# Patient Record
Sex: FEMALE | Employment: UNEMPLOYED | URBAN - METROPOLITAN AREA
[De-identification: names, ages, dates, MRNs, and addresses within clinical notes are randomized per-mention and may not be internally consistent; named-entity substitution may affect disease eponyms.]

---

## 2021-04-23 ENCOUNTER — APPOINTMENT (INPATIENT)
Dept: RADIOLOGY | Facility: HOSPITAL | Age: 56
DRG: 840 | End: 2021-04-23
Payer: COMMERCIAL

## 2021-04-23 ENCOUNTER — HOSPITAL ENCOUNTER (INPATIENT)
Facility: HOSPITAL | Age: 56
LOS: 52 days | Discharge: NURSING, REHAB OR LONG-TERM CARE | DRG: 840 | End: 2021-06-14
Attending: FAMILY MEDICINE | Admitting: INTERNAL MEDICINE
Payer: COMMERCIAL

## 2021-04-23 DIAGNOSIS — G89.3 CANCER RELATED PAIN: ICD-10-CM

## 2021-04-23 DIAGNOSIS — T38.0X5A STEROID-INDUCED HYPERGLYCEMIA: ICD-10-CM

## 2021-04-23 DIAGNOSIS — C85.89 CNS LYMPHOMA (HCC): Primary | ICD-10-CM

## 2021-04-23 DIAGNOSIS — R33.9 URINARY RETENTION: ICD-10-CM

## 2021-04-23 DIAGNOSIS — K59.00 CONSTIPATION: ICD-10-CM

## 2021-04-23 DIAGNOSIS — M25.462 PAIN AND SWELLING OF LEFT KNEE: ICD-10-CM

## 2021-04-23 DIAGNOSIS — R73.9 STEROID-INDUCED HYPERGLYCEMIA: ICD-10-CM

## 2021-04-23 DIAGNOSIS — E43 SEVERE PROTEIN-CALORIE MALNUTRITION (HCC): ICD-10-CM

## 2021-04-23 DIAGNOSIS — R13.10 DYSPHAGIA: ICD-10-CM

## 2021-04-23 DIAGNOSIS — M10.9 ACUTE GOUT: ICD-10-CM

## 2021-04-23 DIAGNOSIS — M25.562 PAIN AND SWELLING OF LEFT KNEE: ICD-10-CM

## 2021-04-23 DIAGNOSIS — I10 HYPERTENSION: ICD-10-CM

## 2021-04-23 PROBLEM — R41.82 ALTERED MENTAL STATUS: Status: ACTIVE | Noted: 2021-04-23

## 2021-04-23 PROCEDURE — 71045 X-RAY EXAM CHEST 1 VIEW: CPT

## 2021-04-23 PROCEDURE — 94760 N-INVAS EAR/PLS OXIMETRY 1: CPT

## 2021-04-23 PROCEDURE — 99223 1ST HOSP IP/OBS HIGH 75: CPT | Performed by: FAMILY MEDICINE

## 2021-04-23 RX ORDER — DEXAMETHASONE SODIUM PHOSPHATE 4 MG/ML
4 INJECTION, SOLUTION INTRA-ARTICULAR; INTRALESIONAL; INTRAMUSCULAR; INTRAVENOUS; SOFT TISSUE 4 TIMES DAILY
COMMUNITY
Start: 2021-04-22 | End: 2021-06-15 | Stop reason: HOSPADM

## 2021-04-23 RX ORDER — ACETAMINOPHEN 500 MG
500 TABLET ORAL EVERY 6 HOURS PRN
Status: ON HOLD | COMMUNITY
Start: 2021-04-22 | End: 2021-06-14

## 2021-04-23 RX ORDER — ONDANSETRON 2 MG/ML
4 INJECTION INTRAMUSCULAR; INTRAVENOUS EVERY 6 HOURS PRN
COMMUNITY
Start: 2021-04-22 | End: 2021-06-15 | Stop reason: HOSPADM

## 2021-04-23 RX ORDER — DEXTROSE AND SODIUM CHLORIDE 5; .45 G/100ML; G/100ML
50 INJECTION, SOLUTION INTRAVENOUS CONTINUOUS
Status: DISCONTINUED | OUTPATIENT
Start: 2021-04-23 | End: 2021-04-27

## 2021-04-23 RX ORDER — DEXAMETHASONE SODIUM PHOSPHATE 4 MG/ML
4 INJECTION, SOLUTION INTRA-ARTICULAR; INTRALESIONAL; INTRAMUSCULAR; INTRAVENOUS; SOFT TISSUE 4 TIMES DAILY
Status: DISCONTINUED | OUTPATIENT
Start: 2021-04-23 | End: 2021-05-07

## 2021-04-23 RX ORDER — STANDARDIZED SENNA CONCENTRATE AND DOCUSATE SODIUM 8.6; 5 MG/1; MG/1
1 TABLET ORAL 2 TIMES DAILY
COMMUNITY
Start: 2021-04-22 | End: 2021-08-05 | Stop reason: HOSPADM

## 2021-04-23 RX ORDER — ALBUTEROL SULFATE 2.5 MG/3ML
2.5 SOLUTION RESPIRATORY (INHALATION) EVERY 6 HOURS PRN
Status: DISCONTINUED | OUTPATIENT
Start: 2021-04-23 | End: 2021-04-23

## 2021-04-23 RX ORDER — AMOXICILLIN 250 MG
1 CAPSULE ORAL 2 TIMES DAILY
Status: DISCONTINUED | OUTPATIENT
Start: 2021-04-23 | End: 2021-05-23

## 2021-04-23 RX ORDER — ALBUTEROL SULFATE 90 UG/1
2 AEROSOL, METERED RESPIRATORY (INHALATION)
COMMUNITY
Start: 2021-04-22 | End: 2021-08-05 | Stop reason: HOSPADM

## 2021-04-23 RX ORDER — FAMOTIDINE 20 MG/1
20 TABLET, FILM COATED ORAL DAILY
Status: ON HOLD | COMMUNITY
Start: 2021-04-22 | End: 2021-09-07 | Stop reason: SDUPTHER

## 2021-04-23 RX ORDER — LIDOCAINE 50 MG/G
1 PATCH TOPICAL DAILY
Status: DISCONTINUED | OUTPATIENT
Start: 2021-04-24 | End: 2021-06-14 | Stop reason: HOSPADM

## 2021-04-23 RX ORDER — ACETAMINOPHEN 325 MG/1
500 TABLET ORAL EVERY 6 HOURS PRN
Status: DISPENSED | OUTPATIENT
Start: 2021-04-23 | End: 2021-05-02

## 2021-04-23 RX ORDER — LIDOCAINE 4 G/G
1 PATCH TOPICAL DAILY
COMMUNITY
Start: 2021-04-23 | End: 2021-07-21 | Stop reason: HOSPADM

## 2021-04-23 RX ORDER — FAMOTIDINE 20 MG/1
20 TABLET, FILM COATED ORAL DAILY
Status: DISCONTINUED | OUTPATIENT
Start: 2021-04-24 | End: 2021-04-27

## 2021-04-23 RX ORDER — ALBUTEROL SULFATE 90 UG/1
2 AEROSOL, METERED RESPIRATORY (INHALATION) EVERY 6 HOURS PRN
Status: DISCONTINUED | OUTPATIENT
Start: 2021-04-23 | End: 2021-06-14 | Stop reason: HOSPADM

## 2021-04-23 RX ORDER — ALBUTEROL SULFATE 2.5 MG/3ML
2.5 SOLUTION RESPIRATORY (INHALATION)
COMMUNITY
Start: 2021-04-22 | End: 2021-07-21 | Stop reason: HOSPADM

## 2021-04-23 RX ORDER — ONDANSETRON 2 MG/ML
4 INJECTION INTRAMUSCULAR; INTRAVENOUS EVERY 6 HOURS PRN
Status: DISCONTINUED | OUTPATIENT
Start: 2021-04-23 | End: 2021-06-14 | Stop reason: HOSPADM

## 2021-04-23 NOTE — PROGRESS NOTES
Pt arrived to unit in stable condition  Pt will not answer orientation questions, open eyes, or respond to questions  Dr Jorge Rodriugez made aware that pt was not responding appropriately to staff, but MD said she responded to her when in room  Dual skin check completed with Landon Primrose, RN- darkened areas noted to bilateral buttocks areas- no skin breakdown noted  "IJ" noted per MARTINE Larsen at [address] [facility] line was placed 3/31  Update 1935: upon assessment pt does not have a IJ central line  Confirmed with Johnson Baumgarten RN, that pt in fact has a hemodialysis catheter  Dr Jorge Rodriguez made aware via tiger text and night shift RN, Jaymie updated

## 2021-04-23 NOTE — ASSESSMENT & PLAN NOTE
· Patient noted to have dysphagia and also decreased p o  Intake  As per [facility] progress note patient had a calorie count and recommended feeding tube placement  · Speech evaluation  · Calorie count  · If the patient continued to have poor p o   Intake may need discussion with the family regarding alternate methods of nutrition

## 2021-04-23 NOTE — H&P
[facility]  H&P- July Echeverria 1965, 49 y o  female MRN: [id number]  Unit/Bed#: [facility] 920-01 Encounter: [id number]  Primary Care Provider: No primary care provider on file  Date and time admitted to hospital: 4/23/2021  4:49 PM    * 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[facility])  Assessment & Plan  · Patient was diagnosed with primary 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through biopsy in [facility] transferred over here for chemotherapy since the [facility] is out of network  · Consult Hematology-Oncology  · Continue with dexamethasone  · As per [facility] progress note patient had discussion with palliative Care and at that time family wants to pursue disease directed therapy     Urinary retention  Assessment & Plan  · Patient developed urinary retention wearing the hospital and heart Cottrell catheter placement  · Plan was voiding trial as an outpatient  · Continue with the Cottrell catheter    Dysphagia  Assessment & Plan  · Patient noted to have dysphagia and also decreased p o  Intake  As per [facility] progress note patient had a calorie count and recommended feeding tube placement  · Speech evaluation  · Calorie count  · If the patient continued to have poor p o  Intake may need discussion with the family regarding alternate methods of nutrition      Altered mental status  Assessment & Plan  · Patient initially presented to [facility] his ultimate [place] with stroke-like symptoms for 2 days  CT scan of the head was concerning for subacute CVA and was transferred to [facility]   · MRI of the brain was more consistent with demyelinating disease patient was started on IV steroids and 1000 mg daily for 5 days  Patient did not have any significant improvement at that time patient had plasmapheresis  And also had  lumbar puncture-negative for infection or malignancy    Patient noted to have persistent encephalopathy so a repeat MRI was obtained which showed worsening of the abnormality seen on the previous MRI and 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· Patient still remained altered  · Continue with the dexamethasone  · Likely secondary to 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· Neurology and Hematology Oncology consult    VTE Prophylaxis:  Hold until hematology evaluation given 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/ sequential compression device   Code Status: full code  POLST: POLST form is not discussed and not completed at this time  Discussion with family:     Anticipated Length of Stay:  Patient will be admitted on an Inpatient basis with an anticipated length of stay of  > 2 midnights  Justification for Hospital Stay:  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Total Time for Visit, including Counseling / Coordination of Care: 70 minutes  Greater than 50% of this total time spent on direct patient counseling and coordination of care  Chief Complaint:   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History of Present Illness:    Arpita Jarvis is a 49 y o  female who presents assessed transfer from [facility] secondary to 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[facility] is out of network for [employer] and needed to be transfer to [facility] for initiation of chemotherapy for her 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Patient initially presented to [facility] his cell [address] and was transferred to [facility] due to suspected stroke  49 y o  female patient who is legally blind at baseline with no other significant past medical history who presented initially on 03/19/21 with left sided facial droop and trouble swallowing for over two days  She also started to complain of blurred vision  and mild posterior headache  She denied any recent febrile illness or flu-like symptoms   She did sustain a fall prior to admission at home  She had a CT of the head which showed an acute to subacute infarct left cerebellum and midbrain  CTA neck and head showed no arterial stenosis or dissection intracranially and patent cervical carotid and vertebral arteries  Patient was outside tPa window and was managed medically  Michelle Bundy showed demyelinating processes such as anti-MOG encephalitis vs neoplastic processes such as 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Edema and expansion of the left aspect of the midbrain, de and left middle cerebellar peduncle was seen as well  Patient also underwent a CT chest/abdomen/pelvis which showed fracture of multiple pubic rami, megan-left for which she was recommended physical therapy and acute rehab  Patient was started on pulse dose steroids on 3/24 1000mg daily for 5 days for suspicion of acute demyelinating encephalomyelitis  Patient underwent a lumbar puncture on 0325/21 by IR which showed high CSF protein, normal glucose and lymphocytosis  Cytology negative for malignancy or lymphoma  She also developed acute urinary retention on 03/25 for which a machado was placed by urology with plan for outpatient follow up with urology for trial of void  Patient was found to have a UTI and completed a 7 day course of antibiotics  When patient's mentation did not improve after steroids, patient was initiated on PLEX for 5 days from 03/30/21 but patient had no major improvements  Patient also had dysphagia and was recommended a dysphagia soft diet  Patient was noticed to have an acute worsening with her mental status on 04/04/21 with repeat CT head showing no new changes  EEG negative for seizures  She had repeat MRI after completing PLEX on 4/7/21 revealing increased enhancement in corpus callosum and RIGHT periventricular white matter and RIGHT aspect of the splenium of the corpus callosum and overall decreasing FLAIR signal abnormality involving the LEFT brainstem and LEFT cerebellar hemispheres with interval decrease in degree of nodular enhancement which has been slowly receding since the first MRI on 03/21/2021   Differential diagnosis at that time included 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although typically not as rapidly progressive and typically more hypercellular), angioinvasive lymphoma (although typically presents with hemorrhages which are not present), autoimmune encephalitis  Neurology recommended repeat LP and cytology and ID was consulted for further infectious work up- CSF PCR negative x 2, CSF cryptococcal ag negative, CSF AFB culture negative  Repeat LP on 4/8/21 revealing 84 WBC (previously 20), elevated protein 134 (previously 222)  Cytology was otherwise reported as negative  Neurosurgery was then consulted and patient underwent a brain biospy on 04/14/21   Patient required ICU stay after the biopsy but was eventually transferred to the floor in a stable  Patient was also treated for acute kidney injury during the hospitalization which was resolved  Patient with poor P O intake   Patient was seen by palliative care and the family was treatment focused  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  Immunohistochemistry is performed with the following results: CD20 positive, CD3 highlights reactive lymphocytes, CD79a positive, CD10 negative, BCL6 positive Patient had a previous CT C/A/P which had not shown any lymphadenopathy or any other signs of metastatic disease elsewhere  MRI cervical/thoraco/lumbar spine on 04/22/21 revealed no evidence of lymphoma in the area  Unfortunately [facility] is not in network for patient insurance  They contacted Dr Pippa Pratt who accepted the patient and wants the patient to be admitted under Medicine  Patient was eventually transferred to [facility]  Patient denied any specific complaints  Patient is [place] speaking  I was able to communicate with her in [address]    Patient follows commands on the right side       Patient and her  is in you was but they have 2 adult children who is in [place]    Review of Systems:    Review of Systems  Cannot be obtained due to patient's mental status  Past Medical and Surgical History: History reviewed  No pertinent past medical history  History reviewed  No pertinent surgical history  Meds/Allergies:    Prior to Admission medications    Medication Sig Start Date End Date Taking? Authorizing Provider   acetaminophen (TYLENOL) 500 mg tablet Take 500 mg by mouth every 6 (six) hours as needed 4/22/21 5/2/21 Yes Historical Provider, MD   albuterol (2 5 mg/3 mL) 0 083 % nebulizer solution Inhale 2 5 mg 4/22/21 4/22/22 Yes Historical Provider, MD   albuterol (PROVENTIL HFA,VENTOLIN HFA) 90 mcg/act inhaler Inhale 2 puffs 4/22/21 4/22/22 Yes Historical Provider, MD   dexamethasone (DECADRON) 4 mg/mL Infuse 4 mg into a venous catheter 4 (four) times a day 4/22/21  Yes Historical Provider, MD   famotidine (PEPCID) 20 mg tablet Take 20 mg by mouth daily 4/22/21 4/22/22 Yes Historical Provider, MD   Lidocaine 4 % PTCH Place 1 patch on the skin daily 4/23/21  Yes Historical Provider, MD   ondansetron (ZOFRAN) 4 mg/2 mL injection Infuse 4 mg into a venous catheter every 6 (six) hours as needed 4/22/21  Yes Historical Provider, MD   senna-docusate sodium (Senokot S) 8 6-50 mg per tablet Take 1 tablet by mouth 2 (two) times a day 4/22/21 4/22/22 Yes Historical Provider, MD     I have been unable to obtain / verify an up to date medication list despite all reasonable attempts  Patient was not on any medication as an outpatient  Allergies:    Allergies   Allergen Reactions    Albumen, Egg - Food Allergy Other (See Comments)       Social History:     Marital Status: Unknown   Occupation:   Patient Pre-hospital Living Situation:   Patient Pre-hospital Level of Mobility:   Patient Pre-hospital Diet Restrictions:   Substance Use History:   Social History     Substance and Sexual Activity   Alcohol Use Never    Frequency: Never     Social History     Tobacco Use   Smoking Status Never Smoker   Smokeless Tobacco Never Used     Social History     Substance and Sexual Activity   Drug Use Not on file Family History:    History reviewed  No pertinent family history  Physical Exam:     Vitals:   Pulse: 79 (04/23/21 1657)  Temperature: 98 2 °F (36 8 °C) (04/23/21 1700)  SpO2: 99 % (04/23/21 1657)    Physical Exam  Constitutional:       Comments: When I entered the room patient's eyes were closed  When I asked her to open her eyes she open her eyes  HENT:      Head:      Comments: Surgical site with staples in     Nose: Nose normal    Eyes:      General: No scleral icterus  Cardiovascular:      Rate and Rhythm: Normal rate and regular rhythm  Pulmonary:      Comments: Decreased breath sounds bilateral  Abdominal:      General: Abdomen is flat  Genitourinary:     Comments: Cottrell catheter present  Musculoskeletal:      Right lower leg: No edema  Left lower leg: No edema  Lymphadenopathy:      Cervical: No cervical adenopathy  Skin:     General: Skin is warm  Neurological:      Mental Status: She is alert  Comments: Opens eyes to voice  Moves right upper and lower extremity on command Strength 4+ out of 5  Attempts to move the left lower extremity on command-  Noted to have muscle wasting in bilateral upper thigh/ calf           Additional Data:     Lab Results: I have personally reviewed pertinent reports  Imaging: I have personally reviewed pertinent reports  XR chest portable    (Results Pending)       EKG, Pathology, and Other Studies Reviewed on Admission:   · EKG:     Allscripts / Epic Records Reviewed: Yes     ** Please Note: This note has been constructed using a voice recognition system   **

## 2021-04-23 NOTE — ASSESSMENT & PLAN NOTE
· Patient was diagnosed with primary 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through biopsy in [facility] transferred over here for chemotherapy since the [facility] is out of network  · Consult Hematology-Oncology  · Continue with dexamethasone  · As per [facility] progress note patient had discussion with palliative Care and at that time family wants to pursue disease directed therapy

## 2021-04-23 NOTE — ASSESSMENT & PLAN NOTE
· Patient initially presented to [facility] his ultimate [place] with stroke-like symptoms for 2 days  CT scan of the head was concerning for subacute CVA and was transferred to [facility]   · MRI of the brain was more consistent with demyelinating disease patient was started on IV steroids and 1000 mg daily for 5 days  Patient did not have any significant improvement at that time patient had plasmapheresis  And also had  lumbar puncture-negative for infection or malignancy    Patient noted to have persistent encephalopathy so a repeat MRI was obtained which showed worsening of the abnormality seen on the previous MRI and 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· Patient still remained altered  · Continue with the dexamethasone  · Likely secondary to 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· Neurology and Hematology Oncology consult

## 2021-04-23 NOTE — ASSESSMENT & PLAN NOTE
· Patient developed urinary retention wearing the hospital and heart Cottrell catheter placement    · Plan was voiding trial as an outpatient  · Continue with the Cottrell catheter

## 2021-04-24 PROBLEM — R53.1 WEAKNESS: Status: ACTIVE | Noted: 2021-04-24

## 2021-04-24 PROBLEM — Z95.828: Status: ACTIVE | Noted: 2021-04-24

## 2021-04-24 PROBLEM — R47.01 APHASIA: Status: ACTIVE | Noted: 2021-04-24

## 2021-04-24 LAB
ALBUMIN SERPL BCP-MCNC: 3.6 G/DL (ref 3.5–5)
ALP SERPL-CCNC: 63 U/L (ref 46–116)
ALT SERPL W P-5'-P-CCNC: 18 U/L (ref 12–78)
ANION GAP SERPL CALCULATED.3IONS-SCNC: 5 MMOL/L (ref 4–13)
AST SERPL W P-5'-P-CCNC: 9 U/L (ref 5–45)
BILIRUB SERPL-MCNC: 0.36 MG/DL (ref 0.2–1)
BUN SERPL-MCNC: 25 MG/DL (ref 5–25)
CALCIUM SERPL-MCNC: 10.2 MG/DL (ref 8.3–10.1)
CHLORIDE SERPL-SCNC: 105 MMOL/L (ref 100–108)
CO2 SERPL-SCNC: 26 MMOL/L (ref 21–32)
CREAT SERPL-MCNC: 0.62 MG/DL (ref 0.6–1.3)
ERYTHROCYTE [DISTWIDTH] IN BLOOD BY AUTOMATED COUNT: 13.2 % (ref 11.6–15.1)
GFR SERPL CREATININE-BSD FRML MDRD: 103 ML/MIN/1.73SQ M
GLUCOSE SERPL-MCNC: 165 MG/DL (ref 65–140)
HCT VFR BLD AUTO: 39.1 % (ref 34.8–46.1)
HGB BLD-MCNC: 13.1 G/DL (ref 11.5–15.4)
MAGNESIUM SERPL-MCNC: 2.1 MG/DL (ref 1.6–2.6)
MCH RBC QN AUTO: 32 PG (ref 26.8–34.3)
MCHC RBC AUTO-ENTMCNC: 33.5 G/DL (ref 31.4–37.4)
MCV RBC AUTO: 96 FL (ref 82–98)
PLATELET # BLD AUTO: 194 THOUSANDS/UL (ref 149–390)
PMV BLD AUTO: 10 FL (ref 8.9–12.7)
POTASSIUM SERPL-SCNC: 4.1 MMOL/L (ref 3.5–5.3)
PREALB SERPL-MCNC: 31.3 MG/DL (ref 18–40)
PROT SERPL-MCNC: 6.9 G/DL (ref 6.4–8.2)
RBC # BLD AUTO: 4.09 MILLION/UL (ref 3.81–5.12)
SODIUM SERPL-SCNC: 136 MMOL/L (ref 136–145)
WBC # BLD AUTO: 7.03 THOUSAND/UL (ref 4.31–10.16)

## 2021-04-24 PROCEDURE — 99253 IP/OBS CNSLTJ NEW/EST LOW 45: CPT | Performed by: INTERNAL MEDICINE

## 2021-04-24 PROCEDURE — 83735 ASSAY OF MAGNESIUM: CPT | Performed by: FAMILY MEDICINE

## 2021-04-24 PROCEDURE — 99222 1ST HOSP IP/OBS MODERATE 55: CPT | Performed by: INTERNAL MEDICINE

## 2021-04-24 PROCEDURE — 85027 COMPLETE CBC AUTOMATED: CPT | Performed by: FAMILY MEDICINE

## 2021-04-24 PROCEDURE — 84134 ASSAY OF PREALBUMIN: CPT | Performed by: FAMILY MEDICINE

## 2021-04-24 PROCEDURE — 92610 EVALUATE SWALLOWING FUNCTION: CPT

## 2021-04-24 PROCEDURE — 80053 COMPREHEN METABOLIC PANEL: CPT | Performed by: FAMILY MEDICINE

## 2021-04-24 RX ORDER — FUROSEMIDE 10 MG/ML
40 INJECTION INTRAMUSCULAR; INTRAVENOUS ONCE
Status: DISCONTINUED | OUTPATIENT
Start: 2021-04-24 | End: 2021-04-24

## 2021-04-24 RX ORDER — TAMSULOSIN HYDROCHLORIDE 0.4 MG/1
0.4 CAPSULE ORAL
Status: DISCONTINUED | OUTPATIENT
Start: 2021-04-24 | End: 2021-06-14 | Stop reason: HOSPADM

## 2021-04-24 RX ADMIN — DEXTROSE AND SODIUM CHLORIDE 75 ML/HR: 5; .45 INJECTION, SOLUTION INTRAVENOUS at 20:45

## 2021-04-24 RX ADMIN — SENNOSIDES, DOCUSATE SODIUM 1 TABLET: 8.6; 5 TABLET ORAL at 08:47

## 2021-04-24 RX ADMIN — FAMOTIDINE 20 MG: 20 TABLET ORAL at 08:47

## 2021-04-24 RX ADMIN — DEXAMETHASONE SODIUM PHOSPHATE 4 MG: 4 INJECTION INTRA-ARTICULAR; INTRALESIONAL; INTRAMUSCULAR; INTRAVENOUS; SOFT TISSUE at 17:31

## 2021-04-24 RX ADMIN — LIDOCAINE 5% 1 PATCH: 700 PATCH TOPICAL at 08:47

## 2021-04-24 RX ADMIN — DEXAMETHASONE SODIUM PHOSPHATE 4 MG: 4 INJECTION INTRA-ARTICULAR; INTRALESIONAL; INTRAMUSCULAR; INTRAVENOUS; SOFT TISSUE at 21:09

## 2021-04-24 RX ADMIN — TAMSULOSIN HYDROCHLORIDE 0.4 MG: 0.4 CAPSULE ORAL at 17:31

## 2021-04-24 RX ADMIN — DEXTROSE AND SODIUM CHLORIDE 75 ML/HR: 5; .45 INJECTION, SOLUTION INTRAVENOUS at 11:42

## 2021-04-24 RX ADMIN — DEXAMETHASONE SODIUM PHOSPHATE 4 MG: 4 INJECTION INTRA-ARTICULAR; INTRALESIONAL; INTRAMUSCULAR; INTRAVENOUS; SOFT TISSUE at 11:38

## 2021-04-24 RX ADMIN — DEXAMETHASONE SODIUM PHOSPHATE 4 MG: 4 INJECTION INTRA-ARTICULAR; INTRALESIONAL; INTRAMUSCULAR; INTRAVENOUS; SOFT TISSUE at 08:47

## 2021-04-24 RX ADMIN — ACETAMINOPHEN 488 MG: 325 TABLET ORAL at 00:47

## 2021-04-24 RX ADMIN — DEXTROSE AND SODIUM CHLORIDE 75 ML/HR: 5; .45 INJECTION, SOLUTION INTRAVENOUS at 00:25

## 2021-04-24 RX ADMIN — SENNOSIDES, DOCUSATE SODIUM 1 TABLET: 8.6; 5 TABLET ORAL at 17:31

## 2021-04-24 RX ADMIN — DEXAMETHASONE SODIUM PHOSPHATE 4 MG: 4 INJECTION INTRA-ARTICULAR; INTRALESIONAL; INTRAMUSCULAR; INTRAVENOUS; SOFT TISSUE at 00:24

## 2021-04-24 NOTE — UTILIZATION REVIEW
Inpatient Admission Authorization Request   NOTIFICATION OF INPATIENT ADMISSION/INPATIENT AUTHORIZATION REQUEST   SERVICING FACILITY:   [facility]  Address: [address] [place] [address], [address] [place] [address]  Tax ID: [id number]  NPI: [id number]  Place of Service: Inpatient [facility]  [address]  Place of Service Code: 24     ATTENDING PROVIDER:  Attending Name and NPI#: Maximino Santana Md [[id number]]  Address: [address] [place] [address], [address] [place] [address]  Phone: [phone]     UTILIZATION REVIEW CONTACT:  Pernell Kayser, Utilization   Network Utilization Review Department  Phone: [phone]  Fax: [phone]  Email: [email or web address]     PHYSICIAN ADVISORY SERVICES:  FOR IOHZ-CC-VUHQ REVIEW - MEDICAL NECESSITY DENIAL  Phone: [phone]  Fax: [phone]  Email: [email or web address]     TYPE OF REQUEST:  Inpatient Status     ADMISSION INFORMATION:  ADMISSION DATE/TIME: 4/23/21 1649  PATIENT DIAGNOSIS CODE/DESCRIPTION:  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[facility] [address] ) [C85 [id number]]  DISCHARGE DATE/TIME: No discharge date for patient encounter  DISCHARGE DISPOSITION (IF DISCHARGED): Final discharge disposition not confirmed     IMPORTANT INFORMATION:  Please contact the Pernell Kayser directly with any questions or concerns regarding this request  Department voicemails are confidential     Send requests for admission clinical reviews, concurrent reviews, approvals, and administrative denials due to lack of clinical to fax [phone]

## 2021-04-24 NOTE — ASSESSMENT & PLAN NOTE
Definite evidence of expressive aphasia, possible component of receptive aphasia as well  Speech therapy on board   However, given that etiology of this is underlying lymphoma, caner-related therapy is the ultimate goal

## 2021-04-24 NOTE — ASSESSMENT & PLAN NOTE
Noted presence of right IJ tunneled double-lumen HD catheter; upon chart review, this was likely inserted to be used for plasmapheresis which she has completed    Will await Oncology recommendations regarding therapeutic options but if no longer needed, can consider IR consult for removal

## 2021-04-24 NOTE — CONSULTS
Medical Oncology/Hematology Consult Note  Senthil Vasquez, female, 49 y o , 1965,  [facility], [id number]     Assessment/Plan:    Assessment in summary, this is a 42-year-old female history of recently diagnosed primary 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Treatment had been recommended in the form of the [facility] program   This involves multi drug cytotoxic chemotherapy  In patients with performance status of 2 or better the fatality rate from toxicity was 6%  Her performance status is ECOG-4  This certainly would increase her potential for toxicity  Given the above, I think it would be reasonable to consider high-dose methotrexate with Rituxan  If she tolerated this without substantial toxicity, Manuela-C and or thiotepa could be added  Again, given her current performance status, I would say that even high-dose methotrexate and Rituxan would be high risk for substantial or even life-threatening toxicities  Not to confuse the matter, another option would be radiation therapy  While this is considered to be generally effective with at least moderate likelihood of improvement in disease, it is usually deferred due to concerns for long-term CNS toxicities  None the less, in her case, due to her poor performance status, I think it is reasonable to consider as a treatment option in her case  I have put in a call to the listed family friend to review this at length  If this is acceptable we will meet in the near future to sign consent forms and proceed with therapy as outlined  Plan see above  History of Present Illness     HPI: Senthil Vasquez is a 49y o  year old female who presents with 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She had been admitted at [facility] in March 19, 2021 with left-sided facial droop and swallowing difficulties  She had long history of legal blindness  Initial MRI imaging suggested demyelinating process versus lymphomatous process  She was started on high-dose steroids    LP showed lymphocytosis  Cytology negative  Symptoms do not improve on steroids  She was started on plasmapheresis March 30, 2021  No significant improvement  Repeat MRI April 7, 2021 showed increasing enhancement of the corpus callosum  Repeat LP was negative for infectious showed agents a nonspecific regarding lymphoma or malignancy  April 20, 2021 she had a biopsy indicating diffuse large B-cell lymphoma, CD 20 positive  CT chest abdomen pelvis showed no disease extracranially  Plan was in place to begin matrix chemotherapy  Insurance issues indicated transition to the [employer] which was accomplished yesterday  WBC 5 3, hemoglobin 12 2, platelet count 720, normal differential   CMP shows glucose 145, otherwise normal     Review of Systems   Constitutional: Negative for appetite change, diaphoresis, fatigue and fever  HENT: Negative for sinus pain  Eyes: Negative for discharge  Respiratory: Negative for cough and shortness of breath  Cardiovascular: Negative for chest pain  Gastrointestinal: Negative for abdominal pain, constipation and diarrhea  Endocrine: Negative for cold intolerance  Genitourinary: Negative for difficulty urinating and hematuria  Musculoskeletal: Negative for joint swelling  Skin: Negative for rash  Allergic/Immunologic: Negative for environmental allergies  Neurological: Negative for dizziness and headaches  Hematological: Negative for adenopathy  Psychiatric/Behavioral: Negative for agitation  Historical Information   History reviewed  No pertinent past medical history  History reviewed  No pertinent surgical history    Social History   Social History     Substance and Sexual Activity   Alcohol Use Never    Frequency: Never     Social History     Substance and Sexual Activity   Drug Use Not on file     Social History     Tobacco Use   Smoking Status Never Smoker   Smokeless Tobacco Never Used     Family History:   Family History   Problem Relation Age of Onset    No Known Problems Mother        Meds/Allergies   all current active meds have been reviewed  Allergies   Allergen Reactions    Albumen, Egg - Food Allergy Other (See Comments)       Objective   Vitals: Blood pressure 141/88, pulse 84, temperature 98 °F (36 7 °C), resp  rate 16, height 5' (1 524 m), weight 46 8 kg (103 lb 2 8 oz), SpO2 98 %  Intake/Output Summary (Last 24 hours) at 4/24/2021 1530  Last data filed at 4/24/2021 1300  Gross per 24 hour   Intake 1240 ml   Output 600 ml   Net 640 ml     Invasive Devices     Peripheral Intravenous Line            Peripheral IV 04/24/21 Left Antecubital less than 1 day          Hemodialysis Catheter            Hemodialysis Catheter Subclavian -- days          Drain            Urethral Catheter -- days                Physical Exam  Constitutional:       Appearance: She is well-developed  Comments: Not communicative  HENT:      Head: Normocephalic and atraumatic  Eyes:      Pupils: Pupils are equal, round, and reactive to light  Neck:      Musculoskeletal: Neck supple  Cardiovascular:      Rate and Rhythm: Normal rate  Heart sounds: No murmur  Pulmonary:      Effort: No respiratory distress  Breath sounds: No wheezing or rales  Abdominal:      General: There is no distension  Palpations: Abdomen is soft  Tenderness: There is no abdominal tenderness  There is no rebound  Musculoskeletal:         General: No tenderness  Lymphadenopathy:      Cervical: No cervical adenopathy  Skin:     General: Skin is warm  Findings: No rash  Neurological:      Mental Status: She is alert and oriented to person, place, and time  Deep Tendon Reflexes: Reflexes normal    Psychiatric:         Thought Content: Thought content normal          Lab Results: I have personally reviewed pertinent reports  Imaging Studies: I have personally reviewed pertinent reports      EKG, Pathology, and Other Studies: I have personally reviewed pertinent reports  Counseling / Coordination of Care  Total floor / unit time spent today 40 minutes  Greater than 50% of total time was spent with the patient and / or family counseling and / or coordination of care  A description of the counseling / coordination of care: see note

## 2021-04-24 NOTE — SPEECH THERAPY NOTE
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[facility])  Active Problems:    Altered mental status    Dysphagia    Urinary retention      Past Medical History  History reviewed  No pertinent past medical history  Past Surgical History  History reviewed  No pertinent surgical history  Summary   Pt presented with s/s suggestive of mild oral dysphagia characterized by prolonged mastication and bolus formation for advanced textures that was better managed with very soft solids  Although she appears to manage advanced textures, the increased amount of time to process appears to lead to fatigue/decreased intake  No pharyngeal s/s of aspiration observed  Recommended Diet: mechanically altered/level 2 diet and thin liquids   Recommended Form of Meds: as tolerated  Currently crushed in puree; trial whole in puree and advance as tolerated  Aspiration precautions and swallowing strategies: upright posture, only feed when fully alert, slow rate of feeding, small bites/sips and alternating bites and sips  Other Recommendations/Considerations: Nutrition consult/ calorie count as ordered at [facility] to assure adequate po intake  Current Medical Status  Per Neuro consult:  Patience Manzo is a 49 y o  female who is legally blind no other past medical problems who presents as a transfer from [facility] secondary to 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on 4/23       Patient presented to [facility] on 3/19 with left-sided facial droop and trouble swallowing since 03/17, she also reported blurred vision, mild posterior headache, recent fall    She had a CT head which revealed acute to subacute infarct left cerebellum and midbrain, CTA showed no arterial stenosis or dissection, out of tPA window, MRI brain revealed demyelinating process suspicion for anti MOG encephalitis vs neoplastic-CNS lymphoma, edema and expansion of left aspect of the midbrain, de, left middle cerebellar peduncle  CT chest,abdomen, pelvis which showed fracture of multiple pubic rami, megan left for which she was recommended PT  She also developed dysphagia and recommended dysphagia soft diet  She had acute worsening in her mental status on 04/04 with repeat CT head no changes, EEG negative for seizure, repeat MRI on 04/07 after completing Plex revealed increased enhancement and carpus callosum and right periventricular white matter and right aspect of splenium of corpus callosum and overall decreased FLAIR signal abnormality involving the LEFT brainstem and LEFT cerebellar hemispheres with interval decrease in degree of nodular enhancement which has been slowly receding since the first MRI on 03/21/2021  Differentials included 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although typically not as rapidly progressive and typically more hypercellular), angioinvasive lymphoma (typically presents with hemorrhages), autoimmune encephalitis  Repeat LP was done- 4/8- 84 WBC (previously 20), elevated protein 134 (previously 222), cytology negative  She underwent brain biopsy on 04/14,       Allergies:  No known food allergies    Past medical history:  Please see H&P for details    Social/Education/Vocational Hx:  Pt lives with family    Swallow Information   Current Risks for Dysphagia & Aspiration: known history of dysphagia since admission to [facility]  Current Symptoms/Concerns: decreased po intake and report of difficulty swallowing  Current Diet: puree/level 1 diet and nectar thick liquids   Baseline Diet: regular prior to admission to [facility]  Reportedly on a "dysphagia soft" diet at [facility]  Baseline Assessment   Behavior/Cognition: pt kept her eyes closed throughout assessment but responded when engaged  Speech/Language Status: limited verbal output and followed basic commands with verbal/tactile cues    Patient Positioning: upright in bed  Pain Status/Interventions/Response to Interventions:   No report of or nonverbal indications of pain  Swallow Mechanism Exam  Limited assessment  Pt has natural dentition  Good lingual movement noted for bolus manipulation and management of material orally  Lip seal was in tact  Voicing was soft but clear  Consistencies Assessed and Performance   Consistencies Administered: thin liquids, puree, soft solids and hard solids  Materials administered included applesauce, rice krispies in milk, toast with butter, thin water via straw  Oral Stage: mild  Mastication was prolonged but effective with toast   Decreased bolus formation noted but pt utilized her tongue to successfully retrieve residual material following initial transfer  A Bolus formation and transfer were functional with no significant oral residue noted for puree and mechanical soft trials  No overt s/s reduced oral control  Pharyngeal Stage: WFL  Swallow Mechanics:  Swallowing initiation appeared prompt  Laryngeal rise was palpated and judged to be within functional limits  No coughing, throat clearing, change in vocal quality or respiratory status noted today  Esophageal Concerns: none reported      Summary and Recommendations (see above)    Results Reviewed with: patient, RN and PCA     Treatment Recommended: dysphagia tx     Frequency of treatment: 3-5x week    Patient Stated Goal: did not state  Dysphagia LTG  -Patient will demonstrate safe and effective oral intake (without overt s/s significant oral/pharyngeal dysphagia including s/s penetration or aspiration) for the highest appropriate diet level       Short Term Goals:  -Pt will tolerate Dysphagia 2/mechanical soft diet and thin liquid with no significant s/s oral or pharyngeal dysphagia across 1-3 diagnostic session/s     -Pt will tolerate Dysphagia 3/advanced (dental soft) diet and thin liquid with no significant s/s oral or pharyngeal dysphagia across 1-3 diagnostic session/s     -Patient will tolerate trials of upgraded food and/or liquid texture with no significant s/s of oral or pharyngeal dysphagia including aspiration across 1-3 diagnostic sessions                 Speech Therapy Prognosis   Prognosis: fair    Prognosis Considerations: age, medical status and therapeutic potential

## 2021-04-24 NOTE — CONSULTS
Consultation - Palliative and Supportive Care   Mekhi Samuels 49 y o  female [id number]    Assessment:      Patient Active Problem List   Diagnosis    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[address] )    Altered mental status    Dysphagia    Urinary retention   Goals of care        Plan:  1  Symptom management -     Recommend global delirium precautions including:      - Establishment of day/night cycle via lights during the day and blinds open  Please limit interruptions at night as medically appropriate  - Provide glasses/hearing aids as apprioriate  - Minimize deliriogenic meds as able  - Provide reorientation including date on board and visible clock  - Avoid restraints as able, frequent verbal reorientations or patient care sitter as appropriate  - PRN tylenol    2  Goals - Patient transferred from [facility] for inpatietn chemo after >1 month in hospital   Presumably full cares  Code Status: level 1   Decisional apparatus:  Patient is not competent on my exam today  If competence is lost, patient's substitute decision maker would default to unknown by [place]  I have reviewed the patient's controlled substance dispensing history in the Prescription Drug Monitoring Program in compliance with the [facility] regulations before prescribing any controlled substances  We appreciate the invitation to be involved in this patient's care  We will follow peripherally  Please do not hesitate to reach our on call provider through our clinic answering service at  should you have acute symptom control concerns  Derotha Phoenix, MD  Palliative and Supportive Care  Clinic/Answering Service: [phone]  You can find me on [employer]!        IDENTIFICATION:  Inpatient consult to Palliative Care  Consult performed by: Derotha Phoenix, MD  Consult ordered by: Rashi Ventura MD        Physician Requesting Consult: Chrissy Torres MD  Reason for Consult / Principal Problem: psychosocial support  Hx and PE limited by: patient participation    HISTORY OF PRESENT ILLNESS:       Anupama Barajas is a 49 y o  female who carried a diagnosis of being legally blind who presented to [facility] after a fall on 3/19  Patient initially thought to have CVA but no found to have 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Per chart review, patient was evaluated by care team at [facility] including Palliative and full cares were to be pursued including inpatient chemo  Given insurance issues she was transferred to [place] [id number] for this  On my evaluation patient will not open her eyes  States "good morning" but then engages no further in conversation  Review of Systems   Unable to perform ROS: mental status change       History reviewed  No pertinent past medical history  History reviewed  No pertinent surgical history    Social History     Socioeconomic History    Marital status: Unknown     Spouse name: Not on file    Number of children: Not on file    Years of education: Not on file    Highest education level: Not on file   Occupational History    Not on file   Social Needs    Financial resource strain: Not on file    Food insecurity     Worry: Not on file     Inability: Not on file    Transportation needs     Medical: Not on file     Non-medical: Not on file   Tobacco Use    Smoking status: Never Smoker    Smokeless tobacco: Never Used   Substance and Sexual Activity    Alcohol use: Never     Frequency: Never    Drug use: Not on file    Sexual activity: Not on file   Lifestyle    Physical activity     Days per week: Not on file     Minutes per session: Not on file    Stress: Not on file   Relationships    Social connections     Talks on phone: Not on file     Gets together: Not on file     Attends [employer] service: Not on file     Active member of club or organization: Not on file     Attends meetings of clubs or organizations: Not on file     Relationship status: Not on file    Intimate partner violence     Fear of current or ex partner: Not on file     Emotionally abused: Not on file     Physically abused: Not on file     Forced sexual activity: Not on file   Other Topics Concern    Not on file   Social History Narrative    Not on file     History reviewed  No pertinent family history  MEDICATIONS / ALLERGIES:    all current active meds have been reviewed and current meds:   Current Facility-Administered Medications   Medication Dose Route Frequency    acetaminophen (TYLENOL) tablet 488 mg  488 mg Oral Q6H PRN    albuterol (PROVENTIL HFA,VENTOLIN HFA) inhaler 2 puff  2 puff Inhalation Q6H PRN    dexamethasone (DECADRON) injection 4 mg  4 mg Intravenous 4x Daily    dextrose 5 % and sodium chloride 0 45 % infusion  75 mL/hr Intravenous Continuous    famotidine (PEPCID) tablet 20 mg  20 mg Oral Daily    lidocaine (LIDODERM) 5 % patch 1 patch  1 patch Topical Daily    ondansetron (ZOFRAN) injection 4 mg  4 mg Intravenous Q6H PRN    senna-docusate sodium (SENOKOT S) 8 6-50 mg per tablet 1 tablet  1 tablet Oral BID       Allergies   Allergen Reactions    Albumen, Egg - Food Allergy Other (See Comments)       OBJECTIVE:    Physical Exam  Physical Exam  Vitals signs and nursing note reviewed  Constitutional:       General: She is not in acute distress  Appearance: She is ill-appearing  HENT:      Head: Atraumatic  Right Ear: External ear normal       Left Ear: External ear normal       Nose: Nose normal    Eyes:      Comments: Eyes clsoed   Cardiovascular:      Rate and Rhythm: Normal rate  Pulmonary:      Effort: No respiratory distress  Abdominal:      General: There is no distension  Skin:     General: Skin is dry  Neurological:      Comments: Not engaging         Lab Results:   I have personally reviewed pertinent labs  , CBC:   Lab Results   Component Value Date    WBC 7 03 04/24/2021    HGB 13 1 04/24/2021    HCT 39 1 04/24/2021    MCV 96 04/24/2021     04/24/2021    MCH 32 0 04/24/2021    MCHC 33 5 04/24/2021 RDW 13 2 04/24/2021    MPV 10 0 04/24/2021   , CMP:   Lab Results   Component Value Date    SODIUM 136 04/24/2021    K 4 1 04/24/2021     04/24/2021    CO2 26 04/24/2021    BUN 25 04/24/2021    CREATININE 0 62 04/24/2021    CALCIUM 10 2 (H) 04/24/2021    AST 9 04/24/2021    ALT 18 04/24/2021    ALKPHOS 63 04/24/2021    EGFR 103 04/24/2021   , BMP:  Lab Results   Component Value Date    SODIUM 136 04/24/2021    K 4 1 04/24/2021     04/24/2021    CO2 26 04/24/2021    BUN 25 04/24/2021    CREATININE 0 62 04/24/2021    GLUC 165 (H) 04/24/2021    CALCIUM 10 2 (H) 04/24/2021    AGAP 5 04/24/2021    EGFR 103 04/24/2021     Imaging Studies: I personally reviewed relevant reports  EKG, Pathology, and Other Studies: I personally reviewed relevant reports    Counseling / Coordination of Care    Total floor / unit time spent today 25+ minutes  Greater than 50% of total time was spent with the patient and / or family counseling and / or coordination of care  A description of the counseling / coordination of care: chart review, med review, brief patietn assessment

## 2021-04-24 NOTE — PROGRESS NOTES
[address] 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[facility])  Assessment & Plan  · Patient was diagnosed with primary 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through biopsy in [facility] transferred over here for chemotherapy since the [facility] is out of network  · Oncology and Palliative care services consulted, appreciate recommendations   · Continue with dexamethasone  · As per [facility] progress note patient had discussion with palliative Care and at that time family wants to pursue disease directed therapy; Also spoke with patient's friend mentioned the chart who confirmed this information, attempted to contact her  Noemí Lindsey, [phone]) but voicemail left  Patient apparently does not speak much English as well but was answering by nodding her head at times and following commands  Presence of permanent central venous catheter  Assessment & Plan  Noted presence of right IJ tunneled double-lumen HD catheter; upon chart review, this was likely inserted to be used for plasmapheresis which she has completed  Will await Oncology recommendations regarding therapeutic options but if no longer needed, can consider IR consult for removal     Weakness  Assessment & Plan  Left greater than right upper and lower extremity weakness with ongoing left lower extremity contracture  Secondary to underlying CNS malignancy  *Decubitus ulcer precautions such as frequent repositioning  Aphasia  Assessment & Plan  Definite evidence of expressive aphasia, possible component of receptive aphasia as well  Speech therapy on board   However, given that etiology of this is underlying lymphoma, caner-related therapy is the ultimate goal      Urinary retention  Assessment & Plan  · Patient developed urinary retention during previous hospitalization  · Plan was voiding trial as an outpatient as she was scheduled for discharge from that facility  · Continue with the Cottrell catheter for today, will initiate Flomax 0 4mg, consider voiding trial    Dysphagia  Assessment & Plan  · Patient noted to have dysphagia and also decreased p o  Intake  As per [facility] progress note patient had a calorie count and recommended feeding tube placement  · Speech evaluation - advance diet with dysphagia 2 and thin liquids; needs assistance with feeds  · Calorie count  · If the patient continued to have poor p o  Intake may need discussion with the family regarding alternate methods of nutrition      Altered mental status  Assessment & Plan  · Patient initially presented to [facility] [place] with stroke-like symptoms for 2 days  CT scan of the head was concerning for subacute CVA and was transferred to [facility]   · MRI of the brain was more consistent with demyelinating disease patient was started on IV steroids and 1000 mg daily for 5 days  Patient did not have any significant improvement at that time patient had plasmapheresis  And also had  lumbar puncture-negative for infection or malignancy    Patient noted to have persistent encephalopathy so a repeat MRI was obtained which showed worsening of the abnormality seen on the previous MRI and 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· Patient following most verbal commands with some exceptions, however large part of this is due to concurrent aphasia from underlying malignancy as well as language barrier  · Continue with the dexamethasone  · Likely secondary to 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· Delirium precautions          Disposition: Anticipated discharge pending oncology recommendations regarding therapeutic options    VTE Prophylaxis - SCDs    Education and Discussions with Family / Patient: Spoke with patient at bedside; discussed current management plan with patient's friend (in chart); attempted to contact her , voicemail left    Current Length of Stay: 1 day(s)  Current Patient Status: Inpatient     Code Status: Level 1 - Full Code      Subjective:   Seen and examined at bedside  Overnight noted  Patient is awake in follow some verbal command but is unable to provide any verbal response, does nod her head but answers yes to any question that is asked, suspect some of this may be a language barrier versus encephalopathy secondary to underlying neoplasm  As per nurse, no diarrhea, cough, fever or chills  Objective:     Vitals:   Temp (24hrs), Av 1 °F (36 7 °C), Min:97 9 °F (36 6 °C), Max:98 2 °F (36 8 °C)    Temp:  [97 9 °F (36 6 °C)-98 2 °F (36 8 °C)] 98 °F (36 7 °C)  HR:  [72-79] 72  Resp:  [16] 16  BP: (132-135)/(84-85) 135/85  SpO2:  [98 %-100 %] 100 %  Body mass index is 20 15 kg/m²  Invasive Devices     Peripheral Intravenous Line            Peripheral IV 21 Left Antecubital less than 1 day          Hemodialysis Catheter            Hemodialysis Catheter Subclavian -- days          Drain            Urethral Catheter -- days                Input and Output Summary (last 24 hours): Intake/Output Summary (Last 24 hours) at 2021 1353  Last data filed at 2021 1140  Gross per 24 hour   Intake 1000 ml   Output 600 ml   Net 400 ml       Physical Exam:     Gen: in bed; room air; appears chronically ill/frail  HEENT: NC/AT, PERRLA, no scleral icterus, moist mucous membranes  No JVD  Right IJ tunneled catheter in right chest wall noted, no signs of infection  RS: symmetric, speaking in full sentences, CTA bilaterally  CVS:  RRR, S1-S2 heard without murmurs, no peripheral edema, radial pulses 2+, capillary refill less than 2 seconds  Abdomen:  Soft, nondistended, no facial grimacing with palpation, bowel sounds normoactive  MSK:  Warm    Left lower extremity contracture evident, significant weakness of 1/5 in left upper and lower extremity, 4/5 in right LE and 5/5 in R upper   :  (+() Cottrell with yellow urine  Neuro:  Alert, nonverbal ; nods her head yes to most questions asked  Psych:  Cooperative        Additional Data:     Labs:    Results from last 7 days   Lab Units 04/24/21  0837   WBC Thousand/uL 7 03   HEMOGLOBIN g/dL 13 1   HEMATOCRIT % 39 1   PLATELETS Thousands/uL 194     Results from last 7 days   Lab Units 04/24/21  0837   POTASSIUM mmol/L 4 1   CHLORIDE mmol/L 105   CO2 mmol/L 26   BUN mg/dL 25   CREATININE mg/dL 0 62   CALCIUM mg/dL 10 2*   ALK PHOS U/L 63   ALT U/L 18   AST U/L 9           * I Have Reviewed All Lab Data Listed Above  * Additional Pertinent Lab Tests Reviewed: [address] Admission Reviewed    Imaging:    Imaging Reports Reviewed Today Include:     IR FLUOROSCOPY GUIDED CENTRAL VENOUS ACCESS    Result Date: 3/31/2021  Impression: Uncomplicated insertion of a tunneled (permanent) Duraflow 2 dialysis catheter via the right internal jugular vein  Workstation:[id number]    MRI BRAIN WWO CONTRAST    Result Date: 4/7/2021  IMPRESSION: 1  Overall changes of the FLAIR signal abnormality and enhancement with significant interval increase in degree of FLAIR hyperintensity from the most recent brain MRI involving the corpus callosum and RIGHT periventricular white matter with associated increased enhancement  Additional increase within the RIGHT aspect of the splenium of the corpus callosum with increased enhancement  2  Overall decreasing FLAIR signal abnormality involving the LEFT brainstem and LEFT cerebellar hemispheres with interval decrease in degree of nodular enhancement which has been slowly receding since the first MRI on 03/21/2021  The FLAIR signal abnormality demonstrates slightly decreased diffusion, suggesting possible cellularity to the changes   The changes over time with different "ages" of lesions is not consistent with ADEM, which is typically a mono-phasic process  Enhancement pattern is also not typical for a demyelinating process  Differential diagnosis includes 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although typically not as rapidly progressive and typically more hypercellular), angioinvasive lymphoma (although typically presents with hemorrhages which are not present), autoimmune encephalitis  Vasculitis is considered less likely given the findings on the prior CTA  No acute hydrocephalus or entrapment of the ventricular system  Radiologist contact information is provided above if needed  Workstation:[id number]    MRI CERVICAL SPINE WWO CONTRAST    Result Date: 4/23/2021  IMPRESSION: 1  No evidence of lymphoma in the cervical spine  2  Small central disc protrusions at C3-4 and C4-5  Workstation:[id number]    MRI THORACIC SPINE WWO CONTRAST    Result Date: 4/23/2021  IMPRESSION: No evidence of lymphoma  Workstation:[id number]    MRI LUMBAR SPINE WWO CONTRAST    Result Date: 4/23/2021  IMPRESSION: 1  No evidence of lymphoma  2  Small central disc extrusion at L1-2 extending superiorly  3  Mild central canal stenosis at L3-4  4  Narrowing of multiple neural foramina as detailed above  5  Grade I spondylolisthesis of L5  Bilateral L5 spondylolysis   Workstation:[id number]        Imaging Personally Reviewed by Myself Includes:  none    Recent Cultures (last 7 days):           Last 24 Hours Medication List:   Current Facility-Administered Medications   Medication Dose Route Frequency Provider Last Rate    acetaminophen  488 mg Oral Q6H PRN Sam Monae MD      albuterol  2 puff Inhalation Q6H PRN Sam Monae MD      dexamethasone  4 mg Intravenous 4x Daily Sam Monae MD      dextrose 5 % and sodium chloride 0 45 %  75 mL/hr Intravenous Continuous Sam Monae MD 75 mL/hr (04/24/21 1142)    famotidine  20 mg Oral Daily Sam Monae MD      lidocaine  1 patch Topical Daily Sam Monae MD      ondansetron  4 mg Intravenous Q6H PRN Justina Romero MD      senna-docusate sodium  1 tablet Oral BID Justina Romero MD      tamsulosin  0 4 mg Oral Daily With Keron Pabon MD              ** Please Note: This note has been constructed using a voice recognition system   **

## 2021-04-24 NOTE — ASSESSMENT & PLAN NOTE
Left greater than right upper and lower extremity weakness with ongoing left lower extremity contracture  Secondary to underlying CNS malignancy  *Decubitus ulcer precautions such as frequent repositioning

## 2021-04-24 NOTE — ASSESSMENT & PLAN NOTE
· Patient developed urinary retention during previous hospitalization  · Plan was voiding trial as an outpatient as she was scheduled for discharge from that facility  · Continue with the Cottrell catheter for today, will initiate Flomax 0 4mg, consider voiding trial

## 2021-04-24 NOTE — ASSESSMENT & PLAN NOTE
· Patient noted to have dysphagia and also decreased p o  Intake  As per [facility] progress note patient had a calorie count and recommended feeding tube placement  · Speech evaluation - advance diet with dysphagia 2 and thin liquids; needs assistance with feeds  · Calorie count  · If the patient continued to have poor p o   Intake may need discussion with the family regarding alternate methods of nutrition

## 2021-04-24 NOTE — ASSESSMENT & PLAN NOTE
· Patient was diagnosed with primary 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through biopsy in [facility] transferred over here for chemotherapy since the [facility] is out of network  · Oncology and Palliative care services consulted, appreciate recommendations   · Continue with dexamethasone  · As per [facility] progress note patient had discussion with palliative Care and at that time family wants to pursue disease directed therapy; Also spoke with patient's friend mentioned the chart who confirmed this information, attempted to contact her  Emir Vigil, [phone]) but voicemail left  Patient apparently does not speak much English as well but was answering by nodding her head at times and following commands

## 2021-04-24 NOTE — RESPIRATORY THERAPY NOTE
RT Protocol Note  Davied No 49 y o  female MRN: [id number]  Unit/Bed#: [facility] 920-01 Encounter: [id number]    Assessment    Principal Problem:    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[address] )  Active Problems:    Altered mental status    Dysphagia    Urinary retention      Home Pulmonary Medications:  none       History reviewed  No pertinent past medical history  Social History     Socioeconomic History    Marital status: Unknown     Spouse name: None    Number of children: None    Years of education: None    Highest education level: None   Occupational History    None   Social Needs    Financial resource strain: None    Food insecurity     Worry: None     Inability: None    Transportation needs     Medical: None     Non-medical: None   Tobacco Use    Smoking status: Never Smoker    Smokeless tobacco: Never Used   Substance and Sexual Activity    Alcohol use: Never     Frequency: Never    Drug use: None    Sexual activity: None   Lifestyle    Physical activity     Days per week: None     Minutes per session: None    Stress: None   Relationships    Social connections     Talks on phone: None     Gets together: None     Attends Buddhist service: None     Active member of club or organization: None     Attends meetings of clubs or organizations: None     Relationship status: None    Intimate partner violence     Fear of current or ex partner: None     Emotionally abused: None     Physically abused: None     Forced sexual activity: None   Other Topics Concern    None   Social History Narrative    None       Subjective         Objective    Physical Exam:   Assessment Type: (P) Assess only  General Appearance: (P) Drowsy  Respiratory Pattern: (P) Normal  Chest Assessment: (P) Chest expansion symmetrical  Bilateral Breath Sounds: (P) Clear    Vitals:  Blood pressure 132/84, pulse 79, temperature 98 2 °F (36 8 °C), SpO2 98 %  Imaging and other studies: I have personally reviewed pertinent reports  Plan    Respiratory Plan: (P) Discontinue Protocol        Resp Comments: (P) Pt admitted from [facility] for chemo tx  Pt has no pulm hx listed in chart  She was found to be on rma with no visible distress and clear BS  No CXR available to review  Pt is not answearing questions for RN or myself at this time, physician aware of this  No therapy needed at this time  Will discontinue prn UDN

## 2021-04-24 NOTE — ASSESSMENT & PLAN NOTE
· Patient initially presented to [facility] his ultimate [place] with stroke-like symptoms for 2 days  CT scan of the head was concerning for subacute CVA and was transferred to [facility]   · MRI of the brain was more consistent with demyelinating disease patient was started on IV steroids and 1000 mg daily for 5 days  Patient did not have any significant improvement at that time patient had plasmapheresis  And also had  lumbar puncture-negative for infection or malignancy    Patient noted to have persistent encephalopathy so a repeat MRI was obtained which showed worsening of the abnormality seen on the previous MRI and 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· Patient following most verbal commands with some exceptions, however large part of this is due to concurrent aphasia from underlying malignancy as well as language barrier  · Continue with the dexamethasone  · Likely secondary to 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· Delirium precautions

## 2021-04-25 PROBLEM — S32.592A FRACTURE OF RAMUS OF LEFT PUBIS (HCC): Status: ACTIVE | Noted: 2021-04-25

## 2021-04-25 LAB
ANION GAP SERPL CALCULATED.3IONS-SCNC: 8 MMOL/L (ref 4–13)
BASOPHILS # BLD MANUAL: 0 THOUSAND/UL (ref 0–0.1)
BASOPHILS NFR MAR MANUAL: 0 % (ref 0–1)
BUN SERPL-MCNC: 18 MG/DL (ref 5–25)
CALCIUM SERPL-MCNC: 9.4 MG/DL (ref 8.3–10.1)
CHLORIDE SERPL-SCNC: 106 MMOL/L (ref 100–108)
CO2 SERPL-SCNC: 23 MMOL/L (ref 21–32)
CREAT SERPL-MCNC: 0.59 MG/DL (ref 0.6–1.3)
EOSINOPHIL # BLD MANUAL: 0 THOUSAND/UL (ref 0–0.4)
EOSINOPHIL NFR BLD MANUAL: 0 % (ref 0–6)
ERYTHROCYTE [DISTWIDTH] IN BLOOD BY AUTOMATED COUNT: 13 % (ref 11.6–15.1)
GFR SERPL CREATININE-BSD FRML MDRD: 104 ML/MIN/1.73SQ M
GLUCOSE SERPL-MCNC: 160 MG/DL (ref 65–140)
HCT VFR BLD AUTO: 33.8 % (ref 34.8–46.1)
HGB BLD-MCNC: 11.5 G/DL (ref 11.5–15.4)
LYMPHOCYTES # BLD AUTO: 1.29 THOUSAND/UL (ref 0.6–4.47)
LYMPHOCYTES # BLD AUTO: 18 % (ref 14–44)
MCH RBC QN AUTO: 31.9 PG (ref 26.8–34.3)
MCHC RBC AUTO-ENTMCNC: 34 G/DL (ref 31.4–37.4)
MCV RBC AUTO: 94 FL (ref 82–98)
METAMYELOCYTES NFR BLD MANUAL: 1 % (ref 0–1)
MONOCYTES # BLD AUTO: 0.65 THOUSAND/UL (ref 0–1.22)
MONOCYTES NFR BLD: 9 % (ref 4–12)
MYELOCYTES NFR BLD MANUAL: 2 % (ref 0–1)
NEUTROPHILS # BLD MANUAL: 5.03 THOUSAND/UL (ref 1.85–7.62)
NEUTS BAND NFR BLD MANUAL: 1 % (ref 0–8)
NEUTS SEG NFR BLD AUTO: 69 % (ref 43–75)
NRBC BLD AUTO-RTO: 0 /100 WBCS
NRBC BLD AUTO-RTO: 1 /100 WBC (ref 0–2)
PLATELET # BLD AUTO: 187 THOUSANDS/UL (ref 149–390)
PLATELET BLD QL SMEAR: ADEQUATE
PMV BLD AUTO: 11 FL (ref 8.9–12.7)
POTASSIUM SERPL-SCNC: 3.7 MMOL/L (ref 3.5–5.3)
RBC # BLD AUTO: 3.6 MILLION/UL (ref 3.81–5.12)
RBC MORPH BLD: NORMAL
SODIUM SERPL-SCNC: 137 MMOL/L (ref 136–145)
WBC # BLD AUTO: 7.19 THOUSAND/UL (ref 4.31–10.16)

## 2021-04-25 PROCEDURE — 85027 COMPLETE CBC AUTOMATED: CPT | Performed by: INTERNAL MEDICINE

## 2021-04-25 PROCEDURE — 99232 SBSQ HOSP IP/OBS MODERATE 35: CPT | Performed by: INTERNAL MEDICINE

## 2021-04-25 PROCEDURE — 85007 BL SMEAR W/DIFF WBC COUNT: CPT | Performed by: INTERNAL MEDICINE

## 2021-04-25 PROCEDURE — 80048 BASIC METABOLIC PNL TOTAL CA: CPT | Performed by: INTERNAL MEDICINE

## 2021-04-25 RX ORDER — HEPARIN SODIUM 5000 [USP'U]/ML
5000 INJECTION, SOLUTION INTRAVENOUS; SUBCUTANEOUS EVERY 8 HOURS SCHEDULED
Status: DISCONTINUED | OUTPATIENT
Start: 2021-04-25 | End: 2021-05-27

## 2021-04-25 RX ADMIN — SENNOSIDES, DOCUSATE SODIUM 1 TABLET: 8.6; 5 TABLET ORAL at 08:53

## 2021-04-25 RX ADMIN — DEXAMETHASONE SODIUM PHOSPHATE 4 MG: 4 INJECTION INTRA-ARTICULAR; INTRALESIONAL; INTRAMUSCULAR; INTRAVENOUS; SOFT TISSUE at 17:00

## 2021-04-25 RX ADMIN — DEXAMETHASONE SODIUM PHOSPHATE 4 MG: 4 INJECTION INTRA-ARTICULAR; INTRALESIONAL; INTRAMUSCULAR; INTRAVENOUS; SOFT TISSUE at 08:11

## 2021-04-25 RX ADMIN — SENNOSIDES, DOCUSATE SODIUM 1 TABLET: 8.6; 5 TABLET ORAL at 17:00

## 2021-04-25 RX ADMIN — DEXAMETHASONE SODIUM PHOSPHATE 4 MG: 4 INJECTION INTRA-ARTICULAR; INTRALESIONAL; INTRAMUSCULAR; INTRAVENOUS; SOFT TISSUE at 21:33

## 2021-04-25 RX ADMIN — HEPARIN SODIUM 5000 UNITS: 5000 INJECTION INTRAVENOUS; SUBCUTANEOUS at 21:33

## 2021-04-25 RX ADMIN — LIDOCAINE 5% 1 PATCH: 700 PATCH TOPICAL at 08:53

## 2021-04-25 RX ADMIN — DEXAMETHASONE SODIUM PHOSPHATE 4 MG: 4 INJECTION INTRA-ARTICULAR; INTRALESIONAL; INTRAMUSCULAR; INTRAVENOUS; SOFT TISSUE at 11:36

## 2021-04-25 RX ADMIN — FAMOTIDINE 20 MG: 20 TABLET ORAL at 08:53

## 2021-04-25 RX ADMIN — HEPARIN SODIUM 5000 UNITS: 5000 INJECTION INTRAVENOUS; SUBCUTANEOUS at 17:00

## 2021-04-25 RX ADMIN — TAMSULOSIN HYDROCHLORIDE 0.4 MG: 0.4 CAPSULE ORAL at 17:00

## 2021-04-25 RX ADMIN — DEXTROSE AND SODIUM CHLORIDE 75 ML/HR: 5; .45 INJECTION, SOLUTION INTRAVENOUS at 11:51

## 2021-04-25 NOTE — PROGRESS NOTES
INTERNAL MEDICINE RESIDENCY PROGRESS NOTE     Name: Toro Barajas   Age & Sex: 49 y o  female   MRN: [id number]  Unit/Bed#: [facility] 920-01   Encounter: [id number]  Team: SOD Team B     PATIENT INFORMATION     Name: Toro Barajas   Age & Sex: 49 y o  female   MRN: [id number] Stay Days: 2    ASSESSMENT/PLAN     Principal Problem:    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[address] )  Active Problems:    Altered mental status    Dysphagia    Urinary retention    Aphasia    Weakness    Presence of permanent central venous catheter    Fracture of ramus of left pubis ([facility])      Fracture of ramus of left pubis [facility])  Assessment & [address] prior to presentation to [facility]  Managed nonoperatively at [facility]    Presence of permanent central venous catheter  Assessment & Plan  Noted presence of right IJ tunneled double-lumen HD catheter; upon chart review, this was likely inserted to be used for plasmapheresis which she has completed  Will await Oncology recommendations regarding therapeutic options but if no longer needed, can consider IR consult for removal     Weakness  Assessment & Plan  Left greater than right upper and lower extremity weakness with ongoing left lower extremity contracture  Secondary to underlying CNS malignancy  *Decubitus ulcer precautions such as frequent repositioning  Aphasia  Assessment & Plan  Definite evidence of expressive aphasia, possible component of receptive aphasia as well  Speech therapy on board  Urinary retention  Assessment & Plan  Patient developed urinary retention during previous hospitalization  Plan was voiding trial as an outpatient as she was scheduled for discharge from that facility  Continue with the Cottrell catheter for today, was started on Flomax 0 4mg, consider voiding trial    Dysphagia  Assessment & Plan  Patient noted to have dysphagia and also decreased p o  Intake    As per [facility] progress note patient had a calorie count and recommended feeding tube placement  Speech evaluation - advance diet with dysphagia 2 and thin liquids; needs assistance with feeds  Calorie count  If the patient continued to have poor p o  Intake may need discussion with the family regarding alternate methods of nutrition    Altered mental status  Assessment & Plan  Patient initially presented to [facility] his ultimate [place] with stroke-like symptoms for 2 days  CT scan of the head was concerning for subacute CVA and was transferred to [facility]   MRI of the brain was more consistent with demyelinating disease patient was started on IV steroids and 1000 mg daily for 5 days  Patient did not have any significant improvement at that time patient had plasmapheresis  And also had  lumbar puncture-negative for infection or malignancy  Patient noted to have persistent encephalopathy so a repeat MRI was obtained which showed worsening of the abnormality seen on the previous MRI and 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Patient following most verbal commands with some exceptions, however large part of this is due to concurrent aphasia from underlying malignancy as well as language barrier  Continue with the dexamethasone  Likely secondary to 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Delirium precautions    * 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[facility])  Assessment & Plan  Patient was diagnosed with primary 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through biopsy in [facility] transferred over here for chemotherapy since the [facility] is out of network  Oncology and Palliative care services consulted, appreciate recommendations   Continue with dexamethasone  As per [facility] progress note patient had discussion with palliative Care and at that time family wants to pursue disease directed therapy  Dr Earl Simpson, oncologist, to meet with pt's family tomorrow afternoon      Disposition: Continue inpatient    SUBJECTIVE     Patient seen and examined  No acute events overnight   She verbalizes "water" and nods and shakes head appropriately to questions, communicates that she has no pain, but is lethargic and has delay in following commands  OBJECTIVE     Vitals:    21 2244 21 0600 21 0758 21 0758   BP: 120/74  139/83 139/83   BP Location:    Right arm   Pulse: 89  76 75   Resp: 18   16   Temp: 99 3 °F (37 4 °C)  98 6 °F (37 °C) 98 6 °F (37 °C)   TempSrc:    Oral   SpO2: 98%  100% 100%   Weight:  46 2 kg (101 lb 13 6 oz)     Height:          Temperature:   Temp (24hrs), Av 6 °F (37 °C), Min:98 °F (36 7 °C), Max:99 3 °F (37 4 °C)    Temperature: 98 6 °F (37 °C)  Intake & Output:  I/O        07 -  07 07 -  07 07 -  0700    P  O  0 560 120    I V  (mL/kg)  1240 (26 8)     Total Intake(mL/kg) 0 (0) 1800 (39) 120 (2 6)    Urine (mL/kg/hr) 250 1425 (1 3)     Total Output 250 1425     Net -250 +375 +120               Weights:   IBW (Ideal Body Weight): 45 5 kg    Body mass index is 19 89 kg/m²  Weight (last 2 days)     Date/Time   Weight    21 06   46 2 (101 85)    21 06   46 8 (103 18)    21 22:14:21   46 7 (102 9)            Physical Exam  Constitutional:       Appearance: She is underweight  HENT:      Head: Normocephalic and atraumatic  Eyes:      General: No scleral icterus  Conjunctiva/sclera: Conjunctivae normal    Cardiovascular:      Rate and Rhythm: Normal rate and regular rhythm  Heart sounds: Normal heart sounds  No murmur  Pulmonary:      Effort: Pulmonary effort is normal       Breath sounds: Normal breath sounds  No wheezing or rales  Abdominal:      General: Bowel sounds are normal  There is no distension  Palpations: Abdomen is soft  Tenderness: There is no abdominal tenderness  There is no guarding  Musculoskeletal:         General: No tenderness or deformity  Right lower leg: No edema  Left lower leg: No edema        Comments: LLE contracted  Diffuse muscle atrophy noted   Skin:     General: Skin is warm and dry  Findings: No erythema  Neurological:      Mental Status: She is lethargic  LABORATORY DATA     Labs: I have personally reviewed pertinent reports  Results from last 7 days   Lab Units 04/25/21  0620 04/24/21  0837   WBC Thousand/uL 7 19 7 03   HEMOGLOBIN g/dL 11 5 13 1   HEMATOCRIT % 33 8* 39 1   PLATELETS Thousands/uL 187 194   MONO PCT % 9  --       Results from last 7 days   Lab Units 04/25/21  0620 04/24/21  0837   POTASSIUM mmol/L 3 7 4 1   CHLORIDE mmol/L 106 105   CO2 mmol/L 23 26   BUN mg/dL 18 25   CREATININE mg/dL 0 59* 0 62   CALCIUM mg/dL 9 4 10 2*   ALK PHOS U/L  --  63   ALT U/L  --  18   AST U/L  --  9     Results from last 7 days   Lab Units 04/24/21  0837   MAGNESIUM mg/dL 2 1                        IMAGING & DIAGNOSTIC TESTING     Radiology Results: I have personally reviewed pertinent reports  Xr Chest Portable    Result Date: 4/24/2021  Impression: Dialysis catheter tip within the superior cavoatrial junction  No pneumothorax  Workstation performed: [id number]     Other Diagnostic Testing: I have personally reviewed pertinent reports      ACTIVE MEDICATIONS     Current Facility-Administered Medications   Medication Dose Route Frequency    acetaminophen (TYLENOL) tablet 488 mg  488 mg Oral Q6H PRN    albuterol (PROVENTIL HFA,VENTOLIN HFA) inhaler 2 puff  2 puff Inhalation Q6H PRN    dexamethasone (DECADRON) injection 4 mg  4 mg Intravenous 4x Daily    dextrose 5 % and sodium chloride 0 45 % infusion  75 mL/hr Intravenous Continuous    famotidine (PEPCID) tablet 20 mg  20 mg Oral Daily    heparin (porcine) subcutaneous injection 5,000 Units  5,000 Units Subcutaneous Q8H [facility]    lidocaine (LIDODERM) 5 % patch 1 patch  1 patch Topical Daily    ondansetron (ZOFRAN) injection 4 mg  4 mg Intravenous Q6H PRN    senna-docusate sodium (SENOKOT S) 8 6-50 mg per tablet 1 tablet  1 tablet Oral BID    tamsulosin (FLOMAX) capsule 0 4 mg  0 4 mg Oral Daily With Dinner       VTE Pharmacologic Prophylaxis: Heparin  VTE Mechanical Prophylaxis: sequential compression device    Portions of the record may have been created with voice recognition software  Occasional wrong word or "sound a like" substitutions may have occurred due to the inherent limitations of voice recognition software    Read the chart carefully and recognize, using context, where substitutions have occurred   ==  Rosalia Nissen, DO  [facility]  Internal Medicine Residency PGY-3

## 2021-04-25 NOTE — ASSESSMENT & PLAN NOTE
Left greater than right upper and lower extremity weakness with ongoing left lower extremity contracture  Secondary to underlying CNS malignancy  Decubitus ulcer precautions such as frequent repositioning

## 2021-04-25 NOTE — ASSESSMENT & PLAN NOTE
· Patient initially presented to [facility] his ultimate [place] with stroke-like symptoms for 2 days  CT scan of the head was concerning for subacute CVA and was transferred to [facility]   · MRI of the brain was more consistent with demyelinating disease patient was started on IV steroids and 1000 mg daily for 5 days  Patient did not have any significant improvement at that time patient had plasmapheresis  And also had  lumbar puncture-negative for infection or malignancy  Patient noted to have persistent encephalopathy so a repeat MRI was obtained which showed worsening of the abnormality seen on the previous MRI and 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· Likely secondary to 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· Delirium precautions   · Currently alert and oriented times 2-3  Follow commands      · Patient's  (does not speak [address]) makes all medical decision for her, confirmed once all medical treatment at this time

## 2021-04-25 NOTE — PROGRESS NOTES
Progress Note - Laura Taylor 49 y o  female MRN: [id number]    Unit/Bed#: [facility] 920-01 Encounter: [id number]      Assessment:  In summary, this is a 62-year-old female history of primary 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Yesterday and today I tried to contact the patient's family  Left message on answering machines  No call backs  In my opinion, the patient is not capable of participating in discussion regarding her cancer condition or treatment options  Until consent can be obtained it will be difficult to move forward with treatment  We will continue to try to contact her family  Addendum:  I was able to talk with family friend Mr Anabella Ramos  I will meet with Mr Anabella Ramos and the patient's  tomorrow afternoon  Plan:  See above  Subjective:   Patient remains in bed  She appears comfortable  Does not respond to verbal stimuli  Eyes closed  Objective:     Vitals: Blood pressure 139/83, pulse 75, temperature 98 6 °F (37 °C), resp  rate 18, height 5' (1 524 m), weight 46 2 kg (101 lb 13 6 oz), SpO2 100 %  ,Body mass index is 19 89 kg/m²  Intake/Output Summary (Last 24 hours) at 4/25/2021 1353  Last data filed at 4/25/2021 0239  Gross per 24 hour   Intake 560 ml   Output 1075 ml   Net -515 ml       Physical Exam:   General Appearance:    Alert, oriented        Eyes:    PERRL   Ears:    Normal external ear canals, both ears   Nose:   Nares normal, septum midline   Throat:   Mucosa moist  Pharynx without injection  Neck:   Supple       Lungs:     Clear to auscultation bilaterally   Chest Wall:    No tenderness or deformity    Heart:    Regular rate and rhythm       Abdomen:     Soft, non-tender, bowel sounds +, no organomegaly           Extremities:   Extremities no cyanosis or edema       Skin:   no rash or icterus      Lymph nodes:   Cervical, supraclavicular, and axillary nodes normal   Neurologic:   CNII-XII intact, normal strength, sensation and reflexes     throughout          Invasive Devices Peripheral Intravenous Line            Peripheral IV 04/24/21 Left Antecubital 1 day          Hemodialysis Catheter            Hemodialysis Catheter Subclavian -- days          Drain            Urethral Catheter -- days                Lab, Imaging and other studies: I have personally reviewed pertinent reports

## 2021-04-25 NOTE — ASSESSMENT & PLAN NOTE
Noted presence of right IJ tunneled double-lumen HD catheter; upon chart review, this was likely inserted to be used for plasmapheresis which she has completed  Plan:  · S/p removal by IR  · Patient now has PICC line

## 2021-04-25 NOTE — ASSESSMENT & PLAN NOTE
Patient was diagnosed with primary 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through biopsy in [facility] transferred over here for chemotherapy since the [facility] is out of network  Had a 2nd family meeting on 04/30 and family has decided to proceed treatment at this time  S/p PICC line for chemotherapy  S/p peg tube placement on 05/10  Plan:  · s/p 2nd cycle of chemotherapy on 05/13, leucovin on 5/14  · DVT prophylaxis with Lovenox  · Rehab placement pending    · S/p steroid taper for cerebral edema from 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· Pancytopenia in setting of recent chemotherapy    Will continue to watch for any signs of infection  · On acyclovir, Diflucan and Levaquin prophylactically  · After discussion with Oncology, plan for rehab, patient does not require radiation therapy, rehab for strength  · Patient's family updated on plan going forward, no questions after family visit  · Repeat MRI shows interval improvement of intracranial multifocal lesions  · Re-consult Oncology on the 06/14/2021 if patient is still inpatient

## 2021-04-25 NOTE — ASSESSMENT & PLAN NOTE
Yisel Sheehan prior to presentation to [facility]   Managed nonoperatively at [facility]

## 2021-04-25 NOTE — UTILIZATION REVIEW
Initial Clinical Review    Admission: Date/Time/Statement:   Admission Orders (From admission, onward)     Ordered        04/23/21 1703  Inpatient Admission  Once                   Orders Placed This Encounter   Procedures    Inpatient Admission     Standing Status:   Standing     Number of Occurrences:   1     Order Specific Question:   Level of Care     Answer:   Med Surg [16]     Order Specific Question:   Estimated length of stay     Answer:   More than 2 Midnights     Order Specific Question:   Certification     Answer:   I certify that inpatient services are medically necessary for this patient for a duration of greater than two midnights  See H&P and MD Progress Notes for additional information about the patient's course of treatment  ED Arrival Information     Patient not seen in ED -- tx from [facility]                     Chief complaint:  recently diagnosed primary 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with need for chemotherapy    Initial Presentation:  49 y o  female who presents to [facility] as a transfer from [facility] d/t [address] [place] [address],[address]  [facility] is out of network for patient's insurance and needed to be tx'd to [facility] for initiation of chemotherapy for her 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On arrival pt still remains altered likely 2/2 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Admit inpatient to M/S/Tele unit -- Continue with dexamethasone  Continue machado cath d/t urinary retention  Plan voiding trial as op  Pt with dysphagia and decreased p o  Intake  Feeding tube recommended at [facility]  Initiate rosy spain, speech therapy consulted  Neurology, Hem/Onc and Palliative care consulted    Hem/Onc consult 4/24 --- Treatment had been recommended in the form of the MATRIX program   This involves multi drug cytotoxic chemotherapy  In patients with performance status of 2 or better the fatality rate from toxicity was 6%  Her performance status is ECOG-4  This certainly would increase her potential for toxicity    Given the above, I think it would be reasonable to consider high-dose methotrexate with Rituxan  If she tolerated this without substantial toxicity, Manuela-C and or thiotepa could be added  Again, given her current performance status, I would say that even high-dose methotrexate and Rituxan would be high risk for substantial or even life-threatening toxicities  Not to confuse the matter, another option would be radiation therapy  While this is considered to be generally effective with at least moderate likelihood of improvement in disease, it is usually deferred due to concerns for long-term CNS toxicities  None the less, in her case, d/t poor performance status, I think it is reasonable to consider as a treatment option in her case  Speech recommends advance diet with dysphagia 2 and thin liquids; needs assistance with feeds  Calorie count    Palliative care consult 4/24 -- Recommend global delirium precautions    Vital Signs:   Date/Time  Temp  Pulse  Resp  BP  MAP (mmHg)  SpO2  O2 Device   04/24/21 22:44:05  99 3 °F (37 4 °C)  89  18  120/74  89  98 %  --   04/24/21 15:23:37  98 °F (36 7 °C)  84  16  141/88  106  98 %  None (Room air)   04/24/21 08:07:53  98 °F (36 7 °C)  72  16  135/85  102  100 %  None (Room air)   04/23/21 22:14:21  97 9 °F (36 6 °C)  73  16  134/85  101  100 %  --   04/23/21 2024  --  --  --  --  --  98 %  None (Room air)   04/23/21 1920  --  --  --  --  --  99 %  None (Room air)   04/23/21 18:38:05  --  --  --  132/84  100  --  --   04/23/21 17:00:13  98 2 °F (36 8 °C)  --  --  --  --  --  --   04/23/21 1700  98 2 °F (36 8 °C)  79  --  --  --  --  --   04/23/21 16:57:45  --  79  --  --  --  99 %  --          Pertinent Labs/Diagnostic Test Results:   CXR 4/24 -- Dialysis catheter tip within the superior cavoatrial junction   No pneumothorax         Results from last 7 days   Lab Units 04/24/21  0837   WBC Thousand/uL 7 03   HEMOGLOBIN g/dL 13 1   HEMATOCRIT % 39 1   PLATELETS Thousands/uL 194   BANDS PCT %  --      Results from last 7 days   Lab Units 04/24/21  0837   SODIUM mmol/L 136   POTASSIUM mmol/L 4 1   CHLORIDE mmol/L 105   CO2 mmol/L 26   ANION GAP mmol/L 5   BUN mg/dL 25   CREATININE mg/dL 0 62   EGFR ml/min/1 73sq m 103   CALCIUM mg/dL 10 2*   MAGNESIUM mg/dL 2 1     Results from last 7 days   Lab Units 04/24/21  0837   AST U/L 9   ALT U/L 18   ALK PHOS U/L 63   TOTAL PROTEIN g/dL 6 9   ALBUMIN g/dL 3 6   TOTAL BILIRUBIN mg/dL 0 36     Results from last 7 days   Lab Units 04/24/21  0837   GLUCOSE RANDOM mg/dL 165*       Admitting Diagnosis: 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HCC) [C85 89]  Age/Sex: 49 y o  female  Admission Orders:  Scheduled Medications:  dexamethasone, 4 mg, Intravenous, 4x Daily  famotidine, 20 mg, Oral, Daily  lidocaine, 1 patch, Topical, Daily  senna-docusate sodium, 1 tablet, Oral, BID  tamsulosin, 0 4 mg, Oral, Daily With Dinner    Continuous IV Infusions:  dextrose 5 % and sodium chloride 0 45 %, 75 mL/hr, Intravenous, Continuous    PRN Meds:  acetaminophen, 488 mg, Oral, Q6H PRN  albuterol, 2 puff, Inhalation, Q6H PRN  ondansetron, 4 mg, Intravenous, Q6H PRN        Network Utilization Review Department  ATTENTION: Please call with any questions or concerns to [phone] and carefully listen to the prompts so that you are directed to the right person  All voicemails are confidential   [facility] all requests for admission clinical reviews, approved or denied determinations and any other requests to dedicated fax number below belonging to the campus where the patient is receiving treatment   List of dedicated fax numbers for the Facilities:  [address] DENIALS (Administrative/Medical Necessity) [phone]   [address] [place] [address] (Maternity/NICU/Pediatrics) 261 [facility],[address] Floor [address] [place] [address] [place] [address] [facility] [address] [place] [address] [address] [address] [facility] [phone]

## 2021-04-25 NOTE — ASSESSMENT & PLAN NOTE
· Patient developed urinary retention during previous hospitalization  · Plan was voiding trial as an outpatient as she was scheduled for discharge from that facility  · Machado catheter was initially removed although patient required re-placement of machado on 05/14 in setting of incontinence and getting chemotherapy    · Machado discontinued, monitor for signs of fluid retention

## 2021-04-26 LAB
ANION GAP SERPL CALCULATED.3IONS-SCNC: 7 MMOL/L (ref 4–13)
BUN SERPL-MCNC: 19 MG/DL (ref 5–25)
CALCIUM SERPL-MCNC: 9.4 MG/DL (ref 8.3–10.1)
CHLORIDE SERPL-SCNC: 103 MMOL/L (ref 100–108)
CO2 SERPL-SCNC: 25 MMOL/L (ref 21–32)
CREAT SERPL-MCNC: 0.54 MG/DL (ref 0.6–1.3)
ERYTHROCYTE [DISTWIDTH] IN BLOOD BY AUTOMATED COUNT: 13 % (ref 11.6–15.1)
GFR SERPL CREATININE-BSD FRML MDRD: 107 ML/MIN/1.73SQ M
GLUCOSE SERPL-MCNC: 145 MG/DL (ref 65–140)
HCT VFR BLD AUTO: 35.1 % (ref 34.8–46.1)
HGB BLD-MCNC: 12 G/DL (ref 11.5–15.4)
MCH RBC QN AUTO: 32.1 PG (ref 26.8–34.3)
MCHC RBC AUTO-ENTMCNC: 34.2 G/DL (ref 31.4–37.4)
MCV RBC AUTO: 94 FL (ref 82–98)
PLATELET # BLD AUTO: 161 THOUSANDS/UL (ref 149–390)
PMV BLD AUTO: 11.7 FL (ref 8.9–12.7)
POTASSIUM SERPL-SCNC: 3.9 MMOL/L (ref 3.5–5.3)
RBC # BLD AUTO: 3.74 MILLION/UL (ref 3.81–5.12)
SODIUM SERPL-SCNC: 135 MMOL/L (ref 136–145)
WBC # BLD AUTO: 8.57 THOUSAND/UL (ref 4.31–10.16)

## 2021-04-26 PROCEDURE — 92526 ORAL FUNCTION THERAPY: CPT

## 2021-04-26 PROCEDURE — 97167 OT EVAL HIGH COMPLEX 60 MIN: CPT

## 2021-04-26 PROCEDURE — 80048 BASIC METABOLIC PNL TOTAL CA: CPT | Performed by: INTERNAL MEDICINE

## 2021-04-26 PROCEDURE — 97163 PT EVAL HIGH COMPLEX 45 MIN: CPT

## 2021-04-26 PROCEDURE — 85027 COMPLETE CBC AUTOMATED: CPT | Performed by: INTERNAL MEDICINE

## 2021-04-26 PROCEDURE — 99233 SBSQ HOSP IP/OBS HIGH 50: CPT | Performed by: INTERNAL MEDICINE

## 2021-04-26 RX ORDER — LEUCOVORIN CALCIUM 50 MG/5ML
25 INJECTION, POWDER, LYOPHILIZED, FOR SOLUTION INTRAMUSCULAR; INTRAVENOUS EVERY 6 HOURS
Status: CANCELLED | OUTPATIENT
Start: 2021-04-28

## 2021-04-26 RX ADMIN — DEXAMETHASONE SODIUM PHOSPHATE 4 MG: 4 INJECTION INTRA-ARTICULAR; INTRALESIONAL; INTRAMUSCULAR; INTRAVENOUS; SOFT TISSUE at 22:04

## 2021-04-26 RX ADMIN — TAMSULOSIN HYDROCHLORIDE 0.4 MG: 0.4 CAPSULE ORAL at 17:00

## 2021-04-26 RX ADMIN — ONDANSETRON 4 MG: 2 INJECTION INTRAMUSCULAR; INTRAVENOUS at 00:52

## 2021-04-26 RX ADMIN — DEXTROSE AND SODIUM CHLORIDE 75 ML/HR: 5; .45 INJECTION, SOLUTION INTRAVENOUS at 00:45

## 2021-04-26 RX ADMIN — HEPARIN SODIUM 5000 UNITS: 5000 INJECTION INTRAVENOUS; SUBCUTANEOUS at 06:54

## 2021-04-26 RX ADMIN — DEXTROSE AND SODIUM CHLORIDE 75 ML/HR: 5; .45 INJECTION, SOLUTION INTRAVENOUS at 17:07

## 2021-04-26 RX ADMIN — DEXAMETHASONE SODIUM PHOSPHATE 4 MG: 4 INJECTION INTRA-ARTICULAR; INTRALESIONAL; INTRAMUSCULAR; INTRAVENOUS; SOFT TISSUE at 09:35

## 2021-04-26 RX ADMIN — HEPARIN SODIUM 5000 UNITS: 5000 INJECTION INTRAVENOUS; SUBCUTANEOUS at 22:04

## 2021-04-26 RX ADMIN — DEXAMETHASONE SODIUM PHOSPHATE 4 MG: 4 INJECTION INTRA-ARTICULAR; INTRALESIONAL; INTRAMUSCULAR; INTRAVENOUS; SOFT TISSUE at 17:00

## 2021-04-26 RX ADMIN — DEXAMETHASONE SODIUM PHOSPHATE 4 MG: 4 INJECTION INTRA-ARTICULAR; INTRALESIONAL; INTRAMUSCULAR; INTRAVENOUS; SOFT TISSUE at 13:41

## 2021-04-26 RX ADMIN — HEPARIN SODIUM 5000 UNITS: 5000 INJECTION INTRAVENOUS; SUBCUTANEOUS at 13:41

## 2021-04-26 RX ADMIN — SENNOSIDES, DOCUSATE SODIUM 1 TABLET: 8.6; 5 TABLET ORAL at 09:34

## 2021-04-26 RX ADMIN — FAMOTIDINE 20 MG: 20 TABLET ORAL at 09:34

## 2021-04-26 RX ADMIN — LIDOCAINE 5% 1 PATCH: 700 PATCH TOPICAL at 09:34

## 2021-04-26 RX ADMIN — SENNOSIDES, DOCUSATE SODIUM 1 TABLET: 8.6; 5 TABLET ORAL at 17:00

## 2021-04-26 NOTE — PROGRESS NOTES
Progress Note - Girish Hawley 49 y o  female MRN: [id number]    [facility] Encounter: [id number]      Assessment:  In summary, this is a 65-year-old female history of recently diagnosed diffuse large B-cell lymphoma, primary 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I reviewed with the patient and her  as well as her son, by phone, who acted as  throughout the course of the interview today  Plan is to proceed with high-dose methotrexate, 3500 g per m2 as well as Rituxan  If she tolerates this well with her 1st cycle Manuela-C and/or thiotepa could be considered working our way into the matrix protocol  This approach is reasonable acknowledge in her poor performance status, ECOG-4  We reviewed potential toxicities including but not limited to nausea, vomiting, cytopenias, renal dysfunction, stomatitis, diarrhea  We reviewed prophylactic measures taken routinely as well as monitoring to allow for early intervention as appropriate  Restaging after 2 months of therapy  The patient's family voiced understanding above discussion and are agreeable to proceed as outlined  Consent form signed  Plan:  See above  Subjective:   Clinically stable  She has no fevers or chills  She has no nausea or vomiting  She has no urinary complaints  She had has no bleeding symptoms  Objective:     Vitals: Blood pressure 145/90, pulse 79, temperature 98 4 °F (36 9 °C), resp  rate 16, height 5' (1 524 m), weight 47 1 kg (103 lb 13 4 oz), SpO2 100 %  ,Body mass index is 20 28 kg/m²  Intake/Output Summary (Last 24 hours) at 4/26/2021 1458  Last data filed at 4/26/2021 1238  Gross per 24 hour   Intake 1220 ml   Output 3175 ml   Net -1955 ml       Physical Exam:  General Appearance:    Alert, oriented        Eyes:    PERRL   Ears:    Normal external ear canals, both ears   Nose:   Nares normal, septum midline   Throat:   Mucosa moist  Pharynx without injection      Neck:   Supple       Lungs:     Clear to auscultation bilaterally   Chest Wall:    No tenderness or deformity    Heart:    Regular rate and rhythm       Abdomen:     Soft, non-tender, bowel sounds +, no organomegaly           Extremities:   Extremities no cyanosis or edema       Skin:   no rash or icterus  Lymph nodes:   Cervical, supraclavicular, and axillary nodes normal   Neurologic:   CNII-XII intact, normal strength, sensation and reflexes     throughout          Invasive Devices     Peripheral Intravenous Line            Peripheral IV 04/24/21 Left Antecubital 2 days          Hemodialysis Catheter            Hemodialysis Catheter Subclavian -- days          Drain            Urethral Catheter -- days                Lab, Imaging and other studies: I have personally reviewed pertinent reports

## 2021-04-26 NOTE — PLAN OF CARE
Problem: Prexisting or High Potential for Compromised Skin Integrity  Goal: Skin integrity is maintained or improved  Description: INTERVENTIONS:  - Identify patients at risk for skin breakdown  - Assess and monitor skin integrity  - Assess and monitor nutrition and hydration status  - Monitor labs   - Assess for incontinence   - Turn and reposition patient  - Assist with mobility/ambulation  - Relieve pressure over bony prominences  - Avoid friction and shearing  - Provide appropriate hygiene as needed including keeping skin clean and dry  - Evaluate need for skin moisturizer/barrier cream  - Collaborate with interdisciplinary team   - Patient/family teaching  - Consider wound care consult   Outcome: Progressing     Problem: Potential for Falls  Goal: Patient will remain free of falls  Description: INTERVENTIONS:  - Assess patient frequently for physical needs  -  Identify cognitive and physical deficits and behaviors that affect risk of falls    -  Strang fall precautions as indicated by assessment   - Educate patient/family on patient safety including physical limitations  - Instruct patient to call for assistance with activity based on assessment  - Modify environment to reduce risk of injury  - Consider OT/PT consult to assist with strengthening/mobility  Outcome: Progressing

## 2021-04-26 NOTE — PHYSICAL THERAPY NOTE
Physical Therapy Evaluation     Patient's Name: Leopold Anis    Admitting Diagnosis  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[facility] [address] ) [C85 89]    Problem List  Patient Active Problem List   Diagnosis    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[facility] [address] )    Altered mental status    Dysphagia    Urinary retention    Aphasia    Weakness    Presence of permanent central venous catheter    Fracture of ramus of left pubis [facility])       Past Medical History  History reviewed  No pertinent past medical history  Past Surgical History  History reviewed  No pertinent surgical history  04/26/21 0839   PT Last Visit   PT Visit Date 04/26/21   Note Type   Note type Evaluation   Pain Assessment   Pain Assessment Tool 0-10   Pain Score No Pain   Home Living   Additional Comments Pt poor historian  Per CM note, pt resides at [facility] where spouse works    Prior Function   Level of [place] Independent with ADLs and functional mobility   Lives With WoodwardManjarrez Help From Family;Friend(s)   ADL Assistance Independent   IADLs Independent   Comments Per CM note, pt was I with ADLs PTA    Restrictions/Precautions   Weight Bearing Precautions Per Order No   Other Precautions Cognitive; Chair Alarm; Bed Alarm;Multiple lines; Fall Risk   General   Family/Caregiver Present No   Cognition   Overall Cognitive Status Impaired   Attention Difficulty attending to directions   Orientation Level Unable to assess   Memory Unable to assess   Following Commands Follows one step commands inconsistently   Comments Pt lethargic throughout therapy session  Able to follow some commands inconsistently  Attempted to use Google translate  RLE Assessment   RLE Assessment   (grossly 2/5 )   LLE Assessment   LLE Assessment   (increased tone; flexed position  grossly 2-/5 )   Bed Mobility   Rolling R 2  Maximal assistance   Additional items Assist x 1; Increased time required   Supine to Sit 2  Maximal assistance   Additional items Assist x 2; Increased time required;LE management;HOB elevated   Sit to Supine 2  Maximal assistance   Additional items Assist x 2; Increased time required;LE management;HOB elevated   Additional Comments Pt supine in bed upon arrival  Pt sat EOB with max x1 to correct L lateral lean  Pt sat EOB ~3 min  Pt returned to supine in bed with alarm on and all needs within reach at the end of therapy session  Transfers   Sit to Stand 2  Maximal assistance   Additional items Assist x 2; Increased time required   Stand to Sit 2  Maximal assistance   Additional items Assist x 2; Increased time required   Additional Comments B/L HHA; unable to tolerate standing for > 30 secs   Ambulation/Elevation   Gait pattern Not tested   Balance   Static Sitting Poor -   Dynamic Sitting Poor -   Static Standing Poor -   Dynamic Standing Poor -   Ambulatory Zero   Activity Tolerance   Activity Tolerance Patient limited by fatigue  (+ cognition )   Medical Staff Made Aware SANCHEZ Casillas    Nurse Made Aware RN cleared pt to participate in therapy session    Assessment   Prognosis Fair   Problem List Decreased strength;Decreased endurance; Impaired balance;Decreased mobility; Decreased cognition   Assessment Pt seen for high complexity PT evaluation  Pt with active PT eval/treat orders  Pt is a 49 y o  female who was admitted to [facility] on 4/23/21 with 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Pt's active problems include: altered mental status, fracture of ramus of L pubis, weakness, aphasia, urinary retention, dysphagia  Pt poor historian, per CM note, pt resides with  at the [facility] where spouse works and was independent prior to hospital admission  Pt has limitations in strength, balance, endurance, and overall functional mobility  Pt requires assist of 2 for all mobility performed this date  Pt performed bed mobility tasks with max Ax2  Pt sat EOB with max Ax1 to correct L lateral lean and maintain upright posture   Pt performed STS transfer from EOB using b/l HHA and max Ax2; pt unable to tolerate standing for more than 30 seconds  Pt was left supine in bed with bed alarm on at the end of PT session with all needs in reach  Pt would benefit from continued PT services while in hospital to address remaining limitations  PT to continue to follow pt and recommends post-acute rehab services after d/c  The patient's AM-PAC Basic Mobility Inpatient Short Form Low Function Raw Score 9 , Standardized Score is 12 55  A standardized score less 42 9 suggests the patient may benefit from discharge to post-acute rehab services  Please also refer to the recommendation of the Physical Therapist for safe discharge planning  Goals   Patient Goals none stated    STG Expiration Date 05/10/21   Short Term Goal #1 STG 1  Pt will be able to perform bed mobility with min x1 in order to improve overall functional mobility and assist in safe d/c  STG 2  Pt will be able to perform functional transfer with modx1 in order to improve overall functional mobility and assist in safe d/c  STG 3  Pt will improve sitting/standing static/dynamic balance 1 grade in order to improve functional mobility and assist in safe d/c  STG 4  Pt will improve LE strength by one grade in order to improve functional mobility and assist in safe d/c  *PT to continue to follow pt to assess ambulation*   Plan   Treatment/Interventions ADL retraining;Functional transfer training;LE strengthening/ROM; Endurance training;Patient/family training;Bed mobility;Spoke to nursing;OT   PT Frequency Other (Comment)  (3-5x/wk )   Recommendation   PT Discharge Recommendation Post acute rehabilitation services   Equipment Recommended   (TBD)   PT - OK to Discharge Yes  (to rehab once medically cleared )   AM-PAC Basic Mobility Inpatient   Turning in Bed Without Bedrails 1   Lying on Back to Sitting on Edge of Flat Bed 1   Moving Bed to Chair 1   Standing Up From Chair 1   Walk in Room 1   Climb 3-5 Stairs 1   Basic Mobility Inpatient Raw Score 6   Turning Head Towards Sound 2 Follow Simple Instructions 1   Low Function Basic Mobility Raw Score 9   Low Function Basic Mobility Standardized Score 12 55         Jakob Su, PT, DPT

## 2021-04-26 NOTE — SPEECH THERAPY NOTE
Speech Language/Pathology    Speech/Language Pathology Progress Note    Patient Name: Satish Grace  [id number] Date: 4/26/2021     Problem List  Principal Problem:    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[address] )  Active Problems:    Altered mental status    Dysphagia    Urinary retention    Aphasia    Weakness    Presence of permanent central venous catheter    Fracture of ramus of left pubis [facility])       Past Medical History  History reviewed  No pertinent past medical history  Past Surgical History  History reviewed  No pertinent surgical history  Subjective:  Patient is awake and alert  PCA initially feeding patient  Objective: The patient is seen for f/u dysphagia therapy at lunch meal  She requires full assistance to feed herself  The patient is assessed with diced peaches and pears with cottage cheese  The patient presents with decreased rotary chew pattern with prolonged mastication time  Lingual residue is observed with all, and patient independently uses lingual sweep to clear buccal cavity  She is unable to adequately close oral cavity for straw and does not have strength to draw from straw  Thin liquids are placed behind bottom teeth  Patient has good oral control and is able to transfer and swallow thin liquid bolus well  Swallow is controled and appears timely  No overt s/s aspiration observed  Intake is 100%  Assessment:  The patient has slow intake, but tolerates mechanical soft diet and thin liquids well  Plan/Recommendations:  Continue current diet and ST to further assess tolerance

## 2021-04-26 NOTE — CASE MANAGEMENT
CM s/w pt's friend (Disha Silver [phone]) and gathered the following information:     HOME:  Pt lives at the [facility] in [address] where  works    LIVES W/:  Spouse    :  Disha Silver (friend) [phone]    INDEPENDENCE:  Ind at baseline and ambulates w/o a device    TRANSPORTATION: Friends     DME:  None reported    [facility]    I/P REHAB:  None reported    INCOME:  None reported     MENTAL HEALTH:  None reported    D&A:  None reported    PCP:  Dr Av Edwards ([phone])    [place]  [facility] [place]    MEDICAL POA:  None reported    TRANSPORTATION AT D/C:  Disha Silver (friend) [phone] (lives 2 hrs away)          CM reviewed d/c planning process including the following: identifying help at home, patient preference for d/c planning needs, Discharge Lounge, [facility] Meds to Bed program, availability of treatment team to discuss questions or concerns patient and/or family may have regarding understanding medications and recognizing signs and symptoms once discharged  CM also encouraged patient to follow up with all recommended appointments after discharge  Patient advised of importance for patient and family to participate in managing patients medical well-being  Patient/caregiver received discharge checklist  Content reviewed  Patient/caregiver encouraged to participate in discharge plan of care prior to discharge home

## 2021-04-26 NOTE — MALNUTRITION/BMI
This medical record reflects one or more clinical indicators suggestive of malnutrition  Malnutrition Findings:   Adult Malnutrition type: Acute illness(due to medical condition resulting in dysphagia, decreased appetite and intake, weight loss)  Adult Degree of Malnutrition: Other severe protein calorie malnutrition  Malnutrition Characteristics: Muscle loss, Inadequate energy, Weight loss(records reveal 13 8%wt loss since 3/19/21; inadquate PO intake while @ [facility] meeting < /=  50% est needs for >/=5 days; loss of muscle mass @ temporals; tx w/ PO diet & supplements; calorie count & consideration for nutrition support via feeding tube)         See Nutrition note dated 4/26/21 for additional details  Completed nutrition assessment is viewable in the nutrition documentation

## 2021-04-26 NOTE — OCCUPATIONAL THERAPY NOTE
Occupational Therapy Evaluation     Patient Name: Niharika RAMÍREZ Date: 4/26/2021  Problem List  Principal Problem:    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[address] )  Active Problems:    Altered mental status    Dysphagia    Urinary retention    Aphasia    Weakness    Presence of permanent central venous catheter    Fracture of ramus of left pubis [facility])    Past Medical History  History reviewed  No pertinent past medical history  Past Surgical History  History reviewed  No pertinent surgical history  04/26/21 0854   OT Last Visit   OT Visit Date 04/26/21   Note Type   Note type Evaluation   Restrictions/Precautions   Weight Bearing Precautions Per Order No   Other Precautions Cognitive; Bed Alarm;Multiple lines; Fall Risk   Pain Assessment   Pain Assessment Tool 0-10   Pain Score No Pain   Home Living   Additional Comments Per chart, pt resides at [facility] where spouse works    Prior Function   Level of [place] Independent with ADLs and functional mobility   Lives With Spouse   ADL Assistance Independent   IADLs Independent   Comments Per chart, pt was I with ADLs PTA    Lifestyle   Autonomy Pt poorly responsive, per chart, pt was I with ADLs PTA    Reciprocal Relationships     Service to Others Unable to obtain   Intrinsic Gratification Will continue to assess    Subjective   Subjective "ok"   ADL   Where Assessed Edge of bed   Eating Assistance 2  Maximal Assistance   Grooming Assistance 2  Maximal Assistance   UB Bathing Assistance 2  Maximal Assistance   LB Bathing Assistance 2  Maximal Assistance   UB Dressing Assistance 2  Maximal Assistance   LB Dressing Assistance 2  Maximal [address]  2  Maximal Assistance   Bed Mobility   Rolling R 2  Maximal assistance   Additional items Assist x 1   Supine to Sit 2  Maximal assistance   Additional items Assist x 2; Increased time required   Sit to Supine 2  Maximal assistance   Additional items Assist x 2; Increased time required Transfers   Sit to Stand 2  Maximal assistance   Additional items Assist x 2; Increased time required   Stand to Sit 2  Maximal assistance   Additional items Assist x 2; Increased time required   Additional Comments B/L HHA    Balance   Static Sitting Poor -   Dynamic Sitting Poor -   Static Standing Poor -   Dynamic Standing Poor -   Ambulatory Zero   Activity Tolerance   Activity Tolerance Patient limited by fatigue  (+ cognition )   Medical Staff Made Aware PT Aristeo Russell    Nurse Made Aware RN clearance for session    RUE Assessment   RUE Assessment   (generally 3/5)   LUE Assessment   LUE Assessment   (increased tone t/o, rigid )   Hand Function   Gross Motor Coordination Impaired   Fine Motor Coordination Impaired   Vision-Basic Assessment   Visual History   (per chart, pt legally blind )   Vision - Complex Assessment   Additional Comments unable to formally assess vision    Cognition   Overall Cognitive Status Impaired   Arousal/Participation Lethargic;Poorly responsive   Attention Difficulty attending to directions   Orientation Level Unable to assess   Memory Unable to assess   Following Commands Follows one step commands inconsistently   Comments Pt lethargic t/o session  Poorly responsive  Attempted to use google translate w/ pt, no increase in attention or interaction    Assessment   Limitation Decreased ADL status; Decreased UE ROM; Decreased UE strength;Decreased cognition;Decreased endurance;Decreased Safe judgement during ADL;Decreased self-care trans;Decreased high-level ADLs   Prognosis Fair   Assessment Pt is a 49 y o  female admitted to [facility] on 4/23/2021 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[facility] [address] s/p fall with fx of ramus of L pubis, AMS  has no past medical history on file  Pt with active OT orders and up with assistance  orders  Pt very lethargic/poorly responsive during session  Attempted both English and Rebeka - pt with no clear preference  Demonstrating global aphasia  Pt only saying "ok" at times t/o session   Per chart, pt lives at [facility] where he  works  Pt was I with ADLs  Upon evaluation, pt currently requires MAX A x2 for transfers and mobility  Pt required MAX A to maintain EOB sitting position with noted L sided lean  Exhibits decreased strength to B/L UE's limiting performance in ADLs/transfers - increased tone and limited ROM noted in LUE  Pt currently  requires MAX A for ADLs overall  Pt intermittently/inconsistently follows commands  Pt is limited at this time 2*: endurance, activity tolerance, functional mobility, forward functional reach, balance, trunk control, functional standing tolerance, decreased I w/ ADLS/IADLS, strength, ROM, aphasia, cognitive impairments, decreased safety awareness and decreased insight into deficits  The following Occupational Performance Areas to address include: eating, grooming, bathing/shower, toilet hygiene, dressing, health maintenance, functional mobility, community mobility and clothing management  Pt to benefit from immediate acute skilled OT to address above deficits, improve overall functional independence, maximize fnxl mobility and reduce caregiver burden  From OT standpoint, recommendation at time of d/c would be STR  Pt was left in bed after session with all current needs met  The patient's raw score on the AM-PAC Daily Activity inpatient short form is 12, standardized score is 30 6, less than 39 4  Patients at this level are likely to benefit from discharge to post-acute rehabilitation services  Please refer to the recommendation of the Occupational Therapist for safe discharge planning  Goals   Patient Goals none stated    LTG Time Frame 10-14   Plan   Treatment Interventions ADL retraining;Functional transfer training;UE strengthening/ROM; Endurance training;Cognitive reorientation;Patient/family training;Equipment evaluation/education; Neuromuscular reeducation; Fine motor coordination activities; Compensatory technique education;Continued evaluation; Energy conservation; Activityengagement   Goal Expiration Date 05/10/21   OT Frequency 3-5x/wk   Recommendation   OT Discharge Recommendation Post acute rehabilitation services   OT - OK to Discharge Yes  (to rehab when medically stable)   AM-PAC Daily Activity Inpatient   Lower Body Dressing 2   Bathing 2   Toileting 2   Upper Body Dressing 2   Grooming 2   Eating 2   Daily Activity Raw Score 12   Daily Activity Standardized Score (Calc for Raw Score >=11) 30 6   AM-PAC Applied Cognition Inpatient   Following a Speech/Presentation 1   Understanding Ordinary Conversation 2   Taking Medications 1   Remembering Where Things Are Placed or Put Away 1   Remembering List of 4-5 Errands 1   Taking Care of Complicated Tasks 1   Applied Cognition Raw Score 7   Applied Cognition Standardized Score 15 17       GOALS    1) Pt will increase activity tolerance to G for 30 min txment sessions    2) Pt will complete UB/LB dressing/self care w/ MIN A using adaptive device and DME as needed    3) Pt will complete bathing w/ MIN A w/ use of AE and DME as needed    4) Pt will complete toileting w/ MIN A w/ G hygiene/thoroughness using DME as needed    5) Pt will improve functional transfers to MIN A on/off all surfaces using DME as needed w/ G balance/safety     6) Pt will improve functional mobility during ADL/IADL/leisure tasks to MIN A using DME as needed w/ G balance/safety     7) Pt will attend to 75% of commands with no more than 3 VCs required    8) Pt will demonstrate G carryover of pt/caregiver education and training as appropriate      9) Pt will demonstrate 100% carryover of energy conservation techniques t/o functional I/ADL/leisure tasks w/o cues s/p skilled education    10) Pt will engage in ongoing cognitive assessment w/ G participation to assist w/ safe d/c planning/recommendations    11) Pt will be A&O to person, place, and situation 100% of the time for increased safety awareness    12) Pt will improve UE strength to 4-/5 for improved ADL performance      Mohit Russell MS, OTR/L

## 2021-04-26 NOTE — PLAN OF CARE
Problem: PHYSICAL THERAPY ADULT  Goal: Performs mobility at highest level of function for planned discharge setting  See evaluation for individualized goals  Description: Treatment/Interventions: ADL retraining, Functional transfer training, LE strengthening/ROM, Endurance training, Patient/family training, Bed mobility, Spoke to nursing, OT  Equipment Recommended: (TBD)       See flowsheet documentation for full assessment, interventions and recommendations  Note: Prognosis: Fair  Problem List: Decreased strength, Decreased endurance, Impaired balance, Decreased mobility, Decreased cognition  Assessment: Pt seen for high complexity PT evaluation  Pt with active PT eval/treat orders  Pt is a 49 y o  female who was admitted to [facility] on 4/23/21 with 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Pt's active problems include: altered mental status, fracture of ramus of L pubis, weakness, aphasia, urinary retention, dysphagia  Pt poor historian, per CM note, pt resides with  at the [facility] where spouse works and was independent prior to hospital admission  Pt has limitations in strength, balance, endurance, and overall functional mobility  Pt requires assist of 2 for all mobility performed this date  Pt performed bed mobility tasks with max Ax2  Pt sat EOB with max Ax1 to correct L lateral lean and maintain upright posture  Pt performed STS transfer from EOB using b/l HHA and max Ax2; pt unable to tolerate standing for more than 30 seconds  Pt was left supine in bed with bed alarm on at the end of PT session with all needs in reach  Pt would benefit from continued PT services while in hospital to address remaining limitations  PT to continue to follow pt and recommends post-acute rehab services after d/c  The patient's AM-PAC Basic Mobility Inpatient Short Form Low Function Raw Score 9 , Standardized Score is 12 55   A standardized score less 42 9 suggests the patient may benefit from discharge to post-acute rehab services  Please also refer to the recommendation of the Physical Therapist for safe discharge planning  PT Discharge Recommendation: Post acute rehabilitation services     PT - OK to Discharge: Yes(to rehab once medically cleared )    See flowsheet documentation for full assessment

## 2021-04-26 NOTE — PLAN OF CARE
Problem: Prexisting or High Potential for Compromised Skin Integrity  Goal: Skin integrity is maintained or improved  Description: INTERVENTIONS:  - Identify patients at risk for skin breakdown  - Assess and monitor skin integrity  - Assess and monitor nutrition and hydration status  - Monitor labs   - Assess for incontinence   - Turn and reposition patient  - Assist with mobility/ambulation  - Relieve pressure over bony prominences  - Avoid friction and shearing  - Provide appropriate hygiene as needed including keeping skin clean and dry  - Evaluate need for skin moisturizer/barrier cream  - Collaborate with interdisciplinary team   - Patient/family teaching  - Consider wound care consult   Outcome: Progressing     Problem: Potential for Falls  Goal: Patient will remain free of falls  Description: INTERVENTIONS:  - Assess patient frequently for physical needs  -  Identify cognitive and physical deficits and behaviors that affect risk of falls    -  [place] fall precautions as indicated by assessment   - Educate patient/family on patient safety including physical limitations  - Instruct patient to call for assistance with activity based on assessment  - Modify environment to reduce risk of injury  - Consider OT/PT consult to assist with strengthening/mobility  Outcome: Progressing

## 2021-04-26 NOTE — NUTRITION
Summary:  Severe malnutrition identified; patient presented from [facility] with evidence of significant wt loss; 2 day calorie count at [facility] 4/21 and 4/22 revealed inadequate PO intake; placement of dobhoff feeding tube was planned for initiation of TF however patient was then transferred to [facility] for initiation of chemo therefore TF was not initiated; patient currently rx for dysphagia 2 thin liquids (mechanical soft) vegetarian diet; RD scheduled ensrue compact with meals today; calorie count planned for 4/26 and 4/27; Goal PO at least 1400kcal/ 55g protein; will follow with calorie count results

## 2021-04-26 NOTE — PLAN OF CARE
Problem: OCCUPATIONAL THERAPY ADULT  Goal: Performs self-care activities at highest level of function for planned discharge setting  See evaluation for individualized goals  Description: Treatment Interventions: ADL retraining, Functional transfer training, UE strengthening/ROM, Endurance training, Cognitive reorientation, Patient/family training, Equipment evaluation/education, Neuromuscular reeducation, Fine motor coordination activities, Compensatory technique education, Continued evaluation, Energy conservation, Activityengagement          See flowsheet documentation for full assessment, interventions and recommendations  Note: Limitation: Decreased ADL status, Decreased UE ROM, Decreased UE strength, Decreased cognition, Decreased endurance, Decreased Safe judgement during ADL, Decreased self-care trans, Decreased high-level ADLs  Prognosis: Fair  Assessment: Pt is a 49 y o  female admitted to [facility] on 4/23/2021 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[address] ), s/p fall with fx of ramus of L pubis, AMS  has no past medical history on file  Pt with active OT orders and up with assistance  orders  Pt very lethargic/poorly responsive during session  Attempted both English and Rebeka - pt with no clear preference  Demonstrating global aphasia  Pt only saying "ok" at times t/o session  Per chart, pt lives at [facility] where he  works  Pt was I with ADLs  Upon evaluation, pt currently requires MAX A x2 for transfers and mobility  Pt required MAX A to maintain EOB sitting position with noted L sided lean  Exhibits decreased strength to B/L UE's limiting performance in ADLs/transfers - increased tone and limited ROM noted in LUE  Pt currently  requires MAX A for ADLs overall  Pt intermittently/inconsistently follows commands   Pt is limited at this time 2*: endurance, activity tolerance, functional mobility, forward functional reach, balance, trunk control, functional standing tolerance, decreased I w/ ADLS/IADLS, strength, ROM, aphasia, cognitive impairments, decreased safety awareness and decreased insight into deficits  The following Occupational Performance Areas to address include: eating, grooming, bathing/shower, toilet hygiene, dressing, health maintenance, functional mobility, community mobility and clothing management  Pt to benefit from immediate acute skilled OT to address above deficits, improve overall functional independence, maximize fnxl mobility and reduce caregiver burden  From OT standpoint, recommendation at time of d/c would be STR  Pt was left in bed after session with all current needs met  The patient's raw score on the AM-PAC Daily Activity inpatient short form is 12, standardized score is 30 6, less than 39 4  Patients at this level are likely to benefit from discharge to post-acute rehabilitation services  Please refer to the recommendation of the Occupational Therapist for safe discharge planning       OT Discharge Recommendation: Post acute rehabilitation services  OT - OK to Discharge: Yes(to rehab when medically stable)

## 2021-04-26 NOTE — PROGRESS NOTES
INTERNAL MEDICINE RESIDENCY PROGRESS NOTE     Name: Drew Lopez   Age & Sex: 49 y o  female   MRN: [id number]  Unit/Bed#: [facility] 920-01   Encounter: [id number]  Team: SOD Team B     PATIENT INFORMATION     Name: Drew Lopez   Age & Sex: 49 y o  female   MRN: [id number] Stay Days: 3    ASSESSMENT/PLAN     Principal Problem:    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[facility] [address] )  Active Problems:    Altered mental status    Dysphagia    Urinary retention    Aphasia    Weakness    Presence of permanent central venous catheter    Fracture of ramus of left pubis ([facility] [address] )      * 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[facility] [address] )  Assessment & Plan  Patient was diagnosed with primary 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through biopsy in [facility] transferred over here for chemotherapy since the [facility] is out of network  Oncology and Palliative care services consulted, appreciate recommendations   Continue with dexamethasone  As per [facility] progress note patient had discussion with palliative Care and at that time family wants to pursue disease directed therapy  Dr Mary Lee, oncologist, to meet with pt's family on 4/26    Altered mental status  Assessment & Plan  Patient initially presented to [facility] his ultimate [place] with stroke-like symptoms for 2 days  CT scan of the head was concerning for subacute CVA and was transferred to [facility]   MRI of the brain was more consistent with demyelinating disease patient was started on IV steroids and 1000 mg daily for 5 days  Patient did not have any significant improvement at that time patient had plasmapheresis  And also had  lumbar puncture-negative for infection or malignancy    Patient noted to have persistent encephalopathy so a repeat MRI was obtained which showed worsening of the abnormality seen on the previous MRI and 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Continue with the dexamethasone  Likely secondary to 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Delirium precautions  Currently alert and oriented times 2-3  Intermittently follow commands  Moves all extremities spontaneously  Fracture of ramus of left pubis [facility])  Assessment & Plan  Nam [place] prior to presentation to [facility]  Managed nonoperatively at [facility]    Presence of permanent central venous catheter  Assessment & Plan  Noted presence of right IJ tunneled double-lumen HD catheter; upon chart review, this was likely inserted to be used for plasmapheresis which she has completed  Will await Oncology recommendations regarding therapeutic options but if no longer needed, can consider IR consult for removal     Weakness  Assessment & Plan  Left greater than right upper and lower extremity weakness with ongoing left lower extremity contracture  Secondary to underlying CNS malignancy  *Decubitus ulcer precautions such as frequent repositioning  Aphasia  Assessment & Plan  Definite evidence of expressive aphasia, possible component of receptive aphasia as well  Speech therapy on board  Urinary retention  Assessment & Plan  Patient developed urinary retention during previous hospitalization  Plan was voiding trial as an outpatient as she was scheduled for discharge from that facility  Continue with the Cottrell catheter for today, was started on Flomax 0 4mg, consider voiding trial    Dysphagia  Assessment & Plan  Patient noted to have dysphagia and also decreased p o  Intake  As per [facility] progress note patient had a calorie count and recommended feeding tube placement  Speech evaluation - advance diet with dysphagia 2 and thin liquids; needs assistance with feeds  Calorie count  If the patient continued to have poor p o  Intake may need discussion with the family regarding alternate methods of nutrition      Disposition:  Family meeting today  SUBJECTIVE     Patient seen and examined  No acute events overnight  Alert and oriented times 2-3  Moves all extremities spontaneously    Intermittently follow commands  OBJECTIVE     Vitals:    21 1530 21 2212 21 0553 21 0715   BP: 127/79 147/91  145/90   BP Location:       Pulse: (!) 106 73  79   Resp:  18  16   Temp: 98 1 °F (36 7 °C) 98 1 °F (36 7 °C)  98 4 °F (36 9 °C)   TempSrc:       SpO2: 98% 99%  100%   Weight:   47 1 kg (103 lb 13 4 oz)    Height:          Temperature:   Temp (24hrs), Av 2 °F (36 8 °C), Min:98 1 °F (36 7 °C), Max:98 4 °F (36 9 °C)    Temperature: 98 4 °F (36 9 °C)  Intake & Output:  I/O        07 -  0700  07 -  07 07 -  0700    P  O  560 240     I V  (mL/kg) 1240 (26 8) 1000 (21 2)     Total Intake(mL/kg) 1800 (39) 1240 (26 3)     Urine (mL/kg/hr) 1425 (1 3) 2275 (2) 900 (4 4)    Stool   0    Total Output 1425 2275 900    Net +375 -1035 -900           Unmeasured Stool Occurrence   1 x        Weights:   IBW (Ideal Body Weight): 45 5 kg    Body mass index is 20 28 kg/m²  Weight (last 2 days)     Date/Time   Weight    21 0553   47 1 (103 84)    21 0600   46 2 (101 85)    21 0600   46 8 (103 18)            Physical Exam  Vitals signs reviewed  Constitutional:       General: She is not in acute distress  Appearance: She is well-developed  She is not diaphoretic  HENT:      Head: Normocephalic and atraumatic  Nose: Nose normal       Mouth/Throat:      Pharynx: No oropharyngeal exudate  Eyes:      General: No scleral icterus  Conjunctiva/sclera: Conjunctivae normal    Neck:      Musculoskeletal: Neck supple  Thyroid: No thyromegaly  Vascular: No JVD  Trachea: No tracheal deviation  Cardiovascular:      Rate and Rhythm: Normal rate and regular rhythm  Heart sounds: Normal heart sounds  No murmur  No friction rub  No gallop  Pulmonary:      Effort: Pulmonary effort is normal  No respiratory distress  Breath sounds: Normal breath sounds  No stridor  No wheezing or rales  Chest:      Chest wall: No tenderness  Abdominal:      General: Bowel sounds are normal  There is no distension  Palpations: Abdomen is soft  There is no mass  Tenderness: There is no abdominal tenderness  There is no guarding or rebound  Musculoskeletal: Normal range of motion  General: No tenderness  Skin:     General: Skin is warm  Findings: No erythema or rash  Neurological:      Mental Status: She is alert  Sensory: No sensory deficit  Comments: Alert and oriented times 2-3  Intermittently follow commands  Moves all extremities spontaneously  Psychiatric:         Behavior: Behavior normal          Thought Content: Thought content normal          Judgment: Judgment normal        LABORATORY DATA     Labs: I have personally reviewed pertinent reports  Results from last 7 days   Lab Units 04/26/21  0552 04/25/21  0620 04/24/21  0837   WBC Thousand/uL 8 57 7 19 7 03   HEMOGLOBIN g/dL 12 0 11 5 13 1   HEMATOCRIT % 35 1 33 8* 39 1   PLATELETS Thousands/uL 161 187 194   MONO PCT %  --  9  --       Results from last 7 days   Lab Units 04/26/21  0552 04/25/21  0620 04/24/21  0837   POTASSIUM mmol/L 3 9 3 7 4 1   CHLORIDE mmol/L 103 106 105   CO2 mmol/L 25 23 26   BUN mg/dL 19 18 25   CREATININE mg/dL 0 54* 0 59* 0 62   CALCIUM mg/dL 9 4 9 4 10 2*   ALK PHOS U/L  --   --  63   ALT U/L  --   --  18   AST U/L  --   --  9     Results from last 7 days   Lab Units 04/24/21  0837   MAGNESIUM mg/dL 2 1                        IMAGING & DIAGNOSTIC TESTING     Radiology Results: I have personally reviewed pertinent reports  Xr Chest Portable    Result Date: 4/24/2021  Impression: Dialysis catheter tip within the superior cavoatrial junction  No pneumothorax  Workstation performed: [id number]     Other Diagnostic Testing: I have personally reviewed pertinent reports      ACTIVE MEDICATIONS     Current Facility-Administered Medications   Medication Dose Route Frequency    acetaminophen (TYLENOL) tablet 488 mg  488 mg Oral Q6H PRN    albuterol (PROVENTIL HFA,VENTOLIN HFA) inhaler 2 puff  2 puff Inhalation Q6H PRN    dexamethasone (DECADRON) injection 4 mg  4 mg Intravenous 4x Daily    dextrose 5 % and sodium chloride 0 45 % infusion  75 mL/hr Intravenous Continuous    famotidine (PEPCID) tablet 20 mg  20 mg Oral Daily    heparin (porcine) subcutaneous injection 5,000 Units  5,000 Units Subcutaneous Q8H [address]    lidocaine (LIDODERM) 5 % patch 1 patch  1 patch Topical Daily    ondansetron (ZOFRAN) injection 4 mg  4 mg Intravenous Q6H PRN    senna-docusate sodium (SENOKOT S) 8 6-50 mg per tablet 1 tablet  1 tablet Oral BID    tamsulosin (FLOMAX) capsule 0 4 mg  0 4 mg Oral Daily With Dinner       VTE Pharmacologic Prophylaxis: Heparin  VTE Mechanical Prophylaxis: sequential compression device    Portions of the record may have been created with voice recognition software  Occasional wrong word or "sound a like" substitutions may have occurred due to the inherent limitations of voice recognition software    Read the chart carefully and recognize, using context, where substitutions have occurred   ==  Belinda Nix, [address] [facility]  Internal Medicine Residency PGY-2

## 2021-04-27 ENCOUNTER — APPOINTMENT (INPATIENT)
Dept: RADIOLOGY | Facility: HOSPITAL | Age: 56
DRG: 840 | End: 2021-04-27
Payer: COMMERCIAL

## 2021-04-27 PROBLEM — E43 SEVERE PROTEIN-CALORIE MALNUTRITION (HCC): Status: ACTIVE | Noted: 2021-04-27

## 2021-04-27 LAB
ANION GAP SERPL CALCULATED.3IONS-SCNC: 10 MMOL/L (ref 4–13)
BUN SERPL-MCNC: 18 MG/DL (ref 5–25)
CALCIUM SERPL-MCNC: 9.8 MG/DL (ref 8.3–10.1)
CHLORIDE SERPL-SCNC: 102 MMOL/L (ref 100–108)
CO2 SERPL-SCNC: 22 MMOL/L (ref 21–32)
CREAT SERPL-MCNC: 0.48 MG/DL (ref 0.6–1.3)
ERYTHROCYTE [DISTWIDTH] IN BLOOD BY AUTOMATED COUNT: 13.2 % (ref 11.6–15.1)
GFR SERPL CREATININE-BSD FRML MDRD: 112 ML/MIN/1.73SQ M
GLUCOSE SERPL-MCNC: 120 MG/DL (ref 65–140)
GLUCOSE SERPL-MCNC: 162 MG/DL (ref 65–140)
GLUCOSE SERPL-MCNC: 173 MG/DL (ref 65–140)
GLUCOSE SERPL-MCNC: 180 MG/DL (ref 65–140)
GLUCOSE SERPL-MCNC: 195 MG/DL (ref 65–140)
GLUCOSE SERPL-MCNC: 252 MG/DL (ref 65–140)
HCT VFR BLD AUTO: 37.2 % (ref 34.8–46.1)
HGB BLD-MCNC: 12.9 G/DL (ref 11.5–15.4)
MCH RBC QN AUTO: 32.3 PG (ref 26.8–34.3)
MCHC RBC AUTO-ENTMCNC: 34.7 G/DL (ref 31.4–37.4)
MCV RBC AUTO: 93 FL (ref 82–98)
NRBC BLD AUTO-RTO: 0 /100 WBCS
PLATELET # BLD AUTO: 181 THOUSANDS/UL (ref 149–390)
PMV BLD AUTO: 10.3 FL (ref 8.9–12.7)
POTASSIUM SERPL-SCNC: 3.8 MMOL/L (ref 3.5–5.3)
RBC # BLD AUTO: 3.99 MILLION/UL (ref 3.81–5.12)
SODIUM SERPL-SCNC: 134 MMOL/L (ref 136–145)
WBC # BLD AUTO: 11.99 THOUSAND/UL (ref 4.31–10.16)

## 2021-04-27 PROCEDURE — 02PY33Z REMOVAL OF INFUSION DEVICE FROM GREAT VESSEL, PERCUTANEOUS APPROACH: ICD-10-PCS | Performed by: RADIOLOGY

## 2021-04-27 PROCEDURE — 85027 COMPLETE CBC AUTOMATED: CPT | Performed by: INTERNAL MEDICINE

## 2021-04-27 PROCEDURE — C1769 GUIDE WIRE: HCPCS

## 2021-04-27 PROCEDURE — 36597 REPOSITION VENOUS CATHETER: CPT

## 2021-04-27 PROCEDURE — 36589 REMOVAL TUNNELED CV CATH: CPT

## 2021-04-27 PROCEDURE — 36589 REMOVAL TUNNELED CV CATH: CPT | Performed by: PHYSICIAN ASSISTANT

## 2021-04-27 PROCEDURE — 82948 REAGENT STRIP/BLOOD GLUCOSE: CPT

## 2021-04-27 PROCEDURE — 80048 BASIC METABOLIC PNL TOTAL CA: CPT | Performed by: STUDENT IN AN ORGANIZED HEALTH CARE EDUCATION/TRAINING PROGRAM

## 2021-04-27 PROCEDURE — 36597 REPOSITION VENOUS CATHETER: CPT | Performed by: RADIOLOGY

## 2021-04-27 PROCEDURE — 71045 X-RAY EXAM CHEST 1 VIEW: CPT

## 2021-04-27 PROCEDURE — 76000 FLUOROSCOPY <1 HR PHYS/QHP: CPT

## 2021-04-27 PROCEDURE — 3E04305 INTRODUCTION OF OTHER ANTINEOPLASTIC INTO CENTRAL VEIN, PERCUTANEOUS APPROACH: ICD-10-PCS | Performed by: INTERNAL MEDICINE

## 2021-04-27 PROCEDURE — 02HV33Z INSERTION OF INFUSION DEVICE INTO SUPERIOR VENA CAVA, PERCUTANEOUS APPROACH: ICD-10-PCS | Performed by: RADIOLOGY

## 2021-04-27 PROCEDURE — C1751 CATH, INF, PER/CENT/MIDLINE: HCPCS

## 2021-04-27 PROCEDURE — 02WY33Z REVISION OF INFUSION DEVICE IN GREAT VESSEL, PERCUTANEOUS APPROACH: ICD-10-PCS | Performed by: RADIOLOGY

## 2021-04-27 PROCEDURE — 36569 INSJ PICC 5 YR+ W/O IMAGING: CPT

## 2021-04-27 PROCEDURE — 92526 ORAL FUNCTION THERAPY: CPT

## 2021-04-27 RX ORDER — LIDOCAINE HYDROCHLORIDE AND EPINEPHRINE 10; 10 MG/ML; UG/ML
INJECTION, SOLUTION INFILTRATION; PERINEURAL CODE/TRAUMA/SEDATION MEDICATION
Status: COMPLETED | OUTPATIENT
Start: 2021-04-27 | End: 2021-04-27

## 2021-04-27 RX ORDER — SODIUM CHLORIDE 9 MG/ML
20 INJECTION, SOLUTION INTRAVENOUS ONCE AS NEEDED
Status: DISCONTINUED | OUTPATIENT
Start: 2021-04-27 | End: 2021-05-31

## 2021-04-27 RX ORDER — FAMOTIDINE 20 MG/1
20 TABLET, FILM COATED ORAL 2 TIMES DAILY
Status: DISCONTINUED | OUTPATIENT
Start: 2021-04-27 | End: 2021-05-23

## 2021-04-27 RX ADMIN — DEXTROSE AND SODIUM CHLORIDE 50 ML/HR: 5; .45 INJECTION, SOLUTION INTRAVENOUS at 19:11

## 2021-04-27 RX ADMIN — INSULIN LISPRO 1 UNITS: 100 INJECTION, SOLUTION INTRAVENOUS; SUBCUTANEOUS at 13:42

## 2021-04-27 RX ADMIN — HEPARIN SODIUM 5000 UNITS: 5000 INJECTION INTRAVENOUS; SUBCUTANEOUS at 21:29

## 2021-04-27 RX ADMIN — DEXAMETHASONE SODIUM PHOSPHATE 4 MG: 4 INJECTION INTRA-ARTICULAR; INTRALESIONAL; INTRAMUSCULAR; INTRAVENOUS; SOFT TISSUE at 13:42

## 2021-04-27 RX ADMIN — FAMOTIDINE 20 MG: 20 TABLET ORAL at 10:48

## 2021-04-27 RX ADMIN — LIDOCAINE HYDROCHLORIDE,EPINEPHRINE BITARTRATE 10 ML: 10; .01 INJECTION, SOLUTION INFILTRATION; PERINEURAL at 15:24

## 2021-04-27 RX ADMIN — FAMOTIDINE 20 MG: 20 TABLET ORAL at 18:18

## 2021-04-27 RX ADMIN — TAMSULOSIN HYDROCHLORIDE 0.4 MG: 0.4 CAPSULE ORAL at 18:18

## 2021-04-27 RX ADMIN — HEPARIN SODIUM 5000 UNITS: 5000 INJECTION INTRAVENOUS; SUBCUTANEOUS at 05:57

## 2021-04-27 RX ADMIN — INSULIN LISPRO 1 UNITS: 100 INJECTION, SOLUTION INTRAVENOUS; SUBCUTANEOUS at 18:59

## 2021-04-27 RX ADMIN — DEXAMETHASONE SODIUM PHOSPHATE 4 MG: 4 INJECTION INTRA-ARTICULAR; INTRALESIONAL; INTRAMUSCULAR; INTRAVENOUS; SOFT TISSUE at 10:47

## 2021-04-27 RX ADMIN — HEPARIN SODIUM 5000 UNITS: 5000 INJECTION INTRAVENOUS; SUBCUTANEOUS at 13:42

## 2021-04-27 RX ADMIN — LIDOCAINE 5% 1 PATCH: 700 PATCH TOPICAL at 10:48

## 2021-04-27 RX ADMIN — SENNOSIDES, DOCUSATE SODIUM 1 TABLET: 8.6; 5 TABLET ORAL at 18:18

## 2021-04-27 RX ADMIN — DEXAMETHASONE SODIUM PHOSPHATE 4 MG: 4 INJECTION INTRA-ARTICULAR; INTRALESIONAL; INTRAMUSCULAR; INTRAVENOUS; SOFT TISSUE at 21:29

## 2021-04-27 RX ADMIN — DEXAMETHASONE SODIUM PHOSPHATE 4 MG: 4 INJECTION INTRA-ARTICULAR; INTRALESIONAL; INTRAMUSCULAR; INTRAVENOUS; SOFT TISSUE at 18:18

## 2021-04-27 RX ADMIN — RITUXIMAB 500 MG: 10 INJECTION, SOLUTION INTRAVENOUS at 18:22

## 2021-04-27 RX ADMIN — DEXTROSE AND SODIUM CHLORIDE 75 ML/HR: 5; .45 INJECTION, SOLUTION INTRAVENOUS at 04:44

## 2021-04-27 RX ADMIN — SENNOSIDES, DOCUSATE SODIUM 1 TABLET: 8.6; 5 TABLET ORAL at 10:48

## 2021-04-27 NOTE — PLAN OF CARE
Problem: Prexisting or High Potential for Compromised Skin Integrity  Goal: Skin integrity is maintained or improved  Description: INTERVENTIONS:  - Identify patients at risk for skin breakdown  - Assess and monitor skin integrity  - Assess and monitor nutrition and hydration status  - Monitor labs   - Assess for incontinence   - Turn and reposition patient  - Assist with mobility/ambulation  - Relieve pressure over bony prominences  - Avoid friction and shearing  - Provide appropriate hygiene as needed including keeping skin clean and dry  - Evaluate need for skin moisturizer/barrier cream  - Collaborate with interdisciplinary team   - Patient/family teaching  - Consider wound care consult   Outcome: Progressing     Problem: Potential for Falls  Goal: Patient will remain free of falls  Description: INTERVENTIONS:  - Assess patient frequently for physical needs  -  Identify cognitive and physical deficits and behaviors that affect risk of falls  -  [place] fall precautions as indicated by assessment   - Educate patient/family on patient safety including physical limitations  - Instruct patient to call for assistance with activity based on assessment  - Modify environment to reduce risk of injury  - Consider OT/PT consult to assist with strengthening/mobility  Outcome: Progressing     Problem: Nutrition/Hydration-ADULT  Goal: Nutrient/Hydration intake appropriate for improving, restoring or maintaining nutritional needs  Description: Monitor and assess patient's nutrition/hydration status for malnutrition  Collaborate with interdisciplinary team and initiate plan and interventions as ordered  Monitor patient's weight and dietary intake as ordered or per policy  Utilize nutrition screening tool and intervene as necessary  Determine patient's food preferences and provide high-protein, high-caloric foods as appropriate       INTERVENTIONS:  - Monitor oral intake, urinary output, labs, and treatment plans  - Assess nutrition and hydration status and recommend course of action  - Evaluate amount of meals eaten  - Assist patient with eating if necessary   - Allow adequate time for meals  - Recommend/ encourage appropriate diets, oral nutritional supplements, and vitamin/mineral supplements  - Order, calculate, and assess calorie counts as needed  - Recommend, monitor, and adjust tube feedings and TPN/PPN based on assessed needs  - Assess need for intravenous fluids  - Provide specific nutrition/hydration education as appropriate  - Include patient/family/caregiver in decisions related to nutrition  Outcome: Progressing     Problem: PAIN - ADULT  Goal: Verbalizes/displays adequate comfort level or baseline comfort level  Description: Interventions:  - Encourage patient to monitor pain and request assistance  - Assess pain using appropriate pain scale  - Administer analgesics based on type and severity of pain and evaluate response  - Implement non-pharmacological measures as appropriate and evaluate response  - Consider cultural and social influences on pain and pain management  - Notify physician/advanced practitioner if interventions unsuccessful or patient reports new pain  Outcome: Progressing     Problem: INFECTION - ADULT  Goal: Absence or prevention of progression during hospitalization  Description: INTERVENTIONS:  - Assess and monitor for signs and symptoms of infection  - Monitor lab/diagnostic results  - Monitor all insertion sites, i e  indwelling lines, tubes, and drains  - Monitor endotracheal if appropriate and nasal secretions for changes in amount and color  - [place] appropriate cooling/warming therapies per order  - Administer medications as ordered  - Instruct and encourage patient and family to use good hand hygiene technique  - Identify and instruct in appropriate isolation precautions for identified infection/condition  Outcome: Progressing     Problem: SAFETY ADULT  Goal: Patient will remain free of falls  Description: INTERVENTIONS:  - Assess patient frequently for physical needs  -  Identify cognitive and physical deficits and behaviors that affect risk of falls    -  [place] fall precautions as indicated by assessment   - Educate patient/family on patient safety including physical limitations  - Instruct patient to call for assistance with activity based on assessment  - Modify environment to reduce risk of injury  - Consider OT/PT consult to assist with strengthening/mobility  Outcome: Progressing  Goal: Maintain or return to baseline ADL function  Description: INTERVENTIONS:  -  Assess patient's ability to carry out ADLs; assess patient's baseline for ADL function and identify physical deficits which impact ability to perform ADLs (bathing, care of mouth/teeth, toileting, grooming, dressing, etc )  - Assess/evaluate cause of self-care deficits   - Assess range of motion  - Assess patient's mobility; develop plan if impaired  - Assess patient's need for assistive devices and provide as appropriate  - Encourage maximum independence but intervene and supervise when necessary  - Involve family in performance of ADLs  - Assess for home care needs following discharge   - Consider OT consult to assist with ADL evaluation and planning for discharge  - Provide patient education as appropriate  Outcome: Progressing  Goal: Maintain or return mobility status to optimal level  Description: INTERVENTIONS:  - Assess patient's baseline mobility status (ambulation, transfers, stairs, etc )    - Identify cognitive and physical deficits and behaviors that affect mobility  - Identify mobility aids required to assist with transfers and/or ambulation (gait belt, sit-to-stand, lift, walker, cane, etc )  - [place] fall precautions as indicated by assessment  - Record patient progress and toleration of activity level on Mobility SBAR; progress patient to next Phase/Stage  - Instruct patient to call for assistance with activity based on assessment  - Consider rehabilitation consult to assist with strengthening/weightbearing, etc   Outcome: Progressing     Problem: DISCHARGE PLANNING  Goal: Discharge to home or other facility with appropriate resources  Description: INTERVENTIONS:  - Identify barriers to discharge w/patient and caregiver  - Arrange for needed discharge resources and transportation as appropriate  - Identify discharge learning needs (meds, wound care, etc )  - Arrange for interpretive services to assist at discharge as needed  - Refer to Case Management Department for coordinating discharge planning if the patient needs post-hospital services based on physician/advanced practitioner order or complex needs related to functional status, cognitive ability, or social support system  Outcome: Progressing     Problem: Knowledge Deficit  Goal: Patient/family/caregiver demonstrates understanding of disease process, treatment plan, medications, and discharge instructions  Description: Complete learning assessment and assess knowledge base    Interventions:  - Provide teaching at level of understanding  - Provide teaching via preferred learning methods  Outcome: Progressing

## 2021-04-27 NOTE — SPEECH THERAPY NOTE
Speech Language/Pathology    Speech/Language Pathology Progress Note    Patient Name: Mulu London  GWHIC'V Date: 4/27/2021     Problem List  Principal Problem:    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[facility] [address] )  Active Problems:    Altered mental status    Dysphagia    Urinary retention    Aphasia    Weakness    Presence of permanent central venous catheter    Fracture of ramus of left pubis ([facility])    Severe protein-calorie malnutrition ([facility] [address] )       Past Medical History  History reviewed  No pertinent past medical history  Past Surgical History  History reviewed  No pertinent surgical history  Subjective:  Patient asleep, but wakes to verbal cues  Objective: The patient is seen for f/u dysphagia therapy at am meal  SLP feeds patient, but she is able to hold and give herself thin liquids via cup  The patient is assessed with cream of wheat and banana  Lip closure for tsp appears improved today  The patient has good bite strength  She continues with decreased rotary chew pattern, but has good bolus breakdown and transfer  Any oral residue is cleared with lingual sweep and liquid wash  The patient is unable to use straw today (she is unable to get full closure to draw from straw)  She gives herself thin liquids via cup  Appears to have better oral closure for cup today, with timely transfer  No overt s/s aspiration observed throughout meal      Assessment:  The patient is tolerating current diet well  Plan/Recommendations:  Continue mechanical soft diet with thin liquids  Continue ST to assess with level 3/soft diet as able

## 2021-04-27 NOTE — TELEMEDICINE
INTERPROFESSIONAL (PHONE) CONSULTATION - Interventional Radiology  Lew Liz 49 y o  female MRN: [id number]  Unit/Bed#: [facility] 920-01 Encounter: [id number]    IR has been consulted to evaluate the patient, determine the appropriate procedure, and whether or not a procedure can and should be performed regarding the care of Lew Liz  We were consulted by SOD concerning tunneled catheter, and to possibly perform a permacath removal  if medically appropriate for the patient  IP Consult to IR  Consult performed by: CHEPE Ramirez  Consult ordered by: Alexa Mathews DO        04/27/21      Assessment/Recommendation:     65-year-old female with complicated hospital course  Was previously at [facility] and had tunneled dialysis catheter inserted for PLEX treatments on 03/31/2021  Patient is no longer in need of HD catheter for PLEX  treatments  - plan to remove HD catheter   - right basilic PICC may also need repositioning under fluoroscopy      Total time spent in review of data, discussion with requesting provider and rendering advice was 22 minutes  Patient or appropriate family member was verbally informed by [facility] and infusion center of this consultative service on their behalf to provide more timely access to specialty care in lieu of an in person consultation  Verbal consent was obtained  Thank you for allowing Interventional Radiology to participate in the care of Lew Liz  Please don't hesitate to call or TigerText us with any questions       [address] [place], [place] [address]

## 2021-04-27 NOTE — SEDATION DOCUMENTATION
PICC repositioning completed, Dr Robert Soto reviewed and verified  Patient tolerated well  Catheter advanced and flushed and aspirates well  0cm out  Report called to [facility]

## 2021-04-27 NOTE — PROGRESS NOTES
INTERNAL MEDICINE RESIDENCY PROGRESS NOTE     Name: Shannan Benson   Age & Sex: 49 y o  female   MRN: [id number]  Unit/Bed#: [facility] 920-01   Encounter: [id number]  Team: SOD Team B     PATIENT INFORMATION     Name: Shannan Benson   Age & Sex: 49 y o  female   MRN: [id number]  Hospital Stay Days: 4    ASSESSMENT/PLAN     Principal Problem:    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[facility] [address] )  Active Problems:    Altered mental status    Presence of permanent central venous catheter    Dysphagia    Urinary retention    Aphasia    Weakness    Fracture of ramus of left pubis (HCC)    Severe protein-calorie malnutrition ([facility] [address] )      * 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[facility] [address] )  Assessment & Plan  Patient was diagnosed with primary 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through biopsy in [facility] transferred over here for chemotherapy since the [facility] is out of network  Oncology and Palliative care services consulted, appreciate recommendations   Continue with dexamethasone  As per [facility] progress note patient had discussion with palliative Care and at that time family wants to pursue disease directed therapy    Plan:  Family meeting was held on 04/26 and plan was to proceed with chemotherapy  Hematology-Oncology consulted  Appreciate recommendations  Plan to start patient on methotrexate and rituximab on 04/27  Will place PICC line for chemotherapy  DVT prophylaxis:  On heparin  Presence of permanent central venous catheter  Assessment & Plan  Noted presence of right IJ tunneled double-lumen HD catheter; upon chart review, this was likely inserted to be used for plasmapheresis which she has completed  Plan:  IR consulted for removal      Altered mental status  Assessment & Plan  Patient initially presented to [facility] his ultimate [place] with stroke-like symptoms for 2 days    CT scan of the head was concerning for subacute CVA and was transferred to [facility]   MRI of the brain was more consistent with demyelinating disease patient was started on IV steroids and 1000 mg daily for 5 days  Patient did not have any significant improvement at that time patient had plasmapheresis  And also had  lumbar puncture-negative for infection or malignancy  Patient noted to have persistent encephalopathy so a repeat MRI was obtained which showed worsening of the abnormality seen on the previous MRI and 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Continue with the dexamethasone  Likely secondary to 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Delirium precautions   Currently alert and oriented times 2-3  Intermittently follow commands  Moves all extremities spontaneously  Patient's  (does not speak [place]) makes all medical decision for her  Severe protein-calorie malnutrition ([facility] [address] )  Assessment & Plan  Malnutrition Findings:   Adult Malnutrition type: Acute illness(due to medical condition resulting in dysphagia, decreased appetite and intake, weight loss)  Adult Degree of Malnutrition: Other severe protein calorie malnutrition    BMI Findings: Body mass index is 20 49 kg/m²  Plan:  Nutrition consult  Proving IV fluids, calorie count, protein shakes  Fracture of ramus of left pubis [facility])  Assessment & Plan  Leslie Font prior to presentation to [facility]  Managed nonoperatively at [facility]    Weakness  Assessment & Plan  Left greater than right upper and lower extremity weakness with ongoing left lower extremity contracture  Secondary to underlying CNS malignancy  *Decubitus ulcer precautions such as frequent repositioning  Aphasia  Assessment & Plan  Definite evidence of expressive aphasia, possible component of receptive aphasia as well  Speech therapy on board       Urinary retention  Assessment & Plan  Patient developed urinary retention during previous hospitalization  Plan was voiding trial as an outpatient as she was scheduled for discharge from that facility  Continue with the Cottrell catheter for today, was started on Flomax 0 4mg, consider voiding trial    Dysphagia  Assessment & Plan  Patient noted to have dysphagia and also decreased p o  Intake  As per [facility] progress note patient had a calorie count and recommended feeding tube placement  Speech evaluation - advance diet with dysphagia 2 and thin liquids; needs assistance with feeds  Calorie count  If the patient continued to have poor p o  Intake may need discussion with the family regarding alternate methods of nutrition      Disposition:  Inpatient chemotherapy  SUBJECTIVE     Patient seen and examined  No acute events overnight  Alert and oriented times 1-2  Intermittently follow commands  Left-sided paralysis  OBJECTIVE     Vitals:    21 2156 21 0600 21 0816 21 0816   BP: 118/80  126/82 126/82   Pulse: 86  76 76   Resp: 18  18 18   Temp: 98 8 °F (37 1 °C)  97 7 °F (36 5 °C) 97 7 °F (36 5 °C)   TempSrc:       SpO2: 99%  99% 100%   Weight:  47 6 kg (104 lb 15 oz)     Height:          Temperature:   Temp (24hrs), Av 4 °F (36 9 °C), Min:97 7 °F (36 5 °C), Max:99 5 °F (37 5 °C)    Temperature: 97 7 °F (36 5 °C)  Intake & Output:  I/O        07 -  0700  07 -  07 07 -  0700    P  O  240 220     I V  (mL/kg) 1000 (21 2) 2096 3 (44)     Total Intake(mL/kg) 1240 (26 3) 2316 3 (48 7)     Urine (mL/kg/hr) 2275 (2) 2850 (2 5)     Stool  0     Total Output 2275 2850     Net -1035 -533 8            Unmeasured Stool Occurrence  2 x         Weights:   IBW (Ideal Body Weight): 45 5 kg    Body mass index is 20 49 kg/m²  Weight (last 2 days)     Date/Time   Weight    21 0600   47 6 (104 94)    21 0553   47 1 (103 84)    21 0600   46 2 (101 85)            Physical Exam  Vitals signs reviewed  Constitutional:       General: She is not in acute distress  Appearance: She is well-developed  She is not diaphoretic  HENT:      Head: Normocephalic and atraumatic        Nose: Nose normal       Mouth/Throat:      Pharynx: No oropharyngeal exudate  Eyes:      General: No scleral icterus  Conjunctiva/sclera: Conjunctivae normal    Neck:      Musculoskeletal: Neck supple  Thyroid: No thyromegaly  Vascular: No JVD  Trachea: No tracheal deviation  Cardiovascular:      Rate and Rhythm: Normal rate and regular rhythm  Heart sounds: Normal heart sounds  No murmur  No friction rub  No gallop  Pulmonary:      Effort: Pulmonary effort is normal  No respiratory distress  Breath sounds: Normal breath sounds  No stridor  No wheezing or rales  Chest:      Chest wall: No tenderness  Abdominal:      General: Bowel sounds are normal  There is no distension  Palpations: Abdomen is soft  There is no mass  Tenderness: There is no abdominal tenderness  There is no guarding or rebound  Musculoskeletal:         General: No tenderness  Skin:     General: Skin is warm  Findings: No erythema or rash  Neurological:      Mental Status: She is alert  Mental status is at baseline  Sensory: No sensory deficit  Comments: Alert and oriented times 1-2  Intermittently follow commands  1/5 strength in left upper and lower extremity  Psychiatric:         Behavior: Behavior normal          Thought Content: Thought content normal          Judgment: Judgment normal        LABORATORY DATA     Labs: I have personally reviewed pertinent reports    Results from last 7 days   Lab Units 04/27/21  1043 04/26/21  0552 04/25/21  0620   WBC Thousand/uL 11 99* 8 57 7 19   HEMOGLOBIN g/dL 12 9 12 0 11 5   HEMATOCRIT % 37 2 35 1 33 8*   PLATELETS Thousands/uL 181 161 187   MONO PCT %  --   --  9      Results from last 7 days   Lab Units 04/27/21  0458 04/26/21  0552 04/25/21  0620 04/24/21  0837   POTASSIUM mmol/L 3 8 3 9 3 7 4 1   CHLORIDE mmol/L 102 103 106 105   CO2 mmol/L 22 25 23 26   BUN mg/dL 18 19 18 25   CREATININE mg/dL 0 48* 0 54* 0 59* 0 62   CALCIUM mg/dL 9 8 9 4 9 4 10 2*   ALK PHOS U/L  --   --   --  63   ALT U/L  --   --   --  18   AST U/L  --   --   --  9     Results from last 7 days   Lab Units 04/24/21  0837   MAGNESIUM mg/dL 2 1                        IMAGING & DIAGNOSTIC TESTING     Radiology Results: I have personally reviewed pertinent reports  Xr Chest Portable    Result Date: 4/24/2021  Impression: Dialysis catheter tip within the superior cavoatrial junction  No pneumothorax  Workstation performed: [id number]     Other Diagnostic Testing: I have personally reviewed pertinent reports      ACTIVE MEDICATIONS     Current Facility-Administered Medications   Medication Dose Route Frequency    acetaminophen (TYLENOL) tablet 488 mg  488 mg Oral Q6H PRN    albuterol (PROVENTIL HFA,VENTOLIN HFA) inhaler 2 puff  2 puff Inhalation Q6H PRN    dexamethasone (DECADRON) injection 4 mg  4 mg Intravenous 4x Daily    dextrose 5 % and sodium chloride 0 45 % infusion  50 mL/hr Intravenous Continuous    famotidine (PEPCID) tablet 20 mg  20 mg Oral BID    heparin (porcine) subcutaneous injection 5,000 Units  5,000 Units Subcutaneous Q8H [facility]    insulin lispro (HumaLOG) 100 units/mL subcutaneous injection 1-5 Units  1-5 Units Subcutaneous TID AC    lidocaine (LIDODERM) 5 % patch 1 patch  1 patch Topical Daily    methotrexate (PF) 4,935 mg in sodium chloride 0 9 % 1,000 mL IVPB  3,500 mg/m2 (Treatment Plan Recorded) Intravenous Once    ondansetron (ZOFRAN) 16 mg, dexamethasone (DECADRON) 10 mg in sodium chloride 0 9 % 50 mL IVPB   Intravenous Once    ondansetron (ZOFRAN) injection 4 mg  4 mg Intravenous Q6H PRN    riTUXimab (RITUXAN) 528 8 mg in sodium chloride 0 9 % 211 5 mL first titrated chemo infusion  375 mg/m2 (Treatment Plan Recorded) Intravenous Once    senna-docusate sodium (SENOKOT S) 8 6-50 mg per tablet 1 tablet  1 tablet Oral BID    sodium bicarbonate 100 mEq in dextrose 5 % 1,000 mL infusion  150 mL/hr Intravenous Continuous    sodium chloride 0 9 % infusion  20 mL/hr Intravenous Once PRN    tamsulosin (FLOMAX) capsule 0 4 mg  0 4 mg Oral Daily With Dinner       VTE Pharmacologic Prophylaxis: Heparin  VTE Mechanical Prophylaxis: sequential compression device    Portions of the record may have been created with voice recognition software  Occasional wrong word or "sound a like" substitutions may have occurred due to the inherent limitations of voice recognition software    Read the chart carefully and recognize, using context, where substitutions have occurred   ==  Aparna Isaac, [facility] [facility]  Internal Medicine Residency PGY-2

## 2021-04-27 NOTE — UTILIZATION REVIEW
Continued Stay Review    Date: 4/25, 4/26 and 4/27/21                         Current Patient Class: IP  Current Level of Care: MS    HPI:56 y o  female initially admitted on 4/23 as a transfer from [facility] to [facility] for treatment of 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for initiation of chemotherapy  She was started on IV Dexamethasone  stroke-like symptoms for 2 days   CT scan of the head was concerning for subacute CVA and was transferred to [facility] [place]   MRI of the brain was more consistent with demyelinating disease patient was started on IV steroids and 1000 mg daily for 5 days   Patient did not have any significant improvement at that time patient had plasmapheresis   And also had  lumbar puncture-negative for infection or malignancy   Patient noted to have persistent encephalopathy so a repeat MRI was obtained which showed worsening of the abnormality seen on the previous MRI and 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Assessment/Plan:   4/25 cannot reach family for consent for treatment, chemo and pt is not capable of giving consent  Provider will meet with spouse and family friend Mr Sesar Escamilla on 4/26  Cannot move forward with chemo w/o consent  Is not responding to verbal stimuli  Pt has R IJ HD catheter that was used prior  If not needed will have it removed  Pt has L>R Upper and lower extremity weakness - needs good skin care  Has expressive aphasia and some receptive aphasia  Will continue machado  She is on Flomax and may need a voiding trial   Per ST she can have dysphagia diet with thin liquids  Patient following most verbal commands with some exceptions, however large part of this is due to concurrent aphasia from underlying malignancy as well as language barrier    4/26 Oncologist spoke with pt and family members  I reviewed with the patient and her  as well as her son, by phone, who acted as  throughout the course of the interview today    Plan is to proceed with high-dose methotrexate, 3500 g per m2 as well as Rituxan  If she tolerates this well with her 1st cycle Manuela-C and/or thiotepa could be considered working our way into the matrix protocol  This approach is reasonable acknowledge in her poor performance status, ECOG-4     4/27 pt had PICC line inserted today  Previous HD catheter has been removed today  Pt will start Methotrexate and Rituxin today 4/27  Intermittently following commands today  Continues on IV Dexamethasone  Chemo started  Na Bicarb drip started today at 150 ml/hr  Vital Signs:   04/27/21 08:16:56  97 7 °F (36 5 °C)  76  18  126/82  97  100 %  --  --   04/27/21 08:16:04  97 7 °F (36 5 °C)  76  18  126/82  97  99 %  --  --   04/26/21 2204  --  --  --  --  --  --  None (Room air)  --   04/26/21 21:56:10  98 8 °F (37 1 °C)  86  18  118/80  93  99 %  --  --   04/26/21 15:27:47  99 5 °F (37 5 °C)  104  16  127/77  94  98 %  --  --   04/26/21 0935  --  --  --  --  --  --  None (Room air)  --   04/26/21 07:15:28  98 4 °F (36 9 °C)  79  16  145/90  108  100 %  --  --   04/25/21 22:12:57  98 1 °F (36 7 °C)  73  18  147/91  110  99 %  --  --   04/25/21 2130  --  --  --  --  --  --  None (Room air)  --   04/25/21 15:30:53  98 1 °F (36 7 °C)  106Abnormal   --  127/79  95  98 %  --  --   04/25/21 15:29:39  98 1 °F (36 7 °C)  101  16  127/79  95  97 %  --  --   04/25/21 0900  --  --  --  --  --  --  None (Room air)  --   04/25/21 07:58:55  98 6 °F (37 °C)  75  16  139/83  102  100 %  --  Lying   04/25/21 07:58:19  98 6 °F (37 °C)  76  --  139/83  102  100 %  --  --     Pertinent Labs/Diagnostic Results:     4/27 removal of IJ tunneled HD Catheter         Results from last 7 days   Lab Units 04/27/21  1043 04/26/21  0552 04/25/21  0620 04/24/21  0837   WBC Thousand/uL 11 99* 8 57 7 19 7 03   HEMOGLOBIN g/dL 12 9 12 0 11 5 13 1   HEMATOCRIT % 37 2 35 1 33 8* 39 1   PLATELETS Thousands/uL 181 161 187 194   BANDS PCT %  --   --  1  --          Results from last 7 days   Lab Units 04/27/21  0458 04/26/21  0552 04/25/21  0620 04/24/21  0837   SODIUM mmol/L 134* 135* 137 136   POTASSIUM mmol/L 3 8 3 9 3 7 4 1   CHLORIDE mmol/L 102 103 106 105   CO2 mmol/L 22 25 23 26   ANION GAP mmol/L 10 7 8 5   BUN mg/dL 18 19 18 25   CREATININE mg/dL 0 48* 0 54* 0 59* 0 62   EGFR ml/min/1 73sq m 112 107 104 103   CALCIUM mg/dL 9 8 9 4 9 4 10 2*   MAGNESIUM mg/dL  --   --   --  2 1     Results from last 7 days   Lab Units 04/24/21  0837   AST U/L 9   ALT U/L 18   ALK PHOS U/L 63   TOTAL PROTEIN g/dL 6 9   ALBUMIN g/dL 3 6   TOTAL BILIRUBIN mg/dL 0 36     Results from last 7 days   Lab Units 04/27/21  1143 04/27/21  1126 04/27/21  0823   POC GLUCOSE mg/dl 173* 195* 120     Results from last 7 days   Lab Units 04/27/21  0458 04/26/21  0552 04/25/21  0620 04/24/21  0837   GLUCOSE RANDOM mg/dL 162* 145* 160* 165*       Medications:   Scheduled Medications:  dexamethasone, 4 mg, Intravenous, 4x Daily  famotidine, 20 mg, Oral, BID  heparin (porcine), 5,000 Units, Subcutaneous, Q8H MEÑO  insulin lispro, 1-5 Units, Subcutaneous, TID AC  lidocaine, 1 patch, Topical, Daily  methotrexate (PF) IVPB, 5,000 mg, Intravenous, Once  ondansetron with dexamethasone IVPB, , Intravenous, Once  riTUXimab (RITUXAN) first titrated chemo infusion, 500 mg, Intravenous, Once  senna-docusate sodium, 1 tablet, Oral, BID  tamsulosin, 0 4 mg, Oral, Daily With Dinner      Continuous IV Infusions:  dextrose 5 % and sodium chloride 0 45 %, 50 mL/hr, Intravenous, Continuous  sodium bicarbonate infusion, 150 mL/hr, Intravenous, Continuous      PRN Meds:  acetaminophen, 488 mg, Oral, Q6H PRN  albuterol, 2 puff, Inhalation, Q6H PRN  ondansetron, 4 mg, Intravenous, Q6H PRN - x 1 4/26  sodium chloride, 20 mL/hr, Intravenous, Once PRN    Discharge Plan: TBD     Network Utilization Review Department  ATTENTION: Please call with any questions or concerns to [phone] and carefully listen to the prompts so that you are directed to the right person  All voicemails are confidential   Valeta Pastel all requests for admission clinical reviews, approved or denied determinations and any other requests to dedicated fax number below belonging to the campus where the patient is receiving treatment   List of dedicated fax numbers for the Facilities:  [address] DENIALS (Administrative/Medical Necessity) [phone]   [address] (Maternity/NICU/Pediatrics) [phone]   [address] [id number] [place] [address] [address] [facility] [place] [address] [phone]

## 2021-04-27 NOTE — PROGRESS NOTES
Nurse on floor spoke same language as patient and assisted in communicating  Patient speaks Jorgensen Octavia

## 2021-04-27 NOTE — NUTRITION
04/27/21 1450   Assessment   Timepoint Nutrition Review  (Calorie count day 1 results)   Recommendations/Interventions   Summary Day 1 calorie count results 4/26 x3 meals: 540 calories (38%-33% calorie needs met) and 22 grams protein (38%-31% protein needs met)  Patient remains on Level 2 Dysphagia, thin liquid, vegetarian diet with ensure compact TID  Calorie count to continue one more day     Nutrition Recommendations Other (Specify)  (Will follow up with day 2 calorie count results and recommendations )   Nutrition Complexity Risk   Nutrition complexity level High risk   Nutrition review: 04/28/21

## 2021-04-27 NOTE — PROCEDURES
Insert PICC line    Date/Time: 4/27/2021 1:05 PM  Performed by: Tamara Nicole RN  Authorized by: Rosi Kirk DO     Patient location:  Bedside  Other Assisting Provider: Yes (comment)    Consent:     Consent obtained:  Written and verbal (consent obtained by physician from pt's )    Consent given by:  Spouse    Procedural risks discussed: MD instructed  Universal protocol:     Procedure explained and questions answered to patient or proxy's satisfaction: yes      Relevant documents present and verified: yes      Test results available and properly labeled: yes      Radiology Images displayed and confirmed  If images not available, report reviewed: yes      Required blood products, implants, devices, and special equipment available: yes      Site/side marked: yes      Immediately prior to procedure, a time out was called: yes      Patient identity confirmed:  Arm band and hospital-assigned identification number  Pre-procedure details:     Hand hygiene: Hand hygiene performed prior to insertion      Sterile barrier technique: All elements of maximal sterile technique followed      Skin preparation:  ChloraPrep    Skin preparation agent: Skin preparation agent completely dried prior to procedure    Indications:     PICC line indications: chemotherapy    Anesthesia (see MAR for exact dosages):      Anesthesia method:  Local infiltration    Local anesthetic:  Lidocaine 1% w/o epi (2 ml)  Procedure details:     Location:  Basilic    Vessel type: vein      Laterality:  Right    Approach: percutaneous technique used      Patient position:  Flat    Procedural supplies:  Double lumen    Catheter size:  5 Fr    Landmarks identified: yes      Ultrasound guidance: yes      Ultrasound image availability:  Not saved    Sterile ultrasound techniques: Sterile gel and sterile probe covers were used      Number of attempts:  1    Successful placement: yes      Vessel of catheter tip end:  Chest Xray needed to confirm placement (pink stickers on caps, reported to RN)    Total catheter length (cm):  40    Catheter out on skin (cm):  3    Max flow rate:  999 ml / hr    Arm circumference:  24  Post-procedure details:     Post-procedure:  Dressing applied and securement device placed    Assessment:  Blood return through all ports, free fluid flow and placement verification pending x-ray result    Post-procedure complications: none      Patient tolerance of procedure:   Tolerated well, no immediate complications    Observer: Yes      Observer name:  Nati Hayward, Infusion tech  Comments:      Lot # [id number]  Expires 6/30/22  Pink stickers on caps

## 2021-04-27 NOTE — PLAN OF CARE
Problem: Prexisting or High Potential for Compromised Skin Integrity  Goal: Skin integrity is maintained or improved  Description: INTERVENTIONS:  - Identify patients at risk for skin breakdown  - Assess and monitor skin integrity  - Assess and monitor nutrition and hydration status  - Monitor labs   - Assess for incontinence   - Turn and reposition patient  - Assist with mobility/ambulation  - Relieve pressure over bony prominences  - Avoid friction and shearing  - Provide appropriate hygiene as needed including keeping skin clean and dry  - Evaluate need for skin moisturizer/barrier cream  - Collaborate with interdisciplinary team   - Patient/family teaching  - Consider wound care consult   4/26/2021 2337 by Lillie Barroso RN  Outcome: Progressing  4/26/2021 2336 by Lillie Barroso RN  Outcome: Progressing     Problem: Potential for Falls  Goal: Patient will remain free of falls  Description: INTERVENTIONS:  - Assess patient frequently for physical needs  -  Identify cognitive and physical deficits and behaviors that affect risk of falls  -  [place] fall precautions as indicated by assessment   - Educate patient/family on patient safety including physical limitations  - Instruct patient to call for assistance with activity based on assessment  - Modify environment to reduce risk of injury  - Consider OT/PT consult to assist with strengthening/mobility  4/26/2021 2337 by Lillie Barroso RN  Outcome: Progressing  4/26/2021 2336 by Lillie Barroso RN  Outcome: Progressing     Problem: Nutrition/Hydration-ADULT  Goal: Nutrient/Hydration intake appropriate for improving, restoring or maintaining nutritional needs  Description: Monitor and assess patient's nutrition/hydration status for malnutrition  Collaborate with interdisciplinary team and initiate plan and interventions as ordered  Monitor patient's weight and dietary intake as ordered or per policy  Utilize nutrition screening tool and intervene as necessary   Determine patient's food preferences and provide high-protein, high-caloric foods as appropriate       INTERVENTIONS:  - Monitor oral intake, urinary output, labs, and treatment plans  - Assess nutrition and hydration status and recommend course of action  - Evaluate amount of meals eaten  - Assist patient with eating if necessary   - Allow adequate time for meals  - Recommend/ encourage appropriate diets, oral nutritional supplements, and vitamin/mineral supplements  - Order, calculate, and assess calorie counts as needed  - Recommend, monitor, and adjust tube feedings and TPN/PPN based on assessed needs  - Assess need for intravenous fluids  - Provide specific nutrition/hydration education as appropriate  - Include patient/family/caregiver in decisions related to nutrition  4/26/2021 2337 by Kailee Yeager RN  Outcome: Progressing  4/26/2021 2336 by Kailee Yeager RN  Outcome: Progressing     Problem: PAIN - ADULT  Goal: Verbalizes/displays adequate comfort level or baseline comfort level  Description: Interventions:  - Encourage patient to monitor pain and request assistance  - Assess pain using appropriate pain scale  - Administer analgesics based on type and severity of pain and evaluate response  - Implement non-pharmacological measures as appropriate and evaluate response  - Consider cultural and social influences on pain and pain management  - Notify physician/advanced practitioner if interventions unsuccessful or patient reports new pain  Outcome: Progressing     Problem: INFECTION - ADULT  Goal: Absence or prevention of progression during hospitalization  Description: INTERVENTIONS:  - Assess and monitor for signs and symptoms of infection  - Monitor lab/diagnostic results  - Monitor all insertion sites, i e  indwelling lines, tubes, and drains  - Monitor endotracheal if appropriate and nasal secretions for changes in amount and color  - [place] appropriate cooling/warming therapies per order  - Administer medications as ordered  - Instruct and encourage patient and family to use good hand hygiene technique  - Identify and instruct in appropriate isolation precautions for identified infection/condition  Outcome: Progressing     Problem: SAFETY ADULT  Goal: Patient will remain free of falls  Description: INTERVENTIONS:  - Assess patient frequently for physical needs  -  Identify cognitive and physical deficits and behaviors that affect risk of falls    -  [place] fall precautions as indicated by assessment   - Educate patient/family on patient safety including physical limitations  - Instruct patient to call for assistance with activity based on assessment  - Modify environment to reduce risk of injury  - Consider OT/PT consult to assist with strengthening/mobility  4/26/2021 2337 by Paulo Haynes RN  Outcome: Progressing  4/26/2021 2336 by Paulo Haynes RN  Outcome: Progressing  Goal: Maintain or return to baseline ADL function  Description: INTERVENTIONS:  -  Assess patient's ability to carry out ADLs; assess patient's baseline for ADL function and identify physical deficits which impact ability to perform ADLs (bathing, care of mouth/teeth, toileting, grooming, dressing, etc )  - Assess/evaluate cause of self-care deficits   - Assess range of motion  - Assess patient's mobility; develop plan if impaired  - Assess patient's need for assistive devices and provide as appropriate  - Encourage maximum independence but intervene and supervise when necessary  - Involve family in performance of ADLs  - Assess for home care needs following discharge   - Consider OT consult to assist with ADL evaluation and planning for discharge  - Provide patient education as appropriate  Outcome: Progressing  Goal: Maintain or return mobility status to optimal level  Description: INTERVENTIONS:  - Assess patient's baseline mobility status (ambulation, transfers, stairs, etc )    - Identify cognitive and physical deficits and behaviors that affect mobility  - Identify mobility aids required to assist with transfers and/or ambulation (gait belt, sit-to-stand, lift, walker, cane, etc )  - [place] fall precautions as indicated by assessment  - Record patient progress and toleration of activity level on Mobility SBAR; progress patient to next Phase/Stage  - Instruct patient to call for assistance with activity based on assessment  - Consider rehabilitation consult to assist with strengthening/weightbearing, etc   Outcome: Progressing     Problem: DISCHARGE PLANNING  Goal: Discharge to home or other facility with appropriate resources  Description: INTERVENTIONS:  - Identify barriers to discharge w/patient and caregiver  - Arrange for needed discharge resources and transportation as appropriate  - Identify discharge learning needs (meds, wound care, etc )  - Arrange for interpretive services to assist at discharge as needed  - Refer to Case Management Department for coordinating discharge planning if the patient needs post-hospital services based on physician/advanced practitioner order or complex needs related to functional status, cognitive ability, or social support system  Outcome: Progressing     Problem: Knowledge Deficit  Goal: Patient/family/caregiver demonstrates understanding of disease process, treatment plan, medications, and discharge instructions  Description: Complete learning assessment and assess knowledge base    Interventions:  - Provide teaching at level of understanding  - Provide teaching via preferred learning methods  Outcome: Progressing

## 2021-04-27 NOTE — ASSESSMENT & PLAN NOTE
Malnutrition Findings:   Adult Malnutrition type: Acute illness(due to medical condition resulting in dysphagia, decreased appetite and intake, weight loss)  Adult Degree of Malnutrition: Other severe protein calorie malnutrition    BMI Findings: Body mass index is 21 44 kg/m²  Plan:  · Nutrition consult     · Tube feeds initiated with Jevity 1 2, advancing to goal

## 2021-04-27 NOTE — BRIEF OP NOTE (RAD/CATH)
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

## 2021-04-27 NOTE — CASE MANAGEMENT
CM s/w pt's friend Rajeevjohnnycorbin Don to discuss aftercare recommendations  Pt is being recommended for rehab after d/c and CM reviewed the rehab facilities that where covered under the pt's insurance  Per [facility], he will review the options with the pt's spouse and CM will follow up with him 4/28 to see what choices they made  CM will follow

## 2021-04-27 NOTE — SEDATION DOCUMENTATION
Right IJ tunneled hemodialysis catheter removed by Melo Leyva PA-C  Patient tolerated well  Gauze and tegaderm applied over site

## 2021-04-27 NOTE — PHYSICAL THERAPY NOTE
Physical Therapy Cancellation Note    Patient's Name: Mancel President        04/27/21 1417   PT Last Visit   PT Visit Date 04/27/21   Note Type   Note Type Treatment   Cancel Reasons Patient off floor/test       Orders received, chart reviewed  PT attempted to see pt, however pt currently being taken off the floor to IR  Will follow for PT treatment as medically appropriate  Thank you       Mark Adam, PT, DPT

## 2021-04-28 LAB
ANION GAP SERPL CALCULATED.3IONS-SCNC: 7 MMOL/L (ref 4–13)
BUN SERPL-MCNC: 18 MG/DL (ref 5–25)
CALCIUM SERPL-MCNC: 10 MG/DL (ref 8.3–10.1)
CHLORIDE SERPL-SCNC: 104 MMOL/L (ref 100–108)
CO2 SERPL-SCNC: 25 MMOL/L (ref 21–32)
CREAT SERPL-MCNC: 0.46 MG/DL (ref 0.6–1.3)
ERYTHROCYTE [DISTWIDTH] IN BLOOD BY AUTOMATED COUNT: 13.5 % (ref 11.6–15.1)
GFR SERPL CREATININE-BSD FRML MDRD: 113 ML/MIN/1.73SQ M
GLUCOSE SERPL-MCNC: 131 MG/DL (ref 65–140)
GLUCOSE SERPL-MCNC: 160 MG/DL (ref 65–140)
GLUCOSE SERPL-MCNC: 182 MG/DL (ref 65–140)
GLUCOSE SERPL-MCNC: 202 MG/DL (ref 65–140)
GLUCOSE SERPL-MCNC: 245 MG/DL (ref 65–140)
HCT VFR BLD AUTO: 36.5 % (ref 34.8–46.1)
HGB BLD-MCNC: 12.4 G/DL (ref 11.5–15.4)
MCH RBC QN AUTO: 32 PG (ref 26.8–34.3)
MCHC RBC AUTO-ENTMCNC: 34 G/DL (ref 31.4–37.4)
MCV RBC AUTO: 94 FL (ref 82–98)
NRBC BLD AUTO-RTO: 0 /100 WBCS
PLATELET # BLD AUTO: 172 THOUSANDS/UL (ref 149–390)
PMV BLD AUTO: 10.8 FL (ref 8.9–12.7)
POTASSIUM SERPL-SCNC: 3.6 MMOL/L (ref 3.5–5.3)
RBC # BLD AUTO: 3.88 MILLION/UL (ref 3.81–5.12)
SODIUM SERPL-SCNC: 136 MMOL/L (ref 136–145)
WBC # BLD AUTO: 12.97 THOUSAND/UL (ref 4.31–10.16)

## 2021-04-28 PROCEDURE — 97112 NEUROMUSCULAR REEDUCATION: CPT

## 2021-04-28 PROCEDURE — 85027 COMPLETE CBC AUTOMATED: CPT | Performed by: STUDENT IN AN ORGANIZED HEALTH CARE EDUCATION/TRAINING PROGRAM

## 2021-04-28 PROCEDURE — 97530 THERAPEUTIC ACTIVITIES: CPT

## 2021-04-28 PROCEDURE — 80048 BASIC METABOLIC PNL TOTAL CA: CPT | Performed by: STUDENT IN AN ORGANIZED HEALTH CARE EDUCATION/TRAINING PROGRAM

## 2021-04-28 PROCEDURE — 82948 REAGENT STRIP/BLOOD GLUCOSE: CPT

## 2021-04-28 PROCEDURE — 97535 SELF CARE MNGMENT TRAINING: CPT

## 2021-04-28 RX ORDER — SODIUM CHLORIDE 9 MG/ML
20 INJECTION, SOLUTION INTRAVENOUS ONCE AS NEEDED
Status: DISCONTINUED | OUTPATIENT
Start: 2021-04-28 | End: 2021-05-31

## 2021-04-28 RX ADMIN — ONDANSETRON: 2 SOLUTION INTRAMUSCULAR; INTRAVENOUS at 11:50

## 2021-04-28 RX ADMIN — SODIUM BICARBONATE 150 ML/HR: 84 INJECTION, SOLUTION INTRAVENOUS at 05:22

## 2021-04-28 RX ADMIN — SENNOSIDES, DOCUSATE SODIUM 1 TABLET: 8.6; 5 TABLET ORAL at 08:51

## 2021-04-28 RX ADMIN — FAMOTIDINE 20 MG: 20 TABLET ORAL at 08:51

## 2021-04-28 RX ADMIN — FAMOTIDINE 20 MG: 20 TABLET ORAL at 17:50

## 2021-04-28 RX ADMIN — SENNOSIDES, DOCUSATE SODIUM 1 TABLET: 8.6; 5 TABLET ORAL at 17:50

## 2021-04-28 RX ADMIN — DEXAMETHASONE SODIUM PHOSPHATE 4 MG: 4 INJECTION INTRA-ARTICULAR; INTRALESIONAL; INTRAMUSCULAR; INTRAVENOUS; SOFT TISSUE at 08:51

## 2021-04-28 RX ADMIN — INSULIN LISPRO 2 UNITS: 100 INJECTION, SOLUTION INTRAVENOUS; SUBCUTANEOUS at 17:50

## 2021-04-28 RX ADMIN — HEPARIN SODIUM 5000 UNITS: 5000 INJECTION INTRAVENOUS; SUBCUTANEOUS at 21:39

## 2021-04-28 RX ADMIN — HEPARIN SODIUM 5000 UNITS: 5000 INJECTION INTRAVENOUS; SUBCUTANEOUS at 13:26

## 2021-04-28 RX ADMIN — METHOTREXATE 5000 MG: 25 INJECTION, SOLUTION INTRA-ARTERIAL; INTRAMUSCULAR; INTRATHECAL; INTRAVENOUS at 12:23

## 2021-04-28 RX ADMIN — DEXAMETHASONE SODIUM PHOSPHATE 4 MG: 4 INJECTION INTRA-ARTICULAR; INTRALESIONAL; INTRAMUSCULAR; INTRAVENOUS; SOFT TISSUE at 21:39

## 2021-04-28 RX ADMIN — SODIUM BICARBONATE 150 ML/HR: 84 INJECTION, SOLUTION INTRAVENOUS at 21:39

## 2021-04-28 RX ADMIN — HEPARIN SODIUM 5000 UNITS: 5000 INJECTION INTRAVENOUS; SUBCUTANEOUS at 05:45

## 2021-04-28 RX ADMIN — TAMSULOSIN HYDROCHLORIDE 0.4 MG: 0.4 CAPSULE ORAL at 17:50

## 2021-04-28 RX ADMIN — DEXAMETHASONE SODIUM PHOSPHATE 4 MG: 4 INJECTION INTRA-ARTICULAR; INTRALESIONAL; INTRAMUSCULAR; INTRAVENOUS; SOFT TISSUE at 17:50

## 2021-04-28 RX ADMIN — INSULIN LISPRO 1 UNITS: 100 INJECTION, SOLUTION INTRAVENOUS; SUBCUTANEOUS at 08:53

## 2021-04-28 RX ADMIN — INSULIN LISPRO 1 UNITS: 100 INJECTION, SOLUTION INTRAVENOUS; SUBCUTANEOUS at 12:28

## 2021-04-28 RX ADMIN — SODIUM BICARBONATE 150 ML/HR: 84 INJECTION, SOLUTION INTRAVENOUS at 11:57

## 2021-04-28 RX ADMIN — DEXAMETHASONE SODIUM PHOSPHATE 4 MG: 4 INJECTION INTRA-ARTICULAR; INTRALESIONAL; INTRAMUSCULAR; INTRAVENOUS; SOFT TISSUE at 11:57

## 2021-04-28 NOTE — PLAN OF CARE
Problem: Prexisting or High Potential for Compromised Skin Integrity  Goal: Skin integrity is maintained or improved  Description: INTERVENTIONS:  - Identify patients at risk for skin breakdown  - Assess and monitor skin integrity  - Assess and monitor nutrition and hydration status  - Monitor labs   - Assess for incontinence   - Turn and reposition patient  - Assist with mobility/ambulation  - Relieve pressure over bony prominences  - Avoid friction and shearing  - Provide appropriate hygiene as needed including keeping skin clean and dry  - Evaluate need for skin moisturizer/barrier cream  - Collaborate with interdisciplinary team   - Patient/family teaching  - Consider wound care consult   Outcome: Progressing     Problem: Potential for Falls  Goal: Patient will remain free of falls  Description: INTERVENTIONS:  - Assess patient frequently for physical needs  -  Identify cognitive and physical deficits and behaviors that affect risk of falls  -  [place] fall precautions as indicated by assessment   - Educate patient/family on patient safety including physical limitations  - Instruct patient to call for assistance with activity based on assessment  - Modify environment to reduce risk of injury  - Consider OT/PT consult to assist with strengthening/mobility  Outcome: Progressing     Problem: Nutrition/Hydration-ADULT  Goal: Nutrient/Hydration intake appropriate for improving, restoring or maintaining nutritional needs  Description: Monitor and assess patient's nutrition/hydration status for malnutrition  Collaborate with interdisciplinary team and initiate plan and interventions as ordered  Monitor patient's weight and dietary intake as ordered or per policy  Utilize nutrition screening tool and intervene as necessary  Determine patient's food preferences and provide high-protein, high-caloric foods as appropriate       INTERVENTIONS:  - Monitor oral intake, urinary output, labs, and treatment plans  - Assess nutrition and hydration status and recommend course of action  - Evaluate amount of meals eaten  - Assist patient with eating if necessary   - Allow adequate time for meals  - Recommend/ encourage appropriate diets, oral nutritional supplements, and vitamin/mineral supplements  - Order, calculate, and assess calorie counts as needed  - Recommend, monitor, and adjust tube feedings and TPN/PPN based on assessed needs  - Assess need for intravenous fluids  - Provide specific nutrition/hydration education as appropriate  - Include patient/family/caregiver in decisions related to nutrition  Outcome: Progressing     Problem: PAIN - ADULT  Goal: Verbalizes/displays adequate comfort level or baseline comfort level  Description: Interventions:  - Encourage patient to monitor pain and request assistance  - Assess pain using appropriate pain scale  - Administer analgesics based on type and severity of pain and evaluate response  - Implement non-pharmacological measures as appropriate and evaluate response  - Consider cultural and social influences on pain and pain management  - Notify physician/advanced practitioner if interventions unsuccessful or patient reports new pain  Outcome: Progressing     Problem: INFECTION - ADULT  Goal: Absence or prevention of progression during hospitalization  Description: INTERVENTIONS:  - Assess and monitor for signs and symptoms of infection  - Monitor lab/diagnostic results  - Monitor all insertion sites, i e  indwelling lines, tubes, and drains  - Monitor endotracheal if appropriate and nasal secretions for changes in amount and color  - [place] appropriate cooling/warming therapies per order  - Administer medications as ordered  - Instruct and encourage patient and family to use good hand hygiene technique  - Identify and instruct in appropriate isolation precautions for identified infection/condition  Outcome: Progressing     Problem: SAFETY ADULT  Goal: Patient will remain free of falls  Description: INTERVENTIONS:  - Assess patient frequently for physical needs  -  Identify cognitive and physical deficits and behaviors that affect risk of falls    -  [place] fall precautions as indicated by assessment   - Educate patient/family on patient safety including physical limitations  - Instruct patient to call for assistance with activity based on assessment  - Modify environment to reduce risk of injury  - Consider OT/PT consult to assist with strengthening/mobility  Outcome: Progressing  Goal: Maintain or return to baseline ADL function  Description: INTERVENTIONS:  -  Assess patient's ability to carry out ADLs; assess patient's baseline for ADL function and identify physical deficits which impact ability to perform ADLs (bathing, care of mouth/teeth, toileting, grooming, dressing, etc )  - Assess/evaluate cause of self-care deficits   - Assess range of motion  - Assess patient's mobility; develop plan if impaired  - Assess patient's need for assistive devices and provide as appropriate  - Encourage maximum independence but intervene and supervise when necessary  - Involve family in performance of ADLs  - Assess for home care needs following discharge   - Consider OT consult to assist with ADL evaluation and planning for discharge  - Provide patient education as appropriate  Outcome: Progressing  Goal: Maintain or return mobility status to optimal level  Description: INTERVENTIONS:  - Assess patient's baseline mobility status (ambulation, transfers, stairs, etc )    - Identify cognitive and physical deficits and behaviors that affect mobility  - Identify mobility aids required to assist with transfers and/or ambulation (gait belt, sit-to-stand, lift, walker, cane, etc )  - [place] fall precautions as indicated by assessment  - Record patient progress and toleration of activity level on Mobility SBAR; progress patient to next Phase/Stage  - Instruct patient to call for assistance with activity based on assessment  - Consider rehabilitation consult to assist with strengthening/weightbearing, etc   Outcome: Progressing     Problem: DISCHARGE PLANNING  Goal: Discharge to home or other facility with appropriate resources  Description: INTERVENTIONS:  - Identify barriers to discharge w/patient and caregiver  - Arrange for needed discharge resources and transportation as appropriate  - Identify discharge learning needs (meds, wound care, etc )  - Arrange for interpretive services to assist at discharge as needed  - Refer to Case Management Department for coordinating discharge planning if the patient needs post-hospital services based on physician/advanced practitioner order or complex needs related to functional status, cognitive ability, or social support system  Outcome: Progressing     Problem: Knowledge Deficit  Goal: Patient/family/caregiver demonstrates understanding of disease process, treatment plan, medications, and discharge instructions  Description: Complete learning assessment and assess knowledge base    Interventions:  - Provide teaching at level of understanding  - Provide teaching via preferred learning methods  Outcome: Progressing

## 2021-04-28 NOTE — OCCUPATIONAL THERAPY NOTE
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 [address] )          04/28/21 1027   OT Last Visit   OT Visit Date 04/28/21   Note Type   Note Type Treatment   Restrictions/Precautions   Weight Bearing Precautions Per Order No   Other Precautions Cognitive; Chair Alarm; Bed Alarm; Fall Risk;Telemetry;Multiple lines   General   Response to Previous Treatment Patient unable to report, no changes reported from family or staff   Lifestyle   Autonomy Pt poorly responsive, per chart, pt was I with ADLs PTA    Reciprocal Relationships     Service to Others Unable to obtain   Intrinsic Gratification Will continue to assess    Pain Assessment   Pain Assessment Tool 0-10   Pain Score No Pain   ADL   Toileting Assistance  1  Total Assistance   Toileting Deficit Perineal hygiene   Bed Mobility   Supine to Sit 2  Maximal assistance   Additional items Assist x 2; Increased time required;HOB elevated   Sit to Supine Unable to assess   Additional Comments EOB pt requires S for static sitting balance, MIN A for dynamic    Transfers   Sit to Stand 2  Maximal assistance   Additional items Assist x 2; Increased time required   Stand to Sit 2  Maximal assistance   Additional items Assist x 2; Increased time required   Stand pivot 2  Maximal assistance   Additional items Assist x 2; Increased time required   Additional Comments Transfers with HHA    Cognition   Overall Cognitive Status Impaired   Arousal/Participation Alert; Cooperative   Attention Difficulty attending to directions   Orientation Level Unable to assess   Memory Unable to assess   Following Commands Follows one step commands inconsistently   Comments Pt demonstrating improved participation; however, continues to demonstrate limited ability to follow commands    Activity Tolerance   Activity Tolerance Patient limited by fatigue;Treatment limited secondary to medical complications (Comment)   Medical Staff Made Aware PT Ivonne   Assessment   Assessment Patient participated in Skilled OT session this date with interventions consisting of ADL re training with the use of correct body mechnaics, therapeutic exercise to: increase functional use of BUEs, increase BUE muscle strength ,  therapeutic activities to: increase activity tolerance and increase OOB/ sitting tolerance   Upon arrival patient was found supine in bed  Pt demonstrated the following tasks: MAX A x 2 sup to sit  Pt maintains EOB position with S for static sitting, MIN A for dynamic  Pt with improved ability to follow commands, though remains limited and inconsistent  Pt does demonstrate slight L sided inattention requiring cues to attend to L side  LUE with limited strength and ROM, pt does utilize RUE to help lift LUE without cueing  Pt requires MAX A x 2 STS, MAX A x 2 SPT  Pt requires DEP A for toileting  Patient continues to be functioning below baseline level, occupational performance remains limited secondary to factors listed above and increased risk for falls and injury  From OT standpoint, recommendation at time of d/c would be Short Term Rehab  Patient to benefit from continued Occupational Therapy treatment while in the hospital to address deficits as defined above and maximize level of functional independence with ADLs and functional mobility  Pt was left in chair with alarm on after session with all current needs met  The patient's raw score on the AM-PAC Daily Activity inpatient short form is 11, standardized score is 29 04, less than 39 4  Patients at this level are likely to benefit from discharge to post-acute rehabilitation services   Please refer to the recommendation of the Occupational Therapist for safe discharge planning  Plan   Treatment Interventions ADL retraining;Functional transfer training;Visual perceptual retraining;UE strengthening/ROM; Endurance training;Cognitive reorientation;Patient/family training;Equipment evaluation/education; Neuromuscular reeducation; Compensatory technique education; Fine motor coordination activities;Continued evaluation; Energy conservation; Activityengagement   Goal Expiration Date 05/10/21   OT Treatment Day 1   OT Frequency 3-5x/wk   Recommendation   OT Discharge Recommendation Post acute rehabilitation services   OT - OK to Discharge Yes  (to rehab when medically stable )   AM-PAC Daily Activity Inpatient   Lower Body Dressing 2   Bathing 2   Toileting 1   Upper Body Dressing 2   Grooming 2   Eating 2   Daily Activity Raw Score 11   Daily Activity Standardized Score (Calc for Raw Score >=11) 29 04   AM-PAC Applied Cognition Inpatient   Following a Speech/Presentation 1   Understanding Ordinary Conversation 2   Taking Medications 1   Remembering Where Things Are Placed or Put Away 1   Remembering List of 4-5 Errands 1   Taking Care of Complicated Tasks 1   Applied Cognition Raw Score 7   Applied Cognition Standardized Score 15 17       Andrew Bautista MS, OTR/L

## 2021-04-28 NOTE — NUTRITION
04/28/21 1613   Recommendations/Interventions   Summary Day2 calorie count results: 650kcal and 30g pro x3 meals and ensure compact supplements; average intake x2 days 595kcal/ 26gm pro; meets < than 50% needs; per Celeste Piña Text with Medical Resident, pts family agree to tube feeding to help optimize pts nutrition; discussed short term option via keofed/ NGT route that is cycled overnite thereby allowing PO during the day for balance of needs; cycled TF goal recs below will provide 780ml tf volume; 936kcal/ 43g pro/ 690ml total water   Interventions Diet: continued as ordered; Supplement continue   Nutrition Recommendations Initiate EN  (continue current diet; initiated TF via NG route: jevity 1 2 cycled 12 hrs (8pm to 8am); 1st night start at 20ml/hr and increase by 20ml q 4 hrs as tony to goal of 65ml/hr; flush with 30ml water at start and end of cycle)

## 2021-04-28 NOTE — PROGRESS NOTES
INTERNAL MEDICINE RESIDENCY PROGRESS NOTE     Name: Oscar Jade   Age & Sex: 49 y o  female   MRN: [id number]  Unit/Bed#: [facility] 920-01   Encounter: [id number]  Team: SOD Team B     PATIENT INFORMATION     Name: Oscar Jade   Age & Sex: 49 y o  female   MRN: [id number]  Hospital Stay Days: 5    ASSESSMENT/PLAN     Principal Problem:    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[facility] [address] )  Active Problems:    Altered mental status    Presence of permanent central venous catheter    Dysphagia    Urinary retention    Aphasia    Weakness    Fracture of ramus of left pubis (HCC)    Severe protein-calorie malnutrition ([facility] [address] )      * 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[facility] [address] )  Assessment & Plan  Patient was diagnosed with primary 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through biopsy in [facility] transferred over here for chemotherapy since the [facility] is out of network  Oncology and Palliative care services consulted, appreciate recommendations   Continue with dexamethasone  As per [facility] progress note patient had discussion with palliative Care and at that time family wants to pursue disease directed therapy    Plan:  Family meeting was held on 04/26 and plan was to proceed with chemotherapy  Hematology-Oncology consulted  Appreciate recommendations  Plan to start patient on methotrexate and rituximab on 04/27  Will place PICC line for chemotherapy  DVT prophylaxis:  On heparin  Presence of permanent central venous catheter  Assessment & Plan  Noted presence of right IJ tunneled double-lumen HD catheter; upon chart review, this was likely inserted to be used for plasmapheresis which she has completed  Plan:  S/p removal by IR  Patient now has PICC line  Altered mental status  Assessment & Plan  Patient initially presented to [facility] his ultimate [place] with stroke-like symptoms for 2 days    CT scan of the head was concerning for subacute CVA and was transferred to [facility]   MRI of the brain was more consistent with demyelinating disease patient was started on IV steroids and 1000 mg daily for 5 days  Patient did not have any significant improvement at that time patient had plasmapheresis  And also had  lumbar puncture-negative for infection or malignancy  Patient noted to have persistent encephalopathy so a repeat MRI was obtained which showed worsening of the abnormality seen on the previous MRI and 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Continue with the dexamethasone  Likely secondary to 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Delirium precautions   Currently alert and oriented times 2-3   follow commands  Moves all extremities spontaneously  Has some delirium episodes during the day and night  Patient's  (does not speak [place]) makes all medical decision for her  Severe protein-calorie malnutrition ([facility] [address] )  Assessment & Plan  Malnutrition Findings:   Adult Malnutrition type: Acute illness(due to medical condition resulting in dysphagia, decreased appetite and intake, weight loss)  Adult Degree of Malnutrition: Other severe protein calorie malnutrition    BMI Findings: Body mass index is 20 49 kg/m²  Plan:  Nutrition consult  Proving IV fluids, calorie count, protein shakes  Fracture of ramus of left pubis [facility])  Assessment & Plan  Wendie Dryer prior to presentation to [facility]  Managed nonoperatively at [facility]    Weakness  Assessment & Plan  Left greater than right upper and lower extremity weakness with ongoing left lower extremity contracture  Secondary to underlying CNS malignancy  *Decubitus ulcer precautions such as frequent repositioning  Aphasia  Assessment & Plan  Definite evidence of expressive aphasia, possible component of receptive aphasia as well  Speech therapy on board       Urinary retention  Assessment & Plan  Patient developed urinary retention during previous hospitalization  Plan was voiding trial as an outpatient as she was scheduled for discharge from that facility  Continue with the Cottrell catheter for today, was started on Flomax 0 4mg, consider voiding trial    Dysphagia  Assessment & Plan  Patient noted to have dysphagia and also decreased p o  Intake  As per [facility] progress note patient had a calorie count and recommended feeding tube placement  Speech evaluation - advance diet with dysphagia 2 and thin liquids; needs assistance with feeds  Calorie count  If the patient continued to have poor p o  Intake may need discussion with the family regarding alternate methods of nutrition      Disposition:  Currently getting chemotherapy    SUBJECTIVE     Patient seen and examined  No acute events overnight  No complaints  OBJECTIVE     Vitals:    21 0248 21 0249 21 0548 21 0752   BP: 131/83   150/98   Pulse: 70   87   Resp: 20   18   Temp:  98 2 °F (36 8 °C)  98 2 °F (36 8 °C)   TempSrc:  Axillary     SpO2: 96%   99%   Weight:   46 8 kg (103 lb 1 6 oz)    Height:          Temperature:   Temp (24hrs), Av 6 °F (37 °C), Min:97 9 °F (36 6 °C), Max:99 3 °F (37 4 °C)    Temperature: 98 2 °F (36 8 °C)  Intake & Output:  I/O        07 -  0700  07 -  0700  07 -  0700    P  O  220 464 50    I V  (mL/kg) 2096 3 (44) 1420 (30 3)     IV Piggyback  250     Total Intake(mL/kg) 2316 3 (48 7) 2134 (45 6) 50 (1 1)    Urine (mL/kg/hr) 2850 (2 5) 1775 (1 6) 1300 (6 4)    Stool 0 0     Total Output 2850 1775 1300    Net -533 8 +359 -1250           Unmeasured Urine Occurrence  1 x     Unmeasured Stool Occurrence 2 x 1 x         Weights:   IBW (Ideal Body Weight): 45 5 kg    Body mass index is 20 14 kg/m²  Weight (last 2 days)     Date/Time   Weight    21 0548   46 8 (103 1)    21 1700   47 6 (104 94)    21 0600   47 6 (104 94)    21 0553   47 1 (103 84)            Physical Exam  Vitals signs reviewed  Constitutional:       General: She is not in acute distress       Appearance: She is well-developed  She is not diaphoretic  HENT:      Head: Normocephalic and atraumatic  Nose: Nose normal       Mouth/Throat:      Pharynx: No oropharyngeal exudate  Eyes:      General: No scleral icterus  Conjunctiva/sclera: Conjunctivae normal    Neck:      Musculoskeletal: Neck supple  Thyroid: No thyromegaly  Vascular: No JVD  Trachea: No tracheal deviation  Cardiovascular:      Rate and Rhythm: Normal rate and regular rhythm  Heart sounds: Normal heart sounds  No murmur  No friction rub  No gallop  Pulmonary:      Effort: Pulmonary effort is normal  No respiratory distress  Breath sounds: Normal breath sounds  No stridor  No wheezing or rales  Chest:      Chest wall: No tenderness  Abdominal:      General: Bowel sounds are normal  There is no distension  Palpations: Abdomen is soft  There is no mass  Tenderness: There is no abdominal tenderness  There is no guarding or rebound  Skin:     General: Skin is warm  Findings: No erythema or rash  Neurological:      Motor: Weakness present  Comments: Patient intermittently answer questions  Left upper and lower extremity 0/5 strength  Answers with single words  LABORATORY DATA     Labs: I have personally reviewed pertinent reports    Results from last 7 days   Lab Units 04/28/21  0518 04/27/21  1043 04/26/21  0552 04/25/21  0620   WBC Thousand/uL 12 97* 11 99* 8 57 7 19   HEMOGLOBIN g/dL 12 4 12 9 12 0 11 5   HEMATOCRIT % 36 5 37 2 35 1 33 8*   PLATELETS Thousands/uL 172 181 161 187   MONO PCT %  --   --   --  9      Results from last 7 days   Lab Units 04/28/21  0518 04/27/21  0458 04/26/21  0552  04/24/21  0837   POTASSIUM mmol/L 3 6 3 8 3 9   < > 4 1   CHLORIDE mmol/L 104 102 103   < > 105   CO2 mmol/L 25 22 25   < > 26   BUN mg/dL 18 18 19   < > 25   CREATININE mg/dL 0 46* 0 48* 0 54*   < > 0 62   CALCIUM mg/dL 10 0 9 8 9 4   < > 10 2*   ALK PHOS U/L  --   --   --   --  63   ALT U/L  -- --   --   --  18   AST U/L  --   --   --   --  9    < > = values in this interval not displayed  Results from last 7 days   Lab Units 04/24/21  0837   MAGNESIUM mg/dL 2 1                        IMAGING & DIAGNOSTIC TESTING     Radiology Results: I have personally reviewed pertinent reports  Xr Chest Portable    Result Date: 4/24/2021  Impression: Dialysis catheter tip within the superior cavoatrial junction  No pneumothorax  Workstation performed: [id number]     Xr Chest Picc Line Portable    Result Date: 4/27/2021  Impression: PICC line tip in the superior vena cava, approximately 3 5 to 4 cm above the cavoatrial junction  The examination demonstrates a significant  finding and was documented as such in [facility] for liaison and referring practitioner notification  Workstation performed: [id number]     Ir Picc Reposition    Result Date: 4/27/2021  Impression: Status-post repositioning of a 5 [place] central venous catheter under fluoroscopic guidance with its tip at the cavoatrial junction  Workstation performed: [id number]     Ir Tunneled Central Line Removal    Result Date: 4/27/2021  Impression: Impression: Successful tunneled central line removal as described  Signed, performed and dictated by Bruna Savage PA-C under the supervision of Dr Brandon Celaya  Workstation performed: [id number]     Other Diagnostic Testing: I have personally reviewed pertinent reports      ACTIVE MEDICATIONS     Current Facility-Administered Medications   Medication Dose Route Frequency    acetaminophen (TYLENOL) tablet 488 mg  488 mg Oral Q6H PRN    albuterol (PROVENTIL HFA,VENTOLIN HFA) inhaler 2 puff  2 puff Inhalation Q6H PRN    dexamethasone (DECADRON) injection 4 mg  4 mg Intravenous 4x Daily    famotidine (PEPCID) tablet 20 mg  20 mg Oral BID    heparin (porcine) subcutaneous injection 5,000 Units  5,000 Units Subcutaneous Q8H [address]    insulin lispro (HumaLOG) 100 units/mL subcutaneous injection 1-5 Units  1-5 Units Subcutaneous TID AC    lidocaine (LIDODERM) 5 % patch 1 patch  1 patch Topical Daily    methotrexate (PF) 5,000 mg in sodium chloride 0 9 % 1,000 mL IVPB  5,000 mg Intravenous Once    methotrexate (PF) 5,000 mg in sodium chloride 0 9 % 1,000 mL IVPB  5,000 mg Intravenous Once    ondansetron (ZOFRAN) 16 mg, dexamethasone (DECADRON) 10 mg in sodium chloride 0 9 % 50 mL IVPB   Intravenous Once    ondansetron (ZOFRAN) 16 mg, dexamethasone (DECADRON) 10 mg in sodium chloride 0 9 % 50 mL IVPB   Intravenous Once    ondansetron (ZOFRAN) injection 4 mg  4 mg Intravenous Q6H PRN    senna-docusate sodium (SENOKOT S) 8 6-50 mg per tablet 1 tablet  1 tablet Oral BID    sodium bicarbonate 100 mEq in dextrose 5 % 1,000 mL infusion  150 mL/hr Intravenous Continuous    sodium bicarbonate 100 mEq in dextrose 5 % 1,000 mL infusion  150 mL/hr Intravenous Continuous    sodium chloride 0 9 % infusion  20 mL/hr Intravenous Once PRN    sodium chloride 0 9 % infusion  20 mL/hr Intravenous Once PRN    tamsulosin (FLOMAX) capsule 0 4 mg  0 4 mg Oral Daily With Dinner       VTE Pharmacologic Prophylaxis: Heparin  VTE Mechanical Prophylaxis: sequential compression device    Portions of the record may have been created with voice recognition software  Occasional wrong word or "sound a like" substitutions may have occurred due to the inherent limitations of voice recognition software    Read the chart carefully and recognize, using context, where substitutions have occurred   ==  Alexa Mathews, [address] [facility]  Internal Medicine Residency PGY-2

## 2021-04-28 NOTE — PLAN OF CARE
Problem: OCCUPATIONAL THERAPY ADULT  Goal: Performs self-care activities at highest level of function for planned discharge setting  See evaluation for individualized goals  Description: Treatment Interventions: ADL retraining, Functional transfer training, UE strengthening/ROM, Endurance training, Cognitive reorientation, Patient/family training, Equipment evaluation/education, Neuromuscular reeducation, Fine motor coordination activities, Compensatory technique education, Continued evaluation, Energy conservation, Activityengagement          See flowsheet documentation for full assessment, interventions and recommendations  Outcome: Progressing  Note: Limitation: Decreased ADL status, Decreased UE ROM, Decreased UE strength, Decreased cognition, Decreased endurance, Decreased Safe judgement during ADL, Decreased self-care trans, Decreased high-level ADLs  Prognosis: Fair  Assessment: Patient participated in Skilled OT session this date with interventions consisting of ADL re training with the use of correct body mechnaics, therapeutic exercise to: increase functional use of BUEs, increase BUE muscle strength ,  therapeutic activities to: increase activity tolerance and increase OOB/ sitting tolerance   Upon arrival patient was found supine in bed  Pt demonstrated the following tasks: MAX A x 2 sup to sit  Pt maintains EOB position with S for static sitting, MIN A for dynamic  Pt with improved ability to follow commands, though remains limited and inconsistent  Pt does demonstrate slight L sided inattention requiring cues to attend to L side  LUE with limited strength and ROM, pt does utilize RUE to help lift LUE without cueing  Pt requires MAX A x 2 STS, MAX A x 2 SPT  Pt requires DEP A for toileting  Patient continues to be functioning below baseline level, occupational performance remains limited secondary to factors listed above and increased risk for falls and injury     From OT standpoint, recommendation at time of d/c would be Short Term Rehab  Patient to benefit from continued Occupational Therapy treatment while in the hospital to address deficits as defined above and maximize level of functional independence with ADLs and functional mobility  Pt was left in chair with alarm on after session with all current needs met  The patient's raw score on the AM-PAC Daily Activity inpatient short form is 11, standardized score is 29 04, less than 39 4  Patients at this level are likely to benefit from discharge to post-acute rehabilitation services  Please refer to the recommendation of the Occupational Therapist for safe discharge planning       OT Discharge Recommendation: Post acute rehabilitation services  OT - OK to Discharge: Yes(to rehab when medically stable )

## 2021-04-28 NOTE — PLAN OF CARE
Problem: PHYSICAL THERAPY ADULT  Goal: Performs mobility at highest level of function for planned discharge setting  See evaluation for individualized goals  Description: Treatment/Interventions: ADL retraining, Functional transfer training, LE strengthening/ROM, Endurance training, Patient/family training, Bed mobility, Spoke to nursing, OT  Equipment Recommended: (TBD)       See flowsheet documentation for full assessment, interventions and recommendations  Outcome: Progressing  Note: Prognosis: Fair  Problem List: Decreased strength, Decreased endurance, Decreased mobility, Decreased coordination, Impaired balance, Decreased cognition, Decreased safety awareness, Pain, Impaired vision  Assessment: Pt continues to present with weakness, impaired balance, and decreased activity tolerance  Pt much more alert this session, but still requires Ax2 for all mobility  Pt was able to sit EOB with Letitia during dynamic balance activities; only required CGA with static sitting  Performed stand pivot transfer, during which pt was able to shuffle RLE, but required maxA to advance LLE  During seated TE, pt demonstrated grossly 2+/5 strength in RLE and 2-/5 strength in LLE  Attempted to use translate he on iphone to assist with translation, however limited success likely 2* pt's cognition  Pt would benefit from continued acute skilled PT to address above deficits  Continuing to recommend rehab upon d/c             PT Discharge Recommendation: Post acute rehabilitation services     PT - OK to Discharge: Yes(to rehab)    See flowsheet documentation for full assessment

## 2021-04-28 NOTE — PROGRESS NOTES
I had a call with the pt's  and friend listed on chart  We have set up a family meeting at 6 am on Friday 4/30/21  I tiger text palliative team regarding this

## 2021-04-28 NOTE — CASE MANAGEMENT
CM s/w pt's friend to get SNF recommendations  Per pt's friend, pt's  first choice is [facility] and his second choice is [facility]  CM sent the referrals as requested  CM will follow

## 2021-04-28 NOTE — PHYSICAL THERAPY NOTE
Physical Therapy Treatment Note    Patient's Name: Joan Talley  : 21 1026   PT Last Visit   PT Visit Date 21   Note Type   Note Type Treatment   Pain Assessment   Pain Assessment Tool FLACC   Pain Rating: FLACC (Rest) - Face 0   Pain Rating: FLACC (Rest) - Legs 0   Pain Rating: FLACC (Rest) - Activity 0   Pain Rating: FLACC (Rest) - Cry 0   Pain Rating: FLACC (Rest) - Consolability 0   Score: FLACC (Rest) 0   Pain Rating: FLACC (Activity) - Face 0   Pain Rating: FLACC (Activity) - Legs 0   Pain Rating: FLACC (Activity) - Activity 0   Pain Rating: FLACC (Activity) - Cry 0   Pain Rating: FLACC (Activity) - Consolability 0   Score: FLACC (Activity) 0   Restrictions/Precautions   Weight Bearing Precautions Per Order No   Other Precautions Cognitive; Bed Alarm; Chair Alarm;Multiple lines;Telemetry; Fall Risk   General   Chart Reviewed Yes   Family/Caregiver Present No   Cognition   Overall Cognitive Status Impaired   Arousal/Participation Cooperative; Alert   Attention Difficulty attending to directions   Orientation Level Unable to assess   Memory Unable to assess   Following Commands Follows one step commands inconsistently   Comments Pt with improved alertness from IE  Limited command following despite attempt at using translate he on iphone  Bed Mobility   Supine to Sit 2  Maximal assistance   Additional items Assist x 2;HOB elevated; Increased time required;Verbal cues;LE management   Additional Comments Pt able to sit EOB with Letitia for dynamic balance and CGA for static balance  Pt ended session seated in the recliner with chair alarm on and RN updated  Transfers   Sit to Stand 2  Maximal assistance   Additional items Assist x 2; Increased time required;Verbal cues   Stand to Sit 2  Maximal assistance   Additional items Assist x 2; Increased time required;Verbal cues   Stand pivot 2  Maximal assistance   Additional items Assist x 2; Increased time required;Verbal cues Additional Comments Transfers with HHA  Performed stand pivot transfer to recliner during which pt required assistance to advance LLE, but was able to shuffle RLE  Ambulation/Elevation   Gait pattern Decreased foot clearance;R Knee Konrad;L Knee Konrad; Improper Weight shift   Gait Assistance 2  Maximal assist   Additional items Assist x 2;Verbal cues; Tactile cues   Assistive Device Other (Comment)  (HHA)   Distance 2 steps during transfer from bed to chair during which pt was able to shuffle RLE, but required maxA to advance LLE   Balance   Static Sitting Fair -   Dynamic Sitting Poor +   Static Standing Poor   Dynamic Standing Poor -   Ambulatory Poor -   Activity Tolerance   Activity Tolerance Patient limited by fatigue   Medical Staff [id number] ok to see per RN   Exercises   Knee AROM Long Arc Quad Sitting;15 reps;AROM; Bilateral   Ankle Pumps Sitting;15 reps;AROM; Bilateral   Neuro re-ed Dynamic crossbody reaching while seated EOB with Letitia   Assessment   Prognosis Fair   Problem List Decreased strength;Decreased endurance;Decreased mobility; Decreased coordination; Impaired balance;Decreased cognition;Decreased safety awareness;Pain; Impaired vision   Assessment Pt continues to present with weakness, impaired balance, and decreased activity tolerance  Pt much more alert this session, but still requires Ax2 for all mobility  Pt was able to sit EOB with Letitia during dynamic balance activities; only required CGA with static sitting  Performed stand pivot transfer, during which pt was able to shuffle RLE, but required maxA to advance LLE  During seated TE, pt demonstrated grossly 2+/5 strength in RLE and 2-/5 strength in LLE  Attempted to use translate he on iphone to assist with translation, however limited success likely 2* pt's cognition  Pt would benefit from continued acute skilled PT to address above deficits  Continuing to recommend rehab upon d/c      Goals   Patient Goals none stated    STG Expiration Date 05/10/21   PT Treatment Day 1   Plan   Treatment/Interventions Functional transfer training;LE strengthening/ROM; Elevations; Therapeutic exercise; Endurance training;Bed mobility;Gait training;Spoke to nursing;Spoke to case management;OT   Progress Progressing toward goals   PT Frequency Other (Comment)  (3-5x/wk)   Recommendation   PT Discharge Recommendation Post acute rehabilitation services   PT - OK to Discharge Yes  (to rehab)   B[place]ly 8 in Bed Without Bedrails 1   Lying on Back to Sitting on Edge of Flat Bed 1   Moving Bed to Chair 1   Standing Up From Chair 1   Walk in Room 1   Climb 3-5 Stairs 1   Basic Mobility Inpatient Raw Score 6   Turning Head Towards Sound 2   Follow Simple Instructions 2   Low Function Basic Mobility Raw Score 10   Low Function Basic Mobility Standardized Score 14 65       Germania Schaefer, PT, DPT

## 2021-04-29 LAB
ALBUMIN SERPL BCP-MCNC: 3 G/DL (ref 3.5–5)
ALP SERPL-CCNC: 54 U/L (ref 46–116)
ALT SERPL W P-5'-P-CCNC: 36 U/L (ref 12–78)
ANION GAP SERPL CALCULATED.3IONS-SCNC: 4 MMOL/L (ref 4–13)
ANION GAP SERPL CALCULATED.3IONS-SCNC: 5 MMOL/L (ref 4–13)
AST SERPL W P-5'-P-CCNC: 15 U/L (ref 5–45)
BASOPHILS # BLD MANUAL: 0 THOUSAND/UL (ref 0–0.1)
BASOPHILS NFR MAR MANUAL: 0 % (ref 0–1)
BILIRUB SERPL-MCNC: 0.61 MG/DL (ref 0.2–1)
BUN SERPL-MCNC: 13 MG/DL (ref 5–25)
BUN SERPL-MCNC: 14 MG/DL (ref 5–25)
CALCIUM ALBUM COR SERPL-MCNC: 10.1 MG/DL (ref 8.3–10.1)
CALCIUM SERPL-MCNC: 8.9 MG/DL (ref 8.3–10.1)
CALCIUM SERPL-MCNC: 9.3 MG/DL (ref 8.3–10.1)
CHLORIDE SERPL-SCNC: 98 MMOL/L (ref 100–108)
CHLORIDE SERPL-SCNC: 98 MMOL/L (ref 100–108)
CO2 SERPL-SCNC: 34 MMOL/L (ref 21–32)
CO2 SERPL-SCNC: 34 MMOL/L (ref 21–32)
CREAT SERPL-MCNC: 0.5 MG/DL (ref 0.6–1.3)
CREAT SERPL-MCNC: 0.51 MG/DL (ref 0.6–1.3)
EOSINOPHIL # BLD MANUAL: 0 THOUSAND/UL (ref 0–0.4)
EOSINOPHIL NFR BLD MANUAL: 0 % (ref 0–6)
ERYTHROCYTE [DISTWIDTH] IN BLOOD BY AUTOMATED COUNT: 13.3 % (ref 11.6–15.1)
ERYTHROCYTE [DISTWIDTH] IN BLOOD BY AUTOMATED COUNT: 13.3 % (ref 11.6–15.1)
GFR SERPL CREATININE-BSD FRML MDRD: 109 ML/MIN/1.73SQ M
GFR SERPL CREATININE-BSD FRML MDRD: 110 ML/MIN/1.73SQ M
GLUCOSE SERPL-MCNC: 145 MG/DL (ref 65–140)
GLUCOSE SERPL-MCNC: 147 MG/DL (ref 65–140)
GLUCOSE SERPL-MCNC: 161 MG/DL (ref 65–140)
GLUCOSE SERPL-MCNC: 180 MG/DL (ref 65–140)
GLUCOSE SERPL-MCNC: 180 MG/DL (ref 65–140)
HCT VFR BLD AUTO: 31.8 % (ref 34.8–46.1)
HCT VFR BLD AUTO: 32.5 % (ref 34.8–46.1)
HGB BLD-MCNC: 10.9 G/DL (ref 11.5–15.4)
HGB BLD-MCNC: 11.3 G/DL (ref 11.5–15.4)
LYMPHOCYTES # BLD AUTO: 0.4 THOUSAND/UL (ref 0.6–4.47)
LYMPHOCYTES # BLD AUTO: 5 % (ref 14–44)
MCH RBC QN AUTO: 31.9 PG (ref 26.8–34.3)
MCH RBC QN AUTO: 32.3 PG (ref 26.8–34.3)
MCHC RBC AUTO-ENTMCNC: 34.3 G/DL (ref 31.4–37.4)
MCHC RBC AUTO-ENTMCNC: 34.8 G/DL (ref 31.4–37.4)
MCV RBC AUTO: 93 FL (ref 82–98)
MCV RBC AUTO: 93 FL (ref 82–98)
METAMYELOCYTES NFR BLD MANUAL: 1 % (ref 0–1)
MONOCYTES # BLD AUTO: 0.4 THOUSAND/UL (ref 0–1.22)
MONOCYTES NFR BLD: 5 % (ref 4–12)
MTX SERPL-SCNC: 2.8 UMOL/L
MYELOCYTES NFR BLD MANUAL: 2 % (ref 0–1)
NEUTROPHILS # BLD MANUAL: 6.26 THOUSAND/UL (ref 1.85–7.62)
NEUTS SEG NFR BLD AUTO: 79 % (ref 43–75)
NRBC BLD AUTO-RTO: 0 /100 WBCS
NRBC BLD AUTO-RTO: 0 /100 WBCS
PHOSPHATE SERPL-MCNC: 2.7 MG/DL (ref 2.7–4.5)
PLATELET # BLD AUTO: 143 THOUSANDS/UL (ref 149–390)
PLATELET # BLD AUTO: 154 THOUSANDS/UL (ref 149–390)
PLATELET BLD QL SMEAR: ADEQUATE
PMV BLD AUTO: 10.5 FL (ref 8.9–12.7)
PMV BLD AUTO: 10.5 FL (ref 8.9–12.7)
POTASSIUM SERPL-SCNC: 3.1 MMOL/L (ref 3.5–5.3)
POTASSIUM SERPL-SCNC: 3.7 MMOL/L (ref 3.5–5.3)
PROT SERPL-MCNC: 5.8 G/DL (ref 6.4–8.2)
RBC # BLD AUTO: 3.42 MILLION/UL (ref 3.81–5.12)
RBC # BLD AUTO: 3.5 MILLION/UL (ref 3.81–5.12)
RBC MORPH BLD: NORMAL
SODIUM SERPL-SCNC: 136 MMOL/L (ref 136–145)
SODIUM SERPL-SCNC: 137 MMOL/L (ref 136–145)
VARIANT LYMPHS # BLD AUTO: 8 %
WBC # BLD AUTO: 7.92 THOUSAND/UL (ref 4.31–10.16)
WBC # BLD AUTO: 9.99 THOUSAND/UL (ref 4.31–10.16)

## 2021-04-29 PROCEDURE — 85027 COMPLETE CBC AUTOMATED: CPT | Performed by: STUDENT IN AN ORGANIZED HEALTH CARE EDUCATION/TRAINING PROGRAM

## 2021-04-29 PROCEDURE — 80048 BASIC METABOLIC PNL TOTAL CA: CPT | Performed by: STUDENT IN AN ORGANIZED HEALTH CARE EDUCATION/TRAINING PROGRAM

## 2021-04-29 PROCEDURE — 84100 ASSAY OF PHOSPHORUS: CPT | Performed by: STUDENT IN AN ORGANIZED HEALTH CARE EDUCATION/TRAINING PROGRAM

## 2021-04-29 PROCEDURE — 85027 COMPLETE CBC AUTOMATED: CPT | Performed by: INTERNAL MEDICINE

## 2021-04-29 PROCEDURE — 85007 BL SMEAR W/DIFF WBC COUNT: CPT | Performed by: STUDENT IN AN ORGANIZED HEALTH CARE EDUCATION/TRAINING PROGRAM

## 2021-04-29 PROCEDURE — 80053 COMPREHEN METABOLIC PANEL: CPT | Performed by: INTERNAL MEDICINE

## 2021-04-29 PROCEDURE — 82948 REAGENT STRIP/BLOOD GLUCOSE: CPT

## 2021-04-29 PROCEDURE — 80204 DRUG ASSAY METHOTREXATE: CPT | Performed by: INTERNAL MEDICINE

## 2021-04-29 PROCEDURE — 99231 SBSQ HOSP IP/OBS SF/LOW 25: CPT | Performed by: INTERNAL MEDICINE

## 2021-04-29 RX ORDER — POTASSIUM CHLORIDE 20 MEQ/1
40 TABLET, EXTENDED RELEASE ORAL ONCE
Status: COMPLETED | OUTPATIENT
Start: 2021-04-29 | End: 2021-04-29

## 2021-04-29 RX ORDER — LEUCOVORIN CALCIUM 25 MG/1
25 TABLET ORAL EVERY 6 HOURS
Status: CANCELLED | OUTPATIENT
Start: 2021-04-29

## 2021-04-29 RX ORDER — AMLODIPINE BESYLATE 5 MG/1
5 TABLET ORAL DAILY
Status: DISCONTINUED | OUTPATIENT
Start: 2021-04-29 | End: 2021-06-14 | Stop reason: HOSPADM

## 2021-04-29 RX ADMIN — HEPARIN SODIUM 5000 UNITS: 5000 INJECTION INTRAVENOUS; SUBCUTANEOUS at 06:00

## 2021-04-29 RX ADMIN — SENNOSIDES, DOCUSATE SODIUM 1 TABLET: 8.6; 5 TABLET ORAL at 08:52

## 2021-04-29 RX ADMIN — DEXAMETHASONE SODIUM PHOSPHATE 4 MG: 4 INJECTION INTRA-ARTICULAR; INTRALESIONAL; INTRAMUSCULAR; INTRAVENOUS; SOFT TISSUE at 13:00

## 2021-04-29 RX ADMIN — TAMSULOSIN HYDROCHLORIDE 0.4 MG: 0.4 CAPSULE ORAL at 18:02

## 2021-04-29 RX ADMIN — SODIUM BICARBONATE 150 ML/HR: 84 INJECTION, SOLUTION INTRAVENOUS at 03:29

## 2021-04-29 RX ADMIN — LIDOCAINE 5% 1 PATCH: 700 PATCH TOPICAL at 08:52

## 2021-04-29 RX ADMIN — HEPARIN SODIUM 5000 UNITS: 5000 INJECTION INTRAVENOUS; SUBCUTANEOUS at 14:59

## 2021-04-29 RX ADMIN — INSULIN LISPRO 1 UNITS: 100 INJECTION, SOLUTION INTRAVENOUS; SUBCUTANEOUS at 13:00

## 2021-04-29 RX ADMIN — SENNOSIDES, DOCUSATE SODIUM 1 TABLET: 8.6; 5 TABLET ORAL at 18:02

## 2021-04-29 RX ADMIN — DEXAMETHASONE SODIUM PHOSPHATE 4 MG: 4 INJECTION INTRA-ARTICULAR; INTRALESIONAL; INTRAMUSCULAR; INTRAVENOUS; SOFT TISSUE at 22:14

## 2021-04-29 RX ADMIN — SODIUM BICARBONATE 150 ML/HR: 84 INJECTION, SOLUTION INTRAVENOUS at 19:42

## 2021-04-29 RX ADMIN — AMLODIPINE BESYLATE 5 MG: 5 TABLET ORAL at 08:52

## 2021-04-29 RX ADMIN — SODIUM CHLORIDE 25 MG: 9 INJECTION, SOLUTION INTRAVENOUS at 12:56

## 2021-04-29 RX ADMIN — SODIUM BICARBONATE 150 ML/HR: 84 INJECTION, SOLUTION INTRAVENOUS at 11:50

## 2021-04-29 RX ADMIN — HEPARIN SODIUM 5000 UNITS: 5000 INJECTION INTRAVENOUS; SUBCUTANEOUS at 22:14

## 2021-04-29 RX ADMIN — DEXAMETHASONE SODIUM PHOSPHATE 4 MG: 4 INJECTION INTRA-ARTICULAR; INTRALESIONAL; INTRAMUSCULAR; INTRAVENOUS; SOFT TISSUE at 08:52

## 2021-04-29 RX ADMIN — FAMOTIDINE 20 MG: 20 TABLET ORAL at 08:52

## 2021-04-29 RX ADMIN — SODIUM CHLORIDE 25 MG: 9 INJECTION, SOLUTION INTRAVENOUS at 18:34

## 2021-04-29 RX ADMIN — POTASSIUM CHLORIDE 40 MEQ: 1500 TABLET, EXTENDED RELEASE ORAL at 08:52

## 2021-04-29 RX ADMIN — INSULIN LISPRO 1 UNITS: 100 INJECTION, SOLUTION INTRAVENOUS; SUBCUTANEOUS at 18:02

## 2021-04-29 RX ADMIN — FAMOTIDINE 20 MG: 20 TABLET ORAL at 18:02

## 2021-04-29 RX ADMIN — DEXAMETHASONE SODIUM PHOSPHATE 4 MG: 4 INJECTION INTRA-ARTICULAR; INTRALESIONAL; INTRAMUSCULAR; INTRAVENOUS; SOFT TISSUE at 18:02

## 2021-04-29 NOTE — PROGRESS NOTES
Progress Note - Vijay Mccullough 49 y o  female MRN: [id number]    [facility] Encounter: [id number]      Assessment:  1  Diffuse large B-cell lymphoma, primary 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· Plan to proceed with high-dose methotrexate, 3500g per m2 as well as rituxan   · Methotrexate level today 2 80   · Continue leucovorin and monitoring methotrexate levels    If cycle 1 is well tolerated can consider Manuela-C and/or thiotepa working further into matrix protocol    Plan has previously been reviewed by Dr Johana Minor with the patient and her  as well as her son, by phone, who acted as  throughout the course of the interview  Reviewed potential toxicities, prophylactic measures, and restaging after 2 months of therapy  Family understands and agrees with this plan  Consent form signed  ECOG-4    Plan:  As above  Subjective:   Clinically stable  No fevers, chills, N/V/D, no urinary complaints, no hematuria, melena, hematochezia  Objective:     Vitals: Blood pressure 125/84, pulse 85, temperature 98 3 °F (36 8 °C), resp  rate 16, height 5' (1 524 m), weight 46 2 kg (101 lb 13 6 oz), SpO2 97 %  ,Body mass index is 19 89 kg/m²  Intake/Output Summary (Last 24 hours) at 4/29/2021 1646  Last data filed at 4/29/2021 1300  Gross per 24 hour   Intake 1235 ml   Output 3350 ml   Net -2115 ml       Physical Exam:  General Appearance:    Alert, oriented        Eyes:    PERRL   Ears:    Normal external ear canals, both ears   Nose:   Nares normal, septum midline   Throat:   Mucosa moist  Pharynx without injection  Neck:   Supple       Lungs:     Clear to auscultation bilaterally   Chest Wall:    No tenderness or deformity    Heart:    Regular rate and rhythm       Abdomen:     Soft, non-tender, bowel sounds +, no organomegaly           Extremities:   Extremities no cyanosis or edema       Skin:   no rash or icterus      Lymph nodes:   Cervical, supraclavicular, and axillary nodes normal Neurologic:   CNII-XII intact, normal strength, sensation and reflexes     throughout          Invasive Devices     Peripherally Inserted Central Catheter Line            PICC Line 40/58/15 Right Basilic 2 days          Peripheral Intravenous Line            Peripheral IV 04/27/21 Right;Dorsal (posterior) Forearm 2 days          Drain            Urethral Catheter -- days                Lab, Imaging and other studies: I have personally reviewed pertinent reports

## 2021-04-29 NOTE — PROGRESS NOTES
INTERNAL MEDICINE RESIDENCY PROGRESS NOTE     Name: Hortensia Boone   Age & Sex: 49 y o  female   MRN: [id number]  Unit/Bed#: [facility] 920-01   Encounter: [id number]  Team: SOD Team B     PATIENT INFORMATION     Name: Hortensia Boone   Age & Sex: 49 y o  female   MRN: [id number]  Hospital Stay Days: 6    ASSESSMENT/PLAN     Principal Problem:    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[facility] [address] )  Active Problems:    Altered mental status    Presence of permanent central venous catheter    Dysphagia    Urinary retention    Aphasia    Weakness    Fracture of ramus of left pubis (HCC)    Severe protein-calorie malnutrition ([address] )      * 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[facility] [address] )  Assessment & Plan  Patient was diagnosed with primary 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through biopsy in [facility] transferred over here for chemotherapy since the [facility] is out of network  Oncology and Palliative care services consulted, appreciate recommendations   Continue with dexamethasone  As per [facility] progress note patient had discussion with palliative Care and at that time family wants to pursue disease directed therapy    Plan:  Family meeting was held on 04/26 and plan was to proceed with chemotherapy although there is another family meeting on 04/30 at 11:00 a m  Palliative team aware  Hematology-Oncology consulted  Appreciate recommendations  Plan started on methotrexate and rituximab on 04/27  Will place PICC line for chemotherapy  DVT prophylaxis:  On heparin  Presence of permanent central venous catheter  Assessment & Plan  Noted presence of right IJ tunneled double-lumen HD catheter; upon chart review, this was likely inserted to be used for plasmapheresis which she has completed  Plan:  S/p removal by IR  Patient now has PICC line  Altered mental status  Assessment & Plan  Patient initially presented to [facility] his ultimate [place] with stroke-like symptoms for 2 days    CT scan of the head was concerning for subacute CVA and was transferred to [facility]   MRI of the brain was more consistent with demyelinating disease patient was started on IV steroids and 1000 mg daily for 5 days  Patient did not have any significant improvement at that time patient had plasmapheresis  And also had  lumbar puncture-negative for infection or malignancy  Patient noted to have persistent encephalopathy so a repeat MRI was obtained which showed worsening of the abnormality seen on the previous MRI and 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Continue with the dexamethasone  Likely secondary to 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Delirium precautions   Currently alert and oriented times 2-3   follow commands  Moves all extremities spontaneously  Has some delirium episodes during the day and night  Patient's  (does not speak [place]) makes all medical decision for her  Severe protein-calorie malnutrition ([facility] [address] )  Assessment & Plan  Malnutrition Findings:   Adult Malnutrition type: Acute illness(due to medical condition resulting in dysphagia, decreased appetite and intake, weight loss)  Adult Degree of Malnutrition: Other severe protein calorie malnutrition    BMI Findings: Body mass index is 20 49 kg/m²  Plan:  Nutrition consult  Proving IV fluids, calorie count, protein shakes  Fracture of ramus of left pubis [facility])  Assessment & Plan  Gale Roman prior to presentation to [facility]  Managed nonoperatively at [facility]    Weakness  Assessment & Plan  Left greater than right upper and lower extremity weakness with ongoing left lower extremity contracture  Secondary to underlying CNS malignancy  *Decubitus ulcer precautions such as frequent repositioning  Aphasia  Assessment & Plan  Definite evidence of expressive aphasia, possible component of receptive aphasia as well  Speech therapy on board       Urinary retention  Assessment & Plan  Patient developed urinary retention during previous hospitalization  Plan was voiding trial as an outpatient as she was scheduled for discharge from that facility  Continue with the Cottrell catheter for today, was started on Flomax 0 4mg, consider voiding trial    Dysphagia  Assessment & Plan  Patient noted to have dysphagia and also decreased p o  Intake  As per [facility] progress note patient had a calorie count and recommended feeding tube placement  Speech evaluation - advance diet with dysphagia 2 and thin liquids; needs assistance with feeds  Calorie count  If the patient continued to have poor p o  Intake may need discussion with the family regarding alternate methods of nutrition        Disposition:  Currently getting chemo     SUBJECTIVE     Patient seen and examined  No acute events overnight  Alert and oriented x2 3  OBJECTIVE     Vitals:    21 1843 21 2123 21 0624 21 0710   BP: 160/92 151/90  148/86   Pulse: 73 71  71   Resp: 18      Temp: 98 2 °F (36 8 °C)   98 6 °F (37 °C)   TempSrc:       SpO2: 100% 99%  100%   Weight:   46 2 kg (101 lb 13 6 oz)    Height:          Temperature:   Temp (24hrs), Av 1 °F (36 7 °C), Min:97 6 °F (36 4 °C), Max:98 6 °F (37 °C)    Temperature: 98 6 °F (37 °C)  Intake & Output:  I/O        07 -  0700  07 -  0700  07 -  0700    P  O  464 270 240    I V  (mL/kg) 1420 (30 3) 2087 5 (45 2)     IV Piggyback 250 1409 4     Total Intake(mL/kg) 2134 (45 6) 3766 9 (81 5) 240 (5 2)    Urine (mL/kg/hr) 1775 (1 6) 3625 (3 3) 1325 (6 1)    Stool 0 0     Total Output 1775 3625 1325    Net +359 +141 9 -1085           Unmeasured Urine Occurrence 1 x      Unmeasured Stool Occurrence 1 x 2 x         Weights:   IBW (Ideal Body Weight): 45 5 kg    Body mass index is 19 89 kg/m²    Weight (last 2 days)     Date/Time   Weight    21 0624   46 2 (101 85)    21 1100   46 8 (103 09)    21 0548   46 8 (103 1)    21 1700   47 6 (104 94)    21 0600   47 6 (104 94) Physical Exam  Vitals signs reviewed  Constitutional:       General: She is not in acute distress  Appearance: She is well-developed  She is not diaphoretic  HENT:      Head: Normocephalic and atraumatic  Nose: Nose normal       Mouth/Throat:      Pharynx: No oropharyngeal exudate  Eyes:      General: No scleral icterus  Conjunctiva/sclera: Conjunctivae normal    Neck:      Musculoskeletal: Neck supple  Thyroid: No thyromegaly  Vascular: No JVD  Trachea: No tracheal deviation  Cardiovascular:      Rate and Rhythm: Normal rate and regular rhythm  Heart sounds: Normal heart sounds  No murmur  No friction rub  No gallop  Pulmonary:      Effort: Pulmonary effort is normal  No respiratory distress  Breath sounds: Normal breath sounds  No stridor  No wheezing or rales  Chest:      Chest wall: No tenderness  Abdominal:      General: Bowel sounds are normal  There is no distension  Palpations: Abdomen is soft  There is no mass  Tenderness: There is no abdominal tenderness  There is no guarding or rebound  Musculoskeletal: Normal range of motion  General: No tenderness  Skin:     General: Skin is warm  Findings: No erythema or rash  Neurological:      Mental Status: She is alert  Mental status is at baseline  Sensory: No sensory deficit  Comments: Patient alert and oriented x2 3  0/5 strength in left upper and lower extremity  Intermittently follow commands  LABORATORY DATA     Labs: I have personally reviewed pertinent reports  Results from last 7 days   Lab Units 04/29/21  0555 04/28/21  0518 04/27/21  1043  04/25/21  0620   WBC Thousand/uL 7 92 12 97* 11 99*   < > 7 19   HEMOGLOBIN g/dL 10 9* 12 4 12 9   < > 11 5   HEMATOCRIT % 31 8* 36 5 37 2   < > 33 8*   PLATELETS Thousands/uL 143* 172 181   < > 187   MONO PCT % 5  --   --   --  9    < > = values in this interval not displayed        Results from last 7 days   Lab Units 04/29/21  0555 04/28/21  0518 04/27/21  0458  04/24/21  0837   POTASSIUM mmol/L 3 1* 3 6 3 8   < > 4 1   CHLORIDE mmol/L 98* 104 102   < > 105   CO2 mmol/L 34* 25 22   < > 26   BUN mg/dL 14 18 18   < > 25   CREATININE mg/dL 0 51* 0 46* 0 48*   < > 0 62   CALCIUM mg/dL 8 9 10 0 9 8   < > 10 2*   ALK PHOS U/L  --   --   --   --  63   ALT U/L  --   --   --   --  18   AST U/L  --   --   --   --  9    < > = values in this interval not displayed  Results from last 7 days   Lab Units 04/24/21  0837   MAGNESIUM mg/dL 2 1     Results from last 7 days   Lab Units 04/29/21  0555   PHOSPHORUS mg/dL 2 7                    IMAGING & DIAGNOSTIC TESTING     Radiology Results: I have personally reviewed pertinent reports  Xr Chest Portable    Result Date: 4/24/2021  Impression: Dialysis catheter tip within the superior cavoatrial junction  No pneumothorax  Workstation performed: [id number]     Xr Chest Picc Line Portable    Result Date: 4/27/2021  Impression: PICC line tip in the superior vena cava, approximately 3 5 to 4 cm above the cavoatrial junction  The examination demonstrates a significant  finding and was documented as such in [facility] for liaison and referring practitioner notification  Workstation performed: [id number]     Ir Picc Reposition    Result Date: 4/27/2021  Impression: Status-post repositioning of a 5 Palestinian central venous catheter under fluoroscopic guidance with its tip at the cavoatrial junction  Workstation performed: [id number]     Ir Tunneled Central Line Removal    Result Date: 4/27/2021  Impression: Impression: Successful tunneled central line removal as described  Signed, performed and dictated by Christophe Allison PA-C under the supervision of Dr Linda Weeks  Workstation performed: [id number]     Other Diagnostic Testing: I have personally reviewed pertinent reports      ACTIVE MEDICATIONS     Current Facility-Administered Medications   Medication Dose Route Frequency    acetaminophen (TYLENOL) tablet 488 mg  488 mg Oral Q6H PRN    albuterol (PROVENTIL HFA,VENTOLIN HFA) inhaler 2 puff  2 puff Inhalation Q6H PRN    amLODIPine (NORVASC) tablet 5 mg  5 mg Oral Daily    dexamethasone (DECADRON) injection 4 mg  4 mg Intravenous 4x Daily    famotidine (PEPCID) tablet 20 mg  20 mg Oral BID    heparin (porcine) subcutaneous injection 5,000 Units  5,000 Units Subcutaneous Q8H [address]    insulin lispro (HumaLOG) 100 units/mL subcutaneous injection 1-6 Units  1-6 Units Subcutaneous TID AC    leucovorin calcium injection 25 mg  25 mg Intravenous Q6H    lidocaine (LIDODERM) 5 % patch 1 patch  1 patch Topical Daily    methotrexate (PF) 5,000 mg in sodium chloride 0 9 % 1,000 mL IVPB  5,000 mg Intravenous Once    ondansetron (ZOFRAN) 16 mg, dexamethasone (DECADRON) 10 mg in sodium chloride 0 9 % 50 mL IVPB   Intravenous Once    ondansetron (ZOFRAN) injection 4 mg  4 mg Intravenous Q6H PRN    senna-docusate sodium (SENOKOT S) 8 6-50 mg per tablet 1 tablet  1 tablet Oral BID    sodium bicarbonate 100 mEq in dextrose 5 % 1,000 mL infusion  150 mL/hr Intravenous Continuous    sodium chloride 0 9 % infusion  20 mL/hr Intravenous Once PRN    sodium chloride 0 9 % infusion  20 mL/hr Intravenous Once PRN    tamsulosin (FLOMAX) capsule 0 4 mg  0 4 mg Oral Daily With Dinner       VTE Pharmacologic Prophylaxis: Heparin  VTE Mechanical Prophylaxis: sequential compression device    Portions of the record may have been created with voice recognition software  Occasional wrong word or "sound a like" substitutions may have occurred due to the inherent limitations of voice recognition software    Read the chart carefully and recognize, using context, where substitutions have occurred   ==  Nanda Maynard, [facility]  Internal Medicine Residency PGY-2

## 2021-04-30 ENCOUNTER — APPOINTMENT (INPATIENT)
Dept: RADIOLOGY | Facility: HOSPITAL | Age: 56
DRG: 840 | End: 2021-04-30
Payer: COMMERCIAL

## 2021-04-30 ENCOUNTER — TELEPHONE (OUTPATIENT)
Dept: SURGICAL ONCOLOGY | Facility: CLINIC | Age: 56
End: 2021-04-30

## 2021-04-30 LAB
ALBUMIN SERPL BCP-MCNC: 2.8 G/DL (ref 3.5–5)
ALP SERPL-CCNC: 53 U/L (ref 46–116)
ALT SERPL W P-5'-P-CCNC: 36 U/L (ref 12–78)
ANION GAP SERPL CALCULATED.3IONS-SCNC: 6 MMOL/L (ref 4–13)
ANION GAP SERPL CALCULATED.3IONS-SCNC: 7 MMOL/L (ref 4–13)
AST SERPL W P-5'-P-CCNC: 14 U/L (ref 5–45)
BASOPHILS # BLD AUTO: 0.01 THOUSANDS/ΜL (ref 0–0.1)
BASOPHILS NFR BLD AUTO: 0 % (ref 0–1)
BILIRUB SERPL-MCNC: 0.39 MG/DL (ref 0.2–1)
BUN SERPL-MCNC: 15 MG/DL (ref 5–25)
BUN SERPL-MCNC: 16 MG/DL (ref 5–25)
CALCIUM ALBUM COR SERPL-MCNC: 10.1 MG/DL (ref 8.3–10.1)
CALCIUM SERPL-MCNC: 9.1 MG/DL (ref 8.3–10.1)
CALCIUM SERPL-MCNC: 9.1 MG/DL (ref 8.3–10.1)
CHLORIDE SERPL-SCNC: 100 MMOL/L (ref 100–108)
CHLORIDE SERPL-SCNC: 98 MMOL/L (ref 100–108)
CO2 SERPL-SCNC: 31 MMOL/L (ref 21–32)
CO2 SERPL-SCNC: 32 MMOL/L (ref 21–32)
CREAT SERPL-MCNC: 0.48 MG/DL (ref 0.6–1.3)
CREAT SERPL-MCNC: 0.48 MG/DL (ref 0.6–1.3)
EOSINOPHIL # BLD AUTO: 0 THOUSAND/ΜL (ref 0–0.61)
EOSINOPHIL NFR BLD AUTO: 0 % (ref 0–6)
ERYTHROCYTE [DISTWIDTH] IN BLOOD BY AUTOMATED COUNT: 13.6 % (ref 11.6–15.1)
GFR SERPL CREATININE-BSD FRML MDRD: 112 ML/MIN/1.73SQ M
GFR SERPL CREATININE-BSD FRML MDRD: 112 ML/MIN/1.73SQ M
GLUCOSE SERPL-MCNC: 136 MG/DL (ref 65–140)
GLUCOSE SERPL-MCNC: 154 MG/DL (ref 65–140)
GLUCOSE SERPL-MCNC: 161 MG/DL (ref 65–140)
GLUCOSE SERPL-MCNC: 172 MG/DL (ref 65–140)
GLUCOSE SERPL-MCNC: 185 MG/DL (ref 65–140)
GLUCOSE SERPL-MCNC: 186 MG/DL (ref 65–140)
GLUCOSE SERPL-MCNC: 198 MG/DL (ref 65–140)
HCT VFR BLD AUTO: 31.9 % (ref 34.8–46.1)
HGB BLD-MCNC: 10.6 G/DL (ref 11.5–15.4)
IMM GRANULOCYTES # BLD AUTO: 0.13 THOUSAND/UL (ref 0–0.2)
IMM GRANULOCYTES NFR BLD AUTO: 2 % (ref 0–2)
LYMPHOCYTES # BLD AUTO: 0.63 THOUSANDS/ΜL (ref 0.6–4.47)
LYMPHOCYTES NFR BLD AUTO: 8 % (ref 14–44)
MCH RBC QN AUTO: 31.4 PG (ref 26.8–34.3)
MCHC RBC AUTO-ENTMCNC: 33.2 G/DL (ref 31.4–37.4)
MCV RBC AUTO: 94 FL (ref 82–98)
MONOCYTES # BLD AUTO: 0.24 THOUSAND/ΜL (ref 0.17–1.22)
MONOCYTES NFR BLD AUTO: 3 % (ref 4–12)
NEUTROPHILS # BLD AUTO: 6.45 THOUSANDS/ΜL (ref 1.85–7.62)
NEUTS SEG NFR BLD AUTO: 87 % (ref 43–75)
NRBC BLD AUTO-RTO: 0 /100 WBCS
PHOSPHATE SERPL-MCNC: 2.7 MG/DL (ref 2.7–4.5)
PLATELET # BLD AUTO: 147 THOUSANDS/UL (ref 149–390)
PMV BLD AUTO: 10.5 FL (ref 8.9–12.7)
POTASSIUM SERPL-SCNC: 3.3 MMOL/L (ref 3.5–5.3)
POTASSIUM SERPL-SCNC: 3.5 MMOL/L (ref 3.5–5.3)
PROT SERPL-MCNC: 5.6 G/DL (ref 6.4–8.2)
RBC # BLD AUTO: 3.38 MILLION/UL (ref 3.81–5.12)
SODIUM SERPL-SCNC: 136 MMOL/L (ref 136–145)
SODIUM SERPL-SCNC: 138 MMOL/L (ref 136–145)
WBC # BLD AUTO: 7.46 THOUSAND/UL (ref 4.31–10.16)

## 2021-04-30 PROCEDURE — 80053 COMPREHEN METABOLIC PANEL: CPT | Performed by: INTERNAL MEDICINE

## 2021-04-30 PROCEDURE — 92526 ORAL FUNCTION THERAPY: CPT

## 2021-04-30 PROCEDURE — 99497 ADVNCD CARE PLAN 30 MIN: CPT | Performed by: PHYSICIAN ASSISTANT

## 2021-04-30 PROCEDURE — 80204 DRUG ASSAY METHOTREXATE: CPT | Performed by: INTERNAL MEDICINE

## 2021-04-30 PROCEDURE — 71045 X-RAY EXAM CHEST 1 VIEW: CPT

## 2021-04-30 PROCEDURE — 99232 SBSQ HOSP IP/OBS MODERATE 35: CPT | Performed by: INTERNAL MEDICINE

## 2021-04-30 PROCEDURE — 84100 ASSAY OF PHOSPHORUS: CPT | Performed by: STUDENT IN AN ORGANIZED HEALTH CARE EDUCATION/TRAINING PROGRAM

## 2021-04-30 PROCEDURE — 80048 BASIC METABOLIC PNL TOTAL CA: CPT | Performed by: STUDENT IN AN ORGANIZED HEALTH CARE EDUCATION/TRAINING PROGRAM

## 2021-04-30 PROCEDURE — 82948 REAGENT STRIP/BLOOD GLUCOSE: CPT

## 2021-04-30 PROCEDURE — 74018 RADEX ABDOMEN 1 VIEW: CPT

## 2021-04-30 PROCEDURE — 85025 COMPLETE CBC W/AUTO DIFF WBC: CPT | Performed by: STUDENT IN AN ORGANIZED HEALTH CARE EDUCATION/TRAINING PROGRAM

## 2021-04-30 RX ORDER — LEUCOVORIN CALCIUM 25 MG/1
25 TABLET ORAL EVERY 6 HOURS
Status: COMPLETED | OUTPATIENT
Start: 2021-05-01 | End: 2021-05-02

## 2021-04-30 RX ORDER — POTASSIUM CHLORIDE 29.8 MG/ML
40 INJECTION INTRAVENOUS ONCE
Status: COMPLETED | OUTPATIENT
Start: 2021-04-30 | End: 2021-05-01

## 2021-04-30 RX ORDER — POTASSIUM CHLORIDE 20 MEQ/1
40 TABLET, EXTENDED RELEASE ORAL 2 TIMES DAILY
Status: DISCONTINUED | OUTPATIENT
Start: 2021-04-30 | End: 2021-04-30

## 2021-04-30 RX ADMIN — SODIUM CHLORIDE 25 MG: 9 INJECTION, SOLUTION INTRAVENOUS at 00:09

## 2021-04-30 RX ADMIN — LIDOCAINE 5% 1 PATCH: 700 PATCH TOPICAL at 10:04

## 2021-04-30 RX ADMIN — SODIUM CHLORIDE 25 MG: 9 INJECTION, SOLUTION INTRAVENOUS at 14:06

## 2021-04-30 RX ADMIN — INSULIN LISPRO 1 UNITS: 100 INJECTION, SOLUTION INTRAVENOUS; SUBCUTANEOUS at 14:12

## 2021-04-30 RX ADMIN — DEXAMETHASONE SODIUM PHOSPHATE 4 MG: 4 INJECTION INTRA-ARTICULAR; INTRALESIONAL; INTRAMUSCULAR; INTRAVENOUS; SOFT TISSUE at 21:59

## 2021-04-30 RX ADMIN — DEXAMETHASONE SODIUM PHOSPHATE 4 MG: 4 INJECTION INTRA-ARTICULAR; INTRALESIONAL; INTRAMUSCULAR; INTRAVENOUS; SOFT TISSUE at 19:09

## 2021-04-30 RX ADMIN — SENNOSIDES, DOCUSATE SODIUM 1 TABLET: 8.6; 5 TABLET ORAL at 10:05

## 2021-04-30 RX ADMIN — FAMOTIDINE 20 MG: 20 TABLET ORAL at 10:04

## 2021-04-30 RX ADMIN — SODIUM BICARBONATE 150 ML/HR: 84 INJECTION, SOLUTION INTRAVENOUS at 00:09

## 2021-04-30 RX ADMIN — SODIUM BICARBONATE 150 ML/HR: 84 INJECTION, SOLUTION INTRAVENOUS at 19:09

## 2021-04-30 RX ADMIN — AMLODIPINE BESYLATE 5 MG: 5 TABLET ORAL at 10:04

## 2021-04-30 RX ADMIN — INSULIN LISPRO 1 UNITS: 100 INJECTION, SOLUTION INTRAVENOUS; SUBCUTANEOUS at 19:46

## 2021-04-30 RX ADMIN — DEXAMETHASONE SODIUM PHOSPHATE 4 MG: 4 INJECTION INTRA-ARTICULAR; INTRALESIONAL; INTRAMUSCULAR; INTRAVENOUS; SOFT TISSUE at 10:04

## 2021-04-30 RX ADMIN — HEPARIN SODIUM 5000 UNITS: 5000 INJECTION INTRAVENOUS; SUBCUTANEOUS at 06:53

## 2021-04-30 RX ADMIN — HEPARIN SODIUM 5000 UNITS: 5000 INJECTION INTRAVENOUS; SUBCUTANEOUS at 21:59

## 2021-04-30 RX ADMIN — POTASSIUM CHLORIDE 40 MEQ: 29.8 INJECTION, SOLUTION INTRAVENOUS at 20:50

## 2021-04-30 RX ADMIN — HEPARIN SODIUM 5000 UNITS: 5000 INJECTION INTRAVENOUS; SUBCUTANEOUS at 14:12

## 2021-04-30 RX ADMIN — SODIUM CHLORIDE 25 MG: 9 INJECTION, SOLUTION INTRAVENOUS at 19:09

## 2021-04-30 RX ADMIN — SODIUM CHLORIDE 25 MG: 9 INJECTION, SOLUTION INTRAVENOUS at 06:31

## 2021-04-30 RX ADMIN — DEXAMETHASONE SODIUM PHOSPHATE 4 MG: 4 INJECTION INTRA-ARTICULAR; INTRALESIONAL; INTRAMUSCULAR; INTRAVENOUS; SOFT TISSUE at 14:08

## 2021-04-30 NOTE — NUTRITION
Tube feeding recommendations; due to severe malnutrition, patient at risk for re-feeding; slow tube feeding increase recommended monitor lytes and correct is indicated     04/30/21 5500   Recommendations/Interventions   Nutrition Recommendations Other (Specify)  (place keofed for TF; recommend jevity 1 2; start at 10ml/hr; increse by 10ml q 8 hrs to to goal of 45ml/hr; flush with 100ml water q 4 hours; will provide ~1300kcal; 60g pro; ~1300ml total water; continue PO as tolerated, when alert)

## 2021-04-30 NOTE — TELEPHONE ENCOUNTER
The only spot I was able to schedule patient for was 5/3 at 10am in the [address] [facility] [place], [place]  Hopefully patient is out of hospital by than and able to make it

## 2021-04-30 NOTE — PROGRESS NOTES
INTERNAL MEDICINE RESIDENCY PROGRESS NOTE     Name: Castro Metcalf   Age & Sex: 49 y o  female   MRN: [id number]  Unit/Bed#: [facility] 920-01   Encounter: [id number]  Team: SOD Team B     PATIENT INFORMATION     Name: Castro Metcalf   Age & Sex: 49 y o  female   MRN: [id number] Stay Days: 7    ASSESSMENT/PLAN     Principal Problem:    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[facility] [address] )  Active Problems:    Altered mental status    Presence of permanent central venous catheter    Dysphagia    Urinary retention    Aphasia    Weakness    Fracture of ramus of left pubis (HCC)    Severe protein-calorie malnutrition ([facility] [address] )      * 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[facility] [address] )  Assessment & Plan  Patient was diagnosed with primary 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through biopsy in [facility] transferred over here for chemotherapy since the [facility] is out of network  Oncology and Palliative care services consulted, appreciate recommendations   Continue with dexamethasone  As per [facility] progress note patient had discussion with palliative Care and at that time family wants to pursue disease directed therapy    Plan:  Had a 2nd family meeting on 04/30 and family has decided to proceed treatment at this time  Hematology-Oncology consulted  Appreciate recommendations  Chemotherapy treatment as per Hematology-Oncology  Patient has poor prognosis even with treatment  S/p PICC line for chemotherapy  DVT prophylaxis:  On heparin  Presence of permanent central venous catheter  Assessment & Plan  Noted presence of right IJ tunneled double-lumen HD catheter; upon chart review, this was likely inserted to be used for plasmapheresis which she has completed  Plan:  S/p removal by IR  Patient now has PICC line  Altered mental status  Assessment & Plan  Patient initially presented to [facility] his ultimate [place] with stroke-like symptoms for 2 days    CT scan of the head was concerning for subacute CVA and was transferred to [facility]   MRI of the brain was more consistent with demyelinating disease patient was started on IV steroids and 1000 mg daily for 5 days  Patient did not have any significant improvement at that time patient had plasmapheresis  And also had  lumbar puncture-negative for infection or malignancy  Patient noted to have persistent encephalopathy so a repeat MRI was obtained which showed worsening of the abnormality seen on the previous MRI and 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Continue with the dexamethasone  Likely secondary to 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Delirium precautions   Currently alert and oriented times 2-3   follow commands  Moves all extremities spontaneously except left upper and lower extrimities  Has some delirium episodes during the day and night  Patient's  (does not speak [place]) makes all medical decision for her  Severe protein-calorie malnutrition ([facility] [address] )  Assessment & Plan  Malnutrition Findings:   Adult Malnutrition type: Acute illness(due to medical condition resulting in dysphagia, decreased appetite and intake, weight loss)  Adult Degree of Malnutrition: Other severe protein calorie malnutrition    BMI Findings: Body mass index is 20 49 kg/m²  Plan:  Nutrition consult  Proving IV fluids, calorie count, protein shakes  Fracture of ramus of left pubis [facility])  Assessment & Plan  Markel Dhillon prior to presentation to [facility]  Managed nonoperatively at [facility]    Weakness  Assessment & Plan  Left greater than right upper and lower extremity weakness with ongoing left lower extremity contracture  Secondary to underlying CNS malignancy  *Decubitus ulcer precautions such as frequent repositioning  Aphasia  Assessment & Plan  Definite evidence of expressive aphasia, possible component of receptive aphasia as well  Speech therapy on board       Urinary retention  Assessment & Plan  Patient developed urinary retention during previous hospitalization  Plan was voiding trial as an outpatient as she was scheduled for discharge from that facility  Continue with the Cottrell catheter for today, was started on Flomax 0 4mg, consider voiding trial    Dysphagia  Assessment & Plan  Patient noted to have dysphagia and also decreased p o  Intake  As per [facility] [place] progress note patient had a calorie count and recommended feeding tube placement  Speech evaluation - advance diet with dysphagia 2 and thin liquids; needs assistance with feeds  Calorie count  If the patient continued to have poor p o  Intake may need discussion with the family regarding alternate methods of nutrition  Patient's calorie intake is low and tube feeds recommendation was made by dietitian  Disposition:  On chemotherapy     SUBJECTIVE     Patient seen and examined  No acute events overnight  Alert and oriented times 2-3  Left-sided paralysis from the tumor  OBJECTIVE     Vitals:    21 0228 21 0230 21 0656 21 0657   BP: 122/80  125/79 125/79   BP Location:       Pulse: 82  73 78   Resp: 20      Temp:  98 3 °F (36 8 °C) 97 7 °F (36 5 °C) 97 7 °F (36 5 °C)   TempSrc:  Axillary     SpO2: 98%  100% 100%   Weight:       Height:          Temperature:   Temp (24hrs), Av 7 °F (36 5 °C), Min:97 1 °F (36 2 °C), Max:98 3 °F (36 8 °C)    Temperature: 97 7 °F (36 5 °C)  Intake & Output:  I/O       701 -  0700  07 -  0700  0701 - / 0700    P  O  270 240 100    I V  (mL/kg) 3080 (66 7) 2292 5 (49 6)     IV Piggyback 1409 4      Total Intake(mL/kg) 4759 4 (103) 2532 5 (54 8) 100 (2 2)    Urine (mL/kg/hr) 3625 (3 3) 2925 (2 6) 2350 (7 7)    Stool 0 0     Total Output 3625 2925 2350    Net +1134 4 -392 5 -2250           Unmeasured Stool Occurrence 2 x 1 x         Weights:   IBW (Ideal Body Weight): 45 5 kg    Body mass index is 19 89 kg/m²    Weight (last 2 days)     Date/Time   Weight    21 0624   46 2 (101 85)    21 1100   46 8 (103 09)    04/28/21 0548   46 8 (103 1)            Physical Exam  Vitals signs reviewed  Constitutional:       General: She is not in acute distress  Appearance: She is well-developed  She is not diaphoretic  HENT:      Head: Normocephalic and atraumatic  Nose: Nose normal       Mouth/Throat:      Pharynx: No oropharyngeal exudate  Eyes:      General: No scleral icterus  Conjunctiva/sclera: Conjunctivae normal    Neck:      Musculoskeletal: Neck supple  Thyroid: No thyromegaly  Vascular: No JVD  Trachea: No tracheal deviation  Cardiovascular:      Rate and Rhythm: Normal rate and regular rhythm  Heart sounds: Normal heart sounds  No murmur  No friction rub  No gallop  Pulmonary:      Effort: Pulmonary effort is normal  No respiratory distress  Breath sounds: Normal breath sounds  No stridor  No wheezing or rales  Chest:      Chest wall: No tenderness  Abdominal:      General: Bowel sounds are normal  There is no distension  Palpations: Abdomen is soft  There is no mass  Tenderness: There is no abdominal tenderness  There is no guarding or rebound  Musculoskeletal: Normal range of motion  General: No tenderness  Skin:     General: Skin is warm  Findings: No erythema or rash  Neurological:      Mental Status: She is alert  Sensory: No sensory deficit  Comments: Alert and oriented times 2-3  Left-sided paralysis from the tumor  Psychiatric:         Behavior: Behavior normal          Thought Content: Thought content normal          Judgment: Judgment normal        LABORATORY DATA     Labs: I have personally reviewed pertinent reports    Results from last 7 days   Lab Units 04/30/21  0506 04/29/21  1227 04/29/21  0555  04/25/21  0620   WBC Thousand/uL 7 46 9 99 7 92   < > 7 19   HEMOGLOBIN g/dL 10 6* 11 3* 10 9*   < > 11 5   HEMATOCRIT % 31 9* 32 5* 31 8*   < > 33 8*   PLATELETS Thousands/uL 147* 154 143*   < > 187   NEUTROS PCT % 87*  --   --   --   --    MONOS PCT % 3*  --   --   --   --    MONO PCT %  --   --  5  --  9    < > = values in this interval not displayed  Results from last 7 days   Lab Units 04/30/21  0506 04/29/21  1227 04/29/21  0555  04/24/21  0837   POTASSIUM mmol/L 3 5  3 3* 3 7 3 1*   < > 4 1   CHLORIDE mmol/L 100  98* 98* 98*   < > 105   CO2 mmol/L 31  32 34* 34*   < > 26   BUN mg/dL 16  15 13 14   < > 25   CREATININE mg/dL 0 48*  0 48* 0 50* 0 51*   < > 0 62   CALCIUM mg/dL 9 1  9 1 9 3 8 9   < > 10 2*   ALK PHOS U/L 53 54  --   --  63   ALT U/L 36 36  --   --  18   AST U/L 14 15  --   --  9    < > = values in this interval not displayed  Results from last 7 days   Lab Units 04/24/21  0837   MAGNESIUM mg/dL 2 1     Results from last 7 days   Lab Units 04/30/21  0506 04/29/21  0555   PHOSPHORUS mg/dL 2 7 2 7                    IMAGING & DIAGNOSTIC TESTING     Radiology Results: I have personally reviewed pertinent reports  Xr Chest Portable    Result Date: 4/24/2021  Impression: Dialysis catheter tip within the superior cavoatrial junction  No pneumothorax  Workstation performed: [id number]     Xr Chest Picc Line Portable    Result Date: 4/27/2021  Impression: PICC line tip in the superior vena cava, approximately 3 5 to 4 cm above the cavoatrial junction  The examination demonstrates a significant  finding and was documented as such in [facility] for liaison and referring practitioner notification  Workstation performed: [id number]     Ir Picc Reposition    Result Date: 4/27/2021  Impression: Status-post repositioning of a 5 Vietnamese central venous catheter under fluoroscopic guidance with its tip at the cavoatrial junction  Workstation performed: [id number]     Ir Tunneled Central Line Removal    Result Date: 4/27/2021  Impression: Impression: Successful tunneled central line removal as described  Signed, performed and dictated by Nel Canela PA-C under the supervision of Dr Erika Manning  Workstation performed: [id number]     Other Diagnostic Testing: I have personally reviewed pertinent reports  ACTIVE MEDICATIONS     Current Facility-Administered Medications   Medication Dose Route Frequency    acetaminophen (TYLENOL) tablet 488 mg  488 mg Oral Q6H PRN    albuterol (PROVENTIL HFA,VENTOLIN HFA) inhaler 2 puff  2 puff Inhalation Q6H PRN    amLODIPine (NORVASC) tablet 5 mg  5 mg Oral Daily    dexamethasone (DECADRON) injection 4 mg  4 mg Intravenous 4x Daily    famotidine (PEPCID) tablet 20 mg  20 mg Oral BID    heparin (porcine) subcutaneous injection 5,000 Units  5,000 Units Subcutaneous Q8H [address]    insulin lispro (HumaLOG) 100 units/mL subcutaneous injection 1-6 Units  1-6 Units Subcutaneous TID AC    [START ON 5/1/2021] leucovorin (WELLCOVORIN) tablet 25 mg  25 mg Oral Q6H    leucovorin calcium 25 mg in sodium chloride 0 9 % 50 mL  25 mg Intravenous Q6H    lidocaine (LIDODERM) 5 % patch 1 patch  1 patch Topical Daily    methotrexate (PF) 5,000 mg in sodium chloride 0 9 % 1,000 mL IVPB  5,000 mg Intravenous Once    ondansetron (ZOFRAN) 16 mg, dexamethasone (DECADRON) 10 mg in sodium chloride 0 9 % 50 mL IVPB   Intravenous Once    ondansetron (ZOFRAN) injection 4 mg  4 mg Intravenous Q6H PRN    potassium chloride (K-DUR,KLOR-CON) CR tablet 40 mEq  40 mEq Oral BID    senna-docusate sodium (SENOKOT S) 8 6-50 mg per tablet 1 tablet  1 tablet Oral BID    sodium bicarbonate 100 mEq in dextrose 5 % 1,000 mL infusion  150 mL/hr Intravenous Continuous    sodium chloride 0 9 % infusion  20 mL/hr Intravenous Once PRN    sodium chloride 0 9 % infusion  20 mL/hr Intravenous Once PRN    tamsulosin (FLOMAX) capsule 0 4 mg  0 4 mg Oral Daily With Dinner       VTE Pharmacologic Prophylaxis: Heparin  VTE Mechanical Prophylaxis: sequential compression device    Portions of the record may have been created with voice recognition software    Occasional wrong word or "sound a like" substitutions may have occurred due to the inherent limitations of voice recognition software    Read the chart carefully and recognize, using context, where substitutions have occurred   ==  Dafne Tamayo, [address] [facility]  Internal Medicine Residency PGY-2

## 2021-04-30 NOTE — TELEPHONE ENCOUNTER
Dr Claude Figueroa from Internal Medicine is calling because this patient Javon Schofield is going to be discharged in the next few days  He is not sure what is the next step for her after she is discharged  Please advise  He does not need a call back - just making sure this patient is appropriately scheduled  Please check with Dr Shaggy Welch

## 2021-04-30 NOTE — ACP (ADVANCE CARE PLANNING)
Record of Family Meeting - Palliative and Supportive Care   Angela Burk 49 y o  female [id number]      Recommendations and Plan:  · Ongoing treatment directed care without limits at this time  · Consider keofed if Calorie count remains insufficient  · Goals of care to be readdressed on an as needed basis pending clinical course        A family meeting was held to provide medical update and discuss goals of care  This meeting was necessary for determine the appropriate course of treatment  Time of Meetin:30  Meeting Location: patient's room  Participants:   - Dr Pam Landin, primary team (interpreted [place])  - Family friend  - Patient's spouse  - Patient's children (present via video call from [facility])  Nico York PA-C [facility])    Patient Participation: alert, but not participating (limited by her disease)    Patient Support System: family as above     Advanced Directive of POLST available: no    Topics of Discussion:  · Medical update provided by Dr Pam Landin (in [facility]) including the following  · Relayed update from oncology team who were not able to be in attendance  · Incurable nature of disease, poor prognosis, unlikely to recovery functionally  · Will consider further chemotherapy if functional and nutritional status allows about 2-3 weeks from initial dose  · If not stable for chemo consider WBRT vs comfort measures  · 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· Discussed concern of poor nutritional status despite maximum attempts, modified diet  Considering keofeed pending calorie count in next 24H  · Clarified goals of care as above  · Family asked if patient was stable to fly back to [facility] at which time this was advised against (unless medical flight which is very expensive)  Unfortunately family is unable to travel to [address] [place] [address] [address] secondary to [place] restrictions, despite letters being sent by other providers      Other Content of Meeting:  · Answers provided to family's satisfaction    Time Involved in Meetin minutes, beginning at approximately 12:30 and ending at approximately 1:15 including time spent coordinating with multidisciplinary team including SOD, oncology, and dietician  Violet Jin PA-C   Palliative and Supportive Care  Clinic/Answering Service: [phone]  You can find me on [employer]!

## 2021-04-30 NOTE — PROGRESS NOTES
I readdress the code status with  today  After further discussion,  has decided to keep patient level 1 full code   has also decided to proceed with [facility] via NG tube  Will place the order for it

## 2021-04-30 NOTE — QUICK NOTE
Was present at bedside for placement of Kaofed tube  Patient tolerated bedside procedure well  Chest x-ray and KUB reviewed at bedside confirming placement of tube within gastric fundus  Okay to use at this time      Roxanne Wilkes DO

## 2021-04-30 NOTE — PROGRESS NOTES
Oncology Progress Note  Junior Braxton 49 y o  female MRN: [id number]  Unit/Bed#: [facility] 920-01 Encounter: [id number]      /79   Pulse 78   Temp 97 7 °F (36 5 °C)   Resp 20   Ht 5' (1 524 m)   Wt 46 2 kg (101 lb 13 6 oz)   SpO2 100%   BMI 19 89 kg/m²     Subjective:  Alert to person    Objective:  Denies any pain  Does not verbalize much but does nod her head appropriately    General Appearance:    Alert, awake, follows commands does have neurologic deficit       Eyes:    PERRL   Ears:    Normal external ear canals, both ears   Nose:   Nares normal, septum midline   Throat:   Mucosa moist  Pharynx without injection  Neck:   Supple       Lungs:     Mild crackles at bilateral bases   Chest Wall:    No tenderness or deformity    Heart:    Regular rate and rhythm       Abdomen:     Soft, non-tender, bowel sounds +, no organomegaly           Extremities:   Extremities no cyanosis or edema       Skin:   no rash or icterus      Lymph nodes:   Cervical, supraclavicular, and axillary nodes normal       Recent Results (from the past 48 hour(s))   Fingerstick Glucose (POCT)    Collection Time: 04/28/21  5:05 PM   Result Value Ref Range    POC Glucose 245 (H) 65 - 140 mg/dl   Fingerstick Glucose (POCT)    Collection Time: 04/28/21  9:21 PM   Result Value Ref Range    POC Glucose 202 (H) 65 - 140 mg/dl   CBC and differential    Collection Time: 04/29/21  5:55 AM   Result Value Ref Range    WBC 7 92 4 31 - 10 16 Thousand/uL    RBC 3 42 (L) 3 81 - 5 12 Million/uL    Hemoglobin 10 9 (L) 11 5 - 15 4 g/dL    Hematocrit 31 8 (L) 34 8 - 46 1 %    MCV 93 82 - 98 fL    MCH 31 9 26 8 - 34 3 pg    MCHC 34 3 31 4 - 37 4 g/dL    RDW 13 3 11 6 - 15 1 %    MPV 10 5 8 9 - 12 7 fL    Platelets 256 (L) 404 - 390 Thousands/uL    nRBC 0 /100 WBCs   Basic metabolic panel    Collection Time: 04/29/21  5:55 AM   Result Value Ref Range    Sodium 137 136 - 145 mmol/L    Potassium 3 1 (L) 3 5 - 5 3 mmol/L    Chloride 98 (L) 100 - 108 mmol/L    CO2 34 (H) 21 - 32 mmol/L    ANION GAP 5 4 - 13 mmol/L    BUN 14 5 - 25 mg/dL    Creatinine 0 51 (L) 0 60 - 1 30 mg/dL    Glucose 161 (H) 65 - 140 mg/dL    Calcium 8 9 8 3 - 10 1 mg/dL    eGFR 109 ml/min/1 73sq m   Phosphorus    Collection Time: 04/29/21  5:55 AM   Result Value Ref Range    Phosphorus 2 7 2 7 - 4 5 mg/dL   Manual Differential(PHLEBS Do Not Order)    Collection Time: 04/29/21  5:55 AM   Result Value Ref Range    Segmented % 79 (H) 43 - 75 %    Lymphocytes % 5 (L) 14 - 44 %    Monocytes % 5 4 - 12 %    Eosinophils, % 0 0 - 6 %    Basophils % 0 0 - 1 %    Metamyelocytes% 1 0 - 1 %    Myelocytes % 2 (H) 0 - 1 %    Atypical Lymphocytes % 8 (H) <=0 %    Absolute Neutrophils 6 26 1 85 - 7 62 Thousand/uL    Lymphocytes Absolute 0 40 (L) 0 60 - 4 47 Thousand/uL    Monocytes Absolute 0 40 0 00 - 1 22 Thousand/uL    Eosinophils Absolute 0 00 0 00 - 0 40 Thousand/uL    Basophils Absolute 0 00 0 00 - 0 10 Thousand/uL    Total Counted      RBC Morphology Normal     Platelet Estimate Adequate Adequate   Fingerstick Glucose (POCT)    Collection Time: 04/29/21  7:55 AM   Result Value Ref Range    POC Glucose 145 (H) 65 - 140 mg/dl   Fingerstick Glucose (POCT)    Collection Time: 04/29/21 11:36 AM   Result Value Ref Range    POC Glucose 180 (H) 65 - 140 mg/dl   Methotrexate level    Collection Time: 04/29/21 12:27 PM   Result Value Ref Range    Methotrexate Lvl 2 80 umol/L   CBC and differential    Collection Time: 04/29/21 12:27 PM   Result Value Ref Range    WBC 9 99 4 31 - 10 16 Thousand/uL    RBC 3 50 (L) 3 81 - 5 12 Million/uL    Hemoglobin 11 3 (L) 11 5 - 15 4 g/dL    Hematocrit 32 5 (L) 34 8 - 46 1 %    MCV 93 82 - 98 fL    MCH 32 3 26 8 - 34 3 pg    MCHC 34 8 31 4 - 37 4 g/dL    RDW 13 3 11 6 - 15 1 %    MPV 10 5 8 9 - 12 7 fL    Platelets 951 882 - 929 Thousands/uL    nRBC 0 /100 WBCs   Comprehensive metabolic panel    Collection Time: 04/29/21 12:27 PM   Result Value Ref Range    Sodium 136 136 - 145 mmol/L    Potassium 3 7 3 5 - 5 3 mmol/L    Chloride 98 (L) 100 - 108 mmol/L    CO2 34 (H) 21 - 32 mmol/L    ANION GAP 4 4 - 13 mmol/L    BUN 13 5 - 25 mg/dL    Creatinine 0 50 (L) 0 60 - 1 30 mg/dL    Glucose 147 (H) 65 - 140 mg/dL    Calcium 9 3 8 3 - 10 1 mg/dL    Corrected Calcium 10 1 8 3 - 10 1 mg/dL    AST 15 5 - 45 U/L    ALT 36 12 - 78 U/L    Alkaline Phosphatase 54 46 - 116 U/L    Total Protein 5 8 (L) 6 4 - 8 2 g/dL    Albumin 3 0 (L) 3 5 - 5 0 g/dL    Total Bilirubin 0 61 0 20 - 1 00 mg/dL    eGFR 110 ml/min/1 73sq m   Fingerstick Glucose (POCT)    Collection Time: 04/29/21  4:59 PM   Result Value Ref Range    POC Glucose 180 (H) 65 - 140 mg/dl   CBC and differential    Collection Time: 04/30/21  5:06 AM   Result Value Ref Range    WBC 7 46 4 31 - 10 16 Thousand/uL    RBC 3 38 (L) 3 81 - 5 12 Million/uL    Hemoglobin 10 6 (L) 11 5 - 15 4 g/dL    Hematocrit 31 9 (L) 34 8 - 46 1 %    MCV 94 82 - 98 fL    MCH 31 4 26 8 - 34 3 pg    MCHC 33 2 31 4 - 37 4 g/dL    RDW 13 6 11 6 - 15 1 %    MPV 10 5 8 9 - 12 7 fL    Platelets 764 (L) 076 - 390 Thousands/uL    nRBC 0 /100 WBCs    Neutrophils Relative 87 (H) 43 - 75 %    Immat GRANS % 2 0 - 2 %    Lymphocytes Relative 8 (L) 14 - 44 %    Monocytes Relative 3 (L) 4 - 12 %    Eosinophils Relative 0 0 - 6 %    Basophils Relative 0 0 - 1 %    Neutrophils Absolute 6 45 1 85 - 7 62 Thousands/µL    Immature Grans Absolute 0 13 0 00 - 0 20 Thousand/uL    Lymphocytes Absolute 0 63 0 60 - 4 47 Thousands/µL    Monocytes Absolute 0 24 0 17 - 1 22 Thousand/µL    Eosinophils Absolute 0 00 0 00 - 0 61 Thousand/µL    Basophils Absolute 0 01 0 00 - 0 10 Thousands/µL   Basic metabolic panel    Collection Time: 04/30/21  5:06 AM   Result Value Ref Range    Sodium 136 136 - 145 mmol/L    Potassium 3 3 (L) 3 5 - 5 3 mmol/L    Chloride 98 (L) 100 - 108 mmol/L    CO2 32 21 - 32 mmol/L    ANION GAP 6 4 - 13 mmol/L    BUN 15 5 - 25 mg/dL    Creatinine 0 48 (L) 0 60 - 1 30 mg/dL Glucose 154 (H) 65 - 140 mg/dL    Calcium 9 1 8 3 - 10 1 mg/dL    eGFR 112 ml/min/1 73sq m   Phosphorus    Collection Time: 04/30/21  5:06 AM   Result Value Ref Range    Phosphorus 2 7 2 7 - 4 5 mg/dL   Comprehensive metabolic panel    Collection Time: 04/30/21  5:06 AM   Result Value Ref Range    Sodium 138 136 - 145 mmol/L    Potassium 3 5 3 5 - 5 3 mmol/L    Chloride 100 100 - 108 mmol/L    CO2 31 21 - 32 mmol/L    ANION GAP 7 4 - 13 mmol/L    BUN 16 5 - 25 mg/dL    Creatinine 0 48 (L) 0 60 - 1 30 mg/dL    Glucose 161 (H) 65 - 140 mg/dL    Calcium 9 1 8 3 - 10 1 mg/dL    Corrected Calcium 10 1 8 3 - 10 1 mg/dL    AST 14 5 - 45 U/L    ALT 36 12 - 78 U/L    Alkaline Phosphatase 53 46 - 116 U/L    Total Protein 5 6 (L) 6 4 - 8 2 g/dL    Albumin 2 8 (L) 3 5 - 5 0 g/dL    Total Bilirubin 0 39 0 20 - 1 00 mg/dL    eGFR 112 ml/min/1 73sq m   Fingerstick Glucose (POCT)    Collection Time: 04/30/21  7:07 AM   Result Value Ref Range    POC Glucose 136 65 - 140 mg/dl   Fingerstick Glucose (POCT)    Collection Time: 04/30/21 11:22 AM   Result Value Ref Range    POC Glucose 185 (H) 65 - 140 mg/dl         Xr Chest Portable    Result Date: 4/24/2021  Narrative: CHEST INDICATION:   Central line placement from outside  COMPARISON:  None EXAM PERFORMED/VIEWS:  XR CHEST PORTABLE FINDINGS:  Dialysis catheter tip is within the superior cavoatrial junction  Cardiomediastinal silhouette appears unremarkable  The lungs are clear  No pneumothorax or pleural effusion  Elevation of left hemidiaphragm is present  Osseous structures appear within normal limits for patient age  Impression: Dialysis catheter tip within the superior cavoatrial junction  No pneumothorax  Workstation performed: [id number]     Xr Chest Picc Line Portable    Result Date: 4/27/2021  Narrative: CHEST INDICATION:   picc line placement   COMPARISON:  4/23/2021 EXAM PERFORMED/VIEWS:  XR CHEST PICC LINE PORTABLE FINDINGS:  Again noted is a dialysis catheter with tip in region of the upper right atrium at or just below cavoatrial junction  Now present is a right arm approach PICC line, with its tip in the superior vena cava approximately 3 5 to 4 cm above the cavoatrial junction  Cardiomediastinal silhouette appears unremarkable  The lungs are clear  No pneumothorax or pleural effusion  Osseous structures appear within normal limits for patient age  Impression: PICC line tip in the superior vena cava, approximately 3 5 to 4 cm above the cavoatrial junction  The examination demonstrates a significant  finding and was documented as such in [facility] for liaison and referring practitioner notification  Workstation performed: [id number]     Ir Picc Reposition    Result Date: 4/27/2021  Narrative: PICC REPOSITION - CENTRAL VENOUS CATHETER PLACEMENT HISTORY:    Malpositioned PICC PROCEDURE: The patient was brought to the Interventional Radiology Suite and identified verbally and by wrist band  Utilizing standard sterile technique with patient prepped and draped in the usual manner, the 5 [place] central venous catheter was advanced over a  018 inch guidewire under fluoroscopic guidance to the cavoatrial junction  The position was confirmed fluoroscopically with 0 cm external length  Blood could be freely aspirated and heparinized saline injected  The patient experienced no untoward reactions  All elements of maximal sterile barrier technique were followed (cap, mask, sterile gown, sterile gloves, large sterile sheet, hand hygiene, and 2% chlorhexidine for cutaneous antisepsis)  Fluoroscopy time: 0 9 minutes Images: 2     Impression: Status-post repositioning of a 5 [place] central venous catheter under fluoroscopic guidance with its tip at the cavoatrial junction  Workstation performed: [id number]     Ir Tunneled Central Line Removal    Result Date: 4/27/2021  Narrative:  Tunneled central line removal Clinical History: Patient presenting for tunneled central line removal  Patient had line placed for PLEX which has been completed  The existing catheter was prepped and draped in the usual sterile fashion  Lidocaine w/ epinephrine was administered the skin and subcutaneous tissues  The cuff was dissected free, and the catheter was removed  Manual compression was applied to the puncture site and tunnel until hemostasis was achieved  The patient tolerated the procedure well and suffered no complications  Impression: Impression: Successful tunneled central line removal as described  Signed, performed and dictated by Dagmar Pearson PA-C under the supervision of Dr Stephanie Valentine  Workstation performed: [id number]         Assessment and Plan : This is an unfortunate 72-year-old female with diffuse large B-cell lymphoma primary CNS who was treated with high-dose methotrexate and Rituxan  Methotrexate level is being followed and she is currently on Leucovorin  ECOG performance status is 4  Prognosis is very guarded  If she does not have a response within the next week or 2 radiation to the brain could be considered versus a 2nd cycle of methotrexate  If she does have a response she can proceed with her next cycle in a few weeks  At this point overall prognosis based on her performance status is poor  I discussed this with the primary team   Dr Bear Exon is in agreement with this  I agree with his assessment that giving her the previously scheduled/plan chemotherapy that [facility] was planning had a very high risk of mortality and even this chemotherapy is quite aggressive for her overall performance status  If she is not improving or cannot tolerated further whole brain radiation versus hospice could be considered  For now we await a response depending on her clinical situation

## 2021-05-01 PROBLEM — T38.0X5A STEROID-INDUCED HYPERGLYCEMIA: Status: ACTIVE | Noted: 2021-05-01

## 2021-05-01 PROBLEM — R73.9 STEROID-INDUCED HYPERGLYCEMIA: Status: ACTIVE | Noted: 2021-05-01

## 2021-05-01 LAB
ANION GAP SERPL CALCULATED.3IONS-SCNC: 6 MMOL/L (ref 4–13)
BUN SERPL-MCNC: 17 MG/DL (ref 5–25)
CALCIUM SERPL-MCNC: 8.9 MG/DL (ref 8.3–10.1)
CHLORIDE SERPL-SCNC: 98 MMOL/L (ref 100–108)
CO2 SERPL-SCNC: 29 MMOL/L (ref 21–32)
CREAT SERPL-MCNC: 0.51 MG/DL (ref 0.6–1.3)
ERYTHROCYTE [DISTWIDTH] IN BLOOD BY AUTOMATED COUNT: 13.6 % (ref 11.6–15.1)
GFR SERPL CREATININE-BSD FRML MDRD: 109 ML/MIN/1.73SQ M
GLUCOSE SERPL-MCNC: 154 MG/DL (ref 65–140)
GLUCOSE SERPL-MCNC: 181 MG/DL (ref 65–140)
GLUCOSE SERPL-MCNC: 208 MG/DL (ref 65–140)
GLUCOSE SERPL-MCNC: 231 MG/DL (ref 65–140)
GLUCOSE SERPL-MCNC: 239 MG/DL (ref 65–140)
GLUCOSE SERPL-MCNC: 274 MG/DL (ref 65–140)
HCT VFR BLD AUTO: 33.1 % (ref 34.8–46.1)
HGB BLD-MCNC: 11.1 G/DL (ref 11.5–15.4)
MCH RBC QN AUTO: 31.9 PG (ref 26.8–34.3)
MCHC RBC AUTO-ENTMCNC: 33.5 G/DL (ref 31.4–37.4)
MCV RBC AUTO: 95 FL (ref 82–98)
MTX SERPL-SCNC: 0.14 UMOL/L
MTX SERPL-SCNC: 0.36 UMOL/L
NRBC BLD AUTO-RTO: 0 /100 WBCS
PHOSPHATE SERPL-MCNC: 1.4 MG/DL (ref 2.7–4.5)
PLATELET # BLD AUTO: 175 THOUSANDS/UL (ref 149–390)
PMV BLD AUTO: 10.3 FL (ref 8.9–12.7)
POTASSIUM SERPL-SCNC: 3.7 MMOL/L (ref 3.5–5.3)
RBC # BLD AUTO: 3.48 MILLION/UL (ref 3.81–5.12)
SODIUM SERPL-SCNC: 133 MMOL/L (ref 136–145)
WBC # BLD AUTO: 10.75 THOUSAND/UL (ref 4.31–10.16)

## 2021-05-01 PROCEDURE — 85027 COMPLETE CBC AUTOMATED: CPT | Performed by: STUDENT IN AN ORGANIZED HEALTH CARE EDUCATION/TRAINING PROGRAM

## 2021-05-01 PROCEDURE — 82948 REAGENT STRIP/BLOOD GLUCOSE: CPT

## 2021-05-01 PROCEDURE — 99233 SBSQ HOSP IP/OBS HIGH 50: CPT | Performed by: INTERNAL MEDICINE

## 2021-05-01 PROCEDURE — 84100 ASSAY OF PHOSPHORUS: CPT | Performed by: STUDENT IN AN ORGANIZED HEALTH CARE EDUCATION/TRAINING PROGRAM

## 2021-05-01 PROCEDURE — 80048 BASIC METABOLIC PNL TOTAL CA: CPT | Performed by: STUDENT IN AN ORGANIZED HEALTH CARE EDUCATION/TRAINING PROGRAM

## 2021-05-01 PROCEDURE — 80204 DRUG ASSAY METHOTREXATE: CPT | Performed by: INTERNAL MEDICINE

## 2021-05-01 RX ADMIN — DEXAMETHASONE SODIUM PHOSPHATE 4 MG: 4 INJECTION INTRA-ARTICULAR; INTRALESIONAL; INTRAMUSCULAR; INTRAVENOUS; SOFT TISSUE at 17:54

## 2021-05-01 RX ADMIN — DEXAMETHASONE SODIUM PHOSPHATE 4 MG: 4 INJECTION INTRA-ARTICULAR; INTRALESIONAL; INTRAMUSCULAR; INTRAVENOUS; SOFT TISSUE at 13:22

## 2021-05-01 RX ADMIN — LEUCOVORIN CALCIUM 25 MG: 25 TABLET ORAL at 01:50

## 2021-05-01 RX ADMIN — HEPARIN SODIUM 5000 UNITS: 5000 INJECTION INTRAVENOUS; SUBCUTANEOUS at 21:04

## 2021-05-01 RX ADMIN — LIDOCAINE 5% 1 PATCH: 700 PATCH TOPICAL at 10:34

## 2021-05-01 RX ADMIN — AMLODIPINE BESYLATE 5 MG: 5 TABLET ORAL at 10:31

## 2021-05-01 RX ADMIN — DEXAMETHASONE SODIUM PHOSPHATE 4 MG: 4 INJECTION INTRA-ARTICULAR; INTRALESIONAL; INTRAMUSCULAR; INTRAVENOUS; SOFT TISSUE at 10:34

## 2021-05-01 RX ADMIN — SODIUM BICARBONATE 150 ML/HR: 84 INJECTION, SOLUTION INTRAVENOUS at 03:28

## 2021-05-01 RX ADMIN — LEUCOVORIN CALCIUM 25 MG: 25 TABLET ORAL at 14:28

## 2021-05-01 RX ADMIN — SENNOSIDES, DOCUSATE SODIUM 1 TABLET: 8.6; 5 TABLET ORAL at 17:55

## 2021-05-01 RX ADMIN — HEPARIN SODIUM 5000 UNITS: 5000 INJECTION INTRAVENOUS; SUBCUTANEOUS at 05:54

## 2021-05-01 RX ADMIN — INSULIN LISPRO 1 UNITS: 100 INJECTION, SOLUTION INTRAVENOUS; SUBCUTANEOUS at 13:21

## 2021-05-01 RX ADMIN — INSULIN LISPRO 1 UNITS: 100 INJECTION, SOLUTION INTRAVENOUS; SUBCUTANEOUS at 23:18

## 2021-05-01 RX ADMIN — HEPARIN SODIUM 5000 UNITS: 5000 INJECTION INTRAVENOUS; SUBCUTANEOUS at 13:22

## 2021-05-01 RX ADMIN — TAMSULOSIN HYDROCHLORIDE 0.4 MG: 0.4 CAPSULE ORAL at 17:54

## 2021-05-01 RX ADMIN — ONDANSETRON 4 MG: 2 INJECTION INTRAMUSCULAR; INTRAVENOUS at 18:02

## 2021-05-01 RX ADMIN — FAMOTIDINE 20 MG: 20 TABLET ORAL at 17:55

## 2021-05-01 RX ADMIN — FAMOTIDINE 20 MG: 20 TABLET ORAL at 10:32

## 2021-05-01 RX ADMIN — SENNOSIDES, DOCUSATE SODIUM 1 TABLET: 8.6; 5 TABLET ORAL at 10:31

## 2021-05-01 RX ADMIN — INSULIN LISPRO 1 UNITS: 100 INJECTION, SOLUTION INTRAVENOUS; SUBCUTANEOUS at 17:55

## 2021-05-01 RX ADMIN — DEXAMETHASONE SODIUM PHOSPHATE 4 MG: 4 INJECTION INTRA-ARTICULAR; INTRALESIONAL; INTRAMUSCULAR; INTRAVENOUS; SOFT TISSUE at 21:04

## 2021-05-01 RX ADMIN — LEUCOVORIN CALCIUM 25 MG: 25 TABLET ORAL at 08:44

## 2021-05-01 RX ADMIN — LEUCOVORIN CALCIUM 25 MG: 25 TABLET ORAL at 20:53

## 2021-05-01 NOTE — PLAN OF CARE
Problem: Prexisting or High Potential for Compromised Skin Integrity  Goal: Skin integrity is maintained or improved  Description: INTERVENTIONS:  - Identify patients at risk for skin breakdown  - Assess and monitor skin integrity  - Assess and monitor nutrition and hydration status  - Monitor labs   - Assess for incontinence   - Turn and reposition patient  - Assist with mobility/ambulation  - Relieve pressure over bony prominences  - Avoid friction and shearing  - Provide appropriate hygiene as needed including keeping skin clean and dry  - Evaluate need for skin moisturizer/barrier cream  - Collaborate with interdisciplinary team   - Patient/family teaching  - Consider wound care consult   Outcome: Progressing     Problem: Potential for Falls  Goal: Patient will remain free of falls  Description: INTERVENTIONS:  - Assess patient frequently for physical needs  -  Identify cognitive and physical deficits and behaviors that affect risk of falls  -  [place] fall precautions as indicated by assessment   - Educate patient/family on patient safety including physical limitations  - Instruct patient to call for assistance with activity based on assessment  - Modify environment to reduce risk of injury  - Consider OT/PT consult to assist with strengthening/mobility  Outcome: Progressing     Problem: Nutrition/Hydration-ADULT  Goal: Nutrient/Hydration intake appropriate for improving, restoring or maintaining nutritional needs  Description: Monitor and assess patient's nutrition/hydration status for malnutrition  Collaborate with interdisciplinary team and initiate plan and interventions as ordered  Monitor patient's weight and dietary intake as ordered or per policy  Utilize nutrition screening tool and intervene as necessary  Determine patient's food preferences and provide high-protein, high-caloric foods as appropriate       INTERVENTIONS:  - Monitor oral intake, urinary output, labs, and treatment plans  - Assess nutrition and hydration status and recommend course of action  - Evaluate amount of meals eaten  - Assist patient with eating if necessary   - Allow adequate time for meals  - Recommend/ encourage appropriate diets, oral nutritional supplements, and vitamin/mineral supplements  - Order, calculate, and assess calorie counts as needed  - Recommend, monitor, and adjust tube feedings and TPN/PPN based on assessed needs  - Assess need for intravenous fluids  - Provide specific nutrition/hydration education as appropriate  - Include patient/family/caregiver in decisions related to nutrition  Outcome: Progressing     Problem: PAIN - ADULT  Goal: Verbalizes/displays adequate comfort level or baseline comfort level  Description: Interventions:  - Encourage patient to monitor pain and request assistance  - Assess pain using appropriate pain scale  - Administer analgesics based on type and severity of pain and evaluate response  - Implement non-pharmacological measures as appropriate and evaluate response  - Consider cultural and social influences on pain and pain management  - Notify physician/advanced practitioner if interventions unsuccessful or patient reports new pain  Outcome: Progressing     Problem: INFECTION - ADULT  Goal: Absence or prevention of progression during hospitalization  Description: INTERVENTIONS:  - Assess and monitor for signs and symptoms of infection  - Monitor lab/diagnostic results  - Monitor all insertion sites, i e  indwelling lines, tubes, and drains  - Monitor endotracheal if appropriate and nasal secretions for changes in amount and color  - [place] appropriate cooling/warming therapies per order  - Administer medications as ordered  - Instruct and encourage patient and family to use good hand hygiene technique  - Identify and instruct in appropriate isolation precautions for identified infection/condition  Outcome: Progressing     Problem: SAFETY ADULT  Goal: Patient will remain free of falls  Description: INTERVENTIONS:  - Assess patient frequently for physical needs  -  Identify cognitive and physical deficits and behaviors that affect risk of falls    -  [place] fall precautions as indicated by assessment   - Educate patient/family on patient safety including physical limitations  - Instruct patient to call for assistance with activity based on assessment  - Modify environment to reduce risk of injury  - Consider OT/PT consult to assist with strengthening/mobility  Outcome: Progressing  Goal: Maintain or return to baseline ADL function  Description: INTERVENTIONS:  -  Assess patient's ability to carry out ADLs; assess patient's baseline for ADL function and identify physical deficits which impact ability to perform ADLs (bathing, care of mouth/teeth, toileting, grooming, dressing, etc )  - Assess/evaluate cause of self-care deficits   - Assess range of motion  - Assess patient's mobility; develop plan if impaired  - Assess patient's need for assistive devices and provide as appropriate  - Encourage maximum independence but intervene and supervise when necessary  - Involve family in performance of ADLs  - Assess for home care needs following discharge   - Consider OT consult to assist with ADL evaluation and planning for discharge  - Provide patient education as appropriate  Outcome: Progressing  Goal: Maintain or return mobility status to optimal level  Description: INTERVENTIONS:  - Assess patient's baseline mobility status (ambulation, transfers, stairs, etc )    - Identify cognitive and physical deficits and behaviors that affect mobility  - Identify mobility aids required to assist with transfers and/or ambulation (gait belt, sit-to-stand, lift, walker, cane, etc )  - [place] fall precautions as indicated by assessment  - Record patient progress and toleration of activity level on Mobility SBAR; progress patient to next Phase/Stage  - Instruct patient to call for assistance with activity based on assessment  - Consider rehabilitation consult to assist with strengthening/weightbearing, etc   Outcome: Progressing     Problem: DISCHARGE PLANNING  Goal: Discharge to home or other facility with appropriate resources  Description: INTERVENTIONS:  - Identify barriers to discharge w/patient and caregiver  - Arrange for needed discharge resources and transportation as appropriate  - Identify discharge learning needs (meds, wound care, etc )  - Arrange for interpretive services to assist at discharge as needed  - Refer to Case Management Department for coordinating discharge planning if the patient needs post-hospital services based on physician/advanced practitioner order or complex needs related to functional status, cognitive ability, or social support system  Outcome: Progressing     Problem: Knowledge Deficit  Goal: Patient/family/caregiver demonstrates understanding of disease process, treatment plan, medications, and discharge instructions  Description: Complete learning assessment and assess knowledge base    Interventions:  - Provide teaching at level of understanding  - Provide teaching via preferred learning methods  Outcome: Progressing

## 2021-05-01 NOTE — PROGRESS NOTES
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[address] )  Active Problems:    Altered mental status    Dysphagia    Urinary retention    Aphasia    Weakness    Presence of permanent central venous catheter    Fracture of ramus of left pubis (HCC)    Severe protein-calorie malnutrition (HCC)    Steroid-induced hyperglycemia      Steroid-induced hyperglycemia  Assessment & Plan  Mild hyperglycemia, glucoses in 200s, no history of DM  Occurring in the setting of Decadron with chemotherapy, as well as sodium bicarb in D5 drip  - D5 drip to finish today  - will monitor sugars while on Jevity and off D5  - continue q6 hour fingerstick glucose with correctional algorithm 1    Severe protein-calorie malnutrition (HCC)  Assessment & Plan  Malnutrition Findings:   Adult Malnutrition type: Acute illness(due to medical condition resulting in dysphagia, decreased appetite and intake, weight loss)  Adult Degree of Malnutrition: Other severe protein calorie malnutrition    BMI Findings: Body mass index is 20 49 kg/m²  Plan:  Nutrition consult  Keofed placed 4/30  Tube feeds initiated with Jevity 1 2, advancing to goal  Hyperglycemia present with glucoses >250: monitor today, SSI, may need to change to glucerna     Fracture of ramus of left pubis [facility])  Assessment & [address] [facility] [place] prior to presentation to [facility]  Managed nonoperatively at [facility]    Presence of permanent central venous catheter  Assessment & Plan  Noted presence of right IJ tunneled double-lumen HD catheter; upon chart review, this was likely inserted to be used for plasmapheresis which she has completed  Plan:  S/p removal by IR    Patient now has PICC line  Weakness  Assessment & Plan  Left greater than right upper and lower extremity weakness with ongoing left lower extremity contracture  Secondary to underlying CNS malignancy  *Decubitus ulcer precautions such as frequent repositioning  Aphasia  Assessment & Plan  Definite evidence of expressive aphasia, possible component of receptive aphasia as well  Speech therapy on board  Urinary retention  Assessment & Plan  Patient developed urinary retention during previous hospitalization  Plan was voiding trial as an outpatient as she was scheduled for discharge from that facility  Continue with the Cottrell catheter for today, was started on Flomax 0 4mg, consider voiding trial    Dysphagia  Assessment & Plan  Patient noted to have dysphagia and also decreased p o  Intake  As per [facility] progress note patient had a calorie count and recommended feeding tube placement  Speech evaluation - advance diet with dysphagia 2 and thin liquids; needs assistance with feeds  Calorie count  If the patient continued to have poor p o  Intake may need discussion with the family regarding alternate methods of nutrition  Patient's calorie intake is low and tube feeds recommendation was made by dietitian  Fabio placed evening of 4/30, Jevity 1 2 started, advancing and currently at 20cc/hr    Altered mental status  Assessment & Plan  Patient initially presented to [facility] his [place] with stroke-like symptoms for 2 days  CT scan of the head was concerning for subacute CVA and was transferred to [facility]   MRI of the brain was more consistent with demyelinating disease patient was started on IV steroids and 1000 mg daily for 5 days  Patient did not have any significant improvement at that time patient had plasmapheresis  And also had  lumbar puncture-negative for infection or malignancy    Patient noted to have persistent encephalopathy so a repeat MRI was obtained which showed worsening of the abnormality seen on the previous MRI and 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Continue with the dexamethasone  Likely secondary to 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Delirium precautions   Currently alert and oriented times 2-3   follow commands  Moves all extremities spontaneously except left upper and lower extrimities  Has some delirium episodes during the day and night  Patient's  (does not speak [place]) makes all medical decision for her  * 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[facility])  Assessment & Plan  Patient was diagnosed with primary 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through biopsy in [facility] transferred over here for chemotherapy since the [facility] is out of network  Oncology and Palliative care services consulted, appreciate recommendations   Continue with dexamethasone  As per [facility] progress note patient had discussion with palliative Care and at that time family wants to pursue disease directed therapy    Plan:  Had a 2nd family meeting on 04/30 and family has decided to proceed treatment at this time  Hematology-Oncology consulted  Appreciate recommendations  Chemotherapy treatment as per Hematology-Oncology  Patient has poor prognosis even with treatment  S/p PICC line for chemotherapy  DVT prophylaxis:  On heparin  Disposition: Continue inpatient, ongoing chemotherapy, tube feeds started     SUBJECTIVE     Patient seen and examined  No acute events overnight  With attending present, she is able to communicate in [place] and reports very minimal neck pain, as well as LLQ abdominal pain which is mild  She does not remember the last time she had a bowel movement       OBJECTIVE     Vitals:    04/30/21 2155 05/01/21 0257 05/01/21 0454 05/01/21 0748   BP: 132/81 133/84  135/79   Pulse: 96 99  94   Resp: 17 16  16   Temp: 98 4 °F (36 9 °C) 97 8 °F (36 6 °C)  98 1 °F (36 7 °C)   TempSrc:    Oral   SpO2: 96% 99%  100%   Weight:   46 5 kg (102 lb 8 2 oz)    Height:          Temperature:   Temp (24hrs), Av 4 °F (36 9 °C), Min:97 8 °F (36 6 °C), Max:98 8 °F (37 1 °C)    Temperature: 98 1 °F (36 7 °C)  Intake & Output:  I/O       701 -  0700  07 -  0700  07 -  0700    P  O  240 340 260    I V  (mL/kg) 2292 5 (49 6) 4652 5 (100 1) 750 (16 1)    NG/GT   200    IV Piggyback       Feedings   137    Total Intake(mL/kg) 2532 5 (54 8) 4992 5 (107 4) 1347 (29)    Urine (mL/kg/hr) 2925 (2 6) 5900 (5 3) 1650 (6 4)    Stool 0  0    Total Output 2925 5900 1650    Net -392 5 -907 5 -303           Unmeasured Stool Occurrence 1 x  1 x        Weights:   IBW (Ideal Body Weight): 45 5 kg    Body mass index is 20 02 kg/m²  Weight (last 2 days)     Date/Time   Weight    21 0454   46 5 (102 51)    21 0624   46 2 (101 85)            Physical Exam  Constitutional:       Appearance: She is well-developed  HENT:      Head: Normocephalic and atraumatic  Eyes:      General: No scleral icterus  Conjunctiva/sclera: Conjunctivae normal    Cardiovascular:      Rate and Rhythm: Normal rate and regular rhythm  Heart sounds: Normal heart sounds  No murmur  Pulmonary:      Effort: Pulmonary effort is normal       Breath sounds: Normal breath sounds  No wheezing or rales  Abdominal:      General: Bowel sounds are normal  There is no distension  Palpations: Abdomen is soft  Tenderness: There is no abdominal tenderness  There is no guarding  Musculoskeletal:         General: No tenderness or deformity  Skin:     General: Skin is warm and dry  Findings: No erythema  Neurological:      Mental Status: She is alert  Mental status is at baseline  Comments: LUE and LLE hemiparesis   Psychiatric:         Mood and Affect: Mood normal          Behavior: Behavior normal        LABORATORY DATA     Labs: I have personally reviewed pertinent reports    Results from last 7 days   Lab Units 21  5416 04/30/21  0506 04/29/21  1227 04/29/21  0555  04/25/21  0620   WBC Thousand/uL 10 75* 7 46 9 99 7 92   < > 7 19   HEMOGLOBIN g/dL 11 1* 10 6* 11 3* 10 9*   < > 11 5   HEMATOCRIT % 33 1* 31 9* 32 5* 31 8*   < > 33 8*   PLATELETS Thousands/uL 175 147* 154 143*   < > 187   NEUTROS PCT %  --  87*  --   --   --   --    MONOS PCT %  --  3*  --   --   --   --    MONO PCT %  --   --   --  5  --  9    < > = values in this interval not displayed  Results from last 7 days   Lab Units 05/01/21  0554 04/30/21  0506 04/29/21  1227   POTASSIUM mmol/L 3 7 3 5  3 3* 3 7   CHLORIDE mmol/L 98* 100  98* 98*   CO2 mmol/L 29 31  32 34*   BUN mg/dL 17 16  15 13   CREATININE mg/dL 0 51* 0 48*  0 48* 0 50*   CALCIUM mg/dL 8 9 9 1  9 1 9 3   ALK PHOS U/L  --  53 54   ALT U/L  --  36 36   AST U/L  --  14 15         Results from last 7 days   Lab Units 05/01/21  0554 04/30/21  0506 04/29/21  0555   PHOSPHORUS mg/dL 1 4* 2 7 2 7                    IMAGING & DIAGNOSTIC TESTING     Radiology Results: I have personally reviewed pertinent reports  Xr Chest Portable    Result Date: 4/24/2021  Impression: Dialysis catheter tip within the superior cavoatrial junction  No pneumothorax  Workstation performed: [id number]     Xr Chest Picc Line Portable    Result Date: 4/27/2021  Impression: PICC line tip in the superior vena cava, approximately 3 5 to 4 cm above the cavoatrial junction  The examination demonstrates a significant  finding and was documented as such in [facility] for liaison and referring practitioner notification  Workstation performed: [id number]     Ir Picc Reposition    Result Date: 4/27/2021  Impression: Status-post repositioning of a 5 [place] citizen central venous catheter under fluoroscopic guidance with its tip at the cavoatrial junction  Workstation performed: [id number]     Ir Tunneled Central Line Removal    Result Date: 4/27/2021  Impression: Impression: Successful tunneled central line removal as described   Signed, performed and dictated by Haroon Duron PA-C under the supervision of Dr Jones   Workstation performed: [id number]     Other Diagnostic Testing: I have personally reviewed pertinent reports  ACTIVE MEDICATIONS     Current Facility-Administered Medications   Medication Dose Route Frequency    acetaminophen (TYLENOL) tablet 488 mg  488 mg Oral Q6H PRN    albuterol (PROVENTIL HFA,VENTOLIN HFA) inhaler 2 puff  2 puff Inhalation Q6H PRN    amLODIPine (NORVASC) tablet 5 mg  5 mg Oral Daily    dexamethasone (DECADRON) injection 4 mg  4 mg Intravenous 4x Daily    famotidine (PEPCID) tablet 20 mg  20 mg Oral BID    heparin (porcine) subcutaneous injection 5,000 Units  5,000 Units Subcutaneous Q8H [address]    insulin lispro (HumaLOG) 100 units/mL subcutaneous injection 1-5 Units  1-5 Units Subcutaneous Q6H [address]    leucovorin (WELLCOVORIN) tablet 25 mg  25 mg Oral Q6H    lidocaine (LIDODERM) 5 % patch 1 patch  1 patch Topical Daily    methotrexate (PF) 5,000 mg in sodium chloride 0 9 % 1,000 mL IVPB  5,000 mg Intravenous Once    ondansetron (ZOFRAN) 16 mg, dexamethasone (DECADRON) 10 mg in sodium chloride 0 9 % 50 mL IVPB   Intravenous Once    ondansetron (ZOFRAN) injection 4 mg  4 mg Intravenous Q6H PRN    senna-docusate sodium (SENOKOT S) 8 6-50 mg per tablet 1 tablet  1 tablet Oral BID    sodium chloride 0 9 % infusion  20 mL/hr Intravenous Once PRN    sodium chloride 0 9 % infusion  20 mL/hr Intravenous Once PRN    tamsulosin (FLOMAX) capsule 0 4 mg  0 4 mg Oral Daily With Dinner       VTE Pharmacologic Prophylaxis: Heparin  VTE Mechanical Prophylaxis: sequential compression device    Portions of the record may have been created with voice recognition software  Occasional wrong word or "sound a like" substitutions may have occurred due to the inherent limitations of voice recognition software    Read the chart carefully and recognize, using context, where substitutions have occurred   ==  Luisa García, [address] [facility]  Internal Medicine Residency PGY-3

## 2021-05-02 LAB
ALBUMIN SERPL BCP-MCNC: 3.2 G/DL (ref 3.5–5)
ANION GAP SERPL CALCULATED.3IONS-SCNC: 8 MMOL/L (ref 4–13)
BASOPHILS # BLD MANUAL: 0 THOUSAND/UL (ref 0–0.1)
BASOPHILS NFR MAR MANUAL: 0 % (ref 0–1)
BUN SERPL-MCNC: 23 MG/DL (ref 5–25)
CALCIUM SERPL-MCNC: 9.5 MG/DL (ref 8.3–10.1)
CHLORIDE SERPL-SCNC: 97 MMOL/L (ref 100–108)
CO2 SERPL-SCNC: 28 MMOL/L (ref 21–32)
CREAT SERPL-MCNC: 0.47 MG/DL (ref 0.6–1.3)
EOSINOPHIL # BLD MANUAL: 0 THOUSAND/UL (ref 0–0.4)
EOSINOPHIL NFR BLD MANUAL: 0 % (ref 0–6)
ERYTHROCYTE [DISTWIDTH] IN BLOOD BY AUTOMATED COUNT: 13.3 % (ref 11.6–15.1)
GFR SERPL CREATININE-BSD FRML MDRD: 112 ML/MIN/1.73SQ M
GLUCOSE SERPL-MCNC: 117 MG/DL (ref 65–140)
GLUCOSE SERPL-MCNC: 141 MG/DL (ref 65–140)
GLUCOSE SERPL-MCNC: 151 MG/DL (ref 65–140)
GLUCOSE SERPL-MCNC: 156 MG/DL (ref 65–140)
GLUCOSE SERPL-MCNC: 249 MG/DL (ref 65–140)
GLUCOSE SERPL-MCNC: 267 MG/DL (ref 65–140)
HCT VFR BLD AUTO: 33.7 % (ref 34.8–46.1)
HGB BLD-MCNC: 11.5 G/DL (ref 11.5–15.4)
LYMPHOCYTES # BLD AUTO: 0 % (ref 14–44)
LYMPHOCYTES # BLD AUTO: 0 THOUSAND/UL (ref 0.6–4.47)
MCH RBC QN AUTO: 32.5 PG (ref 26.8–34.3)
MCHC RBC AUTO-ENTMCNC: 34.1 G/DL (ref 31.4–37.4)
MCV RBC AUTO: 95 FL (ref 82–98)
MONOCYTES # BLD AUTO: 0 THOUSAND/UL (ref 0–1.22)
MONOCYTES NFR BLD: 0 % (ref 4–12)
MTX SERPL-SCNC: <0.1 UMOL/L
NEUTROPHILS # BLD MANUAL: 14.16 THOUSAND/UL (ref 1.85–7.62)
NEUTS SEG NFR BLD AUTO: 98 % (ref 43–75)
NRBC BLD AUTO-RTO: 0 /100 WBCS
PHOSPHATE SERPL-MCNC: 2.7 MG/DL (ref 2.7–4.5)
PLATELET # BLD AUTO: 216 THOUSANDS/UL (ref 149–390)
PLATELET BLD QL SMEAR: ADEQUATE
PMV BLD AUTO: 10.4 FL (ref 8.9–12.7)
POTASSIUM SERPL-SCNC: 3.8 MMOL/L (ref 3.5–5.3)
RBC # BLD AUTO: 3.54 MILLION/UL (ref 3.81–5.12)
RBC MORPH BLD: NORMAL
SODIUM SERPL-SCNC: 133 MMOL/L (ref 136–145)
VARIANT LYMPHS # BLD AUTO: 2 %
WBC # BLD AUTO: 14.45 THOUSAND/UL (ref 4.31–10.16)

## 2021-05-02 PROCEDURE — 82040 ASSAY OF SERUM ALBUMIN: CPT | Performed by: INTERNAL MEDICINE

## 2021-05-02 PROCEDURE — 82948 REAGENT STRIP/BLOOD GLUCOSE: CPT

## 2021-05-02 PROCEDURE — 85027 COMPLETE CBC AUTOMATED: CPT | Performed by: STUDENT IN AN ORGANIZED HEALTH CARE EDUCATION/TRAINING PROGRAM

## 2021-05-02 PROCEDURE — 80204 DRUG ASSAY METHOTREXATE: CPT | Performed by: INTERNAL MEDICINE

## 2021-05-02 PROCEDURE — 84100 ASSAY OF PHOSPHORUS: CPT | Performed by: STUDENT IN AN ORGANIZED HEALTH CARE EDUCATION/TRAINING PROGRAM

## 2021-05-02 PROCEDURE — 99233 SBSQ HOSP IP/OBS HIGH 50: CPT | Performed by: INTERNAL MEDICINE

## 2021-05-02 PROCEDURE — 80048 BASIC METABOLIC PNL TOTAL CA: CPT | Performed by: STUDENT IN AN ORGANIZED HEALTH CARE EDUCATION/TRAINING PROGRAM

## 2021-05-02 PROCEDURE — 85007 BL SMEAR W/DIFF WBC COUNT: CPT | Performed by: STUDENT IN AN ORGANIZED HEALTH CARE EDUCATION/TRAINING PROGRAM

## 2021-05-02 RX ADMIN — TAMSULOSIN HYDROCHLORIDE 0.4 MG: 0.4 CAPSULE ORAL at 17:00

## 2021-05-02 RX ADMIN — HEPARIN SODIUM 5000 UNITS: 5000 INJECTION INTRAVENOUS; SUBCUTANEOUS at 13:51

## 2021-05-02 RX ADMIN — DEXAMETHASONE SODIUM PHOSPHATE 4 MG: 4 INJECTION INTRA-ARTICULAR; INTRALESIONAL; INTRAMUSCULAR; INTRAVENOUS; SOFT TISSUE at 08:39

## 2021-05-02 RX ADMIN — SENNOSIDES, DOCUSATE SODIUM 1 TABLET: 8.6; 5 TABLET ORAL at 08:39

## 2021-05-02 RX ADMIN — INSULIN LISPRO 2 UNITS: 100 INJECTION, SOLUTION INTRAVENOUS; SUBCUTANEOUS at 23:40

## 2021-05-02 RX ADMIN — LIDOCAINE 5% 1 PATCH: 700 PATCH TOPICAL at 08:39

## 2021-05-02 RX ADMIN — FAMOTIDINE 20 MG: 20 TABLET ORAL at 17:00

## 2021-05-02 RX ADMIN — HEPARIN SODIUM 5000 UNITS: 5000 INJECTION INTRAVENOUS; SUBCUTANEOUS at 05:06

## 2021-05-02 RX ADMIN — DEXAMETHASONE SODIUM PHOSPHATE 4 MG: 4 INJECTION INTRA-ARTICULAR; INTRALESIONAL; INTRAMUSCULAR; INTRAVENOUS; SOFT TISSUE at 13:11

## 2021-05-02 RX ADMIN — LEUCOVORIN CALCIUM 25 MG: 25 TABLET ORAL at 08:32

## 2021-05-02 RX ADMIN — INSULIN LISPRO 2 UNITS: 100 INJECTION, SOLUTION INTRAVENOUS; SUBCUTANEOUS at 18:47

## 2021-05-02 RX ADMIN — DEXAMETHASONE SODIUM PHOSPHATE 4 MG: 4 INJECTION INTRA-ARTICULAR; INTRALESIONAL; INTRAMUSCULAR; INTRAVENOUS; SOFT TISSUE at 17:00

## 2021-05-02 RX ADMIN — AMLODIPINE BESYLATE 5 MG: 5 TABLET ORAL at 08:39

## 2021-05-02 RX ADMIN — FAMOTIDINE 20 MG: 20 TABLET ORAL at 08:39

## 2021-05-02 RX ADMIN — SENNOSIDES, DOCUSATE SODIUM 1 TABLET: 8.6; 5 TABLET ORAL at 17:00

## 2021-05-02 RX ADMIN — DEXAMETHASONE SODIUM PHOSPHATE 4 MG: 4 INJECTION INTRA-ARTICULAR; INTRALESIONAL; INTRAMUSCULAR; INTRAVENOUS; SOFT TISSUE at 21:01

## 2021-05-02 RX ADMIN — HEPARIN SODIUM 5000 UNITS: 5000 INJECTION INTRAVENOUS; SUBCUTANEOUS at 21:01

## 2021-05-02 RX ADMIN — LEUCOVORIN CALCIUM 25 MG: 25 TABLET ORAL at 02:27

## 2021-05-02 RX ADMIN — INSULIN LISPRO 1 UNITS: 100 INJECTION, SOLUTION INTRAVENOUS; SUBCUTANEOUS at 05:07

## 2021-05-02 NOTE — PROGRESS NOTES
INTERNAL MEDICINE RESIDENCY PROGRESS NOTE     Name: Niharika Altamirano   Age & Sex: 49 y o  female   MRN: [id number]  Unit/Bed#: [facility] 920-01   Encounter: [id number]  Team: SOD Team B     PATIENT INFORMATION     Name: Niharika Altamirano   Age & Sex: 49 y o  female   MRN: [id number] Stay Days: 9    ASSESSMENT/PLAN     Principal Problem:    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[facility] [address] )  Active Problems:    Altered mental status    Dysphagia    Presence of permanent central venous catheter    Urinary retention    Aphasia    Weakness    Fracture of ramus of left pubis (HCC)    Severe protein-calorie malnutrition ([address] )    Steroid-induced hyperglycemia      * 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[facility] [address] )  Assessment & Plan  Patient was diagnosed with primary 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through biopsy in [facility] transferred over here for chemotherapy since the [facility] is out of network  Oncology and Palliative care services consulted, appreciate recommendations   Continue with dexamethasone  As per [facility] progress note patient had discussion with palliative Care and at that time family wants to pursue disease directed therapy    Plan:  Had a 2nd family meeting on 04/30 and family has decided to proceed treatment at this time  Hematology-Oncology consulted  Appreciate recommendations  Chemotherapy treatment as per Hematology-Oncology  Patient has poor prognosis even with treatment  S/p PICC line for chemotherapy  DVT prophylaxis:  On heparin  Case management-will start looking for places for rehab  Presence of permanent central venous catheter  Assessment & Plan  Noted presence of right IJ tunneled double-lumen HD catheter; upon chart review, this was likely inserted to be used for plasmapheresis which she has completed  Plan:  S/p removal by IR  Patient now has PICC line  Dysphagia  Assessment & Plan  Patient noted to have dysphagia and also decreased p o  Intake    As per [facility] progress note patient had a calorie count and recommended feeding tube placement  Speech evaluation - advance diet with dysphagia 2 and thin liquids; needs assistance with feeds  Calorie count  If the patient continued to have poor p o  Intake may need discussion with the family regarding alternate methods of nutrition  Patient's calorie intake is low and tube feeds recommendation was made by dietitian  Fabio placed evening of 4/30, Jevity 1 2 started, advancing as tolerated  Altered mental status  Assessment & Plan  Patient initially presented to [facility] his ultimate [employer] with stroke-like symptoms for 2 days  CT scan of the head was concerning for subacute CVA and was transferred to [facility]   MRI of the brain was more consistent with demyelinating disease patient was started on IV steroids and 1000 mg daily for 5 days  Patient did not have any significant improvement at that time patient had plasmapheresis  And also had  lumbar puncture-negative for infection or malignancy  Patient noted to have persistent encephalopathy so a repeat MRI was obtained which showed worsening of the abnormality seen on the previous MRI and 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Continue with the dexamethasone  Likely secondary to 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Delirium precautions   Currently alert and oriented times 2-3   follow commands  Moves all extremities spontaneously except left upper and lower extrimities  Has some delirium episodes during the day and night  Patient's  (does not speak [place]) makes all medical decision for her  Steroid-induced hyperglycemia  Assessment & Plan  Mild hyperglycemia, glucoses in 200s, no history of DM  Occurring in the setting of Decadron with chemotherapy, as well as sodium bicarb in D5 drip    - D5 drip to finish today  - will monitor sugars while on Jevity and off D5  - continue q6 hour fingerstick glucose with correctional algorithm 1    Severe protein-calorie malnutrition ([facility] [address] )  Assessment & Plan  Malnutrition Findings:   Adult Malnutrition type: Acute illness(due to medical condition resulting in dysphagia, decreased appetite and intake, weight loss)  Adult Degree of Malnutrition: Other severe protein calorie malnutrition    BMI Findings: Body mass index is 20 49 kg/m²  Plan:  Nutrition consult  Keofed placed   Tube feeds initiated with Jevity 1 2, advancing to goal  Hyperglycemia present with glucoses >250: monitor today, SSI, may need to change to glucerna     Fracture of ramus of left pubis [facility])  Assessment & [address] [facility] prior to presentation to [facility]  Managed nonoperatively at [facility]    Weakness  Assessment & Plan  Left greater than right upper and lower extremity weakness with ongoing left lower extremity contracture  Secondary to underlying CNS malignancy  *Decubitus ulcer precautions such as frequent repositioning  Aphasia  Assessment & Plan  Definite evidence of expressive aphasia, possible component of receptive aphasia as well  Speech therapy on board  Urinary retention  Assessment & Plan  Patient developed urinary retention during previous hospitalization  Plan was voiding trial as an outpatient as she was scheduled for discharge from that facility  Continue with the Cottrell catheter for today, was started on Flomax 0 4mg, consider voiding trial        Disposition:  Discharge next week     SUBJECTIVE     Patient seen and examined  No acute events overnight  Alert and oriented 2-3      OBJECTIVE     Vitals:    21 0544 21 0833 21 1057 21 1100   BP:  123/89 124/88    Pulse:  85 94    Resp:  18  18   Temp:  98 5 °F (36 9 °C) 98 °F (36 7 °C)    TempSrc:  Axillary  Oral   SpO2:  100% 100%    Weight: 46 8 kg (103 lb 4 oz)      Height:          Temperature:   Temp (24hrs), Av 5 °F (36 9 °C), Min:97 7 °F (36 5 °C), Max:100 °F (37 8 °C)    Temperature: 98 °F (36 7 °C)  Intake & Output:  I/O        2655 - 05/01 0700 05/01 0701 - 05/02 0700 05/02 0701 - 05/03 0700    P  O  340 660 80    I V  (mL/kg) 4652 5 (100 1) 750 (16)     NG/GT  800     Feedings  827     Total Intake(mL/kg) 4992 5 (107 4) 3037 (64 9) 80 (1 7)    Urine (mL/kg/hr) 5900 (5 3) 5000 (4 5)     Stool  0     Total Output 5900 5000     Net -907 5 -1963 +80           Unmeasured Stool Occurrence  1 x         Weights:   IBW (Ideal Body Weight): 45 5 kg    Body mass index is 20 16 kg/m²  Weight (last 2 days)     Date/Time   Weight    05/02/21 0544   46 8 (103 25)    05/01/21 0454   46 5 (102 51)            Physical Exam  Vitals signs reviewed  Constitutional:       General: She is not in acute distress  Appearance: She is well-developed  She is not diaphoretic  HENT:      Head: Normocephalic and atraumatic  Nose: Nose normal       Mouth/Throat:      Pharynx: No oropharyngeal exudate  Eyes:      General: No scleral icterus  Conjunctiva/sclera: Conjunctivae normal    Neck:      Musculoskeletal: Neck supple  Thyroid: No thyromegaly  Vascular: No JVD  Trachea: No tracheal deviation  Cardiovascular:      Rate and Rhythm: Normal rate and regular rhythm  Heart sounds: Normal heart sounds  No murmur  No friction rub  No gallop  Pulmonary:      Effort: Pulmonary effort is normal  No respiratory distress  Breath sounds: Normal breath sounds  No stridor  No wheezing or rales  Chest:      Chest wall: No tenderness  Abdominal:      General: Bowel sounds are normal  There is no distension  Palpations: Abdomen is soft  There is no mass  Tenderness: There is no abdominal tenderness  There is no guarding or rebound  Musculoskeletal: Normal range of motion  General: No tenderness  Skin:     General: Skin is warm  Findings: No erythema or rash  Neurological:      Mental Status: She is alert  Sensory: No sensory deficit        Comments: Left upper and lower extremity severe weakness from the mass  Alert and oriented times 2-3  LABORATORY DATA     Labs: I have personally reviewed pertinent reports  Results from last 7 days   Lab Units 05/02/21 0451 05/01/21  0554 04/30/21  0506  04/29/21  0555   WBC Thousand/uL 14 45* 10 75* 7 46   < > 7 92   HEMOGLOBIN g/dL 11 5 11 1* 10 6*   < > 10 9*   HEMATOCRIT % 33 7* 33 1* 31 9*   < > 31 8*   PLATELETS Thousands/uL 216 175 147*   < > 143*   NEUTROS PCT %  --   --  87*  --   --    MONOS PCT %  --   --  3*  --   --    MONO PCT % 0*  --   --   --  5    < > = values in this interval not displayed  Results from last 7 days   Lab Units 05/02/21 0451 05/01/21  0554 04/30/21  0506 04/29/21  1227   POTASSIUM mmol/L 3 8 3 7 3 5  3 3* 3 7   CHLORIDE mmol/L 97* 98* 100  98* 98*   CO2 mmol/L 28 29 31  32 34*   BUN mg/dL 23 17 16  15 13   CREATININE mg/dL 0 47* 0 51* 0 48*  0 48* 0 50*   CALCIUM mg/dL 9 5 8 9 9 1  9 1 9 3   ALK PHOS U/L  --   --  53 54   ALT U/L  --   --  36 36   AST U/L  --   --  14 15         Results from last 7 days   Lab Units 05/02/21 0451 05/01/21  0554 04/30/21  0506   PHOSPHORUS mg/dL 2 7 1 4* 2 7                    IMAGING & DIAGNOSTIC TESTING     Radiology Results: I have personally reviewed pertinent reports  Xr Chest Portable    Result Date: 4/24/2021  Impression: Dialysis catheter tip within the superior cavoatrial junction  No pneumothorax  Workstation performed: [id number]     Xr Chest Picc Line Portable    Result Date: 4/27/2021  Impression: PICC line tip in the superior vena cava, approximately 3 5 to 4 cm above the cavoatrial junction  The examination demonstrates a significant  finding and was documented as such in [facility] for liaison and referring practitioner notification  Workstation performed: [id number]     Ir Picc Reposition    Result Date: 4/27/2021  Impression: Status-post repositioning of a 5 [place] central venous catheter under fluoroscopic guidance with its tip at the cavoatrial junction   Workstation performed: [id number]     Ir Tunneled Central Line Removal    Result Date: 4/27/2021  Impression: Impression: Successful tunneled central line removal as described  Signed, performed and dictated by Marycarmen Katz PA-C under the supervision of Dr Lopez Brenton  Workstation performed: [id number]     Other Diagnostic Testing: I have personally reviewed pertinent reports  ACTIVE MEDICATIONS     Current Facility-Administered Medications   Medication Dose Route Frequency    acetaminophen (TYLENOL) tablet 488 mg  488 mg Oral Q6H PRN    albuterol (PROVENTIL HFA,VENTOLIN HFA) inhaler 2 puff  2 puff Inhalation Q6H PRN    amLODIPine (NORVASC) tablet 5 mg  5 mg Oral Daily    dexamethasone (DECADRON) injection 4 mg  4 mg Intravenous 4x Daily    famotidine (PEPCID) tablet 20 mg  20 mg Oral BID    heparin (porcine) subcutaneous injection 5,000 Units  5,000 Units Subcutaneous Q8H [facility]    insulin lispro (HumaLOG) 100 units/mL subcutaneous injection 1-5 Units  1-5 Units Subcutaneous Q6H [facility]    lidocaine (LIDODERM) 5 % patch 1 patch  1 patch Topical Daily    methotrexate (PF) 5,000 mg in sodium chloride 0 9 % 1,000 mL IVPB  5,000 mg Intravenous Once    ondansetron (ZOFRAN) 16 mg, dexamethasone (DECADRON) 10 mg in sodium chloride 0 9 % 50 mL IVPB   Intravenous Once    ondansetron (ZOFRAN) injection 4 mg  4 mg Intravenous Q6H PRN    senna-docusate sodium (SENOKOT S) 8 6-50 mg per tablet 1 tablet  1 tablet Oral BID    sodium chloride 0 9 % infusion  20 mL/hr Intravenous Once PRN    sodium chloride 0 9 % infusion  20 mL/hr Intravenous Once PRN    tamsulosin (FLOMAX) capsule 0 4 mg  0 4 mg Oral Daily With Dinner       VTE Pharmacologic Prophylaxis: Heparin  VTE Mechanical Prophylaxis: sequential compression device    Portions of the record may have been created with voice recognition software    Occasional wrong word or "sound a like" substitutions may have occurred due to the inherent limitations of voice recognition software    Read the chart carefully and recognize, using context, where substitutions have occurred   ==  Alexa Mathews, [facility]  Internal Medicine Residency PGY-2

## 2021-05-03 ENCOUNTER — APPOINTMENT (INPATIENT)
Dept: RADIOLOGY | Facility: HOSPITAL | Age: 56
DRG: 840 | End: 2021-05-03
Payer: COMMERCIAL

## 2021-05-03 PROBLEM — A41.9 SEPSIS (HCC): Status: ACTIVE | Noted: 2021-05-03

## 2021-05-03 LAB
ANION GAP SERPL CALCULATED.3IONS-SCNC: 10 MMOL/L (ref 4–13)
BUN SERPL-MCNC: 30 MG/DL (ref 5–25)
CALCIUM SERPL-MCNC: 8.7 MG/DL (ref 8.3–10.1)
CHLORIDE SERPL-SCNC: 98 MMOL/L (ref 100–108)
CO2 SERPL-SCNC: 22 MMOL/L (ref 21–32)
CREAT SERPL-MCNC: 0.58 MG/DL (ref 0.6–1.3)
ERYTHROCYTE [DISTWIDTH] IN BLOOD BY AUTOMATED COUNT: 13.3 % (ref 11.6–15.1)
GFR SERPL CREATININE-BSD FRML MDRD: 105 ML/MIN/1.73SQ M
GLUCOSE SERPL-MCNC: 177 MG/DL (ref 65–140)
GLUCOSE SERPL-MCNC: 202 MG/DL (ref 65–140)
GLUCOSE SERPL-MCNC: 211 MG/DL (ref 65–140)
GLUCOSE SERPL-MCNC: 255 MG/DL (ref 65–140)
GLUCOSE SERPL-MCNC: 264 MG/DL (ref 65–140)
HCT VFR BLD AUTO: 31.9 % (ref 34.8–46.1)
HGB BLD-MCNC: 11 G/DL (ref 11.5–15.4)
LACTATE SERPL-SCNC: 5 MMOL/L (ref 0.5–2)
LACTATE SERPL-SCNC: 5.1 MMOL/L (ref 0.5–2)
LACTATE SERPL-SCNC: 5.5 MMOL/L (ref 0.5–2)
MCH RBC QN AUTO: 32.4 PG (ref 26.8–34.3)
MCHC RBC AUTO-ENTMCNC: 34.5 G/DL (ref 31.4–37.4)
MCV RBC AUTO: 94 FL (ref 82–98)
NRBC BLD AUTO-RTO: 0 /100 WBCS
PLATELET # BLD AUTO: 224 THOUSANDS/UL (ref 149–390)
PMV BLD AUTO: 10.8 FL (ref 8.9–12.7)
POTASSIUM SERPL-SCNC: 4.2 MMOL/L (ref 3.5–5.3)
PROCALCITONIN SERPL-MCNC: <0.05 NG/ML
RBC # BLD AUTO: 3.39 MILLION/UL (ref 3.81–5.12)
SODIUM SERPL-SCNC: 130 MMOL/L (ref 136–145)
WBC # BLD AUTO: 13.53 THOUSAND/UL (ref 4.31–10.16)

## 2021-05-03 PROCEDURE — 97112 NEUROMUSCULAR REEDUCATION: CPT

## 2021-05-03 PROCEDURE — 83605 ASSAY OF LACTIC ACID: CPT | Performed by: INTERNAL MEDICINE

## 2021-05-03 PROCEDURE — 71045 X-RAY EXAM CHEST 1 VIEW: CPT

## 2021-05-03 PROCEDURE — 87081 CULTURE SCREEN ONLY: CPT | Performed by: STUDENT IN AN ORGANIZED HEALTH CARE EDUCATION/TRAINING PROGRAM

## 2021-05-03 PROCEDURE — 82948 REAGENT STRIP/BLOOD GLUCOSE: CPT

## 2021-05-03 PROCEDURE — 80048 BASIC METABOLIC PNL TOTAL CA: CPT | Performed by: STUDENT IN AN ORGANIZED HEALTH CARE EDUCATION/TRAINING PROGRAM

## 2021-05-03 PROCEDURE — 84145 PROCALCITONIN (PCT): CPT | Performed by: STUDENT IN AN ORGANIZED HEALTH CARE EDUCATION/TRAINING PROGRAM

## 2021-05-03 PROCEDURE — 87040 BLOOD CULTURE FOR BACTERIA: CPT | Performed by: STUDENT IN AN ORGANIZED HEALTH CARE EDUCATION/TRAINING PROGRAM

## 2021-05-03 PROCEDURE — 74230 X-RAY XM SWLNG FUNCJ C+: CPT

## 2021-05-03 PROCEDURE — 85027 COMPLETE CBC AUTOMATED: CPT | Performed by: STUDENT IN AN ORGANIZED HEALTH CARE EDUCATION/TRAINING PROGRAM

## 2021-05-03 PROCEDURE — 97530 THERAPEUTIC ACTIVITIES: CPT

## 2021-05-03 PROCEDURE — 83605 ASSAY OF LACTIC ACID: CPT | Performed by: STUDENT IN AN ORGANIZED HEALTH CARE EDUCATION/TRAINING PROGRAM

## 2021-05-03 PROCEDURE — 97535 SELF CARE MNGMENT TRAINING: CPT

## 2021-05-03 PROCEDURE — 92526 ORAL FUNCTION THERAPY: CPT

## 2021-05-03 PROCEDURE — 92611 MOTION FLUOROSCOPY/SWALLOW: CPT

## 2021-05-03 RX ORDER — INSULIN GLARGINE 100 [IU]/ML
3 INJECTION, SOLUTION SUBCUTANEOUS
Status: DISCONTINUED | OUTPATIENT
Start: 2021-05-03 | End: 2021-05-04

## 2021-05-03 RX ORDER — SODIUM CHLORIDE, SODIUM GLUCONATE, SODIUM ACETATE, POTASSIUM CHLORIDE, MAGNESIUM CHLORIDE, SODIUM PHOSPHATE, DIBASIC, AND POTASSIUM PHOSPHATE .53; .5; .37; .037; .03; .012; .00082 G/100ML; G/100ML; G/100ML; G/100ML; G/100ML; G/100ML; G/100ML
500 INJECTION, SOLUTION INTRAVENOUS ONCE
Status: COMPLETED | OUTPATIENT
Start: 2021-05-03 | End: 2021-05-03

## 2021-05-03 RX ORDER — SODIUM CHLORIDE, SODIUM GLUCONATE, SODIUM ACETATE, POTASSIUM CHLORIDE, MAGNESIUM CHLORIDE, SODIUM PHOSPHATE, DIBASIC, AND POTASSIUM PHOSPHATE .53; .5; .37; .037; .03; .012; .00082 G/100ML; G/100ML; G/100ML; G/100ML; G/100ML; G/100ML; G/100ML
500 INJECTION, SOLUTION INTRAVENOUS ONCE
Status: COMPLETED | OUTPATIENT
Start: 2021-05-03 | End: 2021-05-04

## 2021-05-03 RX ORDER — SODIUM CHLORIDE 9 MG/ML
50 INJECTION, SOLUTION INTRAVENOUS CONTINUOUS
Status: DISCONTINUED | OUTPATIENT
Start: 2021-05-03 | End: 2021-05-10

## 2021-05-03 RX ORDER — VANCOMYCIN HYDROCHLORIDE 1 G/200ML
1000 INJECTION, SOLUTION INTRAVENOUS EVERY 12 HOURS
Status: DISCONTINUED | OUTPATIENT
Start: 2021-05-03 | End: 2021-05-05

## 2021-05-03 RX ORDER — POLYETHYLENE GLYCOL 3350 17 G/17G
17 POWDER, FOR SOLUTION ORAL DAILY
Status: DISCONTINUED | OUTPATIENT
Start: 2021-05-03 | End: 2021-05-23

## 2021-05-03 RX ADMIN — INSULIN LISPRO 1 UNITS: 100 INJECTION, SOLUTION INTRAVENOUS; SUBCUTANEOUS at 05:27

## 2021-05-03 RX ADMIN — INSULIN LISPRO 2 UNITS: 100 INJECTION, SOLUTION INTRAVENOUS; SUBCUTANEOUS at 18:18

## 2021-05-03 RX ADMIN — INSULIN GLARGINE 3 UNITS: 100 INJECTION, SOLUTION SUBCUTANEOUS at 22:22

## 2021-05-03 RX ADMIN — INSULIN LISPRO 1 UNITS: 100 INJECTION, SOLUTION INTRAVENOUS; SUBCUTANEOUS at 12:21

## 2021-05-03 RX ADMIN — SODIUM CHLORIDE, SODIUM GLUCONATE, SODIUM ACETATE, POTASSIUM CHLORIDE, MAGNESIUM CHLORIDE, SODIUM PHOSPHATE, DIBASIC, AND POTASSIUM PHOSPHATE 500 ML: .53; .5; .37; .037; .03; .012; .00082 INJECTION, SOLUTION INTRAVENOUS at 21:54

## 2021-05-03 RX ADMIN — FAMOTIDINE 20 MG: 20 TABLET ORAL at 11:29

## 2021-05-03 RX ADMIN — DEXAMETHASONE SODIUM PHOSPHATE 4 MG: 4 INJECTION INTRA-ARTICULAR; INTRALESIONAL; INTRAMUSCULAR; INTRAVENOUS; SOFT TISSUE at 15:52

## 2021-05-03 RX ADMIN — SENNOSIDES, DOCUSATE SODIUM 1 TABLET: 8.6; 5 TABLET ORAL at 18:07

## 2021-05-03 RX ADMIN — DEXAMETHASONE SODIUM PHOSPHATE 4 MG: 4 INJECTION INTRA-ARTICULAR; INTRALESIONAL; INTRAMUSCULAR; INTRAVENOUS; SOFT TISSUE at 11:30

## 2021-05-03 RX ADMIN — SODIUM CHLORIDE 75 ML/HR: 0.9 INJECTION, SOLUTION INTRAVENOUS at 12:09

## 2021-05-03 RX ADMIN — POLYETHYLENE GLYCOL 3350 17 G: 17 POWDER, FOR SOLUTION ORAL at 11:30

## 2021-05-03 RX ADMIN — CEFEPIME HYDROCHLORIDE 2000 MG: 2 INJECTION, POWDER, FOR SOLUTION INTRAVENOUS at 19:47

## 2021-05-03 RX ADMIN — TAMSULOSIN HYDROCHLORIDE 0.4 MG: 0.4 CAPSULE ORAL at 18:07

## 2021-05-03 RX ADMIN — VANCOMYCIN HYDROCHLORIDE 1000 MG: 1 INJECTION, SOLUTION INTRAVENOUS at 21:05

## 2021-05-03 RX ADMIN — DEXAMETHASONE SODIUM PHOSPHATE 4 MG: 4 INJECTION INTRA-ARTICULAR; INTRALESIONAL; INTRAMUSCULAR; INTRAVENOUS; SOFT TISSUE at 19:47

## 2021-05-03 RX ADMIN — AMLODIPINE BESYLATE 5 MG: 5 TABLET ORAL at 11:29

## 2021-05-03 RX ADMIN — CEFTRIAXONE 1000 MG: 10 INJECTION, POWDER, FOR SOLUTION INTRAVENOUS at 13:08

## 2021-05-03 RX ADMIN — SODIUM CHLORIDE, SODIUM GLUCONATE, SODIUM ACETATE, POTASSIUM CHLORIDE, MAGNESIUM CHLORIDE, SODIUM PHOSPHATE, DIBASIC, AND POTASSIUM PHOSPHATE 500 ML: .53; .5; .37; .037; .03; .012; .00082 INJECTION, SOLUTION INTRAVENOUS at 18:06

## 2021-05-03 RX ADMIN — HEPARIN SODIUM 5000 UNITS: 5000 INJECTION INTRAVENOUS; SUBCUTANEOUS at 15:53

## 2021-05-03 RX ADMIN — SENNOSIDES, DOCUSATE SODIUM 1 TABLET: 8.6; 5 TABLET ORAL at 11:29

## 2021-05-03 RX ADMIN — HEPARIN SODIUM 5000 UNITS: 5000 INJECTION INTRAVENOUS; SUBCUTANEOUS at 05:27

## 2021-05-03 RX ADMIN — METRONIDAZOLE 500 MG: 500 INJECTION, SOLUTION INTRAVENOUS at 11:31

## 2021-05-03 RX ADMIN — HEPARIN SODIUM 5000 UNITS: 5000 INJECTION INTRAVENOUS; SUBCUTANEOUS at 22:22

## 2021-05-03 RX ADMIN — FAMOTIDINE 20 MG: 20 TABLET ORAL at 18:07

## 2021-05-03 RX ADMIN — SODIUM CHLORIDE 500 ML: 0.9 INJECTION, SOLUTION INTRAVENOUS at 13:45

## 2021-05-03 NOTE — CASE MANAGEMENT
Per conversation with reviewing SNF  CM called pt's emergency contact and asked for pt's SSN as its needed to process pt's referral  Pt's emergency contact went to voicemail and CM left a VM requesting a return phone call  CM will follow

## 2021-05-03 NOTE — PROGRESS NOTES
INTERNAL MEDICINE RESIDENCY PROGRESS NOTE     Name: Castro Metcalf   Age & Sex: 49 y o  female   MRN: [id number]  Unit/Bed#: [facility] 920-01   Encounter: [id number]  Team: SOD Team B     PATIENT INFORMATION     Name: Castro Metcalf   Age & Sex: 49 y o  female   MRN: [id number] Stay Days: 10    ASSESSMENT/PLAN     Principal Problem:    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[facility] [address] )  Active Problems:    Sepsis ([facility] [address] )    Altered mental status    Dysphagia    Urinary retention    Presence of permanent central venous catheter    Steroid-induced hyperglycemia    Aphasia    Weakness    Fracture of ramus of left pubis ([facility] [address] )    Severe protein-calorie malnutrition ([facility] [address] )      Sepsis ([facility] [address] )  Assessment & Plan  Patient now has hypothermia, elevated WBC count and tachycardia  Concern for possible aspiration in setting on dysphagia  Plan: Will start on IV ceftriaxone and Flagyl for now  Follow-up on blood cultures, procalcitonin, lactic acid and chest x-ray  * 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[facility])  Assessment & Plan  Patient was diagnosed with primary 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through biopsy in [facility] transferred over here for chemotherapy since the [facility] is out of network  Oncology and Palliative care services consulted, appreciate recommendations   Continue with dexamethasone  As per [facility] progress note patient had discussion with palliative Care and at that time family wants to pursue disease directed therapy    Plan:  Had a 2nd family meeting on 04/30 and family has decided to proceed treatment at this time  Hematology-Oncology consulted  Appreciate recommendations  S/p 1 cycle of Chemotherapy treatment as per Hematology-Oncology  Patient has poor prognosis even with treatment  S/p PICC line for chemotherapy  DVT prophylaxis:  On heparin  Case management-will start looking for places for rehab  Facilities may not accept patient since she has Keofed   Patient currently has keofed and plan is to try VBS to advanced diet so that we can discontinue Keofed or place feeding tube if needed  Steroid-induced hyperglycemia  Assessment & Plan  Mild hyperglycemia, glucoses in 200s, no history of DM  Occurring in the setting of Decadron with chemotherapy, as well as sodium bicarb in D5 drip  - D5 drip to finished  - will monitor sugars while on Jevity and now off D5  - continue q6 hour fingerstick glucose with correctional algorithm 1 and Lantus    Presence of permanent central venous catheter  Assessment & Plan  Noted presence of right IJ tunneled double-lumen HD catheter; upon chart review, this was likely inserted to be used for plasmapheresis which she has completed  Plan:  S/p removal by IR  Patient now has PICC line  Urinary retention  Assessment & Plan  Patient developed urinary retention during previous hospitalization  Plan was voiding trial as an outpatient as she was scheduled for discharge from that facility  Will try voiding trial     Dysphagia  Assessment & Plan  Patient noted to have dysphagia and also decreased p o  Intake  As per [facility] progress note patient had a calorie count and recommended feeding tube placement  Speech evaluation - advance diet with dysphagia 2 and thin liquids; needs assistance with feeds  Calorie count  If the patient continued to have poor p o  Intake may need discussion with the family regarding alternate methods of nutrition  Patient's calorie intake is low  Keofed placed evening of 4/30, Jevity 1 2 started, advancing as tolerated  Plan for VBS to advanced diet  Altered mental status  Assessment & Plan  Patient initially presented to [facility] his [place] with stroke-like symptoms for 2 days  CT scan of the head was concerning for subacute CVA and was transferred to [facility]   MRI of the brain was more consistent with demyelinating disease patient was started on IV steroids and 1000 mg daily for 5 days  Patient did not have any significant improvement at that time patient had plasmapheresis  And also had  lumbar puncture-negative for infection or malignancy  Patient noted to have persistent encephalopathy so a repeat MRI was obtained which showed worsening of the abnormality seen on the previous MRI and 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Continue with the dexamethasone  Likely secondary to 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Delirium precautions   Currently alert and oriented times 2-3   follow commands  Moves all extremities spontaneously except left upper and lower extrimities  Patient's  (does not speak [place]) makes all medical decision for her  Severe protein-calorie malnutrition ([facility] [address] )  Assessment & Plan  Malnutrition Findings:   Adult Malnutrition type: Acute illness(due to medical condition resulting in dysphagia, decreased appetite and intake, weight loss)  Adult Degree of Malnutrition: Other severe protein calorie malnutrition    BMI Findings: Body mass index is 20 49 kg/m²  Plan:  Nutrition consult  Keofed placed 4/30  Tube feeds initiated with Jevity 1 2, advancing to goal      Fracture of ramus of left pubis [facility])  Assessment & Plan  Marelisabet Hodges prior to presentation to [facility]  Managed nonoperatively at [facility]    Weakness  Assessment & Plan  Left greater than right upper and lower extremity weakness with ongoing left lower extremity contracture  Secondary to underlying CNS malignancy  *Decubitus ulcer precautions such as frequent repositioning  Aphasia  Assessment & Plan  Definite evidence of expressive aphasia, possible component of receptive aphasia as well  Speech therapy on board  Disposition:  Case management looking for rehab places  SUBJECTIVE     Patient seen and examined  No acute events overnight  Alert and oriented x2 3       OBJECTIVE     Vitals:    05/03/21 0219 05/03/21 0555 05/03/21 0822 05/03/21 0822   BP: 125/81  133/91 133/91   Pulse: 98  99 99 Resp: 17  14    Temp: 98 7 °F (37 1 °C)  98 7 °F (37 1 °C) 98 7 °F (37 1 °C)   TempSrc:       SpO2: 99%  100% 100%   Weight:  46 7 kg (102 lb 15 3 oz)     Height:          Temperature:   Temp (24hrs), Av °F (36 7 °C), Min:96 3 °F (35 7 °C), Max:98 8 °F (37 1 °C)    Temperature: 98 7 °F (37 1 °C)  Intake & Output:  I/O       / 0701 - / 0700 05/ 07 -  0700 05/ 07 - / 0700    P  O  660 350     I V  (mL/kg) 750 (16)      NG/ 600     Feedings 827 1080     Total Intake(mL/kg) 3037 (64 9) 2030 (43 5)     Urine (mL/kg/hr) 5000 (4 5) 1700 (1 5) 250 (1 3)    Stool 0      Total Output 5000 1700 250    Net -1963 +330 -250           Unmeasured Urine Occurrence  0 x     Unmeasured Stool Occurrence 1 x          Weights:   IBW (Ideal Body Weight): 45 5 kg    Body mass index is 20 11 kg/m²  Weight (last 2 days)     Date/Time   Weight    21 0555   46 7 (102 96)    21 0544   46 8 (103 25)    21 0454   46 5 (102 51)            Physical Exam  Vitals signs reviewed  Constitutional:       General: She is not in acute distress  Appearance: She is well-developed  She is not diaphoretic  HENT:      Head: Normocephalic and atraumatic  Nose: Nose normal       Mouth/Throat:      Pharynx: No oropharyngeal exudate  Eyes:      General: No scleral icterus  Conjunctiva/sclera: Conjunctivae normal    Neck:      Musculoskeletal: Neck supple  Thyroid: No thyromegaly  Vascular: No JVD  Trachea: No tracheal deviation  Cardiovascular:      Rate and Rhythm: Normal rate and regular rhythm  Heart sounds: Normal heart sounds  No murmur  No friction rub  No gallop  Pulmonary:      Effort: Pulmonary effort is normal  No respiratory distress  Breath sounds: Normal breath sounds  No stridor  No wheezing or rales  Chest:      Chest wall: No tenderness  Abdominal:      General: Bowel sounds are normal  There is no distension  Palpations: Abdomen is soft  There is no mass  Tenderness: There is no abdominal tenderness  There is no guarding or rebound  Musculoskeletal: Normal range of motion  General: No tenderness  Skin:     General: Skin is warm  Findings: No erythema or rash  Neurological:      Mental Status: She is alert  Mental status is at baseline  Sensory: No sensory deficit  Comments: And alert and oriented x2 3  Patient now able to left upper extremity  0/5 strength left lower extremity  4/5 strength left upper extremity  LABORATORY DATA     Labs: I have personally reviewed pertinent reports  Results from last 7 days   Lab Units 05/03/21  0601 05/02/21 0451 05/01/21  0554 04/30/21  0506  04/29/21  0555   WBC Thousand/uL 13 53* 14 45* 10 75* 7 46   < > 7 92   HEMOGLOBIN g/dL 11 0* 11 5 11 1* 10 6*   < > 10 9*   HEMATOCRIT % 31 9* 33 7* 33 1* 31 9*   < > 31 8*   PLATELETS Thousands/uL 224 216 175 147*   < > 143*   NEUTROS PCT %  --   --   --  87*  --   --    MONOS PCT %  --   --   --  3*  --   --    MONO PCT %  --  0*  --   --   --  5    < > = values in this interval not displayed  Results from last 7 days   Lab Units 05/03/21  0601 05/02/21 0451 05/01/21  0554 04/30/21  0506 04/29/21  1227   POTASSIUM mmol/L 4 2 3 8 3 7 3 5  3 3* 3 7   CHLORIDE mmol/L 98* 97* 98* 100  98* 98*   CO2 mmol/L 22 28 29 31  32 34*   BUN mg/dL 30* 23 17 16  15 13   CREATININE mg/dL 0 58* 0 47* 0 51* 0 48*  0 48* 0 50*   CALCIUM mg/dL 8 7 9 5 8 9 9 1  9 1 9 3   ALK PHOS U/L  --   --   --  53 54   ALT U/L  --   --   --  36 36   AST U/L  --   --   --  14 15         Results from last 7 days   Lab Units 05/02/21 0451 05/01/21  0554 04/30/21  0506   PHOSPHORUS mg/dL 2 7 1 4* 2 7                    IMAGING & DIAGNOSTIC TESTING     Radiology Results: I have personally reviewed pertinent reports  Xr Chest Portable    Result Date: 4/24/2021  Impression: Dialysis catheter tip within the superior cavoatrial junction  No pneumothorax  Workstation performed: [id number]     Xr Chest Picc Line Portable    Result Date: 4/27/2021  Impression: PICC line tip in the superior vena cava, approximately 3 5 to 4 cm above the cavoatrial junction  The examination demonstrates a significant  finding and was documented as such in [facility] for liaison and referring practitioner notification  Workstation performed: [id number]     Ir Picc Reposition    Result Date: 4/27/2021  Impression: Status-post repositioning of a 5 Nepali central venous catheter under fluoroscopic guidance with its tip at the cavoatrial junction  Workstation performed: [id number]     Ir Tunneled Central Line Removal    Result Date: 4/27/2021  Impression: Impression: Successful tunneled central line removal as described  Signed, performed and dictated by Dagmar Pearson PA-C under the supervision of Dr Stephanie Valentine  Workstation performed: [id number]     Other Diagnostic Testing: I have personally reviewed pertinent reports      ACTIVE MEDICATIONS     Current Facility-Administered Medications   Medication Dose Route Frequency    albuterol (PROVENTIL HFA,VENTOLIN HFA) inhaler 2 puff  2 puff Inhalation Q6H PRN    amLODIPine (NORVASC) tablet 5 mg  5 mg Oral Daily    cefTRIAXone (ROCEPHIN) 1,000 mg in dextrose 5 % 50 mL IVPB  1,000 mg Intravenous Q24H    dexamethasone (DECADRON) injection 4 mg  4 mg Intravenous 4x Daily    famotidine (PEPCID) tablet 20 mg  20 mg Oral BID    heparin (porcine) subcutaneous injection 5,000 Units  5,000 Units Subcutaneous Q8H [address]    insulin glargine (LANTUS) subcutaneous injection 3 Units 0 03 mL  3 Units Subcutaneous HS    insulin lispro (HumaLOG) 100 units/mL subcutaneous injection 1-5 Units  1-5 Units Subcutaneous Q6H [address]    lidocaine (LIDODERM) 5 % patch 1 patch  1 patch Topical Daily    methotrexate (PF) 5,000 mg in sodium chloride 0 9 % 1,000 mL IVPB  5,000 mg Intravenous Once    metroNIDAZOLE (FLAGYL) IVPB (premix) 500 mg 100 mL  500 mg Intravenous Q8H  ondansetron (ZOFRAN) 16 mg, dexamethasone (DECADRON) 10 mg in sodium chloride 0 9 % 50 mL IVPB   Intravenous Once    ondansetron (ZOFRAN) injection 4 mg  4 mg Intravenous Q6H PRN    senna-docusate sodium (SENOKOT S) 8 6-50 mg per tablet 1 tablet  1 tablet Oral BID    sodium chloride 0 9 % infusion  20 mL/hr Intravenous Once PRN    sodium chloride 0 9 % infusion  20 mL/hr Intravenous Once PRN    sodium chloride 0 9 % infusion  50 mL/hr Intravenous Continuous    tamsulosin (FLOMAX) capsule 0 4 mg  0 4 mg Oral Daily With Dinner       VTE Pharmacologic Prophylaxis: Heparin  VTE Mechanical Prophylaxis: sequential compression device    Portions of the record may have been created with voice recognition software  Occasional wrong word or "sound a like" substitutions may have occurred due to the inherent limitations of voice recognition software    Read the chart carefully and recognize, using context, where substitutions have occurred   ==  Brent Carrera, [facility]  Internal Medicine Residency PGY-2

## 2021-05-03 NOTE — SEPSIS NOTE
Sepsis Note   Mancel President 49 y o  female MRN: [id number]  [facility] Encounter: [id number]      qSOFA     [address] Name 05/03/21 18:26:39 05/03/21 15:37:08 05/03/21 1520 05/03/21 12:32:01 05/03/21 0917    Altered mental status GCS < 15  --  --  1  --  1    Respiratory Rate > / =22  0  0  --  0  --    Systolic BP < / =525  0  0  --  0  --    Q Sofa Score  1  0  1  0  1    Row Name 05/03/21 08:22:58 05/03/21 08:22:21 05/03/21 02:19:01 05/02/21 21:25:20 05/02/21 19:43:12    Altered mental status GCS < 15  --  --  --  --  --    Respiratory Rate > / =22  --  0  0  0  0    Systolic BP < / =279  0  0  0  0  --    Q Sofa Score  0  0  0  0  1    Row Name 05/02/21 18:47:57 05/02/21 1600 05/02/21 15:00:47 05/02/21 1100 05/02/21 10:57:05    Altered mental status GCS < 15  --  1  --  --  --    Respiratory Rate > / =22  0  --  0  0  --    Systolic BP < / =811  0  --  0  --  0    Q Sofa Score  1  1  0  1  1    Row Name 05/02/21 08:33:10 05/02/21 0725 05/02/21 0247 05/02/21 02:28:18 05/01/21 22:31:41    Altered mental status GCS < 15  --  1  1  --  --    Respiratory Rate > / =22  0  --  --  0  0    Systolic BP < / =451  0  --  --  0  0    Q Sofa Score  1  --  1  0  0    Row Name 05/01/21 19:02:48                Altered mental status GCS < 15  --        Respiratory Rate > / =23  0        Systolic BP < / =005  0        Q Sofa Score  1            Initial Sepsis Screening     Row Name 05/03/21 5558                Is the patient's history suggestive of a new or worsening infection? (!) Yes (Proceed)  -KS        Suspected source of infection  urinary tract infection;bloodstream catheter infection  -KS        Are two or more of the following signs & symptoms of infection both present and new to the patient? (!) Yes (Proceed)  -KS        Indicate SIRS criteria  Tachycardia > 90 bpm;Leukocytosis (WBC > 66853 IJL); Hypothermia < 36C (96 8F)  -KS        If the answer is yes to both questions, suspicion of sepsis is present --        If severe sepsis is present AND tissue hypoperfusion perists in the hour after fluid resuscitation or lactate > 4, the patient meets criteria for SEPTIC SHOCK  --        Are any of the following organ dysfunction criteria present within 6 hours of suspected infection and SIRS criteria that are NOT considered to be chronic conditions? (!) Yes  -KS        Organ dysfunction  --        Date of presentation of severe sepsis  --        Time of presentation of severe sepsis  --        Tissue hypoperfusion persists in the hour after crystalloid fluid administration, evidenced, by either:  * OR * Lactate level is greater to or equal 4 mmol/dL ( ___ mmol/dL in comment field)  -KS        Was hypotension present within one hour of the conclusion of crystalloid fluid administration? No  -KS        Date of presentation of septic shock  --        Time of presentation of septic shock  --          User Key  (r) = Recorded By, (t) = Taken By, (c) = Cosigned By    Initials Name Provider Type    ESSENCE Trinidad DO Resident               Default Flowsheet Data (last 720 hours)      Sepsis Reassess     Row Name 05/03/21 9116                   Repeat Volume Status and Tissue Perfusion Assessment Performed    Repeat Volume Status and Tissue Perfusion Assessment Performed  Yes  -KS           Volume Status and Tissue Perfusion Post Fluid Resuscitation * Must Document All *    Vital Signs Reviewed (HR, RR, BP, T)  Yes  -KS        Shock Index Reviewed  Yes  -KS        Arterial Oxygen Saturation Reviewed (POx, SaO2 or SpO2)  Yes (comment %)  -KS        Cardio  Normal S1/S2; No rub or gallop; No murmor;Regular rate and rhythm  -KS        Pulmonary  Normal effort  -KS        Capillary Refill  Brisk  -KS        Peripheral Pulses  Radial  -KS        Peripheral Pulse  +2  -KS        Skin  Warm  -KS        Urine output assessed  Decreased  -KS           *OR*   Intensive Monitoring- Must Document One of the Following Four *:    Vital Signs Reviewed  Yes  -KS        * Central Venous Pressure (CVP or RAP)  --        * Central Venous Oxygen (SVO2, ScvO2 or Oxygen saturation via central catheter)  --        * Bedside Cardiovascular US in IVC diameter and % collapse  --        * Passive Leg Raise OR Crystalloid Challenge  --          User Key  (r) = Recorded By, (t) = Taken By, (c) = Cosigned By    Initials Name Provider Type    ESSENCE Miller,  Resident

## 2021-05-03 NOTE — PLAN OF CARE
Problem: Prexisting or High Potential for Compromised Skin Integrity  Goal: Skin integrity is maintained or improved  Description: INTERVENTIONS:  - Identify patients at risk for skin breakdown  - Assess and monitor skin integrity  - Assess and monitor nutrition and hydration status  - Monitor labs   - Assess for incontinence   - Turn and reposition patient  - Assist with mobility/ambulation  - Relieve pressure over bony prominences  - Avoid friction and shearing  - Provide appropriate hygiene as needed including keeping skin clean and dry  - Evaluate need for skin moisturizer/barrier cream  - Collaborate with interdisciplinary team   - Patient/family teaching  - Consider wound care consult   Outcome: Progressing     Problem: Potential for Falls  Goal: Patient will remain free of falls  Description: INTERVENTIONS:  - Assess patient frequently for physical needs  -  Identify cognitive and physical deficits and behaviors that affect risk of falls  -  [place] fall precautions as indicated by assessment   - Educate patient/family on patient safety including physical limitations  - Instruct patient to call for assistance with activity based on assessment  - Modify environment to reduce risk of injury  - Consider OT/PT consult to assist with strengthening/mobility  Outcome: Progressing     Problem: Nutrition/Hydration-ADULT  Goal: Nutrient/Hydration intake appropriate for improving, restoring or maintaining nutritional needs  Description: Monitor and assess patient's nutrition/hydration status for malnutrition  Collaborate with interdisciplinary team and initiate plan and interventions as ordered  Monitor patient's weight and dietary intake as ordered or per policy  Utilize nutrition screening tool and intervene as necessary  Determine patient's food preferences and provide high-protein, high-caloric foods as appropriate       INTERVENTIONS:  - Monitor oral intake, urinary output, labs, and treatment plans  - Assess nutrition and hydration status and recommend course of action  - Evaluate amount of meals eaten  - Assist patient with eating if necessary   - Allow adequate time for meals  - Recommend/ encourage appropriate diets, oral nutritional supplements, and vitamin/mineral supplements  - Order, calculate, and assess calorie counts as needed  - Recommend, monitor, and adjust tube feedings and TPN/PPN based on assessed needs  - Assess need for intravenous fluids  - Provide specific nutrition/hydration education as appropriate  - Include patient/family/caregiver in decisions related to nutrition  Outcome: Progressing     Problem: PAIN - ADULT  Goal: Verbalizes/displays adequate comfort level or baseline comfort level  Description: Interventions:  - Encourage patient to monitor pain and request assistance  - Assess pain using appropriate pain scale  - Administer analgesics based on type and severity of pain and evaluate response  - Implement non-pharmacological measures as appropriate and evaluate response  - Consider cultural and social influences on pain and pain management  - Notify physician/advanced practitioner if interventions unsuccessful or patient reports new pain  Outcome: Progressing     Problem: INFECTION - ADULT  Goal: Absence or prevention of progression during hospitalization  Description: INTERVENTIONS:  - Assess and monitor for signs and symptoms of infection  - Monitor lab/diagnostic results  - Monitor all insertion sites, i e  indwelling lines, tubes, and drains  - Monitor endotracheal if appropriate and nasal secretions for changes in amount and color  - [place] appropriate cooling/warming therapies per order  - Administer medications as ordered  - Instruct and encourage patient and family to use good hand hygiene technique  - Identify and instruct in appropriate isolation precautions for identified infection/condition  Outcome: Progressing     Problem: SAFETY ADULT  Goal: Patient will remain free of falls  Description: INTERVENTIONS:  - Assess patient frequently for physical needs  -  Identify cognitive and physical deficits and behaviors that affect risk of falls    -  [place] fall precautions as indicated by assessment   - Educate patient/family on patient safety including physical limitations  - Instruct patient to call for assistance with activity based on assessment  - Modify environment to reduce risk of injury  - Consider OT/PT consult to assist with strengthening/mobility  Outcome: Progressing  Goal: Maintain or return to baseline ADL function  Description: INTERVENTIONS:  -  Assess patient's ability to carry out ADLs; assess patient's baseline for ADL function and identify physical deficits which impact ability to perform ADLs (bathing, care of mouth/teeth, toileting, grooming, dressing, etc )  - Assess/evaluate cause of self-care deficits   - Assess range of motion  - Assess patient's mobility; develop plan if impaired  - Assess patient's need for assistive devices and provide as appropriate  - Encourage maximum independence but intervene and supervise when necessary  - Involve family in performance of ADLs  - Assess for home care needs following discharge   - Consider OT consult to assist with ADL evaluation and planning for discharge  - Provide patient education as appropriate  Outcome: Progressing  Goal: Maintain or return mobility status to optimal level  Description: INTERVENTIONS:  - Assess patient's baseline mobility status (ambulation, transfers, stairs, etc )    - Identify cognitive and physical deficits and behaviors that affect mobility  - Identify mobility aids required to assist with transfers and/or ambulation (gait belt, sit-to-stand, lift, walker, cane, etc )  - [place] fall precautions as indicated by assessment  - Record patient progress and toleration of activity level on Mobility SBAR; progress patient to next Phase/Stage  - Instruct patient to call for assistance with activity based on assessment  - Consider rehabilitation consult to assist with strengthening/weightbearing, etc   Outcome: Progressing     Problem: DISCHARGE PLANNING  Goal: Discharge to home or other facility with appropriate resources  Description: INTERVENTIONS:  - Identify barriers to discharge w/patient and caregiver  - Arrange for needed discharge resources and transportation as appropriate  - Identify discharge learning needs (meds, wound care, etc )  - Arrange for interpretive services to assist at discharge as needed  - Refer to Case Management Department for coordinating discharge planning if the patient needs post-hospital services based on physician/advanced practitioner order or complex needs related to functional status, cognitive ability, or social support system  Outcome: Progressing     Problem: Knowledge Deficit  Goal: Patient/family/caregiver demonstrates understanding of disease process, treatment plan, medications, and discharge instructions  Description: Complete learning assessment and assess knowledge base    Interventions:  - Provide teaching at level of understanding  - Provide teaching via preferred learning methods  Outcome: Progressing

## 2021-05-03 NOTE — SPEECH THERAPY NOTE
Speech Language/Pathology    Speech/Language Pathology Progress Note    Patient Name: Patience Manzo  RJSPP'N Date: 5/3/2021     Problem List  Principal Problem:    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[address] )  Active Problems:    Altered mental status    Dysphagia    Urinary retention    Aphasia    Weakness    Presence of permanent central venous catheter    Fracture of ramus of left pubis (HCC)    Severe protein-calorie malnutrition (HCC)    Steroid-induced hyperglycemia       Past Medical History  History reviewed  No pertinent past medical history  Past Surgical History  Past Surgical History:   Procedure Laterality Date    IR PICC REPOSITION  4/27/2021    IR TUNNELED CENTRAL LINE REMOVAL  4/27/2021         Subjective:  Patient awake and alert  Sitting upright in bed  Now has NG tube  Objective:  PCA fed patient am meal  She reports patient tolerated well  She continues to be hungry and is assessed with applesauce and honey thick liquids  Oral care is first provided via oral swab and suction kit  Oral cavity is clear  The patient continues with right side facial weakness  SLP feeds patient  She has decreased oral closure around tsp, and retrieves bolus by scraping with her teeth  Despite this, the patient has adequate transfer and clearance  She independently uses lingual sweep to clear buccal cavities  The patient is also assessed with honey thick liquids  A small amount is poured into space behind bottom teeth  The patient has very good bolus control, with timely transfer  Swallow appears adequate with all with no overt s/s aspiration today  PO intake has been good with current diet  Assessment:  Patient appears to be tolerating current diet well  Consider VBS to assess for upgrades  Plan/Recommendations:  Continue puree diet with honey thick liquids  Consider VBS this week  Will s/w medical team  [place] will continue

## 2021-05-03 NOTE — NUTRITION
05/03/21 1234   Recommendations/Interventions   Summary patient rx for pureed diet HTL and magic cup supplement tid; 24 hour calorie count completed for 5/2: 400kcal/ 15 grams protein;  patient tolerating TF at goal rate which provides 1296kcal; 60g; ~1470 total free water   Interventions Diet: continued as ordered; Supplement continue;EN continue as ordered  (calorie count concluded with this note)   Nutrition Recommendations Continue diet as ordered;Continue EN as ordered

## 2021-05-03 NOTE — ASSESSMENT & PLAN NOTE
Patient had hypothermia, elevated WBC count and tachycardia  Concern for possible aspiration in setting on dysphagia versus UTI in setting of urinary catheter which was placed for retention  Plan:  · S/p IV antibiotics last day on 05/05  · Will continue to monitor off antibiotics

## 2021-05-03 NOTE — UTILIZATION REVIEW
Continued Stay Review    Date: 5/1/21                          Current Patient Class: IP  Current Level of Care: MS     HPI:56 y o  female initially admitted on 4/23 for 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for initiation of chemotherapy  Assessment/Plan:   Pt and family have decided to proceed with treatment at this time  Family meeting 4/30  Pt has poor prognosis even with treatment  Chemo is in progress  She is on IV dexamethasone  She is A&O and has ZOHAIB except for L side  Keofed tube was placed 4/30 and she has started on tube feeds with Jevity 1/2  Currently at 20 cc/hr  Has steroid induced hyperglycemia - monitor sugars on TF q 6 hr  She has some hyperglycemia today  She is on IV fluids, IV dexamethasone, IV Chemo, IV antibiotics       Vital Signs:   05/01/21 22:31:41  99 °F (37 2 °C)  102  18  104/68  80  98 %  --   05/01/21 19:02:48  98 8 °F (37 1 °C)  95  18  118/75  89  94 %  None (Room air)   05/01/21 17:55:12  97 8 °F (36 6 °C)  101  --  --  --  99 %  --   05/01/21 1615  --  --  --  --  --  --  None (Room air)   05/01/21 15:30:10  100 °F (37 8 °C)  103  18  118/72  87  99 %  None (Room air)   05/01/21 07:48:31  98 1 °F (36 7 °C)  94  16  135/79  98  100 %  None (Room air)   05/01/21 02:57:34  97 8 °F (36 6 °C)  99  16  133/84  100  99 %  --     Pertinent Labs/Diagnostic Results:       Results from last 7 days   Lab Units 05/01/21  0554 04/30/21  0506 04/29/21  1227   WBC Thousand/uL 10 75* 7 46 9 99   HEMOGLOBIN g/dL 11 1* 10 6* 11 3*   HEMATOCRIT % 33 1* 31 9* 32 5*   PLATELETS Thousands/uL 175 147* 154   NEUTROS ABS Thousands/µL  --  6 45  --          Results from last 7 days   Lab Units 05/01/21  0554 04/30/21  0506 04/29/21  1227 04/29/21  0555   SODIUM mmol/L 133* 138  136 136 137   POTASSIUM mmol/L 3 7 3 5  3 3* 3 7 3 1*   CHLORIDE mmol/L 98* 100  98* 98* 98*   CO2 mmol/L 29 31  32 34* 34*   ANION GAP mmol/L 6 7  6 4 5   BUN mg/dL 17 16  15 13 14   CREATININE mg/dL 0 51* 0 48*  0 48* 0 50* 0 51*   EGFR ml/min/1 73sq m 109 112  112 110 109   CALCIUM mg/dL 8 9 9 1  9 1 9 3 8 9   PHOSPHORUS mg/dL 1 4* 2 7  --  2 7     Results from last 7 days   Lab Units 05/02/21  0451 04/30/21  0506 04/29/21  1227   AST U/L  --  14 15   ALT U/L  --  36 36   ALK PHOS U/L  --  53 54   TOTAL PROTEIN g/dL  --  5 6* 5 8*   ALBUMIN g/dL 3 2* 2 8* 3 0*   TOTAL BILIRUBIN mg/dL  --  0 39 0 61     Results from last 7 days   Lab Units 05/01/21  2311 05/01/21  1706 05/01/21  1218 05/01/21  0636 05/01/21  0408   POC GLUCOSE mg/dl 208* 181* 154* 231* 274*     Results from last 7 days   Lab Units 05/01/21  0554 04/30/21  0506 04/29/21  1227 04/29/21  0555 04/28/21  0518 04/27/21  0458   GLUCOSE RANDOM mg/dL 239* 161*  154* 147* 161* 131 162*       Medications:   Scheduled Medications:  amLODIPine, 5 mg, Oral, Daily  cefTRIAXone, 1,000 mg, Intravenous, Q24H  dexamethasone, 4 mg, Intravenous, 4x Daily  famotidine, 20 mg, Oral, BID  heparin (porcine), 5,000 Units, Subcutaneous, Q8H MEÑO  insulin glargine, 3 Units, Subcutaneous, HS  insulin lispro, 1-5 Units, Subcutaneous, Q6H MEÑO  lidocaine, 1 patch, Topical, Daily  methotrexate (PF) IVPB, 5,000 mg, Intravenous, Once  metroNIDAZOLE, 500 mg, Intravenous, Q8H  ondansetron with dexamethasone IVPB, , Intravenous, Once  polyethylene glycol, 17 g, Oral, Daily  senna-docusate sodium, 1 tablet, Oral, BID  sodium chloride, 500 mL, Intravenous, Once  tamsulosin, 0 4 mg, Oral, Daily With Dinner      Continuous IV Infusions:  sodium chloride, 100 mL/hr, Intravenous, Continuous  Bicarb infusion d/c 5/1 @ 0521    PRN Meds:  albuterol, 2 puff, Inhalation, Q6H PRN  ondansetron, 4 mg, Intravenous, Q6H PRN - x 1 5/1  sodium chloride, 20 mL/hr, Intravenous, Once PRN  sodium chloride, 20 mL/hr, Intravenous, Once PRN    Discharge Plan: TBD    Network Utilization Review Department  ATTENTION: Please call with any questions or concerns to [phone] and carefully listen to the prompts so that you are directed to the right person  All voicemails are confidential   [facility] all requests for admission clinical reviews, approved or denied determinations and any other requests to dedicated fax number below belonging to the campus where the patient is receiving treatment   List of dedicated fax numbers for the Facilities:  [address] DENIALS (Administrative/Medical Necessity) [phone]   [address] (Maternity/NICU/Pediatrics) [phone]   [address] [place] [address] [id number] [place] [address] [place] [address] [facility] [phone]

## 2021-05-03 NOTE — PROCEDURES
Video Barium Swallow Study    Summary:  Images are on PACS for review  The patient presents with a moderate oropharyngeal dysphagia  Oral retrieval of bolus is reduced via tsp and cup (patient unable to fully close oral cavity most likely due to right side weakness)  Mastication of soft bolus is prolonged  Patient independently uses lingual sweep to clear right buccal cavity  Bolus transfer time is prolonged and piecemeal deglutition is needed for each bolus  Epiglottic inversion is reduced with decreased pharyngeal constriction  A moderate amount of valleculae and pyriform retention is observed with all  Deep penetration with banana x1, with eventual silent aspiration after nectar thick liquid wash x1  No additional penetration or aspiration events observed  Brief scan of the esophagus appears unremarkable  Recommendations:  Diet: Puree  Liquids: Honey   Meds: Crush in puree   Strategies: Double swallows; alternate liquids and solids   Upright position  F/u ST tx: Yes-will trial nectar thick liquids at bedside   Therapy Prognosis: Guarded   Prognosis considerations: Medical status and diagnosis   Full Supervision  Aspiration Precautions    Consider consult with: nutrition ? supplements  Results reviewed with: nursing, physician    Aspiration precautions posted  If a dedicated assessment of the esophagus is desired, consider esophagram/barium swallow or EGD  Patient's goal:  None stated     Goals:  Pt will tolerate least restrictive diet w/out s/s aspiration or oral/pharyngeal difficulties  Current Medical Status  Per Neuro consult:  Jd Savage a 49 y  o  female who is legally blind no other past medical problems who presents as a transfer from [facility] secondary to CNS lymphoma on 4/23     Patient presented to [facility] on 3/19 with left-sided facial droop and trouble swallowing since 03/17, she also reported blurred vision, mild posterior headache, recent fall   She had a CT head which revealed acute to subacute infarct left cerebellum and midbrain, CTA showed no arterial stenosis or dissection, out of tPA window, MRI brain revealed demyelinating process suspicion for anti MOG encephalitis vs neoplastic-CNS lymphoma, edema and expansion of left aspect of the midbrain, de, left middle cerebellar peduncle   CT chest,abdomen, pelvis which showed fracture of multiple pubic rami, megan left for which she was recommended PT     She also developed dysphagia and recommended dysphagia soft diet   She had acute worsening in her mental status on 04/04 with repeat CT head no changes, EEG negative for seizure, repeat MRI on 04/07 after completing [place] revealed increased enhancement and carpus callosum and right periventricular white matter and right aspect of splenium of corpus callosum and overall decreased FLAIR signal abnormality involving the LEFT brainstem and LEFT cerebellar hemispheres with interval decrease in degree of nodular enhancement which has been slowly receding since the first MRI on 03/21/2021   Differentials included 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although typically not as rapidly progressive and typically more hypercellular), angioinvasive lymphoma (typically presents with hemorrhages), autoimmune encephalitis    Repeat LP was done- 4/8- 84 WBC (previously 20), elevated protein 134 (previously 222), cytology negative   She underwent brain biopsy on 04/14,     Previous VBS:  None   Current Diet:  Puree with honey thick liquids    Premorbid diet:  Regular with thin liquids   Dentition:  WFL-natural   O2 requirement:  None   Oral mech:  Strength and ROM: Right side labial droop/weakness observed   Vocal Quality/Speech:  Difficult to assess due to language barrier   Cognitive status:  Awake and alert     Consistencies administered: Barium laden applesauce, banana, peaches, honey thick, nectar thick, thin liquids  Liquids were administered by cup  Pt was seated laterally at 90 degrees       Oral stage: Moderate  Lip closure: decreased closure on tsp and cup   Mastication: prolonged, but efficient   Bolus formation: wfl  Bolus control: wfl  Transfer: prolonged  Residue: minimal     Pharyngeal stage: Moderate  Swallow promptness: delayed with thin liquids   Spill to valleculae: with thin liquids   Spill to pyriforms: with thin liquids   Epiglottic inversion: reduced   Laryngeal excursion: reduced   Pharyngeal constriction: reduced   Vallecular retention: moderate with all   Pyriform retention: moderate with all   PPW coating: yes  Osteophytes: no  CP prominence: no  Retropulsion from prominence: n/a  Transient penetration: no  Epiglottic undercoat: no  Penetration: yes with banana   Aspiration: yes with banana residue and nectar thick wash   Strategies: unable to use strategies due to language barrier   Response to aspiration: silent     Screening of Esophageal stage:   WNL

## 2021-05-03 NOTE — OCCUPATIONAL THERAPY NOTE
[address] Progress Note     Patient Name: Tyra Villeda  TCQGO'X Date: 5/3/2021  Problem List  Principal Problem:    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[facility] [address] )  Active Problems:    Altered mental status    Dysphagia    Urinary retention    Aphasia    Weakness    Presence of permanent central venous catheter    Fracture of ramus of left pubis (HCC)    Severe protein-calorie malnutrition (HCC)    Steroid-induced hyperglycemia    Sepsis ([facility] [address] )          05/03/21 1156   OT Last Visit   OT Visit Date 05/03/21   Note Type   Note Type Treatment   Restrictions/Precautions   Weight Bearing Precautions Per Order No   Other Precautions Cognitive; Chair Alarm; Bed Alarm; Fall Risk;Multiple lines  (aphasia)   General   Response to Previous Treatment Patient unable to report, no changes reported from family or staff   Lifestyle   Autonomy Pt poorly responsive, per chart, pt was I with ADLs PTA    Reciprocal Relationships     Service to Others Unable to obtain   Intrinsic Gratification Will continue to assess    Pain Assessment   Pain Assessment Tool FLACC   Pain Score No Pain   ADL   Where Assessed Edge of bed   Grooming Assistance 2  Maximal Assistance   Grooming Deficit Brushing hair   Toileting Assistance  1  Total Assistance   Toileting Deficit Perineal hygiene   Functional Standing Tolerance   Time ~ 1 5 min   Activity Standing with A x 2 for buttocks hygiene    Bed Mobility   Supine to Sit 2  Maximal assistance   Additional items Assist x 2; Increased time required   Sit to Supine Unable to assess   Transfers   Sit to Stand 2  Maximal assistance   Additional items Assist x 2; Increased time required   Stand to Sit 2  Maximal assistance   Additional items Assist x 2; Increased time required   Stand pivot 2  Maximal assistance   Additional items Assist x 2; Increased time required   Additional Comments Transfers with B/L HHA    Neuromuscular Education   Weight Bearing Technique Yes   Comments Participated in LUE WB activity with reaching with RUE - requires A for positioning  Initally requires [place] to reach with RUE but improves with repetition    Cognition   Overall Cognitive Status Impaired   Arousal/Participation Alert; Cooperative   Attention Difficulty attending to directions   Memory Unable to assess   Following Commands Follows one step commands inconsistently   Comments Pt alert t/o session  Continue to demonstrated aphasia, typically only responsing 'ok' or nodding head for yes/no  Requires demonstration and repition of directions for command following    Activity Tolerance   Activity Tolerance Patient limited by fatigue   Medical Staff Made Aware PT Helene    Assessment   Assessment Patient participated in Skilled OT session this date with interventions consisting of ADL re training with the use of correct body mechnaics, safety awareness and fall prevention techniques, therapeutic exercise to: increase functional use of BUEs, increase BUE muscle strength , neuromuscular re education to: facilitate volitional use of limbs and elicit righting and equilibrium reactions for improved postural control and alignment during transitional movements   Upon arrival patient was found supine in bed  Pt demonstrated the following tasks: MAX A x 2 sup to sit  Pt maintains EOB sitting position with CGA  Participated in WB activity through LUE  Participation improves with repetition  Requires MAX A for hair care, DEP for toileting hygiene  Pt requires MAX A x 2 STS and SPT  Unable to take fnxl steps at this time  Pt continues to have difficulty following commands, tho improves with repeated directions  Patient continues to be functioning below baseline level, occupational performance remains limited secondary to factors listed above and increased risk for falls and injury  From OT standpoint, recommendation at time of d/c would be Short Term Rehab     Patient to benefit from continued Occupational Therapy treatment while in the hospital to address deficits as defined above and maximize level of functional independence with ADLs and functional mobility  Pt was left in chair with alarm on after session with all current needs met  The patient's raw score on the AM-PAC Daily Activity inpatient short form is 11, standardized score is 29 04, less than 39 4  Patients at this level are likely to benefit from discharge to post-acute rehabilitation services  Please refer to the recommendation of the Occupational Therapist for safe discharge planning  Plan   Treatment Interventions ADL retraining;Visual perceptual retraining;Functional transfer training;UE strengthening/ROM; Endurance training;Cognitive reorientation;Patient/family training;Equipment evaluation/education; Compensatory technique education;Continued evaluation; Energy conservation; Activityengagement; Neuromuscular reeducation; Fine motor coordination activities   Goal Expiration Date 05/10/21   OT Treatment Day 2   OT Frequency 3-5x/wk   Recommendation   OT Discharge Recommendation Post acute rehabilitation services   OT - OK to Discharge Yes  (to rehab when medically stable )   AM-[facility] Daily Activity Inpatient   Lower Body Dressing 2   Bathing 2   Toileting 1   Upper Body Dressing 2   Grooming 2   Eating 2   Daily Activity Raw Score 11   Daily Activity Standardized Score (Calc for Raw Score >=11) 29 04   AM-[facility] Applied Cognition Inpatient   Following a Speech/Presentation 1   Understanding Ordinary Conversation 2   Taking Medications 1   Remembering Where Things Are Placed or Put Away 1   Remembering List of 4-5 Errands 1   Taking Care of Complicated Tasks 1   Applied Cognition Raw Score 7   Applied Cognition Standardized Score 15 17         Chin Almeida MS, OTR/L

## 2021-05-03 NOTE — PLAN OF CARE
Problem: OCCUPATIONAL THERAPY ADULT  Goal: Performs self-care activities at highest level of function for planned discharge setting  See evaluation for individualized goals  Description: Treatment Interventions: ADL retraining, Functional transfer training, UE strengthening/ROM, Endurance training, Cognitive reorientation, Patient/family training, Equipment evaluation/education, Neuromuscular reeducation, Fine motor coordination activities, Compensatory technique education, Continued evaluation, Energy conservation, Activityengagement          See flowsheet documentation for full assessment, interventions and recommendations  Outcome: Progressing  Note: Limitation: Decreased ADL status, Decreased UE ROM, Decreased UE strength, Decreased cognition, Decreased endurance, Decreased Safe judgement during ADL, Decreased self-care trans, Decreased high-level ADLs  Prognosis: Fair  Assessment: Patient participated in Skilled OT session this date with interventions consisting of ADL re training with the use of correct body mechnaics, safety awareness and fall prevention techniques, therapeutic exercise to: increase functional use of BUEs, increase BUE muscle strength , neuromuscular re education to: facilitate volitional use of limbs and elicit righting and equilibrium reactions for improved postural control and alignment during transitional movements   Upon arrival patient was found supine in bed  Pt demonstrated the following tasks: MAX A x 2 sup to sit  Pt maintains EOB sitting position with CGA  Participated in WB activity through LUE  Participation improves with repetition  Requires MAX A for hair care, DEP for toileting hygiene  Pt requires MAX A x 2 STS and SPT  Unable to take fnxl steps at this time  Pt continues to have difficulty following commands, tho improves with repeated directions  Patient continues to be functioning below baseline level, occupational performance remains limited secondary to factors listed above and increased risk for falls and injury  From OT standpoint, recommendation at time of d/c would be Short Term Rehab  Patient to benefit from continued Occupational Therapy treatment while in the hospital to address deficits as defined above and maximize level of functional independence with ADLs and functional mobility  Pt was left in chair with alarm on after session with all current needs met  The patient's raw score on the AM-PAC Daily Activity inpatient short form is 11, standardized score is 29 04, less than 39 4  Patients at this level are likely to benefit from discharge to post-acute rehabilitation services  Please refer to the recommendation of the Occupational Therapist for safe discharge planning       OT Discharge Recommendation: Post acute rehabilitation services  OT - OK to Discharge: Yes(to rehab when medically stable )

## 2021-05-03 NOTE — PHYSICAL THERAPY NOTE
PHYSICAL THERAPY NOTE          Patient Name: Girish Hawley  MBPHS'O Date: 5/3/2021     05/03/21 1157   PT Last Visit   PT Visit Date 05/03/21   Note Type   Note Type Treatment   Pain Assessment   Pain Assessment Tool FLACC   Pain Score No Pain   Restrictions/Precautions   Weight Bearing Precautions Per Order No   Other Precautions Cognitive; Chair Alarm; Bed Alarm;Multiple lines; Fall Risk  (aphasia )   General   Chart Reviewed Yes   Response to Previous Treatment Patient unable to report, no changes reported from family or staff   Family/Caregiver Present No   Cognition   Overall Cognitive Status Impaired   Arousal/Participation Alert; Cooperative   Attention Difficulty attending to directions   Orientation Level Unable to assess   Memory Unable to assess   Following Commands Follows one step commands inconsistently   Comments Pt pleasant and cooperative to participate in therapy session  Requires frequent cuing and demonstration to complete tasks  Bed Mobility   Supine to Sit 2  Maximal assistance   Additional items Assist x 2; Increased time required   Sit to Supine Unable to assess   Additional Comments Pt supine in bed upon arrival  Pt sat EOB ~ 18 min with CGA to maintain upright posture  Pt sitting upright in bedside chair with chair alarm on with all needs within reach at the end of therapy session    Transfers   Sit to Stand 2  Maximal assistance   Additional items Assist x 2; Increased time required   Stand to Sit 2  Maximal assistance   Additional items Assist x 2   Stand pivot 2  Maximal assistance   Additional items Assist x 2; Increased time required   Additional Comments Transfers with b/l HHA    Ambulation/Elevation   Gait pattern Not tested   Balance   Static Sitting Fair -   Dynamic Sitting Poor +   Static Standing Poor -   Dynamic Standing Poor -   Endurance Deficit   Endurance Deficit Yes   Endurance Deficit Description fatigue    Activity Tolerance   Activity Tolerance Patient limited by fatigue   Medical Staff Made Aware SANCHEZ Jackman    Nurse Made Aware RN cleared pt to participate in therapy session    Exercises   Neuro re-ed WB through L UE and reach across body with RUE  Pt requires min A to complete tasks as well as TC   Assessment   Prognosis Fair   Problem List Decreased strength;Decreased endurance;Decreased mobility; Decreased coordination; Impaired balance;Decreased cognition;Decreased safety awareness;Pain; Impaired vision   Assessment Pt seen for PT treatment session this date  Therapy session focused on bed mobility, sitting static/dynamic balance, functional transfers, and endurance training in order to improve overall mobility and independence  Pt requires assist of 2 for all mobility performed this date  Pt with increased alertness and sitting tolerance/balance noted this session  Pt sat EOB ~ 18 min with CGA; min Ax1 required for dynamic balance activities  Pt performed STS from EOB using max x2 b/l HHA  Pt performed SPT to bedside chiar  Pt making progress toward goals  Pt was left sitting upright in bedside chair with chair alarm on at the end of PT session with all needs in reach  Pt would benefit from continued PT services while in hospital to address remaining limitations  PT to continue to follow pt and recommends post-acute rehab services after d/c  The patient's AM-PAC Basic Mobility Inpatient Short Form Low Function Raw Score 10 , Standardized Score is 14 65  A standardized score less 42 9 suggests the patient may benefit from discharge to post-acute rehab services  Please also refer to the recommendation of the Physical Therapist for safe discharge planning  Goals   Patient Goals none stated    STG Expiration Date 05/10/21   PT Treatment Day 2   Plan   Treatment/Interventions ADL retraining;Functional transfer training;LE strengthening/ROM; Endurance training;Patient/family training; Bed mobility;Spoke to nursing;OT   Progress Progressing toward goals   PT Frequency Other (Comment)  (3-5x/wk )   Recommendation   PT Discharge Recommendation Post acute rehabilitation services   PT - OK to Discharge Yes  (to rehab once medically cleared )   AM-PAC Basic Mobility Inpatient   Turning in Bed Without Bedrails 1   Lying on Back to Sitting on Edge of Flat Bed 1   Moving Bed to Chair 1   Standing Up From Chair 1   Walk in Room 1   Climb 3-5 Stairs 1   Basic Mobility Inpatient Raw Score 6   Turning Head Towards Sound 2   Follow Simple Instructions 2   Low Function Basic Mobility Raw Score 10   Low Function Basic Mobility Standardized Score 14 65     Evert Domingo, PT, DPT

## 2021-05-03 NOTE — PLAN OF CARE
Problem: PHYSICAL THERAPY ADULT  Goal: Performs mobility at highest level of function for planned discharge setting  See evaluation for individualized goals  Description: Treatment/Interventions: ADL retraining, Functional transfer training, LE strengthening/ROM, Endurance training, Patient/family training, Bed mobility, Spoke to nursing, OT  Equipment Recommended: (TBD)       See flowsheet documentation for full assessment, interventions and recommendations  Outcome: Progressing  Note: Prognosis: Fair  Problem List: Decreased strength, Decreased endurance, Decreased mobility, Decreased coordination, Impaired balance, Decreased cognition, Decreased safety awareness, Pain, Impaired vision  Assessment: Pt seen for PT treatment session this date  Therapy session focused on bed mobility, sitting static/dynamic balance, functional transfers, and endurance training in order to improve overall mobility and independence  Pt requires assist of 2 for all mobility performed this date  Pt with increased alertness and sitting tolerance/balance noted this session  Pt sat EOB ~ 18 min with CGA; min Ax1 required for dynamic balance activities  Pt performed STS from EOB using max x2 b/l HHA  Pt performed SPT to bedside chiar  Pt making progress toward goals  Pt was left sitting upright in bedside chair with chair alarm on at the end of PT session with all needs in reach  Pt would benefit from continued PT services while in hospital to address remaining limitations  PT to continue to follow pt and recommends post-acute rehab services after d/c  The patient's AM-PAC Basic Mobility Inpatient Short Form Low Function Raw Score 10 , Standardized Score is 14 65  A standardized score less 42 9 suggests the patient may benefit from discharge to post-acute rehab services  Please also refer to the recommendation of the Physical Therapist for safe discharge planning             PT Discharge Recommendation: Post acute rehabilitation services     PT - OK to Discharge: Yes(to rehab once medically cleared )    See flowsheet documentation for full assessment

## 2021-05-04 LAB
ANION GAP SERPL CALCULATED.3IONS-SCNC: 6 MMOL/L (ref 4–13)
BUN SERPL-MCNC: 24 MG/DL (ref 5–25)
CALCIUM SERPL-MCNC: 8.1 MG/DL (ref 8.3–10.1)
CHLORIDE SERPL-SCNC: 103 MMOL/L (ref 100–108)
CO2 SERPL-SCNC: 24 MMOL/L (ref 21–32)
CREAT SERPL-MCNC: 0.46 MG/DL (ref 0.6–1.3)
ERYTHROCYTE [DISTWIDTH] IN BLOOD BY AUTOMATED COUNT: 13.4 % (ref 11.6–15.1)
GFR SERPL CREATININE-BSD FRML MDRD: 113 ML/MIN/1.73SQ M
GLUCOSE SERPL-MCNC: 178 MG/DL (ref 65–140)
GLUCOSE SERPL-MCNC: 179 MG/DL (ref 65–140)
GLUCOSE SERPL-MCNC: 232 MG/DL (ref 65–140)
GLUCOSE SERPL-MCNC: 283 MG/DL (ref 65–140)
HCT VFR BLD AUTO: 27.2 % (ref 34.8–46.1)
HGB BLD-MCNC: 9.2 G/DL (ref 11.5–15.4)
LACTATE SERPL-SCNC: 2.9 MMOL/L (ref 0.5–2)
LACTATE SERPL-SCNC: 3 MMOL/L (ref 0.5–2)
LACTATE SERPL-SCNC: 3.8 MMOL/L (ref 0.5–2)
LACTATE SERPL-SCNC: 4.2 MMOL/L (ref 0.5–2)
MCH RBC QN AUTO: 31.9 PG (ref 26.8–34.3)
MCHC RBC AUTO-ENTMCNC: 33.8 G/DL (ref 31.4–37.4)
MCV RBC AUTO: 94 FL (ref 82–98)
NRBC BLD AUTO-RTO: 1 /100 WBCS
PLATELET # BLD AUTO: 139 THOUSANDS/UL (ref 149–390)
PMV BLD AUTO: 10.3 FL (ref 8.9–12.7)
POTASSIUM SERPL-SCNC: 4.2 MMOL/L (ref 3.5–5.3)
PROCALCITONIN SERPL-MCNC: <0.05 NG/ML
RBC # BLD AUTO: 2.88 MILLION/UL (ref 3.81–5.12)
SODIUM SERPL-SCNC: 133 MMOL/L (ref 136–145)
WBC # BLD AUTO: 9.55 THOUSAND/UL (ref 4.31–10.16)

## 2021-05-04 PROCEDURE — 85027 COMPLETE CBC AUTOMATED: CPT | Performed by: STUDENT IN AN ORGANIZED HEALTH CARE EDUCATION/TRAINING PROGRAM

## 2021-05-04 PROCEDURE — 82948 REAGENT STRIP/BLOOD GLUCOSE: CPT

## 2021-05-04 PROCEDURE — 83605 ASSAY OF LACTIC ACID: CPT | Performed by: INTERNAL MEDICINE

## 2021-05-04 PROCEDURE — 92526 ORAL FUNCTION THERAPY: CPT

## 2021-05-04 PROCEDURE — 80048 BASIC METABOLIC PNL TOTAL CA: CPT | Performed by: STUDENT IN AN ORGANIZED HEALTH CARE EDUCATION/TRAINING PROGRAM

## 2021-05-04 PROCEDURE — 83605 ASSAY OF LACTIC ACID: CPT | Performed by: STUDENT IN AN ORGANIZED HEALTH CARE EDUCATION/TRAINING PROGRAM

## 2021-05-04 PROCEDURE — 84145 PROCALCITONIN (PCT): CPT | Performed by: STUDENT IN AN ORGANIZED HEALTH CARE EDUCATION/TRAINING PROGRAM

## 2021-05-04 RX ORDER — SODIUM CHLORIDE, SODIUM GLUCONATE, SODIUM ACETATE, POTASSIUM CHLORIDE, MAGNESIUM CHLORIDE, SODIUM PHOSPHATE, DIBASIC, AND POTASSIUM PHOSPHATE .53; .5; .37; .037; .03; .012; .00082 G/100ML; G/100ML; G/100ML; G/100ML; G/100ML; G/100ML; G/100ML
1000 INJECTION, SOLUTION INTRAVENOUS ONCE
Status: COMPLETED | OUTPATIENT
Start: 2021-05-04 | End: 2021-05-04

## 2021-05-04 RX ORDER — SODIUM CHLORIDE, SODIUM GLUCONATE, SODIUM ACETATE, POTASSIUM CHLORIDE, MAGNESIUM CHLORIDE, SODIUM PHOSPHATE, DIBASIC, AND POTASSIUM PHOSPHATE .53; .5; .37; .037; .03; .012; .00082 G/100ML; G/100ML; G/100ML; G/100ML; G/100ML; G/100ML; G/100ML
500 INJECTION, SOLUTION INTRAVENOUS ONCE
Status: COMPLETED | OUTPATIENT
Start: 2021-05-04 | End: 2021-05-04

## 2021-05-04 RX ORDER — INSULIN GLARGINE 100 [IU]/ML
6 INJECTION, SOLUTION SUBCUTANEOUS
Status: DISCONTINUED | OUTPATIENT
Start: 2021-05-04 | End: 2021-05-05

## 2021-05-04 RX ADMIN — INSULIN LISPRO 1 UNITS: 100 INJECTION, SOLUTION INTRAVENOUS; SUBCUTANEOUS at 11:48

## 2021-05-04 RX ADMIN — CEFEPIME HYDROCHLORIDE 2000 MG: 2 INJECTION, POWDER, FOR SOLUTION INTRAVENOUS at 05:06

## 2021-05-04 RX ADMIN — HEPARIN SODIUM 5000 UNITS: 5000 INJECTION INTRAVENOUS; SUBCUTANEOUS at 13:38

## 2021-05-04 RX ADMIN — DEXAMETHASONE SODIUM PHOSPHATE 4 MG: 4 INJECTION INTRA-ARTICULAR; INTRALESIONAL; INTRAMUSCULAR; INTRAVENOUS; SOFT TISSUE at 00:23

## 2021-05-04 RX ADMIN — AMLODIPINE BESYLATE 5 MG: 5 TABLET ORAL at 10:34

## 2021-05-04 RX ADMIN — DEXAMETHASONE SODIUM PHOSPHATE 4 MG: 4 INJECTION INTRA-ARTICULAR; INTRALESIONAL; INTRAMUSCULAR; INTRAVENOUS; SOFT TISSUE at 18:20

## 2021-05-04 RX ADMIN — FAMOTIDINE 20 MG: 20 TABLET ORAL at 10:34

## 2021-05-04 RX ADMIN — SODIUM CHLORIDE, SODIUM GLUCONATE, SODIUM ACETATE, POTASSIUM CHLORIDE, MAGNESIUM CHLORIDE, SODIUM PHOSPHATE, DIBASIC, AND POTASSIUM PHOSPHATE 1000 ML: .53; .5; .37; .037; .03; .012; .00082 INJECTION, SOLUTION INTRAVENOUS at 11:48

## 2021-05-04 RX ADMIN — INSULIN LISPRO 1 UNITS: 100 INJECTION, SOLUTION INTRAVENOUS; SUBCUTANEOUS at 18:23

## 2021-05-04 RX ADMIN — SODIUM CHLORIDE 125 ML/HR: 0.9 INJECTION, SOLUTION INTRAVENOUS at 22:57

## 2021-05-04 RX ADMIN — DEXAMETHASONE SODIUM PHOSPHATE 4 MG: 4 INJECTION INTRA-ARTICULAR; INTRALESIONAL; INTRAMUSCULAR; INTRAVENOUS; SOFT TISSUE at 10:34

## 2021-05-04 RX ADMIN — INSULIN LISPRO 2 UNITS: 100 INJECTION, SOLUTION INTRAVENOUS; SUBCUTANEOUS at 00:23

## 2021-05-04 RX ADMIN — SODIUM CHLORIDE, SODIUM GLUCONATE, SODIUM ACETATE, POTASSIUM CHLORIDE, MAGNESIUM CHLORIDE, SODIUM PHOSPHATE, DIBASIC, AND POTASSIUM PHOSPHATE 1000 ML: .53; .5; .37; .037; .03; .012; .00082 INJECTION, SOLUTION INTRAVENOUS at 07:45

## 2021-05-04 RX ADMIN — SENNOSIDES, DOCUSATE SODIUM 1 TABLET: 8.6; 5 TABLET ORAL at 10:34

## 2021-05-04 RX ADMIN — SENNOSIDES, DOCUSATE SODIUM 1 TABLET: 8.6; 5 TABLET ORAL at 18:20

## 2021-05-04 RX ADMIN — INSULIN LISPRO 2 UNITS: 100 INJECTION, SOLUTION INTRAVENOUS; SUBCUTANEOUS at 06:35

## 2021-05-04 RX ADMIN — FAMOTIDINE 20 MG: 20 TABLET ORAL at 18:19

## 2021-05-04 RX ADMIN — TAMSULOSIN HYDROCHLORIDE 0.4 MG: 0.4 CAPSULE ORAL at 18:19

## 2021-05-04 RX ADMIN — HEPARIN SODIUM 5000 UNITS: 5000 INJECTION INTRAVENOUS; SUBCUTANEOUS at 06:34

## 2021-05-04 RX ADMIN — VANCOMYCIN HYDROCHLORIDE 1000 MG: 1 INJECTION, SOLUTION INTRAVENOUS at 18:29

## 2021-05-04 RX ADMIN — SODIUM CHLORIDE 125 ML/HR: 0.9 INJECTION, SOLUTION INTRAVENOUS at 10:36

## 2021-05-04 RX ADMIN — VANCOMYCIN HYDROCHLORIDE 1000 MG: 1 INJECTION, SOLUTION INTRAVENOUS at 05:57

## 2021-05-04 RX ADMIN — SODIUM CHLORIDE, SODIUM GLUCONATE, SODIUM ACETATE, POTASSIUM CHLORIDE, MAGNESIUM CHLORIDE, SODIUM PHOSPHATE, DIBASIC, AND POTASSIUM PHOSPHATE 500 ML: .53; .5; .37; .037; .03; .012; .00082 INJECTION, SOLUTION INTRAVENOUS at 01:58

## 2021-05-04 RX ADMIN — DEXAMETHASONE SODIUM PHOSPHATE 4 MG: 4 INJECTION INTRA-ARTICULAR; INTRALESIONAL; INTRAMUSCULAR; INTRAVENOUS; SOFT TISSUE at 22:50

## 2021-05-04 RX ADMIN — INSULIN GLARGINE 6 UNITS: 100 INJECTION, SOLUTION SUBCUTANEOUS at 22:55

## 2021-05-04 RX ADMIN — HEPARIN SODIUM 5000 UNITS: 5000 INJECTION INTRAVENOUS; SUBCUTANEOUS at 22:50

## 2021-05-04 RX ADMIN — CEFEPIME HYDROCHLORIDE 2000 MG: 2 INJECTION, POWDER, FOR SOLUTION INTRAVENOUS at 18:29

## 2021-05-04 RX ADMIN — DEXAMETHASONE SODIUM PHOSPHATE 4 MG: 4 INJECTION INTRA-ARTICULAR; INTRALESIONAL; INTRAMUSCULAR; INTRAVENOUS; SOFT TISSUE at 13:38

## 2021-05-04 NOTE — CASE MANAGEMENT
CM s/w pt's emergency contact and he provided CM with pt's SS# and copy of pt's permanent resident card  CM will follow

## 2021-05-04 NOTE — PROGRESS NOTES
Vancomycin Pharmacy Consult    Shannan Benson is an 49 y o  female who is currently receiving IV vancomycin for sepsis 2/2 PNA vs  other  Vancomycin Assessment:  1  ID Consult: No  2  Cultures:   5/3 Blood 2/2: in process  5/3 MRSA: in process  3  Procalcitonin:   5/3: <0 05  4  Renal Function:   SCr: 0 46  CrCl: 81 mL/min  UOP: 2 8 mL/kg/hr  5  Days of Therapy: 2  6  Current Dose: 1000 mg IV q12h  7  Last Level: n/a  8  Goal AUC(24h): 400-600  9  Goal Random/Trough: 15-20    Vancomycin Plan:  1  Evaluation: continue current regimen  2  New Dosing: continue 1000 mg IV q12h  Predicted Trough / AUC(24h): 13 2 / 543  3  Next Level: trough 5/5 at [address] will continue to follow closely for s/sx of nephrotoxicity, infusion reactions, and appropriateness of therapy  BMP and CBC will be ordered per protocol  We will continue to follow the patients culture results and clinical progress daily  Keron Basurto, PharmD  Internal Medicine Clinical Pharmacist Specialist  [phone]  [facility]/[employer]

## 2021-05-04 NOTE — CASE MANAGEMENT
YSABEL s/w Navneet ( from [employer]) and provided d/c update  Per Molly Dimas if YSABEL runs into any issues with placement due to pt's citizenship status, SYABEL should contact Molly Dimas ([phone]) and he will have his  the facility  to try to alleviate the issue  YSABEL will follow

## 2021-05-04 NOTE — PLAN OF CARE
Problem: Prexisting or High Potential for Compromised Skin Integrity  Goal: Skin integrity is maintained or improved  Description: INTERVENTIONS:  - Identify patients at risk for skin breakdown  - Assess and monitor skin integrity  - Assess and monitor nutrition and hydration status  - Monitor labs   - Assess for incontinence   - Turn and reposition patient  - Assist with mobility/ambulation  - Relieve pressure over bony prominences  - Avoid friction and shearing  - Provide appropriate hygiene as needed including keeping skin clean and dry  - Evaluate need for skin moisturizer/barrier cream  - Collaborate with interdisciplinary team   - Patient/family teaching  - Consider wound care consult   Outcome: Progressing     Problem: Potential for Falls  Goal: Patient will remain free of falls  Description: INTERVENTIONS:  - Assess patient frequently for physical needs  -  Identify cognitive and physical deficits and behaviors that affect risk of falls  -  [place] fall precautions as indicated by assessment   - Educate patient/family on patient safety including physical limitations  - Instruct patient to call for assistance with activity based on assessment  - Modify environment to reduce risk of injury  - Consider OT/PT consult to assist with strengthening/mobility  Outcome: Progressing     Problem: Nutrition/Hydration-ADULT  Goal: Nutrient/Hydration intake appropriate for improving, restoring or maintaining nutritional needs  Description: Monitor and assess patient's nutrition/hydration status for malnutrition  Collaborate with interdisciplinary team and initiate plan and interventions as ordered  Monitor patient's weight and dietary intake as ordered or per policy  Utilize nutrition screening tool and intervene as necessary  Determine patient's food preferences and provide high-protein, high-caloric foods as appropriate       INTERVENTIONS:  - Monitor oral intake, urinary output, labs, and treatment plans  - Assess nutrition and hydration status and recommend course of action  - Evaluate amount of meals eaten  - Assist patient with eating if necessary   - Allow adequate time for meals  - Recommend/ encourage appropriate diets, oral nutritional supplements, and vitamin/mineral supplements  - Order, calculate, and assess calorie counts as needed  - Recommend, monitor, and adjust tube feedings and TPN/PPN based on assessed needs  - Assess need for intravenous fluids  - Provide specific nutrition/hydration education as appropriate  - Include patient/family/caregiver in decisions related to nutrition  Outcome: Progressing     Problem: PAIN - ADULT  Goal: Verbalizes/displays adequate comfort level or baseline comfort level  Description: Interventions:  - Encourage patient to monitor pain and request assistance  - Assess pain using appropriate pain scale  - Administer analgesics based on type and severity of pain and evaluate response  - Implement non-pharmacological measures as appropriate and evaluate response  - Consider cultural and social influences on pain and pain management  - Notify physician/advanced practitioner if interventions unsuccessful or patient reports new pain  Outcome: Progressing     Problem: INFECTION - ADULT  Goal: Absence or prevention of progression during hospitalization  Description: INTERVENTIONS:  - Assess and monitor for signs and symptoms of infection  - Monitor lab/diagnostic results  - Monitor all insertion sites, i e  indwelling lines, tubes, and drains  - Monitor endotracheal if appropriate and nasal secretions for changes in amount and color  - [place] appropriate cooling/warming therapies per order  - Administer medications as ordered  - Instruct and encourage patient and family to use good hand hygiene technique  - Identify and instruct in appropriate isolation precautions for identified infection/condition  Outcome: Progressing     Problem: SAFETY ADULT  Goal: Patient will remain free of falls  Description: INTERVENTIONS:  - Assess patient frequently for physical needs  -  Identify cognitive and physical deficits and behaviors that affect risk of falls    -  [place] fall precautions as indicated by assessment   - Educate patient/family on patient safety including physical limitations  - Instruct patient to call for assistance with activity based on assessment  - Modify environment to reduce risk of injury  - Consider OT/PT consult to assist with strengthening/mobility  Outcome: Progressing  Goal: Maintain or return to baseline ADL function  Description: INTERVENTIONS:  -  Assess patient's ability to carry out ADLs; assess patient's baseline for ADL function and identify physical deficits which impact ability to perform ADLs (bathing, care of mouth/teeth, toileting, grooming, dressing, etc )  - Assess/evaluate cause of self-care deficits   - Assess range of motion  - Assess patient's mobility; develop plan if impaired  - Assess patient's need for assistive devices and provide as appropriate  - Encourage maximum independence but intervene and supervise when necessary  - Involve family in performance of ADLs  - Assess for home care needs following discharge   - Consider OT consult to assist with ADL evaluation and planning for discharge  - Provide patient education as appropriate  Outcome: Progressing  Goal: Maintain or return mobility status to optimal level  Description: INTERVENTIONS:  - Assess patient's baseline mobility status (ambulation, transfers, stairs, etc )    - Identify cognitive and physical deficits and behaviors that affect mobility  - Identify mobility aids required to assist with transfers and/or ambulation (gait belt, sit-to-stand, lift, walker, cane, etc )  - [place] fall precautions as indicated by assessment  - Record patient progress and toleration of activity level on Mobility SBAR; progress patient to next Phase/Stage  - Instruct patient to call for assistance with activity based on assessment  - Consider rehabilitation consult to assist with strengthening/weightbearing, etc   Outcome: Progressing     Problem: DISCHARGE PLANNING  Goal: Discharge to home or other facility with appropriate resources  Description: INTERVENTIONS:  - Identify barriers to discharge w/patient and caregiver  - Arrange for needed discharge resources and transportation as appropriate  - Identify discharge learning needs (meds, wound care, etc )  - Arrange for interpretive services to assist at discharge as needed  - Refer to Case Management Department for coordinating discharge planning if the patient needs post-hospital services based on physician/advanced practitioner order or complex needs related to functional status, cognitive ability, or social support system  Outcome: Progressing     Problem: Knowledge Deficit  Goal: Patient/family/caregiver demonstrates understanding of disease process, treatment plan, medications, and discharge instructions  Description: Complete learning assessment and assess knowledge base    Interventions:  - Provide teaching at level of understanding  - Provide teaching via preferred learning methods  Outcome: Progressing

## 2021-05-04 NOTE — NUTRITION
05/04/21 1517   Recommendations/Interventions   Summary RD was contacted by SLP on behalf of Medical Resident re:"when should TF decrease"; RD then spoke with Medical Resident; it was decided to trial cycled TF to allow for increased PO at meal time; RD provided cycled TF recommendations as noted below; cylced TF will provide 936kcal; 43g pro; 630ml water; TF flushes: 30ml at start and end of cycle = 60ml;  100ml water flush q 4 hrs while TF infusing = 300ml; total water ~990ml; patient also continues to receive IVF at 125ml/hour   Interventions EN change to cyclic  (goal PO at least 400-500kcal and 20g protein with meals and supplements for balance of needs)   Nutrition Recommendations Other (Specify)  (jevity 1 2 at 65ml/hr x12 hours; 8pm to 8am; flush with 30ml water before and after cycle; continue 100ml water flush q 4 hours while TF infusing; will defer IVF mgmt to medical team)

## 2021-05-04 NOTE — PROGRESS NOTES
Vancomycin IV Pharmacy-to-Dose Consultation    Eduarda Herrera is a 49 y o  female who is currently receiving Vancomycin IV with management by the Pharmacy Consult service for the treatment of MRSA suspected    Assessment/Plan:    The patient's chart was reviewed  Renal function is stable  There are no signs or symptoms of nephrotoxicity and/or infusion reactions documented  Based on today's assessment, will continue current vancomycin (Day # 2) dosing of 1000 mg Q12, with a plan for trough to be drawn at   0530 on Wed (5/5)  We will continue to follow the patient's culture results and clinical progress daily  Stevie Goldberg Pavlo R  , Pharmacist, [phone]

## 2021-05-04 NOTE — CASE MANAGEMENT
CM s/w pt's contact and sent additional referrals to in network providers for short term rehab  Referrals where sent to [facility], Emi Sanders at [facility] AND [facility] and [address] [place] [address]  CM will follow

## 2021-05-04 NOTE — PROGRESS NOTES
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[address] )  Active Problems:    Sepsis ([facility] [address] )    Altered mental status    Dysphagia    Urinary retention    Presence of permanent central venous catheter    Steroid-induced hyperglycemia    Aphasia    Weakness    Fracture of ramus of left pubis ([facility] [address] )    Severe protein-calorie malnutrition ([facility] [address] )      Sepsis ([facility] [address] )  Assessment & Plan  Patient now has hypothermia, elevated WBC count and tachycardia  Concern for possible aspiration in setting on dysphagia  Plan:  Initially on IV ceftriaxone and Flagyl for now then switched to broad-spectrum with cefepime and vancomycin  Follow-up on blood cultures, procalcitonin, lactic acid and chest x-ray- no focal pneumonia  Will narrow antibiotics based on culture results  * 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[facility])  Assessment & Plan  Patient was diagnosed with primary 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through biopsy in [facility] transferred over here for chemotherapy since the [facility] is out of network  Oncology and Palliative care services consulted, appreciate recommendations   Continue with dexamethasone  As per [facility] progress note patient had discussion with palliative Care and at that time family wants to pursue disease directed therapy    Plan:  Had a 2nd family meeting on 04/30 and family has decided to proceed treatment at this time  Hematology-Oncology consulted  Appreciate recommendations  S/p 1 cycle of Chemotherapy treatment as per Hematology-Oncology  Patient has poor prognosis even with treatment  S/p PICC line for chemotherapy  DVT prophylaxis:  On heparin    Case management-will start looking for places for rehab  Facilities may not accept patient since she has Keofed  Patient currently has keofed and plan is to try VBS to advanced diet so that we can discontinue Keofed or place feeding tube if needed  Steroid-induced hyperglycemia  Assessment & Plan  Mild hyperglycemia, glucoses in 200s, no history of DM  Occurring in the setting of Decadron with chemotherapy, as well as sodium bicarb in D5 drip  - D5 drip to finished  - will monitor sugars while on Jevity and now off D5  - continue q6 hour fingerstick glucose with correctional algorithm 1 and Lantus    Presence of permanent central venous catheter  Assessment & Plan  Noted presence of right IJ tunneled double-lumen HD catheter; upon chart review, this was likely inserted to be used for plasmapheresis which she has completed  Plan:  S/p removal by IR  Patient now has PICC line  Urinary retention  Assessment & Plan  Patient developed urinary retention during previous hospitalization  Plan was voiding trial as an outpatient as she was scheduled for discharge from that facility  Will try voiding trial     Dysphagia  Assessment & Plan  Patient noted to have dysphagia and also decreased p o  Intake  As per [facility] progress note patient had a calorie count and recommended feeding tube placement  Speech evaluation - advance diet with dysphagia 2 and thin liquids; needs assistance with feeds  Calorie count  If the patient continued to have poor p o  Intake may need discussion with the family regarding alternate methods of nutrition  Patient's calorie intake is low  Keofed placed evening of 4/30, Jevity 1 2 started, advancing as tolerated  VBS- showed oropharyngeal dysphagia  Altered mental status  Assessment & Plan  Patient initially presented to [facility] his ultimate [place] with stroke-like symptoms for 2 days    CT scan of the head was concerning for subacute CVA and was transferred to [facility]   MRI of the brain was more consistent with demyelinating disease patient was started on IV steroids and 1000 mg daily for 5 days  Patient did not have any significant improvement at that time patient had plasmapheresis  And also had  lumbar puncture-negative for infection or malignancy  Patient noted to have persistent encephalopathy so a repeat MRI was obtained which showed worsening of the abnormality seen on the previous MRI and 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Continue with the dexamethasone  Likely secondary to 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Delirium precautions   Currently alert and oriented times 2-3   follow commands  Moves all extremities spontaneously except left upper and lower extrimities  Patient's  (does not speak [place]) makes all medical decision for her  Severe protein-calorie malnutrition ([facility] [address] )  Assessment & Plan  Malnutrition Findings:   Adult Malnutrition type: Acute illness(due to medical condition resulting in dysphagia, decreased appetite and intake, weight loss)  Adult Degree of Malnutrition: Other severe protein calorie malnutrition    BMI Findings: Body mass index is 20 49 kg/m²  Plan:  Nutrition consult  Keofed placed 4/30  Tube feeds initiated with Jevity 1 2, advancing to goal      Fracture of ramus of left pubis [facility])  Assessment & Plan  Jorge Luis Perales prior to presentation to [facility]  Managed nonoperatively at [facility]    Weakness  Assessment & Plan  Left greater than right upper and lower extremity weakness with ongoing left lower extremity contracture  Secondary to underlying CNS malignancy  *Decubitus ulcer precautions such as frequent repositioning  Aphasia  Assessment & Plan  Definite evidence of expressive aphasia, possible component of receptive aphasia as well  Speech therapy on board  Disposition:  On IV antibiotics     SUBJECTIVE     Patient seen and examined  No acute events overnight  Alert and oriented x2-3    Still has left-sided weakness  OBJECTIVE     Vitals:    21 2218 21 0244 21 0600 21 0733   BP: 119/76 118/76  121/75   Pulse: 98 99  94   Resp: 17 17  20   Temp: 97 8 °F (36 6 °C) (!) 97 4 °F (36 3 °C)  98 9 °F (37 2 °C)   TempSrc:       SpO2: 100% 100%  100%   Weight:   48 1 kg (106 lb 0 7 oz)    Height:          Temperature:   Temp (24hrs), Av 2 °F (36 8 °C), Min:97 4 °F (36 3 °C), Max:98 9 °F (37 2 °C)    Temperature: 98 9 °F (37 2 °C)  Intake & Output:  I/O        07 -  07 07 -  07 07 -  0700    P  O  350 160     I V  (mL/kg)  2462 1 (51 2) 1000 (20 8)    NG/ 701     IV Piggyback  950     Feedings 1080 1429     Total Intake(mL/kg) 2030 (43 5) 5702 1 (118 5) 1000 (20 8)    Urine (mL/kg/hr) 1700 (1 5) 3215 (2 8)     Stool  0     Total Output 1700 3215     Net +330 +2487 1 +1000           Unmeasured Urine Occurrence 0 x      Unmeasured Stool Occurrence  4 x         Weights:   IBW (Ideal Body Weight): 45 5 kg    Body mass index is 20 71 kg/m²  Weight (last 2 days)     Date/Time   Weight    21 0600   48 1 (106 04)    21 0555   46 7 (102 96)    21 0544   46 8 (103 25)            Physical Exam  Vitals signs reviewed  Constitutional:       General: She is not in acute distress  Appearance: She is well-developed  She is not diaphoretic  HENT:      Head: Normocephalic and atraumatic  Nose: Nose normal       Mouth/Throat:      Pharynx: No oropharyngeal exudate  Eyes:      General: No scleral icterus  Conjunctiva/sclera: Conjunctivae normal    Neck:      Musculoskeletal: Neck supple  Thyroid: No thyromegaly  Vascular: No JVD  Trachea: No tracheal deviation  Cardiovascular:      Rate and Rhythm: Normal rate and regular rhythm  Heart sounds: Normal heart sounds  No murmur  No friction rub  No gallop  Pulmonary:      Effort: Pulmonary effort is normal  No respiratory distress        Breath sounds: Normal breath sounds  No stridor  No wheezing or rales  Chest:      Chest wall: No tenderness  Abdominal:      General: Bowel sounds are normal  There is no distension  Palpations: Abdomen is soft  There is no mass  Tenderness: There is no abdominal tenderness  There is no guarding or rebound  Musculoskeletal: Normal range of motion  General: No tenderness  Skin:     General: Skin is warm  Findings: No erythema or rash  Neurological:      Mental Status: She is alert  Sensory: No sensory deficit  Comments: A&O x2 3  Left lower extremity 0/5  Left upper extremity 4/5  Psychiatric:         Behavior: Behavior normal          Thought Content: Thought content normal          Judgment: Judgment normal        LABORATORY DATA     Labs: I have personally reviewed pertinent reports  Results from last 7 days   Lab Units 05/04/21 0627 05/03/21  0601 05/02/21  0451  04/30/21  0506  04/29/21  0555   WBC Thousand/uL 9 55 13 53* 14 45*   < > 7 46   < > 7 92   HEMOGLOBIN g/dL 9 2* 11 0* 11 5   < > 10 6*   < > 10 9*   HEMATOCRIT % 27 2* 31 9* 33 7*   < > 31 9*   < > 31 8*   PLATELETS Thousands/uL 139* 224 216   < > 147*   < > 143*   NEUTROS PCT %  --   --   --   --  87*  --   --    MONOS PCT %  --   --   --   --  3*  --   --    MONO PCT %  --   --  0*  --   --   --  5    < > = values in this interval not displayed  Results from last 7 days   Lab Units 05/04/21 0627 05/03/21  0601 05/02/21  0451  04/30/21  0506 04/29/21  1227   POTASSIUM mmol/L 4 2 4 2 3 8   < > 3 5  3 3* 3 7   CHLORIDE mmol/L 103 98* 97*   < > 100  98* 98*   CO2 mmol/L 24 22 28   < > 31  32 34*   BUN mg/dL 24 30* 23   < > 16  15 13   CREATININE mg/dL 0 46* 0 58* 0 47*   < > 0 48*  0 48* 0 50*   CALCIUM mg/dL 8 1* 8 7 9 5   < > 9 1  9 1 9 3   ALK PHOS U/L  --   --   --   --  53 54   ALT U/L  --   --   --   --  36 36   AST U/L  --   --   --   --  14 15    < > = values in this interval not displayed  Results from last 7 days   Lab Units 05/02/21  0451 05/01/21  0554 04/30/21  0506   PHOSPHORUS mg/dL 2 7 1 4* 2 7          Results from last 7 days   Lab Units 05/04/21  0347   LACTIC ACID mmol/L 3 0*           IMAGING & DIAGNOSTIC TESTING     Radiology Results: I have personally reviewed pertinent reports  Xr Chest Portable    Result Date: 4/24/2021  Impression: Dialysis catheter tip within the superior cavoatrial junction  No pneumothorax  Workstation performed: [id number]     Xr Chest Picc Line Portable    Result Date: 4/27/2021  Impression: PICC line tip in the superior vena cava, approximately 3 5 to 4 cm above the cavoatrial junction  The examination demonstrates a significant  finding and was documented as such in [facility] for liaison and referring practitioner notification  Workstation performed: [id number]     Ir Picc Reposition    Result Date: 4/27/2021  Impression: Status-post repositioning of a 5 English central venous catheter under fluoroscopic guidance with its tip at the cavoatrial junction  Workstation performed: [id number]     Ir Tunneled Central Line Removal    Result Date: 4/27/2021  Impression: Impression: Successful tunneled central line removal as described  Signed, performed and dictated by Nel Canela PA-C under the supervision of Dr Erika Manning  Workstation performed: [id number]     Other Diagnostic Testing: I have personally reviewed pertinent reports      ACTIVE MEDICATIONS     Current Facility-Administered Medications   Medication Dose Route Frequency    albuterol (PROVENTIL HFA,VENTOLIN HFA) inhaler 2 puff  2 puff Inhalation Q6H PRN    amLODIPine (NORVASC) tablet 5 mg  5 mg Oral Daily    cefepime (MAXIPIME) 2,000 mg in dextrose 5 % 50 mL IVPB  2,000 mg Intravenous Q12H    dexamethasone (DECADRON) injection 4 mg  4 mg Intravenous 4x Daily    famotidine (PEPCID) tablet 20 mg  20 mg Oral BID    heparin (porcine) subcutaneous injection 5,000 Units  5,000 Units Subcutaneous Q8H [facility]    insulin glargine (LANTUS) subcutaneous injection 6 Units 0 06 mL  6 Units Subcutaneous HS    insulin lispro (HumaLOG) 100 units/mL subcutaneous injection 1-5 Units  1-5 Units Subcutaneous Q6H [facility]    lidocaine (LIDODERM) 5 % patch 1 patch  1 patch Topical Daily    methotrexate (PF) 5,000 mg in sodium chloride 0 9 % 1,000 mL IVPB  5,000 mg Intravenous Once    ondansetron (ZOFRAN) 16 mg, dexamethasone (DECADRON) 10 mg in sodium chloride 0 9 % 50 mL IVPB   Intravenous Once    ondansetron (ZOFRAN) injection 4 mg  4 mg Intravenous Q6H PRN    polyethylene glycol (MIRALAX) packet 17 g  17 g Oral Daily    senna-docusate sodium (SENOKOT S) 8 6-50 mg per tablet 1 tablet  1 tablet Oral BID    sodium chloride 0 9 % infusion  20 mL/hr Intravenous Once PRN    sodium chloride 0 9 % infusion  20 mL/hr Intravenous Once PRN    sodium chloride 0 9 % infusion  125 mL/hr Intravenous Continuous    tamsulosin (FLOMAX) capsule 0 4 mg  0 4 mg Oral Daily With Dinner    vancomycin (VANCOCIN) IVPB (premix in dextrose) 1,000 mg 200 mL  1,000 mg Intravenous Q12H       VTE Pharmacologic Prophylaxis: Heparin  VTE Mechanical Prophylaxis: sequential compression device    Portions of the record may have been created with voice recognition software  Occasional wrong word or "sound a like" substitutions may have occurred due to the inherent limitations of voice recognition software    Read the chart carefully and recognize, using context, where substitutions have occurred   ==  Rory Pyle, [address] [facility]  Internal Medicine Residency PGY-2

## 2021-05-04 NOTE — SPEECH THERAPY NOTE
Speech Language/Pathology    Speech/Language Pathology Progress Note    Patient Name: Girish Hawley  LWMNL'B Date: 5/4/2021     Problem List  Principal Problem:    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[facility] [address] )  Active Problems:    Altered mental status    Dysphagia    Urinary retention    Aphasia    Weakness    Presence of permanent central venous catheter    Fracture of ramus of left pubis (HCC)    Severe protein-calorie malnutrition ([facility])    Steroid-induced hyperglycemia    Sepsis ([facility] [address] )       Past Medical History  History reviewed  No pertinent past medical history  Past Surgical History  Past Surgical History:   Procedure Laterality Date    IR PICC REPOSITION  4/27/2021    IR TUNNELED CENTRAL LINE REMOVAL  4/27/2021         Subjective:  Patient is awake  Objective:  Patient is positioned upright in bed  She continues with NG tube and receives puree tray with nectar thick liquids (diet order is still honey thick)  SLP feeds patient  She has decrease oral closure for tsp today, with anterior leakage observed on left  Patient is very restless in bed, and consistently turns head/body to the left to swallow  Minimal oral residue is observed with puree solids  She has decrease oral closure for nectar thick liquids via cup  Oral control of bolus appears reduced  The patient does not present with any overt s/s aspiration  However, she appears nauseas and waves hand for SLP to stop meal  Intake is decreased today compared to other sessions  Assessment:  Patient is restless and appears uncomfortable  PO intake is decreased today  Plan/Recommendations:  Continue puree diet with honey thick liquids, as tolerated  Will discuss with dietician to determine potential supplements  ST will continue

## 2021-05-05 LAB
ALBUMIN SERPL BCP-MCNC: 2.4 G/DL (ref 3.5–5)
ALP SERPL-CCNC: 55 U/L (ref 46–116)
ALT SERPL W P-5'-P-CCNC: 56 U/L (ref 12–78)
ANION GAP SERPL CALCULATED.3IONS-SCNC: 7 MMOL/L (ref 4–13)
AST SERPL W P-5'-P-CCNC: 14 U/L (ref 5–45)
BILIRUB SERPL-MCNC: 0.28 MG/DL (ref 0.2–1)
BUN SERPL-MCNC: 22 MG/DL (ref 5–25)
CALCIUM ALBUM COR SERPL-MCNC: 9.2 MG/DL (ref 8.3–10.1)
CALCIUM SERPL-MCNC: 7.9 MG/DL (ref 8.3–10.1)
CHLORIDE SERPL-SCNC: 105 MMOL/L (ref 100–108)
CO2 SERPL-SCNC: 21 MMOL/L (ref 21–32)
CREAT SERPL-MCNC: 0.38 MG/DL (ref 0.6–1.3)
ERYTHROCYTE [DISTWIDTH] IN BLOOD BY AUTOMATED COUNT: 13.7 % (ref 11.6–15.1)
GFR SERPL CREATININE-BSD FRML MDRD: 120 ML/MIN/1.73SQ M
GLUCOSE SERPL-MCNC: 159 MG/DL (ref 65–140)
GLUCOSE SERPL-MCNC: 199 MG/DL (ref 65–140)
GLUCOSE SERPL-MCNC: 222 MG/DL (ref 65–140)
GLUCOSE SERPL-MCNC: 225 MG/DL (ref 65–140)
GLUCOSE SERPL-MCNC: 309 MG/DL (ref 65–140)
HCT VFR BLD AUTO: 23.9 % (ref 34.8–46.1)
HGB BLD-MCNC: 8.1 G/DL (ref 11.5–15.4)
LACTATE SERPL-SCNC: 3.1 MMOL/L (ref 0.5–2)
LYMPHOCYTES # BLD AUTO: 3 % (ref 14–44)
MCH RBC QN AUTO: 32.3 PG (ref 26.8–34.3)
MCHC RBC AUTO-ENTMCNC: 33.9 G/DL (ref 31.4–37.4)
MCV RBC AUTO: 95 FL (ref 82–98)
MONOCYTES NFR BLD: 1 % (ref 4–12)
MRSA NOSE QL CULT: NORMAL
MYELOCYTES NFR BLD MANUAL: 3 % (ref 0–1)
NEUTS SEG NFR BLD AUTO: 93 % (ref 43–75)
PLATELET # BLD AUTO: 124 THOUSANDS/UL (ref 149–390)
PLATELET BLD QL SMEAR: ABNORMAL
PMV BLD AUTO: 10.8 FL (ref 8.9–12.7)
POTASSIUM SERPL-SCNC: 4 MMOL/L (ref 3.5–5.3)
PROT SERPL-MCNC: 4.6 G/DL (ref 6.4–8.2)
RBC # BLD AUTO: 2.51 MILLION/UL (ref 3.81–5.12)
SODIUM SERPL-SCNC: 133 MMOL/L (ref 136–145)
TOTAL CELLS COUNTED SPEC: 100
VANCOMYCIN TROUGH SERPL-MCNC: 15 UG/ML (ref 10–20)
WBC # BLD AUTO: 10.98 THOUSAND/UL (ref 4.31–10.16)

## 2021-05-05 PROCEDURE — 85027 COMPLETE CBC AUTOMATED: CPT | Performed by: STUDENT IN AN ORGANIZED HEALTH CARE EDUCATION/TRAINING PROGRAM

## 2021-05-05 PROCEDURE — 85025 COMPLETE CBC W/AUTO DIFF WBC: CPT | Performed by: STUDENT IN AN ORGANIZED HEALTH CARE EDUCATION/TRAINING PROGRAM

## 2021-05-05 PROCEDURE — 80053 COMPREHEN METABOLIC PANEL: CPT | Performed by: STUDENT IN AN ORGANIZED HEALTH CARE EDUCATION/TRAINING PROGRAM

## 2021-05-05 PROCEDURE — 80202 ASSAY OF VANCOMYCIN: CPT | Performed by: STUDENT IN AN ORGANIZED HEALTH CARE EDUCATION/TRAINING PROGRAM

## 2021-05-05 PROCEDURE — 97530 THERAPEUTIC ACTIVITIES: CPT

## 2021-05-05 PROCEDURE — 97112 NEUROMUSCULAR REEDUCATION: CPT

## 2021-05-05 PROCEDURE — 82948 REAGENT STRIP/BLOOD GLUCOSE: CPT

## 2021-05-05 PROCEDURE — 85007 BL SMEAR W/DIFF WBC COUNT: CPT | Performed by: STUDENT IN AN ORGANIZED HEALTH CARE EDUCATION/TRAINING PROGRAM

## 2021-05-05 RX ORDER — INSULIN GLARGINE 100 [IU]/ML
9 INJECTION, SOLUTION SUBCUTANEOUS
Status: DISCONTINUED | OUTPATIENT
Start: 2021-05-05 | End: 2021-05-07

## 2021-05-05 RX ADMIN — FAMOTIDINE 20 MG: 20 TABLET ORAL at 18:03

## 2021-05-05 RX ADMIN — POLYETHYLENE GLYCOL 3350 17 G: 17 POWDER, FOR SOLUTION ORAL at 08:16

## 2021-05-05 RX ADMIN — INSULIN LISPRO 1 UNITS: 100 INJECTION, SOLUTION INTRAVENOUS; SUBCUTANEOUS at 18:04

## 2021-05-05 RX ADMIN — INSULIN LISPRO 2 UNITS: 100 INJECTION, SOLUTION INTRAVENOUS; SUBCUTANEOUS at 05:55

## 2021-05-05 RX ADMIN — INSULIN GLARGINE 9 UNITS: 100 INJECTION, SOLUTION SUBCUTANEOUS at 21:29

## 2021-05-05 RX ADMIN — INSULIN LISPRO 3 UNITS: 100 INJECTION, SOLUTION INTRAVENOUS; SUBCUTANEOUS at 00:12

## 2021-05-05 RX ADMIN — DEXAMETHASONE SODIUM PHOSPHATE 4 MG: 4 INJECTION INTRA-ARTICULAR; INTRALESIONAL; INTRAMUSCULAR; INTRAVENOUS; SOFT TISSUE at 18:03

## 2021-05-05 RX ADMIN — SODIUM CHLORIDE 125 ML/HR: 0.9 INJECTION, SOLUTION INTRAVENOUS at 06:05

## 2021-05-05 RX ADMIN — VANCOMYCIN HYDROCHLORIDE 1000 MG: 1 INJECTION, SOLUTION INTRAVENOUS at 06:06

## 2021-05-05 RX ADMIN — HEPARIN SODIUM 5000 UNITS: 5000 INJECTION INTRAVENOUS; SUBCUTANEOUS at 21:29

## 2021-05-05 RX ADMIN — DEXAMETHASONE SODIUM PHOSPHATE 4 MG: 4 INJECTION INTRA-ARTICULAR; INTRALESIONAL; INTRAMUSCULAR; INTRAVENOUS; SOFT TISSUE at 21:29

## 2021-05-05 RX ADMIN — INSULIN LISPRO 1 UNITS: 100 INJECTION, SOLUTION INTRAVENOUS; SUBCUTANEOUS at 12:29

## 2021-05-05 RX ADMIN — SENNOSIDES, DOCUSATE SODIUM 1 TABLET: 8.6; 5 TABLET ORAL at 18:03

## 2021-05-05 RX ADMIN — HEPARIN SODIUM 5000 UNITS: 5000 INJECTION INTRAVENOUS; SUBCUTANEOUS at 14:13

## 2021-05-05 RX ADMIN — SENNOSIDES, DOCUSATE SODIUM 1 TABLET: 8.6; 5 TABLET ORAL at 08:16

## 2021-05-05 RX ADMIN — DEXAMETHASONE SODIUM PHOSPHATE 4 MG: 4 INJECTION INTRA-ARTICULAR; INTRALESIONAL; INTRAMUSCULAR; INTRAVENOUS; SOFT TISSUE at 08:17

## 2021-05-05 RX ADMIN — LIDOCAINE 5% 1 PATCH: 700 PATCH TOPICAL at 08:16

## 2021-05-05 RX ADMIN — CEFTRIAXONE SODIUM 1000 MG: 10 INJECTION, POWDER, FOR SOLUTION INTRAVENOUS at 20:23

## 2021-05-05 RX ADMIN — CEFEPIME HYDROCHLORIDE 2000 MG: 2 INJECTION, POWDER, FOR SOLUTION INTRAVENOUS at 05:53

## 2021-05-05 RX ADMIN — HEPARIN SODIUM 5000 UNITS: 5000 INJECTION INTRAVENOUS; SUBCUTANEOUS at 05:54

## 2021-05-05 RX ADMIN — AMLODIPINE BESYLATE 5 MG: 5 TABLET ORAL at 08:16

## 2021-05-05 RX ADMIN — FAMOTIDINE 20 MG: 20 TABLET ORAL at 08:16

## 2021-05-05 RX ADMIN — DEXAMETHASONE SODIUM PHOSPHATE 4 MG: 4 INJECTION INTRA-ARTICULAR; INTRALESIONAL; INTRAMUSCULAR; INTRAVENOUS; SOFT TISSUE at 12:29

## 2021-05-05 RX ADMIN — TAMSULOSIN HYDROCHLORIDE 0.4 MG: 0.4 CAPSULE ORAL at 18:03

## 2021-05-05 NOTE — PROGRESS NOTES
Vancomycin Pharmacy Consult     Zita President is an 49 y o  female who is currently receiving IV vancomycin for sepsis 2/2 PNA vs  other  Vancomycin Assessment:  1  ID Consult: No  2  Cultures:   5/3 Blood 2/2: NGTD  5/3 MRSA: in process  3  Procalcitonin:   5/3: <0 05  5/4: <0 05  4  Renal Function:   SCr: 0 38  CrCl: ~80 mL/min  UOP: 3 6 mL/kg/hr  5  Days of Therapy: 3  6  Current Dose: 1000 mg IV q12h  7  Last Level: 15 (trough 5/5 at 0557)  8  Goal AUC(24h): 400-600  9  Goal Random/Trough: 15-20     Vancomycin Plan:  1  Evaluation: trough this AM extrapolates to 14 3, but AUC is 567, thus, will continue current regimen  2  New Dosing: continue 1000 mg IV q12h  Predicted Trough / AUC(24h): 14 3 / 567  3  Next Level: trough 5/10 at [place] will continue to follow closely for s/sx of nephrotoxicity, infusion reactions, and appropriateness of therapy  BMP and CBC will be ordered per protocol  We will continue to follow the patients culture results and clinical progress daily  Marvel Alves, PharmD  Internal Medicine Clinical Pharmacist Specialist  [phone]  [facility]

## 2021-05-05 NOTE — UTILIZATION REVIEW
Continued Stay Review    Date: 5/5/21                          Current Patient Class: IP  Current Level of Care: MS    HPI:56 y o  female initially admitted on 4/23 for CNS lymphoma for initiation of chemotherapy    Assessment/Plan:   Pt with persistent lactic acidosis - to 3 1 with high of 4 8 5/4  Has received IV fluids x 36 hr and may be r/t CNS Lymphoma  Continues on IV fluids at 50 ml/hr  Goal is for full treatment  Hyperglycemia persists - pt is on steroids  Pt is now on only IV Ceftriaxone and Vanco d/c  She remains with leukocytosis  Pt will need rehab  Consideration for PEG tube instead of Keofed tube if calorie intake is still low  Pt remains A&O x 3  Vital Signs:   05/05/21 11:20:48  98 6 °F (37 °C)  92  18  111/71  84  99 %   05/05/21 08:21:05  98 1 °F (36 7 °C)  87  16  108/66  80  100 %   05/05/21 02:43:15  98 2 °F (36 8 °C)  89  18  123/70  88  100 %   05/04/21 23:21:45  98 2 °F (36 8 °C)  84  18  110/72  85  100 %   05/04/21 19:05:33  98 3 °F (36 8 °C)  97  18  127/77  94  100 %   05/04/21 15:12:46  98 9 °F (37 2 °C)  91  16  117/72  87  100 %   05/04/21 11:54:17  99 1 °F (37 3 °C)  83  20  119/73  88  100 %   05/04/21 07:33:56  98 9 °F (37 2 °C)  94  20  121/75  90  100 %   05/04/21 02:44:57  97 4 °F (36 3 °C)Abnormal   99  17  118/76  90  100 %   05/03/21 22:18:04  97 8 °F (36 6 °C)  98  17  119/76  90  100 %   05/03/21 18:26:39  98 4 °F (36 9 °C)  101  18  123/79  94  99 %   05/03/21 15:37:08  98 7 °F (37 1 °C)  103  18  116/80  92  100 %   05/03/21 12:32:01  98 1 °F (36 7 °C)  111Abnormal   16  121/86  98  100 %   05/03/21 08:22:58  98 7 °F (37 1 °C)  99  --  133/91  105  100 %   05/03/21 08:22:21  98 7 °F (37 1 °C)  99  14  133/91  105  100 %   05/03/21 02:19:01  98 7 °F (37 1 °C)  98  17  125/81  96  99 %     Pertinent Labs/Diagnostic Results:     5/3 CXR - 1  Lines and tubes as described above  2   Improved aeration within the left lower lobe      5/3 Video barium swallow - moderate oropharyngeal dysphagia, therapy guarded, needs full supervision with meals, Diet: Puree, Liquids: Honey, Meds: Crush in puree         Results from last 7 days   Lab Units 05/05/21 0557 05/04/21  0627 05/03/21  0601 05/02/21  0451 05/01/21  0554 04/30/21  0506   WBC Thousand/uL 10 98* 9 55 13 53* 14 45* 10 75* 7 46   HEMOGLOBIN g/dL 8 1* 9 2* 11 0* 11 5 11 1* 10 6*   HEMATOCRIT % 23 9* 27 2* 31 9* 33 7* 33 1* 31 9*   PLATELETS Thousands/uL 124* 139* 224 216 175 147*   NEUTROS ABS Thousands/µL  --   --   --   --   --  6 45         Results from last 7 days   Lab Units 05/05/21 0557 05/04/21  0627 05/03/21  0601 05/02/21  0451 05/01/21  0554 04/30/21  0506 04/29/21  0555   SODIUM mmol/L 133* 133* 130* 133* 133* 138  136 137   POTASSIUM mmol/L 4 0 4 2 4 2 3 8 3 7 3 5  3 3* 3 1*   CHLORIDE mmol/L 105 103 98* 97* 98* 100  98* 98*   CO2 mmol/L 21 24 22 28 29 31  32 34*   ANION GAP mmol/L 7 6 10 8 6 7  6 5   BUN mg/dL 22 24 30* 23 17 16  15 14   CREATININE mg/dL 0 38* 0 46* 0 58* 0 47* 0 51* 0 48*  0 48* 0 51*   EGFR ml/min/1 73sq m 120 113 105 112 109 112  112 109   CALCIUM mg/dL 7 9* 8 1* 8 7 9 5 8 9 9 1  9 1 8 9   PHOSPHORUS mg/dL  --   --   --  2 7 1 4* 2 7 2 7     Results from last 7 days   Lab Units 05/05/21 0557 05/02/21 0451 04/30/21  0506 04/29/21  1227   AST U/L 14  --  14 15   ALT U/L 56  --  36 36   ALK PHOS U/L 55  --  53 54   TOTAL PROTEIN g/dL 4 6*  --  5 6* 5 8*   ALBUMIN g/dL 2 4* 3 2* 2 8* 3 0*   TOTAL BILIRUBIN mg/dL 0 28  --  0 39 0 61     Results from last 7 days   Lab Units 05/05/21  1209 05/05/21  0539 05/04/21  2358 05/04/21  1800 05/04/21  1139 05/04/21  0604 05/03/21  2347 05/03/21  1811 05/03/21  1134 05/03/21  0526 05/02/21  2338 05/02/21  1800   POC GLUCOSE mg/dl 199* 222* 309* 178* 179* 232* 264* 255* 177* 202* 249* 267*     Results from last 7 days   Lab Units 05/05/21  0557 05/04/21  3826 05/03/21  0601 05/02/21  0451 05/01/21  0554 04/30/21  0506 04/29/21  1227 04/29/21  0555   GLUCOSE RANDOM mg/dL 225* 283* 211* 151* 239* 161*  154* 147* 161*     Results from last 7 days   Lab Units 05/04/21  0627 05/03/21  1112   PROCALCITONIN ng/ml <0 05 <0 05     Results from last 7 days   Lab Units 05/04/21  2243 05/04/21  1832 05/04/21  1026 05/04/21  0347 05/04/21  0025   LACTIC ACID mmol/L 3 1* 4 2* 3 8* 3 0* 2 9*     Results from last 7 days   Lab Units 05/03/21  1115 05/03/21  1114   BLOOD CULTURE  No Growth at 48 hrs  No Growth at 48 hrs  Medications:   Scheduled Medications:  amLODIPine, 5 mg, Oral, Daily  cefTRIAXone, 1,000 mg, Intravenous, Once  dexamethasone, 4 mg, Intravenous, 4x Daily  famotidine, 20 mg, Oral, BID  heparin (porcine), 5,000 Units, Subcutaneous, Q8H [address]  insulin glargine, 9 Units, Subcutaneous, HS  insulin lispro, 1-5 Units, Subcutaneous, Q6H MEÑO  lidocaine, 1 patch, Topical, Daily  methotrexate (PF) IVPB, 5,000 mg, Intravenous, Once  ondansetron with dexamethasone IVPB, , Intravenous, Once  polyethylene glycol, 17 g, Oral, Daily  senna-docusate sodium, 1 tablet, Oral, BID  tamsulosin, 0 4 mg, Oral, Daily With Dinner      Continuous IV Infusions:  sodium chloride, 50 mL/hr, Intravenous, Continuous      PRN Meds:  albuterol, 2 puff, Inhalation, Q6H PRN  ondansetron, 4 mg, Intravenous, Q6H PRN  sodium chloride, 20 mL/hr, Intravenous, Once PRN  sodium chloride, 20 mL/hr, Intravenous, Once PRN    Discharge Plan: D     Network Utilization Review Department  ATTENTION: Please call with any questions or concerns to [phone] and carefully listen to the prompts so that you are directed to the right person  All voicemails are confidential   Dennis Gonzalez all requests for admission clinical reviews, approved or denied determinations and any other requests to dedicated fax number below belonging to the campus where the patient is receiving treatment   List of dedicated fax numbers for the Facilities:  FACILITY NAME UR FAX NUMBER   ADMISSION DENIALS (Administrative/Medical Necessity) [phone]   [address] (Maternity/NICU/Pediatrics) 261 [facility],7Th Floor [facility] [address] [place] [address]  [phone]   Juanito Oh [id number] [id number] [place] [address] [facility] [address] [phone]

## 2021-05-05 NOTE — QUICK NOTE
Progress Note - Palliative & Supportive Care     Patient is moving towards dispo to SNF  Family has been clear with their goals of car for Ms Arturo Martinez  [facility] will offer outpatient follow up and sign off at this time  Please notify on-call provider with further questions/concerns      Ty Torres,   Palliative and Supportive Care  [phone]

## 2021-05-05 NOTE — PROGRESS NOTES
Evaluated patient at bedside regarding elevated lactate which has been ongoing and not responsive to intermittent fluid resuscitation over the last 36h  Pt's vital signs appear stable, clinical exam appears unchanged, abdominal exam is benign  [place]  line to be obtained by nursing and placed at bedside to confirm patient has no new symptoms  Suspect lactic acidosis is secondary to 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Will discontinue trending lactic acid and will monitor for acidosis on BMP  Will continue with IVF hydration given poor oral intake      Kamari Brar, DO  Internal Medicine PGY-3

## 2021-05-05 NOTE — PROGRESS NOTES
INTERNAL MEDICINE RESIDENCY PROGRESS NOTE     Name: Murray Gong   Age & Sex: 49 y o  female   MRN: [id number]  Unit/Bed#: [facility] 920-01   Encounter: [id number]  Team: SOD Team B     PATIENT INFORMATION     Name: Murray Gong   Age & Sex: 49 y o  female   MRN: [id number] Stay Days: 12    ASSESSMENT/PLAN     Principal Problem:    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[facility] [address] )  Active Problems:    Sepsis ([facility] [address] )    Altered mental status    Dysphagia    Urinary retention    Presence of permanent central venous catheter    Steroid-induced hyperglycemia    Aphasia    Weakness    Fracture of ramus of left pubis (HCC)    Severe protein-calorie malnutrition ([facility] [address] )      Sepsis ([facility] [address] )  Assessment & Plan  Patient had hypothermia, elevated WBC count and tachycardia  Concern for possible aspiration in setting on dysphagia versus UTI in setting of urinary catheter which was placed for retention  Plan:  Initially on IV ceftriaxone and Flagyl for now then switched to broad-spectrum with cefepime and vancomycin  Follow-up on blood cultures, procalcitonin, lactic acid and chest x-ray- no focal pneumonia  Will narrow antibiotics based on culture results  * 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[facility])  Assessment & Plan  Patient was diagnosed with primary 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through biopsy in [facility] transferred over here for chemotherapy since the [facility] is out of network  Oncology and Palliative care services consulted, appreciate recommendations   Continue with dexamethasone  As per [facility] progress note patient had discussion with palliative Care and at that time family wants to pursue disease directed therapy    Plan:  Had a 2nd family meeting on 04/30 and family has decided to proceed treatment at this time  Hematology-Oncology consulted  Appreciate recommendations  S/p 1 cycle of Chemotherapy treatment as per Hematology-Oncology  Patient has poor prognosis even with treatment    S/p PICC line for chemotherapy  DVT prophylaxis:  On heparin  Case management-will start looking for places for rehab  Facilities may not accept patient since she has Keofed  Patient currently has keofed and VPS showed severe oropharyngeal dysphagia  Plan is to eventually discontinue Keofed and may consider PEG tube if calorie intake is low  Steroid-induced hyperglycemia  Assessment & Plan  Mild hyperglycemia, glucoses in 200s, no history of DM  Occurring in the setting of Decadron with chemotherapy, as well as sodium bicarb in D5 drip  - D5 drip is finished  - will monitor sugars while on Jevity and now off D5  - continue q6 hour fingerstick glucose with correctional algorithm 1 and Lantus    Presence of permanent central venous catheter  Assessment & Plan  Noted presence of right IJ tunneled double-lumen HD catheter; upon chart review, this was likely inserted to be used for plasmapheresis which she has completed  Plan:  S/p removal by IR  Patient now has PICC line  Urinary retention  Assessment & Plan  Patient developed urinary retention during previous hospitalization  Plan was voiding trial as an outpatient as she was scheduled for discharge from that facility  Patient passed voiding trial and now voiding appropriately  Urinary retention protocol and strict ins and outs  Dysphagia  Assessment & Plan  Patient noted to have dysphagia and also decreased p o  Intake  As per [facility] progress note patient had a calorie count and recommended feeding tube placement  Speech evaluation - advance diet with dysphagia 2 and thin liquids; needs assistance with feeds  Calorie count  If the patient continued to have poor p o  Intake may need discussion with the family regarding alternate methods of nutrition  Patient's calorie intake is low  Keofed placed evening of 4/30, Jevity 1 2 started, advancing as tolerated  VBS- showed oropharyngeal dysphagia      Altered mental status  Assessment & Plan  Patient initially presented to [facility] his ultimate [place] with stroke-like symptoms for 2 days  CT scan of the head was concerning for subacute CVA and was transferred to [facility]   MRI of the brain was more consistent with demyelinating disease patient was started on IV steroids and 1000 mg daily for 5 days  Patient did not have any significant improvement at that time patient had plasmapheresis  And also had  lumbar puncture-negative for infection or malignancy  Patient noted to have persistent encephalopathy so a repeat MRI was obtained which showed worsening of the abnormality seen on the previous MRI and 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Continue with the dexamethasone  Likely secondary to 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Delirium precautions   Currently alert and oriented times 2-3   follow commands  Moves all extremities spontaneously except left upper and lower extrimities  Patient's  (does not speak [place]) makes all medical decision for her  Severe protein-calorie malnutrition ([facility] [address] )  Assessment & Plan  Malnutrition Findings:   Adult Malnutrition type: Acute illness(due to medical condition resulting in dysphagia, decreased appetite and intake, weight loss)  Adult Degree of Malnutrition: Other severe protein calorie malnutrition    BMI Findings: Body mass index is 20 49 kg/m²  Plan:  Nutrition consult  Keofed placed 4/30  Tube feeds initiated with Jevity 1 2, advancing to goal      Fracture of ramus of left pubis [facility])  Assessment & Plan  Gustavo Mcallister prior to presentation to [facility]  Managed nonoperatively at [facility]    Weakness  Assessment & Plan  Left greater than right upper and lower extremity weakness with ongoing left lower extremity contracture  Secondary to underlying CNS malignancy  *Decubitus ulcer precautions such as frequent repositioning       Aphasia  Assessment & Plan  Definite evidence of expressive aphasia, possible component of receptive aphasia as well  Speech therapy on board  Disposition:  Will stay inpatient  SUBJECTIVE     Patient seen and examined  No acute events overnight  Alert and oriented x2 3  Only answers with 1 words  Left-sided weakness severe in left leg at baseline  OBJECTIVE     Vitals:    21 2321 21 0243 21 0546 21 0821   BP: 110/72 123/70  108/66   Pulse: 84 89  87   Resp: 18 18  16   Temp: 98 2 °F (36 8 °C) 98 2 °F (36 8 °C)  98 1 °F (36 7 °C)   TempSrc: Oral Oral     SpO2: 100% 100%  100%   Weight:   50 1 kg (110 lb 7 2 oz)    Height:          Temperature:   Temp (24hrs), Av 5 °F (36 9 °C), Min:98 1 °F (36 7 °C), Max:99 1 °F (37 3 °C)    Temperature: 98 1 °F (36 7 °C)  Intake & Output:  I/O       701 -  07 -  07 07 -  0700    P  O  160 270 120    I V  (mL/kg) 2462 1 (51 2) 3317 9 (66 2)     NG/ 531 100    IV Piggyback 950 250     Feedings 1429 878 127    Total Intake(mL/kg) 5702 1 (118 5) 5246 9 (104 7) 347 (6 9)    Urine (mL/kg/hr) 3215 (2 8) 4388 (3 6) 1178 (5 5)    Stool 0 0     Total Output 3215 4388 1178    Net +2487 1 +858 9 -831           Unmeasured Stool Occurrence 4 x 2 x         Weights:   IBW (Ideal Body Weight): 45 5 kg    Body mass index is 21 57 kg/m²  Weight (last 2 days)     Date/Time   Weight    21 0546   50 1 (110 45)    21 0600   48 1 (106 04)    21 0555   46 7 (102 96)            Physical Exam  Vitals signs reviewed  Constitutional:       General: She is not in acute distress  Appearance: She is well-developed  She is not diaphoretic  HENT:      Head: Normocephalic and atraumatic  Nose: Nose normal       Mouth/Throat:      Pharynx: No oropharyngeal exudate  Eyes:      General: No scleral icterus  Conjunctiva/sclera: Conjunctivae normal    Neck:      Musculoskeletal: Neck supple  Thyroid: No thyromegaly  Vascular: No JVD  Trachea: No tracheal deviation  Cardiovascular:      Rate and Rhythm: Normal rate and regular rhythm  Heart sounds: Normal heart sounds  No murmur  No friction rub  No gallop  Pulmonary:      Effort: Pulmonary effort is normal  No respiratory distress  Breath sounds: Normal breath sounds  No stridor  No wheezing or rales  Chest:      Chest wall: No tenderness  Abdominal:      General: Bowel sounds are normal  There is no distension  Palpations: Abdomen is soft  There is no mass  Tenderness: There is no abdominal tenderness  There is no guarding or rebound  Musculoskeletal: Normal range of motion  General: No tenderness  Skin:     General: Skin is warm  Findings: No erythema or rash  Neurological:      Mental Status: Mental status is at baseline  Sensory: No sensory deficit  Comments: Left lower leg of 0/5 strength  Left arm 4/5 strength  Psychiatric:         Behavior: Behavior normal          Thought Content: Thought content normal          Judgment: Judgment normal        LABORATORY DATA     Labs: I have personally reviewed pertinent reports  Results from last 7 days   Lab Units 05/05/21  0557 05/04/21  0627 05/03/21  0601 05/02/21  0451  04/30/21  0506   WBC Thousand/uL 10 98* 9 55 13 53* 14 45*   < > 7 46   HEMOGLOBIN g/dL 8 1* 9 2* 11 0* 11 5   < > 10 6*   HEMATOCRIT % 23 9* 27 2* 31 9* 33 7*   < > 31 9*   PLATELETS Thousands/uL 124* 139* 224 216   < > 147*   NEUTROS PCT %  --   --   --   --   --  87*   MONOS PCT %  --   --   --   --   --  3*   MONO PCT % 1*  --   --  0*  --   --     < > = values in this interval not displayed        Results from last 7 days   Lab Units 05/05/21  0557 05/04/21  0627 05/03/21  0601  04/30/21  0506 04/29/21  1227   POTASSIUM mmol/L 4 0 4 2 4 2   < > 3 5  3 3* 3 7   CHLORIDE mmol/L 105 103 98*   < > 100  98* 98*   CO2 mmol/L 21 24 22   < > 31  32 34*   BUN mg/dL 22 24 30*   < > 16  15 13   CREATININE mg/dL 0 38* 0 46* 0 58*   < > 0 48*  0 48* 0 50* CALCIUM mg/dL 7 9* 8 1* 8 7   < > 9 1  9 1 9 3   ALK PHOS U/L 55  --   --   --  53 54   ALT U/L 56  --   --   --  36 36   AST U/L 14  --   --   --  14 15    < > = values in this interval not displayed  Results from last 7 days   Lab Units 05/02/21  0451 05/01/21  0554 04/30/21  0506   PHOSPHORUS mg/dL 2 7 1 4* 2 7          Results from last 7 days   Lab Units 05/04/21  2243   LACTIC ACID mmol/L 3 1*           IMAGING & DIAGNOSTIC TESTING     Radiology Results: I have personally reviewed pertinent reports  Xr Chest Portable    Result Date: 4/24/2021  Impression: Dialysis catheter tip within the superior cavoatrial junction  No pneumothorax  Workstation performed: [id number]     Xr Chest Picc Line Portable    Result Date: 4/27/2021  Impression: PICC line tip in the superior vena cava, approximately 3 5 to 4 cm above the cavoatrial junction  The examination demonstrates a significant  finding and was documented as such in [facility] for liaison and referring practitioner notification  Workstation performed: [id number]     Ir Picc Reposition    Result Date: 4/27/2021  Impression: Status-post repositioning of a 5 Japanese central venous catheter under fluoroscopic guidance with its tip at the cavoatrial junction  Workstation performed: [id number]     Ir Tunneled Central Line Removal    Result Date: 4/27/2021  Impression: Impression: Successful tunneled central line removal as described  Signed, performed and dictated by Adela Hugo PA-C under the supervision of Dr Emely Joiner  Workstation performed: [id number]     Other Diagnostic Testing: I have personally reviewed pertinent reports      ACTIVE MEDICATIONS     Current Facility-Administered Medications   Medication Dose Route Frequency    albuterol (PROVENTIL HFA,VENTOLIN HFA) inhaler 2 puff  2 puff Inhalation Q6H PRN    amLODIPine (NORVASC) tablet 5 mg  5 mg Oral Daily    cefepime (MAXIPIME) 2,000 mg in dextrose 5 % 50 mL IVPB  2,000 mg Intravenous Q12H  dexamethasone (DECADRON) injection 4 mg  4 mg Intravenous 4x Daily    famotidine (PEPCID) tablet 20 mg  20 mg Oral BID    heparin (porcine) subcutaneous injection 5,000 Units  5,000 Units Subcutaneous Q8H [facility]    insulin glargine (LANTUS) subcutaneous injection 9 Units 0 09 mL  9 Units Subcutaneous HS    insulin lispro (HumaLOG) 100 units/mL subcutaneous injection 1-5 Units  1-5 Units Subcutaneous Q6H [facility]    lidocaine (LIDODERM) 5 % patch 1 patch  1 patch Topical Daily    methotrexate (PF) 5,000 mg in sodium chloride 0 9 % 1,000 mL IVPB  5,000 mg Intravenous Once    ondansetron (ZOFRAN) 16 mg, dexamethasone (DECADRON) 10 mg in sodium chloride 0 9 % 50 mL IVPB   Intravenous Once    ondansetron (ZOFRAN) injection 4 mg  4 mg Intravenous Q6H PRN    polyethylene glycol (MIRALAX) packet 17 g  17 g Oral Daily    senna-docusate sodium (SENOKOT S) 8 6-50 mg per tablet 1 tablet  1 tablet Oral BID    sodium chloride 0 9 % infusion  20 mL/hr Intravenous Once PRN    sodium chloride 0 9 % infusion  20 mL/hr Intravenous Once PRN    sodium chloride 0 9 % infusion  50 mL/hr Intravenous Continuous    tamsulosin (FLOMAX) capsule 0 4 mg  0 4 mg Oral Daily With Dinner    vancomycin (VANCOCIN) IVPB (premix in dextrose) 1,000 mg 200 mL  1,000 mg Intravenous Q12H       VTE Pharmacologic Prophylaxis: Heparin  VTE Mechanical Prophylaxis: sequential compression device    Portions of the record may have been created with voice recognition software  Occasional wrong word or "sound a like" substitutions may have occurred due to the inherent limitations of voice recognition software    Read the chart carefully and recognize, using context, where substitutions have occurred   ==  Noah Almazan, [address] [facility]  Internal Medicine Residency PGY-2

## 2021-05-05 NOTE — PHYSICAL THERAPY NOTE
PHYSICAL THERAPY NOTE          Patient Name: Shannan Benson  PMSGM'X Date: 5/5/2021 05/05/21 4036   PT Last Visit   PT Visit Date 05/05/21   Note Type   Note Type Treatment   Pain Assessment   Pain Assessment Tool FLACC   Pain Score No Pain   Restrictions/Precautions   Weight Bearing Precautions Per Order No   Other Precautions Cognitive; Chair Alarm; Bed Alarm;Multiple lines; Fall Risk  (aphasia )   General   Chart Reviewed Yes   Response to Previous Treatment Patient unable to report, no changes reported from family or staff   Family/Caregiver Present No   Cognition   Overall Cognitive Status Impaired   Arousal/Participation Alert; Cooperative   Attention Difficulty attending to directions   Memory Unable to assess   Following Commands Follows one step commands inconsistently   Comments Pt alert and cooperative to participate in therapy session  Responded "ok" or nodded head yes/no    Bed Mobility   Supine to Sit 2  Maximal assistance   Additional items Assist x 2; Increased time required;LE management;HOB elevated   Sit to Supine Unable to assess   Additional Comments Pt supine in bed upon arrival  Pt sat EOB with min A/ CGA to correct R lateral lean and maintain upright posture  Pt sat EOB ~12 min  Hands positioned on bed and feet positioned on floor to assist in upright posture  Pt sitting upright in bedside chair with alarm on and all needs within reach at the end of therapy session    Transfers   Sit to Stand 2  Maximal assistance   Additional items Assist x 2; Increased time required   Stand to Sit 2  Maximal assistance   Additional items Assist x 2; Increased time required   Stand pivot 2  Maximal assistance   Additional items Assist x 2; Increased time required   Additional Comments Transfers with b/l HHA      Ambulation/Elevation   Gait pattern Not tested   Balance   Static Sitting Fair -   Dynamic Sitting Poor +   Static Standing Poor -   Dynamic Standing Poor -   Ambulatory Poor -   Endurance Deficit   Endurance Deficit Yes   Endurance Deficit Description faituge    Activity Tolerance   Activity Tolerance Patient limited by fatigue   Medical Staff Made Aware Rehab aidcorbin Jovel    Nurse Made Aware RN cleared pt to participate in therapy session    Assessment   Prognosis Fair   Problem List Decreased strength;Decreased endurance;Decreased mobility; Decreased coordination; Impaired balance;Decreased cognition;Decreased safety awareness;Pain; Impaired vision   Assessment Pt seen for PT treatment session this date  Therapy session focused on bed mobility, static/dynamic sitting tolerance/balance, functional transfers, and endurance training in order to improve overall mobility and independence  Pt requires assist of 2 for all mobility performed this date  Pt performed supine to sit bed mobility tasks with max Ax2; HOB elevated, trunk/ LE management and increased time required to complete tasks  Pt sat EOB with min A/CGA to correct R lateral lean and maintain upright posture  Increased fatigue noted in sitting this date- pt could only tolerate ~12 min sitting upright  Pt performed 3x STS from EOB/bedside chair using HHA and max Ax2 to complete  SPT from EOB to bedside chair completed with max Ax2; pt unable to take steps this date  Pt incontinent of bladder- increased time required for hygiene tasks  Pt making slow progress toward goals due to decreased activity tolerance  Pt was left sitting upright in bedside chair with alarm on at the end of PT session with all needs in reach  Pt would benefit from continued PT services while in hospital to address remaining limitations  PT to continue to follow pt and recommends post-acute rehab services after d/c  The patient's AM-PAC Basic Mobility Inpatient Short Form Low Function Raw Score 10 , Standardized Score is 14 65   A standardized score less 42 9 suggests the patient may benefit from discharge to post-acute rehab services  Please also refer to the recommendation of the Physical Therapist for safe discharge planning  Goals   Patient Goals none stated    STG Expiration Date 05/10/21   PT Treatment Day 3   Plan   Treatment/Interventions ADL retraining;Functional transfer training;LE strengthening/ROM; Endurance training;Patient/family training;Bed mobility;Spoke to nursing   Progress Slow progress, decreased activity tolerance   PT Frequency Other (Comment)  (3-5x/wk )   Recommendation   PT Discharge Recommendation Post acute rehabilitation services   Equipment Recommended   (TBD )   PT - OK to Discharge Yes  (to rehab once medically cleared )   AM-PAC Basic Mobility Inpatient   Turning in Bed Without Bedrails 1   Lying on Back to Sitting on Edge of Flat Bed 1   Moving Bed to Chair 1   Standing Up From Chair 1   Walk in Room 1   Climb 3-5 Stairs 1   Basic Mobility Inpatient Raw Score 6   Turning Head Towards Sound 2   Follow Simple Instructions 2   Low Function Basic Mobility Raw Score 10   Low Function Basic Mobility Standardized Score 14 65     Araceli Ashley, PT, DPT

## 2021-05-05 NOTE — CONSULTS
Vancomycin Pharmacy Consult      Vancomycin has been discontinued; Pharmacy will sign off now  Thank you for involving us in this patient's care  Please do not hesitate to reach back out to Pharmacy  Mary Mcadams, PharmD  Internal Medicine Clinical Pharmacist Specialist  [phone]  [facility]

## 2021-05-05 NOTE — PLAN OF CARE
Problem: PHYSICAL THERAPY ADULT  Goal: Performs mobility at highest level of function for planned discharge setting  See evaluation for individualized goals  Description: Treatment/Interventions: ADL retraining, Functional transfer training, LE strengthening/ROM, Endurance training, Patient/family training, Bed mobility, Spoke to nursing, OT  Equipment Recommended: (TBD)       See flowsheet documentation for full assessment, interventions and recommendations  Outcome: Progressing  Note: Prognosis: Fair  Problem List: Decreased strength, Decreased endurance, Decreased mobility, Decreased coordination, Impaired balance, Decreased cognition, Decreased safety awareness, Pain, Impaired vision  Assessment: Pt seen for PT treatment session this date  Therapy session focused on bed mobility, static/dynamic sitting tolerance/balance, functional transfers, and endurance training in order to improve overall mobility and independence  Pt requires assist of 2 for all mobility performed this date  Pt performed supine to sit bed mobility tasks with max Ax2; HOB elevated, trunk/ LE management and increased time required to complete tasks  Pt sat EOB with min A/CGA to correct R lateral lean and maintain upright posture  Increased fatigue noted in sitting this date- pt could only tolerate ~12 min sitting upright  Pt performed 3x STS from EOB/bedside chair using HHA and max Ax2 to complete  SPT from EOB to bedside chair completed with max Ax2; pt unable to take steps this date  Pt incontinent of bladder- increased time required for hygiene tasks  Pt making slow progress toward goals due to decreased activity tolerance  Pt was left sitting upright in bedside chair with alarm on at the end of PT session with all needs in reach  Pt would benefit from continued PT services while in hospital to address remaining limitations  PT to continue to follow pt and recommends post-acute rehab services after d/c   The patient's AM-PAC Basic Mobility Inpatient Short Form Low Function Raw Score 10 , Standardized Score is 14 65  A standardized score less 42 9 suggests the patient may benefit from discharge to post-acute rehab services  Please also refer to the recommendation of the Physical Therapist for safe discharge planning  PT Discharge Recommendation: Post acute rehabilitation services     PT - OK to Discharge: Yes(to rehab once medically cleared )    See flowsheet documentation for full assessment

## 2021-05-06 LAB
ERYTHROCYTE [DISTWIDTH] IN BLOOD BY AUTOMATED COUNT: 14.5 % (ref 11.6–15.1)
GLUCOSE SERPL-MCNC: 135 MG/DL (ref 65–140)
GLUCOSE SERPL-MCNC: 181 MG/DL (ref 65–140)
GLUCOSE SERPL-MCNC: 192 MG/DL (ref 65–140)
GLUCOSE SERPL-MCNC: 261 MG/DL (ref 65–140)
HCT VFR BLD AUTO: 24.3 % (ref 34.8–46.1)
HGB BLD-MCNC: 8.3 G/DL (ref 11.5–15.4)
MCH RBC QN AUTO: 32.4 PG (ref 26.8–34.3)
MCHC RBC AUTO-ENTMCNC: 34.2 G/DL (ref 31.4–37.4)
MCV RBC AUTO: 95 FL (ref 82–98)
NRBC BLD AUTO-RTO: 2 /100 WBCS
PLATELET # BLD AUTO: 132 THOUSANDS/UL (ref 149–390)
PMV BLD AUTO: 11 FL (ref 8.9–12.7)
RBC # BLD AUTO: 2.56 MILLION/UL (ref 3.81–5.12)
WBC # BLD AUTO: 10.88 THOUSAND/UL (ref 4.31–10.16)

## 2021-05-06 PROCEDURE — 85027 COMPLETE CBC AUTOMATED: CPT | Performed by: STUDENT IN AN ORGANIZED HEALTH CARE EDUCATION/TRAINING PROGRAM

## 2021-05-06 PROCEDURE — 92526 ORAL FUNCTION THERAPY: CPT

## 2021-05-06 PROCEDURE — 82948 REAGENT STRIP/BLOOD GLUCOSE: CPT

## 2021-05-06 RX ORDER — BISACODYL 10 MG
10 SUPPOSITORY, RECTAL RECTAL DAILY PRN
Status: DISCONTINUED | OUTPATIENT
Start: 2021-05-06 | End: 2021-05-06

## 2021-05-06 RX ORDER — LACTULOSE 20 G/30ML
10 SOLUTION ORAL 2 TIMES DAILY PRN
Status: DISCONTINUED | OUTPATIENT
Start: 2021-05-06 | End: 2021-05-24

## 2021-05-06 RX ORDER — BISACODYL 10 MG
10 SUPPOSITORY, RECTAL RECTAL DAILY PRN
Status: DISCONTINUED | OUTPATIENT
Start: 2021-05-06 | End: 2021-06-01

## 2021-05-06 RX ADMIN — FAMOTIDINE 20 MG: 20 TABLET ORAL at 17:15

## 2021-05-06 RX ADMIN — TAMSULOSIN HYDROCHLORIDE 0.4 MG: 0.4 CAPSULE ORAL at 17:15

## 2021-05-06 RX ADMIN — INSULIN LISPRO 1 UNITS: 100 INJECTION, SOLUTION INTRAVENOUS; SUBCUTANEOUS at 11:34

## 2021-05-06 RX ADMIN — SENNOSIDES, DOCUSATE SODIUM 1 TABLET: 8.6; 5 TABLET ORAL at 08:16

## 2021-05-06 RX ADMIN — DEXAMETHASONE SODIUM PHOSPHATE 4 MG: 4 INJECTION INTRA-ARTICULAR; INTRALESIONAL; INTRAMUSCULAR; INTRAVENOUS; SOFT TISSUE at 22:20

## 2021-05-06 RX ADMIN — SODIUM CHLORIDE 50 ML/HR: 0.9 INJECTION, SOLUTION INTRAVENOUS at 01:43

## 2021-05-06 RX ADMIN — POLYETHYLENE GLYCOL 3350 17 G: 17 POWDER, FOR SOLUTION ORAL at 08:16

## 2021-05-06 RX ADMIN — HEPARIN SODIUM 5000 UNITS: 5000 INJECTION INTRAVENOUS; SUBCUTANEOUS at 14:10

## 2021-05-06 RX ADMIN — FAMOTIDINE 20 MG: 20 TABLET ORAL at 08:16

## 2021-05-06 RX ADMIN — DEXAMETHASONE SODIUM PHOSPHATE 4 MG: 4 INJECTION INTRA-ARTICULAR; INTRALESIONAL; INTRAMUSCULAR; INTRAVENOUS; SOFT TISSUE at 08:17

## 2021-05-06 RX ADMIN — SENNOSIDES, DOCUSATE SODIUM 1 TABLET: 8.6; 5 TABLET ORAL at 17:15

## 2021-05-06 RX ADMIN — HEPARIN SODIUM 5000 UNITS: 5000 INJECTION INTRAVENOUS; SUBCUTANEOUS at 22:20

## 2021-05-06 RX ADMIN — SODIUM CHLORIDE 50 ML/HR: 0.9 INJECTION, SOLUTION INTRAVENOUS at 22:09

## 2021-05-06 RX ADMIN — INSULIN GLARGINE 9 UNITS: 100 INJECTION, SOLUTION SUBCUTANEOUS at 22:19

## 2021-05-06 RX ADMIN — DEXAMETHASONE SODIUM PHOSPHATE 4 MG: 4 INJECTION INTRA-ARTICULAR; INTRALESIONAL; INTRAMUSCULAR; INTRAVENOUS; SOFT TISSUE at 17:15

## 2021-05-06 RX ADMIN — AMLODIPINE BESYLATE 5 MG: 5 TABLET ORAL at 08:16

## 2021-05-06 RX ADMIN — LIDOCAINE 5% 1 PATCH: 700 PATCH TOPICAL at 08:16

## 2021-05-06 RX ADMIN — DEXAMETHASONE SODIUM PHOSPHATE 4 MG: 4 INJECTION INTRA-ARTICULAR; INTRALESIONAL; INTRAMUSCULAR; INTRAVENOUS; SOFT TISSUE at 11:35

## 2021-05-06 RX ADMIN — LACTULOSE 10 G: 10 SOLUTION ORAL at 03:04

## 2021-05-06 RX ADMIN — HEPARIN SODIUM 5000 UNITS: 5000 INJECTION INTRAVENOUS; SUBCUTANEOUS at 05:07

## 2021-05-06 RX ADMIN — INSULIN LISPRO 1 UNITS: 100 INJECTION, SOLUTION INTRAVENOUS; SUBCUTANEOUS at 05:07

## 2021-05-06 RX ADMIN — INSULIN LISPRO 2 UNITS: 100 INJECTION, SOLUTION INTRAVENOUS; SUBCUTANEOUS at 01:44

## 2021-05-06 NOTE — NUTRITION
05/06/21 3964   Recommendations/Interventions   Summary Physician contacted [facility] re: possible need for PEG tube prior to transfer to [facility]; it is noted that patients PO intake has not significantly improved since transition to cycled tube feeding   Nutrition Recommendations Other (Specify)  (if patient planned for PEG tube rec: jevity 1 2 continuous at 45ml/hour; flush with 150ml water q 6 hours (~1300kcal; 60g pro,1470ml total water); continue PO diet as rx)

## 2021-05-06 NOTE — CASE MANAGEMENT
CM contacted the [facility] and [facility] to follow up on the referral  CM left both facilities a voicemail requesting a return call  CM will follow

## 2021-05-06 NOTE — PROGRESS NOTES
INTERNAL MEDICINE RESIDENCY PROGRESS NOTE     Name: Vivienne Mcintosh   Age & Sex: 49 y o  female   MRN: [id number]  Unit/Bed#: [facility] 920-01   Encounter: [id number]  Team: SOD Team B     PATIENT INFORMATION     Name: Vivienne Mcintosh   Age & Sex: 49 y o  female   MRN: [id number] [facility] [address] Stay Days: 13    ASSESSMENT/PLAN     Principal Problem:    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[facility] [address] )  Active Problems:    Sepsis ([facility] [address] )    Altered mental status    Dysphagia    Urinary retention    Presence of permanent central venous catheter    Steroid-induced hyperglycemia    Aphasia    Weakness    Fracture of ramus of left pubis (HCC)    Severe protein-calorie malnutrition ([facility] [address] )      Sepsis ([facility] [address] )  Assessment & Plan  Patient had hypothermia, elevated WBC count and tachycardia  Concern for possible aspiration in setting on dysphagia versus UTI in setting of urinary catheter which was placed for retention  Plan:  S/p IV antibiotics last day on 05/05  Will continue to monitor off antibiotics  * 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[facility])  Assessment & Plan  Patient was diagnosed with primary 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through biopsy in [facility] transferred over here for chemotherapy since the [facility] is out of network  Oncology and Palliative care services consulted, appreciate recommendations   Continue with dexamethasone  As per [facility] progress note patient had discussion with palliative Care and at that time family wants to pursue disease directed therapy    Plan:  Had a 2nd family meeting on 04/30 and family has decided to proceed treatment at this time  Hematology-Oncology consulted  Appreciate recommendations  S/p 1 cycle of Chemotherapy treatment as per Hematology-Oncology  Patient has poor prognosis even with treatment  S/p PICC line for chemotherapy  DVT prophylaxis:  On heparin  Case management-will start looking for places for rehab  Facilities may not accept patient since she has Keofed   Patient currently has keofed and VPS showed severe oropharyngeal dysphagia  Plan is to eventually discontinue Keofed and may consider PEG tube if calorie intake is low   (was making medical decisions for her) is agreeing with placement of PEG tube if needed  Steroid-induced hyperglycemia  Assessment & Plan  Mild hyperglycemia, glucoses in 200s, no history of DM  Occurring in the setting of Decadron with chemotherapy, as well as sodium bicarb in D5 drip  - D5 drip is finished  - will monitor sugars while on Jevity and now off D5  - continue q6 hour fingerstick glucose with correctional algorithm 1 and Lantus    Presence of permanent central venous catheter  Assessment & Plan  Noted presence of right IJ tunneled double-lumen HD catheter; upon chart review, this was likely inserted to be used for plasmapheresis which she has completed  Plan:  S/p removal by IR  Patient now has PICC line  Urinary retention  Assessment & Plan  Patient developed urinary retention during previous hospitalization  Plan was voiding trial as an outpatient as she was scheduled for discharge from that facility  Patient passed voiding trial and now voiding appropriately  Urinary retention protocol and strict ins and outs  Dysphagia  Assessment & Plan  Patient noted to have dysphagia and also decreased p o  Intake  As per [facility] progress note patient had a calorie count and recommended feeding tube placement  Speech evaluation - advance diet with dysphagia 2 and thin liquids; needs assistance with feeds  Calorie count  If the patient continued to have poor p o  Intake may need discussion with the family regarding alternate methods of nutrition  Patient's calorie intake is low  Keofed placed evening of 4/30, Jevity 1 2 started, advancing as tolerated  VBS- showed oropharyngeal dysphagia      Altered mental status  Assessment & Plan  Patient initially presented to [facility] his ultimate [place] with stroke-like symptoms for 2 days  CT scan of the head was concerning for subacute CVA and was transferred to [facility]   MRI of the brain was more consistent with demyelinating disease patient was started on IV steroids and 1000 mg daily for 5 days  Patient did not have any significant improvement at that time patient had plasmapheresis  And also had  lumbar puncture-negative for infection or malignancy  Patient noted to have persistent encephalopathy so a repeat MRI was obtained which showed worsening of the abnormality seen on the previous MRI and 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Continue with the dexamethasone  Likely secondary to 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Delirium precautions   Currently alert and oriented times 2-3   follow commands  Moves all extremities spontaneously except left upper and lower extrimities  Patient's  (does not speak [place]) makes all medical decision for her  Severe protein-calorie malnutrition ([facility] [address] )  Assessment & Plan  Malnutrition Findings:   Adult Malnutrition type: Acute illness(due to medical condition resulting in dysphagia, decreased appetite and intake, weight loss)  Adult Degree of Malnutrition: Other severe protein calorie malnutrition    BMI Findings: Body mass index is 20 49 kg/m²  Plan:  Nutrition consult  Keofed placed 4/30  Tube feeds initiated with Jevity 1 2, advancing to goal      Fracture of ramus of left pubis [facility])  Assessment & Plan  Betancourt Mediate prior to presentation to [facility]  Managed nonoperatively at [facility]    Weakness  Assessment & Plan  Left greater than right upper and lower extremity weakness with ongoing left lower extremity contracture  Secondary to underlying CNS malignancy  *Decubitus ulcer precautions such as frequent repositioning  Aphasia  Assessment & Plan  Definite evidence of expressive aphasia, possible component of receptive aphasia as well  Speech therapy on board         Disposition:  CM working on finding places for rehab  SUBJECTIVE     Patient seen and examined  No acute events overnight  Alert and oriented x2 3  Left-sided paralysis at baseline  OBJECTIVE     Vitals:    21 0145 21 0245 21 0600 21 0708   BP: 115/76 98/61  116/80   Pulse: 85 82  91   Resp:  20     Temp: 98 1 °F (36 7 °C) 98 2 °F (36 8 °C)  98 6 °F (37 °C)   TempSrc:  Axillary     SpO2: 100% 100%  100%   Weight:   47 2 kg (104 lb 1 6 oz)    Height:          Temperature:   Temp (24hrs), Av 5 °F (36 9 °C), Min:98 1 °F (36 7 °C), Max:98 9 °F (37 2 °C)    Temperature: 98 6 °F (37 °C)  Intake & Output:  I/O        07 -  07 07 -  07 07 -  0700    P  O  270 240     I V  (mL/kg) 3317 9 (66 2) 1349 6 (28 6)     NG/ 600 100    IV Piggyback 250 50     Feedings 878 709 133    Total Intake(mL/kg) 5246 9 (104 7) 2948 6 (62 5) 233 (4 9)    Urine (mL/kg/hr) 4388 (3 6) 2513 (2 2)     Stool 0 0     Total Output 4388 2513     Net +858 9 +435 6 +233           Unmeasured Urine Occurrence  1 x     Unmeasured Stool Occurrence 2 x 3 x         Weights:   IBW (Ideal Body Weight): 45 5 kg    Body mass index is 20 33 kg/m²  Weight (last 2 days)     Date/Time   Weight    21 0600   47 2 (104 1)    21 0546   50 1 (110 45)    21 0600   48 1 (106 04)            Physical Exam  Vitals signs reviewed  Constitutional:       General: She is not in acute distress  Appearance: She is well-developed  She is not diaphoretic  HENT:      Head: Normocephalic and atraumatic  Nose: Nose normal       Mouth/Throat:      Pharynx: No oropharyngeal exudate  Eyes:      General: No scleral icterus  Conjunctiva/sclera: Conjunctivae normal    Neck:      Musculoskeletal: Neck supple  Thyroid: No thyromegaly  Vascular: No JVD  Trachea: No tracheal deviation  Cardiovascular:      Rate and Rhythm: Normal rate and regular rhythm        Heart sounds: Normal heart sounds  No murmur  No friction rub  No gallop  Pulmonary:      Effort: Pulmonary effort is normal  No respiratory distress  Breath sounds: Normal breath sounds  No stridor  No wheezing or rales  Chest:      Chest wall: No tenderness  Abdominal:      General: Bowel sounds are normal  There is no distension  Palpations: Abdomen is soft  There is no mass  Tenderness: There is no abdominal tenderness  There is no guarding or rebound  Musculoskeletal: Normal range of motion  General: No tenderness  Skin:     General: Skin is warm  Findings: No erythema or rash  Neurological:      Mental Status: Mental status is at baseline  Sensory: No sensory deficit  Comments: Left leg 0/5 strength, left arm 3-4/5       LABORATORY DATA     Labs: I have personally reviewed pertinent reports  Results from last 7 days   Lab Units 05/06/21  0502 05/05/21  0557 05/04/21  0627  05/02/21  0451  04/30/21  0506   WBC Thousand/uL 10 88* 10 98* 9 55   < > 14 45*   < > 7 46   HEMOGLOBIN g/dL 8 3* 8 1* 9 2*   < > 11 5   < > 10 6*   HEMATOCRIT % 24 3* 23 9* 27 2*   < > 33 7*   < > 31 9*   PLATELETS Thousands/uL 132* 124* 139*   < > 216   < > 147*   NEUTROS PCT %  --   --   --   --   --   --  87*   MONOS PCT %  --   --   --   --   --   --  3*   MONO PCT %  --  1*  --   --  0*  --   --     < > = values in this interval not displayed  Results from last 7 days   Lab Units 05/05/21  0557 05/04/21  0627 05/03/21  0601  04/30/21  0506 04/29/21  1227   POTASSIUM mmol/L 4 0 4 2 4 2   < > 3 5  3 3* 3 7   CHLORIDE mmol/L 105 103 98*   < > 100  98* 98*   CO2 mmol/L 21 24 22   < > 31  32 34*   BUN mg/dL 22 24 30*   < > 16  15 13   CREATININE mg/dL 0 38* 0 46* 0 58*   < > 0 48*  0 48* 0 50*   CALCIUM mg/dL 7 9* 8 1* 8 7   < > 9 1  9 1 9 3   ALK PHOS U/L 55  --   --   --  53 54   ALT U/L 56  --   --   --  36 36   AST U/L 14  --   --   --  14 15    < > = values in this interval not displayed  Results from last 7 days   Lab Units 05/02/21  0451 05/01/21  0554 04/30/21  0506   PHOSPHORUS mg/dL 2 7 1 4* 2 7          Results from last 7 days   Lab Units 05/04/21  2243   LACTIC ACID mmol/L 3 1*           IMAGING & DIAGNOSTIC TESTING     Radiology Results: I have personally reviewed pertinent reports  Xr Chest Portable    Result Date: 4/24/2021  Impression: Dialysis catheter tip within the superior cavoatrial junction  No pneumothorax  Workstation performed: [id number]     Xr Chest Picc Line Portable    Result Date: 4/27/2021  Impression: PICC line tip in the superior vena cava, approximately 3 5 to 4 cm above the cavoatrial junction  The examination demonstrates a significant  finding and was documented as such in [facility] for liaison and referring practitioner notification  Workstation performed: [id number]     Ir Picc Reposition    Result Date: 4/27/2021  Impression: Status-post repositioning of a 5 [place] central venous catheter under fluoroscopic guidance with its tip at the cavoatrial junction  Workstation performed: [id number]     Ir Tunneled Central Line Removal    Result Date: 4/27/2021  Impression: Impression: Successful tunneled central line removal as described  Signed, performed and dictated by Luis Fernando Narayan PA-C under the supervision of Dr Kevyn Pavon  Workstation performed: [id number]     Other Diagnostic Testing: I have personally reviewed pertinent reports      ACTIVE MEDICATIONS     Current Facility-Administered Medications   Medication Dose Route Frequency    albuterol (PROVENTIL HFA,VENTOLIN HFA) inhaler 2 puff  2 puff Inhalation Q6H PRN    amLODIPine (NORVASC) tablet 5 mg  5 mg Oral Daily    bisacodyl (DULCOLAX) rectal suppository 10 mg  10 mg Rectal Daily PRN    dexamethasone (DECADRON) injection 4 mg  4 mg Intravenous 4x Daily    famotidine (PEPCID) tablet 20 mg  20 mg Oral BID    heparin (porcine) subcutaneous injection 5,000 Units  5,000 Units Subcutaneous Q8H [address]    insulin glargine (LANTUS) subcutaneous injection 9 Units 0 09 mL  9 Units Subcutaneous HS    insulin lispro (HumaLOG) 100 units/mL subcutaneous injection 1-5 Units  1-5 Units Subcutaneous Q6H [address]    lactulose oral solution 10 g  10 g Oral BID PRN    lidocaine (LIDODERM) 5 % patch 1 patch  1 patch Topical Daily    methotrexate (PF) 5,000 mg in sodium chloride 0 9 % 1,000 mL IVPB  5,000 mg Intravenous Once    ondansetron (ZOFRAN) 16 mg, dexamethasone (DECADRON) 10 mg in sodium chloride 0 9 % 50 mL IVPB   Intravenous Once    ondansetron (ZOFRAN) injection 4 mg  4 mg Intravenous Q6H PRN    polyethylene glycol (MIRALAX) packet 17 g  17 g Oral Daily    senna-docusate sodium (SENOKOT S) 8 6-50 mg per tablet 1 tablet  1 tablet Oral BID    sodium chloride 0 9 % infusion  20 mL/hr Intravenous Once PRN    sodium chloride 0 9 % infusion  20 mL/hr Intravenous Once PRN    sodium chloride 0 9 % infusion  50 mL/hr Intravenous Continuous    tamsulosin (FLOMAX) capsule 0 4 mg  0 4 mg Oral Daily With Dinner       VTE Pharmacologic Prophylaxis: Heparin  VTE Mechanical Prophylaxis: sequential compression device    Portions of the record may have been created with voice recognition software  Occasional wrong word or "sound a like" substitutions may have occurred due to the inherent limitations of voice recognition software    Read the chart carefully and recognize, using context, where substitutions have occurred   ==  Mario Wilkes, [address] [facility]  Internal Medicine Residency PGY-2

## 2021-05-06 NOTE — SPEECH THERAPY NOTE
Speech Language/Pathology    Speech/Language Pathology Progress Note    Patient Name: Mekhi Samuels  AUREAGAN Date: 5/6/2021     Problem List  Principal Problem:    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[facility] [address] )  Active Problems:    Altered mental status    Dysphagia    Urinary retention    Aphasia    Weakness    Presence of permanent central venous catheter    Fracture of ramus of left pubis (HCC)    Severe protein-calorie malnutrition (HCC)    Steroid-induced hyperglycemia    Sepsis ([facility] [address] )       Past Medical History  History reviewed  No pertinent past medical history  Past Surgical History  Past Surgical History:   Procedure Laterality Date    IR PICC REPOSITION  4/27/2021    IR TUNNELED CENTRAL LINE REMOVAL  4/27/2021         Subjective:  Patient awake and alert  Objective: The patient is seen for f/u dysphagia therapy  She continues on a puree diet with honey thick liquids, with NGT feeds  Intake has been ~25% at each meal  She is agreeable to am snack and SLP feeds patient pudding  Oral closure for tsp continues to be reduced and patient continues to "scrape" bolus from tsp with teeth  Oral transfer is min prolonged, but patient has good bolus control and clears oral cavity with lingual sweep  Patient is assessed with nectar thick vs honey thick liquids  Small bolus is poured behind patient's lower teeth  She again continues with good bolus control  The patient appears to have less control with nectar thick liquids, requiring multiple swallows to clear  She immediately raises hand, indicating to stop intake  She appears to tolerate honey thick liquids better  No overt s/s aspiration observed across all trials  The patient continues with overall fatigue and deconditioning increasing aspiration risl  Assessment:  Patient appears to be tolerating puree solids and honey thick liquids well  Plan/Recommendations:  Recommend to continue with current diet   Recommend f/u with ST In rehab, and consider repeat VBS during or after rehab stay  May need to consider PEG to help meet calorie needs  No further ST warranted in acute care  Please re-consult with concerns

## 2021-05-07 PROBLEM — R53.1 LEFT-SIDED WEAKNESS: Status: ACTIVE | Noted: 2021-05-07

## 2021-05-07 LAB
ERYTHROCYTE [DISTWIDTH] IN BLOOD BY AUTOMATED COUNT: 15 % (ref 11.6–15.1)
GLUCOSE SERPL-MCNC: 194 MG/DL (ref 65–140)
GLUCOSE SERPL-MCNC: 197 MG/DL (ref 65–140)
GLUCOSE SERPL-MCNC: 203 MG/DL (ref 65–140)
GLUCOSE SERPL-MCNC: 218 MG/DL (ref 65–140)
GLUCOSE SERPL-MCNC: 226 MG/DL (ref 65–140)
GLUCOSE SERPL-MCNC: 239 MG/DL (ref 65–140)
HCT VFR BLD AUTO: 24.4 % (ref 34.8–46.1)
HGB BLD-MCNC: 8.4 G/DL (ref 11.5–15.4)
MCH RBC QN AUTO: 32.8 PG (ref 26.8–34.3)
MCHC RBC AUTO-ENTMCNC: 34.4 G/DL (ref 31.4–37.4)
MCV RBC AUTO: 95 FL (ref 82–98)
NRBC BLD AUTO-RTO: 3 /100 WBCS
PLATELET # BLD AUTO: 127 THOUSANDS/UL (ref 149–390)
PMV BLD AUTO: 10.8 FL (ref 8.9–12.7)
RBC # BLD AUTO: 2.56 MILLION/UL (ref 3.81–5.12)
WBC # BLD AUTO: 8.99 THOUSAND/UL (ref 4.31–10.16)

## 2021-05-07 PROCEDURE — 85027 COMPLETE CBC AUTOMATED: CPT | Performed by: STUDENT IN AN ORGANIZED HEALTH CARE EDUCATION/TRAINING PROGRAM

## 2021-05-07 PROCEDURE — 82948 REAGENT STRIP/BLOOD GLUCOSE: CPT

## 2021-05-07 PROCEDURE — 99254 IP/OBS CNSLTJ NEW/EST MOD 60: CPT | Performed by: INTERNAL MEDICINE

## 2021-05-07 RX ORDER — DEXAMETHASONE SODIUM PHOSPHATE 4 MG/ML
4 INJECTION, SOLUTION INTRA-ARTICULAR; INTRALESIONAL; INTRAMUSCULAR; INTRAVENOUS; SOFT TISSUE EVERY 8 HOURS
Status: COMPLETED | OUTPATIENT
Start: 2021-05-07 | End: 2021-05-09

## 2021-05-07 RX ORDER — DEXAMETHASONE SODIUM PHOSPHATE 4 MG/ML
4 INJECTION, SOLUTION INTRA-ARTICULAR; INTRALESIONAL; INTRAMUSCULAR; INTRAVENOUS; SOFT TISSUE DAILY
Status: COMPLETED | OUTPATIENT
Start: 2021-05-12 | End: 2021-05-13

## 2021-05-07 RX ORDER — INSULIN GLARGINE 100 [IU]/ML
11 INJECTION, SOLUTION SUBCUTANEOUS
Status: DISCONTINUED | OUTPATIENT
Start: 2021-05-07 | End: 2021-05-09

## 2021-05-07 RX ORDER — DEXAMETHASONE SODIUM PHOSPHATE 4 MG/ML
4 INJECTION, SOLUTION INTRA-ARTICULAR; INTRALESIONAL; INTRAMUSCULAR; INTRAVENOUS; SOFT TISSUE EVERY 12 HOURS
Status: COMPLETED | OUTPATIENT
Start: 2021-05-09 | End: 2021-05-11

## 2021-05-07 RX ADMIN — INSULIN GLARGINE 11 UNITS: 100 INJECTION, SOLUTION SUBCUTANEOUS at 21:56

## 2021-05-07 RX ADMIN — AMLODIPINE BESYLATE 5 MG: 5 TABLET ORAL at 08:18

## 2021-05-07 RX ADMIN — INSULIN LISPRO 2 UNITS: 100 INJECTION, SOLUTION INTRAVENOUS; SUBCUTANEOUS at 11:46

## 2021-05-07 RX ADMIN — DEXAMETHASONE SODIUM PHOSPHATE 4 MG: 4 INJECTION INTRA-ARTICULAR; INTRALESIONAL; INTRAMUSCULAR; INTRAVENOUS; SOFT TISSUE at 11:46

## 2021-05-07 RX ADMIN — SENNOSIDES, DOCUSATE SODIUM 1 TABLET: 8.6; 5 TABLET ORAL at 17:36

## 2021-05-07 RX ADMIN — SENNOSIDES, DOCUSATE SODIUM 1 TABLET: 8.6; 5 TABLET ORAL at 08:18

## 2021-05-07 RX ADMIN — DEXAMETHASONE SODIUM PHOSPHATE 4 MG: 4 INJECTION INTRA-ARTICULAR; INTRALESIONAL; INTRAMUSCULAR; INTRAVENOUS; SOFT TISSUE at 20:04

## 2021-05-07 RX ADMIN — POLYETHYLENE GLYCOL 3350 17 G: 17 POWDER, FOR SOLUTION ORAL at 08:18

## 2021-05-07 RX ADMIN — INSULIN LISPRO 1 UNITS: 100 INJECTION, SOLUTION INTRAVENOUS; SUBCUTANEOUS at 17:37

## 2021-05-07 RX ADMIN — HEPARIN SODIUM 5000 UNITS: 5000 INJECTION INTRAVENOUS; SUBCUTANEOUS at 21:56

## 2021-05-07 RX ADMIN — LIDOCAINE 5% 1 PATCH: 700 PATCH TOPICAL at 08:18

## 2021-05-07 RX ADMIN — HEPARIN SODIUM 5000 UNITS: 5000 INJECTION INTRAVENOUS; SUBCUTANEOUS at 06:09

## 2021-05-07 RX ADMIN — FAMOTIDINE 20 MG: 20 TABLET ORAL at 08:18

## 2021-05-07 RX ADMIN — INSULIN LISPRO 2 UNITS: 100 INJECTION, SOLUTION INTRAVENOUS; SUBCUTANEOUS at 00:40

## 2021-05-07 RX ADMIN — INSULIN LISPRO 1 UNITS: 100 INJECTION, SOLUTION INTRAVENOUS; SUBCUTANEOUS at 06:09

## 2021-05-07 RX ADMIN — DEXAMETHASONE SODIUM PHOSPHATE 4 MG: 4 INJECTION INTRA-ARTICULAR; INTRALESIONAL; INTRAMUSCULAR; INTRAVENOUS; SOFT TISSUE at 08:18

## 2021-05-07 RX ADMIN — TAMSULOSIN HYDROCHLORIDE 0.4 MG: 0.4 CAPSULE ORAL at 17:36

## 2021-05-07 RX ADMIN — SODIUM CHLORIDE 50 ML/HR: 0.9 INJECTION, SOLUTION INTRAVENOUS at 18:30

## 2021-05-07 RX ADMIN — FAMOTIDINE 20 MG: 20 TABLET ORAL at 17:36

## 2021-05-07 RX ADMIN — HEPARIN SODIUM 5000 UNITS: 5000 INJECTION INTRAVENOUS; SUBCUTANEOUS at 14:04

## 2021-05-07 NOTE — PROGRESS NOTES
INTERNAL MEDICINE RESIDENCY PROGRESS NOTE     Name: Joan Talley   Age & Sex: 49 y o  female   MRN: [id number]  Unit/Bed#: [facility] 920-01   Encounter: [id number]  Team: SOD Team B     PATIENT INFORMATION     Name: Joan Talley   Age & Sex: 49 y o  female   MRN: [id number] Stay Days: 14    ASSESSMENT/PLAN     Principal Problem:    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[facility] [address] )  Active Problems:    Sepsis ([facility] [address] )    Altered mental status    Dysphagia    Urinary retention    Presence of permanent central venous catheter    Steroid-induced hyperglycemia    Left-sided weakness    Aphasia    Weakness    Fracture of ramus of left pubis (HCC)    Severe protein-calorie malnutrition ([facility] [address] )      Sepsis ([facility] [address] )  Assessment & Plan  Patient had hypothermia, elevated WBC count and tachycardia  Concern for possible aspiration in setting on dysphagia versus UTI in setting of urinary catheter which was placed for retention  Plan:  S/p IV antibiotics last day on 05/05  Will continue to monitor off antibiotics  * 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[facility])  Assessment & Plan  Patient was diagnosed with primary 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through biopsy in [facility] transferred over here for chemotherapy since the [facility] is out of network  Oncology and Palliative care services consulted, appreciate recommendations   Continue with dexamethasone  As per [facility] progress note patient had discussion with palliative Care and at that time family wants to pursue disease directed therapy    Plan:  Had a 2nd family meeting on 04/30 and family has decided to proceed treatment at this time  Hematology-Oncology consulted  Appreciate recommendations  S/p 1 cycle of Chemotherapy treatment as per Hematology-Oncology  Patient has poor prognosis even with treatment  S/p PICC line for chemotherapy  DVT prophylaxis:  On heparin  Case management-will start looking for places for rehab  Facilities may not accept patient since she has Keofed  Patient currently has keofed and VBS showed severe oropharyngeal dysphagia  Plan is to eventually discontinue Keofed and consider PEG tube given low calorie intake   (was making medical decisions for her) is agreeing with placement of PEG tube  GI consulted  Left-sided weakness  Assessment & Plan  Patient has left-sided weakness at baseline from her 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Left upper extremity 3-4/5 and left lower extremity 0-1/5 muscle strength at baseline  Steroid-induced hyperglycemia  Assessment & Plan  Mild hyperglycemia, glucoses in 200s, no history of DM  Occurring in the setting of Decadron with chemotherapy, as well as sodium bicarb in D5 drip  - D5 drip is finished  - will monitor sugars while on Jevity and now off D5  - continue q6 hour fingerstick glucose with correctional algorithm 1 and Lantus    Presence of permanent central venous catheter  Assessment & Plan  Noted presence of right IJ tunneled double-lumen HD catheter; upon chart review, this was likely inserted to be used for plasmapheresis which she has completed  Plan:  S/p removal by IR  Patient now has PICC line  Urinary retention  Assessment & Plan  Patient developed urinary retention during previous hospitalization  Plan was voiding trial as an outpatient as she was scheduled for discharge from that facility  Patient passed voiding trial and now voiding appropriately  Urinary retention protocol and strict ins and outs  Dysphagia  Assessment & Plan  Patient noted to have dysphagia and also decreased p o  Intake  As per [facility] progress note patient had a calorie count and recommended feeding tube placement  Speech evaluation - advance diet with dysphagia 2 and thin liquids; needs assistance with feeds  Calorie count  If the patient continued to have poor p o  Intake may need discussion with the family regarding alternate methods of nutrition  Patient's calorie intake is low     Keofed placed evening of 4/30, Jevity 1 2 started, advancing as tolerated  VBS- showed oropharyngeal dysphagia  Altered mental status  Assessment & Plan  Patient initially presented to [facility] his [facility] [place] with stroke-like symptoms for 2 days  CT scan of the head was concerning for subacute CVA and was transferred to [facility]   MRI of the brain was more consistent with demyelinating disease patient was started on IV steroids and 1000 mg daily for 5 days  Patient did not have any significant improvement at that time patient had plasmapheresis  And also had  lumbar puncture-negative for infection or malignancy  Patient noted to have persistent encephalopathy so a repeat MRI was obtained which showed worsening of the abnormality seen on the previous MRI and 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Continue with the dexamethasone  Likely secondary to 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Delirium precautions   Currently alert and oriented times 2-3   follow commands  Moves all extremities spontaneously except left upper and lower extrimities  Patient's  (does not speak [place]) makes all medical decision for her  Severe protein-calorie malnutrition ([facility] [address] )  Assessment & Plan  Malnutrition Findings:   Adult Malnutrition type: Acute illness(due to medical condition resulting in dysphagia, decreased appetite and intake, weight loss)  Adult Degree of Malnutrition: Other severe protein calorie malnutrition    BMI Findings: Body mass index is 20 49 kg/m²  Plan:  Nutrition consult  Keofed placed 4/30  Tube feeds initiated with Jevity 1 2, advancing to goal      Fracture of ramus of left pubis [facility])  Assessment & Plan  Katie Bustillo prior to presentation to [facility]  Managed nonoperatively at [facility]    Weakness  Assessment & Plan  Left greater than right upper and lower extremity weakness with ongoing left lower extremity contracture  Secondary to underlying CNS malignancy     *Decubitus ulcer precautions such as frequent repositioning  Aphasia  Assessment & Plan  Definite evidence of expressive aphasia, possible component of receptive aphasia as well  Speech therapy on board  Disposition:  Plan for PEG tube placement     SUBJECTIVE     Patient seen and examined  No acute events overnight  Alert and oriented times 1-2  Left-sided weakness at baseline  OBJECTIVE     Vitals:    21 1539 21 2209 21 0600 21 0729   BP: 121/80 123/82  124/82   Pulse: 86 81  84   Resp: 18 18  15   Temp: (!) 97 2 °F (36 2 °C) 97 8 °F (36 6 °C)  99 °F (37 2 °C)   TempSrc:       SpO2: 98% 100%  100%   Weight:   47 2 kg (104 lb 0 9 oz)    Height:          Temperature:   Temp (24hrs), Av °F (36 7 °C), Min:97 2 °F (36 2 °C), Max:99 °F (37 2 °C)    Temperature: 99 °F (37 2 °C)  Intake & Output:  I/O       701 -  07 -  07 07 -  0700    P  O  240 0     I V  (mL/kg) 1349 6 (28 6) 1000 (21 2)     NG/ 100 300    IV Piggyback 50      Feedings 709 133 760    Total Intake(mL/kg) 2948 6 (62 5) 1233 (26 1) 1060 (22 5)    Urine (mL/kg/hr) 2513 (2 2) 1553 (1 4)     Stool 0      Total Output 2513 1553     Net +435 6 -320 +1060           Unmeasured Urine Occurrence 1 x      Unmeasured Stool Occurrence 3 x          Weights:   IBW (Ideal Body Weight): 45 5 kg    Body mass index is 20 32 kg/m²  Weight (last 2 days)     Date/Time   Weight    21 0600   47 2 (104 06)    21 0600   47 2 (104 1)    21 0546   50 1 (110 45)            Physical Exam  Vitals signs reviewed  Constitutional:       General: She is not in acute distress  Appearance: She is well-developed  She is not diaphoretic  HENT:      Head: Normocephalic and atraumatic  Nose: Nose normal       Mouth/Throat:      Pharynx: No oropharyngeal exudate  Eyes:      General: No scleral icterus  Conjunctiva/sclera: Conjunctivae normal    Neck:      Musculoskeletal: Neck supple  Thyroid: No thyromegaly  Vascular: No JVD  Trachea: No tracheal deviation  Cardiovascular:      Rate and Rhythm: Normal rate and regular rhythm  Heart sounds: Normal heart sounds  No murmur  No friction rub  No gallop  Pulmonary:      Effort: Pulmonary effort is normal  No respiratory distress  Breath sounds: Normal breath sounds  No stridor  No wheezing or rales  Chest:      Chest wall: No tenderness  Abdominal:      General: Bowel sounds are normal  There is no distension  Palpations: Abdomen is soft  There is no mass  Tenderness: There is no abdominal tenderness  There is no guarding or rebound  Musculoskeletal: Normal range of motion  General: No tenderness  Skin:     General: Skin is warm  Findings: No erythema or rash  Neurological:      Mental Status: Mental status is at baseline  Sensory: No sensory deficit  Comments: Patient only responds with 1 word  Left upper and lower extremity weakness at baseline  Alert and oriented times 1-2   LABORATORY DATA     Labs: I have personally reviewed pertinent reports  Results from last 7 days   Lab Units 05/07/21  0612 05/06/21  0502 05/05/21  0557  05/02/21  0451   WBC Thousand/uL 8 99 10 88* 10 98*   < > 14 45*   HEMOGLOBIN g/dL 8 4* 8 3* 8 1*   < > 11 5   HEMATOCRIT % 24 4* 24 3* 23 9*   < > 33 7*   PLATELETS Thousands/uL 127* 132* 124*   < > 216   MONO PCT %  --   --  1*  --  0*    < > = values in this interval not displayed        Results from last 7 days   Lab Units 05/05/21  0557 05/04/21  0627 05/03/21  0601   POTASSIUM mmol/L 4 0 4 2 4 2   CHLORIDE mmol/L 105 103 98*   CO2 mmol/L 21 24 22   BUN mg/dL 22 24 30*   CREATININE mg/dL 0 38* 0 46* 0 58*   CALCIUM mg/dL 7 9* 8 1* 8 7   ALK PHOS U/L 55  --   --    ALT U/L 56  --   --    AST U/L 14  --   --          Results from last 7 days   Lab Units 05/02/21  0451 05/01/21  0554   PHOSPHORUS mg/dL 2 7 1 4*          Results from last 7 days Lab Units 05/04/21  2243   LACTIC ACID mmol/L 3 1*           IMAGING & DIAGNOSTIC TESTING     Radiology Results: I have personally reviewed pertinent reports  Xr Chest Portable    Result Date: 4/24/2021  Impression: Dialysis catheter tip within the superior cavoatrial junction  No pneumothorax  Workstation performed: [id number]     Xr Chest Picc Line Portable    Result Date: 4/27/2021  Impression: PICC line tip in the superior vena cava, approximately 3 5 to 4 cm above the cavoatrial junction  The examination demonstrates a significant  finding and was documented as such in [facility] for liaison and referring practitioner notification  Workstation performed: [id number]     Ir Picc Reposition    Result Date: 4/27/2021  Impression: Status-post repositioning of a 5 [place] central venous catheter under fluoroscopic guidance with its tip at the cavoatrial junction  Workstation performed: [id number]     Ir Tunneled Central Line Removal    Result Date: 4/27/2021  Impression: Impression: Successful tunneled central line removal as described  Signed, performed and dictated by Rut Sandhu PA-C under the supervision of Dr Comfort Dallas  Workstation performed: [id number]     Other Diagnostic Testing: I have personally reviewed pertinent reports      ACTIVE MEDICATIONS     Current Facility-Administered Medications   Medication Dose Route Frequency    albuterol (PROVENTIL HFA,VENTOLIN HFA) inhaler 2 puff  2 puff Inhalation Q6H PRN    amLODIPine (NORVASC) tablet 5 mg  5 mg Oral Daily    bisacodyl (DULCOLAX) rectal suppository 10 mg  10 mg Rectal Daily PRN    dexamethasone (DECADRON) injection 4 mg  4 mg Intravenous 4x Daily    famotidine (PEPCID) tablet 20 mg  20 mg Oral BID    heparin (porcine) subcutaneous injection 5,000 Units  5,000 Units Subcutaneous Q8H [address]    insulin glargine (LANTUS) subcutaneous injection 11 Units 0 11 mL  11 Units Subcutaneous HS    insulin lispro (HumaLOG) 100 units/mL subcutaneous injection 1-5 Units  1-5 Units Subcutaneous Q6H [address]    lactulose oral solution 10 g  10 g Oral BID PRN    lidocaine (LIDODERM) 5 % patch 1 patch  1 patch Topical Daily    methotrexate (PF) 5,000 mg in sodium chloride 0 9 % 1,000 mL IVPB  5,000 mg Intravenous Once    ondansetron (ZOFRAN) 16 mg, dexamethasone (DECADRON) 10 mg in sodium chloride 0 9 % 50 mL IVPB   Intravenous Once    ondansetron (ZOFRAN) injection 4 mg  4 mg Intravenous Q6H PRN    polyethylene glycol (MIRALAX) packet 17 g  17 g Oral Daily    senna-docusate sodium (SENOKOT S) 8 6-50 mg per tablet 1 tablet  1 tablet Oral BID    sodium chloride 0 9 % infusion  20 mL/hr Intravenous Once PRN    sodium chloride 0 9 % infusion  20 mL/hr Intravenous Once PRN    sodium chloride 0 9 % infusion  50 mL/hr Intravenous Continuous    tamsulosin (FLOMAX) capsule 0 4 mg  0 4 mg Oral Daily With Dinner       VTE Pharmacologic Prophylaxis: Heparin  VTE Mechanical Prophylaxis: sequential compression device    Portions of the record may have been created with voice recognition software  Occasional wrong word or "sound a like" substitutions may have occurred due to the inherent limitations of voice recognition software    Read the chart carefully and recognize, using context, where substitutions have occurred   ==  Tressa Izquierdo, Sammi1 [facility]  Internal Medicine Residency PGY-2

## 2021-05-07 NOTE — CONSULTS
Consultation - [facility]) [facility]  Sandoval Olguin 49 y o  female MRN: [id number]  Unit/Bed#: [facility] 920-01 Encounter: [id number]        Inpatient consult to gastroenterology     Performed by  Caryl Fields MD     Authorized by Belinda Nix DO          Reason for Consult / Principal Problem:   PEG Tube Evaluation    ASSESSMENT AND PLAN:    Sandoval Olguin is a 49 y o  old female with PMH:  CNS lymphoma, altered mental status status post plasmapheresis who presented with protein calorie malnutrition and dysphagia  #CNS Lymphoma  #PEG Tube Evaluation  #Dysphagia  Patient with dysphagia status post 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and mass effect  Had discussion was had with primary team,  Austin Scott - [phone]), and patient everyone is in agreement with placement of G-tube  Patient has not has not adequately sustained nutritional requirements and is now a candidate for G-tube placement  Patient now reasonably be candidate for PEG tube placement  Patient with no significant contraindications at this time including no ascites, no significant coagulopathies, no acute infectious symptoms, and no distal obstruction at this point  Discussed risks of procedure with patient and her  in [place]  They understood risks and would like to proceed with procedure  Plan:  -NPO at midnight on Sunday night for EGD Monday  -Patient not anticoagulation  -Ancef 1 gram prior to endoscopy     ______________________________________________________________________    HPI:    Patient who presented as a transfer from [facility] in the setting of 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Patient presented following a suspected stroke  Patient is legally blind but otherwise had no significant past medical history  Left facial droop started March 19, 21  Patient had blurred vision and posterior headache  CTA showed subacute infarct the left cerebellum and mid brain    MRI brain showed edema and expansion of the left aspect of the midbrain consistent with 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Patient was started on pulse dose steroids  Patient noted dysphagia decreased p o  intake  Patient underwent calorie count in discussion about feeding tube placement [facility]   Family initially held off at that time in hopes recovery  Since that time patient has undergone speech evaluation which showed dysphagia to thin liquids and inability of all maintain calorie count  Patient had NG tube feeding of Jevity starting on 04/30  Underwent VBS which showed oropharyngeal dysphagia  Patient denies any hematemesis, melena, or hematochezia  Does not endorse any weight loss, recent N/V/F/C  Denies any change in bowel habits, no new constipation or diarrhea  Patient not on anticoagulation  REVIEW OF SYSTEMS:    CONSTITUTIONAL: Denies any fever, chills, rigors, and weight loss  HEENT: No earache or tinnitus  Denies hearing loss or visual disturbances  CARDIOVASCULAR: No chest pain or palpitations  RESPIRATORY: Denies any cough, hemoptysis, shortness of breath or dyspnea on exertion  GASTROINTESTINAL: As noted in the History of Present Illness  GENITOURINARY: No problems with urination  Denies any hematuria or dysuria  NEUROLOGIC: No dizziness or vertigo, denies headaches  MUSCULOSKELETAL: Denies any muscle or joint pain  SKIN: Denies skin rashes or itching  ENDOCRINE: Denies excessive thirst  Denies intolerance to heat or cold  PSYCHOSOCIAL: Denies depression or anxiety  Denies any recent memory loss  Historical Information   History reviewed  No pertinent past medical history    Past Surgical History:   Procedure Laterality Date    IR PICC REPOSITION  4/27/2021    IR TUNNELED CENTRAL LINE REMOVAL  4/27/2021     Social History   Social History     Substance and Sexual Activity   Alcohol Use Not Currently    Frequency: Never     Social History     Substance and Sexual Activity   Drug Use Not on file     Social History     Tobacco Use   Smoking Status Never Smoker   Smokeless Tobacco Never Used     Family History   Problem Relation Age of Onset    No Known Problems Mother        Meds/Allergies     Medications Prior to Admission   Medication    [] acetaminophen (TYLENOL) 500 mg tablet    albuterol (2 5 mg/3 mL) 0 083 % nebulizer solution    albuterol (PROVENTIL HFA,VENTOLIN HFA) 90 mcg/act inhaler    dexamethasone (DECADRON) 4 mg/mL    famotidine (PEPCID) 20 mg tablet    Lidocaine 4 % PTCH    ondansetron (ZOFRAN) 4 mg/2 mL injection    senna-docusate sodium (Senokot S) 8 6-50 mg per tablet     Current Facility-Administered Medications   Medication Dose Route Frequency    albuterol (PROVENTIL HFA,VENTOLIN HFA) inhaler 2 puff  2 puff Inhalation Q6H PRN    amLODIPine (NORVASC) tablet 5 mg  5 mg Oral Daily    bisacodyl (DULCOLAX) rectal suppository 10 mg  10 mg Rectal Daily PRN    dexamethasone (DECADRON) injection 4 mg  4 mg Intravenous Q8H    Followed by   Jean Grapes ON 2021] dexamethasone (DECADRON) injection 4 mg  4 mg Intravenous Q12H    Followed by   Jean Grapes ON 2021] dexamethasone (DECADRON) injection 4 mg  4 mg Intravenous Daily    famotidine (PEPCID) tablet 20 mg  20 mg Oral BID    heparin (porcine) subcutaneous injection 5,000 Units  5,000 Units Subcutaneous Q8H [address]    insulin glargine (LANTUS) subcutaneous injection 11 Units 0 11 mL  11 Units Subcutaneous HS    insulin lispro (HumaLOG) 100 units/mL subcutaneous injection 1-5 Units  1-5 Units Subcutaneous Q6H [address]    lactulose oral solution 10 g  10 g Oral BID PRN    lidocaine (LIDODERM) 5 % patch 1 patch  1 patch Topical Daily    methotrexate (PF) 5,000 mg in sodium chloride 0 9 % 1,000 mL IVPB  5,000 mg Intravenous Once    ondansetron (ZOFRAN) 16 mg, dexamethasone (DECADRON) 10 mg in sodium chloride 0 9 % 50 mL IVPB   Intravenous Once    ondansetron (ZOFRAN) injection 4 mg  4 mg Intravenous Q6H PRN    polyethylene glycol (MIRALAX) packet 17 g  17 g Oral Daily    senna-docusate sodium (SENOKOT S) 8 6-50 mg per tablet 1 tablet  1 tablet Oral BID    sodium chloride 0 9 % infusion  20 mL/hr Intravenous Once PRN    sodium chloride 0 9 % infusion  20 mL/hr Intravenous Once PRN    sodium chloride 0 9 % infusion  50 mL/hr Intravenous Continuous    tamsulosin (FLOMAX) capsule 0 4 mg  0 4 mg Oral Daily With Dinner     No Known Allergies    Objective     Blood pressure 124/82, pulse 84, temperature 99 °F (37 2 °C), resp  rate 15, height 5' (1 524 m), weight 47 2 kg (104 lb 0 9 oz), SpO2 100 %  Body mass index is 20 32 kg/m²  Intake/Output Summary (Last 24 hours) at 5/7/2021 1307  Last data filed at 5/7/2021 1001  Gross per 24 hour   Intake 2060 ml   Output 1553 ml   Net 507 ml       PHYSICAL EXAM:      General Appearance:   Alert, cooperative, no distress   HEENT:   L facial droop   Neck:   Supple, symmetrical, trachea midline   Lungs:   Clear to auscultation bilaterally; no rales, rhonchi or wheezing; respirations unlabored    Heart:   Regular rate and rhythm; no murmur, rub, or gallop  Abdomen:   Soft, non-tender, non-distended; normal bowel sounds; no masses, no organomegaly    Genitalia:   Deferred    Rectal:   Deferred    Extremities:  No cyanosis, clubbing or edema    Pulses:  2+ and symmetric all extremities    Skin:  No jaundice, rashes, or lesions    Lymph nodes:  No palpable cervical lymphadenopathy      Lab Results:   No results displayed because visit has over 200 results  Imaging Studies: I have personally reviewed pertinent imaging studies  Xr Chest Portable    Result Date: 5/3/2021  Narrative: CHEST INDICATION:   low T, high WBC, aspiration risk  COMPARISON:  4/30/2021 at 6:08 PM EXAM PERFORMED/VIEWS:  XR CHEST PORTABLE FINDINGS:  Right PICC tip is within the superior cavoatrial junction  Feeding tube tip is within the distal body of the stomach  Cardiomediastinal silhouette appears unremarkable   There is improved aeration within the left lung base  No pleural effusions or pneumothorax  Elevation of left hemidiaphragm is stable  Osseous structures appear within normal limits for patient age  Impression: 1  Lines and tubes as described above  2   Improved aeration within the left lower lobe  Workstation performed: [id number]     Xr Chest Portable    Result Date: 5/1/2021  Narrative: CHEST INDICATION:  Keofed tube placement  COMPARISON:  4/27/2021 EXAM PERFORMED/VIEWS:  XR CHEST PORTABLE FINDINGS:  Thoracic inlet has been partially excluded from the field-of-view  Feeding tube tip terminates near the level of the GE junction  This should be advanced about 8 to 10 cm  Right IJ central line has been removed  Right-sided PICC line remains, tip terminates near the caval atrial junction  Cardiomediastinal silhouette appears unremarkable  Mild elevation left diaphragm  Patchy left retrocardiac density could represent atelectasis or infiltrate  No pneumothorax or pleural effusion  Osseous structures appear within normal limits for patient age  Impression: Feeding tube tip terminates near the level of the GE junction  Recommend advancement 8-10 cm  Please see report of abdominal film immediately following this study  Patchy left retrocardiac density could represent atelectasis or infiltrate  Workstation performed: [id number]     Xr Chest Portable    Result Date: 4/24/2021  Narrative: CHEST INDICATION:   Central line placement from outside  COMPARISON:  None EXAM PERFORMED/VIEWS:  XR CHEST PORTABLE FINDINGS:  Dialysis catheter tip is within the superior cavoatrial junction  Cardiomediastinal silhouette appears unremarkable  The lungs are clear  No pneumothorax or pleural effusion  Elevation of left hemidiaphragm is present  Osseous structures appear within normal limits for patient age  Impression: Dialysis catheter tip within the superior cavoatrial junction  No pneumothorax   Workstation performed: [id number]     Xr Abdomen 1 View Kub    Result Date: 5/1/2021  Narrative: ABDOMEN INDICATION:  Keofed tube placement  COMPARISON:  None VIEWS:  AP semierect FINDINGS: Please note the lower abdomen/pelvis was not included in the field-of-view  Feeding tube has been advanced below the diaphragm tip projects in the region of the mid gastric body  There is a nonobstructive bowel gas pattern  No discernible free air on this limited portable study  No pathologic calcifications or soft tissue masses as visualized  Left retrocardiac opacity suspicious for pneumonia  Paravertebral ossifications thoracic spine  Impression: No acute findings  Feeding tube position as above  Left retrocardiac opacity suspicious for pneumonia  The study was marked in [facility] for immediate notification  Workstation performed: [id number]     Ct Head W Wo Contrast    Result Date: 5/3/2021  Narrative: 1 2 840 [id number]  7 723 4 [id number]    Mri Thoracic Spine W Wo Contrast    Result Date: 5/3/2021  Narrative: 1 2 840 [id number]  2 401 2 [id number] 2 [id number] 1    Mri Lumbar Spine W Wo Contrast    Result Date: 5/3/2021  Narrative: 1 2 840 [id number]  2 401 2 [id number] 2 [id number] 1    Fl Barium Swallow Video W Speech    Result Date: 5/3/2021  Narrative: A video barium swallow study was performed by the Department of Speech Pathology  Please refer to the report for the official interpretation  The images are stored for archival purposes only  Study images were not formally reviewed by the Radiology Department  Xr Chest Picc Line Portable    Result Date: 4/27/2021  Narrative: CHEST INDICATION:   picc line placement  COMPARISON:  4/23/2021 EXAM PERFORMED/VIEWS:  XR CHEST PICC LINE PORTABLE FINDINGS:  Again noted is a dialysis catheter with tip in region of the upper right atrium at or just below cavoatrial junction  Now present is a right arm approach PICC line, with its tip in the superior vena cava approximately 3 5 to 4 cm above the cavoatrial junction   Cardiomediastinal silhouette appears unremarkable  The lungs are clear  No pneumothorax or pleural effusion  Osseous structures appear within normal limits for patient age  Impression: PICC line tip in the superior vena cava, approximately 3 5 to 4 cm above the cavoatrial junction  The examination demonstrates a significant  finding and was documented as such in [facility] for liaison and referring practitioner notification  Workstation performed: [id number]     Ir Picc Reposition    Result Date: 4/27/2021  Narrative: PICC REPOSITION - CENTRAL VENOUS CATHETER PLACEMENT HISTORY:    Malpositioned PICC PROCEDURE: The patient was brought to the Interventional Radiology Suite and identified verbally and by wrist band  Utilizing standard sterile technique with patient prepped and draped in the usual manner, the 5 Maltese central venous catheter was advanced over a  018 inch guidewire under fluoroscopic guidance to the cavoatrial junction  The position was confirmed fluoroscopically with 0 cm external length  Blood could be freely aspirated and heparinized saline injected  The patient experienced no untoward reactions  All elements of maximal sterile barrier technique were followed (cap, mask, sterile gown, sterile gloves, large sterile sheet, hand hygiene, and 2% chlorhexidine for cutaneous antisepsis)  Fluoroscopy time: 0 9 minutes Images: 2     Impression: Status-post repositioning of a 5 Maltese central venous catheter under fluoroscopic guidance with its tip at the cavoatrial junction  Workstation performed: [id number]     Mri Outside Images    Result Date: 5/3/2021  Narrative: 1 2 840 [id number]  2 401 2 [id number] 2 [id number] 1    Fl Outside Images    Result Date: 5/3/2021  Narrative: 1 2 840 [id number]  2 401 2 [id number] 2 [id number] 1    Ir Tunneled Central Line Removal    Result Date: 4/27/2021  Narrative:  Tunneled central line removal Clinical History: Patient presenting for tunneled central line removal  Patient had line placed for PLEX which has been completed  The existing catheter was prepped and draped in the usual sterile fashion  Lidocaine w/ epinephrine was administered the skin and subcutaneous tissues  The cuff was dissected free, and the catheter was removed  Manual compression was applied to the puncture site and tunnel until hemostasis was achieved  The patient tolerated the procedure well and suffered no complications  Impression: Impression: Successful tunneled central line removal as described  Signed, performed and dictated by Melo Leyva PA-C under the supervision of Dr Cheli Zavala   Workstation performed: [id number]       ---------------------------------------------------  Note Electronically Signed By:    Gloria Mao MD  [facility] Gastroenterology Fellow PGY-5  [address] #: [id number]

## 2021-05-07 NOTE — ASSESSMENT & PLAN NOTE
· Patient has left-sided weakness at baseline from her 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· Left upper extremity 3-4/5 and left lower extremity 0-1/5 muscle strength

## 2021-05-08 LAB
BACTERIA BLD CULT: NORMAL
BACTERIA BLD CULT: NORMAL
ERYTHROCYTE [DISTWIDTH] IN BLOOD BY AUTOMATED COUNT: 15.7 % (ref 11.6–15.1)
GLUCOSE SERPL-MCNC: 162 MG/DL (ref 65–140)
GLUCOSE SERPL-MCNC: 178 MG/DL (ref 65–140)
GLUCOSE SERPL-MCNC: 217 MG/DL (ref 65–140)
GLUCOSE SERPL-MCNC: 247 MG/DL (ref 65–140)
HCT VFR BLD AUTO: 26.3 % (ref 34.8–46.1)
HGB BLD-MCNC: 8.9 G/DL (ref 11.5–15.4)
MCH RBC QN AUTO: 33 PG (ref 26.8–34.3)
MCHC RBC AUTO-ENTMCNC: 33.8 G/DL (ref 31.4–37.4)
MCV RBC AUTO: 97 FL (ref 82–98)
NRBC BLD AUTO-RTO: 4 /100 WBCS
PLATELET # BLD AUTO: 147 THOUSANDS/UL (ref 149–390)
PMV BLD AUTO: 10.8 FL (ref 8.9–12.7)
RBC # BLD AUTO: 2.7 MILLION/UL (ref 3.81–5.12)
WBC # BLD AUTO: 8.59 THOUSAND/UL (ref 4.31–10.16)

## 2021-05-08 PROCEDURE — 82948 REAGENT STRIP/BLOOD GLUCOSE: CPT

## 2021-05-08 PROCEDURE — 97530 THERAPEUTIC ACTIVITIES: CPT

## 2021-05-08 PROCEDURE — 97112 NEUROMUSCULAR REEDUCATION: CPT

## 2021-05-08 PROCEDURE — 85027 COMPLETE CBC AUTOMATED: CPT | Performed by: STUDENT IN AN ORGANIZED HEALTH CARE EDUCATION/TRAINING PROGRAM

## 2021-05-08 RX ADMIN — INSULIN LISPRO 1 UNITS: 100 INJECTION, SOLUTION INTRAVENOUS; SUBCUTANEOUS at 23:35

## 2021-05-08 RX ADMIN — POLYETHYLENE GLYCOL 3350 17 G: 17 POWDER, FOR SOLUTION ORAL at 09:33

## 2021-05-08 RX ADMIN — FAMOTIDINE 20 MG: 20 TABLET ORAL at 09:34

## 2021-05-08 RX ADMIN — DEXAMETHASONE SODIUM PHOSPHATE 4 MG: 4 INJECTION INTRA-ARTICULAR; INTRALESIONAL; INTRAMUSCULAR; INTRAVENOUS; SOFT TISSUE at 20:51

## 2021-05-08 RX ADMIN — INSULIN LISPRO 1 UNITS: 100 INJECTION, SOLUTION INTRAVENOUS; SUBCUTANEOUS at 05:46

## 2021-05-08 RX ADMIN — DEXAMETHASONE SODIUM PHOSPHATE 4 MG: 4 INJECTION INTRA-ARTICULAR; INTRALESIONAL; INTRAMUSCULAR; INTRAVENOUS; SOFT TISSUE at 04:44

## 2021-05-08 RX ADMIN — INSULIN LISPRO 2 UNITS: 100 INJECTION, SOLUTION INTRAVENOUS; SUBCUTANEOUS at 17:33

## 2021-05-08 RX ADMIN — LIDOCAINE 5% 1 PATCH: 700 PATCH TOPICAL at 09:33

## 2021-05-08 RX ADMIN — HEPARIN SODIUM 5000 UNITS: 5000 INJECTION INTRAVENOUS; SUBCUTANEOUS at 05:46

## 2021-05-08 RX ADMIN — INSULIN LISPRO 2 UNITS: 100 INJECTION, SOLUTION INTRAVENOUS; SUBCUTANEOUS at 12:33

## 2021-05-08 RX ADMIN — TAMSULOSIN HYDROCHLORIDE 0.4 MG: 0.4 CAPSULE ORAL at 15:26

## 2021-05-08 RX ADMIN — INSULIN GLARGINE 11 UNITS: 100 INJECTION, SOLUTION SUBCUTANEOUS at 22:11

## 2021-05-08 RX ADMIN — HEPARIN SODIUM 5000 UNITS: 5000 INJECTION INTRAVENOUS; SUBCUTANEOUS at 22:11

## 2021-05-08 RX ADMIN — SENNOSIDES, DOCUSATE SODIUM 1 TABLET: 8.6; 5 TABLET ORAL at 09:33

## 2021-05-08 RX ADMIN — AMLODIPINE BESYLATE 5 MG: 5 TABLET ORAL at 09:33

## 2021-05-08 RX ADMIN — FAMOTIDINE 20 MG: 20 TABLET ORAL at 15:27

## 2021-05-08 RX ADMIN — HEPARIN SODIUM 5000 UNITS: 5000 INJECTION INTRAVENOUS; SUBCUTANEOUS at 14:26

## 2021-05-08 RX ADMIN — INSULIN LISPRO 2 UNITS: 100 INJECTION, SOLUTION INTRAVENOUS; SUBCUTANEOUS at 00:10

## 2021-05-08 RX ADMIN — DEXAMETHASONE SODIUM PHOSPHATE 4 MG: 4 INJECTION INTRA-ARTICULAR; INTRALESIONAL; INTRAMUSCULAR; INTRAVENOUS; SOFT TISSUE at 12:32

## 2021-05-08 RX ADMIN — SENNOSIDES, DOCUSATE SODIUM 1 TABLET: 8.6; 5 TABLET ORAL at 15:26

## 2021-05-08 NOTE — PROGRESS NOTES
INTERNAL MEDICINE RESIDENCY PROGRESS NOTE     Name: Lew Liz   Age & Sex: 49 y o  female   MRN: [id number]  Unit/Bed#: [facility] 920-01   Encounter: [id number]  Team: SOD Team B     PATIENT INFORMATION     Name: Lew Liz   Age & Sex: 49 y o  female   MRN: [id number] [address] Stay Days: 15    ASSESSMENT/PLAN     Principal Problem:    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[facility] [address] )  Active Problems:    Sepsis ([facility] [address] )    Altered mental status    Dysphagia    Urinary retention    Presence of permanent central venous catheter    Steroid-induced hyperglycemia    Left-sided weakness    Aphasia    Weakness    Fracture of ramus of left pubis (HCC)    Severe protein-calorie malnutrition ([address] )      Sepsis ([facility] [address] )  Assessment & Plan  Patient had hypothermia, elevated WBC count and tachycardia  Concern for possible aspiration in setting on dysphagia versus UTI in setting of urinary catheter which was placed for retention  Plan:  S/p IV antibiotics last day on 05/05  Will continue to monitor off antibiotics  * 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[facility])  Assessment & Plan  Patient was diagnosed with primary 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through biopsy in [facility] transferred over here for chemotherapy since the [facility] is out of network    Plan:  Had a 2nd family meeting on 04/30 and family has decided to proceed treatment at this time  Hematology-Oncology consulted  Appreciate recommendations  S/p 1 cycle of Chemotherapy treatment as per Hematology-Oncology  Patient has poor prognosis even with treatment although family wants all treatment  S/p PICC line for chemotherapy  DVT prophylaxis:  On heparin  Case management-will start looking for places for rehab  Facilities may not accept patient since she has Keofed  Patient currently has keofed and VBS showed severe oropharyngeal dysphagia  Plan is to eventually discontinue Keofed and consider PEG tube given low calorie intake     (was making medical decisions for her) is agreeing with placement of PEG tube  GI consulted  Plan for PEG tube placement on Monday  Left-sided weakness  Assessment & Plan  Patient has left-sided weakness at baseline from her 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Left upper extremity 3-4/5 and left lower extremity 0-1/5 muscle strength at baseline  Steroid-induced hyperglycemia  Assessment & Plan  Mild hyperglycemia, glucoses in 200s, no history of DM  Occurring in the setting of Decadron with chemotherapy, as well as sodium bicarb in D5 drip  - D5 drip is finished  - will monitor sugars while on Jevity and now off D5  - continue q6 hour fingerstick glucose with correctional algorithm 1 and Lantus    Presence of permanent central venous catheter  Assessment & Plan  Noted presence of right IJ tunneled double-lumen HD catheter; upon chart review, this was likely inserted to be used for plasmapheresis which she has completed  Plan:  S/p removal by IR  Patient now has PICC line  Urinary retention  Assessment & Plan  Patient developed urinary retention during previous hospitalization  Plan was voiding trial as an outpatient as she was scheduled for discharge from that facility  Patient passed voiding trial and now voiding appropriately  Urinary retention protocol and strict ins and outs  Dysphagia  Assessment & Plan  Patient noted to have dysphagia and also decreased p o  Intake  As per [facility] progress note patient had a calorie count and recommended feeding tube placement  Speech evaluation - advance diet with dysphagia 2 and thin liquids; needs assistance with feeds  Calorie count  If the patient continued to have poor p o  Intake may need discussion with the family regarding alternate methods of nutrition  Patient's calorie intake is low  Keofed placed evening of 4/30, Jevity 1 2 started, advancing as tolerated  VBS- showed oropharyngeal dysphagia      Altered mental status  Assessment & Plan  Patient initially presented to [facility] his [place] with stroke-like symptoms for 2 days  CT scan of the head was concerning for subacute CVA and was transferred to [facility]   MRI of the brain was more consistent with demyelinating disease patient was started on IV steroids and 1000 mg daily for 5 days  Patient did not have any significant improvement at that time patient had plasmapheresis  And also had  lumbar puncture-negative for infection or malignancy  Patient noted to have persistent encephalopathy so a repeat MRI was obtained which showed worsening of the abnormality seen on the previous MRI and 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Continue with the dexamethasone  Likely secondary to 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Delirium precautions   Currently alert and oriented times 2-3   follow commands  Moves all extremities spontaneously except left upper and lower extrimities  Patient's  (does not speak [place]) makes all medical decision for her  Severe protein-calorie malnutrition ([facility] [address] )  Assessment & Plan  Malnutrition Findings:   Adult Malnutrition type: Acute illness(due to medical condition resulting in dysphagia, decreased appetite and intake, weight loss)  Adult Degree of Malnutrition: Other severe protein calorie malnutrition    BMI Findings: Body mass index is 20 49 kg/m²  Plan:  Nutrition consult  Keofed placed 4/30  Tube feeds initiated with Jevity 1 2, advancing to goal      Fracture of ramus of left pubis [facility])  Assessment & Plan  [facility] prior to presentation to [facility]  Managed nonoperatively at [facility]    Weakness  Assessment & Plan  Left greater than right upper and lower extremity weakness with ongoing left lower extremity contracture  Secondary to underlying CNS malignancy  *Decubitus ulcer precautions such as frequent repositioning  Aphasia  Assessment & Plan  Definite evidence of expressive aphasia, possible component of receptive aphasia as well  Speech therapy on board  Disposition:  Continue with inpatient management as highlighted above    SUBJECTIVE     Patient seen and examined  No acute events overnight  Lying comfortably in bed  Has no acute complaints at this time  Denies any nausea, vomiting, diarrhea, constipation, fevers or chills  All other review of systems negative at this time  Completed nursing rounds  OBJECTIVE     Vitals:    21 1543 21 2204 21 0729 21 0730   BP: 118/85 122/85 127/85 127/85   BP Location: Left arm Left arm     Pulse: 97 98 83 83   Resp: 20 18     Temp: 98 4 °F (36 9 °C) 98 2 °F (36 8 °C) (!) 97 2 °F (36 2 °C) (!) 97 2 °F (36 2 °C)   TempSrc: Axillary Axillary     SpO2: 100% 100% 100% 100%   Weight:       Height:          Temperature:   Temp (24hrs), Av 8 °F (36 6 °C), Min:97 2 °F (36 2 °C), Max:98 4 °F (36 9 °C)    Temperature: (!) 97 2 °F (36 2 °C)  Intake & Output:  I/O       / 07 -  0700 / 07 -  0700 / 07 -  0700    P  O  0 240     I V  (mL/kg) 1000 (21 2) 1575 8 (33 4)     NG/ 300     IV Piggyback       Feedings 133 760     Total Intake(mL/kg) 1233 (26 1) 2875 8 (60 9)     Urine (mL/kg/hr) 1553 (1 4) 1000 (0 9)     Stool  0     Total Output 1553 1000     Net -320 +1875 8            Unmeasured Urine Occurrence  1 x     Unmeasured Stool Occurrence  2 x         Weights:   IBW (Ideal Body Weight): 45 5 kg    Body mass index is 20 32 kg/m²  Weight (last 2 days)     Date/Time   Weight    21 0600   47 2 (104 06)    21 0600   47 2 (104 1)            Physical Exam  Vitals signs reviewed  Constitutional:       General: She is not in acute distress  Appearance: She is well-developed  She is not diaphoretic  HENT:      Head: Normocephalic and atraumatic  Nose: Nose normal       Mouth/Throat:      Pharynx: No oropharyngeal exudate  Eyes:      General: No scleral icterus  Right eye: No discharge  Left eye: No discharge  Conjunctiva/sclera: Conjunctivae normal    Neck:      Musculoskeletal: Normal range of motion and neck supple  Cardiovascular:      Rate and Rhythm: Normal rate and regular rhythm  Heart sounds: Normal heart sounds  No murmur  No friction rub  No gallop  Pulmonary:      Effort: Pulmonary effort is normal  No respiratory distress  Breath sounds: Normal breath sounds  No wheezing or rales  Abdominal:      General: Bowel sounds are normal  There is no distension  Palpations: Abdomen is soft  Tenderness: There is no abdominal tenderness  There is no guarding  Musculoskeletal:      Comments: Left upper extremity 3/5, left lower extremity 0/5  Skin:     General: Skin is warm and dry  Findings: No erythema  Neurological:      Mental Status: She is alert  Comments: Alert oriented x2   Psychiatric:         Behavior: Behavior normal        LABORATORY DATA     Labs: I have personally reviewed pertinent reports  Results from last 7 days   Lab Units 05/08/21  0549 05/07/21  0612 05/06/21  0502 05/05/21  0557  05/02/21  0451   WBC Thousand/uL 8 59 8 99 10 88* 10 98*   < > 14 45*   HEMOGLOBIN g/dL 8 9* 8 4* 8 3* 8 1*   < > 11 5   HEMATOCRIT % 26 3* 24 4* 24 3* 23 9*   < > 33 7*   PLATELETS Thousands/uL 147* 127* 132* 124*   < > 216   MONO PCT %  --   --   --  1*  --  0*    < > = values in this interval not displayed        Results from last 7 days   Lab Units 05/05/21  0557 05/04/21  0627 05/03/21  0601   POTASSIUM mmol/L 4 0 4 2 4 2   CHLORIDE mmol/L 105 103 98*   CO2 mmol/L 21 24 22   BUN mg/dL 22 24 30*   CREATININE mg/dL 0 38* 0 46* 0 58*   CALCIUM mg/dL 7 9* 8 1* 8 7   ALK PHOS U/L 55  --   --    ALT U/L 56  --   --    AST U/L 14  --   --          Results from last 7 days   Lab Units 05/02/21  0451   PHOSPHORUS mg/dL 2 7          Results from last 7 days   Lab Units 05/04/21  2243   LACTIC ACID mmol/L 3 1*           IMAGING & DIAGNOSTIC TESTING     Radiology Results: I have personally reviewed pertinent reports  Xr Chest Portable    Result Date: 4/24/2021  Impression: Dialysis catheter tip within the superior cavoatrial junction  No pneumothorax  Workstation performed: [id number]     Xr Chest Picc Line Portable    Result Date: 4/27/2021  Impression: PICC line tip in the superior vena cava, approximately 3 5 to 4 cm above the cavoatrial junction  The examination demonstrates a significant  finding and was documented as such in [facility] for liaison and referring practitioner notification  Workstation performed: [id number]     Ir Picc Reposition    Result Date: 4/27/2021  Impression: Status-post repositioning of a 5 [employer] central venous catheter under fluoroscopic guidance with its tip at the cavoatrial junction  Workstation performed: [id number]     Ir Tunneled Central Line Removal    Result Date: 4/27/2021  Impression: Impression: Successful tunneled central line removal as described  Signed, performed and dictated by Thurnell Runner PA-C under the supervision of Dr Arturo Martinez  Workstation performed: [id number]     Other Diagnostic Testing: I have personally reviewed pertinent reports      ACTIVE MEDICATIONS     Current Facility-Administered Medications   Medication Dose Route Frequency    albuterol (PROVENTIL HFA,VENTOLIN HFA) inhaler 2 puff  2 puff Inhalation Q6H PRN    amLODIPine (NORVASC) tablet 5 mg  5 mg Oral Daily    bisacodyl (DULCOLAX) rectal suppository 10 mg  10 mg Rectal Daily PRN    dexamethasone (DECADRON) injection 4 mg  4 mg Intravenous Q8H    Followed by   Verdie Slice ON 5/9/2021] dexamethasone (DECADRON) injection 4 mg  4 mg Intravenous Q12H    Followed by   Verdie Slice ON 5/12/2021] dexamethasone (DECADRON) injection 4 mg  4 mg Intravenous Daily    famotidine (PEPCID) tablet 20 mg  20 mg Oral BID    heparin (porcine) subcutaneous injection 5,000 Units  5,000 Units Subcutaneous Q8H [address]    insulin glargine (LANTUS) subcutaneous injection 11 Units 0 11 mL  11 Units Subcutaneous HS    insulin lispro (HumaLOG) 100 units/mL subcutaneous injection 1-5 Units  1-5 Units Subcutaneous Q6H [address]    lactulose oral solution 10 g  10 g Oral BID PRN    lidocaine (LIDODERM) 5 % patch 1 patch  1 patch Topical Daily    methotrexate (PF) 5,000 mg in sodium chloride 0 9 % 1,000 mL IVPB  5,000 mg Intravenous Once    ondansetron (ZOFRAN) 16 mg, dexamethasone (DECADRON) 10 mg in sodium chloride 0 9 % 50 mL IVPB   Intravenous Once    ondansetron (ZOFRAN) injection 4 mg  4 mg Intravenous Q6H PRN    polyethylene glycol (MIRALAX) packet 17 g  17 g Oral Daily    senna-docusate sodium (SENOKOT S) 8 6-50 mg per tablet 1 tablet  1 tablet Oral BID    sodium chloride 0 9 % infusion  20 mL/hr Intravenous Once PRN    sodium chloride 0 9 % infusion  20 mL/hr Intravenous Once PRN    sodium chloride 0 9 % infusion  50 mL/hr Intravenous Continuous    tamsulosin (FLOMAX) capsule 0 4 mg  0 4 mg Oral Daily With Dinner       VTE Pharmacologic Prophylaxis: Heparin  VTE Mechanical Prophylaxis: sequential compression device    Portions of the record may have been created with voice recognition software  Occasional wrong word or "sound a like" substitutions may have occurred due to the inherent limitations of voice recognition software    Read the chart carefully and recognize, using context, where substitutions have occurred   ==    [address]  Internal Medicine Residency PGY-

## 2021-05-08 NOTE — PLAN OF CARE
Problem: Prexisting or High Potential for Compromised Skin Integrity  Goal: Skin integrity is maintained or improved  Description: INTERVENTIONS:  - Identify patients at risk for skin breakdown  - Assess and monitor skin integrity  - Assess and monitor nutrition and hydration status  - Monitor labs   - Assess for incontinence   - Turn and reposition patient  - Assist with mobility/ambulation  - Relieve pressure over bony prominences  - Avoid friction and shearing  - Provide appropriate hygiene as needed including keeping skin clean and dry  - Evaluate need for skin moisturizer/barrier cream  - Collaborate with interdisciplinary team   - Patient/family teaching  - Consider wound care consult   Outcome: Progressing     Problem: Potential for Falls  Goal: Patient will remain free of falls  Description: INTERVENTIONS:  - Assess patient frequently for physical needs  -  Identify cognitive and physical deficits and behaviors that affect risk of falls  -  [place] fall precautions as indicated by assessment   - Educate patient/family on patient safety including physical limitations  - Instruct patient to call for assistance with activity based on assessment  - Modify environment to reduce risk of injury  - Consider OT/PT consult to assist with strengthening/mobility  Outcome: Progressing     Problem: Nutrition/Hydration-ADULT  Goal: Nutrient/Hydration intake appropriate for improving, restoring or maintaining nutritional needs  Description: Monitor and assess patient's nutrition/hydration status for malnutrition  Collaborate with interdisciplinary team and initiate plan and interventions as ordered  Monitor patient's weight and dietary intake as ordered or per policy  Utilize nutrition screening tool and intervene as necessary  Determine patient's food preferences and provide high-protein, high-caloric foods as appropriate       INTERVENTIONS:  - Monitor oral intake, urinary output, labs, and treatment plans  - Assess nutrition and hydration status and recommend course of action  - Evaluate amount of meals eaten  - Assist patient with eating if necessary   - Allow adequate time for meals  - Recommend/ encourage appropriate diets, oral nutritional supplements, and vitamin/mineral supplements  - Order, calculate, and assess calorie counts as needed  - Recommend, monitor, and adjust tube feedings and TPN/PPN based on assessed needs  - Assess need for intravenous fluids  - Provide specific nutrition/hydration education as appropriate  - Include patient/family/caregiver in decisions related to nutrition  Outcome: Progressing     Problem: PAIN - ADULT  Goal: Verbalizes/displays adequate comfort level or baseline comfort level  Description: Interventions:  - Encourage patient to monitor pain and request assistance  - Assess pain using appropriate pain scale  - Administer analgesics based on type and severity of pain and evaluate response  - Implement non-pharmacological measures as appropriate and evaluate response  - Consider cultural and social influences on pain and pain management  - Notify physician/advanced practitioner if interventions unsuccessful or patient reports new pain  Outcome: Progressing     Problem: INFECTION - ADULT  Goal: Absence or prevention of progression during hospitalization  Description: INTERVENTIONS:  - Assess and monitor for signs and symptoms of infection  - Monitor lab/diagnostic results  - Monitor all insertion sites, i e  indwelling lines, tubes, and drains  - Monitor endotracheal if appropriate and nasal secretions for changes in amount and color  - Roll appropriate cooling/warming therapies per order  - Administer medications as ordered  - Instruct and encourage patient and family to use good hand hygiene technique  - Identify and instruct in appropriate isolation precautions for identified infection/condition  Outcome: Progressing     Problem: SAFETY ADULT  Goal: Patient will remain free of falls  Description: INTERVENTIONS:  - Assess patient frequently for physical needs  -  Identify cognitive and physical deficits and behaviors that affect risk of falls    -  [place] fall precautions as indicated by assessment   - Educate patient/family on patient safety including physical limitations  - Instruct patient to call for assistance with activity based on assessment  - Modify environment to reduce risk of injury  - Consider OT/PT consult to assist with strengthening/mobility  Outcome: Progressing  Goal: Maintain or return to baseline ADL function  Description: INTERVENTIONS:  -  Assess patient's ability to carry out ADLs; assess patient's baseline for ADL function and identify physical deficits which impact ability to perform ADLs (bathing, care of mouth/teeth, toileting, grooming, dressing, etc )  - Assess/evaluate cause of self-care deficits   - Assess range of motion  - Assess patient's mobility; develop plan if impaired  - Assess patient's need for assistive devices and provide as appropriate  - Encourage maximum independence but intervene and supervise when necessary  - Involve family in performance of ADLs  - Assess for home care needs following discharge   - Consider OT consult to assist with ADL evaluation and planning for discharge  - Provide patient education as appropriate  Outcome: Progressing  Goal: Maintain or return mobility status to optimal level  Description: INTERVENTIONS:  - Assess patient's baseline mobility status (ambulation, transfers, stairs, etc )    - Identify cognitive and physical deficits and behaviors that affect mobility  - Identify mobility aids required to assist with transfers and/or ambulation (gait belt, sit-to-stand, lift, walker, cane, etc )  - [place] fall precautions as indicated by assessment  - Record patient progress and toleration of activity level on Mobility SBAR; progress patient to next Phase/Stage  - Instruct patient to call for assistance with activity based on assessment  - Consider rehabilitation consult to assist with strengthening/weightbearing, etc   Outcome: Progressing     Problem: DISCHARGE PLANNING  Goal: Discharge to home or other facility with appropriate resources  Description: INTERVENTIONS:  - Identify barriers to discharge w/patient and caregiver  - Arrange for needed discharge resources and transportation as appropriate  - Identify discharge learning needs (meds, wound care, etc )  - Arrange for interpretive services to assist at discharge as needed  - Refer to Case Management Department for coordinating discharge planning if the patient needs post-hospital services based on physician/advanced practitioner order or complex needs related to functional status, cognitive ability, or social support system  Outcome: Progressing     Problem: Knowledge Deficit  Goal: Patient/family/caregiver demonstrates understanding of disease process, treatment plan, medications, and discharge instructions  Description: Complete learning assessment and assess knowledge base    Interventions:  - Provide teaching at level of understanding  - Provide teaching via preferred learning methods  Outcome: Not Progressing

## 2021-05-08 NOTE — PHYSICAL THERAPY NOTE
PHYSICAL THERAPY NOTE          Patient Name: Drew Lopez  MZQKM'[id number] Date: 5/8/2021 05/08/21 1035   PT Last Visit   PT Visit Date 05/08/21   Note Type   Note Type Treatment   Pain Assessment   Pain Assessment Tool FLACC   Pain Score No Pain   Restrictions/Precautions   Weight Bearing Precautions Per Order No   Other Precautions Cognitive; Chair Alarm; Bed Alarm;Multiple lines; Fall Risk  (+ Apashia )   General   Chart Reviewed Yes   Response to Previous Treatment Patient unable to report, no changes reported from family or staff   Family/Caregiver Present No   Cognition   Overall Cognitive Status Impaired   Arousal/Participation Alert; Cooperative   Attention Difficulty attending to directions   Memory Unable to assess   Following Commands Follows one step commands inconsistently   Comments Pt alert and cooperative to participate in therapy session  Followed directions inconsistently- increased cuing/demonstration required  Bed Mobility   Rolling R 3  Moderate assistance   Additional items Assist x 1   Rolling L 3  Moderate assistance   Additional items Assist x 1   Supine to Sit 2  Maximal assistance   Additional items Assist x 2;HOB elevated; Increased time required;Verbal cues;LE management   Sit to Supine Unable to assess   Additional Comments Pt supine in bed upon arrival  Pt sat EOB ~ 8 min with min Ax1 to correct R lateral lean and maintain upright posture  Pt sitting upright in bedside chair with chair alarm on and all needs within reach at the end of therapy session  Transfers   Sit to Stand 2  Maximal assistance   Additional items Assist x 2; Increased time required   Stand to Sit 2  Maximal assistance   Additional items Assist x 2; Increased time required   Stand pivot 2  Maximal assistance   Additional items Assist x 2; Increased time required   Additional Comments Transfers with b/l HHA; 2x STS performed   Pt able to tolerate standing ~1 min while performing hygiene tasks  b/l knee block required  Ambulation/Elevation   Gait pattern Not tested   Balance   Static Sitting Fair -   Dynamic Sitting Poor   Static Standing Poor -   Endurance Deficit   Endurance Deficit Yes   Endurance Deficit Description fatigue    Activity Tolerance   Activity Tolerance Patient limited by fatigue   Medical Staff Made Aware nursing aide    Nurse Made Aware rn cleared pt to participate in therapy session    Assessment   Prognosis Fair   Problem List Decreased strength;Decreased endurance;Decreased mobility; Decreased coordination; Impaired balance;Decreased cognition;Decreased safety awareness;Pain; Impaired vision   Assessment Pt seen for PT treatment session this date  Therapy session focused on bed mobility, static/dynamic sitting/standing tolerance/ balance, functional transfers, and endurance training in order to improve overall mobility and independence  Pt performed L/R rolling bed mobility tasks with mod Ax1; pt performed multiple rolls to assist in hygiene tasks- pt incontinent of bowel/bladder 2x- increased time required for hygiene tasks  Pt sat EOB ~8 min with min Ax1 to correct R lateral lean and assist in sitting upright  Pt performed STS from EOB using b/l HHA with max Ax2 to complete- b/l knee block required due to b/l knee buckling  Pt performed SPT from EOB to bedside chair with Ax2  Pt incontinent of bladder while sitting in bedside chair; STS performed to assist in hygiene tasks- RN notified due to excessive amount of urine expelled throughout therapy session  Pt making slow progress toward goals due to decreased activity tolerance  Pt was left sitting upright in bedside chair with alarm on at the end of PT session with all needs in reach  Pt would benefit from continued PT services while in hospital to address remaining limitations  PT to continue to follow pt and recommends post-acute rehab services after d c   The patient's AM-PAC Basic Mobility Inpatient Short Form Low Function Raw Score 10 , Standardized Score is 14 65  A standardized score less 42 9 suggests the patient may benefit from discharge to post-acute rehab services  Please also refer to the recommendation of the Physical Therapist for safe discharge planning  Goals   Patient Goals none stated    STG Expiration Date 05/10/21   PT Treatment Day 4   Plan   Treatment/Interventions ADL retraining;Functional transfer training;LE strengthening/ROM; Endurance training;Patient/family training;Bed mobility;Spoke to nursing   Progress Slow progress, decreased activity tolerance   PT Frequency Other (Comment)  (3-5x/wk )   Recommendation   PT Discharge Recommendation Post acute rehabilitation services   Equipment Recommended   (TBD )   PT - OK to Discharge   (to rehab once medically cleared )   AM-PAC Basic Mobility Inpatient   Turning in Bed Without Bedrails 1   Lying on Back to Sitting on Edge of Flat Bed 1   Moving Bed to Chair 1   Standing Up From Chair 1   Walk in Room 1   Climb 3-5 Stairs 1   Basic Mobility Inpatient Raw Score 6   Turning Head Towards Sound 2   Follow Simple Instructions 2   Low Function Basic Mobility Raw Score 10   Low Function Basic Mobility Standardized Score 14 65     Derrek Baum, PT, DPT

## 2021-05-08 NOTE — PLAN OF CARE
Problem: PHYSICAL THERAPY ADULT  Goal: Performs mobility at highest level of function for planned discharge setting  See evaluation for individualized goals  Description: Treatment/Interventions: ADL retraining, Functional transfer training, LE strengthening/ROM, Endurance training, Patient/family training, Bed mobility, Spoke to nursing, OT  Equipment Recommended: (TBD)       See flowsheet documentation for full assessment, interventions and recommendations  Outcome: Progressing  Note: Prognosis: Fair  Problem List: Decreased strength, Decreased endurance, Decreased mobility, Decreased coordination, Impaired balance, Decreased cognition, Decreased safety awareness, Pain, Impaired vision  Assessment: Pt seen for PT treatment session this date  Therapy session focused on bed mobility, static/dynamic sitting/standing tolerance/ balance, functional transfers, and endurance training in order to improve overall mobility and independence  Pt performed L/R rolling bed mobility tasks with mod Ax1; pt performed multiple rolls to assist in hygiene tasks- pt incontinent of bowel/bladder 2x- increased time required for hygiene tasks  Pt sat EOB ~8 min with min Ax1 to correct R lateral lean and assist in sitting upright  Pt performed STS from EOB using b/l HHA with max Ax2 to complete- b/l knee block required due to b/l knee buckling  Pt performed SPT from EOB to bedside chair with Ax2  Pt incontinent of bladder while sitting in bedside chair; STS performed to assist in hygiene tasks- RN notified due to excessive amount of urine expelled throughout therapy session  Pt making slow progress toward goals due to decreased activity tolerance  Pt was left sitting upright in bedside chair with alarm on at the end of PT session with all needs in reach  Pt would benefit from continued PT services while in hospital to address remaining limitations  PT to continue to follow pt and recommends post-acute rehab services after d c   The patient's AM-PAC Basic Mobility Inpatient Short Form Low Function Raw Score 10 , Standardized Score is 14 65  A standardized score less 42 9 suggests the patient may benefit from discharge to post-acute rehab services  Please also refer to the recommendation of the Physical Therapist for safe discharge planning  PT Discharge Recommendation: Post acute rehabilitation services     PT - OK to Discharge: (to rehab once medically cleared )    See flowsheet documentation for full assessment

## 2021-05-09 LAB
ANISOCYTOSIS BLD QL SMEAR: PRESENT
BASOPHILS # BLD MANUAL: 0 THOUSAND/UL (ref 0–0.1)
BASOPHILS NFR MAR MANUAL: 0 % (ref 0–1)
EOSINOPHIL # BLD MANUAL: 0 THOUSAND/UL (ref 0–0.4)
EOSINOPHIL NFR BLD MANUAL: 0 % (ref 0–6)
ERYTHROCYTE [DISTWIDTH] IN BLOOD BY AUTOMATED COUNT: 16.5 % (ref 11.6–15.1)
GLUCOSE SERPL-MCNC: 125 MG/DL (ref 65–140)
GLUCOSE SERPL-MCNC: 149 MG/DL (ref 65–140)
GLUCOSE SERPL-MCNC: 150 MG/DL (ref 65–140)
GLUCOSE SERPL-MCNC: 262 MG/DL (ref 65–140)
HCT VFR BLD AUTO: 26.8 % (ref 34.8–46.1)
HGB BLD-MCNC: 8.7 G/DL (ref 11.5–15.4)
LYMPHOCYTES # BLD AUTO: 0.28 THOUSAND/UL (ref 0.6–4.47)
LYMPHOCYTES # BLD AUTO: 3 % (ref 14–44)
MCH RBC QN AUTO: 32.2 PG (ref 26.8–34.3)
MCHC RBC AUTO-ENTMCNC: 32.5 G/DL (ref 31.4–37.4)
MCV RBC AUTO: 99 FL (ref 82–98)
MONOCYTES # BLD AUTO: 0.18 THOUSAND/UL (ref 0–1.22)
MONOCYTES NFR BLD: 2 % (ref 4–12)
MYELOCYTES NFR BLD MANUAL: 3 % (ref 0–1)
NEUTROPHILS # BLD MANUAL: 8.46 THOUSAND/UL (ref 1.85–7.62)
NEUTS BAND NFR BLD MANUAL: 2 % (ref 0–8)
NEUTS SEG NFR BLD AUTO: 90 % (ref 43–75)
NRBC BLD AUTO-RTO: 2 /100 WBC (ref 0–2)
NRBC BLD AUTO-RTO: 3 /100 WBCS
PLATELET # BLD AUTO: 157 THOUSANDS/UL (ref 149–390)
PLATELET BLD QL SMEAR: ADEQUATE
PMV BLD AUTO: 11.3 FL (ref 8.9–12.7)
POIKILOCYTOSIS BLD QL SMEAR: PRESENT
POLYCHROMASIA BLD QL SMEAR: PRESENT
RBC # BLD AUTO: 2.7 MILLION/UL (ref 3.81–5.12)
RBC MORPH BLD: PRESENT
WBC # BLD AUTO: 9.2 THOUSAND/UL (ref 4.31–10.16)

## 2021-05-09 PROCEDURE — 85027 COMPLETE CBC AUTOMATED: CPT | Performed by: STUDENT IN AN ORGANIZED HEALTH CARE EDUCATION/TRAINING PROGRAM

## 2021-05-09 PROCEDURE — 82948 REAGENT STRIP/BLOOD GLUCOSE: CPT

## 2021-05-09 PROCEDURE — 85007 BL SMEAR W/DIFF WBC COUNT: CPT | Performed by: STUDENT IN AN ORGANIZED HEALTH CARE EDUCATION/TRAINING PROGRAM

## 2021-05-09 RX ORDER — INSULIN GLARGINE 100 [IU]/ML
12 INJECTION, SOLUTION SUBCUTANEOUS
Status: DISCONTINUED | OUTPATIENT
Start: 2021-05-09 | End: 2021-05-10

## 2021-05-09 RX ADMIN — DEXAMETHASONE SODIUM PHOSPHATE 4 MG: 4 INJECTION INTRA-ARTICULAR; INTRALESIONAL; INTRAMUSCULAR; INTRAVENOUS; SOFT TISSUE at 03:07

## 2021-05-09 RX ADMIN — LIDOCAINE 5% 1 PATCH: 700 PATCH TOPICAL at 07:19

## 2021-05-09 RX ADMIN — DEXAMETHASONE SODIUM PHOSPHATE 4 MG: 4 INJECTION INTRA-ARTICULAR; INTRALESIONAL; INTRAMUSCULAR; INTRAVENOUS; SOFT TISSUE at 11:20

## 2021-05-09 RX ADMIN — HEPARIN SODIUM 5000 UNITS: 5000 INJECTION INTRAVENOUS; SUBCUTANEOUS at 14:04

## 2021-05-09 RX ADMIN — SENNOSIDES, DOCUSATE SODIUM 1 TABLET: 8.6; 5 TABLET ORAL at 07:20

## 2021-05-09 RX ADMIN — FAMOTIDINE 20 MG: 20 TABLET ORAL at 07:20

## 2021-05-09 RX ADMIN — AMLODIPINE BESYLATE 5 MG: 5 TABLET ORAL at 07:20

## 2021-05-09 RX ADMIN — DEXAMETHASONE SODIUM PHOSPHATE 4 MG: 4 INJECTION INTRA-ARTICULAR; INTRALESIONAL; INTRAMUSCULAR; INTRAVENOUS; SOFT TISSUE at 21:04

## 2021-05-09 RX ADMIN — TAMSULOSIN HYDROCHLORIDE 0.4 MG: 0.4 CAPSULE ORAL at 15:17

## 2021-05-09 RX ADMIN — SENNOSIDES, DOCUSATE SODIUM 1 TABLET: 8.6; 5 TABLET ORAL at 17:21

## 2021-05-09 RX ADMIN — HEPARIN SODIUM 5000 UNITS: 5000 INJECTION INTRAVENOUS; SUBCUTANEOUS at 05:05

## 2021-05-09 RX ADMIN — POLYETHYLENE GLYCOL 3350 17 G: 17 POWDER, FOR SOLUTION ORAL at 07:21

## 2021-05-09 RX ADMIN — FAMOTIDINE 20 MG: 20 TABLET ORAL at 17:21

## 2021-05-09 RX ADMIN — INSULIN GLARGINE 12 UNITS: 100 INJECTION, SOLUTION SUBCUTANEOUS at 21:04

## 2021-05-09 RX ADMIN — HEPARIN SODIUM 5000 UNITS: 5000 INJECTION INTRAVENOUS; SUBCUTANEOUS at 21:05

## 2021-05-09 RX ADMIN — SODIUM CHLORIDE 50 ML/HR: 0.9 INJECTION, SOLUTION INTRAVENOUS at 07:24

## 2021-05-09 RX ADMIN — INSULIN LISPRO 2 UNITS: 100 INJECTION, SOLUTION INTRAVENOUS; SUBCUTANEOUS at 05:04

## 2021-05-09 NOTE — QUICK NOTE
Notified by RN that patient was found with Keofed tube removed  TF is cyclic from 8pm to 8am, however patient is currently planned for PEG tube placement tomorrow, so feeds would be held after midnight anyway  She has had some PO intake today so it is ok to leave Atul Bowens out at this time, pending PEG tomorrow

## 2021-05-09 NOTE — PROGRESS NOTES
Entered patient's room at this time and found keofed out of nares  Notified sod b,md aware  Peg placement for tomorrow, npo midnight

## 2021-05-09 NOTE — PROGRESS NOTES
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[facility] [address] )  Active Problems:    Altered mental status    Dysphagia    Urinary retention    Aphasia    Weakness    Presence of permanent central venous catheter    Fracture of ramus of left pubis (HCC)    Severe protein-calorie malnutrition (HCC)    Steroid-induced hyperglycemia    Sepsis ([facility] [address] )    Left-sided weakness      Left-sided weakness  Assessment & Plan  Patient has left-sided weakness at baseline from her 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Left upper extremity 3-4/5 and left lower extremity 0-1/5 muscle strength at baseline  Sepsis [facility])  Assessment & Plan  Patient had hypothermia, elevated WBC count and tachycardia  Concern for possible aspiration in setting on dysphagia versus UTI in setting of urinary catheter which was placed for retention  Plan:  S/p IV antibiotics last day on 05/05  Will continue to monitor off antibiotics  Steroid-induced hyperglycemia  Assessment & Plan  Mild hyperglycemia, glucoses in 200s, no history of DM  Occurring in the setting of Decadron with chemotherapy, as well as sodium bicarb in D5 drip  - D5 drip is finished  - will monitor sugars while on Jevity and now off D5  - continue q6 hour fingerstick glucose with correctional algorithm 1 and Lantus    Severe protein-calorie malnutrition (HCC)  Assessment & Plan  Malnutrition Findings:   Adult Malnutrition type: Acute illness(due to medical condition resulting in dysphagia, decreased appetite and intake, weight loss)  Adult Degree of Malnutrition: Other severe protein calorie malnutrition    BMI Findings: Body mass index is 20 49 kg/m²  Plan:  Nutrition consult  Keofed placed 4/30  Tube feeds initiated with Jevity 1 2, advancing to goal      Fracture of ramus of left pubis [facility])  Assessment & Plan  Nikky Felipe prior to presentation to [facility]  Managed nonoperatively at [facility]    Presence of permanent central venous catheter  Assessment & Plan  Noted presence of right IJ tunneled double-lumen HD catheter; upon chart review, this was likely inserted to be used for plasmapheresis which she has completed  Plan:  S/p removal by IR  Patient now has PICC line  Weakness  Assessment & Plan  Left greater than right upper and lower extremity weakness with ongoing left lower extremity contracture  Secondary to underlying CNS malignancy  *Decubitus ulcer precautions such as frequent repositioning  Aphasia  Assessment & Plan  Definite evidence of expressive aphasia, possible component of receptive aphasia as well  Speech therapy on board  Urinary retention  Assessment & Plan  Patient developed urinary retention during previous hospitalization  Plan was voiding trial as an outpatient as she was scheduled for discharge from that facility  Patient passed voiding trial and now voiding appropriately  Urinary retention protocol and strict ins and outs  Dysphagia  Assessment & Plan  Patient noted to have dysphagia and also decreased p o  Intake  As per [facility] progress note patient had a calorie count and recommended feeding tube placement  Speech evaluation - advance diet with dysphagia 2 and thin liquids; needs assistance with feeds  Calorie count  If the patient continued to have poor p o  Intake may need discussion with the family regarding alternate methods of nutrition  Patient's calorie intake is low  Keofed placed evening of 4/30, Jevity 1 2 started, advancing as tolerated  VBS- showed oropharyngeal dysphagia      Altered mental status  Assessment & Plan  Patient initially presented to [facility] his [place] with stroke-like symptoms for 2 days  CT scan of the head was concerning for subacute CVA and was transferred to [facility]   MRI of the brain was more consistent with demyelinating disease patient was started on IV steroids and 1000 mg daily for 5 days  Patient did not have any significant improvement at that time patient had plasmapheresis  And also had  lumbar puncture-negative for infection or malignancy  Patient noted to have persistent encephalopathy so a repeat MRI was obtained which showed worsening of the abnormality seen on the previous MRI and 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Continue with the dexamethasone  Likely secondary to 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Delirium precautions   Currently alert and oriented times 2-3   follow commands  Moves all extremities spontaneously except left upper and lower extrimities  Patient's  (does not speak [place]) makes all medical decision for her  * 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[facility])  Assessment & Plan  Patient was diagnosed with primary 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through biopsy in [facility] transferred over here for chemotherapy since the [facility] is out of network    Plan:  Had a 2nd family meeting on 04/30 and family has decided to proceed treatment at this time  Hematology-Oncology consulted  Appreciate recommendations  S/p 1 cycle of Chemotherapy treatment as per Hematology-Oncology  Patient has poor prognosis even with treatment although family wants all treatment  S/p PICC line for chemotherapy  DVT prophylaxis:  On heparin  Case management-will start looking for places for rehab  Facilities may not accept patient since she has Keofed  Patient currently has keofed and VBS showed severe oropharyngeal dysphagia  Plan is to eventually discontinue Keofed and consider PEG tube given low calorie intake   (was making medical decisions for her) is agreeing with placement of PEG tube  GI consulted    Plan for PEG tube placement on Monday  Disposition: Continue inpatient, PEG monday     SUBJECTIVE     Patient seen and examined  No acute events overnight  Per PCA, she seemed to flinch previously when her right hip was touched while she was being turned  She does not indicate any pain in this area  OBJECTIVE     Vitals:    21 0730 21 1528 21 2200 21 0732   BP: 127/85 127/86 125/86 127/85   BP Location: Left arm      Pulse: 83 91 98 99   Resp: 20 18 16    Temp: (!) 97 2 °F (36 2 °C) (!) 97 3 °F (36 3 °C) 98 °F (36 7 °C) 97 9 °F (36 6 °C)   TempSrc: Oral      SpO2: 100% 96% 99% 100%   Weight:       Height:          Temperature:   Temp (24hrs), Av 7 °F (36 5 °C), Min:97 3 °F (36 3 °C), Max:98 °F (36 7 °C)    Temperature: 97 9 °F (36 6 °C)  Intake & Output:  I/O        07 -  0700  07 -  0700  07 - 05/10 0700    P  O  240 120     I V  (mL/kg) 1575 8 (33 4) 1050 (22 2)     NG/  60    Feedings 760      Total Intake(mL/kg) 2875 8 (60 9) 1170 (24 8) 60 (1 3)    Urine (mL/kg/hr) 1000 (0 9) 0 (0)     Stool 0 0     Total Output 1000 0     Net +1875 8 +1170 +60           Unmeasured Urine Occurrence 1 x 1 x 1 x    Unmeasured Stool Occurrence 2 x 3 x 1 x        Weights:   IBW (Ideal Body Weight): 45 5 kg    Body mass index is 20 32 kg/m²  Weight (last 2 days)     Date/Time   Weight    21 0600   47 2 (104 06)            Physical Exam  Constitutional:       Appearance: She is well-developed  HENT:      Head: Normocephalic and atraumatic  Eyes:      General: No scleral icterus  Conjunctiva/sclera: Conjunctivae normal    Cardiovascular:      Rate and Rhythm: Normal rate and regular rhythm  Heart sounds: Normal heart sounds  No murmur  Pulmonary:      Effort: Pulmonary effort is normal       Breath sounds: Normal breath sounds  No wheezing or rales  Abdominal:      General: Bowel sounds are normal  There is no distension  Palpations: Abdomen is soft  Tenderness: There is no abdominal tenderness  There is no guarding  Musculoskeletal:         General: No tenderness or deformity  Skin:     General: Skin is warm and dry  Findings: No erythema  Neurological:      Mental Status: She is alert  Comments: Awake, makes eye contact, verbalizes "yes" and "ok", moves RUE and RLE spontaneously   Psychiatric:         Behavior: Behavior normal        LABORATORY DATA     Labs: I have personally reviewed pertinent reports  Results from last 7 days   Lab Units 05/09/21  0500 05/08/21  0549 05/07/21  0612  05/05/21  0557   WBC Thousand/uL 9 20 8 59 8 99   < > 10 98*   HEMOGLOBIN g/dL 8 7* 8 9* 8 4*   < > 8 1*   HEMATOCRIT % 26 8* 26 3* 24 4*   < > 23 9*   PLATELETS Thousands/uL 157 147* 127*   < > 124*   MONO PCT % 2*  --   --   --  1*    < > = values in this interval not displayed  Results from last 7 days   Lab Units 05/05/21  0557 05/04/21  0627 05/03/21  0601   POTASSIUM mmol/L 4 0 4 2 4 2   CHLORIDE mmol/L 105 103 98*   CO2 mmol/L 21 24 22   BUN mg/dL 22 24 30*   CREATININE mg/dL 0 38* 0 46* 0 58*   CALCIUM mg/dL 7 9* 8 1* 8 7   ALK PHOS U/L 55  --   --    ALT U/L 56  --   --    AST U/L 14  --   --                   Results from last 7 days   Lab Units 05/04/21  2243   LACTIC ACID mmol/L 3 1*           IMAGING & DIAGNOSTIC TESTING     Radiology Results: I have personally reviewed pertinent reports  Xr Chest Portable    Result Date: 4/24/2021  Impression: Dialysis catheter tip within the superior cavoatrial junction  No pneumothorax  Workstation performed: [id number]     Xr Chest Picc Line Portable    Result Date: 4/27/2021  Impression: PICC line tip in the superior vena cava, approximately 3 5 to 4 cm above the cavoatrial junction  The examination demonstrates a significant  finding and was documented as such in [facility] for liaison and referring practitioner notification   Workstation performed: [id number]     Ir Picc Reposition    Result Date: 4/27/2021  Impression: Status-post repositioning of a 5 [place] central venous catheter under fluoroscopic guidance with its tip at the cavoatrial junction  Workstation performed: [id number]     Ir Tunneled Central Line Removal    Result Date: 4/27/2021  Impression: Impression: Successful tunneled central line removal as described  Signed, performed and dictated by Christophe Allison PA-C under the supervision of Dr Linda Weeks  Workstation performed: [id number]     Other Diagnostic Testing: I have personally reviewed pertinent reports      ACTIVE MEDICATIONS     Current Facility-Administered Medications   Medication Dose Route Frequency    albuterol (PROVENTIL HFA,VENTOLIN HFA) inhaler 2 puff  2 puff Inhalation Q6H PRN    amLODIPine (NORVASC) tablet 5 mg  5 mg Oral Daily    bisacodyl (DULCOLAX) rectal suppository 10 mg  10 mg Rectal Daily PRN    dexamethasone (DECADRON) injection 4 mg  4 mg Intravenous Q12H    Followed by   Shimon Love ON 5/12/2021] dexamethasone (DECADRON) injection 4 mg  4 mg Intravenous Daily    famotidine (PEPCID) tablet 20 mg  20 mg Oral BID    heparin (porcine) subcutaneous injection 5,000 Units  5,000 Units Subcutaneous Q8H [facility]    insulin glargine (LANTUS) subcutaneous injection 11 Units 0 11 mL  11 Units Subcutaneous HS    insulin lispro (HumaLOG) 100 units/mL subcutaneous injection 1-5 Units  1-5 Units Subcutaneous Q6H [facility]    lactulose oral solution 10 g  10 g Oral BID PRN    lidocaine (LIDODERM) 5 % patch 1 patch  1 patch Topical Daily    methotrexate (PF) 5,000 mg in sodium chloride 0 9 % 1,000 mL IVPB  5,000 mg Intravenous Once    ondansetron (ZOFRAN) 16 mg, dexamethasone (DECADRON) 10 mg in sodium chloride 0 9 % 50 mL IVPB   Intravenous Once    ondansetron (ZOFRAN) injection 4 mg  4 mg Intravenous Q6H PRN    polyethylene glycol (MIRALAX) packet 17 g  17 g Oral Daily    senna-docusate sodium (SENOKOT S) 8 6-50 mg per tablet 1 tablet  1 tablet Oral BID    sodium chloride 0 9 % infusion  20 mL/hr Intravenous Once PRN    sodium chloride 0 9 % infusion  20 mL/hr Intravenous Once PRN    sodium chloride 0 9 % infusion  50 mL/hr Intravenous Continuous    tamsulosin (FLOMAX) capsule 0 4 mg  0 4 mg Oral Daily With Dinner       VTE Pharmacologic Prophylaxis: Heparin  VTE Mechanical Prophylaxis: sequential compression device    Portions of the record may have been created with voice recognition software  Occasional wrong word or "sound a like" substitutions may have occurred due to the inherent limitations of voice recognition software    Read the chart carefully and recognize, using context, where substitutions have occurred   ==  Marlena Alexander, DO  [address]  Internal Medicine Residency PGY-3

## 2021-05-09 NOTE — PLAN OF CARE
Problem: Prexisting or High Potential for Compromised Skin Integrity  Goal: Skin integrity is maintained or improved  Description: INTERVENTIONS:  - Identify patients at risk for skin breakdown  - Assess and monitor skin integrity  - Assess and monitor nutrition and hydration status  - Monitor labs   - Assess for incontinence   - Turn and reposition patient  - Assist with mobility/ambulation  - Relieve pressure over bony prominences  - Avoid friction and shearing  - Provide appropriate hygiene as needed including keeping skin clean and dry  - Evaluate need for skin moisturizer/barrier cream  - Collaborate with interdisciplinary team   - Patient/family teaching  - Consider wound care consult   Outcome: Progressing     Problem: Potential for Falls  Goal: Patient will remain free of falls  Description: INTERVENTIONS:  - Assess patient frequently for physical needs  -  Identify cognitive and physical deficits and behaviors that affect risk of falls  -  [place] fall precautions as indicated by assessment   - Educate patient/family on patient safety including physical limitations  - Instruct patient to call for assistance with activity based on assessment  - Modify environment to reduce risk of injury  - Consider OT/PT consult to assist with strengthening/mobility  Outcome: Progressing     Problem: Nutrition/Hydration-ADULT  Goal: Nutrient/Hydration intake appropriate for improving, restoring or maintaining nutritional needs  Description: Monitor and assess patient's nutrition/hydration status for malnutrition  Collaborate with interdisciplinary team and initiate plan and interventions as ordered  Monitor patient's weight and dietary intake as ordered or per policy  Utilize nutrition screening tool and intervene as necessary  Determine patient's food preferences and provide high-protein, high-caloric foods as appropriate       INTERVENTIONS:  - Monitor oral intake, urinary output, labs, and treatment plans  - Assess nutrition and hydration status and recommend course of action  - Evaluate amount of meals eaten  - Assist patient with eating if necessary   - Allow adequate time for meals  - Recommend/ encourage appropriate diets, oral nutritional supplements, and vitamin/mineral supplements  - Order, calculate, and assess calorie counts as needed  - Recommend, monitor, and adjust tube feedings and TPN/PPN based on assessed needs  - Assess need for intravenous fluids  - Provide specific nutrition/hydration education as appropriate  - Include patient/family/caregiver in decisions related to nutrition  Outcome: Progressing     Problem: PAIN - ADULT  Goal: Verbalizes/displays adequate comfort level or baseline comfort level  Description: Interventions:  - Encourage patient to monitor pain and request assistance  - Assess pain using appropriate pain scale  - Administer analgesics based on type and severity of pain and evaluate response  - Implement non-pharmacological measures as appropriate and evaluate response  - Consider cultural and social influences on pain and pain management  - Notify physician/advanced practitioner if interventions unsuccessful or patient reports new pain  Outcome: Progressing     Problem: INFECTION - ADULT  Goal: Absence or prevention of progression during hospitalization  Description: INTERVENTIONS:  - Assess and monitor for signs and symptoms of infection  - Monitor lab/diagnostic results  - Monitor all insertion sites, i e  indwelling lines, tubes, and drains  - Monitor endotracheal if appropriate and nasal secretions for changes in amount and color  - [place] appropriate cooling/warming therapies per order  - Administer medications as ordered  - Instruct and encourage patient and family to use good hand hygiene technique  - Identify and instruct in appropriate isolation precautions for identified infection/condition  Outcome: Progressing     Problem: SAFETY ADULT  Goal: Patient will remain free of falls  Description: INTERVENTIONS:  - Assess patient frequently for physical needs  -  Identify cognitive and physical deficits and behaviors that affect risk of falls    -  [place] fall precautions as indicated by assessment   - Educate patient/family on patient safety including physical limitations  - Instruct patient to call for assistance with activity based on assessment  - Modify environment to reduce risk of injury  - Consider OT/PT consult to assist with strengthening/mobility  Outcome: Progressing  Goal: Maintain or return to baseline ADL function  Description: INTERVENTIONS:  -  Assess patient's ability to carry out ADLs; assess patient's baseline for ADL function and identify physical deficits which impact ability to perform ADLs (bathing, care of mouth/teeth, toileting, grooming, dressing, etc )  - Assess/evaluate cause of self-care deficits   - Assess range of motion  - Assess patient's mobility; develop plan if impaired  - Assess patient's need for assistive devices and provide as appropriate  - Encourage maximum independence but intervene and supervise when necessary  - Involve family in performance of ADLs  - Assess for home care needs following discharge   - Consider OT consult to assist with ADL evaluation and planning for discharge  - Provide patient education as appropriate  Outcome: Progressing  Goal: Maintain or return mobility status to optimal level  Description: INTERVENTIONS:  - Assess patient's baseline mobility status (ambulation, transfers, stairs, etc )    - Identify cognitive and physical deficits and behaviors that affect mobility  - Identify mobility aids required to assist with transfers and/or ambulation (gait belt, sit-to-stand, lift, walker, cane, etc )  - [place] fall precautions as indicated by assessment  - Record patient progress and toleration of activity level on Mobility SBAR; progress patient to next Phase/Stage  - Instruct patient to call for assistance with activity based on assessment  - Consider rehabilitation consult to assist with strengthening/weightbearing, etc   Outcome: Progressing     Problem: DISCHARGE PLANNING  Goal: Discharge to home or other facility with appropriate resources  Description: INTERVENTIONS:  - Identify barriers to discharge w/patient and caregiver  - Arrange for needed discharge resources and transportation as appropriate  - Identify discharge learning needs (meds, wound care, etc )  - Arrange for interpretive services to assist at discharge as needed  - Refer to Case Management Department for coordinating discharge planning if the patient needs post-hospital services based on physician/advanced practitioner order or complex needs related to functional status, cognitive ability, or social support system  Outcome: Progressing     Problem: Knowledge Deficit  Goal: Patient/family/caregiver demonstrates understanding of disease process, treatment plan, medications, and discharge instructions  Description: Complete learning assessment and assess knowledge base    Interventions:  - Provide teaching at level of understanding  - Provide teaching via preferred learning methods  Outcome: Not Progressing

## 2021-05-10 ENCOUNTER — ANESTHESIA (INPATIENT)
Dept: GASTROENTEROLOGY | Facility: HOSPITAL | Age: 56
DRG: 840 | End: 2021-05-10
Payer: COMMERCIAL

## 2021-05-10 ENCOUNTER — APPOINTMENT (INPATIENT)
Dept: GASTROENTEROLOGY | Facility: HOSPITAL | Age: 56
DRG: 840 | End: 2021-05-10
Payer: COMMERCIAL

## 2021-05-10 ENCOUNTER — ANESTHESIA EVENT (INPATIENT)
Dept: GASTROENTEROLOGY | Facility: HOSPITAL | Age: 56
DRG: 840 | End: 2021-05-10
Payer: COMMERCIAL

## 2021-05-10 LAB
ANION GAP SERPL CALCULATED.3IONS-SCNC: 8 MMOL/L (ref 4–13)
BASOPHILS # BLD AUTO: 0.02 THOUSANDS/ΜL (ref 0–0.1)
BASOPHILS NFR BLD AUTO: 0 % (ref 0–1)
BUN SERPL-MCNC: 20 MG/DL (ref 5–25)
CALCIUM SERPL-MCNC: 8.8 MG/DL (ref 8.3–10.1)
CHLORIDE SERPL-SCNC: 103 MMOL/L (ref 100–108)
CO2 SERPL-SCNC: 23 MMOL/L (ref 21–32)
CREAT SERPL-MCNC: 0.27 MG/DL (ref 0.6–1.3)
EOSINOPHIL # BLD AUTO: 0 THOUSAND/ΜL (ref 0–0.61)
EOSINOPHIL NFR BLD AUTO: 0 % (ref 0–6)
ERYTHROCYTE [DISTWIDTH] IN BLOOD BY AUTOMATED COUNT: 16.9 % (ref 11.6–15.1)
GFR SERPL CREATININE-BSD FRML MDRD: 135 ML/MIN/1.73SQ M
GLUCOSE SERPL-MCNC: 101 MG/DL (ref 65–140)
GLUCOSE SERPL-MCNC: 142 MG/DL (ref 65–140)
GLUCOSE SERPL-MCNC: 82 MG/DL (ref 65–140)
GLUCOSE SERPL-MCNC: 85 MG/DL (ref 65–140)
HCT VFR BLD AUTO: 25.9 % (ref 34.8–46.1)
HGB BLD-MCNC: 8.7 G/DL (ref 11.5–15.4)
IMM GRANULOCYTES # BLD AUTO: 0.36 THOUSAND/UL (ref 0–0.2)
IMM GRANULOCYTES NFR BLD AUTO: 6 % (ref 0–2)
LYMPHOCYTES # BLD AUTO: 0.82 THOUSANDS/ΜL (ref 0.6–4.47)
LYMPHOCYTES NFR BLD AUTO: 13 % (ref 14–44)
MCH RBC QN AUTO: 32.7 PG (ref 26.8–34.3)
MCHC RBC AUTO-ENTMCNC: 33.6 G/DL (ref 31.4–37.4)
MCV RBC AUTO: 97 FL (ref 82–98)
MONOCYTES # BLD AUTO: 0.32 THOUSAND/ΜL (ref 0.17–1.22)
MONOCYTES NFR BLD AUTO: 5 % (ref 4–12)
NEUTROPHILS # BLD AUTO: 5.04 THOUSANDS/ΜL (ref 1.85–7.62)
NEUTS SEG NFR BLD AUTO: 76 % (ref 43–75)
NRBC BLD AUTO-RTO: 2 /100 WBCS
PHOSPHATE SERPL-MCNC: 4.4 MG/DL (ref 2.7–4.5)
PLATELET # BLD AUTO: 141 THOUSANDS/UL (ref 149–390)
PMV BLD AUTO: 10.3 FL (ref 8.9–12.7)
POTASSIUM SERPL-SCNC: 4 MMOL/L (ref 3.5–5.3)
RBC # BLD AUTO: 2.66 MILLION/UL (ref 3.81–5.12)
SODIUM SERPL-SCNC: 134 MMOL/L (ref 136–145)
WBC # BLD AUTO: 6.56 THOUSAND/UL (ref 4.31–10.16)

## 2021-05-10 PROCEDURE — 97168 OT RE-EVAL EST PLAN CARE: CPT

## 2021-05-10 PROCEDURE — 43246 EGD PLACE GASTROSTOMY TUBE: CPT | Performed by: INTERNAL MEDICINE

## 2021-05-10 PROCEDURE — 0DH63UZ INSERTION OF FEEDING DEVICE INTO STOMACH, PERCUTANEOUS APPROACH: ICD-10-PCS | Performed by: INTERNAL MEDICINE

## 2021-05-10 PROCEDURE — 97110 THERAPEUTIC EXERCISES: CPT

## 2021-05-10 PROCEDURE — 97112 NEUROMUSCULAR REEDUCATION: CPT

## 2021-05-10 PROCEDURE — 85025 COMPLETE CBC W/AUTO DIFF WBC: CPT | Performed by: STUDENT IN AN ORGANIZED HEALTH CARE EDUCATION/TRAINING PROGRAM

## 2021-05-10 PROCEDURE — 84100 ASSAY OF PHOSPHORUS: CPT | Performed by: INTERNAL MEDICINE

## 2021-05-10 PROCEDURE — 97164 PT RE-EVAL EST PLAN CARE: CPT

## 2021-05-10 PROCEDURE — 80048 BASIC METABOLIC PNL TOTAL CA: CPT | Performed by: INTERNAL MEDICINE

## 2021-05-10 PROCEDURE — 82948 REAGENT STRIP/BLOOD GLUCOSE: CPT

## 2021-05-10 RX ORDER — INSULIN GLARGINE 100 [IU]/ML
10 INJECTION, SOLUTION SUBCUTANEOUS
Status: DISCONTINUED | OUTPATIENT
Start: 2021-05-10 | End: 2021-05-12

## 2021-05-10 RX ORDER — LIDOCAINE HYDROCHLORIDE 20 MG/ML
INJECTION, SOLUTION INFILTRATION; PERINEURAL AS NEEDED
Status: DISCONTINUED | OUTPATIENT
Start: 2021-05-10 | End: 2021-05-10

## 2021-05-10 RX ORDER — PROPOFOL 10 MG/ML
INJECTION, EMULSION INTRAVENOUS CONTINUOUS PRN
Status: DISCONTINUED | OUTPATIENT
Start: 2021-05-10 | End: 2021-05-10

## 2021-05-10 RX ORDER — DEXTROSE AND SODIUM CHLORIDE 5; .9 G/100ML; G/100ML
50 INJECTION, SOLUTION INTRAVENOUS CONTINUOUS
Status: DISCONTINUED | OUTPATIENT
Start: 2021-05-10 | End: 2021-05-11

## 2021-05-10 RX ORDER — PROPOFOL 10 MG/ML
INJECTION, EMULSION INTRAVENOUS AS NEEDED
Status: DISCONTINUED | OUTPATIENT
Start: 2021-05-10 | End: 2021-05-10

## 2021-05-10 RX ORDER — CEFAZOLIN SODIUM 1 G/3ML
INJECTION, POWDER, FOR SOLUTION INTRAMUSCULAR; INTRAVENOUS AS NEEDED
Status: DISCONTINUED | OUTPATIENT
Start: 2021-05-10 | End: 2021-05-10

## 2021-05-10 RX ADMIN — CEFAZOLIN 1000 MG: 1 INJECTION, POWDER, FOR SOLUTION INTRAMUSCULAR; INTRAVENOUS at 10:24

## 2021-05-10 RX ADMIN — TAMSULOSIN HYDROCHLORIDE 0.4 MG: 0.4 CAPSULE ORAL at 17:54

## 2021-05-10 RX ADMIN — HEPARIN SODIUM 5000 UNITS: 5000 INJECTION INTRAVENOUS; SUBCUTANEOUS at 13:36

## 2021-05-10 RX ADMIN — SODIUM CHLORIDE 50 ML/HR: 0.9 INJECTION, SOLUTION INTRAVENOUS at 03:59

## 2021-05-10 RX ADMIN — PROPOFOL 50 MCG/KG/MIN: 10 INJECTION, EMULSION INTRAVENOUS at 10:23

## 2021-05-10 RX ADMIN — DEXTROSE AND SODIUM CHLORIDE 50 ML/HR: 5; .9 INJECTION, SOLUTION INTRAVENOUS at 20:09

## 2021-05-10 RX ADMIN — INSULIN GLARGINE 10 UNITS: 100 INJECTION, SOLUTION SUBCUTANEOUS at 23:15

## 2021-05-10 RX ADMIN — DEXAMETHASONE SODIUM PHOSPHATE 4 MG: 4 INJECTION INTRA-ARTICULAR; INTRALESIONAL; INTRAMUSCULAR; INTRAVENOUS; SOFT TISSUE at 23:15

## 2021-05-10 RX ADMIN — PROPOFOL 20 MG: 10 INJECTION, EMULSION INTRAVENOUS at 10:23

## 2021-05-10 RX ADMIN — HEPARIN SODIUM 5000 UNITS: 5000 INJECTION INTRAVENOUS; SUBCUTANEOUS at 23:15

## 2021-05-10 RX ADMIN — DEXAMETHASONE SODIUM PHOSPHATE 4 MG: 4 INJECTION INTRA-ARTICULAR; INTRALESIONAL; INTRAMUSCULAR; INTRAVENOUS; SOFT TISSUE at 09:15

## 2021-05-10 RX ADMIN — LIDOCAINE HYDROCHLORIDE 3 ML: 20 INJECTION, SOLUTION INFILTRATION; PERINEURAL at 10:23

## 2021-05-10 RX ADMIN — SENNOSIDES, DOCUSATE SODIUM 1 TABLET: 8.6; 5 TABLET ORAL at 17:54

## 2021-05-10 RX ADMIN — FAMOTIDINE 20 MG: 20 TABLET ORAL at 17:55

## 2021-05-10 NOTE — DISCHARGE INSTRUCTIONS
Perma-cath Removal   WHAT YOU NEED TO KNOW:   A perma-cath is a catheter placed through a vein into or near your right atrium  Your right atrium is the right upper chamber of your heart  A perma-cath is used for dialysis in an emergency or until a long-term device is ready to use  Or you no longer need dialysis  DISCHARGE INSTRUCTIONS:   Call 911 for any of the following:   · You feel lightheaded, short of breath, and have chest pain  · You start bleeding    Contact Interventional Radiology at [phone] [place] PATIENTS: Contact Interventional Radiology at [phone]) Kyra Echeverria PATIENTS: Contact Interventional Radiology at [phone]) if:  · Blood soaks through your bandage  · You have new swelling in your arm, neck, face, or chest on your right side  · Your bruises or pain get worse  · You have a fever or chills  · Persistent nausea or vomiting  · Your incision is red, swollen, or draining pus  · You have questions or concerns about your condition or care  Self-care:   · Resume your normal diet  Small sips of flat soda will help with nausea  · Keep your dressings dry  Do not get your perma-cath site wet until the incision closes  You may take a tub bath, but do not get your dressings wet  Water in your wound can cause bacteria to grow and cause an infection  If your dressing gets wet, remove the wet dressing and apply a dry bandaid  Keep it covered until the incision closes  This should only take a few days to heal  Do not use soaps or ointments  · Immediately after your catheter is removed, no strenuous activity for twenty four hours and stay in an upright sitting position for two hours  Follow up with your healthcare provider as directed: Write down your questions so you remember to ask them during your visits  Upper Endoscopy   WHAT YOU NEED TO KNOW:   An upper endoscopy is also called an upper gastrointestinal (GI) endoscopy, or an esophagogastroduodenoscopy (EGD)   It is a procedure to examine the inside of your esophagus, stomach, and duodenum (first part of the small intestine) with a scope  You may feel bloated, gassy, or have some abdominal discomfort after your procedure  Your throat may be sore for 24 to 36 hours  You may burp or pass gas from air that is still inside your body  DISCHARGE INSTRUCTIONS:   Seek care immediately if:    You have sudden, severe abdominal pain   You have problems swallowing   You have a large amount of black, sticky bowel movements or blood in your bowel movements   You have sudden trouble breathing   You feel weak, lightheaded, or faint or your heart beats faster than normal for you  Contact your healthcare provider if:    You have a fever and chills   You have nausea or are vomiting   Your abdomen is bloated or feels full and hard   You have abdominal pain   You have black, sticky bowel movements or blood in your bowel movements   You have not had a bowel movement for 3 days after your procedure   You have rash or hives   You have questions or concerns about your procedure  Activity:    Do not lift, strain, or run for 24 hours after your procedure   Rest after your procedure  You have been given medicine to relax you  Do not drive or make important decisions until the day after your procedure  Return to your normal activity as directed   Relieve gas and discomfort from bloating by lying on your right side with a heating pad on your abdomen  You may need to take short walks to help the gas move out  Eat small meals until bloating is relieved  Follow up with your healthcare provider as directed: Write down your questions so you remember to ask them during your visits  If you take a blood thinner, please review the specific instructions from your endoscopist about when you should resume it   These can be found in the Recommendation and Your Medication list sections of this After Visit Summary

## 2021-05-10 NOTE — NUTRITION
05/10/21 1720   Recommendations/Interventions   Nutrition Recommendations Other (Specify)  (resume jevity 1 2 when medically appropriate via PEG tube via continuous method for now: jevity 1 2 at 45ml/hr; flush with 150ml q 6 hours (~1300kcal; 60g pro; 1470ml total water); continue PO as rx)

## 2021-05-10 NOTE — PROGRESS NOTES
INTERNAL MEDICINE RESIDENCY PROGRESS NOTE     Name: Zita Guzman   Age & Sex: 49 y o  female   MRN: [id number]  Unit/Bed#: [facility] 920-01   Encounter: [id number]  Team: SOD Team B     PATIENT INFORMATION     Name: Zita Guzman   Age & Sex: 49 y o  female   MRN: [id number] [facility] [address] Stay Days: 16    ASSESSMENT/PLAN     Principal Problem:    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[facility] [address] )  Active Problems:    Sepsis ([facility] [address] )    Altered mental status    Dysphagia    Urinary retention    Presence of permanent central venous catheter    Steroid-induced hyperglycemia    Left-sided weakness    Aphasia    Weakness    Fracture of ramus of left pubis (HCC)    Severe protein-calorie malnutrition ([facility] [address] )      Sepsis ([facility] [address] )  Assessment & Plan  Patient had hypothermia, elevated WBC count and tachycardia  Concern for possible aspiration in setting on dysphagia versus UTI in setting of urinary catheter which was placed for retention  Plan:  S/p IV antibiotics last day on 05/05  Will continue to monitor off antibiotics  * 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[facility])  Assessment & Plan  Patient was diagnosed with primary 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through biopsy in [facility] transferred over here for chemotherapy since the [facility] is out of network    Plan:  Had a 2nd family meeting on 04/30 and family has decided to proceed treatment at this time  Hematology-Oncology consulted  Appreciate recommendations  S/p 1 cycle of Chemotherapy treatment as per Hematology-Oncology  Patient has poor prognosis even with treatment although family wants all treatment  S/p PICC line for chemotherapy  DVT prophylaxis:  On heparin  Case management-will start looking for places for rehab  Facilities may not accept patient since she has Keofed  Patient currently has keofed and VBS showed severe oropharyngeal dysphagia  Plan is to eventually discontinue Keofed and consider PEG tube given low calorie intake     (was making medical decisions for her) is agreeing with placement of PEG tube  GI consulted  Plan for PEG tube placement on 5/10  Continue steroid taper for cerebral edema from 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Left-sided weakness  Assessment & Plan  Patient has left-sided weakness at baseline from her 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Left upper extremity 3-4/5 and left lower extremity 0-1/5 muscle strength at baseline  Steroid-induced hyperglycemia  Assessment & Plan  Mild hyperglycemia, glucoses in 200s, no history of DM  Occurring in the setting of Decadron with chemotherapy, as well as sodium bicarb in D5 drip  - D5 drip is finished  - will monitor sugars while on Jevity and now off D5  - continue q6 hour fingerstick glucose with correctional algorithm 1 and Lantus    Presence of permanent central venous catheter  Assessment & Plan  Noted presence of right IJ tunneled double-lumen HD catheter; upon chart review, this was likely inserted to be used for plasmapheresis which she has completed  Plan:  S/p removal by IR  Patient now has PICC line  Urinary retention  Assessment & Plan  Patient developed urinary retention during previous hospitalization  Plan was voiding trial as an outpatient as she was scheduled for discharge from that facility  Patient passed voiding trial and now voiding appropriately  Urinary retention protocol and strict ins and outs  Dysphagia  Assessment & Plan  Patient noted to have dysphagia and also decreased p o  Intake  As per [facility] progress note patient had a calorie count and recommended feeding tube placement  Speech evaluation - advance diet with dysphagia 2 and thin liquids; needs assistance with feeds  Calorie count  If the patient continued to have poor p o  Intake may need discussion with the family regarding alternate methods of nutrition  Patient's calorie intake is low  Keofed placed evening of 4/30, Jevity 1 2 started, advancing as tolerated  VBS- showed oropharyngeal dysphagia      Altered mental status  Assessment & Plan  Patient initially presented to [facility] his ultimate [place] with stroke-like symptoms for 2 days  CT scan of the head was concerning for subacute CVA and was transferred to [facility]   MRI of the brain was more consistent with demyelinating disease patient was started on IV steroids and 1000 mg daily for 5 days  Patient did not have any significant improvement at that time patient had plasmapheresis  And also had  lumbar puncture-negative for infection or malignancy  Patient noted to have persistent encephalopathy so a repeat MRI was obtained which showed worsening of the abnormality seen on the previous MRI and 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Continue with the dexamethasone  Likely secondary to 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Delirium precautions   Currently alert and oriented times 2-3   follow commands  Moves all extremities spontaneously except left upper and lower extrimities  Patient's  (does not speak [place]) makes all medical decision for her  Severe protein-calorie malnutrition ([facility] [address] )  Assessment & Plan  Malnutrition Findings:   Adult Malnutrition type: Acute illness(due to medical condition resulting in dysphagia, decreased appetite and intake, weight loss)  Adult Degree of Malnutrition: Other severe protein calorie malnutrition    BMI Findings: Body mass index is 20 49 kg/m²  Plan:  Nutrition consult  Keofed placed 4/30  Tube feeds initiated with Jevity 1 2, advancing to goal      Fracture of ramus of left pubis [facility])  Assessment & Plan  [place] prior to presentation to [facility]  Managed nonoperatively at [facility]    Weakness  Assessment & Plan  Left greater than right upper and lower extremity weakness with ongoing left lower extremity contracture  Secondary to underlying CNS malignancy  *Decubitus ulcer precautions such as frequent repositioning       Aphasia  Assessment & Plan  Definite evidence of expressive aphasia, possible component of receptive aphasia as well  Speech therapy on board  Disposition:  Continue inpatient care     SUBJECTIVE     Patient seen and examined  No acute events overnight  Alert and oriented times 2-3  only lead response with 1 or 2 words  Left-sided weakness at baseline  OBJECTIVE     Vitals:    05/10/21 0907 05/10/21 0959 05/10/21 1101 05/10/21 1116   BP: 124/82 126/81 123/81 137/89   Pulse:  71 71 70   Resp:  18 16 16   Temp:  (!) 96 7 °F (35 9 °C) (!) 96 °F (35 6 °C)    TempSrc:  Tympanic Tympanic    SpO2:  100% 100% 100%   Weight:  47 2 kg (104 lb)     Height:  5' (1 524 m)        Temperature:   Temp (24hrs), Av 2 °F (36 2 °C), Min:96 °F (35 6 °C), Max:98 1 °F (36 7 °C)    Temperature: (!) 96 °F (35 6 °C)  Intake & Output:  I/O        07 -  07 07 - 05/10 0700 05/10 0701 -  0700    P  O  120 110 0    I V  (mL/kg) 1050 (22 2) 1200 (25 4)     NG/GT  60     Feedings       Total Intake(mL/kg) 1170 (24 8) 1370 (29) 0 (0)    Urine (mL/kg/hr) 0 (0) 0 (0)     Stool 0 0     Total Output 0 0     Net +1170 +1370 0           Unmeasured Urine Occurrence 1 x 3 x     Unmeasured Stool Occurrence 3 x 3 x         Weights:   IBW (Ideal Body Weight): 45 5 kg    Body mass index is 20 31 kg/m²  Weight (last 2 days)     Date/Time   Weight    05/10/21 0959   47 2 (104)            Physical Exam  Vitals signs reviewed  Constitutional:       General: She is not in acute distress  Appearance: She is well-developed  She is not diaphoretic  HENT:      Head: Normocephalic and atraumatic  Nose: Nose normal       Mouth/Throat:      Pharynx: No oropharyngeal exudate  Eyes:      General: No scleral icterus  Conjunctiva/sclera: Conjunctivae normal    Neck:      Musculoskeletal: Neck supple  Thyroid: No thyromegaly  Vascular: No JVD  Trachea: No tracheal deviation  Cardiovascular:      Rate and Rhythm: Normal rate and regular rhythm        Heart sounds: Normal heart sounds  No murmur  No friction rub  No gallop  Pulmonary:      Effort: Pulmonary effort is normal  No respiratory distress  Breath sounds: Normal breath sounds  No stridor  No wheezing or rales  Chest:      Chest wall: No tenderness  Abdominal:      General: Bowel sounds are normal  There is no distension  Palpations: Abdomen is soft  There is no mass  Tenderness: There is no abdominal tenderness  There is no guarding or rebound  Musculoskeletal: Normal range of motion  General: No tenderness  Skin:     General: Skin is warm  Findings: No erythema or rash  Neurological:      Mental Status: She is alert and oriented to person, place, and time  Sensory: No sensory deficit  Comments: Left lower extremity 0/5 strength, left upper extremity 3 to 4/5 strength  Psychiatric:         Behavior: Behavior normal          Thought Content: Thought content normal          Judgment: Judgment normal        LABORATORY DATA     Labs: I have personally reviewed pertinent reports  Results from last 7 days   Lab Units 05/10/21  0651 05/09/21  0500 05/08/21  0549  05/05/21  0557   WBC Thousand/uL 6 56 9 20 8 59   < > 10 98*   HEMOGLOBIN g/dL 8 7* 8 7* 8 9*   < > 8 1*   HEMATOCRIT % 25 9* 26 8* 26 3*   < > 23 9*   PLATELETS Thousands/uL 141* 157 147*   < > 124*   NEUTROS PCT % 76*  --   --   --   --    MONOS PCT % 5  --   --   --   --    MONO PCT %  --  2*  --   --  1*    < > = values in this interval not displayed        Results from last 7 days   Lab Units 05/10/21  0651 05/05/21  0557 05/04/21  0627   POTASSIUM mmol/L 4 0 4 0 4 2   CHLORIDE mmol/L 103 105 103   CO2 mmol/L 23 21 24   BUN mg/dL 20 22 24   CREATININE mg/dL 0 27* 0 38* 0 46*   CALCIUM mg/dL 8 8 7 9* 8 1*   ALK PHOS U/L  --  55  --    ALT U/L  --  56  --    AST U/L  --  14  --          Results from last 7 days   Lab Units 05/10/21  0651   PHOSPHORUS mg/dL 4 4          Results from last 7 days   Lab Units 05/04/21  2243   LACTIC ACID mmol/L 3 1*           IMAGING & DIAGNOSTIC TESTING     Radiology Results: I have personally reviewed pertinent reports  Xr Chest Portable    Result Date: 4/24/2021  Impression: Dialysis catheter tip within the superior cavoatrial junction  No pneumothorax  Workstation performed: [id number]     Xr Chest Picc Line Portable    Result Date: 4/27/2021  Impression: PICC line tip in the superior vena cava, approximately 3 5 to 4 cm above the cavoatrial junction  The examination demonstrates a significant  finding and was documented as such in [facility] for liaison and referring practitioner notification  Workstation performed: [id number]     Ir Picc Reposition    Result Date: 4/27/2021  Impression: Status-post repositioning of a 5 [place] central venous catheter under fluoroscopic guidance with its tip at the cavoatrial junction  Workstation performed: [id number]     Ir Tunneled Central Line Removal    Result Date: 4/27/2021  Impression: Impression: Successful tunneled central line removal as described  Signed, performed and dictated by Melyssa Mccarthy PA-C under the supervision of Dr Sesar Escamilla  Workstation performed: [id number]     Other Diagnostic Testing: I have personally reviewed pertinent reports      ACTIVE MEDICATIONS     Current Facility-Administered Medications   Medication Dose Route Frequency    albuterol (PROVENTIL HFA,VENTOLIN HFA) inhaler 2 puff  2 puff Inhalation Q6H PRN    amLODIPine (NORVASC) tablet 5 mg  5 mg Oral Daily    bisacodyl (DULCOLAX) rectal suppository 10 mg  10 mg Rectal Daily PRN    dexamethasone (DECADRON) injection 4 mg  4 mg Intravenous Q12H    Followed by   Cal Specking ON 5/12/2021] dexamethasone (DECADRON) injection 4 mg  4 mg Intravenous Daily    famotidine (PEPCID) tablet 20 mg  20 mg Oral BID    heparin (porcine) subcutaneous injection 5,000 Units  5,000 Units Subcutaneous Q8H [address]    insulin glargine (LANTUS) subcutaneous injection 10 Units 0 1 mL 10 Units Subcutaneous HS    insulin lispro (HumaLOG) 100 units/mL subcutaneous injection 1-5 Units  1-5 Units Subcutaneous Q6H [address]    lactulose oral solution 10 g  10 g Oral BID PRN    lidocaine (LIDODERM) 5 % patch 1 patch  1 patch Topical Daily    methotrexate (PF) 5,000 mg in sodium chloride 0 9 % 1,000 mL IVPB  5,000 mg Intravenous Once    ondansetron (ZOFRAN) 16 mg, dexamethasone (DECADRON) 10 mg in sodium chloride 0 9 % 50 mL IVPB   Intravenous Once    ondansetron (ZOFRAN) injection 4 mg  4 mg Intravenous Q6H PRN    polyethylene glycol (MIRALAX) packet 17 g  17 g Oral Daily    senna-docusate sodium (SENOKOT S) 8 6-50 mg per tablet 1 tablet  1 tablet Oral BID    sodium chloride 0 9 % infusion  20 mL/hr Intravenous Once PRN    sodium chloride 0 9 % infusion  20 mL/hr Intravenous Once PRN    sodium chloride 0 9 % infusion  50 mL/hr Intravenous Continuous    tamsulosin (FLOMAX) capsule 0 4 mg  0 4 mg Oral Daily With Dinner       VTE Pharmacologic Prophylaxis: Heparin  VTE Mechanical Prophylaxis: sequential compression device    Portions of the record may have been created with voice recognition software  Occasional wrong word or "sound a like" substitutions may have occurred due to the inherent limitations of voice recognition software    Read the chart carefully and recognize, using context, where substitutions have occurred   ==  Tanmay President, [address] [place] [address]  Internal Medicine Residency PGY-2

## 2021-05-10 NOTE — PLAN OF CARE
Problem: Prexisting or High Potential for Compromised Skin Integrity  Goal: Skin integrity is maintained or improved  Description: INTERVENTIONS:  - Identify patients at risk for skin breakdown  - Assess and monitor skin integrity  - Assess and monitor nutrition and hydration status  - Monitor labs   - Assess for incontinence   - Turn and reposition patient  - Assist with mobility/ambulation  - Relieve pressure over bony prominences  - Avoid friction and shearing  - Provide appropriate hygiene as needed including keeping skin clean and dry  - Evaluate need for skin moisturizer/barrier cream  - Collaborate with interdisciplinary team   - Patient/family teaching  - Consider wound care consult   Outcome: Progressing     Problem: Potential for Falls  Goal: Patient will remain free of falls  Description: INTERVENTIONS:  - Assess patient frequently for physical needs  -  Identify cognitive and physical deficits and behaviors that affect risk of falls  -  [place] fall precautions as indicated by assessment   - Educate patient/family on patient safety including physical limitations  - Instruct patient to call for assistance with activity based on assessment  - Modify environment to reduce risk of injury  - Consider OT/PT consult to assist with strengthening/mobility  Outcome: Progressing     Problem: Nutrition/Hydration-ADULT  Goal: Nutrient/Hydration intake appropriate for improving, restoring or maintaining nutritional needs  Description: Monitor and assess patient's nutrition/hydration status for malnutrition  Collaborate with interdisciplinary team and initiate plan and interventions as ordered  Monitor patient's weight and dietary intake as ordered or per policy  Utilize nutrition screening tool and intervene as necessary  Determine patient's food preferences and provide high-protein, high-caloric foods as appropriate       INTERVENTIONS:  - Monitor oral intake, urinary output, labs, and treatment plans  - Assess nutrition and hydration status and recommend course of action  - Evaluate amount of meals eaten  - Assist patient with eating if necessary   - Allow adequate time for meals  - Recommend/ encourage appropriate diets, oral nutritional supplements, and vitamin/mineral supplements  - Order, calculate, and assess calorie counts as needed  - Recommend, monitor, and adjust tube feedings and TPN/PPN based on assessed needs  - Assess need for intravenous fluids  - Provide specific nutrition/hydration education as appropriate  - Include patient/family/caregiver in decisions related to nutrition  Outcome: Progressing     Problem: PAIN - ADULT  Goal: Verbalizes/displays adequate comfort level or baseline comfort level  Description: Interventions:  - Encourage patient to monitor pain and request assistance  - Assess pain using appropriate pain scale  - Administer analgesics based on type and severity of pain and evaluate response  - Implement non-pharmacological measures as appropriate and evaluate response  - Consider cultural and social influences on pain and pain management  - Notify physician/advanced practitioner if interventions unsuccessful or patient reports new pain  Outcome: Progressing     Problem: INFECTION - ADULT  Goal: Absence or prevention of progression during hospitalization  Description: INTERVENTIONS:  - Assess and monitor for signs and symptoms of infection  - Monitor lab/diagnostic results  - Monitor all insertion sites, i e  indwelling lines, tubes, and drains  - Monitor endotracheal if appropriate and nasal secretions for changes in amount and color  - [place] appropriate cooling/warming therapies per order  - Administer medications as ordered  - Instruct and encourage patient and family to use good hand hygiene technique  - Identify and instruct in appropriate isolation precautions for identified infection/condition  Outcome: Progressing     Problem: SAFETY ADULT  Goal: Patient will remain free of falls  Description: INTERVENTIONS:  - Assess patient frequently for physical needs  -  Identify cognitive and physical deficits and behaviors that affect risk of falls    -  [place] fall precautions as indicated by assessment   - Educate patient/family on patient safety including physical limitations  - Instruct patient to call for assistance with activity based on assessment  - Modify environment to reduce risk of injury  - Consider OT/PT consult to assist with strengthening/mobility  Outcome: Progressing  Goal: Maintain or return to baseline ADL function  Description: INTERVENTIONS:  -  Assess patient's ability to carry out ADLs; assess patient's baseline for ADL function and identify physical deficits which impact ability to perform ADLs (bathing, care of mouth/teeth, toileting, grooming, dressing, etc )  - Assess/evaluate cause of self-care deficits   - Assess range of motion  - Assess patient's mobility; develop plan if impaired  - Assess patient's need for assistive devices and provide as appropriate  - Encourage maximum independence but intervene and supervise when necessary  - Involve family in performance of ADLs  - Assess for home care needs following discharge   - Consider OT consult to assist with ADL evaluation and planning for discharge  - Provide patient education as appropriate  Outcome: Progressing  Goal: Maintain or return mobility status to optimal level  Description: INTERVENTIONS:  - Assess patient's baseline mobility status (ambulation, transfers, stairs, etc )    - Identify cognitive and physical deficits and behaviors that affect mobility  - Identify mobility aids required to assist with transfers and/or ambulation (gait belt, sit-to-stand, lift, walker, cane, etc )  - [place] fall precautions as indicated by assessment  - Record patient progress and toleration of activity level on Mobility SBAR; progress patient to next Phase/Stage  - Instruct patient to call for assistance with activity based on assessment  - Consider rehabilitation consult to assist with strengthening/weightbearing, etc   Outcome: Progressing     Problem: DISCHARGE PLANNING  Goal: Discharge to home or other facility with appropriate resources  Description: INTERVENTIONS:  - Identify barriers to discharge w/patient and caregiver  - Arrange for needed discharge resources and transportation as appropriate  - Identify discharge learning needs (meds, wound care, etc )  - Arrange for interpretive services to assist at discharge as needed  - Refer to Case Management Department for coordinating discharge planning if the patient needs post-hospital services based on physician/advanced practitioner order or complex needs related to functional status, cognitive ability, or social support system  Outcome: Progressing

## 2021-05-10 NOTE — PLAN OF CARE
Problem: PHYSICAL THERAPY ADULT  Goal: Performs mobility at highest level of function for planned discharge setting  See evaluation for individualized goals  Description: Treatment/Interventions: ADL retraining, Functional transfer training, LE strengthening/ROM, Endurance training, Patient/family training, Bed mobility, Spoke to nursing, OT  Equipment Recommended: (TBD)       See flowsheet documentation for full assessment, interventions and recommendations  Outcome: Progressing  Note: Prognosis: Fair  Problem List: Decreased strength, Decreased range of motion, Decreased endurance, Impaired balance, Decreased mobility, Decreased cognition, Impaired vision  Assessment: Pt seen for PT re-Evaluation this date 2* expiring goals  Please refer to IE for more detailed information regarding PLOF and home set-up  Per chart review, pt I ADLs/ IADLs  prior to hospital admission  Upon re-evaluation, pt has limitations in strength, balance, endurance, cognition and overall functional mobility  Pt requires assist of 1- 2 for all mobility performed this date  Pt performed L/R rolling with mod Ax1  Pt performed Supine to sit/ sit to supine bed mobility tasks with mod Ax2  Pt sat EOB fluctuating between min x1 to close S  Pt perofmred STS from EOB using b/l HHA with max Ax2  Pt was left supine in bed with alarm on at the end of PT session with all needs in reach  Pt would benefit from continued PT services while in hospital to address remaining limitations  PT to continue to follow pt and recommends post-acute rehab services after d/c  The patient's AM-PAC Basic Mobility Inpatient Short Form Low Function Raw Score 14 , Standardized Score is 22 01  A standardized score less 42 9 suggests the patient may benefit from discharge to post-acute rehab services  Please also refer to the recommendation of the Physical Therapist for safe discharge planning    Barriers to Discharge: Inaccessible home environment, Decreased caregiver support        PT Discharge Recommendation: Post acute rehabilitation services     PT - OK to Discharge: Yes(to rehab once medically cleared )    See flowsheet documentation for full assessment

## 2021-05-10 NOTE — PHYSICAL THERAPY NOTE
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[address] )    Altered mental status    Dysphagia    Urinary retention    Aphasia    Weakness    Presence of permanent central venous catheter    Fracture of ramus of left pubis (HCC)    Severe protein-calorie malnutrition (HCC)    Steroid-induced hyperglycemia    Sepsis ([facility] [address] )    Left-sided weakness       Past Medical History  History reviewed  No pertinent past medical history  Past Surgical History  Past Surgical History:   Procedure Laterality Date    IR PICC REPOSITION  4/27/2021    IR TUNNELED CENTRAL LINE REMOVAL  4/27/2021     Time: 13:40- 13:53     05/10/21 1419   PT Last Visit   PT Visit Date 05/10/21   Note Type   Note type Re-Evaluation   Pain Assessment   Pain Assessment Tool Pain Assessment not indicated - pt denies pain   Pain Score No Pain   Home Living   Additional Comments please refer to initial evaluation    Prior Function   Level of [place] Independent with ADLs and functional mobility   Lives With Spouse   Receives Help From Family;Friend(s)   ADL Assistance Independent   IADLs Independent   Restrictions/Precautions   Weight Bearing Precautions Per Order No   Other Precautions Cognitive; Chair Alarm; Bed Alarm; Fall Risk;Visual impairment  (+ aphasia, limited english, pt speaks [place] )   General   Family/Caregiver Present No   Cognition   Overall Cognitive Status Impaired   Attention Attends with cues to redirect   Orientation Level Oriented to person   Memory Unable to assess   Following Commands Follows one step commands with increased time or repetition   Comments Pt more alert and cooperative this therapy session  Requires visual demonstration, used google translate this session with more understanding      RLE Assessment   RLE Assessment   (Grossly 3/5 )   LLE Assessment   LLE Assessment   (grossly 2/5 )   Bed Mobility   Rolling R 3  Moderate assistance   Additional items Assist x 1;Bedrails; Increased time required   Rolling L 3  Moderate assistance   Additional items Assist x 1;Bedrails; Increased time required   Supine to Sit 3  Moderate assistance   Additional items Assist x 2; Increased time required   Sit to Supine 3  Moderate assistance   Additional items Assist x 2; Increased time required   Transfers   Sit to Stand 2  Maximal assistance   Additional items Assist x 2; Increased time required   Stand to Sit 2  Maximal assistance   Additional items Assist x 2; Increased time required   Additional Comments transfers with b/l HHA    Ambulation/Elevation   Gait pattern Not appropriate   Balance   Static Sitting Fair -   Dynamic Sitting Poor +   Static Standing Poor   Dynamic Standing Poor -   Ambulatory Poor -   Activity Tolerance   Activity Tolerance Patient limited by fatigue   Medical Staff Made Aware SANCHEZ Key    Nurse Made Aware RN cleared pt to participate in therapy session    Assessment   Prognosis Fair   Problem List Decreased strength;Decreased range of motion;Decreased endurance; Impaired balance;Decreased mobility; Decreased cognition; Impaired vision   Assessment Pt seen for PT re-Evaluation this date 2* expiring goals  Please refer to IE for more detailed information regarding PLOF and home set-up  Per chart review, pt I ADLs/ IADLs  prior to hospital admission  Upon re-evaluation, pt has limitations in strength, balance, endurance, cognition and overall functional mobility  Pt requires assist of 1- 2 for all mobility performed this date  Pt performed L/R rolling with mod Ax1  Pt performed Supine to sit/ sit to supine bed mobility tasks with mod Ax2  Pt sat EOB fluctuating between min x1 to close S  Pt perofmred STS from EOB using b/l HHA with max Ax2  Pt was left supine in bed with alarm on at the end of PT session with all needs in reach   Pt would benefit from continued PT services while in hospital to address remaining limitations  PT to continue to follow pt and recommends post-acute rehab services after d/c  The patient's AM-PAC Basic Mobility Inpatient Short Form Low Function Raw Score 14 , Standardized Score is 22 01  A standardized score less 42 9 suggests the patient may benefit from discharge to post-acute rehab services  Please also refer to the recommendation of the Physical Therapist for safe discharge planning  Barriers to Discharge Inaccessible home environment;Decreased caregiver support   Goals   Patient Goals none stated    STG Expiration Date 05/24/21   Short Term Goal #1 STG 1  Pt will be able to perform bed mobility with min Ax1 in order to improve overall functional mobility and assist in safe d/c  STG 2  Pt will be able to perform functional transfer with min Ax1 in order to improve overall functional mobility and assist in safe d/c  STG 3  Pt will improve sitting/standing static/dynamic balance 1/2 grade in order to improve functional mobility and assist in safe d/c  STG 4  Pt will improve LE strength by 1/2 grade in order to improve functional mobility and assist in safe d/c  *PT to continue to follow to assess ambulation as appropriate and able   Plan   Treatment/Interventions Functional transfer training;LE strengthening/ROM; Therapeutic exercise; Endurance training;Patient/family training;Bed mobility;Spoke to nursing;OT   PT Frequency Other (Comment)  (3-5x/wk )   Recommendation   PT Discharge Recommendation Post acute rehabilitation services   Equipment Recommended   (TBD )   PT - OK to Discharge Yes  (to rehab once medically cleared )   AM-PAC Basic Mobility Inpatient   Turning in Bed Without Bedrails 2   Lying on Back to Sitting on Edge of Flat Bed 1   Moving Bed to Chair 1   Standing Up From Chair 1   Walk in Room 1   Climb 3-5 Stairs 1   Basic Mobility Inpatient Raw Score 7   Turning Head Towards Sound 4   Follow Simple Instructions 3   Low Function Basic Mobility Raw Score 14   Low Function Basic Mobility Standardized Score 22 01     Treatment Time: 13:54- 14:19   S: Pt pleasant and cooperative to participate in therapy session   O: static/dynamic sitting balance/tolerance, therapeutic exercise, and endurance training  A: Pt seen for additional treatment session after re-evaluation this date  Treatment session focused on sitting static/dynamic balance, therapeutic exercise and endurance training  Pt sat EOB ~18 minutes fluctuating between min Ax1 for dynamic activities to close supervision level  for static balance activities  Dynamic balance activities performed sitting EOB  Pt able to touch therapist's hand high/low/cross body with RUE, assistance required to assist LUE in doing the same- pt able to initiate motion however required min Ax1 to complete motion  WBing through b/l UEs on table top reaching for therapist's hand also performed  Pt able to perform ankle pumps (AROM 2x10 b/l LEs), LAQs (AROM RLE 2x 10, AAROM LLE 2 x10) and marching (AAROM RLE and RLLE 2x10)  Pt positioned comfortably in bed with alarm on and all needs within reach at the end of therapy session  P: Continue to follow pt 3-5x/wk  Recommend post-acute rehab services after d/c             John Dowd, PT, DPT

## 2021-05-10 NOTE — UTILIZATION REVIEW
Continued Stay Review    Date:5/9/21                          Current Patient Class: IP  Current Level of Care: MS    HPI:56 y o  female initially admitted on 4/23 for CNS lymphoma for initiation of chemotherapy     Assessment/Plan:   Pt's Keofed tube came out on 5/9  She will have PEG place on 5/10  She is NPO p MN for the procedure  Pt continues on IV steroids with tapering doses  Being monitored off IV antibiotics    Pt is awake, makes eye contact, verbalizes "yes" and "ok", moves RUE and RLE spontaneously   Vital Signs:   05/09/21 22:07:52  97 9 °F (36 6 °C)  86  16  128/86  100  98 %  --  --   05/09/21 15:00:57  98 1 °F (36 7 °C)  94  16  130/85  100  99 %  --  --   05/09/21 0929  --  --  --  --  --  --  None (Room air)  --   05/09/21 07:32:11  97 9 °F (36 6 °C)  99  --  127/85  99  100 %  --  --   05/08/21 22:00:17  98 °F (36 7 °C)  98  16  125/86  99  99 %  --  --   05/08/21 15:28:29  97 3 °F (36 3 °C)Abnormal   91  18  127/86  100  96 %  --  --   05/08/21 07:30:07  97 2 °F (36 2 °C)Abnormal   83  20  127/85  99  100 %  --  Lying   05/08/21 07:29:52  97 2 °F (36 2 °C)Abnormal   83  --  127/85  99  100 %  --  --     Pertinent Labs/Diagnostic Results:       Results from last 7 days   Lab Units 05/09/21  0500 05/08/21  0549 05/07/21  0612 05/06/21  0502   WBC Thousand/uL 9 20 8 59 8 99 10 88*   HEMOGLOBIN g/dL 8 7* 8 9* 8 4* 8 3*   HEMATOCRIT % 26 8* 26 3* 24 4* 24 3*   PLATELETS Thousands/uL 157 147* 127* 132*   NEUTROS ABS Thousands/µL  --   --   --   --    BANDS PCT % 2  --   --   --          Results from last 7 days   Lab Units 05/05/21  0557 05/04/21  0627   SODIUM mmol/L 133* 133*   POTASSIUM mmol/L 4 0 4 2   CHLORIDE mmol/L 105 103   CO2 mmol/L 21 24   ANION GAP mmol/L 7 6   BUN mg/dL 22 24   CREATININE mg/dL 0 38* 0 46*   EGFR ml/min/1 73sq m 120 113   CALCIUM mg/dL 7 9* 8 1*   PHOSPHORUS mg/dL  --   --      Results from last 7 days   Lab Units 05/05/21  0557   AST U/L 14   ALT U/L 56   ALK PHOS U/L 55 TOTAL PROTEIN g/dL 4 6*   ALBUMIN g/dL 2 4*   TOTAL BILIRUBIN mg/dL 0 28     Results from last 7 days   Lab Units 05/09/21  2357 05/09/21  2045 05/09/21  1130 05/09/21  0503 05/08/21  2334 05/08/21  1727 05/08/21  1217 05/08/21  0545 05/07/21  2350 05/07/21  1712 05/07/21  1132   POC GLUCOSE mg/dl 125 150* 149* 262* 162* 217* 247* 178* 218* 194* 239*     Results from last 7 days   Lab Units 05/10/21  0651 05/05/21  0557 05/04/21  0627   GLUCOSE RANDOM mg/dL 82 225* 283*     Results from last 7 days   Lab Units 05/04/21  0627   PROCALCITONIN ng/ml <0 05     Results from last 7 days   Lab Units 05/04/21  2243 05/04/21  1832 05/04/21  1026 05/04/21  0347 05/04/21  0025   LACTIC ACID mmol/L 3 1* 4 2* 3 8* 3 0* 2 9*     Medications:   Scheduled Medications:  amLODIPine, 5 mg, Oral, Daily  dexamethasone, 4 mg, Intravenous, Q12H    Followed by  Yanira Johnson ON 5/12/2021] dexamethasone, 4 mg, Intravenous, Daily  famotidine, 20 mg, Oral, BID  heparin (porcine), 5,000 Units, Subcutaneous, Q8H MEÑO  insulin glargine, 10 Units, Subcutaneous, HS  insulin lispro, 1-5 Units, Subcutaneous, Q6H MEÑO  lidocaine, 1 patch, Topical, Daily  methotrexate (PF) IVPB, 5,000 mg, Intravenous, Once  ondansetron with dexamethasone IVPB, , Intravenous, Once  polyethylene glycol, 17 g, Oral, Daily  senna-docusate sodium, 1 tablet, Oral, BID  tamsulosin, 0 4 mg, Oral, Daily With Dinner      Continuous IV Infusions:  sodium chloride, 50 mL/hr, Intravenous, Continuous      PRN Meds:  albuterol, 2 puff, Inhalation, Q6H PRN  bisacodyl, 10 mg, Rectal, Daily PRN  lactulose, 10 g, Oral, BID PRN  ondansetron, 4 mg, Intravenous, Q6H PRN  sodium chloride, 20 mL/hr, Intravenous, Once PRN  sodium chloride, 20 mL/hr, Intravenous, Once PRN    Discharge Plan: TBD    Network Utilization Review Department  ATTENTION: Please call with any questions or concerns to [phone] and carefully listen to the prompts so that you are directed to the right person   All voicemails are confidential   Keily Lee all requests for admission clinical reviews, approved or denied determinations and any other requests to dedicated fax number below belonging to the campus where the patient is receiving treatment   List of dedicated fax numbers for the Facilities:  [address] DENIALS (Administrative/Medical Necessity) [phone]   [address] (Maternity/NICU/Pediatrics) [phone]   [address] [place] [address] [facility] [place] [address] [address] [place] [address] [place] [address] [facility] [place] [address] [phone]

## 2021-05-10 NOTE — ANESTHESIA POSTPROCEDURE EVALUATION
Post-Op Assessment Note    CV Status:  Stable    Pain management: adequate     Mental Status:  Lethargic and sleepy   Hydration Status:  Stable   PONV Controlled:  None   Airway Patency:  Patent      Post Op Vitals Reviewed: Yes      Staff: CRNA   Comments: vss        No complications documented      BP      Temp     Pulse     Resp      SpO2

## 2021-05-10 NOTE — PLAN OF CARE
Problem: OCCUPATIONAL THERAPY ADULT  Goal: Performs self-care activities at highest level of function for planned discharge setting  See evaluation for individualized goals  Description: Treatment Interventions: ADL retraining, Functional transfer training, UE strengthening/ROM, Endurance training, Cognitive reorientation, Patient/family training, Equipment evaluation/education, Neuromuscular reeducation, Fine motor coordination activities, Compensatory technique education, Continued evaluation, Energy conservation, Activityengagement          See flowsheet documentation for full assessment, interventions and recommendations  Outcome: Progressing  Note: Limitation: Decreased ADL status, Decreased UE ROM, Decreased UE strength, Decreased cognition, Decreased endurance, Visual deficit, Decreased fine motor control, Decreased self-care trans, Decreased high-level ADLs  Prognosis: Fair  Assessment: Pt seen for OT-revaluation 2* expiring goals  Please refer to IE for more detailed information regarding PLOF and home set-up  Per chart, pt was I with ADLs/IADLs and fnxl mobility at baseline  Upon re-evaluation, pt currently requires MOD A x 2 sup <> sit, MAX A x 2 STS  Exhibits decreased strength to B/L UE's limiting performance in ADLs/transfers, LUE limited distally  Pt able to achieve ~ 45* sh flex and abd with LUE, able to achieve 180* AAROM, also can perform supination and pronation  Limited finger flexion, grasp strength  Pt currently  requires MAX A UB ADLs, MAX A LB ADLs, and MAX A toileting  Pt is limited at this time 2*: endurance, activity tolerance, functional mobility, forward functional reach, balance, trunk control, functional standing tolerance, decreased I w/ ADLS/IADLS, strength, ROM, aphasia, visual deficits, cognitive impairments, impaired fine motor skills and coordination deficits  The following Occupational Performance Areas to address include: eating, grooming, bathing/shower, toilet hygiene, dressing, health maintenance, functional mobility, community mobility and clothing management  Pt to benefit from immediate acute skilled OT to address above deficits, improve overall functional independence, maximize fnxl mobility and reduce caregiver burden  From OT standpoint, recommendation at time of d/c would be STR  Pt was left in bed, wedges and pillow placed for appropriate positioning and alarm on after session with all current needs met  The patient's raw score on the AM-PAC Daily Activity inpatient short form is 12, standardized score is 30 6, less than 39 4  Patients at this level are likely to benefit from discharge to post-acute rehabilitation services  Please refer to the recommendation of the Occupational Therapist for safe discharge planning       OT Discharge Recommendation: Post acute rehabilitation services  OT - OK to Discharge: Yes(to rehab when medically stable )

## 2021-05-10 NOTE — OCCUPATIONAL THERAPY NOTE
Occupational Therapy Re-Evaluation and Treatment      Patient Name: Adri Greene  ZTLZL'L Date: 5/10/2021  Problem List  Principal Problem:    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[address] )  Active Problems:    Altered mental status    Dysphagia    Urinary retention    Aphasia    Weakness    Presence of permanent central venous catheter    Fracture of ramus of left pubis (HCC)    Severe protein-calorie malnutrition (HCC)    Steroid-induced hyperglycemia    Sepsis (HCC)    Left-sided weakness    Past Medical History  History reviewed  No pertinent past medical history  Past Surgical History  Past Surgical History:   Procedure Laterality Date    IR PICC REPOSITION  4/27/2021    IR TUNNELED CENTRAL LINE REMOVAL  4/27/2021             05/10/21 1457   OT Last Visit   OT Visit Date 05/10/21   Note Type   Note type Re-Evaluation   Restrictions/Precautions   Weight Bearing Precautions Per Order No   Other Precautions Cognitive; Bed Alarm; Fall Risk;Visual impairment  (+aphasia, limited [place] - language is primarily gujarati )   Pain Assessment   Pain Assessment Tool Pain Assessment not indicated - pt denies pain   Pain Score No Pain   Home Living   Additional Comments Per chart, pt was residing at [facility] where he  works    Prior Function   Level of [place] Independent with ADLs and functional mobility   Lives With WoodwardManjarrez Help From Family;Friend(s)   ADL Assistance Independent   IADLs Independent   Lifestyle   Autonomy  per chart, pt was I with ADLs PTA    Reciprocal Relationships     Service to Others Unable to obtain   Intrinsic Gratification Will continue to assess    Psychosocial   Psychosocial (WDL) WDL   ADL   Where Assessed Edge of bed   Eating Assistance 3  Moderate Assistance   Grooming Assistance 2  Maximal Assistance   UB Bathing Assistance 2  Maximal Assistance   UB Bathing Deficit   (Limited LUE movement )   LB Bathing Assistance 2  Maximal Assistance   UB Dressing Assistance 2  Maximal Assistance   LB Dressing Assistance 2  Maximal Assistance   Toileting Assistance  2  Maximal Assistance   Bed Mobility   Rolling R 3  Moderate assistance   Additional items Assist x 1;Bedrails; Increased time required   Rolling L 3  Moderate assistance   Additional items Assist x 1;Bedrails; Increased time required   Supine to Sit 3  Moderate assistance   Additional items Assist x 2; Increased time required   Sit to Supine 3  Moderate assistance   Additional items Assist x 2; Increased time required   Transfers   Sit to Stand 2  Maximal assistance   Additional items Assist x 2; Increased time required   Stand to Sit 2  Maximal assistance   Balance   Static Sitting Fair -   Dynamic Sitting Poor +   Static Standing Poor   Dynamic Standing Poor -   Ambulatory Poor -   Activity Tolerance   Activity Tolerance Patient limited by fatigue   Medical Staff Made Aware PT Helene   Nurse Made Aware RN clearance for session   RUE Assessment   RUE Assessment   (~ 3/5 )   LUE Assessment   LUE Assessment   (2-/5 proximally, limited distally )   Hand Function   Gross Motor Coordination Impaired   Fine Motor Coordination Impaired   Vision - Complex Assessment   Additional Comments Unable to formally assess  Does demonstrate L sided visual inattention at times   Cognition   Overall Cognitive Status Impaired   Arousal/Participation Alert; Cooperative   Attention Attends with cues to redirect   Orientation Level Oriented to person   Memory Unable to assess   Following Commands Follows one step commands with increased time or repetition   Comments Pt alert and cooperative t/o session  Requires repeated directions at times with tactile cues - utilize translate he during session    Assessment   Limitation Decreased ADL status; Decreased UE ROM; Decreased UE strength;Decreased cognition;Decreased endurance;Visual deficit; Decreased fine motor control;Decreased self-care trans;Decreased high-level ADLs   Prognosis Fair   Assessment Pt seen for OT-revaluation 2* expiring goals  Please refer to IE for more detailed information regarding PLOF and home set-up  Per chart, pt was I with ADLs/IADLs and fnxl mobility at baseline  Upon re-evaluation, pt currently requires MOD A x 2 sup <> sit, MAX A x 2 STS  Exhibits decreased strength to B/L UE's limiting performance in ADLs/transfers, LUE limited distally  Pt able to achieve ~ 45* sh flex and abd with LUE, able to achieve 180* AAROM, also can perform supination and pronation  Limited finger flexion, grasp strength  Pt currently  requires MAX A UB ADLs, MAX A LB ADLs, and MAX A toileting  Pt is limited at this time 2*: endurance, activity tolerance, functional mobility, forward functional reach, balance, trunk control, functional standing tolerance, decreased I w/ ADLS/IADLS, strength, ROM, aphasia, visual deficits, cognitive impairments, impaired fine motor skills and coordination deficits  The following Occupational Performance Areas to address include: eating, grooming, bathing/shower, toilet hygiene, dressing, health maintenance, functional mobility, community mobility and clothing management  Pt to benefit from immediate acute skilled OT to address above deficits, improve overall functional independence, maximize fnxl mobility and reduce caregiver burden  From OT standpoint, recommendation at time of d/c would be STR  Pt was left in bed, wedges and pillow placed for appropriate positioning and alarm on after session with all current needs met  The patient's raw score on the AM-PAC Daily Activity inpatient short form is 12, standardized score is 30 6, less than 39 4  Patients at this level are likely to benefit from discharge to post-acute rehabilitation services  Please refer to the recommendation of the Occupational Therapist for safe discharge planning     Goals   Patient Goals None stated    LTG Time Frame 10-14   Plan   Treatment Interventions ADL retraining;Functional transfer training;UE strengthening/ROM; Endurance training;Cognitive reorientation;Patient/family training;Neuromuscular reeducation;Equipment evaluation/education; Fine motor coordination activities; Compensatory technique education;Continued evaluation   Goal Expiration Date 05/24/21   OT Treatment Day 3   OT Frequency 3-5x/wk   Additional Treatment Session   Start Time 1400   End Time 1420   Treatment Assessment Pt seen for additional OT tx session  Pt maintains EOB sitting position with S for static sitting, MIN A for dynamic sitting balance  Pt engaged in reaching activity in all planes with RUE, min A provided to reach with LUE  Pt does demonstrate dysmetria when reaching with LUE  Pt also participated in WB activity through B/L elbows on tabletop with further reaching and grasping with B/L hands  Pt given stress ball and performed grasp - limited ability to grasp independently, requires assistance to place fingers and thumb appropriately to hold onto ball, noted to have poor intrinsic muscle strength and tightness in thumb  Pt to benefit from continued skilled OT sessions to improve above deficits and increase occupational performance and independence      Additional Treatment Day 1   Recommendation   OT Discharge Recommendation Post acute rehabilitation services   OT - OK to Discharge Yes  (to rehab when medically stable )   AM-PAC Daily Activity Inpatient   Lower Body Dressing 2   Bathing 2   Toileting 2   Upper Body Dressing 2   Grooming 2   Eating 2   Daily Activity Raw Score 12   Daily Activity Standardized Score (Calc for Raw Score >=11) 30 6   AM-PAC Applied Cognition Inpatient   Following a Speech/Presentation 1   Understanding Ordinary Conversation 2   Taking Medications 1   Remembering Where Things Are Placed or Put Away 1   Remembering List of 4-5 Errands 1   Taking Care of Complicated Tasks 1   Applied Cognition Raw Score 7   Applied Cognition Standardized Score 15 17      Chin Ko, MS, OTR/L

## 2021-05-10 NOTE — ANESTHESIA PREPROCEDURE EVALUATION
Procedure:  EGD    Relevant Problems   GI/HEPATIC   (+) Dysphagia      HEMATOLOGY   (+) 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HCC)      NEURO/PSYCH   (+) Aphasia      Other   (+) Altered mental status   (+) Fracture of ramus of left pubis (HCC)   (+) Left-sided weakness   (+) Severe protein-calorie malnutrition (HCC)   (+) Steroid-induced hyperglycemia   (+) Urinary retention   (+) Weakness      TTE 3/2021:    Left ventricle is normal in size  Wall thickness is normal  Systolic   function is mildly decreased with an ejection fraction of 45-50%  Global   hypokinesis  There is grade I (mild) diastolic dysfunction    Right ventricle cavity is normal  Systolic function is normal     No significant valve abnormalities    Lab Results   Component Value Date    WBC 6 56 05/10/2021    HGB 8 7 (L) 05/10/2021    HCT 25 9 (L) 05/10/2021    MCV 97 05/10/2021     (L) 05/10/2021     Lab Results   Component Value Date    SODIUM 134 (L) 05/10/2021    K 4 0 05/10/2021     05/10/2021    CO2 23 05/10/2021    BUN 20 05/10/2021    CREATININE 0 27 (L) 05/10/2021    GLUC 82 05/10/2021    CALCIUM 8 8 05/10/2021     No results found for: INR, PROTIME  Lab Results   Component Value Date    HGBA1C 4 8 03/19/2021          Physical Exam    Airway    Mallampati score: III  TM Distance: >3 FB  Neck ROM: full     Dental   No notable dental hx     Cardiovascular  Cardiovascular exam normal    Pulmonary  Pulmonary exam normal     Other Findings        Anesthesia Plan  ASA Score- 3     Anesthesia Type- IV sedation with anesthesia with ASA Monitors  Additional Monitors:   Airway Plan:           Plan Factors-Exercise tolerance (METS): <4 METS  Chart reviewed  Existing labs reviewed  Patient summary reviewed  Induction- intravenous  Postoperative Plan-     Informed Consent- Anesthetic plan and risks discussed with patient  I personally reviewed this patient with the CRNA   Discussed and agreed on the Anesthesia Plan with the TITO Parsons

## 2021-05-10 NOTE — UTILIZATION REVIEW
Continued Stay Review    Date: 5/7/21                         Current Patient Class: IP  Current Level of Care: MS    HPI:56 y o  female initially admitted on 4/23 for CNS lymphoma for initiation of chemotherapy     Assessment/Plan:   Pt completed IV antibiotics on 5/5  She remains afebrile  She continues on IV steroids in decreasing doses for titrating down - currently receiving IV Dexamethasone 4 mg q 8 hr, then q 12 hr and then daily  Pt continues to have Keofed tube for oropharyngeal dysphagia  Spouse is agreeable to her having a PEG tube inserted which will allow her to have placement in a rehab facility  GI is consulted  She continues on IV fluids  H/H is stable  Still with hyperglycemia        Vital Signs:   05/07/21 07:29:20  99 °F (37 2 °C)  84  15  124/82  96  100 %   05/06/21 22:09:50  97 8 °F (36 6 °C)  81  18  123/82  96  100 %   05/06/21 15:39:02  97 2 °F (36 2 °C)Abnormal   86  18  121/80  94  98 %   05/06/21 07:08:01  98 6 °F (37 °C)  91  --  116/80  92  100 %   05/06/21 02:45:53  98 2 °F (36 8 °C)  82  20  98/61  73  100 %   05/06/21 01:45:19  98 1 °F (36 7 °C)  85  --  115/76  89  100 %   05/05/21 15:35:51  98 9 °F (37 2 °C)  81  16  120/80  93  100 %   05/05/21 11:20:48  98 6 °F (37 °C)  92  18  111/71  84  99 %   05/05/21 08:21:05  98 1 °F (36 7 °C)  87  16  108/66  80  100 %   05/05/21 02:43:15  98 2 °F (36 8 °C)  89  18  123/70  88  100 %     Pertinent Labs/Diagnostic Results:       Results from last 7 days   Lab Units 05/10/21  0651 05/09/21  0500 05/08/21  0549 05/07/21  0612 05/06/21  0502   WBC Thousand/uL 6 56 9 20 8 59 8 99 10 88*   HEMOGLOBIN g/dL 8 7* 8 7* 8 9* 8 4* 8 3*   HEMATOCRIT % 25 9* 26 8* 26 3* 24 4* 24 3*   PLATELETS Thousands/uL 141* 157 147* 127* 132*   NEUTROS ABS Thousands/µL 5 04  --   --   --   --    BANDS PCT %  --  2  --   --   --          Results from last 7 days   Lab Units 05/10/21  0651 05/05/21  0557 05/04/21  0627   SODIUM mmol/L 134* 133* 133*   POTASSIUM mmol/L 4 0 4 0 4 2   CHLORIDE mmol/L 103 105 103   CO2 mmol/L 23 21 24   ANION GAP mmol/L 8 7 6   BUN mg/dL 20 22 24   CREATININE mg/dL 0 27* 0 38* 0 46*   EGFR ml/min/1 73sq m 135 120 113   CALCIUM mg/dL 8 8 7 9* 8 1*   PHOSPHORUS mg/dL 4 4  --   --      Results from last 7 days   Lab Units 05/05/21  0557   AST U/L 14   ALT U/L 56   ALK PHOS U/L 55   TOTAL PROTEIN g/dL 4 6*   ALBUMIN g/dL 2 4*   TOTAL BILIRUBIN mg/dL 0 28     Results from last 7 days   Lab Units 05/10/21  0647 05/09/21  2357 05/09/21  2045 05/09/21  1130 05/09/21  0503 05/08/21  2334 05/08/21  1727 05/08/21  1217 05/08/21  0545 05/07/21  2350 05/07/21  1712 05/07/21  1132   POC GLUCOSE mg/dl 101 125 150* 149* 262* 162* 217* 247* 178* 218* 194* 239*     Results from last 7 days   Lab Units 05/10/21  0651 05/05/21  0557 05/04/21  0627   GLUCOSE RANDOM mg/dL 82 225* 283*     Results from last 7 days   Lab Units 05/04/21  0627   PROCALCITONIN ng/ml <0 05     Results from last 7 days   Lab Units 05/04/21  2243 05/04/21  1832 05/04/21  1026 05/04/21  0347 05/04/21  0025   LACTIC ACID mmol/L 3 1* 4 2* 3 8* 3 0* 2 9*     Medications:   Scheduled Medications:  amLODIPine, 5 mg, Oral, Daily  dexamethasone, 4 mg, Intravenous, Q12H    Followed by  Neva Manrique ON 5/12/2021] dexamethasone, 4 mg, Intravenous, Daily  famotidine, 20 mg, Oral, BID  heparin (porcine), 5,000 Units, Subcutaneous, Q8H MEÑO  insulin glargine, 10 Units, Subcutaneous, HS  insulin lispro, 1-5 Units, Subcutaneous, Q6H MEÑO  lidocaine, 1 patch, Topical, Daily  methotrexate (PF) IVPB, 5,000 mg, Intravenous, Once  ondansetron with dexamethasone IVPB, , Intravenous, Once  polyethylene glycol, 17 g, Oral, Daily  senna-docusate sodium, 1 tablet, Oral, BID  tamsulosin, 0 4 mg, Oral, Daily With Dinner      Continuous IV Infusions:  sodium chloride, 50 mL/hr, Intravenous, Continuous      PRN Meds:  albuterol, 2 puff, Inhalation, Q6H PRN  bisacodyl, 10 mg, Rectal, Daily PRN  lactulose, 10 g, Oral, BID PRN  ondansetron, 4 mg, Intravenous, Q6H PRN  sodium chloride, 20 mL/hr, Intravenous, Once PRN  sodium chloride, 20 mL/hr, Intravenous, Once PRN    Discharge Plan:     Network Utilization Review Department  ATTENTION: Please call with any questions or concerns to [phone] and carefully listen to the prompts so that you are directed to the right person  All voicemails are confidential   Awilda Lockhart all requests for admission clinical reviews, approved or denied determinations and any other requests to dedicated fax number below belonging to the campus where the patient is receiving treatment   List of dedicated fax numbers for the Facilities:  [address] DENIALS (Administrative/Medical Necessity) [phone]   [address] (Maternity/NICU/Pediatrics) [phone]   [address] [place] [address] Dr Aleisha Oh [address] [id number] [place] [address] [address] [place] [address] [phone]

## 2021-05-11 LAB
GLUCOSE SERPL-MCNC: 100 MG/DL (ref 65–140)
GLUCOSE SERPL-MCNC: 101 MG/DL (ref 65–140)
GLUCOSE SERPL-MCNC: 155 MG/DL (ref 65–140)
GLUCOSE SERPL-MCNC: 158 MG/DL (ref 65–140)
GLUCOSE SERPL-MCNC: 184 MG/DL (ref 65–140)

## 2021-05-11 PROCEDURE — 82948 REAGENT STRIP/BLOOD GLUCOSE: CPT

## 2021-05-11 PROCEDURE — 99232 SBSQ HOSP IP/OBS MODERATE 35: CPT | Performed by: INTERNAL MEDICINE

## 2021-05-11 RX ADMIN — SENNOSIDES, DOCUSATE SODIUM 1 TABLET: 8.6; 5 TABLET ORAL at 10:53

## 2021-05-11 RX ADMIN — HEPARIN SODIUM 5000 UNITS: 5000 INJECTION INTRAVENOUS; SUBCUTANEOUS at 22:26

## 2021-05-11 RX ADMIN — POLYETHYLENE GLYCOL 3350 17 G: 17 POWDER, FOR SOLUTION ORAL at 10:54

## 2021-05-11 RX ADMIN — INSULIN LISPRO 1 UNITS: 100 INJECTION, SOLUTION INTRAVENOUS; SUBCUTANEOUS at 22:27

## 2021-05-11 RX ADMIN — FAMOTIDINE 20 MG: 20 TABLET ORAL at 10:54

## 2021-05-11 RX ADMIN — LIDOCAINE 5% 1 PATCH: 700 PATCH TOPICAL at 10:56

## 2021-05-11 RX ADMIN — INSULIN GLARGINE 10 UNITS: 100 INJECTION, SOLUTION SUBCUTANEOUS at 22:26

## 2021-05-11 RX ADMIN — HEPARIN SODIUM 5000 UNITS: 5000 INJECTION INTRAVENOUS; SUBCUTANEOUS at 05:06

## 2021-05-11 RX ADMIN — INSULIN LISPRO 1 UNITS: 100 INJECTION, SOLUTION INTRAVENOUS; SUBCUTANEOUS at 17:33

## 2021-05-11 RX ADMIN — DEXAMETHASONE SODIUM PHOSPHATE 4 MG: 4 INJECTION INTRA-ARTICULAR; INTRALESIONAL; INTRAMUSCULAR; INTRAVENOUS; SOFT TISSUE at 10:54

## 2021-05-11 RX ADMIN — SENNOSIDES, DOCUSATE SODIUM 1 TABLET: 8.6; 5 TABLET ORAL at 17:31

## 2021-05-11 RX ADMIN — FAMOTIDINE 20 MG: 20 TABLET ORAL at 17:32

## 2021-05-11 RX ADMIN — HEPARIN SODIUM 5000 UNITS: 5000 INJECTION INTRAVENOUS; SUBCUTANEOUS at 16:07

## 2021-05-11 RX ADMIN — TAMSULOSIN HYDROCHLORIDE 0.4 MG: 0.4 CAPSULE ORAL at 17:31

## 2021-05-11 NOTE — PROGRESS NOTES
INTERNAL MEDICINE RESIDENCY PROGRESS NOTE     Name: Leopold Anis   Age & Sex: 49 y o  female   MRN: [id number]  Unit/Bed#: [facility] 920-01   Encounter: [id number]  Team: SOD Team B     PATIENT INFORMATION     Name: Leopold Anis   Age & Sex: 49 y o  female   MRN: [id number] [facility] Stay Days: 25    ASSESSMENT/PLAN     Principal Problem:    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[facility] [address] )  Active Problems:    Sepsis ([facility] [address] )    Altered mental status    Dysphagia    Urinary retention    Presence of permanent central venous catheter    Steroid-induced hyperglycemia    Left-sided weakness    Aphasia    Weakness    Fracture of ramus of left pubis (HCC)    Severe protein-calorie malnutrition ([address] )      Sepsis ([facility] [address] )  Assessment & Plan  Patient had hypothermia, elevated WBC count and tachycardia  Concern for possible aspiration in setting on dysphagia versus UTI in setting of urinary catheter which was placed for retention  Plan:  S/p IV antibiotics last day on 05/05  Will continue to monitor off antibiotics  * 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[facility])  Assessment & Plan  Patient was diagnosed with primary 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through biopsy in [facility] transferred over here for chemotherapy since the [facility] is out of network    Plan:  Had a 2nd family meeting on 04/30 and family has decided to proceed treatment at this time  Hematology-Oncology consulted  Appreciate recommendations  S/p 1 cycle of Chemotherapy treatment as per Hematology-Oncology  Patient has poor prognosis even with treatment although family wants all treatment  S/p PICC line for chemotherapy  DVT prophylaxis:  On heparin  Case management-will start looking for places for rehab  S/p peg tube placement on 05/10  Currently on oral diet and Jevity 1 2  Continue steroid taper for cerebral edema from 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Left-sided weakness  Assessment & Plan  Patient has left-sided weakness at baseline from her 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Left upper extremity 3-4/5 and left lower extremity 0-1/5 muscle strength at baseline  Steroid-induced hyperglycemia  Assessment & Plan  Mild hyperglycemia, glucoses in 200s, no history of DM  Occurring in the setting of Decadron with chemotherapy, as well as sodium bicarb in D5 drip  - D5 drip is finished  - will monitor sugars while on Jevity and now off D5  - continue q4 hour fingerstick glucose with correctional algorithm 1 and Lantus    Presence of permanent central venous catheter  Assessment & Plan  Noted presence of right IJ tunneled double-lumen HD catheter; upon chart review, this was likely inserted to be used for plasmapheresis which she has completed  Plan:  S/p removal by IR  Patient now has PICC line  Urinary retention  Assessment & Plan  Patient developed urinary retention during previous hospitalization  Plan was voiding trial as an outpatient as she was scheduled for discharge from that facility  Patient passed voiding trial and now voiding appropriately  Urinary retention protocol and strict ins and outs  Dysphagia  Assessment & Plan  Patient noted to have dysphagia and also decreased p o  Intake  As per [facility] progress note patient had a calorie count and recommended feeding tube placement  Speech evaluation - advance diet with dysphagia 2 and thin liquids; needs assistance with feeds  Calorie count  If the patient continued to have poor p o  Intake may need discussion with the family regarding alternate methods of nutrition  Patient's calorie intake is low  VBS- showed oropharyngeal dysphagia  s/p peg tube placement  Altered mental status  Assessment & Plan  Patient initially presented to [facility] his ultimate [place] with stroke-like symptoms for 2 days    CT scan of the head was concerning for subacute CVA and was transferred to [facility]   MRI of the brain was more consistent with demyelinating disease patient was started on IV steroids and 1000 mg daily for 5 days  Patient did not have any significant improvement at that time patient had plasmapheresis  And also had  lumbar puncture-negative for infection or malignancy  Patient noted to have persistent encephalopathy so a repeat MRI was obtained which showed worsening of the abnormality seen on the previous MRI and 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Continue with the dexamethasone  Likely secondary to 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Delirium precautions   Currently alert and oriented times 2-3   follow commands  Moves all extremities spontaneously except left upper and lower extrimities  Patient's  (does not speak [place]) makes all medical decision for her  Severe protein-calorie malnutrition ([facility] [address] )  Assessment & Plan  Malnutrition Findings:   Adult Malnutrition type: Acute illness(due to medical condition resulting in dysphagia, decreased appetite and intake, weight loss)  Adult Degree of Malnutrition: Other severe protein calorie malnutrition    BMI Findings: Body mass index is 20 49 kg/m²  Plan:  Nutrition consult  Keofed placed 4/30  Tube feeds initiated with Jevity 1 2, advancing to goal      Fracture of ramus of left pubis [facility])  Assessment & Plan  Ki Boykin prior to presentation to [facility]  Managed nonoperatively at [facility]    Weakness  Assessment & Plan  Left greater than right upper and lower extremity weakness with ongoing left lower extremity contracture  Secondary to underlying CNS malignancy  *Decubitus ulcer precautions such as frequent repositioning  Aphasia  Assessment & Plan  Definite evidence of expressive aphasia, possible component of receptive aphasia as well  Speech therapy on board  Disposition:  Continue inpatient care     SUBJECTIVE     Patient seen and examined  No acute events overnight  Alert and oriented x2 3  Left-sided weakness at baseline      OBJECTIVE     Vitals:    05/10/21 2242 05/11/21 0536 05/11/21 0729 05/11/21 0729   BP: 106/73  105/72 105/72   Pulse: 96   88   Resp: 18  16    Temp: 99 °F (37 2 °C)  97 9 °F (36 6 °C) 97 9 °F (36 6 °C)   TempSrc:       SpO2: 97%   100%   Weight:  48 2 kg (106 lb 4 2 oz)     Height:          Temperature:   Temp (24hrs), Av °F (36 7 °C), Min:97 °F (36 1 °C), Max:99 °F (37 2 °C)    Temperature: 97 9 °F (36 6 °C)  Intake & Output:  I/O        07 - 05/10 0700 05/10 07 -  0700  07 0700    P  O  110 120     I V  (mL/kg) 1200 (25 4) 822 5 (17 1)     NG/GT 60      Total Intake(mL/kg) 1370 (29) 942 5 (19 6)     Urine (mL/kg/hr) 0 (0) 0 (0)     Stool 0      Total Output 0 0     Net +1370 +942 5            Unmeasured Urine Occurrence 3 x      Unmeasured Stool Occurrence 3 x          Weights:   IBW (Ideal Body Weight): 45 5 kg    Body mass index is 20 75 kg/m²  Weight (last 2 days)     Date/Time   Weight    21 0536   48 2 (106 26)    05/10/21 0959   47 2 (104)            Physical Exam  Vitals signs reviewed  Constitutional:       General: She is not in acute distress  Appearance: She is well-developed  She is not diaphoretic  HENT:      Head: Normocephalic and atraumatic  Nose: Nose normal       Mouth/Throat:      Pharynx: No oropharyngeal exudate  Eyes:      General: No scleral icterus  Conjunctiva/sclera: Conjunctivae normal    Neck:      Musculoskeletal: Neck supple  Thyroid: No thyromegaly  Vascular: No JVD  Trachea: No tracheal deviation  Cardiovascular:      Rate and Rhythm: Normal rate and regular rhythm  Heart sounds: Normal heart sounds  No murmur  No friction rub  No gallop  Pulmonary:      Effort: Pulmonary effort is normal  No respiratory distress  Breath sounds: Normal breath sounds  No stridor  No wheezing or rales  Chest:      Chest wall: No tenderness  Abdominal:      General: Bowel sounds are normal  There is no distension  Palpations: Abdomen is soft  There is no mass  Tenderness:  There is no abdominal tenderness  There is no guarding or rebound  Musculoskeletal: Normal range of motion  General: No tenderness  Skin:     General: Skin is warm  Findings: No erythema or rash  Neurological:      Mental Status: She is alert  Mental status is at baseline  Sensory: No sensory deficit  Comments: Left lower extremity 0/5, left upper extremity 3/5 muscle strength  LABORATORY DATA     Labs: I have personally reviewed pertinent reports  Results from last 7 days   Lab Units 05/10/21  0651 05/09/21  0500 05/08/21  0549  05/05/21  0557   WBC Thousand/uL 6 56 9 20 8 59   < > 10 98*   HEMOGLOBIN g/dL 8 7* 8 7* 8 9*   < > 8 1*   HEMATOCRIT % 25 9* 26 8* 26 3*   < > 23 9*   PLATELETS Thousands/uL 141* 157 147*   < > 124*   NEUTROS PCT % 76*  --   --   --   --    MONOS PCT % 5  --   --   --   --    MONO PCT %  --  2*  --   --  1*    < > = values in this interval not displayed  Results from last 7 days   Lab Units 05/10/21  0651 05/05/21  0557   POTASSIUM mmol/L 4 0 4 0   CHLORIDE mmol/L 103 105   CO2 mmol/L 23 21   BUN mg/dL 20 22   CREATININE mg/dL 0 27* 0 38*   CALCIUM mg/dL 8 8 7 9*   ALK PHOS U/L  --  55   ALT U/L  --  56   AST U/L  --  14         Results from last 7 days   Lab Units 05/10/21  0651   PHOSPHORUS mg/dL 4 4          Results from last 7 days   Lab Units 05/04/21  2243   LACTIC ACID mmol/L 3 1*           IMAGING & DIAGNOSTIC TESTING     Radiology Results: I have personally reviewed pertinent reports  Xr Chest Portable    Result Date: 4/24/2021  Impression: Dialysis catheter tip within the superior cavoatrial junction  No pneumothorax  Workstation performed: [id number]     Xr Chest Picc Line Portable    Result Date: 4/27/2021  Impression: PICC line tip in the superior vena cava, approximately 3 5 to 4 cm above the cavoatrial junction  The examination demonstrates a significant  finding and was documented as such in [facility] for liaison and referring practitioner notification  Workstation performed: [id number]     Ir Picc Reposition    Result Date: 4/27/2021  Impression: Status-post repositioning of a 5 [place] central venous catheter under fluoroscopic guidance with its tip at the cavoatrial junction  Workstation performed: [id number]     Ir Tunneled Central Line Removal    Result Date: 4/27/2021  Impression: Impression: Successful tunneled central line removal as described  Signed, performed and dictated by Elsy Talley PA-C under the supervision of Dr Сергей Kamara  Workstation performed: [id number]     Other Diagnostic Testing: I have personally reviewed pertinent reports      ACTIVE MEDICATIONS     Current Facility-Administered Medications   Medication Dose Route Frequency    albuterol (PROVENTIL HFA,VENTOLIN HFA) inhaler 2 puff  2 puff Inhalation Q6H PRN    amLODIPine (NORVASC) tablet 5 mg  5 mg Oral Daily    bisacodyl (DULCOLAX) rectal suppository 10 mg  10 mg Rectal Daily PRN    [START ON 5/12/2021] dexamethasone (DECADRON) injection 4 mg  4 mg Intravenous Daily    famotidine (PEPCID) tablet 20 mg  20 mg Oral BID    heparin (porcine) subcutaneous injection 5,000 Units  5,000 Units Subcutaneous Q8H [facility]    insulin glargine (LANTUS) subcutaneous injection 10 Units 0 1 mL  10 Units Subcutaneous HS    insulin lispro (HumaLOG) 100 units/mL subcutaneous injection 1-5 Units  1-5 Units Subcutaneous 4x Daily (AC & HS)    lactulose oral solution 10 g  10 g Oral BID PRN    lidocaine (LIDODERM) 5 % patch 1 patch  1 patch Topical Daily    methotrexate (PF) 5,000 mg in sodium chloride 0 9 % 1,000 mL IVPB  5,000 mg Intravenous Once    ondansetron (ZOFRAN) 16 mg, dexamethasone (DECADRON) 10 mg in sodium chloride 0 9 % 50 mL IVPB   Intravenous Once    ondansetron (ZOFRAN) injection 4 mg  4 mg Intravenous Q6H PRN    polyethylene glycol (MIRALAX) packet 17 g  17 g Oral Daily    senna-docusate sodium (SENOKOT S) 8 6-50 mg per tablet 1 tablet  1 tablet Oral BID    sodium chloride 0 9 % infusion  20 mL/hr Intravenous Once PRN    sodium chloride 0 9 % infusion  20 mL/hr Intravenous Once PRN    tamsulosin (FLOMAX) capsule 0 4 mg  0 4 mg Oral Daily With Dinner       VTE Pharmacologic Prophylaxis: Heparin  VTE Mechanical Prophylaxis: sequential compression device    Portions of the record may have been created with voice recognition software  Occasional wrong word or "sound a like" substitutions may have occurred due to the inherent limitations of voice recognition software    Read the chart carefully and recognize, using context, where substitutions have occurred   ==  Brent Carrera, [facility] [facility] [employer]  Internal Medicine Residency PGY-2

## 2021-05-11 NOTE — UTILIZATION REVIEW
Continued Stay Review    Date: 5/11/21                       Current Patient Class: Inpatient Current Level of Care: Med Surg    HPI:56 y o  female initially admitted on 4/23/21 for initiation of chemotherapy for 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5/10 PEG tube placed today  Physical Therapy recommending post acute rehab when medically cleared for d/c      5/11 S/P PEG tube placement, tube feeds initiated with Jevity 1 2, advancing to goal  Remains on IV dexamethasone  Alert and oriented x2-3, left sided weakness at baseline  Vital Signs:     Date/Time  Temp  Pulse  Resp  BP  MAP (mmHg)  SpO2  O2 Device   05/11/21 07:29:36  97 9 °F (36 6 °C)  88  --  105/72  83  100 %  --   05/11/21 07:29:15  97 9 °F (36 6 °C)  --  16  105/72  83  --  --   05/10/21 2300  --  --  --  --  --  --  None (Room air)   05/10/21 22:42:38  99 °F (37 2 °C)  96  18  106/73  84  97 %  --   05/10/21 15:25:03  97 °F (36 1 °C)Abnormal   87  18  112/77  89  97 %  --   05/10/21 1116  --  70  16  137/89  --  100 %  None (Room air)   05/10/21 1101  96 °F (35 6 °C)Abnormal   71  16  123/81  --  100 %  None (Room air)   05/10/21 0959  96 7 °F (35 9 °C)Abnormal   71  18  126/81  --  100 %  None (Room air)   05/10/21 09:07:22  --  --  --  124/82  96  --  --   05/10/21 06:37:37  97 5 °F (36 4 °C)  82  18  128/83  98  100 %  --   05/09/21 22:07:52  97 9 °F (36 6 °C)  86  16  128/86  100  98 %  --         DATE OF SERVICE:  5/10/21    EGD     INDICATION: Dysphagia    FINDINGS:  · The esophagus appeared normal   · PEG-G tube placed in the stomach via the pull technique after the site was identified via transillumination, visualized indentation and needle passed through abdominal wall; distance from external bolster to external end of tube: 3 5 cm   PEG reference number [id number]  PEG LOT number [id number]  05 [place] size  · Mild, patchy abnormal mucosa in the stomach  · The 1st part of the duodenum and 2nd part of the duodenum appeared normal   ·   IMPRESSION:  Normal esophagus  Successful placement of PEG tube with external bumper at 3 5cm  Mild patchy gastritis  Normal duodenum    RECOMMENDATION:  Okay to use PEG tube for medications in 4 hours  Hold tube feeds until gastroenterology evaluation tomorrow morning  Will need nutritional recommendations regarding tube feeds  Return to floor         Pertinent Labs/Diagnostic Results:           Results from last 7 days   Lab Units 05/10/21  0651 05/09/21  0500 05/08/21  0549 05/07/21  0612 05/06/21  0502   WBC Thousand/uL 6 56 9 20 8 59 8 99 10 88*   HEMOGLOBIN g/dL 8 7* 8 7* 8 9* 8 4* 8 3*   HEMATOCRIT % 25 9* 26 8* 26 3* 24 4* 24 3*   PLATELETS Thousands/uL 141* 157 147* 127* 132*   NEUTROS ABS Thousands/µL 5 04  --   --   --   --    BANDS PCT %  --  2  --   --   --          Results from last 7 days   Lab Units 05/10/21  0651 05/05/21  0557   SODIUM mmol/L 134* 133*   POTASSIUM mmol/L 4 0 4 0   CHLORIDE mmol/L 103 105   CO2 mmol/L 23 21   ANION GAP mmol/L 8 7   BUN mg/dL 20 22   CREATININE mg/dL 0 27* 0 38*   EGFR ml/min/1 73sq m 135 120   CALCIUM mg/dL 8 8 7 9*   PHOSPHORUS mg/dL 4 4  --      Results from last 7 days   Lab Units 05/05/21  0557   AST U/L 14   ALT U/L 56   ALK PHOS U/L 55   TOTAL PROTEIN g/dL 4 6*   ALBUMIN g/dL 2 4*   TOTAL BILIRUBIN mg/dL 0 28     Results from last 7 days   Lab Units 05/11/21  1043 05/11/21  0730 05/10/21  2314 05/10/21  1754 05/10/21  1301 05/10/21  0647 05/09/21  2357 05/09/21  2045 05/09/21  1130 05/09/21  0503 05/08/21  2334 05/08/21  1727   POC GLUCOSE mg/dl 100 155* 142* 85 101 101 125 150* 149* 262* 162* 217*     Results from last 7 days   Lab Units 05/10/21  0651 05/05/21  0557   GLUCOSE RANDOM mg/dL 82 225*                 Results from last 7 days   Lab Units 05/04/21  2243 05/04/21  1832   LACTIC ACID mmol/L 3 1* 4 2*           Results from last 7 days   Lab Units 05/05/21  0557   TOTAL COUNTED  100             Medications:   Scheduled Medications:      amLODIPine, 5 mg, Oral, Daily  [START ON 5/12/2021] dexamethasone, 4 mg, Intravenous, Daily  famotidine, 20 mg, Oral, BID  heparin (porcine), 5,000 Units, Subcutaneous, Q8H [facility]  insulin glargine, 10 Units, Subcutaneous, HS  insulin lispro, 1-5 Units, Subcutaneous, 4x Daily (AC & HS)  lidocaine, 1 patch, Topical, Daily  methotrexate (PF) IVPB, 5,000 mg, Intravenous, Once  ondansetron with dexamethasone IVPB, , Intravenous, Once  polyethylene glycol, 17 g, Oral, Daily  senna-docusate sodium, 1 tablet, Oral, BID  tamsulosin, 0 4 mg, Oral, Daily With Dinner      Continuous IV Infusions:     PRN Meds:      albuterol, 2 puff, Inhalation, Q6H PRN  bisacodyl, 10 mg, Rectal, Daily PRN  lactulose, 10 g, Oral, BID PRN  ondansetron, 4 mg, Intravenous, Q6H PRN  sodium chloride, 20 mL/hr, Intravenous, Once PRN  sodium chloride, 20 mL/hr, Intravenous, Once PRN        Discharge Plan: D        Network Utilization Review Department  ATTENTION: Please call with any questions or concerns to [phone] and carefully listen to the prompts so that you are directed to the right person  All voicemails are confidential   Leonie Dickinson all requests for admission clinical reviews, approved or denied determinations and any other requests to dedicated fax number below belonging to the campus where the patient is receiving treatment   List of dedicated fax numbers for the Facilities:  [address] DENIALS (Administrative/Medical Necessity) [phone]   [address] (Maternity/NICU/Pediatrics) [phone]   [address] [phone]   [address] Aleisha Oh [address] [id number] [place] [address] [phone]     Micki Mack 37 [address] [place] [address] [phone]

## 2021-05-11 NOTE — PLAN OF CARE
Problem: Prexisting or High Potential for Compromised Skin Integrity  Goal: Skin integrity is maintained or improved  Description: INTERVENTIONS:  - Identify patients at risk for skin breakdown  - Assess and monitor skin integrity  - Assess and monitor nutrition and hydration status  - Monitor labs   - Assess for incontinence   - Turn and reposition patient  - Assist with mobility/ambulation  - Relieve pressure over bony prominences  - Avoid friction and shearing  - Provide appropriate hygiene as needed including keeping skin clean and dry  - Evaluate need for skin moisturizer/barrier cream  - Collaborate with interdisciplinary team   - Patient/family teaching  - Consider wound care consult   Outcome: Progressing     Problem: Potential for Falls  Goal: Patient will remain free of falls  Description: INTERVENTIONS:  - Assess patient frequently for physical needs  -  Identify cognitive and physical deficits and behaviors that affect risk of falls  -  [place] fall precautions as indicated by assessment   - Educate patient/family on patient safety including physical limitations  - Instruct patient to call for assistance with activity based on assessment  - Modify environment to reduce risk of injury  - Consider OT/PT consult to assist with strengthening/mobility  Outcome: Progressing     Problem: Nutrition/Hydration-ADULT  Goal: Nutrient/Hydration intake appropriate for improving, restoring or maintaining nutritional needs  Description: Monitor and assess patient's nutrition/hydration status for malnutrition  Collaborate with interdisciplinary team and initiate plan and interventions as ordered  Monitor patient's weight and dietary intake as ordered or per policy  Utilize nutrition screening tool and intervene as necessary  Determine patient's food preferences and provide high-protein, high-caloric foods as appropriate       INTERVENTIONS:  - Monitor oral intake, urinary output, labs, and treatment plans  - Assess nutrition and hydration status and recommend course of action  - Evaluate amount of meals eaten  - Assist patient with eating if necessary   - Allow adequate time for meals  - Recommend/ encourage appropriate diets, oral nutritional supplements, and vitamin/mineral supplements  - Order, calculate, and assess calorie counts as needed  - Recommend, monitor, and adjust tube feedings and TPN/PPN based on assessed needs  - Assess need for intravenous fluids  - Provide specific nutrition/hydration education as appropriate  - Include patient/family/caregiver in decisions related to nutrition  Outcome: Progressing     Problem: PAIN - ADULT  Goal: Verbalizes/displays adequate comfort level or baseline comfort level  Description: Interventions:  - Encourage patient to monitor pain and request assistance  - Assess pain using appropriate pain scale  - Administer analgesics based on type and severity of pain and evaluate response  - Implement non-pharmacological measures as appropriate and evaluate response  - Consider cultural and social influences on pain and pain management  - Notify physician/advanced practitioner if interventions unsuccessful or patient reports new pain  Outcome: Progressing     Problem: INFECTION - ADULT  Goal: Absence or prevention of progression during hospitalization  Description: INTERVENTIONS:  - Assess and monitor for signs and symptoms of infection  - Monitor lab/diagnostic results  - Monitor all insertion sites, i e  indwelling lines, tubes, and drains  - Monitor endotracheal if appropriate and nasal secretions for changes in amount and color  - [place] appropriate cooling/warming therapies per order  - Administer medications as ordered  - Instruct and encourage patient and family to use good hand hygiene technique  - Identify and instruct in appropriate isolation precautions for identified infection/condition  Outcome: Progressing

## 2021-05-11 NOTE — PROGRESS NOTES
Progress Note- Anupama Barajas 49 y o  female MRN: [id number]  [facility] Encounter: [id number]    Assessment and Plan:  Anupama Barajas is a 49 y o  old female with PMH:  CNS lymphoma, altered mental status status post plasmapheresis who presented with protein calorie malnutrition and dysphagia  #CNS Lymphoma  #PEG Tube Evaluation  #Dysphagia  Patient with dysphagia status post 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and mass effect  Had discussion was had with primary team,  Hever Elam - [phone]), and patient everyone is in agreement with placement of G-tube  Patient underwent successful PEG tube placement yesterday  Peg tube flushed appropriately with bumper exhibiting 360 degree mobility  No evidence of erythema, discomfort, abdominal pain endorsed by patient  Patient's abdomen was soft, nondistended, nontender on exam    Patient safely able to use G-tube for administration of medications  Plan:  -safe to start tube feeds for patient through G-tube  -dietician/nutrition consult for tube feeds    GI team will sign off at this time, please do not hesitate to contact us with any further questions or concerns about the patient at any point       ______________________________________________________________________    Subjective:     Patient no acute distress at this point  States that she does not have any significant abdominal pain at this time  Tolerated the procedure well  No hematemesis, melena noted      Medication Administration - last 24 hours from 05/10/2021 1426 to 05/11/2021 1426       Date/Time Order Dose Route Action Action by     05/11/2021 1056 lidocaine (LIDODERM) 5 % patch 1 patch 1 patch Topical Medication Applied Kemi Martel RN     05/11/2021 1053 senna-docusate sodium (SENOKOT S) 8 6-50 mg per tablet 1 tablet 1 tablet Oral Given Kemi Martel RN     05/10/2021 1754 senna-docusate sodium (SENOKOT S) 8 6-50 mg per tablet 1 tablet 1 tablet Oral Given Vijay Fitzgerald RN     05/10/2021 1754 tamsulosin (FLOMAX) capsule 0 4 mg 0 4 mg Oral Given Karla Monaco, RN     05/11/2021 0506 heparin (porcine) subcutaneous injection 5,000 Units 5,000 Units Subcutaneous Given Manpreet Rogers, RN     05/10/2021 2315 heparin (porcine) subcutaneous injection 5,000 Units 5,000 Units Subcutaneous Given Manpreet Rogers, RN     05/11/2021 1054 famotidine (PEPCID) tablet 20 mg 20 mg Oral Given Babs Webster, RN     05/10/2021 1755 famotidine (PEPCID) tablet 20 mg 20 mg Oral Given Karla Monaco, RN     05/11/2021 1039 amLODIPine (NORVASC) tablet 5 mg 5 mg Oral Not Given Babs Webster, RN     05/10/2021 1930 sodium chloride 0 9 % infusion 0 mL/hr Intravenous Stopped Manpreet Rogers, RN     05/11/2021 1054 polyethylene glycol (MIRALAX) packet 17 g 17 g Oral Given Babs Webster, MARTINE     05/10/2021 1754 insulin lispro (HumaLOG) 100 units/mL subcutaneous injection 1-5 Units 1 Units Subcutaneous Not Given Karla Monaco, MARTINE     05/11/2021 1054 dexamethasone (DECADRON) injection 4 mg 4 mg Intravenous Given Babs Webster, RN     05/10/2021 2315 dexamethasone (DECADRON) injection 4 mg 4 mg Intravenous Given Manpreet Rogers, RN     05/10/2021 2315 insulin glargine (LANTUS) subcutaneous injection 10 Units 0 1 mL 10 Units Subcutaneous Given Manpreet Rogers, MARTINE     05/11/2021 1044 insulin lispro (HumaLOG) 100 units/mL subcutaneous injection 1-5 Units 1 Units Subcutaneous Not Given Babs Webster, RN     05/11/2021 0900 insulin lispro (HumaLOG) 100 units/mL subcutaneous injection 1-5 Units 1 Units Subcutaneous Not Given Babs Webster, RN     05/10/2021 2316 insulin lispro (HumaLOG) 100 units/mL subcutaneous injection 1-5 Units 1 Units Subcutaneous Not Given Manpreet Rogers, RN     05/11/2021 1038 dextrose 5 % and sodium chloride 0 9 % infusion 0 mL/hr Intravenous Stopped Babs Webster, RN     05/10/2021 2009 dextrose 5 % and sodium chloride 0 9 % infusion 50 mL/hr Intravenous [facility]          Objective: Vitals: Blood pressure 105/72, pulse 88, temperature 97 9 °F (36 6 °C), resp  rate 16, height 5' (1 524 m), weight 48 2 kg (106 lb 4 2 oz), SpO2 100 %  ,Body mass index is 20 75 kg/m²  Intake/Output Summary (Last 24 hours) at 5/11/2021 1426  Last data filed at 5/11/2021 1416  Gross per 24 hour   Intake 1002 5 ml   Output 0 ml   Net 1002 5 ml       Physical Exam:   General Appearance:   Alert, cooperative, no distress   HEENT:   Normocephalic, atraumatic, anicteric  Neck:  Supple, symmetrical, trachea midline   Lungs:   Clear to auscultation bilaterally; no rales, rhonchi or wheezing; respirations unlabored    Heart[de-identified]   Regular rate and rhythm; no murmur, rub, or gallop  Abdomen:   Peg tube rotated freely   Genitalia:   Deferred    Rectal:   Deferred    Extremities:  No cyanosis, clubbing or edema    Pulses:  2+ and symmetric all extremities    Skin:  No jaundice, rashes, or lesions    Lymph nodes:  No palpable cervical lymphadenopathy        Invasive Devices     Peripherally Inserted Central Catheter Line            PICC Line 16/36/22 Right Basilic 14 days          Drain            External Urinary Catheter 4 days    Gastrostomy/Enterostomy Gastrostomy-jejunostomy 20 Fr  LUQ 1 day                Lab Results:  No results displayed because visit has over 200 results  Imaging Studies: I have personally reviewed pertinent imaging studies        ---------------------------------------------------  Note Electronically Signed By:    MD Darrin Mary 75 Gastroenterology Fellow PGY-5  0146 [employer] #: [id number]

## 2021-05-12 LAB
ALBUMIN SERPL BCP-MCNC: 2.7 G/DL (ref 3.5–5)
ALBUMIN SERPL BCP-MCNC: 2.8 G/DL (ref 3.5–5)
ALP SERPL-CCNC: 49 U/L (ref 46–116)
ALP SERPL-CCNC: 52 U/L (ref 46–116)
ALT SERPL W P-5'-P-CCNC: 59 U/L (ref 12–78)
ALT SERPL W P-5'-P-CCNC: 64 U/L (ref 12–78)
ANION GAP SERPL CALCULATED.3IONS-SCNC: 6 MMOL/L (ref 4–13)
ANION GAP SERPL CALCULATED.3IONS-SCNC: 7 MMOL/L (ref 4–13)
AST SERPL W P-5'-P-CCNC: 15 U/L (ref 5–45)
AST SERPL W P-5'-P-CCNC: 16 U/L (ref 5–45)
BASOPHILS # BLD MANUAL: 0 THOUSAND/UL (ref 0–0.1)
BASOPHILS NFR MAR MANUAL: 0 % (ref 0–1)
BILIRUB DIRECT SERPL-MCNC: 0.14 MG/DL (ref 0–0.2)
BILIRUB SERPL-MCNC: 0.5 MG/DL (ref 0.2–1)
BILIRUB SERPL-MCNC: 0.52 MG/DL (ref 0.2–1)
BUN SERPL-MCNC: 17 MG/DL (ref 5–25)
BUN SERPL-MCNC: 18 MG/DL (ref 5–25)
CALCIUM ALBUM COR SERPL-MCNC: 9.8 MG/DL (ref 8.3–10.1)
CALCIUM SERPL-MCNC: 8.5 MG/DL (ref 8.3–10.1)
CALCIUM SERPL-MCNC: 8.8 MG/DL (ref 8.3–10.1)
CHLORIDE SERPL-SCNC: 101 MMOL/L (ref 100–108)
CHLORIDE SERPL-SCNC: 99 MMOL/L (ref 100–108)
CO2 SERPL-SCNC: 24 MMOL/L (ref 21–32)
CO2 SERPL-SCNC: 25 MMOL/L (ref 21–32)
CREAT SERPL-MCNC: 0.25 MG/DL (ref 0.6–1.3)
CREAT SERPL-MCNC: 0.34 MG/DL (ref 0.6–1.3)
EOSINOPHIL # BLD MANUAL: 0.03 THOUSAND/UL (ref 0–0.4)
EOSINOPHIL NFR BLD MANUAL: 1 % (ref 0–6)
ERYTHROCYTE [DISTWIDTH] IN BLOOD BY AUTOMATED COUNT: 17.2 % (ref 11.6–15.1)
ERYTHROCYTE [DISTWIDTH] IN BLOOD BY AUTOMATED COUNT: 17.2 % (ref 11.6–15.1)
GFR SERPL CREATININE-BSD FRML MDRD: 125 ML/MIN/1.73SQ M
GFR SERPL CREATININE-BSD FRML MDRD: 138 ML/MIN/1.73SQ M
GLUCOSE SERPL-MCNC: 130 MG/DL (ref 65–140)
GLUCOSE SERPL-MCNC: 156 MG/DL (ref 65–140)
GLUCOSE SERPL-MCNC: 170 MG/DL (ref 65–140)
GLUCOSE SERPL-MCNC: 188 MG/DL (ref 65–140)
GLUCOSE SERPL-MCNC: 198 MG/DL (ref 65–140)
GLUCOSE SERPL-MCNC: 212 MG/DL (ref 65–140)
GLUCOSE SERPL-MCNC: 228 MG/DL (ref 65–140)
HCT VFR BLD AUTO: 24.4 % (ref 34.8–46.1)
HCT VFR BLD AUTO: 26.4 % (ref 34.8–46.1)
HGB BLD-MCNC: 8.1 G/DL (ref 11.5–15.4)
HGB BLD-MCNC: 8.7 G/DL (ref 11.5–15.4)
LYMPHOCYTES # BLD AUTO: 0.91 THOUSAND/UL (ref 0.6–4.47)
LYMPHOCYTES # BLD AUTO: 31 % (ref 14–44)
MCH RBC QN AUTO: 32.2 PG (ref 26.8–34.3)
MCH RBC QN AUTO: 32.7 PG (ref 26.8–34.3)
MCHC RBC AUTO-ENTMCNC: 33 G/DL (ref 31.4–37.4)
MCHC RBC AUTO-ENTMCNC: 33.2 G/DL (ref 31.4–37.4)
MCV RBC AUTO: 98 FL (ref 82–98)
MCV RBC AUTO: 98 FL (ref 82–98)
MONOCYTES # BLD AUTO: 0.12 THOUSAND/UL (ref 0–1.22)
MONOCYTES NFR BLD: 4 % (ref 4–12)
MYELOCYTES NFR BLD MANUAL: 5 % (ref 0–1)
NEUTROPHILS # BLD MANUAL: 1.71 THOUSAND/UL (ref 1.85–7.62)
NEUTS BAND NFR BLD MANUAL: 9 % (ref 0–8)
NEUTS SEG NFR BLD AUTO: 49 % (ref 43–75)
NRBC BLD AUTO-RTO: 1 /100 WBCS
NRBC BLD AUTO-RTO: 1 /100 WBCS
PLATELET # BLD AUTO: 127 THOUSANDS/UL (ref 149–390)
PLATELET # BLD AUTO: 131 THOUSANDS/UL (ref 149–390)
PLATELET BLD QL SMEAR: ABNORMAL
PMV BLD AUTO: 10.2 FL (ref 8.9–12.7)
PMV BLD AUTO: 10.2 FL (ref 8.9–12.7)
POTASSIUM SERPL-SCNC: 4 MMOL/L (ref 3.5–5.3)
POTASSIUM SERPL-SCNC: 4.3 MMOL/L (ref 3.5–5.3)
PROMYELOCYTES NFR BLD MANUAL: 1 % (ref 0–0)
PROT SERPL-MCNC: 5.2 G/DL (ref 6.4–8.2)
PROT SERPL-MCNC: 5.7 G/DL (ref 6.4–8.2)
RBC # BLD AUTO: 2.48 MILLION/UL (ref 3.81–5.12)
RBC # BLD AUTO: 2.7 MILLION/UL (ref 3.81–5.12)
SODIUM SERPL-SCNC: 130 MMOL/L (ref 136–145)
SODIUM SERPL-SCNC: 132 MMOL/L (ref 136–145)
TOTAL CELLS COUNTED SPEC: 100
WBC # BLD AUTO: 2.94 THOUSAND/UL (ref 4.31–10.16)
WBC # BLD AUTO: 2.97 THOUSAND/UL (ref 4.31–10.16)

## 2021-05-12 PROCEDURE — 82948 REAGENT STRIP/BLOOD GLUCOSE: CPT

## 2021-05-12 PROCEDURE — 97110 THERAPEUTIC EXERCISES: CPT

## 2021-05-12 PROCEDURE — 85007 BL SMEAR W/DIFF WBC COUNT: CPT | Performed by: STUDENT IN AN ORGANIZED HEALTH CARE EDUCATION/TRAINING PROGRAM

## 2021-05-12 PROCEDURE — 99233 SBSQ HOSP IP/OBS HIGH 50: CPT | Performed by: INTERNAL MEDICINE

## 2021-05-12 PROCEDURE — 80048 BASIC METABOLIC PNL TOTAL CA: CPT | Performed by: STUDENT IN AN ORGANIZED HEALTH CARE EDUCATION/TRAINING PROGRAM

## 2021-05-12 PROCEDURE — 85027 COMPLETE CBC AUTOMATED: CPT | Performed by: INTERNAL MEDICINE

## 2021-05-12 PROCEDURE — 80053 COMPREHEN METABOLIC PANEL: CPT | Performed by: INTERNAL MEDICINE

## 2021-05-12 PROCEDURE — 80076 HEPATIC FUNCTION PANEL: CPT | Performed by: INTERNAL MEDICINE

## 2021-05-12 PROCEDURE — 85027 COMPLETE CBC AUTOMATED: CPT | Performed by: STUDENT IN AN ORGANIZED HEALTH CARE EDUCATION/TRAINING PROGRAM

## 2021-05-12 RX ORDER — INSULIN GLARGINE 100 [IU]/ML
8 INJECTION, SOLUTION SUBCUTANEOUS
Status: DISCONTINUED | OUTPATIENT
Start: 2021-05-12 | End: 2021-05-14

## 2021-05-12 RX ORDER — LEUCOVORIN CALCIUM 50 MG/5ML
25 INJECTION, POWDER, LYOPHILIZED, FOR SOLUTION INTRAMUSCULAR; INTRAVENOUS EVERY 6 HOURS
Status: CANCELLED | OUTPATIENT
Start: 2021-05-14 | End: 2021-05-14

## 2021-05-12 RX ORDER — ACETAMINOPHEN 325 MG/1
650 TABLET ORAL ONCE
Status: DISCONTINUED | OUTPATIENT
Start: 2021-05-12 | End: 2021-05-13 | Stop reason: SDUPTHER

## 2021-05-12 RX ORDER — ACETAMINOPHEN 325 MG/1
650 TABLET ORAL ONCE
Status: COMPLETED | OUTPATIENT
Start: 2021-05-13 | End: 2021-05-13

## 2021-05-12 RX ORDER — DIPHENHYDRAMINE HCL 25 MG
50 TABLET ORAL ONCE
Status: CANCELLED
Start: 2021-05-12

## 2021-05-12 RX ORDER — SODIUM CHLORIDE 9 MG/ML
20 INJECTION, SOLUTION INTRAVENOUS ONCE AS NEEDED
Status: DISCONTINUED | OUTPATIENT
Start: 2021-05-12 | End: 2021-05-31

## 2021-05-12 RX ORDER — DIPHENHYDRAMINE HCL 25 MG
50 TABLET ORAL ONCE
Status: DISCONTINUED | OUTPATIENT
Start: 2021-05-12 | End: 2021-05-13 | Stop reason: SDUPTHER

## 2021-05-12 RX ADMIN — SENNOSIDES, DOCUSATE SODIUM 1 TABLET: 8.6; 5 TABLET ORAL at 08:03

## 2021-05-12 RX ADMIN — HEPARIN SODIUM 5000 UNITS: 5000 INJECTION INTRAVENOUS; SUBCUTANEOUS at 13:36

## 2021-05-12 RX ADMIN — POLYETHYLENE GLYCOL 3350 17 G: 17 POWDER, FOR SOLUTION ORAL at 08:00

## 2021-05-12 RX ADMIN — SENNOSIDES, DOCUSATE SODIUM 1 TABLET: 8.6; 5 TABLET ORAL at 17:01

## 2021-05-12 RX ADMIN — INSULIN LISPRO 1 UNITS: 100 INJECTION, SOLUTION INTRAVENOUS; SUBCUTANEOUS at 12:06

## 2021-05-12 RX ADMIN — FAMOTIDINE 20 MG: 20 TABLET ORAL at 08:03

## 2021-05-12 RX ADMIN — DEXAMETHASONE SODIUM PHOSPHATE 4 MG: 4 INJECTION INTRA-ARTICULAR; INTRALESIONAL; INTRAMUSCULAR; INTRAVENOUS; SOFT TISSUE at 08:01

## 2021-05-12 RX ADMIN — HEPARIN SODIUM 5000 UNITS: 5000 INJECTION INTRAVENOUS; SUBCUTANEOUS at 22:37

## 2021-05-12 RX ADMIN — INSULIN LISPRO 1 UNITS: 100 INJECTION, SOLUTION INTRAVENOUS; SUBCUTANEOUS at 22:37

## 2021-05-12 RX ADMIN — INSULIN LISPRO 1 UNITS: 100 INJECTION, SOLUTION INTRAVENOUS; SUBCUTANEOUS at 07:57

## 2021-05-12 RX ADMIN — INSULIN GLARGINE 8 UNITS: 100 INJECTION, SOLUTION SUBCUTANEOUS at 22:36

## 2021-05-12 RX ADMIN — INSULIN LISPRO 2 UNITS: 100 INJECTION, SOLUTION INTRAVENOUS; SUBCUTANEOUS at 17:00

## 2021-05-12 RX ADMIN — FAMOTIDINE 20 MG: 20 TABLET ORAL at 17:01

## 2021-05-12 RX ADMIN — TAMSULOSIN HYDROCHLORIDE 0.4 MG: 0.4 CAPSULE ORAL at 17:00

## 2021-05-12 RX ADMIN — HEPARIN SODIUM 5000 UNITS: 5000 INJECTION INTRAVENOUS; SUBCUTANEOUS at 06:12

## 2021-05-12 RX ADMIN — LIDOCAINE 5% 1 PATCH: 700 PATCH TOPICAL at 08:06

## 2021-05-12 NOTE — PLAN OF CARE
Problem: Prexisting or High Potential for Compromised Skin Integrity  Goal: Skin integrity is maintained or improved  Description: INTERVENTIONS:  - Identify patients at risk for skin breakdown  - Assess and monitor skin integrity  - Assess and monitor nutrition and hydration status  - Monitor labs   - Assess for incontinence   - Turn and reposition patient  - Assist with mobility/ambulation  - Relieve pressure over bony prominences  - Avoid friction and shearing  - Provide appropriate hygiene as needed including keeping skin clean and dry  - Evaluate need for skin moisturizer/barrier cream  - Collaborate with interdisciplinary team   - Patient/family teaching  - Consider wound care consult   Outcome: Progressing     Problem: Potential for Falls  Goal: Patient will remain free of falls  Description: INTERVENTIONS:  - Assess patient frequently for physical needs  -  Identify cognitive and physical deficits and behaviors that affect risk of falls  -  [place] fall precautions as indicated by assessment   - Educate patient/family on patient safety including physical limitations  - Instruct patient to call for assistance with activity based on assessment  - Modify environment to reduce risk of injury  - Consider OT/PT consult to assist with strengthening/mobility  Outcome: Progressing     Problem: Nutrition/Hydration-ADULT  Goal: Nutrient/Hydration intake appropriate for improving, restoring or maintaining nutritional needs  Description: Monitor and assess patient's nutrition/hydration status for malnutrition  Collaborate with interdisciplinary team and initiate plan and interventions as ordered  Monitor patient's weight and dietary intake as ordered or per policy  Utilize nutrition screening tool and intervene as necessary  Determine patient's food preferences and provide high-protein, high-caloric foods as appropriate       INTERVENTIONS:  - Monitor oral intake, urinary output, labs, and treatment plans  - Assess nutrition and hydration status and recommend course of action  - Evaluate amount of meals eaten  - Assist patient with eating if necessary   - Allow adequate time for meals  - Recommend/ encourage appropriate diets, oral nutritional supplements, and vitamin/mineral supplements  - Order, calculate, and assess calorie counts as needed  - Recommend, monitor, and adjust tube feedings and TPN/PPN based on assessed needs  - Assess need for intravenous fluids  - Provide specific nutrition/hydration education as appropriate  - Include patient/family/caregiver in decisions related to nutrition  Outcome: Progressing     Problem: PAIN - ADULT  Goal: Verbalizes/displays adequate comfort level or baseline comfort level  Description: Interventions:  - Encourage patient to monitor pain and request assistance  - Assess pain using appropriate pain scale  - Administer analgesics based on type and severity of pain and evaluate response  - Implement non-pharmacological measures as appropriate and evaluate response  - Consider cultural and social influences on pain and pain management  - Notify physician/advanced practitioner if interventions unsuccessful or patient reports new pain  Outcome: Progressing     Problem: INFECTION - ADULT  Goal: Absence or prevention of progression during hospitalization  Description: INTERVENTIONS:  - Assess and monitor for signs and symptoms of infection  - Monitor lab/diagnostic results  - Monitor all insertion sites, i e  indwelling lines, tubes, and drains  - Monitor endotracheal if appropriate and nasal secretions for changes in amount and color  - [place] appropriate cooling/warming therapies per order  - Administer medications as ordered  - Instruct and encourage patient and family to use good hand hygiene technique  - Identify and instruct in appropriate isolation precautions for identified infection/condition  Outcome: Progressing     Problem: SAFETY ADULT  Goal: Patient will remain free of falls  Description: INTERVENTIONS:  - Assess patient frequently for physical needs  -  Identify cognitive and physical deficits and behaviors that affect risk of falls    -  [place] fall precautions as indicated by assessment   - Educate patient/family on patient safety including physical limitations  - Instruct patient to call for assistance with activity based on assessment  - Modify environment to reduce risk of injury  - Consider OT/PT consult to assist with strengthening/mobility  Outcome: Progressing  Goal: Maintain or return to baseline ADL function  Description: INTERVENTIONS:  -  Assess patient's ability to carry out ADLs; assess patient's baseline for ADL function and identify physical deficits which impact ability to perform ADLs (bathing, care of mouth/teeth, toileting, grooming, dressing, etc )  - Assess/evaluate cause of self-care deficits   - Assess range of motion  - Assess patient's mobility; develop plan if impaired  - Assess patient's need for assistive devices and provide as appropriate  - Encourage maximum independence but intervene and supervise when necessary  - Involve family in performance of ADLs  - Assess for home care needs following discharge   - Consider OT consult to assist with ADL evaluation and planning for discharge  - Provide patient education as appropriate  Outcome: Progressing  Goal: Maintain or return mobility status to optimal level  Description: INTERVENTIONS:  - Assess patient's baseline mobility status (ambulation, transfers, stairs, etc )    - Identify cognitive and physical deficits and behaviors that affect mobility  - Identify mobility aids required to assist with transfers and/or ambulation (gait belt, sit-to-stand, lift, walker, cane, etc )  - [place] fall precautions as indicated by assessment  - Record patient progress and toleration of activity level on Mobility SBAR; progress patient to next Phase/Stage  - Instruct patient to call for assistance with activity based on assessment  - Consider rehabilitation consult to assist with strengthening/weightbearing, etc   Outcome: Progressing     Problem: DISCHARGE PLANNING  Goal: Discharge to home or other facility with appropriate resources  Description: INTERVENTIONS:  - Identify barriers to discharge w/patient and caregiver  - Arrange for needed discharge resources and transportation as appropriate  - Identify discharge learning needs (meds, wound care, etc )  - Arrange for interpretive services to assist at discharge as needed  - Refer to Case Management Department for coordinating discharge planning if the patient needs post-hospital services based on physician/advanced practitioner order or complex needs related to functional status, cognitive ability, or social support system  Outcome: Progressing     Problem: Knowledge Deficit  Goal: Patient/family/caregiver demonstrates understanding of disease process, treatment plan, medications, and discharge instructions  Description: Complete learning assessment and assess knowledge base    Interventions:  - Provide teaching at level of understanding  - Provide teaching via preferred learning methods  Outcome: Progressing

## 2021-05-12 NOTE — PLAN OF CARE
Problem: PHYSICAL THERAPY ADULT  Goal: Performs mobility at highest level of function for planned discharge setting  See evaluation for individualized goals  Description: Treatment/Interventions: ADL retraining, Functional transfer training, LE strengthening/ROM, Endurance training, Patient/family training, Bed mobility, Spoke to nursing, OT  Equipment Recommended: (TBD)       See flowsheet documentation for full assessment, interventions and recommendations  Outcome: Progressing  Note: Prognosis: Fair  Problem List: Decreased strength, Decreased range of motion, Decreased endurance, Impaired balance, Decreased mobility, Decreased cognition, Impaired vision  Assessment: Pt seen for PT treatment session this date  Therapy session focused on bed mobility, static/dynamic sitting balance, endurance training, and therapeutic exercise in order to improve overall mobility and independence  Pt requires assist of 1-2 for all mobility performed this date  Pt more alert and engaged in PT session this afternoon  Pt performed sit to supine bed mobility tasks with mod Ax2  Pt sat EOB ~10 min fluctuating between mod Ax1- close S with UE support to maintain upright posture  Pt performed BLE therex program to hips, knees and ankles- pt able to count with therapist this date  Pt performed dynamic reaching with 1 UE support  More trunk assistance required when performing reaching with LUE compared to RUE  Pt making steady progress toward goals  Pt was left supine in bed with alarm on at the end of PT session with all needs in reach  Pt would benefit from continued PT services while in hospital to address remaining limitations  PT to continue to follow pt and recommends post-acute rehab services after d/c  The patient's AM-PAC Basic Mobility Inpatient Short Form Low Function Raw Score 14 , Standardized Score is 22 01  A standardized score less 42 9 suggests the patient may benefit from discharge to post-acute rehab services  Please also refer to the recommendation of the Physical Therapist for safe discharge planning  Barriers to Discharge: Inaccessible home environment, Decreased caregiver support        PT Discharge Recommendation: Post acute rehabilitation services     PT - OK to Discharge: (to rehab once medically cleared )    See flowsheet documentation for full assessment

## 2021-05-12 NOTE — PHYSICAL THERAPY NOTE
PHYSICAL THERAPY NOTE          Patient Name: Mulu London  BPKVO'E Date: 5/12/2021 05/12/21 1524   PT Last Visit   PT Visit Date 05/12/21   Note Type   Note Type Treatment   Pain Assessment   Pain Assessment Tool FLACC   Pain Rating: FLACC (Rest) - Face 0   Pain Rating: FLACC (Rest) - Legs 0   Pain Rating: FLACC (Rest) - Activity 0   Pain Rating: FLACC (Rest) - Cry 0   Pain Rating: FLACC (Rest) - Consolability 0   Score: FLACC (Rest) 0   Pain Rating: FLACC (Activity) - Face 0   Pain Rating: FLACC (Activity) - Legs 0   Pain Rating: FLACC (Activity) - Activity 0   Pain Rating: FLACC (Activity) - Cry 0   Pain Rating: FLACC (Activity) - Consolability 0   Score: FLACC (Activity) 0   Restrictions/Precautions   Weight Bearing Precautions Per Order No   Other Precautions Cognitive; Chair Alarm; Bed Alarm;Multiple lines; Fall Risk;Visual impairment  (aphasia, limited English- primarily speaks [place] )   General   Chart Reviewed Yes   Response to Previous Treatment Patient unable to report, no changes reported from family or staff   Family/Caregiver Present No   Cognition   Overall Cognitive Status Impaired   Arousal/Participation Alert; Cooperative   Attention Attends with cues to redirect   Orientation Level Unable to assess   Memory Unable to assess   Following Commands Follows one step commands with increased time or repetition   Comments Pt more alert and engaged in therapy session  Able to count with therapist when performing therex program  Cooperative and motivated to participate    Bed Mobility   Rolling R 3  Moderate assistance   Additional items Assist x 1; Increased time required; Bedrails   Rolling L 3  Moderate assistance   Additional items Assist x 1; Increased time required; Bedrails   Supine to Sit 3  Moderate assistance   Additional items Assist x 2; Increased time required;LE management;HOB elevated   Sit to Supine 3 Moderate assistance   Additional items Assist x 2;HOB elevated; Increased time required;LE management   Additional Comments Pt supine in bed upon arrival  Pt sat EOB ~10 min fluctuating between mod Ax1- close S to maintain upright posture  Pt repositioned comfortably in supine with all needs within reach at the end of therapy session  Transfers   Sit to Stand Unable to assess   Stand to Sit Unable to assess   Ambulation/Elevation   Gait pattern Not appropriate   Balance   Static Sitting Fair -   Dynamic Sitting Poor +   Endurance Deficit   Endurance Deficit Yes   Endurance Deficit Description fatigue, weakness   Activity Tolerance   Activity Tolerance Patient limited by fatigue   Medical Staff Grecia    Nurse Made Aware RN cleared pt to participate in therapy session    Exercises   Knee AROM Long Arc Quad Sitting;Bilateral;10 reps;AAROM;AROM  (AAROM LLE, AROM RLE )   Ankle Pumps Sitting;10 reps;Bilateral;AROM   Marching Sitting;Bilateral;AAROM;10 reps   Neuro re-ed Dynamic reaching with 1 UE support; BUEs  CGA/ min Ax1 when reaching with RUE  Min/Mod Ax1 when reaching with LLE (assistance required to aide LUE in reaching- min Ax1)    Assessment   Prognosis Fair   Problem List Decreased strength;Decreased range of motion;Decreased endurance; Impaired balance;Decreased mobility; Decreased cognition; Impaired vision   Assessment Pt seen for PT treatment session this date  Therapy session focused on bed mobility, static/dynamic sitting balance, endurance training, and therapeutic exercise in order to improve overall mobility and independence  Pt requires assist of 1-2 for all mobility performed this date  Pt more alert and engaged in PT session this afternoon  Pt performed sit to supine bed mobility tasks with mod Ax2  Pt sat EOB ~10 min fluctuating between mod Ax1- close S with UE support to maintain upright posture   Pt performed BLE therex program to hips, knees and ankles- pt able to count with therapist this date  Pt performed dynamic reaching with 1 UE support  More trunk assistance required when performing reaching with LUE compared to RUE  Pt making steady progress toward goals  Pt was left supine in bed with alarm on at the end of PT session with all needs in reach  Pt would benefit from continued PT services while in hospital to address remaining limitations  PT to continue to follow pt and recommends post-acute rehab services after d/c  The patient's AM-PAC Basic Mobility Inpatient Short Form Low Function Raw Score 14 , Standardized Score is 22 01  A standardized score less 42 9 suggests the patient may benefit from discharge to post-acute rehab services  Please also refer to the recommendation of the Physical Therapist for safe discharge planning  Barriers to Discharge Inaccessible home environment;Decreased caregiver support   Goals   Patient Goals to rest    STG Expiration Date 05/24/21   Plan   Treatment/Interventions LE strengthening/ROM; Therapeutic exercise; Endurance training;Patient/family training;Bed mobility;Spoke to nursing   Progress Progressing toward goals   PT Frequency Other (Comment)  (3-5x/wk )   Recommendation   PT Discharge Recommendation Post acute rehabilitation services   Equipment Recommended   (tbd )   PT - OK to Discharge   (to rehab once medically cleared )   AM-PAC Basic Mobility Inpatient   Turning in Bed Without Bedrails 2   Lying on Back to Sitting on Edge of Flat Bed 1   Moving Bed to Chair 1   Standing Up From Chair 1   Walk in Room 1   Climb 3-5 Stairs 1   Basic Mobility Inpatient Raw Score 7   Turning Head Towards Sound 4   Follow Simple Instructions 3   Low Function Basic Mobility Raw Score 14   Low Function Basic Mobility Standardized Score 22 01     Nader Salvador, PT, DPT

## 2021-05-12 NOTE — QUICK NOTE
Spoke with Dr Yanet Patten; plan to push C2 Rituxan/HD MTX to tomorrow, 5/13       Cassandra Hargrove, FNP-BC  Hematology/Oncology Nurse Practitioner

## 2021-05-12 NOTE — CASE MANAGEMENT
CM s/w Go Hooper  (MARTINE GRADY) from [facility] and provided an update to pt's current status  Per Go Hooper he will bring the issues regarding placement to his supervisors for review to see if he can get an exception to help reduce the barriers to the pt's placement  CM will follow

## 2021-05-12 NOTE — PROGRESS NOTES
[facility]  Department of Hematology & Medical Oncology  Inpatient Consultation/Progress Note    Date: 05/12/21          ASSESSMENT & PLAN        Assessment:  · 28-year-old female with primary CNS diffuse large B-cell lymphoma and ECOG performance status of 4 here for cycle 2 of methotrexate chemotherapy with Leucovorin rescue  Her primary oncologist is Dr Asenath Claude  Plan:   Treatment:  o Per primary oncologist, administer 3,500 mg/m2 Methotrexate with Leucovorin rescue and 375 mg/m2 Rituximab  Order was signed by Dr Asenath Claude  - Consent was obtained and signed prior to administration of cycle 1   - Chemotherapy-induced nausea and vomiting protocol in place  o Continue steroid taper for cerebral edema  · Labs/imaging:  · Monitor methotrexate levels daily  · Hepatic function levels and BMP to be drawn on 5/13  · Begin daily CMP on Friday 5/14  · Her primary oncologist, plan for restaging imaging with an MRI brain in 2 weeks      Please contact me if any questions or concerns about this patient's case  Thank you  SUBJECTIVE      Anahi Rios is a Gujarati-speaking 28-year-old female who was diagnosed with primary 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via biopsy at [facility] and transferred to [facility] for chemotherapy  She has a PICC line in place  She had a PEG tube placed on 5/10 and is currently on an oral diet with Jevity  She was diagnosed with primary 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after presenting with left-sided facial droop and difficulty swallowing at [facility] on March 19, 2021  Treatment according to the MATRIX program was recommended when she was initially diagnosed at [facility]  However given that the patient will likely not tolerate the toxicity of this regimen given her ECOG performance status of 4, Dr Asenath Claude  recommended high-dose methotrexate with Rituxan and and consideration of Manuela-C and/or thiotepa if she tolerates her initial cycles well    The plan was discussed with the family by Dr Asenath Claude and a consent form was signed  She received her 1st cycle of methotrexate with Leucovorin rescue on 4/28 and 4/29 which she tolerated well without significant adverse effects or complications  Given her poor performance status, Dr Natalia Yoon discussed her treatment plan with Dr Christel Shea, who recommended moving forward with cycle 2 of her treatment today  Oncology pharmacist Asher Muñoz was made aware and will coordinate chemotherapy he regimen accordingly  Given that she only speaks [place], Dr Natalia Yoon performed her review of symptoms using Gujarati  I have reviewed the relevant past medical, surgical, social and family history  I have also reviewed allergies and medications for this patient  Review of Systems  Review of Systems   All other systems reviewed and are negative  OBJECTIVE     Physical Exam    Vitals:    05/12/21 0742   BP: 103/67   Pulse: 90   Resp: 18   Temp: 98 3 °F (36 8 °C)   SpO2: 98%       Physical Exam  Vitals signs reviewed  Constitutional:       General: She is not in acute distress  Appearance: She is ill-appearing  She is not toxic-appearing or diaphoretic  Comments: 70-year-old cachectic and tired-appearing female lying in bed  HENT:      Head: Normocephalic and atraumatic  Eyes:      General: No scleral icterus  Extraocular Movements: Extraocular movements intact  Conjunctiva/sclera: Conjunctivae normal    Neck:      Musculoskeletal: Normal range of motion and neck supple  No neck rigidity  Cardiovascular:      Rate and Rhythm: Regular rhythm  Tachycardia present  Pulses: Normal pulses  Heart sounds: Normal heart sounds  No murmur  No friction rub  No gallop  Pulmonary:      Effort: Pulmonary effort is normal  No respiratory distress  Breath sounds: Normal breath sounds  No wheezing or rales  Abdominal:      General: Abdomen is flat  Bowel sounds are normal  There is no distension  Palpations: Abdomen is soft  There is no mass  Tenderness: There is no abdominal tenderness  There is no guarding or rebound  Musculoskeletal: Normal range of motion  General: No swelling or tenderness  Right lower leg: No edema  Left lower leg: No edema  Lymphadenopathy:      Cervical: No cervical adenopathy  Skin:     General: Skin is warm and dry  Neurological:      Comments: Unable to illicit mental status due to language barrier  Appeared confused but this may be her baseline            Imaging  Relevant imaging reviewed in chart    Labs  Relevant labs reviewed in chart    CBC  Diff   Lab Results   Component Value Date/Time    WBC 2 94 (L) 05/12/2021 06:13 AM    HGB 8 1 (L) 05/12/2021 06:13 AM    HCT 24 4 (L) 05/12/2021 06:13 AM    RBC 2 48 (L) 05/12/2021 06:13 AM    MCV 98 05/12/2021 06:13 AM    MCHC 33 2 05/12/2021 06:13 AM    MCH 32 7 05/12/2021 06:13 AM    RDW 17 2 (H) 05/12/2021 06:13 AM    MPV 10 2 05/12/2021 06:13 AM    Lab Results   Component Value Date/Time    LYMPHSABS 0 82 05/10/2021 06:51 AM    EOSABS 0 00 05/10/2021 06:51 AM    MONOSABS 0 32 05/10/2021 06:51 AM    BASOSABS 0 02 05/10/2021 06:51 AM        Basic Metabolic Profile    Lab Results   Component Value Date/Time    SODIUM 130 (L) 05/12/2021 06:13 AM    CO2 25 05/12/2021 06:13 AM    Lab Results   Component Value Date/Time    BUN 18 05/12/2021 06:13 AM    CREATININE 0 25 (L) 05/12/2021 06:13 AM           Leopold Penman, PA-C  Hematology & Medical Oncology  [facility] 12

## 2021-05-12 NOTE — PROGRESS NOTES
INTERNAL MEDICINE RESIDENCY PROGRESS NOTE     Name: Castro Metcalf   Age & Sex: 49 y o  female   MRN: [id number]  Unit/Bed#: [facility] 920-01   Encounter: [id number]  Team: SOD Team B     PATIENT INFORMATION     Name: Castro Metcalf   Age & Sex: 49 y o  female   MRN: [id number] Stay Days: 23    ASSESSMENT/PLAN     Principal Problem:    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[facility] [address] )  Active Problems:    Sepsis ([facility] [address] )    Altered mental status    Dysphagia    Urinary retention    Presence of permanent central venous catheter    Steroid-induced hyperglycemia    Left-sided weakness    Aphasia    Weakness    Fracture of ramus of left pubis (HCC)    Severe protein-calorie malnutrition ([facility] [address] )      Sepsis ([facility] [address] )  Assessment & Plan  Patient had hypothermia, elevated WBC count and tachycardia  Concern for possible aspiration in setting on dysphagia versus UTI in setting of urinary catheter which was placed for retention  Plan:  S/p IV antibiotics last day on 05/05  Will continue to monitor off antibiotics  * 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[facility])  Assessment & Plan  Patient was diagnosed with primary 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through biopsy in [facility] transferred over here for chemotherapy since the [facility] is out of network    Plan:  Had a 2nd family meeting on 04/30 and family has decided to proceed treatment at this time  Hematology-Oncology consulted  Appreciate recommendations  S/p 1 cycle of Chemotherapy treatment as per Hematology-Oncology  Patient has poor prognosis even with treatment although family wants all treatment  S/p PICC line for chemotherapy  DVT prophylaxis:  On heparin  Case management-will start looking for places for rehab  S/p peg tube placement on 05/10  Currently on oral diet and Jevity 1 2  Continue steroid taper for cerebral edema from 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Left-sided weakness  Assessment & Plan  Patient has left-sided weakness at baseline from her 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Left upper extremity 3-4/5 and left lower extremity 0-1/5 muscle strength at baseline  Steroid-induced hyperglycemia  Assessment & Plan  Mild hyperglycemia, glucoses in 200s, no history of DM  Occurring in the setting of Decadron with chemotherapy, as well as sodium bicarb in D5 drip  - D5 drip is finished  - will monitor sugars while on Jevity and now off D5  - continue q4 hour fingerstick glucose with correctional algorithm 1 and Lantus    Presence of permanent central venous catheter  Assessment & Plan  Noted presence of right IJ tunneled double-lumen HD catheter; upon chart review, this was likely inserted to be used for plasmapheresis which she has completed  Plan:  S/p removal by IR  Patient now has PICC line  Urinary retention  Assessment & Plan  Patient developed urinary retention during previous hospitalization  Plan was voiding trial as an outpatient as she was scheduled for discharge from that facility  Patient passed voiding trial and now voiding appropriately  Urinary retention protocol and strict ins and outs  Dysphagia  Assessment & Plan  Patient noted to have dysphagia and also decreased p o  Intake  As per [facility] progress note patient had a calorie count and recommended feeding tube placement  Speech evaluation - advance diet with dysphagia 2 and thin liquids; needs assistance with feeds  Calorie count  If the patient continued to have poor p o  Intake may need discussion with the family regarding alternate methods of nutrition  Patient's calorie intake is low  VBS- showed oropharyngeal dysphagia  s/p peg tube placement  Altered mental status  Assessment & Plan  Patient initially presented to [facility] [place] with stroke-like symptoms for 2 days    CT scan of the head was concerning for subacute CVA and was transferred to [facility]   MRI of the brain was more consistent with demyelinating disease patient was started on IV steroids and 1000 mg daily for 5 days  Patient did not have any significant improvement at that time patient had plasmapheresis  And also had  lumbar puncture-negative for infection or malignancy  Patient noted to have persistent encephalopathy so a repeat MRI was obtained which showed worsening of the abnormality seen on the previous MRI and 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Likely secondary to 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Delirium precautions   Currently alert and oriented times 2-3   follow commands  Moves all extremities spontaneously except left upper and lower extrimities  Patient's  (does not speak [place]) makes all medical decision for her  Severe protein-calorie malnutrition ([facility] [address] )  Assessment & Plan  Malnutrition Findings:   Adult Malnutrition type: Acute illness(due to medical condition resulting in dysphagia, decreased appetite and intake, weight loss)  Adult Degree of Malnutrition: Other severe protein calorie malnutrition    BMI Findings: Body mass index is 20 49 kg/m²  Plan:  Nutrition consult  Tube feeds initiated with Jevity 1 2, advancing to goal      Fracture of ramus of left pubis [facility])  Assessment & Plan  Gale Roman prior to presentation to [facility]  Managed nonoperatively at [facility]    Weakness  Assessment & Plan  Left greater than right upper and lower extremity weakness with ongoing left lower extremity contracture  Secondary to underlying CNS malignancy  *Decubitus ulcer precautions such as frequent repositioning  Aphasia  Assessment & Plan  Definite evidence of expressive aphasia, possible component of receptive aphasia as well  Speech therapy on board  Disposition:  Continue inpatient care     SUBJECTIVE     Patient seen and examined  No acute events overnight  Alert and oriented times 2-3  Left-sided weakness from the brain mass      OBJECTIVE     Vitals:    05/11/21 2214 05/12/21 0535 05/12/21 0731 05/12/21 0742   BP: 104/70   103/67   Pulse: 91   90   Resp: 18   18   Temp: 98 8 °F (37 1 °C)   98 3 °F (36 8 °C)   TempSrc:       SpO2: 99%   98%   Weight:  47 9 kg (105 lb 9 6 oz) 47 9 kg (105 lb 9 6 oz)    Height:   5' (1 524 m)       Temperature:   Temp (24hrs), Av 1 °F (36 7 °C), Min:97 3 °F (36 3 °C), Max:98 8 °F (37 1 °C)    Temperature: 98 3 °F (36 8 °C)  Intake & Output:  I/O       05/10 07 -  0700  07 -  0700  07 -  0700    P  O  120 180     I V  (mL/kg) 822 5 (17 1)      NG/GT       Total Intake(mL/kg) 942 5 (19 6) 180 (3 8)     Urine (mL/kg/hr) 0 (0) 1320 (1 1)     Stool  0     Total Output 0 1320     Net +942 5 -1140            Unmeasured Urine Occurrence  2 x     Unmeasured Stool Occurrence  2 x         Weights:   IBW (Ideal Body Weight): 45 5 kg    Body mass index is 20 62 kg/m²  Weight (last 2 days)     Date/Time   Weight    21 0731   47 9 (105 6)    21 0535   47 9 (105 6)    21 0536   48 2 (106 26)    05/10/21 0959   47 2 (104)            Physical Exam  Vitals signs reviewed  Constitutional:       General: She is not in acute distress  Appearance: She is well-developed  She is not diaphoretic  HENT:      Head: Normocephalic and atraumatic  Nose: Nose normal       Mouth/Throat:      Pharynx: No oropharyngeal exudate  Eyes:      General: No scleral icterus  Conjunctiva/sclera: Conjunctivae normal    Neck:      Musculoskeletal: Neck supple  Thyroid: No thyromegaly  Vascular: No JVD  Trachea: No tracheal deviation  Cardiovascular:      Rate and Rhythm: Normal rate and regular rhythm  Heart sounds: Normal heart sounds  No murmur  No friction rub  No gallop  Pulmonary:      Effort: Pulmonary effort is normal  No respiratory distress  Breath sounds: Normal breath sounds  No stridor  No wheezing or rales  Chest:      Chest wall: No tenderness  Abdominal:      General: Bowel sounds are normal  There is no distension  Palpations: Abdomen is soft  There is no mass  Tenderness: There is no abdominal tenderness  There is no guarding or rebound  Musculoskeletal: Normal range of motion  General: No tenderness  Skin:     General: Skin is warm  Findings: No erythema or rash  Neurological:      Mental Status: Mental status is at baseline  Sensory: No sensory deficit  Comments: Alert and oriented x2 3  Left-sided weakness from brain mass  Left lower extremity 0/5 and left upper extremity 3/5 muscle strength  LABORATORY DATA     Labs: I have personally reviewed pertinent reports  Results from last 7 days   Lab Units 05/12/21  0613 05/10/21  0651 05/09/21  0500   WBC Thousand/uL 2 94* 6 56 9 20   HEMOGLOBIN g/dL 8 1* 8 7* 8 7*   HEMATOCRIT % 24 4* 25 9* 26 8*   PLATELETS Thousands/uL 127* 141* 157   NEUTROS PCT %  --  76*  --    MONOS PCT %  --  5  --    MONO PCT %  --   --  2*      Results from last 7 days   Lab Units 05/12/21  0613 05/10/21  0651   POTASSIUM mmol/L 4 0 4 0   CHLORIDE mmol/L 99* 103   CO2 mmol/L 25 23   BUN mg/dL 18 20   CREATININE mg/dL 0 25* 0 27*   CALCIUM mg/dL 8 5 8 8         Results from last 7 days   Lab Units 05/10/21  0651   PHOSPHORUS mg/dL 4 4                    IMAGING & DIAGNOSTIC TESTING     Radiology Results: I have personally reviewed pertinent reports  Xr Chest Portable    Result Date: 4/24/2021  Impression: Dialysis catheter tip within the superior cavoatrial junction  No pneumothorax  Workstation performed: [id number]     Xr Chest Picc Line Portable    Result Date: 4/27/2021  Impression: PICC line tip in the superior vena cava, approximately 3 5 to 4 cm above the cavoatrial junction  The examination demonstrates a significant  finding and was documented as such in [facility] for liaison and referring practitioner notification   Workstation performed: [id number]     Ir Picc Reposition    Result Date: 4/27/2021  Impression: Status-post repositioning of a 5 [place] central venous catheter under fluoroscopic guidance with its tip at the cavoatrial junction  Workstation performed: [id number]     Ir Tunneled Central Line Removal    Result Date: 4/27/2021  Impression: Impression: Successful tunneled central line removal as described  Signed, performed and dictated by Bob Choi PA-C under the supervision of Dr Wendie Barrett  Workstation performed: [id number]     Other Diagnostic Testing: I have personally reviewed pertinent reports      ACTIVE MEDICATIONS     Current Facility-Administered Medications   Medication Dose Route Frequency    albuterol (PROVENTIL HFA,VENTOLIN HFA) inhaler 2 puff  2 puff Inhalation Q6H PRN    amLODIPine (NORVASC) tablet 5 mg  5 mg Oral Daily    bisacodyl (DULCOLAX) rectal suppository 10 mg  10 mg Rectal Daily PRN    dexamethasone (DECADRON) injection 4 mg  4 mg Intravenous Daily    famotidine (PEPCID) tablet 20 mg  20 mg Oral BID    heparin (porcine) subcutaneous injection 5,000 Units  5,000 Units Subcutaneous Q8H [address]    insulin glargine (LANTUS) subcutaneous injection 8 Units 0 08 mL  8 Units Subcutaneous HS    insulin lispro (HumaLOG) 100 units/mL subcutaneous injection 1-5 Units  1-5 Units Subcutaneous 4x Daily (AC & HS)    lactulose oral solution 10 g  10 g Oral BID PRN    lidocaine (LIDODERM) 5 % patch 1 patch  1 patch Topical Daily    methotrexate (PF) 5,000 mg in sodium chloride 0 9 % 1,000 mL IVPB  5,000 mg Intravenous Once    ondansetron (ZOFRAN) 16 mg, dexamethasone (DECADRON) 10 mg in sodium chloride 0 9 % 50 mL IVPB   Intravenous Once    ondansetron (ZOFRAN) injection 4 mg  4 mg Intravenous Q6H PRN    polyethylene glycol (MIRALAX) packet 17 g  17 g Oral Daily    senna-docusate sodium (SENOKOT S) 8 6-50 mg per tablet 1 tablet  1 tablet Oral BID    sodium chloride 0 9 % infusion  20 mL/hr Intravenous Once PRN    sodium chloride 0 9 % infusion  20 mL/hr Intravenous Once PRN    tamsulosin (FLOMAX) capsule 0 4 mg  0 4 mg Oral Daily With Dinner       VTE Pharmacologic Prophylaxis: Heparin  VTE Mechanical Prophylaxis: sequential compression device    Portions of the record may have been created with voice recognition software  Occasional wrong word or "sound a like" substitutions may have occurred due to the inherent limitations of voice recognition software    Read the chart carefully and recognize, using context, where substitutions have occurred   ==  Eddi Bautista, [address] [facility]  Internal Medicine Residency PGY-2

## 2021-05-12 NOTE — PLAN OF CARE
Problem: Prexisting or High Potential for Compromised Skin Integrity  Goal: Skin integrity is maintained or improved  Description: INTERVENTIONS:  - Identify patients at risk for skin breakdown  - Assess and monitor skin integrity  - Assess and monitor nutrition and hydration status  - Monitor labs   - Assess for incontinence   - Turn and reposition patient  - Assist with mobility/ambulation  - Relieve pressure over bony prominences  - Avoid friction and shearing  - Provide appropriate hygiene as needed including keeping skin clean and dry  - Evaluate need for skin moisturizer/barrier cream  - Collaborate with interdisciplinary team   - Patient/family teaching  - Consider wound care consult   Outcome: Progressing     Problem: Potential for Falls  Goal: Patient will remain free of falls  Description: INTERVENTIONS:  - Assess patient frequently for physical needs  -  Identify cognitive and physical deficits and behaviors that affect risk of falls  -  [place] fall precautions as indicated by assessment   - Educate patient/family on patient safety including physical limitations  - Instruct patient to call for assistance with activity based on assessment  - Modify environment to reduce risk of injury  - Consider OT/PT consult to assist with strengthening/mobility  Outcome: Progressing     Problem: Nutrition/Hydration-ADULT  Goal: Nutrient/Hydration intake appropriate for improving, restoring or maintaining nutritional needs  Description: Monitor and assess patient's nutrition/hydration status for malnutrition  Collaborate with interdisciplinary team and initiate plan and interventions as ordered  Monitor patient's weight and dietary intake as ordered or per policy  Utilize nutrition screening tool and intervene as necessary  Determine patient's food preferences and provide high-protein, high-caloric foods as appropriate       INTERVENTIONS:  - Monitor oral intake, urinary output, labs, and treatment plans  - Assess nutrition and hydration status and recommend course of action  - Evaluate amount of meals eaten  - Assist patient with eating if necessary   - Allow adequate time for meals  - Recommend/ encourage appropriate diets, oral nutritional supplements, and vitamin/mineral supplements  - Order, calculate, and assess calorie counts as needed  - Recommend, monitor, and adjust tube feedings and TPN/PPN based on assessed needs  - Assess need for intravenous fluids  - Provide specific nutrition/hydration education as appropriate  - Include patient/family/caregiver in decisions related to nutrition  Outcome: Progressing     Problem: PAIN - ADULT  Goal: Verbalizes/displays adequate comfort level or baseline comfort level  Description: Interventions:  - Encourage patient to monitor pain and request assistance  - Assess pain using appropriate pain scale  - Administer analgesics based on type and severity of pain and evaluate response  - Implement non-pharmacological measures as appropriate and evaluate response  - Consider cultural and social influences on pain and pain management  - Notify physician/advanced practitioner if interventions unsuccessful or patient reports new pain  Outcome: Progressing     Problem: INFECTION - ADULT  Goal: Absence or prevention of progression during hospitalization  Description: INTERVENTIONS:  - Assess and monitor for signs and symptoms of infection  - Monitor lab/diagnostic results  - Monitor all insertion sites, i e  indwelling lines, tubes, and drains  - Monitor endotracheal if appropriate and nasal secretions for changes in amount and color  - [place] appropriate cooling/warming therapies per order  - Administer medications as ordered  - Instruct and encourage patient and family to use good hand hygiene technique  - Identify and instruct in appropriate isolation precautions for identified infection/condition  Outcome: Progressing

## 2021-05-13 PROBLEM — A41.9 SEPSIS (HCC): Status: RESOLVED | Noted: 2021-05-03 | Resolved: 2021-05-13

## 2021-05-13 LAB
ANION GAP SERPL CALCULATED.3IONS-SCNC: 7 MMOL/L (ref 4–13)
BUN SERPL-MCNC: 20 MG/DL (ref 5–25)
CALCIUM SERPL-MCNC: 9 MG/DL (ref 8.3–10.1)
CHLORIDE SERPL-SCNC: 99 MMOL/L (ref 100–108)
CO2 SERPL-SCNC: 25 MMOL/L (ref 21–32)
CREAT SERPL-MCNC: 0.35 MG/DL (ref 0.6–1.3)
ERYTHROCYTE [DISTWIDTH] IN BLOOD BY AUTOMATED COUNT: 16.8 % (ref 11.6–15.1)
GFR SERPL CREATININE-BSD FRML MDRD: 124 ML/MIN/1.73SQ M
GLUCOSE SERPL-MCNC: 141 MG/DL (ref 65–140)
GLUCOSE SERPL-MCNC: 155 MG/DL (ref 65–140)
GLUCOSE SERPL-MCNC: 163 MG/DL (ref 65–140)
GLUCOSE SERPL-MCNC: 258 MG/DL (ref 65–140)
GLUCOSE SERPL-MCNC: 278 MG/DL (ref 65–140)
GLUCOSE SERPL-MCNC: 387 MG/DL (ref 65–140)
HCT VFR BLD AUTO: 27.6 % (ref 34.8–46.1)
HGB BLD-MCNC: 9.2 G/DL (ref 11.5–15.4)
MCH RBC QN AUTO: 32.3 PG (ref 26.8–34.3)
MCHC RBC AUTO-ENTMCNC: 33.3 G/DL (ref 31.4–37.4)
MCV RBC AUTO: 97 FL (ref 82–98)
NRBC BLD AUTO-RTO: 2 /100 WBCS
PLATELET # BLD AUTO: 164 THOUSANDS/UL (ref 149–390)
PMV BLD AUTO: 10.4 FL (ref 8.9–12.7)
POTASSIUM SERPL-SCNC: 4 MMOL/L (ref 3.5–5.3)
RBC # BLD AUTO: 2.85 MILLION/UL (ref 3.81–5.12)
SODIUM SERPL-SCNC: 131 MMOL/L (ref 136–145)
WBC # BLD AUTO: 3.02 THOUSAND/UL (ref 4.31–10.16)

## 2021-05-13 PROCEDURE — 85027 COMPLETE CBC AUTOMATED: CPT | Performed by: STUDENT IN AN ORGANIZED HEALTH CARE EDUCATION/TRAINING PROGRAM

## 2021-05-13 PROCEDURE — 3E04305 INTRODUCTION OF OTHER ANTINEOPLASTIC INTO CENTRAL VEIN, PERCUTANEOUS APPROACH: ICD-10-PCS | Performed by: INTERNAL MEDICINE

## 2021-05-13 PROCEDURE — 80048 BASIC METABOLIC PNL TOTAL CA: CPT | Performed by: STUDENT IN AN ORGANIZED HEALTH CARE EDUCATION/TRAINING PROGRAM

## 2021-05-13 PROCEDURE — 82948 REAGENT STRIP/BLOOD GLUCOSE: CPT

## 2021-05-13 PROCEDURE — 99232 SBSQ HOSP IP/OBS MODERATE 35: CPT | Performed by: INTERNAL MEDICINE

## 2021-05-13 RX ORDER — DIPHENHYDRAMINE HCL 25 MG
50 TABLET ORAL ONCE
Status: COMPLETED | OUTPATIENT
Start: 2021-05-13 | End: 2021-05-13

## 2021-05-13 RX ORDER — ACETAMINOPHEN 325 MG/1
650 TABLET ORAL EVERY 6 HOURS PRN
Status: DISCONTINUED | OUTPATIENT
Start: 2021-05-13 | End: 2021-05-27

## 2021-05-13 RX ORDER — ACETAMINOPHEN 325 MG/1
650 TABLET ORAL ONCE
Status: COMPLETED | OUTPATIENT
Start: 2021-05-13 | End: 2021-05-13

## 2021-05-13 RX ADMIN — HEPARIN SODIUM 5000 UNITS: 5000 INJECTION INTRAVENOUS; SUBCUTANEOUS at 17:01

## 2021-05-13 RX ADMIN — DIPHENHYDRAMINE HCL 50 MG: 25 TABLET ORAL at 11:37

## 2021-05-13 RX ADMIN — HEPARIN SODIUM 5000 UNITS: 5000 INJECTION INTRAVENOUS; SUBCUTANEOUS at 22:06

## 2021-05-13 RX ADMIN — SENNOSIDES, DOCUSATE SODIUM 1 TABLET: 8.6; 5 TABLET ORAL at 10:05

## 2021-05-13 RX ADMIN — DEXAMETHASONE SODIUM PHOSPHATE 4 MG: 4 INJECTION INTRA-ARTICULAR; INTRALESIONAL; INTRAMUSCULAR; INTRAVENOUS; SOFT TISSUE at 10:05

## 2021-05-13 RX ADMIN — ACETAMINOPHEN 650 MG: 325 TABLET, FILM COATED ORAL at 00:35

## 2021-05-13 RX ADMIN — ONDANSETRON: 2 SOLUTION INTRAMUSCULAR; INTRAVENOUS at 18:00

## 2021-05-13 RX ADMIN — FAMOTIDINE 20 MG: 20 TABLET ORAL at 18:00

## 2021-05-13 RX ADMIN — FAMOTIDINE 20 MG: 20 TABLET ORAL at 10:05

## 2021-05-13 RX ADMIN — INSULIN GLARGINE 8 UNITS: 100 INJECTION, SOLUTION SUBCUTANEOUS at 22:05

## 2021-05-13 RX ADMIN — INSULIN LISPRO 3 UNITS: 100 INJECTION, SOLUTION INTRAVENOUS; SUBCUTANEOUS at 22:10

## 2021-05-13 RX ADMIN — SENNOSIDES, DOCUSATE SODIUM 1 TABLET: 8.6; 5 TABLET ORAL at 18:00

## 2021-05-13 RX ADMIN — METHOTREXATE 5000 MG: 25 INJECTION, SOLUTION INTRA-ARTERIAL; INTRAMUSCULAR; INTRATHECAL; INTRAVENOUS at 18:22

## 2021-05-13 RX ADMIN — INSULIN LISPRO 2 UNITS: 100 INJECTION, SOLUTION INTRAVENOUS; SUBCUTANEOUS at 11:57

## 2021-05-13 RX ADMIN — ACETAMINOPHEN 650 MG: 325 TABLET, FILM COATED ORAL at 22:06

## 2021-05-13 RX ADMIN — RITUXIMAB 500 MG: 10 INJECTION, SOLUTION INTRAVENOUS at 12:23

## 2021-05-13 RX ADMIN — SODIUM BICARBONATE 150 ML/HR: 84 INJECTION, SOLUTION INTRAVENOUS at 13:55

## 2021-05-13 RX ADMIN — LIDOCAINE 5% 1 PATCH: 700 PATCH TOPICAL at 10:05

## 2021-05-13 RX ADMIN — SODIUM BICARBONATE 150 ML/HR: 84 INJECTION, SOLUTION INTRAVENOUS at 22:11

## 2021-05-13 RX ADMIN — INSULIN LISPRO 4 UNITS: 100 INJECTION, SOLUTION INTRAVENOUS; SUBCUTANEOUS at 17:02

## 2021-05-13 RX ADMIN — SODIUM CHLORIDE 20 ML/HR: 0.9 INJECTION, SOLUTION INTRAVENOUS at 12:20

## 2021-05-13 RX ADMIN — ACETAMINOPHEN 650 MG: 325 TABLET, FILM COATED ORAL at 11:37

## 2021-05-13 RX ADMIN — HEPARIN SODIUM 5000 UNITS: 5000 INJECTION INTRAVENOUS; SUBCUTANEOUS at 05:53

## 2021-05-13 NOTE — PROGRESS NOTES
INTERNAL MEDICINE RESIDENCY PROGRESS NOTE     Name: Mekhi Samuels   Age & Sex: 49 y o  female   MRN: [id number]  Unit/Bed#: [facility] 920-01   Encounter: [id number]  Team: SOD Team B     PATIENT INFORMATION     Name: Mekhi Samuels   Age & Sex: 49 y o  female   MRN: [id number] [facility] [address] Stay Days: 20    ASSESSMENT/PLAN     Principal Problem:    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[facility] [address] )  Active Problems:    Dysphagia    Altered mental status    Urinary retention    Presence of permanent central venous catheter    Steroid-induced hyperglycemia    Left-sided weakness    Aphasia    Weakness    Fracture of ramus of left pubis (HCC)    Severe protein-calorie malnutrition ([facility] [address] )      Dysphagia  Assessment & Plan  Patient noted to have dysphagia and also decreased p o  Intake  As per [facility] progress note patient had a calorie count and recommended feeding tube placement  Speech evaluation - advance diet with dysphagia 2 and thin liquids; needs assistance with feeds  Calorie count  If the patient continued to have poor p o  Intake may need discussion with the family regarding alternate methods of nutrition  Patient's calorie intake is low  VBS- showed oropharyngeal dysphagia  s/p peg tube placement  * 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[facility])  Assessment & Plan  Patient was diagnosed with primary 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through biopsy in [facility] transferred over here for chemotherapy since the [facility] is out of network  Had a 2nd family meeting on 04/30 and family has decided to proceed treatment at this time  S/p PICC line for chemotherapy  S/p peg tube placement on 05/10  Currently on oral diet and Jevity 1 2  Plan:  Hematology-Oncology consulted  Appreciate recommendations  Chemotherapy treatment as per hematology oncology team   Plan for 2nd cycle of chemotherapy on 05/13  Patient has poor prognosis even with treatment although family wants all treatment  DVT prophylaxis:  On heparin    Case management-will start looking for places for rehab  Continue steroid taper for cerebral edema from 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Sepsis (HCC)-resolved as of 5/13/2021  Assessment & Plan  Patient had hypothermia, elevated WBC count and tachycardia  Concern for possible aspiration in setting on dysphagia versus UTI in setting of urinary catheter which was placed for retention  Plan:  S/p IV antibiotics last day on 05/05  Will continue to monitor off antibiotics  Left-sided weakness  Assessment & Plan  Patient has left-sided weakness at baseline from her 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Left upper extremity 3-4/5 and left lower extremity 0-1/5 muscle strength at baseline  Steroid-induced hyperglycemia  Assessment & Plan  Mild hyperglycemia, glucoses in 200s, no history of DM  Occurring in the setting of Decadron with chemotherapy, as well as sodium bicarb in D5 drip  - D5 drip is finished  - will monitor sugars while on Jevity and now off D5  - continue q4 hour fingerstick glucose with correctional algorithm 1 and Lantus    Presence of permanent central venous catheter  Assessment & Plan  Noted presence of right IJ tunneled double-lumen HD catheter; upon chart review, this was likely inserted to be used for plasmapheresis which she has completed  Plan:  S/p removal by IR  Patient now has PICC line  Urinary retention  Assessment & Plan  Patient developed urinary retention during previous hospitalization  Plan was voiding trial as an outpatient as she was scheduled for discharge from that facility  Patient passed voiding trial and now voiding appropriately  Urinary retention protocol and strict ins and outs  Altered mental status  Assessment & Plan  Patient initially presented to [facility] [place] with stroke-like symptoms for 2 days    CT scan of the head was concerning for subacute CVA and was transferred to [facility]   MRI of the brain was more consistent with demyelinating disease patient was started on IV steroids and 1000 mg daily for 5 days  Patient did not have any significant improvement at that time patient had plasmapheresis  And also had  lumbar puncture-negative for infection or malignancy  Patient noted to have persistent encephalopathy so a repeat MRI was obtained which showed worsening of the abnormality seen on the previous MRI and 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Likely secondary to 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Delirium precautions   Currently alert and oriented times 2-3   follow commands  Moves all extremities spontaneously except left upper and lower extrimities  Patient's  (does not speak [place]) makes all medical decision for her  Severe protein-calorie malnutrition ([facility] [address] )  Assessment & Plan  Malnutrition Findings:   Adult Malnutrition type: Acute illness(due to medical condition resulting in dysphagia, decreased appetite and intake, weight loss)  Adult Degree of Malnutrition: Other severe protein calorie malnutrition    BMI Findings: Body mass index is 20 49 kg/m²  Plan:  Nutrition consult  Tube feeds initiated with Jevity 1 2, advancing to goal      Fracture of ramus of left pubis [facility])  Assessment & Plan  Philip Farley prior to presentation to [facility]  Managed nonoperatively at [facility]    Weakness  Assessment & Plan  Left greater than right upper and lower extremity weakness with ongoing left lower extremity contracture  Secondary to underlying CNS malignancy  *Decubitus ulcer precautions such as frequent repositioning  Aphasia  Assessment & Plan  Definite evidence of expressive aphasia, possible component of receptive aphasia as well  Speech therapy on board  Disposition:  Continue inpatient care  SUBJECTIVE     Patient seen and examined  No acute events overnight  Alert and ordered times 2-3  Has left-sided weakness at baseline      OBJECTIVE     Vitals:    05/12/21 1548 05/12/21 2215 05/13/21 0806 05/13/21 1001   BP: 102/67 112/75 110/75 108/78   Pulse: 90 94 91 (!) 107   Resp: 17 18 18    Temp: 99 1 °F (37 3 °C) 99 5 °F (37 5 °C) 97 8 °F (36 6 °C)    TempSrc: Axillary      SpO2: 98% 100% 100% 99%   Weight:       Height:          Temperature:   Temp (24hrs), Av 8 °F (37 1 °C), Min:97 8 °F (36 6 °C), Max:99 5 °F (37 5 °C)    Temperature: 97 8 °F (36 6 °C)  Intake & Output:  I/O        07 -  0700  07 -  0700  07 -  0700    P  O  180 0     I V  (mL/kg)       NG/GT  900     Feedings  1513     Total Intake(mL/kg) 180 (3 8) 2413 (50 4)     Urine (mL/kg/hr) 1320 (1 1) 1450 (1 3)     Stool 0 0     Total Output 1320 1450     Net -1140 +963            Unmeasured Urine Occurrence 2 x 1 x     Unmeasured Stool Occurrence 2 x 1 x         Weights:   IBW (Ideal Body Weight): 45 5 kg    Body mass index is 20 62 kg/m²  Weight (last 2 days)     Date/Time   Weight    21 1300   47 9 (105 6)    21 0731   47 9 (105 6)    21 0535   47 9 (105 6)    21 0536   48 2 (106 26)            Physical Exam  Vitals signs reviewed  Constitutional:       General: She is not in acute distress  Appearance: She is well-developed  She is not diaphoretic  HENT:      Head: Normocephalic and atraumatic  Nose: Nose normal       Mouth/Throat:      Pharynx: No oropharyngeal exudate  Eyes:      General: No scleral icterus  Conjunctiva/sclera: Conjunctivae normal    Neck:      Musculoskeletal: Neck supple  Thyroid: No thyromegaly  Vascular: No JVD  Trachea: No tracheal deviation  Cardiovascular:      Rate and Rhythm: Normal rate and regular rhythm  Heart sounds: Normal heart sounds  No murmur  No friction rub  No gallop  Pulmonary:      Effort: Pulmonary effort is normal  No respiratory distress  Breath sounds: Normal breath sounds  No stridor  No wheezing or rales  Chest:      Chest wall: No tenderness     Abdominal:      General: Bowel sounds are normal  There is no distension  Palpations: Abdomen is soft  There is no mass  Tenderness: There is no abdominal tenderness  There is no guarding or rebound  Musculoskeletal: Normal range of motion  General: No tenderness  Skin:     General: Skin is warm  Findings: No erythema or rash  Neurological:      Mental Status: She is alert  Mental status is at baseline  Sensory: No sensory deficit  Comments: Left lower extremity 0/5 strength  Left upper extremity 2-3/5 strength  Psychiatric:         Behavior: Behavior normal          Thought Content: Thought content normal          Judgment: Judgment normal        LABORATORY DATA     Labs: I have personally reviewed pertinent reports  Results from last 7 days   Lab Units 05/13/21  0555 05/12/21  1344 05/12/21  0613 05/10/21  0651 05/09/21  0500   WBC Thousand/uL 3 02* 2 97* 2 94* 6 56 9 20   HEMOGLOBIN g/dL 9 2* 8 7* 8 1* 8 7* 8 7*   HEMATOCRIT % 27 6* 26 4* 24 4* 25 9* 26 8*   PLATELETS Thousands/uL 164 131* 127* 141* 157   NEUTROS PCT %  --   --   --  76*  --    MONOS PCT %  --   --   --  5  --    MONO PCT %  --   --  4  --  2*      Results from last 7 days   Lab Units 05/13/21  0555 05/12/21  1344 05/12/21  0613   POTASSIUM mmol/L 4 0 4 3 4 0   CHLORIDE mmol/L 99* 101 99*   CO2 mmol/L 25 24 25   BUN mg/dL 20 17 18   CREATININE mg/dL 0 35* 0 34* 0 25*   CALCIUM mg/dL 9 0 8 8 8 5   ALK PHOS U/L  --  49 52   ALT U/L  --  64 59   AST U/L  --  15 16         Results from last 7 days   Lab Units 05/10/21  0651   PHOSPHORUS mg/dL 4 4                    IMAGING & DIAGNOSTIC TESTING     Radiology Results: I have personally reviewed pertinent reports  Xr Chest Portable    Result Date: 4/24/2021  Impression: Dialysis catheter tip within the superior cavoatrial junction  No pneumothorax   Workstation performed: [id number]     Xr Chest Picc Line Portable    Result Date: 4/27/2021  Impression: PICC line tip in the superior vena cava, approximately 3 5 to 4 cm above the cavoatrial junction  The examination demonstrates a significant  finding and was documented as such in [facility] for liaison and referring practitioner notification  Workstation performed: [id number]     Ir Picc Reposition    Result Date: 4/27/2021  Impression: Status-post repositioning of a 5 [place] central venous catheter under fluoroscopic guidance with its tip at the cavoatrial junction  Workstation performed: [id number]     Ir Tunneled Central Line Removal    Result Date: 4/27/2021  Impression: Impression: Successful tunneled central line removal as described  Signed, performed and dictated by Idamae Spatz PA-C under the supervision of Dr Ana Acosta  Workstation performed: [id number]     Other Diagnostic Testing: I have personally reviewed pertinent reports      ACTIVE MEDICATIONS     Current Facility-Administered Medications   Medication Dose Route Frequency    acetaminophen (TYLENOL) tablet 650 mg  650 mg Oral Once    albuterol (PROVENTIL HFA,VENTOLIN HFA) inhaler 2 puff  2 puff Inhalation Q6H PRN    amLODIPine (NORVASC) tablet 5 mg  5 mg Oral Daily    bisacodyl (DULCOLAX) rectal suppository 10 mg  10 mg Rectal Daily PRN    diphenhydrAMINE (BENADRYL) tablet 50 mg  50 mg Oral Once    famotidine (PEPCID) tablet 20 mg  20 mg Oral BID    heparin (porcine) subcutaneous injection 5,000 Units  5,000 Units Subcutaneous Q8H [address]    insulin glargine (LANTUS) subcutaneous injection 8 Units 0 08 mL  8 Units Subcutaneous HS    insulin lispro (HumaLOG) 100 units/mL subcutaneous injection 1-5 Units  1-5 Units Subcutaneous 4x Daily (AC & HS)    lactulose oral solution 10 g  10 g Oral BID PRN    lidocaine (LIDODERM) 5 % patch 1 patch  1 patch Topical Daily    methotrexate (PF) 5,000 mg in sodium chloride 0 9 % 1,000 mL IVPB  5,000 mg Intravenous Once    ondansetron (ZOFRAN) 16 mg, dexamethasone (DECADRON) 10 mg in sodium chloride 0 9 % 50 mL IVPB   Intravenous Once    ondansetron (ZOFRAN) injection 4 mg  4 mg Intravenous Q6H PRN    polyethylene glycol (MIRALAX) packet 17 g  17 g Oral Daily    riTUXimab (RITUXAN) 500 mg in sodium chloride 0 9 % 200 mL first titrated chemo infusion  500 mg Intravenous Once    senna-docusate sodium (SENOKOT S) 8 6-50 mg per tablet 1 tablet  1 tablet Oral BID    sodium bicarbonate 100 mEq in dextrose 5 % 1,000 mL infusion  150 mL/hr Intravenous Continuous    sodium chloride 0 9 % infusion  20 mL/hr Intravenous Once PRN    sodium chloride 0 9 % infusion  20 mL/hr Intravenous Once PRN    sodium chloride 0 9 % infusion  20 mL/hr Intravenous Once PRN    tamsulosin (FLOMAX) capsule 0 4 mg  0 4 mg Oral Daily With Dinner       VTE Pharmacologic Prophylaxis: Heparin  VTE Mechanical Prophylaxis: sequential compression device    Portions of the record may have been created with voice recognition software  Occasional wrong word or "sound a like" substitutions may have occurred due to the inherent limitations of voice recognition software    Read the chart carefully and recognize, using context, where substitutions have occurred   ==  Kaylyn Guillermo, [facility]  Internal Medicine Residency PGY-2

## 2021-05-13 NOTE — NUTRITION
05/13/21 8284   Recommendations/Interventions   Summary patient tolerating TF at goal; weight up 3 6# since 4/29; beneficial wt gain; PO intake overall sub-optimal; current TF provides ~1300kcal; 60g protein; ~1500ml total water (free water from TF itself and  water flushes)   Interventions Diet: order adjustment  (added back dysphagia pureed NTL diet features per discussion with RN)   Nutrition Recommendations Continue diet as ordered;Continue EN as ordered

## 2021-05-13 NOTE — UTILIZATION REVIEW
Continued Stay Review    Date:  5/13/21                  Current Patient Class: IP  Current Level of Care: MS    HPI:56 y o  female initially admitted on 4 /23/21 for initiation of chemotherapy for 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Assessment/Plan:   Pt is receiving Cylce @ of Methotrexate with Leucovorin and Rituximab today  She is on Zofran and steroids for cerebral edema  Pt and family know she has poor prognosis but want to continue with treatment  Has completed antibiotics and is being monitored off antibiotics  Pt had PEG tube placed 5/10 and is taking oral diet and jevity  Labs are stable with exception of Na is 131 5/13  Getting IV Fluids  She is having some tachycardia this afternoon  She is alert and oriented x 2 today        Vital Signs:   05/13/21 1312  --  113Abnormal   --  121/73  89  99 %  --   05/13/21 1257  --  115Abnormal   --  123/74  90  --  --   05/13/21 1242  --  118Abnormal   --  129/74  92  98 %  --   05/13/21 1227  --  129Abnormal   --  130/73  92  99 %  --   05/13/21 11:48:16  98 4 °F (36 9 °C)  114Abnormal   17  112/78  89  100 %  --   05/13/21 10:01:22  --  107Abnormal   --  108/78  88  99 %  --   05/13/21 08:06:48  97 8 °F (36 6 °C)  91  18  110/75  87  100 %  --   05/12/21 22:15:07  99 5 °F (37 5 °C)  94  18  112/75  87  100 %  --   05/12/21 15:48:03  99 1 °F (37 3 °C)  90  17  102/67  79  98 %  --   05/12/21 07:42:14  98 3 °F (36 8 °C)  90  18  103/67  79  98 %  --   05/11/21 22:14:49  98 8 °F (37 1 °C)  91  18  104/70  81  99 %  --   05/11/21 2200  --  --  --  --  --  --  None (Room air)   05/11/21 14:52:33  97 3 °F (36 3 °C)Abnormal   87  18  103/71  82  100 %  --   05/11/21 07:29:36  97 9 °F (36 6 °C)  88  --  105/72  83  100 %  --   05/11/21 07:29:15  97 9 °F (36 6 °C)  --  16  105/72  83  --  --         Pertinent Labs/Diagnostic Results:       Results from last 7 days   Lab Units 05/13/21  0555 05/12/21  1344 05/12/21  0613 05/10/21  0651 05/09/21  0500   WBC Thousand/uL 3 02* 2 97* 2 94* 6  56 9 20   HEMOGLOBIN g/dL 9 2* 8 7* 8 1* 8 7* 8 7*   HEMATOCRIT % 27 6* 26 4* 24 4* 25 9* 26 8*   PLATELETS Thousands/uL 164 131* 127* 141* 157   NEUTROS ABS Thousands/µL  --   --   --  5 04  --    BANDS PCT %  --   --  9*  --  2         Results from last 7 days   Lab Units 05/13/21  0555 05/12/21  1344 05/12/21  0613 05/10/21  0651   SODIUM mmol/L 131* 132* 130* 134*   POTASSIUM mmol/L 4 0 4 3 4 0 4 0   CHLORIDE mmol/L 99* 101 99* 103   CO2 mmol/L 25 24 25 23   ANION GAP mmol/L 7 7 6 8   BUN mg/dL 20 17 18 20   CREATININE mg/dL 0 35* 0 34* 0 25* 0 27*   EGFR ml/min/1 73sq m 124 125 138 135   CALCIUM mg/dL 9 0 8 8 8 5 8 8   PHOSPHORUS mg/dL  --   --   --  4 4     Results from last 7 days   Lab Units 05/12/21  1344 05/12/21  0613   AST U/L 15 16   ALT U/L 64 59   ALK PHOS U/L 49 52   TOTAL PROTEIN g/dL 5 7* 5 2*   ALBUMIN g/dL 2 8* 2 7*   TOTAL BILIRUBIN mg/dL 0 52 0 50   BILIRUBIN DIRECT mg/dL  --  0 14     Results from last 7 days   Lab Units 05/13/21  1144 05/13/21  0807 05/13/21  0007 05/12/21  2235 05/12/21  2108 05/12/21  1655 05/12/21  1156 05/12/21  0753 05/11/21  2051 05/11/21  1653 05/11/21  1043 05/11/21  0730   POC GLUCOSE mg/dl 258* 141* 155* 198* 228* 212* 170* 188* 158* 184* 100 155*     Results from last 7 days   Lab Units 05/13/21  0555 05/12/21  1344 05/12/21  0613 05/10/21  0651   GLUCOSE RANDOM mg/dL 163* 156* 130 82     Medications:   Scheduled Medications:  amLODIPine, 5 mg, Oral, Daily  famotidine, 20 mg, Oral, BID  heparin (porcine), 5,000 Units, Subcutaneous, Q8H MEÑO  insulin glargine, 8 Units, Subcutaneous, HS  insulin lispro, 1-5 Units, Subcutaneous, 4x Daily (AC & HS)  lidocaine, 1 patch, Topical, Daily  methotrexate (PF) IVPB, 5,000 mg, Intravenous, Once  ondansetron with dexamethasone IVPB, , Intravenous, Once  polyethylene glycol, 17 g, Oral, Daily  senna-docusate sodium, 1 tablet, Oral, BID  tamsulosin, 0 4 mg, Oral, Daily With Dinner      Continuous IV Infusions:  sodium bicarbonate infusion, 150 mL/hr, Intravenous, Continuous      PRN Meds:  albuterol, 2 puff, Inhalation, Q6H PRN  bisacodyl, 10 mg, Rectal, Daily PRN  lactulose, 10 g, Oral, BID PRN  ondansetron, 4 mg, Intravenous, Q6H PRN  sodium chloride, 20 mL/hr, Intravenous, Once PRN x 1 5/13  sodium chloride, 20 mL/hr, Intravenous, Once PRN  sodium chloride, 20 mL/hr, Intravenous, Once PRN    Discharge Plan: D    Network Utilization Review Department  ATTENTION: Please call with any questions or concerns to [phone] and carefully listen to the prompts so that you are directed to the right person  All voicemails are confidential   [facility] all requests for admission clinical reviews, approved or denied determinations and any other requests to dedicated fax number below belonging to the campus where the patient is receiving treatment   List of dedicated fax numbers for the Facilities:  [address] DENIALS (Administrative/Medical Necessity) [phone]   [address] (Maternity/NICU/Pediatrics) [phone]   [address] [place] [address] [address] [place] [address] [address] [facility] [place] [address] [phone]

## 2021-05-13 NOTE — PROGRESS NOTES
[address]  Department of Hematology & Medical Oncology  Inpatient Consultation/Progress Note    Date: 05/13/21          ASSESSMENT & PLAN        Assessment:  · 82-year-old female with primary CNS diffuse large B-cell lymphoma and ECOG performance status of 4 here for cycle 2 of methotrexate chemotherapy with Leucovorin rescue  Her primary oncologist is Dr Nathalie Castillo  Plan:   Treatment:  o Per primary oncologist, administer 3,500 mg/m2 Methotrexate with Leucovorin rescue and 375 mg/m2 Rituximab today  Order was signed by Dr Nathalie Castillo and reviewed by oncology pharmacist Mya Johnston   - Consent was obtained and signed prior to administration of cycle 1   - Chemotherapy-induced nausea and vomiting protocol in place  o Continue steroid taper for cerebral edema  · Labs/imaging:  · Monitor methotrexate levels daily, beginning 24 hours after initial infusion (order in treatment plan)  · Daily CBC and CMP w/ differential ordered  · Her primary oncologist, plan for restaging imaging with an MRI brain in 2 weeks      Please contact me if any questions or concerns about this patient's case  Thank you  SUBJECTIVE      Carmela Enamorado is a Gujarati-speaking 82-year-old female who was diagnosed with primary 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at [facility] and transferred to [facility] for chemotherapy  She received her 1st cycle of methotrexate with Leucovorin rescue on 4/28 and 4/29 which she tolerated well without significant adverse effects or complications  Given her poor performance status, Dr Rylie Arriaga discussed her treatment plan with Dr Nathalie Castillo, who recommended moving forward with cycle 2 of her treatment today  Oncology pharmacist Mya Johnston was made aware and will coordinate chemotherapy  regimen accordingly  Upon speaking with her nurse Jenifer Lezama, she reports no overnight or acute events  Given that she only speaks [place], Dr Rylie Arriaga performed her review of symptoms using Gujarati       I have reviewed the relevant past medical, surgical, social and family history  I have also reviewed allergies and medications for this patient  Review of Systems  ROS      OBJECTIVE     Physical Exam    Vitals:    05/13/21 0806   BP: 110/75   Pulse: 91   Resp: 18   Temp: 97 8 °F (36 6 °C)   SpO2: 100%       Physical Exam  Vitals signs reviewed  Constitutional:       General: She is not in acute distress  Appearance: She is ill-appearing  She is not toxic-appearing or diaphoretic  Comments: Tired and cachectic appearing 72-year-old female lying in bed  HENT:      Head: Normocephalic  Eyes:      Extraocular Movements: Extraocular movements intact  Conjunctiva/sclera: Conjunctivae normal       Pupils: Pupils are equal, round, and reactive to light  Neck:      Musculoskeletal: Normal range of motion and neck supple  No neck rigidity  Cardiovascular:      Rate and Rhythm: Regular rhythm  Tachycardia present  Pulses: Normal pulses  Heart sounds: Normal heart sounds  No murmur  No gallop  Pulmonary:      Effort: Pulmonary effort is normal  No respiratory distress  Breath sounds: Normal breath sounds  No wheezing  Abdominal:      General: Abdomen is flat  Bowel sounds are normal  There is no distension  Palpations: Abdomen is soft  There is no mass  Tenderness: There is no abdominal tenderness  There is no guarding or rebound  Comments: Healing ecchymosis in her bilateral lower abdomen  PEG had tube in place  Winces upon light and deep palpation of bilateral lower abdominal quadrants  Musculoskeletal: Normal range of motion  General: No swelling or tenderness  Right lower leg: No edema  Left lower leg: No edema  Lymphadenopathy:      Cervical: No cervical adenopathy  Skin:     General: Skin is warm and dry  Coloration: Skin is not jaundiced or pale  Findings: No erythema or rash     Neurological:      Mental Status: Mental status is at baseline  Comments: Left-sided weakness due to 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Psychiatric:      Comments: Mumbling throughout physical exam   Her confusion may be at baseline, but it is difficult to assess due to her 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Imaging  Relevant imaging reviewed in chart    Labs  Relevant labs reviewed in chart    CBC  Diff   Lab Results   Component Value Date/Time    WBC 3 02 (L) 05/13/2021 05:55 AM    HGB 9 2 (L) 05/13/2021 05:55 AM    HCT 27 6 (L) 05/13/2021 05:55 AM    RBC 2 85 (L) 05/13/2021 05:55 AM    MCV 97 05/13/2021 05:55 AM    MCHC 33 3 05/13/2021 05:55 AM    MCH 32 3 05/13/2021 05:55 AM    RDW 16 8 (H) 05/13/2021 05:55 AM    MPV 10 4 05/13/2021 05:55 AM    Lab Results   Component Value Date/Time    LYMPHSABS 0 82 05/10/2021 06:51 AM    EOSABS 0 03 05/12/2021 06:13 AM    EOSABS 0 00 05/10/2021 06:51 AM    MONOSABS 0 32 05/10/2021 06:51 AM    BASOSABS 0 00 05/12/2021 06:13 AM    BASOSABS 0 02 05/10/2021 06:51 AM        Basic Metabolic Profile    Lab Results   Component Value Date/Time    SODIUM 131 (L) 05/13/2021 05:55 AM    CO2 25 05/13/2021 05:55 AM    Lab Results   Component Value Date/Time    BUN 20 05/13/2021 05:55 AM    CREATININE 0 35 (L) 05/13/2021 05:55 AM           Rachel Jaime PA-C  Hematology & Medical Oncology  [facility] 12

## 2021-05-14 ENCOUNTER — APPOINTMENT (INPATIENT)
Dept: RADIOLOGY | Facility: HOSPITAL | Age: 56
DRG: 840 | End: 2021-05-14
Payer: COMMERCIAL

## 2021-05-14 PROBLEM — D61.818 PANCYTOPENIA (HCC): Status: ACTIVE | Noted: 2021-05-14

## 2021-05-14 LAB
ALBUMIN SERPL BCP-MCNC: 2.3 G/DL (ref 3.5–5)
ALBUMIN SERPL BCP-MCNC: 2.6 G/DL (ref 3.5–5)
ALP SERPL-CCNC: 59 U/L (ref 46–116)
ALP SERPL-CCNC: 66 U/L (ref 46–116)
ALT SERPL W P-5'-P-CCNC: 66 U/L (ref 12–78)
ALT SERPL W P-5'-P-CCNC: 67 U/L (ref 12–78)
ANION GAP SERPL CALCULATED.3IONS-SCNC: 4 MMOL/L (ref 4–13)
ANION GAP SERPL CALCULATED.3IONS-SCNC: 5 MMOL/L (ref 4–13)
AST SERPL W P-5'-P-CCNC: 21 U/L (ref 5–45)
AST SERPL W P-5'-P-CCNC: 24 U/L (ref 5–45)
BILIRUB SERPL-MCNC: 0.53 MG/DL (ref 0.2–1)
BILIRUB SERPL-MCNC: 0.58 MG/DL (ref 0.2–1)
BUN SERPL-MCNC: 12 MG/DL (ref 5–25)
BUN SERPL-MCNC: 13 MG/DL (ref 5–25)
CALCIUM ALBUM COR SERPL-MCNC: 9.2 MG/DL (ref 8.3–10.1)
CALCIUM ALBUM COR SERPL-MCNC: 9.5 MG/DL (ref 8.3–10.1)
CALCIUM SERPL-MCNC: 7.8 MG/DL (ref 8.3–10.1)
CALCIUM SERPL-MCNC: 8.4 MG/DL (ref 8.3–10.1)
CHLORIDE SERPL-SCNC: 101 MMOL/L (ref 100–108)
CHLORIDE SERPL-SCNC: 99 MMOL/L (ref 100–108)
CO2 SERPL-SCNC: 29 MMOL/L (ref 21–32)
CO2 SERPL-SCNC: 31 MMOL/L (ref 21–32)
CREAT SERPL-MCNC: 0.3 MG/DL (ref 0.6–1.3)
CREAT SERPL-MCNC: 0.32 MG/DL (ref 0.6–1.3)
ERYTHROCYTE [DISTWIDTH] IN BLOOD BY AUTOMATED COUNT: 16.5 % (ref 11.6–15.1)
GFR SERPL CREATININE-BSD FRML MDRD: 127 ML/MIN/1.73SQ M
GFR SERPL CREATININE-BSD FRML MDRD: 130 ML/MIN/1.73SQ M
GLUCOSE SERPL-MCNC: 156 MG/DL (ref 65–140)
GLUCOSE SERPL-MCNC: 163 MG/DL (ref 65–140)
GLUCOSE SERPL-MCNC: 192 MG/DL (ref 65–140)
GLUCOSE SERPL-MCNC: 195 MG/DL (ref 65–140)
GLUCOSE SERPL-MCNC: 248 MG/DL (ref 65–140)
GLUCOSE SERPL-MCNC: 274 MG/DL (ref 65–140)
HCT VFR BLD AUTO: 21.2 % (ref 34.8–46.1)
HGB BLD-MCNC: 7.1 G/DL (ref 11.5–15.4)
MCH RBC QN AUTO: 32.9 PG (ref 26.8–34.3)
MCHC RBC AUTO-ENTMCNC: 33.5 G/DL (ref 31.4–37.4)
MCV RBC AUTO: 98 FL (ref 82–98)
NRBC BLD AUTO-RTO: 2 /100 WBCS
PLATELET # BLD AUTO: 142 THOUSANDS/UL (ref 149–390)
PMV BLD AUTO: 10.9 FL (ref 8.9–12.7)
POTASSIUM SERPL-SCNC: 3.4 MMOL/L (ref 3.5–5.3)
POTASSIUM SERPL-SCNC: 4 MMOL/L (ref 3.5–5.3)
PROT SERPL-MCNC: 4.9 G/DL (ref 6.4–8.2)
PROT SERPL-MCNC: 5.1 G/DL (ref 6.4–8.2)
RBC # BLD AUTO: 2.16 MILLION/UL (ref 3.81–5.12)
SODIUM SERPL-SCNC: 134 MMOL/L (ref 136–145)
SODIUM SERPL-SCNC: 135 MMOL/L (ref 136–145)
WBC # BLD AUTO: 2.75 THOUSAND/UL (ref 4.31–10.16)

## 2021-05-14 PROCEDURE — 74230 X-RAY XM SWLNG FUNCJ C+: CPT

## 2021-05-14 PROCEDURE — 99233 SBSQ HOSP IP/OBS HIGH 50: CPT | Performed by: INTERNAL MEDICINE

## 2021-05-14 PROCEDURE — 82948 REAGENT STRIP/BLOOD GLUCOSE: CPT

## 2021-05-14 PROCEDURE — 97530 THERAPEUTIC ACTIVITIES: CPT

## 2021-05-14 PROCEDURE — 80053 COMPREHEN METABOLIC PANEL: CPT | Performed by: STUDENT IN AN ORGANIZED HEALTH CARE EDUCATION/TRAINING PROGRAM

## 2021-05-14 PROCEDURE — 97112 NEUROMUSCULAR REEDUCATION: CPT

## 2021-05-14 PROCEDURE — 99232 SBSQ HOSP IP/OBS MODERATE 35: CPT | Performed by: INTERNAL MEDICINE

## 2021-05-14 PROCEDURE — 85027 COMPLETE CBC AUTOMATED: CPT | Performed by: STUDENT IN AN ORGANIZED HEALTH CARE EDUCATION/TRAINING PROGRAM

## 2021-05-14 PROCEDURE — 80204 DRUG ASSAY METHOTREXATE: CPT | Performed by: INTERNAL MEDICINE

## 2021-05-14 PROCEDURE — 92611 MOTION FLUOROSCOPY/SWALLOW: CPT

## 2021-05-14 RX ORDER — POTASSIUM CHLORIDE 20 MEQ/1
40 TABLET, EXTENDED RELEASE ORAL ONCE
Status: COMPLETED | OUTPATIENT
Start: 2021-05-14 | End: 2021-05-14

## 2021-05-14 RX ORDER — LEVOFLOXACIN 500 MG/1
500 TABLET, FILM COATED ORAL EVERY 24 HOURS
Status: DISCONTINUED | OUTPATIENT
Start: 2021-05-14 | End: 2021-06-14 | Stop reason: HOSPADM

## 2021-05-14 RX ORDER — FLUCONAZOLE 200 MG/1
400 TABLET ORAL DAILY
Status: DISCONTINUED | OUTPATIENT
Start: 2021-05-14 | End: 2021-06-14 | Stop reason: HOSPADM

## 2021-05-14 RX ORDER — ACYCLOVIR 200 MG/1
400 CAPSULE ORAL EVERY 12 HOURS SCHEDULED
Status: DISCONTINUED | OUTPATIENT
Start: 2021-05-14 | End: 2021-06-14 | Stop reason: HOSPADM

## 2021-05-14 RX ORDER — LEUCOVORIN CALCIUM 50 MG/5ML
25 INJECTION, POWDER, LYOPHILIZED, FOR SOLUTION INTRAMUSCULAR; INTRAVENOUS EVERY 6 HOURS
Status: DISCONTINUED | OUTPATIENT
Start: 2021-05-14 | End: 2021-05-14 | Stop reason: SDUPTHER

## 2021-05-14 RX ORDER — INSULIN GLARGINE 100 [IU]/ML
10 INJECTION, SOLUTION SUBCUTANEOUS
Status: DISCONTINUED | OUTPATIENT
Start: 2021-05-14 | End: 2021-05-16

## 2021-05-14 RX ADMIN — FLUCONAZOLE 400 MG: 200 TABLET ORAL at 17:34

## 2021-05-14 RX ADMIN — ONDANSETRON 4 MG: 2 INJECTION INTRAMUSCULAR; INTRAVENOUS at 12:00

## 2021-05-14 RX ADMIN — FAMOTIDINE 20 MG: 20 TABLET ORAL at 17:34

## 2021-05-14 RX ADMIN — HEPARIN SODIUM 5000 UNITS: 5000 INJECTION INTRAVENOUS; SUBCUTANEOUS at 21:12

## 2021-05-14 RX ADMIN — HEPARIN SODIUM 5000 UNITS: 5000 INJECTION INTRAVENOUS; SUBCUTANEOUS at 14:55

## 2021-05-14 RX ADMIN — SODIUM CHLORIDE 25 MG: 9 INJECTION, SOLUTION INTRAVENOUS at 23:49

## 2021-05-14 RX ADMIN — SODIUM CHLORIDE 25 MG: 9 INJECTION, SOLUTION INTRAVENOUS at 18:24

## 2021-05-14 RX ADMIN — SODIUM BICARBONATE 150 ML/HR: 84 INJECTION, SOLUTION INTRAVENOUS at 21:16

## 2021-05-14 RX ADMIN — INSULIN GLARGINE 10 UNITS: 100 INJECTION, SOLUTION SUBCUTANEOUS at 21:12

## 2021-05-14 RX ADMIN — POTASSIUM CHLORIDE 40 MEQ: 1500 TABLET, EXTENDED RELEASE ORAL at 11:14

## 2021-05-14 RX ADMIN — SENNOSIDES, DOCUSATE SODIUM 1 TABLET: 8.6; 5 TABLET ORAL at 11:13

## 2021-05-14 RX ADMIN — AMLODIPINE BESYLATE 5 MG: 5 TABLET ORAL at 11:13

## 2021-05-14 RX ADMIN — LEVOFLOXACIN 500 MG: 500 TABLET, FILM COATED ORAL at 17:34

## 2021-05-14 RX ADMIN — INSULIN LISPRO 1 UNITS: 100 INJECTION, SOLUTION INTRAVENOUS; SUBCUTANEOUS at 21:14

## 2021-05-14 RX ADMIN — SODIUM BICARBONATE 150 ML/HR: 84 INJECTION, SOLUTION INTRAVENOUS at 05:31

## 2021-05-14 RX ADMIN — LIDOCAINE 5% 1 PATCH: 700 PATCH TOPICAL at 11:11

## 2021-05-14 RX ADMIN — HEPARIN SODIUM 5000 UNITS: 5000 INJECTION INTRAVENOUS; SUBCUTANEOUS at 05:38

## 2021-05-14 RX ADMIN — POLYETHYLENE GLYCOL 3350 17 G: 17 POWDER, FOR SOLUTION ORAL at 11:14

## 2021-05-14 RX ADMIN — ACYCLOVIR 400 MG: 200 CAPSULE ORAL at 21:12

## 2021-05-14 RX ADMIN — FAMOTIDINE 20 MG: 20 TABLET ORAL at 11:13

## 2021-05-14 RX ADMIN — INSULIN LISPRO 1 UNITS: 100 INJECTION, SOLUTION INTRAVENOUS; SUBCUTANEOUS at 17:35

## 2021-05-14 NOTE — PROCEDURES
Video Swallow Study      Patient Name: Zita Presclair OSORIO Date: 5/14/2021        Past Medical History  History reviewed  No pertinent past medical history  Past Surgical History  Past Surgical History:   Procedure Laterality Date    IR PICC REPOSITION  4/27/2021    IR TUNNELED CENTRAL LINE REMOVAL  4/27/2021   Video Barium Swallow Study    Summary:  Images are on PACS for review  Pt presents w/ severe/profound oral dysphagia, at least mod pharyngeal dysphagia w/ poor retrieval & min to no oral seal & poor containment  Poor bolus manipulation & transfers w/ labored transfers & high risk for spill w/ all material kenji thinner liquids other than ht  Reduced anterior hyolaryngeal movement w/ reduced constriction  + silent aspiration of thin prior to the swallow, pt cued to cough but cough is weak  The pt would be more appropriate for pleasure feeds at this time given her medical status & severe oral dysphagia  She is high risk for ongoing poor intake by mouth but also risky for aspiration  Would focus on treating the primary medical issue & offer only pleasure feeds for comfort  Please rely on the PEG as the main nutrition source  Recommendations:  Pleasure feeds of puree/ht by tsp as able with the PEG for 1* nutrition    Strategies: only feed when pt is fully awake/alert  Upright position  F/u ST tx: 1-2x week for support as able   Therapy Prognosis: guarded   Prognosis considerations: medical condition   Full Supervision  Aspiration Precautions    Patient's goal:  None stated      Goals:  Pt will tolerate small amt of pleasure feeds w/out s/s aspiration or oral/pharyngeal difficulties  Current Medical Status:  Per Neuro consult:  Phong Leal a 49 y  o  female who is legally blind no other past medical problems who presents as a transfer from [facility] secondary to CNS lymphoma on 4/23       Patient presented to [facility] on 3/19 with left-sided facial droop and trouble swallowing since 03/17, she also reported blurred vision, mild posterior headache, recent fall   She had a CT head which revealed acute to subacute infarct left cerebellum and midbrain, CTA showed no arterial stenosis or dissection, out of tPA window, MRI brain revealed demyelinating process suspicion for anti MOG encephalitis vs neoplastic-CNS lymphoma, edema and expansion of left aspect of the midbrain, de, left middle cerebellar peduncle   CT chest,abdomen, pelvis which showed fracture of multiple pubic rami, megan left for which she was recommended PT       She also developed dysphagia and recommended dysphagia soft diet   She had acute worsening in her mental status on 04/04 with repeat CT head no changes, EEG negative for seizure, repeat MRI on 04/07 after completing [facility] revealed increased enhancement and carpus callosum and right periventricular white matter and right aspect of splenium of corpus callosum and overall decreased FLAIR signal abnormality involving the LEFT brainstem and LEFT cerebellar hemispheres with interval decrease in degree of nodular enhancement which has been slowly receding since the first MRI on 03/21/2021   Differentials included 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although typically not as rapidly progressive and typically more hypercellular), angioinvasive lymphoma (typically presents with hemorrhages), autoimmune encephalitis    Repeat LP was done- 4/8- 84 WBC (previously 20), elevated protein 134 (previously 222), cytology negative   She underwent brain biopsy on 04/14,     Pt seen by speech w/ decline in swallow function- diagnosed with primary 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through biopsy in [facility] transferred over here for chemotherapy since the [facility] is out of network  Prognosis is poor but all treatments were desired by family    A PEG was placed 5/10/2021 as the pt had poor po intake & increased dysphagia-diet downgraded to puree/ht prior to the PEG placement  Pt has started chemo & remains lethargic & high risk for aspiration  ? Aspiration related pna at this time  No recent CXR indicating aspiration pna    Previous VBS:  -  Current Diet:   PEG w/ tf, regular w/ thin ordered then changed to puree/ht    Premorbid diet:  Dysphagia soft at another hospital, regular at home  Difficulty w/ swallowing since 3/17/2021  Dentition:  Natural   O2 requirement:  RA  Oral mech:  Strength and ROM: noted weakness b/l w/ upper lip raised but flaccid on the R?? Unable to purse or form a labial seal on anything  Vocal Quality/Speech:  Min verbalizations, weak  Cognitive status:  Lethargic, max stim needed to participate  Leans to the L onto the rail when it is up    Consistencies administered: Barium laden applesauce, peach,  honey thick, nectar thick, thin liquids  Liquids were administered by cup and tsp, unable to manage a straw   Pt was seated laterally at 90 degrees  Leaning L consistently- nursing stayed to assist w/ positioning & feeding  Oral Phase:  Lip closure: poor seal to no seal w/ material retrieved w/ the teeth, keeps oral cavity open while attempting transfers w/ all material   Unable to draw from the straw with attempts  Mastication: fair, prolonged w/o control & some pieces transferred  Bolus formation: poor, pt w/ munching motion w/ all material - appears to attempt mastication for all      Bolus control: poor  Transfer: labored, uncoordinated, lingual pumping for all with a slight to & fro  Movement   Residue: post lingual residue noted w/ all     Pharyngeal stage:    Swallow promptness:mild delay to fairly prompt  Spill to valleculae: with most material   Spill to pyriforms: with thin prior to swallow, nt by cup  Epiglottic inversion: reduced   Laryngeal excursion: reduced anterior movement   Pharyngeal constriction: mildly reduced   Vallecular retention: with puree, mild   Pyriform retention: min pooling  PPW coating: mild throughout   Osteophytes: -  CP prominence:-  Retropulsion from prominence:-  Transient penetration:  Epiglottic undercoat:-  Penetration:-  Aspiration: with thin prior to the swallow  Strategies: pt unable to physically complete any strategies  Leans L, needed repositioning mult times throughout  Response to aspiration: no response, verbally cued but cough is weak    Screening of Esophageal stage:   WNL

## 2021-05-14 NOTE — PLAN OF CARE
Problem: Prexisting or High Potential for Compromised Skin Integrity  Goal: Skin integrity is maintained or improved  Description: INTERVENTIONS:  - Identify patients at risk for skin breakdown  - Assess and monitor skin integrity  - Assess and monitor nutrition and hydration status  - Monitor labs   - Assess for incontinence   - Turn and reposition patient  - Assist with mobility/ambulation  - Relieve pressure over bony prominences  - Avoid friction and shearing  - Provide appropriate hygiene as needed including keeping skin clean and dry  - Evaluate need for skin moisturizer/barrier cream  - Collaborate with interdisciplinary team   - Patient/family teaching  - Consider wound care consult   Outcome: Progressing     Problem: Potential for Falls  Goal: Patient will remain free of falls  Description: INTERVENTIONS:  - Assess patient frequently for physical needs  -  Identify cognitive and physical deficits and behaviors that affect risk of falls  -  [place] fall precautions as indicated by assessment   - Educate patient/family on patient safety including physical limitations  - Instruct patient to call for assistance with activity based on assessment  - Modify environment to reduce risk of injury  - Consider OT/PT consult to assist with strengthening/mobility  Outcome: Progressing     Problem: Nutrition/Hydration-ADULT  Goal: Nutrient/Hydration intake appropriate for improving, restoring or maintaining nutritional needs  Description: Monitor and assess patient's nutrition/hydration status for malnutrition  Collaborate with interdisciplinary team and initiate plan and interventions as ordered  Monitor patient's weight and dietary intake as ordered or per policy  Utilize nutrition screening tool and intervene as necessary  Determine patient's food preferences and provide high-protein, high-caloric foods as appropriate       INTERVENTIONS:  - Monitor oral intake, urinary output, labs, and treatment plans  - Assess nutrition and hydration status and recommend course of action  - Evaluate amount of meals eaten  - Assist patient with eating if necessary   - Allow adequate time for meals  - Recommend/ encourage appropriate diets, oral nutritional supplements, and vitamin/mineral supplements  - Order, calculate, and assess calorie counts as needed  - Recommend, monitor, and adjust tube feedings and TPN/PPN based on assessed needs  - Assess need for intravenous fluids  - Provide specific nutrition/hydration education as appropriate  - Include patient/family/caregiver in decisions related to nutrition  Outcome: Progressing     Problem: PAIN - ADULT  Goal: Verbalizes/displays adequate comfort level or baseline comfort level  Description: Interventions:  - Encourage patient to monitor pain and request assistance  - Assess pain using appropriate pain scale  - Administer analgesics based on type and severity of pain and evaluate response  - Implement non-pharmacological measures as appropriate and evaluate response  - Consider cultural and social influences on pain and pain management  - Notify physician/advanced practitioner if interventions unsuccessful or patient reports new pain  Outcome: Progressing     Problem: INFECTION - ADULT  Goal: Absence or prevention of progression during hospitalization  Description: INTERVENTIONS:  - Assess and monitor for signs and symptoms of infection  - Monitor lab/diagnostic results  - Monitor all insertion sites, i e  indwelling lines, tubes, and drains  - Monitor endotracheal if appropriate and nasal secretions for changes in amount and color  - [place] appropriate cooling/warming therapies per order  - Administer medications as ordered  - Instruct and encourage patient and family to use good hand hygiene technique  - Identify and instruct in appropriate isolation precautions for identified infection/condition  Outcome: Progressing     Problem: SAFETY ADULT  Goal: Patient will remain free of falls  Description: INTERVENTIONS:  - Assess patient frequently for physical needs  -  Identify cognitive and physical deficits and behaviors that affect risk of falls    -  [place] fall precautions as indicated by assessment   - Educate patient/family on patient safety including physical limitations  - Instruct patient to call for assistance with activity based on assessment  - Modify environment to reduce risk of injury  - Consider OT/PT consult to assist with strengthening/mobility  Outcome: Progressing  Goal: Maintain or return to baseline ADL function  Description: INTERVENTIONS:  -  Assess patient's ability to carry out ADLs; assess patient's baseline for ADL function and identify physical deficits which impact ability to perform ADLs (bathing, care of mouth/teeth, toileting, grooming, dressing, etc )  - Assess/evaluate cause of self-care deficits   - Assess range of motion  - Assess patient's mobility; develop plan if impaired  - Assess patient's need for assistive devices and provide as appropriate  - Encourage maximum independence but intervene and supervise when necessary  - Involve family in performance of ADLs  - Assess for home care needs following discharge   - Consider OT consult to assist with ADL evaluation and planning for discharge  - Provide patient education as appropriate  Outcome: Progressing  Goal: Maintain or return mobility status to optimal level  Description: INTERVENTIONS:  - Assess patient's baseline mobility status (ambulation, transfers, stairs, etc )    - Identify cognitive and physical deficits and behaviors that affect mobility  - Identify mobility aids required to assist with transfers and/or ambulation (gait belt, sit-to-stand, lift, walker, cane, etc )  - [place] fall precautions as indicated by assessment  - Record patient progress and toleration of activity level on Mobility SBAR; progress patient to next Phase/Stage  - Instruct patient to call for assistance with activity based on assessment  - Consider rehabilitation consult to assist with strengthening/weightbearing, etc   Outcome: Progressing     Problem: DISCHARGE PLANNING  Goal: Discharge to home or other facility with appropriate resources  Description: INTERVENTIONS:  - Identify barriers to discharge w/patient and caregiver  - Arrange for needed discharge resources and transportation as appropriate  - Identify discharge learning needs (meds, wound care, etc )  - Arrange for interpretive services to assist at discharge as needed  - Refer to Case Management Department for coordinating discharge planning if the patient needs post-hospital services based on physician/advanced practitioner order or complex needs related to functional status, cognitive ability, or social support system  Outcome: Progressing     Problem: Knowledge Deficit  Goal: Patient/family/caregiver demonstrates understanding of disease process, treatment plan, medications, and discharge instructions  Description: Complete learning assessment and assess knowledge base    Interventions:  - Provide teaching at level of understanding  - Provide teaching via preferred learning methods  Outcome: Progressing

## 2021-05-14 NOTE — PROGRESS NOTES
Hematology - Oncology Progress Note    Demetrice Tiwari, 1965, [id number]  [facility] [address]/[facility] 920-01      Impression and plan:    05/14/2021 3:30 p m  Addendum I re-discussed the case with Dr Ezra Leo again later on today  The plan is to start prophylactic Diflucan, acyclovir and Levaquin (staying away from the Bactrim because of the recent methotrexate administration)  WBC/ ANC needs to be monitored; patient can receive Granix as needed  Patient is obviously immunosuppressed but hopefully the prophylaxis will help to decrease the risk for infection  30-year-old female recently diagnosed with primary 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Patient received her 2nd cycle of methotrexate and Rituxan (rituximab) yesterday  Because of dysphagia, patient is being evaluated; swallowing study scheduled for today  Patient is to begin Leucovorin rescue today  It is imperative that patient get the Leucovorin on time and that the methotrexate levels are checked routinely (even if the results are not immediately available)  Patient has suffered a number of complications including sepsis  The white count is decreased and as above, patient recently received chemotherapy  Unfortunately patient is incontinent, nursing staff is very concerned about Mrs Barahona's incontinence and urine spill possibly containing methotrexate/metabolites  This becomes complicated and both options (putting in the Cottrell catheter and increasing risk for infection verses urine spill) are not optimal   From an oncology standpoint, patient can have the Cottrell catheter placed  WBC/ANC needs to be monitored closely  If necessary, patient can be given daily G-CSF  Patient obviously needs to be monitored closely for any signs of infection  Will follow      __________________________________________________________________________________________    Chief complaint:  Primary 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History of present illness:  30-year-old female recently diagnosed with primary 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treated with high-dose methotrexate and Rituxan (rituximab)  Patient received her 2nd cycle yesterday  Patient was found in bed in no apparent distress  Verbal communication was nil  I spoke to nursing staff, no evidence of pain  No respiratory issues  Patient is scheduled for a swallowing study      Hospital medications list:  Current Facility-Administered Medications   Medication Dose Route Frequency Provider Last Rate    acetaminophen  650 mg Oral Q6H PRN Gabrielle Farrar MD      albuterol  2 puff Inhalation Q6H PRN Laci Cedeno MD      amLODIPine  5 mg Oral Daily Harlen Czech, DO      bisacodyl  10 mg Rectal Daily PRN Almer Canard, DO      famotidine  20 mg Oral BID Harlen Czech, DO      heparin (porcine)  5,000 Units Subcutaneous [facility] Jarred Katz, DO      insulin glargine  10 Units Subcutaneous HS Harlen Czech, DO      insulin lispro  1-5 Units Subcutaneous 4x Daily (AC & HS) Harartur Barahona, DO      lactulose  10 g Oral BID PRN Almer Canard, DO      leucovorin calcium IVPB  25 mg Intravenous Q6H [address] Devere Kendall, DO      lidocaine  1 patch Topical Daily Laci Cedeno MD      ondansetron  4 mg Intravenous Q6H PRN Laci Cedeno MD      polyethylene glycol  17 g Oral Daily Abhinav Emerson, DO      senna-docusate sodium  1 tablet Oral BID Laci Cedeno MD      sodium bicarbonate infusion  150 mL/hr Intravenous Continuous Devere Kendall,  mL/hr (05/14/21 0531)    sodium chloride  20 mL/hr Intravenous Once PRN Devere Kendall, DO      sodium chloride  20 mL/hr Intravenous Once PRN Devere Kendall, DO      sodium chloride  20 mL/hr Intravenous Once PRN Devere Kendall, DO Stopped (05/13/21 1354)    tamsulosin  0 4 mg Oral Daily With Dinner Brigette Cummins MD       Physical exam  /75   Pulse 79   Temp 98 4 °F (36 9 °C)   Resp 17   Ht 5' (1 524 m)   Wt 47 9 kg (105 lb 9 6 oz)   SpO2 100%   BMI 20 62 kg/m²   Constitutional:  Relatively well-nourished female, no respiratory distress  Eyes: PERRL, conjunctiva pale, anicteric    HENT:  Right forhead well-healed suture line  Neck: Good range of motion, no adenopathy  Respiratory:  Fair entry bilaterally, scattered rhonchi  Cardiovascular: Normal rate, normal rhythm, no murmurs, no gallops, no rubs    GI: Soft, nondistended, normal bowel sounds, nontender, no organomegaly, no mass, no rebound, no guarding    : No costovertebral angle tenderness, normal reproductive organs, no discharge  Musculoskeletal: No pain or tenderness with palpation of joints, muscles or bones  Integument:  Pale, warm, moist, scattered purpura  Lymphatic: No adenopathy in the neck, supra-clavicular region, axilla and groin bilaterally  Extremities:  No lower extremity edema, no cords, pulses 1+    Laboratory test results    Results for Garrett Kirk (MRN [id number]) as of 5/14/2021 12:02   Ref  Range 5/14/2021 05:42   WBC Latest Ref Range: 4 31 - 10 16 Thousand/uL 2 75 (L)   Red Blood Cell Count Latest Ref Range: 3 81 - 5 12 Million/uL 2 16 (L)   Hemoglobin Latest Ref Range: 11 5 - 15 4 g/dL 7 1 (L)   HCT Latest Ref Range: 34 8 - 46 1 % 21 2 (L)   MCV Latest Ref Range: 82 - 98 fL 98   MCH Latest Ref Range: 26 8 - 34 3 pg 32 9   MCHC Latest Ref Range: 31 4 - 37 4 g/dL 33 5   RDW Latest Ref Range: 11 6 - 15 1 % 16 5 (H)   Platelet Count Latest Ref Range: 149 - 390 Thousands/uL 142 (L)     Results for Garrett Kirk (MRN [id number]) as of 5/14/2021 12:02   Ref   Range 5/14/2021 05:42   Potassium Latest Ref Range: 3 5 - 5 3 mmol/L 3 4 (L)   Chloride Latest Ref Range: 100 - 108 mmol/L 101   CO2 Latest Ref Range: 21 - 32 mmol/L 29   Anion Gap Latest Ref Range: 4 - 13 mmol/L 5   BUN Latest Ref Range: 5 - 25 mg/dL 13   Creatinine Latest Ref Range: 0 60 - 1 30 mg/dL 0 30 (L)   Glucose, Random Latest Ref Range: 65 - 140 mg/dL 248 (H)   Calcium Latest Ref Range: 8 3 - 10 1 mg/dL 7 8 (L)   CORRECTED CALCIUM Latest Ref Range: 8 3 - 10 1 mg/dL 9  2   AST Latest Ref Range: 5 - 45 U/L 21   ALT Latest Ref Range: 12 - 78 U/L 66   Alkaline Phosphatase Latest Ref Range: 46 - 116 U/L 66   Total Protein Latest Ref Range: 6 4 - 8 2 g/dL 4 9 (L)   Albumin Latest Ref Range: 3 5 - 5 0 g/dL 2 3 (L)   TOTAL BILIRUBIN Latest Ref Range: 0 20 - 1 00 mg/dL 0 53   eGFR Latest Units: ml/min/1 73sq m 130

## 2021-05-14 NOTE — PHYSICAL THERAPY NOTE
PHYSICAL THERAPY NOTE          Patient Name: Toro Barajas  [id number] Date: 5/14/2021 05/14/21 1454   PT Last Visit   PT Visit Date 05/14/21   Note Type   Note Type Treatment   Pain Assessment   Pain Assessment Tool FLACC   Pain Rating: FLACC (Rest) - Face 0   Pain Rating: FLACC (Rest) - Legs 0   Pain Rating: FLACC (Rest) - Activity 0   Pain Rating: FLACC (Rest) - Cry 0   Pain Rating: FLACC (Rest) - Consolability 0   Score: FLACC (Rest) 0   Pain Rating: FLACC (Activity) - Face 1   Pain Rating: FLACC (Activity) - Legs 1   Pain Rating: FLACC (Activity) - Activity 1   Pain Rating: FLACC (Activity) - Cry 1   Pain Rating: FLACC (Activity) - Consolability 1   Score: FLACC (Activity) 5   Restrictions/Precautions   Weight Bearing Precautions Per Order No   Other Precautions Cognitive; Chair Alarm; Bed Alarm;Multiple lines; Fall Risk;Pain;Visual impairment  (aphasia, limited english- primarily speaks [place] )   General   Chart Reviewed Yes   Response to Previous Treatment Patient unable to report, no changes reported from family or staff   Family/Caregiver Present No   Cognition   Overall Cognitive Status Impaired   Arousal/Participation Alert; Cooperative   Attention Attends with cues to redirect   Orientation Level Oriented X4   Memory Unable to assess   Following Commands Follows one step commands with increased time or repetition   Comments Pt alert and cooperative to participate in therapy session  Google translate used throughout session    Bed Mobility   Rolling R 3  Moderate assistance   Additional items Assist x 1;Bedrails   Rolling L 3  Moderate assistance   Additional items Assist x 1;Bedrails   Supine to Sit 3  Moderate assistance   Additional items Assist x 2; Increased time required;LE management   Sit to Supine 3  Moderate assistance   Additional items Assist x 2; Increased time required;LE management   Additional Comments Pt supine in bed upon arrival  Pt sat EOB ~12 min  Pt returned to supine in bed with all needs within reach at the end of therapy session    Transfers   Sit to Stand Unable to assess   Ambulation/Elevation   Gait pattern Not appropriate   Balance   Static Sitting Fair   Dynamic Sitting Fair -   Endurance Deficit   Endurance Deficit Yes   Endurance Deficit Description fatigue, weakness    Activity Tolerance   Activity Tolerance Patient limited by fatigue   Medical Staff Made Aware Rehab aide Becka    Nurse Made Aware RN cleared pt to participate in therapy session    Exercises   Knee AROM Long Arc Quad Sitting;Bilateral;10 reps;AROM;AAROM  (AAROM LLE, AROM RLE )   Ankle Pumps Sitting;Bilateral;15 reps;AROM   Marching Sitting;Bilateral;10 reps;AAROM;AROM  (AAROM LLE, AROM RLE )   Neuro re-ed Dynamic reaching sitting EOB with 1 UE support  Pt able to reach in all planes, including cross body, with RUE  Pt required min A to use LUE, limited motion noted  Pt performed reaching activities with close S when using RUE and CGA/min A required when using LUE to maintain upright posture  Assessment   Prognosis Fair   Problem List Decreased strength;Decreased range of motion;Decreased endurance; Impaired balance;Decreased mobility; Decreased cognition; Impaired vision   Assessment Pt seen for PT treatment session this date  Therapy session focused on bed mobility, sitting static/dynamic balance and endurance training in order to improve overall mobility and independence  Pt requires assist of 1-2 for all mobility performed this date  Pt performed supine to sit/ sit to supine bed mobility tasks with mod Ax2  Pt sat EOB ~12 min fluctuating between close S and min Ax1 to maintain upright posture  Dynamic reaching sitting EOB with 1 UE support  Pt able to reach in all planes, including cross body, with RUE  Pt required min A to use LUE, limited motion noted   Pt performed reaching activities with close S when using RUE and CGA/min A required when using LUE to maintain upright posture  Pt able to performed BLE therex program to hips, knees, and ankles  Pt incontinent of bowel this date  Rolling to R/ L with mod Ax1 utilized to assist in hygiene tasks  Pt making progress toward goals  Pt was left supine in bed at the end of PT session with all needs in reach  Pt would benefit from continued PT services while in hospital to address remaining limitations  PT to continue to follow pt and recommends post-acute rehab services after d/c  The patient's AM-PAC Basic Mobility Inpatient Short Form Low Function Raw Score 14 , Standardized Score is 22 01  A standardized score less 42 9 suggests the patient may benefit from discharge to post-acute rehab services  Please also refer to the recommendation of the Physical Therapist for safe discharge planning  Barriers to Discharge Inaccessible home environment;Decreased caregiver support   Goals   Patient Goals to rest    STG Expiration Date 05/24/21   Plan   Treatment/Interventions LE strengthening/ROM; Endurance training;Patient/family training;Bed mobility;Spoke to nursing   Progress Progressing toward goals   PT Frequency Other (Comment)  (3-5x/wk )   Recommendation   PT Discharge Recommendation Post acute rehabilitation services   Equipment Recommended   (tbd )   PT - OK to Discharge Yes  (to rehab once medically cleared )   AM-PAC Basic Mobility Inpatient   Turning in Bed Without Bedrails 2   Lying on Back to Sitting on Edge of Flat Bed 1   Moving Bed to Chair 1   Standing Up From Chair 1   Walk in Room 1   Climb 3-5 Stairs 1   Basic Mobility Inpatient Raw Score 7   Turning Head Towards Sound 4   Follow Simple Instructions 3   Low Function Basic Mobility Raw Score 14   Low Function Basic Mobility Standardized Score 22 01     Mary Ortiz, PT, DPT

## 2021-05-14 NOTE — ASSESSMENT & PLAN NOTE
· Likely in setting of chemotherapy  · Will continue to monitor for any signs of infection  · Will transfuse with packed RBC for hemoglobin less than 7    · Drop in Hgb from 9 to 7 on 5/14, other lines relatively stable  VSS and soft brown BMs  Will discuss with oncology if this is expected based on chemo or if there should be further workup for bleeding, though hemodynamics currently do not suggest bleed    · S/p 1 pack RBC on 05/15, hemoglobin continues to improve

## 2021-05-14 NOTE — UTILIZATION REVIEW
Continued Stay Review    Date: 5/14/21                        Current Patient Class: IP Current Level of Care: MS    HPI:56 y o  female initially admitted on 4/23/21 for initiation of chemotherapy for 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5/10 PEG tube placement  Assessment/Plan:  Received her 2nd cycle of methotrexate and Rituxan (rituximab) yesterday  Because of dysphagia, patient is being evaluated; swallowing study scheduled for today  On Leucovorin rescue q 6 hr  Still with nause and used IV Zofran x 1  Patient has suffered a number of complications including sepsis  The white count is decreased and as above, patient recently received chemotherapy  Unfortunately patient is incontinent, nursing staff is very concerned about Mrs Barahona's incontinence and urine spill possibly containing methotrexate/metabolites  This becomes complicated and both options (putting in the Cottrell catheter and increasing risk for infection verses urine spill) are not optimal   From an oncology standpoint, patient can have the Cottrell catheter placed  WBC/ANC needs to be monitored closely  If necessary, patient can be given daily G-CSF  Patient obviously needs to be monitored closely for any signs of infection  Hgb is 7 1 and orders are for transfusion for < 7 0  Pt with pancytopenia post chemo  She continues on bicard drip today  Cottrell reinserted d/t ongoing incontinence       Vital Signs:   05/14/21 11:11:20  98 4 °F (36 9 °C)  79  17  117/75  89  100 %  --   05/14/21 07:24:19  98 7 °F (37 1 °C)  87  --  120/75  90  100 %  --   05/14/21 0230  --  --  --  --  --  --  None (Room air)   05/14/21 02:00:57  98 2 °F (36 8 °C)  79  20  125/75  92  100 %  --   05/13/21 22:00:23  98 5 °F (36 9 °C)  90  18  119/90  100  100 %  --   05/13/21 2014  --  --  --  --  --  --  None (Room air)   05/13/21 18:50:58  96 3 °F (35 7 °C)Abnormal   100  17  114/84  94  100 %  --   05/13/21 15:18:18  99 2 °F (37 3 °C)  100  18  102/68  79  100 %  --   05/13/21 1357  -- 111Abnormal   --  115/72  86  99 %  --   05/13/21 1342  --  --  --  116/72  87  --  --   05/13/21 1327  --  110Abnormal   --  118/73  88  99 %  --   05/13/21 1312  --  113Abnormal   --  121/73  89  99 %  --   05/13/21 1257  --  115Abnormal   --  123/74  90  --  --   05/13/21 1242  --  118Abnormal   --  129/74  92  98 %  --   05/13/21 1227  --  129Abnormal   --  130/73  92  99 %  --   05/13/21 11:48:16  98 4 °F (36 9 °C)  114Abnormal   17  112/78  89  100 %  --   05/13/21 10:01:22  --  107Abnormal   --  108/78  88  99 %  --   05/13/21 08:06:48  97 8 °F (36 6 °C)  91  18  110/75  87  100 %  --   05/13/21 0730  --  --  --  --  --  --  None (Room air)   05/12/21 22:15:07  99 5 °F (37 5 °C)  94  18  112/75  87  100 %  --   05/12/21 15:48:03  99 1 °F (37 3 °C)  90  17  102/67  79  98 %  --   05/12/21 07:42:14  98 3 °F (36 8 °C)  90  18  103/67  79  98 %  --     Pertinent Labs/Diagnostic Results:     5/14 Video Barium Swallow Eval - Pt presents w/ severe/profound oral dysphagia, at least mod pharyngeal dysphagia w/ poor retrieval & min to no oral seal & poor containment  Poor bolus manipulation & transfers w/ labored transfers & high risk for spill w/ all material kenji thinner liquids other than ht  Reduced anterior hyolaryngeal movement w/ reduced constriction  + silent aspiration of thin prior to the swallow, pt cued to cough but cough is weak  The pt would be more appropriate for pleasure feeds at this time given her medical status & severe oral dysphagia  She is high risk for ongoing poor intake by mouth but also risky for aspiration  Would focus on treating the primary medical issue & offer only pleasure feeds for comfort  Please rely on the PEG as the main nutrition source with jevity    Recommendations:  Pleasure feeds of puree/ht by tsp as able with the PEG for 1* nutrition           Results from last 7 days   Lab Units 05/14/21  0542 05/13/21  0555 05/12/21  1344 05/12/21  0613 05/10/21  0651 05/09/21  0500 WBC Thousand/uL 2 75* 3 02* 2 97* 2 94* 6 56 9 20   HEMOGLOBIN g/dL 7 1* 9 2* 8 7* 8 1* 8 7* 8 7*   HEMATOCRIT % 21 2* 27 6* 26 4* 24 4* 25 9* 26 8*   PLATELETS Thousands/uL 142* 164 131* 127* 141* 157   NEUTROS ABS Thousands/µL  --   --   --   --  5 04  --    BANDS PCT %  --   --   --  9*  --  2         Results from last 7 days   Lab Units 05/14/21  0542 05/13/21  0555 05/12/21  1344 05/12/21  0613 05/10/21  0651   SODIUM mmol/L 135* 131* 132* 130* 134*   POTASSIUM mmol/L 3 4* 4 0 4 3 4 0 4 0   CHLORIDE mmol/L 101 99* 101 99* 103   CO2 mmol/L 29 25 24 25 23   ANION GAP mmol/L 5 7 7 6 8   BUN mg/dL 13 20 17 18 20   CREATININE mg/dL 0 30* 0 35* 0 34* 0 25* 0 27*   EGFR ml/min/1 73sq m 130 124 125 138 135   CALCIUM mg/dL 7 8* 9 0 8 8 8 5 8 8   PHOSPHORUS mg/dL  --   --   --   --  4 4     Results from last 7 days   Lab Units 05/14/21  0542 05/12/21  1344 05/12/21  0613   AST U/L 21 15 16   ALT U/L 66 64 59   ALK PHOS U/L 66 49 52   TOTAL PROTEIN g/dL 4 9* 5 7* 5 2*   ALBUMIN g/dL 2 3* 2 8* 2 7*   TOTAL BILIRUBIN mg/dL 0 53 0 52 0 50   BILIRUBIN DIRECT mg/dL  --   --  0 14     Results from last 7 days   Lab Units 05/14/21  1117 05/14/21  0734 05/13/21  2039 05/13/21  1632 05/13/21  1144 05/13/21  0807 05/13/21  0007 05/12/21  2235 05/12/21  2108 05/12/21  1655 05/12/21  1156 05/12/21  0753   POC GLUCOSE mg/dl 163* 274* 278* 387* 258* 141* 155* 198* 228* 212* 170* 188*     Results from last 7 days   Lab Units 05/14/21  0542 05/13/21  0555 05/12/21  1344 05/12/21  0613 05/10/21  0651   GLUCOSE RANDOM mg/dL 248* 163* 156* 130 82     Medications:   Scheduled Medications:  acyclovir, 400 mg, Oral, Q12H MEÑO  amLODIPine, 5 mg, Oral, Daily  famotidine, 20 mg, Oral, BID  fluconazole, 400 mg, Oral, Daily  heparin (porcine), 5,000 Units, Subcutaneous, Q8H MEÑO  insulin glargine, 10 Units, Subcutaneous, HS  insulin lispro, 1-5 Units, Subcutaneous, 4x Daily (AC & HS)  leucovorin calcium IVPB, 25 mg, Intravenous, Q6H [facility]  levofloxacin, 500 mg, Oral, Q24H  lidocaine, 1 patch, Topical, Daily  polyethylene glycol, 17 g, Oral, Daily  senna-docusate sodium, 1 tablet, Oral, BID  tamsulosin, 0 4 mg, Oral, Daily With Dinner      Continuous IV Infusions:  sodium bicarbonate infusion, 150 mL/hr, Intravenous, Continuous      PRN Meds:  acetaminophen, 650 mg, Oral, Q6H PRN  albuterol, 2 puff, Inhalation, Q6H PRN  bisacodyl, 10 mg, Rectal, Daily PRN  lactulose, 10 g, Oral, BID PRN  ondansetron, 4 mg, Intravenous, Q6H PRN - x 1 5/14  sodium chloride, 20 mL/hr, Intravenous, Once PRN  sodium chloride, 20 mL/hr, Intravenous, Once PRN  sodium chloride, 20 mL/hr, Intravenous, Once PRN    Discharge Plan: rehab    Network Utilization Review Department  ATTENTION: Please call with any questions or concerns to [phone] and carefully listen to the prompts so that you are directed to the right person  All voicemails are confidential   Arun Delude all requests for admission clinical reviews, approved or denied determinations and any other requests to dedicated fax number below belonging to the campus where the patient is receiving treatment   List of dedicated fax numbers for the Facilities:  [address] DENIALS (Administrative/Medical Necessity) [phone]   [address] (Maternity/NICU/Pediatrics) [phone] [address] Aleisha Staffordel Al[place] [address]19 [id number] [place] [address] [address] [facility] [phone]

## 2021-05-14 NOTE — PROGRESS NOTES
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[address] )  Active Problems:    Dysphagia    Pancytopenia ([address] )    Altered mental status    Urinary retention    Presence of permanent central venous catheter    Steroid-induced hyperglycemia    Left-sided weakness    Aphasia    Weakness    Fracture of ramus of left pubis (HCC)    Severe protein-calorie malnutrition (HCC)      Pancytopenia ([address] )  Assessment & Plan  Likely in setting of chemotherapy  Will continue to monitor for any signs of infection  Will transfuse with packed RBC for hemoglobin less than 7  Dysphagia  Assessment & Plan  Patient noted to have dysphagia and also decreased p o  Intake  As per [facility] progress note patient had a calorie count and recommended feeding tube placement  Speech evaluation - advance diet with dysphagia 2 and thin liquids; needs assistance with feeds  Calorie count  If the patient continued to have poor p o  Intake may need discussion with the family regarding alternate methods of nutrition  Patient's calorie intake is low  VBS- showed oropharyngeal dysphagia  s/p peg tube placement  * 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[facility])  Assessment & Plan  Patient was diagnosed with primary 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through biopsy in [facility] transferred over here for chemotherapy since the [facility] is out of network  Had a 2nd family meeting on 04/30 and family has decided to proceed treatment at this time  S/p PICC line for chemotherapy  S/p peg tube placement on 05/10  Currently on oral diet and Jevity 1 2  Plan:  Hematology-Oncology consulted  Appreciate recommendations     Chemotherapy treatment as per hematology oncology team     S/p 2nd cycle of chemotherapy on 05/13  Leucovin on 5/14  DVT prophylaxis:  On heparin  Case management-will start looking for places for rehab  S/p steroid taper for cerebral edema from 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Pancytopenia in setting of recent chemotherapy  Will continue to watch for any signs of infection  Sepsis (HCC)-resolved as of 5/13/2021  Assessment & Plan  Patient had hypothermia, elevated WBC count and tachycardia  Concern for possible aspiration in setting on dysphagia versus UTI in setting of urinary catheter which was placed for retention  Plan:  S/p IV antibiotics last day on 05/05  Will continue to monitor off antibiotics  Left-sided weakness  Assessment & Plan  Patient has left-sided weakness at baseline from her 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Left upper extremity 3-4/5 and left lower extremity 0-1/5 muscle strength at baseline  Steroid-induced hyperglycemia  Assessment & Plan  Mild hyperglycemia, glucoses in 200s, no history of DM  Occurring in the setting of Decadron with chemotherapy, as well as sodium bicarb in D5 drip  - D5 drip is finished  - will monitor sugars while on Jevity and now off D5  - continue q4 hour fingerstick glucose with correctional algorithm 1 and Lantus    Presence of permanent central venous catheter  Assessment & Plan  Noted presence of right IJ tunneled double-lumen HD catheter; upon chart review, this was likely inserted to be used for plasmapheresis which she has completed  Plan:  S/p removal by IR  Patient now has PICC line  Urinary retention  Assessment & Plan  Patient developed urinary retention during previous hospitalization  Plan was voiding trial as an outpatient as she was scheduled for discharge from that facility  Machado catheter was initially removed although patient required re-placement of machado on 05/14 in setting of incontinence and getting chemotherapy    Will evaluate daily for Machado catheter removal       Altered mental status  Assessment & Plan  Patient initially presented to [facility] his [place] with stroke-like symptoms for 2 days  CT scan of the head was concerning for subacute CVA and was transferred to [facility]   MRI of the brain was more consistent with demyelinating disease patient was started on IV steroids and 1000 mg daily for 5 days  Patient did not have any significant improvement at that time patient had plasmapheresis  And also had  lumbar puncture-negative for infection or malignancy  Patient noted to have persistent encephalopathy so a repeat MRI was obtained which showed worsening of the abnormality seen on the previous MRI and 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Likely secondary to 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Delirium precautions   Currently alert and oriented times 2-3   follow commands  Moves all extremities spontaneously except left upper and lower extrimities  Patient's  (does not speak [place]) makes all medical decision for her  Severe protein-calorie malnutrition ([facility] [address] )  Assessment & Plan  Malnutrition Findings:   Adult Malnutrition type: Acute illness(due to medical condition resulting in dysphagia, decreased appetite and intake, weight loss)  Adult Degree of Malnutrition: Other severe protein calorie malnutrition    BMI Findings: Body mass index is 20 49 kg/m²  Plan:  Nutrition consult  Tube feeds initiated with Jevity 1 2, advancing to goal      Fracture of ramus of left pubis [facility])  Assessment & Plan  Aubrey Ernst prior to presentation to [facility]  Managed nonoperatively at [facility]    Weakness  Assessment & Plan  Left greater than right upper and lower extremity weakness with ongoing left lower extremity contracture  Secondary to underlying CNS malignancy  *Decubitus ulcer precautions such as frequent repositioning       Aphasia  Assessment & Plan  Definite evidence of expressive aphasia, possible component of receptive aphasia as well  Speech therapy on board  Disposition:  Continue inpatient care     SUBJECTIVE     Patient seen and examined  No acute events overnight  Alert and orient x2 3  Left-sided weakness at baseline  OBJECTIVE     Vitals:    21 0200 21 0600 21 0724 21 1111   BP: 125/75  120/75 117/75   Pulse: 79  87 79   Resp: 20   17   Temp: 98 2 °F (36 8 °C)  98 7 °F (37 1 °C) 98 4 °F (36 9 °C)   TempSrc: Oral      SpO2: 100%  100% 100%   Weight:  47 9 kg (105 lb 9 6 oz)     Height:          Temperature:   Temp (24hrs), Av 2 °F (36 8 °C), Min:96 3 °F (35 7 °C), Max:99 2 °F (37 3 °C)    Temperature: 98 4 °F (36 9 °C)  Intake & Output:  I/O       701 -  07 -  07 - 05/15 0700    P  O  0 340 80    I V  (mL/kg)  728 8 (15 2)     NG/ 150     IV Piggyback  300     Feedings 1513 485     Total Intake(mL/kg) 2413 (50 4) 2003 8 (41 8) 80 (1 7)    Urine (mL/kg/hr) 1450 (1 3) 1100 (1) 975 (2 7)    Stool 0 0     Total Output 1450 1100 975    Net +963 +903 8 -895           Unmeasured Urine Occurrence 1 x 1 x     Unmeasured Stool Occurrence 1 x 2 x         Weights:   IBW (Ideal Body Weight): 45 5 kg    Body mass index is 20 62 kg/m²  Weight (last 2 days)     Date/Time   Weight    21 0600   47 9 (105 6)    21 1300   47 9 (105 6)    21 0731   47 9 (105 6)    21 0535   47 9 (105 6)            Physical Exam  Vitals signs reviewed  Constitutional:       General: She is not in acute distress  Appearance: She is well-developed  She is not diaphoretic  HENT:      Head: Normocephalic and atraumatic  Nose: Nose normal       Mouth/Throat:      Pharynx: No oropharyngeal exudate  Eyes:      General: No scleral icterus  Conjunctiva/sclera: Conjunctivae normal    Neck:      Musculoskeletal: Neck supple  Thyroid: No thyromegaly  Vascular: No JVD  Trachea: No tracheal deviation     Cardiovascular: Rate and Rhythm: Normal rate and regular rhythm  Heart sounds: Normal heart sounds  No murmur  No friction rub  No gallop  Pulmonary:      Effort: Pulmonary effort is normal  No respiratory distress  Breath sounds: Normal breath sounds  No stridor  No wheezing or rales  Chest:      Chest wall: No tenderness  Abdominal:      General: Bowel sounds are normal  There is no distension  Palpations: Abdomen is soft  There is no mass  Tenderness: There is no abdominal tenderness  There is no guarding or rebound  Musculoskeletal: Normal range of motion  General: No tenderness  Skin:     General: Skin is warm  Findings: No erythema or rash  Neurological:      Mental Status: She is oriented to person, place, and time  Sensory: No sensory deficit  Comments: Left lower leg 0/5 and left upper arm 3/5 strength which is at baseline  LABORATORY DATA     Labs: I have personally reviewed pertinent reports  Results from last 7 days   Lab Units 05/14/21  0542 05/13/21  0555 05/12/21  1344 05/12/21  0613 05/10/21  0651 05/09/21  0500   WBC Thousand/uL 2 75* 3 02* 2 97* 2 94* 6 56 9 20   HEMOGLOBIN g/dL 7 1* 9 2* 8 7* 8 1* 8 7* 8 7*   HEMATOCRIT % 21 2* 27 6* 26 4* 24 4* 25 9* 26 8*   PLATELETS Thousands/uL 142* 164 131* 127* 141* 157   NEUTROS PCT %  --   --   --   --  76*  --    MONOS PCT %  --   --   --   --  5  --    MONO PCT %  --   --   --  4  --  2*      Results from last 7 days   Lab Units 05/14/21  0542 05/13/21  0555 05/12/21  1344 05/12/21  0613   POTASSIUM mmol/L 3 4* 4 0 4 3 4 0   CHLORIDE mmol/L 101 99* 101 99*   CO2 mmol/L 29 25 24 25   BUN mg/dL 13 20 17 18   CREATININE mg/dL 0 30* 0 35* 0 34* 0 25*   CALCIUM mg/dL 7 8* 9 0 8 8 8 5   ALK PHOS U/L 66  --  49 52   ALT U/L 66  --  64 59   AST U/L 21  --  15 16         Results from last 7 days   Lab Units 05/10/21  0651   PHOSPHORUS mg/dL 4 4                    IMAGING & DIAGNOSTIC TESTING     Radiology Results:  I have personally reviewed pertinent reports  Xr Chest Portable    Result Date: 4/24/2021  Impression: Dialysis catheter tip within the superior cavoatrial junction  No pneumothorax  Workstation performed: [id number]     Xr Chest Picc Line Portable    Result Date: 4/27/2021  Impression: PICC line tip in the superior vena cava, approximately 3 5 to 4 cm above the cavoatrial junction  The examination demonstrates a significant  finding and was documented as such in [facility] for liaison and referring practitioner notification  Workstation performed: [id number]     Ir Picc Reposition    Result Date: 4/27/2021  Impression: Status-post repositioning of a 5 [place] central venous catheter under fluoroscopic guidance with its tip at the cavoatrial junction  Workstation performed: [id number]     Ir Tunneled Central Line Removal    Result Date: 4/27/2021  Impression: Impression: Successful tunneled central line removal as described  Signed, performed and dictated by Bruce Love PA-C under the supervision of Dr Jayedn Jerez  Workstation performed: [id number]     Other Diagnostic Testing: I have personally reviewed pertinent reports      ACTIVE MEDICATIONS     Current Facility-Administered Medications   Medication Dose Route Frequency    acetaminophen (TYLENOL) tablet 650 mg  650 mg Oral Q6H PRN    acyclovir (ZOVIRAX) capsule 400 mg  400 mg Oral Q12H [facility]    albuterol (PROVENTIL HFA,VENTOLIN HFA) inhaler 2 puff  2 puff Inhalation Q6H PRN    amLODIPine (NORVASC) tablet 5 mg  5 mg Oral Daily    bisacodyl (DULCOLAX) rectal suppository 10 mg  10 mg Rectal Daily PRN    famotidine (PEPCID) tablet 20 mg  20 mg Oral BID    fluconazole (DIFLUCAN) tablet 400 mg  400 mg Oral Daily    heparin (porcine) subcutaneous injection 5,000 Units  5,000 Units Subcutaneous Q8H [facility]    insulin glargine (LANTUS) subcutaneous injection 10 Units 0 1 mL  10 Units Subcutaneous HS    insulin lispro (HumaLOG) 100 units/mL subcutaneous injection 1-5 Units  1-5 Units Subcutaneous 4x Daily (AC & HS)    lactulose oral solution 10 g  10 g Oral BID PRN    leucovorin 25 mg in sodium chloride 0 9 % 50 mL IVPB  25 mg Intravenous Q6H [facility]    levofloxacin (LEVAQUIN) tablet 500 mg  500 mg Oral Q24H    lidocaine (LIDODERM) 5 % patch 1 patch  1 patch Topical Daily    ondansetron (ZOFRAN) injection 4 mg  4 mg Intravenous Q6H PRN    polyethylene glycol (MIRALAX) packet 17 g  17 g Oral Daily    senna-docusate sodium (SENOKOT S) 8 6-50 mg per tablet 1 tablet  1 tablet Oral BID    sodium bicarbonate 100 mEq in dextrose 5 % 1,000 mL infusion  150 mL/hr Intravenous Continuous    sodium chloride 0 9 % infusion  20 mL/hr Intravenous Once PRN    sodium chloride 0 9 % infusion  20 mL/hr Intravenous Once PRN    sodium chloride 0 9 % infusion  20 mL/hr Intravenous Once PRN    tamsulosin (FLOMAX) capsule 0 4 mg  0 4 mg Oral Daily With Dinner       VTE Pharmacologic Prophylaxis: Heparin  VTE Mechanical Prophylaxis: sequential compression device    Portions of the record may have been created with voice recognition software  Occasional wrong word or "sound a like" substitutions may have occurred due to the inherent limitations of voice recognition software    Read the chart carefully and recognize, using context, where substitutions have occurred   ==  Jerri Chang, [facility]  Internal Medicine Residency PGY-2

## 2021-05-14 NOTE — PLAN OF CARE
Problem: PHYSICAL THERAPY ADULT  Goal: Performs mobility at highest level of function for planned discharge setting  See evaluation for individualized goals  Description: Treatment/Interventions: ADL retraining, Functional transfer training, LE strengthening/ROM, Endurance training, Patient/family training, Bed mobility, Spoke to nursing, OT  Equipment Recommended: (TBD)       See flowsheet documentation for full assessment, interventions and recommendations  Outcome: Progressing  Note: Prognosis: Fair  Problem List: Decreased strength, Decreased range of motion, Decreased endurance, Impaired balance, Decreased mobility, Decreased cognition, Impaired vision  Assessment: Pt seen for PT treatment session this date  Therapy session focused on bed mobility, sitting static/dynamic balance and endurance training in order to improve overall mobility and independence  Pt requires assist of 1-2 for all mobility performed this date  Pt performed supine to sit/ sit to supine bed mobility tasks with mod Ax2  Pt sat EOB ~12 min fluctuating between close S and min Ax1 to maintain upright posture  Dynamic reaching sitting EOB with 1 UE support  Pt able to reach in all planes, including cross body, with RUE  Pt required min A to use LUE, limited motion noted  Pt performed reaching activities with close S when using RUE and CGA/min A required when using LUE to maintain upright posture  Pt able to performed BLE therex program to hips, knees, and ankles  Pt incontinent of bowel this date  Rolling to R/ L with mod Ax1 utilized to assist in hygiene tasks  Pt making progress toward goals  Pt was left supine in bed at the end of PT session with all needs in reach  Pt would benefit from continued PT services while in hospital to address remaining limitations  PT to continue to follow pt and recommends post-acute rehab services after d/c   The patient's AM-PAC Basic Mobility Inpatient Short Form Low Function Raw Score 14 , Standardized Score is 22 01  A standardized score less 42 9 suggests the patient may benefit from discharge to post-acute rehab services  Please also refer to the recommendation of the Physical Therapist for safe discharge planning  Barriers to Discharge: Inaccessible home environment, Decreased caregiver support        PT Discharge Recommendation: Post acute rehabilitation services     PT - OK to Discharge: Yes(to rehab once medically cleared )    See flowsheet documentation for full assessment

## 2021-05-15 LAB
ABO GROUP BLD: NORMAL
ABO GROUP BLD: NORMAL
ALBUMIN SERPL BCP-MCNC: 2.4 G/DL (ref 3.5–5)
ALP SERPL-CCNC: 54 U/L (ref 46–116)
ALT SERPL W P-5'-P-CCNC: 61 U/L (ref 12–78)
ANION GAP SERPL CALCULATED.3IONS-SCNC: 6 MMOL/L (ref 4–13)
AST SERPL W P-5'-P-CCNC: 25 U/L (ref 5–45)
BASOPHILS # BLD MANUAL: 0 THOUSAND/UL (ref 0–0.1)
BASOPHILS NFR MAR MANUAL: 0 % (ref 0–1)
BILIRUB SERPL-MCNC: 0.48 MG/DL (ref 0.2–1)
BLD GP AB SCN SERPL QL: NEGATIVE
BUN SERPL-MCNC: 12 MG/DL (ref 5–25)
CALCIUM ALBUM COR SERPL-MCNC: 9.5 MG/DL (ref 8.3–10.1)
CALCIUM SERPL-MCNC: 8.2 MG/DL (ref 8.3–10.1)
CHLORIDE SERPL-SCNC: 95 MMOL/L (ref 100–108)
CO2 SERPL-SCNC: 30 MMOL/L (ref 21–32)
CREAT SERPL-MCNC: 0.36 MG/DL (ref 0.6–1.3)
EOSINOPHIL # BLD MANUAL: 0 THOUSAND/UL (ref 0–0.4)
EOSINOPHIL NFR BLD MANUAL: 0 % (ref 0–6)
ERYTHROCYTE [DISTWIDTH] IN BLOOD BY AUTOMATED COUNT: 16.9 % (ref 11.6–15.1)
GFR SERPL CREATININE-BSD FRML MDRD: 123 ML/MIN/1.73SQ M
GLUCOSE SERPL-MCNC: 130 MG/DL (ref 65–140)
GLUCOSE SERPL-MCNC: 131 MG/DL (ref 65–140)
GLUCOSE SERPL-MCNC: 136 MG/DL (ref 65–140)
GLUCOSE SERPL-MCNC: 143 MG/DL (ref 65–140)
GLUCOSE SERPL-MCNC: 166 MG/DL (ref 65–140)
HCT VFR BLD AUTO: 21.7 % (ref 34.8–46.1)
HGB BLD-MCNC: 7 G/DL (ref 11.5–15.4)
HYPERCHROMIA BLD QL SMEAR: PRESENT
LYMPHOCYTES # BLD AUTO: 0.73 THOUSAND/UL (ref 0.6–4.47)
LYMPHOCYTES # BLD AUTO: 27 % (ref 14–44)
MCH RBC QN AUTO: 32.1 PG (ref 26.8–34.3)
MCHC RBC AUTO-ENTMCNC: 32.3 G/DL (ref 31.4–37.4)
MCV RBC AUTO: 100 FL (ref 82–98)
MONOCYTES # BLD AUTO: 0.08 THOUSAND/UL (ref 0–1.22)
MONOCYTES NFR BLD: 3 % (ref 4–12)
MTX SERPL-SCNC: 2.3 UMOL/L
NEUTROPHILS # BLD MANUAL: 1.9 THOUSAND/UL (ref 1.85–7.62)
NEUTS BAND NFR BLD MANUAL: 8 % (ref 0–8)
NEUTS SEG NFR BLD AUTO: 62 % (ref 43–75)
NRBC BLD AUTO-RTO: 0 /100 WBCS
NRBC BLD AUTO-RTO: 1 /100 WBC (ref 0–2)
PLATELET # BLD AUTO: 142 THOUSANDS/UL (ref 149–390)
PLATELET BLD QL SMEAR: ABNORMAL
PMV BLD AUTO: 10.1 FL (ref 8.9–12.7)
POTASSIUM SERPL-SCNC: 4 MMOL/L (ref 3.5–5.3)
PROT SERPL-MCNC: 4.8 G/DL (ref 6.4–8.2)
RBC # BLD AUTO: 2.18 MILLION/UL (ref 3.81–5.12)
RBC MORPH BLD: PRESENT
RH BLD: POSITIVE
RH BLD: POSITIVE
SODIUM SERPL-SCNC: 131 MMOL/L (ref 136–145)
SPECIMEN EXPIRATION DATE: NORMAL
TOTAL CELLS COUNTED SPEC: 100
WBC # BLD AUTO: 2.72 THOUSAND/UL (ref 4.31–10.16)

## 2021-05-15 PROCEDURE — 86920 COMPATIBILITY TEST SPIN: CPT

## 2021-05-15 PROCEDURE — P9040 RBC LEUKOREDUCED IRRADIATED: HCPCS

## 2021-05-15 PROCEDURE — 82948 REAGENT STRIP/BLOOD GLUCOSE: CPT

## 2021-05-15 PROCEDURE — 85027 COMPLETE CBC AUTOMATED: CPT | Performed by: STUDENT IN AN ORGANIZED HEALTH CARE EDUCATION/TRAINING PROGRAM

## 2021-05-15 PROCEDURE — 99231 SBSQ HOSP IP/OBS SF/LOW 25: CPT | Performed by: INTERNAL MEDICINE

## 2021-05-15 PROCEDURE — 86901 BLOOD TYPING SEROLOGIC RH(D): CPT | Performed by: INTERNAL MEDICINE

## 2021-05-15 PROCEDURE — 86900 BLOOD TYPING SEROLOGIC ABO: CPT | Performed by: INTERNAL MEDICINE

## 2021-05-15 PROCEDURE — 99232 SBSQ HOSP IP/OBS MODERATE 35: CPT | Performed by: INTERNAL MEDICINE

## 2021-05-15 PROCEDURE — 30233N1 TRANSFUSION OF NONAUTOLOGOUS RED BLOOD CELLS INTO PERIPHERAL VEIN, PERCUTANEOUS APPROACH: ICD-10-PCS | Performed by: INTERNAL MEDICINE

## 2021-05-15 PROCEDURE — 80204 DRUG ASSAY METHOTREXATE: CPT | Performed by: INTERNAL MEDICINE

## 2021-05-15 PROCEDURE — 80053 COMPREHEN METABOLIC PANEL: CPT | Performed by: STUDENT IN AN ORGANIZED HEALTH CARE EDUCATION/TRAINING PROGRAM

## 2021-05-15 PROCEDURE — 85007 BL SMEAR W/DIFF WBC COUNT: CPT | Performed by: STUDENT IN AN ORGANIZED HEALTH CARE EDUCATION/TRAINING PROGRAM

## 2021-05-15 PROCEDURE — 86850 RBC ANTIBODY SCREEN: CPT | Performed by: INTERNAL MEDICINE

## 2021-05-15 RX ORDER — MUSCLE RUB CREAM 100; 150 MG/G; MG/G
CREAM TOPICAL 2 TIMES DAILY
Status: DISCONTINUED | OUTPATIENT
Start: 2021-05-15 | End: 2021-06-14 | Stop reason: HOSPADM

## 2021-05-15 RX ADMIN — INSULIN LISPRO 1 UNITS: 100 INJECTION, SOLUTION INTRAVENOUS; SUBCUTANEOUS at 10:00

## 2021-05-15 RX ADMIN — FAMOTIDINE 20 MG: 20 TABLET ORAL at 09:59

## 2021-05-15 RX ADMIN — MENTHOL, UNSPECIFIED FORM AND METHYL SALICYLATE 1 APPLICATION: 10; 150 CREAM TOPICAL at 15:00

## 2021-05-15 RX ADMIN — FLUCONAZOLE 400 MG: 200 TABLET ORAL at 09:57

## 2021-05-15 RX ADMIN — ACYCLOVIR 400 MG: 200 CAPSULE ORAL at 09:57

## 2021-05-15 RX ADMIN — SODIUM BICARBONATE 150 ML/HR: 84 INJECTION, SOLUTION INTRAVENOUS at 12:11

## 2021-05-15 RX ADMIN — FAMOTIDINE 20 MG: 20 TABLET ORAL at 18:33

## 2021-05-15 RX ADMIN — ACYCLOVIR 400 MG: 200 CAPSULE ORAL at 21:52

## 2021-05-15 RX ADMIN — SENNOSIDES, DOCUSATE SODIUM 1 TABLET: 8.6; 5 TABLET ORAL at 09:57

## 2021-05-15 RX ADMIN — ACETAMINOPHEN 650 MG: 325 TABLET, FILM COATED ORAL at 12:47

## 2021-05-15 RX ADMIN — HEPARIN SODIUM 5000 UNITS: 5000 INJECTION INTRAVENOUS; SUBCUTANEOUS at 21:52

## 2021-05-15 RX ADMIN — SODIUM BICARBONATE 150 ML/HR: 84 INJECTION, SOLUTION INTRAVENOUS at 04:04

## 2021-05-15 RX ADMIN — MENTHOL, UNSPECIFIED FORM AND METHYL SALICYLATE 1 APPLICATION: 10; 150 CREAM TOPICAL at 17:48

## 2021-05-15 RX ADMIN — SODIUM CHLORIDE 25 MG: 9 INJECTION, SOLUTION INTRAVENOUS at 18:21

## 2021-05-15 RX ADMIN — HEPARIN SODIUM 5000 UNITS: 5000 INJECTION INTRAVENOUS; SUBCUTANEOUS at 05:39

## 2021-05-15 RX ADMIN — SODIUM CHLORIDE 25 MG: 9 INJECTION, SOLUTION INTRAVENOUS at 05:44

## 2021-05-15 RX ADMIN — SODIUM CHLORIDE 25 MG: 9 INJECTION, SOLUTION INTRAVENOUS at 12:02

## 2021-05-15 RX ADMIN — LEVOFLOXACIN 500 MG: 500 TABLET, FILM COATED ORAL at 18:33

## 2021-05-15 RX ADMIN — INSULIN GLARGINE 10 UNITS: 100 INJECTION, SOLUTION SUBCUTANEOUS at 21:52

## 2021-05-15 RX ADMIN — SODIUM BICARBONATE 150 ML/HR: 84 INJECTION, SOLUTION INTRAVENOUS at 21:57

## 2021-05-15 RX ADMIN — LIDOCAINE 5% 1 PATCH: 700 PATCH TOPICAL at 09:58

## 2021-05-15 RX ADMIN — SENNOSIDES, DOCUSATE SODIUM 1 TABLET: 8.6; 5 TABLET ORAL at 18:33

## 2021-05-15 RX ADMIN — HEPARIN SODIUM 5000 UNITS: 5000 INJECTION INTRAVENOUS; SUBCUTANEOUS at 13:03

## 2021-05-15 NOTE — PLAN OF CARE
Problem: Prexisting or High Potential for Compromised Skin Integrity  Goal: Skin integrity is maintained or improved  Description: INTERVENTIONS:  - Identify patients at risk for skin breakdown  - Assess and monitor skin integrity  - Assess and monitor nutrition and hydration status  - Monitor labs   - Assess for incontinence   - Turn and reposition patient  - Assist with mobility/ambulation  - Relieve pressure over bony prominences  - Avoid friction and shearing  - Provide appropriate hygiene as needed including keeping skin clean and dry  - Evaluate need for skin moisturizer/barrier cream  - Collaborate with interdisciplinary team   - Patient/family teaching  - Consider wound care consult   Outcome: Progressing     Problem: Potential for Falls  Goal: Patient will remain free of falls  Description: INTERVENTIONS:  - Assess patient frequently for physical needs  -  Identify cognitive and physical deficits and behaviors that affect risk of falls  -  [place] fall precautions as indicated by assessment   - Educate patient/family on patient safety including physical limitations  - Instruct patient to call for assistance with activity based on assessment  - Modify environment to reduce risk of injury  - Consider OT/PT consult to assist with strengthening/mobility  Outcome: Progressing     Problem: Nutrition/Hydration-ADULT  Goal: Nutrient/Hydration intake appropriate for improving, restoring or maintaining nutritional needs  Description: Monitor and assess patient's nutrition/hydration status for malnutrition  Collaborate with interdisciplinary team and initiate plan and interventions as ordered  Monitor patient's weight and dietary intake as ordered or per policy  Utilize nutrition screening tool and intervene as necessary  Determine patient's food preferences and provide high-protein, high-caloric foods as appropriate       INTERVENTIONS:  - Monitor oral intake, urinary output, labs, and treatment plans  - Assess nutrition and hydration status and recommend course of action  - Evaluate amount of meals eaten  - Assist patient with eating if necessary   - Allow adequate time for meals  - Recommend/ encourage appropriate diets, oral nutritional supplements, and vitamin/mineral supplements  - Order, calculate, and assess calorie counts as needed  - Recommend, monitor, and adjust tube feedings and TPN/PPN based on assessed needs  - Assess need for intravenous fluids  - Provide specific nutrition/hydration education as appropriate  - Include patient/family/caregiver in decisions related to nutrition  Outcome: Progressing     Problem: PAIN - ADULT  Goal: Verbalizes/displays adequate comfort level or baseline comfort level  Description: Interventions:  - Encourage patient to monitor pain and request assistance  - Assess pain using appropriate pain scale  - Administer analgesics based on type and severity of pain and evaluate response  - Implement non-pharmacological measures as appropriate and evaluate response  - Consider cultural and social influences on pain and pain management  - Notify physician/advanced practitioner if interventions unsuccessful or patient reports new pain  Outcome: Progressing     Problem: INFECTION - ADULT  Goal: Absence or prevention of progression during hospitalization  Description: INTERVENTIONS:  - Assess and monitor for signs and symptoms of infection  - Monitor lab/diagnostic results  - Monitor all insertion sites, i e  indwelling lines, tubes, and drains  - Monitor endotracheal if appropriate and nasal secretions for changes in amount and color  - [place] appropriate cooling/warming therapies per order  - Administer medications as ordered  - Instruct and encourage patient and family to use good hand hygiene technique  - Identify and instruct in appropriate isolation precautions for identified infection/condition  Outcome: Progressing     Problem: SAFETY ADULT  Goal: Patient will remain free of falls  Description: INTERVENTIONS:  - Assess patient frequently for physical needs  -  Identify cognitive and physical deficits and behaviors that affect risk of falls    -  [place] fall precautions as indicated by assessment   - Educate patient/family on patient safety including physical limitations  - Instruct patient to call for assistance with activity based on assessment  - Modify environment to reduce risk of injury  - Consider OT/PT consult to assist with strengthening/mobility  Outcome: Progressing  Goal: Maintain or return to baseline ADL function  Description: INTERVENTIONS:  -  Assess patient's ability to carry out ADLs; assess patient's baseline for ADL function and identify physical deficits which impact ability to perform ADLs (bathing, care of mouth/teeth, toileting, grooming, dressing, etc )  - Assess/evaluate cause of self-care deficits   - Assess range of motion  - Assess patient's mobility; develop plan if impaired  - Assess patient's need for assistive devices and provide as appropriate  - Encourage maximum independence but intervene and supervise when necessary  - Involve family in performance of ADLs  - Assess for home care needs following discharge   - Consider OT consult to assist with ADL evaluation and planning for discharge  - Provide patient education as appropriate  Outcome: Progressing  Goal: Maintain or return mobility status to optimal level  Description: INTERVENTIONS:  - Assess patient's baseline mobility status (ambulation, transfers, stairs, etc )    - Identify cognitive and physical deficits and behaviors that affect mobility  - Identify mobility aids required to assist with transfers and/or ambulation (gait belt, sit-to-stand, lift, walker, cane, etc )  - [place] fall precautions as indicated by assessment  - Record patient progress and toleration of activity level on Mobility SBAR; progress patient to next Phase/Stage  - Instruct patient to call for assistance with activity based on assessment  - Consider rehabilitation consult to assist with strengthening/weightbearing, etc   Outcome: Progressing     Problem: DISCHARGE PLANNING  Goal: Discharge to home or other facility with appropriate resources  Description: INTERVENTIONS:  - Identify barriers to discharge w/patient and caregiver  - Arrange for needed discharge resources and transportation as appropriate  - Identify discharge learning needs (meds, wound care, etc )  - Arrange for interpretive services to assist at discharge as needed  - Refer to Case Management Department for coordinating discharge planning if the patient needs post-hospital services based on physician/advanced practitioner order or complex needs related to functional status, cognitive ability, or social support system  Outcome: Progressing     Problem: Knowledge Deficit  Goal: Patient/family/caregiver demonstrates understanding of disease process, treatment plan, medications, and discharge instructions  Description: Complete learning assessment and assess knowledge base    Interventions:  - Provide teaching at level of understanding  - Provide teaching via preferred learning methods  Outcome: Progressing

## 2021-05-15 NOTE — PROGRESS NOTES
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Patient just received her 2nd cycle of methotrexate and Rituxan (rituximab); now on Leucovorin rescue  Methotrexate level is pending for later on today  Unfortunately the methotrexate levels are send outs and take time to process  Patient should continue to Leucovorin as written  Leucovorin is a form of folic acid - too much leucovorin is not a big issue but if the leucovorin is stopped before the methotrexate drops to an acceptable level, patient can have severe toxicities  Without having the specific values, it is better to error on the side of caution (and continue with the Leucovorin)  Hemoglobin is borderline, 7 0 g/dL  Patient is sick (obviously), has a +/- performance status and likely does not have a very good bone marrow reserve  I would give the patient a unit of packed red blood cells today  WBC is okay, about the same as yesterday  ANC is approximately 1 68 - okay/acceptable  As discussed yesterday, G-CSF is an option in the future  Platelet count is slightly decreased but okay/acceptable  Patient will continue with IV fluids, feedings via feeding tube  Pain seems to be controlled  Mental status seems to be about the same as before  As discussed yesterday, patient is incontinent  The concern was methotrexate spill  Patient has a Cottrell catheter in place  I discussed options with the oncology pharmacist yesterday  Patient is now on Diflucan, acyclovir and Levaquin  Mrs Amor Kwon needs to be monitored closely for any signs of infection including UTIs  WBC/ANC can be monitored; G-CSF is always an option  Will follow  __________________________________________________________________________________________    Chief complaint:  Primary CNS lymphoma      History of present illness: 24-year-old female recently diagnosed with primary CNS lymphoma status post her 2nd cycle of methotrexate and Rituxan (rituximab)  Patient was found in bed in no apparent distress  Very limited verbal communication, mental status did seem to be slightly better today as compared to yesterday  No evidence of pain  Nursing staff denies any active medical issues      Hospital medications list:  Current Facility-Administered Medications   Medication Dose Route Frequency Provider Last Rate    acetaminophen  650 mg Oral Q6H PRN Arleen Vela MD      acyclovir  400 mg Oral Q12H [facility] Shruthi José MD      albuterol  2 puff Inhalation Q6H PRN Angela Smith MD      amLODIPine  5 mg Oral Daily Maryetta Beat, DO      bisacodyl  10 mg Rectal Daily PRN [facility], DO      famotidine  20 mg Oral BID Maryetta Beat, DO      fluconazole  400 mg Oral Daily Shruthi José MD      heparin (porcine)  5,000 Units Subcutaneous [facility] Shayy Dennis, DO      insulin glargine  10 Units Subcutaneous HS Maryetta Beat, DO      insulin lispro  1-5 Units Subcutaneous 4x Daily (AC & HS) José Luis Barahona, DO      lactulose  10 g Oral BID PRN Cheli Hawk, DO      leucovorin calcium IVPB  25 mg Intravenous Q6H [facility] [place] Paxton, DO Stopped (05/15/21 1235)    levofloxacin  500 mg Oral Q24H Shruthi José MD      lidocaine  1 patch Topical Daily Angela Smith MD      menthol-methyl salicylate   Apply externally BID Shayy Dennis, DO      ondansetron  4 mg Intravenous Q6H PRN Angela Smith MD      polyethylene glycol  17 g Oral Daily Maryetta Beat, DO      senna-docusate sodium  1 tablet Oral BID Angela Smith MD      sodium bicarbonate infusion  150 mL/hr Intravenous Continuous Estiven Paxton,  mL/hr (05/15/21 1211)    sodium chloride  20 mL/hr Intravenous Once PRN Estiven Paxton, DO      sodium chloride  20 mL/hr Intravenous Once PRN Estiven Paxton, DO      sodium chloride  20 mL/hr Intravenous Once PRN [place] Paxton, DO Stopped (05/13/21 1354)    tamsulosin  0 4 mg Oral Daily With Dinner Carlton Carreno MD       Physical exam  /63   Pulse 95   Temp 99 °F (37 2 °C) (Axillary)   Resp 18   Ht 5' (1 524 m)   Wt 47 9 kg (105 lb 9 6 oz)   SpO2 100%   BMI 20 62 kg/m²    Constitutional:  Relatively well-nourished female, no respiratory distress  Eyes: PERRL, conjunctiva pale, anicteric    HENT:  Right forhead well-healed suture line  Neck: Good range of motion, no adenopathy  Respiratory:  Fair entry bilaterally, scattered rhonchi  Cardiovascular: Normal rate, normal rhythm, no murmurs, no gallops, no rubs    GI: Soft, nondistended, normal bowel sounds, nontender, no organomegaly, no mass, no rebound, no guarding, feeding tube in place   : No costovertebral angle tenderness, normal reproductive organs, no discharge  Musculoskeletal: No pain or tenderness with palpation of joints, muscles or bones  Integument:  Pale, warm, moist, scattered purpura  Lymphatic: No adenopathy in the neck, supra-clavicular region, axilla and groin bilaterally  Extremities:  No lower extremity edema, no cords, pulses 1+     Laboratory test results    Results for Alex Maxwell (MRN [id number]) as of 5/15/2021 13:51   Ref   Range 5/13/2021 05:55 5/14/2021 05:42 5/15/2021 05:39   WBC Latest Ref Range: 4 31 - 10 16 Thousand/uL 3 02 (L) 2 75 (L) 2 72 (L)   Red Blood Cell Count Latest Ref Range: 3 81 - 5 12 Million/uL 2 85 (L) 2 16 (L) 2 18 (L)   Hemoglobin Latest Ref Range: 11 5 - 15 4 g/dL 9 2 (L) 7 1 (L) 7 0 (L)   HCT Latest Ref Range: 34 8 - 46 1 % 27 6 (L) 21 2 (L) 21 7 (L)   MCV Latest Ref Range: 82 - 98 fL 97 98 100 (H)   MCH Latest Ref Range: 26 8 - 34 3 pg 32 3 32 9 32 1   MCHC Latest Ref Range: 31 4 - 37 4 g/dL 33 3 33 5 32 3   RDW Latest Ref Range: 11 6 - 15 1 % 16 8 (H) 16 5 (H) 16 9 (H)   Platelet Count Latest Ref Range: 149 - 390 Thousands/uL 164 142 (L) 142 (L)

## 2021-05-15 NOTE — PROGRESS NOTES
INTERNAL MEDICINE RESIDENCY PROGRESS NOTE     Name: Krysten Hood   Age & Sex: 49 y o  female   MRN: [id number]  Unit/Bed#: [facility] 920-01   Encounter: [id number]  Team: SOD Team B     PATIENT INFORMATION     Name: Krysten Hood   Age & Sex: 49 y o  female   MRN: [id number] Stay Days: 22    ASSESSMENT/PLAN     Principal Problem:    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[facility] [address] )  Active Problems:    Altered mental status    Dysphagia    Urinary retention    Aphasia    Weakness    Presence of permanent central venous catheter    Fracture of ramus of left pubis (HCC)    Severe protein-calorie malnutrition (HCC)    Steroid-induced hyperglycemia    Left-sided weakness    Pancytopenia (HCC)      Pancytopenia (HCC)  Assessment & Plan  Likely in setting of chemotherapy  Will continue to monitor for any signs of infection  Will transfuse with packed RBC for hemoglobin less than 7  Drop in Hgb from 9 to 7 on 5/14, other lines relatively stable  VSS and soft brown BMs  Will discuss with oncology if this is expected based on chemo or if there should be further workup for bleeding, though hemodynamics currently do not suggest bleed  Type and screen done    Left-sided weakness  Assessment & Plan  Patient has left-sided weakness at baseline from her CNS lymphoma  Left upper extremity 3-4/5 and left lower extremity 0-1/5 muscle strength at baseline  Seems to have pain in left hip and/or left knee, will trial Arthur rangel    Steroid-induced hyperglycemia  Assessment & Plan  Mild hyperglycemia, glucoses in 200s, no history of DM  Occurring in the setting of Decadron with chemotherapy, as well as sodium bicarb in D5 drip  - D5 drip is finished     - will monitor sugars while on Jevity and now off D5  - continue q4 hour fingerstick glucose with correctional algorithm 1 and Lantus    Severe protein-calorie malnutrition ([facility] )  Assessment & Plan  Malnutrition Findings:   Adult Malnutrition type: Acute illness(due to medical condition resulting in dysphagia, decreased appetite and intake, weight loss)  Adult Degree of Malnutrition: Other severe protein calorie malnutrition    BMI Findings: Body mass index is 20 49 kg/m²  Plan:  Nutrition consult  Tube feeds initiated with Jevity 1 2, advancing to goal      Fracture of ramus of left pubis [facility])  Assessment & Plan  Alena Aponte prior to presentation to [facility]  Managed nonoperatively at [facility]    Presence of permanent central venous catheter  Assessment & Plan  Noted presence of right IJ tunneled double-lumen HD catheter; upon chart review, this was likely inserted to be used for plasmapheresis which she has completed  Plan:  S/p removal by IR  Patient now has PICC line  Weakness  Assessment & Plan  Left greater than right upper and lower extremity weakness with ongoing left lower extremity contracture  Secondary to underlying CNS malignancy  *Decubitus ulcer precautions such as frequent repositioning  Aphasia  Assessment & Plan  Definite evidence of expressive aphasia, possible component of receptive aphasia as well  Speech therapy on board  Urinary retention  Assessment & Plan  Patient developed urinary retention during previous hospitalization  Plan was voiding trial as an outpatient as she was scheduled for discharge from that facility  Machado catheter was initially removed although patient required re-placement of machado on 05/14 in setting of incontinence and getting chemotherapy  Will evaluate daily for Machado catheter removal       Dysphagia  Assessment & Plan  Patient noted to have dysphagia and also decreased p o  Intake  As per [facility] progress note patient had a calorie count and recommended feeding tube placement  Speech evaluation - advance diet with dysphagia 2 and thin liquids; needs assistance with feeds  Calorie count  If the patient continued to have poor p o   Intake may need discussion with the family regarding alternate methods of nutrition  Patient's calorie intake is low  VBS- showed oropharyngeal dysphagia  s/p peg tube placement  Altered mental status  Assessment & Plan  Patient initially presented to [facility] his [place] with stroke-like symptoms for 2 days  CT scan of the head was concerning for subacute CVA and was transferred to [facility]   MRI of the brain was more consistent with demyelinating disease patient was started on IV steroids and 1000 mg daily for 5 days  Patient did not have any significant improvement at that time patient had plasmapheresis  And also had  lumbar puncture-negative for infection or malignancy  Patient noted to have persistent encephalopathy so a repeat MRI was obtained which showed worsening of the abnormality seen on the previous MRI and 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  Likely secondary to CNS lymphoma  Delirium precautions   Currently alert and oriented times 2-3   follow commands  Moves all extremities spontaneously except left upper and lower extrimities  Patient's  (does not speak [place]) makes all medical decision for her  * CNS lymphoma [facility])  Assessment & Plan  Patient was diagnosed with primary CNS lymphoma through biopsy in [facility] transferred over here for chemotherapy since the [facility] is out of network  Had a 2nd family meeting on 04/30 and family has decided to proceed treatment at this time  S/p PICC line for chemotherapy  S/p peg tube placement on 05/10  Currently on oral diet and Jevity 1 2  Plan:  Hematology-Oncology consulted  Appreciate recommendations  Chemotherapy treatment as per hematology oncology team     S/p 2nd cycle of chemotherapy on 05/13  Leucovin on 5/14  DVT prophylaxis:  On heparin  Case management-will start looking for places for rehab  S/p steroid taper for cerebral edema from CNS lymphoma  Pancytopenia in setting of recent chemotherapy  Will continue to watch for any signs of infection  Sepsis (HCC)-resolved as of 2021  Assessment & Plan  Patient had hypothermia, elevated WBC count and tachycardia  Concern for possible aspiration in setting on dysphagia versus UTI in setting of urinary catheter which was placed for retention  Plan:  S/p IV antibiotics last day on   Will continue to monitor off antibiotics  Disposition: Continue inpatient     SUBJECTIVE     Patient seen and examined  No acute events overnight  She is holding her left leg and flinches when it is internally or externally rotated, or when the left medial knee is palpated  OBJECTIVE     Vitals:    05/15/21 0303 05/15/21 0756 05/15/21 1041 05/15/21 1041   BP: 99/60 106/63 104/63 104/63   Pulse: 95 95 92 95   Resp: 20 18  18   Temp: 98 9 °F (37 2 °C) 98 2 °F (36 8 °C) 99 °F (37 2 °C) 99 °F (37 2 °C)   TempSrc:  Axillary  Axillary   SpO2: 100% 100% 98% 100%   Weight:       Height:          Temperature:   Temp (24hrs), Av 8 °F (37 1 °C), Min:98 2 °F (36 8 °C), Max:99 1 °F (37 3 °C)    Temperature: 99 °F (37 2 °C)  Intake & Output:  I/O        07 -  07 07 - 05/15 0700 05/15 07 -  0700    P  O  340 80 180    I V  (mL/kg) 728 8 (15 2)      NG/ 450     IV Piggyback 300 52 5 53    Feedings 485 950     Total Intake(mL/kg) 2003 8 (41 8) 1532 5 (32) 233 (4 9)    Urine (mL/kg/hr) 1100 (1) 3075 (2 7) 3100 (9 4)    Stool 0 0     Total Output 1100 3075 3100    Net +903 8 -1542 5 -2867           Unmeasured Urine Occurrence 1 x      Unmeasured Stool Occurrence 2 x 5 x         Weights:   IBW (Ideal Body Weight): 45 5 kg    Body mass index is 20 62 kg/m²  Weight (last 2 days)     Date/Time   Weight    21 0600   47 9 (105 6)            Physical Exam  Constitutional:       Appearance: She is well-developed  HENT:      Head: Normocephalic and atraumatic  Eyes:      General: No scleral icterus       Conjunctiva/sclera: Conjunctivae normal    Cardiovascular:      Rate and Rhythm: Normal rate and regular rhythm  Heart sounds: Normal heart sounds  No murmur  Pulmonary:      Effort: Pulmonary effort is normal       Breath sounds: Normal breath sounds  No wheezing or rales  Abdominal:      General: Bowel sounds are normal  There is no distension  Palpations: Abdomen is soft  Tenderness: There is no abdominal tenderness  There is no guarding  Musculoskeletal:         General: No tenderness or deformity  Comments: Grimace with palpation of left medial knee, also with internal or external rotation of left hip, also with extension of hip   Skin:     General: Skin is warm and dry  Findings: No erythema  Neurological:      Mental Status: She is alert  Mental status is at baseline  Psychiatric:         Mood and Affect: Mood normal          Behavior: Behavior normal        LABORATORY DATA     Labs: I have personally reviewed pertinent reports  Results from last 7 days   Lab Units 05/15/21  0539 05/14/21  0542 05/13/21  0555  05/12/21  0613 05/10/21  0651   WBC Thousand/uL 2 72* 2 75* 3 02*   < > 2 94* 6 56   HEMOGLOBIN g/dL 7 0* 7 1* 9 2*   < > 8 1* 8 7*   HEMATOCRIT % 21 7* 21 2* 27 6*   < > 24 4* 25 9*   PLATELETS Thousands/uL 142* 142* 164   < > 127* 141*   NEUTROS PCT %  --   --   --   --   --  76*   MONOS PCT %  --   --   --   --   --  5   MONO PCT % 3*  --   --   --  4  --     < > = values in this interval not displayed        Results from last 7 days   Lab Units 05/15/21  0539 05/14/21  1822 05/14/21  0542   POTASSIUM mmol/L 4 0 4 0 3 4*   CHLORIDE mmol/L 95* 99* 101   CO2 mmol/L 30 31 29   BUN mg/dL 12 12 13   CREATININE mg/dL 0 36* 0 32* 0 30*   CALCIUM mg/dL 8 2* 8 4 7 8*   ALK PHOS U/L 54 59 66   ALT U/L 61 67 66   AST U/L 25 24 21         Results from last 7 days   Lab Units 05/10/21  0651   PHOSPHORUS mg/dL 4 4                    IMAGING & DIAGNOSTIC TESTING     Radiology Results: I have personally reviewed pertinent reports  Xr Chest Portable    Result Date: 4/24/2021  Impression: Dialysis catheter tip within the superior cavoatrial junction  No pneumothorax  Workstation performed: [id number]     Xr Chest Picc Line Portable    Result Date: 4/27/2021  Impression: PICC line tip in the superior vena cava, approximately 3 5 to 4 cm above the cavoatrial junction  The examination demonstrates a significant  finding and was documented as such in [facility] for liaison and referring practitioner notification  Workstation performed: [id number]     Ir Picc Reposition    Result Date: 4/27/2021  Impression: Status-post repositioning of a 5 Romansh central venous catheter under fluoroscopic guidance with its tip at the cavoatrial junction  Workstation performed: [id number]     Ir Tunneled Central Line Removal    Result Date: 4/27/2021  Impression: Impression: Successful tunneled central line removal as described  Signed, performed and dictated by Bob Choi PA-C under the supervision of Dr Wendie Barrett  Workstation performed: [id number]     Other Diagnostic Testing: I have personally reviewed pertinent reports      ACTIVE MEDICATIONS     Current Facility-Administered Medications   Medication Dose Route Frequency    acetaminophen (TYLENOL) tablet 650 mg  650 mg Oral Q6H PRN    acyclovir (ZOVIRAX) capsule 400 mg  400 mg Oral Q12H [address]    albuterol (PROVENTIL HFA,VENTOLIN HFA) inhaler 2 puff  2 puff Inhalation Q6H PRN    amLODIPine (NORVASC) tablet 5 mg  5 mg Oral Daily    bisacodyl (DULCOLAX) rectal suppository 10 mg  10 mg Rectal Daily PRN    famotidine (PEPCID) tablet 20 mg  20 mg Oral BID    fluconazole (DIFLUCAN) tablet 400 mg  400 mg Oral Daily    heparin (porcine) subcutaneous injection 5,000 Units  5,000 Units Subcutaneous Q8H [address]    insulin glargine (LANTUS) subcutaneous injection 10 Units 0 1 mL  10 Units Subcutaneous HS    insulin lispro (HumaLOG) 100 units/mL subcutaneous injection 1-5 Units  1-5 Units Subcutaneous 4x Daily (AC & HS)    lactulose oral solution 10 g  10 g Oral BID PRN    leucovorin 25 mg in sodium chloride 0 9 % 50 mL IVPB  25 mg Intravenous Q6H [address]    levofloxacin (LEVAQUIN) tablet 500 mg  500 mg Oral Q24H    lidocaine (LIDODERM) 5 % patch 1 patch  1 patch Topical Daily    menthol-methyl salicylate (BENGAY) 74-83 % cream   Apply externally BID    ondansetron (ZOFRAN) injection 4 mg  4 mg Intravenous Q6H PRN    polyethylene glycol (MIRALAX) packet 17 g  17 g Oral Daily    senna-docusate sodium (SENOKOT S) 8 6-50 mg per tablet 1 tablet  1 tablet Oral BID    sodium bicarbonate 100 mEq in dextrose 5 % 1,000 mL infusion  150 mL/hr Intravenous Continuous    sodium chloride 0 9 % infusion  20 mL/hr Intravenous Once PRN    sodium chloride 0 9 % infusion  20 mL/hr Intravenous Once PRN    sodium chloride 0 9 % infusion  20 mL/hr Intravenous Once PRN    tamsulosin (FLOMAX) capsule 0 4 mg  0 4 mg Oral Daily With Dinner       VTE Pharmacologic Prophylaxis: Heparin  VTE Mechanical Prophylaxis: sequential compression device    Portions of the record may have been created with voice recognition software  Occasional wrong word or "sound a like" substitutions may have occurred due to the inherent limitations of voice recognition software    Read the chart carefully and recognize, using context, where substitutions have occurred   ==  Luisa García, DO  [address]  Internal Medicine Residency PGY-3

## 2021-05-16 LAB
ABO GROUP BLD BPU: NORMAL
BPU ID: NORMAL
CROSSMATCH: NORMAL
ERYTHROCYTE [DISTWIDTH] IN BLOOD BY AUTOMATED COUNT: 20 % (ref 11.6–15.1)
GLUCOSE SERPL-MCNC: 114 MG/DL (ref 65–140)
GLUCOSE SERPL-MCNC: 118 MG/DL (ref 65–140)
GLUCOSE SERPL-MCNC: 135 MG/DL (ref 65–140)
GLUCOSE SERPL-MCNC: 148 MG/DL (ref 65–140)
GLUCOSE SERPL-MCNC: 159 MG/DL (ref 65–140)
HCT VFR BLD AUTO: 26.4 % (ref 34.8–46.1)
HGB BLD-MCNC: 8.8 G/DL (ref 11.5–15.4)
MCH RBC QN AUTO: 31.3 PG (ref 26.8–34.3)
MCHC RBC AUTO-ENTMCNC: 33.3 G/DL (ref 31.4–37.4)
MCV RBC AUTO: 94 FL (ref 82–98)
MTX SERPL-SCNC: 0.16 UMOL/L
NRBC BLD AUTO-RTO: 0 /100 WBCS
PLATELET # BLD AUTO: 139 THOUSANDS/UL (ref 149–390)
PMV BLD AUTO: 10.3 FL (ref 8.9–12.7)
RBC # BLD AUTO: 2.81 MILLION/UL (ref 3.81–5.12)
UNIT DISPENSE STATUS: NORMAL
UNIT PRODUCT CODE: NORMAL
UNIT RH: NORMAL
WBC # BLD AUTO: 2.86 THOUSAND/UL (ref 4.31–10.16)

## 2021-05-16 PROCEDURE — 85027 COMPLETE CBC AUTOMATED: CPT | Performed by: STUDENT IN AN ORGANIZED HEALTH CARE EDUCATION/TRAINING PROGRAM

## 2021-05-16 PROCEDURE — 82948 REAGENT STRIP/BLOOD GLUCOSE: CPT

## 2021-05-16 PROCEDURE — 80204 DRUG ASSAY METHOTREXATE: CPT | Performed by: INTERNAL MEDICINE

## 2021-05-16 RX ORDER — INSULIN GLARGINE 100 [IU]/ML
7 INJECTION, SOLUTION SUBCUTANEOUS
Status: DISCONTINUED | OUTPATIENT
Start: 2021-05-16 | End: 2021-06-07

## 2021-05-16 RX ORDER — LEUCOVORIN CALCIUM 25 MG/1
25 TABLET ORAL EVERY 6 HOURS
Status: COMPLETED | OUTPATIENT
Start: 2021-05-16 | End: 2021-05-17

## 2021-05-16 RX ORDER — SODIUM CHLORIDE 9 MG/ML
50 INJECTION, SOLUTION INTRAVENOUS CONTINUOUS
Status: DISCONTINUED | OUTPATIENT
Start: 2021-05-16 | End: 2021-05-20

## 2021-05-16 RX ADMIN — SODIUM BICARBONATE 150 ML/HR: 84 INJECTION, SOLUTION INTRAVENOUS at 14:48

## 2021-05-16 RX ADMIN — FLUCONAZOLE 400 MG: 200 TABLET ORAL at 08:54

## 2021-05-16 RX ADMIN — LEUCOVORIN CALCIUM 25 MG: 25 TABLET ORAL at 07:24

## 2021-05-16 RX ADMIN — TAMSULOSIN HYDROCHLORIDE 0.4 MG: 0.4 CAPSULE ORAL at 18:37

## 2021-05-16 RX ADMIN — LEUCOVORIN CALCIUM 25 MG: 25 TABLET ORAL at 12:20

## 2021-05-16 RX ADMIN — LEVOFLOXACIN 500 MG: 500 TABLET, FILM COATED ORAL at 18:37

## 2021-05-16 RX ADMIN — MENTHOL, UNSPECIFIED FORM AND METHYL SALICYLATE 1 APPLICATION: 10; 150 CREAM TOPICAL at 08:55

## 2021-05-16 RX ADMIN — SODIUM BICARBONATE 150 ML/HR: 84 INJECTION, SOLUTION INTRAVENOUS at 05:11

## 2021-05-16 RX ADMIN — POLYETHYLENE GLYCOL 3350 17 G: 17 POWDER, FOR SOLUTION ORAL at 08:54

## 2021-05-16 RX ADMIN — INSULIN GLARGINE 7 UNITS: 100 INJECTION, SOLUTION SUBCUTANEOUS at 21:05

## 2021-05-16 RX ADMIN — SENNOSIDES, DOCUSATE SODIUM 1 TABLET: 8.6; 5 TABLET ORAL at 18:37

## 2021-05-16 RX ADMIN — FAMOTIDINE 20 MG: 20 TABLET ORAL at 08:54

## 2021-05-16 RX ADMIN — ACYCLOVIR 400 MG: 200 CAPSULE ORAL at 08:54

## 2021-05-16 RX ADMIN — MENTHOL, UNSPECIFIED FORM AND METHYL SALICYLATE 1 APPLICATION: 10; 150 CREAM TOPICAL at 18:37

## 2021-05-16 RX ADMIN — FAMOTIDINE 20 MG: 20 TABLET ORAL at 18:37

## 2021-05-16 RX ADMIN — LIDOCAINE 5% 1 PATCH: 700 PATCH TOPICAL at 08:54

## 2021-05-16 RX ADMIN — ACYCLOVIR 400 MG: 200 CAPSULE ORAL at 21:00

## 2021-05-16 RX ADMIN — SODIUM CHLORIDE 50 ML/HR: 0.9 INJECTION, SOLUTION INTRAVENOUS at 15:05

## 2021-05-16 RX ADMIN — HEPARIN SODIUM 5000 UNITS: 5000 INJECTION INTRAVENOUS; SUBCUTANEOUS at 07:15

## 2021-05-16 RX ADMIN — SENNOSIDES, DOCUSATE SODIUM 1 TABLET: 8.6; 5 TABLET ORAL at 08:54

## 2021-05-16 RX ADMIN — INSULIN LISPRO 1 UNITS: 100 INJECTION, SOLUTION INTRAVENOUS; SUBCUTANEOUS at 12:19

## 2021-05-16 RX ADMIN — SODIUM CHLORIDE 25 MG: 9 INJECTION, SOLUTION INTRAVENOUS at 00:13

## 2021-05-16 RX ADMIN — LEUCOVORIN CALCIUM 25 MG: 25 TABLET ORAL at 18:08

## 2021-05-16 RX ADMIN — HEPARIN SODIUM 5000 UNITS: 5000 INJECTION INTRAVENOUS; SUBCUTANEOUS at 21:00

## 2021-05-16 RX ADMIN — HEPARIN SODIUM 5000 UNITS: 5000 INJECTION INTRAVENOUS; SUBCUTANEOUS at 14:48

## 2021-05-16 NOTE — PROGRESS NOTES
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[address] )  Active Problems:    Dysphagia    Pancytopenia ([address] )    Altered mental status    Urinary retention    Presence of permanent central venous catheter    Steroid-induced hyperglycemia    Left-sided weakness    Aphasia    Weakness    Fracture of ramus of left pubis (HCC)    Severe protein-calorie malnutrition (HCC)      Pancytopenia ([address] )  Assessment & Plan  Likely in setting of chemotherapy  Will continue to monitor for any signs of infection  Will transfuse with packed RBC for hemoglobin less than 7  Drop in Hgb from 9 to 7 on 5/14, other lines relatively stable  VSS and soft brown BMs  Will discuss with oncology if this is expected based on chemo or if there should be further workup for bleeding, though hemodynamics currently do not suggest bleed  S/p 1 pack RBC on 05/15  Dysphagia  Assessment & Plan  Patient noted to have dysphagia and also decreased p o  Intake  As per [facility] progress note patient had a calorie count and recommended feeding tube placement  Speech evaluation - advance diet with dysphagia 2 and thin liquids; needs assistance with feeds  Calorie count  If the patient continued to have poor p o  Intake may need discussion with the family regarding alternate methods of nutrition  Patient's calorie intake is low  VBS- showed oropharyngeal dysphagia  s/p peg tube placement  * CNS lymphoma [facility])  Assessment & Plan  Patient was diagnosed with primary CNS lymphoma through biopsy in [facility] transferred over here for chemotherapy since the [facility] is out of network   Had a 2nd family meeting on 04/30 and family has decided to proceed treatment at this time  S/p PICC line for chemotherapy  S/p peg tube placement on 05/10  Currently on oral diet and Jevity 1 2  Plan:  Hematology-Oncology consulted  Appreciate recommendations  Chemotherapy treatment as per hematology oncology team     S/p 2nd cycle of chemotherapy on 05/13  Leucovin on 5/14  DVT prophylaxis:  On heparin  Case management-will start looking for places for rehab  S/p steroid taper for cerebral edema from CNS lymphoma  Pancytopenia in setting of recent chemotherapy  Will continue to watch for any signs of infection  Also started on acyclovir, Diflucan and Levaquin  Consider early discontinuation of Cottrell catheter on 05/17 or 5/18  Sepsis (HCC)-resolved as of 5/13/2021  Assessment & Plan  Patient had hypothermia, elevated WBC count and tachycardia  Concern for possible aspiration in setting on dysphagia versus UTI in setting of urinary catheter which was placed for retention  Plan:  S/p IV antibiotics last day on 05/05  Will continue to monitor off antibiotics  Left-sided weakness  Assessment & Plan  Patient has left-sided weakness at baseline from her CNS lymphoma  Left upper extremity 3-4/5 and left lower extremity 0-1/5 muscle strength at baseline  Seems to have pain in left hip and/or left knee, will trial Arthur rangel    Steroid-induced hyperglycemia  Assessment & Plan  Mild hyperglycemia, glucoses in 200s, no history of DM  Occurring in the setting of Decadron with chemotherapy, as well as sodium bicarb in D5 drip  - D5 drip is finished     - will monitor sugars while on Jevity and now off D5  - continue q4 hour fingerstick glucose with correctional algorithm 1 and Lantus    Presence of permanent central venous catheter  Assessment & Plan  Noted presence of right IJ tunneled double-lumen HD catheter; upon chart review, this was likely inserted to be used for plasmapheresis which she has completed  Plan:  S/p removal by IR  Patient now has PICC line  Urinary retention  Assessment & Plan  Patient developed urinary retention during previous hospitalization  Plan was voiding trial as an outpatient as she was scheduled for discharge from that facility  Machado catheter was initially removed although patient required re-placement of machado on 05/14 in setting of incontinence and getting chemotherapy  Will evaluate daily for Machado catheter removal       Altered mental status  Assessment & Plan  Patient initially presented to [facility] his ultimate [place] with stroke-like symptoms for 2 days  CT scan of the head was concerning for subacute CVA and was transferred to [facility]   MRI of the brain was more consistent with demyelinating disease patient was started on IV steroids and 1000 mg daily for 5 days  Patient did not have any significant improvement at that time patient had plasmapheresis  And also had  lumbar puncture-negative for infection or malignancy  Patient noted to have persistent encephalopathy so a repeat MRI was obtained which showed worsening of the abnormality seen on the previous MRI and 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  Likely secondary to CNS lymphoma  Delirium precautions   Currently alert and oriented times 2-3   follow commands  Moves all extremities spontaneously except left upper and lower extrimities  Patient's  (does not speak [employer]) makes all medical decision for her  Severe protein-calorie malnutrition ([facility] [address] )  Assessment & Plan  Malnutrition Findings:   Adult Malnutrition type: Acute illness(due to medical condition resulting in dysphagia, decreased appetite and intake, weight loss)  Adult Degree of Malnutrition: Other severe protein calorie malnutrition    BMI Findings: Body mass index is 20 49 kg/m²  Plan:  Nutrition consult     Tube feeds initiated with Jevity 1 2, advancing to goal      Fracture of ramus of left pubis [facility])  Assessment & Plan  Marti Holliday prior to presentation to [facility]  Managed nonoperatively at [facility]    Weakness  Assessment & Plan  Left greater than right upper and lower extremity weakness with ongoing left lower extremity contracture  Secondary to underlying CNS malignancy  *Decubitus ulcer precautions such as frequent repositioning  Aphasia  Assessment & Plan  Definite evidence of expressive aphasia, possible component of receptive aphasia as well  Speech therapy on board  Disposition:  Continue inpatient care  SUBJECTIVE     Patient seen and examined  No acute events overnight  Alert and ordered x3  OBJECTIVE     Vitals:    05/15/21 1848 05/15/21 2305 21 0348 21 0726   BP: 98/60 100/62 102/65 106/68   Pulse: 94 94 94 91   Resp: 18 18 17 17   Temp: 98 7 °F (37 1 °C) 99 1 °F (37 3 °C) 99 9 °F (37 7 °C) 100 °F (37 8 °C)   TempSrc:       SpO2: 100% 100% 100% 100%   Weight:       Height:          Temperature:   Temp (24hrs), Av 1 °F (37 3 °C), Min:98 6 °F (37 °C), Max:100 °F (37 8 °C)    Temperature: 100 °F (37 8 °C)  Intake & Output:  I/O        07 - 05/15 0700 05/15 07 -  0700  07 -  0700    P  O  80 360 120    I V  (mL/kg)  3600 (75 2)     Blood  350     NG/ 600     IV Piggyback 52 5 103     Feedings 950 1080     Total Intake(mL/kg) 1532 5 (32) 6093 (127 2) 120 (2 5)    Urine (mL/kg/hr) 3075 (2 7) 6200 (5 4) 550 (4 2)    Stool 0      Total Output 3075 6200 550    Net -1542 5 -107 -430           Unmeasured Stool Occurrence 5 x          Weights:   IBW (Ideal Body Weight): 45 5 kg    Body mass index is 20 62 kg/m²  Weight (last 2 days)     Date/Time   Weight    21 0600   47 9 (105 6)            Physical Exam  Vitals signs reviewed  Constitutional:       General: She is not in acute distress  Appearance: She is well-developed  She is not diaphoretic  HENT:      Head: Normocephalic and atraumatic  Nose: Nose normal       Mouth/Throat:      Pharynx: No oropharyngeal exudate  Eyes:      General: No scleral icterus  Conjunctiva/sclera: Conjunctivae normal    Neck:      Musculoskeletal: Neck supple  Thyroid: No thyromegaly  Vascular: No JVD  Trachea: No tracheal deviation  Cardiovascular:      Rate and Rhythm: Normal rate and regular rhythm  Heart sounds: Normal heart sounds  No murmur  No friction rub  No gallop  Pulmonary:      Effort: Pulmonary effort is normal  No respiratory distress  Breath sounds: Normal breath sounds  No stridor  No wheezing or rales  Chest:      Chest wall: No tenderness  Abdominal:      General: Bowel sounds are normal  There is no distension  Palpations: Abdomen is soft  There is no mass  Tenderness: There is no abdominal tenderness  There is no guarding or rebound  Musculoskeletal: Normal range of motion  General: No tenderness  Skin:     General: Skin is warm  Findings: No erythema or rash  Neurological:      Mental Status: She is alert and oriented to person, place, and time  Sensory: No sensory deficit  Comments: Left-sided weakness in left upper and lower extremity  Left upper arm 0/5  Left lower leg 0/5  Psychiatric:         Behavior: Behavior normal          Thought Content: Thought content normal          Judgment: Judgment normal        LABORATORY DATA     Labs: I have personally reviewed pertinent reports  Results from last 7 days   Lab Units 05/16/21  0521 05/15/21  0539 05/14/21  0542  05/12/21  0613 05/10/21  0651   WBC Thousand/uL 2 86* 2 72* 2 75*   < > 2 94* 6 56   HEMOGLOBIN g/dL 8 8* 7 0* 7 1*   < > 8 1* 8 7*   HEMATOCRIT % 26 4* 21 7* 21 2*   < > 24 4* 25 9*   PLATELETS Thousands/uL 139* 142* 142*   < > 127* 141*   NEUTROS PCT %  --   --   --   --   --  76*   MONOS PCT %  --   --   --   --   --  5   MONO PCT %  --  3*  --   --  4  --     < > = values in this interval not displayed  Results from last 7 days   Lab Units 05/15/21  0539 05/14/21  1822 05/14/21  0542   POTASSIUM mmol/L 4 0 4 0 3 4*   CHLORIDE mmol/L 95* 99* 101   CO2 mmol/L 30 31 29   BUN mg/dL 12 12 13   CREATININE mg/dL 0 36* 0 32* 0 30*   CALCIUM mg/dL 8 2* 8 4 7 8*   ALK PHOS U/L 54 59 66   ALT U/L 61 67 66   AST U/L 25 24 21         Results from last 7 days   Lab Units 05/10/21  0651   PHOSPHORUS mg/dL 4 4                    IMAGING & DIAGNOSTIC TESTING     Radiology Results: I have personally reviewed pertinent reports  Xr Chest Portable    Result Date: 4/24/2021  Impression: Dialysis catheter tip within the superior cavoatrial junction  No pneumothorax  Workstation performed: [id number]     Xr Chest Picc Line Portable    Result Date: 4/27/2021  Impression: PICC line tip in the superior vena cava, approximately 3 5 to 4 cm above the cavoatrial junction  The examination demonstrates a significant  finding and was documented as such in [facility] for liaison and referring practitioner notification  Workstation performed: [id number]     Ir Picc Reposition    Result Date: 4/27/2021  Impression: Status-post repositioning of a 5 Irish central venous catheter under fluoroscopic guidance with its tip at the cavoatrial junction  Workstation performed: [id number]     Ir Tunneled Central Line Removal    Result Date: 4/27/2021  Impression: Impression: Successful tunneled central line removal as described  Signed, performed and dictated by Thurnell Runner PA-C under the supervision of Dr Arturo Martinez  Workstation performed: [id number]     Other Diagnostic Testing: I have personally reviewed pertinent reports      ACTIVE MEDICATIONS     Current Facility-Administered Medications   Medication Dose Route Frequency    acetaminophen (TYLENOL) tablet 650 mg  650 mg Oral Q6H PRN    acyclovir (ZOVIRAX) capsule 400 mg  400 mg Oral Q12H [address]    albuterol (PROVENTIL HFA,VENTOLIN HFA) inhaler 2 puff  2 puff Inhalation Q6H PRN    amLODIPine (NORVASC) tablet 5 mg  5 mg Oral Daily    bisacodyl (DULCOLAX) rectal suppository 10 mg  10 mg Rectal Daily PRN    famotidine (PEPCID) tablet 20 mg  20 mg Oral BID    fluconazole (DIFLUCAN) tablet 400 mg  400 mg Oral Daily    heparin (porcine) subcutaneous injection 5,000 Units  5,000 Units Subcutaneous Q8H [facility]    insulin glargine (LANTUS) subcutaneous injection 7 Units 0 07 mL  7 Units Subcutaneous HS    insulin lispro (HumaLOG) 100 units/mL subcutaneous injection 1-5 Units  1-5 Units Subcutaneous 4x Daily (AC & HS)    lactulose oral solution 10 g  10 g Oral BID PRN    leucovorin (WELLCOVORIN) tablet 25 mg  25 mg Oral Q6H    levofloxacin (LEVAQUIN) tablet 500 mg  500 mg Oral Q24H    lidocaine (LIDODERM) 5 % patch 1 patch  1 patch Topical Daily    menthol-methyl salicylate (BENGAY) 35-69 % cream   Apply externally BID    ondansetron (ZOFRAN) injection 4 mg  4 mg Intravenous Q6H PRN    polyethylene glycol (MIRALAX) packet 17 g  17 g Oral Daily    senna-docusate sodium (SENOKOT S) 8 6-50 mg per tablet 1 tablet  1 tablet Oral BID    sodium bicarbonate 100 mEq in dextrose 5 % 1,000 mL infusion  150 mL/hr Intravenous Continuous    sodium chloride 0 9 % infusion  20 mL/hr Intravenous Once PRN    sodium chloride 0 9 % infusion  20 mL/hr Intravenous Once PRN    sodium chloride 0 9 % infusion  20 mL/hr Intravenous Once PRN    tamsulosin (FLOMAX) capsule 0 4 mg  0 4 mg Oral Daily With Dinner       VTE Pharmacologic Prophylaxis: heparin  VTE Mechanical Prophylaxis: sequential compression device    Portions of the record may have been created with voice recognition software  Occasional wrong word or "sound a like" substitutions may have occurred due to the inherent limitations of voice recognition software    Read the chart carefully and recognize, using context, where substitutions have occurred   ==  Trey Enamorado, [facility]  Internal Medicine Residency PGY-2

## 2021-05-17 PROBLEM — M25.462 PAIN AND SWELLING OF LEFT KNEE: Status: ACTIVE | Noted: 2021-05-17

## 2021-05-17 PROBLEM — M25.562 PAIN AND SWELLING OF LEFT KNEE: Status: ACTIVE | Noted: 2021-05-17

## 2021-05-17 LAB
ERYTHROCYTE [DISTWIDTH] IN BLOOD BY AUTOMATED COUNT: 18.6 % (ref 11.6–15.1)
GLUCOSE SERPL-MCNC: 131 MG/DL (ref 65–140)
GLUCOSE SERPL-MCNC: 141 MG/DL (ref 65–140)
GLUCOSE SERPL-MCNC: 172 MG/DL (ref 65–140)
GLUCOSE SERPL-MCNC: 214 MG/DL (ref 65–140)
HCT VFR BLD AUTO: 28.9 % (ref 34.8–46.1)
HGB BLD-MCNC: 9.5 G/DL (ref 11.5–15.4)
MCH RBC QN AUTO: 31 PG (ref 26.8–34.3)
MCHC RBC AUTO-ENTMCNC: 32.9 G/DL (ref 31.4–37.4)
MCV RBC AUTO: 94 FL (ref 82–98)
MTX SERPL-SCNC: <0.1 UMOL/L
NRBC BLD AUTO-RTO: 0 /100 WBCS
PLATELET # BLD AUTO: 165 THOUSANDS/UL (ref 149–390)
PMV BLD AUTO: 10.6 FL (ref 8.9–12.7)
RBC # BLD AUTO: 3.06 MILLION/UL (ref 3.81–5.12)
URATE UR-MCNC: 10.8 MG/DL
WBC # BLD AUTO: 2.93 THOUSAND/UL (ref 4.31–10.16)

## 2021-05-17 PROCEDURE — 84560 ASSAY OF URINE/URIC ACID: CPT | Performed by: STUDENT IN AN ORGANIZED HEALTH CARE EDUCATION/TRAINING PROGRAM

## 2021-05-17 PROCEDURE — 99231 SBSQ HOSP IP/OBS SF/LOW 25: CPT | Performed by: INTERNAL MEDICINE

## 2021-05-17 PROCEDURE — 82948 REAGENT STRIP/BLOOD GLUCOSE: CPT

## 2021-05-17 PROCEDURE — 80204 DRUG ASSAY METHOTREXATE: CPT | Performed by: INTERNAL MEDICINE

## 2021-05-17 PROCEDURE — 85027 COMPLETE CBC AUTOMATED: CPT | Performed by: STUDENT IN AN ORGANIZED HEALTH CARE EDUCATION/TRAINING PROGRAM

## 2021-05-17 RX ORDER — PREDNISONE 10 MG/1
10 TABLET ORAL DAILY
Status: DISCONTINUED | OUTPATIENT
Start: 2021-05-17 | End: 2021-05-20

## 2021-05-17 RX ADMIN — SENNOSIDES, DOCUSATE SODIUM 1 TABLET: 8.6; 5 TABLET ORAL at 17:00

## 2021-05-17 RX ADMIN — HEPARIN SODIUM 5000 UNITS: 5000 INJECTION INTRAVENOUS; SUBCUTANEOUS at 05:05

## 2021-05-17 RX ADMIN — HEPARIN SODIUM 5000 UNITS: 5000 INJECTION INTRAVENOUS; SUBCUTANEOUS at 22:39

## 2021-05-17 RX ADMIN — HEPARIN SODIUM 5000 UNITS: 5000 INJECTION INTRAVENOUS; SUBCUTANEOUS at 16:57

## 2021-05-17 RX ADMIN — FLUCONAZOLE 400 MG: 200 TABLET ORAL at 08:36

## 2021-05-17 RX ADMIN — LEVOFLOXACIN 500 MG: 500 TABLET, FILM COATED ORAL at 16:57

## 2021-05-17 RX ADMIN — MENTHOL, UNSPECIFIED FORM AND METHYL SALICYLATE: 10; 150 CREAM TOPICAL at 08:37

## 2021-05-17 RX ADMIN — INSULIN LISPRO 1 UNITS: 100 INJECTION, SOLUTION INTRAVENOUS; SUBCUTANEOUS at 22:40

## 2021-05-17 RX ADMIN — FAMOTIDINE 20 MG: 20 TABLET ORAL at 17:00

## 2021-05-17 RX ADMIN — LEUCOVORIN CALCIUM 25 MG: 25 TABLET ORAL at 11:44

## 2021-05-17 RX ADMIN — INSULIN LISPRO 2 UNITS: 100 INJECTION, SOLUTION INTRAVENOUS; SUBCUTANEOUS at 16:57

## 2021-05-17 RX ADMIN — LEUCOVORIN CALCIUM 25 MG: 25 TABLET ORAL at 00:20

## 2021-05-17 RX ADMIN — ACYCLOVIR 400 MG: 200 CAPSULE ORAL at 22:39

## 2021-05-17 RX ADMIN — LIDOCAINE 5% 1 PATCH: 700 PATCH TOPICAL at 08:33

## 2021-05-17 RX ADMIN — ACYCLOVIR 400 MG: 200 CAPSULE ORAL at 08:36

## 2021-05-17 RX ADMIN — PREDNISONE 10 MG: 10 TABLET ORAL at 11:45

## 2021-05-17 RX ADMIN — INSULIN GLARGINE 7 UNITS: 100 INJECTION, SOLUTION SUBCUTANEOUS at 22:39

## 2021-05-17 RX ADMIN — ACETAMINOPHEN 650 MG: 325 TABLET, FILM COATED ORAL at 00:20

## 2021-05-17 RX ADMIN — TBO-FILGRASTIM 300 MCG: 300 INJECTION, SOLUTION SUBCUTANEOUS at 16:58

## 2021-05-17 RX ADMIN — LEUCOVORIN CALCIUM 25 MG: 25 TABLET ORAL at 05:05

## 2021-05-17 RX ADMIN — MENTHOL, UNSPECIFIED FORM AND METHYL SALICYLATE: 10; 150 CREAM TOPICAL at 17:01

## 2021-05-17 RX ADMIN — SENNOSIDES, DOCUSATE SODIUM 1 TABLET: 8.6; 5 TABLET ORAL at 08:33

## 2021-05-17 RX ADMIN — SODIUM CHLORIDE 50 ML/HR: 0.9 INJECTION, SOLUTION INTRAVENOUS at 11:43

## 2021-05-17 RX ADMIN — AMLODIPINE BESYLATE 5 MG: 5 TABLET ORAL at 08:33

## 2021-05-17 RX ADMIN — POLYETHYLENE GLYCOL 3350 17 G: 17 POWDER, FOR SOLUTION ORAL at 08:34

## 2021-05-17 RX ADMIN — FAMOTIDINE 20 MG: 20 TABLET ORAL at 08:33

## 2021-05-17 NOTE — QUICK NOTE
Update: Pt stable overall; WBC now 2 93; reasonable to start granix, 300mcg SQ daily x 2 days to ideally prevent prolonged neutropenia and infection, especially given urine machado  MTX level still pending from 5/16  Can plan to discontinue leukovorin until MTX lab level less than 0 10         RENALDO MckeonP-BC  Hematology/Oncology Nurse Practitioner

## 2021-05-17 NOTE — PROGRESS NOTES
Hematology - Oncology Progress Note    Anupama Barajas, 1965, [id number]  [facility] 920/[facility] 920-01      Impression and plan:    53-year-old female recently diagnosed with primary CNS lymphoma  Patient just received her 2nd cycle of methotrexate and Rituxan (rituximab); now on Leucovorin rescue  Methotrexate level from 5/16 at 18:08 is still pending  Patient will continue with IV fluids, leukovorin rescue, feedings via feeding tube  Pain seems to be controlled  Mental status seems to be about the same as before  MTX level still pending from 5/16 at 18:08  Can plan to discontinue leukovorin when MTX lab level less than 0 10  Continue PPX with Diflucan, acyclovir and Levaquin  WBC now 2 93; reasonable to start granix, 300mcg SQ daily x 2 days to ideally prevent prolonged neutropenia and infection, especially given urine machado  Plan to remove machado catheter as soon as reasonable - possibly 5/18  Will follow  I did see this patient with Dr Jerzy Harkins  and he is in agreement with this plan  Julissa [facility]  Hematology/Oncology Nurse Practitioner   __________________________________________________________________________________________    Chief complaint:  Primary CNS lymphoma  History of present illness:  53-year-old female recently diagnosed with primary CNS lymphoma status post her 2nd cycle of methotrexate and Rituxan (rituximab)  Patient was found in bed in no apparent distress  Very limited verbal communication, mental status did seem to be slightly better today as compared to yesterday  No evidence of pain  Nursing staff denies any active medical issues      Hospital medications list:  Current Facility-Administered Medications   Medication Dose Route Frequency Provider Last Rate    acetaminophen  650 mg Oral Q6H PRN Kae Beal MD      acyclovir  400 mg Oral Q12H [address] Rasheed Feliciano MD      albuterol  2 puff Inhalation Q6H PRN Dean Wilson MD      amLODIPine 5 mg Oral Daily Neftaly Barge, DO      bisacodyl  10 mg Rectal Daily PRN Driscoll Kossuth, DO      famotidine  20 mg Oral BID Neftaly Barge, DO      fluconazole  400 mg Oral Daily Shameka Gates MD      heparin (porcine)  5,000 Units Subcutaneous [facility] Lenn Suzette, DO      insulin glargine  7 Units Subcutaneous HS Neftaly Barge, DO      insulin lispro  1-5 Units Subcutaneous 4x Daily (AC & HS) José Luis Barahona, DO      lactulose  10 g Oral BID PRN Nikita Tran, DO      levofloxacin  500 mg Oral Q24H Shameka Gates MD      lidocaine  1 patch Topical Daily Amber Holloway MD      menthol-methyl salicylate   Apply externally BID Luz Marina Bradford, DO      ondansetron  4 mg Intravenous Q6H PRN Amber Holloway MD      polyethylene glycol  17 g Oral Daily Neftaly Barge, DO      predniSONE  10 mg Oral Daily Sadaf Mcdonald, DO      senna-docusate sodium  1 tablet Oral BID Amber Holloway MD      sodium chloride  20 mL/hr Intravenous Once PRN [facility], DO      sodium chloride  20 mL/hr Intravenous Once PRN Severiano [place], DO      sodium chloride  20 mL/hr Intravenous Once PRN Severiano Ketchum, DO Stopped (05/13/21 1354)    sodium chloride  50 mL/hr Intravenous Continuous Neftaly Barge, DO 50 mL/hr (05/17/21 1143)    tamsulosin  0 4 mg Oral Daily With Amirah Malcolm MD      tbo-filgrastim  300 mcg Subcutaneous Q24H Severiano Ketchum, DO       Physical exam  /76   Pulse 98   Temp 99 1 °F (37 3 °C)   Resp 18   Ht 5' (1 524 m)   Wt 47 6 kg (104 lb 15 oz)   SpO2 100%   BMI 20 49 kg/m²    Constitutional:  Relatively well-nourished female, no respiratory distress  Eyes: PERRL, conjunctiva pale, anicteric    HENT:  Right forhead well-healed suture line  Neck: Good range of motion, no adenopathy  Respiratory:  Fair entry bilaterally, scattered rhonchi  Cardiovascular: Normal rate, normal rhythm, no murmurs, no gallops, no rubs    GI: Soft, nondistended, normal bowel sounds, nontender, no organomegaly, no mass, no rebound, no guarding, feeding tube in place   : No costovertebral angle tenderness, normal reproductive organs, no discharge  Musculoskeletal: No pain or tenderness with palpation of joints, muscles or bones  Integument:  Pale, warm, moist, scattered purpura  Lymphatic: No adenopathy in the neck, supra-clavicular region, axilla and groin bilaterally  Extremities:  No lower extremity edema, no cords, pulses 1+     Laboratory test results    Results for Kraig Villanueva (MRN [id number]) as of 5/15/2021 13:51   Ref   Range 5/13/2021 05:55 5/14/2021 05:42 5/15/2021 05:39   WBC Latest Ref Range: 4 31 - 10 16 Thousand/uL 3 02 (L) 2 75 (L) 2 72 (L)   Red Blood Cell Count Latest Ref Range: 3 81 - 5 12 Million/uL 2 85 (L) 2 16 (L) 2 18 (L)   Hemoglobin Latest Ref Range: 11 5 - 15 4 g/dL 9 2 (L) 7 1 (L) 7 0 (L)   HCT Latest Ref Range: 34 8 - 46 1 % 27 6 (L) 21 2 (L) 21 7 (L)   MCV Latest Ref Range: 82 - 98 fL 97 98 100 (H)   MCH Latest Ref Range: 26 8 - 34 3 pg 32 3 32 9 32 1   MCHC Latest Ref Range: 31 4 - 37 4 g/dL 33 3 33 5 32 3   RDW Latest Ref Range: 11 6 - 15 1 % 16 8 (H) 16 5 (H) 16 9 (H)   Platelet Count Latest Ref Range: 149 - 390 Thousands/uL 164 142 (L) 142 (L)

## 2021-05-17 NOTE — ASSESSMENT & PLAN NOTE
Known history of gout, 10 mg prednisone with concern for left knee pain/swelling gout  No aspiration at this time given recent chemotherapy and risk for infection to sterile site  Consider allopurinol enteric treatment  Uric acid 10 8

## 2021-05-17 NOTE — PROGRESS NOTES
INTERNAL MEDICINE RESIDENCY PROGRESS NOTE     Name: Rahul Barillas   Age & Sex: 49 y o  female   MRN: [id number]  Unit/Bed#: [facility] 920-01   Encounter: [id number]  Team: SOD Team B     PATIENT INFORMATION     Name: Rahul Barillas   Age & Sex: 49 y o  female   MRN: [id number] 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[address] )  Active Problems:    Altered mental status    Dysphagia    Urinary retention    Aphasia    Weakness    Presence of permanent central venous catheter    Fracture of ramus of left pubis (HCC)    Severe protein-calorie malnutrition (HCC)    Steroid-induced hyperglycemia    Left-sided weakness    Pancytopenia (HCC)    Pain and swelling of left knee      Pain and swelling of left knee  Assessment & Plan  Known history of gout, 10 mg prednisone with concern for left knee pain/swelling gout  No aspiration at this time given recent chemotherapy and risk for infection to sterile site    Pancytopenia [facility])  Assessment & Plan  Likely in setting of chemotherapy  Will continue to monitor for any signs of infection  Will transfuse with packed RBC for hemoglobin less than 7  Drop in Hgb from 9 to 7 on 5/14, other lines relatively stable  VSS and soft brown BMs  Will discuss with oncology if this is expected based on chemo or if there should be further workup for bleeding, though hemodynamics currently do not suggest bleed  S/p 1 pack RBC on 05/15, hemoglobin continues to improve    Left-sided weakness  Assessment & Plan  Patient has left-sided weakness at baseline from her CNS lymphoma  Left upper extremity 3-4/5 and left lower extremity 0-1/5 muscle strength at baseline  Steroid-induced hyperglycemia  Assessment & Plan  Mild hyperglycemia, glucoses in 200s, no history of DM  Occurring in the setting of Decadron with chemotherapy, as well as sodium bicarb in D5 drip  - D5 drip is finished     - will monitor sugars while on Jevity and now off D5  - continue q4 hour fingerstick glucose with correctional algorithm 1 and Lantus    Severe protein-calorie malnutrition (HCC)  Assessment & Plan  Malnutrition Findings:   Adult Malnutrition type: Acute illness(due to medical condition resulting in dysphagia, decreased appetite and intake, weight loss)  Adult Degree of Malnutrition: Other severe protein calorie malnutrition    BMI Findings: Body mass index is 20 49 kg/m²  Plan:  Nutrition consult  Tube feeds initiated with Jevity 1 2, advancing to goal      Fracture of ramus of left pubis [facility])  Assessment & Plan  Jonathon Mackenzie prior to presentation to [facility]  Managed nonoperatively at [facility]    Presence of permanent central venous catheter  Assessment & Plan  Noted presence of right IJ tunneled double-lumen HD catheter; upon chart review, this was likely inserted to be used for plasmapheresis which she has completed  Plan:  S/p removal by IR  Patient now has PICC line  Weakness  Assessment & Plan  Left greater than right upper and lower extremity weakness with ongoing left lower extremity contracture  Secondary to underlying CNS malignancy  *Decubitus ulcer precautions such as frequent repositioning  Aphasia  Assessment & Plan  Definite evidence of expressive aphasia, possible component of receptive aphasia as well  Speech therapy on board  Urinary retention  Assessment & Plan  Patient developed urinary retention during previous hospitalization  Plan was voiding trial as an outpatient as she was scheduled for discharge from that facility  Machado catheter was initially removed although patient required re-placement of machado on 05/14 in setting of incontinence and getting chemotherapy  Will evaluate daily for Machado catheter removal       Dysphagia  Assessment & Plan  Patient noted to have dysphagia and also decreased p o  Intake    As per [facility] progress note patient had a calorie count and recommended feeding tube placement  Speech evaluation - advance diet with dysphagia 2 and thin liquids; needs assistance with feeds  Calorie count  If the patient continued to have poor p o  Intake may need discussion with the family regarding alternate methods of nutrition  Patient's calorie intake is low  VBS- showed oropharyngeal dysphagia  s/p peg tube placement  Altered mental status  Assessment & Plan  Patient initially presented to [facility] his ultimate [place] with stroke-like symptoms for 2 days  CT scan of the head was concerning for subacute CVA and was transferred to [facility]   MRI of the brain was more consistent with demyelinating disease patient was started on IV steroids and 1000 mg daily for 5 days  Patient did not have any significant improvement at that time patient had plasmapheresis  And also had  lumbar puncture-negative for infection or malignancy  Patient noted to have persistent encephalopathy so a repeat MRI was obtained which showed worsening of the abnormality seen on the previous MRI and 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  Likely secondary to CNS lymphoma  Delirium precautions   Currently alert and oriented times 2-3   follow commands  Moves all extremities spontaneously except left upper and lower extrimities  Patient's  (does not speak [place]) makes all medical decision for her  * CNS lymphoma [facility])  Assessment & Plan  Patient was diagnosed with primary CNS lymphoma through biopsy in [facility] transferred over here for chemotherapy since the [facility] is out of network  Had a 2nd family meeting on 04/30 and family has decided to proceed treatment at this time  S/p PICC line for chemotherapy  S/p peg tube placement on 05/10  Currently on oral diet and Jevity 1 2  Plan:  Hematology-Oncology consulted  Appreciate recommendations     Chemotherapy treatment as per hematology oncology team     S/p 2nd cycle of chemotherapy on   Leucovin on   DVT prophylaxis:  On heparin  Case management-will start looking for places for rehab  S/p steroid taper for cerebral edema from CNS lymphoma  Pancytopenia in setting of recent chemotherapy  Will continue to watch for any signs of infection  Also started on acyclovir, Diflucan and Levaquin  Consider early discontinuation of Cottrell catheter   Sepsis (HCC)-resolved as of 2021  Assessment & Plan  Patient had hypothermia, elevated WBC count and tachycardia  Concern for possible aspiration in setting on dysphagia versus UTI in setting of urinary catheter which was placed for retention  Plan:  S/p IV antibiotics last day on   Will continue to monitor off antibiotics  Disposition:  Blood counts continue to improve, monitor  Appreciate Hematology/Oncology input     SUBJECTIVE     Patient seen and examined  No acute events overnight  Patient remains slightly altered with language barrier  After discussion with nursing team this appears to be baseline  OBJECTIVE     Vitals:    21 0724 21 1130 21 1517 21 1843   BP: 101/68 112/76 110/74 109/77   Pulse: 98 98 103 (!) 106   Resp:     Temp: 99 3 °F (37 4 °C) 99 1 °F (37 3 °C) 99 3 °F (37 4 °C) 99 °F (37 2 °C)   TempSrc:       SpO2: 100% 100% 99% 100%   Weight:       Height:          Temperature:   Temp (24hrs), Av 1 °F (37 3 °C), Min:98 8 °F (37 1 °C), Max:99 3 °F (37 4 °C)    Temperature: 99 °F (37 2 °C)  Intake & Output:  I/O       05/15 07 -  0700  07 -  0700  07 -  0700    P  O  360 370     I V  (mL/kg) 3600 (75 2) 1395 8 (29 3)     Blood 350      NG/ 720     IV Piggyback 103      Feedings 1080 1080     Total Intake(mL/kg) 6093 (127 2) 3565 8 (74 9)     Urine (mL/kg/hr) 6200 (5 4) 4125 (3 6)     Stool       Total Output 6200 4125     Net -107 -559 2                Weights:   IBW (Ideal Body Weight): 45 5 kg    Body mass index is 20 49 kg/m²  Weight (last 2 days)     Date/Time   Weight    05/17/21 0545   47 6 (104 94)            Physical Exam  Constitutional:       General: She is not in acute distress  Appearance: She is normal weight  HENT:      Head: Normocephalic and atraumatic  Eyes:      General: No scleral icterus  Pupils: Pupils are equal, round, and reactive to light  Cardiovascular:      Rate and Rhythm: Normal rate and regular rhythm  Pulmonary:      Effort: Pulmonary effort is normal       Breath sounds: Normal breath sounds  Abdominal:      General: There is no distension  Tenderness: There is no abdominal tenderness  Musculoskeletal: Normal range of motion  General: No swelling  Skin:     General: Skin is warm and dry  Capillary Refill: Capillary refill takes less than 2 seconds  Neurological:      General: No focal deficit present  Mental Status: She is alert  Cranial Nerves: No cranial nerve deficit  Psychiatric:         Mood and Affect: Mood normal          Behavior: Behavior normal        LABORATORY DATA     Labs: I have personally reviewed pertinent reports  Results from last 7 days   Lab Units 05/17/21  0500 05/16/21  0521 05/15/21  0539  05/12/21  0613   WBC Thousand/uL 2 93* 2 86* 2 72*   < > 2 94*   HEMOGLOBIN g/dL 9 5* 8 8* 7 0*   < > 8 1*   HEMATOCRIT % 28 9* 26 4* 21 7*   < > 24 4*   PLATELETS Thousands/uL 165 139* 142*   < > 127*   MONO PCT %  --   --  3*  --  4    < > = values in this interval not displayed  Results from last 7 days   Lab Units 05/15/21  0539 05/14/21  1822 05/14/21  0542   POTASSIUM mmol/L 4 0 4 0 3 4*   CHLORIDE mmol/L 95* 99* 101   CO2 mmol/L 30 31 29   BUN mg/dL 12 12 13   CREATININE mg/dL 0 36* 0 32* 0 30*   CALCIUM mg/dL 8 2* 8 4 7 8*   ALK PHOS U/L 54 59 66   ALT U/L 61 67 66   AST U/L 25 24 21                            IMAGING & DIAGNOSTIC TESTING     Radiology Results: I have personally reviewed pertinent reports    Xr Chest Portable    Result Date: 4/24/2021  Impression: Dialysis catheter tip within the superior cavoatrial junction  No pneumothorax  Workstation performed: [id number]     Xr Chest Picc Line Portable    Result Date: 4/27/2021  Impression: PICC line tip in the superior vena cava, approximately 3 5 to 4 cm above the cavoatrial junction  The examination demonstrates a significant  finding and was documented as such in [facility] for liaison and referring practitioner notification  Workstation performed: [id number]     Ir Picc Reposition    Result Date: 4/27/2021  Impression: Status-post repositioning of a 5 Nepali central venous catheter under fluoroscopic guidance with its tip at the cavoatrial junction  Workstation performed: [id number]     Ir Tunneled Central Line Removal    Result Date: 4/27/2021  Impression: Impression: Successful tunneled central line removal as described  Signed, performed and dictated by Christophe Allison PA-C under the supervision of Dr Linda Weeks  Workstation performed: [id number]     Other Diagnostic Testing: I have personally reviewed pertinent reports      ACTIVE MEDICATIONS     Current Facility-Administered Medications   Medication Dose Route Frequency    acetaminophen (TYLENOL) tablet 650 mg  650 mg Oral Q6H PRN    acyclovir (ZOVIRAX) capsule 400 mg  400 mg Oral Q12H [address]    albuterol (PROVENTIL HFA,VENTOLIN HFA) inhaler 2 puff  2 puff Inhalation Q6H PRN    amLODIPine (NORVASC) tablet 5 mg  5 mg Oral Daily    bisacodyl (DULCOLAX) rectal suppository 10 mg  10 mg Rectal Daily PRN    famotidine (PEPCID) tablet 20 mg  20 mg Oral BID    fluconazole (DIFLUCAN) tablet 400 mg  400 mg Oral Daily    heparin (porcine) subcutaneous injection 5,000 Units  5,000 Units Subcutaneous Q8H [address]    insulin glargine (LANTUS) subcutaneous injection 7 Units 0 07 mL  7 Units Subcutaneous HS    insulin lispro (HumaLOG) 100 units/mL subcutaneous injection 1-5 Units  1-5 Units Subcutaneous 4x Daily (AC & HS)    lactulose oral solution 10 g  10 g Oral BID PRN    levofloxacin (LEVAQUIN) tablet 500 mg  500 mg Oral Q24H    lidocaine (LIDODERM) 5 % patch 1 patch  1 patch Topical Daily    menthol-methyl salicylate (BENGAY) 58-60 % cream   Apply externally BID    ondansetron (ZOFRAN) injection 4 mg  4 mg Intravenous Q6H PRN    polyethylene glycol (MIRALAX) packet 17 g  17 g Oral Daily    predniSONE tablet 10 mg  10 mg Oral Daily    senna-docusate sodium (SENOKOT S) 8 6-50 mg per tablet 1 tablet  1 tablet Oral BID    sodium chloride 0 9 % infusion  20 mL/hr Intravenous Once PRN    sodium chloride 0 9 % infusion  20 mL/hr Intravenous Once PRN    sodium chloride 0 9 % infusion  20 mL/hr Intravenous Once PRN    sodium chloride 0 9 % infusion  50 mL/hr Intravenous Continuous    tamsulosin (FLOMAX) capsule 0 4 mg  0 4 mg Oral Daily With Dinner    tbo-filgrastim (GRANIX) subcutnaeous injection 300 mcg  300 mcg Subcutaneous Q24H       VTE Pharmacologic Prophylaxis: Heparin  VTE Mechanical Prophylaxis: sequential compression device    Portions of the record may have been created with voice recognition software  Occasional wrong word or "sound a like" substitutions may have occurred due to the inherent limitations of voice recognition software    Read the chart carefully and recognize, using context, where substitutions have occurred   ==  Akash Villanueva, [facility]  Internal Medicine Residency PGY-2

## 2021-05-17 NOTE — UTILIZATION REVIEW
Continued Stay Review    Date: 5/16/21                        Current Patient Class: IP  Current Level of Care: MS    HPI:56 y o  female initially admitted on 4/23/21 for initiation of chemotherapy for CNS lymphoma  5/10 PEG tube placement  Assessment/Plan:   Slight fever on 5/16  Pt was transfused with 1 U PRBCs on 5/15 for hgb 7 0 with resultant hgb 8 8  Has feeds via PEG tube  Had 2nd cycle chemo on 5/13 and Leucovorin on 5/14  Continues on steroid taper for cerebral edema from CNS Lymphoma  Monitoring for s/s of infection  Glucose fairly stable in light of steroids  Will plan for another machado removal on daily review  Using APJeT on L hip and knee pain  She remains A&O X 3        Vital Signs:   05/16/21 22:49:26  98 8 °F (37 1 °C)  110Abnormal   18  104/69  81  99 %  --   05/16/21 1830  --  107Abnormal   18  106/71  83  99 %  --   05/16/21 1613  --  --  --  --  --  --  None (Room air)   05/16/21 15:07:48  99 9 °F (37 7 °C)  106Abnormal   18  91/68  76  100 %  None (Room air)   05/16/21 10:34:21  99 5 °F (37 5 °C)  101  18  107/69  82  100 %  None (Room air)   05/16/21 0820  --  --  --  --  --  --  None (Room air)   05/16/21 07:26:23  100 °F (37 8 °C)  91  17  106/68  81  100 %  None (Room air)   05/16/21 03:48:49  99 9 °F (37 7 °C)  94  17  102/65  77  100 %  --   05/15/21 23:05:57  99 1 °F (37 3 °C)  94  18  100/62  75  100 %  --   05/15/21 18:48:42  98 7 °F (37 1 °C)  94  18  98/60  73  100 %  --   05/15/21 18:21:50  99 3 °F (37 4 °C)  89  18  98/61  73  100 %  None (Room air)   05/15/21 1720  --  --  --  --  --  --  None (Room air)   05/15/21 1620  --  --  --  --  --  --  None (Room air)   05/15/21 16:02:48  98 6 °F (37 °C)  85  18  100/62  75  100 %  None (Room air)   05/15/21 1600  --  --  --  --  --  --  None (Room air)   05/15/21 15:50:12  98 7 °F (37 1 °C)  90  18  100/62  75  100 %  None (Room air)   05/15/21 15:38:49  98 6 °F (37 °C)  94  20  102/62  75  100 %  --   05/15/21 15:31:06  98 9 °F (37 2 °C)  94  20  104/63  77  100 %  None (Room air)   05/15/21 10:41:43  99 °F (37 2 °C)  95  18  104/63  77  100 %  None (Room air)   05/15/21 10:41:30  99 °F (37 2 °C)  92  --  104/63  77  98 %  --   05/15/21 07:56:19  98 2 °F (36 8 °C)  95  18  106/63  77  100 %  None (Room air)   05/15/21 0415  --  --  --  --  --  --  None (Room      Pertinent Labs/Diagnostic Results:     5/14 Video Barium Swallow - Pt presents w/ severe/profound oral dysphagia, at least mod pharyngeal dysphagia w/ poor retrieval & min to no oral seal & poor containment  Poor bolus manipulation & transfers w/ labored transfers & high risk for spill w/ all material kenji thinner liquids other than ht  Reduced anterior hyolaryngeal movement w/ reduced constriction  + silent aspiration of thin prior to the swallow, pt cued to cough but cough is weak  Pleasure feeds of puree/ht by tsp as able with the PEG for 1* nutrition  Results from last 7 days   Lab Units 05/17/21  0500 05/16/21  0521 05/15/21  0539 05/14/21  0542 05/13/21  0555  05/12/21  0613   WBC Thousand/uL 2 93* 2 86* 2 72* 2 75* 3 02*   < > 2 94*   HEMOGLOBIN g/dL 9 5* 8 8* 7 0* 7 1* 9 2*   < > 8 1*   HEMATOCRIT % 28 9* 26 4* 21 7* 21 2* 27 6*   < > 24 4*   PLATELETS Thousands/uL 165 139* 142* 142* 164   < > 127*   BANDS PCT %  --   --  8  --   --   --  9*    < > = values in this interval not displayed           Results from last 7 days   Lab Units 05/15/21  0539 05/14/21  1822 05/14/21  0542 05/13/21  0555 05/12/21  1344   SODIUM mmol/L 131* 134* 135* 131* 132*   POTASSIUM mmol/L 4 0 4 0 3 4* 4 0 4 3   CHLORIDE mmol/L 95* 99* 101 99* 101   CO2 mmol/L 30 31 29 25 24   ANION GAP mmol/L 6 4 5 7 7   BUN mg/dL 12 12 13 20 17   CREATININE mg/dL 0 36* 0 32* 0 30* 0 35* 0 34*   EGFR ml/min/1 73sq m 123 127 130 124 125   CALCIUM mg/dL 8 2* 8 4 7 8* 9 0 8 8     Results from last 7 days   Lab Units 05/15/21  0539 05/14/21  1822 05/14/21  0542 05/12/21  1344 05/12/21  0613   AST U/L 25 24 21 15 16   ALT U/L 61 67 66 64 59   ALK PHOS U/L 54 59 66 49 52   TOTAL PROTEIN g/dL 4 8* 5 1* 4 9* 5 7* 5 2*   ALBUMIN g/dL 2 4* 2 6* 2 3* 2 8* 2 7*   TOTAL BILIRUBIN mg/dL 0 48 0 58 0 53 0 52 0 50   BILIRUBIN DIRECT mg/dL  --   --   --   --  0 14     Results from last 7 days   Lab Units 05/17/21  1143 05/17/21  0730 05/16/21  2107 05/16/21  2049 05/16/21  1630 05/16/21  1143 05/16/21  0850 05/15/21  2133 05/15/21  1723 05/15/21  1055 05/15/21  0755 05/14/21 2026   POC GLUCOSE mg/dl 131 141* 135 148* 114 159* 118 130 131 143* 166* 195*     Results from last 7 days   Lab Units 05/15/21  0539 05/14/21  1822 05/14/21  0542 05/13/21  0555 05/12/21  1344 05/12/21  0613   GLUCOSE RANDOM mg/dL 136 156* 248* 163* 156* 130     Medications:   Scheduled Medications:  acyclovir, 400 mg, Oral, Q12H MEÑO  amLODIPine, 5 mg, Oral, Daily  famotidine, 20 mg, Oral, BID  fluconazole, 400 mg, Oral, Daily  heparin (porcine), 5,000 Units, Subcutaneous, Q8H MEÑO  insulin glargine, 7 Units, Subcutaneous, HS  insulin lispro, 1-5 Units, Subcutaneous, 4x Daily (AC & HS)  levofloxacin, 500 mg, Oral, Q24H  lidocaine, 1 patch, Topical, Daily  menthol-methyl salicylate, , Apply externally, BID  polyethylene glycol, 17 g, Oral, Daily  predniSONE, 10 mg, Oral, Daily  senna-docusate sodium, 1 tablet, Oral, BID  tamsulosin, 0 4 mg, Oral, Daily With Dinner  tbo-filgrastim, 300 mcg, Subcutaneous, Q24H      Continuous IV Infusions:  sodium chloride, 50 mL/hr, Intravenous, Continuous      PRN Meds:  acetaminophen, 650 mg, Oral, Q6H PRN -x 1 5/15  albuterol, 2 puff, Inhalation, Q6H PRN  bisacodyl, 10 mg, Rectal, Daily PRN  lactulose, 10 g, Oral, BID PRN  ondansetron, 4 mg, Intravenous, Q6H PRN  sodium chloride, 20 mL/hr, Intravenous, Once PRN  sodium chloride, 20 mL/hr, Intravenous, Once PRN  sodium chloride, 20 mL/hr, Intravenous, Once PRN    Discharge Plan: TBD     Network Utilization Review Department  ATTENTION: Please call with any questions or concerns to [phone] and carefully listen to the prompts so that you are directed to the right person  All voicemails are confidential   Cait Gabe all requests for admission clinical reviews, approved or denied determinations and any other requests to dedicated fax number below belonging to the campus where the patient is receiving treatment   List of dedicated fax numbers for the Facilities:  [address] DENIALS (Administrative/Medical Necessity) [phone]   [address] (Maternity/NICU/Pediatrics) [phone]   [address] [place] [id number] [place] [address] [address] [place] [address] [phone]

## 2021-05-18 PROBLEM — M10.9 ACUTE GOUT: Status: ACTIVE | Noted: 2021-05-18

## 2021-05-18 LAB
ANION GAP SERPL CALCULATED.3IONS-SCNC: 9 MMOL/L (ref 4–13)
ANISOCYTOSIS BLD QL SMEAR: PRESENT
BASOPHILS # BLD MANUAL: 0 THOUSAND/UL (ref 0–0.1)
BASOPHILS NFR MAR MANUAL: 0 % (ref 0–1)
BUN SERPL-MCNC: 20 MG/DL (ref 5–25)
CALCIUM SERPL-MCNC: 9.1 MG/DL (ref 8.3–10.1)
CHLORIDE SERPL-SCNC: 103 MMOL/L (ref 100–108)
CO2 SERPL-SCNC: 22 MMOL/L (ref 21–32)
CREAT SERPL-MCNC: 0.37 MG/DL (ref 0.6–1.3)
EOSINOPHIL # BLD MANUAL: 0 THOUSAND/UL (ref 0–0.4)
EOSINOPHIL NFR BLD MANUAL: 0 % (ref 0–6)
ERYTHROCYTE [DISTWIDTH] IN BLOOD BY AUTOMATED COUNT: 17.5 % (ref 11.6–15.1)
GFR SERPL CREATININE-BSD FRML MDRD: 122 ML/MIN/1.73SQ M
GLUCOSE SERPL-MCNC: 144 MG/DL (ref 65–140)
GLUCOSE SERPL-MCNC: 149 MG/DL (ref 65–140)
GLUCOSE SERPL-MCNC: 150 MG/DL (ref 65–140)
GLUCOSE SERPL-MCNC: 170 MG/DL (ref 65–140)
GLUCOSE SERPL-MCNC: 216 MG/DL (ref 65–140)
HCT VFR BLD AUTO: 29.6 % (ref 34.8–46.1)
HGB BLD-MCNC: 9.8 G/DL (ref 11.5–15.4)
LYMPHOCYTES # BLD AUTO: 0.94 THOUSAND/UL (ref 0.6–4.47)
LYMPHOCYTES # BLD AUTO: 15 % (ref 14–44)
MCH RBC QN AUTO: 31.4 PG (ref 26.8–34.3)
MCHC RBC AUTO-ENTMCNC: 33.1 G/DL (ref 31.4–37.4)
MCV RBC AUTO: 95 FL (ref 82–98)
MONOCYTES # BLD AUTO: 0.19 THOUSAND/UL (ref 0–1.22)
MONOCYTES NFR BLD: 3 % (ref 4–12)
MTX SERPL-SCNC: <0.1 UMOL/L
NEUTROPHILS # BLD MANUAL: 5.13 THOUSAND/UL (ref 1.85–7.62)
NEUTS BAND NFR BLD MANUAL: 17 % (ref 0–8)
NEUTS SEG NFR BLD AUTO: 65 % (ref 43–75)
NRBC BLD AUTO-RTO: 1 /100 WBCS
PLATELET # BLD AUTO: 142 THOUSANDS/UL (ref 149–390)
PLATELET BLD QL SMEAR: ABNORMAL
PMV BLD AUTO: 9.9 FL (ref 8.9–12.7)
POTASSIUM SERPL-SCNC: 4.2 MMOL/L (ref 3.5–5.3)
RBC # BLD AUTO: 3.12 MILLION/UL (ref 3.81–5.12)
SODIUM SERPL-SCNC: 134 MMOL/L (ref 136–145)
TOTAL CELLS COUNTED SPEC: 100
WBC # BLD AUTO: 6.26 THOUSAND/UL (ref 4.31–10.16)

## 2021-05-18 PROCEDURE — 80048 BASIC METABOLIC PNL TOTAL CA: CPT | Performed by: STUDENT IN AN ORGANIZED HEALTH CARE EDUCATION/TRAINING PROGRAM

## 2021-05-18 PROCEDURE — 99231 SBSQ HOSP IP/OBS SF/LOW 25: CPT | Performed by: INTERNAL MEDICINE

## 2021-05-18 PROCEDURE — 85027 COMPLETE CBC AUTOMATED: CPT | Performed by: STUDENT IN AN ORGANIZED HEALTH CARE EDUCATION/TRAINING PROGRAM

## 2021-05-18 PROCEDURE — 85007 BL SMEAR W/DIFF WBC COUNT: CPT | Performed by: STUDENT IN AN ORGANIZED HEALTH CARE EDUCATION/TRAINING PROGRAM

## 2021-05-18 PROCEDURE — 82948 REAGENT STRIP/BLOOD GLUCOSE: CPT

## 2021-05-18 RX ORDER — ALLOPURINOL 100 MG/1
100 TABLET ORAL DAILY
Status: DISCONTINUED | OUTPATIENT
Start: 2021-05-18 | End: 2021-06-14 | Stop reason: HOSPADM

## 2021-05-18 RX ADMIN — POLYETHYLENE GLYCOL 3350 17 G: 17 POWDER, FOR SOLUTION ORAL at 10:52

## 2021-05-18 RX ADMIN — MENTHOL, UNSPECIFIED FORM AND METHYL SALICYLATE: 10; 150 CREAM TOPICAL at 10:54

## 2021-05-18 RX ADMIN — HEPARIN SODIUM 5000 UNITS: 5000 INJECTION INTRAVENOUS; SUBCUTANEOUS at 13:29

## 2021-05-18 RX ADMIN — ACETAMINOPHEN 650 MG: 325 TABLET, FILM COATED ORAL at 06:10

## 2021-05-18 RX ADMIN — TAMSULOSIN HYDROCHLORIDE 0.4 MG: 0.4 CAPSULE ORAL at 16:41

## 2021-05-18 RX ADMIN — FLUCONAZOLE 400 MG: 200 TABLET ORAL at 10:53

## 2021-05-18 RX ADMIN — LEVOFLOXACIN 500 MG: 500 TABLET, FILM COATED ORAL at 16:34

## 2021-05-18 RX ADMIN — SENNOSIDES, DOCUSATE SODIUM 1 TABLET: 8.6; 5 TABLET ORAL at 17:55

## 2021-05-18 RX ADMIN — TBO-FILGRASTIM 300 MCG: 300 INJECTION, SOLUTION SUBCUTANEOUS at 16:34

## 2021-05-18 RX ADMIN — SODIUM CHLORIDE 50 ML/HR: 0.9 INJECTION, SOLUTION INTRAVENOUS at 11:04

## 2021-05-18 RX ADMIN — LIDOCAINE 5% 1 PATCH: 700 PATCH TOPICAL at 10:53

## 2021-05-18 RX ADMIN — FAMOTIDINE 20 MG: 20 TABLET ORAL at 10:53

## 2021-05-18 RX ADMIN — ACETAMINOPHEN 650 MG: 325 TABLET, FILM COATED ORAL at 21:20

## 2021-05-18 RX ADMIN — SENNOSIDES, DOCUSATE SODIUM 1 TABLET: 8.6; 5 TABLET ORAL at 10:53

## 2021-05-18 RX ADMIN — INSULIN GLARGINE 7 UNITS: 100 INJECTION, SOLUTION SUBCUTANEOUS at 23:30

## 2021-05-18 RX ADMIN — FAMOTIDINE 20 MG: 20 TABLET ORAL at 17:55

## 2021-05-18 RX ADMIN — MENTHOL, UNSPECIFIED FORM AND METHYL SALICYLATE: 10; 150 CREAM TOPICAL at 17:55

## 2021-05-18 RX ADMIN — INSULIN LISPRO 1 UNITS: 100 INJECTION, SOLUTION INTRAVENOUS; SUBCUTANEOUS at 21:23

## 2021-05-18 RX ADMIN — INSULIN LISPRO 2 UNITS: 100 INJECTION, SOLUTION INTRAVENOUS; SUBCUTANEOUS at 16:41

## 2021-05-18 RX ADMIN — ACYCLOVIR 400 MG: 200 CAPSULE ORAL at 10:53

## 2021-05-18 RX ADMIN — ACYCLOVIR 400 MG: 200 CAPSULE ORAL at 21:21

## 2021-05-18 RX ADMIN — LACTULOSE 10 G: 10 SOLUTION ORAL at 21:20

## 2021-05-18 RX ADMIN — PREDNISONE 10 MG: 10 TABLET ORAL at 10:54

## 2021-05-18 RX ADMIN — LACTULOSE 10 G: 10 SOLUTION ORAL at 06:31

## 2021-05-18 RX ADMIN — AMLODIPINE BESYLATE 5 MG: 5 TABLET ORAL at 10:53

## 2021-05-18 RX ADMIN — ALLOPURINOL 100 MG: 100 TABLET ORAL at 13:29

## 2021-05-18 NOTE — PROGRESS NOTES
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  Patient just received her 2nd cycle of methotrexate and Rituxan (rituximab); now on Leucovorin rescue  Methotrexate level from 5/16 at 18:08 less than 0 10  We can change to maintenance IVF; Discontinue leukovorin rescue at this time  Continue PPX with Diflucan, acyclovir and Levaquin until outpatient appt, then defer continuation to primary oncologist, Dr Farzana Rader  WBC did respond after 1st dose of granix- complete 2nd dose of 300mcg SQ daily today  Remove machado when appropriate per primary team    Pt needs rehab, and outpatient follow up with oncology  I did see this patient with Dr Cristian Membreno  and he is in agreement with this plan  Batsheva Poster, 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 status post her 2nd cycle of methotrexate and Rituxan (rituximab)  Patient was found in bed in no apparent distress  Very limited verbal communication, mental status did seem to be slightly better today as compared to yesterday  No evidence of pain  Nursing staff denies any active medical issues      Hospital medications list:  Current Facility-Administered Medications   Medication Dose Route Frequency Provider Last Rate    acetaminophen  650 mg Oral Q6H PRN Solis Bustos MD      acyclovir  400 mg Oral Q12H [facility] Gisella Dang MD      albuterol  2 puff Inhalation Q6H PRN Meredith Manzano MD      allopurinol  100 mg Oral Daily Ulises Vollaro, DO      amLODIPine  5 mg Oral Daily Jesus Inoue, DO      bisacodyl  10 mg Rectal Daily PRN Luke Hoff, DO      famotidine  20 mg Oral BID Harlen Barbadian, DO      fluconazole  400 mg Oral Daily Cheryle App, MD      heparin (porcine)  5,000 Units Subcutaneous [facility] Jarred Katz DO      insulin glargine  7 Units Subcutaneous HS Harlen Barbadian, DO      insulin lispro  1-5 Units Subcutaneous 4x Daily (AC & HS) José Luis Barahona, DO      lactulose  10 g Oral BID PRN Almer Canard, DO      levofloxacin  500 mg Oral Q24H Cheryle App, MD      lidocaine  1 patch Topical Daily Laci Cedeno MD      menthol-methyl salicylate   Apply externally BID Jarred Katz, DO      ondansetron  4 mg Intravenous Q6H PRN Laci Cedeno MD      polyethylene glycol  17 g Oral Daily Harlen Barbadian, DO      predniSONE  10 mg Oral Daily Vallorie Bellow, DO      senna-docusate sodium  1 tablet Oral BID Laci Cedeno MD      sodium chloride  20 mL/hr Intravenous Once PRN Devere Kendall, DO      sodium chloride  20 mL/hr Intravenous Once PRN Devere Kendall, DO      sodium chloride  20 mL/hr Intravenous Once PRN Devere Kendall, DO Stopped (05/13/21 1354)    sodium chloride  50 mL/hr Intravenous Continuous Harlen Barbadian, DO 50 mL/hr (05/18/21 1104)    tamsulosin  0 4 mg Oral Daily With Sara Lester MD      tbo-filgrastim  300 mcg Subcutaneous Q24H Devere Kendall, DO       Physical exam  /77   Pulse 103   Temp (!) 96 3 °F (35 7 °C)   Resp 20   Ht 5' (1 524 m)   Wt 47 6 kg (104 lb 15 oz)   SpO2 100%   BMI 20 49 kg/m²    Constitutional:  Relatively well-nourished female, no respiratory distress  Eyes: PERRL, conjunctiva pale, anicteric    HENT:  Right forhead well-healed suture line  Neck: Good range of motion, no adenopathy  Respiratory:  Fair entry bilaterally, scattered rhonchi  Cardiovascular: Normal rate, normal rhythm, no murmurs, no gallops, no rubs    GI: Soft, nondistended, normal bowel sounds, nontender, no organomegaly, no mass, no rebound, no guarding, feeding tube in place   : No costovertebral angle tenderness, normal reproductive organs, no discharge  Musculoskeletal: No pain or tenderness with palpation of joints, muscles or bones  Integument:  Pale, warm, moist, scattered purpura  Lymphatic: No adenopathy in the neck, supra-clavicular region, axilla and groin bilaterally  Extremities:  No lower extremity edema, no cords, pulses 1+     Laboratory test results  Lab Results   Component Value Date    HGB 9 8 (L) 05/18/2021    HCT 29 6 (L) 05/18/2021    MCV 95 05/18/2021     (L) 05/18/2021    WBC 6 26 05/18/2021    NRBC 1 05/18/2021    BANDSPCT 17 (H) 05/18/2021    ATYLMPCT 2 (H) 05/02/2021       Lab Results   Component Value Date    K 4 2 05/18/2021     05/18/2021    CO2 22 05/18/2021    BUN 20 05/18/2021    CREATININE 0 37 (L) 05/18/2021    CALCIUM 9 1 05/18/2021    CORRECTEDCA 9 5 05/15/2021    AST 25 05/15/2021    ALT 61 05/15/2021    ALKPHOS 54 05/15/2021    EGFR 122 05/18/2021

## 2021-05-18 NOTE — PLAN OF CARE
Problem: Prexisting or High Potential for Compromised Skin Integrity  Goal: Skin integrity is maintained or improved  Description: INTERVENTIONS:  - Identify patients at risk for skin breakdown  - Assess and monitor skin integrity  - Assess and monitor nutrition and hydration status  - Monitor labs   - Assess for incontinence   - Turn and reposition patient  - Assist with mobility/ambulation  - Relieve pressure over bony prominences  - Avoid friction and shearing  - Provide appropriate hygiene as needed including keeping skin clean and dry  - Evaluate need for skin moisturizer/barrier cream  - Collaborate with interdisciplinary team   - Patient/family teaching  - Consider wound care consult   Outcome: Progressing     Problem: Potential for Falls  Goal: Patient will remain free of falls  Description: INTERVENTIONS:  - Assess patient frequently for physical needs  -  Identify cognitive and physical deficits and behaviors that affect risk of falls  -  [place] fall precautions as indicated by assessment   - Educate patient/family on patient safety including physical limitations  - Instruct patient to call for assistance with activity based on assessment  - Modify environment to reduce risk of injury  - Consider OT/PT consult to assist with strengthening/mobility  Outcome: Progressing     Problem: Nutrition/Hydration-ADULT  Goal: Nutrient/Hydration intake appropriate for improving, restoring or maintaining nutritional needs  Description: Monitor and assess patient's nutrition/hydration status for malnutrition  Collaborate with interdisciplinary team and initiate plan and interventions as ordered  Monitor patient's weight and dietary intake as ordered or per policy  Utilize nutrition screening tool and intervene as necessary  Determine patient's food preferences and provide high-protein, high-caloric foods as appropriate       INTERVENTIONS:  - Monitor oral intake, urinary output, labs, and treatment plans  - Assess nutrition and hydration status and recommend course of action  - Evaluate amount of meals eaten  - Assist patient with eating if necessary   - Allow adequate time for meals  - Recommend/ encourage appropriate diets, oral nutritional supplements, and vitamin/mineral supplements  - Order, calculate, and assess calorie counts as needed  - Recommend, monitor, and adjust tube feedings and TPN/PPN based on assessed needs  - Assess need for intravenous fluids  - Provide specific nutrition/hydration education as appropriate  - Include patient/family/caregiver in decisions related to nutrition  Outcome: Progressing     Problem: PAIN - ADULT  Goal: Verbalizes/displays adequate comfort level or baseline comfort level  Description: Interventions:  - Encourage patient to monitor pain and request assistance  - Assess pain using appropriate pain scale  - Administer analgesics based on type and severity of pain and evaluate response  - Implement non-pharmacological measures as appropriate and evaluate response  - Consider cultural and social influences on pain and pain management  - Notify physician/advanced practitioner if interventions unsuccessful or patient reports new pain  Outcome: Progressing     Problem: INFECTION - ADULT  Goal: Absence or prevention of progression during hospitalization  Description: INTERVENTIONS:  - Assess and monitor for signs and symptoms of infection  - Monitor lab/diagnostic results  - Monitor all insertion sites, i e  indwelling lines, tubes, and drains  - Monitor endotracheal if appropriate and nasal secretions for changes in amount and color  - [place] appropriate cooling/warming therapies per order  - Administer medications as ordered  - Instruct and encourage patient and family to use good hand hygiene technique  - Identify and instruct in appropriate isolation precautions for identified infection/condition  Outcome: Progressing     Problem: SAFETY ADULT  Goal: Patient will remain free of falls  Description: INTERVENTIONS:  - Assess patient frequently for physical needs  -  Identify cognitive and physical deficits and behaviors that affect risk of falls    -  [place] fall precautions as indicated by assessment   - Educate patient/family on patient safety including physical limitations  - Instruct patient to call for assistance with activity based on assessment  - Modify environment to reduce risk of injury  - Consider OT/PT consult to assist with strengthening/mobility  Outcome: Progressing  Goal: Maintain or return to baseline ADL function  Description: INTERVENTIONS:  -  Assess patient's ability to carry out ADLs; assess patient's baseline for ADL function and identify physical deficits which impact ability to perform ADLs (bathing, care of mouth/teeth, toileting, grooming, dressing, etc )  - Assess/evaluate cause of self-care deficits   - Assess range of motion  - Assess patient's mobility; develop plan if impaired  - Assess patient's need for assistive devices and provide as appropriate  - Encourage maximum independence but intervene and supervise when necessary  - Involve family in performance of ADLs  - Assess for home care needs following discharge   - Consider OT consult to assist with ADL evaluation and planning for discharge  - Provide patient education as appropriate  Outcome: Progressing  Goal: Maintain or return mobility status to optimal level  Description: INTERVENTIONS:  - Assess patient's baseline mobility status (ambulation, transfers, stairs, etc )    - Identify cognitive and physical deficits and behaviors that affect mobility  - Identify mobility aids required to assist with transfers and/or ambulation (gait belt, sit-to-stand, lift, walker, cane, etc )  - [place] fall precautions as indicated by assessment  - Record patient progress and toleration of activity level on Mobility SBAR; progress patient to next Phase/Stage  - Instruct patient to call for assistance with activity based on assessment  - Consider rehabilitation consult to assist with strengthening/weightbearing, etc   Outcome: Progressing     Problem: DISCHARGE PLANNING  Goal: Discharge to home or other facility with appropriate resources  Description: INTERVENTIONS:  - Identify barriers to discharge w/patient and caregiver  - Arrange for needed discharge resources and transportation as appropriate  - Identify discharge learning needs (meds, wound care, etc )  - Arrange for interpretive services to assist at discharge as needed  - Refer to Case Management Department for coordinating discharge planning if the patient needs post-hospital services based on physician/advanced practitioner order or complex needs related to functional status, cognitive ability, or social support system  Outcome: Progressing     Problem: Knowledge Deficit  Goal: Patient/family/caregiver demonstrates understanding of disease process, treatment plan, medications, and discharge instructions  Description: Complete learning assessment and assess knowledge base    Interventions:  - Provide teaching at level of understanding  - Provide teaching via preferred learning methods  Outcome: Progressing

## 2021-05-18 NOTE — UTILIZATION REVIEW
Continued Stay Review    Date: 5/18/21                       Current Patient Class: IP  Current Level of Care: MS    HPI:56 y o  female initially admitted on 4/23/21 for initiation of chemotherapy for CNS lymphoma  5/10 PEG tube placement      Assessment/Plan:     5/17 -  Pt stable overall; WBC now 2 93; reasonable to start granix, 300mcg SQ daily x 2 days to ideally prevent prolonged neutropenia and infection, especially given urine machado  She actually may need additional days of Granix  MTX levels are below  Plan to discontinue leukovorin until MTX lab level less than 0 10  No c/o pain and tolerating tube feeds well  Ref  Range 5/14/2021 18:22 5/15/2021 18:18 5/16/2021 18:08 5/17/2021 18:40   Methotrexate Lvl Latest Units: umol/L 2 30 0 16 <0 10 <0 10     5/18 - continues on Jevity tube feeds  Machado will be d/c today  Pain in R knee from gout flare is improving today  Still on Predsnisone and will hold off on NSAIDS and colchicine d/t chemo  Starting Low dose Allopurinol  Last transfusion PRBCs was 5/15 and hgb is stable at 9 8  WBC is up to 6 26 today  Intermittent tachycardia  Glucose stable, continues on Saline infusion        Vital Signs:   05/18/21 07:09:44  96 3 °F (35 7 °C)Abnormal   103  --  111/77  88  100 %  --   05/18/21 02:58:48  --  105  20  113/78  90  99 %  --   05/17/21 21:59:02  97 7 °F (36 5 °C)  98  18  111/80  90  100 %  --   05/17/21 18:43:10  99 °F (37 2 °C)  106Abnormal   18  109/77  88  100 %  --   05/17/21 15:17:41  99 3 °F (37 4 °C)  103  18  110/74  86  99 %  --   05/17/21 11:30:47  99 1 °F (37 3 °C)  98  18  112/76  88  100 %  --   05/17/21 0945  --  --  --  --  --  --  None (Room air)   05/17/21 07:24:50  99 3 °F (37 4 °C)  98  --  101/68  79  100 %  --   05/17/21 02:43:50  99 °F (37 2 °C)  98  18  103/69  80  100 %  --   05/16/21 22:49:26  98 8 °F (37 1 °C)  110Abnormal   18  104/69  81  99 %  --   05/16/21 1830  --  107Abnormal   18  106/71  83  99 %  --   05/16/21 1613  --  --  --  --  --  --  None (Room air)   05/16/21 15:07:48  99 9 °F (37 7 °C)  106Abnormal   18  91/68  76  100 %  None (Room air)   05/16/21 10:34:21  99 5 °F (37 5 °C)  101  18  107/69  82  100 %  None (Room air)   05/16/21 0820  --  --  --  --  --  --  None (Room air)   05/16/21 07:26:23  100 °F (37 8 °C)  91  17  106/68  81  100 %  None (Room air)     Pertinent Labs/Diagnostic Results:       Results from last 7 days   Lab Units 05/18/21  0628 05/17/21  0500 05/16/21  0521 05/15/21  0539 05/14/21  0542 05/12/21  0613   WBC Thousand/uL 6 26 2 93* 2 86* 2 72* 2 75* 2 94*   HEMOGLOBIN g/dL 9 8* 9 5* 8 8* 7 0* 7 1* 8 1*   HEMATOCRIT % 29 6* 28 9* 26 4* 21 7* 21 2* 24 4*   PLATELETS Thousands/uL 142* 165 139* 142* 142* 127*   BANDS PCT % 17*  --   --  8  --  9*         Results from last 7 days   Lab Units 05/18/21  0628 05/15/21  0539 05/14/21  1822 05/14/21  0542 05/13/21  0555   SODIUM mmol/L 134* 131* 134* 135* 131*   POTASSIUM mmol/L 4 2 4 0 4 0 3 4* 4 0   CHLORIDE mmol/L 103 95* 99* 101 99*   CO2 mmol/L 22 30 31 29 25   ANION GAP mmol/L 9 6 4 5 7   BUN mg/dL 20 12 12 13 20   CREATININE mg/dL 0 37* 0 36* 0 32* 0 30* 0 35*   EGFR ml/min/1 73sq m 122 123 127 130 124   CALCIUM mg/dL 9 1 8 2* 8 4 7 8* 9 0     Results from last 7 days   Lab Units 05/15/21  0539 05/14/21  1822 05/14/21  0542 05/12/21  1344 05/12/21  0613   AST U/L 25 24 21 15 16   ALT U/L 61 67 66 64 59   ALK PHOS U/L 54 59 66 49 52   TOTAL PROTEIN g/dL 4 8* 5 1* 4 9* 5 7* 5 2*   ALBUMIN g/dL 2 4* 2 6* 2 3* 2 8* 2 7*   TOTAL BILIRUBIN mg/dL 0 48 0 58 0 53 0 52 0 50   BILIRUBIN DIRECT mg/dL  --   --   --   --  0 14     Results from last 7 days   Lab Units 05/18/21  1130 05/18/21  0716 05/17/21  2204 05/17/21  1633 05/17/21  1143 05/17/21  0730 05/16/21  2107 05/16/21  2049 05/16/21  1630 05/16/21  1143 05/16/21  0850 05/15/21  2133   POC GLUCOSE mg/dl 144* 150* 172* 214* 131 141* 135 148* 114 159* 118 130     Results from last 7 days   Lab Units 05/18/21  2915 05/15/21  0539 05/14/21  1822 05/14/21  0542 05/13/21  0555 05/12/21  1344 05/12/21  0613   GLUCOSE RANDOM mg/dL 149* 136 156* 248* 163* 156* 130     Medications:   Scheduled Medications:  acyclovir, 400 mg, Oral, Q12H MEÑO  allopurinol, 100 mg, Oral, Daily  amLODIPine, 5 mg, Oral, Daily  famotidine, 20 mg, Oral, BID  fluconazole, 400 mg, Oral, Daily  heparin (porcine), 5,000 Units, Subcutaneous, Q8H MEÑO  insulin glargine, 7 Units, Subcutaneous, HS  insulin lispro, 1-5 Units, Subcutaneous, 4x Daily (AC & HS)  levofloxacin, 500 mg, Oral, Q24H  lidocaine, 1 patch, Topical, Daily  menthol-methyl salicylate, , Apply externally, BID  polyethylene glycol, 17 g, Oral, Daily  predniSONE, 10 mg, Oral, Daily  senna-docusate sodium, 1 tablet, Oral, BID  tamsulosin, 0 4 mg, Oral, Daily With Dinner  tbo-filgrastim, 300 mcg, Subcutaneous, Q24H    Continuous IV Infusions:  sodium chloride, 50 mL/hr, Intravenous, Continuous      PRN Meds:  acetaminophen, 650 mg, Oral, Q6H PRN - x 1 5/17, 5/18  albuterol, 2 puff, Inhalation, Q6H PRN  bisacodyl, 10 mg, Rectal, Daily PRN  lactulose, 10 g, Oral, BID PRN - x 1 5/18  ondansetron, 4 mg, Intravenous, Q6H PRN  sodium chloride, 20 mL/hr, Intravenous, Once PRN  sodium chloride, 20 mL/hr, Intravenous, Once PRN  sodium chloride, 20 mL/hr, Intravenous, Once PRN    Discharge Plan: TBD    Network Utilization Review Department  ATTENTION: Please call with any questions or concerns to [phone] and carefully listen to the prompts so that you are directed to the right person  All voicemails are confidential   Mauricio Kwong all requests for admission clinical reviews, approved or denied determinations and any other requests to dedicated fax number below belonging to the campus where the patient is receiving treatment   List of dedicated fax numbers for the Facilities:  [address] [place] [address] DENIALS (Administrative/Medical Necessity) [phone]   [address] (Maternity/NICU/Pediatrics) [address] [facility] [address] [place] [address] [id number] [place] [address] [address] [place] [address] [facility] [address] [facility] [phone]

## 2021-05-18 NOTE — PROGRESS NOTES
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[address] )  Active Problems:    Acute gout    Altered mental status    Dysphagia    Urinary retention    Aphasia    Weakness    Presence of permanent central venous catheter    Fracture of ramus of left pubis (HCC)    Severe protein-calorie malnutrition (HCC)    Steroid-induced hyperglycemia    Left-sided weakness    Pancytopenia ([address] )      * 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[facility] [address] )  Assessment & Plan  Patient was diagnosed with primary CNS lymphoma through biopsy in [facility] [place] transferred over here for chemotherapy since the [facility] [place] is out of network  Had a 2nd family meeting on 04/30 and family has decided to proceed treatment at this time  S/p PICC line for chemotherapy  S/p peg tube placement on 05/10  Currently on oral diet and Jevity 1 2  Plan:  Hematology-Oncology consulted  Appreciate recommendations  Chemotherapy treatment as per hematology oncology team     S/p 2nd cycle of chemotherapy on 05/13  Leucovin on 5/14  DVT prophylaxis:  On heparin  Case management-will start looking for places for rehab  S/p steroid taper for cerebral edema from CNS lymphoma  Pancytopenia in setting of recent chemotherapy  Will continue to watch for any signs of infection  Also started on acyclovir, Diflucan and Levaquin    Discontinue Cottrell today    Acute gout  Assessment & Plan  Appears to have acute gout flare right knee, pain improving today  Currently on prednisone 10 mg q day, based on recent chemotherapy will hold off on NSAIDs or colchicine  Will start low-dose allopurinol 100 mg q day to hopefully prevent other acute flares in the setting of chemotherapy    Pancytopenia ([facility] [address] )  Assessment & Plan  Likely in setting of chemotherapy  Will continue to monitor for any signs of infection  Will transfuse with packed RBC for hemoglobin less than 7  Drop in Hgb from 9 to 7 on 5/14, other lines relatively stable  VSS and soft brown BMs  Will discuss with oncology if this is expected based on chemo or if there should be further workup for bleeding, though hemodynamics currently do not suggest bleed  S/p 1 pack RBC on 05/15, hemoglobin continues to improve    Left-sided weakness  Assessment & Plan  Patient has left-sided weakness at baseline from her CNS lymphoma  Left upper extremity 3-4/5 and left lower extremity 0-1/5 muscle strength at baseline  Steroid-induced hyperglycemia  Assessment & Plan  Mild hyperglycemia, glucoses in 200s, no history of DM  Occurring in the setting of Decadron with chemotherapy, as well as sodium bicarb in D5 drip  - D5 drip is finished  - will monitor sugars while on Jevity and now off D5  - continue q4 hour fingerstick glucose with correctional algorithm 1 and Lantus    Severe protein-calorie malnutrition (HCC)  Assessment & Plan  Malnutrition Findings:   Adult Malnutrition type: Acute illness(due to medical condition resulting in dysphagia, decreased appetite and intake, weight loss)  Adult Degree of Malnutrition: Other severe protein calorie malnutrition    BMI Findings: Body mass index is 20 49 kg/m²  Plan:  Nutrition consult  Tube feeds initiated with Jevity 1 2, advancing to goal      Fracture of ramus of left pubis [facility])  Assessment & Plan  Markel Dhillon prior to presentation to [facility]  Managed nonoperatively at [facility]    Presence of permanent central venous catheter  Assessment & Plan  Noted presence of right IJ tunneled double-lumen HD catheter; upon chart review, this was likely inserted to be used for plasmapheresis which she has completed  Plan:  S/p removal by IR    Patient now has PICC line     Weakness  Assessment & Plan  Left greater than right upper and lower extremity weakness with ongoing left lower extremity contracture  Secondary to underlying CNS malignancy  *Decubitus ulcer precautions such as frequent repositioning  Aphasia  Assessment & Plan  Definite evidence of expressive aphasia, possible component of receptive aphasia as well  Speech therapy on board  Urinary retention  Assessment & Plan  Patient developed urinary retention during previous hospitalization  Plan was voiding trial as an outpatient as she was scheduled for discharge from that facility  Machado catheter was initially removed although patient required re-placement of machado on 05/14 in setting of incontinence and getting chemotherapy  Will evaluate daily for Machado catheter removal       Dysphagia  Assessment & Plan  Patient noted to have dysphagia and also decreased p o  Intake  As per [facility] progress note patient had a calorie count and recommended feeding tube placement  Speech evaluation - advance diet with dysphagia 2 and thin liquids; needs assistance with feeds  Calorie count  If the patient continued to have poor p o  Intake may need discussion with the family regarding alternate methods of nutrition  Patient's calorie intake is low  VBS- showed oropharyngeal dysphagia  s/p peg tube placement  Altered mental status  Assessment & Plan  Patient initially presented to [facility] his ultimate [place] with stroke-like symptoms for 2 days  CT scan of the head was concerning for subacute CVA and was transferred to [facility]   MRI of the brain was more consistent with demyelinating disease patient was started on IV steroids and 1000 mg daily for 5 days  Patient did not have any significant improvement at that time patient had plasmapheresis  And also had  lumbar puncture-negative for infection or malignancy    Patient noted to have persistent encephalopathy so a repeat MRI was obtained which showed worsening of the abnormality seen on the previous MRI and 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  Likely secondary to CNS lymphoma  Delirium precautions   Currently alert and oriented times 2-3   follow commands  Moves all extremities spontaneously except left upper and lower extrimities  Patient's  (does not speak [place]) makes all medical decision for her  Sepsis (HCC)-resolved as of 2021  Assessment & Plan  Patient had hypothermia, elevated WBC count and tachycardia  Concern for possible aspiration in setting on dysphagia versus UTI in setting of urinary catheter which was placed for retention  Plan:  S/p IV antibiotics last day on   Will continue to monitor off antibiotics  Disposition:  Continue inpatient treatment, hopefully rehab tomorrow or next day  SUBJECTIVE     Patient seen and examined  No acute events overnight  Mental status stable, pain left knee improving  OBJECTIVE     Vitals:    21 1843 21 2159 21 0258 21 0709   BP: 109/77 111/80 113/78 111/77   Pulse: (!) 106 98 105 103   Resp: 18 18 20    Temp: 99 °F (37 2 °C) 97 7 °F (36 5 °C)  (!) 96 3 °F (35 7 °C)   TempSrc:       SpO2: 100% 100% 99% 100%   Weight:       Height:          Temperature:   Temp (24hrs), Av 1 °F (36 7 °C), Min:96 3 °F (35 7 °C), Max:99 3 °F (37 4 °C)    Temperature: (!) 96 3 °F (35 7 °C)  Intake & Output:  I/O        07 -  0700  07 -  0700  07 -  0700    P  O  370 290     I V  (mL/kg) 1395 8 (29 3) 1700 8 (35 7)     Blood       NG/ 225     IV Piggyback       Feedings 1080 632     Total Intake(mL/kg) 3565 8 (74 9) 2847 8 (59 8)     Urine (mL/kg/hr) 4125 (3 6) 2575 (2 3) 500 (1 8)    Total Output 4125 2575 500    Net -559 2 +272 8 -500               Weights:   IBW (Ideal Body Weight): 45 5 kg    Body mass index is 20 49 kg/m²    Weight (last 2 days)     Date/Time Weight    05/17/21 0545   47 6 (104 94)            Physical Exam  Constitutional:       Appearance: She is normal weight  HENT:      Head: Normocephalic and atraumatic  Eyes:      General: No scleral icterus  Comments: Left upper eyelid droop   Cardiovascular:      Rate and Rhythm: Normal rate and regular rhythm  Pulmonary:      Effort: Pulmonary effort is normal       Breath sounds: Normal breath sounds  Abdominal:      General: Abdomen is flat  There is no distension  Tenderness: There is no abdominal tenderness  Musculoskeletal: Normal range of motion  General: Swelling (Swelling, limited erythema left knee) present  Skin:     General: Skin is warm and dry  Capillary Refill: Capillary refill takes less than 2 seconds  Neurological:      General: No focal deficit present  Mental Status: She is alert  LABORATORY DATA     Labs: I have personally reviewed pertinent reports  Results from last 7 days   Lab Units 05/18/21  0628 05/17/21  0500 05/16/21  0521 05/15/21  0539  05/12/21  0613   WBC Thousand/uL 6 26 2 93* 2 86* 2 72*   < > 2 94*   HEMOGLOBIN g/dL 9 8* 9 5* 8 8* 7 0*   < > 8 1*   HEMATOCRIT % 29 6* 28 9* 26 4* 21 7*   < > 24 4*   PLATELETS Thousands/uL 142* 165 139* 142*   < > 127*   MONO PCT % 3*  --   --  3*  --  4    < > = values in this interval not displayed  Results from last 7 days   Lab Units 05/18/21  0628 05/15/21  0539 05/14/21  1822 05/14/21  0542   POTASSIUM mmol/L 4 2 4 0 4 0 3 4*   CHLORIDE mmol/L 103 95* 99* 101   CO2 mmol/L 22 30 31 29   BUN mg/dL 20 12 12 13   CREATININE mg/dL 0 37* 0 36* 0 32* 0 30*   CALCIUM mg/dL 9 1 8 2* 8 4 7 8*   ALK PHOS U/L  --  54 59 66   ALT U/L  --  61 67 66   AST U/L  --  25 24 21                            IMAGING & DIAGNOSTIC TESTING     Radiology Results: I have personally reviewed pertinent reports    Xr Chest Portable    Result Date: 4/24/2021  Impression: Dialysis catheter tip within the superior cavoatrial junction  No pneumothorax  Workstation performed: [id number]     Xr Chest Picc Line Portable    Result Date: 4/27/2021  Impression: PICC line tip in the superior vena cava, approximately 3 5 to 4 cm above the cavoatrial junction  The examination demonstrates a significant  finding and was documented as such in [facility] for liaison and referring practitioner notification  Workstation performed: [id number]     Ir Picc Reposition    Result Date: 4/27/2021  Impression: Status-post repositioning of a 5 Tamazight central venous catheter under fluoroscopic guidance with its tip at the cavoatrial junction  Workstation performed: [id number]     Ir Tunneled Central Line Removal    Result Date: 4/27/2021  Impression: Impression: Successful tunneled central line removal as described  Signed, performed and dictated by Bob Choi PA-C under the supervision of Dr Wendie Barrett  Workstation performed: [id number]     Other Diagnostic Testing: I have personally reviewed pertinent reports      ACTIVE MEDICATIONS     Current Facility-Administered Medications   Medication Dose Route Frequency    acetaminophen (TYLENOL) tablet 650 mg  650 mg Oral Q6H PRN    acyclovir (ZOVIRAX) capsule 400 mg  400 mg Oral Q12H [facility]    albuterol (PROVENTIL HFA,VENTOLIN HFA) inhaler 2 puff  2 puff Inhalation Q6H PRN    allopurinol (ZYLOPRIM) tablet 100 mg  100 mg Oral Daily    amLODIPine (NORVASC) tablet 5 mg  5 mg Oral Daily    bisacodyl (DULCOLAX) rectal suppository 10 mg  10 mg Rectal Daily PRN    famotidine (PEPCID) tablet 20 mg  20 mg Oral BID    fluconazole (DIFLUCAN) tablet 400 mg  400 mg Oral Daily    heparin (porcine) subcutaneous injection 5,000 Units  5,000 Units Subcutaneous Q8H [facility]    insulin glargine (LANTUS) subcutaneous injection 7 Units 0 07 mL  7 Units Subcutaneous HS    insulin lispro (HumaLOG) 100 units/mL subcutaneous injection 1-5 Units  1-5 Units Subcutaneous 4x Daily (AC & HS)    lactulose oral solution 10 g  10 g Oral BID PRN    levofloxacin (LEVAQUIN) tablet 500 mg  500 mg Oral Q24H    lidocaine (LIDODERM) 5 % patch 1 patch  1 patch Topical Daily    menthol-methyl salicylate (BENGAY) 49-26 % cream   Apply externally BID    ondansetron (ZOFRAN) injection 4 mg  4 mg Intravenous Q6H PRN    polyethylene glycol (MIRALAX) packet 17 g  17 g Oral Daily    predniSONE tablet 10 mg  10 mg Oral Daily    senna-docusate sodium (SENOKOT S) 8 6-50 mg per tablet 1 tablet  1 tablet Oral BID    sodium chloride 0 9 % infusion  20 mL/hr Intravenous Once PRN    sodium chloride 0 9 % infusion  20 mL/hr Intravenous Once PRN    sodium chloride 0 9 % infusion  20 mL/hr Intravenous Once PRN    sodium chloride 0 9 % infusion  50 mL/hr Intravenous Continuous    tamsulosin (FLOMAX) capsule 0 4 mg  0 4 mg Oral Daily With Dinner    tbo-filgrastim (GRANIX) subcutnaeous injection 300 mcg  300 mcg Subcutaneous Q24H       VTE Pharmacologic Prophylaxis: Enoxaparin (Lovenox)  VTE Mechanical Prophylaxis: sequential compression device    Portions of the record may have been created with voice recognition software  Occasional wrong word or "sound a like" substitutions may have occurred due to the inherent limitations of voice recognition software    Read the chart carefully and recognize, using context, where substitutions have occurred   ==  Edmar Oviedo, [facility]  Internal Medicine Residency PGY-2

## 2021-05-18 NOTE — ASSESSMENT & PLAN NOTE
Gout flare left knee resolving  Steroid taper completed  Will start low-dose allopurinol 100 mg q day to hopefully prevent other acute flares in the setting of chemotherapy  Follow-up uric acid level

## 2021-05-18 NOTE — CASE MANAGEMENT
CM s/w Dr Aiyana Edwards and he stated that pt will be ready for d/c soon  Per Dr Aiyana Edwards the goal is for the pt to transition to SNF to get rehab to help the pt get stronger and potentially pursue additional treatment on an outpatient basis  CM sent updated clinicals to the in network facilities for them to review  CM s/w the insurance RN YSABEL Tran ([phone]) to get an update as to the status of any assistance that he might be able to provide in helping to overcome the barriers for SNF placement  Per Jeannette Tran there will be a meeting 5/20/2021 and he is hopefull that they will be able to come up with a plan that might be able to provide an exception for the pt  CM provided Dr Aiyana Edwards with [employer] phone number for him to call and provide the necessary medical information that will be needed to assist with the review  CM will follow

## 2021-05-18 NOTE — CASE MANAGEMENT
CM s/w Admission from [facility] and stated that in order to accept the pt they will need a written agreement from the insurance company that states that the insurance company will cover all costs relating to cancer treatments including transportation  CM will follow

## 2021-05-19 LAB
ANION GAP SERPL CALCULATED.3IONS-SCNC: 11 MMOL/L (ref 4–13)
BASOPHILS # BLD AUTO: 0.05 THOUSANDS/ΜL (ref 0–0.1)
BASOPHILS NFR BLD AUTO: 1 % (ref 0–1)
BUN SERPL-MCNC: 20 MG/DL (ref 5–25)
CALCIUM SERPL-MCNC: 8.6 MG/DL (ref 8.3–10.1)
CHLORIDE SERPL-SCNC: 99 MMOL/L (ref 100–108)
CO2 SERPL-SCNC: 23 MMOL/L (ref 21–32)
CREAT SERPL-MCNC: 0.28 MG/DL (ref 0.6–1.3)
EOSINOPHIL # BLD AUTO: 0.02 THOUSAND/ΜL (ref 0–0.61)
EOSINOPHIL NFR BLD AUTO: 0 % (ref 0–6)
ERYTHROCYTE [DISTWIDTH] IN BLOOD BY AUTOMATED COUNT: 17.2 % (ref 11.6–15.1)
GFR SERPL CREATININE-BSD FRML MDRD: 133 ML/MIN/1.73SQ M
GLUCOSE SERPL-MCNC: 134 MG/DL (ref 65–140)
GLUCOSE SERPL-MCNC: 141 MG/DL (ref 65–140)
GLUCOSE SERPL-MCNC: 175 MG/DL (ref 65–140)
GLUCOSE SERPL-MCNC: 180 MG/DL (ref 65–140)
GLUCOSE SERPL-MCNC: 212 MG/DL (ref 65–140)
HCT VFR BLD AUTO: 28.2 % (ref 34.8–46.1)
HGB BLD-MCNC: 9.2 G/DL (ref 11.5–15.4)
IMM GRANULOCYTES # BLD AUTO: >0.5 THOUSAND/UL (ref 0–0.2)
IMM GRANULOCYTES NFR BLD AUTO: 17 % (ref 0–2)
LYMPHOCYTES # BLD AUTO: 1.16 THOUSANDS/ΜL (ref 0.6–4.47)
LYMPHOCYTES NFR BLD AUTO: 14 % (ref 14–44)
MCH RBC QN AUTO: 31.1 PG (ref 26.8–34.3)
MCHC RBC AUTO-ENTMCNC: 32.6 G/DL (ref 31.4–37.4)
MCV RBC AUTO: 95 FL (ref 82–98)
MONOCYTES # BLD AUTO: 0.28 THOUSAND/ΜL (ref 0.17–1.22)
MONOCYTES NFR BLD AUTO: 3 % (ref 4–12)
NEUTROPHILS # BLD AUTO: 5.64 THOUSANDS/ΜL (ref 1.85–7.62)
NEUTS SEG NFR BLD AUTO: 65 % (ref 43–75)
NRBC BLD AUTO-RTO: 1 /100 WBCS
PLATELET # BLD AUTO: 118 THOUSANDS/UL (ref 149–390)
PMV BLD AUTO: 10.2 FL (ref 8.9–12.7)
POTASSIUM SERPL-SCNC: 3.9 MMOL/L (ref 3.5–5.3)
RBC # BLD AUTO: 2.96 MILLION/UL (ref 3.81–5.12)
SODIUM SERPL-SCNC: 133 MMOL/L (ref 136–145)
WBC # BLD AUTO: 8.6 THOUSAND/UL (ref 4.31–10.16)

## 2021-05-19 PROCEDURE — 97112 NEUROMUSCULAR REEDUCATION: CPT

## 2021-05-19 PROCEDURE — 99232 SBSQ HOSP IP/OBS MODERATE 35: CPT | Performed by: INTERNAL MEDICINE

## 2021-05-19 PROCEDURE — 80048 BASIC METABOLIC PNL TOTAL CA: CPT | Performed by: STUDENT IN AN ORGANIZED HEALTH CARE EDUCATION/TRAINING PROGRAM

## 2021-05-19 PROCEDURE — 85027 COMPLETE CBC AUTOMATED: CPT | Performed by: STUDENT IN AN ORGANIZED HEALTH CARE EDUCATION/TRAINING PROGRAM

## 2021-05-19 PROCEDURE — 97530 THERAPEUTIC ACTIVITIES: CPT

## 2021-05-19 PROCEDURE — 82948 REAGENT STRIP/BLOOD GLUCOSE: CPT

## 2021-05-19 PROCEDURE — 97535 SELF CARE MNGMENT TRAINING: CPT

## 2021-05-19 RX ADMIN — PREDNISONE 10 MG: 10 TABLET ORAL at 09:36

## 2021-05-19 RX ADMIN — TAMSULOSIN HYDROCHLORIDE 0.4 MG: 0.4 CAPSULE ORAL at 17:19

## 2021-05-19 RX ADMIN — ACYCLOVIR 400 MG: 200 CAPSULE ORAL at 09:36

## 2021-05-19 RX ADMIN — MENTHOL, UNSPECIFIED FORM AND METHYL SALICYLATE: 10; 150 CREAM TOPICAL at 09:39

## 2021-05-19 RX ADMIN — INSULIN GLARGINE 7 UNITS: 100 INJECTION, SOLUTION SUBCUTANEOUS at 23:57

## 2021-05-19 RX ADMIN — FAMOTIDINE 20 MG: 20 TABLET ORAL at 17:19

## 2021-05-19 RX ADMIN — LIDOCAINE 5% 1 PATCH: 700 PATCH TOPICAL at 09:36

## 2021-05-19 RX ADMIN — ACETAMINOPHEN 650 MG: 325 TABLET, FILM COATED ORAL at 11:41

## 2021-05-19 RX ADMIN — SENNOSIDES, DOCUSATE SODIUM 1 TABLET: 8.6; 5 TABLET ORAL at 09:36

## 2021-05-19 RX ADMIN — POLYETHYLENE GLYCOL 3350 17 G: 17 POWDER, FOR SOLUTION ORAL at 09:35

## 2021-05-19 RX ADMIN — INSULIN LISPRO 2 UNITS: 100 INJECTION, SOLUTION INTRAVENOUS; SUBCUTANEOUS at 17:18

## 2021-05-19 RX ADMIN — LEVOFLOXACIN 500 MG: 500 TABLET, FILM COATED ORAL at 17:19

## 2021-05-19 RX ADMIN — FLUCONAZOLE 400 MG: 200 TABLET ORAL at 09:36

## 2021-05-19 RX ADMIN — INSULIN LISPRO 1 UNITS: 100 INJECTION, SOLUTION INTRAVENOUS; SUBCUTANEOUS at 09:33

## 2021-05-19 RX ADMIN — HEPARIN SODIUM 5000 UNITS: 5000 INJECTION INTRAVENOUS; SUBCUTANEOUS at 12:18

## 2021-05-19 RX ADMIN — FAMOTIDINE 20 MG: 20 TABLET ORAL at 09:36

## 2021-05-19 RX ADMIN — AMLODIPINE BESYLATE 5 MG: 5 TABLET ORAL at 09:36

## 2021-05-19 RX ADMIN — SENNOSIDES, DOCUSATE SODIUM 1 TABLET: 8.6; 5 TABLET ORAL at 17:19

## 2021-05-19 RX ADMIN — SODIUM CHLORIDE 50 ML/HR: 0.9 INJECTION, SOLUTION INTRAVENOUS at 20:02

## 2021-05-19 RX ADMIN — ALLOPURINOL 100 MG: 100 TABLET ORAL at 09:36

## 2021-05-19 RX ADMIN — SODIUM CHLORIDE 50 ML/HR: 0.9 INJECTION, SOLUTION INTRAVENOUS at 09:39

## 2021-05-19 NOTE — WOUND OSTOMY CARE
Consult Note - Wound   Sapphire  49 y o  female MRN: [id number]  Unit/Bed#: [facility] 920-01 Encounter: [id number]      History and Present Illness:  Patient is seen for skin assessment  Patient examined in bed and is a minimal assist ot turn in bed  Patient is incontinent  Assessment Findings:   Heels intact  sacro-buttocks blanchabale and intact with hyperpigmentation                Skin care plans:  1-Hydraguard to bilateral sacrum, buttock and heels BID and PRN  2-Elevate heels to offload pressure  3-Ehob cushion in chair when out of bed  4-Moisturize skin daily with skin nourishing cream   5-Turn/reposition q2h or when medically stable for pressure re-distribution on skin         Wound Care will sign off  Call or Tigertext with any questions Patient

## 2021-05-19 NOTE — PLAN OF CARE
Problem: PHYSICAL THERAPY ADULT  Goal: Performs mobility at highest level of function for planned discharge setting  See evaluation for individualized goals  Description: Treatment/Interventions: ADL retraining, Functional transfer training, LE strengthening/ROM, Endurance training, Patient/family training, Bed mobility, Spoke to nursing, OT  Equipment Recommended: (TBD)       See flowsheet documentation for full assessment, interventions and recommendations  Outcome: Progressing  Note: Prognosis: Fair  Problem List: Decreased strength, Decreased range of motion, Decreased endurance, Impaired balance, Decreased mobility, Decreased cognition, Impaired vision, Pain  Assessment: Pt seen for PT treatment session this date  Therapy session focused on bed mobility, static/dynamic sitting balance/ tolerance, functional transfers, and endurance training in order to improve overall mobility and independence  Pt requires assist of 1-2 for all mobility performed this date  Pt sitting EOB with OT upon arrival  Pt required mod Ax1 to correct L lateral lean and maintain upright posture  Pt required TC to extend neck and maintain upright posture  PT focused on static/ dynamic sitting balance while OT addressed LE dressing as well as feeding tasks  Pt dep x2 to performed STS/ SPT from EOB to drop arm bedside chair and from drop arm bedside chair to EOB  Pt max Ax1 to perform sit to supine bed mobility tasks  Pt incontinent of bowel/bladder- pt able to perform R roll with min Ax1 to assist in hygiene tasks  PT assisted with bed mobility while OT performed hygiene tasks  Pt making slow progress toward goals due to decreased activity tolerance  Pt was left supine in bed at the end of PT session with all needs in reach  Pt would benefit from continued PT services while in hospital to address remaining limitations  PT to continue to follow pt and recommends post-acute rehab services after d/c   The patient's AM-PAC Basic Mobility Inpatient Short Form Low Function Raw Score 14 , Standardized Score is 22 01  A standardized score less 42 9 suggests the patient may benefit from discharge to post-acute rehab services  Please also refer to the recommendation of the Physical Therapist for safe discharge planning  Barriers to Discharge: Inaccessible home environment, Decreased caregiver support        PT Discharge Recommendation: Post acute rehabilitation services     PT - OK to Discharge: Yes(once medically cleared )    See flowsheet documentation for full assessment

## 2021-05-19 NOTE — PLAN OF CARE
Problem: OCCUPATIONAL THERAPY ADULT  Goal: Performs self-care activities at highest level of function for planned discharge setting  See evaluation for individualized goals  Description: Treatment Interventions: ADL retraining, Functional transfer training, UE strengthening/ROM, Endurance training, Cognitive reorientation, Patient/family training, Equipment evaluation/education, Neuromuscular reeducation, Fine motor coordination activities, Compensatory technique education, Continued evaluation, Energy conservation, Activityengagement          See flowsheet documentation for full assessment, interventions and recommendations  Outcome: Progressing  Note: Limitation: Decreased ADL status, Decreased UE ROM, Decreased UE strength, Decreased cognition, Decreased endurance, Visual deficit, Decreased fine motor control, Decreased self-care trans, Decreased high-level ADLs  Prognosis: Fair  Assessment: Patient participated in Skilled OT session this date with interventions consisting of ADL re training with the use of correct body mechnaics, safety awareness and fall prevention techniques,  therapeutic activities to: increase activity tolerance and increase OOB/ sitting tolerance   Upon arrival patient was found supine in bed  Pt demonstrated the following tasks: MAX A x 1 sup <> sit  Pt maintains EOB sitting position with MOD A  Sitting EOB, pt participates in eating tasks with MIN A, as well as VCs to initiate eating  Pt utilizes both fork and spoon, does require some A for scooping and for coordination of utensil to mouth  Pt was assisted in putting LE in cross-legged position for LBD  Pt is able to hold LE to keep in place, socks doffed and donned with MAX A  Utilized google translate with pt, pt continues to have difficulty expressing needs/desires  Pt requires DEP X 2 STS/SPT bed <> drop arm chair  Pt returned to supine with MAX A x 1, increased A for LEs  Wedges and pillows placed for appropriate positioning  Patient continues to be functioning below baseline level, occupational performance remains limited secondary to factors listed above and increased risk for falls and injury  From OT standpoint, recommendation at time of d/c would be Short Term Rehab  Patient to benefit from continued Occupational Therapy treatment while in the hospital to address deficits as defined above and maximize level of functional independence with ADLs and functional mobility  Pt was left in bed with alarm on after session with all current needs met  The patient's raw score on the AM-PAC Daily Activity inpatient short form is 13, standardized score is 32 03, less than 39 4  Patients at this level are likely to benefit from discharge to post-acute rehabilitation services  Please refer to the recommendation of the Occupational Therapist for safe discharge planning       OT Discharge Recommendation: Post acute rehabilitation services  OT - OK to Discharge: Yes(when medically stable )

## 2021-05-19 NOTE — PLAN OF CARE
Problem: Prexisting or High Potential for Compromised Skin Integrity  Goal: Skin integrity is maintained or improved  Description: INTERVENTIONS:  - Identify patients at risk for skin breakdown  - Assess and monitor skin integrity  - Assess and monitor nutrition and hydration status  - Monitor labs   - Assess for incontinence   - Turn and reposition patient  - Assist with mobility/ambulation  - Relieve pressure over bony prominences  - Avoid friction and shearing  - Provide appropriate hygiene as needed including keeping skin clean and dry  - Evaluate need for skin moisturizer/barrier cream  - Collaborate with interdisciplinary team   - Patient/family teaching  - Consider wound care consult   Outcome: Progressing     Problem: Potential for Falls  Goal: Patient will remain free of falls  Description: INTERVENTIONS:  - Assess patient frequently for physical needs  -  Identify cognitive and physical deficits and behaviors that affect risk of falls  -  [place] fall precautions as indicated by assessment   - Educate patient/family on patient safety including physical limitations  - Instruct patient to call for assistance with activity based on assessment  - Modify environment to reduce risk of injury  - Consider OT/PT consult to assist with strengthening/mobility  Outcome: Progressing     Problem: Nutrition/Hydration-ADULT  Goal: Nutrient/Hydration intake appropriate for improving, restoring or maintaining nutritional needs  Description: Monitor and assess patient's nutrition/hydration status for malnutrition  Collaborate with interdisciplinary team and initiate plan and interventions as ordered  Monitor patient's weight and dietary intake as ordered or per policy  Utilize nutrition screening tool and intervene as necessary  Determine patient's food preferences and provide high-protein, high-caloric foods as appropriate       INTERVENTIONS:  - Monitor oral intake, urinary output, labs, and treatment plans  - Assess nutrition and hydration status and recommend course of action  - Evaluate amount of meals eaten  - Assist patient with eating if necessary   - Allow adequate time for meals  - Recommend/ encourage appropriate diets, oral nutritional supplements, and vitamin/mineral supplements  - Order, calculate, and assess calorie counts as needed  - Recommend, monitor, and adjust tube feedings and TPN/PPN based on assessed needs  - Assess need for intravenous fluids  - Provide specific nutrition/hydration education as appropriate  - Include patient/family/caregiver in decisions related to nutrition  Outcome: Progressing     Problem: PAIN - ADULT  Goal: Verbalizes/displays adequate comfort level or baseline comfort level  Description: Interventions:  - Encourage patient to monitor pain and request assistance  - Assess pain using appropriate pain scale  - Administer analgesics based on type and severity of pain and evaluate response  - Implement non-pharmacological measures as appropriate and evaluate response  - Consider cultural and social influences on pain and pain management  - Notify physician/advanced practitioner if interventions unsuccessful or patient reports new pain  Outcome: Progressing     Problem: INFECTION - ADULT  Goal: Absence or prevention of progression during hospitalization  Description: INTERVENTIONS:  - Assess and monitor for signs and symptoms of infection  - Monitor lab/diagnostic results  - Monitor all insertion sites, i e  indwelling lines, tubes, and drains  - Monitor endotracheal if appropriate and nasal secretions for changes in amount and color  - [place] appropriate cooling/warming therapies per order  - Administer medications as ordered  - Instruct and encourage patient and family to use good hand hygiene technique  - Identify and instruct in appropriate isolation precautions for identified infection/condition  Outcome: Progressing     Problem: SAFETY ADULT  Goal: Patient will remain free of falls  Description: INTERVENTIONS:  - Assess patient frequently for physical needs  -  Identify cognitive and physical deficits and behaviors that affect risk of falls    -  [place] fall precautions as indicated by assessment   - Educate patient/family on patient safety including physical limitations  - Instruct patient to call for assistance with activity based on assessment  - Modify environment to reduce risk of injury  - Consider OT/PT consult to assist with strengthening/mobility  Outcome: Progressing  Goal: Maintain or return to baseline ADL function  Description: INTERVENTIONS:  -  Assess patient's ability to carry out ADLs; assess patient's baseline for ADL function and identify physical deficits which impact ability to perform ADLs (bathing, care of mouth/teeth, toileting, grooming, dressing, etc )  - Assess/evaluate cause of self-care deficits   - Assess range of motion  - Assess patient's mobility; develop plan if impaired  - Assess patient's need for assistive devices and provide as appropriate  - Encourage maximum independence but intervene and supervise when necessary  - Involve family in performance of ADLs  - Assess for home care needs following discharge   - Consider OT consult to assist with ADL evaluation and planning for discharge  - Provide patient education as appropriate  Outcome: Progressing  Goal: Maintain or return mobility status to optimal level  Description: INTERVENTIONS:  - Assess patient's baseline mobility status (ambulation, transfers, stairs, etc )    - Identify cognitive and physical deficits and behaviors that affect mobility  - Identify mobility aids required to assist with transfers and/or ambulation (gait belt, sit-to-stand, lift, walker, cane, etc )  - [place] fall precautions as indicated by assessment  - Record patient progress and toleration of activity level on Mobility SBAR; progress patient to next Phase/Stage  - Instruct patient to call for assistance with activity based on assessment  - Consider rehabilitation consult to assist with strengthening/weightbearing, etc   Outcome: Progressing     Problem: DISCHARGE PLANNING  Goal: Discharge to home or other facility with appropriate resources  Description: INTERVENTIONS:  - Identify barriers to discharge w/patient and caregiver  - Arrange for needed discharge resources and transportation as appropriate  - Identify discharge learning needs (meds, wound care, etc )  - Arrange for interpretive services to assist at discharge as needed  - Refer to Case Management Department for coordinating discharge planning if the patient needs post-hospital services based on physician/advanced practitioner order or complex needs related to functional status, cognitive ability, or social support system  Outcome: Progressing     Problem: Knowledge Deficit  Goal: Patient/family/caregiver demonstrates understanding of disease process, treatment plan, medications, and discharge instructions  Description: Complete learning assessment and assess knowledge base    Interventions:  - Provide teaching at level of understanding  - Provide teaching via preferred learning methods  Outcome: Progressing

## 2021-05-19 NOTE — OCCUPATIONAL THERAPY NOTE
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[address] )  Active Problems:    Altered mental status    Dysphagia    Urinary retention    Aphasia    Weakness    Presence of permanent central venous catheter    Fracture of ramus of left pubis (HCC)    Severe protein-calorie malnutrition (HCC)    Steroid-induced hyperglycemia    Left-sided weakness    Pancytopenia (HCC)    Acute gout          05/19/21 0921   OT Last Visit   OT Visit Date 05/19/21   Note Type   Note Type Treatment   Restrictions/Precautions   Weight Bearing Precautions Per Order No   Other Precautions Cognitive; Chair Alarm; Bed Alarm; Fall Risk;Limb alert;Pain;Multiple lines;Visual impairment  (feeding tube, primary langauge is [place] )   General   Response to Previous Treatment Patient unable to report, no changes reported from family or staff   Lifestyle   Autonomy Per chart, pt was I with ADLs PTA    Reciprocal Relationships     Service to Others Unable to obtain   Intrinsic Gratification Will continue to assess    Pain Assessment   Pain Assessment Tool FLACC   Pain Rating: FLACC (Rest) - Face 0   Pain Rating: FLACC (Rest) - Legs 0   Pain Rating: FLACC (Rest) - Activity 0   Pain Rating: FLACC (Rest) - Cry 0   Pain Rating: FLACC (Rest) - Consolability 0   Score: FLACC (Rest) 0   Pain Rating: FLACC (Activity) - Face 1   Pain Rating: FLACC (Activity) - Legs 1   Pain Rating: FLACC (Activity) - Activity 1   Pain Rating: FLACC (Activity) - Cry 1   Pain Rating: FLACC (Activity) - Consolability 1   Score: FLACC (Activity) 5   ADL   Where Assessed Edge of bed   Eating Assistance 4  Minimal Assistance   Eating Comments Pt able to scoop using spoon/fork with MIN A  VC required to initiate    LB Dressing Assistance 2  Maximal Assistance   LB Dressing Deficit Don/doff R sock; Don/doff L sock   LB Dressing Comments Pt hold leg in cross-legged position to assist in sock dressing    Toileting Assistance  2  Maximal Assistance   Toileting Deficit Perineal hygiene   Bed Mobility   Rolling R 4  Minimal assistance   Additional items Assist x 1;Bedrails   Supine to Sit 2  Maximal assistance   Additional items Assist x 1; Increased time required   Sit to Supine 2  Maximal assistance   Additional items Assist x 1; Increased time required   Additional Comments Pt requires MOD A to maintain EOB sitting position with L lateral lean    Transfers   Sit to Stand 1  Dependent   Additional items Assist x 2   Stand to Sit 1  Dependent   Additional items Assist x 2   Stand pivot 1  Dependent   Additional items Assist x 2   Additional Comments SPT x 2 performed to <> from drop arm chair  Pt with increased fatigue this session, diffiuclty performing STS    Cognition   Overall Cognitive Status Impaired   Arousal/Participation Alert; Cooperative   Attention Attends with cues to redirect   Orientation Level Unable to assess   Memory Unable to assess   Following Commands Follows one step commands with increased time or repetition   Comments Pt cooperative during session  Requires increased processing time to follow commands  Utilized google translate for pt  Pt responds with head nodding yes/no or stating "ok"    Activity Tolerance   Activity Tolerance Patient limited by fatigue   Medical Staff Made Aware PT Helene    Assessment   Assessment Patient participated in Skilled OT session this date with interventions consisting of ADL re training with the use of correct body mechnaics, safety awareness and fall prevention techniques,  therapeutic activities to: increase activity tolerance and increase OOB/ sitting tolerance   Upon arrival patient was found supine in bed  Pt demonstrated the following tasks: MAX A x 1 sup <> sit  Pt maintains EOB sitting position with MOD A  Sitting EOB, pt participates in eating tasks with MIN A, as well as VCs to initiate eating   Pt utilizes both fork and spoon, does require some A for scooping and for coordination of utensil to mouth  Pt was assisted in putting LE in cross-legged position for LBD  Pt is able to hold LE to keep in place, socks doffed and donned with MAX A  Utilized google translate with pt, pt continues to have difficulty expressing needs/desires  Pt requires DEP X 2 STS/SPT bed <> drop arm chair  Pt returned to supine with MAX A x 1, increased A for LEs  Wedges and pillows placed for appropriate positioning  Patient continues to be functioning below baseline level, occupational performance remains limited secondary to factors listed above and increased risk for falls and injury  From OT standpoint, recommendation at time of d/c would be Short Term Rehab  Patient to benefit from continued Occupational Therapy treatment while in the hospital to address deficits as defined above and maximize level of functional independence with ADLs and functional mobility  Pt was left in bed with alarm on after session with all current needs met  The patient's raw score on the AM-PAC Daily Activity inpatient short form is 13, standardized score is 32 03, less than 39 4  Patients at this level are likely to benefit from discharge to post-acute rehabilitation services  Please refer to the recommendation of the Occupational Therapist for safe discharge planning  Plan   Treatment Interventions ADL retraining;Functional transfer training;UE strengthening/ROM; Endurance training;Visual perceptual retraining;Cognitive reorientation;Patient/family training;Equipment evaluation/education; Neuromuscular reeducation; Fine motor coordination activities; Compensatory technique education;Continued evaluation; Activityengagement; Energy conservation   Goal Expiration Date 05/24/21   OT Treatment Day 4   OT Frequency 3-5x/wk   Recommendation   OT Discharge Recommendation Post acute rehabilitation services   OT - OK to Discharge Yes  (when medically stable )   AM-[facility] Daily Activity Inpatient Lower Body Dressing 2   Bathing 2   Toileting 2   Upper Body Dressing 2   Grooming 2   Eating 3   Daily Activity Raw Score 13   Daily Activity Standardized Score (Calc for Raw Score >=11) 32 03   AM-PAC Applied Cognition Inpatient   Following a Speech/Presentation 1   Understanding Ordinary Conversation 2   Taking Medications 1   Remembering Where Things Are Placed or Put Away 1   Remembering List of 4-5 Errands 1   Taking Care of Complicated Tasks 1   Applied Cognition Raw Score 7   Applied Cognition Standardized Score 15 17     Naomi Man, MS, OTR/L

## 2021-05-19 NOTE — CASE MANAGEMENT
CM s/w Femi Held who stated that he has the information that he needs for his review on Thursday  He also stated that he spoke to [facility] and they are stating that there two biggest barriers would be the language barrier and the cancer treatment  [facility] recommended sending referrals to in network SNF near the hospital  CM will get permission from family to do the same  CM will follow

## 2021-05-19 NOTE — PROGRESS NOTES
INTERNAL MEDICINE RESIDENCY PROGRESS NOTE     Name: Castro Metcalf   Age & Sex: 49 y o  female   MRN: [id number]  Unit/Bed#: [facility] 920-01   Encounter: [id number]  Team: SOD Team B     PATIENT INFORMATION     Name: Castro Metcalf   Age & Sex: 49 y o  female   MRN: [id number] Stay Days: 32    ASSESSMENT/PLAN     Principal Problem:    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[address] )  Active Problems:    Acute gout    Altered mental status    Dysphagia    Urinary retention    Aphasia    Weakness    Presence of permanent central venous catheter    Fracture of ramus of left pubis (HCC)    Severe protein-calorie malnutrition (HCC)    Steroid-induced hyperglycemia    Left-sided weakness    Pancytopenia ([address] )      * 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[facility] [address] )  Assessment & Plan  Patient was diagnosed with primary CNS lymphoma through biopsy in [facility] transferred over here for chemotherapy since the [facility] is out of network  Had a 2nd family meeting on 04/30 and family has decided to proceed treatment at this time  S/p PICC line for chemotherapy  S/p peg tube placement on 05/10  Currently on oral diet and Jevity 1 2  Plan:  Hematology-Oncology consulted  Appreciate recommendations  Chemotherapy treatment as per hematology oncology team     S/p 2nd cycle of chemotherapy on 05/13  Leucovin on 5/14  DVT prophylaxis:  On heparin  Case management-will start looking for places for rehab  S/p steroid taper for cerebral edema from CNS lymphoma  Pancytopenia in setting of recent chemotherapy  Will continue to watch for any signs of infection  On acyclovir, Diflucan and Levaquin    After discussion with Oncology yesterday, plan for rehab, patient does not require radiation therapy, rehab for strength, no chemotherapy until at least 3 weeks, evaluate at that time    Acute gout  Assessment & Plan  Appears to have acute gout flare right knee, pain improving today  Currently on prednisone 10 mg q day, based on recent chemotherapy will hold off on NSAIDs or colchicine  Will start low-dose allopurinol 100 mg q day to hopefully prevent other acute flares in the setting of chemotherapy    Pancytopenia (HCC)  Assessment & Plan  Likely in setting of chemotherapy  Will continue to monitor for any signs of infection  Will transfuse with packed RBC for hemoglobin less than 7  Drop in Hgb from 9 to 7 on 5/14, other lines relatively stable  VSS and soft brown BMs  Will discuss with oncology if this is expected based on chemo or if there should be further workup for bleeding, though hemodynamics currently do not suggest bleed  S/p 1 pack RBC on 05/15, hemoglobin continues to improve    Left-sided weakness  Assessment & Plan  Patient has left-sided weakness at baseline from her CNS lymphoma  Left upper extremity 3-4/5 and left lower extremity 0-1/5 muscle strength at baseline  Steroid-induced hyperglycemia  Assessment & Plan  Mild hyperglycemia, glucoses in 200s, no history of DM  Occurring in the setting of Decadron with chemotherapy, as well as sodium bicarb in D5 drip  - D5 drip is finished  - will monitor sugars while on Jevity and now off D5  - continue q4 hour fingerstick glucose with correctional algorithm 1 and Lantus    Severe protein-calorie malnutrition (HCC)  Assessment & Plan  Malnutrition Findings:   Adult Malnutrition type: Acute illness(due to medical condition resulting in dysphagia, decreased appetite and intake, weight loss)  Adult Degree of Malnutrition: Other severe protein calorie malnutrition    BMI Findings: Body mass index is 20 49 kg/m²  Plan:  Nutrition consult  Tube feeds initiated with Jevity 1 2, advancing to goal      Fracture of ramus of left pubis [facility])  Assessment & Plan  Philip Farley prior to presentation to [facility]   Managed nonoperatively at [facility]    Presence of permanent central venous catheter  Assessment & Plan  Noted presence of right IJ tunneled double-lumen HD catheter; upon chart review, this was likely inserted to be used for plasmapheresis which she has completed  Plan:  S/p removal by IR  Patient now has PICC line  Weakness  Assessment & Plan  Left greater than right upper and lower extremity weakness with ongoing left lower extremity contracture  Secondary to underlying CNS malignancy  *Decubitus ulcer precautions such as frequent repositioning  Aphasia  Assessment & Plan  Definite evidence of expressive aphasia, possible component of receptive aphasia as well  Speech therapy on board  Urinary retention  Assessment & Plan  Patient developed urinary retention during previous hospitalization  Plan was voiding trial as an outpatient as she was scheduled for discharge from that facility  Machado catheter was initially removed although patient required re-placement of machado on 05/14 in setting of incontinence and getting chemotherapy  Will evaluate daily for Machado catheter removal       Dysphagia  Assessment & Plan  Patient noted to have dysphagia and also decreased p o  Intake  As per [facility] progress note patient had a calorie count and recommended feeding tube placement  Speech evaluation - advance diet with dysphagia 2 and thin liquids; needs assistance with feeds  Calorie count  If the patient continued to have poor p o  Intake may need discussion with the family regarding alternate methods of nutrition  Patient's calorie intake is low  VBS- showed oropharyngeal dysphagia  s/p peg tube placement  Altered mental status  Assessment & Plan  Patient initially presented to [facility] his [place] with stroke-like symptoms for 2 days  CT scan of the head was concerning for subacute CVA and was transferred to [facility]   MRI of the brain was more consistent with demyelinating disease patient was started on IV steroids and 1000 mg daily for 5 days    Patient did not have any significant improvement at that time patient had plasmapheresis  And also had  lumbar puncture-negative for infection or malignancy  Patient noted to have persistent encephalopathy so a repeat MRI was obtained which showed worsening of the abnormality seen on the previous MRI and 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  Likely secondary to CNS lymphoma  Delirium precautions   Currently alert and oriented times 2-3   follow commands  Moves all extremities spontaneously except left upper and lower extrimities  Patient's  (does not speak [place]) makes all medical decision for her  Sepsis (HCC)-resolved as of 2021  Assessment & Plan  Patient had hypothermia, elevated WBC count and tachycardia  Concern for possible aspiration in setting on dysphagia versus UTI in setting of urinary catheter which was placed for retention  Plan:  S/p IV antibiotics last day on   Will continue to monitor off antibiotics  Disposition:  Rehab placement     SUBJECTIVE     Patient seen and examined  No acute events overnight  OBJECTIVE     Vitals:    21 1504 21 2210 21 0553 21 0600   BP: 108/74 105/73 102/70    Pulse: 100 105 96    Resp: 17 18 16    Temp: 99 6 °F (37 6 °C) (!) 97 4 °F (36 3 °C) 99 4 °F (37 4 °C)    TempSrc:       SpO2: 100% 100% 100%    Weight:    49 3 kg (108 lb 11 oz)   Height:          Temperature:   Temp (24hrs), Av 2 °F (36 8 °C), Min:96 3 °F (35 7 °C), Max:99 6 °F (37 6 °C)    Temperature: 99 4 °F (37 4 °C)  Intake & Output:  I/O        07 -  0700  07 -  0700    P  O  290 60    I V  (mL/kg) 1700 8 (35 7) 1100 (22 3)    NG/ 850    Feedings 632 1264    Total Intake(mL/kg) 2847 8 (59 8) 3274 (66 4)    Urine (mL/kg/hr) 2575 (2 3) 1300 (1 1)    Stool  0    Total Output 2575 1300    Net +272 8 +1974          Unmeasured Urine Occurrence  1 x    Unmeasured Stool Occurrence  2 x        Weights:   IBW (Ideal Body Weight): 45 5 kg    Body mass index is 21 23 kg/m²  Weight (last 2 days)     Date/Time   Weight    05/19/21 0600   49 3 (108 69)    05/17/21 0545   47 6 (104 94)            Physical Exam  Vitals signs and nursing note reviewed  Constitutional:       General: She is not in acute distress  Appearance: She is well-developed  She is ill-appearing  She is not toxic-appearing  HENT:      Head: Normocephalic and atraumatic  Eyes:      Conjunctiva/sclera: Conjunctivae normal    Neck:      Musculoskeletal: Neck supple  Cardiovascular:      Rate and Rhythm: Normal rate and regular rhythm  Heart sounds: No murmur  Pulmonary:      Effort: Pulmonary effort is normal  No respiratory distress  Breath sounds: Normal breath sounds  Abdominal:      Palpations: Abdomen is soft  Tenderness: There is no abdominal tenderness  Skin:     General: Skin is warm and dry  Capillary Refill: Capillary refill takes less than 2 seconds  Neurological:      Mental Status: She is alert  Psychiatric:         Mood and Affect: Mood normal          Behavior: Behavior normal        LABORATORY DATA     Labs: I have personally reviewed pertinent reports  Results from last 7 days   Lab Units 05/18/21  0628 05/17/21  0500 05/16/21  0521 05/15/21  0539   WBC Thousand/uL 6 26 2 93* 2 86* 2 72*   HEMOGLOBIN g/dL 9 8* 9 5* 8 8* 7 0*   HEMATOCRIT % 29 6* 28 9* 26 4* 21 7*   PLATELETS Thousands/uL 142* 165 139* 142*   MONO PCT % 3*  --   --  3*      Results from last 7 days   Lab Units 05/18/21  0628 05/15/21  0539 05/14/21  1822 05/14/21  0542   POTASSIUM mmol/L 4 2 4 0 4 0 3 4*   CHLORIDE mmol/L 103 95* 99* 101   CO2 mmol/L 22 30 31 29   BUN mg/dL 20 12 12 13   CREATININE mg/dL 0 37* 0 36* 0 32* 0 30*   CALCIUM mg/dL 9 1 8 2* 8 4 7 8*   ALK PHOS U/L  --  54 59 66   ALT U/L  --  61 67 66   AST U/L  --  25 24 21                            IMAGING & DIAGNOSTIC TESTING     Radiology Results: I have personally reviewed pertinent reports    Xr Chest Portable    Result Date: 4/24/2021  Impression: Dialysis catheter tip within the superior cavoatrial junction  No pneumothorax  Workstation performed: [id number]     Xr Chest Picc Line Portable    Result Date: 4/27/2021  Impression: PICC line tip in the superior vena cava, approximately 3 5 to 4 cm above the cavoatrial junction  The examination demonstrates a significant  finding and was documented as such in [facility] for liaison and referring practitioner notification  Workstation performed: [id number]     Ir Picc Reposition    Result Date: 4/27/2021  Impression: Status-post repositioning of a 5 [place] central venous catheter under fluoroscopic guidance with its tip at the cavoatrial junction  Workstation performed: [id number]     Ir Tunneled Central Line Removal    Result Date: 4/27/2021  Impression: Impression: Successful tunneled central line removal as described  Signed, performed and dictated by Alireza Eugene PA-C under the supervision of Dr Seven Persaud  Workstation performed: [id number]     Other Diagnostic Testing: I have personally reviewed pertinent reports      ACTIVE MEDICATIONS     Current Facility-Administered Medications   Medication Dose Route Frequency    acetaminophen (TYLENOL) tablet 650 mg  650 mg Oral Q6H PRN    acyclovir (ZOVIRAX) capsule 400 mg  400 mg Oral Q12H [address]    albuterol (PROVENTIL HFA,VENTOLIN HFA) inhaler 2 puff  2 puff Inhalation Q6H PRN    allopurinol (ZYLOPRIM) tablet 100 mg  100 mg Oral Daily    amLODIPine (NORVASC) tablet 5 mg  5 mg Oral Daily    bisacodyl (DULCOLAX) rectal suppository 10 mg  10 mg Rectal Daily PRN    famotidine (PEPCID) tablet 20 mg  20 mg Oral BID    fluconazole (DIFLUCAN) tablet 400 mg  400 mg Oral Daily    heparin (porcine) subcutaneous injection 5,000 Units  5,000 Units Subcutaneous Q8H [address]    insulin glargine (LANTUS) subcutaneous injection 7 Units 0 07 mL  7 Units Subcutaneous HS    insulin lispro (HumaLOG) 100 units/mL subcutaneous injection 1-5 Units  1-5 Units Subcutaneous 4x Daily (AC & HS)    lactulose oral solution 10 g  10 g Oral BID PRN    levofloxacin (LEVAQUIN) tablet 500 mg  500 mg Oral Q24H    lidocaine (LIDODERM) 5 % patch 1 patch  1 patch Topical Daily    menthol-methyl salicylate (BENGAY) 65-39 % cream   Apply externally BID    ondansetron (ZOFRAN) injection 4 mg  4 mg Intravenous Q6H PRN    polyethylene glycol (MIRALAX) packet 17 g  17 g Oral Daily    predniSONE tablet 10 mg  10 mg Oral Daily    senna-docusate sodium (SENOKOT S) 8 6-50 mg per tablet 1 tablet  1 tablet Oral BID    sodium chloride 0 9 % infusion  20 mL/hr Intravenous Once PRN    sodium chloride 0 9 % infusion  20 mL/hr Intravenous Once PRN    sodium chloride 0 9 % infusion  20 mL/hr Intravenous Once PRN    sodium chloride 0 9 % infusion  50 mL/hr Intravenous Continuous    tamsulosin (FLOMAX) capsule 0 4 mg  0 4 mg Oral Daily With Dinner       VTE Pharmacologic Prophylaxis: Heparin  VTE Mechanical Prophylaxis: sequential compression device    Portions of the record may have been created with voice recognition software  Occasional wrong word or "sound a like" substitutions may have occurred due to the inherent limitations of voice recognition software    Read the chart carefully and recognize, using context, where substitutions have occurred   ==  Sarah Harvey, [facility]  Internal Medicine Residency PGY-2

## 2021-05-19 NOTE — PHYSICAL THERAPY NOTE
PHYSICAL THERAPY NOTE          Patient Name: Demetrice Tiwari  HHJPL'V Date: 5/19/2021 05/19/21 0920   PT Last Visit   PT Visit Date 05/19/21   Note Type   Note Type Treatment   Pain Assessment   Pain Assessment Tool FLACC   Pain Rating: FLACC (Rest) - Face 0   Pain Rating: FLACC (Rest) - Legs 0   Pain Rating: FLACC (Rest) - Activity 0   Pain Rating: FLACC (Rest) - Cry 0   Pain Rating: FLACC (Rest) - Consolability 0   Score: FLACC (Rest) 0   Pain Rating: FLACC (Activity) - Face 1   Pain Rating: FLACC (Activity) - Legs 1   Pain Rating: FLACC (Activity) - Activity 1   Pain Rating: FLACC (Activity) - Cry 1   Pain Rating: FLACC (Activity) - Consolability 1   Score: FLACC (Activity) 5   Restrictions/Precautions   Weight Bearing Precautions Per Order No   Other Precautions Cognitive; Chair Alarm; Bed Alarm;Multiple lines; Fall Risk;Pain;Visual impairment  (aphasia, limited english- primarily speaks gujarati )   General   Chart Reviewed Yes   Response to Previous Treatment Patient unable to report, no changes reported from family or staff   Family/Caregiver Present No   Cognition   Overall Cognitive Status Impaired   Arousal/Participation Alert; Cooperative   Attention Attends with cues to redirect   Orientation Level Unable to assess   Memory Unable to assess   Following Commands Follows one step commands with increased time or repetition   Comments Pt alert and cooperative  Google translate used throughout session  Pt able to respond with head nods  Bed Mobility   Rolling R 4  Minimal assistance   Additional items Assist x 1;Bedrails; Increased time required;Verbal cues   Rolling L Unable to assess   Supine to Sit Unable to assess   Sit to Supine 2  Maximal assistance   Additional items Assist x 1   Additional Comments Pt sitting EOB with OT upon arrival  Pt sat EOB~15 min with mod Ax1 to correct L lateral lean   Pt returned to supine in bed with all needs within reach at the end of therapy session  Transfers   Sit to Stand 1  Dependent   Additional items Assist x 2   Stand to Sit 1  Dependent   Additional items Assist x 2   Stand pivot 1  Dependent   Additional items Assist x 2   Additional Comments 2x SPT performed  EOB -> drop arm chair, drop arm -> EOB  b/l HHA used    Ambulation/Elevation   Gait pattern Not appropriate   Balance   Static Sitting Poor +   Dynamic Sitting Poor   Static Standing Poor -   Dynamic Standing Poor -   Ambulatory Zero   Endurance Deficit   Endurance Deficit Yes   Endurance Deficit Description fatigue, weakness    Activity Tolerance   Activity Tolerance Patient limited by fatigue   Medical Staff Made Aware OT Kedar Veras    Nurse Made Aware RN cleared pt to participate in therapy session    Assessment   Prognosis Fair   Problem List Decreased strength;Decreased range of motion;Decreased endurance; Impaired balance;Decreased mobility; Decreased cognition; Impaired vision;Pain   Assessment Pt seen for PT treatment session this date  Therapy session focused on bed mobility, static/dynamic sitting balance/ tolerance, functional transfers, and endurance training in order to improve overall mobility and independence  Pt requires assist of 1-2 for all mobility performed this date  Pt sitting EOB with OT upon arrival  Pt required mod Ax1 to correct L lateral lean and maintain upright posture  Pt required TC to extend neck and maintain upright posture  PT focused on static/ dynamic sitting balance while OT addressed LE dressing as well as feeding tasks  Pt dep x2 to performed STS/ SPT from EOB to drop arm bedside chair and from drop arm bedside chair to EOB  Pt max Ax1 to perform sit to supine bed mobility tasks  Pt incontinent of bowel/bladder- pt able to perform R roll with min Ax1 to assist in hygiene tasks  PT assisted with bed mobility while OT performed hygiene tasks   Pt making slow progress toward goals due to decreased activity tolerance  Pt was left supine in bed at the end of PT session with all needs in reach  Pt would benefit from continued PT services while in hospital to address remaining limitations  PT to continue to follow pt and recommends post-acute rehab services after d/c  The patient's AM-PAC Basic Mobility Inpatient Short Form Low Function Raw Score 14 , Standardized Score is 22 01  A standardized score less 42 9 suggests the patient may benefit from discharge to post-acute rehab services  Please also refer to the recommendation of the Physical Therapist for safe discharge planning  Barriers to Discharge Inaccessible home environment;Decreased caregiver support   Goals   Patient Goals none expressed    STG Expiration Date 05/24/21   PT Treatment Day 5   Plan   Treatment/Interventions Functional transfer training; Endurance training;Patient/family training;Bed mobility;Spoke to nursing;OT;Spoke to case management   Progress Slow progress, decreased activity tolerance   PT Frequency Other (Comment)  (3-5x/wk )   Recommendation   PT Discharge Recommendation Post acute rehabilitation services   PT - OK to Discharge Yes  (once medically cleared )   AM-PAC Basic Mobility Inpatient   Turning in Bed Without Bedrails 2   Lying on Back to Sitting on Edge of Flat Bed 1   Moving Bed to Chair 1   Standing Up From Chair 1   Walk in Room 1   Climb 3-5 Stairs 1   Basic Mobility Inpatient Raw Score 7   Turning Head Towards Sound 4   Follow Simple Instructions 3   Low Function Basic Mobility Raw Score 14   Low Function Basic Mobility Standardized Score 22 01     Jocelynn Medicus, PT, DPT

## 2021-05-20 LAB
GLUCOSE SERPL-MCNC: 128 MG/DL (ref 65–140)
GLUCOSE SERPL-MCNC: 132 MG/DL (ref 65–140)
GLUCOSE SERPL-MCNC: 157 MG/DL (ref 65–140)
GLUCOSE SERPL-MCNC: 167 MG/DL (ref 65–140)
GLUCOSE SERPL-MCNC: 180 MG/DL (ref 65–140)

## 2021-05-20 PROCEDURE — 99232 SBSQ HOSP IP/OBS MODERATE 35: CPT | Performed by: INTERNAL MEDICINE

## 2021-05-20 PROCEDURE — 82948 REAGENT STRIP/BLOOD GLUCOSE: CPT

## 2021-05-20 RX ORDER — PREDNISONE 1 MG/1
5 TABLET ORAL DAILY
Status: DISCONTINUED | OUTPATIENT
Start: 2021-05-21 | End: 2021-05-26

## 2021-05-20 RX ADMIN — ACYCLOVIR 400 MG: 200 CAPSULE ORAL at 09:12

## 2021-05-20 RX ADMIN — INSULIN GLARGINE 7 UNITS: 100 INJECTION, SOLUTION SUBCUTANEOUS at 21:37

## 2021-05-20 RX ADMIN — INSULIN LISPRO 1 UNITS: 100 INJECTION, SOLUTION INTRAVENOUS; SUBCUTANEOUS at 11:22

## 2021-05-20 RX ADMIN — LIDOCAINE 5% 1 PATCH: 700 PATCH TOPICAL at 09:14

## 2021-05-20 RX ADMIN — HEPARIN SODIUM 5000 UNITS: 5000 INJECTION INTRAVENOUS; SUBCUTANEOUS at 13:11

## 2021-05-20 RX ADMIN — PREDNISONE 10 MG: 10 TABLET ORAL at 09:12

## 2021-05-20 RX ADMIN — ALLOPURINOL 100 MG: 100 TABLET ORAL at 09:12

## 2021-05-20 RX ADMIN — ACYCLOVIR 400 MG: 200 CAPSULE ORAL at 21:35

## 2021-05-20 RX ADMIN — TAMSULOSIN HYDROCHLORIDE 0.4 MG: 0.4 CAPSULE ORAL at 17:22

## 2021-05-20 RX ADMIN — MENTHOL, UNSPECIFIED FORM AND METHYL SALICYLATE: 10; 150 CREAM TOPICAL at 09:18

## 2021-05-20 RX ADMIN — FAMOTIDINE 20 MG: 20 TABLET ORAL at 17:22

## 2021-05-20 RX ADMIN — FAMOTIDINE 20 MG: 20 TABLET ORAL at 09:12

## 2021-05-20 RX ADMIN — FLUCONAZOLE 400 MG: 200 TABLET ORAL at 09:12

## 2021-05-20 RX ADMIN — LEVOFLOXACIN 500 MG: 500 TABLET, FILM COATED ORAL at 17:23

## 2021-05-20 RX ADMIN — SENNOSIDES, DOCUSATE SODIUM 1 TABLET: 8.6; 5 TABLET ORAL at 09:12

## 2021-05-20 RX ADMIN — INSULIN LISPRO 1 UNITS: 100 INJECTION, SOLUTION INTRAVENOUS; SUBCUTANEOUS at 21:37

## 2021-05-20 RX ADMIN — SENNOSIDES, DOCUSATE SODIUM 1 TABLET: 8.6; 5 TABLET ORAL at 17:22

## 2021-05-20 RX ADMIN — ACYCLOVIR 400 MG: 200 CAPSULE ORAL at 00:00

## 2021-05-20 RX ADMIN — HEPARIN SODIUM 5000 UNITS: 5000 INJECTION INTRAVENOUS; SUBCUTANEOUS at 21:36

## 2021-05-20 RX ADMIN — ACETAMINOPHEN 650 MG: 325 TABLET, FILM COATED ORAL at 00:00

## 2021-05-20 RX ADMIN — INSULIN LISPRO 1 UNITS: 100 INJECTION, SOLUTION INTRAVENOUS; SUBCUTANEOUS at 17:22

## 2021-05-20 RX ADMIN — POLYETHYLENE GLYCOL 3350 17 G: 17 POWDER, FOR SOLUTION ORAL at 09:12

## 2021-05-20 RX ADMIN — ACETAMINOPHEN 650 MG: 325 TABLET, FILM COATED ORAL at 21:34

## 2021-05-20 NOTE — CASE MANAGEMENT
CM s/w pt's emergency contact who gave permission for CM to complete a blanket referral to find an accepting SNF  CM will follow

## 2021-05-20 NOTE — PROGRESS NOTES
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[address] )  Active Problems:    Acute gout    Altered mental status    Dysphagia    Urinary retention    Aphasia    Weakness    Presence of permanent central venous catheter    Fracture of ramus of left pubis (HCC)    Severe protein-calorie malnutrition (HCC)    Steroid-induced hyperglycemia    Left-sided weakness    Pancytopenia ([address] )      * 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[facility] [address] )  Assessment & Plan  Patient was diagnosed with primary CNS lymphoma through biopsy in [facility] transferred over here for chemotherapy since the [facility] is out of network  Had a 2nd family meeting on 04/30 and family has decided to proceed treatment at this time  S/p PICC line for chemotherapy  S/p peg tube placement on 05/10  Currently on oral diet and Jevity 1 2  Plan:  Hematology-Oncology consulted  Appreciate recommendations  Chemotherapy treatment as per hematology oncology team     S/p 2nd cycle of chemotherapy on 05/13  Leucovin on 5/14  DVT prophylaxis:  On heparin  Case management-will start looking for places for rehab  S/p steroid taper for cerebral edema from CNS lymphoma  Pancytopenia in setting of recent chemotherapy  Will continue to watch for any signs of infection  On acyclovir, Diflucan and Levaquin    After discussion with Oncology, plan for rehab, patient does not require radiation therapy, rehab for strength, no chemotherapy until at least 3 weeks, evaluate at that time  Will continue to try to reach out to family for update    Acute gout  Assessment & Plan  Appears to have acute gout flare right knee, pain improving today  Currently on prednisone 10 mg q day, based on recent chemotherapy will hold off on NSAIDs or colchicine  Will start low-dose allopurinol 100 mg q day to hopefully prevent other acute flares in the setting of chemotherapy    Pancytopenia (HCC)  Assessment & Plan  Likely in setting of chemotherapy  Will continue to monitor for any signs of infection  Will transfuse with packed RBC for hemoglobin less than 7  Drop in Hgb from 9 to 7 on 5/14, other lines relatively stable  VSS and soft brown BMs  Will discuss with oncology if this is expected based on chemo or if there should be further workup for bleeding, though hemodynamics currently do not suggest bleed  S/p 1 pack RBC on 05/15, hemoglobin continues to improve    Left-sided weakness  Assessment & Plan  Patient has left-sided weakness at baseline from her CNS lymphoma  Left upper extremity 3-4/5 and left lower extremity 0-1/5 muscle strength at baseline  Steroid-induced hyperglycemia  Assessment & Plan  Mild hyperglycemia, glucoses in 200s, no history of DM  Occurring in the setting of Decadron with chemotherapy, as well as sodium bicarb in D5 drip  - D5 drip is finished  - will monitor sugars while on Jevity and now off D5  - continue q4 hour fingerstick glucose with correctional algorithm 1 and Lantus    Severe protein-calorie malnutrition (HCC)  Assessment & Plan  Malnutrition Findings:   Adult Malnutrition type: Acute illness(due to medical condition resulting in dysphagia, decreased appetite and intake, weight loss)  Adult Degree of Malnutrition: Other severe protein calorie malnutrition    BMI Findings: Body mass index is 20 49 kg/m²  Plan:  Nutrition consult  Tube feeds initiated with Jevity 1 2, advancing to goal      Fracture of ramus of left pubis [facility])  Assessment & Plan  Timoteo Aguilar prior to presentation to [facility]   Managed nonoperatively at [facility]    Presence of permanent central venous catheter  Assessment & Plan  Noted presence of right IJ tunneled double-lumen HD catheter; upon chart review, this was likely inserted to be used for plasmapheresis which she has completed  Plan:  S/p removal by IR  Patient now has PICC line  Weakness  Assessment & Plan  Left greater than right upper and lower extremity weakness with ongoing left lower extremity contracture  Secondary to underlying CNS malignancy  *Decubitus ulcer precautions such as frequent repositioning  Aphasia  Assessment & Plan  Definite evidence of expressive aphasia, possible component of receptive aphasia as well  Speech therapy on board  Urinary retention  Assessment & Plan  Patient developed urinary retention during previous hospitalization  Plan was voiding trial as an outpatient as she was scheduled for discharge from that facility  Machado catheter was initially removed although patient required re-placement of machado on 05/14 in setting of incontinence and getting chemotherapy  Machado discontinued, monitor for signs of fluid retention      Dysphagia  Assessment & Plan  Patient noted to have dysphagia and also decreased p o  Intake  As per [facility] progress note patient had a calorie count and recommended feeding tube placement  Speech evaluation - advance diet with dysphagia 2 and thin liquids; needs assistance with feeds  Calorie count  If the patient continued to have poor p o  Intake may need discussion with the family regarding alternate methods of nutrition  Patient's calorie intake is low  VBS- showed oropharyngeal dysphagia  s/p peg tube placement  Altered mental status  Assessment & Plan  Patient initially presented to [facility] his ultimate [place] with stroke-like symptoms for 2 days  CT scan of the head was concerning for subacute CVA and was transferred to [facility]   MRI of the brain was more consistent with demyelinating disease patient was started on IV steroids and 1000 mg daily for 5 days    Patient did not have any significant improvement at that time patient had plasmapheresis  And also had  lumbar puncture-negative for infection or malignancy  Patient noted to have persistent encephalopathy so a repeat MRI was obtained which showed worsening of the abnormality seen on the previous MRI and 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  Likely secondary to CNS lymphoma  Delirium precautions   Currently alert and oriented times 2-3   follow commands  Moves all extremities spontaneously except left upper and lower extrimities  Patient's  (does not speak [place]) makes all medical decision for her  Sepsis (HCC)-resolved as of 2021  Assessment & Plan  Patient had hypothermia, elevated WBC count and tachycardia  Concern for possible aspiration in setting on dysphagia versus UTI in setting of urinary catheter which was placed for retention  Plan:  S/p IV antibiotics last day on   Will continue to monitor off antibiotics  Disposition:  Pending placement to skilled nursing facility, follow-up closely with Oncology  SUBJECTIVE     Patient seen and examined  No acute events overnight  Patient denies pain, overall more interactive given language barrier  OBJECTIVE     Vitals:    21 0600 21 1131 21 1511 21 2228   BP:  103/70 104/71 97/69   Pulse:  102 96 99   Resp:   20 20   Temp:  97 9 °F (36 6 °C) 97 6 °F (36 4 °C) 98 4 °F (36 9 °C)   TempSrc:       SpO2:  100% 100% 100%   Weight: 49 3 kg (108 lb 11 oz)      Height:          Temperature:   Temp (24hrs), Av °F (36 7 °C), Min:97 6 °F (36 4 °C), Max:98 4 °F (36 9 °C)    Temperature: 98 4 °F (36 9 °C)  Intake & Output:  I/O        07 -  0700  07 -  07 07 -  0700    P  O  60 240     I V  (mL/kg) 1100 (22 3) 1069 2 (21 7)     NG/ 625     Feedings 1264 1275     Total Intake(mL/kg) 3274 (66 4) 3209 2 (65 1)     Urine (mL/kg/hr) 1300 (1 1) 994 (0 8)     Stool 0 0 Total Output 1300 994     Net +1974 +2215 2            Unmeasured Urine Occurrence 1 x 1 x     Unmeasured Stool Occurrence 2 x 2 x         Weights:   IBW (Ideal Body Weight): 45 5 kg    Body mass index is 21 23 kg/m²  Weight (last 2 days)     Date/Time   Weight    05/19/21 0600   49 3 (108 69)            Physical Exam  Vitals signs and nursing note reviewed  Constitutional:       General: She is not in acute distress  Appearance: She is well-developed  HENT:      Head: Normocephalic and atraumatic  Eyes:      Conjunctiva/sclera: Conjunctivae normal    Neck:      Musculoskeletal: Normal range of motion and neck supple  Cardiovascular:      Rate and Rhythm: Normal rate and regular rhythm  Heart sounds: No murmur  Pulmonary:      Effort: Pulmonary effort is normal  No respiratory distress  Breath sounds: Normal breath sounds  Abdominal:      Palpations: Abdomen is soft  Tenderness: There is no abdominal tenderness  Skin:     General: Skin is warm and dry  Capillary Refill: Capillary refill takes less than 2 seconds  Neurological:      General: No focal deficit present  Mental Status: She is alert  Psychiatric:         Mood and Affect: Mood normal          Behavior: Behavior normal        LABORATORY DATA     Labs: I have personally reviewed pertinent reports  Results from last 7 days   Lab Units 05/19/21  0554 05/18/21  0628 05/17/21  0500  05/15/21  0539   WBC Thousand/uL 8 60 6 26 2 93*   < > 2 72*   HEMOGLOBIN g/dL 9 2* 9 8* 9 5*   < > 7 0*   HEMATOCRIT % 28 2* 29 6* 28 9*   < > 21 7*   PLATELETS Thousands/uL 118* 142* 165   < > 142*   NEUTROS PCT % 65  --   --   --   --    MONOS PCT % 3*  --   --   --   --    MONO PCT %  --  3*  --   --  3*    < > = values in this interval not displayed        Results from last 7 days   Lab Units 05/19/21  0554 05/18/21  0628 05/15/21  0539 05/14/21  1822 05/14/21  0542   POTASSIUM mmol/L 3 9 4 2 4 0 4 0 3 4*   CHLORIDE mmol/L 99* 103 95* 99* 101   CO2 mmol/L 23 22 30 31 29   BUN mg/dL 20 20 12 12 13   CREATININE mg/dL 0 28* 0 37* 0 36* 0 32* 0 30*   CALCIUM mg/dL 8 6 9 1 8 2* 8 4 7 8*   ALK PHOS U/L  --   --  54 59 66   ALT U/L  --   --  61 67 66   AST U/L  --   --  25 24 21                            IMAGING & DIAGNOSTIC TESTING     Radiology Results: I have personally reviewed pertinent reports  Xr Chest Portable    Result Date: 4/24/2021  Impression: Dialysis catheter tip within the superior cavoatrial junction  No pneumothorax  Workstation performed: [id number]     Xr Chest Picc Line Portable    Result Date: 4/27/2021  Impression: PICC line tip in the superior vena cava, approximately 3 5 to 4 cm above the cavoatrial junction  The examination demonstrates a significant  finding and was documented as such in [facility] for liaison and referring practitioner notification  Workstation performed: [id number]     Ir Picc Reposition    Result Date: 4/27/2021  Impression: Status-post repositioning of a 5 Prydeinig central venous catheter under fluoroscopic guidance with its tip at the cavoatrial junction  Workstation performed: [id number]     Ir Tunneled Central Line Removal    Result Date: 4/27/2021  Impression: Impression: Successful tunneled central line removal as described  Signed, performed and dictated by Araseli Figueroa PA-C under the supervision of Dr Pham Ramos  Workstation performed: [id number]     Other Diagnostic Testing: I have personally reviewed pertinent reports      ACTIVE MEDICATIONS     Current Facility-Administered Medications   Medication Dose Route Frequency    acetaminophen (TYLENOL) tablet 650 mg  650 mg Oral Q6H PRN    acyclovir (ZOVIRAX) capsule 400 mg  400 mg Oral Q12H [address]    albuterol (PROVENTIL HFA,VENTOLIN HFA) inhaler 2 puff  2 puff Inhalation Q6H PRN    allopurinol (ZYLOPRIM) tablet 100 mg  100 mg Oral Daily    amLODIPine (NORVASC) tablet 5 mg  5 mg Oral Daily    bisacodyl (DULCOLAX) rectal suppository 10 mg  10 mg Rectal Daily PRN    famotidine (PEPCID) tablet 20 mg  20 mg Oral BID    fluconazole (DIFLUCAN) tablet 400 mg  400 mg Oral Daily    heparin (porcine) subcutaneous injection 5,000 Units  5,000 Units Subcutaneous Q8H [address]    insulin glargine (LANTUS) subcutaneous injection 7 Units 0 07 mL  7 Units Subcutaneous HS    insulin lispro (HumaLOG) 100 units/mL subcutaneous injection 1-5 Units  1-5 Units Subcutaneous 4x Daily (AC & HS)    lactulose oral solution 10 g  10 g Oral BID PRN    levofloxacin (LEVAQUIN) tablet 500 mg  500 mg Oral Q24H    lidocaine (LIDODERM) 5 % patch 1 patch  1 patch Topical Daily    menthol-methyl salicylate (BENGAY) 79-34 % cream   Apply externally BID    ondansetron (ZOFRAN) injection 4 mg  4 mg Intravenous Q6H PRN    polyethylene glycol (MIRALAX) packet 17 g  17 g Oral Daily    predniSONE tablet 10 mg  10 mg Oral Daily    senna-docusate sodium (SENOKOT S) 8 6-50 mg per tablet 1 tablet  1 tablet Oral BID    sodium chloride 0 9 % infusion  20 mL/hr Intravenous Once PRN    sodium chloride 0 9 % infusion  20 mL/hr Intravenous Once PRN    sodium chloride 0 9 % infusion  20 mL/hr Intravenous Once PRN    sodium chloride 0 9 % infusion  50 mL/hr Intravenous Continuous    tamsulosin (FLOMAX) capsule 0 4 mg  0 4 mg Oral Daily With Dinner       VTE Pharmacologic Prophylaxis: Heparin  VTE Mechanical Prophylaxis: sequential compression device    Portions of the record may have been created with voice recognition software  Occasional wrong word or "sound a like" substitutions may have occurred due to the inherent limitations of voice recognition software    Read the chart carefully and recognize, using context, where substitutions have occurred   ==  Shun Nath, [address] [facility]  Internal Medicine Residency PGY-2

## 2021-05-20 NOTE — UTILIZATION REVIEW
Continued Stay Review    Date: 5/20/21                        Current Patient Class: IP  Current Level of Care: MS    HPI:56 y o  female initially admitted on 4/23/21 for initiation of chemotherapy for CNS lymphoma  5/10 PEG tube placement      Assessment/Plan:     Pt is stable today  Tolerating tube feeds  Pain is under control with Tylenol  Today was last day for Prednisone 10 mg, will start 5 mg 5/21        Vital Signs:   05/20/21 08:24:05  99 °F (37 2 °C)  91  18  98/64  75  100 %   05/19/21 22:28:07  98 4 °F (36 9 °C)  99  20  97/69  78  100 %   05/19/21 15:11:30  97 6 °F (36 4 °C)  96  20  104/71  82  100 %   05/19/21 11:31:31  97 9 °F (36 6 °C)  102  --  103/70  81  100 %   05/19/21 05:53:47  99 4 °F (37 4 °C)  96  16  102/70  81  100 %   05/18/21 22:10:50  97 4 °F (36 3 °C)Abnormal   105  18  105/73  84  100 %   05/18/21 15:04:29  99 6 °F (37 6 °C)  100  17  108/74  85  100 %   05/18/21 07:09:44  96 3 °F (35 7 °C)Abnormal   103  --  111/77  88  100 %   05/18/21 02:58:48  --  105  20  113/78  90  99 %     Pertinent Labs/Diagnostic Results:       Results from last 7 days   Lab Units 05/19/21  0554 05/18/21  0628 05/17/21  0500 05/16/21  0521 05/15/21  0539   WBC Thousand/uL 8 60 6 26 2 93* 2 86* 2 72*   HEMOGLOBIN g/dL 9 2* 9 8* 9 5* 8 8* 7 0*   HEMATOCRIT % 28 2* 29 6* 28 9* 26 4* 21 7*   PLATELETS Thousands/uL 118* 142* 165 139* 142*   NEUTROS ABS Thousands/µL 5 64  --   --   --   --    BANDS PCT %  --  17*  --   --  8         Results from last 7 days   Lab Units 05/19/21  0554 05/18/21  0628 05/15/21  0539 05/14/21  1822 05/14/21  0542   SODIUM mmol/L 133* 134* 131* 134* 135*   POTASSIUM mmol/L 3 9 4 2 4 0 4 0 3 4*   CHLORIDE mmol/L 99* 103 95* 99* 101   CO2 mmol/L 23 22 30 31 29   ANION GAP mmol/L 11 9 6 4 5   BUN mg/dL 20 20 12 12 13   CREATININE mg/dL 0 28* 0 37* 0 36* 0 32* 0 30*   EGFR ml/min/1 73sq m 133 122 123 127 130   CALCIUM mg/dL 8 6 9 1 8 2* 8 4 7 8*     Results from last 7 days   Lab Units 05/15/21  0539 05/14/21  1822 05/14/21  0542   AST U/L 25 24 21   ALT U/L 61 67 66   ALK PHOS U/L 54 59 66   TOTAL PROTEIN g/dL 4 8* 5 1* 4 9*   ALBUMIN g/dL 2 4* 2 6* 2 3*   TOTAL BILIRUBIN mg/dL 0 48 0 58 0 53     Results from last 7 days   Lab Units 05/20/21  1055 05/20/21  0917 05/19/21  2358 05/19/21  2043 05/19/21  1643 05/19/21  1130 05/19/21  0932 05/18/21  2050 05/18/21  1630 05/18/21  1130 05/18/21  0716 05/17/21  2204   POC GLUCOSE mg/dl 157* 128 132 175* 212* 134 180* 170* 216* 144* 150* 172*     Results from last 7 days   Lab Units 05/19/21  0554 05/18/21  0628 05/15/21  0539 05/14/21  1822 05/14/21  0542   GLUCOSE RANDOM mg/dL 141* 149* 136 156* 248*     Medications:   Scheduled Medications:  acyclovir, 400 mg, Oral, Q12H MEÑO  allopurinol, 100 mg, Oral, Daily  amLODIPine, 5 mg, Oral, Daily  famotidine, 20 mg, Oral, BID  fluconazole, 400 mg, Oral, Daily  heparin (porcine), 5,000 Units, Subcutaneous, Q8H MEÑO  insulin glargine, 7 Units, Subcutaneous, HS  insulin lispro, 1-5 Units, Subcutaneous, 4x Daily (AC & HS)  levofloxacin, 500 mg, Oral, Q24H  lidocaine, 1 patch, Topical, Daily  menthol-methyl salicylate, , Apply externally, BID  polyethylene glycol, 17 g, Oral, Daily  [START ON 5/21/2021] predniSONE, 5 mg, Oral, Daily  senna-docusate sodium, 1 tablet, Oral, BID  tamsulosin, 0 4 mg, Oral, Daily With Dinner      Continuous IV Infusions:     PRN Meds:  acetaminophen, 650 mg, Oral, Q6H PRN - x 1 5/19, 5/20  albuterol, 2 puff, Inhalation, Q6H PRN  bisacodyl, 10 mg, Rectal, Daily PRN  lactulose, 10 g, Oral, BID PRN  ondansetron, 4 mg, Intravenous, Q6H PRN  sodium chloride, 20 mL/hr, Intravenous, Once PRN  sodium chloride, 20 mL/hr, Intravenous, Once PRN  sodium chloride, 20 mL/hr, Intravenous, Once PRN    Discharge Plan: rehab    Network Utilization Review Department  ATTENTION: Please call with any questions or concerns to [phone] and carefully listen to the prompts so that you are directed to the right person  All voicemails are confidential   Cait Gabe all requests for admission clinical reviews, approved or denied determinations and any other requests to dedicated fax number below belonging to the campus where the patient is receiving treatment   List of dedicated fax numbers for the Facilities:  [address] DENIALS (Administrative/Medical Necessity) [phone]   [address] (Maternity/NICU/Pediatrics) [phone]   [address] [place] [address] [facility] [place] [id number] [place] [address] [address] [facility] [place] [address] [phone]

## 2021-05-20 NOTE — PLAN OF CARE
Problem: Prexisting or High Potential for Compromised Skin Integrity  Goal: Skin integrity is maintained or improved  Description: INTERVENTIONS:  - Identify patients at risk for skin breakdown  - Assess and monitor skin integrity  - Assess and monitor nutrition and hydration status  - Monitor labs   - Assess for incontinence   - Turn and reposition patient  - Assist with mobility/ambulation  - Relieve pressure over bony prominences  - Avoid friction and shearing  - Provide appropriate hygiene as needed including keeping skin clean and dry  - Evaluate need for skin moisturizer/barrier cream  - Collaborate with interdisciplinary team   - Patient/family teaching  - Consider wound care consult   Outcome: Progressing     Problem: Potential for Falls  Goal: Patient will remain free of falls  Description: INTERVENTIONS:  - Assess patient frequently for physical needs  -  Identify cognitive and physical deficits and behaviors that affect risk of falls  -  [place] fall precautions as indicated by assessment   - Educate patient/family on patient safety including physical limitations  - Instruct patient to call for assistance with activity based on assessment  - Modify environment to reduce risk of injury  - Consider OT/PT consult to assist with strengthening/mobility  Outcome: Progressing     Problem: Nutrition/Hydration-ADULT  Goal: Nutrient/Hydration intake appropriate for improving, restoring or maintaining nutritional needs  Description: Monitor and assess patient's nutrition/hydration status for malnutrition  Collaborate with interdisciplinary team and initiate plan and interventions as ordered  Monitor patient's weight and dietary intake as ordered or per policy  Utilize nutrition screening tool and intervene as necessary  Determine patient's food preferences and provide high-protein, high-caloric foods as appropriate       INTERVENTIONS:  - Monitor oral intake, urinary output, labs, and treatment plans  - Assess nutrition and hydration status and recommend course of action  - Evaluate amount of meals eaten  - Assist patient with eating if necessary   - Allow adequate time for meals  - Recommend/ encourage appropriate diets, oral nutritional supplements, and vitamin/mineral supplements  - Order, calculate, and assess calorie counts as needed  - Recommend, monitor, and adjust tube feedings and TPN/PPN based on assessed needs  - Assess need for intravenous fluids  - Provide specific nutrition/hydration education as appropriate  - Include patient/family/caregiver in decisions related to nutrition  Outcome: Progressing     Problem: PAIN - ADULT  Goal: Verbalizes/displays adequate comfort level or baseline comfort level  Description: Interventions:  - Encourage patient to monitor pain and request assistance  - Assess pain using appropriate pain scale  - Administer analgesics based on type and severity of pain and evaluate response  - Implement non-pharmacological measures as appropriate and evaluate response  - Consider cultural and social influences on pain and pain management  - Notify physician/advanced practitioner if interventions unsuccessful or patient reports new pain  Outcome: Progressing     Problem: INFECTION - ADULT  Goal: Absence or prevention of progression during hospitalization  Description: INTERVENTIONS:  - Assess and monitor for signs and symptoms of infection  - Monitor lab/diagnostic results  - Monitor all insertion sites, i e  indwelling lines, tubes, and drains  - Monitor endotracheal if appropriate and nasal secretions for changes in amount and color  - [place] appropriate cooling/warming therapies per order  - Administer medications as ordered  - Instruct and encourage patient and family to use good hand hygiene technique  - Identify and instruct in appropriate isolation precautions for identified infection/condition  Outcome: Progressing     Problem: SAFETY ADULT  Goal: Patient will remain free of falls  Description: INTERVENTIONS:  - Assess patient frequently for physical needs  -  Identify cognitive and physical deficits and behaviors that affect risk of falls    -  [place] fall precautions as indicated by assessment   - Educate patient/family on patient safety including physical limitations  - Instruct patient to call for assistance with activity based on assessment  - Modify environment to reduce risk of injury  - Consider OT/PT consult to assist with strengthening/mobility  Outcome: Progressing  Goal: Maintain or return to baseline ADL function  Description: INTERVENTIONS:  -  Assess patient's ability to carry out ADLs; assess patient's baseline for ADL function and identify physical deficits which impact ability to perform ADLs (bathing, care of mouth/teeth, toileting, grooming, dressing, etc )  - Assess/evaluate cause of self-care deficits   - Assess range of motion  - Assess patient's mobility; develop plan if impaired  - Assess patient's need for assistive devices and provide as appropriate  - Encourage maximum independence but intervene and supervise when necessary  - Involve family in performance of ADLs  - Assess for home care needs following discharge   - Consider OT consult to assist with ADL evaluation and planning for discharge  - Provide patient education as appropriate  Outcome: Progressing  Goal: Maintain or return mobility status to optimal level  Description: INTERVENTIONS:  - Assess patient's baseline mobility status (ambulation, transfers, stairs, etc )    - Identify cognitive and physical deficits and behaviors that affect mobility  - Identify mobility aids required to assist with transfers and/or ambulation (gait belt, sit-to-stand, lift, walker, cane, etc )  - [place] fall precautions as indicated by assessment  - Record patient progress and toleration of activity level on Mobility SBAR; progress patient to next Phase/Stage  - Instruct patient to call for assistance with activity based on assessment  - Consider rehabilitation consult to assist with strengthening/weightbearing, etc   Outcome: Progressing     Problem: DISCHARGE PLANNING  Goal: Discharge to home or other facility with appropriate resources  Description: INTERVENTIONS:  - Identify barriers to discharge w/patient and caregiver  - Arrange for needed discharge resources and transportation as appropriate  - Identify discharge learning needs (meds, wound care, etc )  - Arrange for interpretive services to assist at discharge as needed  - Refer to Case Management Department for coordinating discharge planning if the patient needs post-hospital services based on physician/advanced practitioner order or complex needs related to functional status, cognitive ability, or social support system  Outcome: Progressing     Problem: Knowledge Deficit  Goal: Patient/family/caregiver demonstrates understanding of disease process, treatment plan, medications, and discharge instructions  Description: Complete learning assessment and assess knowledge base    Interventions:  - Provide teaching at level of understanding  - Provide teaching via preferred learning methods  Outcome: Progressing

## 2021-05-20 NOTE — CASE MANAGEMENT
YSABEL s/w [facility] who stated that they had the meeting today and the case was transferred to [address] [facility] who would review it  Per Liliana Thakkar they are the ones responsible for writing individual contracts with facilities  Per Liliana Thakkar the pt's case will also be transitioned to a complex case manager  CM will follow

## 2021-05-21 LAB
GLUCOSE SERPL-MCNC: 139 MG/DL (ref 65–140)
GLUCOSE SERPL-MCNC: 141 MG/DL (ref 65–140)
GLUCOSE SERPL-MCNC: 147 MG/DL (ref 65–140)
GLUCOSE SERPL-MCNC: 151 MG/DL (ref 65–140)

## 2021-05-21 PROCEDURE — 97110 THERAPEUTIC EXERCISES: CPT

## 2021-05-21 PROCEDURE — 99232 SBSQ HOSP IP/OBS MODERATE 35: CPT | Performed by: INTERNAL MEDICINE

## 2021-05-21 PROCEDURE — 82948 REAGENT STRIP/BLOOD GLUCOSE: CPT

## 2021-05-21 PROCEDURE — 97112 NEUROMUSCULAR REEDUCATION: CPT

## 2021-05-21 PROCEDURE — 97535 SELF CARE MNGMENT TRAINING: CPT

## 2021-05-21 RX ADMIN — FAMOTIDINE 20 MG: 20 TABLET ORAL at 09:33

## 2021-05-21 RX ADMIN — ACETAMINOPHEN 650 MG: 325 TABLET, FILM COATED ORAL at 21:43

## 2021-05-21 RX ADMIN — LIDOCAINE 5% 1 PATCH: 700 PATCH TOPICAL at 09:33

## 2021-05-21 RX ADMIN — INSULIN LISPRO 1 UNITS: 100 INJECTION, SOLUTION INTRAVENOUS; SUBCUTANEOUS at 17:37

## 2021-05-21 RX ADMIN — AMLODIPINE BESYLATE 5 MG: 5 TABLET ORAL at 13:32

## 2021-05-21 RX ADMIN — HEPARIN SODIUM 5000 UNITS: 5000 INJECTION INTRAVENOUS; SUBCUTANEOUS at 06:05

## 2021-05-21 RX ADMIN — LEVOFLOXACIN 500 MG: 500 TABLET, FILM COATED ORAL at 17:38

## 2021-05-21 RX ADMIN — ACYCLOVIR 400 MG: 200 CAPSULE ORAL at 21:44

## 2021-05-21 RX ADMIN — HEPARIN SODIUM 5000 UNITS: 5000 INJECTION INTRAVENOUS; SUBCUTANEOUS at 21:44

## 2021-05-21 RX ADMIN — HEPARIN SODIUM 5000 UNITS: 5000 INJECTION INTRAVENOUS; SUBCUTANEOUS at 13:28

## 2021-05-21 RX ADMIN — ALLOPURINOL 100 MG: 100 TABLET ORAL at 09:32

## 2021-05-21 RX ADMIN — TAMSULOSIN HYDROCHLORIDE 0.4 MG: 0.4 CAPSULE ORAL at 17:38

## 2021-05-21 RX ADMIN — FLUCONAZOLE 400 MG: 200 TABLET ORAL at 09:31

## 2021-05-21 RX ADMIN — ACYCLOVIR 400 MG: 200 CAPSULE ORAL at 09:31

## 2021-05-21 RX ADMIN — SENNOSIDES, DOCUSATE SODIUM 1 TABLET: 8.6; 5 TABLET ORAL at 09:32

## 2021-05-21 RX ADMIN — PREDNISONE 5 MG: 5 TABLET ORAL at 09:32

## 2021-05-21 RX ADMIN — FAMOTIDINE 20 MG: 20 TABLET ORAL at 17:38

## 2021-05-21 RX ADMIN — INSULIN GLARGINE 7 UNITS: 100 INJECTION, SOLUTION SUBCUTANEOUS at 21:44

## 2021-05-21 NOTE — PLAN OF CARE
Problem: Prexisting or High Potential for Compromised Skin Integrity  Goal: Skin integrity is maintained or improved  Description: INTERVENTIONS:  - Identify patients at risk for skin breakdown  - Assess and monitor skin integrity  - Assess and monitor nutrition and hydration status  - Monitor labs   - Assess for incontinence   - Turn and reposition patient  - Assist with mobility/ambulation  - Relieve pressure over bony prominences  - Avoid friction and shearing  - Provide appropriate hygiene as needed including keeping skin clean and dry  - Evaluate need for skin moisturizer/barrier cream  - Collaborate with interdisciplinary team   - Patient/family teaching  - Consider wound care consult   Outcome: Progressing     Problem: Potential for Falls  Goal: Patient will remain free of falls  Description: INTERVENTIONS:  - Assess patient frequently for physical needs  -  Identify cognitive and physical deficits and behaviors that affect risk of falls  -  [place] fall precautions as indicated by assessment   - Educate patient/family on patient safety including physical limitations  - Instruct patient to call for assistance with activity based on assessment  - Modify environment to reduce risk of injury  - Consider OT/PT consult to assist with strengthening/mobility  Outcome: Progressing     Problem: SAFETY ADULT  Goal: Patient will remain free of falls  Description: INTERVENTIONS:  - Assess patient frequently for physical needs  -  Identify cognitive and physical deficits and behaviors that affect risk of falls    -  [place] fall precautions as indicated by assessment   - Educate patient/family on patient safety including physical limitations  - Instruct patient to call for assistance with activity based on assessment  - Modify environment to reduce risk of injury  - Consider OT/PT consult to assist with strengthening/mobility  Outcome: Progressing

## 2021-05-21 NOTE — OCCUPATIONAL THERAPY NOTE
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[address] )  Active Problems:    Altered mental status    Dysphagia    Urinary retention    Aphasia    Weakness    Presence of permanent central venous catheter    Fracture of ramus of left pubis (HCC)    Severe protein-calorie malnutrition (HCC)    Steroid-induced hyperglycemia    Left-sided weakness    Pancytopenia (HCC)    Acute gout      History reviewed  No pertinent past medical history  Past Surgical History:   Procedure Laterality Date    IR PICC REPOSITION  4/27/2021    IR TUNNELED CENTRAL LINE REMOVAL  4/27/2021 05/21/21 1132   OT Last Visit   OT Visit Date 05/21/21   Note Type   Note Type Treatment   Restrictions/Precautions   Weight Bearing Precautions Per Order No   Other Precautions Cognitive; Bed Alarm;Multiple lines; Fall Risk;Pain;Visual impairment  (language barrier)   General   Response to Previous Treatment Patient unable to report, no changes reported from family or staff   Lifestyle   Autonomy Per chart, pt was I with ADLs PTA    Reciprocal Relationships     Service to Others Unable to obtain   Intrinsic Gratification Will continue to assess    Pain Assessment   Pain Assessment Tool FLACC   Pain Rating: FLACC (Rest) - Face 0   Pain Rating: FLACC (Rest) - Legs 0   Pain Rating: FLACC (Rest) - Activity 0   Pain Rating: FLACC (Rest) - Cry 0   Pain Rating: FLACC (Rest) - Consolability 0   Score: FLACC (Rest) 0   Pain Rating: FLACC (Activity) - Face 0   Pain Rating: FLACC (Activity) - Legs 0   Pain Rating: FLACC (Activity) - Activity 0   Pain Rating: FLACC (Activity) - Cry 0   Pain Rating: FLACC (Activity) - Consolability 0   Score: FLACC (Activity) 0   ADL   Where Assessed Edge of bed   Grooming Assistance 2  Maximal Assistance   Grooming Comments pt able to slowly wash her face while seated at EOB  requires max assist for completion of grooming activity      LB Dressing Assistance 2  Maximal Assistance   LB Dressing Deficit Don/doff R sock; Don/doff L sock   Toileting Assistance  2  Maximal Assistance   Bed Mobility   Rolling R 4  Minimal assistance   Additional items Assist x 1;Bedrails; Increased time required   Rolling L 4  Minimal assistance   Additional items Bedrails; Increased time required   Supine to Sit 2  Maximal assistance   Additional items Assist x 1; Increased time required;LE management   Sit to Supine 2  Maximal assistance   Additional items Assist x 1   Transfers   Additional Comments pt sat at EOB x 15 min, overall poor + balance sitting at EOB   Therapeutic Exercise - ROM   UE-ROM Yes   ROM- Right Upper Extremities   R Shoulder AROM   R Elbow AROM   R Wrist AROM   R Hand AROM   R Position Seated   ROM - Left Upper Extremities    L Shoulder AAROM   L Elbow AAROM   L Wrist AAROM   L Hand AAROM   L Position Seated   LUE ROM Comment pt with significant weakness L UE, pt presents with L hemiparesis, needing AAROM for movement  able to pronate/supinate self, able to wiggle fingers self, but assist for elbow and shoulder flexion  upon arrival, pt in bed with L hand supinated/stretched out, and not really moving it unless asked to  Coordination   Gross Motor impaired   Fine Motor impaired   Cognition   Overall Cognitive Status Impaired   Arousal/Participation Alert; Cooperative   Attention Attends with cues to redirect   Orientation Level Unable to assess   Memory Unable to assess   Following Commands Follows one step commands with increased time or repetition   Comments pt speaks Gujarati only, occasional english words i e when counting and "ok", "yes", "no"  pt appears to be answering "Ok" to every question, sometimes in the wrong places   Vision   Vision Comments legally blind?    Activity Tolerance   Activity Tolerance Patient limited by fatigue   Medical Staff Made Aware PT Kathy Shown   Assessment   Assessment Pt seen for skilled OT treatment session this AM w focus on sitting balance/endurance at EOB, self care and there ex  Pt seen as a co-treat with PT due to the patient's co-morbidities, clinically unstable presentation, and present impairments which are a regression from the patient's baseline  Pt requires max assist of 1 for supine to sit (very petite lady), mod A for balance at EOB x 15 min, occasionally able to hold self for very brief moments  Pt able to wash face when up at EOB w visual demonstration  Pt does require max assist for completion of grooming  She does fatigue very easily, has decreased act tolerance  While at EOB, pt worked on reaching exercises across midline  Also worked on UE exercises with both arms  R UE AROM, LUE AAROM/PROM  Pt was very cooperative throughout session  OT will continue to follow as per POC  The patient's raw score on the AM-PAC Daily Activity inpatient short form is 13, standardized score is 32 03, less than 39 4  Patients at this level are likely to benefit from discharge to post-acute rehabilitation services

## 2021-05-21 NOTE — PROGRESS NOTES
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[address] )  Active Problems:    Acute gout    Altered mental status    Dysphagia    Urinary retention    Aphasia    Weakness    Presence of permanent central venous catheter    Fracture of ramus of left pubis (HCC)    Severe protein-calorie malnutrition (HCC)    Steroid-induced hyperglycemia    Left-sided weakness    Pancytopenia ([address] )      * 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[facility] [address] )  Assessment & Plan  Patient was diagnosed with primary CNS lymphoma through biopsy in [facility] transferred over here for chemotherapy since the [facility] is out of network  Had a 2nd family meeting on 04/30 and family has decided to proceed treatment at this time  S/p PICC line for chemotherapy  S/p peg tube placement on 05/10  Currently on oral diet and Jevity 1 2  Plan:  Hematology-Oncology consulted  Appreciate recommendations  Chemotherapy treatment as per hematology oncology team     S/p 2nd cycle of chemotherapy on 05/13  Leucovin on 5/14  DVT prophylaxis:  On heparin  Case management-will start looking for places for rehab  S/p steroid taper for cerebral edema from CNS lymphoma  Pancytopenia in setting of recent chemotherapy  Will continue to watch for any signs of infection  On acyclovir, Diflucan and Levaquin    After discussion with Oncology, plan for rehab, patient does not require radiation therapy, rehab for strength, no chemotherapy until at least 3 weeks, evaluate at that time  Will continue to try to reach out to family for update    Acute gout  Assessment & Plan  Appears to have acute gout flare right knee, pain improving today  Currently on prednisone 10 mg q day, based on recent chemotherapy will hold off on NSAIDs or colchicine  Will start low-dose allopurinol 100 mg q day to hopefully prevent other acute flares in the setting of chemotherapy    Pancytopenia (HCC)  Assessment & Plan  Likely in setting of chemotherapy  Will continue to monitor for any signs of infection  Will transfuse with packed RBC for hemoglobin less than 7  Drop in Hgb from 9 to 7 on 5/14, other lines relatively stable  VSS and soft brown BMs  Will discuss with oncology if this is expected based on chemo or if there should be further workup for bleeding, though hemodynamics currently do not suggest bleed  S/p 1 pack RBC on 05/15, hemoglobin continues to improve    Left-sided weakness  Assessment & Plan  Patient has left-sided weakness at baseline from her CNS lymphoma  Left upper extremity 3-4/5 and left lower extremity 0-1/5 muscle strength at baseline  Steroid-induced hyperglycemia  Assessment & Plan  Mild hyperglycemia, glucoses in 200s, no history of DM  Occurring in the setting of Decadron with chemotherapy, as well as sodium bicarb in D5 drip  - D5 drip is finished  - will monitor sugars while on Jevity and now off D5  - continue q4 hour fingerstick glucose with correctional algorithm 1 and Lantus    Severe protein-calorie malnutrition (HCC)  Assessment & Plan  Malnutrition Findings:   Adult Malnutrition type: Acute illness(due to medical condition resulting in dysphagia, decreased appetite and intake, weight loss)  Adult Degree of Malnutrition: Other severe protein calorie malnutrition    BMI Findings: Body mass index is 20 49 kg/m²  Plan:  Nutrition consult  Tube feeds initiated with Jevity 1 2, advancing to goal      Fracture of ramus of left pubis [facility])  Assessment & Plan  Leslie Iglesias prior to presentation to [facility]   Managed nonoperatively at [facility]    Presence of permanent central venous catheter  Assessment & Plan  Noted presence of right IJ tunneled double-lumen HD catheter; upon chart review, this was likely inserted to be used for plasmapheresis which she has completed  Plan:  S/p removal by IR  Patient now has PICC line  Weakness  Assessment & Plan  Left greater than right upper and lower extremity weakness with ongoing left lower extremity contracture  Secondary to underlying CNS malignancy  *Decubitus ulcer precautions such as frequent repositioning  Aphasia  Assessment & Plan  Definite evidence of expressive aphasia, possible component of receptive aphasia as well  Speech therapy on board  Urinary retention  Assessment & Plan  Patient developed urinary retention during previous hospitalization  Plan was voiding trial as an outpatient as she was scheduled for discharge from that facility  Machado catheter was initially removed although patient required re-placement of machado on 05/14 in setting of incontinence and getting chemotherapy  Machado discontinued, monitor for signs of fluid retention      Dysphagia  Assessment & Plan  Patient noted to have dysphagia and also decreased p o  Intake  As per [facility] progress note patient had a calorie count and recommended feeding tube placement  Speech evaluation - advance diet with dysphagia 2 and thin liquids; needs assistance with feeds  Calorie count  If the patient continued to have poor p o  Intake may need discussion with the family regarding alternate methods of nutrition  Patient's calorie intake is low  VBS- showed oropharyngeal dysphagia  s/p peg tube placement  Altered mental status  Assessment & Plan  Patient initially presented to [facility] his ultimate [place] with stroke-like symptoms for 2 days  CT scan of the head was concerning for subacute CVA and was transferred to [facility]   MRI of the brain was more consistent with demyelinating disease patient was started on IV steroids and 1000 mg daily for 5 days    Patient did not have any significant improvement at that time patient had plasmapheresis  And also had  lumbar puncture-negative for infection or malignancy  Patient noted to have persistent encephalopathy so a repeat MRI was obtained which showed worsening of the abnormality seen on the previous MRI and 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  Likely secondary to CNS lymphoma  Delirium precautions   Currently alert and oriented times 2-3   follow commands  Moves all extremities spontaneously except left upper and lower extrimities  Patient's  (does not speak [place]) makes all medical decision for her  Sepsis (HCC)-resolved as of 2021  Assessment & Plan  Patient had hypothermia, elevated WBC count and tachycardia  Concern for possible aspiration in setting on dysphagia versus UTI in setting of urinary catheter which was placed for retention  Plan:  S/p IV antibiotics last day on   Will continue to monitor off antibiotics  Disposition:  Pending placement, rehab  Outpatient Oncology      SUBJECTIVE     Patient seen and examined  No acute events overnight  Patient denies pain  OBJECTIVE     Vitals:    21 0824 21 1645 21 2219 21 0740   BP: 98/64 118/78 116/71 112/69   Pulse: 91 98 (!) 113 (!) 106   Resp: 18 17 18 20   Temp: 99 °F (37 2 °C) 97 7 °F (36 5 °C) 97 6 °F (36 4 °C) 99 8 °F (37 7 °C)   TempSrc:       SpO2: 100% 99% 100% 100%   Weight:       Height:          Temperature:   Temp (24hrs), Av 5 °F (36 9 °C), Min:97 6 °F (36 4 °C), Max:99 8 °F (37 7 °C)    Temperature: 99 8 °F (37 7 °C)  Intake & Output:  I/O        07 -  0700  07 -  0700  07 -  0700    P  O  240 140     I V  (mL/kg) 1069 2 (21 7) 699 2 (14 2)     NG/ 450     Feedings 1275 985     Total Intake(mL/kg) 3209 2 (65 1) 2274 2 (46 1)     Urine (mL/kg/hr) 994 (0 8) 950 (0 8)     Stool 0 0     Total Output 994 950     Net +2215 2 +1324 2 Unmeasured Urine Occurrence 1 x 3 x     Unmeasured Stool Occurrence 2 x 2 x         Weights:   IBW (Ideal Body Weight): 45 5 kg    Body mass index is 21 23 kg/m²  Weight (last 2 days)     Date/Time   Weight    05/19/21 0600   49 3 (108 69)            Physical Exam  Vitals signs and nursing note reviewed  Constitutional:       General: She is not in acute distress  Appearance: She is well-developed  She is ill-appearing  HENT:      Head: Normocephalic and atraumatic  Eyes:      Conjunctiva/sclera: Conjunctivae normal    Neck:      Musculoskeletal: Neck supple  Cardiovascular:      Rate and Rhythm: Normal rate and regular rhythm  Heart sounds: No murmur  Pulmonary:      Effort: Pulmonary effort is normal  No respiratory distress  Breath sounds: Normal breath sounds  Abdominal:      Palpations: Abdomen is soft  Tenderness: There is no abdominal tenderness  Skin:     General: Skin is warm and dry  Neurological:      Mental Status: She is alert  LABORATORY DATA     Labs: I have personally reviewed pertinent reports  Results from last 7 days   Lab Units 05/19/21  0554 05/18/21  0628 05/17/21  0500  05/15/21  0539   WBC Thousand/uL 8 60 6 26 2 93*   < > 2 72*   HEMOGLOBIN g/dL 9 2* 9 8* 9 5*   < > 7 0*   HEMATOCRIT % 28 2* 29 6* 28 9*   < > 21 7*   PLATELETS Thousands/uL 118* 142* 165   < > 142*   NEUTROS PCT % 65  --   --   --   --    MONOS PCT % 3*  --   --   --   --    MONO PCT %  --  3*  --   --  3*    < > = values in this interval not displayed        Results from last 7 days   Lab Units 05/19/21  0554 05/18/21  0628 05/15/21  0539 05/14/21  1822   POTASSIUM mmol/L 3 9 4 2 4 0 4 0   CHLORIDE mmol/L 99* 103 95* 99*   CO2 mmol/L 23 22 30 31   BUN mg/dL 20 20 12 12   CREATININE mg/dL 0 28* 0 37* 0 36* 0 32*   CALCIUM mg/dL 8 6 9 1 8 2* 8 4   ALK PHOS U/L  --   --  54 59   ALT U/L  --   --  61 67   AST U/L  --   --  25 24                            IMAGING & DIAGNOSTIC TESTING Radiology Results: I have personally reviewed pertinent reports  Xr Chest Portable    Result Date: 4/24/2021  Impression: Dialysis catheter tip within the superior cavoatrial junction  No pneumothorax  Workstation performed: [id number]     Xr Chest Picc Line Portable    Result Date: 4/27/2021  Impression: PICC line tip in the superior vena cava, approximately 3 5 to 4 cm above the cavoatrial junction  The examination demonstrates a significant  finding and was documented as such in [facility] for liaison and referring practitioner notification  Workstation performed: [id number]     Ir Picc Reposition    Result Date: 4/27/2021  Impression: Status-post repositioning of a 5 [place] central venous catheter under fluoroscopic guidance with its tip at the cavoatrial junction  Workstation performed: [id number]     Ir Tunneled Central Line Removal    Result Date: 4/27/2021  Impression: Impression: Successful tunneled central line removal as described  Signed, performed and dictated by Errol Greenberg PA-C under the supervision of Dr Bambi Noguera  Workstation performed: [id number]     Other Diagnostic Testing: I have personally reviewed pertinent reports      ACTIVE MEDICATIONS     Current Facility-Administered Medications   Medication Dose Route Frequency    acetaminophen (TYLENOL) tablet 650 mg  650 mg Oral Q6H PRN    acyclovir (ZOVIRAX) capsule 400 mg  400 mg Oral Q12H [address]    albuterol (PROVENTIL HFA,VENTOLIN HFA) inhaler 2 puff  2 puff Inhalation Q6H PRN    allopurinol (ZYLOPRIM) tablet 100 mg  100 mg Oral Daily    amLODIPine (NORVASC) tablet 5 mg  5 mg Oral Daily    bisacodyl (DULCOLAX) rectal suppository 10 mg  10 mg Rectal Daily PRN    famotidine (PEPCID) tablet 20 mg  20 mg Oral BID    fluconazole (DIFLUCAN) tablet 400 mg  400 mg Oral Daily    heparin (porcine) subcutaneous injection 5,000 Units  5,000 Units Subcutaneous Q8H [address]    insulin glargine (LANTUS) subcutaneous injection 7 Units 0 07 mL  7 Units Subcutaneous HS    insulin lispro (HumaLOG) 100 units/mL subcutaneous injection 1-5 Units  1-5 Units Subcutaneous 4x Daily (AC & HS)    lactulose oral solution 10 g  10 g Oral BID PRN    levofloxacin (LEVAQUIN) tablet 500 mg  500 mg Oral Q24H    lidocaine (LIDODERM) 5 % patch 1 patch  1 patch Topical Daily    menthol-methyl salicylate (BENGAY) 03-46 % cream   Apply externally BID    ondansetron (ZOFRAN) injection 4 mg  4 mg Intravenous Q6H PRN    polyethylene glycol (MIRALAX) packet 17 g  17 g Oral Daily    predniSONE tablet 5 mg  5 mg Oral Daily    senna-docusate sodium (SENOKOT S) 8 6-50 mg per tablet 1 tablet  1 tablet Oral BID    sodium chloride 0 9 % infusion  20 mL/hr Intravenous Once PRN    sodium chloride 0 9 % infusion  20 mL/hr Intravenous Once PRN    sodium chloride 0 9 % infusion  20 mL/hr Intravenous Once PRN    tamsulosin (FLOMAX) capsule 0 4 mg  0 4 mg Oral Daily With Dinner       VTE Pharmacologic Prophylaxis: Heparin  VTE Mechanical Prophylaxis: sequential compression device    Portions of the record may have been created with voice recognition software  Occasional wrong word or "sound a like" substitutions may have occurred due to the inherent limitations of voice recognition software    Read the chart carefully and recognize, using context, where substitutions have occurred   ==  Lilian Mancera, [facility]  Internal Medicine Residency PGY-2

## 2021-05-21 NOTE — PHYSICAL THERAPY NOTE
PHYSICAL THERAPY NOTE          Patient Name: Lew WHITE Date: 5/21/2021 05/21/21 1133   PT Last Visit   PT Visit Date 05/21/21   Note Type   Note Type Treatment   Pain Assessment   Pain Assessment Tool FLACC   Pain Rating: FLACC (Rest) - Face 0   Pain Rating: FLACC (Rest) - Legs 0   Pain Rating: FLACC (Rest) - Activity 0   Pain Rating: FLACC (Rest) - Cry 0   Pain Rating: FLACC (Rest) - Consolability 0   Score: FLACC (Rest) 0   Pain Rating: FLACC (Activity) - Face 0   Pain Rating: FLACC (Activity) - Legs 0   Pain Rating: FLACC (Activity) - Activity 0   Pain Rating: FLACC (Activity) - Cry 0   Pain Rating: FLACC (Activity) - Consolability 0   Score: FLACC (Activity) 0   Restrictions/Precautions   Weight Bearing Precautions Per Order No   Other Precautions Cognitive; Chair Alarm; Bed Alarm;Multiple lines; Fall Risk;Visual impairment  (language barrier )   General   Chart Reviewed Yes   Response to Previous Treatment Patient unable to report, no changes reported from family or staff   Family/Caregiver Present No   Cognition   Overall Cognitive Status Impaired   Arousal/Participation Alert; Cooperative   Attention Attends with cues to redirect   Orientation Level Unable to assess   Memory Unable to assess   Following Commands Follows one step commands with increased time or repetition   Comments Pt more alert and following commands  Pt pleasant and cooperative    Bed Mobility   Rolling R 4  Minimal assistance   Additional items Assist x 1;Bedrails; Increased time required   Rolling L 4  Minimal assistance   Additional items Assist x 1; Increased time required; Bedrails   Supine to Sit 2  Maximal assistance   Additional items Assist x 1;HOB elevated; Increased time required;LE management   Sit to Supine 2  Maximal assistance   Additional items Assist x 1; Increased time required;LE management;HOB elevated   Additional Comments Pt supine in bed upon arrival  Pt returned to supine in bed with all needs within reach at the end of therapy session- bed alarm on post-session    Transfers   Sit to Stand Unable to assess   Ambulation/Elevation   Gait pattern Not appropriate   Balance   Static Sitting Fair -   Dynamic Sitting Poor +   Endurance Deficit   Endurance Deficit Yes   Endurance Deficit Description fatigue, weakness    Activity Tolerance   Activity Tolerance Patient limited by fatigue   Medical Staff [address]     Nurse Made Aware RN cleared pt to participate in therapy session    Exercises   Knee AROM Long Arc Quad Sitting;Bilateral;15 reps;AAROM;AROM  (AAROM- LLE; AROM- RLE )   Ankle Pumps Sitting;Bilateral;15 reps   Marching Sitting;Bilateral;15 reps;AROM;AAROM  (AAROM- LLE; AROM- RLE )   Assessment   Prognosis Fair   Problem List Decreased strength;Decreased range of motion;Decreased endurance; Impaired balance;Decreased mobility; Decreased cognition; Impaired vision;Pain   Assessment Pt seen for PT treatment session this date  Therapy session focused on bed mobility, sitting static/dynamic balance/tolerance, endurance training, and therapeutic exercise in order to improve overall mobility and independence  Pt requires assist of 1-2 for all mobility performed this date  Co-treatment with OT this date due to pt's multiple co-morbidities, increased medical complexity and decreased functional mobility status and activity tolerance  Pt performed sit to supine bed mobility tasks with max Ax1  Pt sat EOB ~18 min fluctuating between mod Ax1 - CGA to maintain upright posture; pt performed dynamic reaching in all planes with 1 UE support  Pt performed LE therex to hips, knees, and ankles with no demonstration of increased pain/discomfort  Pt performed sit to supine bed mobility tasks with max Ax1  Pt had BM while sitting EOB; rolling to R/L performed to assist in hygiene tasks   Pt making slow progress toward goals due to decreased activity tolerance  Pt was left supine in bed with alarm on at the end of PT session with all needs in reach  Pt would benefit from continued PT services while in hospital to address remaining limitations  PT to continue to follow pt and recommends post-acute rehab services after d/c  The patient's AM-PAC Basic Mobility Inpatient Short Form Low Function Raw Score 14 , Standardized Score is 22 01  A standardized score less 42 9 suggests the patient may benefit from discharge to post-acute rehab services  Please also refer to the recommendation of the Physical Therapist for safe discharge planning  Barriers to Discharge Inaccessible home environment;Decreased caregiver support   Goals   Patient Goals none expressed    STG Expiration Date 05/24/21   Plan   Treatment/Interventions LE strengthening/ROM; Therapeutic exercise; Endurance training;Patient/family training;Bed mobility;Spoke to nursing;Spoke to case management;OT   Progress Slow progress, decreased activity tolerance   PT Frequency Other (Comment)  (3-5x/wk )   Recommendation   PT Discharge Recommendation Post acute rehabilitation services   PT - OK to Discharge Yes  (to rehab once medically cleared )   AM-PAC Basic Mobility Inpatient   Turning in Bed Without Bedrails 2   Lying on Back to Sitting on Edge of Flat Bed 1   Moving Bed to Chair 1   Standing Up From Chair 1   Walk in Room 1   Climb 3-5 Stairs 1   Basic Mobility Inpatient Raw Score 7   Turning Head Towards Sound 4   Follow Simple Instructions 3   Low Function Basic Mobility Raw Score 14   Low Function Basic Mobility Standardized Score 22 01     Marcio Angela, PT, DPT

## 2021-05-21 NOTE — PLAN OF CARE
Problem: OCCUPATIONAL THERAPY ADULT  Goal: Performs self-care activities at highest level of function for planned discharge setting  See evaluation for individualized goals  Description: Treatment Interventions: ADL retraining, Functional transfer training, UE strengthening/ROM, Endurance training, Cognitive reorientation, Patient/family training, Equipment evaluation/education, Neuromuscular reeducation, Fine motor coordination activities, Compensatory technique education, Continued evaluation, Energy conservation, Activityengagement          See flowsheet documentation for full assessment, interventions and recommendations  Note: Limitation: Decreased ADL status, Decreased UE ROM, Decreased UE strength, Decreased cognition, Decreased endurance, Visual deficit, Decreased fine motor control, Decreased self-care trans, Decreased high-level ADLs  Prognosis: Fair  Assessment: Pt seen for skilled OT treatment session this AM w focus on sitting balance/endurance at EOB, self care and there ex  Pt seen as a co-treat with PT due to the patient's co-morbidities, clinically unstable presentation, and present impairments which are a regression from the patient's baseline  Pt requires max assist of 1 for supine to sit (very petite lady), mod A for balance at EOB x 15 min, occasionally able to hold self for very brief moments  Pt able to wash face when up at EOB w visual demonstration  Pt does require max assist for completion of grooming  She does fatigue very easily, has decreased act tolerance  While at EOB, pt worked on reaching exercises across midline  Also worked on UE exercises with both arms  R UE AROMLEONARD/PROM  Pt was very cooperative throughout session  OT will continue to follow as per POC  The patient's raw score on the AM-PAC Daily Activity inpatient short form is 13, standardized score is 32 03, less than 39 4   Patients at this level are likely to benefit from discharge to post-acute rehabilitation services        OT Discharge Recommendation: Post acute rehabilitation services  OT - OK to Discharge: Yes(when medically stable )

## 2021-05-21 NOTE — PLAN OF CARE
Problem: PHYSICAL THERAPY ADULT  Goal: Performs mobility at highest level of function for planned discharge setting  See evaluation for individualized goals  Description: Treatment/Interventions: ADL retraining, Functional transfer training, LE strengthening/ROM, Endurance training, Patient/family training, Bed mobility, Spoke to nursing, OT  Equipment Recommended: (TBD)       See flowsheet documentation for full assessment, interventions and recommendations  Outcome: Progressing  Note: Prognosis: Fair  Problem List: Decreased strength, Decreased range of motion, Decreased endurance, Impaired balance, Decreased mobility, Decreased cognition, Impaired vision, Pain  Assessment: Pt seen for PT treatment session this date  Therapy session focused on bed mobility, sitting static/dynamic balance/tolerance, endurance training, and therapeutic exercise in order to improve overall mobility and independence  Pt requires assist of 1-2 for all mobility performed this date  Co-treatment with OT this date due to pt's multiple co-morbidities, increased medical complexity and decreased functional mobility status  Pt performed sit to supine bed mobility tasks with max Ax1  Pt sat EOB ~18 min fluctuating between mod Ax1 - CGA to maintain upright posture; pt performed dynamic reaching in all planes with 1 UE support  Pt performed LE therex to hips, knees, and ankles with no demonstration of increased pain/discomfort  Pt performed sit to supine bed mobility tasks with max Ax1  Pt had BM while sitting EOB; rolling to R/L performed to assist in hygiene tasks  Pt making slow progress toward goals due to decreased activity tolerance  Pt was left supine in bed with alarm on at the end of PT session with all needs in reach  Pt would benefit from continued PT services while in hospital to address remaining limitations  PT to continue to follow pt and recommends post-acute rehab services after d/c   The patient's AM-PAC Basic Mobility Inpatient Short Form Low Function Raw Score 14 , Standardized Score is 22 01  A standardized score less 42 9 suggests the patient may benefit from discharge to post-acute rehab services  Please also refer to the recommendation of the Physical Therapist for safe discharge planning  Barriers to Discharge: Inaccessible home environment, Decreased caregiver support        PT Discharge Recommendation: Post acute rehabilitation services     PT - OK to Discharge: Yes(to rehab once medically cleared )    See flowsheet documentation for full assessment

## 2021-05-22 LAB
GLUCOSE SERPL-MCNC: 143 MG/DL (ref 65–140)
GLUCOSE SERPL-MCNC: 149 MG/DL (ref 65–140)
GLUCOSE SERPL-MCNC: 154 MG/DL (ref 65–140)
GLUCOSE SERPL-MCNC: 187 MG/DL (ref 65–140)

## 2021-05-22 PROCEDURE — 82948 REAGENT STRIP/BLOOD GLUCOSE: CPT

## 2021-05-22 PROCEDURE — 99232 SBSQ HOSP IP/OBS MODERATE 35: CPT | Performed by: INTERNAL MEDICINE

## 2021-05-22 RX ORDER — OXYCODONE HYDROCHLORIDE 10 MG/1
10 TABLET ORAL EVERY 4 HOURS PRN
Status: DISCONTINUED | OUTPATIENT
Start: 2021-05-22 | End: 2021-05-22

## 2021-05-22 RX ORDER — OXYCODONE HYDROCHLORIDE 5 MG/1
5 TABLET ORAL EVERY 4 HOURS PRN
Status: DISCONTINUED | OUTPATIENT
Start: 2021-05-22 | End: 2021-05-22

## 2021-05-22 RX ORDER — HYDROMORPHONE HCL/PF 1 MG/ML
0.5 SYRINGE (ML) INJECTION EVERY 4 HOURS PRN
Status: DISCONTINUED | OUTPATIENT
Start: 2021-05-22 | End: 2021-06-07

## 2021-05-22 RX ADMIN — HEPARIN SODIUM 5000 UNITS: 5000 INJECTION INTRAVENOUS; SUBCUTANEOUS at 12:36

## 2021-05-22 RX ADMIN — PREDNISONE 5 MG: 5 TABLET ORAL at 09:36

## 2021-05-22 RX ADMIN — INSULIN LISPRO 1 UNITS: 100 INJECTION, SOLUTION INTRAVENOUS; SUBCUTANEOUS at 12:27

## 2021-05-22 RX ADMIN — FAMOTIDINE 20 MG: 20 TABLET ORAL at 09:36

## 2021-05-22 RX ADMIN — HEPARIN SODIUM 5000 UNITS: 5000 INJECTION INTRAVENOUS; SUBCUTANEOUS at 22:18

## 2021-05-22 RX ADMIN — HEPARIN SODIUM 5000 UNITS: 5000 INJECTION INTRAVENOUS; SUBCUTANEOUS at 05:32

## 2021-05-22 RX ADMIN — ACYCLOVIR 400 MG: 200 CAPSULE ORAL at 09:36

## 2021-05-22 RX ADMIN — SENNOSIDES, DOCUSATE SODIUM 1 TABLET: 8.6; 5 TABLET ORAL at 09:36

## 2021-05-22 RX ADMIN — FLUCONAZOLE 400 MG: 200 TABLET ORAL at 09:35

## 2021-05-22 RX ADMIN — ALLOPURINOL 100 MG: 100 TABLET ORAL at 09:37

## 2021-05-22 RX ADMIN — INSULIN GLARGINE 7 UNITS: 100 INJECTION, SOLUTION SUBCUTANEOUS at 22:18

## 2021-05-22 RX ADMIN — TAMSULOSIN HYDROCHLORIDE 0.4 MG: 0.4 CAPSULE ORAL at 17:20

## 2021-05-22 RX ADMIN — FAMOTIDINE 20 MG: 20 TABLET ORAL at 17:20

## 2021-05-22 RX ADMIN — ACYCLOVIR 400 MG: 200 CAPSULE ORAL at 22:18

## 2021-05-22 RX ADMIN — MORPHINE SULFATE 2 MG: 2 INJECTION, SOLUTION INTRAMUSCULAR; INTRAVENOUS at 22:18

## 2021-05-22 RX ADMIN — INSULIN LISPRO 1 UNITS: 100 INJECTION, SOLUTION INTRAVENOUS; SUBCUTANEOUS at 17:20

## 2021-05-22 RX ADMIN — LEVOFLOXACIN 500 MG: 500 TABLET, FILM COATED ORAL at 17:19

## 2021-05-22 RX ADMIN — AMLODIPINE BESYLATE 5 MG: 5 TABLET ORAL at 09:36

## 2021-05-22 RX ADMIN — MORPHINE SULFATE 2 MG: 2 INJECTION, SOLUTION INTRAMUSCULAR; INTRAVENOUS at 17:40

## 2021-05-22 RX ADMIN — LIDOCAINE 5% 1 PATCH: 700 PATCH TOPICAL at 09:37

## 2021-05-22 RX ADMIN — MORPHINE SULFATE 2 MG: 2 INJECTION, SOLUTION INTRAMUSCULAR; INTRAVENOUS at 12:53

## 2021-05-22 NOTE — PLAN OF CARE
Problem: Prexisting or High Potential for Compromised Skin Integrity  Goal: Skin integrity is maintained or improved  Description: INTERVENTIONS:  - Identify patients at risk for skin breakdown  - Assess and monitor skin integrity  - Assess and monitor nutrition and hydration status  - Monitor labs   - Assess for incontinence   - Turn and reposition patient  - Assist with mobility/ambulation  - Relieve pressure over bony prominences  - Avoid friction and shearing  - Provide appropriate hygiene as needed including keeping skin clean and dry  - Evaluate need for skin moisturizer/barrier cream  - Collaborate with interdisciplinary team   - Patient/family teaching  - Consider wound care consult   Outcome: Progressing     Problem: Potential for Falls  Goal: Patient will remain free of falls  Description: INTERVENTIONS:  - Assess patient frequently for physical needs  -  Identify cognitive and physical deficits and behaviors that affect risk of falls  -  [place] fall precautions as indicated by assessment   - Educate patient/family on patient safety including physical limitations  - Instruct patient to call for assistance with activity based on assessment  - Modify environment to reduce risk of injury  - Consider OT/PT consult to assist with strengthening/mobility  Outcome: Progressing     Problem: Nutrition/Hydration-ADULT  Goal: Nutrient/Hydration intake appropriate for improving, restoring or maintaining nutritional needs  Description: Monitor and assess patient's nutrition/hydration status for malnutrition  Collaborate with interdisciplinary team and initiate plan and interventions as ordered  Monitor patient's weight and dietary intake as ordered or per policy  Utilize nutrition screening tool and intervene as necessary  Determine patient's food preferences and provide high-protein, high-caloric foods as appropriate       INTERVENTIONS:  - Monitor oral intake, urinary output, labs, and treatment plans  - Assess nutrition and hydration status and recommend course of action  - Evaluate amount of meals eaten  - Assist patient with eating if necessary   - Allow adequate time for meals  - Recommend/ encourage appropriate diets, oral nutritional supplements, and vitamin/mineral supplements  - Order, calculate, and assess calorie counts as needed  - Recommend, monitor, and adjust tube feedings and TPN/PPN based on assessed needs  - Assess need for intravenous fluids  - Provide specific nutrition/hydration education as appropriate  - Include patient/family/caregiver in decisions related to nutrition  Outcome: Progressing     Problem: PAIN - ADULT  Goal: Verbalizes/displays adequate comfort level or baseline comfort level  Description: Interventions:  - Encourage patient to monitor pain and request assistance  - Assess pain using appropriate pain scale  - Administer analgesics based on type and severity of pain and evaluate response  - Implement non-pharmacological measures as appropriate and evaluate response  - Consider cultural and social influences on pain and pain management  - Notify physician/advanced practitioner if interventions unsuccessful or patient reports new pain  Outcome: Progressing     Problem: INFECTION - ADULT  Goal: Absence or prevention of progression during hospitalization  Description: INTERVENTIONS:  - Assess and monitor for signs and symptoms of infection  - Monitor lab/diagnostic results  - Monitor all insertion sites, i e  indwelling lines, tubes, and drains  - Monitor endotracheal if appropriate and nasal secretions for changes in amount and color  - [place] appropriate cooling/warming therapies per order  - Administer medications as ordered  - Instruct and encourage patient and family to use good hand hygiene technique  - Identify and instruct in appropriate isolation precautions for identified infection/condition  Outcome: Progressing     Problem: SAFETY ADULT  Goal: Patient will remain free of falls  Description: INTERVENTIONS:  - Assess patient frequently for physical needs  -  Identify cognitive and physical deficits and behaviors that affect risk of falls    -  [place] fall precautions as indicated by assessment   - Educate patient/family on patient safety including physical limitations  - Instruct patient to call for assistance with activity based on assessment  - Modify environment to reduce risk of injury  - Consider OT/PT consult to assist with strengthening/mobility  Outcome: Progressing  Goal: Maintain or return to baseline ADL function  Description: INTERVENTIONS:  -  Assess patient's ability to carry out ADLs; assess patient's baseline for ADL function and identify physical deficits which impact ability to perform ADLs (bathing, care of mouth/teeth, toileting, grooming, dressing, etc )  - Assess/evaluate cause of self-care deficits   - Assess range of motion  - Assess patient's mobility; develop plan if impaired  - Assess patient's need for assistive devices and provide as appropriate  - Encourage maximum independence but intervene and supervise when necessary  - Involve family in performance of ADLs  - Assess for home care needs following discharge   - Consider OT consult to assist with ADL evaluation and planning for discharge  - Provide patient education as appropriate  Outcome: Progressing  Goal: Maintain or return mobility status to optimal level  Description: INTERVENTIONS:  - Assess patient's baseline mobility status (ambulation, transfers, stairs, etc )    - Identify cognitive and physical deficits and behaviors that affect mobility  - Identify mobility aids required to assist with transfers and/or ambulation (gait belt, sit-to-stand, lift, walker, cane, etc )  - [place] fall precautions as indicated by assessment  - Record patient progress and toleration of activity level on Mobility SBAR; progress patient to next Phase/Stage  - Instruct patient to call for assistance with activity based on assessment  - Consider rehabilitation consult to assist with strengthening/weightbearing, etc   Outcome: Progressing     Problem: DISCHARGE PLANNING  Goal: Discharge to home or other facility with appropriate resources  Description: INTERVENTIONS:  - Identify barriers to discharge w/patient and caregiver  - Arrange for needed discharge resources and transportation as appropriate  - Identify discharge learning needs (meds, wound care, etc )  - Arrange for interpretive services to assist at discharge as needed  - Refer to Case Management Department for coordinating discharge planning if the patient needs post-hospital services based on physician/advanced practitioner order or complex needs related to functional status, cognitive ability, or social support system  Outcome: Progressing     Problem: Knowledge Deficit  Goal: Patient/family/caregiver demonstrates understanding of disease process, treatment plan, medications, and discharge instructions  Description: Complete learning assessment and assess knowledge base    Interventions:  - Provide teaching at level of understanding  - Provide teaching via preferred learning methods  Outcome: Progressing

## 2021-05-22 NOTE — PROGRESS NOTES
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[address] )  Active Problems:    Acute gout    Altered mental status    Dysphagia    Urinary retention    Aphasia    Weakness    Presence of permanent central venous catheter    Fracture of ramus of left pubis (HCC)    Severe protein-calorie malnutrition (HCC)    Steroid-induced hyperglycemia    Left-sided weakness    Pancytopenia ([address] )      * 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[address] )  Assessment & Plan  Patient was diagnosed with primary CNS lymphoma through biopsy in [facility] transferred over here for chemotherapy since the [facility] is out of network  Had a 2nd family meeting on 04/30 and family has decided to proceed treatment at this time  S/p PICC line for chemotherapy  S/p peg tube placement on 05/10  Currently on oral diet and Jevity 1 2  Plan:  Hematology-Oncology consulted  Appreciate recommendations  Chemotherapy treatment as per hematology oncology team     S/p 2nd cycle of chemotherapy on 05/13  Leucovin on 5/14  DVT prophylaxis:  On heparin  Case management-will start looking for places for rehab  S/p steroid taper for cerebral edema from CNS lymphoma  Pancytopenia in setting of recent chemotherapy  Will continue to watch for any signs of infection  On acyclovir, Diflucan and Levaquin    After discussion with Oncology, plan for rehab, patient does not require radiation therapy, rehab for strength, no chemotherapy until at least 3 weeks, evaluate at that time  Will continue to try to reach out to family for update    Acute gout  Assessment & Plan  Appears to have acute gout flare right knee, pain improving today  Currently on prednisone 10 mg q day, based on recent chemotherapy will hold off on NSAIDs or colchicine  Will start low-dose allopurinol 100 mg q day to hopefully prevent other acute flares in the setting of chemotherapy    Pancytopenia (HCC)  Assessment & Plan  Likely in setting of chemotherapy  Will continue to monitor for any signs of infection  Will transfuse with packed RBC for hemoglobin less than 7  Drop in Hgb from 9 to 7 on 5/14, other lines relatively stable  VSS and soft brown BMs  Will discuss with oncology if this is expected based on chemo or if there should be further workup for bleeding, though hemodynamics currently do not suggest bleed  S/p 1 pack RBC on 05/15, hemoglobin continues to improve    Left-sided weakness  Assessment & Plan  Patient has left-sided weakness at baseline from her CNS lymphoma  Left upper extremity 3-4/5 and left lower extremity 0-1/5 muscle strength at baseline  Steroid-induced hyperglycemia  Assessment & Plan  Mild hyperglycemia, glucoses in 200s, no history of DM  Occurring in the setting of Decadron with chemotherapy, as well as sodium bicarb in D5 drip  - D5 drip is finished  - will monitor sugars while on Jevity and now off D5  - continue q4 hour fingerstick glucose with correctional algorithm 1 and Lantus    Severe protein-calorie malnutrition (HCC)  Assessment & Plan  Malnutrition Findings:   Adult Malnutrition type: Acute illness(due to medical condition resulting in dysphagia, decreased appetite and intake, weight loss)  Adult Degree of Malnutrition: Other severe protein calorie malnutrition    BMI Findings: Body mass index is 20 49 kg/m²  Plan:  Nutrition consult  Tube feeds initiated with Jevity 1 2, advancing to goal      Fracture of ramus of left pubis [facility])  Assessment & Plan  Leslie Iglesias prior to presentation to [facility]   Managed nonoperatively at [facility]    Presence of permanent central venous catheter  Assessment & Plan  Noted presence of right IJ tunneled double-lumen HD catheter; upon chart review, this was likely inserted to be used for plasmapheresis which she has completed  Plan:  S/p removal by IR  Patient now has PICC line  Weakness  Assessment & Plan  Left greater than right upper and lower extremity weakness with ongoing left lower extremity contracture  Secondary to underlying CNS malignancy  *Decubitus ulcer precautions such as frequent repositioning  Aphasia  Assessment & Plan  Definite evidence of expressive aphasia, possible component of receptive aphasia as well  Speech therapy on board  Urinary retention  Assessment & Plan  Patient developed urinary retention during previous hospitalization  Plan was voiding trial as an outpatient as she was scheduled for discharge from that facility  Machado catheter was initially removed although patient required re-placement of machado on 05/14 in setting of incontinence and getting chemotherapy  Amchado discontinued, monitor for signs of fluid retention      Dysphagia  Assessment & Plan  Patient noted to have dysphagia and also decreased p o  Intake  As per [facility] progress note patient had a calorie count and recommended feeding tube placement  Speech evaluation - advance diet with dysphagia 2 and thin liquids; needs assistance with feeds  Calorie count  If the patient continued to have poor p o  Intake may need discussion with the family regarding alternate methods of nutrition  Patient's calorie intake is low  VBS- showed oropharyngeal dysphagia  s/p peg tube placement  Altered mental status  Assessment & Plan  Patient initially presented to [facility] his ultimate [place] with stroke-like symptoms for 2 days  CT scan of the head was concerning for subacute CVA and was transferred to [facility]   MRI of the brain was more consistent with demyelinating disease patient was started on IV steroids and 1000 mg daily for 5 days    Patient did not have any significant improvement at that time patient had plasmapheresis  And also had  lumbar puncture-negative for infection or malignancy  Patient noted to have persistent encephalopathy so a repeat MRI was obtained which showed worsening of the abnormality seen on the previous MRI and 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  Likely secondary to CNS lymphoma  Delirium precautions   Currently alert and oriented times 2-3   follow commands  Moves all extremities spontaneously except left upper and lower extrimities  Patient's  (does not speak [place]) makes all medical decision for her  Sepsis (HCC)-resolved as of 2021  Assessment & Plan  Patient had hypothermia, elevated WBC count and tachycardia  Concern for possible aspiration in setting on dysphagia versus UTI in setting of urinary catheter which was placed for retention  Plan:  S/p IV antibiotics last day on   Will continue to monitor off antibiotics  Disposition:  Continue inpatient treatment, rehab pending  SUBJECTIVE     Patient seen and examined  No acute events overnight  OBJECTIVE     Vitals:    21 1539 21 0233 21 0239 21 0554   BP: 111/69 104/69 104/69    Pulse: (!) 118 93 94    Resp:  20 20    Temp: 99 1 °F (37 3 °C)  98 2 °F (36 8 °C)    TempSrc:   Axillary    SpO2: 99% 100% 100%    Weight:    46 4 kg (102 lb 4 7 oz)   Height:          Temperature:   Temp (24hrs), Av °F (37 2 °C), Min:98 2 °F (36 8 °C), Max:99 8 °F (37 7 °C)    Temperature: 98 2 °F (36 8 °C)  Intake & Output:  I/O        07 -  0700  07 -  0700    P  O  140 200    I V  (mL/kg) 699 2 (14 2)     NG/ 300    Feedings 985 1000    Total Intake(mL/kg) 2274 2 (46 1) 1500 (32 3)    Urine (mL/kg/hr) 950 (0 8) 600 (0 5)    Stool 0     Total Output 950 600    Net +1324 2 +900          Unmeasured Urine Occurrence 3 x 1 x    Unmeasured Stool Occurrence 2 x 4 x        Weights:   IBW (Ideal Body Weight): 45 5 kg    Body mass index is 19 98 kg/m²  Weight (last 2 days)     Date/Time   Weight    05/22/21 0554   46 4 (102 29)            Physical Exam  Vitals signs and nursing note reviewed  Constitutional:       General: She is not in acute distress  Appearance: She is well-developed  HENT:      Head: Normocephalic and atraumatic  Eyes:      Conjunctiva/sclera: Conjunctivae normal    Neck:      Musculoskeletal: Neck supple  Cardiovascular:      Rate and Rhythm: Normal rate and regular rhythm  Heart sounds: No murmur  Pulmonary:      Effort: Pulmonary effort is normal  No respiratory distress  Breath sounds: Normal breath sounds  Abdominal:      Palpations: Abdomen is soft  Tenderness: There is no abdominal tenderness  Musculoskeletal: Normal range of motion  General: No swelling  Skin:     General: Skin is warm and dry  Capillary Refill: Capillary refill takes less than 2 seconds  Neurological:      General: No focal deficit present  Mental Status: She is alert  Mental status is at baseline  LABORATORY DATA     Labs: I have personally reviewed pertinent reports  Results from last 7 days   Lab Units 05/19/21  0554 05/18/21  0628 05/17/21  0500   WBC Thousand/uL 8 60 6 26 2 93*   HEMOGLOBIN g/dL 9 2* 9 8* 9 5*   HEMATOCRIT % 28 2* 29 6* 28 9*   PLATELETS Thousands/uL 118* 142* 165   NEUTROS PCT % 65  --   --    MONOS PCT % 3*  --   --    MONO PCT %  --  3*  --       Results from last 7 days   Lab Units 05/19/21  0554 05/18/21  0628   POTASSIUM mmol/L 3 9 4 2   CHLORIDE mmol/L 99* 103   CO2 mmol/L 23 22   BUN mg/dL 20 20   CREATININE mg/dL 0 28* 0 37*   CALCIUM mg/dL 8 6 9 1                            IMAGING & DIAGNOSTIC TESTING     Radiology Results: I have personally reviewed pertinent reports  Xr Chest Portable    Result Date: 4/24/2021  Impression: Dialysis catheter tip within the superior cavoatrial junction  No pneumothorax   Workstation performed: [id number]     Xr Chest Picc Line Portable    Result Date: 4/27/2021  Impression: PICC line tip in the superior vena cava, approximately 3 5 to 4 cm above the cavoatrial junction  The examination demonstrates a significant  finding and was documented as such in [facility] for liaison and referring practitioner notification  Workstation performed: [id number]     Ir Picc Reposition    Result Date: 4/27/2021  Impression: Status-post repositioning of a 5 [place] central venous catheter under fluoroscopic guidance with its tip at the cavoatrial junction  Workstation performed: [id number]     Ir Tunneled Central Line Removal    Result Date: 4/27/2021  Impression: Impression: Successful tunneled central line removal as described  Signed, performed and dictated by Bob Choi PA-C under the supervision of Dr Wendie Barrett  Workstation performed: [id number]     Other Diagnostic Testing: I have personally reviewed pertinent reports      ACTIVE MEDICATIONS     Current Facility-Administered Medications   Medication Dose Route Frequency    acetaminophen (TYLENOL) tablet 650 mg  650 mg Oral Q6H PRN    acyclovir (ZOVIRAX) capsule 400 mg  400 mg Oral Q12H [address]    albuterol (PROVENTIL HFA,VENTOLIN HFA) inhaler 2 puff  2 puff Inhalation Q6H PRN    allopurinol (ZYLOPRIM) tablet 100 mg  100 mg Oral Daily    amLODIPine (NORVASC) tablet 5 mg  5 mg Oral Daily    bisacodyl (DULCOLAX) rectal suppository 10 mg  10 mg Rectal Daily PRN    famotidine (PEPCID) tablet 20 mg  20 mg Oral BID    fluconazole (DIFLUCAN) tablet 400 mg  400 mg Oral Daily    heparin (porcine) subcutaneous injection 5,000 Units  5,000 Units Subcutaneous Q8H [address]    insulin glargine (LANTUS) subcutaneous injection 7 Units 0 07 mL  7 Units Subcutaneous HS    insulin lispro (HumaLOG) 100 units/mL subcutaneous injection 1-5 Units  1-5 Units Subcutaneous 4x Daily (AC & HS)    lactulose oral solution 10 g  10 g Oral BID PRN    levofloxacin (LEVAQUIN) tablet 500 mg  500 mg Oral Q24H    lidocaine (LIDODERM) 5 % patch 1 patch  1 patch Topical Daily    menthol-methyl salicylate (BENGAY) 05-19 % cream   Apply externally BID    ondansetron (ZOFRAN) injection 4 mg  4 mg Intravenous Q6H PRN    polyethylene glycol (MIRALAX) packet 17 g  17 g Oral Daily    predniSONE tablet 5 mg  5 mg Oral Daily    senna-docusate sodium (SENOKOT S) 8 6-50 mg per tablet 1 tablet  1 tablet Oral BID    sodium chloride 0 9 % infusion  20 mL/hr Intravenous Once PRN    sodium chloride 0 9 % infusion  20 mL/hr Intravenous Once PRN    sodium chloride 0 9 % infusion  20 mL/hr Intravenous Once PRN    tamsulosin (FLOMAX) capsule 0 4 mg  0 4 mg Oral Daily With Dinner       VTE Pharmacologic Prophylaxis: Heparin  VTE Mechanical Prophylaxis: sequential compression device    Portions of the record may have been created with voice recognition software  Occasional wrong word or "sound a like" substitutions may have occurred due to the inherent limitations of voice recognition software    Read the chart carefully and recognize, using context, where substitutions have occurred   ==  Kacie Pradhan, [address] [facility]  Internal Medicine Residency PGY-2

## 2021-05-23 PROBLEM — T38.0X5A STEROID-INDUCED HYPERGLYCEMIA: Status: RESOLVED | Noted: 2021-05-01 | Resolved: 2021-05-23

## 2021-05-23 PROBLEM — R73.9 STEROID-INDUCED HYPERGLYCEMIA: Status: RESOLVED | Noted: 2021-05-01 | Resolved: 2021-05-23

## 2021-05-23 LAB
ANION GAP SERPL CALCULATED.3IONS-SCNC: 8 MMOL/L (ref 4–13)
ANISOCYTOSIS BLD QL SMEAR: PRESENT
BASOPHILS # BLD MANUAL: 0.12 THOUSAND/UL (ref 0–0.1)
BASOPHILS NFR MAR MANUAL: 2 % (ref 0–1)
BUN SERPL-MCNC: 21 MG/DL (ref 5–25)
CALCIUM SERPL-MCNC: 9.1 MG/DL (ref 8.3–10.1)
CHLORIDE SERPL-SCNC: 95 MMOL/L (ref 100–108)
CO2 SERPL-SCNC: 27 MMOL/L (ref 21–32)
CREAT SERPL-MCNC: 0.27 MG/DL (ref 0.6–1.3)
DACRYOCYTES BLD QL SMEAR: PRESENT
EOSINOPHIL # BLD MANUAL: 0 THOUSAND/UL (ref 0–0.4)
EOSINOPHIL NFR BLD MANUAL: 0 % (ref 0–6)
ERYTHROCYTE [DISTWIDTH] IN BLOOD BY AUTOMATED COUNT: 19 % (ref 11.6–15.1)
GFR SERPL CREATININE-BSD FRML MDRD: 135 ML/MIN/1.73SQ M
GLUCOSE SERPL-MCNC: 117 MG/DL (ref 65–140)
GLUCOSE SERPL-MCNC: 119 MG/DL (ref 65–140)
GLUCOSE SERPL-MCNC: 125 MG/DL (ref 65–140)
GLUCOSE SERPL-MCNC: 154 MG/DL (ref 65–140)
GLUCOSE SERPL-MCNC: 168 MG/DL (ref 65–140)
HCT VFR BLD AUTO: 25.3 % (ref 34.8–46.1)
HGB BLD-MCNC: 8 G/DL (ref 11.5–15.4)
LYMPHOCYTES # BLD AUTO: 0.87 THOUSAND/UL (ref 0.6–4.47)
LYMPHOCYTES # BLD AUTO: 15 % (ref 14–44)
MCH RBC QN AUTO: 31 PG (ref 26.8–34.3)
MCHC RBC AUTO-ENTMCNC: 31.6 G/DL (ref 31.4–37.4)
MCV RBC AUTO: 98 FL (ref 82–98)
METAMYELOCYTES NFR BLD MANUAL: 2 % (ref 0–1)
MONOCYTES # BLD AUTO: 0.52 THOUSAND/UL (ref 0–1.22)
MONOCYTES NFR BLD: 9 % (ref 4–12)
MYELOCYTES NFR BLD MANUAL: 14 % (ref 0–1)
NEUTROPHILS # BLD MANUAL: 3.26 THOUSAND/UL (ref 1.85–7.62)
NEUTS BAND NFR BLD MANUAL: 8 % (ref 0–8)
NEUTS SEG NFR BLD AUTO: 48 % (ref 43–75)
NRBC BLD AUTO-RTO: 1 /100 WBC (ref 0–2)
NRBC BLD AUTO-RTO: 1 /100 WBCS
PLATELET # BLD AUTO: 101 THOUSANDS/UL (ref 149–390)
PLATELET BLD QL SMEAR: ABNORMAL
PMV BLD AUTO: 11.1 FL (ref 8.9–12.7)
POIKILOCYTOSIS BLD QL SMEAR: PRESENT
POLYCHROMASIA BLD QL SMEAR: PRESENT
POTASSIUM SERPL-SCNC: 4.2 MMOL/L (ref 3.5–5.3)
PROMYELOCYTES NFR BLD MANUAL: 1 % (ref 0–0)
RBC # BLD AUTO: 2.58 MILLION/UL (ref 3.81–5.12)
RBC MORPH BLD: PRESENT
SODIUM SERPL-SCNC: 130 MMOL/L (ref 136–145)
VARIANT LYMPHS # BLD AUTO: 1 %
WBC # BLD AUTO: 5.82 THOUSAND/UL (ref 4.31–10.16)

## 2021-05-23 PROCEDURE — 85007 BL SMEAR W/DIFF WBC COUNT: CPT | Performed by: STUDENT IN AN ORGANIZED HEALTH CARE EDUCATION/TRAINING PROGRAM

## 2021-05-23 PROCEDURE — 85027 COMPLETE CBC AUTOMATED: CPT | Performed by: STUDENT IN AN ORGANIZED HEALTH CARE EDUCATION/TRAINING PROGRAM

## 2021-05-23 PROCEDURE — 80048 BASIC METABOLIC PNL TOTAL CA: CPT | Performed by: STUDENT IN AN ORGANIZED HEALTH CARE EDUCATION/TRAINING PROGRAM

## 2021-05-23 PROCEDURE — C9113 INJ PANTOPRAZOLE SODIUM, VIA: HCPCS | Performed by: STUDENT IN AN ORGANIZED HEALTH CARE EDUCATION/TRAINING PROGRAM

## 2021-05-23 PROCEDURE — 82948 REAGENT STRIP/BLOOD GLUCOSE: CPT

## 2021-05-23 PROCEDURE — 99232 SBSQ HOSP IP/OBS MODERATE 35: CPT | Performed by: INTERNAL MEDICINE

## 2021-05-23 RX ORDER — AMOXICILLIN 250 MG
1 CAPSULE ORAL 2 TIMES DAILY PRN
Status: DISCONTINUED | OUTPATIENT
Start: 2021-05-23 | End: 2021-06-01

## 2021-05-23 RX ORDER — PANTOPRAZOLE SODIUM 40 MG/1
40 INJECTION, POWDER, FOR SOLUTION INTRAVENOUS
Status: DISCONTINUED | OUTPATIENT
Start: 2021-05-23 | End: 2021-05-23

## 2021-05-23 RX ORDER — POLYETHYLENE GLYCOL 3350 17 G/17G
17 POWDER, FOR SOLUTION ORAL DAILY PRN
Status: DISCONTINUED | OUTPATIENT
Start: 2021-05-23 | End: 2021-06-01

## 2021-05-23 RX ORDER — MAGNESIUM HYDROXIDE/ALUMINUM HYDROXICE/SIMETHICONE 120; 1200; 1200 MG/30ML; MG/30ML; MG/30ML
30 SUSPENSION ORAL EVERY 4 HOURS PRN
Status: DISCONTINUED | OUTPATIENT
Start: 2021-05-23 | End: 2021-06-14 | Stop reason: HOSPADM

## 2021-05-23 RX ORDER — PANTOPRAZOLE SODIUM 40 MG/1
40 TABLET, DELAYED RELEASE ORAL
Status: DISCONTINUED | OUTPATIENT
Start: 2021-05-23 | End: 2021-05-23

## 2021-05-23 RX ORDER — DICYCLOMINE HYDROCHLORIDE 10 MG/1
10 CAPSULE ORAL
Status: DISCONTINUED | OUTPATIENT
Start: 2021-05-23 | End: 2021-06-14 | Stop reason: HOSPADM

## 2021-05-23 RX ADMIN — ACYCLOVIR 400 MG: 200 CAPSULE ORAL at 22:50

## 2021-05-23 RX ADMIN — LEVOFLOXACIN 500 MG: 500 TABLET, FILM COATED ORAL at 16:52

## 2021-05-23 RX ADMIN — TAMSULOSIN HYDROCHLORIDE 0.4 MG: 0.4 CAPSULE ORAL at 16:52

## 2021-05-23 RX ADMIN — PREDNISONE 5 MG: 5 TABLET ORAL at 10:26

## 2021-05-23 RX ADMIN — FAMOTIDINE 20 MG: 20 TABLET ORAL at 10:26

## 2021-05-23 RX ADMIN — FLUCONAZOLE 400 MG: 200 TABLET ORAL at 10:25

## 2021-05-23 RX ADMIN — DICYCLOMINE HYDROCHLORIDE 10 MG: 10 CAPSULE ORAL at 22:50

## 2021-05-23 RX ADMIN — ACYCLOVIR 400 MG: 200 CAPSULE ORAL at 10:25

## 2021-05-23 RX ADMIN — ALLOPURINOL 100 MG: 100 TABLET ORAL at 10:25

## 2021-05-23 RX ADMIN — MORPHINE SULFATE 2 MG: 2 INJECTION, SOLUTION INTRAMUSCULAR; INTRAVENOUS at 05:10

## 2021-05-23 RX ADMIN — INSULIN LISPRO 1 UNITS: 100 INJECTION, SOLUTION INTRAVENOUS; SUBCUTANEOUS at 22:51

## 2021-05-23 RX ADMIN — MORPHINE SULFATE 2 MG: 2 INJECTION, SOLUTION INTRAMUSCULAR; INTRAVENOUS at 21:01

## 2021-05-23 RX ADMIN — PANTOPRAZOLE SODIUM 40 MG: 40 INJECTION, POWDER, FOR SOLUTION INTRAVENOUS at 11:44

## 2021-05-23 RX ADMIN — HEPARIN SODIUM 5000 UNITS: 5000 INJECTION INTRAVENOUS; SUBCUTANEOUS at 13:31

## 2021-05-23 RX ADMIN — LIDOCAINE 5% 1 PATCH: 700 PATCH TOPICAL at 10:27

## 2021-05-23 RX ADMIN — HEPARIN SODIUM 5000 UNITS: 5000 INJECTION INTRAVENOUS; SUBCUTANEOUS at 05:11

## 2021-05-23 RX ADMIN — MORPHINE SULFATE 2 MG: 2 INJECTION, SOLUTION INTRAMUSCULAR; INTRAVENOUS at 10:40

## 2021-05-23 RX ADMIN — INSULIN GLARGINE 7 UNITS: 100 INJECTION, SOLUTION SUBCUTANEOUS at 22:55

## 2021-05-23 RX ADMIN — HEPARIN SODIUM 5000 UNITS: 5000 INJECTION INTRAVENOUS; SUBCUTANEOUS at 22:51

## 2021-05-23 RX ADMIN — SODIUM CHLORIDE 500 ML: 0.9 INJECTION, SOLUTION INTRAVENOUS at 11:48

## 2021-05-23 RX ADMIN — MORPHINE SULFATE 2 MG: 2 INJECTION, SOLUTION INTRAMUSCULAR; INTRAVENOUS at 16:52

## 2021-05-23 RX ADMIN — INSULIN LISPRO 1 UNITS: 100 INJECTION, SOLUTION INTRAVENOUS; SUBCUTANEOUS at 16:53

## 2021-05-23 NOTE — PLAN OF CARE
Pt appeared uncomfortable this afternoon and evening  Dr Ajay Jones and Dr Jeffery Encinas in afternoon at bedside to evaluate- orders noted for medication to manage GERD pt c/o  In evening Dr Ajay Jones at bedside to evaluate pt when she appeared uncomfortable- pt c/o abdominal pain- orders noted for u/s and night shift RN made aware  Pt currently laying in bed w/ safety precautions maintained         Problem: Prexisting or High Potential for Compromised Skin Integrity  Goal: Skin integrity is maintained or improved  Description: INTERVENTIONS:  - Identify patients at risk for skin breakdown  - Assess and monitor skin integrity  - Assess and monitor nutrition and hydration status  - Monitor labs   - Assess for incontinence   - Turn and reposition patient  - Assist with mobility/ambulation  - Relieve pressure over bony prominences  - Avoid friction and shearing  - Provide appropriate hygiene as needed including keeping skin clean and dry  - Evaluate need for skin moisturizer/barrier cream  - Collaborate with interdisciplinary team   - Patient/family teaching  - Consider wound care consult   Outcome: Progressing

## 2021-05-23 NOTE — PROGRESS NOTES
INTERNAL MEDICINE RESIDENCY PROGRESS NOTE     Name: Vivienne Mcintosh   Age & Sex: 49 y o  female   MRN: [id number]  Unit/Bed#: [facility] 920-01   Encounter: [id number]  Team: SOD Team B     PATIENT INFORMATION     Name: Vivienne Mcintosh   Age & Sex: 49 y o  female   MRN: [id number] Stay Days: 30    ASSESSMENT/PLAN     Principal Problem:    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[address] )  Active Problems:    Altered mental status    Dysphagia    Urinary retention    Aphasia    Weakness    Presence of permanent central venous catheter    Fracture of ramus of left pubis (HCC)    Severe protein-calorie malnutrition (HCC)    Left-sided weakness    Pancytopenia ([address] )    Acute gout      * CNS lymphoma [facility])  Assessment & Plan  Patient was diagnosed with primary CNS lymphoma through biopsy in [facility] transferred over here for chemotherapy since the [facility] is out of network  Had a 2nd family meeting on 04/30 and family has decided to proceed treatment at this time  S/p PICC line for chemotherapy  S/p peg tube placement on 05/10  Currently on oral diet and Jevity 1 2  Plan:  Hematology-Oncology consulted  Appreciate recommendations  Chemotherapy treatment as per hematology oncology team     S/p 2nd cycle of chemotherapy on 05/13  Leucovin on 5/14  DVT prophylaxis:  On heparin  Case management-will start looking for places for rehab  S/p steroid taper for cerebral edema from CNS lymphoma  Pancytopenia in setting of recent chemotherapy  Will continue to watch for any signs of infection  On acyclovir, Diflucan and Levaquin    After discussion with Oncology, plan for rehab, patient does not require radiation therapy, rehab for strength, no chemotherapy until at least 3 weeks, evaluate at that time  Will continue to try to reach out to family for update    Acute gout  Assessment & Plan  Appears to have acute gout flare right knee, pain improving today  Currently on prednisone 10 mg q day, based on recent chemotherapy will hold off on NSAIDs or colchicine  Will start low-dose allopurinol 100 mg q day to hopefully prevent other acute flares in the setting of chemotherapy    Pancytopenia (HCC)  Assessment & Plan  Likely in setting of chemotherapy  Will continue to monitor for any signs of infection  Will transfuse with packed RBC for hemoglobin less than 7  Drop in Hgb from 9 to 7 on 5/14, other lines relatively stable  VSS and soft brown BMs  Will discuss with oncology if this is expected based on chemo or if there should be further workup for bleeding, though hemodynamics currently do not suggest bleed  S/p 1 pack RBC on 05/15, hemoglobin continues to improve    Left-sided weakness  Assessment & Plan  Patient has left-sided weakness at baseline from her CNS lymphoma  Left upper extremity 3-4/5 and left lower extremity 0-1/5 muscle strength at baseline  Severe protein-calorie malnutrition ([address] )  Assessment & Plan  Malnutrition Findings:   Adult Malnutrition type: Acute illness(due to medical condition resulting in dysphagia, decreased appetite and intake, weight loss)  Adult Degree of Malnutrition: Other severe protein calorie malnutrition    BMI Findings: Body mass index is 20 24 kg/m²  Plan:  Nutrition consult  Tube feeds initiated with Jevity 1 2, advancing to goal      Fracture of ramus of left pubis [facility])  Assessment & Plan  Vivian Horton prior to presentation to [facility]  Managed nonoperatively at [facility]    Presence of permanent central venous catheter  Assessment & Plan  Noted presence of right IJ tunneled double-lumen HD catheter; upon chart review, this was likely inserted to be used for plasmapheresis which she has completed  Plan:  S/p removal by IR  Patient now has PICC line  Weakness  Assessment & Plan  Left greater than right upper and lower extremity weakness with ongoing left lower extremity contracture  Secondary to underlying CNS malignancy     *Decubitus ulcer precautions such as frequent repositioning  Aphasia  Assessment & Plan  Definite evidence of expressive aphasia, possible component of receptive aphasia as well  Speech therapy on board  Urinary retention  Assessment & Plan  Patient developed urinary retention during previous hospitalization  Plan was voiding trial as an outpatient as she was scheduled for discharge from that facility  Machado catheter was initially removed although patient required re-placement of machado on 05/14 in setting of incontinence and getting chemotherapy  Machado discontinued, monitor for signs of fluid retention      Dysphagia  Assessment & Plan  Patient noted to have dysphagia and also decreased p o  Intake  As per [facility] progress note patient had a calorie count and recommended feeding tube placement  Speech evaluation - advance diet with dysphagia 2 and thin liquids; needs assistance with feeds  Calorie count  If the patient continued to have poor p o  Intake may need discussion with the family regarding alternate methods of nutrition  Patient's calorie intake is low  VBS- showed oropharyngeal dysphagia  s/p peg tube placement  Altered mental status  Assessment & Plan  Patient initially presented to [facility] his [place] with stroke-like symptoms for 2 days  CT scan of the head was concerning for subacute CVA and was transferred to [facility]   MRI of the brain was more consistent with demyelinating disease patient was started on IV steroids and 1000 mg daily for 5 days  Patient did not have any significant improvement at that time patient had plasmapheresis  And also had  lumbar puncture-negative for infection or malignancy    Patient noted to have persistent encephalopathy so a repeat MRI was obtained which showed worsening of the abnormality seen on the previous MRI and 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  Likely secondary to CNS lymphoma  Delirium precautions Currently alert and oriented times 2-3   follow commands  Patient's  (does not speak [place]) makes all medical decision for her  Sepsis (HCC)-resolved as of 2021  Assessment & Plan  Patient had hypothermia, elevated WBC count and tachycardia  Concern for possible aspiration in setting on dysphagia versus UTI in setting of urinary catheter which was placed for retention  Plan:  S/p IV antibiotics last day on   Will continue to monitor off antibiotics  Steroid-induced hyperglycemia-resolved as of 2021  Assessment & Plan  Mild hyperglycemia, glucoses in 200s, no history of DM  Occurring in the setting of Decadron with chemotherapy, as well as sodium bicarb in D5 drip  - D5 drip is finished  - will monitor sugars while on Jevity and now off D5  - continue q4 hour fingerstick glucose with correctional algorithm 1 and Lantus      Disposition:  Continue inpatient care  Pending placement to rehab  SUBJECTIVE     Patient seen and examined  No acute events overnight  ROS limited due to language barrier and unavailable language line at this time  Does understands little english and follows commands  Per discussion with RN, not oriented  OBJECTIVE     Vitals:    21 1504 21 1947 21 0533 21 0651   BP: 91/60 106/70  100/69   BP Location:       Pulse: (!) 109 (!) 113  102   Resp: 18      Temp: 99 5 °F (37 5 °C)   98 8 °F (37 1 °C)   TempSrc:       SpO2: 100% 100%  100%   Weight:   47 kg (103 lb 9 9 oz)    Height:          Temperature:   Temp (24hrs), Av 2 °F (37 3 °C), Min:98 8 °F (37 1 °C), Max:99 5 °F (37 5 °C)    Temperature: 98 8 °F (37 1 °C)  Intake & Output:  I/O        07 -  0700  07 -  0700    P  O  200 120    NG/ 200    Feedings 1000     Total Intake(mL/kg) 1500 (32 3) 320 (6 8)    Urine (mL/kg/hr) 600 (0 5) 750 (0 7)    Stool  0    Total Output 600 750    Net +900 -430          Unmeasured Urine Occurrence 1 x 1 x Unmeasured Stool Occurrence 4 x 1 x        Weights:   IBW (Ideal Body Weight): 45 5 kg    Body mass index is 20 24 kg/m²  Weight (last 2 days)     Date/Time   Weight    05/23/21 0533   47 (103 62)    05/22/21 0554   46 4 (102 29)            Physical Exam  Vitals signs and nursing note reviewed  Constitutional:       General: She is not in acute distress  Appearance: She is well-developed  She is ill-appearing  HENT:      Head: Normocephalic and atraumatic  Eyes:      Conjunctiva/sclera: Conjunctivae normal    Neck:      Musculoskeletal: Neck supple  Cardiovascular:      Rate and Rhythm: Normal rate and regular rhythm  Heart sounds: No murmur  Pulmonary:      Effort: Pulmonary effort is normal  No respiratory distress  Breath sounds: Normal breath sounds  Abdominal:      Palpations: Abdomen is soft  Tenderness: There is no abdominal tenderness  Skin:     General: Skin is warm and dry  Neurological:      Mental Status: She is alert  Comments: Left side weakness, LUE contracture       LABORATORY DATA     Labs: I have personally reviewed pertinent reports  Results from last 7 days   Lab Units 05/23/21 0519 05/19/21  0554 05/18/21  0628   WBC Thousand/uL 5 82 8 60 6 26   HEMOGLOBIN g/dL 8 0* 9 2* 9 8*   HEMATOCRIT % 25 3* 28 2* 29 6*   PLATELETS Thousands/uL 101* 118* 142*   NEUTROS PCT %  --  65  --    MONOS PCT %  --  3*  --    MONO PCT % 9  --  3*      Results from last 7 days   Lab Units 05/23/21  0519 05/19/21  0554 05/18/21  0628   POTASSIUM mmol/L 4 2 3 9 4 2   CHLORIDE mmol/L 95* 99* 103   CO2 mmol/L 27 23 22   BUN mg/dL 21 20 20   CREATININE mg/dL 0 27* 0 28* 0 37*   CALCIUM mg/dL 9 1 8 6 9 1                            IMAGING & DIAGNOSTIC TESTING     Radiology Results: I have personally reviewed pertinent reports  Xr Chest Portable    Result Date: 4/24/2021  Impression: Dialysis catheter tip within the superior cavoatrial junction  No pneumothorax   Workstation performed: [id number]     Xr Chest Picc Line Portable    Result Date: 4/27/2021  Impression: PICC line tip in the superior vena cava, approximately 3 5 to 4 cm above the cavoatrial junction  The examination demonstrates a significant  finding and was documented as such in [facility] for liaison and referring practitioner notification  Workstation performed: [id number]     Ir Picc Reposition    Result Date: 4/27/2021  Impression: Status-post repositioning of a 5 [place] central venous catheter under fluoroscopic guidance with its tip at the cavoatrial junction  Workstation performed: [id number]     Ir Tunneled Central Line Removal    Result Date: 4/27/2021  Impression: Impression: Successful tunneled central line removal as described  Signed, performed and dictated by Kuldip Neil PA-C under the supervision of Dr Italia Boone  Workstation performed: [id number]     Other Diagnostic Testing: I have personally reviewed pertinent reports      ACTIVE MEDICATIONS     Current Facility-Administered Medications   Medication Dose Route Frequency    acetaminophen (TYLENOL) tablet 650 mg  650 mg Oral Q6H PRN    acyclovir (ZOVIRAX) capsule 400 mg  400 mg Oral Q12H [address]    albuterol (PROVENTIL HFA,VENTOLIN HFA) inhaler 2 puff  2 puff Inhalation Q6H PRN    allopurinol (ZYLOPRIM) tablet 100 mg  100 mg Oral Daily    amLODIPine (NORVASC) tablet 5 mg  5 mg Oral Daily    bisacodyl (DULCOLAX) rectal suppository 10 mg  10 mg Rectal Daily PRN    famotidine (PEPCID) tablet 20 mg  20 mg Oral BID    fluconazole (DIFLUCAN) tablet 400 mg  400 mg Oral Daily    heparin (porcine) subcutaneous injection 5,000 Units  5,000 Units Subcutaneous Q8H [address]    HYDROmorphone (DILAUDID) injection 0 5 mg  0 5 mg Intravenous Q4H PRN    insulin glargine (LANTUS) subcutaneous injection 7 Units 0 07 mL  7 Units Subcutaneous HS    insulin lispro (HumaLOG) 100 units/mL subcutaneous injection 1-5 Units  1-5 Units Subcutaneous 4x Daily (AC & HS)    lactulose oral solution 10 g  10 g Oral BID PRN    levofloxacin (LEVAQUIN) tablet 500 mg  500 mg Oral Q24H    lidocaine (LIDODERM) 5 % patch 1 patch  1 patch Topical Daily    menthol-methyl salicylate (BENGAY) 40-54 % cream   Apply externally BID    morphine injection 2 mg  2 mg Intravenous Q4H PRN    ondansetron (ZOFRAN) injection 4 mg  4 mg Intravenous Q6H PRN    polyethylene glycol (MIRALAX) packet 17 g  17 g Oral Daily    predniSONE tablet 5 mg  5 mg Oral Daily    senna-docusate sodium (SENOKOT S) 8 6-50 mg per tablet 1 tablet  1 tablet Oral BID    sodium chloride 0 9 % infusion  20 mL/hr Intravenous Once PRN    sodium chloride 0 9 % infusion  20 mL/hr Intravenous Once PRN    sodium chloride 0 9 % infusion  20 mL/hr Intravenous Once PRN    tamsulosin (FLOMAX) capsule 0 4 mg  0 4 mg Oral Daily With Dinner       VTE Pharmacologic Prophylaxis: Heparin  VTE Mechanical Prophylaxis: sequential compression device    Portions of the record may have been created with voice recognition software  Occasional wrong word or "sound a like" substitutions may have occurred due to the inherent limitations of voice recognition software    Read the chart carefully and recognize, using context, where substitutions have occurred   ==  Nanda Booker MD  [facility]  Internal Medicine Residency PGY-2

## 2021-05-24 ENCOUNTER — APPOINTMENT (INPATIENT)
Dept: RADIOLOGY | Facility: HOSPITAL | Age: 56
DRG: 840 | End: 2021-05-24
Payer: COMMERCIAL

## 2021-05-24 LAB
GLUCOSE SERPL-MCNC: 115 MG/DL (ref 65–140)
GLUCOSE SERPL-MCNC: 122 MG/DL (ref 65–140)
GLUCOSE SERPL-MCNC: 139 MG/DL (ref 65–140)
GLUCOSE SERPL-MCNC: 156 MG/DL (ref 65–140)
GLUCOSE SERPL-MCNC: 334 MG/DL (ref 65–140)

## 2021-05-24 PROCEDURE — 99232 SBSQ HOSP IP/OBS MODERATE 35: CPT | Performed by: INTERNAL MEDICINE

## 2021-05-24 PROCEDURE — 76705 ECHO EXAM OF ABDOMEN: CPT

## 2021-05-24 PROCEDURE — 82948 REAGENT STRIP/BLOOD GLUCOSE: CPT

## 2021-05-24 RX ADMIN — DICYCLOMINE HYDROCHLORIDE 10 MG: 10 CAPSULE ORAL at 21:47

## 2021-05-24 RX ADMIN — DICYCLOMINE HYDROCHLORIDE 10 MG: 10 CAPSULE ORAL at 07:28

## 2021-05-24 RX ADMIN — ACYCLOVIR 400 MG: 200 CAPSULE ORAL at 21:47

## 2021-05-24 RX ADMIN — INSULIN LISPRO 4 UNITS: 100 INJECTION, SOLUTION INTRAVENOUS; SUBCUTANEOUS at 10:54

## 2021-05-24 RX ADMIN — PREDNISONE 5 MG: 5 TABLET ORAL at 10:09

## 2021-05-24 RX ADMIN — HEPARIN SODIUM 5000 UNITS: 5000 INJECTION INTRAVENOUS; SUBCUTANEOUS at 13:42

## 2021-05-24 RX ADMIN — LEVOFLOXACIN 500 MG: 500 TABLET, FILM COATED ORAL at 16:37

## 2021-05-24 RX ADMIN — INSULIN GLARGINE 7 UNITS: 100 INJECTION, SOLUTION SUBCUTANEOUS at 21:47

## 2021-05-24 RX ADMIN — ACYCLOVIR 400 MG: 200 CAPSULE ORAL at 10:07

## 2021-05-24 RX ADMIN — MORPHINE SULFATE 2 MG: 2 INJECTION, SOLUTION INTRAMUSCULAR; INTRAVENOUS at 10:54

## 2021-05-24 RX ADMIN — FLUCONAZOLE 400 MG: 200 TABLET ORAL at 10:08

## 2021-05-24 RX ADMIN — DICYCLOMINE HYDROCHLORIDE 10 MG: 10 CAPSULE ORAL at 16:37

## 2021-05-24 RX ADMIN — HEPARIN SODIUM 5000 UNITS: 5000 INJECTION INTRAVENOUS; SUBCUTANEOUS at 07:28

## 2021-05-24 RX ADMIN — MENTHOL, UNSPECIFIED FORM AND METHYL SALICYLATE 1 APPLICATION: 10; 150 CREAM TOPICAL at 18:06

## 2021-05-24 RX ADMIN — TAMSULOSIN HYDROCHLORIDE 0.4 MG: 0.4 CAPSULE ORAL at 16:37

## 2021-05-24 RX ADMIN — ALLOPURINOL 100 MG: 100 TABLET ORAL at 10:08

## 2021-05-24 RX ADMIN — HEPARIN SODIUM 5000 UNITS: 5000 INJECTION INTRAVENOUS; SUBCUTANEOUS at 21:47

## 2021-05-24 RX ADMIN — DICYCLOMINE HYDROCHLORIDE 10 MG: 10 CAPSULE ORAL at 10:54

## 2021-05-24 RX ADMIN — MENTHOL, UNSPECIFIED FORM AND METHYL SALICYLATE: 10; 150 CREAM TOPICAL at 10:10

## 2021-05-24 RX ADMIN — LIDOCAINE 5% 1 PATCH: 700 PATCH TOPICAL at 10:07

## 2021-05-24 RX ADMIN — MORPHINE SULFATE 2 MG: 2 INJECTION, SOLUTION INTRAMUSCULAR; INTRAVENOUS at 22:19

## 2021-05-24 NOTE — CASE MANAGEMENT
YSABEL received a voicemail from Ruby Sarmiento ([facility]) [phone] opt [phone]  CM called back but the phone went to voicemail  CM left a message requesting a return phone call  CM will follow

## 2021-05-24 NOTE — PROGRESS NOTES
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 transferred over here for chemotherapy since the [facility] is out of network  Had a 2nd family meeting on 04/30 and family has decided to proceed treatment at this time  S/p PICC line for chemotherapy  S/p peg tube placement on 05/10  Currently on oral diet and Jevity 1 2  Plan:  Hematology-Oncology consulted  Appreciate recommendations  Chemotherapy treatment as per hematology oncology team     S/p 2nd cycle of chemotherapy on 05/13  Leucovin on 5/14  DVT prophylaxis:  On heparin  Case management-will start looking for places for rehab  S/p steroid taper for cerebral edema from CNS lymphoma  Pancytopenia in setting of recent chemotherapy  Will continue to watch for any signs of infection  On acyclovir, Diflucan and Levaquin    After discussion with Oncology, plan for rehab, patient does not require radiation therapy, rehab for strength, no chemotherapy until at least 3 weeks, evaluate at that time  Will continue to try to reach out to family for update    Acute gout  Assessment & Plan  Appears to have acute gout flare right knee, pain improving today  Currently on prednisone 5 mg q day, based on recent chemotherapy will hold off on NSAIDs or colchicine  Will start low-dose allopurinol 100 mg q day to hopefully prevent other acute flares in the setting of chemotherapy    Pancytopenia (HCC)  Assessment & Plan  Likely in setting of chemotherapy  Will continue to monitor for any signs of infection  Will transfuse with packed RBC for hemoglobin less than 7  Drop in Hgb from 9 to 7 on 5/14, other lines relatively stable  VSS and soft brown BMs  Will discuss with oncology if this is expected based on chemo or if there should be further workup for bleeding, though hemodynamics currently do not suggest bleed  S/p 1 pack RBC on 05/15, hemoglobin continues to improve    Left-sided weakness  Assessment & Plan  Patient has left-sided weakness at baseline from her CNS lymphoma  Left upper extremity 3-4/5 and left lower extremity 0-1/5 muscle strength at baseline  Severe protein-calorie malnutrition ([address] )  Assessment & Plan  Malnutrition Findings:   Adult Malnutrition type: Acute illness(due to medical condition resulting in dysphagia, decreased appetite and intake, weight loss)  Adult Degree of Malnutrition: Other severe protein calorie malnutrition    BMI Findings: Body mass index is 20 24 kg/m²  Plan:  Nutrition consult  Tube feeds initiated with Jevity 1 2, advancing to goal      Fracture of ramus of left pubis [facility])  Assessment & Plan  Marti Holliday prior to presentation to [facility]  Managed nonoperatively at [facility]    Presence of permanent central venous catheter  Assessment & Plan  Noted presence of right IJ tunneled double-lumen HD catheter; upon chart review, this was likely inserted to be used for plasmapheresis which she has completed  Plan:  S/p removal by IR  Patient now has PICC line  Weakness  Assessment & Plan  Left greater than right upper and lower extremity weakness with ongoing left lower extremity contracture  Secondary to underlying CNS malignancy     *Decubitus ulcer precautions such as frequent repositioning  Aphasia  Assessment & Plan  Definite evidence of expressive aphasia, possible component of receptive aphasia as well  Speech therapy on board  Urinary retention  Assessment & Plan  Patient developed urinary retention during previous hospitalization  Plan was voiding trial as an outpatient as she was scheduled for discharge from that facility  Machado catheter was initially removed although patient required re-placement of machado on 05/14 in setting of incontinence and getting chemotherapy  Machado discontinued, monitor for signs of fluid retention      Dysphagia  Assessment & Plan  Patient noted to have dysphagia and also decreased p o  Intake  As per [facility] progress note patient had a calorie count and recommended feeding tube placement  Speech evaluation - advance diet with dysphagia 2 and thin liquids; needs assistance with feeds  Calorie count  If the patient continued to have poor p o  Intake may need discussion with the family regarding alternate methods of nutrition  Patient's calorie intake is low  VBS- showed oropharyngeal dysphagia  s/p peg tube placement  Altered mental status  Assessment & Plan  Patient initially presented to [facility] his [place] with stroke-like symptoms for 2 days  CT scan of the head was concerning for subacute CVA and was transferred to [facility]   MRI of the brain was more consistent with demyelinating disease patient was started on IV steroids and 1000 mg daily for 5 days  Patient did not have any significant improvement at that time patient had plasmapheresis  And also had  lumbar puncture-negative for infection or malignancy    Patient noted to have persistent encephalopathy so a repeat MRI was obtained which showed worsening of the abnormality seen on the previous MRI and 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  Likely secondary to CNS lymphoma  Delirium precautions Currently alert and oriented times 2-3   follow commands  Patient's  (does not speak [place]) makes all medical decision for her  Sepsis (HCC)-resolved as of 2021  Assessment & Plan  Patient had hypothermia, elevated WBC count and tachycardia  Concern for possible aspiration in setting on dysphagia versus UTI in setting of urinary catheter which was placed for retention  Plan:  S/p IV antibiotics last day on   Will continue to monitor off antibiotics  Steroid-induced hyperglycemia-resolved as of 2021  Assessment & Plan  Mild hyperglycemia, glucoses in 200s, no history of DM  Occurring in the setting of Decadron with chemotherapy, as well as sodium bicarb in D5 drip  - D5 drip is finished  - will monitor sugars while on Jevity and now off D5  - continue q4 hour fingerstick glucose with correctional algorithm 1 and Lantus      Disposition:  Rehab placement pending     SUBJECTIVE     Patient seen and examined  No acute events overnight  Pain better controlled, there was a question of abdominal pain with nursing team yesterday, ultrasound abdomen pending  Conservative treatment with PPI  Patient appears to be in no distress  OBJECTIVE     Vitals:    21 2205 21 0600 21 0730 21 0737   BP: 99/66   101/64   Pulse: 101   88   Resp: 18      Temp: 97 7 °F (36 5 °C)   97 6 °F (36 4 °C)   TempSrc:       SpO2: 100%  100% 100%   Weight:  47 kg (103 lb 9 9 oz)     Height:          Temperature:   Temp (24hrs), Av 4 °F (36 9 °C), Min:97 6 °F (36 4 °C), Max:99 8 °F (37 7 °C)    Temperature: 97 6 °F (36 4 °C)  Intake & Output:  I/O        07 -  0700  07 -  07 07 -  0700    P  O  120 240     NG/  200    Feedings   1345    Total Intake(mL/kg) 320 (6 8) 240 (5 1) 1545 (32 9)    Urine (mL/kg/hr) 750 (0 7) 1500 (1 3)     Stool 0      Total Output 750 1500     Net -430 -1260 +1545           Unmeasured Urine Occurrence 1 x Unmeasured Stool Occurrence 1 x          Weights:   IBW (Ideal Body Weight): 45 5 kg    Body mass index is 20 24 kg/m²  Weight (last 2 days)     Date/Time   Weight    05/24/21 0600   47 (103 62)    05/23/21 0533   47 (103 62)    05/22/21 0554   46 4 (102 29)            Physical Exam  Vitals signs and nursing note reviewed  Constitutional:       General: She is not in acute distress  Appearance: She is well-developed  HENT:      Head: Normocephalic and atraumatic  Eyes:      Conjunctiva/sclera: Conjunctivae normal    Neck:      Musculoskeletal: Neck supple  Cardiovascular:      Rate and Rhythm: Normal rate and regular rhythm  Heart sounds: No murmur  Pulmonary:      Effort: Pulmonary effort is normal  No respiratory distress  Breath sounds: Normal breath sounds  Abdominal:      Palpations: Abdomen is soft  Tenderness: There is no abdominal tenderness  Comments: PEG tube, clear margins, no erythema   Musculoskeletal: Normal range of motion  General: No swelling  Skin:     General: Skin is warm and dry  Capillary Refill: Capillary refill takes less than 2 seconds  Neurological:      Mental Status: She is alert  She is disoriented  Psychiatric:         Mood and Affect: Mood normal          Behavior: Behavior normal        LABORATORY DATA     Labs: I have personally reviewed pertinent reports    Results from last 7 days   Lab Units 05/23/21  0519 05/19/21  0554 05/18/21  0628   WBC Thousand/uL 5 82 8 60 6 26   HEMOGLOBIN g/dL 8 0* 9 2* 9 8*   HEMATOCRIT % 25 3* 28 2* 29 6*   PLATELETS Thousands/uL 101* 118* 142*   NEUTROS PCT %  --  65  --    MONOS PCT %  --  3*  --    MONO PCT % 9  --  3*      Results from last 7 days   Lab Units 05/23/21 0519 05/19/21  0554 05/18/21  0628   POTASSIUM mmol/L 4 2 3 9 4 2   CHLORIDE mmol/L 95* 99* 103   CO2 mmol/L 27 23 22   BUN mg/dL 21 20 20   CREATININE mg/dL 0 27* 0 28* 0 37*   CALCIUM mg/dL 9 1 8 6 9 1 IMAGING & DIAGNOSTIC TESTING     Radiology Results: I have personally reviewed pertinent reports  Xr Chest Portable    Result Date: 4/24/2021  Impression: Dialysis catheter tip within the superior cavoatrial junction  No pneumothorax  Workstation performed: [id number]     Xr Chest Picc Line Portable    Result Date: 4/27/2021  Impression: PICC line tip in the superior vena cava, approximately 3 5 to 4 cm above the cavoatrial junction  The examination demonstrates a significant  finding and was documented as such in [facility] for liaison and referring practitioner notification  Workstation performed: [id number]     Ir Picc Reposition    Result Date: 4/27/2021  Impression: Status-post repositioning of a 5 [place] central venous catheter under fluoroscopic guidance with its tip at the cavoatrial junction  Workstation performed: [id number]     Ir Tunneled Central Line Removal    Result Date: 4/27/2021  Impression: Impression: Successful tunneled central line removal as described  Signed, performed and dictated by [facility] SIMONE under the supervision of Dr Sumi Damon  Workstation performed: [id number]     Other Diagnostic Testing: I have personally reviewed pertinent reports      ACTIVE MEDICATIONS     Current Facility-Administered Medications   Medication Dose Route Frequency    acetaminophen (TYLENOL) tablet 650 mg  650 mg Oral Q6H PRN    acyclovir (ZOVIRAX) capsule 400 mg  400 mg Oral Q12H [address]    albuterol (PROVENTIL HFA,VENTOLIN HFA) inhaler 2 puff  2 puff Inhalation Q6H PRN    allopurinol (ZYLOPRIM) tablet 100 mg  100 mg Oral Daily    aluminum-magnesium hydroxide-simethicone (MYLANTA) oral suspension 30 mL  30 mL Per PEG Tube Q4H PRN    amLODIPine (NORVASC) tablet 5 mg  5 mg Oral Daily    bisacodyl (DULCOLAX) rectal suppository 10 mg  10 mg Rectal Daily PRN    dicyclomine (BENTYL) capsule 10 mg  10 mg Oral 4x Daily (AC & HS)    fluconazole (DIFLUCAN) tablet 400 mg  400 mg Oral Daily    heparin (porcine) subcutaneous injection 5,000 Units  5,000 Units Subcutaneous Q8H [facility]    HYDROmorphone (DILAUDID) injection 0 5 mg  0 5 mg Intravenous Q4H PRN    insulin glargine (LANTUS) subcutaneous injection 7 Units 0 07 mL  7 Units Subcutaneous HS    insulin lispro (HumaLOG) 100 units/mL subcutaneous injection 1-5 Units  1-5 Units Subcutaneous 4x Daily (AC & HS)    lactulose oral solution 10 g  10 g Oral BID PRN    levofloxacin (LEVAQUIN) tablet 500 mg  500 mg Oral Q24H    lidocaine (LIDODERM) 5 % patch 1 patch  1 patch Topical Daily    menthol-methyl salicylate (BENGAY) 08-17 % cream   Apply externally BID    morphine injection 2 mg  2 mg Intravenous Q4H PRN    omeprazole (PRILOSEC) suspension 2 mg/mL  20 mg Oral Daily    ondansetron (ZOFRAN) injection 4 mg  4 mg Intravenous Q6H PRN    polyethylene glycol (MIRALAX) packet 17 g  17 g Oral Daily PRN    predniSONE tablet 5 mg  5 mg Oral Daily    senna-docusate sodium (SENOKOT S) 8 6-50 mg per tablet 1 tablet  1 tablet Oral BID PRN    sodium chloride 0 9 % infusion  20 mL/hr Intravenous Once PRN    sodium chloride 0 9 % infusion  20 mL/hr Intravenous Once PRN    sodium chloride 0 9 % infusion  20 mL/hr Intravenous Once PRN    tamsulosin (FLOMAX) capsule 0 4 mg  0 4 mg Oral Daily With Dinner       VTE Pharmacologic Prophylaxis: Heparin  VTE Mechanical Prophylaxis: sequential compression device    Portions of the record may have been created with voice recognition software  Occasional wrong word or "sound a like" substitutions may have occurred due to the inherent limitations of voice recognition software    Read the chart carefully and recognize, using context, where substitutions have occurred   ==  Kacie Pradhan, [address] [facility]  Internal Medicine Residency PGY-2

## 2021-05-24 NOTE — CASE MANAGEMENT
YSABEL s/w Deshaun Reedmeme  Ins Complex Case Manager) [phone] opt [phone] ext [phone]  CM provided her with an update as to the status of the pt's referrals  Per Deshaun Choi she did not have an update as to the current status of the pt's case with [employer]  CM will follow

## 2021-05-24 NOTE — PLAN OF CARE
Problem: Prexisting or High Potential for Compromised Skin Integrity  Goal: Skin integrity is maintained or improved  Description: INTERVENTIONS:  - Identify patients at risk for skin breakdown  - Assess and monitor skin integrity  - Assess and monitor nutrition and hydration status  - Monitor labs   - Assess for incontinence   - Turn and reposition patient  - Assist with mobility/ambulation  - Relieve pressure over bony prominences  - Avoid friction and shearing  - Provide appropriate hygiene as needed including keeping skin clean and dry  - Evaluate need for skin moisturizer/barrier cream  - Collaborate with interdisciplinary team   - Patient/family teaching  - Consider wound care consult   Outcome: Progressing     Problem: Potential for Falls  Goal: Patient will remain free of falls  Description: INTERVENTIONS:  - Assess patient frequently for physical needs  -  Identify cognitive and physical deficits and behaviors that affect risk of falls  -  [place] fall precautions as indicated by assessment   - Educate patient/family on patient safety including physical limitations  - Instruct patient to call for assistance with activity based on assessment  - Modify environment to reduce risk of injury  - Consider OT/PT consult to assist with strengthening/mobility  Outcome: Progressing     Problem: Nutrition/Hydration-ADULT  Goal: Nutrient/Hydration intake appropriate for improving, restoring or maintaining nutritional needs  Description: Monitor and assess patient's nutrition/hydration status for malnutrition  Collaborate with interdisciplinary team and initiate plan and interventions as ordered  Monitor patient's weight and dietary intake as ordered or per policy  Utilize nutrition screening tool and intervene as necessary  Determine patient's food preferences and provide high-protein, high-caloric foods as appropriate       INTERVENTIONS:  - Monitor oral intake, urinary output, labs, and treatment plans  - Assess nutrition and hydration status and recommend course of action  - Evaluate amount of meals eaten  - Assist patient with eating if necessary   - Allow adequate time for meals  - Recommend/ encourage appropriate diets, oral nutritional supplements, and vitamin/mineral supplements  - Order, calculate, and assess calorie counts as needed  - Recommend, monitor, and adjust tube feedings and TPN/PPN based on assessed needs  - Assess need for intravenous fluids  - Provide specific nutrition/hydration education as appropriate  - Include patient/family/caregiver in decisions related to nutrition  Outcome: Progressing     Problem: PAIN - ADULT  Goal: Verbalizes/displays adequate comfort level or baseline comfort level  Description: Interventions:  - Encourage patient to monitor pain and request assistance  - Assess pain using appropriate pain scale  - Administer analgesics based on type and severity of pain and evaluate response  - Implement non-pharmacological measures as appropriate and evaluate response  - Consider cultural and social influences on pain and pain management  - Notify physician/advanced practitioner if interventions unsuccessful or patient reports new pain  Outcome: Progressing     Problem: INFECTION - ADULT  Goal: Absence or prevention of progression during hospitalization  Description: INTERVENTIONS:  - Assess and monitor for signs and symptoms of infection  - Monitor lab/diagnostic results  - Monitor all insertion sites, i e  indwelling lines, tubes, and drains  - Monitor endotracheal if appropriate and nasal secretions for changes in amount and color  - [place] appropriate cooling/warming therapies per order  - Administer medications as ordered  - Instruct and encourage patient and family to use good hand hygiene technique  - Identify and instruct in appropriate isolation precautions for identified infection/condition  Outcome: Progressing     Problem: SAFETY ADULT  Goal: Patient will remain free of falls  Description: INTERVENTIONS:  - Assess patient frequently for physical needs  -  Identify cognitive and physical deficits and behaviors that affect risk of falls    -  [employer] fall precautions as indicated by assessment   - Educate patient/family on patient safety including physical limitations  - Instruct patient to call for assistance with activity based on assessment  - Modify environment to reduce risk of injury  - Consider OT/PT consult to assist with strengthening/mobility  Outcome: Progressing  Goal: Maintain or return to baseline ADL function  Description: INTERVENTIONS:  -  Assess patient's ability to carry out ADLs; assess patient's baseline for ADL function and identify physical deficits which impact ability to perform ADLs (bathing, care of mouth/teeth, toileting, grooming, dressing, etc )  - Assess/evaluate cause of self-care deficits   - Assess range of motion  - Assess patient's mobility; develop plan if impaired  - Assess patient's need for assistive devices and provide as appropriate  - Encourage maximum independence but intervene and supervise when necessary  - Involve family in performance of ADLs  - Assess for home care needs following discharge   - Consider OT consult to assist with ADL evaluation and planning for discharge  - Provide patient education as appropriate  Outcome: Progressing  Goal: Maintain or return mobility status to optimal level  Description: INTERVENTIONS:  - Assess patient's baseline mobility status (ambulation, transfers, stairs, etc )    - Identify cognitive and physical deficits and behaviors that affect mobility  - Identify mobility aids required to assist with transfers and/or ambulation (gait belt, sit-to-stand, lift, walker, cane, etc )  - [employer] fall precautions as indicated by assessment  - Record patient progress and toleration of activity level on Mobility SBAR; progress patient to next Phase/Stage  - Instruct patient to call for assistance with activity based on assessment  - Consider rehabilitation consult to assist with strengthening/weightbearing, etc   Outcome: Progressing     Problem: DISCHARGE PLANNING  Goal: Discharge to home or other facility with appropriate resources  Description: INTERVENTIONS:  - Identify barriers to discharge w/patient and caregiver  - Arrange for needed discharge resources and transportation as appropriate  - Identify discharge learning needs (meds, wound care, etc )  - Arrange for interpretive services to assist at discharge as needed  - Refer to Case Management Department for coordinating discharge planning if the patient needs post-hospital services based on physician/advanced practitioner order or complex needs related to functional status, cognitive ability, or social support system  Outcome: Progressing     Problem: Knowledge Deficit  Goal: Patient/family/caregiver demonstrates understanding of disease process, treatment plan, medications, and discharge instructions  Description: Complete learning assessment and assess knowledge base    Interventions:  - Provide teaching at level of understanding  - Provide teaching via preferred learning methods  Outcome: Progressing

## 2021-05-24 NOTE — UTILIZATION REVIEW
Continued Stay Review    Date:  5/22 and 5/24/21                      Current Patient Class: IP  Current Level of Care: MS    HPI:56 y o  female initially admitted on 4/23/21 for initiation of chemotherapy for CNS lymphoma  5/10 PEG tube placement      Assessment/Plan:     5/22 - pt remains on acyclovir, Diflucan and Levaquin  Remains on Jevity tube feed  Down to Prednisone 5 mg daily  Did have acute gout flare of R knee which is improving today  There is a communication deficit d/t language but the nursing team does not feel the patient is oriented  Pain is being controlled with Morphine IV        5/24 - pt c/o abd pain 5/23  US Abd pending  Pain appears better controlled today  Conservative treatment for abd pain with PPI  She is in no distress  Working on placement post-discharge  Pain is being controlled with IV Morphine in decreasing amounts since 5/22  Na was down to 130 on 5/23  Creat downtrending       Vital Signs:   05/24/21 10:02:06  --  96  --  100/64  76  100 %  --  --   05/24/21 07:37:23  97 6 °F (36 4 °C)  88  --  101/64  76  100 %  --  --   05/24/21 0730  --  --  --  --  --  100 %  None (Room air)  --   05/24/21 0100  --  --  --  --  --  --  None (Room air)  --   05/23/21 22:05:12  97 7 °F (36 5 °C)  101  18  99/66  77  100 %  --  --   05/23/21 15:28:20  99 8 °F (37 7 °C)  95  20  102/67  79  100 %  --  --   05/23/21 10:46:34  --  98  --  103/68  80  100 %  --  --   05/23/21 06:51:42  98 8 °F (37 1 °C)  102  --  100/69  79  100 %  --  --   05/22/21 19:47:22  --  113Abnormal   --  106/70  82  100 %  --  --   05/22/21 15:04:42  99 5 °F (37 5 °C)  109Abnormal   18  91/60  70  100 %  --  --   05/22/21 07:49:23  99 3 °F (37 4 °C)  104  18  112/76  --  100 %  --  Lying   05/22/21 02:39:05  98 2 °F (36 8 °C)  94  20  104/69  81  100 %  --  --   05/22/21 02:33:51  --  93  20  104/69  81  100 %  --  --     Pertinent Labs/Diagnostic Results:     5/23 US Abd - Normal       Results from last 7 days Lab Units 05/23/21  0519 05/19/21  0554 05/18/21  0628   WBC Thousand/uL 5 82 8 60 6 26   HEMOGLOBIN g/dL 8 0* 9 2* 9 8*   HEMATOCRIT % 25 3* 28 2* 29 6*   PLATELETS Thousands/uL 101* 118* 142*   NEUTROS ABS Thousands/µL  --  5 64  --    BANDS PCT % 8  --  17*         Results from last 7 days   Lab Units 05/23/21  0519 05/19/21  0554 05/18/21  0628   SODIUM mmol/L 130* 133* 134*   POTASSIUM mmol/L 4 2 3 9 4 2   CHLORIDE mmol/L 95* 99* 103   CO2 mmol/L 27 23 22   ANION GAP mmol/L 8 11 9   BUN mg/dL 21 20 20   CREATININE mg/dL 0 27* 0 28* 0 37*   EGFR ml/min/1 73sq m 135 133 122   CALCIUM mg/dL 9 1 8 6 9 1         Results from last 7 days   Lab Units 05/24/21  1044 05/24/21  0727 05/23/21  2033 05/23/21  1647 05/23/21  1143 05/23/21  0748 05/22/21  2134 05/22/21  1652 05/22/21  1226 05/22/21  0751 05/21/21  2054 05/21/21  1638   POC GLUCOSE mg/dl 334* 115 168* 154* 125 117 149* 187* 154* 143* 147* 151*     Results from last 7 days   Lab Units 05/23/21  0519 05/19/21  0554 05/18/21  0628   GLUCOSE RANDOM mg/dL 119 141* 149*     Medications:   Scheduled Medications:  acyclovir, 400 mg, Oral, Q12H MEÑO  allopurinol, 100 mg, Oral, Daily  amLODIPine, 5 mg, Oral, Daily  dicyclomine, 10 mg, Oral, 4x Daily (AC & HS)  fluconazole, 400 mg, Oral, Daily  heparin (porcine), 5,000 Units, Subcutaneous, Q8H MEÑO  insulin glargine, 7 Units, Subcutaneous, HS  insulin lispro, 1-5 Units, Subcutaneous, 4x Daily (AC & HS)  levofloxacin, 500 mg, Oral, Q24H  lidocaine, 1 patch, Topical, Daily  menthol-methyl salicylate, , Apply externally, BID  omeprazole (PRILOSEC) suspension 2 mg/mL, 20 mg, Oral, Daily  predniSONE, 5 mg, Oral, Daily  tamsulosin, 0 4 mg, Oral, Daily With Dinner      Continuous IV Infusions:        PRN Meds:  acetaminophen, 650 mg, Oral, Q6H PRN  albuterol, 2 puff, Inhalation, Q6H PRN  aluminum-magnesium hydroxide-simethicone, 30 mL, Per PEG Tube, Q4H PRN  bisacodyl, 10 mg, Rectal, Daily PRN  HYDROmorphone, 0 5 mg, Intravenous, Q4H PRN  morphine injection, 2 mg, Intravenous, Q4H PRN - x 3 5/22, x 4 5/23, x 1 5/24  ondansetron, 4 mg, Intravenous, Q6H PRN  polyethylene glycol, 17 g, Oral, Daily PRN  senna-docusate sodium, 1 tablet, Oral, BID PRN  sodium chloride, 20 mL/hr, Intravenous, Once PRN  sodium chloride, 20 mL/hr, Intravenous, Once PRN  sodium chloride, 20 mL/hr, Intravenous, Once PRN    Discharge Plan: [facility] rehab     Network Utilization Review Department  ATTENTION: Please call with any questions or concerns to [phone] and carefully listen to the prompts so that you are directed to the right person  All voicemails are confidential   Menezes Divya all requests for admission clinical reviews, approved or denied determinations and any other requests to dedicated fax number below belonging to the campus where the patient is receiving treatment   List of dedicated fax numbers for the Facilities:  [address] DENIALS (Administrative/Medical Necessity) [phone]   [address] (Maternity/NICU/Pediatrics) [phone]   [address] [place] [address] [address] [place] [address] [id number] [place] [address] [address] [place] [address] [phone]

## 2021-05-25 LAB
ANION GAP SERPL CALCULATED.3IONS-SCNC: 6 MMOL/L (ref 4–13)
BUN SERPL-MCNC: 19 MG/DL (ref 5–25)
CALCIUM SERPL-MCNC: 9.7 MG/DL (ref 8.3–10.1)
CHLORIDE SERPL-SCNC: 97 MMOL/L (ref 100–108)
CO2 SERPL-SCNC: 29 MMOL/L (ref 21–32)
CREAT SERPL-MCNC: 0.25 MG/DL (ref 0.6–1.3)
ERYTHROCYTE [DISTWIDTH] IN BLOOD BY AUTOMATED COUNT: 19.5 % (ref 11.6–15.1)
GFR SERPL CREATININE-BSD FRML MDRD: 138 ML/MIN/1.73SQ M
GLUCOSE SERPL-MCNC: 102 MG/DL (ref 65–140)
GLUCOSE SERPL-MCNC: 121 MG/DL (ref 65–140)
GLUCOSE SERPL-MCNC: 147 MG/DL (ref 65–140)
GLUCOSE SERPL-MCNC: 152 MG/DL (ref 65–140)
GLUCOSE SERPL-MCNC: 161 MG/DL (ref 65–140)
HCT VFR BLD AUTO: 28.4 % (ref 34.8–46.1)
HGB BLD-MCNC: 8.8 G/DL (ref 11.5–15.4)
MCH RBC QN AUTO: 30.9 PG (ref 26.8–34.3)
MCHC RBC AUTO-ENTMCNC: 31 G/DL (ref 31.4–37.4)
MCV RBC AUTO: 100 FL (ref 82–98)
NRBC BLD AUTO-RTO: 1 /100 WBCS
PLATELET # BLD AUTO: 117 THOUSANDS/UL (ref 149–390)
PMV BLD AUTO: 11.5 FL (ref 8.9–12.7)
POTASSIUM SERPL-SCNC: 4.4 MMOL/L (ref 3.5–5.3)
RBC # BLD AUTO: 2.85 MILLION/UL (ref 3.81–5.12)
SODIUM SERPL-SCNC: 132 MMOL/L (ref 136–145)
WBC # BLD AUTO: 6.76 THOUSAND/UL (ref 4.31–10.16)

## 2021-05-25 PROCEDURE — 97535 SELF CARE MNGMENT TRAINING: CPT

## 2021-05-25 PROCEDURE — 97530 THERAPEUTIC ACTIVITIES: CPT

## 2021-05-25 PROCEDURE — 97112 NEUROMUSCULAR REEDUCATION: CPT

## 2021-05-25 PROCEDURE — 82948 REAGENT STRIP/BLOOD GLUCOSE: CPT

## 2021-05-25 PROCEDURE — 80048 BASIC METABOLIC PNL TOTAL CA: CPT | Performed by: STUDENT IN AN ORGANIZED HEALTH CARE EDUCATION/TRAINING PROGRAM

## 2021-05-25 PROCEDURE — 97168 OT RE-EVAL EST PLAN CARE: CPT

## 2021-05-25 PROCEDURE — 97164 PT RE-EVAL EST PLAN CARE: CPT

## 2021-05-25 PROCEDURE — 99232 SBSQ HOSP IP/OBS MODERATE 35: CPT | Performed by: INTERNAL MEDICINE

## 2021-05-25 PROCEDURE — 85027 COMPLETE CBC AUTOMATED: CPT | Performed by: STUDENT IN AN ORGANIZED HEALTH CARE EDUCATION/TRAINING PROGRAM

## 2021-05-25 RX ADMIN — ACYCLOVIR 400 MG: 200 CAPSULE ORAL at 09:33

## 2021-05-25 RX ADMIN — DICYCLOMINE HYDROCHLORIDE 10 MG: 10 CAPSULE ORAL at 21:29

## 2021-05-25 RX ADMIN — MENTHOL, UNSPECIFIED FORM AND METHYL SALICYLATE: 10; 150 CREAM TOPICAL at 09:35

## 2021-05-25 RX ADMIN — LEVOFLOXACIN 500 MG: 500 TABLET, FILM COATED ORAL at 16:48

## 2021-05-25 RX ADMIN — DICYCLOMINE HYDROCHLORIDE 10 MG: 10 CAPSULE ORAL at 12:21

## 2021-05-25 RX ADMIN — LIDOCAINE 5% 1 PATCH: 700 PATCH TOPICAL at 09:42

## 2021-05-25 RX ADMIN — INSULIN LISPRO 1 UNITS: 100 INJECTION, SOLUTION INTRAVENOUS; SUBCUTANEOUS at 21:28

## 2021-05-25 RX ADMIN — ACYCLOVIR 400 MG: 200 CAPSULE ORAL at 21:29

## 2021-05-25 RX ADMIN — Medication 20 MG: at 09:44

## 2021-05-25 RX ADMIN — MORPHINE SULFATE 2 MG: 2 INJECTION, SOLUTION INTRAMUSCULAR; INTRAVENOUS at 21:30

## 2021-05-25 RX ADMIN — INSULIN LISPRO 1 UNITS: 100 INJECTION, SOLUTION INTRAVENOUS; SUBCUTANEOUS at 17:13

## 2021-05-25 RX ADMIN — PREDNISONE 5 MG: 5 TABLET ORAL at 09:34

## 2021-05-25 RX ADMIN — MENTHOL, UNSPECIFIED FORM AND METHYL SALICYLATE: 10; 150 CREAM TOPICAL at 16:47

## 2021-05-25 RX ADMIN — TAMSULOSIN HYDROCHLORIDE 0.4 MG: 0.4 CAPSULE ORAL at 16:47

## 2021-05-25 RX ADMIN — DICYCLOMINE HYDROCHLORIDE 10 MG: 10 CAPSULE ORAL at 09:34

## 2021-05-25 RX ADMIN — HEPARIN SODIUM 5000 UNITS: 5000 INJECTION INTRAVENOUS; SUBCUTANEOUS at 14:11

## 2021-05-25 RX ADMIN — HEPARIN SODIUM 5000 UNITS: 5000 INJECTION INTRAVENOUS; SUBCUTANEOUS at 21:29

## 2021-05-25 RX ADMIN — DICYCLOMINE HYDROCHLORIDE 10 MG: 10 CAPSULE ORAL at 16:47

## 2021-05-25 RX ADMIN — ALLOPURINOL 100 MG: 100 TABLET ORAL at 09:34

## 2021-05-25 RX ADMIN — MORPHINE SULFATE 2 MG: 2 INJECTION, SOLUTION INTRAMUSCULAR; INTRAVENOUS at 05:05

## 2021-05-25 RX ADMIN — INSULIN GLARGINE 7 UNITS: 100 INJECTION, SOLUTION SUBCUTANEOUS at 21:28

## 2021-05-25 RX ADMIN — HEPARIN SODIUM 5000 UNITS: 5000 INJECTION INTRAVENOUS; SUBCUTANEOUS at 05:05

## 2021-05-25 RX ADMIN — FLUCONAZOLE 400 MG: 200 TABLET ORAL at 09:34

## 2021-05-25 NOTE — PROGRESS NOTES
INTERNAL MEDICINE RESIDENCY PROGRESS NOTE     Name: Charo Osman   Age & Sex: 49 y o  female   MRN: [id number]  Unit/Bed#: [facility] 920-01   Encounter: [id number]  Team: SOD Team B     PATIENT INFORMATION     Name: Charo Osman   Age & Sex: 49 y o  female   MRN: [id number] Stay Days: 28    ASSESSMENT/PLAN     Principal Problem:    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[address] )  Active Problems:    Acute gout    Altered mental status    Dysphagia    Urinary retention    Aphasia    Weakness    Presence of permanent central venous catheter    Fracture of ramus of left pubis (HCC)    Severe protein-calorie malnutrition (HCC)    Left-sided weakness    Pancytopenia ([address] )      * 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[facility] [address] )  Assessment & Plan  Patient was diagnosed with primary CNS lymphoma through biopsy in [facility] transferred over here for chemotherapy since the [facility] is out of network  Had a 2nd family meeting on 04/30 and family has decided to proceed treatment at this time  S/p PICC line for chemotherapy  S/p peg tube placement on 05/10  Plan:  Hematology-Oncology consulted  Appreciate recommendations  Chemotherapy treatment as per hematology oncology team     S/p 2nd cycle of chemotherapy on 05/13  Leucovin on 5/14  DVT prophylaxis: On heparin  Case management-will start looking for places for rehab  S/p steroid taper for cerebral edema from CNS lymphoma  Pancytopenia in setting of recent chemotherapy  Will continue to watch for any signs of infection  On acyclovir, Diflucan and Levaquin    After discussion with Oncology, plan for rehab, patient does not require radiation therapy, rehab for strength, no chemotherapy until at least 3 weeks, evaluate at that time  Patient's family came in yesterday afternoon, updated on plan going forward, no questions after family visit    Acute gout  Assessment & Plan  Appears to have acute gout flare right knee, negative pain now  Currently on prednisone 5 mg q day, based on recent chemotherapy will hold off on NSAIDs or colchicine  Will start low-dose allopurinol 100 mg q day to hopefully prevent other acute flares in the setting of chemotherapy    Pancytopenia (HCC)  Assessment & Plan  Likely in setting of chemotherapy  Will continue to monitor for any signs of infection  Will transfuse with packed RBC for hemoglobin less than 7  Drop in Hgb from 9 to 7 on 5/14, other lines relatively stable  VSS and soft brown BMs  Will discuss with oncology if this is expected based on chemo or if there should be further workup for bleeding, though hemodynamics currently do not suggest bleed  S/p 1 pack RBC on 05/15, hemoglobin continues to improve    Left-sided weakness  Assessment & Plan  Patient has left-sided weakness at baseline from her CNS lymphoma  Left upper extremity 3-4/5 and left lower extremity 0-1/5 muscle strength at baseline  Severe protein-calorie malnutrition ([facility] [address] )  Assessment & Plan  Malnutrition Findings:   Adult Malnutrition type: Acute illness(due to medical condition resulting in dysphagia, decreased appetite and intake, weight loss)  Adult Degree of Malnutrition: Other severe protein calorie malnutrition    BMI Findings: Body mass index is 20 24 kg/m²  Plan:  Nutrition consult  Tube feeds initiated with Jevity 1 2, advancing to goal      Fracture of ramus of left pubis [facility])  Assessment & Plan  Alena Aponte prior to presentation to [facility]  Managed nonoperatively at [facility]    Presence of permanent central venous catheter  Assessment & Plan  Noted presence of right IJ tunneled double-lumen HD catheter; upon chart review, this was likely inserted to be used for plasmapheresis which she has completed  Plan:  S/p removal by IR  Patient now has PICC line  Weakness  Assessment & Plan  Left greater than right upper and lower extremity weakness with ongoing left lower extremity contracture  Secondary to underlying CNS malignancy     *Decubitus ulcer precautions such as frequent repositioning  Aphasia  Assessment & Plan  Definite evidence of expressive aphasia, possible component of receptive aphasia as well  Speech therapy on board  Urinary retention  Assessment & Plan  Patient developed urinary retention during previous hospitalization  Plan was voiding trial as an outpatient as she was scheduled for discharge from that facility  Machado catheter was initially removed although patient required re-placement of machado on 05/14 in setting of incontinence and getting chemotherapy  Machado discontinued, monitor for signs of fluid retention      Dysphagia  Assessment & Plan  Patient noted to have dysphagia and also decreased p o  Intake  As per [facility] progress note patient had a calorie count and recommended feeding tube placement  Speech evaluation - advance diet with dysphagia 2 and thin liquids; needs assistance with feeds  Calorie count  If the patient continued to have poor p o  Intake may need discussion with the family regarding alternate methods of nutrition  Patient's calorie intake is low  VBS- showed oropharyngeal dysphagia  s/p peg tube placement  Altered mental status  Assessment & Plan  Patient initially presented to [facility] his ultimate [place] with stroke-like symptoms for 2 days  CT scan of the head was concerning for subacute CVA and was transferred to [facility]   MRI of the brain was more consistent with demyelinating disease patient was started on IV steroids and 1000 mg daily for 5 days  Patient did not have any significant improvement at that time patient had plasmapheresis  And also had  lumbar puncture-negative for infection or malignancy    Patient noted to have persistent encephalopathy so a repeat MRI was obtained which showed worsening of the abnormality seen on the previous MRI and 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  Likely secondary to CNS lymphoma  Delirium precautions   Currently alert and oriented times 2-3  Follow commands  Patient's  (does not speak [place]) makes all medical decision for her  Sepsis (HCC)-resolved as of 2021  Assessment & Plan  Patient had hypothermia, elevated WBC count and tachycardia  Concern for possible aspiration in setting on dysphagia versus UTI in setting of urinary catheter which was placed for retention  Plan:  S/p IV antibiotics last day on   Will continue to monitor off antibiotics  Steroid-induced hyperglycemia-resolved as of 2021  Assessment & Plan  Mild hyperglycemia, glucoses in 200s, no history of DM  Occurring in the setting of Decadron with chemotherapy, as well as sodium bicarb in D5 drip  - D5 drip is finished  - will monitor sugars while on Jevity and now off D5  - continue q4 hour fingerstick glucose with correctional algorithm 1 and Lantus      Disposition:  Awaiting placement     SUBJECTIVE     Patient seen and examined  No acute events overnight  OBJECTIVE     Vitals:    21 1002 21 1533 21 2000 21 0546   BP: 100/64 100/64     BP Location:  Right arm     Pulse: 96 88     Resp:  18     Temp:  99 2 °F (37 3 °C)     TempSrc:  Axillary     SpO2: 100% 100% 100%    Weight:    44 kg (97 lb)   Height:          Temperature:   Temp (24hrs), Av 4 °F (36 9 °C), Min:97 6 °F (36 4 °C), Max:99 2 °F (37 3 °C)    Temperature: 99 2 °F (37 3 °C)  Intake & Output:  I/O        07 -  0700  07 -  0700  07 -  0700    P  O  240 360     NG/GT  350     Feedings  2791     Total Intake(mL/kg) 240 (5 1) 3501 (79 6)     Urine (mL/kg/hr) 1500 (1 3) 350 (0 3)     Stool  0     Total Output 1500 350     Net -1260 +3151            Unmeasured Stool Occurrence  1 x         Weights:   IBW (Ideal Body Weight): 45 5 kg    Body mass index is 18 94 kg/m²    Weight (last 2 days)     Date/Time   Weight    21 0546   44 (97)    21 0600   47 (103 62) 05/23/21 0533   47 (103 62)            Physical Exam  Vitals signs and nursing note reviewed  Constitutional:       General: She is not in acute distress  Appearance: She is well-developed  She is ill-appearing  HENT:      Head: Normocephalic and atraumatic  Eyes:      Conjunctiva/sclera: Conjunctivae normal    Neck:      Musculoskeletal: Neck supple  Cardiovascular:      Rate and Rhythm: Normal rate and regular rhythm  Heart sounds: No murmur  Pulmonary:      Effort: Pulmonary effort is normal  No respiratory distress  Breath sounds: Normal breath sounds  Abdominal:      Palpations: Abdomen is soft  Tenderness: There is no abdominal tenderness  Musculoskeletal: Normal range of motion  General: No swelling  Skin:     General: Skin is warm and dry  Neurological:      General: No focal deficit present  Mental Status: She is alert  Mental status is at baseline  Psychiatric:         Mood and Affect: Mood normal          Behavior: Behavior normal        LABORATORY DATA     Labs: I have personally reviewed pertinent reports  Results from last 7 days   Lab Units 05/23/21  0519 05/19/21  0554   WBC Thousand/uL 5 82 8 60   HEMOGLOBIN g/dL 8 0* 9 2*   HEMATOCRIT % 25 3* 28 2*   PLATELETS Thousands/uL 101* 118*   NEUTROS PCT %  --  65   MONOS PCT %  --  3*   MONO PCT % 9  --       Results from last 7 days   Lab Units 05/23/21  0519 05/19/21  0554   POTASSIUM mmol/L 4 2 3 9   CHLORIDE mmol/L 95* 99*   CO2 mmol/L 27 23   BUN mg/dL 21 20   CREATININE mg/dL 0 27* 0 28*   CALCIUM mg/dL 9 1 8 6                            IMAGING & DIAGNOSTIC TESTING     Radiology Results: I have personally reviewed pertinent reports  Xr Chest Portable    Result Date: 4/24/2021  Impression: Dialysis catheter tip within the superior cavoatrial junction  No pneumothorax   Workstation performed: [id number]     Xr Chest Picc Line Portable    Result Date: 4/27/2021  Impression: PICC line tip in the superior vena cava, approximately 3 5 to 4 cm above the cavoatrial junction  The examination demonstrates a significant  finding and was documented as such in [facility] for liaison and referring practitioner notification  Workstation performed: [id number]     Ir Picc Reposition    Result Date: 4/27/2021  Impression: Status-post repositioning of a 5 [place] central venous catheter under fluoroscopic guidance with its tip at the cavoatrial junction  Workstation performed: [id number]     Ir Tunneled Central Line Removal    Result Date: 4/27/2021  Impression: Impression: Successful tunneled central line removal as described  Signed, performed and dictated by Adela Hugo PA-C under the supervision of Dr Emely Joiner  Workstation performed: [id number]     Other Diagnostic Testing: I have personally reviewed pertinent reports      ACTIVE MEDICATIONS     Current Facility-Administered Medications   Medication Dose Route Frequency    acetaminophen (TYLENOL) tablet 650 mg  650 mg Oral Q6H PRN    acyclovir (ZOVIRAX) capsule 400 mg  400 mg Oral Q12H [address]    albuterol (PROVENTIL HFA,VENTOLIN HFA) inhaler 2 puff  2 puff Inhalation Q6H PRN    allopurinol (ZYLOPRIM) tablet 100 mg  100 mg Oral Daily    aluminum-magnesium hydroxide-simethicone (MYLANTA) oral suspension 30 mL  30 mL Per PEG Tube Q4H PRN    amLODIPine (NORVASC) tablet 5 mg  5 mg Oral Daily    bisacodyl (DULCOLAX) rectal suppository 10 mg  10 mg Rectal Daily PRN    dicyclomine (BENTYL) capsule 10 mg  10 mg Oral 4x Daily (AC & HS)    fluconazole (DIFLUCAN) tablet 400 mg  400 mg Oral Daily    heparin (porcine) subcutaneous injection 5,000 Units  5,000 Units Subcutaneous Q8H [address]    HYDROmorphone (DILAUDID) injection 0 5 mg  0 5 mg Intravenous Q4H PRN    insulin glargine (LANTUS) subcutaneous injection 7 Units 0 07 mL  7 Units Subcutaneous HS    insulin lispro (HumaLOG) 100 units/mL subcutaneous injection 1-5 Units  1-5 Units Subcutaneous 4x Daily (AC & HS)    levofloxacin (LEVAQUIN) tablet 500 mg  500 mg Oral Q24H    lidocaine (LIDODERM) 5 % patch 1 patch  1 patch Topical Daily    menthol-methyl salicylate (BENGAY) 70-75 % cream   Apply externally BID    morphine injection 2 mg  2 mg Intravenous Q4H PRN    omeprazole (PRILOSEC) suspension 2 mg/mL  20 mg Oral Daily    ondansetron (ZOFRAN) injection 4 mg  4 mg Intravenous Q6H PRN    polyethylene glycol (MIRALAX) packet 17 g  17 g Oral Daily PRN    predniSONE tablet 5 mg  5 mg Oral Daily    senna-docusate sodium (SENOKOT S) 8 6-50 mg per tablet 1 tablet  1 tablet Oral BID PRN    sodium chloride 0 9 % infusion  20 mL/hr Intravenous Once PRN    sodium chloride 0 9 % infusion  20 mL/hr Intravenous Once PRN    sodium chloride 0 9 % infusion  20 mL/hr Intravenous Once PRN    tamsulosin (FLOMAX) capsule 0 4 mg  0 4 mg Oral Daily With Dinner       VTE Pharmacologic Prophylaxis: Heparin  VTE Mechanical Prophylaxis: sequential compression device    Portions of the record may have been created with voice recognition software  Occasional wrong word or "sound a like" substitutions may have occurred due to the inherent limitations of voice recognition software    Read the chart carefully and recognize, using context, where substitutions have occurred   ==  Donna Pack, [address] [facility]  Internal Medicine Residency PGY-2

## 2021-05-25 NOTE — OCCUPATIONAL THERAPY NOTE
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[address] )  Active Problems:    Altered mental status    Dysphagia    Urinary retention    Aphasia    Weakness    Presence of permanent central venous catheter    Fracture of ramus of left pubis (HCC)    Severe protein-calorie malnutrition (HCC)    Left-sided weakness    Pancytopenia (HCC)    Acute gout    Past Medical History  History reviewed  No pertinent past medical history  Past Surgical History  Past Surgical History:   Procedure Laterality Date    IR PICC REPOSITION  4/27/2021    IR TUNNELED CENTRAL LINE REMOVAL  4/27/2021 05/25/21 0902   OT Last Visit   OT Visit Date 05/25/21   Note Type   Note type Re-Evaluation   Restrictions/Precautions   Weight Bearing Precautions Per Order No   Other Precautions Cognitive; Chair Alarm; Bed Alarm;Multiple lines; Fall Risk;Pain;Visual impairment  (aphasia, feeding tube)   Pain Assessment   Pain Assessment Tool FLACC   Pain Location/Orientation Orientation: Left; Location: Leg;Location: Abdomen   Pain Rating: FLACC (Rest) - Face 1   Pain Rating: FLACC (Rest) - Legs 1   Pain Rating: FLACC (Rest) - Activity 1   Pain Rating: FLACC (Rest) - Cry 0   Pain Rating: FLACC (Rest) - Consolability 1   Score: FLACC (Rest) 4   Pain Rating: FLACC (Activity) - Face 1   Pain Rating: FLACC (Activity) - Legs 1   Pain Rating: FLACC (Activity) - Activity 1   Pain Rating: FLACC (Activity) - Cry 1   Pain Rating: FLACC (Activity) - Consolability 1   Score: FLACC (Activity) 5   Home Living   Additional Comments Per chart, pt was living with  at [facility] where he works    Prior Function   Level of [place] Independent with ADLs and functional mobility   Lives With Chris Help From Family;Friend(s)   ADL Assistance Independent   IADLs Independent   Lifestyle   Autonomy Per chart, pt was I with ADLs, IADLs PTA    Reciprocal Relationships    Service to Others Unable to obtain   Intrinsic Gratification [place] chocolate    Psychosocial   Psychosocial (WDL) WDL   Subjective   Subjective "Hungry"   ADL   Where Assessed Edge of bed   Eating Assistance 4  Minimal Assistance   Eating Deficit Increased time to complete;Bringing food to mouth assist;Scoop assist   Grooming Assistance 4  Minimal Assistance   UB Bathing Assistance 3  Moderate Assistance   LB Bathing Assistance 2  Maximal Assistance   UB Dressing Assistance 3  Moderate Assistance   LB Dressing Assistance 2  Maximal [address]  2  Maximal Assistance   Bed Mobility   Rolling L 4  Minimal assistance   Additional items Assist x 1;Bedrails; Increased time required   Supine to Sit 3  Moderate assistance   Additional items Assist x 2; Increased time required   Sit to Supine 3  Moderate assistance   Additional items Assist x 1; Increased time required   Transfers   Sit to Stand 2  Maximal assistance   Additional items Assist x 2; Increased time required   Stand to Sit 2  Maximal assistance   Additional items Assist x 2; Increased time required   Stand pivot 2  Maximal assistance   Additional items Assist x 2; Increased time required   Additional Comments Transfers with RW    Balance   Static Sitting Fair   Dynamic Sitting Poor +   Static Standing Poor -   Dynamic Standing Poor -   Ambulatory Poor -   Activity Tolerance   Activity Tolerance Patient limited by fatigue;Patient limited by pain   Medical Staff Made Aware PT Helene    Nurse Made Aware RN clearance for session    RUE Assessment   RUE Assessment   (3+/5)   LUE Assessment   LUE Assessment   (limited, sh ROM ~ 75 *)   Hand Function   Gross Motor Coordination Impaired   Fine Motor Coordination Impaired   Vision - Complex Assessment   Additional Comments Unable to formally assess, though pt is able to visually attend to tasks    Cognition   Overall Cognitive Status Impaired Arousal/Participation Alert; Responsive; Cooperative   Attention Attends with cues to redirect   Orientation Level Unable to assess   Memory Unable to assess   Following Commands Follows one step commands with increased time or repetition   Comments Pt with increased engagement in session and with therapists  Does require increased processing time  Utilized blue phone  with pt  Pt at times repeats word/words that therapists say (i e  are you hungry? "hungry")   Assessment   Limitation Decreased ADL status; Decreased UE ROM; Decreased UE strength;Decreased Safe judgement during ADL;Decreased cognition;Decreased endurance;Decreased self-care trans;Decreased high-level ADLs; Decreased fine motor control   Prognosis Fair   Assessment Pt seen for OT re-evaluation 2*  goals  PTA pt was I with ADLs/IADLs  Lived with spouse at [facility] where he works  Upon evaluation, pt currently requires MOD A x 2 sup to sit, MOD A x 1, MAX A x 2 STS/SPT bed to chair  Exhibits decreased strength to B/L UE's limiting performance in ADLs/transfers  LUE weaker than R  Limited AROM, can achieve full ROM with PROM  Pt does have increased tone in thenar eminence/thumb of L hand  Pt currently  requires MOD A UB ADLs, MAX A LB ADLs, and MAX A toileting  Pt is limited at this time 2*: pain, endurance, activity tolerance, functional mobility, forward functional reach, balance, trunk control, functional standing tolerance, decreased I w/ ADLS/IADLS, strength, ROM, visual deficits, cognitive impairments, decreased safety awareness, impaired fine motor skills and coordination deficits  The following Occupational Performance Areas to address include: eating, grooming, bathing/shower, toilet hygiene, dressing, functional mobility, community mobility and clothing management  Pt to benefit from immediate acute skilled OT to address above deficits, improve overall functional independence, maximize fnxl mobility and reduce caregiver burden  From OT standpoint, recommendation at time of d/c would be STR  Pt was left in chair with alarm on after session with all current needs met  The patient's raw score on the AM-PAC Daily Activity inpatient short form is 14, standardized score is 33 39, less than 39 4  Patients at this level are likely to benefit from discharge to post-acute rehabilitation services  Please refer to the recommendation of the Occupational Therapist for safe discharge planning  Goals   Patient Goals Pt reports wanting "chocolate" "[place]"   LTG Time Frame 10-14   Plan   Treatment Interventions ADL retraining;Visual perceptual retraining;Functional transfer training;UE strengthening/ROM; Endurance training;Cognitive reorientation;Patient/family training;Equipment evaluation/education; Compensatory technique education;Continued evaluation; Energy conservation; Activityengagement; Neuromuscular reeducation; Fine motor coordination activities   Goal Expiration Date 06/08/21   OT Treatment Day 5   OT Frequency 3-5x/wk   Additional Treatment Session   Start Time 0840   End Time 0902   Treatment Assessment Pt seen for additional OT tx session  Pt in chair with support from PT to maintain upright sitting position w/out using back of chair  Pt initially requires [place] A to bring RUE to plate  Pt uses RUE to scoop with fork and spoon, requires A to scoop, pt has decreased [address] and dexterity to maintain food on utensil, as well as to bring to mouth  [place] utilized with LUE to hold plate, assist in scooping  Pt c/o this w/out [place] after x3 trials with assistance  Assisted to bring both R and L UEs to bring cup to mouth  Pt has decreased bilateral integration w/out assistance  Performed stretching on pt L thumb (flex/ext, rotation, abd)  Towel rolls placed to encourage abduction  Patient continues to be functioning below baseline level, occupational performance remains limited secondary to factors listed above and increased risk for falls and injury  Recommendation   OT Discharge Recommendation Post acute rehabilitation services   OT - OK to Discharge Yes  (to rehab when medically stable )   AM-PAC Daily Activity Inpatient   Lower Body Dressing 2   Bathing 2   Toileting 2   Upper Body Dressing 2   Grooming 3   Eating 3   Daily Activity Raw Score 14   Daily Activity Standardized Score (Calc for Raw Score >=11) 33 39   AM-PAC Applied Cognition Inpatient   Following a Speech/Presentation 2   Understanding Ordinary Conversation 3   Taking Medications 2   Remembering Where Things Are Placed or Put Away 2   Remembering List of 4-5 Errands 1   Taking Care of Complicated Tasks 1   Applied Cognition Raw Score 11   Applied Cognition Standardized Score 27 03      GOALS    1) Pt will increase activity tolerance to G for 30 min txment sessions    2) Pt will complete UB/LB dressing/self care w/ S using adaptive device and DME as needed    3) Pt will complete bathing w/ S w/ use of AE and DME as needed    4) Pt will complete toileting w/ S w/ G hygiene/thoroughness using DME as needed    5) Pt will improve functional transfers to S on/off all surfaces using DME as needed w/ G balance/safety     6) Pt will improve functional mobility during ADL/IADL/leisure tasks to S using DME as needed w/ G balance/safety     7) Pt will demonstrate G carryover of pt/caregiver education and training as appropriate      8) Pt will demonstrate 100% carryover of energy conservation techniques t/o functional I/ADL/leisure tasks w/o cues s/p skilled education    9) Pt will engage in ongoing cognitive assessment w/ G participation to assist w/ safe d/c planning/recommendations    10) Pt will increase static and dynamic standing/sitting balance to F+ using AD/DME as needed to increase safety and I during fnxl transfers and ADLs    11) Pt will maintain upright sitting position for at least 20 min during dynamic fnxl activity with G balance and endurance to improve ADL performance and independence, as well as reduce risk of falls    12) Pt will improve UE strength to 4-/5 to increase ADL performance and independence       Mari Nunes MS, OTR/L

## 2021-05-25 NOTE — PLAN OF CARE
Problem: PHYSICAL THERAPY ADULT  Goal: Performs mobility at highest level of function for planned discharge setting  See evaluation for individualized goals  Description: Treatment/Interventions: ADL retraining, Functional transfer training, LE strengthening/ROM, Endurance training, Patient/family training, Bed mobility, Spoke to nursing, OT  Equipment Recommended: (TBD)       See flowsheet documentation for full assessment, interventions and recommendations  Outcome: Progressing  Note: Prognosis: Fair  Problem List: Decreased strength, Decreased range of motion, Decreased endurance, Impaired balance, Decreased mobility, Decreased cognition, Impaired vision, Pain  Assessment: Pt seen for PT re-evaluation this date 2* expiring goals  Prior to admission, pt was I with ADLs/IADLs  Per CM note pt lives with  in [facility]  Upon re-evaluation pt performing L rolling bed mobility tasks with min Ax1, supine to sit bed mobility task with mod Ax2 and sit to supine bed mobility task with mod Ax1  Pt sat EOB ~8 min with CGA/close S level with b/l UE support  Pt performed STS from EOB using b/l HHA with max Ax2- unable to take steps however able to tolerate SPT to bedside chair with max Ax2  Pt is limited at this time 2*: decreased LE strength (L>R), decreased ROM, pain, decreased endurance, decreased sitting/stadning balance/ tolerance, decreased cognition, decreased safety awareness, and overall functional mobility  Pt was left sitting upright in bedside chair with chair alarm on at the end of PT session with all needs in reach  Pt would benefit from continued PT services while in hospital to address remaining limitations  PT to continue to follow pt and recommends post-acute rehab services after d/c  The patient's AM-PAC Basic Mobility Inpatient Short Form Low Function Raw Score 15 , Standardized Score is 23 9   A standardized score less 42 9 suggests the patient may benefit from discharge to post-acute rehab services  Please also refer to the recommendation of the Physical Therapist for safe discharge planning  Barriers to Discharge: Inaccessible home environment, Decreased caregiver support        PT Discharge Recommendation: Post acute rehabilitation services     PT - OK to Discharge: Yes(to rehab once medically cleared )    See flowsheet documentation for full assessment

## 2021-05-25 NOTE — NUTRITION
05/25/21 8867   Recommendations/Interventions   Summary discussed patient with RN; patient may not require addition of banatrol or prosource long term as there are no wounds; BM's more fomed today;  PO intake has been variable but is providing additional kcal/ protein   Interventions Diet: continued as ordered   Nutrition Recommendations Other (Specify); Continue diet as ordered  (monitor BM's and consider d/c banatrol in next several days; suggest decrease tube feeding prosource to 1 pkt per day for additional 11g protein)

## 2021-05-25 NOTE — PLAN OF CARE
Problem: OCCUPATIONAL THERAPY ADULT  Goal: Performs self-care activities at highest level of function for planned discharge setting  See evaluation for individualized goals  Description: Treatment Interventions: ADL retraining, Functional transfer training, UE strengthening/ROM, Endurance training, Cognitive reorientation, Patient/family training, Equipment evaluation/education, Neuromuscular reeducation, Fine motor coordination activities, Compensatory technique education, Continued evaluation, Energy conservation, Activityengagement          See flowsheet documentation for full assessment, interventions and recommendations  Note: Limitation: Decreased ADL status, Decreased UE ROM, Decreased UE strength, Decreased Safe judgement during ADL, Decreased cognition, Decreased endurance, Decreased self-care trans, Decreased high-level ADLs, Decreased fine motor control  Prognosis: Fair  Assessment: Pt seen for OT re-evaluation 2*  goals  PTA pt was I with ADLs/IADLs  Lived with spouse at [facility] where he works  Upon evaluation, pt currently requires MOD A x 2 sup to sit, MOD A x 1, MAX A x 2 STS/SPT bed to chair  Exhibits decreased strength to B/L UE's limiting performance in ADLs/transfers  LUE weaker than R  Limited AROM, can achieve full ROM with PROM  Pt does have increased tone in thenar eminence/thumb of L hand  Pt currently  requires MOD A UB ADLs, MAX A LB ADLs, and MAX A toileting  Pt is limited at this time 2*: pain, endurance, activity tolerance, functional mobility, forward functional reach, balance, trunk control, functional standing tolerance, decreased I w/ ADLS/IADLS, strength, ROM, visual deficits, cognitive impairments, decreased safety awareness, impaired fine motor skills and coordination deficits  The following Occupational Performance Areas to address include: eating, grooming, bathing/shower, toilet hygiene, dressing, functional mobility, community mobility and clothing management  Pt to benefit from immediate acute skilled OT to address above deficits, improve overall functional independence, maximize fnxl mobility and reduce caregiver burden  From OT standpoint, recommendation at time of d/c would be STR  Pt was left in chair with alarm on after session with all current needs met  The patient's raw score on the AM-PAC Daily Activity inpatient short form is 14, standardized score is 33 39, less than 39 4  Patients at this level are likely to benefit from discharge to post-acute rehabilitation services  Please refer to the recommendation of the Occupational Therapist for safe discharge planning       OT Discharge Recommendation: Post acute rehabilitation services  OT - OK to Discharge: Yes(to rehab when medically stable )

## 2021-05-26 LAB
GLUCOSE SERPL-MCNC: 126 MG/DL (ref 65–140)
GLUCOSE SERPL-MCNC: 137 MG/DL (ref 65–140)
GLUCOSE SERPL-MCNC: 152 MG/DL (ref 65–140)
GLUCOSE SERPL-MCNC: 159 MG/DL (ref 65–140)

## 2021-05-26 PROCEDURE — 82948 REAGENT STRIP/BLOOD GLUCOSE: CPT

## 2021-05-26 PROCEDURE — 99232 SBSQ HOSP IP/OBS MODERATE 35: CPT | Performed by: INTERNAL MEDICINE

## 2021-05-26 RX ORDER — PREDNISONE 2.5 MG
2.5 TABLET ORAL DAILY
Status: DISCONTINUED | OUTPATIENT
Start: 2021-05-26 | End: 2021-05-31

## 2021-05-26 RX ADMIN — PREDNISONE 2.5 MG: 2.5 TABLET ORAL at 08:40

## 2021-05-26 RX ADMIN — TAMSULOSIN HYDROCHLORIDE 0.4 MG: 0.4 CAPSULE ORAL at 17:09

## 2021-05-26 RX ADMIN — INSULIN LISPRO 1 UNITS: 100 INJECTION, SOLUTION INTRAVENOUS; SUBCUTANEOUS at 11:41

## 2021-05-26 RX ADMIN — ALLOPURINOL 100 MG: 100 TABLET ORAL at 08:40

## 2021-05-26 RX ADMIN — ACYCLOVIR 400 MG: 200 CAPSULE ORAL at 08:40

## 2021-05-26 RX ADMIN — DICYCLOMINE HYDROCHLORIDE 10 MG: 10 CAPSULE ORAL at 17:09

## 2021-05-26 RX ADMIN — HEPARIN SODIUM 5000 UNITS: 5000 INJECTION INTRAVENOUS; SUBCUTANEOUS at 21:53

## 2021-05-26 RX ADMIN — MENTHOL, UNSPECIFIED FORM AND METHYL SALICYLATE: 10; 150 CREAM TOPICAL at 08:41

## 2021-05-26 RX ADMIN — ACYCLOVIR 400 MG: 200 CAPSULE ORAL at 21:54

## 2021-05-26 RX ADMIN — INSULIN LISPRO 1 UNITS: 100 INJECTION, SOLUTION INTRAVENOUS; SUBCUTANEOUS at 21:54

## 2021-05-26 RX ADMIN — HEPARIN SODIUM 5000 UNITS: 5000 INJECTION INTRAVENOUS; SUBCUTANEOUS at 06:35

## 2021-05-26 RX ADMIN — Medication 20 MG: at 08:40

## 2021-05-26 RX ADMIN — INSULIN GLARGINE 7 UNITS: 100 INJECTION, SOLUTION SUBCUTANEOUS at 21:54

## 2021-05-26 RX ADMIN — DICYCLOMINE HYDROCHLORIDE 10 MG: 10 CAPSULE ORAL at 11:41

## 2021-05-26 RX ADMIN — MORPHINE SULFATE 2 MG: 2 INJECTION, SOLUTION INTRAMUSCULAR; INTRAVENOUS at 21:56

## 2021-05-26 RX ADMIN — LEVOFLOXACIN 500 MG: 500 TABLET, FILM COATED ORAL at 17:08

## 2021-05-26 RX ADMIN — MENTHOL, UNSPECIFIED FORM AND METHYL SALICYLATE: 10; 150 CREAM TOPICAL at 17:08

## 2021-05-26 RX ADMIN — FLUCONAZOLE 400 MG: 200 TABLET ORAL at 08:40

## 2021-05-26 RX ADMIN — DICYCLOMINE HYDROCHLORIDE 10 MG: 10 CAPSULE ORAL at 21:54

## 2021-05-26 RX ADMIN — LIDOCAINE 5% 1 PATCH: 700 PATCH TOPICAL at 08:40

## 2021-05-26 RX ADMIN — ACETAMINOPHEN 650 MG: 325 TABLET, FILM COATED ORAL at 08:40

## 2021-05-26 RX ADMIN — DICYCLOMINE HYDROCHLORIDE 10 MG: 10 CAPSULE ORAL at 06:35

## 2021-05-26 NOTE — PROGRESS NOTES
INTERNAL MEDICINE RESIDENCY PROGRESS NOTE     Name: Vijay Mccullough   Age & Sex: 49 y o  female   MRN: [id number]  Unit/Bed#: [facility] 920-01   Encounter: [id number]  Team: SOD Team B     PATIENT INFORMATION     Name: Vijay Mccullough   Age & Sex: 49 y o  female   MRN: [id number] Stay Days: 35    ASSESSMENT/PLAN     Principal Problem:    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[address] )  Active Problems:    Acute gout    Altered mental status    Dysphagia    Urinary retention    Aphasia    Weakness    Presence of permanent central venous catheter    Fracture of ramus of left pubis (HCC)    Severe protein-calorie malnutrition (HCC)    Left-sided weakness    Pancytopenia ([address] )      * 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[facility] [address] )  Assessment & Plan  Patient was diagnosed with primary CNS lymphoma through biopsy in [facility] transferred over here for chemotherapy since the [facility] is out of network  Had a 2nd family meeting on 04/30 and family has decided to proceed treatment at this time  S/p PICC line for chemotherapy  S/p peg tube placement on 05/10  Plan:  Hematology-Oncology consulted  Appreciate recommendations  Chemotherapy treatment as per hematology oncology team     S/p 2nd cycle of chemotherapy on 05/13  Leucovin on 5/14  DVT prophylaxis: On heparin  Case management-will start looking for places for rehab  S/p steroid taper for cerebral edema from CNS lymphoma  Pancytopenia in setting of recent chemotherapy  Will continue to watch for any signs of infection  On acyclovir, Diflucan and Levaquin    After discussion with Oncology, plan for rehab, patient does not require radiation therapy, rehab for strength, no chemotherapy until at least 3 weeks, evaluate at that time  Patient's family came in yesterday afternoon, updated on plan going forward, no questions after family visit    Acute gout  Assessment & Plan  Appears to have acute gout flare right knee, negative pain now  Currently on prednisone 5 mg q day, based on recent chemotherapy will hold off on NSAIDs or colchicine  Will start low-dose allopurinol 100 mg q day to hopefully prevent other acute flares in the setting of chemotherapy    Pancytopenia (HCC)  Assessment & Plan  Likely in setting of chemotherapy  Will continue to monitor for any signs of infection  Will transfuse with packed RBC for hemoglobin less than 7  Drop in Hgb from 9 to 7 on 5/14, other lines relatively stable  VSS and soft brown BMs  Will discuss with oncology if this is expected based on chemo or if there should be further workup for bleeding, though hemodynamics currently do not suggest bleed  S/p 1 pack RBC on 05/15, hemoglobin continues to improve    Left-sided weakness  Assessment & Plan  Patient has left-sided weakness at baseline from her CNS lymphoma  Left upper extremity 3-4/5 and left lower extremity 0-1/5 muscle strength at baseline  Severe protein-calorie malnutrition ([facility] [address] )  Assessment & Plan  Malnutrition Findings:   Adult Malnutrition type: Acute illness(due to medical condition resulting in dysphagia, decreased appetite and intake, weight loss)  Adult Degree of Malnutrition: Other severe protein calorie malnutrition    BMI Findings: Body mass index is 20 24 kg/m²  Plan:  Nutrition consult  Tube feeds initiated with Jevity 1 2, advancing to goal      Fracture of ramus of left pubis [facility])  Assessment & Plan  [place] Tet[place] prior to presentation to [facility]  Managed nonoperatively at [facility]    Presence of permanent central venous catheter  Assessment & Plan  Noted presence of right IJ tunneled double-lumen HD catheter; upon chart review, this was likely inserted to be used for plasmapheresis which she has completed  Plan:  S/p removal by IR  Patient now has PICC line  Weakness  Assessment & Plan  Left greater than right upper and lower extremity weakness with ongoing left lower extremity contracture  Secondary to underlying CNS malignancy     *Decubitus ulcer precautions such as frequent repositioning  Aphasia  Assessment & Plan  Definite evidence of expressive aphasia, possible component of receptive aphasia as well  Speech therapy on board  Urinary retention  Assessment & Plan  Patient developed urinary retention during previous hospitalization  Plan was voiding trial as an outpatient as she was scheduled for discharge from that facility  Machado catheter was initially removed although patient required re-placement of machado on 05/14 in setting of incontinence and getting chemotherapy  Machado discontinued, monitor for signs of fluid retention      Dysphagia  Assessment & Plan  Patient noted to have dysphagia and also decreased p o  Intake  As per [facility] progress note patient had a calorie count and recommended feeding tube placement  Speech evaluation - advance diet with dysphagia 2 and thin liquids; needs assistance with feeds  Calorie count  If the patient continued to have poor p o  Intake may need discussion with the family regarding alternate methods of nutrition  Patient's calorie intake is low  VBS- showed oropharyngeal dysphagia  s/p peg tube placement  Altered mental status  Assessment & Plan  Patient initially presented to [facility] his [place] with stroke-like symptoms for 2 days  CT scan of the head was concerning for subacute CVA and was transferred to [facility]   MRI of the brain was more consistent with demyelinating disease patient was started on IV steroids and 1000 mg daily for 5 days  Patient did not have any significant improvement at that time patient had plasmapheresis  And also had  lumbar puncture-negative for infection or malignancy    Patient noted to have persistent encephalopathy so a repeat MRI was obtained which showed worsening of the abnormality seen on the previous MRI and 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  Likely secondary to CNS lymphoma  Delirium precautions   Currently alert and oriented times 2-3  Follow commands  Patient's  (does not speak [place]) makes all medical decision for her  Sepsis (HCC)-resolved as of 2021  Assessment & Plan  Patient had hypothermia, elevated WBC count and tachycardia  Concern for possible aspiration in setting on dysphagia versus UTI in setting of urinary catheter which was placed for retention  Plan:  S/p IV antibiotics last day on   Will continue to monitor off antibiotics  Steroid-induced hyperglycemia-resolved as of 2021  Assessment & Plan  Mild hyperglycemia, glucoses in 200s, no history of DM  Occurring in the setting of Decadron with chemotherapy, as well as sodium bicarb in D5 drip  - D5 drip is finished  - will monitor sugars while on Jevity and now off D5  - continue q4 hour fingerstick glucose with correctional algorithm 1 and Lantus      Disposition:  Continue follow-up with case management on placement     SUBJECTIVE     Patient seen and examined  No acute events overnight  OBJECTIVE     Vitals:    21 1515 21 2155 21 0500 21 0709   BP: 113/72 101/64  99/61   Pulse: 96 101     Resp: 18 17  16   Temp: 99 4 °F (37 4 °C) 99 °F (37 2 °C)  99 °F (37 2 °C)   TempSrc:       SpO2: 100% 100%     Weight:   48 2 kg (106 lb 4 2 oz)    Height:          Temperature:   Temp (24hrs), Av °F (37 2 °C), Min:98 7 °F (37 1 °C), Max:99 4 °F (37 4 °C)    Temperature: 99 °F (37 2 °C)  Intake & Output:  I/O        07 -  0700  07 -  0700  07 -  0700    P  O  360 280     NG/ 1576     Feedings 2791 2724     Total Intake(mL/kg) 3501 (79 6) 4580 (95)     Urine (mL/kg/hr) 350 (0 3) 0 (0)     Stool 0 0     Total Output 350 0     Net +3151 +4580            Unmeasured Urine Occurrence  4 x     Unmeasured Stool Occurrence 1 x 2 x         Weights:   IBW (Ideal Body Weight): 45 5 kg    Body mass index is 20 75 kg/m²    Weight (last 2 days) Date/Time   Weight    05/26/21 0500   48 2 (106 26)    05/25/21 0546   44 (97)    05/24/21 0600   47 (103 62)            Physical Exam  Vitals signs and nursing note reviewed  Constitutional:       General: She is not in acute distress  Appearance: She is well-developed  She is ill-appearing  HENT:      Head: Normocephalic and atraumatic  Eyes:      Conjunctiva/sclera: Conjunctivae normal    Neck:      Musculoskeletal: Neck supple  Cardiovascular:      Rate and Rhythm: Normal rate and regular rhythm  Heart sounds: No murmur  Pulmonary:      Effort: Pulmonary effort is normal  No respiratory distress  Breath sounds: Normal breath sounds  Abdominal:      Palpations: Abdomen is soft  Tenderness: There is no abdominal tenderness  Skin:     General: Skin is warm and dry  Capillary Refill: Capillary refill takes less than 2 seconds  Neurological:      Mental Status: She is alert  Mental status is at baseline  Psychiatric:         Mood and Affect: Mood normal          Behavior: Behavior normal        LABORATORY DATA     Labs: I have personally reviewed pertinent reports  Results from last 7 days   Lab Units 05/25/21  0505 05/23/21  0519   WBC Thousand/uL 6 76 5 82   HEMOGLOBIN g/dL 8 8* 8 0*   HEMATOCRIT % 28 4* 25 3*   PLATELETS Thousands/uL 117* 101*   MONO PCT %  --  9      Results from last 7 days   Lab Units 05/25/21  0505 05/23/21  0519   POTASSIUM mmol/L 4 4 4 2   CHLORIDE mmol/L 97* 95*   CO2 mmol/L 29 27   BUN mg/dL 19 21   CREATININE mg/dL 0 25* 0 27*   CALCIUM mg/dL 9 7 9 1                            IMAGING & DIAGNOSTIC TESTING     Radiology Results: I have personally reviewed pertinent reports  Xr Chest Portable    Result Date: 4/24/2021  Impression: Dialysis catheter tip within the superior cavoatrial junction  No pneumothorax   Workstation performed: [id number]     Xr Chest Picc Line Portable    Result Date: 4/27/2021  Impression: PICC line tip in the superior vena cava, approximately 3 5 to 4 cm above the cavoatrial junction  The examination demonstrates a significant  finding and was documented as such in [facility] for liaison and referring practitioner notification  Workstation performed: [id number]     Ir Picc Reposition    Result Date: 4/27/2021  Impression: Status-post repositioning of a 5 [place] central venous catheter under fluoroscopic guidance with its tip at the cavoatrial junction  Workstation performed: [id number]     Ir Tunneled Central Line Removal    Result Date: 4/27/2021  Impression: Impression: Successful tunneled central line removal as described  Signed, performed and dictated by Edd Carranza PA-C under the supervision of Dr Mary Jo Linares  Workstation performed: [id number]     Other Diagnostic Testing: I have personally reviewed pertinent reports      ACTIVE MEDICATIONS     Current Facility-Administered Medications   Medication Dose Route Frequency    acetaminophen (TYLENOL) tablet 650 mg  650 mg Oral Q6H PRN    acyclovir (ZOVIRAX) capsule 400 mg  400 mg Oral Q12H [address]    albuterol (PROVENTIL HFA,VENTOLIN HFA) inhaler 2 puff  2 puff Inhalation Q6H PRN    allopurinol (ZYLOPRIM) tablet 100 mg  100 mg Oral Daily    aluminum-magnesium hydroxide-simethicone (MYLANTA) oral suspension 30 mL  30 mL Per PEG Tube Q4H PRN    amLODIPine (NORVASC) tablet 5 mg  5 mg Oral Daily    bisacodyl (DULCOLAX) rectal suppository 10 mg  10 mg Rectal Daily PRN    dicyclomine (BENTYL) capsule 10 mg  10 mg Oral 4x Daily (AC & HS)    fluconazole (DIFLUCAN) tablet 400 mg  400 mg Oral Daily    heparin (porcine) subcutaneous injection 5,000 Units  5,000 Units Subcutaneous Q8H [address]    HYDROmorphone (DILAUDID) injection 0 5 mg  0 5 mg Intravenous Q4H PRN    insulin glargine (LANTUS) subcutaneous injection 7 Units 0 07 mL  7 Units Subcutaneous HS    insulin lispro (HumaLOG) 100 units/mL subcutaneous injection 1-5 Units  1-5 Units Subcutaneous 4x Daily (AC & HS)    levofloxacin (LEVAQUIN) tablet 500 mg  500 mg Oral Q24H    lidocaine (LIDODERM) 5 % patch 1 patch  1 patch Topical Daily    menthol-methyl salicylate (BENGAY) 02-45 % cream   Apply externally BID    morphine injection 2 mg  2 mg Intravenous Q4H PRN    omeprazole (PRILOSEC) suspension 2 mg/mL  20 mg Oral Daily    ondansetron (ZOFRAN) injection 4 mg  4 mg Intravenous Q6H PRN    polyethylene glycol (MIRALAX) packet 17 g  17 g Oral Daily PRN    predniSONE tablet 2 5 mg  2 5 mg Oral Daily    senna-docusate sodium (SENOKOT S) 8 6-50 mg per tablet 1 tablet  1 tablet Oral BID PRN    sodium chloride 0 9 % infusion  20 mL/hr Intravenous Once PRN    sodium chloride 0 9 % infusion  20 mL/hr Intravenous Once PRN    sodium chloride 0 9 % infusion  20 mL/hr Intravenous Once PRN    tamsulosin (FLOMAX) capsule 0 4 mg  0 4 mg Oral Daily With Dinner       VTE Pharmacologic Prophylaxis: Heparin  VTE Mechanical Prophylaxis: sequential compression device    Portions of the record may have been created with voice recognition software  Occasional wrong word or "sound a like" substitutions may have occurred due to the inherent limitations of voice recognition software    Read the chart carefully and recognize, using context, where substitutions have occurred   ==  Cornel Jarvis, [facility]  Internal Medicine Residency PGY-2

## 2021-05-26 NOTE — UTILIZATION REVIEW
Continued Stay Review    Date: 5/26                          Current Patient Class:  IP   Current Level of Care:  Ms     HPI:56 y o  female initially admitted on    4/23  Patient was diagnosed with primary CNS lymphoma through biopsy in [facility] transferred over here for chemotherapy since the [facility] is out of network  Had a 2nd family meeting on 04/30 and family has decided to proceed treatment at this time  S/p PICC line for chemotherapy  S/p peg tube placement on 05/10  S/p 2nd cycle of chemotherapy on 05/13  Leucovorin on 5/14  Continues on steroid taper (from 5 to 2 5 mg today) for cerebral edema from CNS Lymphoma  Glucose fairly stable in light of steroids  Will plan for another machado removal on daily review  remains A&O X 3     Has used MS IV X2/day  for past 2 days -  Took tylenol po this am     Vital Signs:   05/26/21 07:09:47  99 °F (37 2 °C)  98  16  99/61  74   rm air    05/25/21 21:55:39  99 °F (37 2 °C)  101  17  101/64  76  100 %   05/25/21 15:15:54  99 4 °F (37 4 °C)  96  18  113/72  86  100 %         Pertinent Labs/Diagnostic Results:   5/24  US RUQ abd -  Normal , no ascites       Results from last 7 days   Lab Units 05/25/21  0505 05/23/21  0519   WBC Thousand/uL 6 76 5 82   HEMOGLOBIN g/dL 8 8* 8 0*   HEMATOCRIT % 28 4* 25 3*   PLATELETS Thousands/uL 117* 101*   BANDS PCT %  --  8         Results from last 7 days   Lab Units 05/25/21  0505 05/23/21  0519   SODIUM mmol/L 132* 130*   POTASSIUM mmol/L 4 4 4 2   CHLORIDE mmol/L 97* 95*   CO2 mmol/L 29 27   ANION GAP mmol/L 6 8   BUN mg/dL 19 21   CREATININE mg/dL 0 25* 0 27*   EGFR ml/min/1 73sq m 138 135   CALCIUM mg/dL 9 7 9 1         Results from last 7 days   Lab Units 05/26/21  1111 05/26/21  0820 05/25/21  2111 05/25/21  1711 05/25/21  1116 05/25/21  0735 05/24/21  2053 05/24/21  1636 05/24/21  1628 05/24/21  1044 05/24/21  0727 05/23/21 2033   POC GLUCOSE mg/dl 159* 126 152* 161* 147* 121 139 122 156* 334* 115 168*     Results from last 7 days   Lab Units 05/25/21  0505 05/23/21  0519   GLUCOSE RANDOM mg/dL 102 119     Medications:   Scheduled Medications:  acyclovir, 400 mg, Oral, Q12H MEÑO  allopurinol, 100 mg, Oral, Daily  amLODIPine, 5 mg, Oral, Daily  dicyclomine, 10 mg, Oral, 4x Daily (AC & HS)  fluconazole, 400 mg, Oral, Daily  heparin (porcine), 5,000 Units, Subcutaneous, Q8H MEÑO  insulin glargine, 7 Units, Subcutaneous, HS  insulin lispro, 1-5 Units, Subcutaneous, 4x Daily (AC & HS)  levofloxacin, 500 mg, Oral, Q24H  lidocaine, 1 patch, Topical, Daily  menthol-methyl salicylate, , Apply externally, BID  omeprazole (PRILOSEC) suspension 2 mg/mL, 20 mg, Oral, Daily  predniSONE, 2 5 mg, Oral, Daily  tamsulosin, 0 4 mg, Oral, Daily With Dinner      Continuous IV Infusions:     PRN Meds:  acetaminophen, 650 mg, Oral, Q6H PRN  albuterol, 2 puff, Inhalation, Q6H PRN  aluminum-magnesium hydroxide-simethicone, 30 mL, Per PEG Tube, Q4H PRN  bisacodyl, 10 mg, Rectal, Daily PRN  HYDROmorphone, 0 5 mg, Intravenous, Q4H PRN  morphine injection, 2 mg, Intravenous, Q4H PRN  ondansetron, 4 mg, Intravenous, Q6H PRN  polyethylene glycol, 17 g, Oral, Daily PRN  senna-docusate sodium, 1 tablet, Oral, BID PRN  sodium chloride, 20 mL/hr, Intravenous, Once PRN  sodium chloride, 20 mL/hr, Intravenous, Once PRN  sodium chloride, 20 mL/hr, Intravenous, Once PRN        Discharge Plan: d    Network Utilization Review Department  ATTENTION: Please call with any questions or concerns to [phone] and carefully listen to the prompts so that you are directed to the right person  All voicemails are confidential   Sudie Crigler all requests for admission clinical reviews, approved or denied determinations and any other requests to dedicated fax number below belonging to the campus where the patient is receiving treatment   List of dedicated fax numbers for the Facilities:  FACILITY NAME UR FAX NUMBER   ADMISSION DENIALS (Administrative/Medical [facility] [phone]   [address] (Maternity/NICU/Pediatrics) 261 [facility] [address] [place] [address]  [phone]   Juanito Oh [address] [id number] [place] [address] [place] [address] [phone]   4[address] [facility] [phone]

## 2021-05-26 NOTE — PLAN OF CARE
Problem: Prexisting or High Potential for Compromised Skin Integrity  Goal: Skin integrity is maintained or improved  Description: INTERVENTIONS:  - Identify patients at risk for skin breakdown  - Assess and monitor skin integrity  - Assess and monitor nutrition and hydration status  - Monitor labs   - Assess for incontinence   - Turn and reposition patient  - Assist with mobility/ambulation  - Relieve pressure over bony prominences  - Avoid friction and shearing  - Provide appropriate hygiene as needed including keeping skin clean and dry  - Evaluate need for skin moisturizer/barrier cream  - Collaborate with interdisciplinary team   - Patient/family teaching  - Consider wound care consult   Outcome: Progressing     Problem: Potential for Falls  Goal: Patient will remain free of falls  Description: INTERVENTIONS:  - Assess patient frequently for physical needs  -  Identify cognitive and physical deficits and behaviors that affect risk of falls  -  [place] fall precautions as indicated by assessment   - Educate patient/family on patient safety including physical limitations  - Instruct patient to call for assistance with activity based on assessment  - Modify environment to reduce risk of injury  - Consider OT/PT consult to assist with strengthening/mobility  Outcome: Progressing     Problem: Nutrition/Hydration-ADULT  Goal: Nutrient/Hydration intake appropriate for improving, restoring or maintaining nutritional needs  Description: Monitor and assess patient's nutrition/hydration status for malnutrition  Collaborate with interdisciplinary team and initiate plan and interventions as ordered  Monitor patient's weight and dietary intake as ordered or per policy  Utilize nutrition screening tool and intervene as necessary  Determine patient's food preferences and provide high-protein, high-caloric foods as appropriate       INTERVENTIONS:  - Monitor oral intake, urinary output, labs, and treatment plans  - Assess nutrition and hydration status and recommend course of action  - Evaluate amount of meals eaten  - Assist patient with eating if necessary   - Allow adequate time for meals  - Recommend/ encourage appropriate diets, oral nutritional supplements, and vitamin/mineral supplements  - Order, calculate, and assess calorie counts as needed  - Recommend, monitor, and adjust tube feedings and TPN/PPN based on assessed needs  - Assess need for intravenous fluids  - Provide specific nutrition/hydration education as appropriate  - Include patient/family/caregiver in decisions related to nutrition  Outcome: Progressing     Problem: PAIN - ADULT  Goal: Verbalizes/displays adequate comfort level or baseline comfort level  Description: Interventions:  - Encourage patient to monitor pain and request assistance  - Assess pain using appropriate pain scale  - Administer analgesics based on type and severity of pain and evaluate response  - Implement non-pharmacological measures as appropriate and evaluate response  - Consider cultural and social influences on pain and pain management  - Notify physician/advanced practitioner if interventions unsuccessful or patient reports new pain  Outcome: Progressing     Problem: INFECTION - ADULT  Goal: Absence or prevention of progression during hospitalization  Description: INTERVENTIONS:  - Assess and monitor for signs and symptoms of infection  - Monitor lab/diagnostic results  - Monitor all insertion sites, i e  indwelling lines, tubes, and drains  - Monitor endotracheal if appropriate and nasal secretions for changes in amount and color  - [place] appropriate cooling/warming therapies per order  - Administer medications as ordered  - Instruct and encourage patient and family to use good hand hygiene technique  - Identify and instruct in appropriate isolation precautions for identified infection/condition  Outcome: Progressing     Problem: SAFETY ADULT  Goal: Patient will remain free of falls  Description: INTERVENTIONS:  - Assess patient frequently for physical needs  -  Identify cognitive and physical deficits and behaviors that affect risk of falls    -  [place] fall precautions as indicated by assessment   - Educate patient/family on patient safety including physical limitations  - Instruct patient to call for assistance with activity based on assessment  - Modify environment to reduce risk of injury  - Consider OT/PT consult to assist with strengthening/mobility  Outcome: Progressing  Goal: Maintain or return to baseline ADL function  Description: INTERVENTIONS:  -  Assess patient's ability to carry out ADLs; assess patient's baseline for ADL function and identify physical deficits which impact ability to perform ADLs (bathing, care of mouth/teeth, toileting, grooming, dressing, etc )  - Assess/evaluate cause of self-care deficits   - Assess range of motion  - Assess patient's mobility; develop plan if impaired  - Assess patient's need for assistive devices and provide as appropriate  - Encourage maximum independence but intervene and supervise when necessary  - Involve family in performance of ADLs  - Assess for home care needs following discharge   - Consider OT consult to assist with ADL evaluation and planning for discharge  - Provide patient education as appropriate  Outcome: Progressing  Goal: Maintain or return mobility status to optimal level  Description: INTERVENTIONS:  - Assess patient's baseline mobility status (ambulation, transfers, stairs, etc )    - Identify cognitive and physical deficits and behaviors that affect mobility  - Identify mobility aids required to assist with transfers and/or ambulation (gait belt, sit-to-stand, lift, walker, cane, etc )  - [place] fall precautions as indicated by assessment  - Record patient progress and toleration of activity level on Mobility SBAR; progress patient to next Phase/Stage  - Instruct patient to call for assistance with activity based on assessment  - Consider rehabilitation consult to assist with strengthening/weightbearing, etc   Outcome: Progressing     Problem: DISCHARGE PLANNING  Goal: Discharge to home or other facility with appropriate resources  Description: INTERVENTIONS:  - Identify barriers to discharge w/patient and caregiver  - Arrange for needed discharge resources and transportation as appropriate  - Identify discharge learning needs (meds, wound care, etc )  - Arrange for interpretive services to assist at discharge as needed  - Refer to Case Management Department for coordinating discharge planning if the patient needs post-hospital services based on physician/advanced practitioner order or complex needs related to functional status, cognitive ability, or social support system  Outcome: Progressing     Problem: Knowledge Deficit  Goal: Patient/family/caregiver demonstrates understanding of disease process, treatment plan, medications, and discharge instructions  Description: Complete learning assessment and assess knowledge base    Interventions:  - Provide teaching at level of understanding  - Provide teaching via preferred learning methods  Outcome: Progressing

## 2021-05-27 ENCOUNTER — APPOINTMENT (INPATIENT)
Dept: NON INVASIVE DIAGNOSTICS | Facility: HOSPITAL | Age: 56
DRG: 840 | End: 2021-05-27
Payer: COMMERCIAL

## 2021-05-27 PROBLEM — M79.662 PAIN OF LEFT LOWER LEG: Status: ACTIVE | Noted: 2021-05-27

## 2021-05-27 LAB
ANION GAP SERPL CALCULATED.3IONS-SCNC: 7 MMOL/L (ref 4–13)
BUN SERPL-MCNC: 21 MG/DL (ref 5–25)
CALCIUM SERPL-MCNC: 9.4 MG/DL (ref 8.3–10.1)
CHLORIDE SERPL-SCNC: 101 MMOL/L (ref 100–108)
CO2 SERPL-SCNC: 27 MMOL/L (ref 21–32)
CREAT SERPL-MCNC: 0.3 MG/DL (ref 0.6–1.3)
GFR SERPL CREATININE-BSD FRML MDRD: 130 ML/MIN/1.73SQ M
GLUCOSE SERPL-MCNC: 108 MG/DL (ref 65–140)
GLUCOSE SERPL-MCNC: 112 MG/DL (ref 65–140)
GLUCOSE SERPL-MCNC: 153 MG/DL (ref 65–140)
GLUCOSE SERPL-MCNC: 160 MG/DL (ref 65–140)
GLUCOSE SERPL-MCNC: 165 MG/DL (ref 65–140)
POTASSIUM SERPL-SCNC: 4.4 MMOL/L (ref 3.5–5.3)
SODIUM SERPL-SCNC: 135 MMOL/L (ref 136–145)

## 2021-05-27 PROCEDURE — 99232 SBSQ HOSP IP/OBS MODERATE 35: CPT | Performed by: INTERNAL MEDICINE

## 2021-05-27 PROCEDURE — 97530 THERAPEUTIC ACTIVITIES: CPT

## 2021-05-27 PROCEDURE — 82948 REAGENT STRIP/BLOOD GLUCOSE: CPT

## 2021-05-27 PROCEDURE — 93971 EXTREMITY STUDY: CPT | Performed by: SURGERY

## 2021-05-27 PROCEDURE — 80048 BASIC METABOLIC PNL TOTAL CA: CPT | Performed by: STUDENT IN AN ORGANIZED HEALTH CARE EDUCATION/TRAINING PROGRAM

## 2021-05-27 PROCEDURE — 93971 EXTREMITY STUDY: CPT

## 2021-05-27 RX ORDER — LEUCOVORIN CALCIUM 25 MG/1
25 TABLET ORAL EVERY 6 HOURS
Status: CANCELLED | OUTPATIENT
Start: 2021-07-15 | End: 2021-07-16

## 2021-05-27 RX ORDER — ACETAMINOPHEN 160 MG/5ML
650 SUSPENSION, ORAL (FINAL DOSE FORM) ORAL EVERY 8 HOURS SCHEDULED
Status: DISCONTINUED | OUTPATIENT
Start: 2021-05-27 | End: 2021-06-14 | Stop reason: HOSPADM

## 2021-05-27 RX ORDER — HYDROMORPHONE HCL/PF 1 MG/ML
0.5 SYRINGE (ML) INJECTION ONCE
Status: COMPLETED | OUTPATIENT
Start: 2021-05-27 | End: 2021-05-27

## 2021-05-27 RX ORDER — SODIUM CHLORIDE 9 MG/ML
20 INJECTION, SOLUTION INTRAVENOUS ONCE AS NEEDED
Status: CANCELLED | OUTPATIENT
Start: 2021-07-13

## 2021-05-27 RX ORDER — LEUCOVORIN CALCIUM 50 MG/5ML
25 INJECTION, POWDER, LYOPHILIZED, FOR SOLUTION INTRAMUSCULAR; INTRAVENOUS EVERY 6 HOURS
Status: CANCELLED | OUTPATIENT
Start: 2021-07-14

## 2021-05-27 RX ORDER — GABAPENTIN 250 MG/5ML
100 SOLUTION ORAL
Status: DISCONTINUED | OUTPATIENT
Start: 2021-05-27 | End: 2021-06-14 | Stop reason: HOSPADM

## 2021-05-27 RX ADMIN — DICYCLOMINE HYDROCHLORIDE 10 MG: 10 CAPSULE ORAL at 12:26

## 2021-05-27 RX ADMIN — TAMSULOSIN HYDROCHLORIDE 0.4 MG: 0.4 CAPSULE ORAL at 18:44

## 2021-05-27 RX ADMIN — LIDOCAINE 5% 1 PATCH: 700 PATCH TOPICAL at 09:31

## 2021-05-27 RX ADMIN — DICYCLOMINE HYDROCHLORIDE 10 MG: 10 CAPSULE ORAL at 21:22

## 2021-05-27 RX ADMIN — ACETAMINOPHEN 650 MG: 650 SUSPENSION ORAL at 14:14

## 2021-05-27 RX ADMIN — MORPHINE SULFATE 2 MG: 2 INJECTION, SOLUTION INTRAMUSCULAR; INTRAVENOUS at 22:59

## 2021-05-27 RX ADMIN — DICYCLOMINE HYDROCHLORIDE 10 MG: 10 CAPSULE ORAL at 18:45

## 2021-05-27 RX ADMIN — ENOXAPARIN SODIUM 40 MG: 40 INJECTION SUBCUTANEOUS at 21:21

## 2021-05-27 RX ADMIN — INSULIN GLARGINE 7 UNITS: 100 INJECTION, SOLUTION SUBCUTANEOUS at 21:21

## 2021-05-27 RX ADMIN — ACYCLOVIR 400 MG: 200 CAPSULE ORAL at 21:22

## 2021-05-27 RX ADMIN — HYDROMORPHONE HYDROCHLORIDE 0.5 MG: 1 INJECTION, SOLUTION INTRAMUSCULAR; INTRAVENOUS; SUBCUTANEOUS at 02:43

## 2021-05-27 RX ADMIN — PREDNISONE 2.5 MG: 2.5 TABLET ORAL at 09:20

## 2021-05-27 RX ADMIN — MORPHINE SULFATE 2 MG: 2 INJECTION, SOLUTION INTRAMUSCULAR; INTRAVENOUS at 01:25

## 2021-05-27 RX ADMIN — MORPHINE SULFATE 2 MG: 2 INJECTION, SOLUTION INTRAMUSCULAR; INTRAVENOUS at 05:58

## 2021-05-27 RX ADMIN — FLUCONAZOLE 400 MG: 200 TABLET ORAL at 09:20

## 2021-05-27 RX ADMIN — INSULIN LISPRO 1 UNITS: 100 INJECTION, SOLUTION INTRAVENOUS; SUBCUTANEOUS at 12:25

## 2021-05-27 RX ADMIN — ACETAMINOPHEN 649.6 MG: 650 SUSPENSION ORAL at 21:22

## 2021-05-27 RX ADMIN — HEPARIN SODIUM 5000 UNITS: 5000 INJECTION INTRAVENOUS; SUBCUTANEOUS at 06:01

## 2021-05-27 RX ADMIN — GABAPENTIN 100 MG: 250 SUSPENSION ORAL at 21:29

## 2021-05-27 RX ADMIN — HYDROMORPHONE HYDROCHLORIDE 0.5 MG: 1 INJECTION, SOLUTION INTRAMUSCULAR; INTRAVENOUS; SUBCUTANEOUS at 01:45

## 2021-05-27 RX ADMIN — ALLOPURINOL 100 MG: 100 TABLET ORAL at 09:20

## 2021-05-27 RX ADMIN — HEPARIN SODIUM 5000 UNITS: 5000 INJECTION INTRAVENOUS; SUBCUTANEOUS at 14:16

## 2021-05-27 RX ADMIN — INSULIN LISPRO 1 UNITS: 100 INJECTION, SOLUTION INTRAVENOUS; SUBCUTANEOUS at 09:32

## 2021-05-27 RX ADMIN — AMLODIPINE BESYLATE 5 MG: 5 TABLET ORAL at 09:20

## 2021-05-27 RX ADMIN — LEVOFLOXACIN 500 MG: 500 TABLET, FILM COATED ORAL at 18:45

## 2021-05-27 RX ADMIN — INSULIN LISPRO 1 UNITS: 100 INJECTION, SOLUTION INTRAVENOUS; SUBCUTANEOUS at 21:23

## 2021-05-27 RX ADMIN — MORPHINE SULFATE 2 MG: 2 INJECTION, SOLUTION INTRAMUSCULAR; INTRAVENOUS at 16:14

## 2021-05-27 RX ADMIN — Medication 20 MG: at 09:20

## 2021-05-27 RX ADMIN — MENTHOL, UNSPECIFIED FORM AND METHYL SALICYLATE: 10; 150 CREAM TOPICAL at 18:43

## 2021-05-27 RX ADMIN — ACYCLOVIR 400 MG: 200 CAPSULE ORAL at 09:20

## 2021-05-27 RX ADMIN — DICYCLOMINE HYDROCHLORIDE 10 MG: 10 CAPSULE ORAL at 06:01

## 2021-05-27 RX ADMIN — MENTHOL, UNSPECIFIED FORM AND METHYL SALICYLATE: 10; 150 CREAM TOPICAL at 09:30

## 2021-05-27 NOTE — QUICK NOTE
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

## 2021-05-27 NOTE — PLAN OF CARE
Problem: Prexisting or High Potential for Compromised Skin Integrity  Goal: Skin integrity is maintained or improved  Description: INTERVENTIONS:  - Identify patients at risk for skin breakdown  - Assess and monitor skin integrity  - Assess and monitor nutrition and hydration status  - Monitor labs   - Assess for incontinence   - Turn and reposition patient  - Assist with mobility/ambulation  - Relieve pressure over bony prominences  - Avoid friction and shearing  - Provide appropriate hygiene as needed including keeping skin clean and dry  - Evaluate need for skin moisturizer/barrier cream  - Collaborate with interdisciplinary team   - Patient/family teaching  - Consider wound care consult   Outcome: Progressing     Problem: Potential for Falls  Goal: Patient will remain free of falls  Description: INTERVENTIONS:  - Assess patient frequently for physical needs  -  Identify cognitive and physical deficits and behaviors that affect risk of falls  -  [place] fall precautions as indicated by assessment   - Educate patient/family on patient safety including physical limitations  - Instruct patient to call for assistance with activity based on assessment  - Modify environment to reduce risk of injury  - Consider OT/PT consult to assist with strengthening/mobility  Outcome: Progressing     Problem: Nutrition/Hydration-ADULT  Goal: Nutrient/Hydration intake appropriate for improving, restoring or maintaining nutritional needs  Description: Monitor and assess patient's nutrition/hydration status for malnutrition  Collaborate with interdisciplinary team and initiate plan and interventions as ordered  Monitor patient's weight and dietary intake as ordered or per policy  Utilize nutrition screening tool and intervene as necessary  Determine patient's food preferences and provide high-protein, high-caloric foods as appropriate       INTERVENTIONS:  - Monitor oral intake, urinary output, labs, and treatment plans  - Assess nutrition and hydration status and recommend course of action  - Evaluate amount of meals eaten  - Assist patient with eating if necessary   - Allow adequate time for meals  - Recommend/ encourage appropriate diets, oral nutritional supplements, and vitamin/mineral supplements  - Order, calculate, and assess calorie counts as needed  - Recommend, monitor, and adjust tube feedings and TPN/PPN based on assessed needs  - Assess need for intravenous fluids  - Provide specific nutrition/hydration education as appropriate  - Include patient/family/caregiver in decisions related to nutrition  Outcome: Progressing     Problem: PAIN - ADULT  Goal: Verbalizes/displays adequate comfort level or baseline comfort level  Description: Interventions:  - Encourage patient to monitor pain and request assistance  - Assess pain using appropriate pain scale  - Administer analgesics based on type and severity of pain and evaluate response  - Implement non-pharmacological measures as appropriate and evaluate response  - Consider cultural and social influences on pain and pain management  - Notify physician/advanced practitioner if interventions unsuccessful or patient reports new pain  Outcome: Progressing     Problem: INFECTION - ADULT  Goal: Absence or prevention of progression during hospitalization  Description: INTERVENTIONS:  - Assess and monitor for signs and symptoms of infection  - Monitor lab/diagnostic results  - Monitor all insertion sites, i e  indwelling lines, tubes, and drains  - Monitor endotracheal if appropriate and nasal secretions for changes in amount and color  - [place] appropriate cooling/warming therapies per order  - Administer medications as ordered  - Instruct and encourage patient and family to use good hand hygiene technique  - Identify and instruct in appropriate isolation precautions for identified infection/condition  Outcome: Progressing     Problem: SAFETY ADULT  Goal: Patient will remain free of falls  Description: INTERVENTIONS:  - Assess patient frequently for physical needs  -  Identify cognitive and physical deficits and behaviors that affect risk of falls    -  [place] fall precautions as indicated by assessment   - Educate patient/family on patient safety including physical limitations  - Instruct patient to call for assistance with activity based on assessment  - Modify environment to reduce risk of injury  - Consider OT/PT consult to assist with strengthening/mobility  Outcome: Progressing  Goal: Maintain or return to baseline ADL function  Description: INTERVENTIONS:  -  Assess patient's ability to carry out ADLs; assess patient's baseline for ADL function and identify physical deficits which impact ability to perform ADLs (bathing, care of mouth/teeth, toileting, grooming, dressing, etc )  - Assess/evaluate cause of self-care deficits   - Assess range of motion  - Assess patient's mobility; develop plan if impaired  - Assess patient's need for assistive devices and provide as appropriate  - Encourage maximum independence but intervene and supervise when necessary  - Involve family in performance of ADLs  - Assess for home care needs following discharge   - Consider OT consult to assist with ADL evaluation and planning for discharge  - Provide patient education as appropriate  Outcome: Progressing  Goal: Maintain or return mobility status to optimal level  Description: INTERVENTIONS:  - Assess patient's baseline mobility status (ambulation, transfers, stairs, etc )    - Identify cognitive and physical deficits and behaviors that affect mobility  - Identify mobility aids required to assist with transfers and/or ambulation (gait belt, sit-to-stand, lift, walker, cane, etc )  - [place] fall precautions as indicated by assessment  - Record patient progress and toleration of activity level on Mobility SBAR; progress patient to next Phase/Stage  - Instruct patient to call for assistance with activity based on assessment  - Consider rehabilitation consult to assist with strengthening/weightbearing, etc   Outcome: Progressing     Problem: DISCHARGE PLANNING  Goal: Discharge to home or other facility with appropriate resources  Description: INTERVENTIONS:  - Identify barriers to discharge w/patient and caregiver  - Arrange for needed discharge resources and transportation as appropriate  - Identify discharge learning needs (meds, wound care, etc )  - Arrange for interpretive services to assist at discharge as needed  - Refer to Case Management Department for coordinating discharge planning if the patient needs post-hospital services based on physician/advanced practitioner order or complex needs related to functional status, cognitive ability, or social support system  Outcome: Progressing     Problem: Knowledge Deficit  Goal: Patient/family/caregiver demonstrates understanding of disease process, treatment plan, medications, and discharge instructions  Description: Complete learning assessment and assess knowledge base    Interventions:  - Provide teaching at level of understanding  - Provide teaching via preferred learning methods  Outcome: Progressing

## 2021-05-27 NOTE — ASSESSMENT & PLAN NOTE
Overnight left lower extremity pain, well score 3, pain now controlled  Bilateral ultrasound Dopplers no evidence of DVT

## 2021-05-27 NOTE — PROGRESS NOTES
INTERNAL MEDICINE RESIDENCY PROGRESS NOTE     Name: Murray Gong   Age & Sex: 49 y o  female   MRN: [id number]  Unit/Bed#: [facility] 920-01   Encounter: [id number]  Team: SOD Team B     PATIENT INFORMATION     Name: Murray Gong   Age & Sex: 49 y o  female   MRN: [id number] Stay Days: 29    ASSESSMENT/PLAN     Principal Problem:    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[address] )  Active Problems:    Acute gout    Altered mental status    Dysphagia    Urinary retention    Aphasia    Weakness    Presence of permanent central venous catheter    Fracture of ramus of left pubis (HCC)    Severe protein-calorie malnutrition (HCC)    Left-sided weakness    Pancytopenia ([address] )      * 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[facility] [address] )  Assessment & Plan  Patient was diagnosed with primary CNS lymphoma through biopsy in [facility] transferred over here for chemotherapy since the [facility] is out of network  Had a 2nd family meeting on 04/30 and family has decided to proceed treatment at this time  S/p PICC line for chemotherapy  S/p peg tube placement on 05/10  Plan:  Hematology-Oncology consulted  Appreciate recommendations  Chemotherapy treatment as per hematology oncology team     S/p 2nd cycle of chemotherapy on 05/13  Leucovin on 5/14  DVT prophylaxis: On heparin  Case management-will start looking for places for rehab  S/p steroid taper for cerebral edema from CNS lymphoma  Pancytopenia in setting of recent chemotherapy  Will continue to watch for any signs of infection  On acyclovir, Diflucan and Levaquin    After discussion with Oncology, plan for rehab, patient does not require radiation therapy, rehab for strength, no chemotherapy until at least 3 weeks, evaluate at that time  Patient's family came in yesterday afternoon, updated on plan going forward, no questions after family visit    Acute gout  Assessment & Plan  Appears to have acute gout flare right knee, negative pain now  Currently on prednisone 5 mg q day, based on recent chemotherapy will hold off on NSAIDs or colchicine  Will start low-dose allopurinol 100 mg q day to hopefully prevent other acute flares in the setting of chemotherapy    Pancytopenia (HCC)  Assessment & Plan  Likely in setting of chemotherapy  Will continue to monitor for any signs of infection  Will transfuse with packed RBC for hemoglobin less than 7  Drop in Hgb from 9 to 7 on 5/14, other lines relatively stable  VSS and soft brown BMs  Will discuss with oncology if this is expected based on chemo or if there should be further workup for bleeding, though hemodynamics currently do not suggest bleed  S/p 1 pack RBC on 05/15, hemoglobin continues to improve    Left-sided weakness  Assessment & Plan  Patient has left-sided weakness at baseline from her CNS lymphoma  Left upper extremity 3-4/5 and left lower extremity 0-1/5 muscle strength at baseline  Severe protein-calorie malnutrition ([facility] [address] )  Assessment & Plan  Malnutrition Findings:   Adult Malnutrition type: Acute illness(due to medical condition resulting in dysphagia, decreased appetite and intake, weight loss)  Adult Degree of Malnutrition: Other severe protein calorie malnutrition    BMI Findings: Body mass index is 20 24 kg/m²  Plan:  Nutrition consult  Tube feeds initiated with Jevity 1 2, advancing to goal      Fracture of ramus of left pubis [facility])  Assessment & Plan  Aubrey Tetey prior to presentation to [facility]  Managed nonoperatively at [facility]    Presence of permanent central venous catheter  Assessment & Plan  Noted presence of right IJ tunneled double-lumen HD catheter; upon chart review, this was likely inserted to be used for plasmapheresis which she has completed  Plan:  S/p removal by IR  Patient now has PICC line  Weakness  Assessment & Plan  Left greater than right upper and lower extremity weakness with ongoing left lower extremity contracture  Secondary to underlying CNS malignancy     *Decubitus ulcer precautions such as frequent repositioning  Aphasia  Assessment & Plan  Definite evidence of expressive aphasia, possible component of receptive aphasia as well  Speech therapy on board  Urinary retention  Assessment & Plan  Patient developed urinary retention during previous hospitalization  Plan was voiding trial as an outpatient as she was scheduled for discharge from that facility  Machado catheter was initially removed although patient required re-placement of machado on 05/14 in setting of incontinence and getting chemotherapy  Machado discontinued, monitor for signs of fluid retention      Dysphagia  Assessment & Plan  Patient noted to have dysphagia and also decreased p o  Intake  As per [facility] progress note patient had a calorie count and recommended feeding tube placement  Speech evaluation - advance diet with dysphagia 2 and thin liquids; needs assistance with feeds  Calorie count  If the patient continued to have poor p o  Intake may need discussion with the family regarding alternate methods of nutrition  Patient's calorie intake is low  VBS- showed oropharyngeal dysphagia  s/p peg tube placement  Altered mental status  Assessment & Plan  Patient initially presented to [facility] his ultimate [place] with stroke-like symptoms for 2 days  CT scan of the head was concerning for subacute CVA and was transferred to [facility]   MRI of the brain was more consistent with demyelinating disease patient was started on IV steroids and 1000 mg daily for 5 days  Patient did not have any significant improvement at that time patient had plasmapheresis  And also had  lumbar puncture-negative for infection or malignancy    Patient noted to have persistent encephalopathy so a repeat MRI was obtained which showed worsening of the abnormality seen on the previous MRI and 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  Likely secondary to CNS lymphoma  Delirium precautions   Currently alert and oriented times 2-3  Follow commands  Patient's  (does not speak [place]) makes all medical decision for her  Sepsis (HCC)-resolved as of 2021  Assessment & Plan  Patient had hypothermia, elevated WBC count and tachycardia  Concern for possible aspiration in setting on dysphagia versus UTI in setting of urinary catheter which was placed for retention  Plan:  S/p IV antibiotics last day on   Will continue to monitor off antibiotics  Steroid-induced hyperglycemia-resolved as of 2021  Assessment & Plan  Mild hyperglycemia, glucoses in 200s, no history of DM  Occurring in the setting of Decadron with chemotherapy, as well as sodium bicarb in D5 drip  - D5 drip is finished  - will monitor sugars while on Jevity and now off D5  - continue q4 hour fingerstick glucose with correctional algorithm 1 and Lantus        Disposition:  Awaiting rehab     SUBJECTIVE     Patient seen and examined  Patient last night did have left lower extremity pain, assessed for high risk DVT  OBJECTIVE     Vitals:    21 0840 21 1433 21 2302 21 0558   BP:  96/60 105/69    Pulse: 98 87 82    Resp:  12 16    Temp:  98 9 °F (37 2 °C) (!) 96 7 °F (35 9 °C)    TempSrc:       SpO2:  100% 100%    Weight:    49 5 kg (109 lb 2 oz)   Height:          Temperature:   Temp (24hrs), Av 2 °F (36 8 °C), Min:96 7 °F (35 9 °C), Max:99 °F (37 2 °C)    Temperature: (!) 96 7 °F (35 9 °C)  Intake & Output:  I/O        07 -  0700  07 -  0700    P  O  280 110    NG/GT 1576     Feedings 2724     Total Intake(mL/kg) 4580 (95) 110 (2 2)    Urine (mL/kg/hr) 0 (0) 800 (0 7)    Stool 0 0    Total Output 0 800    Net +4580 -690          Unmeasured Urine Occurrence 4 x 1 x    Unmeasured Stool Occurrence 2 x 3 x        Weights:   IBW (Ideal Body Weight): 45 5 kg    Body mass index is 21 31 kg/m²    Weight (last 2 days)     Date/Time   Weight    21 0558   49 5 (109 13)    05/26/21 0500   48 2 (106 26)    05/25/21 0546   44 (97)            Physical Exam  Vitals signs and nursing note reviewed  Constitutional:       General: She is not in acute distress  Appearance: She is well-developed  HENT:      Head: Normocephalic and atraumatic  Eyes:      Conjunctiva/sclera: Conjunctivae normal    Neck:      Musculoskeletal: Neck supple  Cardiovascular:      Rate and Rhythm: Normal rate and regular rhythm  Heart sounds: No murmur  Pulmonary:      Effort: Pulmonary effort is normal  No respiratory distress  Breath sounds: Normal breath sounds  Abdominal:      Palpations: Abdomen is soft  Tenderness: There is no abdominal tenderness  Musculoskeletal: Normal range of motion  Comments: Tenderness to palpation left lower extremity   Skin:     General: Skin is warm and dry  Capillary Refill: Capillary refill takes less than 2 seconds  Neurological:      General: No focal deficit present  Mental Status: She is alert  LABORATORY DATA     Labs: I have personally reviewed pertinent reports  Results from last 7 days   Lab Units 05/25/21  0505 05/23/21  0519   WBC Thousand/uL 6 76 5 82   HEMOGLOBIN g/dL 8 8* 8 0*   HEMATOCRIT % 28 4* 25 3*   PLATELETS Thousands/uL 117* 101*   MONO PCT %  --  9      Results from last 7 days   Lab Units 05/27/21  0558 05/25/21  0505 05/23/21  0519   POTASSIUM mmol/L 4 4 4 4 4 2   CHLORIDE mmol/L 101 97* 95*   CO2 mmol/L 27 29 27   BUN mg/dL 21 19 21   CREATININE mg/dL 0 30* 0 25* 0 27*   CALCIUM mg/dL 9 4 9 7 9 1                            IMAGING & DIAGNOSTIC TESTING     Radiology Results: I have personally reviewed pertinent reports  Xr Chest Portable    Result Date: 4/24/2021  Impression: Dialysis catheter tip within the superior cavoatrial junction  No pneumothorax   Workstation performed: [id number]     Xr Chest Picc Line Portable    Result Date: 4/27/2021  Impression: PICC line tip in the superior vena cava, approximately 3 5 to 4 cm above the cavoatrial junction  The examination demonstrates a significant  finding and was documented as such in [facility] for liaison and referring practitioner notification  Workstation performed: [id number]     Ir Picc Reposition    Result Date: 4/27/2021  Impression: Status-post repositioning of a 5 [place] central venous catheter under fluoroscopic guidance with its tip at the cavoatrial junction  Workstation performed: [id number]     Ir Tunneled Central Line Removal    Result Date: 4/27/2021  Impression: Impression: Successful tunneled central line removal as described  Signed, performed and dictated by Andi Swan PA-C under the supervision of Dr Soy White  Workstation performed: [id number]     Other Diagnostic Testing: I have personally reviewed pertinent reports      ACTIVE MEDICATIONS     Current Facility-Administered Medications   Medication Dose Route Frequency    acetaminophen (TYLENOL) tablet 650 mg  650 mg Oral Q6H PRN    acyclovir (ZOVIRAX) capsule 400 mg  400 mg Oral Q12H [address]    albuterol (PROVENTIL HFA,VENTOLIN HFA) inhaler 2 puff  2 puff Inhalation Q6H PRN    allopurinol (ZYLOPRIM) tablet 100 mg  100 mg Oral Daily    aluminum-magnesium hydroxide-simethicone (MYLANTA) oral suspension 30 mL  30 mL Per PEG Tube Q4H PRN    amLODIPine (NORVASC) tablet 5 mg  5 mg Oral Daily    bisacodyl (DULCOLAX) rectal suppository 10 mg  10 mg Rectal Daily PRN    dicyclomine (BENTYL) capsule 10 mg  10 mg Oral 4x Daily (AC & HS)    fluconazole (DIFLUCAN) tablet 400 mg  400 mg Oral Daily    heparin (porcine) subcutaneous injection 5,000 Units  5,000 Units Subcutaneous Q8H [address]    HYDROmorphone (DILAUDID) injection 0 5 mg  0 5 mg Intravenous Q4H PRN    insulin glargine (LANTUS) subcutaneous injection 7 Units 0 07 mL  7 Units Subcutaneous HS    insulin lispro (HumaLOG) 100 units/mL subcutaneous injection 1-5 Units  1-5 Units Subcutaneous 4x Daily (AC & HS)    levofloxacin (LEVAQUIN) tablet 500 mg  500 mg Oral Q24H    lidocaine (LIDODERM) 5 % patch 1 patch  1 patch Topical Daily    menthol-methyl salicylate (BENGAY) 71-75 % cream   Apply externally BID    morphine injection 2 mg  2 mg Intravenous Q4H PRN    omeprazole (PRILOSEC) suspension 2 mg/mL  20 mg Oral Daily    ondansetron (ZOFRAN) injection 4 mg  4 mg Intravenous Q6H PRN    polyethylene glycol (MIRALAX) packet 17 g  17 g Oral Daily PRN    predniSONE tablet 2 5 mg  2 5 mg Oral Daily    senna-docusate sodium (SENOKOT S) 8 6-50 mg per tablet 1 tablet  1 tablet Oral BID PRN    sodium chloride 0 9 % infusion  20 mL/hr Intravenous Once PRN    sodium chloride 0 9 % infusion  20 mL/hr Intravenous Once PRN    sodium chloride 0 9 % infusion  20 mL/hr Intravenous Once PRN    tamsulosin (FLOMAX) capsule 0 4 mg  0 4 mg Oral Daily With Dinner       VTE Pharmacologic Prophylaxis: Heparin  VTE Mechanical Prophylaxis: sequential compression device    Portions of the record may have been created with voice recognition software  Occasional wrong word or "sound a like" substitutions may have occurred due to the inherent limitations of voice recognition software    Read the chart carefully and recognize, using context, where substitutions have occurred   ==  Ingrid Ventura, [address] [facility]  Internal Medicine Residency PGY-2

## 2021-05-27 NOTE — CASE MANAGEMENT
YSABEL s/w Lakeisha Pugh from [address] , and provided her with an updated status of the referrals that were sent for the pt  Per Lakeisha Pugh she does not have any update as to the status of the case being sent to [facility] and loss for a review to determine if they will be able to provide and individual contact to [facility]  YSABEL will follow

## 2021-05-27 NOTE — PLAN OF CARE
Problem: PHYSICAL THERAPY ADULT  Goal: Performs mobility at highest level of function for planned discharge setting  See evaluation for individualized goals  Description: Treatment/Interventions: ADL retraining, Functional transfer training, LE strengthening/ROM, Endurance training, Patient/family training, Bed mobility, Spoke to nursing, OT  Equipment Recommended: (TBD)       See flowsheet documentation for full assessment, interventions and recommendations  Outcome: Progressing  Note: Prognosis: Fair  Problem List: Decreased strength, Decreased range of motion, Decreased endurance, Impaired balance, Decreased mobility, Decreased cognition, Impaired vision, Pain  Assessment: Pt seen for PT treatment session this date  Therapy session focused on bed mobility and endurance training in order to improve overall mobility and independence  Pt requires assist of 1 for all mobility performed this date  Pt more alert and vocal this session- [employer] translate used to assist with translation  Pt incontinent of bowel/bladder  R/L rolling bed mobility tasks performed to assist in hygiene tasks  Pt performed R/L rolling with min Ax1 using handrails  Pt able to initiate rolling this date and reaching for bedrails with less of an assist from therapist  Pt repositioned comfortably in bed with green wedges to offload bottom and prevent skin breakdown  Pt making slow progress toward goals 2* decreased activity tolerance  Pt was left supine in bed with alarm on at the end of PT session with all needs in reach  Pt would benefit from continued PT services while in hospital to address remaining limitations  PT to continue to follow pt and recommends rehab services after d/c  The patient's AM-PAC Basic Mobility Inpatient Short Form Low Function Raw Score 15 , Standardized Score is 23 9  A standardized score less 42 9 suggests the patient may benefit from discharge to post-acute rehab services   Please also refer to the recommendation of the Physical Therapist for safe discharge planning  Barriers to Discharge: Inaccessible home environment, Decreased caregiver support        PT Discharge Recommendation: Post acute rehabilitation services     PT - OK to Discharge: Yes(to rehab once medically cleared )    See flowsheet documentation for full assessment

## 2021-05-27 NOTE — QUICK NOTE
Went to evaluate patient after being paged by nurse regarding patient's current status  Nursing assisted in translating  Per nurse patient was complaining of left lower extremity pain 10/10, nonradiating  Upon evaluation patient appeared uncomfortable but not in any acute distress  She appeared alert and oriented times 1-2  She states she has a lot of pain in her left calf  She denies any shortness of breath  She has tenderness to palpation of her sternum  She also stated that she did not want to live anymore and is requesting a family meeting with doctors to discuss goals of care  Patient was hemodynamically stable on presentation and saturating well on room air  On examination she has a positive Homans sign, tenderness to palpation of the left calf but no significant edema or swelling or erythema was noted  Heart and lung examination was normal     Patient's Wells score is 3 putting her at high risk for DVT in the light of active cancer  Given this information will order VAS of her left leg to assess for DVT  Extra dose of Dilaudid 0 5 mg ordered for breakthrough pain  Will sign out to day team with regards to patient's wishes of a family meeting  Recommend palliative care involvement as well at the time  Continue to monitor

## 2021-05-27 NOTE — PHYSICAL THERAPY NOTE
PHYSICAL THERAPY NOTE          Patient Name: James Albarran  [id number] Date: 5/27/2021 05/27/21 0901   PT Last Visit   PT Visit Date 05/27/21   Note Type   Note Type Treatment   Pain Assessment   Pain Assessment Tool FLACC   Pain Location/Orientation Orientation: Left; Location: Abdomen; Location: Leg   Pain Rating: FLACC (Rest) - Face 1   Pain Rating: FLACC (Rest) - Legs 1   Pain Rating: FLACC (Rest) - Activity 1   Pain Rating: FLACC (Rest) - Cry 1   Pain Rating: FLACC (Rest) - Consolability 1   Score: FLACC (Rest) 5   Pain Rating: FLACC (Activity) - Face 1   Pain Rating: FLACC (Activity) - Legs 1   Pain Rating: FLACC (Activity) - Activity 1   Pain Rating: FLACC (Activity) - Cry 1   Pain Rating: FLACC (Activity) - Consolability 1   Score: FLACC (Activity) 5   Restrictions/Precautions   Weight Bearing Precautions Per Order No   Other Precautions Cognitive; Chair Alarm; Bed Alarm;Multiple lines; Fall Risk;Pain;Visual impairment  (language barrier)   General   Chart Reviewed Yes   Response to Previous Treatment Patient unable to report, no changes reported from family or staff   Family/Caregiver Present No   Cognition   Overall Cognitive Status Impaired   Arousal/Participation Alert; Responsive; Cooperative   Attention Attends with cues to redirect   Orientation Level Unable to assess   Memory Unable to assess   Following Commands Follows one step commands with increased time or repetition   Comments Pt with increased alertness, yelling into hallway for "docotr"   Bed Mobility   Rolling R 4  Minimal assistance   Additional items Assist x 1;Bedrails; Increased time required   Rolling L 4  Minimal assistance   Additional items Assist x 1;Bedrails; Increased time required   Supine to Sit Unable to assess   Sit to Supine Unable to assess   Transfers   Sit to Stand Unable to assess   Stand to Sit Unable to assess   Stand pivot Unable to assess Ambulation/Elevation   Gait pattern Not appropriate   Endurance Deficit   Endurance Deficit Yes   Endurance Deficit Description pain, fatigue    Activity Tolerance   Activity Tolerance Patient limited by fatigue;Patient limited by pain   Medical Staff Made Aware Rehab aidcorbin Jovel    Nurse Made Aware RN cleared pt to participate in therapy session    Assessment   Prognosis Fair   Problem List Decreased strength;Decreased range of motion;Decreased endurance; Impaired balance;Decreased mobility; Decreased cognition; Impaired vision;Pain   Assessment Pt seen for PT treatment session this date  Therapy session focused on bed mobility and endurance training in order to improve overall mobility and independence  Pt requires assist of 1 for all mobility performed this date  Pt more alert and vocal this session- [employer] translate used to assist with translation  Pt incontinent of bowel/bladder  R/L rolling bed mobility tasks performed to assist in hygiene tasks  Pt performed R/L rolling with min Ax1 using handrails  Pt able to initiate rolling this date and reaching for bedrails with less of an assist from therapist  Pt repositioned comfortably in bed with green wedges to offload bottom and prevent skin breakdown  Pt making slow progress toward goals 2* decreased activity tolerance  Pt was left supine in bed with alarm on at the end of PT session with all needs in reach  Pt would benefit from continued PT services while in hospital to address remaining limitations  PT to continue to follow pt and recommends rehab services after d/c  The patient's AM-PAC Basic Mobility Inpatient Short Form Low Function Raw Score 15 , Standardized Score is 23 9  A standardized score less 42 9 suggests the patient may benefit from discharge to post-acute rehab services  Please also refer to the recommendation of the Physical Therapist for safe discharge planning     Barriers to Discharge Inaccessible home environment;Decreased caregiver support Goals   Patient Goals to see "doctor"    STG Expiration Date 06/08/21   Plan   Treatment/Interventions Endurance training;Patient/family training;Bed mobility;Spoke to nursing   Progress Slow progress, decreased activity tolerance   PT Frequency Other (Comment)  (3-5x/wk )   Recommendation   PT Discharge Recommendation Post acute rehabilitation services   PT - OK to Discharge Yes  (to rehab once medically cleared )   AM-PAC Basic Mobility Inpatient   Turning in Bed Without Bedrails 3   Lying on Back to Sitting on Edge of Flat Bed 1   Moving Bed to Chair 1   Standing Up From Chair 1   Walk in Room 1   Climb 3-5 Stairs 1   Basic Mobility Inpatient Raw Score 8   Turning Head Towards Sound 4   Follow Simple Instructions 3   Low Function Basic Mobility Raw Score 15   Low Function Basic Mobility Standardized Score 23 9     Marcio Angela, PT, DPT

## 2021-05-27 NOTE — NURSING NOTE
Patient was calling out for help this am  This nurse is native in the [place] language and is able to communicate and translate with patient, went to evaluate patient's needs  Patient has been confused and disoriented to time and situation for the duration of her stay on this unit, however at this time is spontaneously alert and oriented x4 to person, place, time, and situation  This nurse had a lengthy and in depth conversation with the patient to reveal the following  The patient is able to verbally state her name, her , aware and verbal to the fact that she is in the hospital but is unaware to which hospital, aware that she is in the hospital for her cancer treatment, stated the year is , stated and is aware that she came to this hospital in April, and stated she is aware that she has been confused for some time  This nurse taught the pain rating scale to patient, as nursing has been rating pain based on "little pain" or "a lot of pain" due to patient being unable to understand pain rating scale previously  Patient showed evidence of learning and is able to rate her pain at this time, 10/10  This nurse gave patient her PRN morphine 2mg with no relief, requiring a breakthrough dose of dilaudid 0 5mg  Patient then stated "it hurts a lot" and "I need medicine " This nurse explained that she just received 2 pain medications to help with her pain but patient stated "it still hurts despite that  10 "   Following this, patient stated "I can't live like this  I can't bear this " This nurse asked the patient if she wants to live, patient replied no  This nurse asked the patient if she wants to die, patient replied "I want to die"  The patient was asked these questions multiple times more variously throughout conversation to verify her understanding of these questions   This nurse asked the patient if she wants to have a meeting with her physician team and her  today about her level of care and treatment plan, patient replied yes and "I want to speak with my cancer doctor "  This nurse contacted SOD intern to relay information  Dr Breezy Chamorro DO evaluated patient at bedside while this nurse provided translation  Reassessment of patient additionally shows intact vision in both eyes, moderate  strength in R hand, weak  strength in L hand, normal strength in R arm, movement against gravity only in L arm, moderate dorsiflexion in R foot, weak dorsiflexion in L foot, movement with gravity eliminated in R leg, and muscle contraction no movement in L leg

## 2021-05-28 LAB
GLUCOSE SERPL-MCNC: 108 MG/DL (ref 65–140)
GLUCOSE SERPL-MCNC: 130 MG/DL (ref 65–140)
GLUCOSE SERPL-MCNC: 140 MG/DL (ref 65–140)
GLUCOSE SERPL-MCNC: 140 MG/DL (ref 65–140)

## 2021-05-28 PROCEDURE — 97535 SELF CARE MNGMENT TRAINING: CPT

## 2021-05-28 PROCEDURE — 82948 REAGENT STRIP/BLOOD GLUCOSE: CPT

## 2021-05-28 PROCEDURE — 97530 THERAPEUTIC ACTIVITIES: CPT

## 2021-05-28 PROCEDURE — 99232 SBSQ HOSP IP/OBS MODERATE 35: CPT | Performed by: INTERNAL MEDICINE

## 2021-05-28 RX ADMIN — GABAPENTIN 100 MG: 250 SUSPENSION ORAL at 23:00

## 2021-05-28 RX ADMIN — ACYCLOVIR 400 MG: 200 CAPSULE ORAL at 08:59

## 2021-05-28 RX ADMIN — DICYCLOMINE HYDROCHLORIDE 10 MG: 10 CAPSULE ORAL at 22:43

## 2021-05-28 RX ADMIN — HYDROMORPHONE HYDROCHLORIDE 0.5 MG: 1 INJECTION, SOLUTION INTRAMUSCULAR; INTRAVENOUS; SUBCUTANEOUS at 09:03

## 2021-05-28 RX ADMIN — LEVOFLOXACIN 500 MG: 500 TABLET, FILM COATED ORAL at 16:57

## 2021-05-28 RX ADMIN — MENTHOL, UNSPECIFIED FORM AND METHYL SALICYLATE: 10; 150 CREAM TOPICAL at 09:00

## 2021-05-28 RX ADMIN — MORPHINE SULFATE 2 MG: 2 INJECTION, SOLUTION INTRAMUSCULAR; INTRAVENOUS at 16:54

## 2021-05-28 RX ADMIN — DICYCLOMINE HYDROCHLORIDE 10 MG: 10 CAPSULE ORAL at 12:24

## 2021-05-28 RX ADMIN — ACETAMINOPHEN 650 MG: 650 SUSPENSION ORAL at 14:20

## 2021-05-28 RX ADMIN — MORPHINE SULFATE 2 MG: 2 INJECTION, SOLUTION INTRAMUSCULAR; INTRAVENOUS at 04:02

## 2021-05-28 RX ADMIN — INSULIN GLARGINE 7 UNITS: 100 INJECTION, SOLUTION SUBCUTANEOUS at 22:43

## 2021-05-28 RX ADMIN — ALLOPURINOL 100 MG: 100 TABLET ORAL at 08:59

## 2021-05-28 RX ADMIN — HYDROMORPHONE HYDROCHLORIDE 0.5 MG: 1 INJECTION, SOLUTION INTRAMUSCULAR; INTRAVENOUS; SUBCUTANEOUS at 14:20

## 2021-05-28 RX ADMIN — MORPHINE SULFATE 2 MG: 2 INJECTION, SOLUTION INTRAMUSCULAR; INTRAVENOUS at 08:49

## 2021-05-28 RX ADMIN — TAMSULOSIN HYDROCHLORIDE 0.4 MG: 0.4 CAPSULE ORAL at 16:57

## 2021-05-28 RX ADMIN — ACYCLOVIR 400 MG: 200 CAPSULE ORAL at 22:43

## 2021-05-28 RX ADMIN — Medication 20 MG: at 09:00

## 2021-05-28 RX ADMIN — ACETAMINOPHEN 650 MG: 650 SUSPENSION ORAL at 22:43

## 2021-05-28 RX ADMIN — ACETAMINOPHEN 650 MG: 650 SUSPENSION ORAL at 06:05

## 2021-05-28 RX ADMIN — DICYCLOMINE HYDROCHLORIDE 10 MG: 10 CAPSULE ORAL at 16:57

## 2021-05-28 RX ADMIN — MORPHINE SULFATE 2 MG: 2 INJECTION, SOLUTION INTRAMUSCULAR; INTRAVENOUS at 22:44

## 2021-05-28 RX ADMIN — FLUCONAZOLE 400 MG: 200 TABLET ORAL at 08:59

## 2021-05-28 RX ADMIN — PREDNISONE 2.5 MG: 2.5 TABLET ORAL at 08:59

## 2021-05-28 RX ADMIN — ENOXAPARIN SODIUM 40 MG: 40 INJECTION SUBCUTANEOUS at 14:21

## 2021-05-28 RX ADMIN — HYDROMORPHONE HYDROCHLORIDE 0.5 MG: 1 INJECTION, SOLUTION INTRAMUSCULAR; INTRAVENOUS; SUBCUTANEOUS at 01:09

## 2021-05-28 RX ADMIN — MORPHINE SULFATE 2 MG: 2 INJECTION, SOLUTION INTRAMUSCULAR; INTRAVENOUS at 12:24

## 2021-05-28 RX ADMIN — DICYCLOMINE HYDROCHLORIDE 10 MG: 10 CAPSULE ORAL at 06:05

## 2021-05-28 RX ADMIN — MENTHOL, UNSPECIFIED FORM AND METHYL SALICYLATE: 10; 150 CREAM TOPICAL at 18:23

## 2021-05-28 RX ADMIN — DICLOFENAC SODIUM 2 G: 10 GEL TOPICAL at 23:20

## 2021-05-28 RX ADMIN — LIDOCAINE 5% 1 PATCH: 700 PATCH TOPICAL at 09:00

## 2021-05-28 RX ADMIN — HYDROMORPHONE HYDROCHLORIDE 0.5 MG: 1 INJECTION, SOLUTION INTRAMUSCULAR; INTRAVENOUS; SUBCUTANEOUS at 18:22

## 2021-05-28 NOTE — CASE MANAGEMENT
CM s/w Yecenia Guillen from [facility] who stated that [facility] (S:[phone]; F: [phone]) would be able to accept the pt  CM faxed over a referral to the facility  CM s/w the admissions representative who stated that she wouyld have to review the referral and then she would notify if she is able to accept the pt  Per the admissions representative if they do have a bed available they would not be able to accept the pt until elliot Worthington notified Yecenia Guillen from [facility] of the same  CM will follow

## 2021-05-28 NOTE — PLAN OF CARE
Problem: OCCUPATIONAL THERAPY ADULT  Goal: Performs self-care activities at highest level of function for planned discharge setting  See evaluation for individualized goals  Description: Treatment Interventions: ADL retraining, Functional transfer training, UE strengthening/ROM, Endurance training, Cognitive reorientation, Patient/family training, Equipment evaluation/education, Neuromuscular reeducation, Fine motor coordination activities, Compensatory technique education, Continued evaluation, Energy conservation, Activityengagement          See flowsheet documentation for full assessment, interventions and recommendations  Outcome: Progressing  Note: Limitation: Decreased ADL status, Decreased UE ROM, Decreased UE strength, Decreased Safe judgement during ADL, Decreased cognition, Decreased endurance, Decreased self-care trans, Decreased high-level ADLs, Decreased fine motor control  Prognosis: Fair  Assessment: Patient participated in Skilled OT session this date with interventions consisting of ADL re training with the use of correct body mechnaics, safety awareness and fall prevention techniques,  therapeutic activities to: increase activity tolerance, increase dynamic sit/ stand balance during functional activity  and increase OOB/ sitting tolerance   Upon arrival patient was found supine in bed  Pt demonstrated the following tasks: MOD A x 2 sup <> sit, MAX A x 2 STS  Pt maintains EOB sitting position with MIN A with static sitting MOD A dynamic sitting  Pt has tendency to lean towards L side  Pt requires MIN A for eating, grooming, UBD, MOD A LBD, MAX A toileting  Pt with increased use of L hand with cues  Able to grasp food items when placed in position  Towel rolls placed at end of session in L hand to encourage thumb ABD  Pt interaction continues to improve   Patient continues to be functioning below baseline level, occupational performance remains limited secondary to factors listed above and increased risk for falls and injury  From OT standpoint, recommendation at time of d/c would be STR  Patient to benefit from continued Occupational Therapy treatment while in the hospital to address deficits as defined above and maximize level of functional independence with ADLs and functional mobility  Pt was left in bed with alarm on after session with all current needs met  The patient's raw score on the AM-PAC Daily Activity inpatient short form is 15, standardized score is 34 69, less than 39 4  Patients at this level are likely to benefit from discharge to post-acute rehabilitation services  Please refer to the recommendation of the Occupational Therapist for safe discharge planning       OT Discharge Recommendation: Post acute rehabilitation services  OT - OK to Discharge: Yes(to rehab when medically stable )

## 2021-05-28 NOTE — QUICK NOTE
Spoke with nursing staff who spoke to patient previously stating that patient would like to hold a family meeting last Monday 5/31 before proceeding with any additional chemotherapy  Dr Melvi Enriquez will be the attending physician from Oncology for Monday 5/31  Chemotherapy plan on hold in [place] for now

## 2021-05-28 NOTE — PROGRESS NOTES
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 transferred over here for chemotherapy since the [facility] is out of network  Had a 2nd family meeting on 04/30 and family has decided to proceed treatment at this time  S/p PICC line for chemotherapy  S/p peg tube placement on 05/10  Plan:  Hematology-Oncology consulted  Appreciate recommendations  Chemotherapy treatment as per hematology oncology team     S/p 2nd cycle of chemotherapy on 05/13  Leucovin on 5/14  DVT prophylaxis: On heparin  Case management-will start looking for places for rehab  S/p steroid taper for cerebral edema from CNS lymphoma  Pancytopenia in setting of recent chemotherapy  Will continue to watch for any signs of infection  On acyclovir, Diflucan and Levaquin    After discussion with Oncology, plan for rehab, patient does not require radiation therapy, rehab for strength, no chemotherapy until at least 3 weeks, evaluate at that time  Patient's family came in yesterday afternoon, updated on plan going forward, no questions after family visit    Acute gout  Assessment & Plan  Appears to have acute gout flare right knee, negative pain now  Currently on prednisone 2 5 mg q day, based on recent chemotherapy will hold off on NSAIDs or colchicine, will and steroid taper 06/01/2021  Will start low-dose allopurinol 100 mg q day to hopefully prevent other acute flares in the setting of chemotherapy    Pain of left lower leg  Assessment & Plan  Overnight left lower extremity pain, well score 3, pain now controlled  Bilateral ultrasound Dopplers pending    Pancytopenia ([facility] [address] )  Assessment & Plan  Likely in setting of chemotherapy  Will continue to monitor for any signs of infection  Will transfuse with packed RBC for hemoglobin less than 7  Drop in Hgb from 9 to 7 on 5/14, other lines relatively stable  VSS and soft brown BMs  Will discuss with oncology if this is expected based on chemo or if there should be further workup for bleeding, though hemodynamics currently do not suggest bleed  S/p 1 pack RBC on 05/15, hemoglobin continues to improve    Left-sided weakness  Assessment & Plan  Patient has left-sided weakness at baseline from her CNS lymphoma  Left upper extremity 3-4/5 and left lower extremity 0-1/5 muscle strength at baseline  Severe protein-calorie malnutrition ([facility] )  Assessment & Plan  Malnutrition Findings:   Adult Malnutrition type: Acute illness(due to medical condition resulting in dysphagia, decreased appetite and intake, weight loss)  Adult Degree of Malnutrition: Other severe protein calorie malnutrition    BMI Findings: Body mass index is 20 24 kg/m²  Plan:  Nutrition consult  Tube feeds initiated with Jevity 1 2, advancing to goal      Fracture of ramus of left pubis [facility])  Assessment & Plan  Gale Roman prior to presentation to [facility]  Managed nonoperatively at [facility]    Presence of permanent central venous catheter  Assessment & Plan  Noted presence of right IJ tunneled double-lumen HD catheter; upon chart review, this was likely inserted to be used for plasmapheresis which she has completed        Plan:  S/p removal by IR   Patient now has PICC line  Weakness  Assessment & Plan  Left greater than right upper and lower extremity weakness with ongoing left lower extremity contracture  Secondary to underlying CNS malignancy  *Decubitus ulcer precautions such as frequent repositioning  Aphasia  Assessment & Plan  Definite evidence of expressive aphasia, possible component of receptive aphasia as well  Speech therapy on board  Urinary retention  Assessment & Plan  Patient developed urinary retention during previous hospitalization  Plan was voiding trial as an outpatient as she was scheduled for discharge from that facility  Machado catheter was initially removed although patient required re-placement of machado on 05/14 in setting of incontinence and getting chemotherapy  Machado discontinued, monitor for signs of fluid retention      Dysphagia  Assessment & Plan  Patient noted to have dysphagia and also decreased p o  Intake  As per [facility] progress note patient had a calorie count and recommended feeding tube placement  Speech evaluation - advance diet with dysphagia 2 and thin liquids; needs assistance with feeds  Calorie count  If the patient continued to have poor p o  Intake may need discussion with the family regarding alternate methods of nutrition  Patient's calorie intake is low  VBS- showed oropharyngeal dysphagia  s/p peg tube placement  Altered mental status  Assessment & Plan  Patient initially presented to [facility] his ultimate [place] with stroke-like symptoms for 2 days  CT scan of the head was concerning for subacute CVA and was transferred to [facility]   MRI of the brain was more consistent with demyelinating disease patient was started on IV steroids and 1000 mg daily for 5 days  Patient did not have any significant improvement at that time patient had plasmapheresis  And also had  lumbar puncture-negative for infection or malignancy    Patient noted to have persistent encephalopathy so a repeat MRI was obtained which showed worsening of the abnormality seen on the previous MRI and 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  Likely secondary to CNS lymphoma  Delirium precautions   Currently alert and oriented times 2-3  Follow commands  Patient's  (does not speak [place]) makes all medical decision for her  Sepsis (HCC)-resolved as of 2021  Assessment & Plan  Patient had hypothermia, elevated WBC count and tachycardia  Concern for possible aspiration in setting on dysphagia versus UTI in setting of urinary catheter which was placed for retention  Plan:  S/p IV antibiotics last day on   Will continue to monitor off antibiotics  Steroid-induced hyperglycemia-resolved as of 2021  Assessment & Plan  Mild hyperglycemia, glucoses in 200s, no history of DM  Occurring in the setting of Decadron with chemotherapy, as well as sodium bicarb in D5 drip  - D5 drip is finished  - will monitor sugars while on Jevity and now off D5  - continue q4 hour fingerstick glucose with correctional algorithm 1 and Lantus      Disposition:  Awaiting rehab placement     SUBJECTIVE     Patient seen and examined  No acute events overnight  OBJECTIVE     Vitals:    21 0721 21 1429 21 2217   BP: 107/67 107/69  107/69   Pulse: 94 99  84   Resp: 19 18  18   Temp:  98 7 °F (37 1 °C)  98 8 °F (37 1 °C)   TempSrc:       SpO2: 100% 98% 99% 100%   Weight:       Height:          Temperature:   Temp (24hrs), Av 8 °F (37 1 °C), Min:98 7 °F (37 1 °C), Max:98 8 °F (37 1 °C)    Temperature: 98 8 °F (37 1 °C)  Intake & Output:  I/O        0700 701 -  07 07    P  O  110 240     NG/GT  160     Feedings  360     Total Intake(mL/kg) 110 (2 2) 760 (15 4)     Urine (mL/kg/hr) 800 (0 7) 575 (0 5)     Stool 0 0     Total Output 800 575     Net -690 +185            Unmeasured Urine Occurrence 1 x 1 x     Unmeasured Stool Occurrence 3 x 1 x         Weights:   IBW (Ideal Body Weight): 45 5 kg    Body mass index is 21 31 kg/m²  Weight (last 2 days)     Date/Time   Weight    05/27/21 0558   49 5 (109 13)    05/26/21 0500   48 2 (106 26)            Physical Exam  Vitals signs and nursing note reviewed  Constitutional:       General: She is not in acute distress  Appearance: She is well-developed  She is ill-appearing  HENT:      Head: Normocephalic and atraumatic  Eyes:      Conjunctiva/sclera: Conjunctivae normal    Neck:      Musculoskeletal: Neck supple  Cardiovascular:      Rate and Rhythm: Normal rate and regular rhythm  Heart sounds: No murmur  Pulmonary:      Effort: Pulmonary effort is normal  No respiratory distress  Breath sounds: Normal breath sounds  Abdominal:      Palpations: Abdomen is soft  Tenderness: There is no abdominal tenderness  Musculoskeletal: Normal range of motion  General: No swelling  Skin:     General: Skin is warm and dry  Capillary Refill: Capillary refill takes less than 2 seconds  Neurological:      Mental Status: She is alert  She is disoriented  LABORATORY DATA     Labs: I have personally reviewed pertinent reports  Results from last 7 days   Lab Units 05/25/21  0505 05/23/21  0519   WBC Thousand/uL 6 76 5 82   HEMOGLOBIN g/dL 8 8* 8 0*   HEMATOCRIT % 28 4* 25 3*   PLATELETS Thousands/uL 117* 101*   MONO PCT %  --  9      Results from last 7 days   Lab Units 05/27/21  0558 05/25/21  0505 05/23/21  0519   POTASSIUM mmol/L 4 4 4 4 4 2   CHLORIDE mmol/L 101 97* 95*   CO2 mmol/L 27 29 27   BUN mg/dL 21 19 21   CREATININE mg/dL 0 30* 0 25* 0 27*   CALCIUM mg/dL 9 4 9 7 9 1                            IMAGING & DIAGNOSTIC TESTING     Radiology Results: I have personally reviewed pertinent reports    Xr Chest Portable    Result Date: 4/24/2021  Impression: Dialysis catheter tip within the superior cavoatrial junction  No pneumothorax  Workstation performed: [id number]     Xr Chest Picc Line Portable    Result Date: 4/27/2021  Impression: PICC line tip in the superior vena cava, approximately 3 5 to 4 cm above the cavoatrial junction  The examination demonstrates a significant  finding and was documented as such in [facility] for liaison and referring practitioner notification  Workstation performed: [id number]     Ir Picc Reposition    Result Date: 4/27/2021  Impression: Status-post repositioning of a 5 [place] central venous catheter under fluoroscopic guidance with its tip at the cavoatrial junction  Workstation performed: [id number]     Ir Tunneled Central Line Removal    Result Date: 4/27/2021  Impression: Impression: Successful tunneled central line removal as described  Signed, performed and dictated by Indy Mcdonough PA-C under the supervision of Dr Rasheed Steel  Workstation performed: [id number]     Other Diagnostic Testing: I have personally reviewed pertinent reports      ACTIVE MEDICATIONS     Current Facility-Administered Medications   Medication Dose Route Frequency    acetaminophen (TYLENOL) oral suspension 650 mg  650 mg Oral Q8H [address]    acyclovir (ZOVIRAX) capsule 400 mg  400 mg Oral Q12H [address]    albuterol (PROVENTIL HFA,VENTOLIN HFA) inhaler 2 puff  2 puff Inhalation Q6H PRN    allopurinol (ZYLOPRIM) tablet 100 mg  100 mg Oral Daily    aluminum-magnesium hydroxide-simethicone (MYLANTA) oral suspension 30 mL  30 mL Per PEG Tube Q4H PRN    amLODIPine (NORVASC) tablet 5 mg  5 mg Oral Daily    bisacodyl (DULCOLAX) rectal suppository 10 mg  10 mg Rectal Daily PRN    dicyclomine (BENTYL) capsule 10 mg  10 mg Oral 4x Daily (AC & HS)    enoxaparin (LOVENOX) subcutaneous injection 40 mg  40 mg Subcutaneous Q24H MEÑO    fluconazole (DIFLUCAN) tablet 400 mg  400 mg Oral Daily    gabapentin (NEURONTIN) oral solution 100 mg  100 mg Oral HS    HYDROmorphone (DILAUDID) injection 0 5 mg  0 5 mg Intravenous Q4H PRN    insulin glargine (LANTUS) subcutaneous injection 7 Units 0 07 mL  7 Units Subcutaneous HS    insulin lispro (HumaLOG) 100 units/mL subcutaneous injection 1-5 Units  1-5 Units Subcutaneous 4x Daily (AC & HS)    levofloxacin (LEVAQUIN) tablet 500 mg  500 mg Oral Q24H    lidocaine (LIDODERM) 5 % patch 1 patch  1 patch Topical Daily    menthol-methyl salicylate (BENGAY) 72-87 % cream   Apply externally BID    morphine injection 2 mg  2 mg Intravenous Q4H PRN    omeprazole (PRILOSEC) suspension 2 mg/mL  20 mg Oral Daily    ondansetron (ZOFRAN) injection 4 mg  4 mg Intravenous Q6H PRN    polyethylene glycol (MIRALAX) packet 17 g  17 g Oral Daily PRN    predniSONE tablet 2 5 mg  2 5 mg Oral Daily    senna-docusate sodium (SENOKOT S) 8 6-50 mg per tablet 1 tablet  1 tablet Oral BID PRN    sodium chloride 0 9 % infusion  20 mL/hr Intravenous Once PRN    sodium chloride 0 9 % infusion  20 mL/hr Intravenous Once PRN    sodium chloride 0 9 % infusion  20 mL/hr Intravenous Once PRN    tamsulosin (FLOMAX) capsule 0 4 mg  0 4 mg Oral Daily With Dinner       VTE Pharmacologic Prophylaxis: Enoxaparin (Lovenox)  VTE Mechanical Prophylaxis: sequential compression device    Portions of the record may have been created with voice recognition software  Occasional wrong word or "sound a like" substitutions may have occurred due to the inherent limitations of voice recognition software    Read the chart carefully and recognize, using context, where substitutions have occurred   ==  Elier Zambrano, [facility]  Internal Medicine Residency PGY-2

## 2021-05-28 NOTE — OCCUPATIONAL THERAPY NOTE
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[address] )  Active Problems:    Altered mental status    Dysphagia    Urinary retention    Aphasia    Weakness    Presence of permanent central venous catheter    Fracture of ramus of left pubis (HCC)    Severe protein-calorie malnutrition (HCC)    Left-sided weakness    Pancytopenia (HCC)    Acute gout    Pain of left lower leg            05/28/21 1122   OT Last Visit   OT Visit Date 05/28/21   Note Type   Note Type Treatment   Restrictions/Precautions   Weight Bearing Precautions Per Order No   Other Precautions Cognitive; Bed Alarm; Fall Risk;Multiple lines;Pain  (language barrier - speaks rainti )   Lifestyle   Autonomy Per chart, pt was I with ADLs, IADLs PTA    Reciprocal Relationships    Service to Others Unable to obtain   Intrinsic Gratification [employer]    Pain Assessment   Pain Assessment Tool FLACC   Pain Location/Orientation Location: Abdomen   Pain Rating: FLACC (Rest) - Face 1   Pain Rating: FLACC (Rest) - Legs 1   Pain Rating: FLACC (Rest) - Activity 1   Pain Rating: FLACC (Rest) - Cry 1   Pain Rating: FLACC (Rest) - Consolability 1   Score: FLACC (Rest) 5   Pain Rating: FLACC (Activity) - Face 1   Pain Rating: FLACC (Activity) - Legs 0   Pain Rating: FLACC (Activity) - Activity 1   Pain Rating: FLACC (Activity) - Cry 1   Pain Rating: FLACC (Activity) - Consolability 1   Score: FLACC (Activity) 4   ADL   Where Assessed Edge of bed   Eating Assistance 4  Minimal Assistance   Eating Deficit Increased time to complete;Bringing food to mouth assist;Verbal cueing;Supervision/safety   Eating Comments A to place L hand to grasp onto pudding up/drink cup, pt scoops with R hand  Decreased coordination when bringing to mouth    Grooming Assistance 4  Minimal Assistance   Grooming Deficit Wash/dry hands; Increased time to complete;Supervision/safety;Verbal cueing   Grooming Comments Washes hands in basin sitting EOB   UB Dressing Assistance 4  Minimal Assistance   UB Dressing Deficit Thread RUE; Thread LUE; Increased time to complete   LB Dressing Assistance 3  Moderate Assistance   LB Dressing Deficit Don/doff R sock; Don/doff L sock   LB Dressing Comments MOD A to put into cross-legged position  Pt unable to reach to toes to initiate donning  Requires posterior assist to maintain upright sitting during task    Toileting Assistance  2  Maximal Assistance   Toileting Deficit Perineal hygiene   Bed Mobility   Rolling R 4  Minimal assistance   Additional items Assist x 1;Bedrails   Rolling L 4  Minimal assistance   Additional items Assist x 1;Bedrails   Supine to Sit 3  Moderate assistance   Additional items Assist x 2; Increased time required   Sit to Supine 3  Moderate assistance   Additional items Assist x 2; Increased time required   Transfers   Sit to Stand 2  Maximal assistance   Additional items Assist x 2; Increased time required   Stand to Sit 2  Maximal assistance   Additional items Assist x 2; Increased time required   Additional Comments x2 STS trials, pt with limited standing tolerance at this time  Stand for < 30 seconds   Cognition   Overall Cognitive Status Impaired   Arousal/Participation Alert; Responsive; Cooperative   Attention Attends with cues to redirect   Following Commands Follows one step commands with increased time or repetition   Comments Pt alert t/o session, continues to have improved command following  At times has difficulty expressing desires/needs  Utilized blue  phone during session, pt does speak occasional English words      Activity Tolerance   Activity Tolerance Patient limited by fatigue;Patient limited by pain   Medical Staff Made Aware RT Sachi Phani   Assessment   Assessment Patient participated in Skilled OT session this date with interventions consisting of ADL re training with the use of correct body mechnaics, safety awareness and fall prevention techniques,  therapeutic activities to: increase activity tolerance, increase dynamic sit/ stand balance during functional activity  and increase OOB/ sitting tolerance   Upon arrival patient was found supine in bed  Pt demonstrated the following tasks: MOD A x 2 sup <> sit, MAX A x 2 STS  Pt maintains EOB sitting position with MIN A with static sitting MOD A dynamic sitting  Pt has tendency to lean towards L side  Pt requires MIN A for eating, grooming, UBD, MOD A LBD, MAX A toileting  Pt with increased use of L hand with cues  Able to grasp food items when placed in position  Towel rolls placed at end of session in L hand to encourage thumb ABD  Pt interaction continues to improve  Patient continues to be functioning below baseline level, occupational performance remains limited secondary to factors listed above and increased risk for falls and injury  From OT standpoint, recommendation at time of d/c would be STR  Patient to benefit from continued Occupational Therapy treatment while in the hospital to address deficits as defined above and maximize level of functional independence with ADLs and functional mobility  Pt was left in bed with alarm on after session with all current needs met  The patient's raw score on the AM-PAC Daily Activity inpatient short form is 15, standardized score is 34 69, less than 39 4  Patients at this level are likely to benefit from discharge to post-acute rehabilitation services  Please refer to the recommendation of the Occupational Therapist for safe discharge planning  Plan   Treatment Interventions ADL retraining;Functional transfer training;Visual perceptual retraining;UE strengthening/ROM; Endurance training;Cognitive reorientation;Patient/family training;Equipment evaluation/education; Neuromuscular reeducation; Compensatory technique education; Fine motor coordination activities;Continued evaluation; Energy conservation; Activityengagement   Goal Expiration Date 06/08/21   OT Treatment Day 6   OT Frequency 3-5x/wk   Recommendation   OT Discharge Recommendation Post acute rehabilitation services   OT - OK to Discharge Yes  (to rehab when medically stable )   AM-PAC Daily Activity Inpatient   Lower Body Dressing 2   Bathing 2   Toileting 2   Upper Body Dressing 3   Grooming 3   Eating 3   Daily Activity Raw Score 15   Daily Activity Standardized Score (Calc for Raw Score >=11) 34 69   AM-PAC Applied Cognition Inpatient   Following a Speech/Presentation 3   Understanding Ordinary Conversation 3   Taking Medications 3   Remembering Where Things Are Placed or Put Away 3   Remembering List of 4-5 Errands 2   Taking Care of Complicated Tasks 2   Applied Cognition Raw Score 16   Applied Cognition Standardized Score 35 03          Marguerite Salvador MS, OTR/L

## 2021-05-28 NOTE — PLAN OF CARE
Problem: Prexisting or High Potential for Compromised Skin Integrity  Goal: Skin integrity is maintained or improved  Description: INTERVENTIONS:  - Identify patients at risk for skin breakdown  - Assess and monitor skin integrity  - Assess and monitor nutrition and hydration status  - Monitor labs   - Assess for incontinence   - Turn and reposition patient  - Assist with mobility/ambulation  - Relieve pressure over bony prominences  - Avoid friction and shearing  - Provide appropriate hygiene as needed including keeping skin clean and dry  - Evaluate need for skin moisturizer/barrier cream  - Collaborate with interdisciplinary team   - Patient/family teaching  - Consider wound care consult   Outcome: Progressing     Problem: Potential for Falls  Goal: Patient will remain free of falls  Description: INTERVENTIONS:  - Assess patient frequently for physical needs  -  Identify cognitive and physical deficits and behaviors that affect risk of falls  -  [place] fall precautions as indicated by assessment   - Educate patient/family on patient safety including physical limitations  - Instruct patient to call for assistance with activity based on assessment  - Modify environment to reduce risk of injury  - Consider OT/PT consult to assist with strengthening/mobility  Outcome: Progressing     Problem: Nutrition/Hydration-ADULT  Goal: Nutrient/Hydration intake appropriate for improving, restoring or maintaining nutritional needs  Description: Monitor and assess patient's nutrition/hydration status for malnutrition  Collaborate with interdisciplinary team and initiate plan and interventions as ordered  Monitor patient's weight and dietary intake as ordered or per policy  Utilize nutrition screening tool and intervene as necessary  Determine patient's food preferences and provide high-protein, high-caloric foods as appropriate       INTERVENTIONS:  - Monitor oral intake, urinary output, labs, and treatment plans  - Assess nutrition and hydration status and recommend course of action  - Evaluate amount of meals eaten  - Assist patient with eating if necessary   - Allow adequate time for meals  - Recommend/ encourage appropriate diets, oral nutritional supplements, and vitamin/mineral supplements  - Order, calculate, and assess calorie counts as needed  - Recommend, monitor, and adjust tube feedings and TPN/PPN based on assessed needs  - Assess need for intravenous fluids  - Provide specific nutrition/hydration education as appropriate  - Include patient/family/caregiver in decisions related to nutrition  Outcome: Progressing     Problem: PAIN - ADULT  Goal: Verbalizes/displays adequate comfort level or baseline comfort level  Description: Interventions:  - Encourage patient to monitor pain and request assistance  - Assess pain using appropriate pain scale  - Administer analgesics based on type and severity of pain and evaluate response  - Implement non-pharmacological measures as appropriate and evaluate response  - Consider cultural and social influences on pain and pain management  - Notify physician/advanced practitioner if interventions unsuccessful or patient reports new pain  Outcome: Progressing     Problem: INFECTION - ADULT  Goal: Absence or prevention of progression during hospitalization  Description: INTERVENTIONS:  - Assess and monitor for signs and symptoms of infection  - Monitor lab/diagnostic results  - Monitor all insertion sites, i e  indwelling lines, tubes, and drains  - Monitor endotracheal if appropriate and nasal secretions for changes in amount and color  - [place] appropriate cooling/warming therapies per order  - Administer medications as ordered  - Instruct and encourage patient and family to use good hand hygiene technique  - Identify and instruct in appropriate isolation precautions for identified infection/condition  Outcome: Progressing     Problem: SAFETY ADULT  Goal: Patient will remain free of falls  Description: INTERVENTIONS:  - Assess patient frequently for physical needs  -  Identify cognitive and physical deficits and behaviors that affect risk of falls    -  [place] fall precautions as indicated by assessment   - Educate patient/family on patient safety including physical limitations  - Instruct patient to call for assistance with activity based on assessment  - Modify environment to reduce risk of injury  - Consider OT/PT consult to assist with strengthening/mobility  Outcome: Progressing  Goal: Maintain or return to baseline ADL function  Description: INTERVENTIONS:  -  Assess patient's ability to carry out ADLs; assess patient's baseline for ADL function and identify physical deficits which impact ability to perform ADLs (bathing, care of mouth/teeth, toileting, grooming, dressing, etc )  - Assess/evaluate cause of self-care deficits   - Assess range of motion  - Assess patient's mobility; develop plan if impaired  - Assess patient's need for assistive devices and provide as appropriate  - Encourage maximum independence but intervene and supervise when necessary  - Involve family in performance of ADLs  - Assess for home care needs following discharge   - Consider OT consult to assist with ADL evaluation and planning for discharge  - Provide patient education as appropriate  Outcome: Progressing  Goal: Maintain or return mobility status to optimal level  Description: INTERVENTIONS:  - Assess patient's baseline mobility status (ambulation, transfers, stairs, etc )    - Identify cognitive and physical deficits and behaviors that affect mobility  - Identify mobility aids required to assist with transfers and/or ambulation (gait belt, sit-to-stand, lift, walker, cane, etc )  - [place] fall precautions as indicated by assessment  - Record patient progress and toleration of activity level on Mobility SBAR; progress patient to next Phase/Stage  - Instruct patient to call for assistance with activity based on assessment  - Consider rehabilitation consult to assist with strengthening/weightbearing, etc   Outcome: Progressing     Problem: DISCHARGE PLANNING  Goal: Discharge to home or other facility with appropriate resources  Description: INTERVENTIONS:  - Identify barriers to discharge w/patient and caregiver  - Arrange for needed discharge resources and transportation as appropriate  - Identify discharge learning needs (meds, wound care, etc )  - Arrange for interpretive services to assist at discharge as needed  - Refer to Case Management Department for coordinating discharge planning if the patient needs post-hospital services based on physician/advanced practitioner order or complex needs related to functional status, cognitive ability, or social support system  Outcome: Progressing     Problem: Knowledge Deficit  Goal: Patient/family/caregiver demonstrates understanding of disease process, treatment plan, medications, and discharge instructions  Description: Complete learning assessment and assess knowledge base    Interventions:  - Provide teaching at level of understanding  - Provide teaching via preferred learning methods  Outcome: Progressing

## 2021-05-29 LAB
GLUCOSE SERPL-MCNC: 130 MG/DL (ref 65–140)
GLUCOSE SERPL-MCNC: 140 MG/DL (ref 65–140)
GLUCOSE SERPL-MCNC: 145 MG/DL (ref 65–140)
GLUCOSE SERPL-MCNC: 203 MG/DL (ref 65–140)

## 2021-05-29 PROCEDURE — 99232 SBSQ HOSP IP/OBS MODERATE 35: CPT | Performed by: INTERNAL MEDICINE

## 2021-05-29 PROCEDURE — 82948 REAGENT STRIP/BLOOD GLUCOSE: CPT

## 2021-05-29 RX ADMIN — INSULIN LISPRO 1 UNITS: 100 INJECTION, SOLUTION INTRAVENOUS; SUBCUTANEOUS at 21:43

## 2021-05-29 RX ADMIN — DICYCLOMINE HYDROCHLORIDE 10 MG: 10 CAPSULE ORAL at 21:42

## 2021-05-29 RX ADMIN — Medication 20 MG: at 11:26

## 2021-05-29 RX ADMIN — MENTHOL, UNSPECIFIED FORM AND METHYL SALICYLATE: 10; 150 CREAM TOPICAL at 17:00

## 2021-05-29 RX ADMIN — LIDOCAINE 5% 1 PATCH: 700 PATCH TOPICAL at 09:17

## 2021-05-29 RX ADMIN — HYDROMORPHONE HYDROCHLORIDE 0.5 MG: 1 INJECTION, SOLUTION INTRAMUSCULAR; INTRAVENOUS; SUBCUTANEOUS at 00:08

## 2021-05-29 RX ADMIN — ALLOPURINOL 100 MG: 100 TABLET ORAL at 09:16

## 2021-05-29 RX ADMIN — ACETAMINOPHEN 650 MG: 650 SUSPENSION ORAL at 21:41

## 2021-05-29 RX ADMIN — LEVOFLOXACIN 500 MG: 500 TABLET, FILM COATED ORAL at 16:54

## 2021-05-29 RX ADMIN — ACYCLOVIR 400 MG: 200 CAPSULE ORAL at 09:15

## 2021-05-29 RX ADMIN — ENOXAPARIN SODIUM 40 MG: 40 INJECTION SUBCUTANEOUS at 13:46

## 2021-05-29 RX ADMIN — ACYCLOVIR 400 MG: 200 CAPSULE ORAL at 21:42

## 2021-05-29 RX ADMIN — ACETAMINOPHEN 650 MG: 650 SUSPENSION ORAL at 06:58

## 2021-05-29 RX ADMIN — ACETAMINOPHEN 650 MG: 650 SUSPENSION ORAL at 13:46

## 2021-05-29 RX ADMIN — TAMSULOSIN HYDROCHLORIDE 0.4 MG: 0.4 CAPSULE ORAL at 16:53

## 2021-05-29 RX ADMIN — MORPHINE SULFATE 2 MG: 2 INJECTION, SOLUTION INTRAMUSCULAR; INTRAVENOUS at 03:14

## 2021-05-29 RX ADMIN — DICYCLOMINE HYDROCHLORIDE 10 MG: 10 CAPSULE ORAL at 16:53

## 2021-05-29 RX ADMIN — GABAPENTIN 100 MG: 250 SUSPENSION ORAL at 21:42

## 2021-05-29 RX ADMIN — MORPHINE SULFATE 2 MG: 2 INJECTION, SOLUTION INTRAMUSCULAR; INTRAVENOUS at 21:42

## 2021-05-29 RX ADMIN — INSULIN GLARGINE 7 UNITS: 100 INJECTION, SOLUTION SUBCUTANEOUS at 21:44

## 2021-05-29 RX ADMIN — PREDNISONE 2.5 MG: 2.5 TABLET ORAL at 09:16

## 2021-05-29 RX ADMIN — DICYCLOMINE HYDROCHLORIDE 10 MG: 10 CAPSULE ORAL at 12:31

## 2021-05-29 RX ADMIN — MORPHINE SULFATE 2 MG: 2 INJECTION, SOLUTION INTRAMUSCULAR; INTRAVENOUS at 16:53

## 2021-05-29 RX ADMIN — MORPHINE SULFATE 2 MG: 2 INJECTION, SOLUTION INTRAMUSCULAR; INTRAVENOUS at 09:05

## 2021-05-29 RX ADMIN — FLUCONAZOLE 400 MG: 200 TABLET ORAL at 09:15

## 2021-05-29 RX ADMIN — DICYCLOMINE HYDROCHLORIDE 10 MG: 10 CAPSULE ORAL at 06:58

## 2021-05-29 RX ADMIN — HYDROMORPHONE HYDROCHLORIDE 0.5 MG: 1 INJECTION, SOLUTION INTRAMUSCULAR; INTRAVENOUS; SUBCUTANEOUS at 05:16

## 2021-05-29 RX ADMIN — MENTHOL, UNSPECIFIED FORM AND METHYL SALICYLATE 1 APPLICATION: 10; 150 CREAM TOPICAL at 09:18

## 2021-05-29 NOTE — PROGRESS NOTES
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[address] )  Active Problems:    Altered mental status    Dysphagia    Urinary retention    Aphasia    Weakness    Presence of permanent central venous catheter    Fracture of ramus of left pubis (HCC)    Severe protein-calorie malnutrition (HCC)    Left-sided weakness    Pancytopenia (HCC)    Acute gout    Pain of left lower leg      * CNS lymphoma [facility])  Assessment & Plan  Patient was diagnosed with primary CNS lymphoma through biopsy in [facility] transferred over here for chemotherapy since the [facility] is out of network  Had a 2nd family meeting on 04/30 and family has decided to proceed treatment at this time  S/p PICC line for chemotherapy  S/p peg tube placement on 05/10  Plan:  Hematology-Oncology consulted  Appreciate recommendations  Chemotherapy treatment as per hematology oncology team     S/p 2nd cycle of chemotherapy on 05/13  Leucovin on 5/14  DVT prophylaxis: On heparin  Case management-will start looking for places for rehab  S/p steroid taper for cerebral edema from CNS lymphoma  Pancytopenia in setting of recent chemotherapy  Will continue to watch for any signs of infection  On acyclovir, Diflucan and Levaquin    After discussion with Oncology, plan for rehab, patient does not require radiation therapy, rehab for strength, no chemotherapy until at least 3 weeks, evaluate at that time  Patient's family updated on plan going forward, no questions after family visit    Pain of left lower leg  Assessment & Plan  Overnight left lower extremity pain, well score 3, pain now controlled  Bilateral ultrasound Dopplers no evidence of DVT    Acute gout  Assessment & Plan  Appears to have acute gout flare right knee, negative pain now  Currently on prednisone 2 5 mg q day, based on recent chemotherapy will hold off on NSAIDs or colchicine, will and steroid taper 06/01/2021  Will start low-dose allopurinol 100 mg q day to hopefully prevent other acute flares in the setting of chemotherapy    Pancytopenia ([address] )  Assessment & Plan  Likely in setting of chemotherapy  Will continue to monitor for any signs of infection  Will transfuse with packed RBC for hemoglobin less than 7  Drop in Hgb from 9 to 7 on 5/14, other lines relatively stable  VSS and soft brown BMs  Will discuss with oncology if this is expected based on chemo or if there should be further workup for bleeding, though hemodynamics currently do not suggest bleed  S/p 1 pack RBC on 05/15, hemoglobin continues to improve    Left-sided weakness  Assessment & Plan  Patient has left-sided weakness at baseline from her CNS lymphoma  Left upper extremity 3-4/5 and left lower extremity 0-1/5 muscle strength    Severe protein-calorie malnutrition (HCC)  Assessment & Plan  Malnutrition Findings:   Adult Malnutrition type: Acute illness(due to medical condition resulting in dysphagia, decreased appetite and intake, weight loss)  Adult Degree of Malnutrition: Other severe protein calorie malnutrition    BMI Findings: Body mass index is 21 44 kg/m²  Plan:  Nutrition consult  Tube feeds initiated with Jevity 1 2, advancing to goal      Fracture of ramus of left pubis [facility])  Assessment & Plan  Radha Serum prior to presentation to [facility]  Managed nonoperatively at [facility]    Presence of permanent central venous catheter  Assessment & Plan  Noted presence of right IJ tunneled double-lumen HD catheter; upon chart review, this was likely inserted to be used for plasmapheresis which she has completed  Plan:  S/p removal by IR    Patient now has PICC line     Weakness  Assessment & Plan  Left greater than right upper and lower extremity weakness with ongoing left lower extremity contracture  Secondary to underlying CNS malignancy  Decubitus ulcer precautions such as frequent repositioning  Aphasia  Assessment & Plan  Speech therapy on board  Urinary retention  Assessment & Plan  Patient developed urinary retention during previous hospitalization  Plan was voiding trial as an outpatient as she was scheduled for discharge from that facility  Machado catheter was initially removed although patient required re-placement of machado on 05/14 in setting of incontinence and getting chemotherapy  Machado discontinued, monitor for signs of fluid retention      Dysphagia  Assessment & Plan  Patient noted to have dysphagia and also decreased p o  Intake  As per [facility] progress note patient had a calorie count and recommended feeding tube placement  Speech evaluation - advance diet with dysphagia 2 and thin liquids; needs assistance with feeds  Calorie count  If the patient continued to have poor p o  Intake may need discussion with the family regarding alternate methods of nutrition  Patient's calorie intake is low  VBS- showed oropharyngeal dysphagia  s/p peg tube placement  Altered mental status  Assessment & Plan  Patient initially presented to [facility] his ultimate [place] with stroke-like symptoms for 2 days  CT scan of the head was concerning for subacute CVA and was transferred to [facility]   MRI of the brain was more consistent with demyelinating disease patient was started on IV steroids and 1000 mg daily for 5 days  Patient did not have any significant improvement at that time patient had plasmapheresis  And also had  lumbar puncture-negative for infection or malignancy    Patient noted to have persistent encephalopathy so a repeat MRI was obtained which showed worsening of the abnormality seen on the previous MRI and 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  Likely secondary to CNS lymphoma  Delirium precautions   Currently alert and oriented times 2-3  Follow commands  Patient's  (does not speak [place]) makes all medical decision for her  Sepsis (HCC)-resolved as of 2021  Assessment & Plan  Patient had hypothermia, elevated WBC count and tachycardia  Concern for possible aspiration in setting on dysphagia versus UTI in setting of urinary catheter which was placed for retention  Plan:  S/p IV antibiotics last day on   Will continue to monitor off antibiotics  Steroid-induced hyperglycemia-resolved as of 2021  Assessment & Plan  Mild hyperglycemia, glucoses in 200s, no history of DM  Occurring in the setting of Decadron with chemotherapy, as well as sodium bicarb in D5 drip  - D5 drip is finished  - will monitor sugars while on Jevity and now off D5  - continue q4 hour fingerstick glucose with correctional algorithm 1 and Lantus      Disposition:  Pending rehab please    SUBJECTIVE     Patient seen and examined  No acute events overnight  OBJECTIVE     Vitals:    21 1521 21 2232 21 0600 21 0749   BP: 104/67 106/67  102/66   BP Location:    Left arm   Pulse: 81 82  87   Resp:  17  20   Temp: 97 8 °F (36 6 °C) 97 7 °F (36 5 °C)  98 9 °F (37 2 °C)   TempSrc:    Oral   SpO2: 99% 100%  100%   Weight:   49 8 kg (109 lb 12 6 oz)    Height:          Temperature:   Temp (24hrs), Av 1 °F (36 7 °C), Min:97 7 °F (36 5 °C), Max:98 9 °F (37 2 °C)    Temperature: 98 9 °F (37 2 °C)  Intake & Output:  I/O        07 -  07 -  07 -  07    P  O  240 5     NG/ 1211     Feedings 360 2227     Total Intake(mL/kg) 760 (15 2) 3443 (69 1)     Urine (mL/kg/hr) 575 (0 5) 1050 (0 9)     Stool 0 0     Total Output 575 1050     Net +185 +2393            Unmeasured Urine Occurrence 1 x      Unmeasured Stool Occurrence 1 x 2 x         Weights:   IBW (Ideal Body Weight): 45 5 kg    Body mass index is 21 44 kg/m²  Weight (last 2 days)     Date/Time   Weight    05/29/21 0600   49 8 (109 79)    05/28/21 0600   49 9 (110)    05/27/21 0558   49 5 (109 13)            Physical Exam  Vitals signs and nursing note reviewed  Constitutional:       General: She is not in acute distress  Appearance: She is well-developed  HENT:      Head: Normocephalic and atraumatic  Eyes:      Conjunctiva/sclera: Conjunctivae normal    Neck:      Musculoskeletal: Neck supple  Cardiovascular:      Rate and Rhythm: Normal rate and regular rhythm  Heart sounds: No murmur  Pulmonary:      Effort: Pulmonary effort is normal  No respiratory distress  Breath sounds: Normal breath sounds  Abdominal:      Palpations: Abdomen is soft  Tenderness: There is no abdominal tenderness  Skin:     General: Skin is warm and dry  Neurological:      Mental Status: She is alert  Comments: Left upper and lower extremity weakness       LABORATORY DATA     Labs: I have personally reviewed pertinent reports  Results from last 7 days   Lab Units 05/25/21  0505 05/23/21  0519   WBC Thousand/uL 6 76 5 82   HEMOGLOBIN g/dL 8 8* 8 0*   HEMATOCRIT % 28 4* 25 3*   PLATELETS Thousands/uL 117* 101*   MONO PCT %  --  9      Results from last 7 days   Lab Units 05/27/21  0558 05/25/21  0505 05/23/21  0519   POTASSIUM mmol/L 4 4 4 4 4 2   CHLORIDE mmol/L 101 97* 95*   CO2 mmol/L 27 29 27   BUN mg/dL 21 19 21   CREATININE mg/dL 0 30* 0 25* 0 27*   CALCIUM mg/dL 9 4 9 7 9 1                            IMAGING & DIAGNOSTIC TESTING     Radiology Results: I have personally reviewed pertinent reports  Xr Chest Portable    Result Date: 4/24/2021  Impression: Dialysis catheter tip within the superior cavoatrial junction  No pneumothorax   Workstation performed: [id number]     Xr Chest Picc Line Portable    Result Date: 4/27/2021  Impression: PICC line tip in the superior vena cava, approximately 3 5 to 4 cm above the cavoatrial junction  The examination demonstrates a significant  finding and was documented as such in [facility] for liaison and referring practitioner notification  Workstation performed: [id number]     Ir Picc Reposition    Result Date: 4/27/2021  Impression: Status-post repositioning of a 5 Sinhala central venous catheter under fluoroscopic guidance with its tip at the cavoatrial junction  Workstation performed: [id number]     Ir Tunneled Central Line Removal    Result Date: 4/27/2021  Impression: Impression: Successful tunneled central line removal as described  Signed, performed and dictated by Bruna Savage PA-C under the supervision of Dr Brandon Celaya  Workstation performed: [id number]     Other Diagnostic Testing: I have personally reviewed pertinent reports      ACTIVE MEDICATIONS     Current Facility-Administered Medications   Medication Dose Route Frequency    acetaminophen (TYLENOL) oral suspension 650 mg  650 mg Oral Q8H [facility]    acyclovir (ZOVIRAX) capsule 400 mg  400 mg Oral Q12H [facility]    albuterol (PROVENTIL HFA,VENTOLIN HFA) inhaler 2 puff  2 puff Inhalation Q6H PRN    allopurinol (ZYLOPRIM) tablet 100 mg  100 mg Oral Daily    aluminum-magnesium hydroxide-simethicone (MYLANTA) oral suspension 30 mL  30 mL Per PEG Tube Q4H PRN    amLODIPine (NORVASC) tablet 5 mg  5 mg Oral Daily    bisacodyl (DULCOLAX) rectal suppository 10 mg  10 mg Rectal Daily PRN    Diclofenac Sodium (VOLTAREN) 1 % topical gel 2 g  2 g Topical TID PRN    dicyclomine (BENTYL) capsule 10 mg  10 mg Oral 4x Daily (AC & HS)    enoxaparin (LOVENOX) subcutaneous injection 40 mg  40 mg Subcutaneous Q24H MEÑO    fluconazole (DIFLUCAN) tablet 400 mg  400 mg Oral Daily    gabapentin (NEURONTIN) oral solution 100 mg  100 mg Oral HS    HYDROmorphone (DILAUDID) injection 0 5 mg  0 5 mg Intravenous Q4H PRN    insulin glargine (LANTUS) subcutaneous injection 7 Units 0 07 mL  7 Units Subcutaneous HS    insulin lispro (HumaLOG) 100 units/mL subcutaneous injection 1-5 Units  1-5 Units Subcutaneous 4x Daily (AC & HS)    levofloxacin (LEVAQUIN) tablet 500 mg  500 mg Oral Q24H    lidocaine (LIDODERM) 5 % patch 1 patch  1 patch Topical Daily    menthol-methyl salicylate (BENGAY) 67-03 % cream   Apply externally BID    morphine injection 2 mg  2 mg Intravenous Q4H PRN    omeprazole (PRILOSEC) suspension 2 mg/mL  20 mg Oral Daily    ondansetron (ZOFRAN) injection 4 mg  4 mg Intravenous Q6H PRN    polyethylene glycol (MIRALAX) packet 17 g  17 g Oral Daily PRN    predniSONE tablet 2 5 mg  2 5 mg Oral Daily    senna-docusate sodium (SENOKOT S) 8 6-50 mg per tablet 1 tablet  1 tablet Oral BID PRN    [MAR Hold] sodium chloride 0 9 % infusion  20 mL/hr Intravenous Once PRN    [MAR Hold] sodium chloride 0 9 % infusion  20 mL/hr Intravenous Once PRN    [MAR Hold] sodium chloride 0 9 % infusion  20 mL/hr Intravenous Once PRN    tamsulosin (FLOMAX) capsule 0 4 mg  0 4 mg Oral Daily With Dinner       VTE Pharmacologic Prophylaxis: Enoxaparin (Lovenox)  VTE Mechanical Prophylaxis: sequential compression device    Portions of the record may have been created with voice recognition software  Occasional wrong word or "sound a like" substitutions may have occurred due to the inherent limitations of voice recognition software    Read the chart carefully and recognize, using context, where substitutions have occurred   ==  Iram Guevara MD  [address]  Internal Medicine Residency PGY-2

## 2021-05-30 LAB
ANION GAP SERPL CALCULATED.3IONS-SCNC: 7 MMOL/L (ref 4–13)
ANISOCYTOSIS BLD QL SMEAR: PRESENT
BASOPHILS # BLD MANUAL: 0.07 THOUSAND/UL (ref 0–0.1)
BASOPHILS NFR MAR MANUAL: 1 % (ref 0–1)
BUN SERPL-MCNC: 17 MG/DL (ref 5–25)
CALCIUM SERPL-MCNC: 9.2 MG/DL (ref 8.3–10.1)
CHLORIDE SERPL-SCNC: 103 MMOL/L (ref 100–108)
CO2 SERPL-SCNC: 27 MMOL/L (ref 21–32)
CREAT SERPL-MCNC: 0.27 MG/DL (ref 0.6–1.3)
DACRYOCYTES BLD QL SMEAR: PRESENT
EOSINOPHIL # BLD MANUAL: 0 THOUSAND/UL (ref 0–0.4)
EOSINOPHIL NFR BLD MANUAL: 0 % (ref 0–6)
ERYTHROCYTE [DISTWIDTH] IN BLOOD BY AUTOMATED COUNT: 19.8 % (ref 11.6–15.1)
GFR SERPL CREATININE-BSD FRML MDRD: 135 ML/MIN/1.73SQ M
GLUCOSE SERPL-MCNC: 119 MG/DL (ref 65–140)
GLUCOSE SERPL-MCNC: 120 MG/DL (ref 65–140)
GLUCOSE SERPL-MCNC: 141 MG/DL (ref 65–140)
GLUCOSE SERPL-MCNC: 156 MG/DL (ref 65–140)
GLUCOSE SERPL-MCNC: 160 MG/DL (ref 65–140)
HCT VFR BLD AUTO: 27.3 % (ref 34.8–46.1)
HGB BLD-MCNC: 8.5 G/DL (ref 11.5–15.4)
LYMPHOCYTES # BLD AUTO: 2.6 THOUSAND/UL (ref 0.6–4.47)
LYMPHOCYTES # BLD AUTO: 37 % (ref 14–44)
MCH RBC QN AUTO: 30.9 PG (ref 26.8–34.3)
MCHC RBC AUTO-ENTMCNC: 31.1 G/DL (ref 31.4–37.4)
MCV RBC AUTO: 99 FL (ref 82–98)
MONOCYTES # BLD AUTO: 1.62 THOUSAND/UL (ref 0–1.22)
MONOCYTES NFR BLD: 23 % (ref 4–12)
NEUTROPHILS # BLD MANUAL: 2.74 THOUSAND/UL (ref 1.85–7.62)
NEUTS BAND NFR BLD MANUAL: 1 % (ref 0–8)
NEUTS SEG NFR BLD AUTO: 38 % (ref 43–75)
NRBC BLD AUTO-RTO: 1 /100 WBCS
PLATELET # BLD AUTO: 247 THOUSANDS/UL (ref 149–390)
PLATELET BLD QL SMEAR: ADEQUATE
PMV BLD AUTO: 10.1 FL (ref 8.9–12.7)
POIKILOCYTOSIS BLD QL SMEAR: PRESENT
POLYCHROMASIA BLD QL SMEAR: PRESENT
POTASSIUM SERPL-SCNC: 3.7 MMOL/L (ref 3.5–5.3)
RBC # BLD AUTO: 2.75 MILLION/UL (ref 3.81–5.12)
RBC MORPH BLD: PRESENT
SODIUM SERPL-SCNC: 137 MMOL/L (ref 136–145)
WBC # BLD AUTO: 7.03 THOUSAND/UL (ref 4.31–10.16)

## 2021-05-30 PROCEDURE — 85027 COMPLETE CBC AUTOMATED: CPT | Performed by: STUDENT IN AN ORGANIZED HEALTH CARE EDUCATION/TRAINING PROGRAM

## 2021-05-30 PROCEDURE — 99232 SBSQ HOSP IP/OBS MODERATE 35: CPT | Performed by: INTERNAL MEDICINE

## 2021-05-30 PROCEDURE — 82948 REAGENT STRIP/BLOOD GLUCOSE: CPT

## 2021-05-30 PROCEDURE — 85007 BL SMEAR W/DIFF WBC COUNT: CPT | Performed by: STUDENT IN AN ORGANIZED HEALTH CARE EDUCATION/TRAINING PROGRAM

## 2021-05-30 PROCEDURE — 80048 BASIC METABOLIC PNL TOTAL CA: CPT | Performed by: STUDENT IN AN ORGANIZED HEALTH CARE EDUCATION/TRAINING PROGRAM

## 2021-05-30 RX ADMIN — DICYCLOMINE HYDROCHLORIDE 10 MG: 10 CAPSULE ORAL at 06:18

## 2021-05-30 RX ADMIN — MENTHOL, UNSPECIFIED FORM AND METHYL SALICYLATE 1 APPLICATION: 10; 150 CREAM TOPICAL at 08:53

## 2021-05-30 RX ADMIN — LIDOCAINE 5% 1 PATCH: 700 PATCH TOPICAL at 09:01

## 2021-05-30 RX ADMIN — ACETAMINOPHEN 650 MG: 650 SUSPENSION ORAL at 22:09

## 2021-05-30 RX ADMIN — INSULIN GLARGINE 7 UNITS: 100 INJECTION, SOLUTION SUBCUTANEOUS at 21:53

## 2021-05-30 RX ADMIN — ACYCLOVIR 400 MG: 200 CAPSULE ORAL at 22:09

## 2021-05-30 RX ADMIN — INSULIN LISPRO 1 UNITS: 100 INJECTION, SOLUTION INTRAVENOUS; SUBCUTANEOUS at 17:12

## 2021-05-30 RX ADMIN — ACYCLOVIR 400 MG: 200 CAPSULE ORAL at 09:01

## 2021-05-30 RX ADMIN — ALLOPURINOL 100 MG: 100 TABLET ORAL at 09:01

## 2021-05-30 RX ADMIN — HYDROMORPHONE HYDROCHLORIDE 0.5 MG: 1 INJECTION, SOLUTION INTRAMUSCULAR; INTRAVENOUS; SUBCUTANEOUS at 14:14

## 2021-05-30 RX ADMIN — TAMSULOSIN HYDROCHLORIDE 0.4 MG: 0.4 CAPSULE ORAL at 17:12

## 2021-05-30 RX ADMIN — DICYCLOMINE HYDROCHLORIDE 10 MG: 10 CAPSULE ORAL at 17:11

## 2021-05-30 RX ADMIN — PREDNISONE 2.5 MG: 2.5 TABLET ORAL at 09:01

## 2021-05-30 RX ADMIN — DICYCLOMINE HYDROCHLORIDE 10 MG: 10 CAPSULE ORAL at 12:06

## 2021-05-30 RX ADMIN — FLUCONAZOLE 400 MG: 200 TABLET ORAL at 09:01

## 2021-05-30 RX ADMIN — HYDROMORPHONE HYDROCHLORIDE 0.5 MG: 1 INJECTION, SOLUTION INTRAMUSCULAR; INTRAVENOUS; SUBCUTANEOUS at 01:31

## 2021-05-30 RX ADMIN — MORPHINE SULFATE 2 MG: 2 INJECTION, SOLUTION INTRAMUSCULAR; INTRAVENOUS at 06:18

## 2021-05-30 RX ADMIN — GABAPENTIN 100 MG: 250 SUSPENSION ORAL at 22:10

## 2021-05-30 RX ADMIN — MENTHOL, UNSPECIFIED FORM AND METHYL SALICYLATE 1 APPLICATION: 10; 150 CREAM TOPICAL at 17:12

## 2021-05-30 RX ADMIN — ACETAMINOPHEN 649.6 MG: 650 SUSPENSION ORAL at 12:05

## 2021-05-30 RX ADMIN — INSULIN LISPRO 1 UNITS: 100 INJECTION, SOLUTION INTRAVENOUS; SUBCUTANEOUS at 21:50

## 2021-05-30 RX ADMIN — ACETAMINOPHEN 650 MG: 650 SUSPENSION ORAL at 06:17

## 2021-05-30 RX ADMIN — ENOXAPARIN SODIUM 40 MG: 40 INJECTION SUBCUTANEOUS at 17:12

## 2021-05-30 RX ADMIN — Medication 20 MG: at 08:47

## 2021-05-30 RX ADMIN — DICYCLOMINE HYDROCHLORIDE 10 MG: 10 CAPSULE ORAL at 22:10

## 2021-05-30 RX ADMIN — LEVOFLOXACIN 500 MG: 500 TABLET, FILM COATED ORAL at 17:11

## 2021-05-30 NOTE — PROGRESS NOTES
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[address] )  Active Problems:    Acute gout    Altered mental status    Dysphagia    Urinary retention    Aphasia    Weakness    Presence of permanent central venous catheter    Fracture of ramus of left pubis (HCC)    Severe protein-calorie malnutrition (HCC)    Left-sided weakness    Pancytopenia (HCC)    Pain of left lower leg      * CNS lymphoma [facility])  Assessment & Plan  Patient was diagnosed with primary CNS lymphoma through biopsy in [facility] transferred over here for chemotherapy since the [facility] is out of network  Had a 2nd family meeting on 04/30 and family has decided to proceed treatment at this time  S/p PICC line for chemotherapy  S/p peg tube placement on 05/10  Plan:  Hematology-Oncology consulted  Appreciate recommendations  Chemotherapy treatment as per hematology oncology team     S/p 2nd cycle of chemotherapy on 05/13  Leucovin on 5/14  DVT prophylaxis: On heparin  Case management-will start looking for places for rehab  S/p steroid taper for cerebral edema from CNS lymphoma  Pancytopenia in setting of recent chemotherapy  Will continue to watch for any signs of infection  On acyclovir, Diflucan and Levaquin    After discussion with Oncology, plan for rehab, patient does not require radiation therapy, rehab for strength, no chemotherapy until at least 3 weeks, evaluate at that time  Patient's family updated on plan going forward, no questions after family visit    Acute gout  Assessment & Plan  Appears to have acute gout flare right knee, negative pain now  Currently on prednisone 2 5 mg q day, based on recent chemotherapy will hold off on NSAIDs or colchicine, will and steroid taper 06/01/2021  Will start low-dose allopurinol 100 mg q day to hopefully prevent other acute flares in the setting of chemotherapy    Pain of left lower leg  Assessment & Plan  Overnight left lower extremity pain, well score 3, pain now controlled  Bilateral ultrasound Dopplers no evidence of DVT    Pancytopenia ([address] )  Assessment & Plan  Likely in setting of chemotherapy  Will continue to monitor for any signs of infection  Will transfuse with packed RBC for hemoglobin less than 7  Drop in Hgb from 9 to 7 on 5/14, other lines relatively stable  VSS and soft brown BMs  Will discuss with oncology if this is expected based on chemo or if there should be further workup for bleeding, though hemodynamics currently do not suggest bleed  S/p 1 pack RBC on 05/15, hemoglobin continues to improve    Left-sided weakness  Assessment & Plan  Patient has left-sided weakness at baseline from her CNS lymphoma  Left upper extremity 3-4/5 and left lower extremity 0-1/5 muscle strength    Severe protein-calorie malnutrition (HCC)  Assessment & Plan  Malnutrition Findings:   Adult Malnutrition type: Acute illness(due to medical condition resulting in dysphagia, decreased appetite and intake, weight loss)  Adult Degree of Malnutrition: Other severe protein calorie malnutrition    BMI Findings: Body mass index is 21 44 kg/m²  Plan:  Nutrition consult  Tube feeds initiated with Jevity 1 2, advancing to goal      Fracture of ramus of left pubis [facility])  Assessment & Plan  Serafin Mediate prior to presentation to [facility]  Managed nonoperatively at [facility]    Presence of permanent central venous catheter  Assessment & Plan  Noted presence of right IJ tunneled double-lumen HD catheter; upon chart review, this was likely inserted to be used for plasmapheresis which she has completed  Plan:  S/p removal by IR    Patient now has PICC line     Weakness  Assessment & Plan  Left greater than right upper and lower extremity weakness with ongoing left lower extremity contracture  Secondary to underlying CNS malignancy  Decubitus ulcer precautions such as frequent repositioning  Aphasia  Assessment & Plan  Speech therapy on board  Urinary retention  Assessment & Plan  Patient developed urinary retention during previous hospitalization  Plan was voiding trial as an outpatient as she was scheduled for discharge from that facility  Machado catheter was initially removed although patient required re-placement of machado on 05/14 in setting of incontinence and getting chemotherapy  Machado discontinued, monitor for signs of fluid retention      Dysphagia  Assessment & Plan  Patient noted to have dysphagia and also decreased p o  Intake  As per [facility] progress note patient had a calorie count and recommended feeding tube placement  Speech evaluation - advance diet with dysphagia 2 and thin liquids; needs assistance with feeds  Calorie count  If the patient continued to have poor p o  Intake may need discussion with the family regarding alternate methods of nutrition  Patient's calorie intake is low  VBS- showed oropharyngeal dysphagia  s/p peg tube placement  Altered mental status  Assessment & Plan  Patient initially presented to [facility] [place] with stroke-like symptoms for 2 days  CT scan of the head was concerning for subacute CVA and was transferred to [facility]   MRI of the brain was more consistent with demyelinating disease patient was started on IV steroids and 1000 mg daily for 5 days  Patient did not have any significant improvement at that time patient had plasmapheresis  And also had  lumbar puncture-negative for infection or malignancy    Patient noted to have persistent encephalopathy so a repeat MRI was obtained which showed worsening of the abnormality seen on the previous MRI and 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  Likely secondary to CNS lymphoma  Delirium precautions   Currently alert and oriented times 2-3  Follow commands  Patient's  (does not speak [place]) makes all medical decision for her  Sepsis (HCC)-resolved as of 2021  Assessment & Plan  Patient had hypothermia, elevated WBC count and tachycardia  Concern for possible aspiration in setting on dysphagia versus UTI in setting of urinary catheter which was placed for retention  Plan:  S/p IV antibiotics last day on   Will continue to monitor off antibiotics  Steroid-induced hyperglycemia-resolved as of 2021  Assessment & Plan  Mild hyperglycemia, glucoses in 200s, no history of DM  Occurring in the setting of Decadron with chemotherapy, as well as sodium bicarb in D5 drip  - D5 drip is finished  - will monitor sugars while on Jevity and now off D5  - continue q4 hour fingerstick glucose with correctional algorithm 1 and Lantus        Disposition:  Awaiting rehab placement     SUBJECTIVE     Patient seen and examined  No acute events overnight  OBJECTIVE     Vitals:    21 2246 21 2247 21 0600 21 0722   BP: 101/65 101/65  101/65   BP Location:       Pulse: 87 84  92   Resp: 18   12   Temp: 99 5 °F (37 5 °C) 99 5 °F (37 5 °C)  (!) 97 4 °F (36 3 °C)   TempSrc:    Axillary   SpO2: 100% 100%  99%   Weight:   48 8 kg (107 lb 9 4 oz)    Height:          Temperature:   Temp (24hrs), Av 5 °F (36 9 °C), Min:97 4 °F (36 3 °C), Max:99 5 °F (37 5 °C)    Temperature: (!) 97 4 °F (36 3 °C)  Intake & Output:  I/O       701 -  0700 701 -  07 -  07    P  O  5 440     NG/GT 1211 120     Feedings 2227 270     Total Intake(mL/kg) 3443 (69 1) 830 (17)     Urine (mL/kg/hr) 1050 (0 9) 1050 (0 9)     Stool 0 0     Total Output 1050 1050     Net +2393 -220            Unmeasured Urine Occurrence  2 x Unmeasured Stool Occurrence 2 x 2 x         Weights:   IBW (Ideal Body Weight): 45 5 kg    Body mass index is 21 01 kg/m²  Weight (last 2 days)     Date/Time   Weight    05/30/21 0600   48 8 (107 58)    05/29/21 0600   49 8 (109 79)    05/28/21 0600   49 9 (110)            Physical Exam  Vitals signs and nursing note reviewed  Constitutional:       General: She is not in acute distress  Appearance: She is well-developed  She is ill-appearing  HENT:      Head: Normocephalic and atraumatic  Eyes:      Conjunctiva/sclera: Conjunctivae normal    Neck:      Musculoskeletal: Neck supple  Cardiovascular:      Rate and Rhythm: Normal rate and regular rhythm  Heart sounds: No murmur  Pulmonary:      Effort: Pulmonary effort is normal  No respiratory distress  Breath sounds: Normal breath sounds  Abdominal:      Palpations: Abdomen is soft  Tenderness: There is no abdominal tenderness  Skin:     General: Skin is warm and dry  Neurological:      General: No focal deficit present  Mental Status: She is alert  Psychiatric:         Mood and Affect: Mood normal        LABORATORY DATA     Labs: I have personally reviewed pertinent reports  Results from last 7 days   Lab Units 05/30/21  0617 05/25/21  0505   WBC Thousand/uL 7 03 6 76   HEMOGLOBIN g/dL 8 5* 8 8*   HEMATOCRIT % 27 3* 28 4*   PLATELETS Thousands/uL 247 117*   MONO PCT % 23*  --       Results from last 7 days   Lab Units 05/30/21  0617 05/27/21  0558 05/25/21  0505   POTASSIUM mmol/L 3 7 4 4 4 4   CHLORIDE mmol/L 103 101 97*   CO2 mmol/L 27 27 29   BUN mg/dL 17 21 19   CREATININE mg/dL 0 27* 0 30* 0 25*   CALCIUM mg/dL 9 2 9 4 9 7                            IMAGING & DIAGNOSTIC TESTING     Radiology Results: I have personally reviewed pertinent reports  Xr Chest Portable    Result Date: 4/24/2021  Impression: Dialysis catheter tip within the superior cavoatrial junction  No pneumothorax   Workstation performed: [id number] Xr Chest Picc Line Portable    Result Date: 4/27/2021  Impression: PICC line tip in the superior vena cava, approximately 3 5 to 4 cm above the cavoatrial junction  The examination demonstrates a significant  finding and was documented as such in [facility] for liaison and referring practitioner notification  Workstation performed: [id number]     Ir Picc Reposition    Result Date: 4/27/2021  Impression: Status-post repositioning of a 5 [place] central venous catheter under fluoroscopic guidance with its tip at the cavoatrial junction  Workstation performed: [id number]     Ir Tunneled Central Line Removal    Result Date: 4/27/2021  Impression: Impression: Successful tunneled central line removal as described  Signed, performed and dictated by Kuldip Neil PA-C under the supervision of Dr Italia Boone  Workstation performed: [id number]     Other Diagnostic Testing: I have personally reviewed pertinent reports      ACTIVE MEDICATIONS     Current Facility-Administered Medications   Medication Dose Route Frequency    acetaminophen (TYLENOL) oral suspension 650 mg  650 mg Oral Q8H [address]    acyclovir (ZOVIRAX) capsule 400 mg  400 mg Oral Q12H [address]    albuterol (PROVENTIL HFA,VENTOLIN HFA) inhaler 2 puff  2 puff Inhalation Q6H PRN    allopurinol (ZYLOPRIM) tablet 100 mg  100 mg Oral Daily    aluminum-magnesium hydroxide-simethicone (MYLANTA) oral suspension 30 mL  30 mL Per PEG Tube Q4H PRN    amLODIPine (NORVASC) tablet 5 mg  5 mg Oral Daily    bisacodyl (DULCOLAX) rectal suppository 10 mg  10 mg Rectal Daily PRN    Diclofenac Sodium (VOLTAREN) 1 % topical gel 2 g  2 g Topical TID PRN    dicyclomine (BENTYL) capsule 10 mg  10 mg Oral 4x Daily (AC & HS)    enoxaparin (LOVENOX) subcutaneous injection 40 mg  40 mg Subcutaneous Q24H MEÑO    fluconazole (DIFLUCAN) tablet 400 mg  400 mg Oral Daily    gabapentin (NEURONTIN) oral solution 100 mg  100 mg Oral HS    HYDROmorphone (DILAUDID) injection 0 5 mg  0 5 mg Intravenous Q4H PRN    insulin glargine (LANTUS) subcutaneous injection 7 Units 0 07 mL  7 Units Subcutaneous HS    insulin lispro (HumaLOG) 100 units/mL subcutaneous injection 1-5 Units  1-5 Units Subcutaneous 4x Daily (AC & HS)    levofloxacin (LEVAQUIN) tablet 500 mg  500 mg Oral Q24H    lidocaine (LIDODERM) 5 % patch 1 patch  1 patch Topical Daily    menthol-methyl salicylate (BENGAY) 60-44 % cream   Apply externally BID    morphine injection 2 mg  2 mg Intravenous Q4H PRN    omeprazole (PRILOSEC) suspension 2 mg/mL  20 mg Oral Daily    ondansetron (ZOFRAN) injection 4 mg  4 mg Intravenous Q6H PRN    polyethylene glycol (MIRALAX) packet 17 g  17 g Oral Daily PRN    predniSONE tablet 2 5 mg  2 5 mg Oral Daily    senna-docusate sodium (SENOKOT S) 8 6-50 mg per tablet 1 tablet  1 tablet Oral BID PRN    [MAR Hold] sodium chloride 0 9 % infusion  20 mL/hr Intravenous Once PRN    [MAR Hold] sodium chloride 0 9 % infusion  20 mL/hr Intravenous Once PRN    [MAR Hold] sodium chloride 0 9 % infusion  20 mL/hr Intravenous Once PRN    tamsulosin (FLOMAX) capsule 0 4 mg  0 4 mg Oral Daily With Dinner       VTE Pharmacologic Prophylaxis: Enoxaparin (Lovenox)  VTE Mechanical Prophylaxis: sequential compression device    Portions of the record may have been created with voice recognition software  Occasional wrong word or "sound a like" substitutions may have occurred due to the inherent limitations of voice recognition software    Read the chart carefully and recognize, using context, where substitutions have occurred   ==  Edmar Oviedo, [facility]  Internal Medicine Residency PGY-2

## 2021-05-30 NOTE — PLAN OF CARE
Problem: Prexisting or High Potential for Compromised Skin Integrity  Goal: Skin integrity is maintained or improved  Description: INTERVENTIONS:  - Identify patients at risk for skin breakdown  - Assess and monitor skin integrity  - Assess and monitor nutrition and hydration status  - Monitor labs   - Assess for incontinence   - Turn and reposition patient  - Assist with mobility/ambulation  - Relieve pressure over bony prominences  - Avoid friction and shearing  - Provide appropriate hygiene as needed including keeping skin clean and dry  - Evaluate need for skin moisturizer/barrier cream  - Collaborate with interdisciplinary team   - Patient/family teaching  - Consider wound care consult   Outcome: Progressing     Problem: Potential for Falls  Goal: Patient will remain free of falls  Description: INTERVENTIONS:  - Assess patient frequently for physical needs  -  Identify cognitive and physical deficits and behaviors that affect risk of falls  -  [place] fall precautions as indicated by assessment   - Educate patient/family on patient safety including physical limitations  - Instruct patient to call for assistance with activity based on assessment  - Modify environment to reduce risk of injury  - Consider OT/PT consult to assist with strengthening/mobility  Outcome: Progressing     Problem: Nutrition/Hydration-ADULT  Goal: Nutrient/Hydration intake appropriate for improving, restoring or maintaining nutritional needs  Description: Monitor and assess patient's nutrition/hydration status for malnutrition  Collaborate with interdisciplinary team and initiate plan and interventions as ordered  Monitor patient's weight and dietary intake as ordered or per policy  Utilize nutrition screening tool and intervene as necessary  Determine patient's food preferences and provide high-protein, high-caloric foods as appropriate       INTERVENTIONS:  - Monitor oral intake, urinary output, labs, and treatment plans  - Assess nutrition and hydration status and recommend course of action  - Evaluate amount of meals eaten  - Assist patient with eating if necessary   - Allow adequate time for meals  - Recommend/ encourage appropriate diets, oral nutritional supplements, and vitamin/mineral supplements  - Order, calculate, and assess calorie counts as needed  - Recommend, monitor, and adjust tube feedings and TPN/PPN based on assessed needs  - Assess need for intravenous fluids  - Provide specific nutrition/hydration education as appropriate  - Include patient/family/caregiver in decisions related to nutrition  Outcome: Progressing     Problem: PAIN - ADULT  Goal: Verbalizes/displays adequate comfort level or baseline comfort level  Description: Interventions:  - Encourage patient to monitor pain and request assistance  - Assess pain using appropriate pain scale  - Administer analgesics based on type and severity of pain and evaluate response  - Implement non-pharmacological measures as appropriate and evaluate response  - Consider cultural and social influences on pain and pain management  - Notify physician/advanced practitioner if interventions unsuccessful or patient reports new pain  Outcome: Progressing     Problem: INFECTION - ADULT  Goal: Absence or prevention of progression during hospitalization  Description: INTERVENTIONS:  - Assess and monitor for signs and symptoms of infection  - Monitor lab/diagnostic results  - Monitor all insertion sites, i e  indwelling lines, tubes, and drains  - Monitor endotracheal if appropriate and nasal secretions for changes in amount and color  - [place] appropriate cooling/warming therapies per order  - Administer medications as ordered  - Instruct and encourage patient and family to use good hand hygiene technique  - Identify and instruct in appropriate isolation precautions for identified infection/condition  Outcome: Progressing     Problem: SAFETY ADULT  Goal: Patient will remain free of falls  Description: INTERVENTIONS:  - Assess patient frequently for physical needs  -  Identify cognitive and physical deficits and behaviors that affect risk of falls    -  [place] fall precautions as indicated by assessment   - Educate patient/family on patient safety including physical limitations  - Instruct patient to call for assistance with activity based on assessment  - Modify environment to reduce risk of injury  - Consider OT/PT consult to assist with strengthening/mobility  Outcome: Progressing  Goal: Maintain or return to baseline ADL function  Description: INTERVENTIONS:  -  Assess patient's ability to carry out ADLs; assess patient's baseline for ADL function and identify physical deficits which impact ability to perform ADLs (bathing, care of mouth/teeth, toileting, grooming, dressing, etc )  - Assess/evaluate cause of self-care deficits   - Assess range of motion  - Assess patient's mobility; develop plan if impaired  - Assess patient's need for assistive devices and provide as appropriate  - Encourage maximum independence but intervene and supervise when necessary  - Involve family in performance of ADLs  - Assess for home care needs following discharge   - Consider OT consult to assist with ADL evaluation and planning for discharge  - Provide patient education as appropriate  Outcome: Progressing  Goal: Maintain or return mobility status to optimal level  Description: INTERVENTIONS:  - Assess patient's baseline mobility status (ambulation, transfers, stairs, etc )    - Identify cognitive and physical deficits and behaviors that affect mobility  - Identify mobility aids required to assist with transfers and/or ambulation (gait belt, sit-to-stand, lift, walker, cane, etc )  - [place] fall precautions as indicated by assessment  - Record patient progress and toleration of activity level on Mobility SBAR; progress patient to next Phase/Stage  - Instruct patient to call for assistance with activity based on assessment  - Consider rehabilitation consult to assist with strengthening/weightbearing, etc   Outcome: Progressing     Problem: DISCHARGE PLANNING  Goal: Discharge to home or other facility with appropriate resources  Description: INTERVENTIONS:  - Identify barriers to discharge w/patient and caregiver  - Arrange for needed discharge resources and transportation as appropriate  - Identify discharge learning needs (meds, wound care, etc )  - Arrange for interpretive services to assist at discharge as needed  - Refer to Case Management Department for coordinating discharge planning if the patient needs post-hospital services based on physician/advanced practitioner order or complex needs related to functional status, cognitive ability, or social support system  Outcome: Progressing     Problem: Knowledge Deficit  Goal: Patient/family/caregiver demonstrates understanding of disease process, treatment plan, medications, and discharge instructions  Description: Complete learning assessment and assess knowledge base    Interventions:  - Provide teaching at level of understanding  - Provide teaching via preferred learning methods  Outcome: Progressing

## 2021-05-31 LAB
GLUCOSE SERPL-MCNC: 125 MG/DL (ref 65–140)
GLUCOSE SERPL-MCNC: 133 MG/DL (ref 65–140)
GLUCOSE SERPL-MCNC: 151 MG/DL (ref 65–140)
GLUCOSE SERPL-MCNC: 157 MG/DL (ref 65–140)

## 2021-05-31 PROCEDURE — 97530 THERAPEUTIC ACTIVITIES: CPT

## 2021-05-31 PROCEDURE — 99232 SBSQ HOSP IP/OBS MODERATE 35: CPT | Performed by: INTERNAL MEDICINE

## 2021-05-31 PROCEDURE — 93005 ELECTROCARDIOGRAM TRACING: CPT

## 2021-05-31 PROCEDURE — 82948 REAGENT STRIP/BLOOD GLUCOSE: CPT

## 2021-05-31 RX ADMIN — DICYCLOMINE HYDROCHLORIDE 10 MG: 10 CAPSULE ORAL at 22:38

## 2021-05-31 RX ADMIN — MORPHINE SULFATE 2 MG: 2 INJECTION, SOLUTION INTRAMUSCULAR; INTRAVENOUS at 20:01

## 2021-05-31 RX ADMIN — INSULIN LISPRO 1 UNITS: 100 INJECTION, SOLUTION INTRAVENOUS; SUBCUTANEOUS at 09:20

## 2021-05-31 RX ADMIN — LEVOFLOXACIN 500 MG: 500 TABLET, FILM COATED ORAL at 17:27

## 2021-05-31 RX ADMIN — ACYCLOVIR 400 MG: 200 CAPSULE ORAL at 09:25

## 2021-05-31 RX ADMIN — MENTHOL, UNSPECIFIED FORM AND METHYL SALICYLATE: 10; 150 CREAM TOPICAL at 09:25

## 2021-05-31 RX ADMIN — Medication 20 MG: at 09:25

## 2021-05-31 RX ADMIN — ENOXAPARIN SODIUM 40 MG: 40 INJECTION SUBCUTANEOUS at 14:57

## 2021-05-31 RX ADMIN — ACETAMINOPHEN 650 MG: 650 SUSPENSION ORAL at 22:38

## 2021-05-31 RX ADMIN — MENTHOL, UNSPECIFIED FORM AND METHYL SALICYLATE: 10; 150 CREAM TOPICAL at 17:27

## 2021-05-31 RX ADMIN — ALLOPURINOL 100 MG: 100 TABLET ORAL at 09:25

## 2021-05-31 RX ADMIN — DICYCLOMINE HYDROCHLORIDE 10 MG: 10 CAPSULE ORAL at 06:36

## 2021-05-31 RX ADMIN — ACETAMINOPHEN 650 MG: 650 SUSPENSION ORAL at 14:49

## 2021-05-31 RX ADMIN — ACYCLOVIR 400 MG: 200 CAPSULE ORAL at 22:37

## 2021-05-31 RX ADMIN — MORPHINE SULFATE 2 MG: 2 INJECTION, SOLUTION INTRAMUSCULAR; INTRAVENOUS at 14:53

## 2021-05-31 RX ADMIN — TAMSULOSIN HYDROCHLORIDE 0.4 MG: 0.4 CAPSULE ORAL at 17:22

## 2021-05-31 RX ADMIN — ACETAMINOPHEN 650 MG: 650 SUSPENSION ORAL at 06:36

## 2021-05-31 RX ADMIN — GABAPENTIN 100 MG: 250 SUSPENSION ORAL at 22:54

## 2021-05-31 RX ADMIN — DICYCLOMINE HYDROCHLORIDE 10 MG: 10 CAPSULE ORAL at 12:20

## 2021-05-31 RX ADMIN — DICYCLOMINE HYDROCHLORIDE 10 MG: 10 CAPSULE ORAL at 17:22

## 2021-05-31 RX ADMIN — INSULIN GLARGINE 7 UNITS: 100 INJECTION, SOLUTION SUBCUTANEOUS at 22:38

## 2021-05-31 RX ADMIN — INSULIN LISPRO 1 UNITS: 100 INJECTION, SOLUTION INTRAVENOUS; SUBCUTANEOUS at 17:26

## 2021-05-31 RX ADMIN — FLUCONAZOLE 400 MG: 200 TABLET ORAL at 09:25

## 2021-05-31 RX ADMIN — LIDOCAINE 5% 1 PATCH: 700 PATCH TOPICAL at 08:55

## 2021-05-31 NOTE — PLAN OF CARE
Problem: Prexisting or High Potential for Compromised Skin Integrity  Goal: Skin integrity is maintained or improved  Description: INTERVENTIONS:  - Identify patients at risk for skin breakdown  - Assess and monitor skin integrity  - Assess and monitor nutrition and hydration status  - Monitor labs   - Assess for incontinence   - Turn and reposition patient  - Assist with mobility/ambulation  - Relieve pressure over bony prominences  - Avoid friction and shearing  - Provide appropriate hygiene as needed including keeping skin clean and dry  - Evaluate need for skin moisturizer/barrier cream  - Collaborate with interdisciplinary team   - Patient/family teaching  - Consider wound care consult   Outcome: Progressing     Problem: Potential for Falls  Goal: Patient will remain free of falls  Description: INTERVENTIONS:  - Assess patient frequently for physical needs  -  Identify cognitive and physical deficits and behaviors that affect risk of falls  -  [place] fall precautions as indicated by assessment   - Educate patient/family on patient safety including physical limitations  - Instruct patient to call for assistance with activity based on assessment  - Modify environment to reduce risk of injury  - Consider OT/PT consult to assist with strengthening/mobility  Outcome: Progressing     Problem: Nutrition/Hydration-ADULT  Goal: Nutrient/Hydration intake appropriate for improving, restoring or maintaining nutritional needs  Description: Monitor and assess patient's nutrition/hydration status for malnutrition  Collaborate with interdisciplinary team and initiate plan and interventions as ordered  Monitor patient's weight and dietary intake as ordered or per policy  Utilize nutrition screening tool and intervene as necessary  Determine patient's food preferences and provide high-protein, high-caloric foods as appropriate       INTERVENTIONS:  - Monitor oral intake, urinary output, labs, and treatment plans  - Assess nutrition and hydration status and recommend course of action  - Evaluate amount of meals eaten  - Assist patient with eating if necessary   - Allow adequate time for meals  - Recommend/ encourage appropriate diets, oral nutritional supplements, and vitamin/mineral supplements  - Order, calculate, and assess calorie counts as needed  - Recommend, monitor, and adjust tube feedings and TPN/PPN based on assessed needs  - Assess need for intravenous fluids  - Provide specific nutrition/hydration education as appropriate  - Include patient/family/caregiver in decisions related to nutrition  Outcome: Progressing     Problem: PAIN - ADULT  Goal: Verbalizes/displays adequate comfort level or baseline comfort level  Description: Interventions:  - Encourage patient to monitor pain and request assistance  - Assess pain using appropriate pain scale  - Administer analgesics based on type and severity of pain and evaluate response  - Implement non-pharmacological measures as appropriate and evaluate response  - Consider cultural and social influences on pain and pain management  - Notify physician/advanced practitioner if interventions unsuccessful or patient reports new pain  Outcome: Progressing     Problem: INFECTION - ADULT  Goal: Absence or prevention of progression during hospitalization  Description: INTERVENTIONS:  - Assess and monitor for signs and symptoms of infection  - Monitor lab/diagnostic results  - Monitor all insertion sites, i e  indwelling lines, tubes, and drains  - Monitor endotracheal if appropriate and nasal secretions for changes in amount and color  - [place] appropriate cooling/warming therapies per order  - Administer medications as ordered  - Instruct and encourage patient and family to use good hand hygiene technique  - Identify and instruct in appropriate isolation precautions for identified infection/condition  Outcome: Progressing     Problem: SAFETY ADULT  Goal: Patient will remain free of falls  Description: INTERVENTIONS:  - Assess patient frequently for physical needs  -  Identify cognitive and physical deficits and behaviors that affect risk of falls    -  [place] fall precautions as indicated by assessment   - Educate patient/family on patient safety including physical limitations  - Instruct patient to call for assistance with activity based on assessment  - Modify environment to reduce risk of injury  - Consider OT/PT consult to assist with strengthening/mobility  Outcome: Progressing  Goal: Maintain or return to baseline ADL function  Description: INTERVENTIONS:  -  Assess patient's ability to carry out ADLs; assess patient's baseline for ADL function and identify physical deficits which impact ability to perform ADLs (bathing, care of mouth/teeth, toileting, grooming, dressing, etc )  - Assess/evaluate cause of self-care deficits   - Assess range of motion  - Assess patient's mobility; develop plan if impaired  - Assess patient's need for assistive devices and provide as appropriate  - Encourage maximum independence but intervene and supervise when necessary  - Involve family in performance of ADLs  - Assess for home care needs following discharge   - Consider OT consult to assist with ADL evaluation and planning for discharge  - Provide patient education as appropriate  Outcome: Progressing  Goal: Maintain or return mobility status to optimal level  Description: INTERVENTIONS:  - Assess patient's baseline mobility status (ambulation, transfers, stairs, etc )    - Identify cognitive and physical deficits and behaviors that affect mobility  - Identify mobility aids required to assist with transfers and/or ambulation (gait belt, sit-to-stand, lift, walker, cane, etc )  - [place] fall precautions as indicated by assessment  - Record patient progress and toleration of activity level on Mobility SBAR; progress patient to next Phase/Stage  - Instruct patient to call for assistance with activity based on assessment  - Consider rehabilitation consult to assist with strengthening/weightbearing, etc   Outcome: Progressing     Problem: DISCHARGE PLANNING  Goal: Discharge to home or other facility with appropriate resources  Description: INTERVENTIONS:  - Identify barriers to discharge w/patient and caregiver  - Arrange for needed discharge resources and transportation as appropriate  - Identify discharge learning needs (meds, wound care, etc )  - Arrange for interpretive services to assist at discharge as needed  - Refer to Case Management Department for coordinating discharge planning if the patient needs post-hospital services based on physician/advanced practitioner order or complex needs related to functional status, cognitive ability, or social support system  Outcome: Progressing     Problem: Knowledge Deficit  Goal: Patient/family/caregiver demonstrates understanding of disease process, treatment plan, medications, and discharge instructions  Description: Complete learning assessment and assess knowledge base    Interventions:  - Provide teaching at level of understanding  - Provide teaching via preferred learning methods  Outcome: Progressing

## 2021-05-31 NOTE — PHYSICAL THERAPY NOTE
PT TREATMENT       05/31/21 1450   PT Last Visit   PT Visit Date 05/31/21   Note Type   Note Type Treatment   Pain Assessment   Pain Rating: FLACC (Rest) - Face 1   Pain Rating: FLACC (Rest) - Legs 1   Pain Rating: FLACC (Rest) - Activity 1   Pain Rating: FLACC (Rest) - Cry 0   Pain Rating: FLACC (Rest) - Consolability 2   Score: FLACC (Rest) 5   Pain Rating: FLACC (Activity) - Face 1   Pain Rating: FLACC (Activity) - Legs 1   Pain Rating: FLACC (Activity) - Activity 1   Pain Rating: FLACC (Activity) - Cry 0   Pain Rating: FLACC (Activity) - Consolability 2   Score: FLACC (Activity) 5   Restrictions/Precautions   Other Precautions Cognitive; Bed Alarm; Fall Risk;Aspiration  (BED ALARM ACTIVE POST PT TREAT, PEG TUBE )   General   Chart Reviewed Yes   Response to Previous Treatment Patient with no complaints from previous session  Family/Caregiver Present No   Cognition   Overall Cognitive Status Impaired   Arousal/Participation Alert   Attention Difficulty attending to directions   Orientation Level   (WAS NOT VERBALIZING- USING NONVERBAL SIGNS/GESTURES)   Memory Unable to assess   Following Commands Follows one step commands inconsistently   Comments + CONFUSION, RESTLESS, REDUCED COMMAND FOLLOWING EVEN WITH GESTURES   Subjective   Subjective DID NOT VERBALIZE DURING TREATMENT  Bed Mobility   Rolling R 4  Minimal assistance   Additional items Assist x 1   Rolling L 4  Minimal assistance   Additional items Assist x 1   Supine to Sit 5  Supervision   Additional items Impulsive   Sit to Supine 4  Minimal assistance   Additional items Assist x 2   Additional Comments PATIENT IMPULSIVELY SITTING ON SIDE OF BED- AT BOTTOM OF BED HAD GOTTEN LEGS OFF SIDE OF BED BELOW BED RAIL  DESPITE BED ALARM BEING ACTIVATED IT WAS NOT ALARMING (ON LEAST SENSITIVE SETTING)  ANTICIPATE PATIENT WOULD HAVE SLIDE ON TO FLOOR IF THERAPIST DID NOT INTERVENE      Transfers   Sit to Stand 2  Maximal assistance   Additional items Assist x 2 Stand to Sit 2  Maximal assistance   Additional items Assist x 2   Additional Comments INCONTINENT OF STOOL  ATTEMPTED STANDING HOWEVER PATIENT DID NOT MAINTAIN WITH MAX-AX2  REQUIRED BED LEVEL PERFORMANCE OF PADMINI-HYGIENE CARE  PATIENT ABLE TO ROLL TO L AND R SIDE W/ USE OF BED RAILS AND TACTILE CUING HOWEVER WOULD NOT SUSTAIN LAYING ON HER SIDE  PATIENT KEPT ROLLING BACK ON TO HER BACK AND WOULD RE-SOIL HERSELF  REQUIRED MULTIPLE PERFORMANCES OF ROLLING TO PERFORM PADMINI-HYGIENE CARE AND CHANGE BED [place] AND GOWN  AX2 TO REPOSITION HIGHER IN BED WITH LE ELEVATED TO ASSIST PATIENT IN NOT SLIDING DOWN TO BOTTOM OF BED  RN PRESENT AND AWARE OF PATIENT'S RESTLNESSNESS  BED ALARM ACTIVE POST TREATMENT  Balance   Static Sitting Fair -   Static Standing Zero   Endurance Deficit   Endurance Deficit Yes   Endurance Deficit Description LE WEAKNESS, REDUCED COGNITION AND INSTRUCTION FOLLOWING    Activity Tolerance   Activity Tolerance Other (Comment)  (REDUCED COGNITION AND INSTRUCTION FOLLOWING )   Nurse Made Aware MARTINE MCCLURE PRESENT    Assessment   Prognosis Fair   Problem List Decreased strength;Decreased endurance; Impaired balance;Decreased mobility; Decreased cognition; Impaired judgement;Decreased safety awareness   Assessment PATIENT PARTICIPATED IN BED LEVEL ACTIVITY TO SAFELY PERFORM PADMINI-HYGIENE CARE (INCONTINENT OF STOOL UPON ARRIVAL)  ATTEMPTED TO CLEAN IN STANDING POSITION HOWEVER WITH MAX-AX2 PATIENT UNABLE TO MAINTAIN STANDING  SHE PERFORMED MULTIPLE BOUTS OF ROLLING TOWARD L AND R SIDES OF BED MIN-AX1  OVERALL PATIENT WAS RESTLESS, PICKING AT GOWN/BED PAD AND CHALLENGING TO RE-DIRECT WITH TACTILE CUES  REPOSITIONED IN BED WITH ALARM ACTIVE AT END OF SESSION  PT D/C RECOMMENDATION IS FOR REHAB  PATIENT WILL BENEFIT FROM CONTINUED SKILLED PT THIS ADMISSION TO ACHIEVE MAXIMAL FUNCTION AND SAFETY  Goals   Patient Goals PATIENT ATTEMPTING TO SIT ON THE SIDE OF THE BED BY HERSELF     STG Expiration Date 06/08/21   PT Treatment Day 6   Plan   Treatment/Interventions Functional transfer training;LE strengthening/ROM; Therapeutic exercise; Endurance training;Patient/family training;Equipment eval/education; Bed mobility;OT;Spoke to nursing   Progress Slow progress, cognitive deficits  (RESTLESS/IMPULSIVE)   PT Frequency Other (Comment)  (3-5X/WK)   Recommendation   PT Discharge Recommendation Post acute rehabilitation services   PT - OK to Discharge Yes  (TO REHAB WHEN MED NIKOLE )   AM-PAC Basic Mobility Inpatient   Turning in Bed Without Bedrails 3   Lying on Back to Sitting on Edge of Flat Bed 1   Moving Bed to Chair 1   Standing Up From Chair 1   Walk in Room 1   Climb 3-5 Stairs 1   Basic Mobility Inpatient Raw Score 8   Turning Head Towards Sound 3   Follow Simple Instructions 2   Low Function Basic Mobility Raw Score 13   Low Function Basic Mobility Standardized Score 20 14     The patient's AM-PAC Basic Mobility Inpatient Short Form Raw Score is 13, Standardized Score is 20  14  A standardized score less than 42 9 suggests the patient may benefit from discharge to post-acute rehabilitation services  Please also refer to the recommendation of the Physical Therapist for safe discharge planning      DINORAH PICKARD PT, DPT

## 2021-05-31 NOTE — PLAN OF CARE
Problem: PHYSICAL THERAPY ADULT  Goal: Performs mobility at highest level of function for planned discharge setting  See evaluation for individualized goals  Description: Treatment/Interventions: ADL retraining, Functional transfer training, LE strengthening/ROM, Endurance training, Patient/family training, Bed mobility, Spoke to nursing, OT  Equipment Recommended: (TBD)       See flowsheet documentation for full assessment, interventions and recommendations  Note: Prognosis: Fair  Problem List: Decreased strength, Decreased endurance, Impaired balance, Decreased mobility, Decreased cognition, Impaired judgement, Decreased safety awareness  Assessment: PATIENT PARTICIPATED IN BED LEVEL ACTIVITY TO SAFELY PERFORM PADMINI-HYGIENE CARE (INCONTINENT OF STOOL UPON ARRIVAL)  ATTEMPTED TO CLEAN IN STANDING POSITION HOWEVER WITH MAX-AX2 PATIENT UNABLE TO MAINTAIN STANDING  SHE PERFORMED MULTIPLE BOUTS OF ROLLING TOWARD L AND R SIDES OF BED MIN-AX1  OVERALL PATIENT WAS RESTLESS, PICKING AT GOWN/BED PAD AND CHALLENGING TO RE-DIRECT WITH TACTILE CUES  REPOSITIONED IN BED WITH ALARM ACTIVE AT END OF SESSION  PT D/C RECOMMENDATION IS FOR REHAB  PATIENT WILL BENEFIT FROM CONTINUED SKILLED PT THIS ADMISSION TO ACHIEVE MAXIMAL FUNCTION AND SAFETY  Barriers to Discharge: Inaccessible home environment, Decreased caregiver support        PT Discharge Recommendation: Post acute rehabilitation services     PT - OK to Discharge: Yes(TO REHAB WHEN MED NIKOLE )    See flowsheet documentation for full assessment

## 2021-05-31 NOTE — PROGRESS NOTES
INTERNAL MEDICINE RESIDENCY PROGRESS NOTE     Name: Mekhi Samuels   Age & Sex: 49 y o  female   MRN: [id number]  Unit/Bed#: [facility] 920-01   Encounter: [id number]  Team: SOD Team B     PATIENT INFORMATION     Name: Mekhi Samuels   Age & Sex: 49 y o  female   MRN: [id number] Stay Days: 45    ASSESSMENT/PLAN     Principal Problem:    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[address] )  Active Problems:    Acute gout    Altered mental status    Dysphagia    Urinary retention    Aphasia    Weakness    Presence of permanent central venous catheter    Fracture of ramus of left pubis (HCC)    Severe protein-calorie malnutrition (HCC)    Left-sided weakness    Pancytopenia (HCC)    Pain of left lower leg      * CNS lymphoma [facility])  Assessment & Plan  Patient was diagnosed with primary CNS lymphoma through biopsy in [facility] transferred over here for chemotherapy since the [facility] is out of network  Had a 2nd family meeting on 04/30 and family has decided to proceed treatment at this time  S/p PICC line for chemotherapy  S/p peg tube placement on 05/10  Plan:  Hematology-Oncology consulted  Appreciate recommendations  Chemotherapy treatment as per hematology oncology team     S/p 2nd cycle of chemotherapy on 05/13  Leucovin on 5/14  DVT prophylaxis: On heparin  Case management-will start looking for places for rehab  S/p steroid taper for cerebral edema from CNS lymphoma  Pancytopenia in setting of recent chemotherapy  Will continue to watch for any signs of infection  On acyclovir, Diflucan and Levaquin    After discussion with Oncology, plan for rehab, patient does not require radiation therapy, rehab for strength, no chemotherapy until at least 3 weeks, evaluate at that time  Patient's family updated on plan going forward, no questions after family visit    Acute gout  Assessment & Plan  Gout flare left knee resolving  Steroid taper completed  Will start low-dose allopurinol 100 mg q day to hopefully prevent other acute flares in the setting of chemotherapy  Follow-up uric acid level    Pain of left lower leg  Assessment & Plan  Overnight left lower extremity pain, well score 3, pain now controlled  Bilateral ultrasound Dopplers no evidence of DVT    Pancytopenia ([address] )  Assessment & Plan  Likely in setting of chemotherapy  Will continue to monitor for any signs of infection  Will transfuse with packed RBC for hemoglobin less than 7  Drop in Hgb from 9 to 7 on 5/14, other lines relatively stable  VSS and soft brown BMs  Will discuss with oncology if this is expected based on chemo or if there should be further workup for bleeding, though hemodynamics currently do not suggest bleed  S/p 1 pack RBC on 05/15, hemoglobin continues to improve    Left-sided weakness  Assessment & Plan  Patient has left-sided weakness at baseline from her CNS lymphoma  Left upper extremity 3-4/5 and left lower extremity 0-1/5 muscle strength    Severe protein-calorie malnutrition (HCC)  Assessment & Plan  Malnutrition Findings:   Adult Malnutrition type: Acute illness(due to medical condition resulting in dysphagia, decreased appetite and intake, weight loss)  Adult Degree of Malnutrition: Other severe protein calorie malnutrition    BMI Findings: Body mass index is 21 44 kg/m²  Plan:  Nutrition consult  Tube feeds initiated with Jevity 1 2, advancing to goal      Fracture of ramus of left pubis [facility])  Assessment & Plan  Jonathon Mackenzie prior to presentation to [facility]  Managed nonoperatively at [facility]    Presence of permanent central venous catheter  Assessment & Plan  Noted presence of right IJ tunneled double-lumen HD catheter; upon chart review, this was likely inserted to be used for plasmapheresis which she has completed  Plan:  S/p removal by IR  Patient now has PICC line      Weakness  Assessment & Plan  Left greater than right upper and lower extremity weakness with ongoing left lower extremity contracture  Secondary to underlying CNS malignancy  Decubitus ulcer precautions such as frequent repositioning  Aphasia  Assessment & Plan  Speech therapy on board  Urinary retention  Assessment & Plan  Patient developed urinary retention during previous hospitalization  Plan was voiding trial as an outpatient as she was scheduled for discharge from that facility  Machado catheter was initially removed although patient required re-placement of machado on 05/14 in setting of incontinence and getting chemotherapy  Machado discontinued, monitor for signs of fluid retention      Dysphagia  Assessment & Plan  Patient noted to have dysphagia and also decreased p o  Intake  As per [facility] progress note patient had a calorie count and recommended feeding tube placement  Speech evaluation - advance diet with dysphagia 2 and thin liquids; needs assistance with feeds  Calorie count  If the patient continued to have poor p o  Intake may need discussion with the family regarding alternate methods of nutrition  Patient's calorie intake is low  VBS- showed oropharyngeal dysphagia  s/p peg tube placement  Altered mental status  Assessment & Plan  Patient initially presented to [facility] his ultimate [place] with stroke-like symptoms for 2 days  CT scan of the head was concerning for subacute CVA and was transferred to [facility]   MRI of the brain was more consistent with demyelinating disease patient was started on IV steroids and 1000 mg daily for 5 days  Patient did not have any significant improvement at that time patient had plasmapheresis  And also had  lumbar puncture-negative for infection or malignancy    Patient noted to have persistent encephalopathy so a repeat MRI was obtained which showed worsening of the abnormality seen on the previous MRI and 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  Likely secondary to CNS lymphoma  Delirium precautions Currently alert and oriented times 2-3  Follow commands  Patient's  (does not speak [place]) makes all medical decision for her  Sepsis (HCC)-resolved as of 2021  Assessment & Plan  Patient had hypothermia, elevated WBC count and tachycardia  Concern for possible aspiration in setting on dysphagia versus UTI in setting of urinary catheter which was placed for retention  Plan:  S/p IV antibiotics last day on   Will continue to monitor off antibiotics  Steroid-induced hyperglycemia-resolved as of 2021  Assessment & Plan  Mild hyperglycemia, glucoses in 200s, no history of DM  Occurring in the setting of Decadron with chemotherapy, as well as sodium bicarb in D5 drip  - D5 drip is finished  - will monitor sugars while on Jevity and now off D5  - continue q4 hour fingerstick glucose with correctional algorithm 1 and Lantus      Disposition:  Awaiting rehab placement     SUBJECTIVE     Patient seen and examined  No acute events overnight  Plan to meet with family later today for continued discussion of goals of care  OBJECTIVE     Vitals:    21 1523 21 2243 21 2243 21 0600   BP: 101/65 110/82 110/82    Pulse: 102 94 94    Resp: 18  16    Temp: 99 8 °F (37 7 °C) 99 2 °F (37 3 °C) 99 2 °F (37 3 °C)    TempSrc:       SpO2: 99% 100% 99%    Weight:    45 5 kg (100 lb 5 oz)   Height:          Temperature:   Temp (24hrs), Av 4 °F (37 4 °C), Min:99 2 °F (37 3 °C), Max:99 8 °F (37 7 °C)    Temperature: 99 2 °F (37 3 °C)  Intake & Output:  I/O       701 -  0700  07 -  07 07 -  0700    P  O  440 980     I V  (mL/kg)  10 (0 2)     NG/ 700     Feedings 270 945     Total Intake(mL/kg) 830 (17) 2635 (57 9)     Urine (mL/kg/hr) 1050 (0 9) 1425 (1 3)     Stool 0 0     Total Output 1050 1425     Net -220 +1210            Unmeasured Urine Occurrence 2 x 2 x     Unmeasured Stool Occurrence 2 x 3 x         Weights:   IBW (Ideal Body Weight): 45 5 kg    Body mass index is 19 59 kg/m²  Weight (last 2 days)     Date/Time   Weight    05/31/21 0600   45 5 (100 31)    05/30/21 0600   48 8 (107 58)    05/29/21 0600   49 8 (109 79)            Physical Exam  Vitals signs and nursing note reviewed  Constitutional:       General: She is not in acute distress  Appearance: She is well-developed  HENT:      Head: Normocephalic and atraumatic  Eyes:      Conjunctiva/sclera: Conjunctivae normal    Neck:      Musculoskeletal: Neck supple  Cardiovascular:      Rate and Rhythm: Normal rate and regular rhythm  Heart sounds: No murmur  Pulmonary:      Effort: Pulmonary effort is normal  No respiratory distress  Breath sounds: Normal breath sounds  Abdominal:      Palpations: Abdomen is soft  Tenderness: There is no abdominal tenderness  Musculoskeletal: Normal range of motion  General: No swelling  Skin:     General: Skin is warm and dry  Capillary Refill: Capillary refill takes less than 2 seconds  Neurological:      Mental Status: She is alert  She is disoriented  Cranial Nerves: No cranial nerve deficit  Psychiatric:         Mood and Affect: Mood normal        LABORATORY DATA     Labs: I have personally reviewed pertinent reports  Results from last 7 days   Lab Units 05/30/21  0617 05/25/21  0505   WBC Thousand/uL 7 03 6 76   HEMOGLOBIN g/dL 8 5* 8 8*   HEMATOCRIT % 27 3* 28 4*   PLATELETS Thousands/uL 247 117*   MONO PCT % 23*  --       Results from last 7 days   Lab Units 05/30/21  0617 05/27/21  0558 05/25/21  0505   POTASSIUM mmol/L 3 7 4 4 4 4   CHLORIDE mmol/L 103 101 97*   CO2 mmol/L 27 27 29   BUN mg/dL 17 21 19   CREATININE mg/dL 0 27* 0 30* 0 25*   CALCIUM mg/dL 9 2 9 4 9 7                            IMAGING & DIAGNOSTIC TESTING     Radiology Results: I have personally reviewed pertinent reports    Xr Chest Portable    Result Date: 4/24/2021  Impression: Dialysis catheter tip within the superior cavoatrial junction  No pneumothorax  Workstation performed: [id number]     Xr Chest Picc Line Portable    Result Date: 4/27/2021  Impression: PICC line tip in the superior vena cava, approximately 3 5 to 4 cm above the cavoatrial junction  The examination demonstrates a significant  finding and was documented as such in [facility] for liaison and referring practitioner notification  Workstation performed: [id number]     Ir Picc Reposition    Result Date: 4/27/2021  Impression: Status-post repositioning of a 5 [place] central venous catheter under fluoroscopic guidance with its tip at the cavoatrial junction  Workstation performed: [id number]     Ir Tunneled Central Line Removal    Result Date: 4/27/2021  Impression: Impression: Successful tunneled central line removal as described  Signed, performed and dictated by Elsy Talley PA-C under the supervision of Dr Сергей Kamara  Workstation performed: [id number]     Other Diagnostic Testing: I have personally reviewed pertinent reports      ACTIVE MEDICATIONS     Current Facility-Administered Medications   Medication Dose Route Frequency    acetaminophen (TYLENOL) oral suspension 650 mg  650 mg Oral Q8H [address]    acyclovir (ZOVIRAX) capsule 400 mg  400 mg Oral Q12H [address]    albuterol (PROVENTIL HFA,VENTOLIN HFA) inhaler 2 puff  2 puff Inhalation Q6H PRN    allopurinol (ZYLOPRIM) tablet 100 mg  100 mg Oral Daily    aluminum-magnesium hydroxide-simethicone (MYLANTA) oral suspension 30 mL  30 mL Per PEG Tube Q4H PRN    amLODIPine (NORVASC) tablet 5 mg  5 mg Oral Daily    bisacodyl (DULCOLAX) rectal suppository 10 mg  10 mg Rectal Daily PRN    Diclofenac Sodium (VOLTAREN) 1 % topical gel 2 g  2 g Topical TID PRN    dicyclomine (BENTYL) capsule 10 mg  10 mg Oral 4x Daily (AC & HS)    enoxaparin (LOVENOX) subcutaneous injection 40 mg  40 mg Subcutaneous Q24H MEÑO    fluconazole (DIFLUCAN) tablet 400 mg  400 mg Oral Daily    gabapentin (NEURONTIN) oral solution 100 mg  100 mg Oral HS    HYDROmorphone (DILAUDID) injection 0 5 mg  0 5 mg Intravenous Q4H PRN    insulin glargine (LANTUS) subcutaneous injection 7 Units 0 07 mL  7 Units Subcutaneous HS    insulin lispro (HumaLOG) 100 units/mL subcutaneous injection 1-5 Units  1-5 Units Subcutaneous 4x Daily (AC & HS)    levofloxacin (LEVAQUIN) tablet 500 mg  500 mg Oral Q24H    lidocaine (LIDODERM) 5 % patch 1 patch  1 patch Topical Daily    menthol-methyl salicylate (BENGAY) 92-82 % cream   Apply externally BID    morphine injection 2 mg  2 mg Intravenous Q4H PRN    omeprazole (PRILOSEC) suspension 2 mg/mL  20 mg Oral Daily    ondansetron (ZOFRAN) injection 4 mg  4 mg Intravenous Q6H PRN    polyethylene glycol (MIRALAX) packet 17 g  17 g Oral Daily PRN    senna-docusate sodium (SENOKOT S) 8 6-50 mg per tablet 1 tablet  1 tablet Oral BID PRN    tamsulosin (FLOMAX) capsule 0 4 mg  0 4 mg Oral Daily With Dinner       VTE Pharmacologic Prophylaxis: Enoxaparin (Lovenox)  VTE Mechanical Prophylaxis: sequential compression device    Portions of the record may have been created with voice recognition software  Occasional wrong word or "sound a like" substitutions may have occurred due to the inherent limitations of voice recognition software    Read the chart carefully and recognize, using context, where substitutions have occurred   ==  Akash Villanueva, [facility]5 Lillian Kelly  Internal Medicine Residency PGY-2

## 2021-06-01 LAB
ATRIAL RATE: 95 BPM
GLUCOSE SERPL-MCNC: 102 MG/DL (ref 65–140)
GLUCOSE SERPL-MCNC: 136 MG/DL (ref 65–140)
GLUCOSE SERPL-MCNC: 145 MG/DL (ref 65–140)
GLUCOSE SERPL-MCNC: 158 MG/DL (ref 65–140)
P AXIS: 53 DEGREES
PR INTERVAL: 124 MS
QRS AXIS: 0 DEGREES
QRSD INTERVAL: 84 MS
QT INTERVAL: 350 MS
QTC INTERVAL: 439 MS
T WAVE AXIS: 23 DEGREES
VENTRICULAR RATE: 95 BPM

## 2021-06-01 PROCEDURE — 97530 THERAPEUTIC ACTIVITIES: CPT

## 2021-06-01 PROCEDURE — 93010 ELECTROCARDIOGRAM REPORT: CPT | Performed by: INTERNAL MEDICINE

## 2021-06-01 PROCEDURE — 82948 REAGENT STRIP/BLOOD GLUCOSE: CPT

## 2021-06-01 RX ORDER — BISACODYL 10 MG
10 SUPPOSITORY, RECTAL RECTAL DAILY PRN
Status: DISCONTINUED | OUTPATIENT
Start: 2021-06-01 | End: 2021-06-14 | Stop reason: HOSPADM

## 2021-06-01 RX ORDER — POLYETHYLENE GLYCOL 3350 17 G/17G
17 POWDER, FOR SOLUTION ORAL DAILY PRN
Status: DISCONTINUED | OUTPATIENT
Start: 2021-06-01 | End: 2021-06-14 | Stop reason: HOSPADM

## 2021-06-01 RX ORDER — AMOXICILLIN 250 MG
1 CAPSULE ORAL 2 TIMES DAILY PRN
Status: DISCONTINUED | OUTPATIENT
Start: 2021-06-01 | End: 2021-06-14 | Stop reason: HOSPADM

## 2021-06-01 RX ADMIN — DICYCLOMINE HYDROCHLORIDE 10 MG: 10 CAPSULE ORAL at 23:42

## 2021-06-01 RX ADMIN — INSULIN LISPRO 1 UNITS: 100 INJECTION, SOLUTION INTRAVENOUS; SUBCUTANEOUS at 09:39

## 2021-06-01 RX ADMIN — TAMSULOSIN HYDROCHLORIDE 0.4 MG: 0.4 CAPSULE ORAL at 17:49

## 2021-06-01 RX ADMIN — ALLOPURINOL 100 MG: 100 TABLET ORAL at 09:38

## 2021-06-01 RX ADMIN — DICYCLOMINE HYDROCHLORIDE 10 MG: 10 CAPSULE ORAL at 17:49

## 2021-06-01 RX ADMIN — GABAPENTIN 100 MG: 250 SUSPENSION ORAL at 23:41

## 2021-06-01 RX ADMIN — MENTHOL, UNSPECIFIED FORM AND METHYL SALICYLATE: 10; 150 CREAM TOPICAL at 10:00

## 2021-06-01 RX ADMIN — ACETAMINOPHEN 650 MG: 650 SUSPENSION ORAL at 13:51

## 2021-06-01 RX ADMIN — FLUCONAZOLE 400 MG: 200 TABLET ORAL at 09:39

## 2021-06-01 RX ADMIN — DICLOFENAC SODIUM 2 G: 10 GEL TOPICAL at 10:00

## 2021-06-01 RX ADMIN — INSULIN GLARGINE 7 UNITS: 100 INJECTION, SOLUTION SUBCUTANEOUS at 23:42

## 2021-06-01 RX ADMIN — ACYCLOVIR 400 MG: 200 CAPSULE ORAL at 23:42

## 2021-06-01 RX ADMIN — MORPHINE SULFATE 2 MG: 2 INJECTION, SOLUTION INTRAMUSCULAR; INTRAVENOUS at 05:19

## 2021-06-01 RX ADMIN — Medication 20 MG: at 10:01

## 2021-06-01 RX ADMIN — LIDOCAINE 5% 1 PATCH: 700 PATCH TOPICAL at 09:42

## 2021-06-01 RX ADMIN — DICYCLOMINE HYDROCHLORIDE 10 MG: 10 CAPSULE ORAL at 05:19

## 2021-06-01 RX ADMIN — LEVOFLOXACIN 500 MG: 500 TABLET, FILM COATED ORAL at 17:49

## 2021-06-01 RX ADMIN — ACYCLOVIR 400 MG: 200 CAPSULE ORAL at 09:38

## 2021-06-01 RX ADMIN — ACETAMINOPHEN 650 MG: 650 SUSPENSION ORAL at 23:42

## 2021-06-01 RX ADMIN — ACETAMINOPHEN 650 MG: 650 SUSPENSION ORAL at 05:18

## 2021-06-01 RX ADMIN — MENTHOL, UNSPECIFIED FORM AND METHYL SALICYLATE: 10; 150 CREAM TOPICAL at 17:47

## 2021-06-01 RX ADMIN — DICYCLOMINE HYDROCHLORIDE 10 MG: 10 CAPSULE ORAL at 13:51

## 2021-06-01 RX ADMIN — ENOXAPARIN SODIUM 40 MG: 40 INJECTION SUBCUTANEOUS at 17:49

## 2021-06-01 NOTE — PLAN OF CARE
Problem: PHYSICAL THERAPY ADULT  Goal: Performs mobility at highest level of function for planned discharge setting  See evaluation for individualized goals  Description: Treatment/Interventions: ADL retraining, Functional transfer training, LE strengthening/ROM, Endurance training, Patient/family training, Bed mobility, Spoke to nursing, OT  Equipment Recommended: (TBD)       See flowsheet documentation for full assessment, interventions and recommendations  6/1/2021 1520 by Latoya Venegas, PT  Outcome: Not Progressing  Note: Prognosis: Guarded  Problem List: Decreased strength, Decreased range of motion, Decreased endurance, Impaired balance, Decreased mobility, Decreased cognition, Impaired judgement, Decreased safety awareness, Decreased coordination  Assessment: PATIENT PARTICIPATED IN BED LEVEL ACTIVITY TO SAFELY PERFORM PADMINI-HYGIENE CARE (INCONTINENT OF STOOL UPON ARRIVAL)  ATTEMPTED TO CLEAN IN STANDING POSITION HOWEVER WITH MAX-AX2 PATIENT UNABLE TO MAINTAIN STANDING  SHE PERFORMED MULTIPLE BOUTS OF ROLLING TOWARD L AND R SIDES OF BED MIN-AX1  OVERALL PATIENT WAS RESTLESS, PICKING AT GOWN/BED PAD AND CHALLENGING TO RE-DIRECT WITH TACTILE CUES  REPOSITIONED IN BED WITH ALARM ACTIVE AT END OF SESSION  PT D/C RECOMMENDATION IS FOR REHAB  PATIENT WILL BENEFIT FROM CONTINUED SKILLED PT THIS ADMISSION TO ACHIEVE MAXIMAL FUNCTION AND SAFETY  Barriers to Discharge: Decreased caregiver support, Inaccessible home environment(Significantly below functional baseline)        PT Discharge Recommendation: (LTC placement w/rehab vs LTAC vs Post acute rehab )     PT - OK to Discharge: Yes(LTC placement w/ rehab vs post acute when medically cleared)    See flowsheet documentation for full assessment       6/1/2021 1520 by Latoya Venegas, PT  Outcome: Progressing  Note: Prognosis: Guarded  Problem List: Decreased strength, Decreased range of motion, Decreased endurance, Impaired balance, Decreased mobility, Decreased cognition, Impaired judgement, Decreased safety awareness, Decreased coordination  Assessment: PATIENT PARTICIPATED IN BED LEVEL ACTIVITY TO SAFELY PERFORM PADMINI-HYGIENE CARE (INCONTINENT OF STOOL UPON ARRIVAL)  ATTEMPTED TO CLEAN IN STANDING POSITION HOWEVER WITH MAX-AX2 PATIENT UNABLE TO MAINTAIN STANDING  SHE PERFORMED MULTIPLE BOUTS OF ROLLING TOWARD L AND R SIDES OF BED MIN-AX1  OVERALL PATIENT WAS RESTLESS, PICKING AT GOWN/BED PAD AND CHALLENGING TO RE-DIRECT WITH TACTILE CUES  REPOSITIONED IN BED WITH ALARM ACTIVE AT END OF SESSION  PT D/C RECOMMENDATION IS FOR REHAB  PATIENT WILL BENEFIT FROM CONTINUED SKILLED PT THIS ADMISSION TO ACHIEVE MAXIMAL FUNCTION AND SAFETY  Barriers to Discharge: Decreased caregiver support, Inaccessible home environment(Significantly below functional baseline)        PT Discharge Recommendation: (LTC placement w/rehab vs LTAC vs Post acute rehab )     PT - OK to Discharge: Yes(LTC placement w/ rehab vs post acute when medically cleared)    See flowsheet documentation for full assessment

## 2021-06-01 NOTE — UTILIZATION REVIEW
Continued Stay Review    Date: 5/30/21 and 6/1/21                         Current Patient Class: IP Current Level of Care: MS    HPI:56 y o  female initially admitted on 4/23 for treatment (Chemo) for CNS Lymphoma  Has been on long-term steroid taper  S/p PEG tube placement 5/10 on tube feeds  LUE, LLE weakness  Last transfusion 5/15/21  Assessment/Plan:     5/30 - steroid treatment was completed on 5/30  Pt continues to use parenteral Dilaudid and Morphine for analgesia multiple x per day  Continuing on Jevity feeds  Pt is requesting a family meeting for 5/31 before continuing chemo  6/1 - pt did have some low grade fever overnight  Sugars are well controlled  Continues to use parenteral analgesia  Family meeting outcome - treatment will continue  Pt is waiting for SNF placement to be approved  No update on additional chemo at this time        Vital Signs:   06/01/21 09:50:42  --  94  --  105/72  83  100 %  --   06/01/21 0938  --  --  --  105/72  --  --  --   06/01/21 07:26:32  99 6 °F (37 6 °C)  --  18  110/72  85  --  --   05/31/21 2300  --  --  --  --  --  --  None (Room air)   05/31/21 22:44:01  100 4 °F (38 °C)  104  --  114/74  87  100 %  --   05/31/21 22:41:40  100 4 °F (38 °C)  100  20  114/74  87  98 %  --   05/31/21 15:38:37  97 4 °F (36 3 °C)Abnormal   102  20  114/74  87  100 %  --   05/31/21 15:38:26  97 4 °F (36 3 °C)Abnormal   104  --  114/74  87  100 %  --   05/31/21 0925  --  --  --  --  --  --  None (Room air)   05/31/21 07:41:25  99 °F (37 2 °C)  96  20  102/68  79  99 %  --   05/30/21 22:43:36  99 2 °F (37 3 °C)  94  16  110/82  91  99 %  --   05/30/21 22:43:12  99 2 °F (37 3 °C)  94  --  110/82  91  100 %  --   05/30/21 1941  --  --  --  --  --  --  None (Room air)   05/30/21 15:23:07  99 8 °F (37 7 °C)  102  18  101/65  77  99 %  None (Room air)   05/30/21 0910  --  --  --  --  --  --  None (Room air)   05/30/21 07:22:33  97 4 °F (36 3 °C)Abnormal   92  12  101/65  77  99 %  None (Room air)     Pertinent Labs/Diagnostic Results:       Results from last 7 days   Lab Units 05/30/21  0617   WBC Thousand/uL 7 03   HEMOGLOBIN g/dL 8 5*   HEMATOCRIT % 27 3*   PLATELETS Thousands/uL 247   BANDS PCT % 1         Results from last 7 days   Lab Units 05/30/21  0617 05/27/21  0558   SODIUM mmol/L 137 135*   POTASSIUM mmol/L 3 7 4 4   CHLORIDE mmol/L 103 101   CO2 mmol/L 27 27   ANION GAP mmol/L 7 7   BUN mg/dL 17 21   CREATININE mg/dL 0 27* 0 30*   EGFR ml/min/1 73sq m 135 130   CALCIUM mg/dL 9 2 9 4         Results from last 7 days   Lab Units 06/01/21  1124 06/01/21  0935 05/31/21  2132 05/31/21  1639 05/31/21  1121 05/31/21  0746 05/30/21  2104 05/30/21  1641 05/30/21  1122 05/30/21  0846 05/29/21  2051 05/29/21  1651   POC GLUCOSE mg/dl 102 158* 125 157* 133 151* 160* 156* 120 141* 203* 140     Results from last 7 days   Lab Units 05/30/21  0617 05/27/21  0558   GLUCOSE RANDOM mg/dL 119 108     Medications:   Scheduled Medications:  acetaminophen, 650 mg, Oral, Q8H MEÑO  acyclovir, 400 mg, Oral, Q12H MEÑO  allopurinol, 100 mg, Oral, Daily  amLODIPine, 5 mg, Oral, Daily  dicyclomine, 10 mg, Oral, 4x Daily (AC & HS)  enoxaparin, 40 mg, Subcutaneous, Q24H MEÑO  fluconazole, 400 mg, Oral, Daily  gabapentin, 100 mg, Oral, HS  insulin glargine, 7 Units, Subcutaneous, HS  insulin lispro, 1-5 Units, Subcutaneous, 4x Daily (AC & HS)  levofloxacin, 500 mg, Oral, Q24H  lidocaine, 1 patch, Topical, Daily  menthol-methyl salicylate, , Apply externally, BID  omeprazole (PRILOSEC) suspension 2 mg/mL, 20 mg, Oral, Daily  tamsulosin, 0 4 mg, Oral, Daily With Dinner      Continuous IV Infusions:     PRN Meds:  albuterol, 2 puff, Inhalation, Q6H PRN  aluminum-magnesium hydroxide-simethicone, 30 mL, Per PEG Tube, Q4H PRN  bisacodyl, 10 mg, Rectal, Daily PRN  Diclofenac Sodium, 2 g, Topical, TID PRN - x 1 6/1  HYDROmorphone, 0 5 mg, Intravenous, Q4H PRN - x 2 5/29, x 2 5/30,   morphine injection, 2 mg, Intravenous, Q4H PRN - x 4 5/29, x 1 5/30, x 2 5/31, x 1 6/1  ondansetron, 4 mg, Intravenous, Q6H PRN  polyethylene glycol, 17 g, Oral, Daily PRN  senna-docusate sodium, 1 tablet, Oral, BID PRN    Discharge Plan: rehab     Network Utilization Review Department  ATTENTION: Please call with any questions or concerns to [phone] and carefully listen to the prompts so that you are directed to the right person  All voicemails are confidential   [facility] all requests for admission clinical reviews, approved or denied determinations and any other requests to dedicated fax number below belonging to the campus where the patient is receiving treatment   List of dedicated fax numbers for the Facilities:  [address] DENIALS (Administrative/Medical Necessity) [phone]   [address] (Maternity/NICU/Pediatrics) [phone]   [address] [place] [address] Dr Aleisha Staffordel [id number] [place] [address] [address] [facility] [facility] [phone]

## 2021-06-01 NOTE — PHYSICAL THERAPY NOTE
Physical Therapy Daily Treatment Note       06/01/21:15 minutes (14:20 to 14:35)   PT Last Visit   PT Visit Date 06/01/21   Note Type   Note Type Treatment   Pain Assessment   Pain Assessment Tool Pain Assessment not indicated - pt denies pain   Restrictions/Precautions   Weight Bearing Precautions Per Order No   Other Precautions Cognitive; Bed Alarm; Fall Risk;Aspiration   General   Chart Reviewed Yes   Response to Previous Treatment Patient unable to report, no changes reported from family or staff   Family/Caregiver Present No   Cognition   Overall Cognitive Status Impaired   Arousal/Participation Lethargic;Persistent stimuli required; Other (Comment)  (Agitated at end of session)   Attention Difficulty attending to directions   Memory Unable to assess   Following Commands Unable to follow one step commands   Subjective   Subjective Non-verbal t/o session; except responded when asked what her name is and stated  "No more" at the end of the session  Bed Mobility   Rolling L 1  Dependent  (HOB elevated, (-) rail)   Additional items Assist x 1; Increased time required   Supine to Sit 1  Dependent  (HOB fully elevated, (-) rail)   Additional items Assist x 1; Increased time required   Sit to Supine 1  Dependent  (HOB slightly; elevated, (-) rail)   Additional items Assist x 1; Increased time required   Additional Comments HOB elevated, + left rail  (Increased time and effort on therapist's part)   Transfers   Sit to Stand Unable to assess   Stand to Sit Unable to assess   Stand pivot Unable to assess   Additional Comments Too weak and lethargic to safely attempt transfer training   Ambulation/Elevation   Gait pattern Not appropriate   Balance   Static Sitting Poor -  (Static sit at EOB X 3 minutes: Heavy L lateral lean agaist fully elevated HOB)   Dynamic Sitting   (Unable to attempt)   Static Standing Zero  (Unable to stand)   Dynamic Standing   (Unable to stand)   Ambulatory Zero  (Non-Ambulatory )   Endurance Deficit Endurance Deficit Yes  (Decreased endurance for activity)   Activity Tolerance   Activity Tolerance Treatment limited secondary to medical complications (Comment); Treatment limited secondary to agitation;Patient limited by fatigue  (+ Lethargy; unable to keep eyes open )   Nurse Made Aware RN cleared pt for PT tx session   Exercises   Heelslides PROM;AAROM; Left;Right;Supine;10 reps  (Pt started kicking this PT; at end of LLE exercise)   Hip Flexion Supine;Left;Right;PROM;10 reps   Hip Abduction Supine;10 reps; Left;Right;PROM   Ankle Pumps Supine;Left;Right;PROM  (10 x 2)   Heel Cord Stretch Supine;5 reps; Left;Right;PROM   Assessment   Prognosis Guarded   Problem List Decreased strength;Decreased range of motion;Decreased endurance; Impaired balance;Decreased mobility; Decreased cognition; Impaired judgement;Decreased safety awareness;Decreased coordination   Barriers to Discharge Decreased caregiver support; Inaccessible home environment  (Significantly below functional baseline)   Goals   Patient Goals None Stated   STG Expiration Date 06/08/21   Short Term Goal #1 STG 1  Pt will be able to perform bed mobility with mod I in order to improveoverall functional mobility and assist in safe d/c  STG 2  Pt will be able to perform functional transfer with min A in order to improve overall functional mobility and assist in safe d/c  STG 3  Pt will improve sitting/standing static/dynamic balance 1 grade in order to improve functional mobility and assist in safe d/c  STG 4  Pt will improve LE strength by one grade in order to improve functional mobility and assist in safe d/c  STG 5  Pt will be able to tolerate sitting EOB ~30 minutes at S level in order to improve overall functional mobility and assist in safe d/c     PT Treatment Day 7   Plan   Treatment/Interventions LE strengthening/ROM; Functional transfer training;ADL retraining; Therapeutic exercise; Endurance training;Cognitive reorientation;Patient/family training;Equipment eval/education; Bed mobility;Gait training; Compensatory technique education;Continued evaluation;Spoke to MD;Spoke to nursing;OT;Family;Spoke to advanced practitioner;Spoke to case management  (Gait if becomes appropriate)   Progress No functional improvements  (Agitated, lethargic, restless)   PT Frequency Other (Comment)  (3 to 5x/week)   Recommendation   PT Discharge Recommendation   (LTC placement w/rehab vs LTAC vs Post acute rehab )   Equipment Recommended Other (Comment)  (To be determined)   PT - OK to Discharge Yes  (LTC placement w/ rehab vs LTAC placement vs post acute when medically cleared)   Additional Comments If current status continues; pt may be appropriate for LTC placement (with rehab) vs LTAC placement  If pt begins to actively participate in PT tx session and demonstrates functional gains; then post acute rehab may then be appropriate     AM-PAC Basic Mobility Inpatient   Turning in Bed Without Bedrails 1   Lying on Back to Sitting on Edge of Flat Bed 1   Moving Bed to Chair 1   Standing Up From Chair 1   Walk in Room 1   Climb 3-5 Stairs 1   Basic Mobility Inpatient Raw Score 6   Turning Head Towards Sound 1   Follow Simple Instructions 1   Low Function Basic Mobility Raw Score 8   Low Function Basic Mobility Standardized Score 10 37

## 2021-06-01 NOTE — CASE MANAGEMENT
CM s/w admissions representative from [facility] who stated that they may have a bed available for pt mid week  Per admissions representative from the [facility] they had to review the referral did not have anything available right now  CM notified Doreen Sebastian from [place] of the same  Per Doreen Sebastian she did not have an update if they were bale to get the individual contract with [facility] as they are waiting for a final outcome from the other SNF  CM will follow

## 2021-06-01 NOTE — PROGRESS NOTES
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[address] )  Active Problems:    Acute gout    Altered mental status    Dysphagia    Urinary retention    Aphasia    Weakness    Presence of permanent central venous catheter    Fracture of ramus of left pubis (HCC)    Severe protein-calorie malnutrition (HCC)    Left-sided weakness    Pancytopenia (HCC)    Pain of left lower leg      * CNS lymphoma [facility])  Assessment & Plan  Patient was diagnosed with primary CNS lymphoma through biopsy in [facility] transferred over here for chemotherapy since the [facility] is out of network  Had a 2nd family meeting on 04/30 and family has decided to proceed treatment at this time  S/p PICC line for chemotherapy  S/p peg tube placement on 05/10  Plan:  Hematology-Oncology consulted  Appreciate recommendations  Chemotherapy treatment as per hematology oncology team     S/p 2nd cycle of chemotherapy on 05/13  Leucovin on 5/14  DVT prophylaxis: On heparin  Case management-will start looking for places for rehab  S/p steroid taper for cerebral edema from CNS lymphoma  Pancytopenia in setting of recent chemotherapy  Will continue to watch for any signs of infection  On acyclovir, Diflucan and Levaquin    After discussion with Oncology, plan for rehab, patient does not require radiation therapy, rehab for strength, no chemotherapy until at least 3 weeks, evaluate at that time  Patient's family updated on plan going forward, no questions after family visit    Acute gout  Assessment & Plan  Gout flare left knee resolving  Steroid taper completed  Will start low-dose allopurinol 100 mg q day to hopefully prevent other acute flares in the setting of chemotherapy  Follow-up uric acid level    Pain of left lower leg  Assessment & Plan  Overnight left lower extremity pain, well score 3, pain now controlled  Bilateral ultrasound Dopplers no evidence of DVT    Pancytopenia ([address] )  Assessment & Plan  Likely in setting of chemotherapy  Will continue to monitor for any signs of infection  Will transfuse with packed RBC for hemoglobin less than 7  Drop in Hgb from 9 to 7 on 5/14, other lines relatively stable  VSS and soft brown BMs  Will discuss with oncology if this is expected based on chemo or if there should be further workup for bleeding, though hemodynamics currently do not suggest bleed  S/p 1 pack RBC on 05/15, hemoglobin continues to improve    Left-sided weakness  Assessment & Plan  Patient has left-sided weakness at baseline from her CNS lymphoma  Left upper extremity 3-4/5 and left lower extremity 0-1/5 muscle strength    Severe protein-calorie malnutrition (HCC)  Assessment & Plan  Malnutrition Findings:   Adult Malnutrition type: Acute illness(due to medical condition resulting in dysphagia, decreased appetite and intake, weight loss)  Adult Degree of Malnutrition: Other severe protein calorie malnutrition    BMI Findings: Body mass index is 21 44 kg/m²  Plan:  Nutrition consult  Tube feeds initiated with Jevity 1 2, advancing to goal      Fracture of ramus of left pubis [facility])  Assessment & Plan  Marti Holliday prior to presentation to [facility]  Managed nonoperatively at [facility]    Presence of permanent central venous catheter  Assessment & Plan  Noted presence of right IJ tunneled double-lumen HD catheter; upon chart review, this was likely inserted to be used for plasmapheresis which she has completed  Plan:  S/p removal by IR  Patient now has PICC line      Weakness  Assessment & Plan  Left greater than right upper and lower extremity weakness with ongoing left lower extremity contracture  Secondary to underlying CNS malignancy  Decubitus ulcer precautions such as frequent repositioning  Aphasia  Assessment & Plan  Speech therapy on board  Urinary retention  Assessment & Plan  Patient developed urinary retention during previous hospitalization  Plan was voiding trial as an outpatient as she was scheduled for discharge from that facility  Machado catheter was initially removed although patient required re-placement of machado on 05/14 in setting of incontinence and getting chemotherapy  Machado discontinued, monitor for signs of fluid retention      Dysphagia  Assessment & Plan  Patient noted to have dysphagia and also decreased p o  Intake  As per [facility] progress note patient had a calorie count and recommended feeding tube placement  Speech evaluation - advance diet with dysphagia 2 and thin liquids; needs assistance with feeds  Calorie count  If the patient continued to have poor p o  Intake may need discussion with the family regarding alternate methods of nutrition  Patient's calorie intake is low  VBS- showed oropharyngeal dysphagia  s/p peg tube placement  Altered mental status  Assessment & Plan  Patient initially presented to [facility] his ultimate [place] with stroke-like symptoms for 2 days  CT scan of the head was concerning for subacute CVA and was transferred to [facility]   MRI of the brain was more consistent with demyelinating disease patient was started on IV steroids and 1000 mg daily for 5 days  Patient did not have any significant improvement at that time patient had plasmapheresis  And also had  lumbar puncture-negative for infection or malignancy    Patient noted to have persistent encephalopathy so a repeat MRI was obtained which showed worsening of the abnormality seen on the previous MRI and 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  Likely secondary to CNS lymphoma  Delirium precautions Currently alert and oriented times 2-3  Follow commands  Patient's  (does not speak [place]) makes all medical decision for her  Sepsis (HCC)-resolved as of 2021  Assessment & Plan  Patient had hypothermia, elevated WBC count and tachycardia  Concern for possible aspiration in setting on dysphagia versus UTI in setting of urinary catheter which was placed for retention  Plan:  S/p IV antibiotics last day on   Will continue to monitor off antibiotics  Steroid-induced hyperglycemia-resolved as of 2021  Assessment & Plan  Mild hyperglycemia, glucoses in 200s, no history of DM  Occurring in the setting of Decadron with chemotherapy, as well as sodium bicarb in D5 drip  - D5 drip is finished  - will monitor sugars while on Jevity and now off D5  - continue q4 hour fingerstick glucose with correctional algorithm 1 and Lantus        Disposition:  Awaiting rehab     SUBJECTIVE     Patient seen and examined  No acute events overnight  OBJECTIVE     Vitals:    21 1538 21 2241 21 2244 21 0600   BP: 114/74 114/74 114/74    Pulse: 102 100 104    Resp: 20 20     Temp: (!) 97 4 °F (36 3 °C) 100 4 °F (38 °C) 100 4 °F (38 °C)    TempSrc:   Axillary    SpO2: 100% 98% 100%    Weight:    45 kg (99 lb 3 3 oz)   Height:          Temperature:   Temp (24hrs), Av 9 °F (37 2 °C), Min:97 4 °F (36 3 °C), Max:100 4 °F (38 °C)    Temperature: 100 4 °F (38 °C)  Intake & Output:  I/O       701 -  0700  07 -  0700  07 -  0700    P  O  980 240     I V  (mL/kg) 10 (0 2)      NG/ 660     Feedings 945      Total Intake(mL/kg) 2635 (57 9) 900 (20)     Urine (mL/kg/hr) 1425 (1 3) 500 (0 5)     Stool 0 0     Total Output 1425 500     Net +1210 +400            Unmeasured Urine Occurrence 2 x 4 x     Unmeasured Stool Occurrence 3 x 4 x         Weights:   IBW (Ideal Body Weight): 45 5 kg    Body mass index is 19 38 kg/m²    Weight (last 2 days) Date/Time   Weight    06/01/21 0600   45 (99 21)    05/31/21 0600   45 5 (100 31)    05/30/21 0600   48 8 (107 58)            Physical Exam  Vitals signs and nursing note reviewed  Constitutional:       General: She is not in acute distress  Appearance: Normal appearance  She is well-developed  She is ill-appearing  HENT:      Head: Normocephalic and atraumatic  Eyes:      Conjunctiva/sclera: Conjunctivae normal    Neck:      Musculoskeletal: Neck supple  Cardiovascular:      Rate and Rhythm: Normal rate and regular rhythm  Heart sounds: No murmur  Pulmonary:      Effort: Pulmonary effort is normal  No respiratory distress  Breath sounds: Normal breath sounds  Abdominal:      Palpations: Abdomen is soft  Tenderness: There is no abdominal tenderness  Musculoskeletal: Normal range of motion  General: No swelling  Skin:     General: Skin is warm and dry  Capillary Refill: Capillary refill takes less than 2 seconds  Neurological:      General: No focal deficit present  Mental Status: She is alert  Cranial Nerves: No cranial nerve deficit  Psychiatric:         Mood and Affect: Mood normal          Behavior: Behavior normal        LABORATORY DATA     Labs: I have personally reviewed pertinent reports  Results from last 7 days   Lab Units 05/30/21  0617   WBC Thousand/uL 7 03   HEMOGLOBIN g/dL 8 5*   HEMATOCRIT % 27 3*   PLATELETS Thousands/uL 247   MONO PCT % 23*      Results from last 7 days   Lab Units 05/30/21  0617 05/27/21  0558   POTASSIUM mmol/L 3 7 4 4   CHLORIDE mmol/L 103 101   CO2 mmol/L 27 27   BUN mg/dL 17 21   CREATININE mg/dL 0 27* 0 30*   CALCIUM mg/dL 9 2 9 4                            IMAGING & DIAGNOSTIC TESTING     Radiology Results: I have personally reviewed pertinent reports  Xr Chest Portable    Result Date: 4/24/2021  Impression: Dialysis catheter tip within the superior cavoatrial junction  No pneumothorax   Workstation performed: [id number]     Xr Chest Picc Line Portable    Result Date: 4/27/2021  Impression: PICC line tip in the superior vena cava, approximately 3 5 to 4 cm above the cavoatrial junction  The examination demonstrates a significant  finding and was documented as such in [facility] for liaison and referring practitioner notification  Workstation performed: [id number]     Ir Picc Reposition    Result Date: 4/27/2021  Impression: Status-post repositioning of a 5 [place] central venous catheter under fluoroscopic guidance with its tip at the cavoatrial junction  Workstation performed: [id number]     Ir Tunneled Central Line Removal    Result Date: 4/27/2021  Impression: Impression: Successful tunneled central line removal as described  Signed, performed and dictated by Bruce Love PA-C under the supervision of Dr Jayden Jerez  Workstation performed: [id number]     Other Diagnostic Testing: I have personally reviewed pertinent reports      ACTIVE MEDICATIONS     Current Facility-Administered Medications   Medication Dose Route Frequency    acetaminophen (TYLENOL) oral suspension 650 mg  650 mg Oral Q8H [address]    acyclovir (ZOVIRAX) capsule 400 mg  400 mg Oral Q12H [address]    albuterol (PROVENTIL HFA,VENTOLIN HFA) inhaler 2 puff  2 puff Inhalation Q6H PRN    allopurinol (ZYLOPRIM) tablet 100 mg  100 mg Oral Daily    aluminum-magnesium hydroxide-simethicone (MYLANTA) oral suspension 30 mL  30 mL Per PEG Tube Q4H PRN    amLODIPine (NORVASC) tablet 5 mg  5 mg Oral Daily    bisacodyl (DULCOLAX) rectal suppository 10 mg  10 mg Rectal Daily PRN    Diclofenac Sodium (VOLTAREN) 1 % topical gel 2 g  2 g Topical TID PRN    dicyclomine (BENTYL) capsule 10 mg  10 mg Oral 4x Daily (AC & HS)    enoxaparin (LOVENOX) subcutaneous injection 40 mg  40 mg Subcutaneous Q24H MEÑO    fluconazole (DIFLUCAN) tablet 400 mg  400 mg Oral Daily    gabapentin (NEURONTIN) oral solution 100 mg  100 mg Oral HS    HYDROmorphone (DILAUDID) injection 0 5 mg 0 5 mg Intravenous Q4H PRN    insulin glargine (LANTUS) subcutaneous injection 7 Units 0 07 mL  7 Units Subcutaneous HS    insulin lispro (HumaLOG) 100 units/mL subcutaneous injection 1-5 Units  1-5 Units Subcutaneous 4x Daily (AC & HS)    levofloxacin (LEVAQUIN) tablet 500 mg  500 mg Oral Q24H    lidocaine (LIDODERM) 5 % patch 1 patch  1 patch Topical Daily    menthol-methyl salicylate (BENGAY) 83-48 % cream   Apply externally BID    morphine injection 2 mg  2 mg Intravenous Q4H PRN    omeprazole (PRILOSEC) suspension 2 mg/mL  20 mg Oral Daily    ondansetron (ZOFRAN) injection 4 mg  4 mg Intravenous Q6H PRN    polyethylene glycol (MIRALAX) packet 17 g  17 g Oral Daily PRN    senna-docusate sodium (SENOKOT S) 8 6-50 mg per tablet 1 tablet  1 tablet Oral BID PRN    tamsulosin (FLOMAX) capsule 0 4 mg  0 4 mg Oral Daily With Dinner       VTE Pharmacologic Prophylaxis: Enoxaparin (Lovenox)  VTE Mechanical Prophylaxis: sequential compression device    Portions of the record may have been created with voice recognition software  Occasional wrong word or "sound a like" substitutions may have occurred due to the inherent limitations of voice recognition software    Read the chart carefully and recognize, using context, where substitutions have occurred   ==  Kate Tran, Eileen5 [facility]  Internal Medicine Residency PGY-2

## 2021-06-01 NOTE — PLAN OF CARE
Problem: Prexisting or High Potential for Compromised Skin Integrity  Goal: Skin integrity is maintained or improved  Description: INTERVENTIONS:  - Identify patients at risk for skin breakdown  - Assess and monitor skin integrity  - Assess and monitor nutrition and hydration status  - Monitor labs   - Assess for incontinence   - Turn and reposition patient  - Assist with mobility/ambulation  - Relieve pressure over bony prominences  - Avoid friction and shearing  - Provide appropriate hygiene as needed including keeping skin clean and dry  - Evaluate need for skin moisturizer/barrier cream  - Collaborate with interdisciplinary team   - Patient/family teaching  - Consider wound care consult   Outcome: Progressing     Problem: Potential for Falls  Goal: Patient will remain free of falls  Description: INTERVENTIONS:  - Assess patient frequently for physical needs  -  Identify cognitive and physical deficits and behaviors that affect risk of falls  -  [place] fall precautions as indicated by assessment   - Educate patient/family on patient safety including physical limitations  - Instruct patient to call for assistance with activity based on assessment  - Modify environment to reduce risk of injury  - Consider OT/PT consult to assist with strengthening/mobility  Outcome: Progressing     Problem: Nutrition/Hydration-ADULT  Goal: Nutrient/Hydration intake appropriate for improving, restoring or maintaining nutritional needs  Description: Monitor and assess patient's nutrition/hydration status for malnutrition  Collaborate with interdisciplinary team and initiate plan and interventions as ordered  Monitor patient's weight and dietary intake as ordered or per policy  Utilize nutrition screening tool and intervene as necessary  Determine patient's food preferences and provide high-protein, high-caloric foods as appropriate       INTERVENTIONS:  - Monitor oral intake, urinary output, labs, and treatment plans  - Assess nutrition and hydration status and recommend course of action  - Evaluate amount of meals eaten  - Assist patient with eating if necessary   - Allow adequate time for meals  - Recommend/ encourage appropriate diets, oral nutritional supplements, and vitamin/mineral supplements  - Order, calculate, and assess calorie counts as needed  - Recommend, monitor, and adjust tube feedings and TPN/PPN based on assessed needs  - Assess need for intravenous fluids  - Provide specific nutrition/hydration education as appropriate  - Include patient/family/caregiver in decisions related to nutrition  Outcome: Progressing     Problem: PAIN - ADULT  Goal: Verbalizes/displays adequate comfort level or baseline comfort level  Description: Interventions:  - Encourage patient to monitor pain and request assistance  - Assess pain using appropriate pain scale  - Administer analgesics based on type and severity of pain and evaluate response  - Implement non-pharmacological measures as appropriate and evaluate response  - Consider cultural and social influences on pain and pain management  - Notify physician/advanced practitioner if interventions unsuccessful or patient reports new pain  Outcome: Progressing     Problem: INFECTION - ADULT  Goal: Absence or prevention of progression during hospitalization  Description: INTERVENTIONS:  - Assess and monitor for signs and symptoms of infection  - Monitor lab/diagnostic results  - Monitor all insertion sites, i e  indwelling lines, tubes, and drains  - Monitor endotracheal if appropriate and nasal secretions for changes in amount and color  - [place] appropriate cooling/warming therapies per order  - Administer medications as ordered  - Instruct and encourage patient and family to use good hand hygiene technique  - Identify and instruct in appropriate isolation precautions for identified infection/condition  Outcome: Progressing     Problem: SAFETY ADULT  Goal: Patient will remain free of falls  Description: INTERVENTIONS:  - Assess patient frequently for physical needs  -  Identify cognitive and physical deficits and behaviors that affect risk of falls    -  [place] fall precautions as indicated by assessment   - Educate patient/family on patient safety including physical limitations  - Instruct patient to call for assistance with activity based on assessment  - Modify environment to reduce risk of injury  - Consider OT/PT consult to assist with strengthening/mobility  Outcome: Progressing  Goal: Maintain or return to baseline ADL function  Description: INTERVENTIONS:  -  Assess patient's ability to carry out ADLs; assess patient's baseline for ADL function and identify physical deficits which impact ability to perform ADLs (bathing, care of mouth/teeth, toileting, grooming, dressing, etc )  - Assess/evaluate cause of self-care deficits   - Assess range of motion  - Assess patient's mobility; develop plan if impaired  - Assess patient's need for assistive devices and provide as appropriate  - Encourage maximum independence but intervene and supervise when necessary  - Involve family in performance of ADLs  - Assess for home care needs following discharge   - Consider OT consult to assist with ADL evaluation and planning for discharge  - Provide patient education as appropriate  Outcome: Progressing  Goal: Maintain or return mobility status to optimal level  Description: INTERVENTIONS:  - Assess patient's baseline mobility status (ambulation, transfers, stairs, etc )    - Identify cognitive and physical deficits and behaviors that affect mobility  - Identify mobility aids required to assist with transfers and/or ambulation (gait belt, sit-to-stand, lift, walker, cane, etc )  - [place] fall precautions as indicated by assessment  - Record patient progress and toleration of activity level on Mobility SBAR; progress patient to next Phase/Stage  - Instruct patient to call for assistance with activity based on assessment  - Consider rehabilitation consult to assist with strengthening/weightbearing, etc   Outcome: Progressing     Problem: DISCHARGE PLANNING  Goal: Discharge to home or other facility with appropriate resources  Description: INTERVENTIONS:  - Identify barriers to discharge w/patient and caregiver  - Arrange for needed discharge resources and transportation as appropriate  - Identify discharge learning needs (meds, wound care, etc )  - Arrange for interpretive services to assist at discharge as needed  - Refer to Case Management Department for coordinating discharge planning if the patient needs post-hospital services based on physician/advanced practitioner order or complex needs related to functional status, cognitive ability, or social support system  Outcome: Progressing     Problem: Knowledge Deficit  Goal: Patient/family/caregiver demonstrates understanding of disease process, treatment plan, medications, and discharge instructions  Description: Complete learning assessment and assess knowledge base    Interventions:  - Provide teaching at level of understanding  - Provide teaching via preferred learning methods  Outcome: Progressing

## 2021-06-02 PROBLEM — Z95.828: Status: RESOLVED | Noted: 2021-04-24 | Resolved: 2021-06-02

## 2021-06-02 PROBLEM — M79.662 PAIN OF LEFT LOWER LEG: Status: RESOLVED | Noted: 2021-05-27 | Resolved: 2021-06-02

## 2021-06-02 PROBLEM — R33.9 URINARY RETENTION: Status: RESOLVED | Noted: 2021-04-23 | Resolved: 2021-06-02

## 2021-06-02 PROBLEM — M10.9 ACUTE GOUT: Status: RESOLVED | Noted: 2021-05-18 | Resolved: 2021-06-02

## 2021-06-02 PROBLEM — R53.1 LEFT-SIDED WEAKNESS: Status: RESOLVED | Noted: 2021-05-07 | Resolved: 2021-06-02

## 2021-06-02 PROBLEM — D61.818 PANCYTOPENIA (HCC): Status: RESOLVED | Noted: 2021-05-14 | Resolved: 2021-06-02

## 2021-06-02 LAB
GLUCOSE SERPL-MCNC: 110 MG/DL (ref 65–140)
GLUCOSE SERPL-MCNC: 144 MG/DL (ref 65–140)
GLUCOSE SERPL-MCNC: 150 MG/DL (ref 65–140)
GLUCOSE SERPL-MCNC: 163 MG/DL (ref 65–140)
GLUCOSE SERPL-MCNC: 84 MG/DL (ref 65–140)

## 2021-06-02 PROCEDURE — 82948 REAGENT STRIP/BLOOD GLUCOSE: CPT

## 2021-06-02 PROCEDURE — 97110 THERAPEUTIC EXERCISES: CPT

## 2021-06-02 PROCEDURE — 97535 SELF CARE MNGMENT TRAINING: CPT

## 2021-06-02 RX ADMIN — DICYCLOMINE HYDROCHLORIDE 10 MG: 10 CAPSULE ORAL at 06:03

## 2021-06-02 RX ADMIN — MORPHINE SULFATE 2 MG: 2 INJECTION, SOLUTION INTRAMUSCULAR; INTRAVENOUS at 19:32

## 2021-06-02 RX ADMIN — ACETAMINOPHEN 650 MG: 650 SUSPENSION ORAL at 21:34

## 2021-06-02 RX ADMIN — ACETAMINOPHEN 650 MG: 650 SUSPENSION ORAL at 15:09

## 2021-06-02 RX ADMIN — INSULIN LISPRO 1 UNITS: 100 INJECTION, SOLUTION INTRAVENOUS; SUBCUTANEOUS at 17:39

## 2021-06-02 RX ADMIN — LEVOFLOXACIN 500 MG: 500 TABLET, FILM COATED ORAL at 15:10

## 2021-06-02 RX ADMIN — INSULIN GLARGINE 7 UNITS: 100 INJECTION, SOLUTION SUBCUTANEOUS at 21:34

## 2021-06-02 RX ADMIN — ACYCLOVIR 400 MG: 200 CAPSULE ORAL at 21:34

## 2021-06-02 RX ADMIN — ENOXAPARIN SODIUM 40 MG: 40 INJECTION SUBCUTANEOUS at 15:10

## 2021-06-02 RX ADMIN — LIDOCAINE 5% 1 PATCH: 700 PATCH TOPICAL at 09:54

## 2021-06-02 RX ADMIN — INSULIN LISPRO 1 UNITS: 100 INJECTION, SOLUTION INTRAVENOUS; SUBCUTANEOUS at 15:09

## 2021-06-02 RX ADMIN — MENTHOL, UNSPECIFIED FORM AND METHYL SALICYLATE: 10; 150 CREAM TOPICAL at 17:39

## 2021-06-02 RX ADMIN — DICYCLOMINE HYDROCHLORIDE 10 MG: 10 CAPSULE ORAL at 21:34

## 2021-06-02 RX ADMIN — ACETAMINOPHEN 650 MG: 650 SUSPENSION ORAL at 06:03

## 2021-06-02 RX ADMIN — GABAPENTIN 100 MG: 250 SUSPENSION ORAL at 21:36

## 2021-06-02 RX ADMIN — FLUCONAZOLE 400 MG: 200 TABLET ORAL at 09:53

## 2021-06-02 RX ADMIN — DICYCLOMINE HYDROCHLORIDE 10 MG: 10 CAPSULE ORAL at 15:27

## 2021-06-02 RX ADMIN — ALLOPURINOL 100 MG: 100 TABLET ORAL at 09:53

## 2021-06-02 RX ADMIN — TAMSULOSIN HYDROCHLORIDE 0.4 MG: 0.4 CAPSULE ORAL at 15:10

## 2021-06-02 RX ADMIN — Medication 20 MG: at 15:27

## 2021-06-02 RX ADMIN — MENTHOL, UNSPECIFIED FORM AND METHYL SALICYLATE: 10; 150 CREAM TOPICAL at 09:54

## 2021-06-02 RX ADMIN — MORPHINE SULFATE 2 MG: 2 INJECTION, SOLUTION INTRAMUSCULAR; INTRAVENOUS at 15:10

## 2021-06-02 RX ADMIN — DICLOFENAC SODIUM 2 G: 10 GEL TOPICAL at 09:54

## 2021-06-02 RX ADMIN — ACYCLOVIR 400 MG: 200 CAPSULE ORAL at 09:51

## 2021-06-02 NOTE — OCCUPATIONAL THERAPY NOTE
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[address] )  Active Problems:    Altered mental status    Dysphagia    Aphasia    Weakness    Fracture of ramus of left pubis (HCC)    Severe protein-calorie malnutrition ([facility] [address] )          06/02/21 1054   OT Last Visit   OT Visit Date 06/02/21   Note Type   Note Type Treatment   Restrictions/Precautions   Weight Bearing Precautions Per Order No   Other Precautions Cognitive; Bed Alarm;Multiple lines; Fall Risk;Pain   General   Response to Previous Treatment Patient with no complaints from previous session   Lifestyle   Autonomy Per chart, pt was I with ADLs, IADLs PTA    Reciprocal Relationships    Service to Others Unable to obtain   Intrinsic Gratification [place] chocolate    Pain Assessment   Pain Assessment Tool 0-10   Pain Score Worst Possible Pain   Pain Location/Orientation Orientation: Left; Location: [facility]   Hospital Pain Intervention(s) Repositioned; Ambulation/increased activity   ADL   Where Assessed Edge of bed   Eating Assistance 4  Minimal Assistance   Eating Deficit Supervision/safety;Verbal cueing; Increased time to complete;Setup;Bringing food to mouth assist;Scoop assist   Eating Comments L hand used to stabilize plate  Also utilized L hand at times for scooping  See assessment for further details    Grooming Assistance 2  Maximal Assistance   Grooming Comments Washes hair with shampoo cap   Bed Mobility   Supine to Sit 2  Maximal assistance   Additional items Assist x 1; Increased time required   Sit to Supine 2  Maximal assistance   Additional items Assist x 1; Increased time required   Therapeutic Exercise - ROM   UE-ROM Yes   ROM - Left Upper Extremities    L Shoulder AAROM; Flexion;ABduction; Extension   L Elbow AAROM;Elbow flexion;Elbow extension   L Wrist AAROM;Wrist flexion;Wrist extension   L Hand Prolonged stretch; Index finger; Long finger;Ring finger;Little finger; Thumb   L Position Supine;Seated   LUE ROM Comment Attempted to complete grasping ex with ex ball, pt unable to squeeze ball 2* decreased strength    Cognition   Overall Cognitive Status Impaired   Arousal/Participation Alert; Responsive; Cooperative   Attention Attends with cues to redirect   Following Commands Follows one step commands with increased time or repetition   Comments Utilized blue phone  during session  Pt with G engagement t/o session  Continues to improve on expressing desires    Vision   Vision Comments Pt with decreased tracking in inferior field    Activity Tolerance   Activity Tolerance Patient limited by pain   Medical Staff Made Aware RN clearance for session    Assessment   Assessment Patient participated in Skilled OT session this date with interventions consisting of ADL re training with the use of correct body mechnaics, safety awareness and fall prevention techniques, therapeutic exercise to: increase functional use of BUEs, increase BUE muscle strength  and increase OOB/ sitting tolerance   Upon arrival patient was found supine in bed  Pt demonstrated the following tasks: MAX A sup <> sit  MIN A EOB sitting  Pt with poor tolerance for EOB sitting 2* LLE pain  Pt requires MAX A to wash hair  Engaged in LUE therex, pt continues to have LUE weakness  Seated upright in bed, pt performs eating task  Requires A to grasp plate with L hand  Pt scoops food with fork using R hand, requires A to scoop appropriate food size  Pt then engaged in eating using L hand  A to place fork in hand  Pt requires A to scoop, as well as rotate wrist to place properly into mouth; however, pt is able to bring food from plate towards mouth w/o A  Patient continues to be functioning below baseline level, occupational performance remains limited secondary to factors listed above and increased risk for falls and injury  From OT standpoint, recommendation at time of d/c would be STR  Patient to benefit from continued Occupational Therapy treatment while in the hospital to address deficits as defined above and maximize level of functional independence with ADLs and functional mobility  Pt was left in bed with alarm on after session with all current needs met  The patient's raw score on the AM-PAC Daily Activity inpatient short form is 14, standardized score is 33 39, less than 39 4  Patients at this level are likely to benefit from discharge to post-acute rehabilitation services  Please refer to the recommendation of the Occupational Therapist for safe discharge planning  Plan   Treatment Interventions ADL retraining;Functional transfer training;UE strengthening/ROM; Visual perceptual retraining; Endurance training;Cognitive reorientation;Patient/family training;Equipment evaluation/education; Compensatory technique education;Continued evaluation; Energy conservation; Activityengagement   Goal Expiration Date 06/08/21   OT Treatment Day 7   OT Frequency 3-5x/wk   Recommendation   OT Discharge Recommendation Post acute rehabilitation services   OT - OK to Discharge Yes  (to rehab when medically stable )   AM-[facility] Daily Activity Inpatient   Lower Body Dressing 2   Bathing 2   Toileting 2   Upper Body Dressing 3   Grooming 2   Eating 3   Daily Activity Raw Score 14   Daily Activity Standardized Score (Calc for Raw Score >=11) 33 39   AM-[facility] Applied Cognition Inpatient   Following a Speech/Presentation 3   Understanding Ordinary Conversation 3   Taking Medications 3   Remembering Where Things Are Placed or Put Away 3   Remembering List of 4-5 Errands 2   Taking Care of Complicated Tasks 2   Applied Cognition Raw Score 16   Applied Cognition Standardized Score 35 03     Payal Guadalupe MS, OTR/L

## 2021-06-02 NOTE — PLAN OF CARE
Problem: OCCUPATIONAL THERAPY ADULT  Goal: Performs self-care activities at highest level of function for planned discharge setting  See evaluation for individualized goals  Description: Treatment Interventions: ADL retraining, Functional transfer training, UE strengthening/ROM, Endurance training, Cognitive reorientation, Patient/family training, Equipment evaluation/education, Neuromuscular reeducation, Fine motor coordination activities, Compensatory technique education, Continued evaluation, Energy conservation, Activityengagement          See flowsheet documentation for full assessment, interventions and recommendations  Outcome: Progressing  Note: Limitation: Decreased ADL status, Decreased UE ROM, Decreased UE strength, Decreased Safe judgement during ADL, Decreased cognition, Decreased endurance, Decreased self-care trans, Decreased high-level ADLs, Decreased fine motor control  Prognosis: Fair  Assessment: Patient participated in Skilled OT session this date with interventions consisting of ADL re training with the use of correct body mechnaics, safety awareness and fall prevention techniques, therapeutic exercise to: increase functional use of BUEs, increase BUE muscle strength  and increase OOB/ sitting tolerance   Upon arrival patient was found supine in bed  Pt demonstrated the following tasks: MAX A sup <> sit  MIN A EOB sitting  Pt with poor tolerance for EOB sitting 2* LLE pain  Pt requires MAX A to wash hair  Engaged in LUE therex, pt continues to have LUE weakness  Seated upright in bed, pt performs eating task  Requires A to grasp plate with L hand  Pt scoops food with fork using R hand, requires A to scoop appropriate food size  Pt then engaged in eating using L hand  A to place fork in hand  Pt requires A to scoop, as well as rotate wrist to place properly into mouth; however, pt is able to bring food from plate towards mouth w/o A   Patient continues to be functioning below baseline level, occupational performance remains limited secondary to factors listed above and increased risk for falls and injury  From OT standpoint, recommendation at time of d/c would be STR  Patient to benefit from continued Occupational Therapy treatment while in the hospital to address deficits as defined above and maximize level of functional independence with ADLs and functional mobility  Pt was left in bed with alarm on after session with all current needs met  The patient's raw score on the AM-PAC Daily Activity inpatient short form is 14, standardized score is 33 39, less than 39 4  Patients at this level are likely to benefit from discharge to post-acute rehabilitation services  Please refer to the recommendation of the Occupational Therapist for safe discharge planning       OT Discharge Recommendation: Post acute rehabilitation services  OT - OK to Discharge: Yes(to rehab when medically stable )

## 2021-06-02 NOTE — PROGRESS NOTES
INTERNAL MEDICINE RESIDENCY PROGRESS NOTE     Name: Cony Schilling   Age & Sex: 49 y o  female   MRN: [id number]  Unit/Bed#: [facility] 920-01   Encounter: [id number]  Team: SOD Team B     PATIENT INFORMATION     Name: Cony Schilling   Age & Sex: 49 y o  female   MRN: [id number] [facility] [address] Stay Days: 36    ASSESSMENT/PLAN     Principal Problem:    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[facility] [address] )  Active Problems:    Altered mental status    Dysphagia    Aphasia    Weakness    Presence of permanent central venous catheter    Fracture of ramus of left pubis (HCC)    Severe protein-calorie malnutrition ([facility] [address] )    Left-sided weakness      * CNS lymphoma [facility])  Assessment & Plan  Patient was diagnosed with primary CNS lymphoma through biopsy in [facility] transferred over here for chemotherapy since the [facility] is out of network  Had a 2nd family meeting on 04/30 and family has decided to proceed treatment at this time  S/p PICC line for chemotherapy  S/p peg tube placement on 05/10  Plan:  Hematology-Oncology consulted  Appreciate recommendations  Chemotherapy treatment as per hematology oncology team     S/p 2nd cycle of chemotherapy on 05/13  Leucovin on 5/14  DVT prophylaxis: On heparin  Case management-will start looking for places for rehab  S/p steroid taper for cerebral edema from CNS lymphoma  Pancytopenia in setting of recent chemotherapy  Will continue to watch for any signs of infection  On acyclovir, Diflucan and Levaquin    After discussion with Oncology, plan for rehab, patient does not require radiation therapy, rehab for strength, no chemotherapy until at least 3 weeks, evaluate at that time  Patient's family updated on plan going forward, no questions after family visit    Acute gout-resolved as of 6/2/2021  Assessment & Plan  Gout flare left knee resolving  Steroid taper completed  Will start low-dose allopurinol 100 mg q day to hopefully prevent other acute flares in the setting of chemotherapy  Follow-up uric acid level    Left-sided weakness  Assessment & Plan  Patient has left-sided weakness at baseline from her CNS lymphoma  Left upper extremity 3-4/5 and left lower extremity 0-1/5 muscle strength    Severe protein-calorie malnutrition (HCC)  Assessment & Plan  Malnutrition Findings:   Adult Malnutrition type: Acute illness(due to medical condition resulting in dysphagia, decreased appetite and intake, weight loss)  Adult Degree of Malnutrition: Other severe protein calorie malnutrition    BMI Findings: Body mass index is 21 44 kg/m²  Plan:  Nutrition consult  Tube feeds initiated with Jevity 1 2, advancing to goal      Fracture of ramus of left pubis [facility])  Assessment & Plan  Timoteo Aguilar prior to presentation to [facility]  Managed nonoperatively at [facility]    Presence of permanent central venous catheter  Assessment & Plan  Noted presence of right IJ tunneled double-lumen HD catheter; upon chart review, this was likely inserted to be used for plasmapheresis which she has completed  Plan:  S/p removal by IR  Patient now has PICC line  Weakness  Assessment & Plan  Left greater than right upper and lower extremity weakness with ongoing left lower extremity contracture  Secondary to underlying CNS malignancy  Decubitus ulcer precautions such as frequent repositioning  Aphasia  Assessment & Plan  Speech therapy on board  Dysphagia  Assessment & Plan  Patient noted to have dysphagia and also decreased p o  Intake  As per [facility] progress note patient had a calorie count and recommended feeding tube placement  Speech evaluation - advance diet with dysphagia 2 and thin liquids; needs assistance with feeds  Calorie count  If the patient continued to have poor p o  Intake may need discussion with the family regarding alternate methods of nutrition  Patient's calorie intake is low  VBS- showed oropharyngeal dysphagia  s/p peg tube placement       Altered mental status  Assessment & Plan  Patient initially presented to [facility] his [facility] [place] with stroke-like symptoms for 2 days  CT scan of the head was concerning for subacute CVA and was transferred to [facility]   MRI of the brain was more consistent with demyelinating disease patient was started on IV steroids and 1000 mg daily for 5 days  Patient did not have any significant improvement at that time patient had plasmapheresis  And also had  lumbar puncture-negative for infection or malignancy  Patient noted to have persistent encephalopathy so a repeat MRI was obtained which showed worsening of the abnormality seen on the previous MRI and 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  Likely secondary to CNS lymphoma  Delirium precautions   Currently alert and oriented times 2-3  Follow commands  Patient's  (does not speak [place]) makes all medical decision for her  Pain of left lower leg-resolved as of 6/2/2021  Assessment & Plan  Overnight left lower extremity pain, well score 3, pain now controlled  Bilateral ultrasound Dopplers no evidence of DVT    Pancytopenia (HCC)-resolved as of 6/2/2021  Assessment & Plan  Likely in setting of chemotherapy  Will continue to monitor for any signs of infection  Will transfuse with packed RBC for hemoglobin less than 7  Drop in Hgb from 9 to 7 on 5/14, other lines relatively stable  VSS and soft brown BMs  Will discuss with oncology if this is expected based on chemo or if there should be further workup for bleeding, though hemodynamics currently do not suggest bleed  S/p 1 pack RBC on 05/15, hemoglobin continues to improve    Sepsis (HCC)-resolved as of 5/13/2021  Assessment & Plan  Patient had hypothermia, elevated WBC count and tachycardia  Concern for possible aspiration in setting on dysphagia versus UTI in setting of urinary catheter which was placed for retention      Plan:  S/p IV antibiotics last day on   Will continue to monitor off antibiotics  Steroid-induced hyperglycemia-resolved as of 2021  Assessment & Plan  Mild hyperglycemia, glucoses in 200s, no history of DM  Occurring in the setting of Decadron with chemotherapy, as well as sodium bicarb in D5 drip  - D5 drip is finished  - will monitor sugars while on Jevity and now off D5  - continue q4 hour fingerstick glucose with correctional algorithm 1 and Lantus    Urinary retention-resolved as of 2021  Assessment & Plan  Patient developed urinary retention during previous hospitalization  Plan was voiding trial as an outpatient as she was scheduled for discharge from that facility  Machado catheter was initially removed although patient required re-placement of machado on  in setting of incontinence and getting chemotherapy  Machado discontinued, monitor for signs of fluid retention        Disposition:  Awaiting rehab placement     SUBJECTIVE     Patient seen and examined  No acute events overnight  OBJECTIVE     Vitals:    21 1535 21 1535 21 2258 21 0550   BP: 103/70 103/70 101/69    Pulse: 89 92 98    Resp:  18     Temp: 99 2 °F (37 3 °C) 99 2 °F (37 3 °C) 98 °F (36 7 °C)    TempSrc:       SpO2: 99% 99% 99%    Weight:    48 8 kg (107 lb 9 4 oz)   Height:          Temperature:   Temp (24hrs), Av °F (37 2 °C), Min:98 °F (36 7 °C), Max:99 6 °F (37 6 °C)    Temperature: 98 °F (36 7 °C)  Intake & Output:  I/O       701 -  0700  07 -  0700    P  O  240 0    NG/     Total Intake(mL/kg) 900 (20) 0 (0)    Urine (mL/kg/hr) 500 (0 5) 0 (0)    Stool 0 0    Total Output 500 0    Net +400 0          Unmeasured Urine Occurrence 4 x 3 x    Unmeasured Stool Occurrence 4 x 1 x        Weights:   IBW (Ideal Body Weight): 45 5 kg    Body mass index is 21 01 kg/m²    Weight (last 2 days)     Date/Time   Weight    21 0550   48 8 (107 58)    21 0600   45 (99 21)    21 0600   45 5 (100 31)            Physical Exam  Vitals signs and nursing note reviewed  Constitutional:       General: She is not in acute distress  Appearance: She is well-developed  She is ill-appearing  She is not toxic-appearing  HENT:      Head: Normocephalic and atraumatic  Eyes:      Conjunctiva/sclera: Conjunctivae normal    Neck:      Musculoskeletal: Neck supple  Cardiovascular:      Rate and Rhythm: Normal rate and regular rhythm  Heart sounds: No murmur  Pulmonary:      Effort: Pulmonary effort is normal  No respiratory distress  Breath sounds: Normal breath sounds  Abdominal:      Palpations: Abdomen is soft  Tenderness: There is no abdominal tenderness  Musculoskeletal: Normal range of motion  General: No swelling  Skin:     General: Skin is warm and dry  Capillary Refill: Capillary refill takes less than 2 seconds  Neurological:      General: No focal deficit present  Mental Status: She is alert  Cranial Nerves: No cranial nerve deficit  Psychiatric:         Mood and Affect: Mood normal          Behavior: Behavior normal        LABORATORY DATA     Labs: I have personally reviewed pertinent reports  Results from last 7 days   Lab Units 05/30/21  0617   WBC Thousand/uL 7 03   HEMOGLOBIN g/dL 8 5*   HEMATOCRIT % 27 3*   PLATELETS Thousands/uL 247   MONO PCT % 23*      Results from last 7 days   Lab Units 05/30/21  0617 05/27/21  0558   POTASSIUM mmol/L 3 7 4 4   CHLORIDE mmol/L 103 101   CO2 mmol/L 27 27   BUN mg/dL 17 21   CREATININE mg/dL 0 27* 0 30*   CALCIUM mg/dL 9 2 9 4                            IMAGING & DIAGNOSTIC TESTING     Radiology Results: I have personally reviewed pertinent reports  Xr Chest Portable    Result Date: 4/24/2021  Impression: Dialysis catheter tip within the superior cavoatrial junction  No pneumothorax   Workstation performed: [id number]     Xr Chest Picc Line Portable    Result Date: 4/27/2021  Impression: PICC line tip in the superior vena cava, approximately 3 5 to 4 cm above the cavoatrial junction  The examination demonstrates a significant  finding and was documented as such in [facility] for liaison and referring practitioner notification  Workstation performed: [id number]     Ir Picc Reposition    Result Date: 4/27/2021  Impression: Status-post repositioning of a 5 [place] central venous catheter under fluoroscopic guidance with its tip at the cavoatrial junction  Workstation performed: [id number]     Ir Tunneled Central Line Removal    Result Date: 4/27/2021  Impression: Impression: Successful tunneled central line removal as described  Signed, performed and dictated by Adalgisa Escamilla PA-C under the supervision of Dr Richard Jeter  Workstation performed: [id number]     Other Diagnostic Testing: I have personally reviewed pertinent reports      ACTIVE MEDICATIONS     Current Facility-Administered Medications   Medication Dose Route Frequency    acetaminophen (TYLENOL) oral suspension 650 mg  650 mg Oral Q8H [address]    acyclovir (ZOVIRAX) capsule 400 mg  400 mg Oral Q12H [address]    albuterol (PROVENTIL HFA,VENTOLIN HFA) inhaler 2 puff  2 puff Inhalation Q6H PRN    allopurinol (ZYLOPRIM) tablet 100 mg  100 mg Oral Daily    aluminum-magnesium hydroxide-simethicone (MYLANTA) oral suspension 30 mL  30 mL Per PEG Tube Q4H PRN    amLODIPine (NORVASC) tablet 5 mg  5 mg Oral Daily    bisacodyl (DULCOLAX) rectal suppository 10 mg  10 mg Rectal Daily PRN    Diclofenac Sodium (VOLTAREN) 1 % topical gel 2 g  2 g Topical TID PRN    dicyclomine (BENTYL) capsule 10 mg  10 mg Oral 4x Daily (AC & HS)    enoxaparin (LOVENOX) subcutaneous injection 40 mg  40 mg Subcutaneous Q24H MEÑO    fluconazole (DIFLUCAN) tablet 400 mg  400 mg Oral Daily    gabapentin (NEURONTIN) oral solution 100 mg  100 mg Oral HS    HYDROmorphone (DILAUDID) injection 0 5 mg  0 5 mg Intravenous Q4H PRN    insulin glargine (LANTUS) subcutaneous injection 7 Units 0 07 mL  7 Units Subcutaneous HS    insulin lispro (HumaLOG) 100 units/mL subcutaneous injection 1-5 Units  1-5 Units Subcutaneous 4x Daily (AC & HS)    levofloxacin (LEVAQUIN) tablet 500 mg  500 mg Oral Q24H    lidocaine (LIDODERM) 5 % patch 1 patch  1 patch Topical Daily    menthol-methyl salicylate (BENGAY) 91-41 % cream   Apply externally BID    morphine injection 2 mg  2 mg Intravenous Q4H PRN    omeprazole (PRILOSEC) suspension 2 mg/mL  20 mg Oral Daily    ondansetron (ZOFRAN) injection 4 mg  4 mg Intravenous Q6H PRN    polyethylene glycol (MIRALAX) packet 17 g  17 g Oral Daily PRN    senna-docusate sodium (SENOKOT S) 8 6-50 mg per tablet 1 tablet  1 tablet Oral BID PRN    tamsulosin (FLOMAX) capsule 0 4 mg  0 4 mg Oral Daily With Dinner       VTE Pharmacologic Prophylaxis: Enoxaparin (Lovenox)  VTE Mechanical Prophylaxis: sequential compression device    Portions of the record may have been created with voice recognition software  Occasional wrong word or "sound a like" substitutions may have occurred due to the inherent limitations of voice recognition software    Read the chart carefully and recognize, using context, where substitutions have occurred   ==  Marty Hicks, [address]   Internal Medicine Residency PGY-2

## 2021-06-02 NOTE — PLAN OF CARE
Problem: Prexisting or High Potential for Compromised Skin Integrity  Goal: Skin integrity is maintained or improved  Description: INTERVENTIONS:  - Identify patients at risk for skin breakdown  - Assess and monitor skin integrity  - Assess and monitor nutrition and hydration status  - Monitor labs   - Assess for incontinence   - Turn and reposition patient  - Assist with mobility/ambulation  - Relieve pressure over bony prominences  - Avoid friction and shearing  - Provide appropriate hygiene as needed including keeping skin clean and dry  - Evaluate need for skin moisturizer/barrier cream  - Collaborate with interdisciplinary team   - Patient/family teaching  - Consider wound care consult   Outcome: Progressing     Problem: Potential for Falls  Goal: Patient will remain free of falls  Description: INTERVENTIONS:  - Assess patient frequently for physical needs  -  Identify cognitive and physical deficits and behaviors that affect risk of falls  -  [place] fall precautions as indicated by assessment   - Educate patient/family on patient safety including physical limitations  - Instruct patient to call for assistance with activity based on assessment  - Modify environment to reduce risk of injury  - Consider OT/PT consult to assist with strengthening/mobility  Outcome: Progressing     Problem: Nutrition/Hydration-ADULT  Goal: Nutrient/Hydration intake appropriate for improving, restoring or maintaining nutritional needs  Description: Monitor and assess patient's nutrition/hydration status for malnutrition  Collaborate with interdisciplinary team and initiate plan and interventions as ordered  Monitor patient's weight and dietary intake as ordered or per policy  Utilize nutrition screening tool and intervene as necessary  Determine patient's food preferences and provide high-protein, high-caloric foods as appropriate       INTERVENTIONS:  - Monitor oral intake, urinary output, labs, and treatment plans  - Assess nutrition and hydration status and recommend course of action  - Evaluate amount of meals eaten  - Assist patient with eating if necessary   - Allow adequate time for meals  - Recommend/ encourage appropriate diets, oral nutritional supplements, and vitamin/mineral supplements  - Order, calculate, and assess calorie counts as needed  - Recommend, monitor, and adjust tube feedings and TPN/PPN based on assessed needs  - Assess need for intravenous fluids  - Provide specific nutrition/hydration education as appropriate  - Include patient/family/caregiver in decisions related to nutrition  Outcome: Progressing     Problem: PAIN - ADULT  Goal: Verbalizes/displays adequate comfort level or baseline comfort level  Description: Interventions:  - Encourage patient to monitor pain and request assistance  - Assess pain using appropriate pain scale  - Administer analgesics based on type and severity of pain and evaluate response  - Implement non-pharmacological measures as appropriate and evaluate response  - Consider cultural and social influences on pain and pain management  - Notify physician/advanced practitioner if interventions unsuccessful or patient reports new pain  Outcome: Progressing     Problem: INFECTION - ADULT  Goal: Absence or prevention of progression during hospitalization  Description: INTERVENTIONS:  - Assess and monitor for signs and symptoms of infection  - Monitor lab/diagnostic results  - Monitor all insertion sites, i e  indwelling lines, tubes, and drains  - Monitor endotracheal if appropriate and nasal secretions for changes in amount and color  - [place] appropriate cooling/warming therapies per order  - Administer medications as ordered  - Instruct and encourage patient and family to use good hand hygiene technique  - Identify and instruct in appropriate isolation precautions for identified infection/condition  Outcome: Progressing     Problem: SAFETY ADULT  Goal: Patient will remain free of falls  Description: INTERVENTIONS:  - Assess patient frequently for physical needs  -  Identify cognitive and physical deficits and behaviors that affect risk of falls    -  [place] fall precautions as indicated by assessment   - Educate patient/family on patient safety including physical limitations  - Instruct patient to call for assistance with activity based on assessment  - Modify environment to reduce risk of injury  - Consider OT/PT consult to assist with strengthening/mobility  Outcome: Progressing  Goal: Maintain or return to baseline ADL function  Description: INTERVENTIONS:  -  Assess patient's ability to carry out ADLs; assess patient's baseline for ADL function and identify physical deficits which impact ability to perform ADLs (bathing, care of mouth/teeth, toileting, grooming, dressing, etc )  - Assess/evaluate cause of self-care deficits   - Assess range of motion  - Assess patient's mobility; develop plan if impaired  - Assess patient's need for assistive devices and provide as appropriate  - Encourage maximum independence but intervene and supervise when necessary  - Involve family in performance of ADLs  - Assess for home care needs following discharge   - Consider OT consult to assist with ADL evaluation and planning for discharge  - Provide patient education as appropriate  Outcome: Progressing  Goal: Maintain or return mobility status to optimal level  Description: INTERVENTIONS:  - Assess patient's baseline mobility status (ambulation, transfers, stairs, etc )    - Identify cognitive and physical deficits and behaviors that affect mobility  - Identify mobility aids required to assist with transfers and/or ambulation (gait belt, sit-to-stand, lift, walker, cane, etc )  - [place] fall precautions as indicated by assessment  - Record patient progress and toleration of activity level on Mobility SBAR; progress patient to next Phase/Stage  - Instruct patient to call for assistance with activity based on assessment  - Consider rehabilitation consult to assist with strengthening/weightbearing, etc   Outcome: Progressing     Problem: DISCHARGE PLANNING  Goal: Discharge to home or other facility with appropriate resources  Description: INTERVENTIONS:  - Identify barriers to discharge w/patient and caregiver  - Arrange for needed discharge resources and transportation as appropriate  - Identify discharge learning needs (meds, wound care, etc )  - Arrange for interpretive services to assist at discharge as needed  - Refer to Case Management Department for coordinating discharge planning if the patient needs post-hospital services based on physician/advanced practitioner order or complex needs related to functional status, cognitive ability, or social support system  Outcome: Progressing     Problem: Knowledge Deficit  Goal: Patient/family/caregiver demonstrates understanding of disease process, treatment plan, medications, and discharge instructions  Description: Complete learning assessment and assess knowledge base    Interventions:  - Provide teaching at level of understanding  - Provide teaching via preferred learning methods  Outcome: Progressing

## 2021-06-03 LAB
ANION GAP SERPL CALCULATED.3IONS-SCNC: 6 MMOL/L (ref 4–13)
ANISOCYTOSIS BLD QL SMEAR: PRESENT
BASOPHILS # BLD MANUAL: 0.06 THOUSAND/UL (ref 0–0.1)
BASOPHILS NFR MAR MANUAL: 1 % (ref 0–1)
BUN SERPL-MCNC: 20 MG/DL (ref 5–25)
CALCIUM SERPL-MCNC: 8.9 MG/DL (ref 8.3–10.1)
CHLORIDE SERPL-SCNC: 106 MMOL/L (ref 100–108)
CO2 SERPL-SCNC: 27 MMOL/L (ref 21–32)
CREAT SERPL-MCNC: 0.23 MG/DL (ref 0.6–1.3)
EOSINOPHIL # BLD MANUAL: 0.12 THOUSAND/UL (ref 0–0.4)
EOSINOPHIL NFR BLD MANUAL: 2 % (ref 0–6)
ERYTHROCYTE [DISTWIDTH] IN BLOOD BY AUTOMATED COUNT: 20.1 % (ref 11.6–15.1)
GFR SERPL CREATININE-BSD FRML MDRD: 142 ML/MIN/1.73SQ M
GLUCOSE SERPL-MCNC: 103 MG/DL (ref 65–140)
GLUCOSE SERPL-MCNC: 104 MG/DL (ref 65–140)
GLUCOSE SERPL-MCNC: 129 MG/DL (ref 65–140)
GLUCOSE SERPL-MCNC: 138 MG/DL (ref 65–140)
GLUCOSE SERPL-MCNC: 144 MG/DL (ref 65–140)
HCT VFR BLD AUTO: 28.4 % (ref 34.8–46.1)
HGB BLD-MCNC: 8.6 G/DL (ref 11.5–15.4)
LYMPHOCYTES # BLD AUTO: 2.72 THOUSAND/UL (ref 0.6–4.47)
LYMPHOCYTES # BLD AUTO: 44 % (ref 14–44)
MCH RBC QN AUTO: 30.8 PG (ref 26.8–34.3)
MCHC RBC AUTO-ENTMCNC: 30.3 G/DL (ref 31.4–37.4)
MCV RBC AUTO: 102 FL (ref 82–98)
METAMYELOCYTES NFR BLD MANUAL: 2 % (ref 0–1)
MONOCYTES # BLD AUTO: 0.99 THOUSAND/UL (ref 0–1.22)
MONOCYTES NFR BLD: 16 % (ref 4–12)
MYELOCYTES NFR BLD MANUAL: 6 % (ref 0–1)
NEUTROPHILS # BLD MANUAL: 1.79 THOUSAND/UL (ref 1.85–7.62)
NEUTS SEG NFR BLD AUTO: 29 % (ref 43–75)
NRBC BLD AUTO-RTO: 0 /100 WBCS
PLATELET # BLD AUTO: 246 THOUSANDS/UL (ref 149–390)
PLATELET BLD QL SMEAR: ADEQUATE
PMV BLD AUTO: 10.1 FL (ref 8.9–12.7)
POIKILOCYTOSIS BLD QL SMEAR: PRESENT
POLYCHROMASIA BLD QL SMEAR: PRESENT
POTASSIUM SERPL-SCNC: 4.1 MMOL/L (ref 3.5–5.3)
RBC # BLD AUTO: 2.79 MILLION/UL (ref 3.81–5.12)
SODIUM SERPL-SCNC: 139 MMOL/L (ref 136–145)
TOTAL CELLS COUNTED SPEC: 100
WBC # BLD AUTO: 6.18 THOUSAND/UL (ref 4.31–10.16)

## 2021-06-03 PROCEDURE — 80048 BASIC METABOLIC PNL TOTAL CA: CPT | Performed by: STUDENT IN AN ORGANIZED HEALTH CARE EDUCATION/TRAINING PROGRAM

## 2021-06-03 PROCEDURE — 85007 BL SMEAR W/DIFF WBC COUNT: CPT | Performed by: STUDENT IN AN ORGANIZED HEALTH CARE EDUCATION/TRAINING PROGRAM

## 2021-06-03 PROCEDURE — 97110 THERAPEUTIC EXERCISES: CPT

## 2021-06-03 PROCEDURE — 85027 COMPLETE CBC AUTOMATED: CPT | Performed by: STUDENT IN AN ORGANIZED HEALTH CARE EDUCATION/TRAINING PROGRAM

## 2021-06-03 PROCEDURE — 82948 REAGENT STRIP/BLOOD GLUCOSE: CPT

## 2021-06-03 RX ORDER — LORAZEPAM 2 MG/ML
0.5 INJECTION INTRAMUSCULAR EVERY 6 HOURS PRN
Status: DISCONTINUED | OUTPATIENT
Start: 2021-06-03 | End: 2021-06-07

## 2021-06-03 RX ADMIN — ACYCLOVIR 400 MG: 200 CAPSULE ORAL at 21:37

## 2021-06-03 RX ADMIN — ACETAMINOPHEN 650 MG: 650 SUSPENSION ORAL at 05:18

## 2021-06-03 RX ADMIN — MORPHINE SULFATE 2 MG: 2 INJECTION, SOLUTION INTRAMUSCULAR; INTRAVENOUS at 19:37

## 2021-06-03 RX ADMIN — DICLOFENAC SODIUM 2 G: 10 GEL TOPICAL at 09:53

## 2021-06-03 RX ADMIN — ALLOPURINOL 100 MG: 100 TABLET ORAL at 09:53

## 2021-06-03 RX ADMIN — DICLOFENAC SODIUM 2 G: 10 GEL TOPICAL at 22:51

## 2021-06-03 RX ADMIN — FLUCONAZOLE 400 MG: 200 TABLET ORAL at 09:53

## 2021-06-03 RX ADMIN — HYDROMORPHONE HYDROCHLORIDE 0.5 MG: 1 INJECTION, SOLUTION INTRAMUSCULAR; INTRAVENOUS; SUBCUTANEOUS at 22:48

## 2021-06-03 RX ADMIN — LEVOFLOXACIN 500 MG: 500 TABLET, FILM COATED ORAL at 17:23

## 2021-06-03 RX ADMIN — ACYCLOVIR 400 MG: 200 CAPSULE ORAL at 09:53

## 2021-06-03 RX ADMIN — MENTHOL, UNSPECIFIED FORM AND METHYL SALICYLATE: 10; 150 CREAM TOPICAL at 17:21

## 2021-06-03 RX ADMIN — ACETAMINOPHEN 650 MG: 650 SUSPENSION ORAL at 21:38

## 2021-06-03 RX ADMIN — LIDOCAINE 5% 1 PATCH: 700 PATCH TOPICAL at 09:00

## 2021-06-03 RX ADMIN — GABAPENTIN 100 MG: 250 SUSPENSION ORAL at 21:42

## 2021-06-03 RX ADMIN — ACETAMINOPHEN 650 MG: 650 SUSPENSION ORAL at 14:26

## 2021-06-03 RX ADMIN — MORPHINE SULFATE 2 MG: 2 INJECTION, SOLUTION INTRAMUSCULAR; INTRAVENOUS at 05:18

## 2021-06-03 RX ADMIN — DICYCLOMINE HYDROCHLORIDE 10 MG: 10 CAPSULE ORAL at 21:38

## 2021-06-03 RX ADMIN — DICYCLOMINE HYDROCHLORIDE 10 MG: 10 CAPSULE ORAL at 05:17

## 2021-06-03 RX ADMIN — MENTHOL, UNSPECIFIED FORM AND METHYL SALICYLATE: 10; 150 CREAM TOPICAL at 09:53

## 2021-06-03 RX ADMIN — DICYCLOMINE HYDROCHLORIDE 10 MG: 10 CAPSULE ORAL at 17:23

## 2021-06-03 RX ADMIN — ENOXAPARIN SODIUM 40 MG: 40 INJECTION SUBCUTANEOUS at 14:27

## 2021-06-03 RX ADMIN — Medication 20 MG: at 14:27

## 2021-06-03 RX ADMIN — INSULIN GLARGINE 7 UNITS: 100 INJECTION, SOLUTION SUBCUTANEOUS at 21:38

## 2021-06-03 RX ADMIN — DICYCLOMINE HYDROCHLORIDE 10 MG: 10 CAPSULE ORAL at 14:26

## 2021-06-03 RX ADMIN — LORAZEPAM 0.5 MG: 2 INJECTION INTRAMUSCULAR; INTRAVENOUS at 14:28

## 2021-06-03 NOTE — PROGRESS NOTES
INTERNAL MEDICINE RESIDENCY PROGRESS NOTE     Name: Toro Barajas   Age & Sex: 49 y o  female   MRN: [id number]  [facility]   Encounter: [id number]  Team: SOD Team B     PATIENT INFORMATION     Name: Toro Barajas   Age & Sex: 49 y o  female   MRN: [id number] Stay Days: 39    ASSESSMENT/PLAN     Principal Problem:    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[facility] [address] )  Active Problems:    Altered mental status    Dysphagia    Aphasia    Weakness    Fracture of ramus of left pubis (HCC)    Severe protein-calorie malnutrition ([facility] [address] )      * CNS lymphoma [facility])  Assessment & Plan  Patient was diagnosed with primary CNS lymphoma through biopsy in [facility] transferred over here for chemotherapy since the [facility] is out of network  Had a 2nd family meeting on 04/30 and family has decided to proceed treatment at this time  S/p PICC line for chemotherapy  S/p peg tube placement on 05/10  Plan:  Hematology-Oncology consulted  Appreciate recommendations  Chemotherapy treatment as per hematology oncology team     S/p 2nd cycle of chemotherapy on 05/13  Leucovin on 5/14  DVT prophylaxis with Lovenox  Rehab placement pending    S/p steroid taper for cerebral edema from CNS lymphoma  Pancytopenia in setting of recent chemotherapy  Will continue to watch for any signs of infection  On acyclovir, Diflucan and Levaquin    After discussion with Oncology, plan for rehab, patient does not require radiation therapy, rehab for strength, no chemotherapy until at least 3 weeks, evaluate at that time  Patient's family updated on plan going forward, no questions after family visit    Acute gout-resolved as of 6/2/2021  Assessment & Plan  Gout flare left knee resolving  Steroid taper completed  Will start low-dose allopurinol 100 mg q day to hopefully prevent other acute flares in the setting of chemotherapy  Follow-up uric acid level    Severe protein-calorie malnutrition ([facility] [address] )  Assessment & Plan  Malnutrition Findings:   Adult Malnutrition type: Acute illness(due to medical condition resulting in dysphagia, decreased appetite and intake, weight loss)  Adult Degree of Malnutrition: Other severe protein calorie malnutrition    BMI Findings: Body mass index is 21 44 kg/m²  Plan:  Nutrition consult  Tube feeds initiated with Jevity 1 2, advancing to goal      Fracture of ramus of left pubis [facility])  Assessment & Plan  Jerzy Suh prior to presentation to [facility]  Managed nonoperatively at [facility]    Weakness  Assessment & Plan  Left greater than right upper and lower extremity weakness with ongoing left lower extremity contracture  Secondary to underlying CNS malignancy  Decubitus ulcer precautions such as frequent repositioning  Aphasia  Assessment & Plan  Speech therapy on board    Dysphagia  Assessment & Plan  s/p peg tube placement, tube feeds at goal    Altered mental status  Assessment & Plan  Patient initially presented to [facility] with stroke-like symptoms for 2 days  CT scan of the head was concerning for subacute CVA and was transferred to [facility]   MRI of the brain was more consistent with demyelinating disease patient was started on IV steroids and 1000 mg daily for 5 days  Patient did not have any significant improvement at that time patient had plasmapheresis  And also had  lumbar puncture-negative for infection or malignancy  Patient noted to have persistent encephalopathy so a repeat MRI was obtained which showed worsening of the abnormality seen on the previous MRI and 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  Likely secondary to CNS lymphoma  Delirium precautions   Currently alert and oriented times 2-3  Follow commands      Patient's  (does not speak [place]) makes all medical decision for her, confirmed once all medical treatment at this time    Pain of left lower leg-resolved as of 6/2/2021  Assessment & Plan  Overnight left lower extremity pain, well score 3, pain now controlled  Bilateral ultrasound Dopplers no evidence of DVT    Pancytopenia (HCC)-resolved as of 6/2/2021  Assessment & Plan  Likely in setting of chemotherapy  Will continue to monitor for any signs of infection  Will transfuse with packed RBC for hemoglobin less than 7  Drop in Hgb from 9 to 7 on 5/14, other lines relatively stable  VSS and soft brown BMs  Will discuss with oncology if this is expected based on chemo or if there should be further workup for bleeding, though hemodynamics currently do not suggest bleed  S/p 1 pack RBC on 05/15, hemoglobin continues to improve    Left-sided weakness-resolved as of 6/2/2021  Assessment & Plan  Patient has left-sided weakness at baseline from her CNS lymphoma  Left upper extremity 3-4/5 and left lower extremity 0-1/5 muscle strength    Sepsis (HCC)-resolved as of 5/13/2021  Assessment & Plan  Patient had hypothermia, elevated WBC count and tachycardia  Concern for possible aspiration in setting on dysphagia versus UTI in setting of urinary catheter which was placed for retention  Plan:  S/p IV antibiotics last day on 05/05  Will continue to monitor off antibiotics  Steroid-induced hyperglycemia-resolved as of 5/23/2021  Assessment & Plan  Mild hyperglycemia, glucoses in 200s, no history of DM  Occurring in the setting of Decadron with chemotherapy, as well as sodium bicarb in D5 drip  - D5 drip is finished  - will monitor sugars while on Jevity and now off D5  - continue q4 hour fingerstick glucose with correctional algorithm 1 and Lantus    Presence of permanent central venous catheter-resolved as of 6/2/2021  Assessment & Plan  Noted presence of right IJ tunneled double-lumen HD catheter; upon chart review, this was likely inserted to be used for plasmapheresis which she has completed  Plan:  S/p removal by IR  Patient now has PICC line      Urinary retention-resolved as of 2021  Assessment & Plan  Patient developed urinary retention during previous hospitalization  Plan was voiding trial as an outpatient as she was scheduled for discharge from that facility  M[place] catheter was initially removed although patient required re-placement of machado on  in setting of incontinence and getting chemotherapy  Machado discontinued, monitor for signs of fluid retention          Disposition:  Continue inpatient care until rehab     SUBJECTIVE     Patient seen and examined  No acute events overnight  OBJECTIVE     Vitals:    21 0550 21 0700 21 1517 21 2149   BP:  111/63 104/64 100/63   Pulse:  90  90   Resp:  18 17 17   Temp:  98 2 °F (36 8 °C) 98 9 °F (37 2 °C) 97 6 °F (36 4 °C)   TempSrc:    Axillary   SpO2:  99%  99%   Weight: 48 8 kg (107 lb 9 4 oz)      Height:          Temperature:   Temp (24hrs), Av 3 °F (36 8 °C), Min:97 6 °F (36 4 °C), Max:98 9 °F (37 2 °C)    Temperature: 97 6 °F (36 4 °C)  Intake & Output:  I/O        0701 -  0700  07 -  0700 / 07 -  0700    P  O  0 240     NG/GT  390     Feedings  550     Total Intake(mL/kg) 0 (0) 1180 (24 2)     Urine (mL/kg/hr) 0 (0) 563 (0 5)     Stool 0      Total Output 0 563     Net 0 +617            Unmeasured Urine Occurrence 3 x      Unmeasured Stool Occurrence 1 x          Weights:   IBW (Ideal Body Weight): 45 5 kg    Body mass index is 21 01 kg/m²  Weight (last 2 days)     Date/Time   Weight    21 0550   48 8 (107 58)    21 0600   45 (99 21)            Physical Exam  Vitals signs and nursing note reviewed  Constitutional:       General: She is not in acute distress  Appearance: She is well-developed  She is ill-appearing  HENT:      Head: Normocephalic and atraumatic  Eyes:      Conjunctiva/sclera: Conjunctivae normal    Neck:      Musculoskeletal: Neck supple  Cardiovascular:      Rate and Rhythm: Normal rate and regular rhythm        Heart sounds: No murmur  Pulmonary:      Effort: Pulmonary effort is normal  No respiratory distress  Breath sounds: Normal breath sounds  Abdominal:      Palpations: Abdomen is soft  Tenderness: There is no abdominal tenderness  Musculoskeletal: Normal range of motion  General: No swelling  Skin:     General: Skin is warm and dry  Capillary Refill: Capillary refill takes less than 2 seconds  Neurological:      Mental Status: She is alert  Cranial Nerves: No cranial nerve deficit  Psychiatric:         Mood and Affect: Mood normal        LABORATORY DATA     Labs: I have personally reviewed pertinent reports  Results from last 7 days   Lab Units 06/03/21  0529 05/30/21  0617   WBC Thousand/uL 6 18 7 03   HEMOGLOBIN g/dL 8 6* 8 5*   HEMATOCRIT % 28 4* 27 3*   PLATELETS Thousands/uL 246 247   MONO PCT %  --  23*      Results from last 7 days   Lab Units 06/03/21  0529 05/30/21  0617   POTASSIUM mmol/L 4 1 3 7   CHLORIDE mmol/L 106 103   CO2 mmol/L 27 27   BUN mg/dL 20 17   CREATININE mg/dL 0 23* 0 27*   CALCIUM mg/dL 8 9 9 2                            IMAGING & DIAGNOSTIC TESTING     Radiology Results: I have personally reviewed pertinent reports  Xr Chest Portable    Result Date: 4/24/2021  Impression: Dialysis catheter tip within the superior cavoatrial junction  No pneumothorax  Workstation performed: [id number]     Xr Chest Picc Line Portable    Result Date: 4/27/2021  Impression: PICC line tip in the superior vena cava, approximately 3 5 to 4 cm above the cavoatrial junction  The examination demonstrates a significant  finding and was documented as such in [facility] for liaison and referring practitioner notification  Workstation performed: [id number]     Ir Picc Reposition    Result Date: 4/27/2021  Impression: Status-post repositioning of a 5 [place] central venous catheter under fluoroscopic guidance with its tip at the cavoatrial junction   Workstation performed: [id number]     Ir Tunneled Central Line Removal    Result Date: 4/27/2021  Impression: Impression: Successful tunneled central line removal as described  Signed, performed and dictated by Adela Hugo PA-C under the supervision of Dr Emely Joiner  Workstation performed: [id number]     Other Diagnostic Testing: I have personally reviewed pertinent reports      ACTIVE MEDICATIONS     Current Facility-Administered Medications   Medication Dose Route Frequency    acetaminophen (TYLENOL) oral suspension 650 mg  650 mg Oral Q8H [address]    acyclovir (ZOVIRAX) capsule 400 mg  400 mg Oral Q12H [address]    albuterol (PROVENTIL HFA,VENTOLIN HFA) inhaler 2 puff  2 puff Inhalation Q6H PRN    allopurinol (ZYLOPRIM) tablet 100 mg  100 mg Oral Daily    aluminum-magnesium hydroxide-simethicone (MYLANTA) oral suspension 30 mL  30 mL Per PEG Tube Q4H PRN    amLODIPine (NORVASC) tablet 5 mg  5 mg Oral Daily    bisacodyl (DULCOLAX) rectal suppository 10 mg  10 mg Rectal Daily PRN    Diclofenac Sodium (VOLTAREN) 1 % topical gel 2 g  2 g Topical TID PRN    dicyclomine (BENTYL) capsule 10 mg  10 mg Oral 4x Daily (AC & HS)    enoxaparin (LOVENOX) subcutaneous injection 40 mg  40 mg Subcutaneous Q24H MEÑO    fluconazole (DIFLUCAN) tablet 400 mg  400 mg Oral Daily    gabapentin (NEURONTIN) oral solution 100 mg  100 mg Oral HS    HYDROmorphone (DILAUDID) injection 0 5 mg  0 5 mg Intravenous Q4H PRN    insulin glargine (LANTUS) subcutaneous injection 7 Units 0 07 mL  7 Units Subcutaneous HS    insulin lispro (HumaLOG) 100 units/mL subcutaneous injection 1-5 Units  1-5 Units Subcutaneous 4x Daily (AC & HS)    levofloxacin (LEVAQUIN) tablet 500 mg  500 mg Oral Q24H    lidocaine (LIDODERM) 5 % patch 1 patch  1 patch Topical Daily    menthol-methyl salicylate (BENGAY) 82-25 % cream   Apply externally BID    morphine injection 2 mg  2 mg Intravenous Q4H PRN    omeprazole (PRILOSEC) suspension 2 mg/mL  20 mg Oral Daily    ondansetron (ZOFRAN) injection 4 mg 4 mg Intravenous Q6H PRN    polyethylene glycol (MIRALAX) packet 17 g  17 g Oral Daily PRN    senna-docusate sodium (SENOKOT S) 8 6-50 mg per tablet 1 tablet  1 tablet Oral BID PRN    tamsulosin (FLOMAX) capsule 0 4 mg  0 4 mg Oral Daily With Dinner       VTE Pharmacologic Prophylaxis: Enoxaparin (Lovenox)  VTE Mechanical Prophylaxis: sequential compression device    Portions of the record may have been created with voice recognition software  Occasional wrong word or "sound a like" substitutions may have occurred due to the inherent limitations of voice recognition software    Read the chart carefully and recognize, using context, where substitutions have occurred   ==  Vonzell Landau, [address] [facility]  Internal Medicine Residency PGY-2

## 2021-06-03 NOTE — PLAN OF CARE
Problem: Prexisting or High Potential for Compromised Skin Integrity  Goal: Skin integrity is maintained or improved  Description: INTERVENTIONS:  - Identify patients at risk for skin breakdown  - Assess and monitor skin integrity  - Assess and monitor nutrition and hydration status  - Monitor labs   - Assess for incontinence   - Turn and reposition patient  - Assist with mobility/ambulation  - Relieve pressure over bony prominences  - Avoid friction and shearing  - Provide appropriate hygiene as needed including keeping skin clean and dry  - Evaluate need for skin moisturizer/barrier cream  - Collaborate with interdisciplinary team   - Patient/family teaching  - Consider wound care consult   Outcome: Progressing     Problem: Potential for Falls  Goal: Patient will remain free of falls  Description: INTERVENTIONS:  - Assess patient frequently for physical needs  -  Identify cognitive and physical deficits and behaviors that affect risk of falls  -  [place] fall precautions as indicated by assessment   - Educate patient/family on patient safety including physical limitations  - Instruct patient to call for assistance with activity based on assessment  - Modify environment to reduce risk of injury  - Consider OT/PT consult to assist with strengthening/mobility  Outcome: Progressing     Problem: Nutrition/Hydration-ADULT  Goal: Nutrient/Hydration intake appropriate for improving, restoring or maintaining nutritional needs  Description: Monitor and assess patient's nutrition/hydration status for malnutrition  Collaborate with interdisciplinary team and initiate plan and interventions as ordered  Monitor patient's weight and dietary intake as ordered or per policy  Utilize nutrition screening tool and intervene as necessary  Determine patient's food preferences and provide high-protein, high-caloric foods as appropriate       INTERVENTIONS:  - Monitor oral intake, urinary output, labs, and treatment plans  - Assess nutrition and hydration status and recommend course of action  - Evaluate amount of meals eaten  - Assist patient with eating if necessary   - Allow adequate time for meals  - Recommend/ encourage appropriate diets, oral nutritional supplements, and vitamin/mineral supplements  - Order, calculate, and assess calorie counts as needed  - Recommend, monitor, and adjust tube feedings and TPN/PPN based on assessed needs  - Assess need for intravenous fluids  - Provide specific nutrition/hydration education as appropriate  - Include patient/family/caregiver in decisions related to nutrition  Outcome: Progressing     Problem: PAIN - ADULT  Goal: Verbalizes/displays adequate comfort level or baseline comfort level  Description: Interventions:  - Encourage patient to monitor pain and request assistance  - Assess pain using appropriate pain scale  - Administer analgesics based on type and severity of pain and evaluate response  - Implement non-pharmacological measures as appropriate and evaluate response  - Consider cultural and social influences on pain and pain management  - Notify physician/advanced practitioner if interventions unsuccessful or patient reports new pain  Outcome: Progressing     Problem: INFECTION - ADULT  Goal: Absence or prevention of progression during hospitalization  Description: INTERVENTIONS:  - Assess and monitor for signs and symptoms of infection  - Monitor lab/diagnostic results  - Monitor all insertion sites, i e  indwelling lines, tubes, and drains  - Monitor endotracheal if appropriate and nasal secretions for changes in amount and color  - [place] appropriate cooling/warming therapies per order  - Administer medications as ordered  - Instruct and encourage patient and family to use good hand hygiene technique  - Identify and instruct in appropriate isolation precautions for identified infection/condition  Outcome: Progressing     Problem: SAFETY ADULT  Goal: Patient will remain free of falls  Description: INTERVENTIONS:  - Assess patient frequently for physical needs  -  Identify cognitive and physical deficits and behaviors that affect risk of falls    -  [place] fall precautions as indicated by assessment   - Educate patient/family on patient safety including physical limitations  - Instruct patient to call for assistance with activity based on assessment  - Modify environment to reduce risk of injury  - Consider OT/PT consult to assist with strengthening/mobility  Outcome: Progressing  Goal: Maintain or return to baseline ADL function  Description: INTERVENTIONS:  -  Assess patient's ability to carry out ADLs; assess patient's baseline for ADL function and identify physical deficits which impact ability to perform ADLs (bathing, care of mouth/teeth, toileting, grooming, dressing, etc )  - Assess/evaluate cause of self-care deficits   - Assess range of motion  - Assess patient's mobility; develop plan if impaired  - Assess patient's need for assistive devices and provide as appropriate  - Encourage maximum independence but intervene and supervise when necessary  - Involve family in performance of ADLs  - Assess for home care needs following discharge   - Consider OT consult to assist with ADL evaluation and planning for discharge  - Provide patient education as appropriate  Outcome: Progressing  Goal: Maintain or return mobility status to optimal level  Description: INTERVENTIONS:  - Assess patient's baseline mobility status (ambulation, transfers, stairs, etc )    - Identify cognitive and physical deficits and behaviors that affect mobility  - Identify mobility aids required to assist with transfers and/or ambulation (gait belt, sit-to-stand, lift, walker, cane, etc )  - [place] fall precautions as indicated by assessment  - Record patient progress and toleration of activity level on Mobility SBAR; progress patient to next Phase/Stage  - Instruct patient to call for assistance with activity based on assessment  - Consider rehabilitation consult to assist with strengthening/weightbearing, etc   Outcome: Progressing     Problem: DISCHARGE PLANNING  Goal: Discharge to home or other facility with appropriate resources  Description: INTERVENTIONS:  - Identify barriers to discharge w/patient and caregiver  - Arrange for needed discharge resources and transportation as appropriate  - Identify discharge learning needs (meds, wound care, etc )  - Arrange for interpretive services to assist at discharge as needed  - Refer to Case Management Department for coordinating discharge planning if the patient needs post-hospital services based on physician/advanced practitioner order or complex needs related to functional status, cognitive ability, or social support system  Outcome: Progressing     Problem: Knowledge Deficit  Goal: Patient/family/caregiver demonstrates understanding of disease process, treatment plan, medications, and discharge instructions  Description: Complete learning assessment and assess knowledge base    Interventions:  - Provide teaching at level of understanding  - Provide teaching via preferred learning methods  Outcome: Progressing

## 2021-06-03 NOTE — PLAN OF CARE
Problem: PHYSICAL THERAPY ADULT  Goal: Performs mobility at highest level of function for planned discharge setting  See evaluation for individualized goals  Description: Treatment/Interventions: ADL retraining, Functional transfer training, LE strengthening/ROM, Endurance training, Patient/family training, Bed mobility, Spoke to nursing, OT  Equipment Recommended: (TBD)       See flowsheet documentation for full assessment, interventions and recommendations  Outcome: Not Progressing  Note: Prognosis: Guarded  Problem List: Decreased strength, Decreased endurance, Impaired balance, Decreased mobility, Decreased cognition, Decreased safety awareness, Pain  Assessment: Pt presents to therapy today with reduced mobility, pain, poor sitting tolerance, poor sitting balance  These impairments limit the patient by requiring increased assistance for mobility and places her at risk of falling  Pt would benefit from continued skilled therapy while in the hospital to improve overall mobility and work towards a safe d/c  Recommend rehab  At end of session patient was left seated with call bell within reach  The patient's AM-PAC Basic Mobility Inpatient Short Form Low Function Raw Score 14 , Standardized Score is 22 01  A standardized score less 42 9 suggests the patient may benefit from discharge to post-acute rehab services  Please also refer to the recommendation of the Physical Therapist for safe discharge planning  Interpretor: [id number]  Barriers to Discharge: Decreased caregiver support, Inaccessible home environment(Significantly below functional baseline)        PT Discharge Recommendation: Post acute rehabilitation services(LTC with rehab vs LTAC vs rehab)     PT - OK to Discharge: Yes    See flowsheet documentation for full assessment

## 2021-06-03 NOTE — PHYSICAL THERAPY NOTE
Physical Therapy Treatment note     Patient Name: Girish Hawley    DTCMB'M Date: 6/3/2021     Problem List  Principal Problem:    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[facility] [address] )  Active Problems:    Altered mental status    Dysphagia    Aphasia    Weakness    Fracture of ramus of left pubis (HCC)    Severe protein-calorie malnutrition ([facility] [address] )       Past Medical History  History reviewed  No pertinent past medical history  Past Surgical History  Past Surgical History:   Procedure Laterality Date    IR PICC REPOSITION  4/27/2021    IR TUNNELED CENTRAL LINE REMOVAL  4/27/2021 06/03/21 1404   PT Last Visit   PT Visit Date 06/03/21   Note Type   Note Type Treatment   Pain Assessment   Pain Assessment Tool FLACC   Pain Rating: FLACC (Rest) - Face 0   Pain Rating: FLACC (Rest) - Legs 0   Pain Rating: FLACC (Rest) - Activity 0   Pain Rating: FLACC (Rest) - Cry 0   Pain Rating: FLACC (Rest) - Consolability 0   Score: FLACC (Rest) 0   Pain Rating: FLACC (Activity) - Face 1   Pain Rating: FLACC (Activity) - Legs 1   Pain Rating: FLACC (Activity) - Activity 1   Pain Rating: FLACC (Activity) - Cry 1   Pain Rating: FLACC (Activity) - Consolability 1   Score: FLACC (Activity) 5   Restrictions/Precautions   Weight Bearing Precautions Per Order No   Other Precautions Cognitive; Chair Alarm; Bed Alarm;Multiple lines;Telemetry; Fall Risk;Pain  (PEG)   General   Chart Reviewed Yes   Family/Caregiver Present No   Cognition   Overall Cognitive Status Impaired   Arousal/Participation Alert; Cooperative   Attention Attends with cues to redirect   Orientation Level Unable to assess   Bed Mobility   Supine to Sit 3  Moderate assistance   Additional items Assist x 1   Additional Comments sitting EOB S level   Balance   Static Sitting Poor -   Dynamic Sitting Poor -   Endurance Deficit   Endurance Deficit Yes   Activity Tolerance   Activity Tolerance Patient limited by fatigue;Patient limited by pain Medical Staff Made Aware restorative aide   Nurse Made Aware nurse approved therapy session   Exercises   Knee AROM Long Arc Quad Sitting;Bilateral  (10 reps x 2 sets AAROM)   Balance training  sitting EOB for 8-10 minutes at amod A level, increased lean to side   Assessment   Prognosis Guarded   Problem List Decreased strength;Decreased endurance; Impaired balance;Decreased mobility; Decreased cognition;Decreased safety awareness;Pain   Assessment Pt presents to therapy today with reduced mobility, pain, poor sitting tolerance, poor sitting balance  These impairments limit the patient by requiring increased assistance for mobility and places her at risk of falling  Pt would benefit from continued skilled therapy while in the hospital to improve overall mobility and work towards a safe d/c  Recommend rehab  At end of session patient was left seated with call bell within reach  The patient's AM-PAC Basic Mobility Inpatient Short Form Low Function Raw Score 14 , Standardized Score is 22 01  A standardized score less 42 9 suggests the patient may benefit from discharge to post-acute rehab services  Please also refer to the recommendation of the Physical Therapist for safe discharge planning  Interpretor: [id number]  Goals   STG Expiration Date 06/08/21   PT Treatment Day 8   Plan   Treatment/Interventions LE strengthening/ROM; Therapeutic exercise; Endurance training;Bed mobility   Progress Slow progress, cognitive deficits   PT Frequency Other (Comment)  2-3xwk   Recommendation   PT Discharge Recommendation Post acute rehabilitation services  (LTC with rehab vs LTAC vs rehab)   PT - OK to Discharge Yes   Additional Comments if to rehab or long term care, no if home    Elielbanleonel 8 in Bed Without Bedrails 3   Lying on Back to Sitting on Edge of Flat Bed 2   Moving Bed to Chair 1   Standing Up From Chair 1   Walk in Room 1   Climb 3-5 Stairs 1   Basic Mobility Inpatient Raw Score 9   Turning Head Towards Sound 3   Follow Simple Instructions 2   Low Function Basic Mobility Raw Score 14   Low Function Basic Mobility Standardized Score 22 01   Edgardo Valdez, PT, DPT

## 2021-06-03 NOTE — UTILIZATION REVIEW
Continued Stay Review    Date: 6/3/21                         Current Patient Class: IP  Current Level of Care: MS    HPI:56 y o  female initially admitted on 4/23 CNS Lymphoma    Assessment/Plan:   Pt is medically stable  VSS  Good glucose control    She is using PRN analgesia for pain and waiting rehab bed availability and approval      Vital Signs:   06/03/21 15:20:18  99 2 °F (37 3 °C)  97  20  96/66  76  97 %  --   06/03/21 07:43:03  98 9 °F (37 2 °C)  91  16  99/63  75  100 %  --   06/02/21 21:49:43  97 6 °F (36 4 °C)  90  17  100/63  75  99 %  --   06/02/21 2146  --  --  --  --  --  --  None (Room air)   06/02/21 15:17:33  98 9 °F (37 2 °C)  --  17  104/64  77  --  --   06/02/21 0700  98 2 °F (36 8 °C)  90  18  111/63  --  99 %  --   06/02/21 0050  --  --  --  --  --  --  None (Room air)   06/01/21 2258  98 °F (36 7 °C)  98  --  101/69  80  99 %  --   06/01/21 15:35:17  99 2 °F (37 3 °C)  92  18  103/70  81  99 %  --   06/01/21 1535  99 2 °F (37 3 °C)  89  --  103/70  81  99 %  --   06/01/21 09:50:42  --  94  --  105/72  83  100 %  --   06/01/21 0938  --  --  --  105/72  --  --  --   06/01/21 07:26:32  99 6 °F (37 6 °C)  --  18  110/72  85  --  --     Pertinent Labs/Diagnostic Results:       Results from last 7 days   Lab Units 06/03/21 0529 05/30/21  0617   WBC Thousand/uL 6 18 7 03   HEMOGLOBIN g/dL 8 6* 8 5*   HEMATOCRIT % 28 4* 27 3*   PLATELETS Thousands/uL 246 247   BANDS PCT %  --  1         Results from last 7 days   Lab Units 06/03/21 0529 05/30/21  0617   SODIUM mmol/L 139 137   POTASSIUM mmol/L 4 1 3 7   CHLORIDE mmol/L 106 103   CO2 mmol/L 27 27   ANION GAP mmol/L 6 7   BUN mg/dL 20 17   CREATININE mg/dL 0 23* 0 27*   EGFR ml/min/1 73sq m 142 135   CALCIUM mg/dL 8 9 9 2         Results from last 7 days   Lab Units 06/03/21  1118 06/03/21  0847 06/02/21  2337 06/02/21  2038 06/02/21  1651 06/02/21  1137 06/02/21  0845 06/01/21  2042 06/01/21  1649 06/01/21  1124 06/01/21  0935 05/31/21  2132 POC GLUCOSE mg/dl 129 138 144* 84 150* 163* 110 136 145* 102 158* 125     Results from last 7 days   Lab Units 06/03/21  0529 05/30/21  0617   GLUCOSE RANDOM mg/dL 103 119     Medications:   Scheduled Medications:  acetaminophen, 650 mg, Oral, Q8H MEÑO  acyclovir, 400 mg, Oral, Q12H MEÑO  allopurinol, 100 mg, Oral, Daily  amLODIPine, 5 mg, Oral, Daily  dicyclomine, 10 mg, Oral, 4x Daily (AC & HS)  enoxaparin, 40 mg, Subcutaneous, Q24H MEÑO  fluconazole, 400 mg, Oral, Daily  gabapentin, 100 mg, Oral, HS  insulin glargine, 7 Units, Subcutaneous, HS  insulin lispro, 1-5 Units, Subcutaneous, 4x Daily (AC & HS)  levofloxacin, 500 mg, Oral, Q24H  lidocaine, 1 patch, Topical, Daily  menthol-methyl salicylate, , Apply externally, BID  omeprazole (PRILOSEC) suspension 2 mg/mL, 20 mg, Oral, Daily  tamsulosin, 0 4 mg, Oral, Daily With Dinner      Continuous IV Infusions:     PRN Meds:  albuterol, 2 puff, Inhalation, Q6H PRN  aluminum-magnesium hydroxide-simethicone, 30 mL, Per PEG Tube, Q4H PRN  bisacodyl, 10 mg, Rectal, Daily PRN  Diclofenac Sodium, 2 g, Topical, TID PRN -x 1 6/2, 6/3  HYDROmorphone, 0 5 mg, Intravenous, Q4H PRN  LORazepam, 0 5 mg, Intravenous, Q6H PRN -x 1 6/3  morphine injection, 2 mg, Intravenous, Q4H PRN - x 2 6/2, x 1 6/3  ondansetron, 4 mg, Intravenous, Q6H PRN  polyethylene glycol, 17 g, Oral, Daily PRN  senna-docusate sodium, 1 tablet, Oral, BID PRN    Discharge Plan: waiting available rehab bed     Network Utilization Review Department  ATTENTION: Please call with any questions or concerns to [phone] and carefully listen to the prompts so that you are directed to the right person  All voicemails are confidential   Casi Ramos all requests for admission clinical reviews, approved or denied determinations and any other requests to dedicated fax number below belonging to the campus where the patient is receiving treatment   List of dedicated fax numbers for the Facilities:  [facility] AD[facility] DENIAL[facility] (Administrative/Medical Necessity) [phone]   [address] (Maternity/NICU/Pediatrics) [address] [facility],[address] Floor [address] [place] [address] [id number] [place] [address] [address] [facility] [phone]

## 2021-06-03 NOTE — CASE MANAGEMENT
YSABEL s/w Chris Pierce from [place] and provided update  Per [facility] no beds available this week  CM left message for admissions rep at [facility] requesting a call back  Per Chris Pierce she has no updates on the individual contract  CM will follow

## 2021-06-04 LAB
GLUCOSE SERPL-MCNC: 118 MG/DL (ref 65–140)
GLUCOSE SERPL-MCNC: 135 MG/DL (ref 65–140)
GLUCOSE SERPL-MCNC: 136 MG/DL (ref 65–140)
GLUCOSE SERPL-MCNC: 332 MG/DL (ref 65–140)

## 2021-06-04 PROCEDURE — 82948 REAGENT STRIP/BLOOD GLUCOSE: CPT

## 2021-06-04 PROCEDURE — 99232 SBSQ HOSP IP/OBS MODERATE 35: CPT | Performed by: INTERNAL MEDICINE

## 2021-06-04 RX ADMIN — LORAZEPAM 0.5 MG: 2 INJECTION INTRAMUSCULAR; INTRAVENOUS at 15:30

## 2021-06-04 RX ADMIN — HYDROMORPHONE HYDROCHLORIDE 0.5 MG: 1 INJECTION, SOLUTION INTRAMUSCULAR; INTRAVENOUS; SUBCUTANEOUS at 03:06

## 2021-06-04 RX ADMIN — ALLOPURINOL 100 MG: 100 TABLET ORAL at 09:39

## 2021-06-04 RX ADMIN — ENOXAPARIN SODIUM 40 MG: 40 INJECTION SUBCUTANEOUS at 15:36

## 2021-06-04 RX ADMIN — INSULIN LISPRO 4 UNITS: 100 INJECTION, SOLUTION INTRAVENOUS; SUBCUTANEOUS at 12:16

## 2021-06-04 RX ADMIN — LIDOCAINE 5% 1 PATCH: 700 PATCH TOPICAL at 09:41

## 2021-06-04 RX ADMIN — HYDROMORPHONE HYDROCHLORIDE 0.5 MG: 1 INJECTION, SOLUTION INTRAMUSCULAR; INTRAVENOUS; SUBCUTANEOUS at 15:30

## 2021-06-04 RX ADMIN — MORPHINE SULFATE 2 MG: 2 INJECTION, SOLUTION INTRAMUSCULAR; INTRAVENOUS at 13:49

## 2021-06-04 RX ADMIN — MORPHINE SULFATE 2 MG: 2 INJECTION, SOLUTION INTRAMUSCULAR; INTRAVENOUS at 18:20

## 2021-06-04 RX ADMIN — LEVOFLOXACIN 500 MG: 500 TABLET, FILM COATED ORAL at 15:37

## 2021-06-04 RX ADMIN — HYDROMORPHONE HYDROCHLORIDE 0.5 MG: 1 INJECTION, SOLUTION INTRAMUSCULAR; INTRAVENOUS; SUBCUTANEOUS at 22:44

## 2021-06-04 RX ADMIN — ACYCLOVIR 400 MG: 200 CAPSULE ORAL at 22:49

## 2021-06-04 RX ADMIN — GABAPENTIN 100 MG: 250 SUSPENSION ORAL at 22:49

## 2021-06-04 RX ADMIN — DICYCLOMINE HYDROCHLORIDE 10 MG: 10 CAPSULE ORAL at 12:16

## 2021-06-04 RX ADMIN — ACETAMINOPHEN 650 MG: 650 SUSPENSION ORAL at 15:36

## 2021-06-04 RX ADMIN — ACYCLOVIR 400 MG: 200 CAPSULE ORAL at 09:39

## 2021-06-04 RX ADMIN — ACETAMINOPHEN 650 MG: 650 SUSPENSION ORAL at 06:23

## 2021-06-04 RX ADMIN — ACETAMINOPHEN 650 MG: 650 SUSPENSION ORAL at 22:48

## 2021-06-04 RX ADMIN — Medication 20 MG: at 09:40

## 2021-06-04 RX ADMIN — DICYCLOMINE HYDROCHLORIDE 10 MG: 10 CAPSULE ORAL at 22:49

## 2021-06-04 RX ADMIN — DICYCLOMINE HYDROCHLORIDE 10 MG: 10 CAPSULE ORAL at 06:23

## 2021-06-04 RX ADMIN — FLUCONAZOLE 400 MG: 200 TABLET ORAL at 09:39

## 2021-06-04 RX ADMIN — MENTHOL, UNSPECIFIED FORM AND METHYL SALICYLATE: 10; 150 CREAM TOPICAL at 16:47

## 2021-06-04 RX ADMIN — INSULIN GLARGINE 7 UNITS: 100 INJECTION, SOLUTION SUBCUTANEOUS at 22:40

## 2021-06-04 RX ADMIN — MENTHOL, UNSPECIFIED FORM AND METHYL SALICYLATE: 10; 150 CREAM TOPICAL at 09:42

## 2021-06-04 RX ADMIN — MORPHINE SULFATE 2 MG: 2 INJECTION, SOLUTION INTRAMUSCULAR; INTRAVENOUS at 01:51

## 2021-06-04 RX ADMIN — DICYCLOMINE HYDROCHLORIDE 10 MG: 10 CAPSULE ORAL at 15:36

## 2021-06-04 RX ADMIN — LORAZEPAM 0.5 MG: 2 INJECTION INTRAMUSCULAR; INTRAVENOUS at 03:07

## 2021-06-04 NOTE — PROGRESS NOTES
INTERNAL MEDICINE RESIDENCY PROGRESS NOTE     Name: Eduarda Herrera   Age & Sex: 49 y o  female   MRN: [id number]  [facility]   Encounter: [id number]  Team: SOD Team B     PATIENT INFORMATION     Name: Eduarda Herrera   Age & Sex: 49 y o  female   MRN: 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[facility] [address] )  Active Problems:    Altered mental status    Dysphagia    Aphasia    Weakness    Fracture of ramus of left pubis (HCC)    Severe protein-calorie malnutrition ([facility] [address] )      * CNS lymphoma [facility])  Assessment & Plan  Patient was diagnosed with primary CNS lymphoma through biopsy in [facility] transferred over here for chemotherapy since the [facility] is out of network  Had a 2nd family meeting on 04/30 and family has decided to proceed treatment at this time  S/p PICC line for chemotherapy  S/p peg tube placement on 05/10  Plan:  Hematology-Oncology consulted  Appreciate recommendations  Chemotherapy treatment as per hematology oncology team     S/p 2nd cycle of chemotherapy on 05/13  Leucovin on 5/14  DVT prophylaxis with Lovenox  Rehab placement pending    S/p steroid taper for cerebral edema from CNS lymphoma  Pancytopenia in setting of recent chemotherapy  Will continue to watch for any signs of infection  On acyclovir, Diflucan and Levaquin    After discussion with Oncology, plan for rehab, patient does not require radiation therapy, rehab for strength, no chemotherapy until at least 3 weeks, evaluate at that time  Patient's family updated on plan going forward, no questions after family visit    Acute gout-resolved as of 6/2/2021  Assessment & Plan  Gout flare left knee resolving  Steroid taper completed  Will start low-dose allopurinol 100 mg q day to hopefully prevent other acute flares in the setting of chemotherapy  Follow-up uric acid level    Severe protein-calorie malnutrition ([facility] [address] )  Assessment & Plan  Malnutrition Findings:   Adult Malnutrition type: Acute illness(due to medical condition resulting in dysphagia, decreased appetite and intake, weight loss)  Adult Degree of Malnutrition: Other severe protein calorie malnutrition    BMI Findings: Body mass index is 21 44 kg/m²  Plan:  Nutrition consult  Tube feeds initiated with Jevity 1 2, advancing to goal      Fracture of ramus of left pubis [facility])  Assessment & Plan  Tunde Jewell prior to presentation to [facility]  Managed nonoperatively at [facility]    Weakness  Assessment & Plan  Left greater than right upper and lower extremity weakness with ongoing left lower extremity contracture  Secondary to underlying CNS malignancy  Decubitus ulcer precautions such as frequent repositioning  Aphasia  Assessment & Plan  Speech therapy on board    Dysphagia  Assessment & Plan  s/p peg tube placement, tube feeds at goal    Altered mental status  Assessment & Plan  Patient initially presented to [facility] [place] with stroke-like symptoms for 2 days  CT scan of the head was concerning for subacute CVA and was transferred to [facility]   MRI of the brain was more consistent with demyelinating disease patient was started on IV steroids and 1000 mg daily for 5 days  Patient did not have any significant improvement at that time patient had plasmapheresis  And also had  lumbar puncture-negative for infection or malignancy  Patient noted to have persistent encephalopathy so a repeat MRI was obtained which showed worsening of the abnormality seen on the previous MRI and 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  Likely secondary to CNS lymphoma  Delirium precautions   Currently alert and oriented times 2-3  Follow commands      Patient's  (does not speak [place]) makes all medical decision for her, confirmed once all medical treatment at this time    Pain of left lower leg-resolved as of 6/2/2021  Assessment & Plan  Overnight left lower extremity pain, well score 3, pain now controlled  Bilateral ultrasound Dopplers no evidence of DVT    Pancytopenia (HCC)-resolved as of 6/2/2021  Assessment & Plan  Likely in setting of chemotherapy  Will continue to monitor for any signs of infection  Will transfuse with packed RBC for hemoglobin less than 7  Drop in Hgb from 9 to 7 on 5/14, other lines relatively stable  VSS and soft brown BMs  Will discuss with oncology if this is expected based on chemo or if there should be further workup for bleeding, though hemodynamics currently do not suggest bleed  S/p 1 pack RBC on 05/15, hemoglobin continues to improve    Left-sided weakness-resolved as of 6/2/2021  Assessment & Plan  Patient has left-sided weakness at baseline from her CNS lymphoma  Left upper extremity 3-4/5 and left lower extremity 0-1/5 muscle strength    Sepsis (HCC)-resolved as of 5/13/2021  Assessment & Plan  Patient had hypothermia, elevated WBC count and tachycardia  Concern for possible aspiration in setting on dysphagia versus UTI in setting of urinary catheter which was placed for retention  Plan:  S/p IV antibiotics last day on 05/05  Will continue to monitor off antibiotics  Steroid-induced hyperglycemia-resolved as of 5/23/2021  Assessment & Plan  Mild hyperglycemia, glucoses in 200s, no history of DM  Occurring in the setting of Decadron with chemotherapy, as well as sodium bicarb in D5 drip  - D5 drip is finished  - will monitor sugars while on Jevity and now off D5  - continue q4 hour fingerstick glucose with correctional algorithm 1 and Lantus    Presence of permanent central venous catheter-resolved as of 6/2/2021  Assessment & Plan  Noted presence of right IJ tunneled double-lumen HD catheter; upon chart review, this was likely inserted to be used for plasmapheresis which she has completed  Plan:  S/p removal by IR  Patient now has PICC line      Urinary retention-resolved as of 2021  Assessment & Plan  Patient developed urinary retention during previous hospitalization  Plan was voiding trial as an outpatient as she was scheduled for discharge from that facility  Machado catheter was initially removed although patient required re-placement of machado on  in setting of incontinence and getting chemotherapy  Machado discontinued, monitor for signs of fluid retention          Disposition:  Awaiting rehab placement     SUBJECTIVE     Patient seen and examined  No acute events overnight  OBJECTIVE     Vitals:    21 0743 21 1520 21 2151 21 0600   BP: 99/63 96/66 116/75    Pulse: 91 97 89    Resp: 16 20 18    Temp: 98 9 °F (37 2 °C) 99 2 °F (37 3 °C) 99 7 °F (37 6 °C)    TempSrc:       SpO2: 100% 97% 99%    Weight:    48 9 kg (107 lb 12 9 oz)   Height:          Temperature:   Temp (24hrs), Av 3 °F (37 4 °C), Min:98 9 °F (37 2 °C), Max:99 7 °F (37 6 °C)    Temperature: 99 7 °F (37 6 °C)  Intake & Output:  I/O       / 0701 -  0700 / 07 -  0700 / 07 -  0700    P  O  240 780     NG/ 625     Feedings 550 1235     Total Intake(mL/kg) 1180 (24 2) 2640 (54)     Urine (mL/kg/hr) 563 (0 5) 1642 (1 4)     Stool  0     Total Output 563 1642     Net +617 +998            Unmeasured Urine Occurrence  1 x     Unmeasured Stool Occurrence  2 x         Weights:   IBW (Ideal Body Weight): 45 5 kg    Body mass index is 21 05 kg/m²  Weight (last 2 days)     Date/Time   Weight    21 0600   48 9 (107 81)    21 0550   48 8 (107 58)            Physical Exam  Vitals signs and nursing note reviewed  Constitutional:       General: She is not in acute distress  Appearance: She is well-developed  She is ill-appearing  HENT:      Head: Normocephalic and atraumatic  Eyes:      Conjunctiva/sclera: Conjunctivae normal    Neck:      Musculoskeletal: Neck supple  Cardiovascular:      Rate and Rhythm: Normal rate and regular rhythm  Heart sounds: No murmur  Pulmonary:      Effort: Pulmonary effort is normal  No respiratory distress  Breath sounds: Normal breath sounds  Abdominal:      Palpations: Abdomen is soft  Tenderness: There is no abdominal tenderness  Skin:     General: Skin is warm and dry  Capillary Refill: Capillary refill takes less than 2 seconds  Neurological:      General: No focal deficit present  Mental Status: She is alert  Mental status is at baseline  Cranial Nerves: No cranial nerve deficit  Psychiatric:         Mood and Affect: Mood normal          Behavior: Behavior normal        LABORATORY DATA     Labs: I have personally reviewed pertinent reports  Results from last 7 days   Lab Units 06/03/21  0529 05/30/21  0617   WBC Thousand/uL 6 18 7 03   HEMOGLOBIN g/dL 8 6* 8 5*   HEMATOCRIT % 28 4* 27 3*   PLATELETS Thousands/uL 246 247   MONO PCT % 16* 23*      Results from last 7 days   Lab Units 06/03/21  0529 05/30/21  0617   POTASSIUM mmol/L 4 1 3 7   CHLORIDE mmol/L 106 103   CO2 mmol/L 27 27   BUN mg/dL 20 17   CREATININE mg/dL 0 23* 0 27*   CALCIUM mg/dL 8 9 9 2                            IMAGING & DIAGNOSTIC TESTING     Radiology Results: I have personally reviewed pertinent reports  Xr Chest Portable    Result Date: 4/24/2021  Impression: Dialysis catheter tip within the superior cavoatrial junction  No pneumothorax  Workstation performed: [id number]     Xr Chest Picc Line Portable    Result Date: 4/27/2021  Impression: PICC line tip in the superior vena cava, approximately 3 5 to 4 cm above the cavoatrial junction  The examination demonstrates a significant  finding and was documented as such in [facility] for liaison and referring practitioner notification  Workstation performed: [id number]     Ir Picc Reposition    Result Date: 4/27/2021  Impression: Status-post repositioning of a 5 [place] central venous catheter under fluoroscopic guidance with its tip at the cavoatrial junction  Workstation performed: [id number]     Ir Tunneled Central Line Removal    Result Date: 4/27/2021  Impression: Impression: Successful tunneled central line removal as described  Signed, performed and dictated by Bruce Love PA-C under the supervision of Dr Jayden Jerez  Workstation performed: [id number]     Other Diagnostic Testing: I have personally reviewed pertinent reports      ACTIVE MEDICATIONS     Current Facility-Administered Medications   Medication Dose Route Frequency    acetaminophen (TYLENOL) oral suspension 650 mg  650 mg Oral Q8H [address]    acyclovir (ZOVIRAX) capsule 400 mg  400 mg Oral Q12H [address]    albuterol (PROVENTIL HFA,VENTOLIN HFA) inhaler 2 puff  2 puff Inhalation Q6H PRN    allopurinol (ZYLOPRIM) tablet 100 mg  100 mg Oral Daily    aluminum-magnesium hydroxide-simethicone (MYLANTA) oral suspension 30 mL  30 mL Per PEG Tube Q4H PRN    amLODIPine (NORVASC) tablet 5 mg  5 mg Oral Daily    bisacodyl (DULCOLAX) rectal suppository 10 mg  10 mg Rectal Daily PRN    Diclofenac Sodium (VOLTAREN) 1 % topical gel 2 g  2 g Topical TID PRN    dicyclomine (BENTYL) capsule 10 mg  10 mg Oral 4x Daily (AC & HS)    enoxaparin (LOVENOX) subcutaneous injection 40 mg  40 mg Subcutaneous Q24H MEÑO    fluconazole (DIFLUCAN) tablet 400 mg  400 mg Oral Daily    gabapentin (NEURONTIN) oral solution 100 mg  100 mg Oral HS    HYDROmorphone (DILAUDID) injection 0 5 mg  0 5 mg Intravenous Q4H PRN    insulin glargine (LANTUS) subcutaneous injection 7 Units 0 07 mL  7 Units Subcutaneous HS    insulin lispro (HumaLOG) 100 units/mL subcutaneous injection 1-5 Units  1-5 Units Subcutaneous 4x Daily (AC & HS)    levofloxacin (LEVAQUIN) tablet 500 mg  500 mg Oral Q24H    lidocaine (LIDODERM) 5 % patch 1 patch  1 patch Topical Daily    LORazepam (ATIVAN) injection 0 5 mg  0 5 mg Intravenous Q6H PRN    menthol-methyl salicylate (BENGAY) 95-16 % cream   Apply externally BID    morphine injection 2 mg  2 mg Intravenous Q4H PRN    omeprazole (PRILOSEC) suspension 2 mg/mL  20 mg Oral Daily    ondansetron (ZOFRAN) injection 4 mg  4 mg Intravenous Q6H PRN    polyethylene glycol (MIRALAX) packet 17 g  17 g Oral Daily PRN    senna-docusate sodium (SENOKOT S) 8 6-50 mg per tablet 1 tablet  1 tablet Oral BID PRN    tamsulosin (FLOMAX) capsule 0 4 mg  0 4 mg Oral Daily With Dinner       VTE Pharmacologic Prophylaxis: Enoxaparin (Lovenox)  VTE Mechanical Prophylaxis: sequential compression device    Portions of the record may have been created with voice recognition software  Occasional wrong word or "sound a like" substitutions may have occurred due to the inherent limitations of voice recognition software    Read the chart carefully and recognize, using context, where substitutions have occurred   ==  Marlon Marquez, [address] [facility]  Internal Medicine Residency PGY-2

## 2021-06-05 LAB
GLUCOSE SERPL-MCNC: 104 MG/DL (ref 65–140)
GLUCOSE SERPL-MCNC: 127 MG/DL (ref 65–140)
GLUCOSE SERPL-MCNC: 155 MG/DL (ref 65–140)
GLUCOSE SERPL-MCNC: 163 MG/DL (ref 65–140)

## 2021-06-05 PROCEDURE — 82948 REAGENT STRIP/BLOOD GLUCOSE: CPT

## 2021-06-05 PROCEDURE — 99232 SBSQ HOSP IP/OBS MODERATE 35: CPT | Performed by: INTERNAL MEDICINE

## 2021-06-05 RX ORDER — OXYCODONE HCL 5 MG/5 ML
5 SOLUTION, ORAL ORAL EVERY 4 HOURS PRN
Status: DISCONTINUED | OUTPATIENT
Start: 2021-06-05 | End: 2021-06-07

## 2021-06-05 RX ORDER — OXYCODONE HCL 5 MG/5 ML
10 SOLUTION, ORAL ORAL EVERY 4 HOURS PRN
Status: DISCONTINUED | OUTPATIENT
Start: 2021-06-05 | End: 2021-06-07

## 2021-06-05 RX ADMIN — Medication 20 MG: at 09:11

## 2021-06-05 RX ADMIN — ACETAMINOPHEN 650 MG: 650 SUSPENSION ORAL at 21:42

## 2021-06-05 RX ADMIN — ACETAMINOPHEN 650 MG: 650 SUSPENSION ORAL at 14:06

## 2021-06-05 RX ADMIN — LIDOCAINE 5% 1 PATCH: 700 PATCH TOPICAL at 09:10

## 2021-06-05 RX ADMIN — DICLOFENAC SODIUM 2 G: 10 GEL TOPICAL at 15:58

## 2021-06-05 RX ADMIN — FLUCONAZOLE 400 MG: 200 TABLET ORAL at 09:09

## 2021-06-05 RX ADMIN — LORAZEPAM 0.5 MG: 2 INJECTION INTRAMUSCULAR; INTRAVENOUS at 15:51

## 2021-06-05 RX ADMIN — LORAZEPAM 0.5 MG: 2 INJECTION INTRAMUSCULAR; INTRAVENOUS at 09:10

## 2021-06-05 RX ADMIN — OXYCODONE HYDROCHLORIDE 10 MG: 5 SOLUTION ORAL at 15:54

## 2021-06-05 RX ADMIN — ALLOPURINOL 100 MG: 100 TABLET ORAL at 09:10

## 2021-06-05 RX ADMIN — DICYCLOMINE HYDROCHLORIDE 10 MG: 10 CAPSULE ORAL at 21:42

## 2021-06-05 RX ADMIN — ACYCLOVIR 400 MG: 200 CAPSULE ORAL at 09:10

## 2021-06-05 RX ADMIN — DICYCLOMINE HYDROCHLORIDE 10 MG: 10 CAPSULE ORAL at 15:55

## 2021-06-05 RX ADMIN — INSULIN GLARGINE 7 UNITS: 100 INJECTION, SOLUTION SUBCUTANEOUS at 21:41

## 2021-06-05 RX ADMIN — OXYCODONE HYDROCHLORIDE 10 MG: 5 SOLUTION ORAL at 21:42

## 2021-06-05 RX ADMIN — ACETAMINOPHEN 650 MG: 650 SUSPENSION ORAL at 05:51

## 2021-06-05 RX ADMIN — LEVOFLOXACIN 500 MG: 500 TABLET, FILM COATED ORAL at 15:57

## 2021-06-05 RX ADMIN — GABAPENTIN 100 MG: 250 SUSPENSION ORAL at 21:43

## 2021-06-05 RX ADMIN — DICYCLOMINE HYDROCHLORIDE 10 MG: 10 CAPSULE ORAL at 11:40

## 2021-06-05 RX ADMIN — LORAZEPAM 0.5 MG: 2 INJECTION INTRAMUSCULAR; INTRAVENOUS at 21:47

## 2021-06-05 RX ADMIN — DICYCLOMINE HYDROCHLORIDE 10 MG: 10 CAPSULE ORAL at 05:51

## 2021-06-05 RX ADMIN — TAMSULOSIN HYDROCHLORIDE 0.4 MG: 0.4 CAPSULE ORAL at 15:55

## 2021-06-05 RX ADMIN — INSULIN LISPRO 1 UNITS: 100 INJECTION, SOLUTION INTRAVENOUS; SUBCUTANEOUS at 11:41

## 2021-06-05 RX ADMIN — ACYCLOVIR 400 MG: 200 CAPSULE ORAL at 21:42

## 2021-06-05 RX ADMIN — INSULIN LISPRO 1 UNITS: 100 INJECTION, SOLUTION INTRAVENOUS; SUBCUTANEOUS at 21:40

## 2021-06-05 RX ADMIN — ENOXAPARIN SODIUM 40 MG: 40 INJECTION SUBCUTANEOUS at 14:06

## 2021-06-05 NOTE — PROGRESS NOTES
INTERNAL MEDICINE RESIDENCY PROGRESS NOTE     Name: Eduarda Herrera   Age & Sex: 49 y o  female   MRN: [id number]  Unit/Bed#: [facility] 920-01   Encounter: [id number]  Team: SOD Team B     PATIENT INFORMATION     Name: Eduarda Herrera   Age & Sex: 49 y o  female   MRN: [id number] [facility] [address] [facility] [address] Stay Days: 37    ASSESSMENT/PLAN     Principal Problem:    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[facility] [address] )  Active Problems:    Altered mental status    Dysphagia    Aphasia    Weakness    Fracture of ramus of left pubis (HCC)    Severe protein-calorie malnutrition ([facility] [address] )      Severe protein-calorie malnutrition ([facility] [address] )  Assessment & Plan  Malnutrition Findings:   Adult Malnutrition type: Acute illness(due to medical condition resulting in dysphagia, decreased appetite and intake, weight loss)  Adult Degree of Malnutrition: Other severe protein calorie malnutrition    BMI Findings: Body mass index is 21 44 kg/m²  Plan:  Nutrition consult  Tube feeds initiated with Jevity 1 2, advancing to goal      Fracture of ramus of left pubis [facility])  Assessment & Plan  Aubrey Klever prior to presentation to [facility]  Managed nonoperatively at [facility]    Weakness  Assessment & Plan  Left greater than right upper and lower extremity weakness with ongoing left lower extremity contracture  Secondary to underlying CNS malignancy  Decubitus ulcer precautions such as frequent repositioning  Aphasia  Assessment & Plan  Speech therapy on board    Dysphagia  Assessment & Plan  s/p peg tube placement, tube feeds at goal    Altered mental status  Assessment & Plan  Patient initially presented to [facility] his [place] with stroke-like symptoms for 2 days  CT scan of the head was concerning for subacute CVA and was transferred to [facility]   MRI of the brain was more consistent with demyelinating disease patient was started on IV steroids and 1000 mg daily for 5 days    Patient did not have any significant improvement at that time patient had plasmapheresis  And also had  lumbar puncture-negative for infection or malignancy  Patient noted to have persistent encephalopathy so a repeat MRI was obtained which showed worsening of the abnormality seen on the previous MRI and 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  Likely secondary to CNS lymphoma  Delirium precautions   Currently alert and oriented times 2-3  Follow commands  Patient's  (does not speak [place]) makes all medical decision for her, confirmed once all medical treatment at this time    * CNS lymphoma [facility])  Assessment & Plan  Patient was diagnosed with primary CNS lymphoma through biopsy in [facility] transferred over here for chemotherapy since the [facility] is out of network  Had a 2nd family meeting on 04/30 and family has decided to proceed treatment at this time  S/p PICC line for chemotherapy  S/p peg tube placement on 05/10  Plan:  Hematology-Oncology consulted  Appreciate recommendations  Chemotherapy treatment as per hematology oncology team     S/p 2nd cycle of chemotherapy on 05/13  Leucovin on 5/14  DVT prophylaxis with Lovenox  Rehab placement pending    S/p steroid taper for cerebral edema from CNS lymphoma  Pancytopenia in setting of recent chemotherapy  Will continue to watch for any signs of infection  On acyclovir, Diflucan and Levaquin    After discussion with Oncology, plan for rehab, patient does not require radiation therapy, rehab for strength, no chemotherapy until at least 3 weeks, evaluate at that time  Patient's family updated on plan going forward, no questions after family visit    Pain of left lower leg-resolved as of 6/2/2021  Assessment & Plan  Overnight left lower extremity pain, well score 3, pain now controlled  Bilateral ultrasound Dopplers no evidence of DVT    Acute gout-resolved as of 6/2/2021  Assessment & Plan  Gout flare left knee resolving  Steroid taper completed  Will start low-dose allopurinol 100 mg q day to hopefully prevent other acute flares in the setting of chemotherapy  Follow-up uric acid level    Pancytopenia (HCC)-resolved as of 6/2/2021  Assessment & Plan  Likely in setting of chemotherapy  Will continue to monitor for any signs of infection  Will transfuse with packed RBC for hemoglobin less than 7  Drop in Hgb from 9 to 7 on 5/14, other lines relatively stable  VSS and soft brown BMs  Will discuss with oncology if this is expected based on chemo or if there should be further workup for bleeding, though hemodynamics currently do not suggest bleed  S/p 1 pack RBC on 05/15, hemoglobin continues to improve    Left-sided weakness-resolved as of 6/2/2021  Assessment & Plan  Patient has left-sided weakness at baseline from her CNS lymphoma  Left upper extremity 3-4/5 and left lower extremity 0-1/5 muscle strength    Sepsis (HCC)-resolved as of 5/13/2021  Assessment & Plan  Patient had hypothermia, elevated WBC count and tachycardia  Concern for possible aspiration in setting on dysphagia versus UTI in setting of urinary catheter which was placed for retention  Plan:  S/p IV antibiotics last day on 05/05  Will continue to monitor off antibiotics  Steroid-induced hyperglycemia-resolved as of 5/23/2021  Assessment & Plan  Mild hyperglycemia, glucoses in 200s, no history of DM  Occurring in the setting of Decadron with chemotherapy, as well as sodium bicarb in D5 drip  - D5 drip is finished  - will monitor sugars while on Jevity and now off D5  - continue q4 hour fingerstick glucose with correctional algorithm 1 and Lantus    Presence of permanent central venous catheter-resolved as of 6/2/2021  Assessment & Plan  Noted presence of right IJ tunneled double-lumen HD catheter; upon chart review, this was likely inserted to be used for plasmapheresis which she has completed  Plan:  S/p removal by IR  Patient now has PICC line      Urinary retention-resolved as of 2021  Assessment & Plan  Patient developed urinary retention during previous hospitalization  Plan was voiding trial as an outpatient as she was scheduled for discharge from that facility  Machado catheter was initially removed although patient required re-placement of machado on  in setting of incontinence and getting chemotherapy  Machado discontinued, monitor for signs of fluid retention          Disposition: Pending placement    SUBJECTIVE     Patient seen and examined  No acute events overnight  Patient is occasionally altered; at times she will be screaming and stating that she has pain, but unable to identify where  Simon Hidalgo spoke to the patient and when asked about the pain, she mentions the knee, but then states that there is no pain  Review of Systems   Unable to perform ROS: Mental status change     OBJECTIVE     Vitals:    21 1519 21 2234 21 0552 21 0659   BP: (!) 87/54 95/62  98/61   Pulse: 100 101  87   Resp: 17 20  18   Temp: 99 °F (37 2 °C) 99 3 °F (37 4 °C)  98 7 °F (37 1 °C)   TempSrc: Axillary      SpO2: 98% 99%  99%   Weight:   51 kg (112 lb 6 4 oz)    Height:          Temperature:   Temp (24hrs), Av °F (37 2 °C), Min:98 7 °F (37 1 °C), Max:99 3 °F (37 4 °C)    Temperature: 98 7 °F (37 1 °C)  Intake & Output:  I/O        07 -  0700 / 07 -  0700  07 -  0700    P  O  780 320     NG/ 600     Feedings 1235 1455     Total Intake(mL/kg) 2640 (54) 2375 (46 6)     Urine (mL/kg/hr) 1642 (1 4) 350 (0 3)     Stool 0 0     Total Output 1642 350     Net +998 +            Unmeasured Urine Occurrence 1 x 4 x     Unmeasured Stool Occurrence 2 x 4 x         Weights:   IBW (Ideal Body Weight): 45 5 kg    Body mass index is 21 95 kg/m²  Weight (last 2 days)     Date/Time   Weight    21 0552   51 (112 4)    21 0600   48 9 (107 81)            Physical Exam  Constitutional:       Appearance: Normal appearance     HENT: Head: Normocephalic and atraumatic  Nose: Nose normal       Mouth/Throat:      Mouth: Mucous membranes are moist       Pharynx: Oropharynx is clear  Eyes:      Extraocular Movements: Extraocular movements intact  Conjunctiva/sclera: Conjunctivae normal    Cardiovascular:      Rate and Rhythm: Normal rate and regular rhythm  Heart sounds: No murmur  No friction rub  No gallop  Pulmonary:      Effort: Pulmonary effort is normal  No respiratory distress  Breath sounds: Normal breath sounds  No stridor  No wheezing, rhonchi or rales  Chest:      Chest wall: No tenderness  Abdominal:      General: Bowel sounds are normal  There is no distension  Palpations: Abdomen is soft  There is no mass  Tenderness: There is no abdominal tenderness  There is no guarding or rebound  Hernia: No hernia is present  Musculoskeletal:      Comments: Weakness on the left side of the body  Skin:     General: Skin is warm and dry  Neurological:      Mental Status: She is alert  Mental status is at baseline  LABORATORY DATA     Labs: I have personally reviewed pertinent reports  Results from last 7 days   Lab Units 06/03/21  0529 05/30/21  0617   WBC Thousand/uL 6 18 7 03   HEMOGLOBIN g/dL 8 6* 8 5*   HEMATOCRIT % 28 4* 27 3*   PLATELETS Thousands/uL 246 247   MONO PCT % 16* 23*      Results from last 7 days   Lab Units 06/03/21  0529 05/30/21  0617   POTASSIUM mmol/L 4 1 3 7   CHLORIDE mmol/L 106 103   CO2 mmol/L 27 27   BUN mg/dL 20 17   CREATININE mg/dL 0 23* 0 27*   CALCIUM mg/dL 8 9 9 2                            IMAGING & DIAGNOSTIC TESTING     Radiology Results: I have personally reviewed pertinent reports  Xr Chest Portable    Result Date: 4/24/2021  Impression: Dialysis catheter tip within the superior cavoatrial junction  No pneumothorax   Workstation performed: [id number]     Xr Chest Picc Line Portable    Result Date: 4/27/2021  Impression: PICC line tip in the superior vena cava, approximately 3 5 to 4 cm above the cavoatrial junction  The examination demonstrates a significant  finding and was documented as such in [facility] for liaison and referring practitioner notification  Workstation performed: [id number]     Ir Picc Reposition    Result Date: 4/27/2021  Impression: Status-post repositioning of a 5 [place] central venous catheter under fluoroscopic guidance with its tip at the cavoatrial junction  Workstation performed: [id number]     Ir Tunneled Central Line Removal    Result Date: 4/27/2021  Impression: Impression: Successful tunneled central line removal as described  Signed, performed and dictated by Thurnell Runner PA-C under the supervision of Dr Arturo Martinez  Workstation performed: [id number]     Other Diagnostic Testing: I have personally reviewed pertinent reports      ACTIVE MEDICATIONS     Current Facility-Administered Medications   Medication Dose Route Frequency    acetaminophen (TYLENOL) oral suspension 650 mg  650 mg Oral Q8H [address]    acyclovir (ZOVIRAX) capsule 400 mg  400 mg Oral Q12H [address]    albuterol (PROVENTIL HFA,VENTOLIN HFA) inhaler 2 puff  2 puff Inhalation Q6H PRN    allopurinol (ZYLOPRIM) tablet 100 mg  100 mg Oral Daily    aluminum-magnesium hydroxide-simethicone (MYLANTA) oral suspension 30 mL  30 mL Per PEG Tube Q4H PRN    amLODIPine (NORVASC) tablet 5 mg  5 mg Oral Daily    bisacodyl (DULCOLAX) rectal suppository 10 mg  10 mg Rectal Daily PRN    Diclofenac Sodium (VOLTAREN) 1 % topical gel 2 g  2 g Topical TID PRN    dicyclomine (BENTYL) capsule 10 mg  10 mg Oral 4x Daily (AC & HS)    enoxaparin (LOVENOX) subcutaneous injection 40 mg  40 mg Subcutaneous Q24H [facility]    fluconazole (DIFLUCAN) tablet 400 mg  400 mg Oral Daily    gabapentin (NEURONTIN) oral solution 100 mg  100 mg Oral HS    HYDROmorphone (DILAUDID) injection 0 5 mg  0 5 mg Intravenous Q4H PRN    insulin glargine (LANTUS) subcutaneous injection 7 Units 0 07 mL  7 Units Subcutaneous HS    insulin lispro (HumaLOG) 100 units/mL subcutaneous injection 1-5 Units  1-5 Units Subcutaneous 4x Daily (AC & HS)    levofloxacin (LEVAQUIN) tablet 500 mg  500 mg Oral Q24H    lidocaine (LIDODERM) 5 % patch 1 patch  1 patch Topical Daily    LORazepam (ATIVAN) injection 0 5 mg  0 5 mg Intravenous Q6H PRN    menthol-methyl salicylate (BENGAY) 65-22 % cream   Apply externally BID    morphine injection 2 mg  2 mg Intravenous Q4H PRN    omeprazole (PRILOSEC) suspension 2 mg/mL  20 mg Oral Daily    ondansetron (ZOFRAN) injection 4 mg  4 mg Intravenous Q6H PRN    polyethylene glycol (MIRALAX) packet 17 g  17 g Oral Daily PRN    senna-docusate sodium (SENOKOT S) 8 6-50 mg per tablet 1 tablet  1 tablet Oral BID PRN    tamsulosin (FLOMAX) capsule 0 4 mg  0 4 mg Oral Daily With Dinner     Portions of the record may have been created with voice recognition software  Occasional wrong word or "sound a like" substitutions may have occurred due to the inherent limitations of voice recognition software    Read the chart carefully and recognize, using context, where substitutions have occurred   ==  Rashid Sim MD  [address] [facility]  Internal Medicine Residency PGY-2

## 2021-06-05 NOTE — PLAN OF CARE
Problem: Prexisting or High Potential for Compromised Skin Integrity  Goal: Skin integrity is maintained or improved  Description: INTERVENTIONS:  - Identify patients at risk for skin breakdown  - Assess and monitor skin integrity  - Assess and monitor nutrition and hydration status  - Monitor labs   - Assess for incontinence   - Turn and reposition patient  - Assist with mobility/ambulation  - Relieve pressure over bony prominences  - Avoid friction and shearing  - Provide appropriate hygiene as needed including keeping skin clean and dry  - Evaluate need for skin moisturizer/barrier cream  - Collaborate with interdisciplinary team   - Patient/family teaching  - Consider wound care consult   Outcome: Progressing     Problem: Potential for Falls  Goal: Patient will remain free of falls  Description: INTERVENTIONS:  - Assess patient frequently for physical needs  -  Identify cognitive and physical deficits and behaviors that affect risk of falls  -  [place] fall precautions as indicated by assessment   - Educate patient/family on patient safety including physical limitations  - Instruct patient to call for assistance with activity based on assessment  - Modify environment to reduce risk of injury  - Consider OT/PT consult to assist with strengthening/mobility  Outcome: Progressing     Problem: Nutrition/Hydration-ADULT  Goal: Nutrient/Hydration intake appropriate for improving, restoring or maintaining nutritional needs  Description: Monitor and assess patient's nutrition/hydration status for malnutrition  Collaborate with interdisciplinary team and initiate plan and interventions as ordered  Monitor patient's weight and dietary intake as ordered or per policy  Utilize nutrition screening tool and intervene as necessary  Determine patient's food preferences and provide high-protein, high-caloric foods as appropriate       INTERVENTIONS:  - Monitor oral intake, urinary output, labs, and treatment plans  - Assess nutrition and hydration status and recommend course of action  - Evaluate amount of meals eaten  - Assist patient with eating if necessary   - Allow adequate time for meals  - Recommend/ encourage appropriate diets, oral nutritional supplements, and vitamin/mineral supplements  - Order, calculate, and assess calorie counts as needed  - Recommend, monitor, and adjust tube feedings and TPN/PPN based on assessed needs  - Assess need for intravenous fluids  - Provide specific nutrition/hydration education as appropriate  - Include patient/family/caregiver in decisions related to nutrition  Outcome: Progressing     Problem: PAIN - ADULT  Goal: Verbalizes/displays adequate comfort level or baseline comfort level  Description: Interventions:  - Encourage patient to monitor pain and request assistance  - Assess pain using appropriate pain scale  - Administer analgesics based on type and severity of pain and evaluate response  - Implement non-pharmacological measures as appropriate and evaluate response  - Consider cultural and social influences on pain and pain management  - Notify physician/advanced practitioner if interventions unsuccessful or patient reports new pain  Outcome: Progressing     Problem: INFECTION - ADULT  Goal: Absence or prevention of progression during hospitalization  Description: INTERVENTIONS:  - Assess and monitor for signs and symptoms of infection  - Monitor lab/diagnostic results  - Monitor all insertion sites, i e  indwelling lines, tubes, and drains  - Monitor endotracheal if appropriate and nasal secretions for changes in amount and color  - [place] appropriate cooling/warming therapies per order  - Administer medications as ordered  - Instruct and encourage patient and family to use good hand hygiene technique  - Identify and instruct in appropriate isolation precautions for identified infection/condition  Outcome: Progressing     Problem: SAFETY ADULT  Goal: Patient will remain free of falls  Description: INTERVENTIONS:  - Assess patient frequently for physical needs  -  Identify cognitive and physical deficits and behaviors that affect risk of falls    -  [place] fall precautions as indicated by assessment   - Educate patient/family on patient safety including physical limitations  - Instruct patient to call for assistance with activity based on assessment  - Modify environment to reduce risk of injury  - Consider OT/PT consult to assist with strengthening/mobility  Outcome: Progressing  Goal: Maintain or return to baseline ADL function  Description: INTERVENTIONS:  -  Assess patient's ability to carry out ADLs; assess patient's baseline for ADL function and identify physical deficits which impact ability to perform ADLs (bathing, care of mouth/teeth, toileting, grooming, dressing, etc )  - Assess/evaluate cause of self-care deficits   - Assess range of motion  - Assess patient's mobility; develop plan if impaired  - Assess patient's need for assistive devices and provide as appropriate  - Encourage maximum independence but intervene and supervise when necessary  - Involve family in performance of ADLs  - Assess for home care needs following discharge   - Consider OT consult to assist with ADL evaluation and planning for discharge  - Provide patient education as appropriate  Outcome: Progressing  Goal: Maintain or return mobility status to optimal level  Description: INTERVENTIONS:  - Assess patient's baseline mobility status (ambulation, transfers, stairs, etc )    - Identify cognitive and physical deficits and behaviors that affect mobility  - Identify mobility aids required to assist with transfers and/or ambulation (gait belt, sit-to-stand, lift, walker, cane, etc )  - [place] fall precautions as indicated by assessment  - Record patient progress and toleration of activity level on Mobility SBAR; progress patient to next Phase/Stage  - Instruct patient to call for assistance with activity based on assessment  - Consider rehabilitation consult to assist with strengthening/weightbearing, etc   Outcome: Progressing     Problem: DISCHARGE PLANNING  Goal: Discharge to home or other facility with appropriate resources  Description: INTERVENTIONS:  - Identify barriers to discharge w/patient and caregiver  - Arrange for needed discharge resources and transportation as appropriate  - Identify discharge learning needs (meds, wound care, etc )  - Arrange for interpretive services to assist at discharge as needed  - Refer to Case Management Department for coordinating discharge planning if the patient needs post-hospital services based on physician/advanced practitioner order or complex needs related to functional status, cognitive ability, or social support system  Outcome: Progressing     Problem: Knowledge Deficit  Goal: Patient/family/caregiver demonstrates understanding of disease process, treatment plan, medications, and discharge instructions  Description: Complete learning assessment and assess knowledge base    Interventions:  - Provide teaching at level of understanding  - Provide teaching via preferred learning methods  Outcome: Not Progressing

## 2021-06-05 NOTE — PROGRESS NOTES
I saw patient earlier today around 6:00 p m  and I called patient's  and he had it over the phone to family friend Mr Mio Riley on patient's contact list  Even though there was some language  barrier I was able to communicate with the patient     There was no language barrier and I spoke with Mr Marrian Severe- family friend  She has been   crying frequently complaining of  Pain here and there but could not pinpoint at 1  Place  She has history of CNS lymphoma and has received 2 doses of high-dose methotrexate with leucovorin rescue and also Rituxan  She has left hemiparesis  Did not complain of headache  She mentioned that she was eating and not having any  bowel problem  No breathing problem  Awake  Probably confused somewhat  No icterus  No oral thrush  No palpable neck mass  Clear lung fields  Regular heart rate  Soft abdomen with some generalized tenderness  No palpable  Abdominal mass  No edema of ankles  Left hemiparesis  No skin rash  No palpable lymphadenopathy in neck and axillary areas  Patient did not know much about chemotherapy that she received  Family member mentioned about rehabilitation and some problem with insurance coverage for placement at a rehabilitation center     Family is not rushing in to having another cycle of chemotherapy  Family wants her to get stronger 1st if possible  Hemoglobin 8 6  WBC 6180 and platelet [id number]  41 [facility] 1790  Normal BMP  No post treatment MRI scan of brain and I suggested to get MRI scan of brain  She might have problem lying still and cooperating?

## 2021-06-06 LAB
GLUCOSE SERPL-MCNC: 122 MG/DL (ref 65–140)
GLUCOSE SERPL-MCNC: 125 MG/DL (ref 65–140)
GLUCOSE SERPL-MCNC: 137 MG/DL (ref 65–140)
GLUCOSE SERPL-MCNC: 146 MG/DL (ref 65–140)

## 2021-06-06 PROCEDURE — 99232 SBSQ HOSP IP/OBS MODERATE 35: CPT | Performed by: INTERNAL MEDICINE

## 2021-06-06 PROCEDURE — 82948 REAGENT STRIP/BLOOD GLUCOSE: CPT

## 2021-06-06 RX ADMIN — GABAPENTIN 100 MG: 250 SUSPENSION ORAL at 21:59

## 2021-06-06 RX ADMIN — ACYCLOVIR 400 MG: 200 CAPSULE ORAL at 21:59

## 2021-06-06 RX ADMIN — DICYCLOMINE HYDROCHLORIDE 10 MG: 10 CAPSULE ORAL at 06:36

## 2021-06-06 RX ADMIN — DICYCLOMINE HYDROCHLORIDE 10 MG: 10 CAPSULE ORAL at 21:59

## 2021-06-06 RX ADMIN — ENOXAPARIN SODIUM 40 MG: 40 INJECTION SUBCUTANEOUS at 15:27

## 2021-06-06 RX ADMIN — OXYCODONE HYDROCHLORIDE 10 MG: 5 SOLUTION ORAL at 10:34

## 2021-06-06 RX ADMIN — ACYCLOVIR 400 MG: 200 CAPSULE ORAL at 10:35

## 2021-06-06 RX ADMIN — ACETAMINOPHEN 650 MG: 650 SUSPENSION ORAL at 06:35

## 2021-06-06 RX ADMIN — HYDROMORPHONE HYDROCHLORIDE 0.5 MG: 1 INJECTION, SOLUTION INTRAMUSCULAR; INTRAVENOUS; SUBCUTANEOUS at 15:27

## 2021-06-06 RX ADMIN — ACETAMINOPHEN 650 MG: 650 SUSPENSION ORAL at 15:27

## 2021-06-06 RX ADMIN — HYDROMORPHONE HYDROCHLORIDE 0.5 MG: 1 INJECTION, SOLUTION INTRAMUSCULAR; INTRAVENOUS; SUBCUTANEOUS at 21:59

## 2021-06-06 RX ADMIN — MENTHOL, UNSPECIFIED FORM AND METHYL SALICYLATE: 10; 150 CREAM TOPICAL at 10:37

## 2021-06-06 RX ADMIN — INSULIN GLARGINE 7 UNITS: 100 INJECTION, SOLUTION SUBCUTANEOUS at 21:59

## 2021-06-06 RX ADMIN — LORAZEPAM 0.5 MG: 2 INJECTION INTRAMUSCULAR; INTRAVENOUS at 10:36

## 2021-06-06 RX ADMIN — HYDROMORPHONE HYDROCHLORIDE 0.5 MG: 1 INJECTION, SOLUTION INTRAMUSCULAR; INTRAVENOUS; SUBCUTANEOUS at 06:36

## 2021-06-06 RX ADMIN — LORAZEPAM 0.5 MG: 2 INJECTION INTRAMUSCULAR; INTRAVENOUS at 03:41

## 2021-06-06 RX ADMIN — OXYCODONE HYDROCHLORIDE 10 MG: 5 SOLUTION ORAL at 03:40

## 2021-06-06 RX ADMIN — ALLOPURINOL 100 MG: 100 TABLET ORAL at 10:35

## 2021-06-06 RX ADMIN — FLUCONAZOLE 400 MG: 200 TABLET ORAL at 10:34

## 2021-06-06 RX ADMIN — LORAZEPAM 0.5 MG: 2 INJECTION INTRAMUSCULAR; INTRAVENOUS at 18:12

## 2021-06-06 RX ADMIN — DICLOFENAC SODIUM 2 G: 10 GEL TOPICAL at 03:45

## 2021-06-06 RX ADMIN — LIDOCAINE 5% 1 PATCH: 700 PATCH TOPICAL at 10:36

## 2021-06-06 RX ADMIN — MENTHOL, UNSPECIFIED FORM AND METHYL SALICYLATE 1 APPLICATION: 10; 150 CREAM TOPICAL at 18:12

## 2021-06-06 RX ADMIN — Medication 20 MG: at 10:38

## 2021-06-06 RX ADMIN — OXYCODONE HYDROCHLORIDE 10 MG: 5 SOLUTION ORAL at 18:11

## 2021-06-06 RX ADMIN — DICYCLOMINE HYDROCHLORIDE 10 MG: 10 CAPSULE ORAL at 15:28

## 2021-06-06 RX ADMIN — LEVOFLOXACIN 500 MG: 500 TABLET, FILM COATED ORAL at 15:28

## 2021-06-06 RX ADMIN — DICYCLOMINE HYDROCHLORIDE 10 MG: 10 CAPSULE ORAL at 10:35

## 2021-06-06 RX ADMIN — TAMSULOSIN HYDROCHLORIDE 0.4 MG: 0.4 CAPSULE ORAL at 15:28

## 2021-06-06 NOTE — PROGRESS NOTES
INTERNAL MEDICINE RESIDENCY PROGRESS NOTE     Name: Senthil Vasquez   Age & Sex: 49 y o  female   MRN: [id number]  Unit/Bed#: [facility] 920-01   Encounter: [id number]  Team: SOD Team B     PATIENT INFORMATION     Name: Senthil Vasquez   Age & Sex: 49 y o  female   MRN: [id number] Stay Days: 40    ASSESSMENT/PLAN     Principal Problem:    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[facility] [address] )  Active Problems:    Altered mental status    Dysphagia    Aphasia    Weakness    Fracture of ramus of left pubis (HCC)    Severe protein-calorie malnutrition ([facility] [address] )      * CNS lymphoma [facility])  Assessment & Plan  Patient was diagnosed with primary CNS lymphoma through biopsy in [facility] transferred over here for chemotherapy since the [facility] is out of network  Had a 2nd family meeting on 04/30 and family has decided to proceed treatment at this time  S/p PICC line for chemotherapy  S/p peg tube placement on 05/10  Plan:  Hematology-Oncology consulted  Appreciate recommendations  Chemotherapy treatment as per hematology oncology team     S/p 2nd cycle of chemotherapy on 05/13  Leucovin on 5/14  DVT prophylaxis with Lovenox  Rehab placement pending    S/p steroid taper for cerebral edema from CNS lymphoma  Pancytopenia in setting of recent chemotherapy  Will continue to watch for any signs of infection  On acyclovir, Diflucan and Levaquin    After discussion with Oncology, plan for rehab, patient does not require radiation therapy, rehab for strength, no chemotherapy until at least 3 weeks, evaluate at that time  Patient's family updated on plan going forward, no questions after family visit    Acute gout-resolved as of 6/2/2021  Assessment & Plan  Gout flare left knee resolving  Steroid taper completed  Will start low-dose allopurinol 100 mg q day to hopefully prevent other acute flares in the setting of chemotherapy  Follow-up uric acid level    Severe protein-calorie malnutrition ([facility] [address] )  Assessment & Plan  Malnutrition Findings:   Adult Malnutrition type: Acute illness(due to medical condition resulting in dysphagia, decreased appetite and intake, weight loss)  Adult Degree of Malnutrition: Other severe protein calorie malnutrition    BMI Findings: Body mass index is 21 44 kg/m²  Plan:  Nutrition consult  Tube feeds initiated with Jevity 1 2, advancing to goal      Fracture of ramus of left pubis [facility])  Assessment & Plan  Jade Montanez prior to presentation to [facility]  Managed nonoperatively at [facility]    Weakness  Assessment & Plan  Left greater than right upper and lower extremity weakness with ongoing left lower extremity contracture  Secondary to underlying CNS malignancy  Decubitus ulcer precautions such as frequent repositioning  Aphasia  Assessment & Plan  Speech therapy on board    Dysphagia  Assessment & Plan  s/p peg tube placement, tube feeds at goal    Altered mental status  Assessment & Plan  Patient initially presented to [facility] his [facility] [place] with stroke-like symptoms for 2 days  CT scan of the head was concerning for subacute CVA and was transferred to [facility]   MRI of the brain was more consistent with demyelinating disease patient was started on IV steroids and 1000 mg daily for 5 days  Patient did not have any significant improvement at that time patient had plasmapheresis  And also had  lumbar puncture-negative for infection or malignancy  Patient noted to have persistent encephalopathy so a repeat MRI was obtained which showed worsening of the abnormality seen on the previous MRI and 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  Likely secondary to CNS lymphoma  Delirium precautions   Currently alert and oriented times 2-3  Follow commands      Patient's  (does not speak [place]) makes all medical decision for her, confirmed once all medical treatment at this time    Pain of left lower leg-resolved as of 6/2/2021  Assessment & Plan  Overnight left lower extremity pain, well score 3, pain now controlled  Bilateral ultrasound Dopplers no evidence of DVT    Pancytopenia (HCC)-resolved as of 6/2/2021  Assessment & Plan  Likely in setting of chemotherapy  Will continue to monitor for any signs of infection  Will transfuse with packed RBC for hemoglobin less than 7  Drop in Hgb from 9 to 7 on 5/14, other lines relatively stable  VSS and soft brown BMs  Will discuss with oncology if this is expected based on chemo or if there should be further workup for bleeding, though hemodynamics currently do not suggest bleed  S/p 1 pack RBC on 05/15, hemoglobin continues to improve    Left-sided weakness-resolved as of 6/2/2021  Assessment & Plan  Patient has left-sided weakness at baseline from her CNS lymphoma  Left upper extremity 3-4/5 and left lower extremity 0-1/5 muscle strength    Sepsis (HCC)-resolved as of 5/13/2021  Assessment & Plan  Patient had hypothermia, elevated WBC count and tachycardia  Concern for possible aspiration in setting on dysphagia versus UTI in setting of urinary catheter which was placed for retention  Plan:  S/p IV antibiotics last day on 05/05  Will continue to monitor off antibiotics  Steroid-induced hyperglycemia-resolved as of 5/23/2021  Assessment & Plan  Mild hyperglycemia, glucoses in 200s, no history of DM  Occurring in the setting of Decadron with chemotherapy, as well as sodium bicarb in D5 drip  - D5 drip is finished  - will monitor sugars while on Jevity and now off D5  - continue q4 hour fingerstick glucose with correctional algorithm 1 and Lantus    Presence of permanent central venous catheter-resolved as of 6/2/2021  Assessment & Plan  Noted presence of right IJ tunneled double-lumen HD catheter; upon chart review, this was likely inserted to be used for plasmapheresis which she has completed  Plan:  S/p removal by IR  Patient now has PICC line      Urinary retention-resolved as of 2021  Assessment & Plan  Patient developed urinary retention during previous hospitalization  Plan was voiding trial as an outpatient as she was scheduled for discharge from that facility  Machado catheter was initially removed although patient required re-placement of machado on  in setting of incontinence and getting chemotherapy  Machado discontinued, monitor for signs of fluid retention        Disposition:  Awaiting rehab placement     SUBJECTIVE     Patient seen and examined  No acute events overnight  OBJECTIVE     Vitals:    21 0552 21 0659 21 1521 21 2231   BP:  98/61 108/70 107/69   Pulse:  87 99 95   Resp:  18 20 20   Temp:  98 7 °F (37 1 °C) 100 1 °F (37 8 °C) 99 2 °F (37 3 °C)   TempSrc:   Axillary    SpO2:  99% 99% 99%   Weight: 51 kg (112 lb 6 4 oz)      Height:          Temperature:   Temp (24hrs), Av 3 °F (37 4 °C), Min:98 7 °F (37 1 °C), Max:100 1 °F (37 8 °C)    Temperature: 99 2 °F (37 3 °C)  Intake & Output:  I/O       / 0701 -  0700  07 -  0700    P  O  320 480    NG/ 100    Feedings 1455     Total Intake(mL/kg) 2375 (46 6) 580 (11 4)    Urine (mL/kg/hr) 350 (0 3) 807 (0 7)    Stool 0 0    Total Output 350 807    Net + -          Unmeasured Urine Occurrence 4 x 2 x    Unmeasured Stool Occurrence 4 x 3 x        Weights:   IBW (Ideal Body Weight): 45 5 kg    Body mass index is 21 95 kg/m²  Weight (last 2 days)     Date/Time   Weight    21 0552   51 (112 4)    21 0600   48 9 (107 81)            Physical Exam  Vitals signs and nursing note reviewed  Constitutional:       General: She is not in acute distress  Appearance: She is well-developed  HENT:      Head: Normocephalic and atraumatic  Eyes:      Conjunctiva/sclera: Conjunctivae normal    Neck:      Musculoskeletal: Neck supple  Cardiovascular:      Rate and Rhythm: Normal rate and regular rhythm  Heart sounds: No murmur     Pulmonary:      Effort: Pulmonary effort is normal  No respiratory distress  Breath sounds: Normal breath sounds  Abdominal:      Palpations: Abdomen is soft  Tenderness: There is no abdominal tenderness  Musculoskeletal: Normal range of motion  General: No swelling  Skin:     General: Skin is warm and dry  Capillary Refill: Capillary refill takes less than 2 seconds  Neurological:      General: No focal deficit present  Mental Status: She is alert  LABORATORY DATA     Labs: I have personally reviewed pertinent reports  Results from last 7 days   Lab Units 06/03/21  0529   WBC Thousand/uL 6 18   HEMOGLOBIN g/dL 8 6*   HEMATOCRIT % 28 4*   PLATELETS Thousands/uL 246   MONO PCT % 16*      Results from last 7 days   Lab Units 06/03/21  0529   POTASSIUM mmol/L 4 1   CHLORIDE mmol/L 106   CO2 mmol/L 27   BUN mg/dL 20   CREATININE mg/dL 0 23*   CALCIUM mg/dL 8 9                            IMAGING & DIAGNOSTIC TESTING     Radiology Results: I have personally reviewed pertinent reports  Xr Chest Portable    Result Date: 4/24/2021  Impression: Dialysis catheter tip within the superior cavoatrial junction  No pneumothorax  Workstation performed: [id number]     Xr Chest Picc Line Portable    Result Date: 4/27/2021  Impression: PICC line tip in the superior vena cava, approximately 3 5 to 4 cm above the cavoatrial junction  The examination demonstrates a significant  finding and was documented as such in [facility] for liaison and referring practitioner notification  Workstation performed: [id number]     Ir Picc Reposition    Result Date: 4/27/2021  Impression: Status-post repositioning of a 5 Khmer central venous catheter under fluoroscopic guidance with its tip at the cavoatrial junction  Workstation performed: [id number]     Ir Tunneled Central Line Removal    Result Date: 4/27/2021  Impression: Impression: Successful tunneled central line removal as described   Signed, performed and dictated by Alireza Eugene PA-C under the supervision of Dr Seven Persaud  Workstation performed: [id number]     Other Diagnostic Testing: I have personally reviewed pertinent reports      ACTIVE MEDICATIONS     Current Facility-Administered Medications   Medication Dose Route Frequency    acetaminophen (TYLENOL) oral suspension 650 mg  650 mg Oral Q8H [address]    acyclovir (ZOVIRAX) capsule 400 mg  400 mg Oral Q12H [address]    albuterol (PROVENTIL HFA,VENTOLIN HFA) inhaler 2 puff  2 puff Inhalation Q6H PRN    allopurinol (ZYLOPRIM) tablet 100 mg  100 mg Oral Daily    aluminum-magnesium hydroxide-simethicone (MYLANTA) oral suspension 30 mL  30 mL Per PEG Tube Q4H PRN    amLODIPine (NORVASC) tablet 5 mg  5 mg Oral Daily    bisacodyl (DULCOLAX) rectal suppository 10 mg  10 mg Rectal Daily PRN    Diclofenac Sodium (VOLTAREN) 1 % topical gel 2 g  2 g Topical TID PRN    dicyclomine (BENTYL) capsule 10 mg  10 mg Oral 4x Daily (AC & HS)    enoxaparin (LOVENOX) subcutaneous injection 40 mg  40 mg Subcutaneous Q24H MEÑO    fluconazole (DIFLUCAN) tablet 400 mg  400 mg Oral Daily    gabapentin (NEURONTIN) oral solution 100 mg  100 mg Oral HS    HYDROmorphone (DILAUDID) injection 0 5 mg  0 5 mg Intravenous Q4H PRN    insulin glargine (LANTUS) subcutaneous injection 7 Units 0 07 mL  7 Units Subcutaneous HS    insulin lispro (HumaLOG) 100 units/mL subcutaneous injection 1-5 Units  1-5 Units Subcutaneous 4x Daily (AC & HS)    levofloxacin (LEVAQUIN) tablet 500 mg  500 mg Oral Q24H    lidocaine (LIDODERM) 5 % patch 1 patch  1 patch Topical Daily    LORazepam (ATIVAN) injection 0 5 mg  0 5 mg Intravenous Q6H PRN    menthol-methyl salicylate (BENGAY) 47-15 % cream   Apply externally BID    omeprazole (PRILOSEC) suspension 2 mg/mL  20 mg Oral Daily    ondansetron (ZOFRAN) injection 4 mg  4 mg Intravenous Q6H PRN    oxyCODONE (ROXICODONE) oral solution 5 mg  5 mg Oral Q4H PRN    Or    oxyCODONE (ROXICODONE) oral solution 10 mg  10 mg Oral Q4H PRN    polyethylene glycol (MIRALAX) packet 17 g  17 g Oral Daily PRN    senna-docusate sodium (SENOKOT S) 8 6-50 mg per tablet 1 tablet  1 tablet Oral BID PRN    tamsulosin (FLOMAX) capsule 0 4 mg  0 4 mg Oral Daily With Dinner       VTE Pharmacologic Prophylaxis: Enoxaparin (Lovenox)  VTE Mechanical Prophylaxis: sequential compression device    Portions of the record may have been created with voice recognition software  Occasional wrong word or "sound a like" substitutions may have occurred due to the inherent limitations of voice recognition software    Read the chart carefully and recognize, using context, where substitutions have occurred   ==  Kate Tran, [id number] [facility]  Internal Medicine Residency PGY-2

## 2021-06-06 NOTE — PLAN OF CARE
Problem: Prexisting or High Potential for Compromised Skin Integrity  Goal: Skin integrity is maintained or improved  Description: INTERVENTIONS:  - Identify patients at risk for skin breakdown  - Assess and monitor skin integrity  - Assess and monitor nutrition and hydration status  - Monitor labs   - Assess for incontinence   - Turn and reposition patient  - Assist with mobility/ambulation  - Relieve pressure over bony prominences  - Avoid friction and shearing  - Provide appropriate hygiene as needed including keeping skin clean and dry  - Evaluate need for skin moisturizer/barrier cream  - Collaborate with interdisciplinary team   - Patient/family teaching  - Consider wound care consult   Outcome: Progressing     Problem: Potential for Falls  Goal: Patient will remain free of falls  Description: INTERVENTIONS:  - Assess patient frequently for physical needs  -  Identify cognitive and physical deficits and behaviors that affect risk of falls  -  [place] fall precautions as indicated by assessment   - Educate patient/family on patient safety including physical limitations  - Instruct patient to call for assistance with activity based on assessment  - Modify environment to reduce risk of injury  - Consider OT/PT consult to assist with strengthening/mobility  Outcome: Progressing     Problem: Nutrition/Hydration-ADULT  Goal: Nutrient/Hydration intake appropriate for improving, restoring or maintaining nutritional needs  Description: Monitor and assess patient's nutrition/hydration status for malnutrition  Collaborate with interdisciplinary team and initiate plan and interventions as ordered  Monitor patient's weight and dietary intake as ordered or per policy  Utilize nutrition screening tool and intervene as necessary  Determine patient's food preferences and provide high-protein, high-caloric foods as appropriate       INTERVENTIONS:  - Monitor oral intake, urinary output, labs, and treatment plans  - Assess nutrition and hydration status and recommend course of action  - Evaluate amount of meals eaten  - Assist patient with eating if necessary   - Allow adequate time for meals  - Recommend/ encourage appropriate diets, oral nutritional supplements, and vitamin/mineral supplements  - Order, calculate, and assess calorie counts as needed  - Recommend, monitor, and adjust tube feedings and TPN/PPN based on assessed needs  - Assess need for intravenous fluids  - Provide specific nutrition/hydration education as appropriate  - Include patient/family/caregiver in decisions related to nutrition  Outcome: Progressing     Problem: PAIN - ADULT  Goal: Verbalizes/displays adequate comfort level or baseline comfort level  Description: Interventions:  - Encourage patient to monitor pain and request assistance  - Assess pain using appropriate pain scale  - Administer analgesics based on type and severity of pain and evaluate response  - Implement non-pharmacological measures as appropriate and evaluate response  - Consider cultural and social influences on pain and pain management  - Notify physician/advanced practitioner if interventions unsuccessful or patient reports new pain  Outcome: Progressing     Problem: INFECTION - ADULT  Goal: Absence or prevention of progression during hospitalization  Description: INTERVENTIONS:  - Assess and monitor for signs and symptoms of infection  - Monitor lab/diagnostic results  - Monitor all insertion sites, i e  indwelling lines, tubes, and drains  - Monitor endotracheal if appropriate and nasal secretions for changes in amount and color  - [place] appropriate cooling/warming therapies per order  - Administer medications as ordered  - Instruct and encourage patient and family to use good hand hygiene technique  - Identify and instruct in appropriate isolation precautions for identified infection/condition  Outcome: Progressing     Problem: SAFETY ADULT  Goal: Patient will remain free of falls  Description: INTERVENTIONS:  - Assess patient frequently for physical needs  -  Identify cognitive and physical deficits and behaviors that affect risk of falls    -  [place] fall precautions as indicated by assessment   - Educate patient/family on patient safety including physical limitations  - Instruct patient to call for assistance with activity based on assessment  - Modify environment to reduce risk of injury  - Consider OT/PT consult to assist with strengthening/mobility  Outcome: Progressing  Goal: Maintain or return to baseline ADL function  Description: INTERVENTIONS:  -  Assess patient's ability to carry out ADLs; assess patient's baseline for ADL function and identify physical deficits which impact ability to perform ADLs (bathing, care of mouth/teeth, toileting, grooming, dressing, etc )  - Assess/evaluate cause of self-care deficits   - Assess range of motion  - Assess patient's mobility; develop plan if impaired  - Assess patient's need for assistive devices and provide as appropriate  - Encourage maximum independence but intervene and supervise when necessary  - Involve family in performance of ADLs  - Assess for home care needs following discharge   - Consider OT consult to assist with ADL evaluation and planning for discharge  - Provide patient education as appropriate  Outcome: Progressing  Goal: Maintain or return mobility status to optimal level  Description: INTERVENTIONS:  - Assess patient's baseline mobility status (ambulation, transfers, stairs, etc )    - Identify cognitive and physical deficits and behaviors that affect mobility  - Identify mobility aids required to assist with transfers and/or ambulation (gait belt, sit-to-stand, lift, walker, cane, etc )  - [place] fall precautions as indicated by assessment  - Record patient progress and toleration of activity level on Mobility SBAR; progress patient to next Phase/Stage  - Instruct patient to call for assistance with activity based on assessment  - Consider rehabilitation consult to assist with strengthening/weightbearing, etc   Outcome: Progressing     Problem: DISCHARGE PLANNING  Goal: Discharge to home or other facility with appropriate resources  Description: INTERVENTIONS:  - Identify barriers to discharge w/patient and caregiver  - Arrange for needed discharge resources and transportation as appropriate  - Identify discharge learning needs (meds, wound care, etc )  - Arrange for interpretive services to assist at discharge as needed  - Refer to Case Management Department for coordinating discharge planning if the patient needs post-hospital services based on physician/advanced practitioner order or complex needs related to functional status, cognitive ability, or social support system  Outcome: Progressing     Problem: Knowledge Deficit  Goal: Patient/family/caregiver demonstrates understanding of disease process, treatment plan, medications, and discharge instructions  Description: Complete learning assessment and assess knowledge base    Interventions:  - Provide teaching at level of understanding  - Provide teaching via preferred learning methods  Outcome: Progressing

## 2021-06-07 LAB
ANION GAP SERPL CALCULATED.3IONS-SCNC: 6 MMOL/L (ref 4–13)
ANISOCYTOSIS BLD QL SMEAR: PRESENT
BASOPHILS # BLD MANUAL: 0 THOUSAND/UL (ref 0–0.1)
BASOPHILS NFR MAR MANUAL: 0 % (ref 0–1)
BUN SERPL-MCNC: 18 MG/DL (ref 5–25)
CALCIUM SERPL-MCNC: 8.8 MG/DL (ref 8.3–10.1)
CHLORIDE SERPL-SCNC: 103 MMOL/L (ref 100–108)
CO2 SERPL-SCNC: 29 MMOL/L (ref 21–32)
CREAT SERPL-MCNC: 0.33 MG/DL (ref 0.6–1.3)
EOSINOPHIL # BLD MANUAL: 0.09 THOUSAND/UL (ref 0–0.4)
EOSINOPHIL NFR BLD MANUAL: 1 % (ref 0–6)
ERYTHROCYTE [DISTWIDTH] IN BLOOD BY AUTOMATED COUNT: 19.9 % (ref 11.6–15.1)
GFR SERPL CREATININE-BSD FRML MDRD: 126 ML/MIN/1.73SQ M
GLUCOSE SERPL-MCNC: 123 MG/DL (ref 65–140)
GLUCOSE SERPL-MCNC: 123 MG/DL (ref 65–140)
GLUCOSE SERPL-MCNC: 141 MG/DL (ref 65–140)
GLUCOSE SERPL-MCNC: 143 MG/DL (ref 65–140)
GLUCOSE SERPL-MCNC: 92 MG/DL (ref 65–140)
GLUCOSE SERPL-MCNC: 97 MG/DL (ref 65–140)
HCT VFR BLD AUTO: 28.3 % (ref 34.8–46.1)
HGB BLD-MCNC: 8.6 G/DL (ref 11.5–15.4)
LYMPHOCYTES # BLD AUTO: 2.98 THOUSAND/UL (ref 0.6–4.47)
LYMPHOCYTES # BLD AUTO: 33 % (ref 14–44)
MACROCYTES BLD QL AUTO: PRESENT
MCH RBC QN AUTO: 30.8 PG (ref 26.8–34.3)
MCHC RBC AUTO-ENTMCNC: 30.4 G/DL (ref 31.4–37.4)
MCV RBC AUTO: 101 FL (ref 82–98)
METAMYELOCYTES NFR BLD MANUAL: 1 % (ref 0–1)
MONOCYTES # BLD AUTO: 0.81 THOUSAND/UL (ref 0–1.22)
MONOCYTES NFR BLD: 9 % (ref 4–12)
MYELOCYTES NFR BLD MANUAL: 5 % (ref 0–1)
NEUTROPHILS # BLD MANUAL: 4.52 THOUSAND/UL (ref 1.85–7.62)
NEUTS BAND NFR BLD MANUAL: 1 % (ref 0–8)
NEUTS SEG NFR BLD AUTO: 49 % (ref 43–75)
NRBC BLD AUTO-RTO: 0 /100 WBCS
NRBC BLD AUTO-RTO: 1 /100 WBC (ref 0–2)
PLATELET # BLD AUTO: 279 THOUSANDS/UL (ref 149–390)
PLATELET BLD QL SMEAR: ADEQUATE
PMV BLD AUTO: 10 FL (ref 8.9–12.7)
POIKILOCYTOSIS BLD QL SMEAR: PRESENT
POLYCHROMASIA BLD QL SMEAR: PRESENT
POTASSIUM SERPL-SCNC: 4 MMOL/L (ref 3.5–5.3)
PROMYELOCYTES NFR BLD MANUAL: 1 % (ref 0–0)
RBC # BLD AUTO: 2.79 MILLION/UL (ref 3.81–5.12)
RBC MORPH BLD: PRESENT
SODIUM SERPL-SCNC: 138 MMOL/L (ref 136–145)
URATE SERPL-MCNC: 4.7 MG/DL (ref 2–6.8)
WBC # BLD AUTO: 9.04 THOUSAND/UL (ref 4.31–10.16)

## 2021-06-07 PROCEDURE — 80048 BASIC METABOLIC PNL TOTAL CA: CPT | Performed by: STUDENT IN AN ORGANIZED HEALTH CARE EDUCATION/TRAINING PROGRAM

## 2021-06-07 PROCEDURE — 85007 BL SMEAR W/DIFF WBC COUNT: CPT | Performed by: STUDENT IN AN ORGANIZED HEALTH CARE EDUCATION/TRAINING PROGRAM

## 2021-06-07 PROCEDURE — 82948 REAGENT STRIP/BLOOD GLUCOSE: CPT

## 2021-06-07 PROCEDURE — 85027 COMPLETE CBC AUTOMATED: CPT | Performed by: STUDENT IN AN ORGANIZED HEALTH CARE EDUCATION/TRAINING PROGRAM

## 2021-06-07 PROCEDURE — 84550 ASSAY OF BLOOD/URIC ACID: CPT | Performed by: STUDENT IN AN ORGANIZED HEALTH CARE EDUCATION/TRAINING PROGRAM

## 2021-06-07 RX ORDER — INSULIN GLARGINE 100 [IU]/ML
5 INJECTION, SOLUTION SUBCUTANEOUS
Status: DISCONTINUED | OUTPATIENT
Start: 2021-06-07 | End: 2021-06-14 | Stop reason: HOSPADM

## 2021-06-07 RX ORDER — HYDROXYZINE HYDROCHLORIDE 25 MG/1
25 TABLET, FILM COATED ORAL EVERY 6 HOURS PRN
Status: DISCONTINUED | OUTPATIENT
Start: 2021-06-07 | End: 2021-06-14 | Stop reason: HOSPADM

## 2021-06-07 RX ADMIN — DICYCLOMINE HYDROCHLORIDE 10 MG: 10 CAPSULE ORAL at 21:14

## 2021-06-07 RX ADMIN — ACYCLOVIR 400 MG: 200 CAPSULE ORAL at 08:00

## 2021-06-07 RX ADMIN — DICYCLOMINE HYDROCHLORIDE 10 MG: 10 CAPSULE ORAL at 12:54

## 2021-06-07 RX ADMIN — LORAZEPAM 0.5 MG: 2 INJECTION INTRAMUSCULAR; INTRAVENOUS at 02:22

## 2021-06-07 RX ADMIN — DICYCLOMINE HYDROCHLORIDE 10 MG: 10 CAPSULE ORAL at 06:38

## 2021-06-07 RX ADMIN — LORAZEPAM 0.5 MG: 2 INJECTION INTRAMUSCULAR; INTRAVENOUS at 09:59

## 2021-06-07 RX ADMIN — DICYCLOMINE HYDROCHLORIDE 10 MG: 10 CAPSULE ORAL at 16:19

## 2021-06-07 RX ADMIN — HYDROMORPHONE HYDROCHLORIDE 0.5 MG: 1 INJECTION, SOLUTION INTRAMUSCULAR; INTRAVENOUS; SUBCUTANEOUS at 09:59

## 2021-06-07 RX ADMIN — ACETAMINOPHEN 650 MG: 650 SUSPENSION ORAL at 21:13

## 2021-06-07 RX ADMIN — DICLOFENAC SODIUM 2 G: 10 GEL TOPICAL at 13:10

## 2021-06-07 RX ADMIN — LIDOCAINE 5% 1 PATCH: 700 PATCH TOPICAL at 08:03

## 2021-06-07 RX ADMIN — ENOXAPARIN SODIUM 40 MG: 40 INJECTION SUBCUTANEOUS at 16:19

## 2021-06-07 RX ADMIN — FLUCONAZOLE 400 MG: 200 TABLET ORAL at 08:00

## 2021-06-07 RX ADMIN — ALLOPURINOL 100 MG: 100 TABLET ORAL at 08:00

## 2021-06-07 RX ADMIN — ACYCLOVIR 400 MG: 200 CAPSULE ORAL at 21:13

## 2021-06-07 RX ADMIN — DICLOFENAC SODIUM 2 G: 10 GEL TOPICAL at 19:42

## 2021-06-07 RX ADMIN — HYDROXYZINE HYDROCHLORIDE 25 MG: 25 TABLET, FILM COATED ORAL at 19:42

## 2021-06-07 RX ADMIN — INSULIN GLARGINE 5 UNITS: 100 INJECTION, SOLUTION SUBCUTANEOUS at 21:14

## 2021-06-07 RX ADMIN — ACETAMINOPHEN 650 MG: 650 SUSPENSION ORAL at 06:38

## 2021-06-07 RX ADMIN — OXYCODONE HYDROCHLORIDE 10 MG: 5 SOLUTION ORAL at 02:22

## 2021-06-07 RX ADMIN — MENTHOL, UNSPECIFIED FORM AND METHYL SALICYLATE: 10; 150 CREAM TOPICAL at 17:28

## 2021-06-07 RX ADMIN — TAMSULOSIN HYDROCHLORIDE 0.4 MG: 0.4 CAPSULE ORAL at 16:19

## 2021-06-07 RX ADMIN — GABAPENTIN 100 MG: 250 SUSPENSION ORAL at 21:14

## 2021-06-07 RX ADMIN — LEVOFLOXACIN 500 MG: 500 TABLET, FILM COATED ORAL at 16:18

## 2021-06-07 RX ADMIN — MENTHOL, UNSPECIFIED FORM AND METHYL SALICYLATE: 10; 150 CREAM TOPICAL at 08:00

## 2021-06-07 RX ADMIN — ACETAMINOPHEN 650 MG: 650 SUSPENSION ORAL at 16:19

## 2021-06-07 RX ADMIN — OXYCODONE HYDROCHLORIDE 10 MG: 5 SOLUTION ORAL at 06:36

## 2021-06-07 RX ADMIN — Medication 20 MG: at 08:22

## 2021-06-07 NOTE — PLAN OF CARE
Problem: Prexisting or High Potential for Compromised Skin Integrity  Goal: Skin integrity is maintained or improved  Description: INTERVENTIONS:  - Identify patients at risk for skin breakdown  - Assess and monitor skin integrity  - Assess and monitor nutrition and hydration status  - Monitor labs   - Assess for incontinence   - Turn and reposition patient  - Assist with mobility/ambulation  - Relieve pressure over bony prominences  - Avoid friction and shearing  - Provide appropriate hygiene as needed including keeping skin clean and dry  - Evaluate need for skin moisturizer/barrier cream  - Collaborate with interdisciplinary team   - Patient/family teaching  - Consider wound care consult   Outcome: Progressing     Problem: Potential for Falls  Goal: Patient will remain free of falls  Description: INTERVENTIONS:  - Assess patient frequently for physical needs  -  Identify cognitive and physical deficits and behaviors that affect risk of falls  -  [place] fall precautions as indicated by assessment   - Educate patient/family on patient safety including physical limitations  - Instruct patient to call for assistance with activity based on assessment  - Modify environment to reduce risk of injury  - Consider OT/PT consult to assist with strengthening/mobility  Outcome: Progressing     Problem: Nutrition/Hydration-ADULT  Goal: Nutrient/Hydration intake appropriate for improving, restoring or maintaining nutritional needs  Description: Monitor and assess patient's nutrition/hydration status for malnutrition  Collaborate with interdisciplinary team and initiate plan and interventions as ordered  Monitor patient's weight and dietary intake as ordered or per policy  Utilize nutrition screening tool and intervene as necessary  Determine patient's food preferences and provide high-protein, high-caloric foods as appropriate       INTERVENTIONS:  - Monitor oral intake, urinary output, labs, and treatment plans  - Assess nutrition and hydration status and recommend course of action  - Evaluate amount of meals eaten  - Assist patient with eating if necessary   - Allow adequate time for meals  - Recommend/ encourage appropriate diets, oral nutritional supplements, and vitamin/mineral supplements  - Order, calculate, and assess calorie counts as needed  - Recommend, monitor, and adjust tube feedings and TPN/PPN based on assessed needs  - Assess need for intravenous fluids  - Provide specific nutrition/hydration education as appropriate  - Include patient/family/caregiver in decisions related to nutrition  Outcome: Progressing     Problem: PAIN - ADULT  Goal: Verbalizes/displays adequate comfort level or baseline comfort level  Description: Interventions:  - Encourage patient to monitor pain and request assistance  - Assess pain using appropriate pain scale  - Administer analgesics based on type and severity of pain and evaluate response  - Implement non-pharmacological measures as appropriate and evaluate response  - Consider cultural and social influences on pain and pain management  - Notify physician/advanced practitioner if interventions unsuccessful or patient reports new pain  Outcome: Progressing     Problem: INFECTION - ADULT  Goal: Absence or prevention of progression during hospitalization  Description: INTERVENTIONS:  - Assess and monitor for signs and symptoms of infection  - Monitor lab/diagnostic results  - Monitor all insertion sites, i e  indwelling lines, tubes, and drains  - Monitor endotracheal if appropriate and nasal secretions for changes in amount and color  - [place] appropriate cooling/warming therapies per order  - Administer medications as ordered  - Instruct and encourage patient and family to use good hand hygiene technique  - Identify and instruct in appropriate isolation precautions for identified infection/condition  Outcome: Progressing     Problem: SAFETY ADULT  Goal: Patient will remain free of falls  Description: INTERVENTIONS:  - Assess patient frequently for physical needs  -  Identify cognitive and physical deficits and behaviors that affect risk of falls    -  [place] fall precautions as indicated by assessment   - Educate patient/family on patient safety including physical limitations  - Instruct patient to call for assistance with activity based on assessment  - Modify environment to reduce risk of injury  - Consider OT/PT consult to assist with strengthening/mobility  Outcome: Progressing  Goal: Maintain or return to baseline ADL function  Description: INTERVENTIONS:  -  Assess patient's ability to carry out ADLs; assess patient's baseline for ADL function and identify physical deficits which impact ability to perform ADLs (bathing, care of mouth/teeth, toileting, grooming, dressing, etc )  - Assess/evaluate cause of self-care deficits   - Assess range of motion  - Assess patient's mobility; develop plan if impaired  - Assess patient's need for assistive devices and provide as appropriate  - Encourage maximum independence but intervene and supervise when necessary  - Involve family in performance of ADLs  - Assess for home care needs following discharge   - Consider OT consult to assist with ADL evaluation and planning for discharge  - Provide patient education as appropriate  Outcome: Progressing  Goal: Maintain or return mobility status to optimal level  Description: INTERVENTIONS:  - Assess patient's baseline mobility status (ambulation, transfers, stairs, etc )    - Identify cognitive and physical deficits and behaviors that affect mobility  - Identify mobility aids required to assist with transfers and/or ambulation (gait belt, sit-to-stand, lift, walker, cane, etc )  - [place] fall precautions as indicated by assessment  - Record patient progress and toleration of activity level on Mobility SBAR; progress patient to next Phase/Stage  - Instruct patient to call for assistance with activity based on assessment  - Consider rehabilitation consult to assist with strengthening/weightbearing, etc   Outcome: Progressing     Problem: DISCHARGE PLANNING  Goal: Discharge to home or other facility with appropriate resources  Description: INTERVENTIONS:  - Identify barriers to discharge w/patient and caregiver  - Arrange for needed discharge resources and transportation as appropriate  - Identify discharge learning needs (meds, wound care, etc )  - Arrange for interpretive services to assist at discharge as needed  - Refer to Case Management Department for coordinating discharge planning if the patient needs post-hospital services based on physician/advanced practitioner order or complex needs related to functional status, cognitive ability, or social support system  Outcome: Progressing     Problem: Knowledge Deficit  Goal: Patient/family/caregiver demonstrates understanding of disease process, treatment plan, medications, and discharge instructions  Description: Complete learning assessment and assess knowledge base    Interventions:  - Provide teaching at level of understanding  - Provide teaching via preferred learning methods  Outcome: Progressing

## 2021-06-07 NOTE — CASE MANAGEMENT
YSABEL s/w Dalton Mclean from [place] and provided an update as to the status of the referrals that were sent

## 2021-06-07 NOTE — PROGRESS NOTES
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[facility] )  Active Problems:    Altered mental status    Dysphagia    Aphasia    Weakness    Fracture of ramus of left pubis (HCC)    Severe protein-calorie malnutrition ([facility] )      * CNS lymphoma [facility])  Assessment & Plan  Patient was diagnosed with primary CNS lymphoma through biopsy in [facility] transferred over here for chemotherapy since the [facility] is out of network  Had a 2nd family meeting on 04/30 and family has decided to proceed treatment at this time  S/p PICC line for chemotherapy  S/p peg tube placement on 05/10  Plan:  Hematology-Oncology consulted  Appreciate recommendations  Chemotherapy treatment as per hematology oncology team     S/p 2nd cycle of chemotherapy on 05/13  Leucovin on 5/14  DVT prophylaxis with Lovenox  Rehab placement pending    S/p steroid taper for cerebral edema from CNS lymphoma  Pancytopenia in setting of recent chemotherapy  Will continue to watch for any signs of infection  On acyclovir, Diflucan and Levaquin    After discussion with Oncology, plan for rehab, patient does not require radiation therapy, rehab for strength, no chemotherapy until at least 3 weeks, evaluate at that time  Patient's family updated on plan going forward, no questions after family visit    Acute gout-resolved as of 6/2/2021  Assessment & Plan  Gout flare left knee resolving  Steroid taper completed  Will start low-dose allopurinol 100 mg q day to hopefully prevent other acute flares in the setting of chemotherapy  Follow-up uric acid level    Severe protein-calorie malnutrition ([facility] [address] )  Assessment & Plan  Malnutrition Findings:   Adult Malnutrition type: Acute illness(due to medical condition resulting in dysphagia, decreased appetite and intake, weight loss)  Adult Degree of Malnutrition: Other severe protein calorie malnutrition    BMI Findings: Body mass index is 21 44 kg/m²  Plan:  Nutrition consult  Tube feeds initiated with Jevity 1 2, advancing to goal      Fracture of ramus of left pubis [facility])  Assessment & Plan  [place] Wen prior to presentation to [facility]  Managed nonoperatively at [facility]    Weakness  Assessment & Plan  Left greater than right upper and lower extremity weakness with ongoing left lower extremity contracture  Secondary to underlying CNS malignancy  Decubitus ulcer precautions such as frequent repositioning  Aphasia  Assessment & Plan  Speech therapy on board    Dysphagia  Assessment & Plan  s/p peg tube placement, tube feeds at goal    Altered mental status  Assessment & Plan  Patient initially presented to [facility] his ultimate [place] with stroke-like symptoms for 2 days  CT scan of the head was concerning for subacute CVA and was transferred to [facility]   MRI of the brain was more consistent with demyelinating disease patient was started on IV steroids and 1000 mg daily for 5 days  Patient did not have any significant improvement at that time patient had plasmapheresis  And also had  lumbar puncture-negative for infection or malignancy  Patient noted to have persistent encephalopathy so a repeat MRI was obtained which showed worsening of the abnormality seen on the previous MRI and 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  Likely secondary to CNS lymphoma  Delirium precautions   Currently alert and oriented times 2-3  Follow commands      Patient's  (does not speak [place]) makes all medical decision for her, confirmed once all medical treatment at this time    Pain of left lower leg-resolved as of 6/2/2021  Assessment & Plan  Overnight left lower extremity pain, well score 3, pain now controlled  Bilateral ultrasound Dopplers no evidence of DVT    Pancytopenia (HCC)-resolved as of 6/2/2021  Assessment & Plan  Likely in setting of chemotherapy  Will continue to monitor for any signs of infection  Will transfuse with packed RBC for hemoglobin less than 7  Drop in Hgb from 9 to 7 on 5/14, other lines relatively stable  VSS and soft brown BMs  Will discuss with oncology if this is expected based on chemo or if there should be further workup for bleeding, though hemodynamics currently do not suggest bleed  S/p 1 pack RBC on 05/15, hemoglobin continues to improve    Left-sided weakness-resolved as of 6/2/2021  Assessment & Plan  Patient has left-sided weakness at baseline from her CNS lymphoma  Left upper extremity 3-4/5 and left lower extremity 0-1/5 muscle strength    Sepsis (HCC)-resolved as of 5/13/2021  Assessment & Plan  Patient had hypothermia, elevated WBC count and tachycardia  Concern for possible aspiration in setting on dysphagia versus UTI in setting of urinary catheter which was placed for retention  Plan:  S/p IV antibiotics last day on 05/05  Will continue to monitor off antibiotics  Steroid-induced hyperglycemia-resolved as of 5/23/2021  Assessment & Plan  Mild hyperglycemia, glucoses in 200s, no history of DM  Occurring in the setting of Decadron with chemotherapy, as well as sodium bicarb in D5 drip  - D5 drip is finished  - will monitor sugars while on Jevity and now off D5  - continue q4 hour fingerstick glucose with correctional algorithm 1 and Lantus    Presence of permanent central venous catheter-resolved as of 6/2/2021  Assessment & Plan  Noted presence of right IJ tunneled double-lumen HD catheter; upon chart review, this was likely inserted to be used for plasmapheresis which she has completed  Plan:  S/p removal by IR  Patient now has PICC line      Urinary retention-resolved as of 2021  Assessment & Plan  Patient developed urinary retention during previous hospitalization  Plan was voiding trial as an outpatient as she was scheduled for discharge from that facility  Machado catheter was initially removed although patient required re-placement of machado on  in setting of incontinence and getting chemotherapy  M[place] discontinued, monitor for signs of fluid retention          Disposition:  Awaiting rehab placement     SUBJECTIVE     Patient seen and examined  No acute events overnight  OBJECTIVE     Vitals:    21 1532 21 2204 21 0238 21 0630   BP: 106/67 102/64  102/64   Pulse: 100 (!) 110  (!) 110   Resp: 16   18   Temp: 99 4 °F (37 4 °C) 100 2 °F (37 9 °C) 98 6 °F (37 °C) 99 8 °F (37 7 °C)   TempSrc:       SpO2: 96% 97%  97%   Weight:       Height:          Temperature:   Temp (24hrs), Av 6 °F (37 6 °C), Min:98 6 °F (37 °C), Max:100 2 °F (37 9 °C)    Temperature: 99 8 °F (37 7 °C)  Intake & Output:  I/O       / 07 -  0700  07 -  0700  07 -  0700    P  O  480 120     NG/      Feedings       Total Intake(mL/kg) 580 (11 2) 120 (2 3)     Urine (mL/kg/hr) 807 (0 7) 0 (0)     Stool 0 0     Total Output 807 0     Net -227 +120            Unmeasured Urine Occurrence 2 x 4 x     Unmeasured Stool Occurrence 3 x 4 x         Weights:   IBW (Ideal Body Weight): 45 5 kg    Body mass index is 22 22 kg/m²  Weight (last 2 days)     Date/Time   Weight    21 0600   51 6 (113 76)    21 0552   51 (112 4)            Physical Exam  Vitals signs and nursing note reviewed  Constitutional:       General: She is not in acute distress  Appearance: She is well-developed  She is ill-appearing  HENT:      Head: Normocephalic and atraumatic  Eyes:      Conjunctiva/sclera: Conjunctivae normal    Neck:      Musculoskeletal: Neck supple  Cardiovascular:      Rate and Rhythm: Normal rate and regular rhythm        Heart sounds: No murmur  Pulmonary:      Effort: Pulmonary effort is normal  No respiratory distress  Breath sounds: Normal breath sounds  Abdominal:      Palpations: Abdomen is soft  Tenderness: There is no abdominal tenderness  Skin:     General: Skin is warm and dry  Capillary Refill: Capillary refill takes less than 2 seconds  Neurological:      General: No focal deficit present  Mental Status: She is alert  She is disoriented  Psychiatric:         Mood and Affect: Mood normal          Behavior: Behavior normal        LABORATORY DATA     Labs: I have personally reviewed pertinent reports  Results from last 7 days   Lab Units 06/03/21  0529   WBC Thousand/uL 6 18   HEMOGLOBIN g/dL 8 6*   HEMATOCRIT % 28 4*   PLATELETS Thousands/uL 246   MONO PCT % 16*      Results from last 7 days   Lab Units 06/03/21  0529   POTASSIUM mmol/L 4 1   CHLORIDE mmol/L 106   CO2 mmol/L 27   BUN mg/dL 20   CREATININE mg/dL 0 23*   CALCIUM mg/dL 8 9                            IMAGING & DIAGNOSTIC TESTING     Radiology Results: I have personally reviewed pertinent reports  Xr Chest Portable    Result Date: 4/24/2021  Impression: Dialysis catheter tip within the superior cavoatrial junction  No pneumothorax  Workstation performed: [id number]     Xr Chest Picc Line Portable    Result Date: 4/27/2021  Impression: PICC line tip in the superior vena cava, approximately 3 5 to 4 cm above the cavoatrial junction  The examination demonstrates a significant  finding and was documented as such in [facility] for liaison and referring practitioner notification  Workstation performed: [id number]     Ir Picc Reposition    Result Date: 4/27/2021  Impression: Status-post repositioning of a 5 [place] central venous catheter under fluoroscopic guidance with its tip at the cavoatrial junction   Workstation performed: [id number]     Ir Tunneled Central Line Removal    Result Date: 4/27/2021  Impression: Impression: Successful tunneled central line removal as described  Signed, performed and dictated by Richard Simpson PA-C under the supervision of Dr Vernon Hamilton  Workstation performed: [id number]     Other Diagnostic Testing: I have personally reviewed pertinent reports      ACTIVE MEDICATIONS     Current Facility-Administered Medications   Medication Dose Route Frequency    acetaminophen (TYLENOL) oral suspension 650 mg  650 mg Oral Q8H [address]    acyclovir (ZOVIRAX) capsule 400 mg  400 mg Oral Q12H [address]    albuterol (PROVENTIL HFA,VENTOLIN HFA) inhaler 2 puff  2 puff Inhalation Q6H PRN    allopurinol (ZYLOPRIM) tablet 100 mg  100 mg Oral Daily    aluminum-magnesium hydroxide-simethicone (MYLANTA) oral suspension 30 mL  30 mL Per PEG Tube Q4H PRN    amLODIPine (NORVASC) tablet 5 mg  5 mg Oral Daily    bisacodyl (DULCOLAX) rectal suppository 10 mg  10 mg Rectal Daily PRN    Diclofenac Sodium (VOLTAREN) 1 % topical gel 2 g  2 g Topical TID PRN    dicyclomine (BENTYL) capsule 10 mg  10 mg Oral 4x Daily (AC & HS)    enoxaparin (LOVENOX) subcutaneous injection 40 mg  40 mg Subcutaneous Q24H MEÑO    fluconazole (DIFLUCAN) tablet 400 mg  400 mg Oral Daily    gabapentin (NEURONTIN) oral solution 100 mg  100 mg Oral HS    HYDROmorphone (DILAUDID) injection 0 5 mg  0 5 mg Intravenous Q4H PRN    insulin glargine (LANTUS) subcutaneous injection 7 Units 0 07 mL  7 Units Subcutaneous HS    insulin lispro (HumaLOG) 100 units/mL subcutaneous injection 1-5 Units  1-5 Units Subcutaneous 4x Daily (AC & HS)    levofloxacin (LEVAQUIN) tablet 500 mg  500 mg Oral Q24H    lidocaine (LIDODERM) 5 % patch 1 patch  1 patch Topical Daily    LORazepam (ATIVAN) injection 0 5 mg  0 5 mg Intravenous Q6H PRN    menthol-methyl salicylate (BENGAY) 09-03 % cream   Apply externally BID    omeprazole (PRILOSEC) suspension 2 mg/mL  20 mg Oral Daily    ondansetron (ZOFRAN) injection 4 mg  4 mg Intravenous Q6H PRN    oxyCODONE (ROXICODONE) oral solution 5 mg  5 mg Oral Q4H PRN    Or    oxyCODONE (ROXICODONE) oral solution 10 mg  10 mg Oral Q4H PRN    polyethylene glycol (MIRALAX) packet 17 g  17 g Oral Daily PRN    senna-docusate sodium (SENOKOT S) 8 6-50 mg per tablet 1 tablet  1 tablet Oral BID PRN    tamsulosin (FLOMAX) capsule 0 4 mg  0 4 mg Oral Daily With Dinner       VTE Pharmacologic Prophylaxis: Enoxaparin (Lovenox)  VTE Mechanical Prophylaxis: sequential compression device    Portions of the record may have been created with voice recognition software  Occasional wrong word or "sound a like" substitutions may have occurred due to the inherent limitations of voice recognition software    Read the chart carefully and recognize, using context, where substitutions have occurred   ==  Marty Hicks, [facility]  Internal Medicine Residency PGY-2

## 2021-06-07 NOTE — NUTRITION
06/07/21 6013   Recommendations/Interventions   Summary at this time patient does not require the addition of banatrol and prosource tid; BM's are fromed, no skin issues documented; records reveal desired weight gain of 10 8# since admit; PO intake tends to be improved, patient needs total assist with meals   Nutrition Recommendations Continue diet as ordered; Other (Specify)  (continue jevity 1 2 at 45ml/hr; discontinue banatrol; decrease prosource TF to 1 pkg per day)

## 2021-06-07 NOTE — UTILIZATION REVIEW
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  · MRI of the brain was more consistent with demyelinating disease patient was started on IV steroids and 1000 mg daily for 5 days   Patient did not have any significant improvement at that time patient had plasmapheresis   And also had  lumbar puncture-negative for infection or malignancy   Patient noted to have persistent encephalopathy so a repeat MRI was obtained which showed worsening of the abnormality seen on the previous MRI and 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  · Oncology plan for rehab, patient does not require radiation therapy, rehab for strength, no chemotherapy until at least 3 weeks, evaluate at that time  Assessment/Plan:     Tube feeds with jevity for severe protein - calorie malnutrition advancing to goal   Patient continues with intermittent confusion  Temp 100  Continue po acyclovir, diflucan and levofloxacin  Monitor cbc closely  At times she screams stating she has pain but is unable to articulate where  Placement in rehab in process           Vital Signs:       Date/Time  Temp  Pulse  Resp  BP  MAP (mmHg)  SpO2  O2 Device   06/05/21 22:31:10  99 2 °F (37 3 °C)  95  20  107/69  82  99 %  --   06/05/21 15:21:40  100 1 °F (37 8 °C)  99  20  108/70  83  99 %  --   06/05/21 06:59:46  98 7 °F (37 1 °C)  87  18  98/61  73  99 %  --             Pertinent Labs/Diagnostic Results:       Results from last 7 days   Lab Units 06/03/21  0529   WBC Thousand/uL 6 18   HEMOGLOBIN g/dL 8 6*   HEMATOCRIT % 28 4*   PLATELETS Thousands/uL 246         Results from last 7 days   Lab Units 06/03/21  0529   SODIUM mmol/L 139   POTASSIUM mmol/L 4 1   CHLORIDE mmol/L 106   CO2 mmol/L 27   ANION GAP mmol/L 6   BUN mg/dL 20   CREATININE mg/dL 0 23*   EGFR ml/min/1 73sq m 142   CALCIUM mg/dL 8 9 Results from last 7 days   Lab Units 06/05/21  2100 06/05/21  1636 06/05/21  1120 06/05/21  0721 06/04/21 2056 06/04/21  1547   POC GLUCOSE mg/dl 155* 127 163* 104 118 135     Results from last 7 days   GLUCOSE RANDOM mg/dL 123 103       Medications:     acetaminophen, 650 mg, Oral, Q8H MEÑO  acyclovir, 400 mg, Oral, Q12H MEÑO  allopurinol, 100 mg, Oral, Daily  amLODIPine, 5 mg, Oral, Daily  dicyclomine, 10 mg, Oral, 4x Daily (AC & HS)  enoxaparin, 40 mg, Subcutaneous, Q24H MEÑO  fluconazole, 400 mg, Oral, Daily  gabapentin, 100 mg, Oral, HS  insulin glargine, 7 Units, Subcutaneous, HS  insulin lispro, 1-5 Units, Subcutaneous, 4x Daily (AC & HS)  levofloxacin, 500 mg, Oral, Q24H  lidocaine, 1 patch, Topical, Daily  menthol-methyl salicylate, , Apply externally, BID  omeprazole (PRILOSEC) suspension 2 mg/mL, 20 mg, Oral, Daily  tamsulosin, 0 4 mg, Oral, Daily With Dinner      Continuous IV Infusions:     PRN Meds:  albuterol, 2 puff, Inhalation, Q6H PRN  aluminum-magnesium hydroxide-simethicone, 30 mL, Per PEG Tube, Q4H PRN  bisacodyl, 10 mg, Rectal, Daily PRN  Diclofenac Sodium, 2 g, Topical, TID PRN  HYDROmorphone, 0 5 mg, Intravenous, Q4H PRN  LORazepam, 0 5 mg, Intravenous, Q6H PRN  ondansetron, 4 mg, Intravenous, Q6H PRN  oxyCODONE, 5 mg, Oral, Q4H PRN    Or  oxyCODONE, 10 mg, Oral, Q4H PRN  polyethylene glycol, 17 g, Oral, Daily PRN  senna-docusate sodium, 1 tablet, Oral, BID PRN        Discharge Plan: to be determined     Network Utilization Review Department  ATTENTION: Please call with any questions or concerns to [phone] and carefully listen to the prompts so that you are directed to the right person  All voicemails are confidential   Tiana Sampson all requests for admission clinical reviews, approved or denied determinations and any other requests to dedicated fax number below belonging to the campus where the patient is receiving treatment   List of dedicated fax numbers for the Facilities:  [address] FAX NUMBER   ADMISSION DENIALS (Administrative/Medical Necessity) [phone]   [address] (Maternity/NICU/Pediatrics) 261 [facility],[address] [address] [place] [address]  [phone]   Juanito Oh [phone] [address] [address] [place] [facility] [address] [facility] [phone]

## 2021-06-07 NOTE — PLAN OF CARE
Problem: Prexisting or High Potential for Compromised Skin Integrity  Goal: Skin integrity is maintained or improved  Description: INTERVENTIONS:  - Identify patients at risk for skin breakdown  - Assess and monitor skin integrity  - Assess and monitor nutrition and hydration status  - Monitor labs   - Assess for incontinence   - Turn and reposition patient  - Assist with mobility/ambulation  - Relieve pressure over bony prominences  - Avoid friction and shearing  - Provide appropriate hygiene as needed including keeping skin clean and dry  - Evaluate need for skin moisturizer/barrier cream  - Collaborate with interdisciplinary team   - Patient/family teaching  - Consider wound care consult   Outcome: Progressing     Problem: Potential for Falls  Goal: Patient will remain free of falls  Description: INTERVENTIONS:  - Assess patient frequently for physical needs  -  Identify cognitive and physical deficits and behaviors that affect risk of falls  -  [place] fall precautions as indicated by assessment   - Educate patient/family on patient safety including physical limitations  - Instruct patient to call for assistance with activity based on assessment  - Modify environment to reduce risk of injury  - Consider OT/PT consult to assist with strengthening/mobility  Outcome: Progressing     Problem: Nutrition/Hydration-ADULT  Goal: Nutrient/Hydration intake appropriate for improving, restoring or maintaining nutritional needs  Description: Monitor and assess patient's nutrition/hydration status for malnutrition  Collaborate with interdisciplinary team and initiate plan and interventions as ordered  Monitor patient's weight and dietary intake as ordered or per policy  Utilize nutrition screening tool and intervene as necessary  Determine patient's food preferences and provide high-protein, high-caloric foods as appropriate       INTERVENTIONS:  - Monitor oral intake, urinary output, labs, and treatment plans  - Assess nutrition and hydration status and recommend course of action  - Evaluate amount of meals eaten  - Assist patient with eating if necessary   - Allow adequate time for meals  - Recommend/ encourage appropriate diets, oral nutritional supplements, and vitamin/mineral supplements  - Order, calculate, and assess calorie counts as needed  - Recommend, monitor, and adjust tube feedings and TPN/PPN based on assessed needs  - Assess need for intravenous fluids  - Provide specific nutrition/hydration education as appropriate  - Include patient/family/caregiver in decisions related to nutrition  Outcome: Progressing     Problem: PAIN - ADULT  Goal: Verbalizes/displays adequate comfort level or baseline comfort level  Description: Interventions:  - Encourage patient to monitor pain and request assistance  - Assess pain using appropriate pain scale  - Administer analgesics based on type and severity of pain and evaluate response  - Implement non-pharmacological measures as appropriate and evaluate response  - Consider cultural and social influences on pain and pain management  - Notify physician/advanced practitioner if interventions unsuccessful or patient reports new pain  Outcome: Progressing     Problem: INFECTION - ADULT  Goal: Absence or prevention of progression during hospitalization  Description: INTERVENTIONS:  - Assess and monitor for signs and symptoms of infection  - Monitor lab/diagnostic results  - Monitor all insertion sites, i e  indwelling lines, tubes, and drains  - Monitor endotracheal if appropriate and nasal secretions for changes in amount and color  - [place] appropriate cooling/warming therapies per order  - Administer medications as ordered  - Instruct and encourage patient and family to use good hand hygiene technique  - Identify and instruct in appropriate isolation precautions for identified infection/condition  Outcome: Progressing     Problem: SAFETY ADULT  Goal: Patient will remain free of falls  Description: INTERVENTIONS:  - Assess patient frequently for physical needs  -  Identify cognitive and physical deficits and behaviors that affect risk of falls    -  [place] fall precautions as indicated by assessment   - Educate patient/family on patient safety including physical limitations  - Instruct patient to call for assistance with activity based on assessment  - Modify environment to reduce risk of injury  - Consider OT/PT consult to assist with strengthening/mobility  Outcome: Progressing  Goal: Maintain or return to baseline ADL function  Description: INTERVENTIONS:  -  Assess patient's ability to carry out ADLs; assess patient's baseline for ADL function and identify physical deficits which impact ability to perform ADLs (bathing, care of mouth/teeth, toileting, grooming, dressing, etc )  - Assess/evaluate cause of self-care deficits   - Assess range of motion  - Assess patient's mobility; develop plan if impaired  - Assess patient's need for assistive devices and provide as appropriate  - Encourage maximum independence but intervene and supervise when necessary  - Involve family in performance of ADLs  - Assess for home care needs following discharge   - Consider OT consult to assist with ADL evaluation and planning for discharge  - Provide patient education as appropriate  Outcome: Progressing  Goal: Maintain or return mobility status to optimal level  Description: INTERVENTIONS:  - Assess patient's baseline mobility status (ambulation, transfers, stairs, etc )    - Identify cognitive and physical deficits and behaviors that affect mobility  - Identify mobility aids required to assist with transfers and/or ambulation (gait belt, sit-to-stand, lift, walker, cane, etc )  - [place] fall precautions as indicated by assessment  - Record patient progress and toleration of activity level on Mobility SBAR; progress patient to next Phase/Stage  - Instruct patient to call for assistance with activity based on assessment  - Consider rehabilitation consult to assist with strengthening/weightbearing, etc   Outcome: Progressing     Problem: DISCHARGE PLANNING  Goal: Discharge to home or other facility with appropriate resources  Description: INTERVENTIONS:  - Identify barriers to discharge w/patient and caregiver  - Arrange for needed discharge resources and transportation as appropriate  - Identify discharge learning needs (meds, wound care, etc )  - Arrange for interpretive services to assist at discharge as needed  - Refer to Case Management Department for coordinating discharge planning if the patient needs post-hospital services based on physician/advanced practitioner order or complex needs related to functional status, cognitive ability, or social support system  Outcome: Progressing     Problem: Knowledge Deficit  Goal: Patient/family/caregiver demonstrates understanding of disease process, treatment plan, medications, and discharge instructions  Description: Complete learning assessment and assess knowledge base    Interventions:  - Provide teaching at level of understanding  - Provide teaching via preferred learning methods  Outcome: Progressing

## 2021-06-08 PROBLEM — R00.0 TACHYCARDIA: Status: ACTIVE | Noted: 2021-06-08

## 2021-06-08 LAB
ERYTHROCYTE [DISTWIDTH] IN BLOOD BY AUTOMATED COUNT: 19.9 % (ref 11.6–15.1)
GLUCOSE SERPL-MCNC: 133 MG/DL (ref 65–140)
GLUCOSE SERPL-MCNC: 135 MG/DL (ref 65–140)
GLUCOSE SERPL-MCNC: 136 MG/DL (ref 65–140)
GLUCOSE SERPL-MCNC: 147 MG/DL (ref 65–140)
HCT VFR BLD AUTO: 27.3 % (ref 34.8–46.1)
HGB BLD-MCNC: 8.7 G/DL (ref 11.5–15.4)
MCH RBC QN AUTO: 32.1 PG (ref 26.8–34.3)
MCHC RBC AUTO-ENTMCNC: 31.9 G/DL (ref 31.4–37.4)
MCV RBC AUTO: 101 FL (ref 82–98)
NRBC BLD AUTO-RTO: 0 /100 WBCS
PLATELET # BLD AUTO: 257 THOUSANDS/UL (ref 149–390)
PMV BLD AUTO: 9.8 FL (ref 8.9–12.7)
RBC # BLD AUTO: 2.71 MILLION/UL (ref 3.81–5.12)
SARS-COV-2 RNA RESP QL NAA+PROBE: NEGATIVE
WBC # BLD AUTO: 7.58 THOUSAND/UL (ref 4.31–10.16)

## 2021-06-08 PROCEDURE — 97530 THERAPEUTIC ACTIVITIES: CPT

## 2021-06-08 PROCEDURE — 97112 NEUROMUSCULAR REEDUCATION: CPT

## 2021-06-08 PROCEDURE — 82948 REAGENT STRIP/BLOOD GLUCOSE: CPT

## 2021-06-08 PROCEDURE — 85027 COMPLETE CBC AUTOMATED: CPT | Performed by: STUDENT IN AN ORGANIZED HEALTH CARE EDUCATION/TRAINING PROGRAM

## 2021-06-08 PROCEDURE — U0005 INFEC AGEN DETEC AMPLI PROBE: HCPCS | Performed by: STUDENT IN AN ORGANIZED HEALTH CARE EDUCATION/TRAINING PROGRAM

## 2021-06-08 PROCEDURE — U0003 INFECTIOUS AGENT DETECTION BY NUCLEIC ACID (DNA OR RNA); SEVERE ACUTE RESPIRATORY SYNDROME CORONAVIRUS 2 (SARS-COV-2) (CORONAVIRUS DISEASE [COVID-19]), AMPLIFIED PROBE TECHNIQUE, MAKING USE OF HIGH THROUGHPUT TECHNOLOGIES AS DESCRIBED BY CMS-2020-01-R: HCPCS | Performed by: STUDENT IN AN ORGANIZED HEALTH CARE EDUCATION/TRAINING PROGRAM

## 2021-06-08 PROCEDURE — 97535 SELF CARE MNGMENT TRAINING: CPT

## 2021-06-08 RX ORDER — OXYCODONE HYDROCHLORIDE 5 MG/1
5 TABLET ORAL EVERY 6 HOURS PRN
Status: DISCONTINUED | OUTPATIENT
Start: 2021-06-08 | End: 2021-06-14 | Stop reason: HOSPADM

## 2021-06-08 RX ADMIN — LEVOFLOXACIN 500 MG: 500 TABLET, FILM COATED ORAL at 18:48

## 2021-06-08 RX ADMIN — ACETAMINOPHEN 650 MG: 650 SUSPENSION ORAL at 22:42

## 2021-06-08 RX ADMIN — DICYCLOMINE HYDROCHLORIDE 10 MG: 10 CAPSULE ORAL at 06:41

## 2021-06-08 RX ADMIN — DICYCLOMINE HYDROCHLORIDE 10 MG: 10 CAPSULE ORAL at 17:06

## 2021-06-08 RX ADMIN — ALLOPURINOL 100 MG: 100 TABLET ORAL at 08:35

## 2021-06-08 RX ADMIN — DICLOFENAC SODIUM 2 G: 10 GEL TOPICAL at 12:05

## 2021-06-08 RX ADMIN — LIDOCAINE 5% 1 PATCH: 700 PATCH TOPICAL at 08:35

## 2021-06-08 RX ADMIN — ACETAMINOPHEN 650 MG: 650 SUSPENSION ORAL at 13:36

## 2021-06-08 RX ADMIN — MENTHOL, UNSPECIFIED FORM AND METHYL SALICYLATE: 10; 150 CREAM TOPICAL at 08:34

## 2021-06-08 RX ADMIN — DICYCLOMINE HYDROCHLORIDE 10 MG: 10 CAPSULE ORAL at 12:03

## 2021-06-08 RX ADMIN — HYDROXYZINE HYDROCHLORIDE 25 MG: 25 TABLET, FILM COATED ORAL at 13:36

## 2021-06-08 RX ADMIN — ENOXAPARIN SODIUM 40 MG: 40 INJECTION SUBCUTANEOUS at 17:06

## 2021-06-08 RX ADMIN — ACETAMINOPHEN 650 MG: 650 SUSPENSION ORAL at 06:41

## 2021-06-08 RX ADMIN — MENTHOL, UNSPECIFIED FORM AND METHYL SALICYLATE: 10; 150 CREAM TOPICAL at 18:48

## 2021-06-08 RX ADMIN — ACYCLOVIR 400 MG: 200 CAPSULE ORAL at 08:34

## 2021-06-08 RX ADMIN — Medication 20 MG: at 08:34

## 2021-06-08 RX ADMIN — FLUCONAZOLE 400 MG: 200 TABLET ORAL at 08:35

## 2021-06-08 RX ADMIN — OXYCODONE HYDROCHLORIDE 5 MG: 5 TABLET ORAL at 16:29

## 2021-06-08 RX ADMIN — INSULIN GLARGINE 5 UNITS: 100 INJECTION, SOLUTION SUBCUTANEOUS at 22:41

## 2021-06-08 RX ADMIN — TAMSULOSIN HYDROCHLORIDE 0.4 MG: 0.4 CAPSULE ORAL at 17:06

## 2021-06-08 RX ADMIN — ACYCLOVIR 400 MG: 200 CAPSULE ORAL at 22:42

## 2021-06-08 RX ADMIN — DICLOFENAC SODIUM 2 G: 10 GEL TOPICAL at 02:12

## 2021-06-08 RX ADMIN — HYDROXYZINE HYDROCHLORIDE 25 MG: 25 TABLET, FILM COATED ORAL at 06:41

## 2021-06-08 RX ADMIN — DICYCLOMINE HYDROCHLORIDE 10 MG: 10 CAPSULE ORAL at 22:42

## 2021-06-08 RX ADMIN — OXYCODONE HYDROCHLORIDE 5 MG: 5 TABLET ORAL at 22:42

## 2021-06-08 RX ADMIN — DICLOFENAC SODIUM 2 G: 10 GEL TOPICAL at 16:32

## 2021-06-08 RX ADMIN — GABAPENTIN 100 MG: 250 SUSPENSION ORAL at 22:46

## 2021-06-08 NOTE — PROGRESS NOTES
Reached out to family friend listed in chart to try and get scheduled a time to have  answer questions for MRI screening form  No answer message left to call me back

## 2021-06-08 NOTE — PLAN OF CARE
Problem: Prexisting or High Potential for Compromised Skin Integrity  Goal: Skin integrity is maintained or improved  Description: INTERVENTIONS:  - Identify patients at risk for skin breakdown  - Assess and monitor skin integrity  - Assess and monitor nutrition and hydration status  - Monitor labs   - Assess for incontinence   - Turn and reposition patient  - Assist with mobility/ambulation  - Relieve pressure over bony prominences  - Avoid friction and shearing  - Provide appropriate hygiene as needed including keeping skin clean and dry  - Evaluate need for skin moisturizer/barrier cream  - Collaborate with interdisciplinary team   - Patient/family teaching  - Consider wound care consult   Outcome: Progressing     Problem: Potential for Falls  Goal: Patient will remain free of falls  Description: INTERVENTIONS:  - Assess patient frequently for physical needs  -  Identify cognitive and physical deficits and behaviors that affect risk of falls  -  [place] fall precautions as indicated by assessment   - Educate patient/family on patient safety including physical limitations  - Instruct patient to call for assistance with activity based on assessment  - Modify environment to reduce risk of injury  - Consider OT/PT consult to assist with strengthening/mobility  Outcome: Progressing     Problem: Nutrition/Hydration-ADULT  Goal: Nutrient/Hydration intake appropriate for improving, restoring or maintaining nutritional needs  Description: Monitor and assess patient's nutrition/hydration status for malnutrition  Collaborate with interdisciplinary team and initiate plan and interventions as ordered  Monitor patient's weight and dietary intake as ordered or per policy  Utilize nutrition screening tool and intervene as necessary  Determine patient's food preferences and provide high-protein, high-caloric foods as appropriate       INTERVENTIONS:  - Monitor oral intake, urinary output, labs, and treatment plans  - Assess nutrition and hydration status and recommend course of action  - Evaluate amount of meals eaten  - Assist patient with eating if necessary   - Allow adequate time for meals  - Recommend/ encourage appropriate diets, oral nutritional supplements, and vitamin/mineral supplements  - Order, calculate, and assess calorie counts as needed  - Recommend, monitor, and adjust tube feedings and TPN/PPN based on assessed needs  - Assess need for intravenous fluids  - Provide specific nutrition/hydration education as appropriate  - Include patient/family/caregiver in decisions related to nutrition  Outcome: Progressing     Problem: PAIN - ADULT  Goal: Verbalizes/displays adequate comfort level or baseline comfort level  Description: Interventions:  - Encourage patient to monitor pain and request assistance  - Assess pain using appropriate pain scale  - Administer analgesics based on type and severity of pain and evaluate response  - Implement non-pharmacological measures as appropriate and evaluate response  - Consider cultural and social influences on pain and pain management  - Notify physician/advanced practitioner if interventions unsuccessful or patient reports new pain  Outcome: Progressing     Problem: INFECTION - ADULT  Goal: Absence or prevention of progression during hospitalization  Description: INTERVENTIONS:  - Assess and monitor for signs and symptoms of infection  - Monitor lab/diagnostic results  - Monitor all insertion sites, i e  indwelling lines, tubes, and drains  - Monitor endotracheal if appropriate and nasal secretions for changes in amount and color  - [place] appropriate cooling/warming therapies per order  - Administer medications as ordered  - Instruct and encourage patient and family to use good hand hygiene technique  - Identify and instruct in appropriate isolation precautions for identified infection/condition  Outcome: Progressing     Problem: SAFETY ADULT  Goal: Patient will remain free of falls  Description: INTERVENTIONS:  - Assess patient frequently for physical needs  -  Identify cognitive and physical deficits and behaviors that affect risk of falls    -  [place] fall precautions as indicated by assessment   - Educate patient/family on patient safety including physical limitations  - Instruct patient to call for assistance with activity based on assessment  - Modify environment to reduce risk of injury  - Consider OT/PT consult to assist with strengthening/mobility  Outcome: Progressing  Goal: Maintain or return to baseline ADL function  Description: INTERVENTIONS:  -  Assess patient's ability to carry out ADLs; assess patient's baseline for ADL function and identify physical deficits which impact ability to perform ADLs (bathing, care of mouth/teeth, toileting, grooming, dressing, etc )  - Assess/evaluate cause of self-care deficits   - Assess range of motion  - Assess patient's mobility; develop plan if impaired  - Assess patient's need for assistive devices and provide as appropriate  - Encourage maximum independence but intervene and supervise when necessary  - Involve family in performance of ADLs  - Assess for home care needs following discharge   - Consider OT consult to assist with ADL evaluation and planning for discharge  - Provide patient education as appropriate  Outcome: Progressing  Goal: Maintain or return mobility status to optimal level  Description: INTERVENTIONS:  - Assess patient's baseline mobility status (ambulation, transfers, stairs, etc )    - Identify cognitive and physical deficits and behaviors that affect mobility  - Identify mobility aids required to assist with transfers and/or ambulation (gait belt, sit-to-stand, lift, walker, cane, etc )  - [place] fall precautions as indicated by assessment  - Record patient progress and toleration of activity level on Mobility SBAR; progress patient to next Phase/Stage  - Instruct patient to call for assistance with activity based on assessment  - Consider rehabilitation consult to assist with strengthening/weightbearing, etc   Outcome: Progressing     Problem: DISCHARGE PLANNING  Goal: Discharge to home or other facility with appropriate resources  Description: INTERVENTIONS:  - Identify barriers to discharge w/patient and caregiver  - Arrange for needed discharge resources and transportation as appropriate  - Identify discharge learning needs (meds, wound care, etc )  - Arrange for interpretive services to assist at discharge as needed  - Refer to Case Management Department for coordinating discharge planning if the patient needs post-hospital services based on physician/advanced practitioner order or complex needs related to functional status, cognitive ability, or social support system  Outcome: Progressing     Problem: Knowledge Deficit  Goal: Patient/family/caregiver demonstrates understanding of disease process, treatment plan, medications, and discharge instructions  Description: Complete learning assessment and assess knowledge base    Interventions:  - Provide teaching at level of understanding  - Provide teaching via preferred learning methods  Outcome: Progressing

## 2021-06-08 NOTE — PLAN OF CARE
Problem: OCCUPATIONAL THERAPY ADULT  Goal: Performs self-care activities at highest level of function for planned discharge setting  See evaluation for individualized goals  Description: Treatment Interventions: ADL retraining, Functional transfer training, UE strengthening/ROM, Endurance training, Cognitive reorientation, Patient/family training, Equipment evaluation/education, Neuromuscular reeducation, Fine motor coordination activities, Compensatory technique education, Continued evaluation, Energy conservation, Activityengagement          See flowsheet documentation for full assessment, interventions and recommendations  Outcome: Progressing  Note: Limitation: Decreased ADL status, Decreased UE ROM, Decreased UE strength, Decreased Safe judgement during ADL, Decreased cognition, Decreased endurance, Decreased self-care trans, Decreased high-level ADLs, Decreased fine motor control  Prognosis: Fair  Assessment: Patient participated in Skilled OT session this date with interventions consisting of ADL re training with the use of correct body mechnaics, safety awareness and fall prevention techniques,  therapeutic activities to: increase activity tolerance and increase OOB/ sitting tolerance   Upon arrival patient was found supine in bed  Pt demonstrated the following tasks: MAX A x 2 sup to sit, pt fluctuates between S to MAX A to maintain EOB sitting position  Pt requires MOD A to don B/L socks, MAX A for hair care, MAX A vero hygiene  MAX A x 2 STS, DEP x 2 SPT  Pt with very limited standing tolerance at this time  Patient continues to be functioning below baseline level, occupational performance remains limited secondary to factors listed above and increased risk for falls and injury     From OT standpoint, recommendation at time of d/c would be STR  Patient to benefit from continued Occupational Therapy treatment while in the hospital to address deficits as defined above and maximize level of functional independence with ADLs and functional mobility  Pt was left in chair with alarm on after session with all current needs met  Extend pt POC x 14 days  The patient's raw score on the AM-PAC Daily Activity inpatient short form is 14, standardized score is 33 39, less than 39 4  Patients at this level are likely to benefit from discharge to post-acute rehabilitation services  Please refer to the recommendation of the Occupational Therapist for safe discharge planning       OT Discharge Recommendation: Post acute rehabilitation services  OT - OK to Discharge: Yes(when medically stable)

## 2021-06-08 NOTE — PLAN OF CARE
Problem: Prexisting or High Potential for Compromised Skin Integrity  Goal: Skin integrity is maintained or improved  Description: INTERVENTIONS:  - Identify patients at risk for skin breakdown  - Assess and monitor skin integrity  - Assess and monitor nutrition and hydration status  - Monitor labs   - Assess for incontinence   - Turn and reposition patient  - Assist with mobility/ambulation  - Relieve pressure over bony prominences  - Avoid friction and shearing  - Provide appropriate hygiene as needed including keeping skin clean and dry  - Evaluate need for skin moisturizer/barrier cream  - Collaborate with interdisciplinary team   - Patient/family teaching  - Consider wound care consult   Outcome: Progressing     Problem: Potential for Falls  Goal: Patient will remain free of falls  Description: INTERVENTIONS:  - Assess patient frequently for physical needs  -  Identify cognitive and physical deficits and behaviors that affect risk of falls  -  [place] fall precautions as indicated by assessment   - Educate patient/family on patient safety including physical limitations  - Instruct patient to call for assistance with activity based on assessment  - Modify environment to reduce risk of injury  - Consider OT/PT consult to assist with strengthening/mobility  Outcome: Progressing     Problem: Nutrition/Hydration-ADULT  Goal: Nutrient/Hydration intake appropriate for improving, restoring or maintaining nutritional needs  Description: Monitor and assess patient's nutrition/hydration status for malnutrition  Collaborate with interdisciplinary team and initiate plan and interventions as ordered  Monitor patient's weight and dietary intake as ordered or per policy  Utilize nutrition screening tool and intervene as necessary  Determine patient's food preferences and provide high-protein, high-caloric foods as appropriate       INTERVENTIONS:  - Monitor oral intake, urinary output, labs, and treatment plans  - Assess nutrition and hydration status and recommend course of action  - Evaluate amount of meals eaten  - Assist patient with eating if necessary   - Allow adequate time for meals  - Recommend/ encourage appropriate diets, oral nutritional supplements, and vitamin/mineral supplements  - Order, calculate, and assess calorie counts as needed  - Recommend, monitor, and adjust tube feedings and TPN/PPN based on assessed needs  - Assess need for intravenous fluids  - Provide specific nutrition/hydration education as appropriate  - Include patient/family/caregiver in decisions related to nutrition  Outcome: Progressing     Problem: PAIN - ADULT  Goal: Verbalizes/displays adequate comfort level or baseline comfort level  Description: Interventions:  - Encourage patient to monitor pain and request assistance  - Assess pain using appropriate pain scale  - Administer analgesics based on type and severity of pain and evaluate response  - Implement non-pharmacological measures as appropriate and evaluate response  - Consider cultural and social influences on pain and pain management  - Notify physician/advanced practitioner if interventions unsuccessful or patient reports new pain  Outcome: Progressing     Problem: INFECTION - ADULT  Goal: Absence or prevention of progression during hospitalization  Description: INTERVENTIONS:  - Assess and monitor for signs and symptoms of infection  - Monitor lab/diagnostic results  - Monitor all insertion sites, i e  indwelling lines, tubes, and drains  - Monitor endotracheal if appropriate and nasal secretions for changes in amount and color  - [place] appropriate cooling/warming therapies per order  - Administer medications as ordered  - Instruct and encourage patient and family to use good hand hygiene technique  - Identify and instruct in appropriate isolation precautions for identified infection/condition  Outcome: Progressing     Problem: SAFETY ADULT  Goal: Patient will remain free of falls  Description: INTERVENTIONS:  - Assess patient frequently for physical needs  -  Identify cognitive and physical deficits and behaviors that affect risk of falls    -  [place] fall precautions as indicated by assessment   - Educate patient/family on patient safety including physical limitations  - Instruct patient to call for assistance with activity based on assessment  - Modify environment to reduce risk of injury  - Consider OT/PT consult to assist with strengthening/mobility  Outcome: Progressing  Goal: Maintain or return to baseline ADL function  Description: INTERVENTIONS:  -  Assess patient's ability to carry out ADLs; assess patient's baseline for ADL function and identify physical deficits which impact ability to perform ADLs (bathing, care of mouth/teeth, toileting, grooming, dressing, etc )  - Assess/evaluate cause of self-care deficits   - Assess range of motion  - Assess patient's mobility; develop plan if impaired  - Assess patient's need for assistive devices and provide as appropriate  - Encourage maximum independence but intervene and supervise when necessary  - Involve family in performance of ADLs  - Assess for home care needs following discharge   - Consider OT consult to assist with ADL evaluation and planning for discharge  - Provide patient education as appropriate  Outcome: Progressing  Goal: Maintain or return mobility status to optimal level  Description: INTERVENTIONS:  - Assess patient's baseline mobility status (ambulation, transfers, stairs, etc )    - Identify cognitive and physical deficits and behaviors that affect mobility  - Identify mobility aids required to assist with transfers and/or ambulation (gait belt, sit-to-stand, lift, walker, cane, etc )  - [place] fall precautions as indicated by assessment  - Record patient progress and toleration of activity level on Mobility SBAR; progress patient to next Phase/Stage  - Instruct patient to call for assistance with activity based on assessment  - Consider rehabilitation consult to assist with strengthening/weightbearing, etc   Outcome: Progressing     Problem: DISCHARGE PLANNING  Goal: Discharge to home or other facility with appropriate resources  Description: INTERVENTIONS:  - Identify barriers to discharge w/patient and caregiver  - Arrange for needed discharge resources and transportation as appropriate  - Identify discharge learning needs (meds, wound care, etc )  - Arrange for interpretive services to assist at discharge as needed  - Refer to Case Management Department for coordinating discharge planning if the patient needs post-hospital services based on physician/advanced practitioner order or complex needs related to functional status, cognitive ability, or social support system  Outcome: Progressing     Problem: Knowledge Deficit  Goal: Patient/family/caregiver demonstrates understanding of disease process, treatment plan, medications, and discharge instructions  Description: Complete learning assessment and assess knowledge base    Interventions:  - Provide teaching at level of understanding  - Provide teaching via preferred learning methods  Outcome: Progressing

## 2021-06-08 NOTE — OCCUPATIONAL THERAPY NOTE
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[address] )  Active Problems:    Altered mental status    Dysphagia    Aphasia    Weakness    Fracture of ramus of left pubis (HCC)    Severe protein-calorie malnutrition ([facility] [address] )    Tachycardia          06/08/21 1158   OT Last Visit   OT Visit Date 06/08/21   Note Type   Note Type Treatment   Restrictions/Precautions   Weight Bearing Precautions Per Order No   Other Precautions Cognitive; Chair Alarm; Bed Alarm; Fall Risk;Pain;Multiple lines   General   Response to Previous Treatment Patient with no complaints from previous session   Lifestyle   Autonomy Per chart, pt was I with ADLs, IADLs PTA    Reciprocal Relationships    Service to Others Unable to obtain   Intrinsic Gratification [employer]    Pain Assessment   Pain Assessment Tool FLACC   Pain Rating: FLACC (Rest) - Face 1   Pain Rating: FLACC (Rest) - Legs 0   Pain Rating: FLACC (Rest) - Activity 0   Pain Rating: FLACC (Rest) - Cry 1   Pain Rating: FLACC (Rest) - Consolability 0   Score: FLACC (Rest) 2   Pain Rating: FLACC (Activity) - Face 1   Pain Rating: FLACC (Activity) - Legs 1   Pain Rating: FLACC (Activity) - Activity 1   Pain Rating: FLACC (Activity) - Cry 1   Pain Rating: FLACC (Activity) - Consolability 1   Score: FLACC (Activity) 5   ADL   Where Assessed Edge of bed   Grooming Assistance 2  Maximal Assistance   Grooming Deficit Brushing hair   LB Dressing Assistance 3  Moderate Assistance   LB Dressing Deficit Don/doff R sock; Don/doff L sock   LB Dressing Comments Pt requires A to place legs in cross-legged position, able to pull over heel    Toileting Assistance  2  Maximal Assistance   Toileting Deficit Perineal hygiene   Functional Standing Tolerance   Time < 30   Activity Attempted static standing to perform buttocks hygiene   Requires x 2 STS transfers to perform 2* decreased activity tolerance Bed Mobility   Supine to Sit 2  Maximal assistance   Additional items Assist x 2; Increased time required   Sit to Supine Unable to assess   Transfers   Sit to Stand 2  Maximal assistance   Additional items Assist x 2; Increased time required   Stand to Sit 2  Maximal assistance   Additional items Assist x 2; Increased time required   Stand pivot 1  Dependent   Additional items Assist x 2; Increased time required   Cognition   Overall Cognitive Status Impaired   Arousal/Participation Responsive; Cooperative   Attention Attends with cues to redirect   Orientation Level Unable to assess   Memory Unable to assess   Following Commands Follows one step commands with increased time or repetition   Comments Utilized blue  phone  Pt very lethargic, decreased activity tolerance    Activity Tolerance   Activity Tolerance Patient limited by fatigue;Patient limited by pain   Medical Staff Made Aware PT Ivonne    Assessment   Assessment Patient participated in Skilled OT session this date with interventions consisting of ADL re training with the use of correct body mechnaics, safety awareness and fall prevention techniques,  therapeutic activities to: increase activity tolerance and increase OOB/ sitting tolerance   Upon arrival patient was found supine in bed  Pt demonstrated the following tasks: MAX A x 2 sup to sit, pt fluctuates between S to MAX A to maintain EOB sitting position  Pt requires MOD A to don B/L socks, MAX A for hair care, MAX A vero hygiene  MAX A x 2 STS, DEP x 2 SPT  Pt with very limited standing tolerance at this time  Patient continues to be functioning below baseline level, occupational performance remains limited secondary to factors listed above and increased risk for falls and injury     From OT standpoint, recommendation at time of d/c would be STR  Patient to benefit from continued Occupational Therapy treatment while in the hospital to address deficits as defined above and maximize level of functional independence with ADLs and functional mobility  Pt was left in chair with alarm on after session with all current needs met  Extend pt POC x 14 days  The patient's raw score on the AM-PAC Daily Activity inpatient short form is 14, standardized score is 33 39, less than 39 4  Patients at this level are likely to benefit from discharge to post-acute rehabilitation services  Please refer to the recommendation of the Occupational Therapist for safe discharge planning  Plan   Treatment Interventions ADL retraining;Functional transfer training;UE strengthening/ROM; Visual perceptual retraining; Endurance training;Cognitive reorientation;Patient/family training;Equipment evaluation/education; Neuromuscular reeducation; Fine motor coordination activities; Compensatory technique education;Continued evaluation; Energy conservation; Activityengagement   Goal Expiration Date 06/22/21  (extend POC x 14 days )   OT Treatment Day 8   OT Frequency 2-3x/wk   Recommendation   OT Discharge Recommendation Post acute rehabilitation services   OT - OK to Discharge Yes  (when medically stable)   AM-PAC Daily Activity Inpatient   Lower Body Dressing 2   Bathing 2   Toileting 2   Upper Body Dressing 3   Grooming 2   Eating 3   Daily Activity Raw Score 14   Daily Activity Standardized Score (Calc for Raw Score >=11) 33 39   AM-PAC Applied Cognition Inpatient   Following a Speech/Presentation 3   Understanding Ordinary Conversation 3   Taking Medications 3   Remembering Where Things Are Placed or Put Away 3   Remembering List of 4-5 Errands 2   Taking Care of Complicated Tasks 2   Applied Cognition Raw Score 16   Applied Cognition Standardized Score 35 03       Azra Sales MS, OTR/L

## 2021-06-08 NOTE — PHYSICAL THERAPY NOTE
Physical Therapy Treatment Note    Patient's Name: Rahul No  : 21 1157   PT Last Visit   PT Visit Date 21   Note Type   Note Type Treatment   Pain Assessment   Pain Assessment Tool FLACC   Pain Rating: FLACC (Rest) - Face 1   Pain Rating: FLACC (Rest) - Legs 0   Pain Rating: FLACC (Rest) - Activity 0   Pain Rating: FLACC (Rest) - Cry 1   Pain Rating: FLACC (Rest) - Consolability 0   Score: FLACC (Rest) 2   Pain Rating: FLACC (Activity) - Face 1   Pain Rating: FLACC (Activity) - Legs 1   Pain Rating: FLACC (Activity) - Activity 1   Pain Rating: FLACC (Activity) - Cry 1   Pain Rating: FLACC (Activity) - Consolability 1   Score: FLACC (Activity) 5   Restrictions/Precautions   Weight Bearing Precautions Per Order No   Other Precautions Cognitive; Chair Alarm; Bed Alarm;Multiple lines; Fall Risk;Pain  (PEG)   General   Chart Reviewed Yes   Family/Caregiver Present No   Cognition   Overall Cognitive Status Impaired   Arousal/Participation Cooperative;Responsive   Attention Attends with cues to redirect   Orientation Level Unable to assess   Memory Unable to assess   Following Commands Follows one step commands with increased time or repetition   Comments Pt very lethargic initially, improving with transition from supine to sit  Utilized blue  phone for communication  Subjective   Subjective     Bed Mobility   Supine to Sit 2  Maximal assistance   Additional items Assist x 2;HOB elevated; Increased time required;Verbal cues;LE management   Additional Comments Pt required varying levels of A from supervision to maxA for trunk control at EOB  Attempted to have pt engage BUEs to assist in maintaining trunk control, pt limited by LUE weakness  Transfers   Sit to Stand 2  Maximal assistance   Additional items Assist x 2; Increased time required;Verbal cues   Stand to Sit 2  Maximal assistance   Additional items Assist x 2; Increased time required;Verbal cues   Stand pivot 1  Dependent   Additional items Assist x 2; Increased time required;Verbal cues   Additional Comments Performed 3 STS trials with HHAx2 during which pt was only able to obtain ~25-50% buttocks clearance  Utilized b/l knee blocking by therapists  Performed stand pivot transfer to drop arm recliner  Ambulation/Elevation   Gait pattern Not appropriate   Balance   Static Sitting Poor -   Dynamic Sitting Poor -   Static Standing Poor -   Ambulatory Zero   Endurance Deficit   Endurance Deficit Yes   Endurance Deficit Description deconditioning, weakness, fatigue   Activity Tolerance   Activity Tolerance Patient limited by fatigue   Medical Staff Made Aware Co-Treat with OT   Nurse Made Aware ok to see per RN   Exercises   Ankle Pumps Sitting;15 reps;AROM; Bilateral   Assessment   Prognosis Guarded   Problem List Decreased strength;Decreased endurance; Impaired balance;Decreased mobility; Decreased safety awareness;Decreased coordination;Decreased cognition;Pain   Assessment Pt continues to present with decreased mobility, strength, balance, and endurance  These impairments are further limited by pt's lethargy and cognitive deficits  At this time, pt requires Ax2 for all aspects of mobility  Pt required varying levels of assist from supervision to maxA for trunk control at EOB  Performed multiple STS trials during which pt was only able to achieve ~25-50% buttocks clearance  Co-treat with OT 2* pt's medical instability and multiple comorbidities  PT focus was on bed mobility, sitting balance, transfers, and standing tolerance  Pt ended session seated in recliner with chair alarm on and all needs in reach  Pt would benefit from continued acute skilled PT to address above deficits   Continuing to recommend rehab upon d/c     Barriers to Discharge Inaccessible home environment;Decreased caregiver support   Goals   Patient Goals none stated   STG Expiration Date 06/22/21  (goals extended- remain appropriate)   Plan Treatment/Interventions Functional transfer training;LE strengthening/ROM; Therapeutic exercise; Endurance training;Bed mobility; Equipment eval/education;Spoke to nursing;Spoke to case management;OT   Progress Slow progress, cognitive deficits   PT Frequency 2-3x/wk   Recommendation   PT Discharge Recommendation Post acute rehabilitation services   PT - OK to Discharge Yes  (if to rehab)   [place] 8 in Bed Without Bedrails 2   Lying on Back to Sitting on Edge of Flat Bed 1   Moving Bed to Chair 1   Standing Up From Chair 1   Walk in Room 1   Climb 3-5 Stairs 1   Basic Mobility Inpatient Raw Score 7   Turning Head Towards Sound 3   Follow Simple Instructions 2   Low Function Basic Mobility Raw Score 12   Low Function Basic Mobility Standardized Score 18 33       Jonah Mckeon, PT, DPT

## 2021-06-08 NOTE — UTILIZATION REVIEW
Continued Stay Review    Date: 6/7/21                          Current Patient Class: IP Current Level of Care: MS     HPI:56 y o  female initially admitted on 4/23 with CNS lymphoma    Assessment/Plan:   Pt is tachycardic but afebrile  Remains on room air  Remains disoriented  Awaiting rehab bed  Continues to have tube feeds  Oral Acyclovir and Levaquin       Vital Signs:  06/07/21 22:33:59  99 9 °F (37 7 °C)  106Abnormal   20  107/66  80  98 %  --   06/07/21 2036  --  --  --  --  --  --  None (Room air)   06/07/21 15:23:45  98 5 °F (36 9 °C)  114Abnormal   20  107/68  81  100 %  --   06/07/21 13:05:57  --  111Abnormal   --  103/67  79  98 %  --   06/07/21 0800  --  --  --  101/95  --  --  --   06/07/21 0745  --  --  --  --  --  --  None (Room air)   06/07/21 06:30:05  99 8 °F (37 7 °C)  110Abnormal   18  102/64  77  97 %  --   06/07/21 02:38:56  98 6 °F (37 °C)  --  --  --  --  --  --   06/06/21 22:04:15  100 2 °F (37 9 °C)  110Abnormal   20  102/64  77  97 %  --   06/06/21 1911  --  --  --  --  --  --  None (Room air)   06/06/21 15:32:16  99 4 °F (37 4 °C)  100  16  106/67  80  96 %  --   06/06/21 10:31:42  99 6 °F (37 6 °C)  102  --  108/72  84  98 %  --   06/06/21 07:51:15  100 2 °F (37 9 °C)  97  --  110/72  85  99 %  --     Pertinent Labs/Diagnostic Results:       Results from last 7 days   Lab Units 06/08/21  0656 06/07/21  0648 06/03/21  0529   WBC Thousand/uL 7 58 9 04 6 18   HEMOGLOBIN g/dL 8 7* 8 6* 8 6*   HEMATOCRIT % 27 3* 28 3* 28 4*   PLATELETS Thousands/uL 257 279 246   BANDS PCT %  --  1  --          Results from last 7 days   Lab Units 06/07/21  0648 06/03/21  0529   SODIUM mmol/L 138 139   POTASSIUM mmol/L 4 0 4 1   CHLORIDE mmol/L 103 106   CO2 mmol/L 29 27   ANION GAP mmol/L 6 6   BUN mg/dL 18 20   CREATININE mg/dL 0 33* 0 23*   EGFR ml/min/1 73sq m 126 142   CALCIUM mg/dL 8 8 8 9         Results from last 7 days   Lab Units 06/08/21  1105 06/08/21  0727 06/07/21  2057 06/07/21  1641 06/07/21  1101 06/07/21  0752 06/07/21  0747 06/06/21  2046 06/06/21  1646 06/06/21  1057 06/06/21  0752 06/05/21  2100   POC GLUCOSE mg/dl 135 133 141* 143* 123 92 97 137 122 125 146* 155*     Results from last 7 days   Lab Units 06/07/21  0648 06/03/21  0529   GLUCOSE RANDOM mg/dL 123 103     Medications:   Scheduled Medications:  acetaminophen, 650 mg, Oral, Q8H MEÑO  acyclovir, 400 mg, Oral, Q12H MEÑO  allopurinol, 100 mg, Oral, Daily  amLODIPine, 5 mg, Oral, Daily  dicyclomine, 10 mg, Oral, 4x Daily (AC & HS)  enoxaparin, 40 mg, Subcutaneous, Q24H MEÑO  fluconazole, 400 mg, Oral, Daily  gabapentin, 100 mg, Oral, HS  insulin glargine, 5 Units, Subcutaneous, HS  insulin lispro, 1-5 Units, Subcutaneous, 4x Daily (AC & HS)  levofloxacin, 500 mg, Oral, Q24H  lidocaine, 1 patch, Topical, Daily  menthol-methyl salicylate, , Apply externally, BID  omeprazole (PRILOSEC) suspension 2 mg/mL, 20 mg, Oral, Daily  tamsulosin, 0 4 mg, Oral, Daily With Dinner      Continuous IV Infusions:     PRN Meds:  albuterol, 2 puff, Inhalation, Q6H PRN  aluminum-magnesium hydroxide-simethicone, 30 mL, Per PEG Tube, Q4H PRN  bisacodyl, 10 mg, Rectal, Daily PRN  Diclofenac Sodium, 2 g, Topical, TID PRN - x 2 6/7, 6/8  Dilaudid IV 0 5 mg q 4 hr PRN - x 1 6/7 and d/c  hydrOXYzine HCL, 25 mg, Oral, Q6H PRN - x 1 6/7, x 2 6/8  Lorazepam 0 5 mg IV   q 6 hr PRN - x 2 6/7 and d/c   ondansetron, 4 mg, Intravenous, Q6H PRN  oxyCODONE, 10 mg, Oral, Q6H PRN - x2 6/7  oxyCODONE, 5 mg, Oral, Q6H PRN  polyethylene glycol, 17 g, Oral, Daily PRN  senna-docusate sodium, 1 tablet, Oral, BID PRN    Discharge Plan: rehab    Network Utilization Review Department  ATTENTION: Please call with any questions or concerns to [phone] and carefully listen to the prompts so that you are directed to the right person   All voicemails are confidential   Janett Vargaspper all requests for admission clinical reviews, approved or denied determinations and any other requests to dedicated fax number below belonging to the campus where the patient is receiving treatment   List of dedicated fax numbers for the Facilities:  [address] [place] [address] DENIALS (Administrative/Medical Necessity) [phone]   [address] (Maternity/NICU/Pediatrics) [phone]   [address] [place] [address] [place] [id number] [address] [facility] [place] [address] [phone]

## 2021-06-08 NOTE — CASE MANAGEMENT
YSABEL s/w pt's contact Edward and notified him that [facility] was able to accept pending auth  Edward stated that he was okay with accepting the placement  YSABEL will follow

## 2021-06-08 NOTE — PROGRESS NOTES
INTERNAL MEDICINE RESIDENCY PROGRESS NOTE     Name: Satish Grace   Age & Sex: 49 y o  female   MRN: [id number]  Unit/Bed#: [facility] 920-01   Encounter: [id number]  Team: SOD Team B     PATIENT INFORMATION     Name: Satish Grace   Age & Sex: 49 y o  female   MRN: [id number] [facility] [address] Stay Days: 55    ASSESSMENT/PLAN     Principal Problem:    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[facility] [address] )  Active Problems:    Altered mental status    Dysphagia    Aphasia    Weakness    Fracture of ramus of left pubis (HCC)    Severe protein-calorie malnutrition ([facility] [address] )    Tachycardia      * CNS lymphoma [facility])  Assessment & Plan  Patient was diagnosed with primary CNS lymphoma through biopsy in [facility] transferred over here for chemotherapy since the [facility] is out of network  Had a 2nd family meeting on 04/30 and family has decided to proceed treatment at this time  S/p PICC line for chemotherapy  S/p peg tube placement on 05/10  Plan:  Hematology-Oncology consulted  Appreciate recommendations  Recommending follow-up MRI with and without contrast to assess severity of disease after chemotherapy  S/p 2nd cycle of chemotherapy on 05/13  Leucovin on 5/14  DVT prophylaxis with Lovenox  Rehab placement pending    S/p steroid taper for cerebral edema from CNS lymphoma  Pancytopenia in setting of recent chemotherapy  Will continue to watch for any signs of infection    On acyclovir, Diflucan and Levaquin prophylactically  After discussion with Oncology, plan for rehab, patient does not require radiation therapy, rehab for strength, no chemotherapy until at least 3 weeks, evaluate at that time  Patient's family updated on plan going forward, no questions after family visit    Acute gout-resolved as of 6/2/2021  Assessment & Plan  Gout flare left knee resolving  Steroid taper completed  Will start low-dose allopurinol 100 mg q day to hopefully prevent other acute flares in the setting of chemotherapy  Follow-up uric acid level    Tachycardia  Assessment & Plan  Tachycardia 110s, fluid recessed  Repeat TSH, trial low-dose beta-blocker    Severe protein-calorie malnutrition (HCC)  Assessment & Plan  Malnutrition Findings:   Adult Malnutrition type: Acute illness(due to medical condition resulting in dysphagia, decreased appetite and intake, weight loss)  Adult Degree of Malnutrition: Other severe protein calorie malnutrition    BMI Findings: Body mass index is 21 44 kg/m²  Plan:  Nutrition consult  Tube feeds initiated with Jevity 1 2, advancing to goal      Fracture of ramus of left pubis [facility])  Assessment & Plan  [place] prior to presentation to [facility]  Managed nonoperatively at [facility]    Weakness  Assessment & Plan  Left greater than right upper and lower extremity weakness with ongoing left lower extremity contracture  Secondary to underlying CNS malignancy  Decubitus ulcer precautions such as frequent repositioning  Aphasia  Assessment & Plan  Speech therapy on board    Dysphagia  Assessment & Plan  s/p peg tube placement, tube feeds at goal  Euvolemic    Altered mental status  Assessment & Plan  Patient initially presented to [facility] [place] with stroke-like symptoms for 2 days  CT scan of the head was concerning for subacute CVA and was transferred to [facility]   MRI of the brain was more consistent with demyelinating disease patient was started on IV steroids and 1000 mg daily for 5 days  Patient did not have any significant improvement at that time patient had plasmapheresis  And also had  lumbar puncture-negative for infection or malignancy  Patient noted to have persistent encephalopathy so a repeat MRI was obtained which showed worsening of the abnormality seen on the previous MRI and 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  Likely secondary to CNS lymphoma  Delirium precautions   Currently alert and oriented times 2-3  Follow commands  Patient's  (does not speak [place]) makes all medical decision for her, confirmed once all medical treatment at this time    Pain of left lower leg-resolved as of 6/2/2021  Assessment & Plan  Overnight left lower extremity pain, well score 3, pain now controlled  Bilateral ultrasound Dopplers no evidence of DVT    Pancytopenia (HCC)-resolved as of 6/2/2021  Assessment & Plan  Likely in setting of chemotherapy  Will continue to monitor for any signs of infection  Will transfuse with packed RBC for hemoglobin less than 7  Drop in Hgb from 9 to 7 on 5/14, other lines relatively stable  VSS and soft brown BMs  Will discuss with oncology if this is expected based on chemo or if there should be further workup for bleeding, though hemodynamics currently do not suggest bleed  S/p 1 pack RBC on 05/15, hemoglobin continues to improve    Left-sided weakness-resolved as of 6/2/2021  Assessment & Plan  Patient has left-sided weakness at baseline from her CNS lymphoma  Left upper extremity 3-4/5 and left lower extremity 0-1/5 muscle strength    Sepsis (HCC)-resolved as of 5/13/2021  Assessment & Plan  Patient had hypothermia, elevated WBC count and tachycardia  Concern for possible aspiration in setting on dysphagia versus UTI in setting of urinary catheter which was placed for retention  Plan:  S/p IV antibiotics last day on 05/05  Will continue to monitor off antibiotics  Steroid-induced hyperglycemia-resolved as of 5/23/2021  Assessment & Plan  Mild hyperglycemia, glucoses in 200s, no history of DM  Occurring in the setting of Decadron with chemotherapy, as well as sodium bicarb in D5 drip  - D5 drip is finished     - will monitor sugars while on Jevity and now off D5  - continue q4 hour fingerstick glucose with correctional algorithm 1 and Lantus    Presence of permanent central venous catheter-resolved as of 6/2/2021  Assessment & Plan  Noted presence of right IJ tunneled double-lumen HD catheter; upon chart review, this was likely inserted to be used for plasmapheresis which she has completed  Plan:  S/p removal by IR  Patient now has PICC line  Urinary retention-resolved as of 2021  Assessment & Plan  Patient developed urinary retention during previous hospitalization  Plan was voiding trial as an outpatient as she was scheduled for discharge from that facility  Machado catheter was initially removed although patient required re-placement of machado on  in setting of incontinence and getting chemotherapy  Machado discontinued, monitor for signs of fluid retention        Disposition:  Awaiting rehab placement     SUBJECTIVE     Patient seen and examined  No acute events overnight  OBJECTIVE     Vitals:    21 1305 21 1523 21 2233 21 0544   BP: 103/67 107/68 107/66    Pulse: (!) 111 (!) 114 (!) 106    Resp:  20 20    Temp:  98 5 °F (36 9 °C) 99 9 °F (37 7 °C)    TempSrc:       SpO2: 98% 100% 98%    Weight:    54 kg (119 lb 0 8 oz)   Height:          Temperature:   Temp (24hrs), Av 2 °F (37 3 °C), Min:98 5 °F (36 9 °C), Max:99 9 °F (37 7 °C)    Temperature: 99 9 °F (37 7 °C)  Intake & Output:  I/O        07 -  0700  07 -  0700 / 07 -  0700    P  O  120      NG/GT  525     Feedings  1106     Total Intake(mL/kg) 120 (2 3) 1631 (30 2)     Urine (mL/kg/hr) 0 (0) 712 (0 5)     Stool 0      Total Output 0 712     Net +120 +919            Unmeasured Urine Occurrence 4 x 1 x     Unmeasured Stool Occurrence 4 x          Weights:   IBW (Ideal Body Weight): 45 5 kg    Body mass index is 23 25 kg/m²  Weight (last 2 days)     Date/Time   Weight    21 0544   54 (119 05)    21 0600   51 6 (113 76)            Physical Exam  Vitals signs and nursing note reviewed  Constitutional:       General: She is not in acute distress  Appearance: She is well-developed  She is ill-appearing  HENT:      Head: Normocephalic and atraumatic  Eyes:      Conjunctiva/sclera: Conjunctivae normal    Neck:      Musculoskeletal: Neck supple  Cardiovascular:      Rate and Rhythm: Normal rate and regular rhythm  Heart sounds: No murmur  Pulmonary:      Effort: Pulmonary effort is normal  No respiratory distress  Breath sounds: Normal breath sounds  Abdominal:      Palpations: Abdomen is soft  Tenderness: There is no abdominal tenderness  Musculoskeletal: Normal range of motion  General: No swelling  Skin:     General: Skin is warm and dry  Neurological:      Mental Status: She is alert  She is disoriented  Cranial Nerves: No cranial nerve deficit  LABORATORY DATA     Labs: I have personally reviewed pertinent reports  Results from last 7 days   Lab Units 06/08/21  0656 06/07/21  0648 06/03/21  0529   WBC Thousand/uL 7 58 9 04 6 18   HEMOGLOBIN g/dL 8 7* 8 6* 8 6*   HEMATOCRIT % 27 3* 28 3* 28 4*   PLATELETS Thousands/uL 257 279 246   MONO PCT %  --  9 16*      Results from last 7 days   Lab Units 06/07/21  0648 06/03/21  0529   POTASSIUM mmol/L 4 0 4 1   CHLORIDE mmol/L 103 106   CO2 mmol/L 29 27   BUN mg/dL 18 20   CREATININE mg/dL 0 33* 0 23*   CALCIUM mg/dL 8 8 8 9                            IMAGING & DIAGNOSTIC TESTING     Radiology Results: I have personally reviewed pertinent reports  Xr Chest Portable    Result Date: 4/24/2021  Impression: Dialysis catheter tip within the superior cavoatrial junction  No pneumothorax  Workstation performed: [id number]     Xr Chest Picc Line Portable    Result Date: 4/27/2021  Impression: PICC line tip in the superior vena cava, approximately 3 5 to 4 cm above the cavoatrial junction  The examination demonstrates a significant  finding and was documented as such [facility] for liaison and referring practitioner notification   Workstation performed: [id number]     Ir Picc Reposition    Result Date: 4/27/2021  Impression: Status-post repositioning of a 5 [facility] venous catheter under fluoroscopic guidance with its tip at the cavoatrial junction  Workstation performed: [id number]     Ir Tunneled Central Line Removal    Result Date: 4/27/2021  Impression: Impression: Successful tunneled central line removal as described  Signed, performed and dictated by Olegario Floyd PA-C under the supervision of Dr Amor Kwon  Workstation performed: [id number]     Other Diagnostic Testing: I have personally reviewed pertinent reports      ACTIVE MEDICATIONS     Current Facility-Administered Medications   Medication Dose Route Frequency    acetaminophen (TYLENOL) oral suspension 650 mg  650 mg Oral Q8H [address]    acyclovir (ZOVIRAX) capsule 400 mg  400 mg Oral Q12H [address]    albuterol (PROVENTIL HFA,VENTOLIN HFA) inhaler 2 puff  2 puff Inhalation Q6H PRN    allopurinol (ZYLOPRIM) tablet 100 mg  100 mg Oral Daily    aluminum-magnesium hydroxide-simethicone (MYLANTA) oral suspension 30 mL  30 mL Per PEG Tube Q4H PRN    amLODIPine (NORVASC) tablet 5 mg  5 mg Oral Daily    bisacodyl (DULCOLAX) rectal suppository 10 mg  10 mg Rectal Daily PRN    Diclofenac Sodium (VOLTAREN) 1 % topical gel 2 g  2 g Topical TID PRN    dicyclomine (BENTYL) capsule 10 mg  10 mg Oral 4x Daily (AC & HS)    enoxaparin (LOVENOX) subcutaneous injection 40 mg  40 mg Subcutaneous Q24H MEÑO    fluconazole (DIFLUCAN) tablet 400 mg  400 mg Oral Daily    gabapentin (NEURONTIN) oral solution 100 mg  100 mg Oral HS    hydrOXYzine HCL (ATARAX) tablet 25 mg  25 mg Oral Q6H PRN    insulin glargine (LANTUS) subcutaneous injection 5 Units 0 05 mL  5 Units Subcutaneous HS    insulin lispro (HumaLOG) 100 units/mL subcutaneous injection 1-5 Units  1-5 Units Subcutaneous 4x Daily (AC & HS)    levofloxacin (LEVAQUIN) tablet 500 mg  500 mg Oral Q24H    lidocaine (LIDODERM) 5 % patch 1 patch  1 patch Topical Daily    menthol-methyl salicylate (BENGAY) 73-21 % cream   Apply externally BID    metoprolol tartrate (LOPRESSOR) partial tablet 12 5 mg  12 5 mg Oral Q12H [address]    omeprazole (PRILOSEC) suspension 2 mg/mL  20 mg Oral Daily    ondansetron (ZOFRAN) injection 4 mg  4 mg Intravenous Q6H PRN    polyethylene glycol (MIRALAX) packet 17 g  17 g Oral Daily PRN    senna-docusate sodium (SENOKOT S) 8 6-50 mg per tablet 1 tablet  1 tablet Oral BID PRN    tamsulosin (FLOMAX) capsule 0 4 mg  0 4 mg Oral Daily With Dinner       VTE Pharmacologic Prophylaxis: Enoxaparin (Lovenox)  VTE Mechanical Prophylaxis: sequential compression device    Portions of the record may have been created with voice recognition software  Occasional wrong word or "sound a like" substitutions may have occurred due to the inherent limitations of voice recognition software    Read the chart carefully and recognize, using context, where substitutions have occurred   ==  Muriel Hubbard, [facility]  Internal Medicine Residency PGY-2

## 2021-06-08 NOTE — CASE MANAGEMENT
CM s/w Kimberly from [place] ([email or web address]) and provided an update as to the status of the referral  Per Eliecer Cee, CM should attempt to send referrals to [facility] facilities as [place] would be willing to negotiate an OON benefit  CM s/w pt's contact who was okay with the blanket referral  CM sent the referrals via [facility]   CM will follow

## 2021-06-08 NOTE — PLAN OF CARE
Problem: PHYSICAL THERAPY ADULT  Goal: Performs mobility at highest level of function for planned discharge setting  See evaluation for individualized goals  Description: Treatment/Interventions: ADL retraining, Functional transfer training, LE strengthening/ROM, Endurance training, Patient/family training, Bed mobility, Spoke to nursing, OT  Equipment Recommended: (TBD)       See flowsheet documentation for full assessment, interventions and recommendations  Outcome: Not Progressing  Note: Prognosis: Guarded  Problem List: Decreased strength, Decreased endurance, Impaired balance, Decreased mobility, Decreased safety awareness, Decreased coordination, Decreased cognition, Pain  Assessment: Pt continues to present with decreased mobility, strength, balance, and endurance  These impairments are further limited by pt's lethargy and cognitive deficits  At this time, pt requires Ax2 for all aspects of mobility  Pt required varying levels of assist from supervision to maxA for trunk control at EOB  Performed multiple STS trials during which pt was only able to achieve ~25-50% buttocks clearance  Co-treat with OT 2* pt's medical instability and multiple comorbidities  PT focus was on bed mobility, sitting balance, transfers, and standing tolerance  Pt ended session seated in recliner with chair alarm on and all needs in reach  Pt would benefit from continued acute skilled PT to address above deficits  Continuing to recommend rehab upon d/c    Barriers to Discharge: Inaccessible home environment, Decreased caregiver support        PT Discharge Recommendation: Post acute rehabilitation services     PT - OK to Discharge: Yes(if to rehab)    See flowsheet documentation for full assessment

## 2021-06-09 ENCOUNTER — APPOINTMENT (INPATIENT)
Dept: RADIOLOGY | Facility: HOSPITAL | Age: 56
DRG: 840 | End: 2021-06-09
Payer: COMMERCIAL

## 2021-06-09 LAB
GLUCOSE SERPL-MCNC: 121 MG/DL (ref 65–140)
GLUCOSE SERPL-MCNC: 125 MG/DL (ref 65–140)
GLUCOSE SERPL-MCNC: 129 MG/DL (ref 65–140)
GLUCOSE SERPL-MCNC: 130 MG/DL (ref 65–140)

## 2021-06-09 PROCEDURE — G1004 CDSM NDSC: HCPCS

## 2021-06-09 PROCEDURE — 70553 MRI BRAIN STEM W/O & W/DYE: CPT

## 2021-06-09 PROCEDURE — 82948 REAGENT STRIP/BLOOD GLUCOSE: CPT

## 2021-06-09 PROCEDURE — A9585 GADOBUTROL INJECTION: HCPCS | Performed by: STUDENT IN AN ORGANIZED HEALTH CARE EDUCATION/TRAINING PROGRAM

## 2021-06-09 RX ADMIN — HYDROXYZINE HYDROCHLORIDE 25 MG: 25 TABLET, FILM COATED ORAL at 21:47

## 2021-06-09 RX ADMIN — FLUCONAZOLE 400 MG: 200 TABLET ORAL at 10:51

## 2021-06-09 RX ADMIN — DICLOFENAC SODIUM 2 G: 10 GEL TOPICAL at 14:36

## 2021-06-09 RX ADMIN — GABAPENTIN 100 MG: 250 SUSPENSION ORAL at 21:50

## 2021-06-09 RX ADMIN — TAMSULOSIN HYDROCHLORIDE 0.4 MG: 0.4 CAPSULE ORAL at 17:16

## 2021-06-09 RX ADMIN — INSULIN GLARGINE 5 UNITS: 100 INJECTION, SOLUTION SUBCUTANEOUS at 21:48

## 2021-06-09 RX ADMIN — DICYCLOMINE HYDROCHLORIDE 10 MG: 10 CAPSULE ORAL at 21:48

## 2021-06-09 RX ADMIN — ACYCLOVIR 400 MG: 200 CAPSULE ORAL at 21:48

## 2021-06-09 RX ADMIN — Medication 20 MG: at 10:52

## 2021-06-09 RX ADMIN — GADOBUTROL 6 ML: 604.72 INJECTION INTRAVENOUS at 09:46

## 2021-06-09 RX ADMIN — OXYCODONE HYDROCHLORIDE 5 MG: 5 TABLET ORAL at 10:51

## 2021-06-09 RX ADMIN — DICYCLOMINE HYDROCHLORIDE 10 MG: 10 CAPSULE ORAL at 10:52

## 2021-06-09 RX ADMIN — ACETAMINOPHEN 650 MG: 650 SUSPENSION ORAL at 12:18

## 2021-06-09 RX ADMIN — ACYCLOVIR 400 MG: 200 CAPSULE ORAL at 10:51

## 2021-06-09 RX ADMIN — OXYCODONE HYDROCHLORIDE 5 MG: 5 TABLET ORAL at 17:16

## 2021-06-09 RX ADMIN — LIDOCAINE 5% 1 PATCH: 700 PATCH TOPICAL at 11:00

## 2021-06-09 RX ADMIN — AMLODIPINE BESYLATE 5 MG: 5 TABLET ORAL at 10:52

## 2021-06-09 RX ADMIN — ENOXAPARIN SODIUM 40 MG: 40 INJECTION SUBCUTANEOUS at 14:47

## 2021-06-09 RX ADMIN — ACETAMINOPHEN 650 MG: 650 SUSPENSION ORAL at 06:06

## 2021-06-09 RX ADMIN — ACETAMINOPHEN 650 MG: 650 SUSPENSION ORAL at 21:48

## 2021-06-09 RX ADMIN — OXYCODONE HYDROCHLORIDE 5 MG: 5 TABLET ORAL at 23:00

## 2021-06-09 RX ADMIN — DICYCLOMINE HYDROCHLORIDE 10 MG: 10 CAPSULE ORAL at 06:06

## 2021-06-09 RX ADMIN — HYDROXYZINE HYDROCHLORIDE 25 MG: 25 TABLET, FILM COATED ORAL at 14:47

## 2021-06-09 RX ADMIN — MENTHOL, UNSPECIFIED FORM AND METHYL SALICYLATE: 10; 150 CREAM TOPICAL at 17:16

## 2021-06-09 RX ADMIN — ALLOPURINOL 100 MG: 100 TABLET ORAL at 10:52

## 2021-06-09 RX ADMIN — LEVOFLOXACIN 500 MG: 500 TABLET, FILM COATED ORAL at 17:16

## 2021-06-09 RX ADMIN — DICYCLOMINE HYDROCHLORIDE 10 MG: 10 CAPSULE ORAL at 17:16

## 2021-06-09 NOTE — UTILIZATION REVIEW
Continued Stay Review    Date: 6/9/21                         Current Patient Class: IP  Current Level of Care: MS    HPI:56 y o  female initially admitted on 4/23 with CNS lymphoma     Assessment/Plan:   Pt is stable and continues to use oral analgesia  She has intermittent tachycardia  Pt remains ill-appearing and disoriented  There is a possibility of d/c today to SNF pending authorization from payor        Vital Signs:   06/09/21 11:01:23  --  --  --  120/77  91  --  --   06/09/21 07:24:07  98 5 °F (36 9 °C)  82  19  118/73  88  100 %  --   06/08/21 21:38:05  97 1 °F (36 2 °C)Abnormal   91  --  95/54  68  99 %  --   06/08/21 21:37:51  97 1 °F (36 2 °C)Abnormal   92  --  95/54  68  98 %  --   06/08/21 15:55:27  --  --  18  95/54  68  --  --   06/08/21 0834  --  --  --  --  --  --  None (Room air)   06/08/21 07:25:52  99 8 °F (37 7 °C)  --  18  103/65  78  --  --   06/07/21 22:33:59  99 9 °F (37 7 °C)  106Abnormal   20  107/66  80  98 %  --   06/07/21 2036  --  --  --  --  --  --  None (Room air)   06/07/21 15:23:45  98 5 °F (36 9 °C)  114Abnormal   20  107/68  81  100 %  --   06/07/21 13:05:57  --  111Abnormal   --  103/67  79  98 %  --   06/07/21 0800  --  --  --  101/95  --  --  --   06/07/21 0745  --  --  --  --  --  --  None (Room air)   06/07/21 06:30:05  99 8 °F (37 7 °C)  110Abnormal   18  102/64  77  97 %  --   06/07/21 02:38:56  98 6 °F (37 °C)  --  --  --  --  --  --     Pertinent Labs/Diagnostic Results:   Results from last 7 days   Lab Units 06/08/21  1711   SARS-COV-2  Negative     Results from last 7 days   Lab Units 06/08/21  0656 06/07/21  0648 06/03/21  0529   WBC Thousand/uL 7 58 9 04 6 18   HEMOGLOBIN g/dL 8 7* 8 6* 8 6*   HEMATOCRIT % 27 3* 28 3* 28 4*   PLATELETS Thousands/uL 257 279 246   BANDS PCT %  --  1  --          Results from last 7 days   Lab Units 06/07/21  0648 06/03/21  0529   SODIUM mmol/L 138 139   POTASSIUM mmol/L 4 0 4 1   CHLORIDE mmol/L 103 106   CO2 mmol/L 29 27   ANION GAP mmol/L 6 6   BUN mg/dL 18 20   CREATININE mg/dL 0 33* 0 23*   EGFR ml/min/1 73sq m 126 142   CALCIUM mg/dL 8 8 8 9         Results from last 7 days   Lab Units 06/09/21  1104 06/09/21  0750 06/08/21  2053 06/08/21  1559 06/08/21  1105 06/08/21  0727 06/07/21  2057 06/07/21  1641 06/07/21  1101 06/07/21  0752 06/07/21  0747 06/06/21  2046   POC GLUCOSE mg/dl 121 129 136 147* 135 133 141* 143* 123 92 97 137     Results from last 7 days   Lab Units 06/07/21  0648 06/03/21  0529   GLUCOSE RANDOM mg/dL 123 103     Medications:   Scheduled Medications:  acetaminophen, 650 mg, Oral, Q8H EMÑO  acyclovir, 400 mg, Oral, Q12H MEÑO  allopurinol, 100 mg, Oral, Daily  amLODIPine, 5 mg, Oral, Daily  dicyclomine, 10 mg, Oral, 4x Daily (AC & HS)  enoxaparin, 40 mg, Subcutaneous, Q24H MEÑO  fluconazole, 400 mg, Oral, Daily  gabapentin, 100 mg, Oral, HS  insulin glargine, 5 Units, Subcutaneous, HS  insulin lispro, 1-5 Units, Subcutaneous, 4x Daily (AC & HS)  levofloxacin, 500 mg, Oral, Q24H  lidocaine, 1 patch, Topical, Daily  menthol-methyl salicylate, , Apply externally, BID  omeprazole (PRILOSEC) suspension 2 mg/mL, 20 mg, Oral, Daily  tamsulosin, 0 4 mg, Oral, Daily With Dinner      Continuous IV Infusions:     PRN Meds:  albuterol, 2 puff, Inhalation, Q6H PRN  aluminum-magnesium hydroxide-simethicone, 30 mL, Per PEG Tube, Q4H PRN  bisacodyl, 10 mg, Rectal, Daily PRN  Diclofenac Sodium, 2 g, Topical, TID PRN - x 3 6/8, x 1 6/9  hydrOXYzine HCL, 25 mg, Oral, Q6H PRN - x 2 6/8, x 1 6/9  ondansetron, 4 mg, Intravenous, Q6H PRN  oxyCODONE, 5 mg, Oral, Q6H PRN - x 2 6/8, x 1 6/9  polyethylene glycol, 17 g, Oral, Daily PRN  senna-docusate sodium, 1 tablet, Oral, BID PRN    Discharge Plan: rehab placement pending    Network Utilization Review Department  ATTENTION: Please call with any questions or concerns to [phone] and carefully listen to the prompts so that you are directed to the right person   All voicemails are confidential   Clesreekanth Lockhart all requests for admission clinical reviews, approved or denied determinations and any other requests to dedicated fax number below belonging to the campus where the patient is receiving treatment   List of dedicated fax numbers for the Facilities:  [address] DENIALS (Administrative/Medical Necessity) [phone]   1000  [facility] (Maternity/NICU/Pediatrics) [phone]   [address] [place] [address] [facility] [place] [id number] [address] [address] [facility] [facility] [phone]

## 2021-06-09 NOTE — PROGRESS NOTES
INTERNAL MEDICINE RESIDENCY PROGRESS NOTE     Name: Hortensia Boone   Age & Sex: 49 y o  female   MRN: [id number]  [facility]   Encounter: [id number]  Team: SOD Team B     PATIENT INFORMATION     Name: Hortensia Boone   Age & Sex: 49 y o  female   MRN: [id number] Stay Days: 52    ASSESSMENT/PLAN     Principal Problem:    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[facility] [address] )  Active Problems:    Altered mental status    Dysphagia    Aphasia    Weakness    Fracture of ramus of left pubis (HCC)    Severe protein-calorie malnutrition ([facility] [address] )      * CNS lymphoma [facility])  Assessment & Plan  Patient was diagnosed with primary CNS lymphoma through biopsy in [facility] transferred over here for chemotherapy since the [facility] is out of network  Had a 2nd family meeting on 04/30 and family has decided to proceed treatment at this time  S/p PICC line for chemotherapy  S/p peg tube placement on 05/10  Plan:  Hematology-Oncology consulted  Appreciate recommendations  Recommending follow-up MRI with and without contrast to assess severity of disease after chemotherapy  S/p 2nd cycle of chemotherapy on 05/13  Leucovin on 5/14  DVT prophylaxis with Lovenox  Rehab placement pending    S/p steroid taper for cerebral edema from CNS lymphoma  Pancytopenia in setting of recent chemotherapy  Will continue to watch for any signs of infection    On acyclovir, Diflucan and Levaquin prophylactically  After discussion with Oncology, plan for rehab, patient does not require radiation therapy, rehab for strength, no chemotherapy until at least 3 weeks, evaluate at that time  Patient's family updated on plan going forward, no questions after family visit  Repeat MRI today to determine if disease is progressing versus improving after 2 cycles of chemotherapy    Acute gout-resolved as of 6/2/2021  Assessment & Plan  Gout flare left knee resolving  Steroid taper completed  Will start low-dose allopurinol 100 mg q day to hopefully prevent other acute flares in the setting of chemotherapy  Follow-up uric acid level    Severe protein-calorie malnutrition (HCC)  Assessment & Plan  Malnutrition Findings:   Adult Malnutrition type: Acute illness(due to medical condition resulting in dysphagia, decreased appetite and intake, weight loss)  Adult Degree of Malnutrition: Other severe protein calorie malnutrition    BMI Findings: Body mass index is 21 44 kg/m²  Plan:  Nutrition consult  Tube feeds initiated with Jevity 1 2, advancing to goal      Fracture of ramus of left pubis [facility])  Assessment & Plan  Paris Behzad prior to presentation to [facility]  Managed nonoperatively at [facility]    Weakness  Assessment & Plan  Left greater than right upper and lower extremity weakness with ongoing left lower extremity contracture  Secondary to underlying CNS malignancy  Decubitus ulcer precautions such as frequent repositioning  Aphasia  Assessment & Plan  Speech therapy on board    Dysphagia  Assessment & Plan  s/p peg tube placement, tube feeds at goal  Euvolemic    Altered mental status  Assessment & Plan  Patient initially presented to [facility] his ultimate [place] with stroke-like symptoms for 2 days  CT scan of the head was concerning for subacute CVA and was transferred to [facility]   MRI of the brain was more consistent with demyelinating disease patient was started on IV steroids and 1000 mg daily for 5 days  Patient did not have any significant improvement at that time patient had plasmapheresis  And also had  lumbar puncture-negative for infection or malignancy  Patient noted to have persistent encephalopathy so a repeat MRI was obtained which showed worsening of the abnormality seen on the previous MRI and 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  Likely secondary to CNS lymphoma  Delirium precautions   Currently alert and oriented times 2-3  Follow commands      Patient's  (does not nay Elizalde) makes all medical decision for her, confirmed once all medical treatment at this time    Pain of left lower leg-resolved as of 6/2/2021  Assessment & Plan  Overnight left lower extremity pain, well score 3, pain now controlled  Bilateral ultrasound Dopplers no evidence of DVT    Pancytopenia (HCC)-resolved as of 6/2/2021  Assessment & Plan  Likely in setting of chemotherapy  Will continue to monitor for any signs of infection  Will transfuse with packed RBC for hemoglobin less than 7  Drop in Hgb from 9 to 7 on 5/14, other lines relatively stable  VSS and soft brown BMs  Will discuss with oncology if this is expected based on chemo or if there should be further workup for bleeding, though hemodynamics currently do not suggest bleed  S/p 1 pack RBC on 05/15, hemoglobin continues to improve    Left-sided weakness-resolved as of 6/2/2021  Assessment & Plan  Patient has left-sided weakness at baseline from her CNS lymphoma  Left upper extremity 3-4/5 and left lower extremity 0-1/5 muscle strength    Sepsis (HCC)-resolved as of 5/13/2021  Assessment & Plan  Patient had hypothermia, elevated WBC count and tachycardia  Concern for possible aspiration in setting on dysphagia versus UTI in setting of urinary catheter which was placed for retention  Plan:  S/p IV antibiotics last day on 05/05  Will continue to monitor off antibiotics  Steroid-induced hyperglycemia-resolved as of 5/23/2021  Assessment & Plan  Mild hyperglycemia, glucoses in 200s, no history of DM  Occurring in the setting of Decadron with chemotherapy, as well as sodium bicarb in D5 drip  - D5 drip is finished     - will monitor sugars while on Jevity and now off D5  - continue q4 hour fingerstick glucose with correctional algorithm 1 and Lantus    Presence of permanent central venous catheter-resolved as of 6/2/2021  Assessment & Plan  Noted presence of right IJ tunneled double-lumen HD catheter; upon chart review, this was likely inserted to be used for plasmapheresis which she has completed  Plan:  S/p removal by IR  Patient now has PICC line  Urinary retention-resolved as of 2021  Assessment & Plan  Patient developed urinary retention during previous hospitalization  Plan was voiding trial as an outpatient as she was scheduled for discharge from that facility  Machado catheter was initially removed although patient required re-placement of machado on  in setting of incontinence and getting chemotherapy  Machado discontinued, monitor for signs of fluid retention        Disposition:  Awaiting rehab placement, potential discharge today     SUBJECTIVE     Patient seen and examined  No acute events overnight  OBJECTIVE     Vitals:    21 2137 21 2138 21 0600 21 0724   BP: 95/54 95/54  118/73   Pulse: 92 91  82   Resp:    19   Temp: (!) 97 1 °F (36 2 °C) (!) 97 1 °F (36 2 °C)  98 5 °F (36 9 °C)   TempSrc:       SpO2: 98% 99%  100%   Weight:   54 kg (119 lb 0 8 oz)    Height:          Temperature:   Temp (24hrs), Av 6 °F (36 4 °C), Min:97 1 °F (36 2 °C), Max:98 5 °F (36 9 °C)    Temperature: 98 5 °F (36 9 °C)  Intake & Output:  I/O        07 -  0700  07 -  0700 / 07 - 06/10 0700    P  O   480     NG/ 975     Feedings 1106 785     Total Intake(mL/kg) 1631 (30 2) 2240 (41 5)     Urine (mL/kg/hr) 712 (0 5) 1000 (0 8)     Stool  0     Total Output 712 1000     Net +919 +1240            Unmeasured Urine Occurrence 1 x 2 x     Unmeasured Stool Occurrence  1 x         Weights:   IBW (Ideal Body Weight): 45 5 kg    Body mass index is 23 25 kg/m²  Weight (last 2 days)     Date/Time   Weight    21 0600   54 (119 05)    21 0544   54 (119 05)            Physical Exam  Vitals signs and nursing note reviewed  Constitutional:       General: She is not in acute distress  Appearance: She is well-developed  She is ill-appearing     HENT:      Head: Normocephalic and atraumatic  Eyes:      Conjunctiva/sclera: Conjunctivae normal    Neck:      Musculoskeletal: Neck supple  Cardiovascular:      Rate and Rhythm: Normal rate and regular rhythm  Heart sounds: No murmur  Pulmonary:      Effort: Pulmonary effort is normal  No respiratory distress  Breath sounds: Normal breath sounds  Abdominal:      Palpations: Abdomen is soft  Tenderness: There is no abdominal tenderness  Musculoskeletal: Normal range of motion  General: Deformity present  No swelling  Skin:     General: Skin is warm and dry  Capillary Refill: Capillary refill takes less than 2 seconds  Neurological:      Mental Status: She is alert  She is disoriented  Psychiatric:         Mood and Affect: Mood normal        LABORATORY DATA     Labs: I have personally reviewed pertinent reports  Results from last 7 days   Lab Units 06/08/21  0656 06/07/21  0648 06/03/21  0529   WBC Thousand/uL 7 58 9 04 6 18   HEMOGLOBIN g/dL 8 7* 8 6* 8 6*   HEMATOCRIT % 27 3* 28 3* 28 4*   PLATELETS Thousands/uL 257 279 246   MONO PCT %  --  9 16*      Results from last 7 days   Lab Units 06/07/21  0648 06/03/21  0529   POTASSIUM mmol/L 4 0 4 1   CHLORIDE mmol/L 103 106   CO2 mmol/L 29 27   BUN mg/dL 18 20   CREATININE mg/dL 0 33* 0 23*   CALCIUM mg/dL 8 8 8 9                            IMAGING & DIAGNOSTIC TESTING     Radiology Results: I have personally reviewed pertinent reports  Xr Chest Portable    Result Date: 4/24/2021  Impression: Dialysis catheter tip within the superior cavoatrial junction  No pneumothorax  Workstation performed: [id number]     Xr Chest Picc Line Portable    Result Date: 4/27/2021  Impression: PICC line tip in the superior vena cava, approximately 3 5 to 4 cm above the cavoatrial junction  The examination demonstrates a significant  finding and was documented as such in [facility] for liaison and referring practitioner notification   Workstation performed: [id number]     Ir Picc Reposition    Result Date: 4/27/2021  Impression: Status-post repositioning of a 5 [place] central venous catheter under fluoroscopic guidance with its tip at the cavoatrial junction  Workstation performed: [id number]     Ir Tunneled Central Line Removal    Result Date: 4/27/2021  Impression: Impression: Successful tunneled central line removal as described  Signed, performed and dictated by Christophe Allison PA-C under the supervision of Dr Linda Weeks  Workstation performed: [id number]     Other Diagnostic Testing: I have personally reviewed pertinent reports      ACTIVE MEDICATIONS     Current Facility-Administered Medications   Medication Dose Route Frequency    acetaminophen (TYLENOL) oral suspension 650 mg  650 mg Oral Q8H [facility]    acyclovir (ZOVIRAX) capsule 400 mg  400 mg Oral Q12H [facility]    albuterol (PROVENTIL HFA,VENTOLIN HFA) inhaler 2 puff  2 puff Inhalation Q6H PRN    allopurinol (ZYLOPRIM) tablet 100 mg  100 mg Oral Daily    aluminum-magnesium hydroxide-simethicone (MYLANTA) oral suspension 30 mL  30 mL Per PEG Tube Q4H PRN    amLODIPine (NORVASC) tablet 5 mg  5 mg Oral Daily    bisacodyl (DULCOLAX) rectal suppository 10 mg  10 mg Rectal Daily PRN    Diclofenac Sodium (VOLTAREN) 1 % topical gel 2 g  2 g Topical TID PRN    dicyclomine (BENTYL) capsule 10 mg  10 mg Oral 4x Daily (AC & HS)    enoxaparin (LOVENOX) subcutaneous injection 40 mg  40 mg Subcutaneous Q24H [facility]    fluconazole (DIFLUCAN) tablet 400 mg  400 mg Oral Daily    gabapentin (NEURONTIN) oral solution 100 mg  100 mg Oral HS    hydrOXYzine HCL (ATARAX) tablet 25 mg  25 mg Oral Q6H PRN    insulin glargine (LANTUS) subcutaneous injection 5 Units 0 05 mL  5 Units Subcutaneous HS    insulin lispro (HumaLOG) 100 units/mL subcutaneous injection 1-5 Units  1-5 Units Subcutaneous 4x Daily (AC & HS)    levofloxacin (LEVAQUIN) tablet 500 mg  500 mg Oral Q24H    lidocaine (LIDODERM) 5 % patch 1 patch  1 patch Topical Daily    menthol-methyl salicylate (BENGAY) 81-57 % cream   Apply externally BID    omeprazole (PRILOSEC) suspension 2 mg/mL  20 mg Oral Daily    ondansetron (ZOFRAN) injection 4 mg  4 mg Intravenous Q6H PRN    oxyCODONE (ROXICODONE) IR tablet 5 mg  5 mg Oral Q6H PRN    polyethylene glycol (MIRALAX) packet 17 g  17 g Oral Daily PRN    senna-docusate sodium (SENOKOT S) 8 6-50 mg per tablet 1 tablet  1 tablet Oral BID PRN    tamsulosin (FLOMAX) capsule 0 4 mg  0 4 mg Oral Daily With Dinner       VTE Pharmacologic Prophylaxis: Enoxaparin (Lovenox)  VTE Mechanical Prophylaxis: sequential compression device    Portions of the record may have been created with voice recognition software  Occasional wrong word or "sound a like" substitutions may have occurred due to the inherent limitations of voice recognition software    Read the chart carefully and recognize, using context, where substitutions have occurred   ==  Chloé Hernadez, [address] [facility]  Internal Medicine Residency PGY-2

## 2021-06-09 NOTE — CASE MANAGEMENT
Ander spoke with Kimberly from [employer] to confirm that CM would go for an [place] for pt to transition to [facility]  Kimberly confirmed that CM should initiate the Danielle Back  CM s/w Alessio from [employer] to initiate the auth, per Moira Smith CM cannot initiate the auth for an [place] as it has to be done by the pt, a pt representative or an [employer] CM that is assigned to the pt  CM contacted Kimberly who stated that she was notified that they wanted to continue to pursue trying to find an in network provider to accept the pt  They are following up on the list of referrals that this Cm had sent to try to get a facility to accept the pt  Per Emily Mendoza the placement at [facility] is still an option and she is working with her supervisor to try and move a decision along  CM will follow

## 2021-06-09 NOTE — PLAN OF CARE
Problem: Prexisting or High Potential for Compromised Skin Integrity  Goal: Skin integrity is maintained or improved  Description: INTERVENTIONS:  - Identify patients at risk for skin breakdown  - Assess and monitor skin integrity  - Assess and monitor nutrition and hydration status  - Monitor labs   - Assess for incontinence   - Turn and reposition patient  - Assist with mobility/ambulation  - Relieve pressure over bony prominences  - Avoid friction and shearing  - Provide appropriate hygiene as needed including keeping skin clean and dry  - Evaluate need for skin moisturizer/barrier cream  - Collaborate with interdisciplinary team   - Patient/family teaching  - Consider wound care consult   Outcome: Completed     Problem: Potential for Falls  Goal: Patient will remain free of falls  Description: INTERVENTIONS:  - Assess patient frequently for physical needs  -  Identify cognitive and physical deficits and behaviors that affect risk of falls  -  [place] fall precautions as indicated by assessment   - Educate patient/family on patient safety including physical limitations  - Instruct patient to call for assistance with activity based on assessment  - Modify environment to reduce risk of injury  - Consider OT/PT consult to assist with strengthening/mobility  Outcome: Completed     Problem: Nutrition/Hydration-ADULT  Goal: Nutrient/Hydration intake appropriate for improving, restoring or maintaining nutritional needs  Description: Monitor and assess patient's nutrition/hydration status for malnutrition  Collaborate with interdisciplinary team and initiate plan and interventions as ordered  Monitor patient's weight and dietary intake as ordered or per policy  Utilize nutrition screening tool and intervene as necessary  Determine patient's food preferences and provide high-protein, high-caloric foods as appropriate       INTERVENTIONS:  - Monitor oral intake, urinary output, labs, and treatment plans  - Assess nutrition and hydration status and recommend course of action  - Evaluate amount of meals eaten  - Assist patient with eating if necessary   - Allow adequate time for meals  - Recommend/ encourage appropriate diets, oral nutritional supplements, and vitamin/mineral supplements  - Order, calculate, and assess calorie counts as needed  - Recommend, monitor, and adjust tube feedings and TPN/PPN based on assessed needs  - Assess need for intravenous fluids  - Provide specific nutrition/hydration education as appropriate  - Include patient/family/caregiver in decisions related to nutrition  Outcome: Completed     Problem: PAIN - ADULT  Goal: Verbalizes/displays adequate comfort level or baseline comfort level  Description: Interventions:  - Encourage patient to monitor pain and request assistance  - Assess pain using appropriate pain scale  - Administer analgesics based on type and severity of pain and evaluate response  - Implement non-pharmacological measures as appropriate and evaluate response  - Consider cultural and social influences on pain and pain management  - Notify physician/advanced practitioner if interventions unsuccessful or patient reports new pain  Outcome: Completed     Problem: INFECTION - ADULT  Goal: Absence or prevention of progression during hospitalization  Description: INTERVENTIONS:  - Assess and monitor for signs and symptoms of infection  - Monitor lab/diagnostic results  - Monitor all insertion sites, i e  indwelling lines, tubes, and drains  - Monitor endotracheal if appropriate and nasal secretions for changes in amount and color  - [place] appropriate cooling/warming therapies per order  - Administer medications as ordered  - Instruct and encourage patient and family to use good hand hygiene technique  - Identify and instruct in appropriate isolation precautions for identified infection/condition  Outcome: Completed     Problem: SAFETY ADULT  Goal: Patient will remain free of falls  Description: INTERVENTIONS:  - Assess patient frequently for physical needs  -  Identify cognitive and physical deficits and behaviors that affect risk of falls    -  [place] fall precautions as indicated by assessment   - Educate patient/family on patient safety including physical limitations  - Instruct patient to call for assistance with activity based on assessment  - Modify environment to reduce risk of injury  - Consider OT/PT consult to assist with strengthening/mobility  Outcome: Completed  Goal: Maintain or return to baseline ADL function  Description: INTERVENTIONS:  -  Assess patient's ability to carry out ADLs; assess patient's baseline for ADL function and identify physical deficits which impact ability to perform ADLs (bathing, care of mouth/teeth, toileting, grooming, dressing, etc )  - Assess/evaluate cause of self-care deficits   - Assess range of motion  - Assess patient's mobility; develop plan if impaired  - Assess patient's need for assistive devices and provide as appropriate  - Encourage maximum independence but intervene and supervise when necessary  - Involve family in performance of ADLs  - Assess for home care needs following discharge   - Consider OT consult to assist with ADL evaluation and planning for discharge  - Provide patient education as appropriate  Outcome: Completed  Goal: Maintain or return mobility status to optimal level  Description: INTERVENTIONS:  - Assess patient's baseline mobility status (ambulation, transfers, stairs, etc )    - Identify cognitive and physical deficits and behaviors that affect mobility  - Identify mobility aids required to assist with transfers and/or ambulation (gait belt, sit-to-stand, lift, walker, cane, etc )  - [place] fall precautions as indicated by assessment  - Record patient progress and toleration of activity level on Mobility SBAR; progress patient to next Phase/Stage  - Instruct patient to call for assistance with activity based on assessment  - Consider rehabilitation consult to assist with strengthening/weightbearing, etc   Outcome: Completed     Problem: DISCHARGE PLANNING  Goal: Discharge to home or other facility with appropriate resources  Description: INTERVENTIONS:  - Identify barriers to discharge w/patient and caregiver  - Arrange for needed discharge resources and transportation as appropriate  - Identify discharge learning needs (meds, wound care, etc )  - Arrange for interpretive services to assist at discharge as needed  - Refer to Case Management Department for coordinating discharge planning if the patient needs post-hospital services based on physician/advanced practitioner order or complex needs related to functional status, cognitive ability, or social support system  Outcome: Completed     Problem: Knowledge Deficit  Goal: Patient/family/caregiver demonstrates understanding of disease process, treatment plan, medications, and discharge instructions  Description: Complete learning assessment and assess knowledge base    Interventions:  - Provide teaching at level of understanding  - Provide teaching via preferred learning methods  Outcome: Completed

## 2021-06-10 PROBLEM — M25.462 PAIN AND SWELLING OF LEFT KNEE: Status: RESOLVED | Noted: 2021-05-17 | Resolved: 2021-06-10

## 2021-06-10 PROBLEM — R00.0 TACHYCARDIA: Status: RESOLVED | Noted: 2021-06-08 | Resolved: 2021-06-10

## 2021-06-10 PROBLEM — M25.562 PAIN AND SWELLING OF LEFT KNEE: Status: RESOLVED | Noted: 2021-05-17 | Resolved: 2021-06-10

## 2021-06-10 LAB
GLUCOSE SERPL-MCNC: 127 MG/DL (ref 65–140)
GLUCOSE SERPL-MCNC: 131 MG/DL (ref 65–140)
GLUCOSE SERPL-MCNC: 139 MG/DL (ref 65–140)
GLUCOSE SERPL-MCNC: 140 MG/DL (ref 65–140)

## 2021-06-10 PROCEDURE — 82948 REAGENT STRIP/BLOOD GLUCOSE: CPT

## 2021-06-10 PROCEDURE — 99232 SBSQ HOSP IP/OBS MODERATE 35: CPT | Performed by: INTERNAL MEDICINE

## 2021-06-10 RX ORDER — LORAZEPAM 2 MG/ML
0.5 INJECTION INTRAMUSCULAR EVERY 6 HOURS PRN
Status: DISCONTINUED | OUTPATIENT
Start: 2021-06-10 | End: 2021-06-11

## 2021-06-10 RX ADMIN — LORAZEPAM 0.5 MG: 2 INJECTION INTRAMUSCULAR; INTRAVENOUS at 08:15

## 2021-06-10 RX ADMIN — OXYCODONE HYDROCHLORIDE 5 MG: 5 TABLET ORAL at 17:06

## 2021-06-10 RX ADMIN — ACETAMINOPHEN 650 MG: 650 SUSPENSION ORAL at 22:39

## 2021-06-10 RX ADMIN — LIDOCAINE 5% 1 PATCH: 700 PATCH TOPICAL at 08:15

## 2021-06-10 RX ADMIN — Medication 20 MG: at 08:15

## 2021-06-10 RX ADMIN — GABAPENTIN 100 MG: 250 SUSPENSION ORAL at 22:40

## 2021-06-10 RX ADMIN — ACYCLOVIR 400 MG: 200 CAPSULE ORAL at 08:15

## 2021-06-10 RX ADMIN — LEVOFLOXACIN 500 MG: 500 TABLET, FILM COATED ORAL at 17:07

## 2021-06-10 RX ADMIN — OXYCODONE HYDROCHLORIDE 5 MG: 5 TABLET ORAL at 06:16

## 2021-06-10 RX ADMIN — DICYCLOMINE HYDROCHLORIDE 10 MG: 10 CAPSULE ORAL at 12:08

## 2021-06-10 RX ADMIN — LORAZEPAM 0.5 MG: 2 INJECTION INTRAMUSCULAR; INTRAVENOUS at 17:06

## 2021-06-10 RX ADMIN — INSULIN GLARGINE 5 UNITS: 100 INJECTION, SOLUTION SUBCUTANEOUS at 22:39

## 2021-06-10 RX ADMIN — ACETAMINOPHEN 650 MG: 650 SUSPENSION ORAL at 06:15

## 2021-06-10 RX ADMIN — HYDROXYZINE HYDROCHLORIDE 25 MG: 25 TABLET, FILM COATED ORAL at 22:38

## 2021-06-10 RX ADMIN — ACYCLOVIR 400 MG: 200 CAPSULE ORAL at 22:38

## 2021-06-10 RX ADMIN — ALLOPURINOL 100 MG: 100 TABLET ORAL at 08:15

## 2021-06-10 RX ADMIN — DICYCLOMINE HYDROCHLORIDE 10 MG: 10 CAPSULE ORAL at 22:39

## 2021-06-10 RX ADMIN — MENTHOL, UNSPECIFIED FORM AND METHYL SALICYLATE: 10; 150 CREAM TOPICAL at 17:07

## 2021-06-10 RX ADMIN — ENOXAPARIN SODIUM 40 MG: 40 INJECTION SUBCUTANEOUS at 13:07

## 2021-06-10 RX ADMIN — ACETAMINOPHEN 650 MG: 650 SUSPENSION ORAL at 13:06

## 2021-06-10 RX ADMIN — FLUCONAZOLE 400 MG: 200 TABLET ORAL at 08:15

## 2021-06-10 RX ADMIN — MENTHOL, UNSPECIFIED FORM AND METHYL SALICYLATE: 10; 150 CREAM TOPICAL at 08:16

## 2021-06-10 RX ADMIN — DICYCLOMINE HYDROCHLORIDE 10 MG: 10 CAPSULE ORAL at 06:16

## 2021-06-10 RX ADMIN — TAMSULOSIN HYDROCHLORIDE 0.4 MG: 0.4 CAPSULE ORAL at 17:06

## 2021-06-10 RX ADMIN — DICYCLOMINE HYDROCHLORIDE 10 MG: 10 CAPSULE ORAL at 17:06

## 2021-06-10 RX ADMIN — HYDROXYZINE HYDROCHLORIDE 25 MG: 25 TABLET, FILM COATED ORAL at 06:16

## 2021-06-10 NOTE — CASE MANAGEMENT
CM spoke with Mushtaq Madison ([phone] ext [phone]) from [facility]  Per Mushtaq Madison they will be reviewing pt's referral  Mushtaq Madison expressed concerns about pt's cancer treatment as well as her language needs  Per Mushtaq Madison they will review pt's referral and notify CM later today  CM will follow

## 2021-06-10 NOTE — CASE MANAGEMENT
CM had a voicemail from Soheila from [place] stating that they had a bed available at [facility]  CM faxed over referral as requested to Daniele Maradiaga (P: [phone] ext [phone])  CM will follow

## 2021-06-10 NOTE — CASE MANAGEMENT
YSABEL spoke with Kimberly from [facility] who stated that the [address] for [facility] has been approved pending chintan and that [facility] was going to reach out to [facility] today to get the process started  However, they would like to pursue an in network provider first so we are waiting on an answer from [facility]  CM will follow

## 2021-06-10 NOTE — PROGRESS NOTES
INTERNAL MEDICINE RESIDENCY PROGRESS NOTE     Name: Anupama Barajas   Age & Sex: 49 y o  female   MRN: [id number]  Unit/Bed#: [facility] 920-01   Encounter: [id number]  Team: SOD Team B     PATIENT INFORMATION     Name: Anupama Barajas   Age & Sex: 49 y o  female   MRN: [id number] [facility] [address] Stay Days: 50    ASSESSMENT/PLAN     Principal Problem:    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[facility] [address] )  Active Problems:    Altered mental status    Dysphagia    Aphasia    Weakness    Fracture of ramus of left pubis (HCC)    Severe protein-calorie malnutrition ([facility] [address] )      * CNS lymphoma [facility])  Assessment & Plan  Patient was diagnosed with primary CNS lymphoma through biopsy in [facility] transferred over here for chemotherapy since the [facility] is out of network  Had a 2nd family meeting on 04/30 and family has decided to proceed treatment at this time  S/p PICC line for chemotherapy  S/p peg tube placement on 05/10  Plan:  Hematology-Oncology consulted  Appreciate recommendations  Recommending follow-up MRI with and without contrast to assess severity of disease after chemotherapy  S/p 2nd cycle of chemotherapy on 05/13  Leucovin on 5/14  DVT prophylaxis with Lovenox  Rehab placement pending    S/p steroid taper for cerebral edema from CNS lymphoma  Pancytopenia in setting of recent chemotherapy  Will continue to watch for any signs of infection    On acyclovir, Diflucan and Levaquin prophylactically  After discussion with Oncology, plan for rehab, patient does not require radiation therapy, rehab for strength, no chemotherapy until at least 3 weeks, evaluate at that time  Patient's family updated on plan going forward, no questions after family visit  Repeat MRI today to determine if disease is progressing versus improving after 2 cycles of chemotherapy    Acute gout-resolved as of 6/2/2021  Assessment & Plan  Gout flare left knee resolving  Steroid taper completed  Will start low-dose allopurinol 100 mg q day to hopefully prevent other acute flares in the setting of chemotherapy  Follow-up uric acid level    Severe protein-calorie malnutrition (HCC)  Assessment & Plan  Malnutrition Findings:   Adult Malnutrition type: Acute illness(due to medical condition resulting in dysphagia, decreased appetite and intake, weight loss)  Adult Degree of Malnutrition: Other severe protein calorie malnutrition    BMI Findings: Body mass index is 21 44 kg/m²  Plan:  Nutrition consult  Tube feeds initiated with Jevity 1 2, advancing to goal      Fracture of ramus of left pubis [facility])  Assessment & Plan  Vivian Antonys prior to presentation to [facility]  Managed nonoperatively at [facility]    Weakness  Assessment & Plan  Left greater than right upper and lower extremity weakness with ongoing left lower extremity contracture  Secondary to underlying CNS malignancy  Decubitus ulcer precautions such as frequent repositioning  Aphasia  Assessment & Plan  Speech therapy on board    Dysphagia  Assessment & Plan  s/p peg tube placement, tube feeds at goal  Euvolemic    Altered mental status  Assessment & Plan  Patient initially presented to [facility] his [place] with stroke-like symptoms for 2 days  CT scan of the head was concerning for subacute CVA and was transferred to [facility]   MRI of the brain was more consistent with demyelinating disease patient was started on IV steroids and 1000 mg daily for 5 days  Patient did not have any significant improvement at that time patient had plasmapheresis  And also had  lumbar puncture-negative for infection or malignancy  Patient noted to have persistent encephalopathy so a repeat MRI was obtained which showed worsening of the abnormality seen on the previous MRI and 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  Likely secondary to CNS lymphoma  Delirium precautions   Currently alert and oriented times 2-3  Follow commands      Patient's  (does not nay Elizalde) makes all medical decision for her, confirmed once all medical treatment at this time    Pain of left lower leg-resolved as of 6/2/2021  Assessment & Plan  Overnight left lower extremity pain, well score 3, pain now controlled  Bilateral ultrasound Dopplers no evidence of DVT    Pancytopenia (HCC)-resolved as of 6/2/2021  Assessment & Plan  Likely in setting of chemotherapy  Will continue to monitor for any signs of infection  Will transfuse with packed RBC for hemoglobin less than 7  Drop in Hgb from 9 to 7 on 5/14, other lines relatively stable  VSS and soft brown BMs  Will discuss with oncology if this is expected based on chemo or if there should be further workup for bleeding, though hemodynamics currently do not suggest bleed  S/p 1 pack RBC on 05/15, hemoglobin continues to improve    Left-sided weakness-resolved as of 6/2/2021  Assessment & Plan  Patient has left-sided weakness at baseline from her CNS lymphoma  Left upper extremity 3-4/5 and left lower extremity 0-1/5 muscle strength    Sepsis (HCC)-resolved as of 5/13/2021  Assessment & Plan  Patient had hypothermia, elevated WBC count and tachycardia  Concern for possible aspiration in setting on dysphagia versus UTI in setting of urinary catheter which was placed for retention  Plan:  S/p IV antibiotics last day on 05/05  Will continue to monitor off antibiotics  Steroid-induced hyperglycemia-resolved as of 5/23/2021  Assessment & Plan  Mild hyperglycemia, glucoses in 200s, no history of DM  Occurring in the setting of Decadron with chemotherapy, as well as sodium bicarb in D5 drip  - D5 drip is finished     - will monitor sugars while on Jevity and now off D5  - continue q4 hour fingerstick glucose with correctional algorithm 1 and Lantus    Presence of permanent central venous catheter-resolved as of 6/2/2021  Assessment & Plan  Noted presence of right IJ tunneled double-lumen HD catheter; upon chart review, this was likely inserted to be used for plasmapheresis which she has completed  Plan:  S/p removal by IR  Patient now has PICC line  Urinary retention-resolved as of 2021  Assessment & Plan  Patient developed urinary retention during previous hospitalization  Plan was voiding trial as an outpatient as she was scheduled for discharge from that facility  Machado catheter was initially removed although patient required re-placement of machado on  in setting of incontinence and getting chemotherapy  Machado discontinued, monitor for signs of fluid retention        Disposition:  Awaiting rehab placement     SUBJECTIVE     Patient seen and examined  No acute events overnight  Mild distress overnight, anxiety etiology  OBJECTIVE     Vitals:    21 0724 21 1101 21 1522 06/10/21 0600   BP: 118/73 120/77 98/65    Pulse: 82  83    Resp: 19  18    Temp: 98 5 °F (36 9 °C)  99 1 °F (37 3 °C)    TempSrc:       SpO2: 100%  100%    Weight:    54 3 kg (119 lb 11 4 oz)   Height:          Temperature:   Temp (24hrs), Av 8 °F (37 1 °C), Min:98 5 °F (36 9 °C), Max:99 1 °F (37 3 °C)    Temperature: 99 1 °F (37 3 °C)  Intake & Output:  I/O       701 -  07 - 06/10 0700 06/10 07 -  0700    P  O  480 300     NG/      Feedings 785      Total Intake(mL/kg) 2240 (41 5) 300 (5 5)     Urine (mL/kg/hr) 1000 (0 8) 850 (0 7)     Stool 0 0     Total Output 1000 850     Net +1240 -550            Unmeasured Urine Occurrence 2 x 3 x     Unmeasured Stool Occurrence 1 x 3 x         Weights:   IBW (Ideal Body Weight): 45 5 kg    Body mass index is 23 38 kg/m²  Weight (last 2 days)     Date/Time   Weight    06/10/21 0600   54 3 (119 71)    21 0600   54 (119 05)    21 0544   54 (119 05)            Physical Exam  Vitals signs and nursing note reviewed  Constitutional:       General: She is not in acute distress  Appearance: She is well-developed  She is ill-appearing     HENT: Head: Normocephalic and atraumatic  Eyes:      Conjunctiva/sclera: Conjunctivae normal    Neck:      Musculoskeletal: Neck supple  Cardiovascular:      Rate and Rhythm: Normal rate and regular rhythm  Heart sounds: No murmur  Pulmonary:      Effort: Pulmonary effort is normal  No respiratory distress  Breath sounds: Normal breath sounds  Abdominal:      Palpations: Abdomen is soft  Tenderness: There is no abdominal tenderness  Skin:     General: Skin is warm and dry  Capillary Refill: Capillary refill takes less than 2 seconds  Neurological:      Mental Status: She is alert  She is disoriented  Psychiatric:         Mood and Affect: Mood normal        LABORATORY DATA     Labs: I have personally reviewed pertinent reports  Results from last 7 days   Lab Units 06/08/21  0656 06/07/21  0648   WBC Thousand/uL 7 58 9 04   HEMOGLOBIN g/dL 8 7* 8 6*   HEMATOCRIT % 27 3* 28 3*   PLATELETS Thousands/uL 257 279   MONO PCT %  --  9      Results from last 7 days   Lab Units 06/07/21  0648   POTASSIUM mmol/L 4 0   CHLORIDE mmol/L 103   CO2 mmol/L 29   BUN mg/dL 18   CREATININE mg/dL 0 33*   CALCIUM mg/dL 8 8                            IMAGING & DIAGNOSTIC TESTING     Radiology Results: I have personally reviewed pertinent reports  Xr Chest Portable    Result Date: 4/24/2021  Impression: Dialysis catheter tip within the superior cavoatrial junction  No pneumothorax  Workstation performed: [id number]     Xr Chest Picc Line Portable    Result Date: 4/27/2021  Impression: PICC line tip in the superior vena cava, approximately 3 5 to 4 cm above the cavoatrial junction  The examination demonstrates a significant  finding and was documented as such in [facility] for liaison and referring practitioner notification   Workstation performed: [id number]     Ir Picc Reposition    Result Date: 4/27/2021  Impression: Status-post repositioning of a 5 German central venous catheter under fluoroscopic guidance with its tip at the cavoatrial junction  Workstation performed: [id number]     Ir Tunneled Central Line Removal    Result Date: 4/27/2021  Impression: Impression: Successful tunneled central line removal as described  Signed, performed and dictated by Danni Zhang PA-C under the supervision of Dr Anabella Ramos  Workstation performed: [id number]     Other Diagnostic Testing: I have personally reviewed pertinent reports      ACTIVE MEDICATIONS     Current Facility-Administered Medications   Medication Dose Route Frequency    acetaminophen (TYLENOL) oral suspension 650 mg  650 mg Oral Q8H [facility]    acyclovir (ZOVIRAX) capsule 400 mg  400 mg Oral Q12H [facility]    albuterol (PROVENTIL HFA,VENTOLIN HFA) inhaler 2 puff  2 puff Inhalation Q6H PRN    allopurinol (ZYLOPRIM) tablet 100 mg  100 mg Oral Daily    aluminum-magnesium hydroxide-simethicone (MYLANTA) oral suspension 30 mL  30 mL Per PEG Tube Q4H PRN    amLODIPine (NORVASC) tablet 5 mg  5 mg Oral Daily    bisacodyl (DULCOLAX) rectal suppository 10 mg  10 mg Rectal Daily PRN    Diclofenac Sodium (VOLTAREN) 1 % topical gel 2 g  2 g Topical TID PRN    dicyclomine (BENTYL) capsule 10 mg  10 mg Oral 4x Daily (AC & HS)    enoxaparin (LOVENOX) subcutaneous injection 40 mg  40 mg Subcutaneous Q24H MEÑO    fluconazole (DIFLUCAN) tablet 400 mg  400 mg Oral Daily    gabapentin (NEURONTIN) oral solution 100 mg  100 mg Oral HS    hydrOXYzine HCL (ATARAX) tablet 25 mg  25 mg Oral Q6H PRN    insulin glargine (LANTUS) subcutaneous injection 5 Units 0 05 mL  5 Units Subcutaneous HS    insulin lispro (HumaLOG) 100 units/mL subcutaneous injection 1-5 Units  1-5 Units Subcutaneous 4x Daily (AC & HS)    levofloxacin (LEVAQUIN) tablet 500 mg  500 mg Oral Q24H    lidocaine (LIDODERM) 5 % patch 1 patch  1 patch Topical Daily    menthol-methyl salicylate (BENGAY) 98-80 % cream   Apply externally BID    omeprazole (PRILOSEC) suspension 2 mg/mL  20 mg Oral Daily    ondansetron (ZOFRAN) injection 4 mg  4 mg Intravenous Q6H PRN    oxyCODONE (ROXICODONE) IR tablet 5 mg  5 mg Oral Q6H PRN    polyethylene glycol (MIRALAX) packet 17 g  17 g Oral Daily PRN    senna-docusate sodium (SENOKOT S) 8 6-50 mg per tablet 1 tablet  1 tablet Oral BID PRN    tamsulosin (FLOMAX) capsule 0 4 mg  0 4 mg Oral Daily With Dinner       VTE Pharmacologic Prophylaxis: Enoxaparin (Lovenox)  VTE Mechanical Prophylaxis: sequential compression device    Portions of the record may have been created with voice recognition software  Occasional wrong word or "sound a like" substitutions may have occurred due to the inherent limitations of voice recognition software    Read the chart carefully and recognize, using context, where substitutions have occurred   ==  Jerry Johns, [facility]  Internal Medicine Residency PGY-2

## 2021-06-11 LAB
ANION GAP SERPL CALCULATED.3IONS-SCNC: 6 MMOL/L (ref 4–13)
ANISOCYTOSIS BLD QL SMEAR: PRESENT
BASOPHILS # BLD MANUAL: 0.08 THOUSAND/UL (ref 0–0.1)
BASOPHILS NFR MAR MANUAL: 1 % (ref 0–1)
BUN SERPL-MCNC: 10 MG/DL (ref 5–25)
CALCIUM SERPL-MCNC: 9.3 MG/DL (ref 8.3–10.1)
CHLORIDE SERPL-SCNC: 105 MMOL/L (ref 100–108)
CO2 SERPL-SCNC: 28 MMOL/L (ref 21–32)
CREAT SERPL-MCNC: 0.31 MG/DL (ref 0.6–1.3)
DACRYOCYTES BLD QL SMEAR: PRESENT
EOSINOPHIL # BLD MANUAL: 0.23 THOUSAND/UL (ref 0–0.4)
EOSINOPHIL NFR BLD MANUAL: 3 % (ref 0–6)
ERYTHROCYTE [DISTWIDTH] IN BLOOD BY AUTOMATED COUNT: 19 % (ref 11.6–15.1)
GFR SERPL CREATININE-BSD FRML MDRD: 129 ML/MIN/1.73SQ M
GLUCOSE SERPL-MCNC: 112 MG/DL (ref 65–140)
GLUCOSE SERPL-MCNC: 116 MG/DL (ref 65–140)
GLUCOSE SERPL-MCNC: 118 MG/DL (ref 65–140)
GLUCOSE SERPL-MCNC: 159 MG/DL (ref 65–140)
GLUCOSE SERPL-MCNC: 92 MG/DL (ref 65–140)
HCT VFR BLD AUTO: 32.3 % (ref 34.8–46.1)
HGB BLD-MCNC: 9.9 G/DL (ref 11.5–15.4)
LYMPHOCYTES # BLD AUTO: 2.85 THOUSAND/UL (ref 0.6–4.47)
LYMPHOCYTES # BLD AUTO: 37 % (ref 14–44)
MCH RBC QN AUTO: 30.8 PG (ref 26.8–34.3)
MCHC RBC AUTO-ENTMCNC: 30.7 G/DL (ref 31.4–37.4)
MCV RBC AUTO: 101 FL (ref 82–98)
MONOCYTES # BLD AUTO: 0.39 THOUSAND/UL (ref 0–1.22)
MONOCYTES NFR BLD: 5 % (ref 4–12)
MYELOCYTES NFR BLD MANUAL: 8 % (ref 0–1)
NEUTROPHILS # BLD MANUAL: 3.55 THOUSAND/UL (ref 1.85–7.62)
NEUTS BAND NFR BLD MANUAL: 2 % (ref 0–8)
NEUTS SEG NFR BLD AUTO: 44 % (ref 43–75)
NRBC BLD AUTO-RTO: 0 /100 WBCS
PLATELET # BLD AUTO: 286 THOUSANDS/UL (ref 149–390)
PLATELET BLD QL SMEAR: ADEQUATE
PMV BLD AUTO: 9.9 FL (ref 8.9–12.7)
POIKILOCYTOSIS BLD QL SMEAR: PRESENT
POLYCHROMASIA BLD QL SMEAR: PRESENT
POTASSIUM SERPL-SCNC: 3.6 MMOL/L (ref 3.5–5.3)
RBC # BLD AUTO: 3.21 MILLION/UL (ref 3.81–5.12)
RBC MORPH BLD: PRESENT
SODIUM SERPL-SCNC: 139 MMOL/L (ref 136–145)
WBC # BLD AUTO: 7.71 THOUSAND/UL (ref 4.31–10.16)

## 2021-06-11 PROCEDURE — 80048 BASIC METABOLIC PNL TOTAL CA: CPT | Performed by: STUDENT IN AN ORGANIZED HEALTH CARE EDUCATION/TRAINING PROGRAM

## 2021-06-11 PROCEDURE — 97112 NEUROMUSCULAR REEDUCATION: CPT

## 2021-06-11 PROCEDURE — 85027 COMPLETE CBC AUTOMATED: CPT | Performed by: STUDENT IN AN ORGANIZED HEALTH CARE EDUCATION/TRAINING PROGRAM

## 2021-06-11 PROCEDURE — 97110 THERAPEUTIC EXERCISES: CPT

## 2021-06-11 PROCEDURE — 82948 REAGENT STRIP/BLOOD GLUCOSE: CPT

## 2021-06-11 PROCEDURE — 85007 BL SMEAR W/DIFF WBC COUNT: CPT | Performed by: STUDENT IN AN ORGANIZED HEALTH CARE EDUCATION/TRAINING PROGRAM

## 2021-06-11 RX ADMIN — LEVOFLOXACIN 500 MG: 500 TABLET, FILM COATED ORAL at 16:25

## 2021-06-11 RX ADMIN — MENTHOL, UNSPECIFIED FORM AND METHYL SALICYLATE: 10; 150 CREAM TOPICAL at 16:35

## 2021-06-11 RX ADMIN — ACETAMINOPHEN 650 MG: 650 SUSPENSION ORAL at 06:11

## 2021-06-11 RX ADMIN — DICYCLOMINE HYDROCHLORIDE 10 MG: 10 CAPSULE ORAL at 06:11

## 2021-06-11 RX ADMIN — ACETAMINOPHEN 650 MG: 650 SUSPENSION ORAL at 14:06

## 2021-06-11 RX ADMIN — LIDOCAINE 5% 1 PATCH: 700 PATCH TOPICAL at 09:39

## 2021-06-11 RX ADMIN — ENOXAPARIN SODIUM 40 MG: 40 INJECTION SUBCUTANEOUS at 14:06

## 2021-06-11 RX ADMIN — DICYCLOMINE HYDROCHLORIDE 10 MG: 10 CAPSULE ORAL at 16:25

## 2021-06-11 RX ADMIN — OXYCODONE HYDROCHLORIDE 5 MG: 5 TABLET ORAL at 16:30

## 2021-06-11 RX ADMIN — FLUCONAZOLE 400 MG: 200 TABLET ORAL at 09:40

## 2021-06-11 RX ADMIN — AMLODIPINE BESYLATE 5 MG: 5 TABLET ORAL at 09:40

## 2021-06-11 RX ADMIN — TAMSULOSIN HYDROCHLORIDE 0.4 MG: 0.4 CAPSULE ORAL at 16:25

## 2021-06-11 RX ADMIN — Medication 20 MG: at 09:44

## 2021-06-11 RX ADMIN — MENTHOL, UNSPECIFIED FORM AND METHYL SALICYLATE: 10; 150 CREAM TOPICAL at 09:40

## 2021-06-11 RX ADMIN — INSULIN LISPRO 1 UNITS: 100 INJECTION, SOLUTION INTRAVENOUS; SUBCUTANEOUS at 16:43

## 2021-06-11 RX ADMIN — ACYCLOVIR 400 MG: 200 CAPSULE ORAL at 09:39

## 2021-06-11 RX ADMIN — ALLOPURINOL 100 MG: 100 TABLET ORAL at 09:40

## 2021-06-11 NOTE — UTILIZATION REVIEW
Continued Stay Review    Date: 6-                        Current Patient Class: Inpatient  Current Level of Care: Med-Surg  HPI:56 y o  female initially admitted on 4/23 with CNS lymphoma    Assessment/Plan: Currently alert and oriented times 2-3  Follow commands  Plan is for IP rehab  Ins has approved auth for [facility]  SNF states IV ativan needs to be d/c's as they are unable to administer IV ativan (must be po or via peg tube), and pt will need to not have received it without 72 hrs of admission  They will review again on Monday as received 2 doses so far today  Continue current tx plan; po meds   SCD's/Lovenox sq/      Vital Signs:   Date/Time  Temp  Pulse  Resp  BP  MAP (mmHg)  SpO2  O2 Device   06/11/21 09:25:28  --  81  18  124/74  91  99 %  --   06/11/21 07:37:01  97 7 °F (36 5 °C)  89  18  108/66  80  100 %  None (Room air)   06/10/21 2245  --  --  --  --  --  --  None (Room air)   06/10/21 22:03:36  99 5 °F (37 5 °C)  88  18  105/70  82  96 %  --   06/10/21 14:59:57  --  --  16  115/77  90  --  --   06/10/21 07:25:39  --  88  18  95/62  73  96 %  --         Pertinent Labs/Diagnostic Results:   Results from last 7 days   Lab Units 06/08/21  1711   SARS-COV-2  Negative     Results from last 7 days   Lab Units 06/11/21  0615 06/08/21  0656 06/07/21  0648   WBC Thousand/uL 7 71 7 58 9 04   HEMOGLOBIN g/dL 9 9* 8 7* 8 6*   HEMATOCRIT % 32 3* 27 3* 28 3*   PLATELETS Thousands/uL 286 257 279   BANDS PCT % 2  --  1     Results from last 7 days   Lab Units 06/11/21  0615 06/07/21  0648   SODIUM mmol/L 139 138   POTASSIUM mmol/L 3 6 4 0   CHLORIDE mmol/L 105 103   CO2 mmol/L 28 29   ANION GAP mmol/L 6 6   BUN mg/dL 10 18   CREATININE mg/dL 0 31* 0 33*   EGFR ml/min/1 73sq m 129 126   CALCIUM mg/dL 9 3 8 8         Results from last 7 days   Lab Units 06/11/21  1600 06/11/21  1221 06/11/21  0759 06/10/21  2037 06/10/21  1624 06/10/21  1135 06/10/21  0748 06/09/21 2038 06/09/21  1641 06/09/21  1104 06/09/21  0750 06/08/21  2053   POC GLUCOSE mg/dl 159* 118 116 139 127 140 131 130 125 121 129 136     Results from last 7 days   Lab Units 06/11/21  0615 06/07/21  0648   GLUCOSE RANDOM mg/dL 112 123       Medications:   Scheduled Medications:  acetaminophen, 650 mg, Oral, Q8H MEÑO  acyclovir, 400 mg, Oral, Q12H MEÑO  allopurinol, 100 mg, Oral, Daily  amLODIPine, 5 mg, Oral, Daily  dicyclomine, 10 mg, Oral, 4x Daily (AC & HS)  enoxaparin, 40 mg, Subcutaneous, Q24H MEÑO  fluconazole, 400 mg, Oral, Daily  gabapentin, 100 mg, Oral, HS  insulin glargine, 5 Units, Subcutaneous, HS  insulin lispro, 1-5 Units, Subcutaneous, 4x Daily (AC & HS)  levofloxacin, 500 mg, Oral, Q24H  lidocaine, 1 patch, Topical, Daily  menthol-methyl salicylate, , Apply externally, BID  omeprazole (PRILOSEC) suspension 2 mg/mL, 20 mg, Oral, Daily  tamsulosin, 0 4 mg, Oral, Daily With Dinner         PRN Meds:  albuterol, 2 puff, Inhalation, Q6H PRN  aluminum-magnesium hydroxide-simethicone, 30 mL, Per PEG Tube, Q4H PRN  bisacodyl, 10 mg, Rectal, Daily PRN  Diclofenac Sodium, 2 g, Topical, TID PRN  hydrOXYzine HCL, 25 mg, Oral, Q6H PRN 6/11 x2  LORazepam, 0 5 mg, Intravenous, Q6H PRN 6/11 x2  ondansetron, 4 mg, Intravenous, Q6H PRN  oxyCODONE, 5 mg, Oral, Q6H PRN 6/10 x2, 6/11 x1  polyethylene glycol, 17 g, Oral, Daily PRN  senna-docusate sodium, 1 tablet, Oral, BID PRN        Discharge Plan: D    Network Utilization Review Department  ATTENTION: Please call with any questions or concerns to [phone] and carefully listen to the prompts so that you are directed to the right person  All voicemails are confidential   Ty Limb all requests for admission clinical reviews, approved or denied determinations and any other requests to dedicated fax number below belonging to the campus where the patient is receiving treatment   List of dedicated fax numbers for the Facilities:  FACILITY NAME UR FAX NUMBER   ADMISSION DENIALS (Administrative/Medical Necessity) [address] [facility] (Maternity/NICU/Pediatrics) [id number] [facility] [address] [place] [address]  [phone]   Juanito Flor [address] [place] [id number] [place] [address] [address] [phone]   [address] [facility] [phone]

## 2021-06-11 NOTE — PROGRESS NOTES
INTERNAL MEDICINE RESIDENCY PROGRESS NOTE     Name: Toro Barajas   Age & Sex: 49 y o  female   MRN: [id number]  [facility]   Encounter: [id number]  Team: SOD Team B     PATIENT INFORMATION     Name: Toro Barajas   Age & Sex: 49 y o  female   MRN: [id number] Stay Days: 52    ASSESSMENT/PLAN     Principal Problem:    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[facility] [address] )  Active Problems:    Altered mental status    Dysphagia    Aphasia    Weakness    Fracture of ramus of left pubis (HCC)    Severe protein-calorie malnutrition ([facility] [address] )      * CNS lymphoma [facility])  Assessment & Plan  Patient was diagnosed with primary CNS lymphoma through biopsy in [facility] transferred over here for chemotherapy since the [facility] is out of network  Had a 2nd family meeting on 04/30 and family has decided to proceed treatment at this time  S/p PICC line for chemotherapy  S/p peg tube placement on 05/10  Plan:  s/p 2nd cycle of chemotherapy on 05/13, leucovin on 5/14  DVT prophylaxis with Lovenox  Rehab placement pending    S/p steroid taper for cerebral edema from CNS lymphoma  Pancytopenia in setting of recent chemotherapy    Will continue to watch for any signs of infection  On acyclovir, Diflucan and Levaquin prophylactically  After discussion with Oncology, plan for rehab, patient does not require radiation therapy, rehab for strength  Patient's family updated on plan going forward, no questions after family visit  Repeat MRI shows interval improvement of intracranial multifocal lesions  Re-consult Oncology on the 06/14/2021 if patient is still inpatient    Acute gout-resolved as of 6/2/2021  Assessment & Plan  Gout flare left knee resolving  Steroid taper completed  Will start low-dose allopurinol 100 mg q day to hopefully prevent other acute flares in the setting of chemotherapy  Follow-up uric acid level    Severe protein-calorie malnutrition ([facility] [address] )  Assessment & Plan  Malnutrition Findings: Adult Malnutrition type: Acute illness(due to medical condition resulting in dysphagia, decreased appetite and intake, weight loss)  Adult Degree of Malnutrition: Other severe protein calorie malnutrition    BMI Findings: Body mass index is 21 44 kg/m²  Plan:  Nutrition consult  Tube feeds initiated with Jevity 1 2, advancing to goal      Fracture of ramus of left pubis [facility])  Assessment & Plan  Marti Holliday prior to presentation to [facility]  Managed nonoperatively at [facility]    Weakness  Assessment & Plan  Left greater than right upper and lower extremity weakness with ongoing left lower extremity contracture  Secondary to underlying CNS malignancy  Decubitus ulcer precautions such as frequent repositioning  Aphasia  Assessment & Plan  Speech therapy on board    Dysphagia  Assessment & Plan  s/p peg tube placement, tube feeds at goal  Euvolemic    Altered mental status  Assessment & Plan  Patient initially presented to [facility] [place] with stroke-like symptoms for 2 days  CT scan of the head was concerning for subacute CVA and was transferred to [facility]   MRI of the brain was more consistent with demyelinating disease patient was started on IV steroids and 1000 mg daily for 5 days  Patient did not have any significant improvement at that time patient had plasmapheresis  And also had  lumbar puncture-negative for infection or malignancy  Patient noted to have persistent encephalopathy so a repeat MRI was obtained which showed worsening of the abnormality seen on the previous MRI and 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  Likely secondary to CNS lymphoma  Delirium precautions   Currently alert and oriented times 2-3  Follow commands      Patient's  (does not speak [place]) makes all medical decision for her, confirmed once all medical treatment at this time    Pain of left lower leg-resolved as of 6/2/2021  Assessment & Plan  Overnight left lower extremity pain, well score 3, pain now controlled  Bilateral ultrasound Dopplers no evidence of DVT    Pancytopenia (HCC)-resolved as of 6/2/2021  Assessment & Plan  Likely in setting of chemotherapy  Will continue to monitor for any signs of infection  Will transfuse with packed RBC for hemoglobin less than 7  Drop in Hgb from 9 to 7 on 5/14, other lines relatively stable  VSS and soft brown BMs  Will discuss with oncology if this is expected based on chemo or if there should be further workup for bleeding, though hemodynamics currently do not suggest bleed  S/p 1 pack RBC on 05/15, hemoglobin continues to improve    Left-sided weakness-resolved as of 6/2/2021  Assessment & Plan  Patient has left-sided weakness at baseline from her CNS lymphoma  Left upper extremity 3-4/5 and left lower extremity 0-1/5 muscle strength    Sepsis (HCC)-resolved as of 5/13/2021  Assessment & Plan  Patient had hypothermia, elevated WBC count and tachycardia  Concern for possible aspiration in setting on dysphagia versus UTI in setting of urinary catheter which was placed for retention  Plan:  S/p IV antibiotics last day on 05/05  Will continue to monitor off antibiotics  Steroid-induced hyperglycemia-resolved as of 5/23/2021  Assessment & Plan  Mild hyperglycemia, glucoses in 200s, no history of DM  Occurring in the setting of Decadron with chemotherapy, as well as sodium bicarb in D5 drip  - D5 drip is finished  - will monitor sugars while on Jevity and now off D5  - continue q4 hour fingerstick glucose with correctional algorithm 1 and Lantus    Presence of permanent central venous catheter-resolved as of 6/2/2021  Assessment & Plan  Noted presence of right IJ tunneled double-lumen HD catheter; upon chart review, this was likely inserted to be used for plasmapheresis which she has completed  Plan:  S/p removal by IR  Patient now has PICC line      Urinary retention-resolved as of 6/2/2021  Assessment & Plan  Patient developed urinary retention during previous hospitalization  Plan was voiding trial as an outpatient as she was scheduled for discharge from that facility  Machado catheter was initially removed although patient required re-placement of machado on  in setting of incontinence and getting chemotherapy  Machado discontinued, monitor for signs of fluid retention        Disposition:  Awaiting rehab placement     SUBJECTIVE     Patient seen and examined  No acute events overnight  OBJECTIVE     Vitals:    06/10/21 0725 06/10/21 1459 06/10/21 2203 21 06   BP: 95/62 115/77 105/70    Pulse: 88  88    Resp: 18 16 18    Temp:   99 5 °F (37 5 °C)    TempSrc:       SpO2: 96%  96%    Weight:    54 3 kg (119 lb 11 4 oz)   Height:          Temperature:   Temp (24hrs), Av 5 °F (37 5 °C), Min:99 5 °F (37 5 °C), Max:99 5 °F (37 5 °C)    Temperature: 99 5 °F (37 5 °C)  Intake & Output:  I/O       701 - 06/10 0700 06/10 0701 -  -  07    P  O  300 360     NG/GT       Feedings       Total Intake(mL/kg) 300 (5 5) 360 (6 6)     Urine (mL/kg/hr) 850 (0 7) 2050 (1 6)     Stool 0 0     Total Output 850 0     Net -550 -1690            Unmeasured Urine Occurrence 3 x      Unmeasured Stool Occurrence 3 x 3 x         Weights:   IBW (Ideal Body Weight): 45 5 kg    Body mass index is 23 38 kg/m²  Weight (last 2 days)     Date/Time   Weight    21 06   54 3 (119 71)    06/10/21 0600   54 3 (119 71)    21 0600   54 (119 05)            Physical Exam  Vitals signs and nursing note reviewed  Constitutional:       General: She is not in acute distress  Appearance: She is well-developed  She is ill-appearing  HENT:      Head: Normocephalic and atraumatic  Eyes:      Conjunctiva/sclera: Conjunctivae normal    Neck:      Musculoskeletal: Neck supple  Cardiovascular:      Rate and Rhythm: Normal rate and regular rhythm  Heart sounds: No murmur     Pulmonary: Effort: Pulmonary effort is normal  No respiratory distress  Breath sounds: Normal breath sounds  Abdominal:      Palpations: Abdomen is soft  Tenderness: There is no abdominal tenderness  Musculoskeletal: Normal range of motion  General: No swelling  Comments: Left lower extremity contracture at knee   Skin:     General: Skin is warm and dry  Capillary Refill: Capillary refill takes less than 2 seconds  Neurological:      Mental Status: She is alert  She is disoriented  Psychiatric:         Mood and Affect: Mood normal        LABORATORY DATA     Labs: I have personally reviewed pertinent reports  Results from last 7 days   Lab Units 06/11/21  0615 06/08/21  0656 06/07/21  0648   WBC Thousand/uL 7 71 7 58 9 04   HEMOGLOBIN g/dL 9 9* 8 7* 8 6*   HEMATOCRIT % 32 3* 27 3* 28 3*   PLATELETS Thousands/uL 286 257 279   MONO PCT %  --   --  9      Results from last 7 days   Lab Units 06/07/21  0648   POTASSIUM mmol/L 4 0   CHLORIDE mmol/L 103   CO2 mmol/L 29   BUN mg/dL 18   CREATININE mg/dL 0 33*   CALCIUM mg/dL 8 8                            IMAGING & DIAGNOSTIC TESTING     Radiology Results: I have personally reviewed pertinent reports  Xr Chest Portable    Result Date: 4/24/2021  Impression: Dialysis catheter tip within the superior cavoatrial junction  No pneumothorax  Workstation performed: [id number]     Xr Chest Picc Line Portable    Result Date: 4/27/2021  Impression: PICC line tip in the superior vena cava, approximately 3 5 to 4 cm above the cavoatrial junction  The examination demonstrates a significant  finding and was documented as such in [facility] for liaison and referring practitioner notification  Workstation performed: [id number]     Ir Picc Reposition    Result Date: 4/27/2021  Impression: Status-post repositioning of a 5 Amharic central venous catheter under fluoroscopic guidance with its tip at the cavoatrial junction   Workstation performed: [id number]     Ir Tunneled Central Line Removal    Result Date: 4/27/2021  Impression: Impression: Successful tunneled central line removal as described  Signed, performed and dictated by Agusto Mckoy PA-C under the supervision of Dr Lena Wynn  Workstation performed: [id number]     Other Diagnostic Testing: I have personally reviewed pertinent reports      ACTIVE MEDICATIONS     Current Facility-Administered Medications   Medication Dose Route Frequency    acetaminophen (TYLENOL) oral suspension 650 mg  650 mg Oral Q8H [address]    acyclovir (ZOVIRAX) capsule 400 mg  400 mg Oral Q12H [address]    albuterol (PROVENTIL HFA,VENTOLIN HFA) inhaler 2 puff  2 puff Inhalation Q6H PRN    allopurinol (ZYLOPRIM) tablet 100 mg  100 mg Oral Daily    aluminum-magnesium hydroxide-simethicone (MYLANTA) oral suspension 30 mL  30 mL Per PEG Tube Q4H PRN    amLODIPine (NORVASC) tablet 5 mg  5 mg Oral Daily    bisacodyl (DULCOLAX) rectal suppository 10 mg  10 mg Rectal Daily PRN    Diclofenac Sodium (VOLTAREN) 1 % topical gel 2 g  2 g Topical TID PRN    dicyclomine (BENTYL) capsule 10 mg  10 mg Oral 4x Daily (AC & HS)    enoxaparin (LOVENOX) subcutaneous injection 40 mg  40 mg Subcutaneous Q24H MEÑO    fluconazole (DIFLUCAN) tablet 400 mg  400 mg Oral Daily    gabapentin (NEURONTIN) oral solution 100 mg  100 mg Oral HS    hydrOXYzine HCL (ATARAX) tablet 25 mg  25 mg Oral Q6H PRN    insulin glargine (LANTUS) subcutaneous injection 5 Units 0 05 mL  5 Units Subcutaneous HS    insulin lispro (HumaLOG) 100 units/mL subcutaneous injection 1-5 Units  1-5 Units Subcutaneous 4x Daily (AC & HS)    levofloxacin (LEVAQUIN) tablet 500 mg  500 mg Oral Q24H    lidocaine (LIDODERM) 5 % patch 1 patch  1 patch Topical Daily    LORazepam (ATIVAN) injection 0 5 mg  0 5 mg Intravenous Q6H PRN    menthol-methyl salicylate (BENGAY) 16-15 % cream   Apply externally BID    omeprazole (PRILOSEC) suspension 2 mg/mL  20 mg Oral Daily    ondansetron (ZOFRAN) injection 4 mg  4 mg Intravenous Q6H PRN    oxyCODONE (ROXICODONE) IR tablet 5 mg  5 mg Oral Q6H PRN    polyethylene glycol (MIRALAX) packet 17 g  17 g Oral Daily PRN    senna-docusate sodium (SENOKOT S) 8 6-50 mg per tablet 1 tablet  1 tablet Oral BID PRN    tamsulosin (FLOMAX) capsule 0 4 mg  0 4 mg Oral Daily With Dinner       VTE Pharmacologic Prophylaxis: Enoxaparin (Lovenox)  VTE Mechanical Prophylaxis: sequential compression device    Portions of the record may have been created with voice recognition software  Occasional wrong word or "sound a like" substitutions may have occurred due to the inherent limitations of voice recognition software    Read the chart carefully and recognize, using context, where substitutions have occurred   ==  Akash Villanueva, [facility]  Internal Medicine Residency PGY-2

## 2021-06-11 NOTE — NUTRITION
06/11/21 5802   Recommendations/Interventions   Summary at this time patient does not require the addition of banatrol and prosource tid; BM's formed, no skin issues dcoumented; PO intake does fluctuate, needs full assist with meals   Nutrition Recommendations Continue diet as ordered; Other (Specify)  (continue jevity 1 2 at 45ml/ hour; discontinue banatrol and decrease prosource TF to 1 pkt per day)

## 2021-06-11 NOTE — PHYSICAL THERAPY NOTE
PHYSICAL THERAPY NOTE          Patient Name: Patience WILLIAMSON'Z Date: 6/11/2021 06/11/21 1404   PT Last Visit   PT Visit Date 06/11/21   Note Type   Note Type Treatment   Pain Assessment   Pain Assessment Tool FLACC   Pain Location/Orientation Orientation: Left; Location: Leg;Location: Abdomen   Pain Rating: FLACC (Rest) - Face 0   Pain Rating: FLACC (Rest) - Legs 0   Pain Rating: FLACC (Rest) - Activity 0   Pain Rating: FLACC (Rest) - Cry 0   Pain Rating: FLACC (Rest) - Consolability 0   Score: FLACC (Rest) 0   Pain Rating: FLACC (Activity) - Face 1   Pain Rating: FLACC (Activity) - Legs 1   Pain Rating: FLACC (Activity) - Activity 1   Pain Rating: FLACC (Activity) - Cry 1   Pain Rating: FLACC (Activity) - Consolability 1   Score: FLACC (Activity) 5   Restrictions/Precautions   Weight Bearing Precautions Per Order No   Other Precautions Cognitive; Chair Alarm; Bed Alarm;Multiple lines; Fall Risk;Pain   General   Chart Reviewed Yes   Response to Previous Treatment Patient unable to report, no changes reported from family or staff   Family/Caregiver Present No   Cognition   Arousal/Participation Responsive; Cooperative   Attention Attends with cues to redirect   Orientation Level Unable to assess   Memory Unable to assess   Following Commands Follows one step commands with increased time or repetition   Comments Pt more alert and engaged in therapy session this date  Pt agreeable and cooperatve throughout session    Bed Mobility   Supine to Sit 3  Moderate assistance   Additional items Assist x 2;HOB elevated; Bedrails; Increased time required;LE management   Sit to Supine Unable to assess   Additional Comments Pt supine in bed upon arrival  Pt sat EOB fluctuating between mod Ax1- CGA to maintain upright sitting posture  Pt sat EOB ~15 min   Pt sitting upright in bedside chair with chair alarm on with all needs within reach at the end of therapy session  Transfers   Sit to Stand 2  Maximal assistance   Additional items Assist x 2; Increased time required   Stand to Sit 2  Maximal assistance   Additional items Assist x 2; Increased time required   Stand pivot 1  Dependent   Additional items Assist x 2; Increased time required   Additional Comments 2x STS performed using b/l HHA and knee blcok  Pt able to achieve 50% standing  Performed SPT to drop-arm bedside chair  Ambulation/Elevation   Gait pattern Not appropriate   Balance   Static Sitting Fair -   Dynamic Sitting Poor +   Static Standing Poor -   Endurance Deficit   Endurance Deficit Yes   Endurance Deficit Description fatigue, weakness   Activity Tolerance   Activity Tolerance Patient limited by fatigue;Patient tolerated treatment well   Medical Staff [facility] RN cleared pt to participate in therapy session    Exercises   Heelslides Sitting;10 reps;AAROM; Left;AROM; Right  (RLE AROM, LLE AAROM)   Hip Flexion Sitting;10 reps;AAROM; Left;AROM; Right  (RLE AROM, LLE AAROM)   Knee AROM Long Arc Quad Sitting;15 reps;AAROM; Left;AROM; Right  (RLE AROM, LLE AAROM)   Ankle Pumps Sitting;Bilateral;15 reps;AROM   Neuro re-ed Dynamic reaching in all planes with RUE with CGA to maintain upright sitting posture  Pt able to perform AAROM to LUE to reach in all planes with min/mod A to maintain upright sitting balance  Assessment   Prognosis Guarded   Problem List Decreased strength;Decreased endurance; Impaired balance;Decreased mobility; Decreased safety awareness;Decreased coordination;Decreased cognition;Pain   Assessment Pt seen for PT treatment session this date  Therapy session focused on bed mobility, static/ dynamic sitting balance/tolerance, functional transfers and endurance training in order to improve overall mobility and independence  Pt requires assist of 1-2 for all mobility performed this date   Pt more alert and engaged in therapy session; pointing at bedside recliner to show she wanted to sit in it  Pt performed bed mobility tasks with mod Ax2  Pt with increased balance/tolerance sitting EOB this date  Able to tolerate sitting EOB ~15 min fluctuating between mod Ax1- CGA to maintain upright posture and correct L lateral lean  Able to performed dynamic reaching activities while sitting EOB as well as therex program to hips/ knees/ ankles  Pt able to perform AAROM using R hand to assist L hand in reaching activities as well as therex program  Pt performed 2x STS from EOB using b/l HHA and knee block, pt able to achieve ~50% stand  SPT from EOB to drop-arm chair performed with dep x 2  Pt making slow progress toward goals 2* to decreased activity tolerance  Pt was left sitting upright in bedside chair with chair alarm on at the end of PT session with all needs in reach  Pt would benefit from continued PT services while in hospital to address remaining limitations  PT to continue to follow pt and recommends post-acute rehab services after d/c  The patient's AM-PAC Basic Mobility Inpatient Short Form Low Function Raw Score 14 , Standardized Score is 22 01  A standardized score less 42 9 suggests the patient may benefit from discharge to post-acute rehab services  Please also refer to the recommendation of the Physical Therapist for safe discharge planning  Barriers to Discharge Inaccessible home environment;Decreased caregiver support   Goals   Patient Goals "medicine"- RN notified    STG Expiration Date 06/22/21   Plan   Treatment/Interventions Functional transfer training;LE strengthening/ROM; Therapeutic exercise; Endurance training;Patient/family training;Bed mobility;Spoke to nursing   Progress Slow progress, decreased activity tolerance   PT Frequency 2-3x/wk   Recommendation   PT Discharge Recommendation Post acute rehabilitation services   PT - OK to Discharge Yes  (to rehab once medically cleared )   AM-PAC Basic Mobility Inpatient   Turning in Bed Without Bedrails 2   Lying on Back to Sitting on Edge of Flat Bed 1   Moving Bed to Chair 1   Standing Up From Chair 1   Walk in Room 1   Climb 3-5 Stairs 1   Basic Mobility Inpatient Raw Score 7   Turning Head Towards Sound 4   Follow Simple Instructions 3   Low Function Basic Mobility Raw Score 14   Low Function Basic Mobility Standardized Score 22 01     Stella Cameron, PT, DPT

## 2021-06-11 NOTE — CASE MANAGEMENT
Cm received message from [facility] with [facility]  They have approved the one time [facility]ON contract for pt to admit [facility] no later than 6/12, pt is approved for 7 days  Per [address] needs to be discontinued as they are unable to administer IV ativan  If ativan is needed would need to be PO or via PEG and a standing order would be needed   pt would need to not have received the IV Ativan within 72 hours of admission and they will review again on Monday  Message left for [facility] [phone] opt [phone] ext [phone] at [facility] to inform pt would not be discharging by 6/12  TC received from [facility] and pt would not need a new auth cm to follow up to confirm d/c on Monday

## 2021-06-11 NOTE — CASE MANAGEMENT
YSABEL spoke with Kimberly from [place] and notified her that [facility] had denied admission  Per Anca Schaefer, they will be going ahead with the OON contract with [facility]  CM will follow

## 2021-06-11 NOTE — PLAN OF CARE
Problem: PHYSICAL THERAPY ADULT  Goal: Performs mobility at highest level of function for planned discharge setting  See evaluation for individualized goals  Description: Treatment/Interventions: ADL retraining, Functional transfer training, LE strengthening/ROM, Endurance training, Patient/family training, Bed mobility, Spoke to nursing, OT  Equipment Recommended: (TBD)       See flowsheet documentation for full assessment, interventions and recommendations  Outcome: Progressing  Note: Prognosis: Guarded  Problem List: Decreased strength, Decreased endurance, Impaired balance, Decreased mobility, Decreased safety awareness, Decreased coordination, Decreased cognition, Pain  Assessment: Pt seen for PT treatment session this date  Therapy session focused on bed mobility, static/ dynamic sitting balance/tolerance, functional transfers and endurance training in order to improve overall mobility and independence  Pt requires assist of 1-2 for all mobility performed this date  Pt more alert and engaged in therapy session; pointing at bedside recliner to show she wanted to sit in it  Pt performed bed mobility tasks with mod Ax2  Pt with increased balance/tolerance sitting EOB this date  Able to tolerate sitting EOB ~15 min fluctuating between mod Ax1- CGA to maintain upright posture and correct L lateral lean  Able to performed dynamic reaching activities while sitting EOB as well as therex program to hips/ knees/ ankles  Pt able to perform AAROM using R hand to assist L hand in reaching activities as well as therex program  Pt performed 2x STS from EOB using b/l HHA and knee block, pt able to achieve ~50% stand  SPT from EOB to drop-arm chair performed with dep x 2  Pt making slow progress toward goals 2* to decreased activity tolerance  Pt was left sitting upright in bedside chair with chair alarm on at the end of PT session with all needs in reach   Pt would benefit from continued PT services while in hospital to address remaining limitations  PT to continue to follow pt and recommends post-acute rehab services after d/c  The patient's AM-PAC Basic Mobility Inpatient Short Form Low Function Raw Score 14 , Standardized Score is 22 01  A standardized score less 42 9 suggests the patient may benefit from discharge to post-acute rehab services  Please also refer to the recommendation of the Physical Therapist for safe discharge planning  Barriers to Discharge: Inaccessible home environment, Decreased caregiver support        PT Discharge Recommendation: Post acute rehabilitation services     PT - OK to Discharge: Yes(to rehab once medically cleared )    See flowsheet documentation for full assessment

## 2021-06-12 LAB
GLUCOSE SERPL-MCNC: 100 MG/DL (ref 65–140)
GLUCOSE SERPL-MCNC: 119 MG/DL (ref 65–140)
GLUCOSE SERPL-MCNC: 133 MG/DL (ref 65–140)
GLUCOSE SERPL-MCNC: 160 MG/DL (ref 65–140)

## 2021-06-12 PROCEDURE — 82948 REAGENT STRIP/BLOOD GLUCOSE: CPT

## 2021-06-12 PROCEDURE — 99232 SBSQ HOSP IP/OBS MODERATE 35: CPT | Performed by: INTERNAL MEDICINE

## 2021-06-12 RX ADMIN — FLUCONAZOLE 400 MG: 200 TABLET ORAL at 10:56

## 2021-06-12 RX ADMIN — DICYCLOMINE HYDROCHLORIDE 10 MG: 10 CAPSULE ORAL at 06:40

## 2021-06-12 RX ADMIN — ACYCLOVIR 400 MG: 200 CAPSULE ORAL at 10:56

## 2021-06-12 RX ADMIN — INSULIN GLARGINE 5 UNITS: 100 INJECTION, SOLUTION SUBCUTANEOUS at 21:57

## 2021-06-12 RX ADMIN — DICYCLOMINE HYDROCHLORIDE 10 MG: 10 CAPSULE ORAL at 00:03

## 2021-06-12 RX ADMIN — OXYCODONE HYDROCHLORIDE 5 MG: 5 TABLET ORAL at 18:07

## 2021-06-12 RX ADMIN — ALLOPURINOL 100 MG: 100 TABLET ORAL at 11:46

## 2021-06-12 RX ADMIN — ACETAMINOPHEN 650 MG: 650 SUSPENSION ORAL at 21:56

## 2021-06-12 RX ADMIN — DICYCLOMINE HYDROCHLORIDE 10 MG: 10 CAPSULE ORAL at 11:45

## 2021-06-12 RX ADMIN — ACETAMINOPHEN 650 MG: 650 SUSPENSION ORAL at 00:02

## 2021-06-12 RX ADMIN — ENOXAPARIN SODIUM 40 MG: 40 INJECTION SUBCUTANEOUS at 15:17

## 2021-06-12 RX ADMIN — ACETAMINOPHEN 650 MG: 650 SUSPENSION ORAL at 06:40

## 2021-06-12 RX ADMIN — HYDROXYZINE HYDROCHLORIDE 25 MG: 25 TABLET, FILM COATED ORAL at 21:57

## 2021-06-12 RX ADMIN — DICLOFENAC SODIUM 2 G: 10 GEL TOPICAL at 22:33

## 2021-06-12 RX ADMIN — Medication 20 MG: at 11:45

## 2021-06-12 RX ADMIN — GABAPENTIN 100 MG: 250 SUSPENSION ORAL at 00:06

## 2021-06-12 RX ADMIN — LIDOCAINE 5% 1 PATCH: 700 PATCH TOPICAL at 11:40

## 2021-06-12 RX ADMIN — GABAPENTIN 100 MG: 250 SUSPENSION ORAL at 22:33

## 2021-06-12 RX ADMIN — ACYCLOVIR 400 MG: 200 CAPSULE ORAL at 21:56

## 2021-06-12 RX ADMIN — TAMSULOSIN HYDROCHLORIDE 0.4 MG: 0.4 CAPSULE ORAL at 18:03

## 2021-06-12 RX ADMIN — INSULIN GLARGINE 5 UNITS: 100 INJECTION, SOLUTION SUBCUTANEOUS at 00:03

## 2021-06-12 RX ADMIN — LEVOFLOXACIN 500 MG: 500 TABLET, FILM COATED ORAL at 15:18

## 2021-06-12 RX ADMIN — DICYCLOMINE HYDROCHLORIDE 10 MG: 10 CAPSULE ORAL at 15:21

## 2021-06-12 RX ADMIN — MENTHOL, UNSPECIFIED FORM AND METHYL SALICYLATE: 10; 150 CREAM TOPICAL at 18:07

## 2021-06-12 RX ADMIN — MENTHOL, UNSPECIFIED FORM AND METHYL SALICYLATE: 10; 150 CREAM TOPICAL at 12:03

## 2021-06-12 RX ADMIN — ACETAMINOPHEN 650 MG: 650 SUSPENSION ORAL at 15:16

## 2021-06-12 RX ADMIN — ACYCLOVIR 400 MG: 200 CAPSULE ORAL at 00:03

## 2021-06-12 RX ADMIN — OXYCODONE HYDROCHLORIDE 5 MG: 5 TABLET ORAL at 11:13

## 2021-06-12 RX ADMIN — DICYCLOMINE HYDROCHLORIDE 10 MG: 10 CAPSULE ORAL at 21:56

## 2021-06-12 NOTE — PROGRESS NOTES
INTERNAL MEDICINE RESIDENCY PROGRESS NOTE     Name: Charo Osman   Age & Sex: 49 y o  female   MRN: [id number]  [facility]   Encounter: [id number]  Team: SOD Team B     PATIENT INFORMATION     Name: hCaro Osman   Age & Sex: 49 y o  female   MRN: [id number] Stay Days: 48    ASSESSMENT/PLAN     Principal Problem:    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[facility] [address] )  Active Problems:    Altered mental status    Dysphagia    Aphasia    Weakness    Fracture of ramus of left pubis (HCC)    Severe protein-calorie malnutrition ([facility] [address] )      * CNS lymphoma [facility])  Assessment & Plan  Patient was diagnosed with primary CNS lymphoma through biopsy in [facility] transferred over here for chemotherapy since the [facility] is out of network  Had a 2nd family meeting on 04/30 and family has decided to proceed treatment at this time  S/p PICC line for chemotherapy  S/p peg tube placement on 05/10  Plan:  s/p 2nd cycle of chemotherapy on 05/13, leucovin on 5/14  DVT prophylaxis with Lovenox  Rehab placement pending    S/p steroid taper for cerebral edema from CNS lymphoma  Pancytopenia in setting of recent chemotherapy    Will continue to watch for any signs of infection  On acyclovir, Diflucan and Levaquin prophylactically  After discussion with Oncology, plan for rehab, patient does not require radiation therapy, rehab for strength  Patient's family updated on plan going forward, no questions after family visit  Repeat MRI shows interval improvement of intracranial multifocal lesions  Re-consult Oncology on the 06/14/2021 if patient is still inpatient    Acute gout-resolved as of 6/2/2021  Assessment & Plan  Gout flare left knee resolving  Steroid taper completed  Will start low-dose allopurinol 100 mg q day to hopefully prevent other acute flares in the setting of chemotherapy  Follow-up uric acid level    Severe protein-calorie malnutrition ([facility] [address] )  Assessment & Plan  Malnutrition Findings: Adult Malnutrition type: Acute illness(due to medical condition resulting in dysphagia, decreased appetite and intake, weight loss)  Adult Degree of Malnutrition: Other severe protein calorie malnutrition    BMI Findings: Body mass index is 21 44 kg/m²  Plan:  Nutrition consult  Tube feeds initiated with Jevity 1 2, advancing to goal      Fracture of ramus of left pubis [facility])  Assessment & Plan  Susie Ghosh prior to presentation to [facility]  Managed nonoperatively at [facility]    Weakness  Assessment & Plan  Left greater than right upper and lower extremity weakness with ongoing left lower extremity contracture  Secondary to underlying CNS malignancy  Decubitus ulcer precautions such as frequent repositioning  Aphasia  Assessment & Plan  Speech therapy on board    Dysphagia  Assessment & Plan  s/p peg tube placement, tube feeds at goal  Euvolemic    Altered mental status  Assessment & Plan  Patient initially presented to [facility] [place] with stroke-like symptoms for 2 days  CT scan of the head was concerning for subacute CVA and was transferred to [facility]   MRI of the brain was more consistent with demyelinating disease patient was started on IV steroids and 1000 mg daily for 5 days  Patient did not have any significant improvement at that time patient had plasmapheresis  And also had  lumbar puncture-negative for infection or malignancy  Patient noted to have persistent encephalopathy so a repeat MRI was obtained which showed worsening of the abnormality seen on the previous MRI and 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  Likely secondary to CNS lymphoma  Delirium precautions   Currently alert and oriented times 2-3  Follow commands      Patient's  (does not speak [place]) makes all medical decision for her, confirmed once all medical treatment at this time    Pain of left lower leg-resolved as of 6/2/2021  Assessment & Plan  Overnight left lower extremity pain, well score 3, pain now controlled  Bilateral ultrasound Dopplers no evidence of DVT    Pancytopenia (HCC)-resolved as of 6/2/2021  Assessment & Plan  Likely in setting of chemotherapy  Will continue to monitor for any signs of infection  Will transfuse with packed RBC for hemoglobin less than 7  Drop in Hgb from 9 to 7 on 5/14, other lines relatively stable  VSS and soft brown BMs  Will discuss with oncology if this is expected based on chemo or if there should be further workup for bleeding, though hemodynamics currently do not suggest bleed  S/p 1 pack RBC on 05/15, hemoglobin continues to improve    Left-sided weakness-resolved as of 6/2/2021  Assessment & Plan  Patient has left-sided weakness at baseline from her CNS lymphoma  Left upper extremity 3-4/5 and left lower extremity 0-1/5 muscle strength    Sepsis (HCC)-resolved as of 5/13/2021  Assessment & Plan  Patient had hypothermia, elevated WBC count and tachycardia  Concern for possible aspiration in setting on dysphagia versus UTI in setting of urinary catheter which was placed for retention  Plan:  S/p IV antibiotics last day on 05/05  Will continue to monitor off antibiotics  Steroid-induced hyperglycemia-resolved as of 5/23/2021  Assessment & Plan  Mild hyperglycemia, glucoses in 200s, no history of DM  Occurring in the setting of Decadron with chemotherapy, as well as sodium bicarb in D5 drip  - D5 drip is finished  - will monitor sugars while on Jevity and now off D5  - continue q4 hour fingerstick glucose with correctional algorithm 1 and Lantus    Presence of permanent central venous catheter-resolved as of 6/2/2021  Assessment & Plan  Noted presence of right IJ tunneled double-lumen HD catheter; upon chart review, this was likely inserted to be used for plasmapheresis which she has completed  Plan:  S/p removal by IR  Patient now has PICC line      Urinary retention-resolved as of 6/2/2021  Assessment & Plan  Patient developed urinary retention during previous hospitalization  Plan was voiding trial as an outpatient as she was scheduled for discharge from that facility  Machado catheter was initially removed although patient required re-placement of machado on  in setting of incontinence and getting chemotherapy  Machado discontinued, monitor for signs of fluid retention          Disposition:  Awaiting rehab placement     SUBJECTIVE     Patient seen and examined  No acute events overnight  OBJECTIVE     Vitals:    21 0925 21 2208 21 0600 21 0722   BP: 124/74 92/56  92/56   Pulse: 81 89  92   Resp: 18 18  18   Temp:  99 °F (37 2 °C)  99 1 °F (37 3 °C)   TempSrc:       SpO2: 99% 98%  100%   Weight:   50 8 kg (112 lb)    Height:          Temperature:   Temp (24hrs), Av 1 °F (37 3 °C), Min:99 °F (37 2 °C), Max:99 1 °F (37 3 °C)    Temperature: 99 1 °F (37 3 °C)  Intake & Output:  I/O       06/10 0701 -  07 -  -  0700    P  O  360 100     I V  (mL/kg)  10 (0 2)     NG/GT  120     Feedings  225     Total Intake(mL/kg) 360 (6 6) 455 (9)     Urine (mL/kg/hr) 2050 (1 6) 324 (0 3)     Stool 0      Total Output  324     Net -1690 +131            Unmeasured Urine Occurrence  2 x     Unmeasured Stool Occurrence 3 x 2 x         Weights:   IBW (Ideal Body Weight): 45 5 kg    Body mass index is 21 87 kg/m²  Weight (last 2 days)     Date/Time   Weight    21 06   50 8 (112)    21 0600   54 3 (119 71)    06/10/21 0600   54 3 (119 71)            Physical Exam  Vitals signs and nursing note reviewed  Constitutional:       General: She is not in acute distress  Appearance: She is well-developed  She is ill-appearing  HENT:      Head: Normocephalic and atraumatic  Eyes:      Conjunctiva/sclera: Conjunctivae normal    Neck:      Musculoskeletal: Neck supple  Cardiovascular:      Rate and Rhythm: Normal rate and regular rhythm  Heart sounds: No murmur  Pulmonary:      Effort: Pulmonary effort is normal  No respiratory distress  Breath sounds: Normal breath sounds  Abdominal:      Palpations: Abdomen is soft  Tenderness: There is no abdominal tenderness  Musculoskeletal: Normal range of motion  General: No swelling  Skin:     General: Skin is warm and dry  Capillary Refill: Capillary refill takes less than 2 seconds  Neurological:      General: No focal deficit present  Mental Status: She is alert  Mental status is at baseline  LABORATORY DATA     Labs: I have personally reviewed pertinent reports  Results from last 7 days   Lab Units 06/11/21  0615 06/08/21  0656 06/07/21  0648   WBC Thousand/uL 7 71 7 58 9 04   HEMOGLOBIN g/dL 9 9* 8 7* 8 6*   HEMATOCRIT % 32 3* 27 3* 28 3*   PLATELETS Thousands/uL 286 257 279   MONO PCT % 5  --  9      Results from last 7 days   Lab Units 06/11/21  0615 06/07/21  0648   POTASSIUM mmol/L 3 6 4 0   CHLORIDE mmol/L 105 103   CO2 mmol/L 28 29   BUN mg/dL 10 18   CREATININE mg/dL 0 31* 0 33*   CALCIUM mg/dL 9 3 8 8                            IMAGING & DIAGNOSTIC TESTING     Radiology Results: I have personally reviewed pertinent reports  Xr Chest Portable    Result Date: 4/24/2021  Impression: Dialysis catheter tip within the superior cavoatrial junction  No pneumothorax  Workstation performed: [id number]     Xr Chest Picc Line Portable    Result Date: 4/27/2021  Impression: PICC line tip in the superior vena cava, approximately 3 5 to 4 cm above the cavoatrial junction  The examination demonstrates a significant  finding and was documented as such in [facility] for liaison and referring practitioner notification  Workstation performed: [id number]     Ir Picc Reposition    Result Date: 4/27/2021  Impression: Status-post repositioning of a 5 Monegasque central venous catheter under fluoroscopic guidance with its tip at the cavoatrial junction   Workstation performed: [id number]     Ir Tunneled Central Line Removal    Result Date: 4/27/2021  Impression: Impression: Successful tunneled central line removal as described  Signed, performed and dictated by Essie Orosco PA-C under the supervision of Dr Cachorro Dickinson  Workstation performed: [id number]     Other Diagnostic Testing: I have personally reviewed pertinent reports      ACTIVE MEDICATIONS     Current Facility-Administered Medications   Medication Dose Route Frequency    acetaminophen (TYLENOL) oral suspension 650 mg  650 mg Oral Q8H [address]    acyclovir (ZOVIRAX) capsule 400 mg  400 mg Oral Q12H [address]    albuterol (PROVENTIL HFA,VENTOLIN HFA) inhaler 2 puff  2 puff Inhalation Q6H PRN    allopurinol (ZYLOPRIM) tablet 100 mg  100 mg Oral Daily    aluminum-magnesium hydroxide-simethicone (MYLANTA) oral suspension 30 mL  30 mL Per PEG Tube Q4H PRN    amLODIPine (NORVASC) tablet 5 mg  5 mg Oral Daily    bisacodyl (DULCOLAX) rectal suppository 10 mg  10 mg Rectal Daily PRN    Diclofenac Sodium (VOLTAREN) 1 % topical gel 2 g  2 g Topical TID PRN    dicyclomine (BENTYL) capsule 10 mg  10 mg Oral 4x Daily (AC & HS)    enoxaparin (LOVENOX) subcutaneous injection 40 mg  40 mg Subcutaneous Q24H MEÑO    fluconazole (DIFLUCAN) tablet 400 mg  400 mg Oral Daily    gabapentin (NEURONTIN) oral solution 100 mg  100 mg Oral HS    hydrOXYzine HCL (ATARAX) tablet 25 mg  25 mg Oral Q6H PRN    insulin glargine (LANTUS) subcutaneous injection 5 Units 0 05 mL  5 Units Subcutaneous HS    insulin lispro (HumaLOG) 100 units/mL subcutaneous injection 1-5 Units  1-5 Units Subcutaneous 4x Daily (AC & HS)    levofloxacin (LEVAQUIN) tablet 500 mg  500 mg Oral Q24H    lidocaine (LIDODERM) 5 % patch 1 patch  1 patch Topical Daily    menthol-methyl salicylate (BENGAY) 66-03 % cream   Apply externally BID    omeprazole (PRILOSEC) suspension 2 mg/mL  20 mg Oral Daily    ondansetron (ZOFRAN) injection 4 mg  4 mg Intravenous Q6H PRN    oxyCODONE (ROXICODONE) IR tablet 5 mg  5 mg Oral Q6H PRN    polyethylene glycol (MIRALAX) packet 17 g  17 g Oral Daily PRN    senna-docusate sodium (SENOKOT S) 8 6-50 mg per tablet 1 tablet  1 tablet Oral BID PRN    tamsulosin (FLOMAX) capsule 0 4 mg  0 4 mg Oral Daily With Dinner       VTE Pharmacologic Prophylaxis: Enoxaparin (Lovenox)  VTE Mechanical Prophylaxis: sequential compression device    Portions of the record may have been created with voice recognition software  Occasional wrong word or "sound a like" substitutions may have occurred due to the inherent limitations of voice recognition software    Read the chart carefully and recognize, using context, where substitutions have occurred   ==  Vonzell Landau, [facility]  Internal Medicine Residency PGY-2

## 2021-06-13 LAB
GLUCOSE SERPL-MCNC: 128 MG/DL (ref 65–140)
GLUCOSE SERPL-MCNC: 136 MG/DL (ref 65–140)
GLUCOSE SERPL-MCNC: 92 MG/DL (ref 65–140)
SARS-COV-2 RNA RESP QL NAA+PROBE: NEGATIVE

## 2021-06-13 PROCEDURE — 82948 REAGENT STRIP/BLOOD GLUCOSE: CPT

## 2021-06-13 PROCEDURE — 99232 SBSQ HOSP IP/OBS MODERATE 35: CPT | Performed by: INTERNAL MEDICINE

## 2021-06-13 PROCEDURE — U0005 INFEC AGEN DETEC AMPLI PROBE: HCPCS | Performed by: STUDENT IN AN ORGANIZED HEALTH CARE EDUCATION/TRAINING PROGRAM

## 2021-06-13 PROCEDURE — U0003 INFECTIOUS AGENT DETECTION BY NUCLEIC ACID (DNA OR RNA); SEVERE ACUTE RESPIRATORY SYNDROME CORONAVIRUS 2 (SARS-COV-2) (CORONAVIRUS DISEASE [COVID-19]), AMPLIFIED PROBE TECHNIQUE, MAKING USE OF HIGH THROUGHPUT TECHNOLOGIES AS DESCRIBED BY CMS-2020-01-R: HCPCS | Performed by: STUDENT IN AN ORGANIZED HEALTH CARE EDUCATION/TRAINING PROGRAM

## 2021-06-13 RX ADMIN — INSULIN GLARGINE 5 UNITS: 100 INJECTION, SOLUTION SUBCUTANEOUS at 22:48

## 2021-06-13 RX ADMIN — TAMSULOSIN HYDROCHLORIDE 0.4 MG: 0.4 CAPSULE ORAL at 16:03

## 2021-06-13 RX ADMIN — OXYCODONE HYDROCHLORIDE 5 MG: 5 TABLET ORAL at 11:18

## 2021-06-13 RX ADMIN — LEVOFLOXACIN 500 MG: 500 TABLET, FILM COATED ORAL at 16:04

## 2021-06-13 RX ADMIN — DICYCLOMINE HYDROCHLORIDE 10 MG: 10 CAPSULE ORAL at 11:17

## 2021-06-13 RX ADMIN — ACETAMINOPHEN 650 MG: 650 SUSPENSION ORAL at 06:07

## 2021-06-13 RX ADMIN — DICYCLOMINE HYDROCHLORIDE 10 MG: 10 CAPSULE ORAL at 22:49

## 2021-06-13 RX ADMIN — ACYCLOVIR 400 MG: 200 CAPSULE ORAL at 22:48

## 2021-06-13 RX ADMIN — ACETAMINOPHEN 650 MG: 650 SUSPENSION ORAL at 15:00

## 2021-06-13 RX ADMIN — ACYCLOVIR 400 MG: 200 CAPSULE ORAL at 11:17

## 2021-06-13 RX ADMIN — HYDROXYZINE HYDROCHLORIDE 25 MG: 25 TABLET, FILM COATED ORAL at 16:03

## 2021-06-13 RX ADMIN — ALLOPURINOL 100 MG: 100 TABLET ORAL at 11:18

## 2021-06-13 RX ADMIN — OXYCODONE HYDROCHLORIDE 5 MG: 5 TABLET ORAL at 03:57

## 2021-06-13 RX ADMIN — ACETAMINOPHEN 650 MG: 650 SUSPENSION ORAL at 22:49

## 2021-06-13 RX ADMIN — MENTHOL, UNSPECIFIED FORM AND METHYL SALICYLATE: 10; 150 CREAM TOPICAL at 17:19

## 2021-06-13 RX ADMIN — OXYCODONE HYDROCHLORIDE 5 MG: 5 TABLET ORAL at 17:21

## 2021-06-13 RX ADMIN — GABAPENTIN 100 MG: 250 SUSPENSION ORAL at 22:49

## 2021-06-13 RX ADMIN — ENOXAPARIN SODIUM 40 MG: 40 INJECTION SUBCUTANEOUS at 16:03

## 2021-06-13 RX ADMIN — LIDOCAINE 5% 1 PATCH: 700 PATCH TOPICAL at 11:18

## 2021-06-13 RX ADMIN — Medication 20 MG: at 11:24

## 2021-06-13 RX ADMIN — FLUCONAZOLE 400 MG: 200 TABLET ORAL at 11:17

## 2021-06-13 RX ADMIN — DICYCLOMINE HYDROCHLORIDE 10 MG: 10 CAPSULE ORAL at 06:07

## 2021-06-13 RX ADMIN — MENTHOL, UNSPECIFIED FORM AND METHYL SALICYLATE: 10; 150 CREAM TOPICAL at 11:20

## 2021-06-13 RX ADMIN — DICYCLOMINE HYDROCHLORIDE 10 MG: 10 CAPSULE ORAL at 16:03

## 2021-06-13 NOTE — CASE MANAGEMENT
Cm spoke with Robert Allred at [facility], [facility] transport scheduled for noon tomorrow  IV ativan was discontinued and pt has been without medication for more than 72hrs, message sent to [facility]  Cm messaged SOD resident Lion Joshi to order COVID test for d/c Monday

## 2021-06-13 NOTE — TRANSPORTATION MEDICAL NECESSITY
Section I - General Information    Name of Patient: Adri Greene                 : 1965    Medicare #: [id number]  Transport Date: 21 (PCS is valid for round trips on this date and for all repetitive trips in the 60-day range as noted below )  [facility]                                                         [facility]  Is the pt's stay covered under Medicare Part A (PPS/DRG)   []     Closest appropriate facility? If no, why is transport to more distant facility required? Yes  If hospice pt, is this transport related to pt's terminal illness? NA       Section II - Medical Necessity Questionnaire  Ambulance transportation is medically necessary only if other means of transport are contraindicated or would be potentially harmful to the patient  To meet this requirement, the patient must either be "bed confined" or suffer from a condition such that transport by means other than ambulance is contraindicated by the patient's condition  The following questions must be answered by the medical professional signing below for this form to be valid:    1)  Describe the MEDICAL CONDITION (physical and/or mental) of this patient AT [address] [place] [address] that requires the patient to be transported in an ambulance and why transport by other means is contraindicated by the patient's condition:     2) Is the patient "bed confined" as defined below? Yes  To be "be confined" the patient must satisfy all three of the following conditions: (1) unable to get up from bed without Assistance; AND (2) unable to ambulate; AND (3) unable to sit in a chair or wheelchair  3) Can this patient safely be transported by car or wheelchair van (i e , seated during transport without a medical attendant or monitoring)?    No    4) In addition to completing questions 1-3 above, please check any of the following conditions that apply*:   *Note: supporting documentation for any boxes checked must be maintained in the patient's medical records  If hosp-hosp transfer, describe services needed at 2nd facility not available at 1st facility? Patient is confused  Moderate/severe pain on movement   Unable to tolerate seated position for time needed to transport       Section III - Signature of Physician or Healthcare Professional  I certify that the above information is true and correct based on my evaluation of this patient, and represent that the patient requires transport by ambulance and that other forms of transport are contraindicated  I understand that this information will be used by the Centers for Medicare and Medicaid Services (CMS) to support the determination of medical necessity for ambulance services, and I represent that I have personal knowledge of the patient's condition at time of transport  []  If this box is checked, I also certify that the patient is physically or mentally incapable of signing the ambulance service's claim and that the institution with which I am affiliated has furnished care, services, or assistance to the patient  My signature below is made on behalf of the patient pursuant to 42 CFR §424 36(b)(4)  In accordance with 42 CFR §424 37, the specific reason(s) that the patient is physically or mentally incapable of signing the claim form is as follows: Denise Morgan of Physician* or Healthcare Professional______________________________________________________________  Signature Date 06/13/21 (For scheduled repetitive transports, this form is not valid for transports performed more than 60 days after this date)    Printed Name & Credentials of Physician or Healthcare Professional (MD, DO, RN, etc )________________________________  *Form must be signed by patient's attending physician for scheduled, repetitive transports   For non-repetitive, unscheduled ambulance transports, if unable to obtain the signature of the attending physician, any of the following may sign (choose appropriate option below)  [] Physician Assistant []  Clinical Nurse Specialist []  Registered Nurse  []  Nurse Practitioner  [x] Discharge Planner

## 2021-06-13 NOTE — PROGRESS NOTES
INTERNAL MEDICINE RESIDENCY PROGRESS NOTE     Name: Junior Braxton   Age & Sex: 49 y o  female   MRN: [id number]  [facility]   Encounter: [id number]  Team: SOD Team B     PATIENT INFORMATION     Name: Junior Braxton   Age & Sex: 49 y o  female   MRN: [id number] Stay Days: 46    ASSESSMENT/PLAN     Principal Problem:    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[facility] [address] )  Active Problems:    Altered mental status    Dysphagia    Aphasia    Weakness    Fracture of ramus of left pubis (HCC)    Severe protein-calorie malnutrition ([facility] [address] )      Severe protein-calorie malnutrition ([facility] [address] )  Assessment & Plan  Malnutrition Findings:   Adult Malnutrition type: Acute illness(due to medical condition resulting in dysphagia, decreased appetite and intake, weight loss)  Adult Degree of Malnutrition: Other severe protein calorie malnutrition    BMI Findings: Body mass index is 21 44 kg/m²  Plan:  Nutrition consult  Tube feeds initiated with Jevity 1 2, advancing to goal      Fracture of ramus of left pubis [facility])  Assessment & Plan  Susie Ghosh prior to presentation to [facility]  Managed nonoperatively at [facility]    Weakness  Assessment & Plan  Left greater than right upper and lower extremity weakness with ongoing left lower extremity contracture  Secondary to underlying CNS malignancy  Decubitus ulcer precautions such as frequent repositioning  Aphasia  Assessment & Plan  Speech therapy on board    Dysphagia  Assessment & Plan  s/p peg tube placement, tube feeds at goal  Euvolemic    Altered mental status  Assessment & Plan  Patient initially presented to [facility] his [place] with stroke-like symptoms for 2 days  CT scan of the head was concerning for subacute CVA and was transferred to [facility]   MRI of the brain was more consistent with demyelinating disease patient was started on IV steroids and 1000 mg daily for 5 days    Patient did not have any significant improvement at that time patient had plasmapheresis  And also had  lumbar puncture-negative for infection or malignancy  Patient noted to have persistent encephalopathy so a repeat MRI was obtained which showed worsening of the abnormality seen on the previous MRI and 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  Likely secondary to CNS lymphoma  Delirium precautions   Currently alert and oriented times 2-3  Follow commands  Patient's  (does not speak [place]) makes all medical decision for her, confirmed once all medical treatment at this time    * CNS lymphoma [facility])  Assessment & Plan  Patient was diagnosed with primary CNS lymphoma through biopsy in [facility] transferred over here for chemotherapy since the [facility] is out of network  Had a 2nd family meeting on 04/30 and family has decided to proceed treatment at this time  S/p PICC line for chemotherapy  S/p peg tube placement on 05/10  Plan:  s/p 2nd cycle of chemotherapy on 05/13, leucovin on 5/14  DVT prophylaxis with Lovenox  Rehab placement pending    S/p steroid taper for cerebral edema from CNS lymphoma  Pancytopenia in setting of recent chemotherapy    Will continue to watch for any signs of infection  On acyclovir, Diflucan and Levaquin prophylactically  After discussion with Oncology, plan for rehab, patient does not require radiation therapy, rehab for strength  Patient's family updated on plan going forward, no questions after family visit  Repeat MRI shows interval improvement of intracranial multifocal lesions  Re-consult Oncology on the 06/14/2021 if patient is still inpatient    Pain of left lower leg-resolved as of 6/2/2021  Assessment & Plan  Overnight left lower extremity pain, well score 3, pain now controlled  Bilateral ultrasound Dopplers no evidence of DVT    Acute gout-resolved as of 6/2/2021  Assessment & Plan  Gout flare left knee resolving  Steroid taper completed  Will start low-dose allopurinol 100 mg q day to hopefully prevent other acute flares in the setting of chemotherapy  Follow-up uric acid level    Pancytopenia (HCC)-resolved as of 6/2/2021  Assessment & Plan  Likely in setting of chemotherapy  Will continue to monitor for any signs of infection  Will transfuse with packed RBC for hemoglobin less than 7  Drop in Hgb from 9 to 7 on 5/14, other lines relatively stable  VSS and soft brown BMs  Will discuss with oncology if this is expected based on chemo or if there should be further workup for bleeding, though hemodynamics currently do not suggest bleed  S/p 1 pack RBC on 05/15, hemoglobin continues to improve    Left-sided weakness-resolved as of 6/2/2021  Assessment & Plan  Patient has left-sided weakness at baseline from her CNS lymphoma  Left upper extremity 3-4/5 and left lower extremity 0-1/5 muscle strength    Sepsis (HCC)-resolved as of 5/13/2021  Assessment & Plan  Patient had hypothermia, elevated WBC count and tachycardia  Concern for possible aspiration in setting on dysphagia versus UTI in setting of urinary catheter which was placed for retention  Plan:  S/p IV antibiotics last day on 05/05  Will continue to monitor off antibiotics  Steroid-induced hyperglycemia-resolved as of 5/23/2021  Assessment & Plan  Mild hyperglycemia, glucoses in 200s, no history of DM  Occurring in the setting of Decadron with chemotherapy, as well as sodium bicarb in D5 drip  - D5 drip is finished  - will monitor sugars while on Jevity and now off D5  - continue q4 hour fingerstick glucose with correctional algorithm 1 and Lantus    Presence of permanent central venous catheter-resolved as of 6/2/2021  Assessment & Plan  Noted presence of right IJ tunneled double-lumen HD catheter; upon chart review, this was likely inserted to be used for plasmapheresis which she has completed  Plan:  S/p removal by IR  Patient now has PICC line      Urinary retention-resolved as of 6/2/2021  Assessment & Plan  Patient developed urinary retention during previous hospitalization  Plan was voiding trial as an outpatient as she was scheduled for discharge from that facility  Machado catheter was initially removed although patient required re-placement of machado on  in setting of incontinence and getting chemotherapy  Machado discontinued, monitor for signs of fluid retention          Disposition: Pending placement    SUBJECTIVE     Patient seen and examined  No acute events overnight  Review of Systems   Unable to perform ROS: Mental status change       OBJECTIVE     Vitals:    21 1453 21 2130 21 0735 21 0835   BP: 93/58 94/60 113/84 102/64   Pulse: 89 59 89 83   Resp: 18 18     Temp: 99 2 °F (37 3 °C) 98 4 °F (36 9 °C) 98 8 °F (37 1 °C) 98 7 °F (37 1 °C)   TempSrc:       SpO2: 100% 100% 99% 99%   Weight:       Height:          Temperature:   Temp (24hrs), Av 8 °F (37 1 °C), Min:98 4 °F (36 9 °C), Max:99 2 °F (37 3 °C)    Temperature: 98 7 °F (37 1 °C)  Intake & Output:  I/O        07 -  0700 701 -  0700 701 -  0700    P  O  100 120 120    I V  (mL/kg) 10 (0 2)      NG/ 1300 120    Feedings 225 1080     Total Intake(mL/kg) 455 (9) 2500 (49 2) 240 (4 7)    Urine (mL/kg/hr) 324 (0 3) 580 (0 5)     Stool       Total Output 324 580     Net +131 +1920 +240           Unmeasured Urine Occurrence 2 x 1 x 1 x    Unmeasured Stool Occurrence 2 x 1 x 1 x        Weights:   IBW (Ideal Body Weight): 45 5 kg    Body mass index is 21 87 kg/m²  Weight (last 2 days)     Date/Time   Weight    21 0600   50 8 (112)    21 0600   54 3 (119 71)            Physical Exam  Constitutional:       Appearance: Normal appearance  HENT:      Head: Normocephalic and atraumatic  Nose: Nose normal       Mouth/Throat:      Mouth: Mucous membranes are moist       Pharynx: Oropharynx is clear  Eyes:      Extraocular Movements: Extraocular movements intact  Conjunctiva/sclera: Conjunctivae normal    Cardiovascular:      Rate and Rhythm: Normal rate and regular rhythm  Heart sounds: No murmur heard  No friction rub  No gallop  Pulmonary:      Effort: Pulmonary effort is normal       Breath sounds: Normal breath sounds  Abdominal:      General: Bowel sounds are normal  There is no distension  Palpations: Abdomen is soft  There is no mass  Tenderness: There is no abdominal tenderness  There is no guarding or rebound  Hernia: No hernia is present  Musculoskeletal:      Cervical back: Normal range of motion  Skin:     General: Skin is warm and dry  Neurological:      Mental Status: She is alert  Mental status is at baseline  LABORATORY DATA     Labs: I have personally reviewed pertinent reports  Results from last 7 days   Lab Units 06/11/21  0615 06/08/21  0656 06/07/21  0648   WBC Thousand/uL 7 71 7 58 9 04   HEMOGLOBIN g/dL 9 9* 8 7* 8 6*   HEMATOCRIT % 32 3* 27 3* 28 3*   PLATELETS Thousands/uL 286 257 279   MONO PCT % 5  --  9      Results from last 7 days   Lab Units 06/11/21  0615 06/07/21  0648   POTASSIUM mmol/L 3 6 4 0   CHLORIDE mmol/L 105 103   CO2 mmol/L 28 29   BUN mg/dL 10 18   CREATININE mg/dL 0 31* 0 33*   CALCIUM mg/dL 9 3 8 8                            IMAGING & DIAGNOSTIC TESTING     Radiology Results: I have personally reviewed pertinent reports  XR chest portable    Result Date: 4/24/2021  Impression: Dialysis catheter tip within the superior cavoatrial junction  No pneumothorax  Workstation performed: [id number]     XR chest PICC line portable    Result Date: 4/27/2021  Impression: PICC line tip in the superior vena cava, approximately 3 5 to 4 cm above the cavoatrial junction  The examination demonstrates a significant  finding and was documented as such in [facility] for liaison and referring practitioner notification   Workstation performed: [id number]     IR PICC reposition    Result Date: 4/27/2021  Impression: Status-post repositioning of a 5 [place] central venous catheter under fluoroscopic guidance with its tip at the cavoatrial junction  Workstation performed: [id number]     IR tunneled central line removal    Result Date: 4/27/2021  Impression: Impression: Successful tunneled central line removal as described  Signed, performed and dictated by Errol Greenberg PA-C under the supervision of Dr Bambi Noguera  Workstation performed: [id number]     Other Diagnostic Testing: I have personally reviewed pertinent reports      ACTIVE MEDICATIONS     Current Facility-Administered Medications   Medication Dose Route Frequency    acetaminophen (TYLENOL) oral suspension 650 mg  650 mg Oral Q8H [address]    acyclovir (ZOVIRAX) capsule 400 mg  400 mg Oral Q12H [address]    albuterol (PROVENTIL HFA,VENTOLIN HFA) inhaler 2 puff  2 puff Inhalation Q6H PRN    allopurinol (ZYLOPRIM) tablet 100 mg  100 mg Oral Daily    aluminum-magnesium hydroxide-simethicone (MYLANTA) oral suspension 30 mL  30 mL Per PEG Tube Q4H PRN    amLODIPine (NORVASC) tablet 5 mg  5 mg Oral Daily    bisacodyl (DULCOLAX) rectal suppository 10 mg  10 mg Rectal Daily PRN    Diclofenac Sodium (VOLTAREN) 1 % topical gel 2 g  2 g Topical TID PRN    dicyclomine (BENTYL) capsule 10 mg  10 mg Oral 4x Daily (AC & HS)    enoxaparin (LOVENOX) subcutaneous injection 40 mg  40 mg Subcutaneous Q24H MEÑO    fluconazole (DIFLUCAN) tablet 400 mg  400 mg Oral Daily    gabapentin (NEURONTIN) oral solution 100 mg  100 mg Oral HS    hydrOXYzine HCL (ATARAX) tablet 25 mg  25 mg Oral Q6H PRN    insulin glargine (LANTUS) subcutaneous injection 5 Units 0 05 mL  5 Units Subcutaneous HS    insulin lispro (HumaLOG) 100 units/mL subcutaneous injection 1-5 Units  1-5 Units Subcutaneous 4x Daily (AC & HS)    levofloxacin (LEVAQUIN) tablet 500 mg  500 mg Oral Q24H    lidocaine (LIDODERM) 5 % patch 1 patch  1 patch Topical Daily    menthol-methyl salicylate (BENGAY) 51-42 % cream   Apply externally BID    omeprazole (PRILOSEC) suspension 2 mg/mL  20 mg Oral Daily    ondansetron (ZOFRAN) injection 4 mg  4 mg Intravenous Q6H PRN    oxyCODONE (ROXICODONE) IR tablet 5 mg  5 mg Oral Q6H PRN    polyethylene glycol (MIRALAX) packet 17 g  17 g Oral Daily PRN    senna-docusate sodium (SENOKOT S) 8 6-50 mg per tablet 1 tablet  1 tablet Oral BID PRN    tamsulosin (FLOMAX) capsule 0 4 mg  0 4 mg Oral Daily With Dinner     Portions of the record may have been created with voice recognition software  Occasional wrong word or "sound a like" substitutions may have occurred due to the inherent limitations of voice recognition software    Read the chart carefully and recognize, using context, where substitutions have occurred   ==  Everardo Fang MD  [address] [facility]  Internal Medicine Residency PGY-2

## 2021-06-14 VITALS
RESPIRATION RATE: 16 BRPM | WEIGHT: 112 LBS | OXYGEN SATURATION: 99 % | BODY MASS INDEX: 21.99 KG/M2 | DIASTOLIC BLOOD PRESSURE: 75 MMHG | TEMPERATURE: 99.2 F | HEIGHT: 60 IN | HEART RATE: 84 BPM | SYSTOLIC BLOOD PRESSURE: 100 MMHG

## 2021-06-14 PROBLEM — G89.3 CANCER RELATED PAIN: Status: ACTIVE | Noted: 2021-06-14

## 2021-06-14 LAB
GLUCOSE SERPL-MCNC: 130 MG/DL (ref 65–140)
GLUCOSE SERPL-MCNC: 133 MG/DL (ref 65–140)
PLATELET # BLD AUTO: 322 THOUSANDS/UL (ref 149–390)
PMV BLD AUTO: 9.9 FL (ref 8.9–12.7)

## 2021-06-14 PROCEDURE — 85049 AUTOMATED PLATELET COUNT: CPT | Performed by: STUDENT IN AN ORGANIZED HEALTH CARE EDUCATION/TRAINING PROGRAM

## 2021-06-14 PROCEDURE — 82948 REAGENT STRIP/BLOOD GLUCOSE: CPT

## 2021-06-14 RX ORDER — MUSCLE RUB CREAM 100; 150 MG/G; MG/G
CREAM TOPICAL 2 TIMES DAILY
Refills: 0
Start: 2021-06-14 | End: 2021-08-05 | Stop reason: HOSPADM

## 2021-06-14 RX ORDER — OXYCODONE HYDROCHLORIDE 5 MG/1
5 TABLET ORAL EVERY 6 HOURS PRN
Qty: 12 TABLET | Refills: 0 | Status: SHIPPED | OUTPATIENT
Start: 2021-06-14 | End: 2021-06-24

## 2021-06-14 RX ORDER — FLUCONAZOLE 200 MG/1
400 TABLET ORAL DAILY
Refills: 0
Start: 2021-06-15 | End: 2021-07-21 | Stop reason: HOSPADM

## 2021-06-14 RX ORDER — INSULIN GLARGINE 100 [IU]/ML
5 INJECTION, SOLUTION SUBCUTANEOUS
Qty: 10 ML | Refills: 0
Start: 2021-06-14 | End: 2021-06-14 | Stop reason: HOSPADM

## 2021-06-14 RX ORDER — POLYETHYLENE GLYCOL 3350 17 G/17G
17 POWDER, FOR SOLUTION ORAL DAILY PRN
Refills: 0 | Status: ON HOLD
Start: 2021-06-14 | End: 2021-09-07 | Stop reason: SDUPTHER

## 2021-06-14 RX ORDER — AMLODIPINE BESYLATE 5 MG/1
5 TABLET ORAL DAILY
Refills: 0
Start: 2021-06-15 | End: 2021-07-21 | Stop reason: HOSPADM

## 2021-06-14 RX ORDER — HYDROXYZINE HYDROCHLORIDE 25 MG/1
25 TABLET, FILM COATED ORAL EVERY 6 HOURS PRN
Qty: 30 TABLET | Refills: 0
Start: 2021-06-14 | End: 2021-08-05 | Stop reason: HOSPADM

## 2021-06-14 RX ORDER — ACYCLOVIR 200 MG/1
400 CAPSULE ORAL EVERY 12 HOURS SCHEDULED
Refills: 0
Start: 2021-06-14 | End: 2021-07-21 | Stop reason: HOSPADM

## 2021-06-14 RX ORDER — DICYCLOMINE HYDROCHLORIDE 10 MG/1
10 CAPSULE ORAL
Refills: 0 | Status: ON HOLD
Start: 2021-06-14 | End: 2021-09-07 | Stop reason: SDUPTHER

## 2021-06-14 RX ORDER — BISACODYL 10 MG
10 SUPPOSITORY, RECTAL RECTAL DAILY PRN
Qty: 12 SUPPOSITORY | Refills: 0
Start: 2021-06-14 | End: 2021-08-05 | Stop reason: HOSPADM

## 2021-06-14 RX ORDER — ACETAMINOPHEN 500 MG
500 TABLET ORAL EVERY 6 HOURS PRN
Refills: 0
Start: 2021-06-14 | End: 2021-06-24

## 2021-06-14 RX ORDER — ALLOPURINOL 100 MG/1
100 TABLET ORAL DAILY
Refills: 0
Start: 2021-06-15 | End: 2021-08-05 | Stop reason: HOSPADM

## 2021-06-14 RX ORDER — MAGNESIUM HYDROXIDE/ALUMINUM HYDROXICE/SIMETHICONE 120; 1200; 1200 MG/30ML; MG/30ML; MG/30ML
30 SUSPENSION ORAL EVERY 4 HOURS PRN
Qty: 355 ML | Refills: 0
Start: 2021-06-14 | End: 2021-08-20 | Stop reason: HOSPADM

## 2021-06-14 RX ORDER — GABAPENTIN 250 MG/5ML
100 SOLUTION ORAL
Refills: 0 | Status: ON HOLD
Start: 2021-06-14 | End: 2021-07-21 | Stop reason: SDUPTHER

## 2021-06-14 RX ORDER — LEVOFLOXACIN 500 MG/1
500 TABLET, FILM COATED ORAL EVERY 24 HOURS
Refills: 0
Start: 2021-06-14 | End: 2021-07-21 | Stop reason: HOSPADM

## 2021-06-14 RX ORDER — TAMSULOSIN HYDROCHLORIDE 0.4 MG/1
0.4 CAPSULE ORAL
Refills: 0
Start: 2021-06-14 | End: 2021-08-05 | Stop reason: HOSPADM

## 2021-06-14 RX ADMIN — FLUCONAZOLE 400 MG: 200 TABLET ORAL at 08:43

## 2021-06-14 RX ADMIN — LIDOCAINE 5% 1 PATCH: 700 PATCH TOPICAL at 08:43

## 2021-06-14 RX ADMIN — ALLOPURINOL 100 MG: 100 TABLET ORAL at 08:43

## 2021-06-14 RX ADMIN — HYDROXYZINE HYDROCHLORIDE 25 MG: 25 TABLET, FILM COATED ORAL at 12:22

## 2021-06-14 RX ADMIN — MENTHOL, UNSPECIFIED FORM AND METHYL SALICYLATE: 10; 150 CREAM TOPICAL at 08:44

## 2021-06-14 RX ADMIN — HYDROXYZINE HYDROCHLORIDE 25 MG: 25 TABLET, FILM COATED ORAL at 06:03

## 2021-06-14 RX ADMIN — ACETAMINOPHEN 650 MG: 650 SUSPENSION ORAL at 06:02

## 2021-06-14 RX ADMIN — OXYCODONE HYDROCHLORIDE 5 MG: 5 TABLET ORAL at 12:22

## 2021-06-14 RX ADMIN — Medication 20 MG: at 08:43

## 2021-06-14 RX ADMIN — ACETAMINOPHEN 650 MG: 650 SUSPENSION ORAL at 13:38

## 2021-06-14 RX ADMIN — OXYCODONE HYDROCHLORIDE 5 MG: 5 TABLET ORAL at 06:02

## 2021-06-14 RX ADMIN — ENOXAPARIN SODIUM 40 MG: 40 INJECTION SUBCUTANEOUS at 13:38

## 2021-06-14 RX ADMIN — ACYCLOVIR 400 MG: 200 CAPSULE ORAL at 08:41

## 2021-06-14 RX ADMIN — DICYCLOMINE HYDROCHLORIDE 10 MG: 10 CAPSULE ORAL at 11:45

## 2021-06-14 RX ADMIN — DICYCLOMINE HYDROCHLORIDE 10 MG: 10 CAPSULE ORAL at 06:04

## 2021-06-14 NOTE — CASE MANAGEMENT
Per Dr Frida Guidry, pt is cleared for d/c today  Pt will transition to [facility] via [facility] BLS at 4:00  Pt attending, family and nursing aware   CM will follow

## 2021-06-14 NOTE — NUTRITION
06/14/21 1009   Recommendations/Interventions   Interventions Other (comment)  (discontnued banatrol and decreased procource TF to 1 pkt per day, per RD protocol)

## 2021-06-14 NOTE — DISCHARGE SUMMARY
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)  Active Problems:    Altered mental status    Dysphagia    Aphasia    Weakness    Fracture of ramus of left pubis (HCC)    Severe protein-calorie malnutrition ([facility] [address] )    Cancer related pain      * CNS lymphoma [facility])  Assessment & Plan  Patient was diagnosed with primary CNS lymphoma through biopsy in [facility] transferred over here for chemotherapy since the [facility] is out of network  Had a 2nd family meeting on 04/30 and family has decided to proceed treatment at this time  S/p PICC line for chemotherapy  S/p peg tube placement on 05/10  Plan:  s/p 2nd cycle of chemotherapy on 05/13, leucovin on 5/14  DVT prophylaxis with Lovenox  Rehab placement pending    S/p steroid taper for cerebral edema from CNS lymphoma  Pancytopenia in setting of recent chemotherapy  Will continue to watch for any signs of infection  On acyclovir, Diflucan and Levaquin prophylactically  After discussion with Oncology, plan for rehab, patient does not require radiation therapy, rehab for strength  Patient's family updated on plan going forward, no questions after family visit  Repeat MRI shows interval improvement of intracranial multifocal lesions  Re-consult Oncology on the 06/14/2021 if patient is still inpatient    Severe protein-calorie malnutrition ([facility] [address] )  Assessment & Plan  Malnutrition Findings:   Adult Malnutrition type: Acute illness(due to medical condition resulting in dysphagia, decreased appetite and intake, weight loss)  Adult Degree of Malnutrition: Other severe protein calorie malnutrition    BMI Findings: Body mass index is 21 44 kg/m²  Plan:  Nutrition consult     Tube feeds initiated with Jevity 1 2, advancing to goal      Fracture of ramus of left pubis [facility])  Assessment & Plan  Augie Marquez prior to presentation to [facility]  Managed nonoperatively at [facility]    Weakness  Assessment & Plan  Left greater than right upper and lower extremity weakness with ongoing left lower extremity contracture  Secondary to underlying CNS malignancy  Decubitus ulcer precautions such as frequent repositioning  Aphasia  Assessment & Plan  Speech therapy on board    Dysphagia  Assessment & Plan  s/p peg tube placement, tube feeds at goal  Euvolemic    Altered mental status  Assessment & Plan  Patient initially presented to [facility] his [place] with stroke-like symptoms for 2 days  CT scan of the head was concerning for subacute CVA and was transferred to [facility]   MRI of the brain was more consistent with demyelinating disease patient was started on IV steroids and 1000 mg daily for 5 days  Patient did not have any significant improvement at that time patient had plasmapheresis  And also had  lumbar puncture-negative for infection or malignancy  Patient noted to have persistent encephalopathy so a repeat MRI was obtained which showed worsening of the abnormality seen on the previous MRI and 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  Likely secondary to CNS lymphoma  Delirium precautions   Currently alert and oriented times 2-3  Follow commands      Patient's  (does not speak [place]) makes all medical decision for her, confirmed once all medical treatment at this time    Pain of left lower leg-resolved as of 6/2/2021  Assessment & Plan  Overnight left lower extremity pain, well score 3, pain now controlled  Bilateral ultrasound Dopplers no evidence of DVT    Acute gout-resolved as of 6/2/2021  Assessment & Plan  Gout flare left knee resolving  Steroid taper completed  Will start low-dose allopurinol 100 mg q day to hopefully prevent other acute flares in the setting of chemotherapy  Follow-up uric acid level    Pancytopenia (HCC)-resolved as of 6/2/2021  Assessment & Plan  Likely in setting of chemotherapy  Will continue to monitor for any signs of infection  Will transfuse with packed RBC for hemoglobin less than 7  Drop in Hgb from 9 to 7 on 5/14, other lines relatively stable  VSS and soft brown BMs  Will discuss with oncology if this is expected based on chemo or if there should be further workup for bleeding, though hemodynamics currently do not suggest bleed  S/p 1 pack RBC on 05/15, hemoglobin continues to improve    Left-sided weakness-resolved as of 6/2/2021  Assessment & Plan  Patient has left-sided weakness at baseline from her CNS lymphoma  Left upper extremity 3-4/5 and left lower extremity 0-1/5 muscle strength    Sepsis (HCC)-resolved as of 5/13/2021  Assessment & Plan  Patient had hypothermia, elevated WBC count and tachycardia  Concern for possible aspiration in setting on dysphagia versus UTI in setting of urinary catheter which was placed for retention  Plan:  S/p IV antibiotics last day on 05/05  Will continue to monitor off antibiotics  Steroid-induced hyperglycemia-resolved as of 5/23/2021  Assessment & Plan  Mild hyperglycemia, glucoses in 200s, no history of DM  Occurring in the setting of Decadron with chemotherapy, as well as sodium bicarb in D5 drip  - D5 drip is finished  - will monitor sugars while on Jevity and now off D5  - continue q4 hour fingerstick glucose with correctional algorithm 1 and Lantus    Presence of permanent central venous catheter-resolved as of 6/2/2021  Assessment & Plan  Noted presence of right IJ tunneled double-lumen HD catheter; upon chart review, this was likely inserted to be used for plasmapheresis which she has completed  Plan:  S/p removal by IR  Patient now has PICC line      Urinary retention-resolved as of 6/2/2021  Assessment & Plan  Patient developed urinary retention during previous hospitalization  Plan was voiding trial as an outpatient as she was scheduled for discharge from that facility  Machado catheter was initially removed although patient required re-placement of machado on 05/14 in setting of incontinence and getting chemotherapy  Machado discontinued, monitor for signs of fluid retention          [address]     Patient had prolonged hospitalization, below is brief summary of the hospital course  Please refer to progress notes for further informations  Patient is a 49years old female initially presented as a transfer from [facility]   Patient initially presented to [facility] for symptoms concerning for stroke  Initially on 03/19/2021 presented with left-sided facial droop and trouble swallowing over 2 days as well as blurring of vision and mild posterior headache, she had a fall prior to admission at home  CT head showed an acute to subacute infarct in the left cerebellum and mid brain  CTA head and neck showed no arterial stenosis or dissection intracranially and patent cervical carotid and vertebral arteries  Patient was outside tPA window and was managed medically  MRI brain completed showed demyelinating process versus CNS lymphoma with edema and expansion of the left aspect of midbrain de and left middle cerebellar peduncle  CT chest abdomen and pelvis showed fracture of multiple pubic rami  Patient was started on pulse dose steroids on 03/24 for 5 days for suspicion of acute demyelinating encephalomyelitis  Patient was initiated on PLEX for 5 days without major improvement  She had a repeat MRI after completing PLEX revealing increased enhancement in corpus callosum and right periventricular white matter and right aspect of the splenium of the corpus callosum and overall decreasing FLAIR signal involving the left brainstem and left cerebellar hemispheres  She underwent brain biopsy which confirmed diffuse large B-cell lymphoma    CT chest abdomen pelvis did not show any lymphadenopathy or signs of metastatic disease  Unfortunately [facility] is not in network for patient's insurance and she was transferred to [facility] for initiation of chemotherapy  She was seen in consultation by Heme-Onc and recommended high-dose methotrexate and Rituxan, per oncology has poor prognosis even with treatment  She was seen in consultation by palliative Care and family elected to continue with treatment  Status post 2 cycles of methotrexate and rituximab  It was decided to hold off further chemotherapy until patient completes rehab and follow up as outpatient  She was started on prophylactic Diflucan, acyclovir and Levaquin  Per Oncology, she needs to continue until seeing Oncology as outpatient  Repeat MRI showed interval improvement of intracranial multifocal lesions  Hospitalization was complicated by sepsis possibly secondary to aspiration in the setting of dysphagia for which she completed antibiotics treatment  Patient received PEG tube on 05/10 and was started on tube feeds which she tolerated  Patient also had an episode of urine retention requiring placement of Cottrell catheter, this was removed and patient was able to void spontaneously  Patient also had acute gout flare of left knee, treated with steroid taper and was started on allopurinol  Patient was accepted at [facility] for rehab  On the day of discharge, she was seen and examined at bedside  Visit Vitals  /75   Pulse 84   Temp 99 2 °F (37 3 °C)   Resp 16   Ht 5' (1 524 m)   Wt 50 8 kg (112 lb)   SpO2 99%   BMI 21 87 kg/m²   Smoking Status Never Smoker   BSA 1 46 m²     Physical Exam  Constitutional:       General: She is not in acute distress  Appearance: She is ill-appearing  Comments: Frail, ill-appearing patient   HENT:      Head: Normocephalic and atraumatic  Cardiovascular:      Rate and Rhythm: Normal rate and regular rhythm  Heart sounds: Normal heart sounds  No murmur heard  Pulmonary:      Breath sounds: Normal breath sounds  No wheezing or rales  Abdominal:      Palpations: Abdomen is soft  Tenderness: There is no abdominal tenderness  Comments: PEG tube in place   Musculoskeletal:      Right lower leg: No edema  Left lower leg: No edema  Skin:     General: Skin is warm  Neurological:      Comments: Left sided hemiparesis       DISCHARGE INFORMATION     PCP at Discharge: No PCP    Admitting Provider: Kathy Gordon MD  Admission Date: 4/23/2021    Discharge Provider: Janki Lucero MD  Discharge Date: 06/14/2021    Discharge Disposition:  Post acute rehab at [facility]  Discharge Condition: serious  Discharge with Lines: PICC line  Discharge Diet: Tube feeds plus dysphagia diet  Activity Restrictions: Per rehab  Test Results Pending at Discharge: none     Discharge Diagnoses:  Principal Problem:    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[address] )  Active Problems:    Altered mental status    Dysphagia    Aphasia    Weakness    Fracture of ramus of left pubis (HCC)    Severe protein-calorie malnutrition ([address] )    Cancer related pain  Resolved Problems:    Urinary retention    Presence of permanent central venous catheter    Steroid-induced hyperglycemia    Sepsis ([address] )    Left-sided weakness    Pancytopenia ([address] )    Acute gout    Pain of left lower leg      Consulting Providers: Oncology  Gastroenterology  Palliative care    Diagnostic & Therapeutic Procedures Performed:  XR chest portable    Result Date: 4/24/2021  Impression: Dialysis catheter tip within the superior cavoatrial junction  No pneumothorax  Workstation performed: [id number]     XR chest PICC line portable    Result Date: 4/27/2021  Impression: PICC line tip in the superior vena cava, approximately 3 5 to 4 cm above the cavoatrial junction   The examination demonstrates a significant  finding and was documented as such in [facility] for liaison and referring practitioner notification  Workstation performed: [id number]     IR PICC reposition    Result Date: 4/27/2021  Impression: Status-post repositioning of a 5 French central venous catheter under fluoroscopic guidance with its tip at the cavoatrial junction  Workstation performed: [id number]     IR tunneled central line removal    Result Date: 4/27/2021  Impression: Impression: Successful tunneled central line removal as described  Signed, performed and dictated by Chloe Landon PA-C under the supervision of Dr Danica Parker  Workstation performed: [id number]       Code Status: Level 1 - Full Code  Advance Directive & Living Will: <no information>  Power of :    POLST:      Medications:  Current Discharge Medication List      STOP taking these medications       acetaminophen (TYLENOL) 500 mg tablet Comments:   Reason for Stopping:             Current Discharge Medication List      START taking these medications    Details   oxyCODONE (ROXICODONE) 5 mg immediate release tablet Take 1 tablet (5 mg total) by mouth every 6 (six) hours as needed for moderate pain or severe pain for up to 10 daysMax Daily Amount: 20 mg  Qty: 12 tablet, Refills: 0    Associated Diagnoses: 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[facility] [address] ); Cancer related pain           Current Discharge Medication List      CONTINUE these medications which have NOT CHANGED    Details   albuterol (2 5 mg/3 mL) 0 083 % nebulizer solution Inhale 2 5 mg      albuterol (PROVENTIL HFA,VENTOLIN HFA) 90 mcg/act inhaler Inhale 2 puffs    Comments: Substitution to a formulary equivalent within the same pharmaceutical class is authorized        dexamethasone (DECADRON) 4 mg/mL Infuse 4 mg into a venous catheter 4 (four) times a day      famotidine (PEPCID) 20 mg tablet Take 20 mg by mouth daily      Lidocaine 4 % PTCH Place 1 patch on the skin daily      ondansetron (ZOFRAN) 4 mg/2 mL injection Infuse 4 mg into a venous catheter every 6 (six) hours as needed      senna-docusate sodium (Senokot S) 8 6-50 mg per tablet Take 1 tablet by mouth 2 (two) times a day             Allergies:  No Known Allergies    FOLLOW-UP     PCP Outpatient Follow-up:  Patient will need to establish care with PCP infoLink provided  Consulting Providers Follow-up:  Oncology     Active Issues Requiring Follow-up:   CNS lymphoma    Discharge Statement:   I spent 45 minutes minutes discharging the patient  This time was spent on the day of discharge  I had direct contact with the patient on the day of discharge  Additional documentation is required if more than 30 minutes were spent on discharge  Portions of the record may have been created with voice recognition software  Occasional wrong word or "sound a like" substitutions may have occurred due to the inherent limitations of voice recognition software    Read the chart carefully and recognize, using context, where substitutions have occurred     ==  Con Linn MD  [address]  Internal Medicine Resident PGY-2

## 2021-06-14 NOTE — PLAN OF CARE
Problem: MOBILITY - ADULT  Goal: Maintain or return to baseline ADL function  Description: INTERVENTIONS:  -  Assess patient's ability to carry out ADLs; assess patient's baseline for ADL function and identify physical deficits which impact ability to perform ADLs (bathing, care of mouth/teeth, toileting, grooming, dressing, etc )  - Assess/evaluate cause of self-care deficits   - Assess range of motion  - Assess patient's mobility; develop plan if impaired  - Assess patient's need for assistive devices and provide as appropriate  - Encourage maximum independence but intervene and supervise when necessary  - Involve family in performance of ADLs  - Assess for home care needs following discharge   - Consider OT consult to assist with ADL evaluation and planning for discharge  - Provide patient education as appropriate  Outcome: Progressing

## 2021-06-14 NOTE — CASE MANAGEMENT
CM spoke with Kimberly from [facility] to confirm pt's d/c today  Per Kimberly CM needed to resend clinicals as the initial [address] was initiated on Weds  Per Kimberly CM did not need to get updated PT/OT notes  CM faxed clinicals to [phone] (ref #: [id number])  CM will follow

## 2021-06-15 ENCOUNTER — TELEPHONE (OUTPATIENT)
Dept: SURGICAL ONCOLOGY | Facility: CLINIC | Age: 56
End: 2021-06-15

## 2021-06-15 RX ORDER — ONDANSETRON 4 MG/1
4 TABLET, ORALLY DISINTEGRATING ORAL EVERY 6 HOURS PRN
Qty: 20 TABLET | Refills: 0
Start: 2021-06-15 | End: 2021-08-20 | Stop reason: HOSPADM

## 2021-06-15 NOTE — UTILIZATION REVIEW
Continued Stay Review     Date: 6/11/21                    Current Patient Class: Inpatient  Current Level of Care: Med-Surg  HPI:56 y o  female initially admitted on 4/23 with CNS lymphoma     Assessment/Plan: Currently alert and oriented times 2-3   Follow commands   Plan is for IP rehab  Ins has approved auth for [facility]  SNF states IV ativan needs to be d/c's as they are unable to administer IV ativan (must be po or via peg tube), and pt will need to not have received it without 72 hrs of admission  They will review again on Monday as received 2 doses so far today  Continue current tx plan; po meds   SCD's/Lovenox sq/       Vital Signs:   Date/Time   Temp   Pulse   Resp   BP   MAP (mmHg)   SpO2   O2 Device   06/11/21 09:25:28   --   81   18   124/74   91   99 %   --   06/11/21 07:37:01   97 7 °F (36 5 °C)   89   18   108/66   80   100 %   None (Room air)   06/10/21 2245   --   --   --   --   --   --   None (Room air)   06/10/21 22:03:36   99 5 °F (37 5 °C)   88   18   105/70   82   96 %   --   06/10/21 14:59:57   --   --   16   115/77   90   --   --   06/10/21 07:25:39   --   88   18   95/62   73   96 %   --            Pertinent Labs/Diagnostic Results:        Results from last 7 days   Lab Units 06/08/21  1711   SARS-COV-2   Negative             Results from last 7 days   Lab Units 06/11/21  0615 06/08/21  0656 06/07/21  0648   WBC Thousand/uL 7 71 7 58 9 04   HEMOGLOBIN g/dL 9 9* 8 7* 8 6*   HEMATOCRIT % 32 3* 27 3* 28 3*   PLATELETS Thousands/uL 286 257 279   BANDS PCT % 2  --  1            Results from last 7 days   Lab Units 06/11/21  0615 06/07/21  0648   SODIUM mmol/L 139 138   POTASSIUM mmol/L 3 6 4 0   CHLORIDE mmol/L 105 103   CO2 mmol/L 28 29   ANION GAP mmol/L 6 6   BUN mg/dL 10 18   CREATININE mg/dL 0 31* 0 33*   EGFR ml/min/1 73sq m 129 126   CALCIUM mg/dL 9 3 8 8                          Results from last 7 days   Lab Units 06/11/21  1600 06/11/21  1221 06/11/21  0759 06/10/21  2033 06/10/21  1624 06/10/21  1135 06/10/21  0748 06/09/21  2038 06/09/21  1641 06/09/21  1104 06/09/21  0750 06/08/21 2053   POC GLUCOSE mg/dl 159* 118 116 139 127 140 131 130 125 121 129 136            Results from last 7 days   Lab Units 06/11/21  0615 06/07/21  0648   GLUCOSE RANDOM mg/dL 112 123         Medications:   Scheduled Medications:  acetaminophen, 650 mg, Oral, Q8H MEÑO  acyclovir, 400 mg, Oral, Q12H MEÑO  allopurinol, 100 mg, Oral, Daily  amLODIPine, 5 mg, Oral, Daily  dicyclomine, 10 mg, Oral, 4x Daily (AC & HS)  enoxaparin, 40 mg, Subcutaneous, Q24H MEÑO  fluconazole, 400 mg, Oral, Daily  gabapentin, 100 mg, Oral, HS  insulin glargine, 5 Units, Subcutaneous, HS  insulin lispro, 1-5 Units, Subcutaneous, 4x Daily (AC & HS)  levofloxacin, 500 mg, Oral, Q24H  lidocaine, 1 patch, Topical, Daily  menthol-methyl salicylate, , Apply externally, BID  omeprazole (PRILOSEC) suspension 2 mg/mL, 20 mg, Oral, Daily  tamsulosin, 0 4 mg, Oral, Daily With Dinner        PRN Meds:  albuterol, 2 puff, Inhalation, Q6H PRN  aluminum-magnesium hydroxide-simethicone, 30 mL, Per PEG Tube, Q4H PRN  bisacodyl, 10 mg, Rectal, Daily PRN  Diclofenac Sodium, 2 g, Topical, TID PRN  hydrOXYzine HCL, 25 mg, Oral, Q6H PRN 6/11 x2  LORazepam, 0 5 mg, Intravenous, Q6H PRN 6/11 x2  ondansetron, 4 mg, Intravenous, Q6H PRN  oxyCODONE, 5 mg, Oral, Q6H PRN 6/10 x2, 6/11 x1  polyethylene glycol, 17 g, Oral, Daily PRN  senna-docusate sodium, 1 tablet, Oral, BID PRN           Discharge Plan: D     Network Utilization Review Department  ATTENTION: Please call with any questions or concerns to [phone] and carefully listen to the prompts so that you are directed to the right person  All voicemails are confidential   [facility] all requests for admission clinical reviews, approved or denied determinations and any other requests to dedicated fax number below belonging to the campus where the patient is receiving treatment   List of dedicated fax numbers for the Facilities:  FACILITY NAME UR FAX NUMBER   ADMISSION DENIALS (Administrative/Medical Necessity) [phone]   [address]  (Maternity/NICU/Pediatrics) [address] [facility] [facility] [address] [place] [address] Dr Aleisha Oh [address] [id number] [address] [facility] [address] [facility] [phone]

## 2021-06-15 NOTE — UTILIZATION REVIEW
Continued Stay Review    Date: 6/13/21                         Current Patient Class: IP  Current Level of Care: MS    HPI:56 y o  female initially admitted on 4/23 with CNS lymphoma with chemo  Assessment/Plan:   Pt is negative for Covid  Tested in preparation to d/c to SNF  Pt is using PRN analgesia and Hydroxyzine  Continues with tube feeds  Stable and waiting d/c to SNF    Pt is A&O     Vital Signs:   06/13/21 22:45:36  99 °F (37 2 °C)  94  --  121/80  94  100 %  --   06/13/21 22:45:22  99 °F (37 2 °C)  --  18  121/80  94  --  --   06/13/21 2230  --  --  --  --  --  --  None (Room air)   06/13/21 16:22:28  98 9 °F (37 2 °C)  88  18  125/81  96  99 %  --   06/13/21 16:22:18  98 9 °F (37 2 °C)  86  --  125/81  96  99 %  --   06/13/21 08:35:39  98 7 °F (37 1 °C)  83  --  102/64  77  99 %  --   06/13/21 07:35:16  98 8 °F (37 1 °C)  89  --  113/84  94  99 %  --   06/12/21 21:30:26  98 4 °F (36 9 °C)  59  18  94/60  71  100 %  --   06/12/21 14:53:15  99 2 °F (37 3 °C)  89  18  93/58  70  100 %  --   06/12/21 07:22:09  99 1 °F (37 3 °C)  92  18  92/56  68  100 %  --     Pertinent Labs/Diagnostic Results:   Results from last 7 days   Lab Units 06/13/21  1613 06/08/21  1711   SARS-COV-2  Negative Negative     Results from last 7 days   Lab Units 06/14/21  1540 06/11/21  0615   WBC Thousand/uL  --  7 71   HEMOGLOBIN g/dL  --  9 9*   HEMATOCRIT %  --  32 3*   PLATELETS Thousands/uL 322 286   BANDS PCT %  --  2         Results from last 7 days   Lab Units 06/11/21  0615   SODIUM mmol/L 139   POTASSIUM mmol/L 3 6   CHLORIDE mmol/L 105   CO2 mmol/L 28   ANION GAP mmol/L 6   BUN mg/dL 10   CREATININE mg/dL 0 31*   EGFR ml/min/1 73sq m 129   CALCIUM mg/dL 9 3         Results from last 7 days   Lab Units 06/14/21  1053 06/14/21  0828 06/13/21  2102 06/13/21  1658 06/13/21  0858 06/12/21  2133 06/12/21  1648 06/12/21  1146 06/12/21  0745 06/11/21  2033 06/11/21  1600 06/11/21  1221   POC GLUCOSE mg/dl 130 133 92 128 136 100 133 119 160* 92 159* 118     Results from last 7 days   Lab Units 06/11/21  0615   GLUCOSE RANDOM mg/dL 112       Medications:   Scheduled Medications:  acetaminophen, 650 mg, Oral, Q8H MEÑO  acyclovir, 400 mg, Oral, Q12H MEÑO  allopurinol, 100 mg, Oral, Daily  amLODIPine, 5 mg, Oral, Daily  dicyclomine, 10 mg, Oral, 4x Daily (AC & HS)  enoxaparin, 40 mg, Subcutaneous, Q24H MEÑO  fluconazole, 400 mg, Oral, Daily  gabapentin, 100 mg, Oral, HS  insulin glargine, 5 Units, Subcutaneous, HS  insulin lispro, 1-5 Units, Subcutaneous, 4x Daily (AC & HS)  levofloxacin, 500 mg, Oral, Q24H  lidocaine, 1 patch, Topical, Daily  menthol-methyl salicylate, , Apply externally, BID  omeprazole (PRILOSEC) suspension 2 mg/mL, 20 mg, Oral, Daily  tamsulosin, 0 4 mg, Oral, Daily With Dinner    Continuous IV Infusions:      PRN Meds:  albuterol, 2 puff, Inhalation, Q6H PRN  aluminum-magnesium hydroxide-simethicone, 30 mL, Per PEG Tube, Q4H PRN  bisacodyl, 10 mg, Rectal, Daily PRN  Diclofenac Sodium, 2 g, Topical, TID PRN  hydrOXYzine HCL, 25 mg, Oral, Q6H PRN x1 6/12, 6/13  LORazepam, 0 5 mg, Intravenous, Q6H PRN ,   /ondansetron, 4 mg, Intravenous, Q6H PRN  oxyCODONE, 5 mg, Oral, Q6H PRN  - x 2 6/12, x 3 6  polyethylene glycol, 17 g, Oral, Daily PRN  senna-docusate sodium, 1 tablet, Oral, BID PRN      Discharge Plan: [facility]     Network Utilization Review Department  ATTENTION: Please call with any questions or concerns to [phone] and carefully listen to the prompts so that you are directed to the right person  All voicemails are confidential   Cait Gabe all requests for admission clinical reviews, approved or denied determinations and any other requests to dedicated fax number below belonging to the campus where the patient is receiving treatment   List of dedicated fax numbers for the Facilities:  [address] [place] [address] DENIALS (Administrative/Medical Necessity) [phone]   [address] (Maternity/NICU/Pediatrics) [address] [facility],7Th [facility] [address] [facility] [address] [address] [place] [address] [address] [place] [address] [phone]

## 2021-06-15 NOTE — UTILIZATION REVIEW
Bryce De La Cruz MD   Resident   Internal Medicine   Discharge Summary       Attested   Date of Service:  6/14/2021 12:56 PM               Attested        Attestation signed by Alessandro Fabian MD at 6/14/2021 7:16 PM   I discussed and reviewed the discharge plan and documentation of this patient with the Resident  I agree with the resident's documented findings and plan of discharge  Expand AllCollapse All      Show:Clear all  [x]Manual[x]Template[x]Copied    Added by:  Jesusita Brittle, MD[x]Sedrick Mendez DO[x]Stephanie Diaz DO[x]Ulises Santo DO    []Alessandra for details             INTERNAL MEDICINE RESIDENCY DISCHARGE SUMMARY      Mekhi Samuels   49 y o  female  MRN: [id number]  Room/Bed: [place] [address]/[facility] [address] [address]   Encounter: [id number]     Principal Problem:    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[facility] [address] )  Active Problems:    Altered mental status    Dysphagia    Aphasia    Weakness    Fracture of ramus of left pubis (HCC)    Severe protein-calorie malnutrition ([facility] [address] )    Cancer related pain        * CNS lymphoma [facility])  Assessment & Plan  Patient was diagnosed with primary CNS lymphoma through biopsy in [facility] transferred over here for chemotherapy since the [facility] is out of network  Had a 2nd family meeting on 04/30 and family has decided to proceed treatment at this time  S/p PICC line for chemotherapy  S/p peg tube placement on 05/10        Plan:  · s/p 2nd cycle of chemotherapy on 05/13, leucovin on 5/14  · DVT prophylaxis with Lovenox  · Rehab placement pending    · S/p steroid taper for cerebral edema from CNS lymphoma  · Pancytopenia in setting of recent chemotherapy    Will continue to watch for any signs of infection  · On acyclovir, Diflucan and Levaquin prophylactically  · After discussion with Oncology, plan for rehab, patient does not require radiation therapy, rehab for strength  · Patient's family updated on plan going forward, no questions after family visit  · Repeat MRI shows interval improvement of intracranial multifocal lesions  · Re-consult Oncology on the 06/14/2021 if patient is still inpatient     Severe protein-calorie malnutrition ([facility] [address] )  Assessment & Plan  Malnutrition Findings:   Adult Malnutrition type: Acute illness(due to medical condition resulting in dysphagia, decreased appetite and intake, weight loss)  Adult Degree of Malnutrition: Other severe protein calorie malnutrition     BMI Findings: Body mass index is 21 44 kg/m²       Plan:  · Nutrition consult  · Tube feeds initiated with Jevity 1 2, advancing to goal        Fracture of ramus of left pubis [facility])  Assessment & Plan  Wendie Dryer prior to presentation to [facility]  Managed nonoperatively at [facility]     Weakness  Assessment & Plan  Left greater than right upper and lower extremity weakness with ongoing left lower extremity contracture  Secondary to underlying CNS malignancy  Decubitus ulcer precautions such as frequent repositioning       Aphasia  Assessment & Plan  Speech therapy on board     Dysphagia  Assessment & Plan  s/p peg tube placement, tube feeds at goal  Euvolemic     Altered mental status  Assessment & Plan  · Patient initially presented to [facility] his [place] with stroke-like symptoms for 2 days   CT scan of the head was concerning for subacute CVA and was transferred to [facility]   · MRI of the brain was more consistent with demyelinating disease patient was started on IV steroids and 1000 mg daily for 5 days   Patient did not have any significant improvement at that time patient had plasmapheresis   And also had  lumbar puncture-negative for infection or malignancy   Patient noted to have persistent encephalopathy so a repeat MRI was obtained which showed worsening of the abnormality seen on the previous MRI and 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  · Likely secondary to CNS lymphoma  · Delirium precautions   · Currently alert and oriented times 2-3  Follow commands  · Patient's  (does not speak [place]) makes all medical decision for her, confirmed once all medical treatment at this time     Pain of left lower leg-resolved as of 6/2/2021  Assessment & Plan  Overnight left lower extremity pain, well score 3, pain now controlled  Bilateral ultrasound Dopplers no evidence of DVT     Acute gout-resolved as of 6/2/2021  Assessment & Plan  Gout flare left knee resolving  Steroid taper completed  Will start low-dose allopurinol 100 mg q day to hopefully prevent other acute flares in the setting of chemotherapy  Follow-up uric acid level     Pancytopenia (HCC)-resolved as of 6/2/2021  Assessment & Plan  · Likely in setting of chemotherapy  · Will continue to monitor for any signs of infection  · Will transfuse with packed RBC for hemoglobin less than 7    ? Drop in Hgb from 9 to 7 on 5/14, other lines relatively stable  VSS and soft brown BMs  Will discuss with oncology if this is expected based on chemo or if there should be further workup for bleeding, though hemodynamics currently do not suggest bleed  ? S/p 1 pack RBC on 05/15, hemoglobin continues to improve     Left-sided weakness-resolved as of 6/2/2021  Assessment & Plan  · Patient has left-sided weakness at baseline from her CNS lymphoma  · Left upper extremity 3-4/5 and left lower extremity 0-1/5 muscle strength     Sepsis (HCC)-resolved as of 5/13/2021  Assessment & Plan  Patient had hypothermia, elevated WBC count and tachycardia  Concern for possible aspiration in setting on dysphagia versus UTI in setting of urinary catheter which was placed for retention      Plan:  · S/p IV antibiotics last day on 05/05  · Will continue to monitor off antibiotics      Steroid-induced hyperglycemia-resolved as of 5/23/2021  Assessment & Plan  Mild hyperglycemia, glucoses in 200s, no history of DM   Occurring in the setting of Decadron with chemotherapy, as well as sodium bicarb in D5 drip  - D5 drip is finished  - will monitor sugars while on Jevity and now off D5  - continue q4 hour fingerstick glucose with correctional algorithm 1 and Lantus     Presence of permanent central venous catheter-resolved as of 6/2/2021  Assessment & Plan  Noted presence of right IJ tunneled double-lumen HD catheter; upon chart review, this was likely inserted to be used for plasmapheresis which she has completed        Plan:  · S/p removal by IR  · Patient now has PICC line      Urinary retention-resolved as of 6/2/2021  Assessment & Plan  · Patient developed urinary retention during previous hospitalization  · Plan was voiding trial as an outpatient as she was scheduled for discharge from that facility  · Machado catheter was initially removed although patient required re-placement of machado on 05/14 in setting of incontinence and getting chemotherapy  · Machado discontinued, monitor for signs of fluid retention              [address]      Patient had prolonged hospitalization, below is brief summary of the hospital course  Please refer to progress notes for further informations      Patient is a 49years old female initially presented as a transfer from [facility]   Patient initially presented to [facility] for symptoms concerning for stroke  Initially on 03/19/2021 presented with left-sided facial droop and trouble swallowing over 2 days as well as blurring of vision and mild posterior headache, she had a fall prior to admission at home  CT head showed an acute to subacute infarct in the left cerebellum and mid brain  CTA head and neck showed no arterial stenosis or dissection intracranially and patent cervical carotid and vertebral arteries  Patient was outside tPA window and was managed medically    MRI brain completed showed demyelinating process versus CNS lymphoma with edema and expansion of the left aspect of midbrain de and left middle cerebellar peduncle  CT chest abdomen and pelvis showed fracture of multiple pubic rami  Patient was started on pulse dose steroids on 03/24 for 5 days for suspicion of acute demyelinating encephalomyelitis  Patient was initiated on PLEX for 5 days without major improvement  She had a repeat MRI after completing PLEX revealing increased enhancement in corpus callosum and right periventricular white matter and right aspect of the splenium of the corpus callosum and overall decreasing FLAIR signal involving the left brainstem and left cerebellar hemispheres  She underwent brain biopsy which confirmed diffuse large B-cell lymphoma  CT chest abdomen pelvis did not show any lymphadenopathy or signs of metastatic disease  Unfortunately [facility] is not in network for patient's insurance and she was transferred to [facility] for initiation of chemotherapy      She was seen in consultation by Heme-Onc and recommended high-dose methotrexate and Rituxan, per oncology has poor prognosis even with treatment  She was seen in consultation by palliative Care and family elected to continue with treatment  Status post 2 cycles of methotrexate and rituximab  It was decided to hold off further chemotherapy until patient completes rehab and follow up as outpatient  She was started on prophylactic Diflucan, acyclovir and Levaquin  Per Oncology, she needs to continue until seeing Oncology as outpatient  Repeat MRI showed interval improvement of intracranial multifocal lesions  Hospitalization was complicated by sepsis possibly secondary to aspiration in the setting of dysphagia for which she completed antibiotics treatment  Patient received PEG tube on 05/10 and was started on tube feeds which she tolerated  Patient also had an episode of urine retention requiring placement of Cottrell catheter, this was removed and patient was able to void spontaneously    Patient also had acute gout flare of left knee, treated with steroid taper and was started on allopurinol      Patient was accepted at [facility] for rehab  On the day of discharge, she was seen and examined at bedside      Visit Vitals  /75   Pulse 84   Temp 99 2 °F (37 3 °C)   Resp 16   Ht 5' (1 524 m)   Wt 50 8 kg (112 lb)   SpO2 99%   BMI 21 87 kg/m²   Smoking Status Never Smoker   BSA 1 46 m²      Physical Exam  Constitutional:       General: She is not in acute distress  Appearance: She is ill-appearing  Comments: Frail, ill-appearing patient   HENT:      Head: Normocephalic and atraumatic  Cardiovascular:      Rate and Rhythm: Normal rate and regular rhythm  Heart sounds: Normal heart sounds  No murmur heard  Pulmonary:      Breath sounds: Normal breath sounds  No wheezing or rales  Abdominal:      Palpations: Abdomen is soft  Tenderness: There is no abdominal tenderness  Comments: PEG tube in place   Musculoskeletal:      Right lower leg: No edema  Left lower leg: No edema  Skin:     General: Skin is warm     Neurological:      Comments: Left sided hemiparesis       DISCHARGE INFORMATION      PCP at Discharge: No PCP     Admitting Provider: Armando Rodriguez MD  Admission Date: 4/23/2021     Discharge Provider: Merline Roller, MD  Discharge Date: 06/14/2021     Discharge Disposition:  Post acute rehab at [facility]  Discharge Condition: serious  Discharge with Lines: PICC line  Discharge Diet: Tube feeds plus dysphagia diet  Activity Restrictions: Per rehab  Test Results Pending at Discharge: none      Discharge Diagnoses:  Principal Problem:    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[address] )  Active Problems:    Altered mental status    Dysphagia    Aphasia    Weakness    Fracture of ramus of left pubis (HCC)    Severe protein-calorie malnutrition ([address] )    Cancer related pain  Resolved Problems:    Urinary retention    Presence of permanent central venous catheter    Steroid-induced hyperglycemia    Sepsis ([address] ) Left-sided weakness    Pancytopenia (HCC)    Acute gout    Pain of left lower leg        Consulting Providers: Oncology  Gastroenterology  Palliative care     Diagnostic & Therapeutic Procedures Performed:  XR chest portable     Result Date: 4/24/2021  Impression: Dialysis catheter tip within the superior cavoatrial junction  No pneumothorax  Workstation performed: [id number]      XR chest PICC line portable     Result Date: 4/27/2021  Impression: PICC line tip in the superior vena cava, approximately 3 5 to 4 cm above the cavoatrial junction  The examination demonstrates a significant  finding and was documented as such in [facility] for liaison and referring practitioner notification  Workstation performed: [id number]      IR PICC reposition     Result Date: 4/27/2021  Impression: Status-post repositioning of a 5 French central venous catheter under fluoroscopic guidance with its tip at the cavoatrial junction  Workstation performed: [id number]      IR tunneled central line removal     Result Date: 4/27/2021  Impression: Impression: Successful tunneled central line removal as described  Signed, performed and dictated by Rut Sandhu PA-C under the supervision of Dr Comfort Dallas  Workstation performed: [id number]         Code Status: Level 1 - Full Code  Advance Directive & Living Will: <no information>  Power of :    POLST:       Medications:       Current Discharge Medication List            STOP taking these medications         acetaminophen (TYLENOL) 500 mg tablet Comments:   Reason for Stopping:                      Current Discharge Medication List            START taking these medications     Details   oxyCODONE (ROXICODONE) 5 mg immediate release tablet Take 1 tablet (5 mg total) by mouth every 6 (six) hours as needed for moderate pain or severe pain for up to 10 daysMax Daily Amount: 20 mg  Qty: 12 tablet, Refills: 0     Associated Diagnoses: 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[facility] [address] );  Cancer related pain                 Current Discharge Medication List       CONTINUE these medications which have NOT CHANGED     Details   albuterol (2 5 mg/3 mL) 0 083 % nebulizer solution Inhale 2 5 mg       albuterol (PROVENTIL HFA,VENTOLIN HFA) 90 mcg/act inhaler Inhale 2 puffs     Comments: Substitution to a formulary equivalent within the same pharmaceutical class is authorized        dexamethasone (DECADRON) 4 mg/mL Infuse 4 mg into a venous catheter 4 (four) times a day       famotidine (PEPCID) 20 mg tablet Take 20 mg by mouth daily       Lidocaine 4 % PTCH Place 1 patch on the skin daily       ondansetron (ZOFRAN) 4 mg/2 mL injection Infuse 4 mg into a venous catheter every 6 (six) hours as needed       senna-docusate sodium (Senokot S) 8 6-50 mg per tablet Take 1 tablet by mouth 2 (two) times a day                 Allergies:  No Known Allergies           FOLLOW-UP      PCP Outpatient Follow-up:  Patient will need to establish care with PCP infoLink provided      Consulting Providers Follow-up:  Oncology      Active Issues Requiring Follow-up:   CNS lymphoma     Discharge Statement:   I spent 45 minutes minutes discharging the patient  This time was spent on the day of discharge  I had direct contact with the patient on the day of discharge  Additional documentation is required if more than 30 minutes were spent on discharge      Portions of the record may have been created with voice recognition software   Occasional wrong word or "sound a like" substitutions may have occurred due to the inherent limitations of voice recognition software   Read the chart carefully and recognize, using context, where substitutions have occurred      ==  Winston Driver MD  [address]  Internal Medicine Resident PGY-2                  Cosigned by:  José Garcia MD at 6/14/2021  7:16 PM   Revision History

## 2021-06-15 NOTE — TELEPHONE ENCOUNTER
New Patient Encounter    New Patient Intake Form   Patient Details:  Mekhi Samuels  1965  [id number]    Background Information:  [address] starts by opening a telephone encounter and gathering the following information   Who is calling to schedule? If not self, relationship to patient? [address]  [phone]   Referring Provider Hospital f/u   What is the diagnosis? CNS Lymphoma   Is this Cancer or Non-Cancer? Cancer   Is this diagnosis confirmed? Yes   When was the diagnosis? 6/2021   Is there a confirmed diagnosis from a biopsy/tissue reviewed by pathology? Yes   Were outside slides requested? NA   Is patient aware of diagnosis? Yes   Is there a personal history and what kind? Did Not Speak to Patient / Office Scheduled   Is there a family history and what kind? Did Not Speak to Patient / Office Scheduled   Reason for visit? New Diagnosis   Have you had any imaging or labs done? If so: when, where? yes  sl   Are records in [facility]? yes   If patient has a prior history of cancer were old records obtained? NA   Was the patient told to bring a disk? No   Does the patient smoke or Vape? Did Not Speak to Patient / Office Scheduled   If yes, how many packs or cartridges per day? Scheduling Information:   Preferred Constable:  [place]     Are there any dates/time the patient cannot be seen? Miscellaneous:   After completing the above information, please route to Financial Counselor and the appropriate Nurse Navigator for review

## 2021-06-29 NOTE — TELEPHONE ENCOUNTER
Per RTE, unable to verify  Reason code 28: Out of network  Called and spoke with Brandon Herrera from [facility]  Call reference#: [id number]  Brandon Herrera confirmed that practice and provider are in network  Plan runs on a calendar year  Patient has a deductible of $2,500 and an OOP of $6,000  Both of which have been met  All services now covered at 100%  Financial counseling outreach not needed at this time

## 2021-07-08 ENCOUNTER — OFFICE VISIT (OUTPATIENT)
Dept: HEMATOLOGY ONCOLOGY | Facility: CLINIC | Age: 56
End: 2021-07-08
Payer: COMMERCIAL

## 2021-07-08 VITALS
WEIGHT: 112 LBS | OXYGEN SATURATION: 98 % | HEART RATE: 97 BPM | BODY MASS INDEX: 21.99 KG/M2 | TEMPERATURE: 98 F | HEIGHT: 60 IN | SYSTOLIC BLOOD PRESSURE: 116 MMHG | DIASTOLIC BLOOD PRESSURE: 72 MMHG | RESPIRATION RATE: 17 BRPM

## 2021-07-08 DIAGNOSIS — C85.89 CNS LYMPHOMA (HCC): Primary | ICD-10-CM

## 2021-07-08 PROCEDURE — 99215 OFFICE O/P EST HI 40 MIN: CPT | Performed by: INTERNAL MEDICINE

## 2021-07-08 RX ORDER — OXYCODONE HYDROCHLORIDE 5 MG/1
5 CAPSULE ORAL EVERY 6 HOURS PRN
COMMUNITY
End: 2021-07-21 | Stop reason: HOSPADM

## 2021-07-08 RX ORDER — ACETAMINOPHEN 325 MG/1
500 TABLET ORAL EVERY 6 HOURS PRN
COMMUNITY
End: 2021-09-08 | Stop reason: HOSPADM

## 2021-07-08 RX ORDER — SENNOSIDES 8.8 MG/5ML
5 LIQUID ORAL 2 TIMES DAILY
COMMUNITY
End: 2021-07-21 | Stop reason: HOSPADM

## 2021-07-08 RX ORDER — ATORVASTATIN CALCIUM 40 MG/1
40 TABLET, FILM COATED ORAL DAILY
COMMUNITY
End: 2021-08-05 | Stop reason: HOSPADM

## 2021-07-08 RX ORDER — DOCUSATE SODIUM 100 MG/1
100 CAPSULE, LIQUID FILLED ORAL 2 TIMES DAILY
COMMUNITY
End: 2021-09-08 | Stop reason: HOSPADM

## 2021-07-12 ENCOUNTER — TELEPHONE (OUTPATIENT)
Dept: HEMATOLOGY ONCOLOGY | Facility: CLINIC | Age: 56
End: 2021-07-12

## 2021-07-12 NOTE — TELEPHONE ENCOUNTER
Spoke to patient's  who was not aware of patient's appt for inpatient chemotherapy today  He stated he cannot give patient a ride as he is at work and does not have a ride himself  Lisa Shi did not let me know patient is still in rehab  After speaking to Charge RN on 9th floor at the hospital, found out patient was discharged to [facility]  Spoke to RN at [facility] who confirmed patient is still at the rehab facility  Informed them she is scheduled to start her inpatient chemotherapy today  Representative stated she will not be able to get patient transportation today, however will be setting it up for tomorrow  Asked patient be taken to [place] tomorrow between 8am and 9am   She voiced understanding  SPoke to MR Kellee Zhu who stated she is fine to get chemotherapy however, he will not be able to come to the hospital   Informed Sanjana Julian, charge RN on 9th floor and [phone] who will notifiy SOD admitting team as well

## 2021-07-13 ENCOUNTER — HOSPITAL ENCOUNTER (INPATIENT)
Facility: HOSPITAL | Age: 56
LOS: 8 days | Discharge: NURSING, REHAB OR LONG-TERM CARE | DRG: 846 | End: 2021-07-21
Attending: INTERNAL MEDICINE | Admitting: INTERNAL MEDICINE
Payer: COMMERCIAL

## 2021-07-13 DIAGNOSIS — E43 SEVERE PROTEIN-CALORIE MALNUTRITION (HCC): ICD-10-CM

## 2021-07-13 DIAGNOSIS — G89.3 CANCER RELATED PAIN: ICD-10-CM

## 2021-07-13 DIAGNOSIS — S32.592D CLOSED FRACTURE OF RAMUS OF LEFT PUBIS WITH ROUTINE HEALING, SUBSEQUENT ENCOUNTER: ICD-10-CM

## 2021-07-13 DIAGNOSIS — R26.2 AMBULATORY DYSFUNCTION: ICD-10-CM

## 2021-07-13 DIAGNOSIS — E87.6 HYPOKALEMIA: ICD-10-CM

## 2021-07-13 DIAGNOSIS — C85.89 CNS LYMPHOMA (HCC): Primary | ICD-10-CM

## 2021-07-13 LAB
ALBUMIN SERPL BCP-MCNC: 3 G/DL (ref 3.5–5)
ALP SERPL-CCNC: 61 U/L (ref 46–116)
ALT SERPL W P-5'-P-CCNC: 12 U/L (ref 12–78)
ANION GAP SERPL CALCULATED.3IONS-SCNC: 8 MMOL/L (ref 4–13)
AST SERPL W P-5'-P-CCNC: 10 U/L (ref 5–45)
BASOPHILS # BLD AUTO: 0.05 THOUSANDS/ΜL (ref 0–0.1)
BASOPHILS NFR BLD AUTO: 1 % (ref 0–1)
BILIRUB SERPL-MCNC: 0.17 MG/DL (ref 0.2–1)
BUN SERPL-MCNC: 14 MG/DL (ref 5–25)
CALCIUM ALBUM COR SERPL-MCNC: 10.5 MG/DL (ref 8.3–10.1)
CALCIUM SERPL-MCNC: 9.7 MG/DL (ref 8.3–10.1)
CHLORIDE SERPL-SCNC: 103 MMOL/L (ref 100–108)
CO2 SERPL-SCNC: 27 MMOL/L (ref 21–32)
CREAT SERPL-MCNC: 0.39 MG/DL (ref 0.6–1.3)
EOSINOPHIL # BLD AUTO: 0.13 THOUSAND/ΜL (ref 0–0.61)
EOSINOPHIL NFR BLD AUTO: 2 % (ref 0–6)
ERYTHROCYTE [DISTWIDTH] IN BLOOD BY AUTOMATED COUNT: 14.9 % (ref 11.6–15.1)
GFR SERPL CREATININE-BSD FRML MDRD: 119 ML/MIN/1.73SQ M
GLUCOSE SERPL-MCNC: 114 MG/DL (ref 65–140)
GLUCOSE SERPL-MCNC: 160 MG/DL (ref 65–140)
GLUCOSE SERPL-MCNC: 194 MG/DL (ref 65–140)
GLUCOSE SERPL-MCNC: 91 MG/DL (ref 65–140)
HCT VFR BLD AUTO: 36.7 % (ref 34.8–46.1)
HGB BLD-MCNC: 11.6 G/DL (ref 11.5–15.4)
IMM GRANULOCYTES # BLD AUTO: 0.04 THOUSAND/UL (ref 0–0.2)
IMM GRANULOCYTES NFR BLD AUTO: 1 % (ref 0–2)
LYMPHOCYTES # BLD AUTO: 2.25 THOUSANDS/ΜL (ref 0.6–4.47)
LYMPHOCYTES NFR BLD AUTO: 28 % (ref 14–44)
MCH RBC QN AUTO: 28.7 PG (ref 26.8–34.3)
MCHC RBC AUTO-ENTMCNC: 31.6 G/DL (ref 31.4–37.4)
MCV RBC AUTO: 91 FL (ref 82–98)
MONOCYTES # BLD AUTO: 0.57 THOUSAND/ΜL (ref 0.17–1.22)
MONOCYTES NFR BLD AUTO: 7 % (ref 4–12)
NEUTROPHILS # BLD AUTO: 4.88 THOUSANDS/ΜL (ref 1.85–7.62)
NEUTS SEG NFR BLD AUTO: 61 % (ref 43–75)
NRBC BLD AUTO-RTO: 0 /100 WBCS
PLATELET # BLD AUTO: 228 THOUSANDS/UL (ref 149–390)
PMV BLD AUTO: 10.8 FL (ref 8.9–12.7)
POTASSIUM SERPL-SCNC: 3.4 MMOL/L (ref 3.5–5.3)
PROT SERPL-MCNC: 6.4 G/DL (ref 6.4–8.2)
RBC # BLD AUTO: 4.04 MILLION/UL (ref 3.81–5.12)
SODIUM SERPL-SCNC: 138 MMOL/L (ref 136–145)
WBC # BLD AUTO: 7.92 THOUSAND/UL (ref 4.31–10.16)

## 2021-07-13 PROCEDURE — 3E03305 INTRODUCTION OF OTHER ANTINEOPLASTIC INTO PERIPHERAL VEIN, PERCUTANEOUS APPROACH: ICD-10-PCS | Performed by: INTERNAL MEDICINE

## 2021-07-13 PROCEDURE — 99222 1ST HOSP IP/OBS MODERATE 55: CPT | Performed by: INTERNAL MEDICINE

## 2021-07-13 PROCEDURE — 80053 COMPREHEN METABOLIC PANEL: CPT | Performed by: INTERNAL MEDICINE

## 2021-07-13 PROCEDURE — 99223 1ST HOSP IP/OBS HIGH 75: CPT | Performed by: INTERNAL MEDICINE

## 2021-07-13 PROCEDURE — 82948 REAGENT STRIP/BLOOD GLUCOSE: CPT

## 2021-07-13 PROCEDURE — 85025 COMPLETE CBC W/AUTO DIFF WBC: CPT | Performed by: INTERNAL MEDICINE

## 2021-07-13 PROCEDURE — 36569 INSJ PICC 5 YR+ W/O IMAGING: CPT

## 2021-07-13 PROCEDURE — 92610 EVALUATE SWALLOWING FUNCTION: CPT

## 2021-07-13 PROCEDURE — C1751 CATH, INF, PER/CENT/MIDLINE: HCPCS

## 2021-07-13 RX ORDER — OXYCODONE HCL 5 MG/5 ML
5 SOLUTION, ORAL ORAL EVERY 6 HOURS PRN
Status: DISCONTINUED | OUTPATIENT
Start: 2021-07-13 | End: 2021-07-21 | Stop reason: HOSPADM

## 2021-07-13 RX ORDER — ACETAMINOPHEN 325 MG/1
650 TABLET ORAL EVERY 6 HOURS PRN
Status: DISCONTINUED | OUTPATIENT
Start: 2021-07-13 | End: 2021-07-21 | Stop reason: HOSPADM

## 2021-07-13 RX ORDER — BISACODYL 10 MG
10 SUPPOSITORY, RECTAL RECTAL DAILY PRN
Status: DISCONTINUED | OUTPATIENT
Start: 2021-07-13 | End: 2021-07-21 | Stop reason: HOSPADM

## 2021-07-13 RX ORDER — MAGNESIUM HYDROXIDE/ALUMINUM HYDROXICE/SIMETHICONE 120; 1200; 1200 MG/30ML; MG/30ML; MG/30ML
30 SUSPENSION ORAL EVERY 4 HOURS PRN
Status: DISCONTINUED | OUTPATIENT
Start: 2021-07-13 | End: 2021-07-21 | Stop reason: HOSPADM

## 2021-07-13 RX ORDER — ONDANSETRON 4 MG/1
4 TABLET, ORALLY DISINTEGRATING ORAL EVERY 6 HOURS PRN
Status: DISCONTINUED | OUTPATIENT
Start: 2021-07-13 | End: 2021-07-21 | Stop reason: HOSPADM

## 2021-07-13 RX ORDER — AMLODIPINE BESYLATE 5 MG/1
5 TABLET ORAL DAILY
Status: DISCONTINUED | OUTPATIENT
Start: 2021-07-13 | End: 2021-07-20

## 2021-07-13 RX ORDER — ACETAMINOPHEN 325 MG/1
650 TABLET ORAL ONCE
Status: CANCELLED
Start: 2021-07-15 | End: 2021-07-15

## 2021-07-13 RX ORDER — GABAPENTIN 100 MG/1
100 CAPSULE ORAL
Status: DISCONTINUED | OUTPATIENT
Start: 2021-07-13 | End: 2021-07-13

## 2021-07-13 RX ORDER — SODIUM CHLORIDE 9 MG/ML
20 INJECTION, SOLUTION INTRAVENOUS ONCE AS NEEDED
Status: DISCONTINUED | OUTPATIENT
Start: 2021-07-13 | End: 2021-07-21 | Stop reason: HOSPADM

## 2021-07-13 RX ORDER — GABAPENTIN 100 MG/1
200 CAPSULE ORAL
Status: DISCONTINUED | OUTPATIENT
Start: 2021-07-13 | End: 2021-07-21 | Stop reason: HOSPADM

## 2021-07-13 RX ORDER — DICYCLOMINE HYDROCHLORIDE 10 MG/1
10 CAPSULE ORAL
Status: DISCONTINUED | OUTPATIENT
Start: 2021-07-13 | End: 2021-07-21 | Stop reason: HOSPADM

## 2021-07-13 RX ORDER — OXYCODONE HCL 5 MG/5 ML
2.5 SOLUTION, ORAL ORAL EVERY 6 HOURS PRN
Status: DISCONTINUED | OUTPATIENT
Start: 2021-07-13 | End: 2021-07-13

## 2021-07-13 RX ORDER — ACYCLOVIR 200 MG/1
400 CAPSULE ORAL EVERY 12 HOURS SCHEDULED
Status: DISCONTINUED | OUTPATIENT
Start: 2021-07-13 | End: 2021-07-21 | Stop reason: HOSPADM

## 2021-07-13 RX ORDER — ALLOPURINOL 100 MG/1
100 TABLET ORAL DAILY
Status: DISCONTINUED | OUTPATIENT
Start: 2021-07-13 | End: 2021-07-21 | Stop reason: HOSPADM

## 2021-07-13 RX ORDER — POTASSIUM CHLORIDE 20 MEQ/1
40 TABLET, EXTENDED RELEASE ORAL ONCE
Status: COMPLETED | OUTPATIENT
Start: 2021-07-13 | End: 2021-07-13

## 2021-07-13 RX ORDER — TAMSULOSIN HYDROCHLORIDE 0.4 MG/1
0.4 CAPSULE ORAL
Status: DISCONTINUED | OUTPATIENT
Start: 2021-07-13 | End: 2021-07-21 | Stop reason: HOSPADM

## 2021-07-13 RX ORDER — ATORVASTATIN CALCIUM 40 MG/1
40 TABLET, FILM COATED ORAL
Status: DISCONTINUED | OUTPATIENT
Start: 2021-07-13 | End: 2021-07-21 | Stop reason: HOSPADM

## 2021-07-13 RX ORDER — AMOXICILLIN 250 MG
1 CAPSULE ORAL EVERY 12 HOURS SCHEDULED
Status: DISCONTINUED | OUTPATIENT
Start: 2021-07-13 | End: 2021-07-21 | Stop reason: HOSPADM

## 2021-07-13 RX ORDER — DIPHENHYDRAMINE HYDROCHLORIDE 50 MG/ML
25 INJECTION INTRAMUSCULAR; INTRAVENOUS ONCE
Status: CANCELLED
Start: 2021-07-15 | End: 2021-07-15

## 2021-07-13 RX ORDER — POLYETHYLENE GLYCOL 3350 17 G/17G
17 POWDER, FOR SOLUTION ORAL DAILY PRN
Status: DISCONTINUED | OUTPATIENT
Start: 2021-07-13 | End: 2021-07-21 | Stop reason: HOSPADM

## 2021-07-13 RX ORDER — FAMOTIDINE 20 MG/1
20 TABLET, FILM COATED ORAL DAILY
Status: DISCONTINUED | OUTPATIENT
Start: 2021-07-13 | End: 2021-07-21 | Stop reason: HOSPADM

## 2021-07-13 RX ORDER — DOCUSATE SODIUM 100 MG/1
100 CAPSULE, LIQUID FILLED ORAL DAILY
Status: DISCONTINUED | OUTPATIENT
Start: 2021-07-13 | End: 2021-07-21 | Stop reason: HOSPADM

## 2021-07-13 RX ORDER — LEVOFLOXACIN 500 MG/1
500 TABLET, FILM COATED ORAL EVERY 24 HOURS
Status: COMPLETED | OUTPATIENT
Start: 2021-07-13 | End: 2021-07-13

## 2021-07-13 RX ORDER — ALBUTEROL SULFATE 90 UG/1
2 AEROSOL, METERED RESPIRATORY (INHALATION) EVERY 4 HOURS PRN
Status: DISCONTINUED | OUTPATIENT
Start: 2021-07-13 | End: 2021-07-21 | Stop reason: HOSPADM

## 2021-07-13 RX ORDER — FLUCONAZOLE 200 MG/1
400 TABLET ORAL DAILY
Status: COMPLETED | OUTPATIENT
Start: 2021-07-13 | End: 2021-07-14

## 2021-07-13 RX ORDER — HYDROXYZINE HYDROCHLORIDE 25 MG/1
25 TABLET, FILM COATED ORAL EVERY 6 HOURS PRN
Status: DISCONTINUED | OUTPATIENT
Start: 2021-07-13 | End: 2021-07-21 | Stop reason: HOSPADM

## 2021-07-13 RX ORDER — MUSCLE RUB CREAM 100; 150 MG/G; MG/G
CREAM TOPICAL 2 TIMES DAILY
Status: DISCONTINUED | OUTPATIENT
Start: 2021-07-13 | End: 2021-07-21 | Stop reason: HOSPADM

## 2021-07-13 RX ORDER — LORAZEPAM 2 MG/ML
0.5 INJECTION INTRAMUSCULAR ONCE AS NEEDED
Status: COMPLETED | OUTPATIENT
Start: 2021-07-13 | End: 2021-07-13

## 2021-07-13 RX ADMIN — SODIUM BICARBONATE 150 ML/HR: 84 INJECTION, SOLUTION INTRAVENOUS at 21:32

## 2021-07-13 RX ADMIN — DICYCLOMINE HYDROCHLORIDE 10 MG: 10 CAPSULE ORAL at 18:02

## 2021-07-13 RX ADMIN — AMLODIPINE BESYLATE 5 MG: 5 TABLET ORAL at 12:07

## 2021-07-13 RX ADMIN — METHOTREXATE 4935 MG: 25 INJECTION INTRA-ARTERIAL; INTRAMUSCULAR; INTRATHECAL; INTRAVENOUS at 19:00

## 2021-07-13 RX ADMIN — ATORVASTATIN CALCIUM 40 MG: 40 TABLET, FILM COATED ORAL at 18:02

## 2021-07-13 RX ADMIN — FAMOTIDINE 20 MG: 20 TABLET ORAL at 12:07

## 2021-07-13 RX ADMIN — SODIUM BICARBONATE 150 ML/HR: 84 INJECTION, SOLUTION INTRAVENOUS at 14:28

## 2021-07-13 RX ADMIN — DOCUSATE SODIUM AND SENNOSIDES 1 TABLET: 8.6; 5 TABLET ORAL at 22:24

## 2021-07-13 RX ADMIN — FLUCONAZOLE 400 MG: 200 TABLET ORAL at 12:07

## 2021-07-13 RX ADMIN — ACYCLOVIR 400 MG: 200 CAPSULE ORAL at 22:25

## 2021-07-13 RX ADMIN — ACYCLOVIR 400 MG: 200 CAPSULE ORAL at 12:07

## 2021-07-13 RX ADMIN — SODIUM CHLORIDE 20 ML/HR: 0.9 INJECTION, SOLUTION INTRAVENOUS at 16:03

## 2021-07-13 RX ADMIN — DOCUSATE SODIUM 100 MG: 100 CAPSULE ORAL at 12:07

## 2021-07-13 RX ADMIN — INSULIN LISPRO 1 UNITS: 100 INJECTION, SOLUTION INTRAVENOUS; SUBCUTANEOUS at 22:26

## 2021-07-13 RX ADMIN — DICYCLOMINE HYDROCHLORIDE 10 MG: 10 CAPSULE ORAL at 12:07

## 2021-07-13 RX ADMIN — POTASSIUM CHLORIDE 40 MEQ: 1500 TABLET, EXTENDED RELEASE ORAL at 18:07

## 2021-07-13 RX ADMIN — ALLOPURINOL 100 MG: 100 TABLET ORAL at 12:07

## 2021-07-13 RX ADMIN — TAMSULOSIN HYDROCHLORIDE 0.4 MG: 0.4 CAPSULE ORAL at 18:02

## 2021-07-13 RX ADMIN — LORAZEPAM 0.5 MG: 2 INJECTION INTRAMUSCULAR; INTRAVENOUS at 14:36

## 2021-07-13 RX ADMIN — MENTHOL, UNSPECIFIED FORM AND METHYL SALICYLATE: 10; 150 CREAM TOPICAL at 18:03

## 2021-07-13 RX ADMIN — LEVOFLOXACIN 500 MG: 500 TABLET, FILM COATED ORAL at 12:08

## 2021-07-13 RX ADMIN — GABAPENTIN 200 MG: 100 CAPSULE ORAL at 22:25

## 2021-07-13 RX ADMIN — ONDANSETRON: 2 SOLUTION INTRAMUSCULAR; INTRAVENOUS at 18:27

## 2021-07-13 RX ADMIN — INSULIN LISPRO 1 UNITS: 100 INJECTION, SOLUTION INTRAVENOUS; SUBCUTANEOUS at 18:03

## 2021-07-13 RX ADMIN — DICYCLOMINE HYDROCHLORIDE 10 MG: 10 CAPSULE ORAL at 22:26

## 2021-07-13 NOTE — CONSULTS
Medical Oncology/Hematology Consult Note  Billy Churchill, female, 49 y o , 1965,  [facility], [id number]     Assessment and Plan  1  Primary CNS Lymphoma  - s/p 2 cycles of high-dose Methotrexate/Rituxan; scan after these two doses showed improvement of disease; pt was sent to rehab to work on her strength/conditioning; she is overdue for C3 of HD MTX/rituxan and was admitted from facility for chemo  - proceed with C3 of HD MTX/Rituxan, with leukovorin rescue after  - continue PT/OT during this admission  - daily CBC/CMP, and daily weight    Communication with patient/family:  I did update the patient with help of a nurse as  Jama Lennox) as pt speaks [place]  Communication with team:  Did talk with primary oncology team, and patient's nurse, and pharmacist regarding her plan  I did see this patient with Dr Ralf Garcia and he is in agreement with this plan  Jerry Randhawa, [facility]  Hematology/Oncology Nurse Practitioner       Reason for consultation: CNS lymphoma  History of present illness: Billy Churchill is a 48 yo female with primary CNS lymphoma, s/p 2 cycles of high dose methotrexate and rituxan  Most recent imaging had showed improvement of intracranial lesions; pt was sent to rehab to work on her strength/conditioning; she is overdue for C3 of HD MTX/rituxan and was admitted from facility for chemo  Pt is doing well overall; she is much stronger - able to ambulate with assistance  She is now able to answer questions and communicate better  She denies any areas of pain  She is very uncomfortable with attempted PICC line placement  She is agreeable to getting chemotherapy today; no other concerned noted today during our conversation  Review of Systems:   As per HPI  Limited ROS given language barrier  No past medical history on file      Past Surgical History:   Procedure Laterality Date    IR PICC REPOSITION  4/27/2021    IR TUNNELED CENTRAL LINE REMOVAL 4/27/2021       Family History   Problem Relation Age of Onset    No Known Problems Mother        Social History     Socioeconomic History    Marital status: Unknown     Spouse name: Not on file    Number of children: Not on file    Years of education: Not on file    Highest education level: Not on file   Occupational History    Not on file   Tobacco Use    Smoking status: Never Smoker    Smokeless tobacco: Never Used   Substance and Sexual Activity    Alcohol use: Not Currently    Drug use: Not on file    Sexual activity: Not on file   Other Topics Concern    Not on file   Social History Narrative    Not on file     Social Determinants of Health     Financial Resource Strain:     Difficulty of Paying Living Expenses:    Food Insecurity:     Worried About [employer] in the Last Year:     [employer] in the Last Year:    Transportation Needs:     Lack of Transportation (Medical):      Lack of Transportation (Non-Medical):    Physical Activity:     Days of Exercise per Week:     Minutes of Exercise per Session:    Stress:     Feeling of Stress :    Social Connections:     Frequency of Communication with Friends and Family:     Frequency of Social Gatherings with Friends and Family:     Attends [employer] Services:     Active Member of Clubs or Organizations:     Attends Club or Organization Meetings:     Marital Status:    Intimate Partner Violence:     Fear of Current or Ex-Partner:     Emotionally Abused:     Physically Abused:     Sexually Abused:          Current Facility-Administered Medications:     acetaminophen (TYLENOL) tablet 650 mg, 650 mg, Oral, Q6H PRN, Gale Johns MD    acyclovir (ZOVIRAX) capsule 400 mg, 400 mg, Oral, Q12H [facility], Chantal Johns MD, 400 mg at 07/13/21 1207    albuterol (PROVENTIL HFA,VENTOLIN HFA) inhaler 2 puff, 2 puff, Inhalation, Q4H PRN, Gale Johns MD    allopurinol (ZYLOPRIM) tablet 100 mg, 100 mg, Oral, Daily, Shawn Key MD, 100 mg at 07/13/21 1207    aluminum-magnesium hydroxide-simethicone (MYLANTA) oral suspension 30 mL, 30 mL, Per PEG Tube, Q4H PRN, Bhaskar Minor MD    amLODIPine (NORVASC) tablet 5 mg, 5 mg, Oral, Daily, Chantal Johns MD, 5 mg at 07/13/21 1207    atorvastatin (LIPITOR) tablet 40 mg, 40 mg, Oral, After Emmanuel Johns MD    bisacodyl (DULCOLAX) rectal suppository 10 mg, 10 mg, Rectal, Daily PRN, Bhaskar Minor MD    Diclofenac Sodium (VOLTAREN) 1 % topical gel 2 g, 2 g, Topical, TID PRN, Bhaskar Minor MD    dicyclomine (BENTYL) capsule 10 mg, 10 mg, Oral, 4x Daily (AC & HS), Chantal Johns MD, 10 mg at 07/13/21 1207    docusate sodium (COLACE) capsule 100 mg, 100 mg, Oral, Daily, Chantal Johns MD, 100 mg at 07/13/21 1207    enoxaparin (LOVENOX) subcutaneous injection 40 mg, 40 mg, Subcutaneous, Q24H [address], Chantal Johns MD    famotidine (PEPCID) tablet 20 mg, 20 mg, Oral, Daily, Chantal Johns MD, 20 mg at 07/13/21 1207    fluconazole (DIFLUCAN) tablet 400 mg, 400 mg, Oral, Daily, Chantal Johns MD, 400 mg at 07/13/21 1207    gabapentin (NEURONTIN) capsule 200 mg, 200 mg, Oral, HS, Chantal Johns MD    hydrOXYzine HCL (ATARAX) tablet 25 mg, 25 mg, Oral, Q6H PRN, Arely Johns MD    insulin lispro (HumaLOG) 100 units/mL subcutaneous injection 1-5 Units, 1-5 Units, Subcutaneous, TID AC **AND** Fingerstick Glucose (POCT), , , TID AC, Chantal Johns MD    insulin lispro (HumaLOG) 100 units/mL subcutaneous injection 1-5 Units, 1-5 Units, Subcutaneous, HS, Chantal Johns MD    menthol-methyl salicylate (BENGAY) 93-46 % cream, , Apply externally, BID, Chantal Johns MD    ondansetron (ZOFRAN-ODT) dispersible tablet 4 mg, 4 mg, Oral, Q6H PRN, Arely Johns MD    oxyCODONE (ROXICODONE) oral solution 5 mg, 5 mg, Oral, Q6H PRN, Arely Johns MD    polyethylene glycol (MIRALAX) packet 17 g, 17 g, Oral, Daily PRN, Arely Johns MD    potassium chloride (K-DUR,KLOR-CON) CR tablet 40 mEq, 40 mEq, Oral, Once, Sara Najera MD    senna-docusate sodium (SENOKOT S) 8 6-50 mg per tablet 1 tablet, 1 tablet, Oral, Q12H [facility], Chantal Johns MD    sodium bicarbonate 100 mEq in dextrose 5 % 1,000 mL infusion, 150 mL/hr, Intravenous, Continuous, Sneha Glasgow DO    tamsulosin (FLOMAX) capsule 0 4 mg, 0 4 mg, Oral, After Gerry Johns MD    Medications Prior to Admission   Medication    acetaminophen (Tylenol) 325 mg tablet    albuterol (2 5 mg/3 mL) 0 083 % nebulizer solution    allopurinol (ZYLOPRIM) 100 mg tablet    aluminum-magnesium hydroxide-simethicone (MYLANTA) 200-200-20 mg/5 mL suspension    amLODIPine (NORVASC) 5 mg tablet    atorvastatin (LIPITOR) 40 mg tablet    bisacodyl (DULCOLAX) 10 mg suppository    Cholecalciferol (Vitamin D3) 1 25 MG (32271 UT) TABS    Diclofenac Sodium (VOLTAREN) 1 %    dicyclomine (BENTYL) 10 mg capsule    docusate sodium (COLACE) 100 mg capsule    famotidine (PEPCID) 20 mg tablet    fluconazole (DIFLUCAN) 200 mg tablet    gabapentin (NEURONTIN) 250 mg/5 mL solution    hydrOXYzine HCL (ATARAX) 25 mg tablet    insulin lispro (HumaLOG) 100 units/mL injection    levofloxacin (LEVAQUIN) 500 mg tablet    menthol-methyl salicylate (BENGAY) 94-80 % cream    omeprazole 2 mg/mL in sodium bicarbonate    ondansetron (ZOFRAN-ODT) 4 mg disintegrating tablet    oxyCODONE (OXY-IR) 5 MG capsule    polyethylene glycol (MIRALAX) 17 g packet    Sennosides (Senna) 8 8 mg/5 mL LIQD    tamsulosin (FLOMAX) 0 4 mg    acyclovir (ZOVIRAX) 200 mg capsule    albuterol (PROVENTIL HFA,VENTOLIN HFA) 90 mcg/act inhaler    enoxaparin (LOVENOX) 40 mg/0 4 mL    Lidocaine 4 % PTCH    senna-docusate sodium (Senokot S) 8 6-50 mg per tablet       No Known Allergies      Physical Exam:    /68   Pulse 79   Temp 97 7 °F (36 5 °C)   Resp 16   Ht 5' (1 524 m)   Wt 51 5 kg (113 lb 8 oz)   SpO2 98%   BMI 22 17 kg/m²     Physical Exam  Constitutional:       Appearance: Normal appearance  HENT:      Head: Normocephalic and atraumatic  Nose: Nose normal    Eyes:      Extraocular Movements: Extraocular movements intact  Conjunctiva/sclera: Conjunctivae normal       Pupils: Pupils are equal, round, and reactive to light  Cardiovascular:      Rate and Rhythm: Normal rate and regular rhythm  Heart sounds: Normal heart sounds  Pulmonary:      Effort: Pulmonary effort is normal  No respiratory distress  Breath sounds: Normal breath sounds  No wheezing, rhonchi or rales  Abdominal:      General: Bowel sounds are normal       Palpations: Abdomen is soft  Tenderness: There is no abdominal tenderness  Musculoskeletal:         General: Normal range of motion  Cervical back: Normal range of motion and neck supple  Skin:     General: Skin is warm and dry  Coloration: Skin is not jaundiced  Findings: No bruising or rash  Neurological:      Mental Status: She is alert  Motor: Weakness (0/5 left upper extremity; left lower weak) present        Comments: AOx2 with    Psychiatric:         Mood and Affect: Mood normal          Recent Results (from the past 50 hour(s))   Comprehensive metabolic panel    Collection Time: 07/13/21 10:08 AM   Result Value Ref Range    Sodium 138 136 - 145 mmol/L    Potassium 3 4 (L) 3 5 - 5 3 mmol/L    Chloride 103 100 - 108 mmol/L    CO2 27 21 - 32 mmol/L    ANION GAP 8 4 - 13 mmol/L    BUN 14 5 - 25 mg/dL    Creatinine 0 39 (L) 0 60 - 1 30 mg/dL    Glucose 114 65 - 140 mg/dL    Calcium 9 7 8 3 - 10 1 mg/dL    Corrected Calcium 10 5 (H) 8 3 - 10 1 mg/dL    AST 10 5 - 45 U/L    ALT 12 12 - 78 U/L    Alkaline Phosphatase 61 46 - 116 U/L    Total Protein 6 4 6 4 - 8 2 g/dL    Albumin 3 0 (L) 3 5 - 5 0 g/dL    Total Bilirubin 0 17 (L) 0 20 - 1 00 mg/dL    eGFR 119 ml/min/1 73sq m   CBC and differential    Collection Time: 07/13/21 10:09 AM   Result Value Ref Range    WBC 7 92 4 31 - 10 16 Thousand/uL    RBC 4 04 3 81 - 5 12 Million/uL    Hemoglobin 11 6 11 5 - 15 4 g/dL    Hematocrit 36 7 34 8 - 46 1 %    MCV 91 82 - 98 fL    MCH 28 7 26 8 - 34 3 pg    MCHC 31 6 31 4 - 37 4 g/dL    RDW 14 9 11 6 - 15 1 %    MPV 10 8 8 9 - 12 7 fL    Platelets 720 243 - 612 Thousands/uL    nRBC 0 /100 WBCs    Neutrophils Relative 61 43 - 75 %    Immat GRANS % 1 0 - 2 %    Lymphocytes Relative 28 14 - 44 %    Monocytes Relative 7 4 - 12 %    Eosinophils Relative 2 0 - 6 %    Basophils Relative 1 0 - 1 %    Neutrophils Absolute 4 88 1 85 - 7 62 Thousands/µL    Immature Grans Absolute 0 04 0 00 - 0 20 Thousand/uL    Lymphocytes Absolute 2 25 0 60 - 4 47 Thousands/µL    Monocytes Absolute 0 57 0 17 - 1 22 Thousand/µL    Eosinophils Absolute 0 13 0 00 - 0 61 Thousand/µL    Basophils Absolute 0 05 0 00 - 0 10 Thousands/µL   Fingerstick Glucose (POCT)    Collection Time: 07/13/21 12:12 PM   Result Value Ref Range    POC Glucose 91 65 - 140 mg/dl       No results found  Labs and pertinent reports reviewed  This note has been generated by voice recognition software system  Therefore, there may be spelling, grammar, and or syntax errors  Please contact if questions arise

## 2021-07-13 NOTE — PLAN OF CARE
Problem: Prexisting or High Potential for Compromised Skin Integrity  Goal: Skin integrity is maintained or improved  Description: INTERVENTIONS:  - Identify patients at risk for skin breakdown  - Assess and monitor skin integrity  - Assess and monitor nutrition and hydration status  - Monitor labs   - Assess for incontinence   - Turn and reposition patient  - Assist with mobility/ambulation  - Relieve pressure over bony prominences  - Avoid friction and shearing  - Provide appropriate hygiene as needed including keeping skin clean and dry  - Evaluate need for skin moisturizer/barrier cream  - Collaborate with interdisciplinary team   - Patient/family teaching  - Consider wound care consult   Outcome: Progressing     Problem: MOBILITY - ADULT  Goal: Maintain or return to baseline ADL function  Description: INTERVENTIONS:  -  Assess patient's ability to carry out ADLs; assess patient's baseline for ADL function and identify physical deficits which impact ability to perform ADLs (bathing, care of mouth/teeth, toileting, grooming, dressing, etc )  - Assess/evaluate cause of self-care deficits   - Assess range of motion  - Assess patient's mobility; develop plan if impaired  - Assess patient's need for assistive devices and provide as appropriate  - Encourage maximum independence but intervene and supervise when necessary  - Involve family in performance of ADLs  - Assess for home care needs following discharge   - Consider OT consult to assist with ADL evaluation and planning for discharge  - Provide patient education as appropriate  Outcome: Progressing  Goal: Maintains/Returns to pre admission functional level  Description: INTERVENTIONS:  - Perform BMAT or MOVE assessment daily    - Set and communicate daily mobility goal to care team and patient/family/caregiver  - Collaborate with rehabilitation services on mobility goals if consulted  - Perform Range of Motion  - Reposition patient every 2 hours    - Record patient progress and toleration of activity level   Outcome: Progressing     Problem: PAIN - ADULT  Goal: Verbalizes/displays adequate comfort level or baseline comfort level  Description: Interventions:  - Encourage patient to monitor pain and request assistance  - Assess pain using appropriate pain scale  - Administer analgesics based on type and severity of pain and evaluate response  - Implement non-pharmacological measures as appropriate and evaluate response  - Consider cultural and social influences on pain and pain management  - Notify physician/advanced practitioner if interventions unsuccessful or patient reports new pain  Outcome: Progressing     Problem: INFECTION - ADULT  Goal: Absence or prevention of progression during hospitalization  Description: INTERVENTIONS:  - Assess and monitor for signs and symptoms of infection  - Monitor lab/diagnostic results  - Monitor all insertion sites, i e  indwelling lines, tubes, and drains  - Monitor endotracheal if appropriate and nasal secretions for changes in amount and color  - [place] appropriate cooling/warming therapies per order  - Administer medications as ordered  - Instruct and encourage patient and family to use good hand hygiene technique  - Identify and instruct in appropriate isolation precautions for identified infection/condition  Outcome: Progressing  Goal: Absence of fever/infection during neutropenic period  Description: INTERVENTIONS:  - Monitor WBC    Outcome: Progressing     Problem: SAFETY ADULT  Goal: Patient will remain free of falls  Description: INTERVENTIONS:  - Educate patient/family on patient safety including physical limitations  - Instruct patient to call for assistance with activity   - Consult OT/PT to assist with strengthening/mobility   - Keep Call bell within reach  - Keep bed low and locked with side rails adjusted as appropriate  - Keep care items and personal belongings within reach  - Initiate and maintain comfort rounds  - Make Fall Risk Sign visible to staff  - Offer Toileting every 2 Hours, in advance of need  - Initiate/Maintain alarm  - Obtain necessary fall risk management equipment  - Apply yellow socks and bracelet for high fall risk patients  - Consider moving patient to room near nurses station  Outcome: Progressing     Problem: DISCHARGE PLANNING  Goal: Discharge to home or other facility with appropriate resources  Description: INTERVENTIONS:  - Identify barriers to discharge w/patient and caregiver  - Arrange for needed discharge resources and transportation as appropriate  - Identify discharge learning needs (meds, wound care, etc )  - Arrange for interpretive services to assist at discharge as needed  - Refer to Case Management Department for coordinating discharge planning if the patient needs post-hospital services based on physician/advanced practitioner order or complex needs related to functional status, cognitive ability, or social support system  Outcome: Progressing     Problem: Knowledge Deficit  Goal: Patient/family/caregiver demonstrates understanding of disease process, treatment plan, medications, and discharge instructions  Description: Complete learning assessment and assess knowledge base    Interventions:  - Provide teaching at level of understanding  - Provide teaching via preferred learning methods  Outcome: Progressing

## 2021-07-13 NOTE — PROCEDURES
Insert PICC line    Date/Time: 7/13/2021 11:32 AM  Performed by: Arturo Coyle RN  Authorized by: Mike Galvan MD     Patient location:  Bedside  Other Assisting Provider: Yes (comment) Tushar Pereyra)    Consent:     Consent obtained:  Written (obtained by MD)    Consent given by:  Patient    Procedural risks discussed: reviewed by MD   Universal protocol:     Procedure explained and questions answered to patient or proxy's satisfaction: yes      Relevant documents present and verified: yes      Test results available and properly labeled: yes      Radiology Images displayed and confirmed  If images not available, report reviewed: yes      Required blood products, implants, devices, and special equipment available: yes      Site/side marked: yes      Immediately prior to procedure, a time out was called: yes      Patient identity confirmed:  Arm band and hospital-assigned identification number  Pre-procedure details:     Hand hygiene: Hand hygiene performed prior to insertion      Sterile barrier technique: All elements of maximal sterile technique followed      Skin preparation:  ChloraPrep    Skin preparation agent: Skin preparation agent completely dried prior to procedure    Indications:     PICC line indications: chemotherapy    Anesthesia (see MAR for exact dosages): Anesthesia method:  Local infiltration    Local anesthetic:  Lidocaine 1% w/o epi (2mL)  Procedure details:     Location:  Basilic and brachial    Vessel type: vein      Laterality:  Right    Approach: percutaneous technique used      Patient position:  Flat    Procedural supplies:  Double lumen    Catheter size:  5 Fr    Landmarks identified: yes      Ultrasound guidance: yes      Sterile ultrasound techniques: Sterile gel and sterile probe covers were used      Number of attempts:  2    Successful placement: no    Comments:      Right arm assessed for piccline  Both basilic and brachial veins attempted   Basilic attempted twice but unable to obtained blood return  Attempted brachial once but vein is too close to artery and pt became highly agitated  Moved to left side but pt threw sterile drape and started screaming  Spk w Thai Wiggins about possible sedation and reattempt piccline placement but after speaking w resident they will order IR consult instead

## 2021-07-13 NOTE — ASSESSMENT & PLAN NOTE
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

## 2021-07-13 NOTE — H&P
INTERNAL MEDICINE RESIDENCY ADMISSION H&P     Name: Hi Nagel   Age & Sex: 49 y o  female   MRN: [id number]  Unit/Bed#: 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)       Direct Admit from Dr Sonu Forrester for in-patient chemo 3rd cycle       S/P chemotherapy on 05/13, leucovin on 5/14 6/9 MRI- Appears to be interval improvement in the overall      enhancement of multifocal intracranial lesions  P:    DVT prophylaxis with Lovenox   Continue acyclovir, Diflucan and Levaquin prophylactic   Oncology Consult   PICC line placement for chemo    Ambulatory dysfunction  Assessment & Plan  A: per [address] does stand up with assistance and able to take few steps however her left leg does give out  P: PT / OT eval     Cancer related pain  Assessment & Plan  Tylenol 650mg Prn  Oxyocodone 2 5mg Prn    Weakness  Assessment & Plan  A: Primary CNS lymphoma      Left Upper ext weakness 0/5 noted in 4/21  Persistent  Left lower- able to wiggle toes / plantar & dorsiflex  But will not raise her leg or keep it elevated unsure if due to language barrier  Will assess again w  Interpretor  Right Side normal    Dysphagia  Assessment & Plan  A: per [facility] pt started to tolerated mechanical / thin liquid diet and stopped PEG tubes    P: will get speech eval      Altered mental status  Assessment & Plan  A: A x O 2 to person and place which is same as previous admission   Speaks Kimmy only     P: Monitor for acute changes       VTE Pharmacologic Prophylaxis: Enoxaparin (Lovenox)  VTE Mechanical Prophylaxis: sequential compression device    CHIEF COMPLAINT   No chief complaint on file  HISTORY OF PRESENT ILLNESS      Patient is a 22-year-old female with past medical history of gout,  diffuse large primary CNS lymphoma s/p 2 rounds of chemotherapy and PEG tube placement 5/10, who presented as a direct admit from Dr Brian Regalado for in patient chemotherapy  Patient was discharged about a month ago to [facility] after completing inpatient chemo for physical rehab  Patient was evaluated along with Dr Lyle Landin for interpretation  Pt is feeling okay  Endorses burning chest pain discomfort earlier today but now resolved  She also complains of arthritic pain in the knees  She is alert and orient x 2 to person and place  As per [facility], patient has been doing much better now  She is able to feed herself and gained some weight as well  She is able to walk to the bathroom with assistance however sometimes her left leg will give out  They have not been using the PEG tube any more  Compliant w/ all the meds  As for goals of care discussion between Dr Brian Regalado and Mr Stevan Torres who is a family friend, patient would ideally like to go back to [facility] where there is more family support which she needs  She received COVID vaccine x 1 7/8    She had an MRI on June 9th which showed improvement in the overall enhancement of the multifocal intracranial lesions which does correlate her improvements clinically  VS T 98 9 HR 88 /66 O2 98% RA    CBC / BMP pending    Will admit for in patient chemo  REVIEW OF SYSTEMS     Review of Systems   Constitutional: Negative for chills and fatigue  HENT: Negative for congestion  Eyes: Negative for visual disturbance  Respiratory: Negative for cough, chest tightness, shortness of breath and wheezing      Cardiovascular: Negative for chest pain, palpitations and leg swelling  Gastrointestinal: Negative for abdominal pain, diarrhea, nausea and vomiting  Endocrine: Negative for polydipsia and polyuria  Genitourinary: Negative for difficulty urinating and dysuria  Neurological: Positive for weakness (Left sided)  Negative for seizures, speech difficulty and headaches  Psychiatric/Behavioral: Negative for agitation and behavioral problems  OBJECTIVE     Vitals:    21 0820 21 0824   BP: 108/66    Pulse: 88    Temp: 98 9 °F (37 2 °C)    SpO2: 98%    Weight:  51 5 kg (113 lb 8 oz)   Height:  5' (1 524 m)      Temperature:   Temp (24hrs), Av 9 °F (37 2 °C), Min:98 9 °F (37 2 °C), Max:98 9 °F (37 2 °C)    Temperature: 98 9 °F (37 2 °C)  Intake & Output:  I/O     None        Weights:   IBW (Ideal Body Weight): 45 5 kg    Body mass index is 22 17 kg/m²  Weight (last 2 days)     Date/Time   Weight    21 0824   51 5 (113 5)            Physical Exam  Constitutional:       General: She is not in acute distress  Appearance: She is ill-appearing  She is not toxic-appearing or diaphoretic  HENT:      Head: Normocephalic and atraumatic  Nose: No congestion or rhinorrhea  Cardiovascular:      Rate and Rhythm: Normal rate and regular rhythm  Pulses: Normal pulses  Heart sounds: Normal heart sounds  No murmur heard  No friction rub  No gallop  Pulmonary:      Effort: Pulmonary effort is normal  No respiratory distress  Breath sounds: Normal breath sounds  No stridor  No wheezing, rhonchi or rales  Chest:      Chest wall: No tenderness  Abdominal:      General: Abdomen is flat  Bowel sounds are normal  There is no distension  Palpations: Abdomen is soft  Tenderness: There is no abdominal tenderness  There is no right CVA tenderness, left CVA tenderness, guarding or rebound  Hernia: No hernia is present  Musculoskeletal:      Right lower leg: No edema  Left lower leg: No edema  Neurological:      Motor: Weakness (Left Upper 0/5  left lower will reassess  Able to dorsi/plantar flex) present  Comments: A x O x 2 person and place in [facility] only     Psychiatric:         Mood and Affect: Mood normal          Behavior: Behavior normal        PAST MEDICAL HISTORY   No past medical history on file  PAST SURGICAL HISTORY     Past Surgical History:   Procedure Laterality Date    IR PICC REPOSITION  4/27/2021    IR TUNNELED CENTRAL LINE REMOVAL  4/27/2021     SOCIAL & FAMILY HISTORY     Social History     Substance and Sexual Activity   Alcohol Use Not Currently     Substance and Sexual Activity   Alcohol Use Not Currently        Substance and Sexual Activity   Drug Use Not on file     Social History     Tobacco Use   Smoking Status Never Smoker   Smokeless Tobacco Never Used     Family History   Problem Relation Age of Onset    No Known Problems Mother      LABORATORY DATA     Labs: I have personally reviewed pertinent reports  Results from last 7 days   Lab Units 07/13/21  1009   WBC Thousand/uL 7 92   HEMOGLOBIN g/dL 11 6   HEMATOCRIT % 36 7   PLATELETS Thousands/uL 228   NEUTROS PCT % 61   MONOS PCT % 7          Invalid input(s): LABALBU                       Micro:  Lab Results   Component Value Date    BLOODCX No Growth After 5 Days  05/03/2021    BLOODCX No Growth After 5 Days  05/03/2021     IMAGING & DIAGNOSTIC TESTS     Imaging: I have personally reviewed pertinent reports  No results found  EKG, Pathology, and Other Studies: I have personally reviewed pertinent reports  ALLERGIES   No Known Allergies  MEDICATIONS PRIOR TO ARRIVAL     Prior to Admission medications    Medication Sig Start Date End Date Taking?  Authorizing Provider   acetaminophen (Tylenol) 325 mg tablet Take 500 mg by mouth every 6 (six) hours as needed for mild pain    Historical Provider, MD   acyclovir (ZOVIRAX) 200 mg capsule Take 2 capsules (400 mg total) by mouth every 12 (twelve) hours 6/14/21 7/14/21  Lexx Ling MD   albuterol (2 5 mg/3 mL) 0 083 % nebulizer solution Inhale 2 5 mg 4/22/21 4/22/22  Historical Provider, MD   albuterol (PROVENTIL HFA,VENTOLIN HFA) 90 mcg/act inhaler Inhale 2 puffs  Patient not taking: Reported on 7/8/2021 4/22/21 4/22/22  Historical Provider, MD   allopurinol (ZYLOPRIM) 100 mg tablet Take 1 tablet (100 mg total) by mouth daily 6/15/21   Lexx Ling MD   aluminum-magnesium hydroxide-simethicone (MYLANTA) 200-200-20 mg/5 mL suspension 30 mL by Per PEG Tube route every 4 (four) hours as needed for indigestion or heartburn 6/14/21   Lexx Ling MD   amLODIPine (NORVASC) 5 mg tablet Take 1 tablet (5 mg total) by mouth daily 6/15/21   Lexx Ling MD   atorvastatin (LIPITOR) 40 mg tablet Take 40 mg by mouth daily    Historical Provider, MD   bisacodyl (DULCOLAX) 10 mg suppository Insert 1 suppository (10 mg total) into the rectum daily as needed (Third line for constipation) 6/14/21   Lexx Ling MD   Cholecalciferol (Vitamin D3) 1 25 MG (04653 UT) TABS Take by mouth once a week    Historical Provider, MD   Diclofenac Sodium (VOLTAREN) 1 % Apply 2 g topically 3 (three) times a day as needed (knee nacho) 6/14/21   Lexx Ling MD   dicyclomine (BENTYL) 10 mg capsule Take 1 capsule (10 mg total) by mouth 4 (four) times a day (before meals and at bedtime)  Patient not taking: Reported on 7/8/2021 6/14/21   Lexx Ling MD   docusate sodium (COLACE) 100 mg capsule Take 100 mg by mouth 2 (two) times a day    Historical Provider, MD   enoxaparin (LOVENOX) 40 mg/0 4 mL Inject 0 4 mL (40 mg total) under the skin every 24 hours 6/15/21   Lexx Ling MD   famotidine (PEPCID) 20 mg tablet Take 20 mg by mouth daily 4/22/21 4/22/22  Historical Provider, MD   fluconazole (DIFLUCAN) 200 mg tablet Take 2 tablets (400 mg total) by mouth daily 6/15/21 7/15/21  Lexx Ling MD   gabapentin (NEURONTIN) 250 mg/5 mL solution Take 2 mL (100 mg total) by mouth daily at bedtime 6/14/21   Lexx Ling MD hydrOXYzine HCL (ATARAX) 25 mg tablet Take 1 tablet (25 mg total) by mouth every 6 (six) hours as needed for itching or anxiety 6/14/21   Danika Balderrama MD   insulin lispro (HumaLOG) 100 units/mL injection Inject 1-5 Units under the skin 4 (four) times a day (before meals and at bedtime) 6/14/21   Danika Balderrama MD   levofloxacin (LEVAQUIN) 500 mg tablet Take 1 tablet (500 mg total) by mouth every 24 hours 6/14/21 7/14/21  Danika Balderrama MD   Lidocaine 4 % PTCH Place 1 patch on the skin daily  Patient not taking: Reported on 7/8/2021 4/23/21   Historical Provider, MD   menthol-methyl salicylate (BENGAY) 41-81 % cream Apply topically 2 (two) times a day 6/14/21   Danika Balderrama MD   omeprazole 2 mg/mL in sodium bicarbonate Take 20 mg by mouth    Historical Provider, MD   ondansetron (ZOFRAN-ODT) 4 mg disintegrating tablet Take 1 tablet (4 mg total) by mouth every 6 (six) hours as needed for nausea or vomiting  Patient not taking: Reported on 7/8/2021 6/15/21   Danika Balderrama MD   oxyCODONE (OXY-IR) 5 MG capsule Take 5 mg by mouth every 6 (six) hours as needed for moderate pain    Historical Provider, MD   polyethylene glycol (MIRALAX) 17 g packet Take 17 g by mouth daily as needed (Second line for constipation) 6/14/21   Danika Balderrama MD   senna-docusate sodium (Senokot S) 8 6-50 mg per tablet Take 1 tablet by mouth 2 (two) times a day 4/22/21 4/22/22  Historical Provider, MD   Sennosides (Senna) 8 8 mg/5 mL LIQD Take by mouth    Historical Provider, MD   tamsulosin (FLOMAX) 0 4 mg Take 1 capsule (0 4 mg total) by mouth daily with dinner 6/14/21   Danika Balderrama MD     MEDICATIONS ADMINISTERED IN LAST 24 HOURS     CURRENT MEDICATIONS     Current Facility-Administered Medications   Medication Dose Route Frequency Provider Last Rate    acetaminophen  650 mg Oral Q6H PRN Kristi Shepard MD      acyclovir  400 mg Oral Q12H [address] Brandi Johns MD      albuterol  2 puff Inhalation Q4H PRN Kristi Shepard MD      allopurinol  100 mg Oral Daily Tasha Johns MD      aluminum-magnesium hydroxide-simethicone  30 mL Per PEG Tube Q4H PRN Kathleen Hu MD      amLODIPine  5 mg Oral Daily Tasha Johns MD      atorvastatin  40 mg Oral After Roby Jacoob MD      bisacodyl  10 mg Rectal Daily PRN Kathleen Hu MD      Diclofenac Sodium  2 g Topical TID PRN Kathleen Hu MD      dicyclomine  10 mg Oral 4x Daily (AC & HS) Tasha Johns MD      enoxaparin  40 mg Subcutaneous Q24H [address] Chantal Johns MD      famotidine  20 mg Oral Daily Chantal Johns MD      fluconazole  400 mg Oral Daily Chantal Johns MD      gabapentin  100 mg Oral HS Chantal Johns MD      hydrOXYzine HCL  25 mg Oral Q6H PRN Kathleen Hu MD      levofloxacin  500 mg Oral Q24H Chantal Johns MD      ondansetron  4 mg Oral Q6H PRN Kathleen Hu MD      oxyCODONE  2 5 mg Oral Q6H PRN Kathleen Hu MD      polyethylene glycol  17 g Oral Daily PRN Kathleen Hu MD      senna-docusate sodium  1 tablet Oral Q12H [address] Chantal Johns MD      tamsulosin  0 4 mg Oral After Roby Jacobo MD          acetaminophen, 650 mg, Q6H PRN  albuterol, 2 puff, Q4H PRN  aluminum-magnesium hydroxide-simethicone, 30 mL, Q4H PRN  bisacodyl, 10 mg, Daily PRN  Diclofenac Sodium, 2 g, TID PRN  hydrOXYzine HCL, 25 mg, Q6H PRN  ondansetron, 4 mg, Q6H PRN  oxyCODONE, 2 5 mg, Q6H PRN  polyethylene glycol, 17 g, Daily PRN        Admission Time  I spent 30 minutes admitting the patient  This involved direct patient contact where I performed a full history and physical, reviewing previous records, and reviewing laboratory and other diagnostic studies  Portions of the record may have been created with voice recognition software  Occasional wrong word or "sound a like" substitutions may have occurred due to the inherent limitations of voice recognition software    Read the chart carefully and recognize, using context, where substitutions have occurred     ==  Kathleen Hu MD    [facility]  Internal Medicine Residency PGY-2

## 2021-07-13 NOTE — ASSESSMENT & PLAN NOTE
A: per [facility] pt started to tolerated mechanical / thin liquid diet and stopped PEG tubes  · Peg tube in place, clear, no discharge noted  Continue with q 4 hours flushes    · Evaluated by speech, Continue with level 2 diet

## 2021-07-13 NOTE — SPEECH THERAPY NOTE
Speech-Language Pathology Bedside Swallow Evaluation      Patient Name: Tomy Adkins    GSOSU'D Date: 7/13/2021     Problem List  Principal Problem:    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[address] )  Active Problems:    Altered mental status    Dysphagia    Weakness    Severe protein-calorie malnutrition (HCC)    Cancer related pain    Ambulatory dysfunction      Past Medical History  No past medical history on file  Past Surgical History  Past Surgical History:   Procedure Laterality Date    IR PICC REPOSITION  4/27/2021    IR TUNNELED CENTRAL LINE REMOVAL  4/27/2021       Summary   Pt presented with functional appearing oral and pharyngeal stage swallowing skills with materials administered today  Patient admitted from SNF where she is on a mechanical soft diet with thin liquids  Risk/s for Aspiration: low-moderate      Recommended Diet: mechanically altered/level 2 diet and thin liquids   Recommended Form of Meds: crushed with puree   Aspiration precautions and swallowing strategies: small bites/sips and alternating bites and sips  Other Recommendations: Continue frequent oral care; Encourage PO intake     Current Medical Status  Patient is a 65-year-old female with past medical history of gout,  diffuse large primary CNS lymphoma s/p 2 rounds of chemotherapy and PEG tube placement 5/10, who presented as a direct admit from Dr Gardenia Ahmadi for in patient chemotherapy  [facility] after completing inpatient chemo for physical rehab  Patient was evaluated along with Dr Reinaldo Salgado for interpretation  Pt is feeling okay  Endorses burning chest pain discomfort earlier today but now resolved  She also complains of arthritic pain in the knees  She is alert and orient x 2 to person and place  [facility], patient has been doing much better now  She is able to feed herself and gained some weight as well    She is able to walk to the bathroom with assistance however sometimes her left leg will give out  They have not been using the PEG tube any more  Compliant w/ all the meds  As for goals of care discussion between Dr Iesha Barrios and Mr  Gloria Field who is a family friend, patient would ideally like to go back to [facility] where there is more family support which she needs     She received COVID vaccine x 1 7/8   She had an MRI on June 9th which showed improvement in the overall enhancement of the multifocal intracranial lesions which does correlate her improvements clinically  Current Precautions:  Fall  Aspiration    Allergies:  No known food allergies  Past medical history:  Please see H&P for details    Special Studies:  VBS 5/14/2021:   Summary:  Images are on PACS for review  Pt presents w/ severe/profound oral dysphagia, at least mod pharyngeal dysphagia w/ poor retrieval & min to no oral seal & poor containment  Poor bolus manipulation & transfers w/ labored transfers & high risk for spill w/ all material kenji thinner liquids other than ht  Reduced anterior hyolaryngeal movement w/ reduced constriction  + silent aspiration of thin prior to the swallow, pt cued to cough but cough is weak  The pt would be more appropriate for pleasure feeds at this time given her medical status & severe oral dysphagia  She is high risk for ongoing poor intake by mouth but also risky for aspiration  Would focus on treating the primary medical issue & offer only pleasure feeds for comfort  Please rely on the PEG as the main nutrition source  Social/Education/Vocational Hx:  Pt lives in SNF/ECF    Swallow Information   Current Risks for Dysphagia & Aspiration: known history of dysphagia  Current Symptoms/Concerns: none observed   Current Diet: mechanically altered/level 2 diet and thin liquids   Baseline Diet: mechanically altered/level 2 diet and thin liquids      Baseline Assessment   Behavior/Cognition: alert and cooperative    Speech/Language Status: able to participate in conversation (language barrier present)  Patient Positioning: upright in bed  Pain Status/Interventions/Response to Interventions: No report of or nonverbal indications of pain  Swallow Mechanism Exam  Facial: left facial droop  Labial: decreased ROM left side  Lingual: WFL  Velum: symmetrical  Mandible: adequate ROM  Dentition: adequate  Vocal quality:clear/adequate     Consistencies Assessed and Performance   Consistencies Administered: thin liquids and mechanical soft solids  Materials administered included mechanical soft carrots and broccoli with thin liquids     Oral Stage: WFL  Mastication was adequate with the materials administered today  Bolus formation and transfer were functional with no significant oral residue noted  No overt s/s reduced oral control  Pharyngeal Stage: WFL  Swallow Mechanics:  Swallowing initiation appeared prompt  Laryngeal rise was palpated and judged to be within functional limits  No coughing, throat clearing, change in vocal quality or respiratory status noted today  Esophageal Concerns: none reported    Summary and Recommendations (see above)    Results Reviewed with: RN     Treatment Recommended: dysphagia therapy       Frequency of treatment: 1-2 f/u sessions to ensure safety (patient had significant dysphagia last admission)    Patient Stated Goal: none     Dysphagia LTG  -Patient will demonstrate safe and effective oral intake (without overt s/s significant oral/pharyngeal dysphagia including s/s penetration or aspiration) for the highest appropriate diet level       Short Term Goals:  -Pt will tolerate Dysphagia 2/mechanical soft diet and thin liquid with no significant s/s oral or pharyngeal dysphagia across 1-3 diagnostic session/s     -Patient will tolerate trials of upgraded food and/or liquid texture with no significant s/s of oral or pharyngeal dysphagia including aspiration across 1-3 diagnostic sessions       Speech Therapy Prognosis   Prognosis: fair    Prognosis Considerations: medical status, prior medical history and progressive disease process

## 2021-07-13 NOTE — ASSESSMENT & PLAN NOTE
A: A x O 2 to person and place which is same as previous admission   Speaks Gujarati only     P: Monitor for acute changes

## 2021-07-13 NOTE — ASSESSMENT & PLAN NOTE
A: per [address] does stand up with assistance and able to take few steps however her left leg does give out  Known residual deficits left upper extremity and lower extremity weakness secondary to CNS lymphoma  · PT/OT recommended rehab, case management consulted    Appreciate further guidance

## 2021-07-13 NOTE — ASSESSMENT & PLAN NOTE
A: Primary CNS lymphoma      Left Upper ext weakness 0/5 noted in 4/21  Persistent  Left lower- able to wiggle toes / plantar & dorsiflex  But will not raise her leg or keep it elevated unsure if due to language barrier  Will assess again w  Interpretor        Right Side normal

## 2021-07-13 NOTE — PROCEDURES
Insert PICC line    Date/Time: 7/13/2021 3:07 PM  Performed by: Callie Parks RN  Authorized by: Bhaskar Minor MD     Patient location:  Bedside  Other Assisting Provider: Yes (comment) Parminder Howard PCA)    Consent:     Consent obtained:  Written (obtained by MD)    Consent given by:  Patient    Procedural risks discussed: reviewed by MD   Universal protocol:     Procedure explained and questions answered to patient or proxy's satisfaction: yes      Relevant documents present and verified: yes      Test results available and properly labeled: yes      Radiology Images displayed and confirmed  If images not available, report reviewed: yes      Required blood products, implants, devices, and special equipment available: yes      Site/side marked: yes      Immediately prior to procedure, a time out was called: yes      Patient identity confirmed:  Arm band and hospital-assigned identification number  Pre-procedure details:     Hand hygiene: Hand hygiene performed prior to insertion      Sterile barrier technique: All elements of maximal sterile technique followed      Skin preparation:  ChloraPrep    Skin preparation agent: Skin preparation agent completely dried prior to procedure    Indications:     PICC line indications: chemotherapy    Anesthesia (see MAR for exact dosages):      Anesthesia method:  Local infiltration    Local anesthetic:  Lidocaine 1% w/o epi (2mL)  Procedure details:     Location:  Basilic    Vessel type: vein      Laterality:  Left    Site selection rationale:  Right side previously attempted without success earlier today    Approach: percutaneous technique used      Patient position:  Flat    Procedural supplies:  Double lumen    Catheter size:  5 Fr    Landmarks identified: yes      Ultrasound guidance: yes      Sterile ultrasound techniques: Sterile gel and sterile probe covers were used      Number of attempts:  1    Successful placement: yes      Vessel of catheter tip end:  Sherlock 3CG confirmed    Total catheter length (cm):  38    Catheter out on skin (cm):  0    Max flow rate:  999    Arm circumference:  28  Post-procedure details:     Post-procedure:  Dressing applied and securement device placed    Assessment:  Blood return through all ports    Post-procedure complications: none      Patient tolerance of procedure: Tolerated well, no immediate complications  Comments:      Successful picc placement ok to use  Sherlock confirmed   Pt given ativan prior to procedure, extra RN needed to hold patient still due to agitation

## 2021-07-14 LAB
ALBUMIN SERPL BCP-MCNC: 3.1 G/DL (ref 3.5–5)
ALP SERPL-CCNC: 57 U/L (ref 46–116)
ALT SERPL W P-5'-P-CCNC: 15 U/L (ref 12–78)
ANION GAP SERPL CALCULATED.3IONS-SCNC: 8 MMOL/L (ref 4–13)
AST SERPL W P-5'-P-CCNC: 13 U/L (ref 5–45)
BASOPHILS # BLD AUTO: 0.01 THOUSANDS/ΜL (ref 0–0.1)
BASOPHILS NFR BLD AUTO: 0 % (ref 0–1)
BILIRUB SERPL-MCNC: 0.26 MG/DL (ref 0.2–1)
BUN SERPL-MCNC: 11 MG/DL (ref 5–25)
CALCIUM ALBUM COR SERPL-MCNC: 10.4 MG/DL (ref 8.3–10.1)
CALCIUM SERPL-MCNC: 9.7 MG/DL (ref 8.3–10.1)
CHLORIDE SERPL-SCNC: 105 MMOL/L (ref 100–108)
CO2 SERPL-SCNC: 28 MMOL/L (ref 21–32)
CREAT SERPL-MCNC: 0.44 MG/DL (ref 0.6–1.3)
EOSINOPHIL # BLD AUTO: 0 THOUSAND/ΜL (ref 0–0.61)
EOSINOPHIL NFR BLD AUTO: 0 % (ref 0–6)
ERYTHROCYTE [DISTWIDTH] IN BLOOD BY AUTOMATED COUNT: 14.6 % (ref 11.6–15.1)
GFR SERPL CREATININE-BSD FRML MDRD: 115 ML/MIN/1.73SQ M
GLUCOSE SERPL-MCNC: 148 MG/DL (ref 65–140)
GLUCOSE SERPL-MCNC: 165 MG/DL (ref 65–140)
GLUCOSE SERPL-MCNC: 215 MG/DL (ref 65–140)
GLUCOSE SERPL-MCNC: 224 MG/DL (ref 65–140)
GLUCOSE SERPL-MCNC: 233 MG/DL (ref 65–140)
HCT VFR BLD AUTO: 35.6 % (ref 34.8–46.1)
HGB BLD-MCNC: 11.5 G/DL (ref 11.5–15.4)
IMM GRANULOCYTES # BLD AUTO: 0.02 THOUSAND/UL (ref 0–0.2)
IMM GRANULOCYTES NFR BLD AUTO: 0 % (ref 0–2)
LYMPHOCYTES # BLD AUTO: 0.95 THOUSANDS/ΜL (ref 0.6–4.47)
LYMPHOCYTES NFR BLD AUTO: 19 % (ref 14–44)
MAGNESIUM SERPL-MCNC: 1.7 MG/DL (ref 1.6–2.6)
MCH RBC QN AUTO: 29.4 PG (ref 26.8–34.3)
MCHC RBC AUTO-ENTMCNC: 32.3 G/DL (ref 31.4–37.4)
MCV RBC AUTO: 91 FL (ref 82–98)
MONOCYTES # BLD AUTO: 0.06 THOUSAND/ΜL (ref 0.17–1.22)
MONOCYTES NFR BLD AUTO: 1 % (ref 4–12)
NEUTROPHILS # BLD AUTO: 4.07 THOUSANDS/ΜL (ref 1.85–7.62)
NEUTS SEG NFR BLD AUTO: 80 % (ref 43–75)
NRBC BLD AUTO-RTO: 0 /100 WBCS
PLATELET # BLD AUTO: 275 THOUSANDS/UL (ref 149–390)
PMV BLD AUTO: 11.5 FL (ref 8.9–12.7)
POTASSIUM SERPL-SCNC: 3.5 MMOL/L (ref 3.5–5.3)
PROT SERPL-MCNC: 6.1 G/DL (ref 6.4–8.2)
RBC # BLD AUTO: 3.91 MILLION/UL (ref 3.81–5.12)
SODIUM SERPL-SCNC: 141 MMOL/L (ref 136–145)
WBC # BLD AUTO: 5.11 THOUSAND/UL (ref 4.31–10.16)

## 2021-07-14 PROCEDURE — 99232 SBSQ HOSP IP/OBS MODERATE 35: CPT | Performed by: INTERNAL MEDICINE

## 2021-07-14 PROCEDURE — 97167 OT EVAL HIGH COMPLEX 60 MIN: CPT

## 2021-07-14 PROCEDURE — 80053 COMPREHEN METABOLIC PANEL: CPT | Performed by: NURSE PRACTITIONER

## 2021-07-14 PROCEDURE — 82948 REAGENT STRIP/BLOOD GLUCOSE: CPT

## 2021-07-14 PROCEDURE — 85025 COMPLETE CBC W/AUTO DIFF WBC: CPT | Performed by: STUDENT IN AN ORGANIZED HEALTH CARE EDUCATION/TRAINING PROGRAM

## 2021-07-14 PROCEDURE — 83735 ASSAY OF MAGNESIUM: CPT | Performed by: STUDENT IN AN ORGANIZED HEALTH CARE EDUCATION/TRAINING PROGRAM

## 2021-07-14 PROCEDURE — 97163 PT EVAL HIGH COMPLEX 45 MIN: CPT

## 2021-07-14 PROCEDURE — 80204 DRUG ASSAY METHOTREXATE: CPT | Performed by: INTERNAL MEDICINE

## 2021-07-14 RX ORDER — POTASSIUM CHLORIDE 20MEQ/15ML
20 LIQUID (ML) ORAL ONCE
Status: COMPLETED | OUTPATIENT
Start: 2021-07-14 | End: 2021-07-14

## 2021-07-14 RX ORDER — ACETAMINOPHEN 325 MG/1
650 TABLET ORAL ONCE
Status: COMPLETED | OUTPATIENT
Start: 2021-07-14 | End: 2021-07-14

## 2021-07-14 RX ORDER — DIPHENHYDRAMINE HYDROCHLORIDE 50 MG/ML
25 INJECTION INTRAMUSCULAR; INTRAVENOUS ONCE
Status: COMPLETED | OUTPATIENT
Start: 2021-07-14 | End: 2021-07-14

## 2021-07-14 RX ADMIN — DICYCLOMINE HYDROCHLORIDE 10 MG: 10 CAPSULE ORAL at 17:35

## 2021-07-14 RX ADMIN — DOCUSATE SODIUM AND SENNOSIDES 1 TABLET: 8.6; 5 TABLET ORAL at 22:14

## 2021-07-14 RX ADMIN — ACYCLOVIR 400 MG: 200 CAPSULE ORAL at 22:14

## 2021-07-14 RX ADMIN — ONDANSETRON 4 MG: 4 TABLET, ORALLY DISINTEGRATING ORAL at 17:35

## 2021-07-14 RX ADMIN — AMLODIPINE BESYLATE 5 MG: 5 TABLET ORAL at 10:03

## 2021-07-14 RX ADMIN — ENOXAPARIN SODIUM 40 MG: 40 INJECTION SUBCUTANEOUS at 10:04

## 2021-07-14 RX ADMIN — DICYCLOMINE HYDROCHLORIDE 10 MG: 10 CAPSULE ORAL at 12:28

## 2021-07-14 RX ADMIN — MENTHOL, UNSPECIFIED FORM AND METHYL SALICYLATE: 10; 150 CREAM TOPICAL at 17:33

## 2021-07-14 RX ADMIN — INSULIN LISPRO 1 UNITS: 100 INJECTION, SOLUTION INTRAVENOUS; SUBCUTANEOUS at 10:04

## 2021-07-14 RX ADMIN — ALLOPURINOL 100 MG: 100 TABLET ORAL at 10:04

## 2021-07-14 RX ADMIN — SODIUM CHLORIDE 25 MG: 9 INJECTION, SOLUTION INTRAVENOUS at 19:12

## 2021-07-14 RX ADMIN — RITUXIMAB 500 MG: 10 INJECTION, SOLUTION INTRAVENOUS at 13:42

## 2021-07-14 RX ADMIN — GABAPENTIN 200 MG: 100 CAPSULE ORAL at 22:14

## 2021-07-14 RX ADMIN — DICYCLOMINE HYDROCHLORIDE 10 MG: 10 CAPSULE ORAL at 22:14

## 2021-07-14 RX ADMIN — POTASSIUM CHLORIDE 20 MEQ: 20 SOLUTION ORAL at 10:03

## 2021-07-14 RX ADMIN — INSULIN LISPRO 1 UNITS: 100 INJECTION, SOLUTION INTRAVENOUS; SUBCUTANEOUS at 17:33

## 2021-07-14 RX ADMIN — FAMOTIDINE 20 MG: 20 TABLET ORAL at 10:04

## 2021-07-14 RX ADMIN — SODIUM BICARBONATE 150 ML/HR: 84 INJECTION, SOLUTION INTRAVENOUS at 05:01

## 2021-07-14 RX ADMIN — SODIUM BICARBONATE 150 ML/HR: 84 INJECTION, SOLUTION INTRAVENOUS at 20:56

## 2021-07-14 RX ADMIN — ACYCLOVIR 400 MG: 200 CAPSULE ORAL at 10:04

## 2021-07-14 RX ADMIN — DICYCLOMINE HYDROCHLORIDE 10 MG: 10 CAPSULE ORAL at 05:23

## 2021-07-14 RX ADMIN — TAMSULOSIN HYDROCHLORIDE 0.4 MG: 0.4 CAPSULE ORAL at 17:35

## 2021-07-14 RX ADMIN — FLUCONAZOLE 400 MG: 200 TABLET ORAL at 10:03

## 2021-07-14 RX ADMIN — ACETAMINOPHEN 650 MG: 325 TABLET, FILM COATED ORAL at 13:19

## 2021-07-14 RX ADMIN — SODIUM BICARBONATE 150 ML/HR: 84 INJECTION, SOLUTION INTRAVENOUS at 13:13

## 2021-07-14 RX ADMIN — DIPHENHYDRAMINE HYDROCHLORIDE 25 MG: 50 INJECTION, SOLUTION INTRAMUSCULAR; INTRAVENOUS at 13:20

## 2021-07-14 RX ADMIN — DOCUSATE SODIUM AND SENNOSIDES 1 TABLET: 8.6; 5 TABLET ORAL at 10:03

## 2021-07-14 RX ADMIN — DOCUSATE SODIUM 100 MG: 100 CAPSULE ORAL at 10:03

## 2021-07-14 RX ADMIN — ATORVASTATIN CALCIUM 40 MG: 40 TABLET, FILM COATED ORAL at 17:35

## 2021-07-14 RX ADMIN — MENTHOL, UNSPECIFIED FORM AND METHYL SALICYLATE: 10; 150 CREAM TOPICAL at 10:03

## 2021-07-14 RX ADMIN — INSULIN LISPRO 1 UNITS: 100 INJECTION, SOLUTION INTRAVENOUS; SUBCUTANEOUS at 12:28

## 2021-07-14 NOTE — PHYSICAL THERAPY NOTE
Physical Therapy Evaluation     Patient's Name: Tuan Glynn    Admitting Diagnosis  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[facility] ) [C85 [id number]]    Problem List  Patient Active Problem List   Diagnosis    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 )    Altered mental status    Dysphagia    Aphasia    Weakness    Fracture of ramus of left pubis ([facility] )    Severe protein-calorie malnutrition ([facility] )    Cancer related pain    Ambulatory dysfunction       Past Medical History  No past medical history on file  Past Surgical History  Past Surgical History:   Procedure Laterality Date    IR PICC REPOSITION  4/27/2021    IR TUNNELED CENTRAL LINE REMOVAL  4/27/2021 07/14/21 0910   PT Last Visit   PT Visit Date 07/14/21   Note Type   Note type Evaluation   Pain Assessment   Pain Assessment Tool 0-10   Pain Score Worst Possible Pain   Pain Location/Orientation Orientation: Left; Location: Arm;Location: Leg   Patient's Stated Pain Goal No pain   Hospital Pain Intervention(s) Repositioned; Ambulation/increased activity   Home Living   Additional Comments Prior to admission pt at [facility] for [address] [place]  At baseline, pt resides at [facility] with  where he works  Prior Function   Comments Prior to admission pt required A from facility staff for ADLs  Per chart review, facility staff report pt " does stand with A and able to take steps"  At baseline, pt I with ADLs/ IADLs  Restrictions/Precautions   Weight Bearing Precautions Per Order No   Other Precautions Cognitive; Chair Alarm; Bed Alarm;Multiple lines; Fall Risk;Pain  (Pt speaks [place] )   General   Family/Caregiver Present No   Cognition   Overall Cognitive Status Impaired   Attention Attends with cues to redirect   Orientation Level Oriented to person   Memory Unable to assess   Following Commands Follows one step commands with increased time or repetition   Comments Pt speaks limited English, primarily speaks [address]  phone utilized throughout session   Pt cooperative to participate in therapy session- limited by L sided pain  RLE Assessment   RLE Assessment   (grossly 3+/5 )   LLE Assessment   LLE Assessment   (grossly 2-/5 )   Bed Mobility   Rolling R 4  Minimal assistance   Additional items Assist x 1; Increased time required;Verbal cues   Supine to Sit 3  Moderate assistance   Additional items Assist x 1;HOB elevated; Increased time required;Verbal cues   Sit to Supine 3  Moderate assistance   Additional items Assist x 1; Increased time required;Verbal cues;LE management   Additional Comments Pt supine in bed upon arrival  Pt returned to supine in bed with bed alarm on with all needs within reach at the end of therapy session  Transfers   Sit to Stand 3  Moderate assistance   Additional items Assist x 2; Increased time required;Verbal cues   Stand to Sit 3  Moderate assistance   Additional items Assist x 2; Increased time required;Verbal cues   Additional Comments 2x STS attempted with b/l HHA  Pt with increased L sided pain as well as L knee buckling  Ambulation/Elevation   Gait pattern Not appropriate  (2* increased pain and poor standing tolerance )   Balance   Static Sitting Fair -   Dynamic Sitting Poor +   Static Standing Poor -   Dynamic Standing Poor -   Endurance Deficit   Endurance Deficit Yes   Endurance Deficit Description fatigue, pain    Activity Tolerance   Activity Tolerance Patient limited by fatigue;Patient limited by pain   Medical Staff Made Aware OT Kristan Colvin; co-eval performed this date 2* increased medical complexity and multiple co-morbidities    Nurse Made Aware RN cleared pt to participate in therapy session    Assessment   Prognosis Fair   Problem List Decreased strength;Decreased endurance; Impaired balance;Decreased mobility; Decreased safety awareness;Decreased coordination;Decreased cognition;Pain   Assessment Pt seen for high complexity PT evaluation  Pt with active PT eval/treat orders   Pt is a 49 y o  female who was admitted to [place] [address] [place] on 7/13/2021 with CNS lymphoma  Pt active problems include: altered mental status, dysphagia, weakness, severe protein-calorie malnutrition, cancer related pain, and ambulatory dysfunction  Prior to admission pt at [facility] for [address]  At baseline, pt resides at [facility] with  where he works  Prior to admission pt required A from facility staff for ADLs  Per chart review, facility staff report pt " does stand with A and able to take steps"  At baseline, pt I with ADLs/ IADLs  Pt currently has limitations in strength, balance, endurance, and overall functional mobility  Pt requires assist of 1-2 for all mobility performed this date  Pt performed R rolling with min A  Pt performed sup to sit/ sit to sup with mod Ax 1  Pt sat EOB at close S level/ CGA with dynamic reaching  Pt performed STS from EOB using b/l HHA and mod Ax2; L knee buckling despite L knee block- further mobility deferred 2* increased pain/ fatigue  Pt was left supine in bed with bed alarm on at the end of PT session with all needs in reach  Pt would benefit from continued PT services while in hospital to address remaining limitations  PT to continue to follow pt and recommends post-acute rehab services after d/c  The patient's AM-PAC Basic Mobility Inpatient Short Form Low Function Raw Score 16 , Standardized Score is 25 72  A standardized score less 42 9 suggests the patient may benefit from discharge to post-acute rehab services  Please also refer to the recommendation of the Physical Therapist for safe discharge planning  Goals   Patient Goals To rest    STG Expiration Date 07/28/21   Short Term Goal #1 STG 1  Pt will be able to perform bed mobility with mod I in order to improve overall functional mobility and assist in safe d/c  STG 2  Pt will be able to perform functional transfer with min A in order to improve overall functional mobility and assist in safe d/c  STG 3   Pt will improve sitting/standing static/dynamic balance 1 grade in order to improve functional mobility and assist in safe d/c  STG 4  Pt will improve LE strength by one grade in order to improve functional mobility and assist in safe d/c  *PT to continue to follow and assess ambulation as appropriate and able   PT Treatment Day 0   Plan   Treatment/Interventions Functional transfer training;LE strengthening/ROM; Endurance training;Patient/family training;Bed mobility;Gait training;Spoke to nursing;Spoke to case management;OT   PT Frequency 2-3x/wk   Recommendation   PT Discharge Recommendation Post acute rehabilitation services   PT - OK to Discharge Yes  (once medically cleared )   AM-PAC Basic Mobility Inpatient   Turning in Bed Without Bedrails 3   Lying on Back to Sitting on Edge of Flat Bed 2   Moving Bed to Chair 1   Standing Up From Chair 1   Walk in Room 1   Climb 3-5 Stairs 1   Basic Mobility Inpatient Raw Score 9   Turning Head Towards Sound 4   Follow Simple Instructions 3   Low Function Basic Mobility Raw Score 16   Low Function Basic Mobility Standardized Score 25 72           Gene Gallagher, PT, DPT

## 2021-07-14 NOTE — OCCUPATIONAL THERAPY NOTE
Occupational Therapy Evaluation     Patient Name: Masoud Bourgeois  KCOCU'B Date: 7/14/2021  Problem List  Principal Problem:    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[facility] )  Active Problems:    Altered mental status    Dysphagia    Weakness    Severe protein-calorie malnutrition ([facility] )    Cancer related pain    Ambulatory dysfunction    Past Medical History  No past medical history on file  Past Surgical History  Past Surgical History:   Procedure Laterality Date    IR PICC REPOSITION  4/27/2021    IR TUNNELED CENTRAL LINE REMOVAL  4/27/2021 07/14/21 0909   OT Last Visit   OT Visit Date 07/14/21   Note Type   Note type Evaluation   Restrictions/Precautions   Weight Bearing Precautions Per Order No   Other Precautions Cognitive; Bed Alarm; Fall Risk;Pain;Visual impairment  (pt speaks [place], limited [place])   Pain Assessment   Pain Assessment Tool 0-10   Pain Score Worst Possible Pain   Pain Location/Orientation Orientation: Left; Location: Arm;Location: Leg   Pain Onset/Description Onset: Ongoing   Hospital Pain Intervention(s) Repositioned; Ambulation/increased activity   Home Living   Additional Comments At baseline, pt resides at [facility] where her  works  PTA pt was at [facility] for STR   Prior Function   Comments At baseline, pt is I with ADLs/IADLs  PTA pt required assist from facility staff for ADLs/IADLs   Lifestyle   Autonomy At baseline, pt was I with ADLs/IADLs   PTA pt requires A for ADLs/IADLs   Reciprocal Relationships     Intrinsic Gratification Will continue to assess   Psychosocial   Psychosocial (WDL) WDL   ADL   Where Assessed Edge of bed   Eating Assistance 5  Supervision/Setup   Grooming Assistance 4  Minimal Assistance   UB Bathing Assistance 3  Moderate Assistance   LB Bathing Assistance 2  Maximal Assistance   UB Dressing Assistance 3  Moderate Assistance   LB Dressing Assistance 2  Maximal Assistance   Toileting Assistance  2  Maximal Assistance   Bed Mobility   Rolling R 4 Minimal assistance   Additional items Assist x 1; Increased time required   Supine to Sit 3  Moderate assistance   Additional items Assist x 1; Increased time required   Sit to Supine 3  Moderate assistance   Additional items Assist x 1; Increased time required   Transfers   Sit to Stand 3  Moderate assistance   Additional items Assist x 2; Increased time required   Stand to Sit 3  Moderate assistance   Additional items Increased time required;Assist x 2   Additional Comments Attempted x2 STS transfers  Pt with L knee buckling, even when knee blocking provided   Functional Mobility   Additional Comments Unable to attempt at this time    Balance   Static Sitting Fair -   Dynamic Sitting Poor +   Static Standing Poor -   Dynamic Standing Poor -   Activity Tolerance   Activity Tolerance Patient limited by fatigue;Patient limited by pain   Medical Staff Made Aware PT [place]   Nurse Made Aware RN clearance for session    RUE Assessment   RUE Assessment   (3+/5)   LUE Assessment   LUE Assessment   (limited sh ROM and   2-/5)   Hand Function   Gross Motor Coordination Impaired   Fine Motor Coordination Impaired   Vision - Complex Assessment   Additional Comments Occular ROM WFL  L eye with noted difficulty maintaining eyelid open    Cognition   Overall Cognitive Status Impaired   Arousal/Participation Responsive; Cooperative   Attention Attends with cues to redirect   Orientation Level Oriented to person   Memory Unable to assess   Following Commands Follows one step commands with increased time or repetition   Comments Pt pleasant and cooperative during session  Requires increased time for processing  Limited by L sided pain and weakness  Utilized blue  phone during session    Assessment   Limitation Decreased ADL status; Decreased UE ROM; Decreased UE strength;Decreased Safe judgement during ADL;Decreased cognition;Decreased endurance;Decreased self-care trans;Decreased high-level ADLs; Decreased fine motor control Prognosis Fair   Assessment Pt is a 49 y o  female admitted to [facility] on 7/13/2021 w/ CNS lymphoma [facility]), pt admitted for inpatient chemotherapy  PMH includes fx of pubic rami, dysphagia, and L sided weakness  Pt with active OT orders  Pt seen as a co-evaluation with PT due to the patient's co-morbidities, clinically unstable presentation/clinical complexity, and present impairments  At baseline, pt was I with ADLs/IADLs and lived at [facility] pt was at [facility] for [address] [place] and required A for ADLs/IADLs  Upon evaluation, pt currently requires MOD A x 1 for sup <> sit transfers, MOD A x 2 STS transfer  Pt with limited standing tolerance, LLE with noted buckling and weakness  Pt c/o L sided pain at rest and during tasks  Exhibits decreased strength to B/L UE's limiting performance in ADLs/transfers, LUE with decreased sh ROM and strength  Pt able to form slight grasp and flex/ext elbow but achieves ~ 10* sh flex  Pt currently requires S eating, MIN A grooming, MOD A UB ADLs, MAX A LB ADLs, and MAX A toileting  Pt is limited at this time 2*: pain, endurance, activity tolerance, functional mobility, forward functional reach, balance, functional standing tolerance, decreased I w/ ADLS/IADLS, strength, ROM and decreased safety awareness  The following Occupational Performance Areas to address include: eating, grooming, bathing/shower, toilet hygiene, dressing, health maintenance, functional mobility, community mobility and clothing management  Pt to benefit from immediate acute skilled OT to address above deficits, improve overall functional independence, maximize fnxl mobility and reduce caregiver burden  From OT standpoint, recommendation at time of d/c would be STR  Pt was left in bed with alarm on after session with all current needs met  The patient's raw score on the AM-PAC Daily Activity inpatient short form is 14, standardized score is 33 39, less than 39 4   Patients at this level are likely to benefit from education and training as appropriate      8) Pt will demonstrate 100% carryover of energy conservation techniques t/o functional I/ADL/leisure tasks w/o cues s/p skilled education    9) Pt will engage in ongoing cognitive assessment w/ G participation to assist w/ safe d/c planning/recommendations    10) Pt will increase static and dynamic standing/sitting balance to F+ using AD/DME as needed to increase safety and I during fnxl transfers and ADLs    11) Pt will maintain upright sitting position for at least 20 min during dynamic fnxl activity with G balance and endurance to improve ADL performance and independence, as well as reduce risk of falls        Thi Jj MS, OTR/L

## 2021-07-14 NOTE — PROGRESS NOTES
INTERNAL MEDICINE RESIDENCY PROGRESS NOTE     Name: Mishel Rushing   Age & Sex: 49 y o  female   MRN: [id number]  Unit/Bed#: [facility] 905-01   Encounter: [id number]  Team: SOD Team A    PATIENT INFORMATION     Name: Mishel Rushing   Age & Sex: 49 y o  female   MRN: [id number]  Hospital Stay Days: 1    ASSESSMENT/PLAN     Principal Problem:    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[facility] )  Active Problems:    Altered mental status    Dysphagia    Severe protein-calorie malnutrition ([place] [address] )    Cancer related pain    Ambulatory dysfunction      Ambulatory dysfunction  Assessment & Plan  A: per [address] does stand up with assistance and able to take few steps however her left leg does give out  Known residual deficits left upper extremity and lower extremity weakness secondary to CNS lymphoma  PT/OT recommended rehab, case management consulted  Appreciate further guidance    Cancer related pain  Assessment & Plan  Tylenol 650mg Prn  Oxyocodone 2 5mg Prn    Dysphagia  Assessment & Plan  A: per [facility] pt started to tolerated mechanical / thin liquid diet and stopped PEG tubes  Peg tube in place, clear, no discharge noted  Continue with q 4 hours flushes  Evaluated by speech, Continue with level 2 diet    Altered mental status  Assessment & Plan  A: A x O 2 to person and place which is same as previous admission  Speaks Kimmy only     P: Monitor for acute changes     * 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[place] [address] )  Assessment & Plan  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)       Direct Admit from Dr Yoan Reaves for in-patient chemo 3rd cycle       S/P chemotherapy on 05/13, leucovin on 5/14 6/9 MRI- Appears to be interval improvement in the overall      enhancement of multifocal intracranial lesions      P:    DVT prophylaxis with Lovenox   Continue acyclovir, Diflucan and Levaquin prophylactic  Status post 1st dose of methotrexate 7/13, continue with IV fluids sodium bicarb 150 cc/hr   Oncology Consult, appreciate recommendations  Disposition:  Continue with inpatient chemotherapy as highlighted above     SUBJECTIVE     Patient seen and examined  No acute events overnight  Portilloadalidrati speaking only, I was able to translate this encounter  Resting comfortably  Endorses some mild left upper extremity pain, stable  Did not eat dinner yesterday, she stated the tray never came  Denies any nausea, vomiting, diarrhea, constipation, fevers or chills  All other review of systems negative at this time  OBJECTIVE     Vitals:    21 0249 21 0730 21 1412 21 1511   BP: 109/75 105/69 98/62 98/62   Pulse: 94 84 103 103   Resp: 20 18  18   Temp: 98 2 °F (36 8 °C) 97 5 °F (36 4 °C)  99 °F (37 2 °C)   SpO2: 100% 98% 99% 100%   Weight:       Height:          Temperature:   Temp (24hrs), Av 4 °F (36 9 °C), Min:97 5 °F (36 4 °C), Max:99 °F (37 2 °C)    Temperature: 99 °F (37 2 °C)  Intake & Output:  I/O        07 -  0700  07 -  0700  07 - 07/15 0700    P  O   160 180    NG/GT  220     IV Piggyback  1234 4     Total Intake(mL/kg)  1614 4 (31 3) 180 (3 5)    Urine (mL/kg/hr)  3000 1500 (3 5)    Stool   0    Total Output  3000 1500    Net  -1385 6 -1320           Unmeasured Stool Occurrence   1 x        Weights:   IBW (Ideal Body Weight): 45 5 kg    Body mass index is 22 17 kg/m²  Weight (last 2 days)     Date/Time   Weight    21 1301   51 5 (113 54)    21 1300   51 5 (113 54)    21 0824   51 5 (113 5)            Physical Exam  Vitals reviewed  Constitutional:       General: She is not in acute distress  Appearance: She is ill-appearing  She is not toxic-appearing  Eyes:      General: No scleral icterus  Comments: Left eye abducted at rest   Cardiovascular:      Rate and Rhythm: Regular rhythm  Tachycardia present  Pulmonary:      Effort: Pulmonary effort is normal  No respiratory distress  Breath sounds: Normal breath sounds  No wheezing  Abdominal:      General: Abdomen is flat  There is no distension  Palpations: Abdomen is soft  Tenderness: There is no abdominal tenderness  Musculoskeletal:         General: No swelling  Right lower leg: No edema  Left lower leg: No edema  Comments: RUE 4/5, RLE 4/5  LUE 2/5, LLE 3/5   Neurological:      Mental Status: She is disoriented  Motor: Weakness present  Coordination: Coordination abnormal       Comments: Oriented to person and place  Is unsure of the year   Psychiatric:         Mood and Affect: Mood normal          Behavior: Behavior normal       Comments: Slightly tearful on exam       LABORATORY DATA     Labs: I have personally reviewed pertinent reports  Results from last 7 days   Lab Units 07/14/21  0511 07/13/21  1009   WBC Thousand/uL 5 11 7 92   HEMOGLOBIN g/dL 11 5 11 6   HEMATOCRIT % 35 6 36 7   PLATELETS Thousands/uL 275 228   NEUTROS PCT % 80* 61   MONOS PCT % 1* 7      Results from last 7 days   Lab Units 07/14/21  0511 07/13/21  1008   POTASSIUM mmol/L 3 5 3 4*   CHLORIDE mmol/L 105 103   CO2 mmol/L 28 27   BUN mg/dL 11 14   CREATININE mg/dL 0 44* 0 39*   CALCIUM mg/dL 9 7 9 7   ALK PHOS U/L 57 61   ALT U/L 15 12   AST U/L 13 10     Results from last 7 days   Lab Units 07/14/21  0511   MAGNESIUM mg/dL 1 7                        IMAGING & DIAGNOSTIC TESTING     Radiology Results: I have personally reviewed pertinent reports  No results found  Other Diagnostic Testing: I have personally reviewed pertinent reports      ACTIVE MEDICATIONS     Current Facility-Administered Medications   Medication Dose Route Frequency    acetaminophen (TYLENOL) tablet 650 mg  650 mg Oral Q6H PRN    acyclovir (ZOVIRAX) capsule 400 mg  400 mg Oral Q12H [facility]    albuterol (PROVENTIL HFA,VENTOLIN HFA) inhaler 2 puff  2 puff Inhalation Q4H PRN    allopurinol (ZYLOPRIM) tablet 100 mg  100 mg Oral Daily    aluminum-magnesium hydroxide-simethicone (MYLANTA) oral suspension 30 mL  30 mL Per PEG Tube Q4H PRN    amLODIPine (NORVASC) tablet 5 mg  5 mg Oral Daily    atorvastatin (LIPITOR) tablet 40 mg  40 mg Oral After Dinner    bisacodyl (DULCOLAX) rectal suppository 10 mg  10 mg Rectal Daily PRN    Diclofenac Sodium (VOLTAREN) 1 % topical gel 2 g  2 g Topical TID PRN    dicyclomine (BENTYL) capsule 10 mg  10 mg Oral 4x Daily (AC & HS)    docusate sodium (COLACE) capsule 100 mg  100 mg Oral Daily    enoxaparin (LOVENOX) subcutaneous injection 40 mg  40 mg Subcutaneous Q24H MEÑO    famotidine (PEPCID) tablet 20 mg  20 mg Oral Daily    gabapentin (NEURONTIN) capsule 200 mg  200 mg Oral HS    hydrOXYzine HCL (ATARAX) tablet 25 mg  25 mg Oral Q6H PRN    insulin lispro (HumaLOG) 100 units/mL subcutaneous injection 1-5 Units  1-5 Units Subcutaneous TID AC    insulin lispro (HumaLOG) 100 units/mL subcutaneous injection 1-5 Units  1-5 Units Subcutaneous HS    leucovorin 25 mg in sodium chloride 0 9 % 50 mL IVPB  25 mg Intravenous Q6H    menthol-methyl salicylate (BENGAY) 04-13 % cream   Apply externally BID    ondansetron (ZOFRAN-ODT) dispersible tablet 4 mg  4 mg Oral Q6H PRN    oxyCODONE (ROXICODONE) oral solution 5 mg  5 mg Oral Q6H PRN    polyethylene glycol (MIRALAX) packet 17 g  17 g Oral Daily PRN    senna-docusate sodium (SENOKOT S) 8 6-50 mg per tablet 1 tablet  1 tablet Oral Q12H [facility]    sodium bicarbonate 100 mEq in dextrose 5 % 1,000 mL infusion  150 mL/hr Intravenous Continuous    sodium chloride 0 9 % infusion  20 mL/hr Intravenous Once PRN    tamsulosin (FLOMAX) capsule 0 4 mg  0 4 mg Oral After Dinner       VTE Pharmacologic Prophylaxis: Enoxaparin (Lovenox)  VTE Mechanical Prophylaxis: sequential compression device    Portions of the record may have been created with voice recognition software  Occasional wrong word or "sound a like" substitutions may have occurred due to the inherent limitations of voice recognition software    Read the chart carefully and recognize, using context, where substitutions have occurred   ==  Pan Donato, [address] [facility]  Internal Medicine Residency PGY-3

## 2021-07-14 NOTE — CASE MANAGEMENT
Madeline Vazquez from [facility] introduced herself as the  for [employer] and provided her phone number [phone] ex [phone]  Madeline Vazquez stated that she can help with any DC planning as needed

## 2021-07-14 NOTE — PROGRESS NOTES
Medical Oncology/Hematology Progress Note  Galen Spears, female, 49 y o , 1965,  [facility], [id number]     Assessment and Plan  1  Primary CNS Lymphoma  - s/p 2 cycles of high-dose Methotrexate/Rituxan; scan after these two doses showed improvement of disease; pt was sent to rehab to work on her strength/conditioning; she is overdue for C3 of HD MTX/rituxan and was admitted from facility for chemo  - Here for C3 of HD MTX/Rituxan (C3D1= 7/13 at 1900); Plan for leukovorin rescue after (to start tonight at 1900)  - continue PT/OT during this admission  - daily CBC/CMP, and daily weight    Communication with team:  Did talk with  patient's nurse, and pharmacist regarding her plan  Did use Eleanor (RN) on phone as   I did review this patient with Dr Mary Rosales and he is in agreement with this plan  Delmis Gaspar, RENALDO-BC  Hematology/Oncology Nurse Practitioner       Interval history: Got HDMTX last night, rituxan earlier today, no issue at all per nurse  Patient is OK  She tells me she is hungry  She is worried she is going to get nauseous she eats that she wants nausea meds of course she eats  She is vegetarian  Dorsal work on getting this med and food for her  Does not appear to be in pain  History of present illness: Galen Spears is a 48 yo female with primary CNS lymphoma, s/p 2 cycles of high dose methotrexate and rituxan  Most recent imaging had showed improvement of intracranial lesions; pt was sent to rehab to work on her strength/conditioning; she is overdue for C3 of HD MTX/rituxan and was admitted from facility for chemo  Pt is doing well overall; she is much stronger - able to ambulate with assistance  She is now able to answer questions and communicate better  She denies any areas of pain  She is very uncomfortable with attempted PICC line placement   She is agreeable to getting chemotherapy today; no other concerned noted today during our conversation  Review of Systems:   As per HPI  Limited ROS given language barrier  No past medical history on file  Past Surgical History:   Procedure Laterality Date    IR PICC REPOSITION  4/27/2021    IR TUNNELED CENTRAL LINE REMOVAL  4/27/2021       Family History   Problem Relation Age of Onset    No Known Problems Mother        Social History     Socioeconomic History    Marital status: Unknown     Spouse name: Not on file    Number of children: Not on file    Years of education: Not on file    Highest education level: Not on file   Occupational History    Not on file   Tobacco Use    Smoking status: Never Smoker    Smokeless tobacco: Never Used   Substance and Sexual Activity    Alcohol use: Not Currently    Drug use: Not on file    Sexual activity: Not on file   Other Topics Concern    Not on file   Social History Narrative    Not on file     Social Determinants of Health     Financial Resource Strain:     Difficulty of Paying Living Expenses:    Food Insecurity:     Worried About Running Out of Food in the Last Year:     [address] in the Last Year:    Transportation Needs:     Lack of Transportation (Medical):      Lack of Transportation (Non-Medical):    Physical Activity:     Days of Exercise per Week:     Minutes of Exercise per Session:    Stress:     Feeling of Stress :    Social Connections:     Frequency of Communication with Friends and Family:     Frequency of Social Gatherings with Friends and Family:     Attends [employer] Services:     Active Member of Clubs or Organizations:     Attends Club or Organization Meetings:     Marital Status:    Intimate Partner Violence:     Fear of Current or Ex-Partner:     Emotionally Abused:     Physically Abused:     Sexually Abused:          Current Facility-Administered Medications:     acetaminophen (TYLENOL) tablet 650 mg, 650 mg, Oral, Q6H PRN, Chantal Johns MD    acyclovir (ZOVIRAX) capsule 400 mg, 400 mg, Oral, Q12H [facility], Chantal Johns MD, 400 mg at 07/14/21 1004    albuterol (PROVENTIL HFA,VENTOLIN HFA) inhaler 2 puff, 2 puff, Inhalation, Q4H PRN, Maude Johns MD    allopurinol (ZYLOPRIM) tablet 100 mg, 100 mg, Oral, Daily, Chantal Johns MD, 100 mg at 07/14/21 1004    aluminum-magnesium hydroxide-simethicone (MYLANTA) oral suspension 30 mL, 30 mL, Per PEG Tube, Q4H PRN, Maude Johns MD    amLODIPine (NORVASC) tablet 5 mg, 5 mg, Oral, Daily, Chantal Johns MD, 5 mg at 07/14/21 1003    atorvastatin (LIPITOR) tablet 40 mg, 40 mg, Oral, After Ethel Johns MD, 40 mg at 07/13/21 1802    bisacodyl (DULCOLAX) rectal suppository 10 mg, 10 mg, Rectal, Daily PRN, Juan Montoya MD    Diclofenac Sodium (VOLTAREN) 1 % topical gel 2 g, 2 g, Topical, TID PRN, Juan Montoya MD    dicyclomine (BENTYL) capsule 10 mg, 10 mg, Oral, 4x Daily (AC & HS), Chantal Johns MD, 10 mg at 07/14/21 1228    docusate sodium (COLACE) capsule 100 mg, 100 mg, Oral, Daily, Chantal Johns MD, 100 mg at 07/14/21 1003    enoxaparin (LOVENOX) subcutaneous injection 40 mg, 40 mg, Subcutaneous, Q24H [facility], Chantal Johns MD, 40 mg at 07/14/21 1004    famotidine (PEPCID) tablet 20 mg, 20 mg, Oral, Daily, Chantal Johns MD, 20 mg at 07/14/21 1004    gabapentin (NEURONTIN) capsule 200 mg, 200 mg, Oral, HS, Chantal Johns MD, 200 mg at 07/13/21 2225    hydrOXYzine HCL (ATARAX) tablet 25 mg, 25 mg, Oral, Q6H PRN, Maude Johns MD    insulin lispro (HumaLOG) 100 units/mL subcutaneous injection 1-5 Units, 1-5 Units, Subcutaneous, TID AC, 1 Units at 07/14/21 1228 **AND** Fingerstick Glucose (POCT), , , TID AC, Chantal Johns MD    insulin lispro (HumaLOG) 100 units/mL subcutaneous injection 1-5 Units, 1-5 Units, Subcutaneous, HS, Chantal Johns MD, 1 Units at 07/13/21 2226    leucovorin 25 mg in sodium chloride 0 9 % 50 mL IVPB, 25 mg, Intravenous, Q6H, CHEPE Tobin    menthol-methyl salicylate (BENGAY) 84-64 % cream, , Apply externally, BID, Liban Boateng MD, Given at 07/14/21 1003    ondansetron (ZOFRAN-ODT) dispersible tablet 4 mg, 4 mg, Oral, Q6H PRN, Liban Boateng MD    oxyCODONE (ROXICODONE) oral solution 5 mg, 5 mg, Oral, Q6H PRN, Ganesh Johns MD    polyethylene glycol (MIRALAX) packet 17 g, 17 g, Oral, Daily PRN, Liban Boateng MD    senna-docusate sodium (SENOKOT S) 8 6-50 mg per tablet 1 tablet, 1 tablet, Oral, Q12H [facility], Chantal Johns MD, 1 tablet at 07/14/21 1003    sodium bicarbonate 100 mEq in dextrose 5 % 1,000 mL infusion, 150 mL/hr, Intravenous, Continuous, Hildred Ebbing, DO, Last Rate: 150 mL/hr at 07/14/21 1313, 150 mL/hr at 07/14/21 1313    sodium chloride 0 9 % infusion, 20 mL/hr, Intravenous, Once PRN, Hildred Ebbing, DO, Last Rate: 20 mL/hr at 07/13/21 1603, 20 mL/hr at 07/13/21 1603    tamsulosin (FLOMAX) capsule 0 4 mg, 0 4 mg, Oral, After Velma Rubinstein Mansoor, MD, 0 4 mg at 07/13/21 1802    Medications Prior to Admission   Medication    acetaminophen (Tylenol) 325 mg tablet    albuterol (2 5 mg/3 mL) 0 083 % nebulizer solution    allopurinol (ZYLOPRIM) 100 mg tablet    aluminum-magnesium hydroxide-simethicone (MYLANTA) 200-200-20 mg/5 mL suspension    amLODIPine (NORVASC) 5 mg tablet    atorvastatin (LIPITOR) 40 mg tablet    bisacodyl (DULCOLAX) 10 mg suppository    Cholecalciferol (Vitamin D3) 1 25 MG (77752 UT) TABS    Diclofenac Sodium (VOLTAREN) 1 %    dicyclomine (BENTYL) 10 mg capsule    docusate sodium (COLACE) 100 mg capsule    famotidine (PEPCID) 20 mg tablet    fluconazole (DIFLUCAN) 200 mg tablet    gabapentin (NEURONTIN) 250 mg/5 mL solution    hydrOXYzine HCL (ATARAX) 25 mg tablet    insulin lispro (HumaLOG) 100 units/mL injection    levofloxacin (LEVAQUIN) 500 mg tablet    menthol-methyl salicylate (BENGAY) 76-09 % cream    omeprazole 2 mg/mL in sodium bicarbonate    ondansetron (ZOFRAN-ODT) 4 mg disintegrating tablet    oxyCODONE (OXY-IR) 5 MG capsule    polyethylene glycol (MIRALAX) 17 g packet    Sennosides (Senna) 8 8 mg/5 mL LIQD    tamsulosin (FLOMAX) 0 4 mg    acyclovir (ZOVIRAX) 200 mg capsule    albuterol (PROVENTIL HFA,VENTOLIN HFA) 90 mcg/act inhaler    enoxaparin (LOVENOX) 40 mg/0 4 mL    Lidocaine 4 % PTCH    senna-docusate sodium (Senokot S) 8 6-50 mg per tablet       No Known Allergies      Physical Exam:    BP 98/62   Pulse 103   Temp 99 °F (37 2 °C)   Resp 18   Ht 5' (1 524 m)   Wt 51 5 kg (113 lb 8 6 oz)   SpO2 100%   BMI 22 17 kg/m²     Physical Exam  Constitutional:       Appearance: Normal appearance  HENT:      Head: Normocephalic and atraumatic  Nose: Nose normal    Eyes:      Extraocular Movements: Extraocular movements intact  Conjunctiva/sclera: Conjunctivae normal       Pupils: Pupils are equal, round, and reactive to light  Cardiovascular:      Rate and Rhythm: Normal rate and regular rhythm  Heart sounds: Normal heart sounds  Pulmonary:      Effort: Pulmonary effort is normal  No respiratory distress  Breath sounds: Normal breath sounds  No wheezing, rhonchi or rales  Abdominal:      General: Bowel sounds are normal       Palpations: Abdomen is soft  Tenderness: There is no abdominal tenderness  Musculoskeletal:         General: Normal range of motion  Cervical back: Normal range of motion and neck supple  Skin:     General: Skin is warm and dry  Coloration: Skin is not jaundiced  Findings: No bruising or rash  Neurological:      Mental Status: She is alert  Motor: Weakness (0/5 left upper extremity; left lower weak) present        Comments: AOx2 with    Psychiatric:         Mood and Affect: Mood normal          Recent Results (from the past 50 hour(s))   Comprehensive metabolic panel    Collection Time: 07/13/21 10:08 AM   Result Value Ref Range    Sodium 138 136 - 145 mmol/L    Potassium 3 4 (L) 3 5 - 5 3 mmol/L    Chloride 103 100 - 108 mmol/L    CO2 27 21 - 32 mmol/L    ANION GAP 8 4 - 13 mmol/L    BUN 14 5 - 25 mg/dL    Creatinine 0 39 (L) 0 60 - 1 30 mg/dL    Glucose 114 65 - 140 mg/dL    Calcium 9 7 8 3 - 10 1 mg/dL    Corrected Calcium 10 5 (H) 8 3 - 10 1 mg/dL    AST 10 5 - 45 U/L    ALT 12 12 - 78 U/L    Alkaline Phosphatase 61 46 - 116 U/L    Total Protein 6 4 6 4 - 8 2 g/dL    Albumin 3 0 (L) 3 5 - 5 0 g/dL    Total Bilirubin 0 17 (L) 0 20 - 1 00 mg/dL    eGFR 119 ml/min/1 73sq m   CBC and differential    Collection Time: 07/13/21 10:09 AM   Result Value Ref Range    WBC 7 92 4 31 - 10 16 Thousand/uL    RBC 4 04 3 81 - 5 12 Million/uL    Hemoglobin 11 6 11 5 - 15 4 g/dL    Hematocrit 36 7 34 8 - 46 1 %    MCV 91 82 - 98 fL    MCH 28 7 26 8 - 34 3 pg    MCHC 31 6 31 4 - 37 4 g/dL    RDW 14 9 11 6 - 15 1 %    MPV 10 8 8 9 - 12 7 fL    Platelets 293 142 - 059 Thousands/uL    nRBC 0 /100 WBCs    Neutrophils Relative 61 43 - 75 %    Immat GRANS % 1 0 - 2 %    Lymphocytes Relative 28 14 - 44 %    Monocytes Relative 7 4 - 12 %    Eosinophils Relative 2 0 - 6 %    Basophils Relative 1 0 - 1 %    Neutrophils Absolute 4 88 1 85 - 7 62 Thousands/µL    Immature Grans Absolute 0 04 0 00 - 0 20 Thousand/uL    Lymphocytes Absolute 2 25 0 60 - 4 47 Thousands/µL    Monocytes Absolute 0 57 0 17 - 1 22 Thousand/µL    Eosinophils Absolute 0 13 0 00 - 0 61 Thousand/µL    Basophils Absolute 0 05 0 00 - 0 10 Thousands/µL   Fingerstick Glucose (POCT)    Collection Time: 07/13/21 12:12 PM   Result Value Ref Range    POC Glucose 91 65 - 140 mg/dl   Fingerstick Glucose (POCT)    Collection Time: 07/13/21  4:34 PM   Result Value Ref Range    POC Glucose 194 (H) 65 - 140 mg/dl   Fingerstick Glucose (POCT)    Collection Time: 07/13/21  8:47 PM   Result Value Ref Range    POC Glucose 160 (H) 65 - 140 mg/dl   CBC and differential    Collection Time: 07/14/21  5:11 AM   Result Value Ref Range    WBC 5 11 4 31 - 10 16 Thousand/uL    RBC 3 91 3 81 - 5 12 Million/uL    Hemoglobin 11 5 11 5 - 15 4 g/dL    Hematocrit 35 6 34 8 - 46 1 %    MCV 91 82 - 98 fL    MCH 29 4 26 8 - 34 3 pg    MCHC 32 3 31 4 - 37 4 g/dL    RDW 14 6 11 6 - 15 1 %    MPV 11 5 8 9 - 12 7 fL    Platelets 234 677 - 873 Thousands/uL    nRBC 0 /100 WBCs    Neutrophils Relative 80 (H) 43 - 75 %    Immat GRANS % 0 0 - 2 %    Lymphocytes Relative 19 14 - 44 %    Monocytes Relative 1 (L) 4 - 12 %    Eosinophils Relative 0 0 - 6 %    Basophils Relative 0 0 - 1 %    Neutrophils Absolute 4 07 1 85 - 7 62 Thousands/µL    Immature Grans Absolute 0 02 0 00 - 0 20 Thousand/uL    Lymphocytes Absolute 0 95 0 60 - 4 47 Thousands/µL    Monocytes Absolute 0 06 (L) 0 17 - 1 22 Thousand/µL    Eosinophils Absolute 0 00 0 00 - 0 61 Thousand/µL    Basophils Absolute 0 01 0 00 - 0 10 Thousands/µL   Comprehensive metabolic panel    Collection Time: 07/14/21  5:11 AM   Result Value Ref Range    Sodium 141 136 - 145 mmol/L    Potassium 3 5 3 5 - 5 3 mmol/L    Chloride 105 100 - 108 mmol/L    CO2 28 21 - 32 mmol/L    ANION GAP 8 4 - 13 mmol/L    BUN 11 5 - 25 mg/dL    Creatinine 0 44 (L) 0 60 - 1 30 mg/dL    Glucose 233 (H) 65 - 140 mg/dL    Calcium 9 7 8 3 - 10 1 mg/dL    Corrected Calcium 10 4 (H) 8 3 - 10 1 mg/dL    AST 13 5 - 45 U/L    ALT 15 12 - 78 U/L    Alkaline Phosphatase 57 46 - 116 U/L    Total Protein 6 1 (L) 6 4 - 8 2 g/dL    Albumin 3 1 (L) 3 5 - 5 0 g/dL    Total Bilirubin 0 26 0 20 - 1 00 mg/dL    eGFR 115 ml/min/1 73sq m   Magnesium    Collection Time: 07/14/21  5:11 AM   Result Value Ref Range    Magnesium 1 7 1 6 - 2 6 mg/dL   Fingerstick Glucose (POCT)    Collection Time: 07/14/21  7:35 AM   Result Value Ref Range    POC Glucose 215 (H) 65 - 140 mg/dl   Fingerstick Glucose (POCT)    Collection Time: 07/14/21 10:55 AM   Result Value Ref Range    POC Glucose 224 (H) 65 - 140 mg/dl       No results found  Labs and pertinent reports reviewed  This note has been generated by voice recognition software system    Therefore, there may be spelling, grammar, and or syntax errors  Please contact if questions arise

## 2021-07-14 NOTE — UTILIZATION REVIEW
Inpatient Admission Authorization Request   NOTIFICATION OF INPATIENT ADMISSION/INPATIENT AUTHORIZATION REQUEST   SERVICING FACILITY:   [facility]  Address: [address] [place] [address], [address] [place] [address]  Tax ID: [id number]  NPI: [id number]  Place of Service: Inpatient [address] [facility] Code: 24     ATTENDING PROVIDER:  Attending Name and NPI#: Maurice Fabian [[id number]]  Address: [address] [place] [address], [address] [place] [id number]  Phone: [phone]     UTILIZATION REVIEW CONTACT:  Ruben Rushing Utilization   Network Utilization Review Department  Phone: [phone]  Fax: [phone]  Email: [email or web address]     PHYSICIAN ADVISORY SERVICES:  FOR LIQE-XU-PWAH REVIEW - MEDICAL NECESSITY DENIAL  Phone: [phone]  Fax: [phone]  Email: [email or web address]     TYPE OF REQUEST:  Inpatient Status     ADMISSION INFORMATION:  ADMISSION DATE/TIME: 7/13/21  8:05 AM  PATIENT DIAGNOSIS CODE/DESCRIPTION:  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[facility] [address] ) [C85 89]  DISCHARGE DATE/TIME: No discharge date for patient encounter  DISCHARGE DISPOSITION (IF DISCHARGED): Non [facility] SNF/TCU/SNU     IMPORTANT INFORMATION:  Please contact the Ruben Rushing directly with any questions or concerns regarding this request  Department voicemails are confidential     Send requests for admission clinical reviews, concurrent reviews, approvals, and administrative denials due to lack of clinical to fax [phone]

## 2021-07-14 NOTE — UTILIZATION REVIEW
Initial Clinical Review    Admission: Date/Time/Statement:   Admission Orders (From admission, onward)     Ordered        07/13/21 0955  Inpatient Admission  Once                   07/13/21 0955  Inpatient Admission (Inpatient Admission)   Transfer Service: General Medicine       Question Answer Comment   Level of Care Level 2 Stepdown / HOT    Estimated length of stay More than 2 Midnights    Certification I certify that inpatient services are medically necessary for this patient for a duration of greater than two midnights  See H&P and MD Progress Notes for additional information about the patient's course of treatment  Initial Presentation: 50 yo f with a pmh of gout, diffuse large primary CNS lymphoma s/p 2 rounds of chemo and PEG tube placement  On 5/10  She presents as a direct admission from  her oncologists office for inpatient chemotherapy  She had been at physical  rehab after completing prior chemo round  She is improving  Now A & O x 2, able to feed herself,  She has gained some weight ad is able to walk to the bathroom with assistance, but has her left leg give out sometimes  She has been compliant with her medications    Plan to have goals of care discussion, ideally she wishes to return to [facility] where she has more family support  MRI June 9 showed improvement in the overall enhancement of the multifocal intracranial lesions  Which does not correlate to her improvements clinically  PICC line placed on 7/13    Oncology consult - 7/13 -  Primary CNS lymphoma - s/p 2 cycles of high - dose methotrexate  Scan post  Showed improvement of disease  Plan to proceed with C3 of HD MTX/Rituxan with leukovorin rescue after, Brian PT/OT  during the admission    Date: 7/14/2021  Day 2: Pt orientated to self, place and time not to situation  She follows commands, LUE flaccid  Speech therapy eval - level 2 diet, flush PEG tube q4     Sh endorses mild left upper extremity pain, stable,  continue inpatient with chemotherapy       ED Triage Vitals   Temperature Pulse Respirations Blood Pressure SpO2   07/13/21 0820 07/13/21 0820 07/13/21 1143 07/13/21 0820 07/13/21 0820   98 9 °F (37 2 °C) 88 16 108/66 98 %      Temp src Heart Rate Source Patient Position - Orthostatic VS BP Location FiO2 (%)   -- -- -- -- --             Pain Score       07/13/21 2218       No Pain          Wt Readings from Last 1 Encounters:   07/13/21 51 5 kg (113 lb 8 6 oz)     Additional Vital Signs:   Date/Time  Temp  Pulse  Resp  BP  MAP (mmHg)  SpO2  O2 Device   07/14/21 02:49:29  98 2 °F (36 8 °C)  94  20  109/75  86  100 %  --   07/13/21 22:18:09  98 7 °F (37 1 °C)  102  --  100/73  82  99 %  None (Room air)   07/13/21 18:51:03  98 5 °F (36 9 °C)  100  18  102/72  82  100 %  --   07/13/21 15:33:19  98 5 °F (36 9 °C)  108Abnormal   18  101/73  82  98 %  --   07/13/21 15:32:39  98 5 °F (36 9 °C)  110Abnormal   18  101/73  82  98 %  --   07/13/21 11:43:48  97 7 °F (36 5 °C)  79  16  110/68  82  98 %           Pertinent Labs/Diagnostic Test Results:       Results from last 7 days   Lab Units 07/14/21  0511 07/13/21  1009   WBC Thousand/uL 5 11 7 92   HEMOGLOBIN g/dL 11 5 11 6   HEMATOCRIT % 35 6 36 7   PLATELETS Thousands/uL 275 228   NEUTROS ABS Thousands/µL 4 07 4 88         Results from last 7 days   Lab Units 07/14/21  0511 07/13/21  1008   SODIUM mmol/L 141 138   POTASSIUM mmol/L 3 5 3 4*   CHLORIDE mmol/L 105 103   CO2 mmol/L 28 27   ANION GAP mmol/L 8 8   BUN mg/dL 11 14   CREATININE mg/dL 0 44* 0 39*   EGFR ml/min/1 73sq m 115 119   CALCIUM mg/dL 9 7 9 7     Results from last 7 days   Lab Units 07/14/21  0511 07/13/21  1008   AST U/L 13 10   ALT U/L 15 12   ALK PHOS U/L 57 61   TOTAL PROTEIN g/dL 6 1* 6 4   ALBUMIN g/dL 3 1* 3 0*   TOTAL BILIRUBIN mg/dL 0 26 0 17*     Results from last 7 days   Lab Units 07/14/21  0735 07/13/21  2047 07/13/21  1634 07/13/21  1212   POC GLUCOSE mg/dl 215* 160* 194* 91     Results from last 7 days Lab Units 07/14/21  0511 07/13/21  1008   GLUCOSE RANDOM mg/dL 233* 114     No radiology studies     No past medical history on file    Present on Admission:   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[facility] )   Severe protein-calorie malnutrition ([facility] )   Cancer related pain   Altered mental status   Weakness   Dysphagia      Admitting Diagnosis: 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HCC) [C85 89]  Age/Sex: 49 y o  female         Admission Orders:  Scheduled Medications:  acyclovir, 400 mg, Oral, Q12H [address]  allopurinol, 100 mg, Oral, Daily  amLODIPine, 5 mg, Oral, Daily  atorvastatin, 40 mg, Oral, After Dinner  dicyclomine, 10 mg, Oral, 4x Daily (AC & HS)  docusate sodium, 100 mg, Oral, Daily  enoxaparin, 40 mg, Subcutaneous, Q24H MEÑO  famotidine, 20 mg, Oral, Daily  fluconazole, 400 mg, Oral, Daily  gabapentin, 200 mg, Oral, HS  insulin lispro, 1-5 Units, Subcutaneous, TID AC  insulin lispro, 1-5 Units, Subcutaneous, HS  menthol-methyl salicylate, , Apply externally, BID  potassium chloride, 20 mEq, Oral, Once - 7/13 x 1  senna-docusate sodium, 1 tablet, Oral, Q12H [address]  tamsulosin, 0 4 mg, Oral, After Dinner  Methotrexate 4,953 mg iv  Once - 7/13  Zofran - Decadron  IV  Once - 7/13x 1  Benadryl 25 mg I v once - 7/14 x 1  Leucovorin 25 mg iv  q6 start  On 7/14@  1900  Rituxan 500mg iv once - 7/14 x1          Continuous IV Infusions:  sodium bicarbonate infusion, 150 mL/hr, Intravenous, Continuous      PRN Meds:  acetaminophen, 650 mg, Oral, Q6H PRN  albuterol, 2 puff, Inhalation, Q4H PRN  aluminum-magnesium hydroxide-simethicone, 30 mL, Per PEG Tube, Q4H PRN  bisacodyl, 10 mg, Rectal, Daily PRN  Diclofenac Sodium, 2 g, Topical, TID PRN  hydrOXYzine HCL, 25 mg, Oral, Q6H PRN  ondansetron, 4 mg, Oral, Q6H PRN  oxyCODONE, 5 mg, Oral, Q6H PRN  polyethylene glycol, 17 g, Oral, Daily PRN  sodium chloride, 20 mL/hr, Intravenous, Once PRN  Ativan 0 5 mg iv prn - 7/13 x 1      Nursing Orders -  VS - peg tube maintenance flush q6 220 cc water - diet dysphagia  Mechanical soft - thin liquids ( vegetarian)     Network Utilization Review Department  ATTENTION: Please call with any questions or concerns to [phone] and carefully listen to the prompts so that you are directed to the right person  All voicemails are confidential   [facility] all requests for admission clinical reviews, approved or denied determinations and any other requests to dedicated fax number below belonging to the campus where the patient is receiving treatment   List of dedicated fax numbers for the Facilities:  [address] DENIALS (Administrative/Medical Necessity) [phone]   [address] (Maternity/NICU/Pediatrics) [phone]   [address] [place] [address] [address] [place] [address] [id number] [place] [address] [address] [facility] [place] [address] [phone]

## 2021-07-14 NOTE — PLAN OF CARE
Problem: Prexisting or High Potential for Compromised Skin Integrity  Goal: Skin integrity is maintained or improved  Description: INTERVENTIONS:  - Identify patients at risk for skin breakdown  - Assess and monitor skin integrity  - Assess and monitor nutrition and hydration status  - Monitor labs   - Assess for incontinence   - Turn and reposition patient  - Assist with mobility/ambulation  - Relieve pressure over bony prominences  - Avoid friction and shearing  - Provide appropriate hygiene as needed including keeping skin clean and dry  - Evaluate need for skin moisturizer/barrier cream  - Collaborate with interdisciplinary team   - Patient/family teaching  - Consider wound care consult   Outcome: Progressing     Problem: MOBILITY - ADULT  Goal: Maintain or return to baseline ADL function  Description: INTERVENTIONS:  -  Assess patient's ability to carry out ADLs; assess patient's baseline for ADL function and identify physical deficits which impact ability to perform ADLs (bathing, care of mouth/teeth, toileting, grooming, dressing, etc )  - Assess/evaluate cause of self-care deficits   - Assess range of motion  - Assess patient's mobility; develop plan if impaired  - Assess patient's need for assistive devices and provide as appropriate  - Encourage maximum independence but intervene and supervise when necessary  - Involve family in performance of ADLs  - Assess for home care needs following discharge   - Consider OT consult to assist with ADL evaluation and planning for discharge  - Provide patient education as appropriate  Outcome: Progressing  Goal: Maintains/Returns to pre admission functional level  Description: INTERVENTIONS:  - Perform BMAT or MOVE assessment daily    - Set and communicate daily mobility goal to care team and patient/family/caregiver  - Collaborate with rehabilitation services on mobility goals if consulted  - Perform Range of Motion  - Reposition patient every 2 hours    - Record patient progress and toleration of activity level   Outcome: Progressing

## 2021-07-14 NOTE — PLAN OF CARE
Problem: PHYSICAL THERAPY ADULT  Goal: Performs mobility at highest level of function for planned discharge setting  See evaluation for individualized goals  Description: Treatment/Interventions: Functional transfer training, LE strengthening/ROM, Endurance training, Patient/family training, Bed mobility, Gait training, Spoke to nursing, Spoke to case management, OT          See flowsheet documentation for full assessment, interventions and recommendations  7/14/2021 1513 by Jeannine Helton, PT  Note: Prognosis: Fair  Problem List: Decreased strength, Decreased endurance, Impaired balance, Decreased mobility, Decreased safety awareness, Decreased coordination, Decreased cognition, Pain  Assessment: Pt seen for high complexity PT evaluation  Pt with active PT eval/treat orders  Pt is a 49 y o  female who was admitted to [facility] on 7/13/2021 with CNS lymphoma  Pt active problems include: altered mental status, dysphagia, weakness, severe protein-calorie malnutrition, cancer related pain, and ambulatory dysfunction  [facility] [employer]  At baseline, pt resides at [facility] with  where he works  Prior to admission pt required A from facility staff for ADLs  Per chart review, facility staff report pt " does stand with A and able to take steps"  At baseline, pt I with ADLs/ IADLs  Pt currently has limitations in strength, balance, endurance, and overall functional mobility  Pt requires assist of 1-2 for all mobility performed this date  Pt performed R rolling with min A  Pt performed sup to sit/ sit to sup with mod Ax 1  Pt sat EOB at close S level/ CGA with dynamic reaching  Pt performed STS from EOB using b/l HHA and mod Ax2; L knee buckling despite L knee block- further mobility deferred 2* increased pain/ fatigue  Pt was left supine in bed with bed alarm on at the end of PT session with all needs in reach   Pt would benefit from continued PT services while in hospital to address remaining limitations  PT to continue to follow pt and recommends post-acute rehab services after d/c  The patient's AM-PAC Basic Mobility Inpatient Short Form Low Function Raw Score 16 , Standardized Score is 25 72  A standardized score less 42 9 suggests the patient may benefit from discharge to post-acute rehab services  Please also refer to the recommendation of the Physical Therapist for safe discharge planning  PT Discharge Recommendation: Post acute rehabilitation services     PT - OK to Discharge: Yes (once medically cleared )    See flowsheet documentation for full assessment

## 2021-07-14 NOTE — PLAN OF CARE
Problem: OCCUPATIONAL THERAPY ADULT  Goal: Performs self-care activities at highest level of function for planned discharge setting  See evaluation for individualized goals  Description: Treatment Interventions: ADL retraining, Functional transfer training, UE strengthening/ROM, Endurance training, Cognitive reorientation, Patient/family training, Equipment evaluation/education, Neuromuscular reeducation, Fine motor coordination activities, Compensatory technique education, Continued evaluation, Energy conservation, Activityengagement          See flowsheet documentation for full assessment, interventions and recommendations  Note: Limitation: Decreased ADL status, Decreased UE ROM, Decreased UE strength, Decreased Safe judgement during ADL, Decreased cognition, Decreased endurance, Decreased self-care trans, Decreased high-level ADLs, Decreased fine motor control  Prognosis: Fair  Assessment: Pt is a 49 y o  female [facility] 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[facility] [address] ), pt admitted for inpatient chemotherapy  PMH includes fx of pubic rami, dysphagia, and L sided weakness  Pt with active OT orders  Pt seen as a co-evaluation with PT due to the patient's co-morbidities, clinically unstable presentation/clinical complexity, and present impairments  At baseline, pt was I with ADLs/IADLs and lived at [facility] pt was at [facility] for [address] and required A for ADLs/IADLs  Upon evaluation, pt currently requires MOD A x 1 for sup <> sit transfers, MOD A x 2 STS transfer  Pt with limited standing tolerance, LLE with noted buckling and weakness  Pt c/o L sided pain at rest and during tasks  Exhibits decreased strength to B/L UE's limiting performance in ADLs/transfers, LUE with decreased sh ROM and strength  Pt able to form slight grasp and flex/ext elbow but achieves ~ 10* sh flex  Pt currently requires S eating, MIN A grooming, MOD A UB ADLs, MAX A LB ADLs, and MAX A toileting   Pt is limited at this time 2*: pain, endurance, activity tolerance, functional mobility, forward functional reach, balance, functional standing tolerance, decreased I w/ ADLS/IADLS, strength, ROM and decreased safety awareness  The following Occupational Performance Areas to address include: eating, grooming, bathing/shower, toilet hygiene, dressing, health maintenance, functional mobility, community mobility and clothing management  Pt to benefit from immediate acute skilled OT to address above deficits, improve overall functional independence, maximize fnxl mobility and reduce caregiver burden  From OT standpoint, recommendation at time of d/c would be STR  Pt was left in bed with alarm on after session with all current needs met  The patient's raw score on the AM-PAC Daily Activity inpatient short form is 14, standardized score is 33 39, less than 39 4  Patients at this level are likely to benefit from discharge to post-acute rehabilitation services  Please refer to the recommendation of the Occupational Therapist for safe discharge planning       OT Discharge Recommendation: Post acute rehabilitation services  OT - OK to Discharge: Yes (to rehab when medically stable )

## 2021-07-15 PROBLEM — E87.6 HYPOKALEMIA: Status: ACTIVE | Noted: 2021-07-15

## 2021-07-15 LAB
ANION GAP SERPL CALCULATED.3IONS-SCNC: 4 MMOL/L (ref 4–13)
ANION GAP SERPL CALCULATED.3IONS-SCNC: 7 MMOL/L (ref 4–13)
BUN SERPL-MCNC: 6 MG/DL (ref 5–25)
BUN SERPL-MCNC: 7 MG/DL (ref 5–25)
CALCIUM SERPL-MCNC: 8.5 MG/DL (ref 8.3–10.1)
CALCIUM SERPL-MCNC: 9.4 MG/DL (ref 8.3–10.1)
CHLORIDE SERPL-SCNC: 106 MMOL/L (ref 100–108)
CHLORIDE SERPL-SCNC: 99 MMOL/L (ref 100–108)
CO2 SERPL-SCNC: 28 MMOL/L (ref 21–32)
CO2 SERPL-SCNC: 32 MMOL/L (ref 21–32)
CREAT SERPL-MCNC: 0.33 MG/DL (ref 0.6–1.3)
CREAT SERPL-MCNC: 0.41 MG/DL (ref 0.6–1.3)
ERYTHROCYTE [DISTWIDTH] IN BLOOD BY AUTOMATED COUNT: 15 % (ref 11.6–15.1)
GFR SERPL CREATININE-BSD FRML MDRD: 118 ML/MIN/1.73SQ M
GFR SERPL CREATININE-BSD FRML MDRD: 126 ML/MIN/1.73SQ M
GLUCOSE SERPL-MCNC: 114 MG/DL (ref 65–140)
GLUCOSE SERPL-MCNC: 125 MG/DL (ref 65–140)
GLUCOSE SERPL-MCNC: 135 MG/DL (ref 65–140)
GLUCOSE SERPL-MCNC: 412 MG/DL (ref 65–140)
GLUCOSE SERPL-MCNC: 90 MG/DL (ref 65–140)
GLUCOSE SERPL-MCNC: 95 MG/DL (ref 65–140)
HCT VFR BLD AUTO: 31.6 % (ref 34.8–46.1)
HGB BLD-MCNC: 9.9 G/DL (ref 11.5–15.4)
MAGNESIUM SERPL-MCNC: 1.6 MG/DL (ref 1.6–2.6)
MCH RBC QN AUTO: 28.8 PG (ref 26.8–34.3)
MCHC RBC AUTO-ENTMCNC: 31.3 G/DL (ref 31.4–37.4)
MCV RBC AUTO: 92 FL (ref 82–98)
PLATELET # BLD AUTO: 208 THOUSANDS/UL (ref 149–390)
PMV BLD AUTO: 10.8 FL (ref 8.9–12.7)
POTASSIUM SERPL-SCNC: 2.7 MMOL/L (ref 3.5–5.3)
POTASSIUM SERPL-SCNC: 4 MMOL/L (ref 3.5–5.3)
RBC # BLD AUTO: 3.44 MILLION/UL (ref 3.81–5.12)
SODIUM SERPL-SCNC: 138 MMOL/L (ref 136–145)
SODIUM SERPL-SCNC: 138 MMOL/L (ref 136–145)
WBC # BLD AUTO: 6.62 THOUSAND/UL (ref 4.31–10.16)

## 2021-07-15 PROCEDURE — 99232 SBSQ HOSP IP/OBS MODERATE 35: CPT | Performed by: INTERNAL MEDICINE

## 2021-07-15 PROCEDURE — 80048 BASIC METABOLIC PNL TOTAL CA: CPT | Performed by: STUDENT IN AN ORGANIZED HEALTH CARE EDUCATION/TRAINING PROGRAM

## 2021-07-15 PROCEDURE — 80204 DRUG ASSAY METHOTREXATE: CPT | Performed by: INTERNAL MEDICINE

## 2021-07-15 PROCEDURE — 85027 COMPLETE CBC AUTOMATED: CPT | Performed by: STUDENT IN AN ORGANIZED HEALTH CARE EDUCATION/TRAINING PROGRAM

## 2021-07-15 PROCEDURE — 83735 ASSAY OF MAGNESIUM: CPT | Performed by: STUDENT IN AN ORGANIZED HEALTH CARE EDUCATION/TRAINING PROGRAM

## 2021-07-15 PROCEDURE — 82948 REAGENT STRIP/BLOOD GLUCOSE: CPT

## 2021-07-15 RX ORDER — POTASSIUM CHLORIDE 14.9 MG/ML
20 INJECTION INTRAVENOUS
Status: COMPLETED | OUTPATIENT
Start: 2021-07-15 | End: 2021-07-16

## 2021-07-15 RX ORDER — MAGNESIUM SULFATE HEPTAHYDRATE 40 MG/ML
4 INJECTION, SOLUTION INTRAVENOUS ONCE
Status: COMPLETED | OUTPATIENT
Start: 2021-07-15 | End: 2021-07-16

## 2021-07-15 RX ADMIN — DOCUSATE SODIUM AND SENNOSIDES 1 TABLET: 8.6; 5 TABLET ORAL at 20:35

## 2021-07-15 RX ADMIN — OXYCODONE HYDROCHLORIDE 5 MG: 5 SOLUTION ORAL at 02:16

## 2021-07-15 RX ADMIN — DICYCLOMINE HYDROCHLORIDE 10 MG: 10 CAPSULE ORAL at 22:10

## 2021-07-15 RX ADMIN — SODIUM CHLORIDE 25 MG: 9 INJECTION, SOLUTION INTRAVENOUS at 19:35

## 2021-07-15 RX ADMIN — MENTHOL, UNSPECIFIED FORM AND METHYL SALICYLATE: 10; 150 CREAM TOPICAL at 09:58

## 2021-07-15 RX ADMIN — POTASSIUM CHLORIDE 20 MEQ: 14.9 INJECTION, SOLUTION INTRAVENOUS at 10:38

## 2021-07-15 RX ADMIN — SODIUM CHLORIDE 25 MG: 9 INJECTION, SOLUTION INTRAVENOUS at 12:27

## 2021-07-15 RX ADMIN — TAMSULOSIN HYDROCHLORIDE 0.4 MG: 0.4 CAPSULE ORAL at 16:24

## 2021-07-15 RX ADMIN — DOCUSATE SODIUM AND SENNOSIDES 1 TABLET: 8.6; 5 TABLET ORAL at 08:38

## 2021-07-15 RX ADMIN — AMLODIPINE BESYLATE 5 MG: 5 TABLET ORAL at 08:38

## 2021-07-15 RX ADMIN — DICYCLOMINE HYDROCHLORIDE 10 MG: 10 CAPSULE ORAL at 12:28

## 2021-07-15 RX ADMIN — POTASSIUM CHLORIDE 20 MEQ: 14.9 INJECTION, SOLUTION INTRAVENOUS at 12:25

## 2021-07-15 RX ADMIN — GABAPENTIN 200 MG: 100 CAPSULE ORAL at 22:10

## 2021-07-15 RX ADMIN — SODIUM CHLORIDE 25 MG: 9 INJECTION, SOLUTION INTRAVENOUS at 02:00

## 2021-07-15 RX ADMIN — ALLOPURINOL 100 MG: 100 TABLET ORAL at 08:38

## 2021-07-15 RX ADMIN — ATORVASTATIN CALCIUM 40 MG: 40 TABLET, FILM COATED ORAL at 16:24

## 2021-07-15 RX ADMIN — ACYCLOVIR 400 MG: 200 CAPSULE ORAL at 08:38

## 2021-07-15 RX ADMIN — DOCUSATE SODIUM 100 MG: 100 CAPSULE ORAL at 08:38

## 2021-07-15 RX ADMIN — SODIUM BICARBONATE 150 ML/HR: 84 INJECTION, SOLUTION INTRAVENOUS at 04:15

## 2021-07-15 RX ADMIN — ACYCLOVIR 400 MG: 200 CAPSULE ORAL at 20:35

## 2021-07-15 RX ADMIN — MENTHOL, UNSPECIFIED FORM AND METHYL SALICYLATE: 10; 150 CREAM TOPICAL at 16:30

## 2021-07-15 RX ADMIN — SODIUM CHLORIDE 25 MG: 9 INJECTION, SOLUTION INTRAVENOUS at 08:39

## 2021-07-15 RX ADMIN — FAMOTIDINE 20 MG: 20 TABLET ORAL at 08:38

## 2021-07-15 RX ADMIN — SODIUM BICARBONATE 150 ML/HR: 84 INJECTION, SOLUTION INTRAVENOUS at 20:30

## 2021-07-15 RX ADMIN — DICYCLOMINE HYDROCHLORIDE 10 MG: 10 CAPSULE ORAL at 05:45

## 2021-07-15 RX ADMIN — ENOXAPARIN SODIUM 40 MG: 40 INJECTION SUBCUTANEOUS at 08:38

## 2021-07-15 RX ADMIN — MAGNESIUM SULFATE HEPTAHYDRATE 4 G: 40 INJECTION, SOLUTION INTRAVENOUS at 13:13

## 2021-07-15 RX ADMIN — POTASSIUM CHLORIDE 20 MEQ: 14.9 INJECTION, SOLUTION INTRAVENOUS at 08:38

## 2021-07-15 RX ADMIN — DICYCLOMINE HYDROCHLORIDE 10 MG: 10 CAPSULE ORAL at 16:24

## 2021-07-15 NOTE — MALNUTRITION/BMI
This medical record reflects one or more clinical indicators suggestive of malnutrition and/or morbid obesity  Malnutrition Findings:   Adult Malnutrition type: Chronic illness  Adult Degree of Malnutrition: Other severe protein calorie malnutrition  Malnutrition Characteristics: Fat loss, Muscle loss, Weight loss, Inadequate energy, Fluid accumulation (energy intake is improving)    NFPE: diffuse muscle and fat wasting-moderate to severe temporalis wasting, moderate to severe buccal fat pad wasting, severe wasting to clavicle bone region with protruding prominent bone, and moderate to severe orbital fat pad wasting with black circles visible under eyes  Weight loss of 5% since 6/11/21  Continue with current diet  Will provide oral nutrition supplements BID  Recommend folate/folic acid secondary to methotrexate  See Nutrition note dated 7/14/21 for additional details  Completed nutrition assessment is viewable in the nutrition documentation

## 2021-07-15 NOTE — PROGRESS NOTES
INTERNAL MEDICINE RESIDENCY PROGRESS NOTE     Name: Maddi Betancourt   Age & Sex: 49 y o  female   MRN: [id number]  Unit/Bed#: [facility] 905-01   Encounter: [id number]  Team: SOD Team A    PATIENT INFORMATION     Name: Maddi Betancourt   Age & Sex: 49 y o  female   MRN: [id number]  Hospital Stay Days: 2    ASSESSMENT/PLAN     Principal Problem:    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[facility] )  Active Problems:    Altered mental status    Dysphagia    Severe protein-calorie malnutrition ([facility] )    Cancer related pain    Ambulatory dysfunction    Hypokalemia      Hypokalemia  Assessment & Plan  Potassium 2 7, likely in setting of hypomagnesemia  Mag 1 6  Status post repletion of potassium and magnesium  Repeat BMP this afternoon  Monitor with a m  BMP and replete as needed     Ambulatory dysfunction  Assessment & Plan  A: per [address] does stand up with assistance and able to take few steps however her left leg does give out  Known residual deficits left upper extremity and lower extremity weakness secondary to CNS lymphoma  PT/OT recommended rehab, case management consulted  Appreciate further guidance    Cancer related pain  Assessment & Plan  Tylenol 650mg Prn  Oxyocodone 2 5mg Prn    Dysphagia  Assessment & Plan  A: per irasema manor pt started to tolerated mechanical / thin liquid diet and stopped PEG tubes  Peg tube in place, clear, no discharge noted  Continue with q 4 hours flushes  Evaluated by speech, Continue with level 2 diet    Altered mental status  Assessment & Plan  A: A x O 2 to person and place which is same as previous admission   Speaks Gujarati only     P: Monitor for acute changes     * 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[place] [id number] )  Assessment & Plan  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)       Direct Admit from Dr Jewels Early for in-patient chemo 3rd cycle       S/P chemotherapy on 05/13, leucovin on 5/14 6/9 MRI- Appears to be interval improvement in the overall      enhancement of multifocal intracranial lesions  P:    DVT prophylaxis with Lovenox   Continue acyclovir, Diflucan and Levaquin prophylactic  Status post 2 doses of methotrexate/Rituxan, continue with Leucovorin rescue treatment   Oncology Consult, appreciate recommendations  Disposition:  Continue with inpatient chemotherapy as highlighted above  SUBJECTIVE     Patient seen and examined  No acute events overnight  Is very fatigued, otherwise has no acute complaints  Denies any nausea, vomiting, diarrhea, constipation, fevers or chills  All other review of systems negative at this time  OBJECTIVE     Vitals:    21 1834 21 2253 07/15/21 0245 07/15/21 1128   BP: 101/65 97/63 114/71 117/76   Pulse: 105 97 98 79   Resp: 18 18 16 17   Temp: 98 7 °F (37 1 °C) 98 °F (36 7 °C) 98 2 °F (36 8 °C) 98 7 °F (37 1 °C)   SpO2: 100% 99% 100% 100%   Weight:       Height:          Temperature:   Temp (24hrs), Av 5 °F (36 9 °C), Min:98 °F (36 7 °C), Max:99 °F (37 2 °C)    Temperature: 98 7 °F (37 1 °C)  Intake & Output:  I/O        07 -  0700  07 - 07/15 0700 07/15 07 -  0700    P  O  160 300     I V  (mL/kg) 2057 5 (40) 1339 (26)     NG/ 220     IV Piggyback 1234 4 250     Total Intake(mL/kg) 3671 9 (71 3) 2109 (41)     Urine (mL/kg/hr) 3000 2850 (2 3) 500 (1 6)    Stool  0     Total Output 3000 2850 500    Net +671 9 -741 -500           Unmeasured Stool Occurrence  1 x         Weights:   IBW (Ideal Body Weight): 45 5 kg    Body mass index is 22 17 kg/m²  Weight (last 2 days)     Date/Time   Weight    21 1258   51 5 (113 54)    21 1301   51 5 (113 54)    21 1300   51 5 (113 54)    21 0824   51 5 (113 5)            Physical Exam  Vitals reviewed  HENT:      Head: Normocephalic and atraumatic  Eyes:      General: No scleral icterus  Cardiovascular:      Rate and Rhythm: Regular rhythm  Tachycardia present     Pulmonary:      Effort: Pulmonary effort is normal    Abdominal: Tenderness: There is no abdominal tenderness  Comments: PEG tube in place   Musculoskeletal:         General: No swelling  Right lower leg: No edema  Left lower leg: No edema  Comments: 2/5 left upper extremity, 1/5 left lower extremity  Generalized weakness   Neurological:      Motor: Weakness present  Gait: Gait abnormal       Comments: Oriented to person and place   Psychiatric:      Comments: Flat affect, appropriate response to most questions       LABORATORY DATA     Labs: I have personally reviewed pertinent reports  Results from last 7 days   Lab Units 07/15/21  0546 07/14/21  0511 07/13/21  1009   WBC Thousand/uL 6 62 5 11 7 92   HEMOGLOBIN g/dL 9 9* 11 5 11 6   HEMATOCRIT % 31 6* 35 6 36 7   PLATELETS Thousands/uL 208 275 228   NEUTROS PCT %  --  80* 61   MONOS PCT %  --  1* 7      Results from last 7 days   Lab Units 07/15/21  0546 07/14/21  0511 07/13/21  1008   POTASSIUM mmol/L 2 7* 3 5 3 4*   CHLORIDE mmol/L 99* 105 103   CO2 mmol/L 32 28 27   BUN mg/dL 6 11 14   CREATININE mg/dL 0 41* 0 44* 0 39*   CALCIUM mg/dL 8 5 9 7 9 7   ALK PHOS U/L  --  57 61   ALT U/L  --  15 12   AST U/L  --  13 10     Results from last 7 days   Lab Units 07/15/21  0546 07/14/21  0511   MAGNESIUM mg/dL 1 6 1 7                        IMAGING & DIAGNOSTIC TESTING     Radiology Results: I have personally reviewed pertinent reports  No results found  Other Diagnostic Testing: I have personally reviewed pertinent reports      ACTIVE MEDICATIONS     Current Facility-Administered Medications   Medication Dose Route Frequency    acetaminophen (TYLENOL) tablet 650 mg  650 mg Oral Q6H PRN    acyclovir (ZOVIRAX) capsule 400 mg  400 mg Oral Q12H [address]    albuterol (PROVENTIL HFA,VENTOLIN HFA) inhaler 2 puff  2 puff Inhalation Q4H PRN    allopurinol (ZYLOPRIM) tablet 100 mg  100 mg Oral Daily    aluminum-magnesium hydroxide-simethicone (MYLANTA) oral suspension 30 mL  30 mL Per PEG Tube Q4H PRN    amLODIPine (NORVASC) tablet 5 mg  5 mg Oral Daily    atorvastatin (LIPITOR) tablet 40 mg  40 mg Oral After Dinner    bisacodyl (DULCOLAX) rectal suppository 10 mg  10 mg Rectal Daily PRN    Diclofenac Sodium (VOLTAREN) 1 % topical gel 2 g  2 g Topical TID PRN    dicyclomine (BENTYL) capsule 10 mg  10 mg Oral 4x Daily (AC & HS)    docusate sodium (COLACE) capsule 100 mg  100 mg Oral Daily    enoxaparin (LOVENOX) subcutaneous injection 40 mg  40 mg Subcutaneous Q24H MEÑO    famotidine (PEPCID) tablet 20 mg  20 mg Oral Daily    gabapentin (NEURONTIN) capsule 200 mg  200 mg Oral HS    hydrOXYzine HCL (ATARAX) tablet 25 mg  25 mg Oral Q6H PRN    insulin lispro (HumaLOG) 100 units/mL subcutaneous injection 1-5 Units  1-5 Units Subcutaneous TID AC    insulin lispro (HumaLOG) 100 units/mL subcutaneous injection 1-5 Units  1-5 Units Subcutaneous HS    leucovorin 25 mg in sodium chloride 0 9 % 50 mL IVPB  25 mg Intravenous Q6H    magnesium sulfate 4 g/100 mL IVPB (premix) 4 g  4 g Intravenous Once    menthol-methyl salicylate (BENGAY) 63-72 % cream   Apply externally BID    ondansetron (ZOFRAN-ODT) dispersible tablet 4 mg  4 mg Oral Q6H PRN    oxyCODONE (ROXICODONE) oral solution 5 mg  5 mg Oral Q6H PRN    polyethylene glycol (MIRALAX) packet 17 g  17 g Oral Daily PRN    potassium chloride 20 mEq IVPB (premix)  20 mEq Intravenous Q1H    senna-docusate sodium (SENOKOT S) 8 6-50 mg per tablet 1 tablet  1 tablet Oral Q12H [address]    sodium bicarbonate 100 mEq in dextrose 5 % 1,000 mL infusion  150 mL/hr Intravenous Continuous    sodium chloride 0 9 % infusion  20 mL/hr Intravenous Once PRN    tamsulosin (FLOMAX) capsule 0 4 mg  0 4 mg Oral After Dinner       VTE Pharmacologic Prophylaxis: Sequential compression device (Venodyne)   VTE Mechanical Prophylaxis: sequential compression device    Portions of the record may have been created with voice recognition software    Occasional wrong word or "sound a like" substitutions may have occurred due to the inherent limitations of voice recognition software    Read the chart carefully and recognize, using context, where substitutions have occurred   ==  Pan Donato, [address] [facility]  Internal Medicine Residency PGY-3

## 2021-07-15 NOTE — ASSESSMENT & PLAN NOTE
Improving, potassium 3 4  Likely in setting of poor p o  Intake/poor absorption in setting of chemotherapy and malnutrition    · Monitor with a m  BMP and replete as needed

## 2021-07-15 NOTE — PROGRESS NOTES
Medical Oncology/Hematology Progress Note  Emir Delvalle, female, 49 y o , 1965,  [facility], [id number]     Assessment and Plan  1  Primary CNS Lymphoma  - s/p 2 cycles of high-dose Methotrexate/Rituxan; scan after these two doses showed improvement of disease; pt was sent to rehab to work on her strength/conditioning; she is overdue for C3 of HD MTX/rituxan and was admitted from facility for chemo  - Here for C3 of HD MTX/Rituxan (C3D1= 7/13 at 1900); Plan for leukovorin rescue after (to start tonight at 1900)  - continue PT/OT during this admission  - daily CBC/CMP, and daily weight    Communication with team:  Did talk with  patient's nurse, and pharmacist regarding her plan  I did review this patient with Dr Sidney Dowell and he is in agreement with this plan  ZAK Mazariegos-BC  Hematology/Oncology Nurse Practitioner       Interval history: Pts receiving fluids  Patient is OK  Does not appear to be in pain  History of present illness: Emir Delvalle is a 50 yo female with primary CNS lymphoma, s/p 2 cycles of high dose methotrexate and rituxan  Most recent imaging had showed improvement of intracranial lesions; pt was sent to rehab to work on her strength/conditioning; she is overdue for C3 of HD MTX/rituxan and was admitted from facility for chemo  Pt is doing well overall; she is much stronger - able to ambulate with assistance  She is now able to answer questions and communicate better  She denies any areas of pain  She is very uncomfortable with attempted PICC line placement  She is agreeable to getting chemotherapy today; no other concerned noted today during our conversation  Review of Systems:   As per HPI  Limited ROS given language barrier  No past medical history on file      Past Surgical History:   Procedure Laterality Date    IR PICC REPOSITION  4/27/2021    IR TUNNELED CENTRAL LINE REMOVAL  4/27/2021       Family History   Problem Relation Age of Onset    No Known Problems Mother        Social History     Socioeconomic History    Marital status: Unknown     Spouse name: Not on file    Number of children: Not on file    Years of education: Not on file    Highest education level: Not on file   Occupational History    Not on file   Tobacco Use    Smoking status: Never Smoker    Smokeless tobacco: Never Used   Substance and Sexual Activity    Alcohol use: Not Currently    Drug use: Not on file    Sexual activity: Not on file   Other Topics Concern    Not on file   Social History Narrative    Not on file     Social Determinants of Health     Financial Resource Strain:     Difficulty of Paying Living Expenses:    Food Insecurity:     Worried About Running Out of Food in the Last Year:     [address] in the Last Year:    Transportation Needs:     Lack of Transportation (Medical):      Lack of Transportation (Non-Medical):    Physical Activity:     Days of Exercise per Week:     Minutes of Exercise per Session:    Stress:     Feeling of Stress :    Social Connections:     Frequency of Communication with Friends and Family:     Frequency of Social Gatherings with Friends and Family:     Attends Christianity Services:     Active Member of Clubs or Organizations:     Attends Club or Organization Meetings:     Marital Status:    Intimate Partner Violence:     Fear of Current or Ex-Partner:     Emotionally Abused:     Physically Abused:     Sexually Abused:          Current Facility-Administered Medications:     acetaminophen (TYLENOL) tablet 650 mg, 650 mg, Oral, Q6H PRN, Chantal Johns MD    acyclovir (ZOVIRAX) capsule 400 mg, 400 mg, Oral, Q12H [address], Chantal Johns MD, 400 mg at 07/15/21 0838    albuterol (PROVENTIL HFA,VENTOLIN HFA) inhaler 2 puff, 2 puff, Inhalation, Q4H PRN, Gale Johns MD    allopurinol (ZYLOPRIM) tablet 100 mg, 100 mg, Oral, Daily, Chantal Johns MD, 100 mg at 07/15/21 0838    aluminum-magnesium hydroxide-simethicone (MYLANTA) oral suspension 30 mL, 30 mL, Per PEG Tube, Q4H PRN, Courtney Johns MD    amLODIPine (NORVASC) tablet 5 mg, 5 mg, Oral, Daily, Chantal Johns MD, 5 mg at 07/15/21 0838    atorvastatin (LIPITOR) tablet 40 mg, 40 mg, Oral, After Aisha Johns MD, 40 mg at 07/14/21 1735    bisacodyl (DULCOLAX) rectal suppository 10 mg, 10 mg, Rectal, Daily PRN, Bridget Troy MD    Diclofenac Sodium (VOLTAREN) 1 % topical gel 2 g, 2 g, Topical, TID PRN, Bridget Troy MD    dicyclomine (BENTYL) capsule 10 mg, 10 mg, Oral, 4x Daily (AC & HS), Chantal Johns MD, 10 mg at 07/15/21 1228    docusate sodium (COLACE) capsule 100 mg, 100 mg, Oral, Daily, Chantal Johns MD, 100 mg at 07/15/21 0838    enoxaparin (LOVENOX) subcutaneous injection 40 mg, 40 mg, Subcutaneous, Q24H [facility], Chantal Johns MD, 40 mg at 07/15/21 0838    famotidine (PEPCID) tablet 20 mg, 20 mg, Oral, Daily, Chantal Johns MD, 20 mg at 07/15/21 0838    gabapentin (NEURONTIN) capsule 200 mg, 200 mg, Oral, HS, Chantal Johns MD, 200 mg at 07/14/21 2214    hydrOXYzine HCL (ATARAX) tablet 25 mg, 25 mg, Oral, Q6H PRN, Courtney Johns MD    insulin lispro (HumaLOG) 100 units/mL subcutaneous injection 1-5 Units, 1-5 Units, Subcutaneous, TID AC, 1 Units at 07/14/21 1733 **AND** Fingerstick Glucose (POCT), , , TID AC, Chantal Johns MD    insulin lispro (HumaLOG) 100 units/mL subcutaneous injection 1-5 Units, 1-5 Units, Subcutaneous, HS, Chantal Johns MD, 1 Units at 07/13/21 2226    leucovorin 25 mg in sodium chloride 0 9 % 50 mL IVPB, 25 mg, Intravenous, Q6H, CHEPE Tobin, Last Rate: 210 mL/hr at 07/15/21 1227, 25 mg at 07/15/21 1227    magnesium sulfate 4 g/100 mL IVPB (premix) 4 g, 4 g, Intravenous, Once, Hillary Fuchs DO    menthol-methyl salicylate (BENGAY) 24-83 % cream, , Apply externally, BID, Bridget Troy MD, Given at 07/15/21 0958    ondansetron (ZOFRAN-ODT) dispersible tablet 4 mg, 4 mg, Oral, Q6H PRN, Courtney Graves MD Andreas, 4 mg at 21 1735    oxyCODONE (ROXICODONE) oral solution 5 mg, 5 mg, Oral, Q6H PRN, Margarita Sepulveda MD, 5 mg at 07/15/21 0216    polyethylene glycol (MIRALAX) packet 17 g, 17 g, Oral, Daily PRN, Margarita Sepulveda MD    potassium chloride 20 mEq IVPB (premix), 20 mEq, Intravenous, Q1H, Hillary Saraiya, DO, 20 mEq at 07/15/21 1225    senna-docusate sodium (SENOKOT S) 8 6-50 mg per tablet 1 tablet, 1 tablet, Oral, Q12H [facility], Chantal Johns MD, 1 tablet at 07/15/21 0838    sodium bicarbonate 100 mEq in dextrose 5 % 1,000 mL infusion, 150 mL/hr, Intravenous, Continuous, Anne Eye, DO, Last Rate: 150 mL/hr at 07/15/21 0415, 150 mL/hr at 07/15/21 0415    sodium chloride 0 9 % infusion, 20 mL/hr, Intravenous, Once PRN, Anne Eye, DO, Last Rate: 20 mL/hr at 21 1603, 20 mL/hr at 21 1603    tamsulosin (FLOMAX) capsule 0 4 mg, 0 4 mg, Oral, After Gile Fadia Johns MD, 0 4 mg at 21 1735    Medications Prior to Admission   Medication    acetaminophen (Tylenol) 325 mg tablet    albuterol (2 5 mg/3 mL) 0 083 % nebulizer solution    allopurinol (ZYLOPRIM) 100 mg tablet    aluminum-magnesium hydroxide-simethicone (MYLANTA) 200-200-20 mg/5 mL suspension    amLODIPine (NORVASC) 5 mg tablet    atorvastatin (LIPITOR) 40 mg tablet    bisacodyl (DULCOLAX) 10 mg suppository    Cholecalciferol (Vitamin D3) 1 25 MG (60793 UT) TABS    Diclofenac Sodium (VOLTAREN) 1 %    dicyclomine (BENTYL) 10 mg capsule    docusate sodium (COLACE) 100 mg capsule    famotidine (PEPCID) 20 mg tablet    fluconazole (DIFLUCAN) 200 mg tablet    gabapentin (NEURONTIN) 250 mg/5 mL solution    hydrOXYzine HCL (ATARAX) 25 mg tablet    insulin lispro (HumaLOG) 100 units/mL injection    [] levofloxacin (LEVAQUIN) 500 mg tablet    menthol-methyl salicylate (BENGAY) 68-20 % cream    omeprazole 2 mg/mL in sodium bicarbonate    ondansetron (ZOFRAN-ODT) 4 mg disintegrating tablet    oxyCODONE (OXY-IR) 5 MG capsule    polyethylene glycol (MIRALAX) 17 g packet    Sennosides (Senna) 8 8 mg/5 mL LIQD    tamsulosin (FLOMAX) 0 4 mg    acyclovir (ZOVIRAX) 200 mg capsule    albuterol (PROVENTIL HFA,VENTOLIN HFA) 90 mcg/act inhaler    enoxaparin (LOVENOX) 40 mg/0 4 mL    Lidocaine 4 % PTCH    senna-docusate sodium (Senokot S) 8 6-50 mg per tablet       No Known Allergies      Physical Exam:    /76   Pulse 79   Temp 98 7 °F (37 1 °C)   Resp 17   Ht 5' (1 524 m)   Wt 51 5 kg (113 lb 8 6 oz)   SpO2 100%   BMI 22 17 kg/m²     Physical Exam  Constitutional:       Appearance: Normal appearance  HENT:      Head: Normocephalic and atraumatic  Nose: Nose normal    Eyes:      Extraocular Movements: Extraocular movements intact  Conjunctiva/sclera: Conjunctivae normal       Pupils: Pupils are equal, round, and reactive to light  Cardiovascular:      Rate and Rhythm: Normal rate and regular rhythm  Heart sounds: Normal heart sounds  Pulmonary:      Effort: Pulmonary effort is normal  No respiratory distress  Breath sounds: Normal breath sounds  No wheezing, rhonchi or rales  Abdominal:      General: Bowel sounds are normal       Palpations: Abdomen is soft  Tenderness: There is no abdominal tenderness  Musculoskeletal:         General: Normal range of motion  Cervical back: Normal range of motion and neck supple  Skin:     General: Skin is warm and dry  Coloration: Skin is not jaundiced  Findings: No bruising or rash  Neurological:      Mental Status: She is alert  Motor: Weakness (0/5 left upper extremity; left lower weak) present        Comments: AOx2 with    Psychiatric:         Mood and Affect: Mood normal          Recent Results (from the past 48 hour(s))   Fingerstick Glucose (POCT)    Collection Time: 07/13/21  4:34 PM   Result Value Ref Range    POC Glucose 194 (H) 65 - 140 mg/dl   Fingerstick Glucose (POCT)    Collection Time: 07/13/21 8:47 PM   Result Value Ref Range    POC Glucose 160 (H) 65 - 140 mg/dl   CBC and differential    Collection Time: 07/14/21  5:11 AM   Result Value Ref Range    WBC 5 11 4 31 - 10 16 Thousand/uL    RBC 3 91 3 81 - 5 12 Million/uL    Hemoglobin 11 5 11 5 - 15 4 g/dL    Hematocrit 35 6 34 8 - 46 1 %    MCV 91 82 - 98 fL    MCH 29 4 26 8 - 34 3 pg    MCHC 32 3 31 4 - 37 4 g/dL    RDW 14 6 11 6 - 15 1 %    MPV 11 5 8 9 - 12 7 fL    Platelets 249 303 - 488 Thousands/uL    nRBC 0 /100 WBCs    Neutrophils Relative 80 (H) 43 - 75 %    Immat GRANS % 0 0 - 2 %    Lymphocytes Relative 19 14 - 44 %    Monocytes Relative 1 (L) 4 - 12 %    Eosinophils Relative 0 0 - 6 %    Basophils Relative 0 0 - 1 %    Neutrophils Absolute 4 07 1 85 - 7 62 Thousands/µL    Immature Grans Absolute 0 02 0 00 - 0 20 Thousand/uL    Lymphocytes Absolute 0 95 0 60 - 4 47 Thousands/µL    Monocytes Absolute 0 06 (L) 0 17 - 1 22 Thousand/µL    Eosinophils Absolute 0 00 0 00 - 0 61 Thousand/µL    Basophils Absolute 0 01 0 00 - 0 10 Thousands/µL   Comprehensive metabolic panel    Collection Time: 07/14/21  5:11 AM   Result Value Ref Range    Sodium 141 136 - 145 mmol/L    Potassium 3 5 3 5 - 5 3 mmol/L    Chloride 105 100 - 108 mmol/L    CO2 28 21 - 32 mmol/L    ANION GAP 8 4 - 13 mmol/L    BUN 11 5 - 25 mg/dL    Creatinine 0 44 (L) 0 60 - 1 30 mg/dL    Glucose 233 (H) 65 - 140 mg/dL    Calcium 9 7 8 3 - 10 1 mg/dL    Corrected Calcium 10 4 (H) 8 3 - 10 1 mg/dL    AST 13 5 - 45 U/L    ALT 15 12 - 78 U/L    Alkaline Phosphatase 57 46 - 116 U/L    Total Protein 6 1 (L) 6 4 - 8 2 g/dL    Albumin 3 1 (L) 3 5 - 5 0 g/dL    Total Bilirubin 0 26 0 20 - 1 00 mg/dL    eGFR 115 ml/min/1 73sq m   Magnesium    Collection Time: 07/14/21  5:11 AM   Result Value Ref Range    Magnesium 1 7 1 6 - 2 6 mg/dL   Fingerstick Glucose (POCT)    Collection Time: 07/14/21  7:35 AM   Result Value Ref Range    POC Glucose 215 (H) 65 - 140 mg/dl   Fingerstick Glucose (POCT) Collection Time: 07/14/21 10:55 AM   Result Value Ref Range    POC Glucose 224 (H) 65 - 140 mg/dl   Fingerstick Glucose (POCT)    Collection Time: 07/14/21  4:44 PM   Result Value Ref Range    POC Glucose 165 (H) 65 - 140 mg/dl   Fingerstick Glucose (POCT)    Collection Time: 07/14/21  8:52 PM   Result Value Ref Range    POC Glucose 148 (H) 65 - 140 mg/dl   Basic metabolic panel    Collection Time: 07/15/21  5:46 AM   Result Value Ref Range    Sodium 138 136 - 145 mmol/L    Potassium 2 7 (LL) 3 5 - 5 3 mmol/L    Chloride 99 (L) 100 - 108 mmol/L    CO2 32 21 - 32 mmol/L    ANION GAP 7 4 - 13 mmol/L    BUN 6 5 - 25 mg/dL    Creatinine 0 41 (L) 0 60 - 1 30 mg/dL    Glucose 412 (H) 65 - 140 mg/dL    Calcium 8 5 8 3 - 10 1 mg/dL    eGFR 118 ml/min/1 73sq m   CBC    Collection Time: 07/15/21  5:46 AM   Result Value Ref Range    WBC 6 62 4 31 - 10 16 Thousand/uL    RBC 3 44 (L) 3 81 - 5 12 Million/uL    Hemoglobin 9 9 (L) 11 5 - 15 4 g/dL    Hematocrit 31 6 (L) 34 8 - 46 1 %    MCV 92 82 - 98 fL    MCH 28 8 26 8 - 34 3 pg    MCHC 31 3 (L) 31 4 - 37 4 g/dL    RDW 15 0 11 6 - 15 1 %    Platelets 997 545 - 673 Thousands/uL    MPV 10 8 8 9 - 12 7 fL   Magnesium    Collection Time: 07/15/21  5:46 AM   Result Value Ref Range    Magnesium 1 6 1 6 - 2 6 mg/dL   Fingerstick Glucose (POCT)    Collection Time: 07/15/21  8:33 AM   Result Value Ref Range    POC Glucose 90 65 - 140 mg/dl   Fingerstick Glucose (POCT)    Collection Time: 07/15/21 11:31 AM   Result Value Ref Range    POC Glucose 95 65 - 140 mg/dl       No results found  Labs and pertinent reports reviewed  This note has been generated by voice recognition software system  Therefore, there may be spelling, grammar, and or syntax errors  Please contact if questions arise

## 2021-07-16 LAB
ANION GAP SERPL CALCULATED.3IONS-SCNC: 6 MMOL/L (ref 4–13)
ANISOCYTOSIS BLD QL SMEAR: PRESENT
BASOPHILS # BLD MANUAL: 0.07 THOUSAND/UL (ref 0–0.1)
BASOPHILS NFR MAR MANUAL: 1 % (ref 0–1)
BUN SERPL-MCNC: 5 MG/DL (ref 5–25)
CALCIUM SERPL-MCNC: 9.3 MG/DL (ref 8.3–10.1)
CHLORIDE SERPL-SCNC: 104 MMOL/L (ref 100–108)
CO2 SERPL-SCNC: 31 MMOL/L (ref 21–32)
CREAT SERPL-MCNC: 0.39 MG/DL (ref 0.6–1.3)
EOSINOPHIL # BLD MANUAL: 0.14 THOUSAND/UL (ref 0–0.4)
EOSINOPHIL NFR BLD MANUAL: 2 % (ref 0–6)
ERYTHROCYTE [DISTWIDTH] IN BLOOD BY AUTOMATED COUNT: 15.1 % (ref 11.6–15.1)
GFR SERPL CREATININE-BSD FRML MDRD: 119 ML/MIN/1.73SQ M
GLUCOSE SERPL-MCNC: 127 MG/DL (ref 65–140)
GLUCOSE SERPL-MCNC: 128 MG/DL (ref 65–140)
GLUCOSE SERPL-MCNC: 130 MG/DL (ref 65–140)
GLUCOSE SERPL-MCNC: 143 MG/DL (ref 65–140)
GLUCOSE SERPL-MCNC: 143 MG/DL (ref 65–140)
HCT VFR BLD AUTO: 36.7 % (ref 34.8–46.1)
HGB BLD-MCNC: 11.7 G/DL (ref 11.5–15.4)
LYMPHOCYTES # BLD AUTO: 1.6 THOUSAND/UL (ref 0.6–4.47)
LYMPHOCYTES # BLD AUTO: 23 % (ref 14–44)
MAGNESIUM SERPL-MCNC: 2.2 MG/DL (ref 1.6–2.6)
MCH RBC QN AUTO: 29.2 PG (ref 26.8–34.3)
MCHC RBC AUTO-ENTMCNC: 31.9 G/DL (ref 31.4–37.4)
MCV RBC AUTO: 92 FL (ref 82–98)
METAMYELOCYTES NFR BLD MANUAL: 1 % (ref 0–1)
MONOCYTES # BLD AUTO: 0 THOUSAND/UL (ref 0–1.22)
MONOCYTES NFR BLD: 0 % (ref 4–12)
MTX SERPL-SCNC: 0.13 UMOL/L
MTX SERPL-SCNC: 1.22 UMOL/L
NEUTROPHILS # BLD MANUAL: 5.07 THOUSAND/UL (ref 1.85–7.62)
NEUTS BAND NFR BLD MANUAL: 2 % (ref 0–8)
NEUTS SEG NFR BLD AUTO: 71 % (ref 43–75)
NRBC BLD AUTO-RTO: 0 /100 WBCS
PLATELET # BLD AUTO: 243 THOUSANDS/UL (ref 149–390)
PLATELET BLD QL SMEAR: ADEQUATE
PMV BLD AUTO: 10.6 FL (ref 8.9–12.7)
POIKILOCYTOSIS BLD QL SMEAR: PRESENT
POTASSIUM SERPL-SCNC: 3.4 MMOL/L (ref 3.5–5.3)
RBC # BLD AUTO: 4.01 MILLION/UL (ref 3.81–5.12)
RBC MORPH BLD: PRESENT
SODIUM SERPL-SCNC: 141 MMOL/L (ref 136–145)
WBC # BLD AUTO: 6.94 THOUSAND/UL (ref 4.31–10.16)

## 2021-07-16 PROCEDURE — 80048 BASIC METABOLIC PNL TOTAL CA: CPT | Performed by: STUDENT IN AN ORGANIZED HEALTH CARE EDUCATION/TRAINING PROGRAM

## 2021-07-16 PROCEDURE — 85027 COMPLETE CBC AUTOMATED: CPT | Performed by: STUDENT IN AN ORGANIZED HEALTH CARE EDUCATION/TRAINING PROGRAM

## 2021-07-16 PROCEDURE — 80204 DRUG ASSAY METHOTREXATE: CPT | Performed by: INTERNAL MEDICINE

## 2021-07-16 PROCEDURE — 85007 BL SMEAR W/DIFF WBC COUNT: CPT | Performed by: STUDENT IN AN ORGANIZED HEALTH CARE EDUCATION/TRAINING PROGRAM

## 2021-07-16 PROCEDURE — 99232 SBSQ HOSP IP/OBS MODERATE 35: CPT | Performed by: INTERNAL MEDICINE

## 2021-07-16 PROCEDURE — 83735 ASSAY OF MAGNESIUM: CPT | Performed by: STUDENT IN AN ORGANIZED HEALTH CARE EDUCATION/TRAINING PROGRAM

## 2021-07-16 PROCEDURE — 82948 REAGENT STRIP/BLOOD GLUCOSE: CPT

## 2021-07-16 RX ORDER — POTASSIUM CHLORIDE 20MEQ/15ML
40 LIQUID (ML) ORAL ONCE
Status: COMPLETED | OUTPATIENT
Start: 2021-07-16 | End: 2021-07-16

## 2021-07-16 RX ORDER — LEUCOVORIN CALCIUM 25 MG/1
25 TABLET ORAL EVERY 6 HOURS
Status: COMPLETED | OUTPATIENT
Start: 2021-07-16 | End: 2021-07-17

## 2021-07-16 RX ORDER — FOLIC ACID 1 MG/1
1 TABLET ORAL DAILY
Status: DISCONTINUED | OUTPATIENT
Start: 2021-07-17 | End: 2021-07-21 | Stop reason: HOSPADM

## 2021-07-16 RX ADMIN — ALLOPURINOL 100 MG: 100 TABLET ORAL at 10:08

## 2021-07-16 RX ADMIN — TAMSULOSIN HYDROCHLORIDE 0.4 MG: 0.4 CAPSULE ORAL at 18:18

## 2021-07-16 RX ADMIN — MENTHOL, UNSPECIFIED FORM AND METHYL SALICYLATE: 10; 150 CREAM TOPICAL at 10:08

## 2021-07-16 RX ADMIN — ENOXAPARIN SODIUM 40 MG: 40 INJECTION SUBCUTANEOUS at 10:08

## 2021-07-16 RX ADMIN — ACYCLOVIR 400 MG: 200 CAPSULE ORAL at 10:08

## 2021-07-16 RX ADMIN — DOCUSATE SODIUM 100 MG: 100 CAPSULE ORAL at 10:08

## 2021-07-16 RX ADMIN — LEUCOVORIN CALCIUM 25 MG: 25 TABLET ORAL at 12:41

## 2021-07-16 RX ADMIN — SODIUM BICARBONATE 150 ML/HR: 84 INJECTION, SOLUTION INTRAVENOUS at 18:21

## 2021-07-16 RX ADMIN — GABAPENTIN 200 MG: 100 CAPSULE ORAL at 21:31

## 2021-07-16 RX ADMIN — SODIUM BICARBONATE 150 ML/HR: 84 INJECTION, SOLUTION INTRAVENOUS at 02:57

## 2021-07-16 RX ADMIN — OXYCODONE HYDROCHLORIDE 5 MG: 5 SOLUTION ORAL at 18:18

## 2021-07-16 RX ADMIN — ACYCLOVIR 400 MG: 200 CAPSULE ORAL at 21:31

## 2021-07-16 RX ADMIN — DOCUSATE SODIUM AND SENNOSIDES 1 TABLET: 8.6; 5 TABLET ORAL at 21:31

## 2021-07-16 RX ADMIN — DOCUSATE SODIUM AND SENNOSIDES 1 TABLET: 8.6; 5 TABLET ORAL at 10:08

## 2021-07-16 RX ADMIN — MENTHOL, UNSPECIFIED FORM AND METHYL SALICYLATE: 10; 150 CREAM TOPICAL at 21:32

## 2021-07-16 RX ADMIN — AMLODIPINE BESYLATE 5 MG: 5 TABLET ORAL at 10:08

## 2021-07-16 RX ADMIN — SODIUM BICARBONATE 150 ML/HR: 84 INJECTION, SOLUTION INTRAVENOUS at 10:11

## 2021-07-16 RX ADMIN — ATORVASTATIN CALCIUM 40 MG: 40 TABLET, FILM COATED ORAL at 18:18

## 2021-07-16 RX ADMIN — LEUCOVORIN CALCIUM 25 MG: 25 TABLET ORAL at 07:19

## 2021-07-16 RX ADMIN — DICYCLOMINE HYDROCHLORIDE 10 MG: 10 CAPSULE ORAL at 18:19

## 2021-07-16 RX ADMIN — POTASSIUM CHLORIDE 40 MEQ: 20 SOLUTION ORAL at 18:18

## 2021-07-16 RX ADMIN — FAMOTIDINE 20 MG: 20 TABLET ORAL at 10:08

## 2021-07-16 RX ADMIN — DICYCLOMINE HYDROCHLORIDE 10 MG: 10 CAPSULE ORAL at 21:31

## 2021-07-16 RX ADMIN — LEUCOVORIN CALCIUM 25 MG: 25 TABLET ORAL at 19:15

## 2021-07-16 RX ADMIN — SODIUM CHLORIDE 25 MG: 9 INJECTION, SOLUTION INTRAVENOUS at 00:59

## 2021-07-16 RX ADMIN — DICYCLOMINE HYDROCHLORIDE 10 MG: 10 CAPSULE ORAL at 12:18

## 2021-07-16 RX ADMIN — DICYCLOMINE HYDROCHLORIDE 10 MG: 10 CAPSULE ORAL at 06:15

## 2021-07-16 NOTE — PROGRESS NOTES
INTERNAL MEDICINE RESIDENCY PROGRESS NOTE     Name: Maddi Betancourt   Age & Sex: 49 y o  female   MRN: [id number]  [facility]   Encounter: [id number]  Team: SOD Team A    PATIENT INFORMATION     Name: Maddi Betancourt   Age & Sex: 49 y o  female   MRN: [id number]  Hospital Stay Days: 3    ASSESSMENT/PLAN     Principal Problem:    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[facility] 75 )  Active Problems:    Altered mental status    Dysphagia    Severe protein-calorie malnutrition ([facility] )    Cancer related pain    Ambulatory dysfunction    Hypokalemia      Hypokalemia  Assessment & Plan  Improving potassium 3 4 today  Likely in setting of poor p o  Intake/poor absorption in setting of chemotherapy and malnutrition  Status post repletion  Monitor with a m  BMP and replete as needed     Ambulatory dysfunction  Assessment & Plan  A: per [address] does stand up with assistance and able to take few steps however her left leg does give out  Known residual deficits left upper extremity and lower extremity weakness secondary to CNS lymphoma  PT/OT recommended rehab, case management consulted  Appreciate further guidance    Cancer related pain  Assessment & Plan  Tylenol 650mg Prn  Oxyocodone 2 5mg Prn    Severe protein-calorie malnutrition (HCC)  Assessment & Plan  Malnutrition Findings:   Adult Malnutrition type: Chronic illness  Adult Degree of Malnutrition: Other severe protein calorie malnutrition    BMI Findings: Body mass index is 22 17 kg/m²  Nutrition following, appreciate recommendations  Continue protein supplementation  Patient does have PEG tube in place, currently not receiving tube feeds as able to tolerate p o  Intake  Dysphagia  Assessment & Plan  A: per irasema louis pt started to tolerated mechanical / thin liquid diet and stopped PEG tubes  Peg tube in place, clear, no discharge noted  Continue with q 4 hours flushes    Evaluated by speech, Continue with level 2 diet    Altered mental status  Assessment & Plan  A: A x O 2 to person and place which is same as previous admission  Speaks Kimmy only     P: Monitor for acute changes     * 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[facility] )  Assessment & Plan  A: Diffuse Large B-cell Primary 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)       Direct Admit from Dr Liana Mendes for in-patient chemo 3rd cycle       S/P chemotherapy on , leucovin on  MRI- Appears to be interval improvement in the overall      enhancement of multifocal intracranial lesions  P:    DVT prophylaxis with Lovenox   Continue acyclovir, Diflucan and Levaquin prophylactic  Status post 2 doses of methotrexate/Rituxan, continue with Leucovorin rescue treatment  Oncology Consult, appreciate recommendations  Will not need further treatment as long as methotrexate levels less than 0 1 on next check      Disposition:  Continue with inpatient management as highlighted above  Per Hematology/Oncology, if methotrexate ankles tonight remain less than 0 1 can tentatively plan for discharge tomorrow to rehab  SUBJECTIVE     Patient seen and examined  No acute events overnight  States she is very tired but overall doing okay  Denies any nausea, vomiting, diarrhea, constipation, fevers or chills  All other review of systems negative at this time  Asked about , patient gets very tearful  Is requesting to speak with her   Is okay with me updating her   I spoke with  and updated him on plan  Stated that he will give patient a call today  Is trying to visit, but will be unable to until after   He lives about an hour and half way    OBJECTIVE     Vitals:    07/15/21 2201 21 0251 21 0731 21 1148   BP: 116/74 129/92 127/89 126/87   Pulse: 91 95 90 101   Resp:    Temp: (!) 97 °F (36 1 °C) (!) 97 °F (36 1 °C) 97 8 °F (36 6 °C) 98 4 °F (36 9 °C)   SpO2: 100% 100% 99% 100%   Weight:       Height:          Temperature:   Temp (24hrs), Av 6 °F (36 4 °C), Min:97 °F (36 1 °C), Max:98 4 °F (36 9 °C)    Temperature: 98 4 °F (36 9 °C)  Intake & Output:  I/O       07/14 0701 - 07/15 0700 07/15 0701 - 07/16 0700 07/16 0701 - 07/17 0700    P  O  300 0     I V  (mL/kg) 1339 (26) 6770 3 (131 5)     NG/ 440     IV Piggyback 250 710     Total Intake(mL/kg) 2109 (41) 7920 3 (153 8)     Urine (mL/kg/hr) 2850 (2 3) 2750 (2 2) 250 (1)    Stool 0      Total Output 2850 2750 250    Net -741 +5170 3 -250           Unmeasured Stool Occurrence 1 x          Weights:   IBW (Ideal Body Weight): 45 5 kg    Body mass index is 22 17 kg/m²  Weight (last 2 days)     Date/Time   Weight    07/14/21 1258   51 5 (113 54)            Physical Exam  Constitutional:       Comments: Fatigued, ill-appearing   HENT:      Head: Normocephalic and atraumatic  Ears:      Comments: Left eye ptosis noted, stable  Abducted     Mouth/Throat:      Mouth: Mucous membranes are dry  Eyes:      General: No scleral icterus  Cardiovascular:      Rate and Rhythm: Normal rate and regular rhythm  Heart sounds: No murmur heard  No gallop  Pulmonary:      Effort: Pulmonary effort is normal       Breath sounds: No wheezing  Abdominal:      General: Abdomen is flat  There is no distension  Tenderness: There is no abdominal tenderness  Musculoskeletal:      Right lower leg: No edema  Left lower leg: No edema  Comments: Left upper extremity 3/5, left lower extremity 2/5   Skin:     General: Skin is dry  Coloration: Skin is not jaundiced or pale  Findings: No rash  Neurological:      Comments: Oriented to person place in [facility]  Is unable to tell me the year   Psychiatric:      Comments: Very tearful on exam when asked about  and family  Responds appropriately to all questions  LABORATORY DATA     Labs: I have personally reviewed pertinent reports    Results from last 7 days   Lab Units 07/16/21  0606 07/15/21  0546 07/14/21  0511 07/13/21  1009   WBC Thousand/uL 6 94 6  62 5 11 7 92   HEMOGLOBIN g/dL 11 7 9 9* 11 5 11 6   HEMATOCRIT % 36 7 31 6* 35 6 36 7   PLATELETS Thousands/uL 243 208 275 228   NEUTROS PCT %  --   --  80* 61   MONOS PCT %  --   --  1* 7   MONO PCT % 0*  --   --   --       Results from last 7 days   Lab Units 07/16/21  0606 07/15/21  1635 07/15/21  0546 07/14/21  0511 07/13/21  1008   POTASSIUM mmol/L 3 4* 4 0 2 7* 3 5 3 4*   CHLORIDE mmol/L 104 106 99* 105 103   CO2 mmol/L 31 28 32 28 27   BUN mg/dL 5 7 6 11 14   CREATININE mg/dL 0 39* 0 33* 0 41* 0 44* 0 39*   CALCIUM mg/dL 9 3 9 4 8 5 9 7 9 7   ALK PHOS U/L  --   --   --  57 61   ALT U/L  --   --   --  15 12   AST U/L  --   --   --  13 10     Results from last 7 days   Lab Units 07/16/21  0606 07/15/21  0546 07/14/21  0511   MAGNESIUM mg/dL 2 2 1 6 1 7                        IMAGING & DIAGNOSTIC TESTING     Radiology Results: I have personally reviewed pertinent reports  No results found  Other Diagnostic Testing: I have personally reviewed pertinent reports      ACTIVE MEDICATIONS     Current Facility-Administered Medications   Medication Dose Route Frequency    acetaminophen (TYLENOL) tablet 650 mg  650 mg Oral Q6H PRN    acyclovir (ZOVIRAX) capsule 400 mg  400 mg Oral Q12H [facility]    albuterol (PROVENTIL HFA,VENTOLIN HFA) inhaler 2 puff  2 puff Inhalation Q4H PRN    allopurinol (ZYLOPRIM) tablet 100 mg  100 mg Oral Daily    aluminum-magnesium hydroxide-simethicone (MYLANTA) oral suspension 30 mL  30 mL Per PEG Tube Q4H PRN    amLODIPine (NORVASC) tablet 5 mg  5 mg Oral Daily    atorvastatin (LIPITOR) tablet 40 mg  40 mg Oral After Dinner    bisacodyl (DULCOLAX) rectal suppository 10 mg  10 mg Rectal Daily PRN    Diclofenac Sodium (VOLTAREN) 1 % topical gel 2 g  2 g Topical TID PRN    dicyclomine (BENTYL) capsule 10 mg  10 mg Oral 4x Daily (AC & HS)    docusate sodium (COLACE) capsule 100 mg  100 mg Oral Daily    enoxaparin (LOVENOX) subcutaneous injection 40 mg  40 mg Subcutaneous Q24H [facility]    famotidine (PEPCID) tablet 20 mg  20 mg Oral Daily    [START ON 8/95/0631] folic acid (FOLVITE) tablet 1 mg  1 mg Oral Daily    gabapentin (NEURONTIN) capsule 200 mg  200 mg Oral HS    hydrOXYzine HCL (ATARAX) tablet 25 mg  25 mg Oral Q6H PRN    insulin lispro (HumaLOG) 100 units/mL subcutaneous injection 1-5 Units  1-5 Units Subcutaneous TID AC    insulin lispro (HumaLOG) 100 units/mL subcutaneous injection 1-5 Units  1-5 Units Subcutaneous HS    leucovorin (WELLCOVORIN) tablet 25 mg  25 mg Oral Q6H    menthol-methyl salicylate (BENGAY) 22-78 % cream   Apply externally BID    ondansetron (ZOFRAN-ODT) dispersible tablet 4 mg  4 mg Oral Q6H PRN    oxyCODONE (ROXICODONE) oral solution 5 mg  5 mg Oral Q6H PRN    polyethylene glycol (MIRALAX) packet 17 g  17 g Oral Daily PRN    potassium chloride oral solution 40 mEq  40 mEq Oral Once    senna-docusate sodium (SENOKOT S) 8 6-50 mg per tablet 1 tablet  1 tablet Oral Q12H [address]    sodium chloride 0 9 % infusion  20 mL/hr Intravenous Once PRN    tamsulosin (FLOMAX) capsule 0 4 mg  0 4 mg Oral After Dinner       VTE Pharmacologic Prophylaxis: Enoxaparin (Lovenox)  VTE Mechanical Prophylaxis: sequential compression device    Portions of the record may have been created with voice recognition software  Occasional wrong word or "sound a like" substitutions may have occurred due to the inherent limitations of voice recognition software    Read the chart carefully and recognize, using context, where substitutions have occurred   ==  Miroslava Sanchez [facility]  Internal Medicine Residency PGY-3

## 2021-07-16 NOTE — ASSESSMENT & PLAN NOTE
Malnutrition Findings:   Adult Malnutrition type: Chronic illness  Adult Degree of Malnutrition: Other severe protein calorie malnutrition    BMI Findings: Body mass index is 22 17 kg/m²  Nutrition following, appreciate recommendations  Continue protein supplementation  Patient does have PEG tube in place, currently not receiving tube feeds as able to tolerate p o  Intake  Encouraged to increase Magic cup and Ensure intake    Patient verbalized understanding

## 2021-07-16 NOTE — CASE MANAGEMENT
LOS: 3  Not a bundle   Re-admission risk: (Green)     CM met with pt at bedside  Pt and pt's spouse are [place] speaking only  CM placed TC to pts EC, VM left requesting a call back  Dr Bina Queen A present during visit and assisted with translation  CM introduced CM role and discussed d/c planning with pt  Pt reports no changes since last admission  Pt currently [facility] a room [facility] [employer] [facility] her  works  Prior to admission pt was admitted to [facility] for [facility]  Pt required minimal assistance with all ADL's which was provided by [facility] staff  Pt reports no prior HHC or DME hx  No D&A or MH tx hx reported  PCP is Dr Alan Marie ([phone])  Preferred pharmacy is [facility] in [facility]  No formal POA/LW at this time  Main emergency contact per pt and pts spouse is Andrade Nolasco ( [phone]) due to being [address]  speaking  Edward provides updates to pt and pt's spouse  CM discussed recommendations for STR with patient  Pt requested to return to [facility] for STR upon d/c  CM placed referral for same via [facility] per pt request      CM reviewed d/c planning process including the following: identifying help at home, patient preference for d/c planning needs, Discharge Lounge, [facility] Meds to Bed program, availability of treatment team to discuss questions or concerns patient and/or family may have regarding understanding medications and recognizing signs and symptoms once discharged  CM also encouraged patient to follow up with all recommended appointments after discharge  Patient advised of importance for patient and family to participate in managing patients medical well being  CM department will continue to follow pt through d/c

## 2021-07-16 NOTE — PROGRESS NOTES
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 admitted for C3 HD MTX/Rituxan with leucovorin rescue  Assessment and plan:  1  Primary CNS Lymphoma  · S/p 2 cycles of high-dose Methotrexate/Rituxan; scan after these two doses showed improvement of disease; pt was sent to rehab to work on her strength/conditioning; she was overdue for C3 of HD MTX/rituxan and was admitted from facility for chemo  · Primary oncologist is Dr Virginie Guerrier, follow-up appt scheduled 8/2  · Here for C3 of HD MTX/Rituxan (C3D1= 7/13 at 1900); Leucovorin rescue after (first does = 7/16 at 0715)  · Plan to discontinue leukovorin until MTX level < 0 10   · MTX level 0 13 on 7/15 at 1900  · Continue PT/OT during this admission  · Daily CBC/CMP, and daily weight    Please contact me if any questions or concerns about this patient's case  Thank you  SUBJECTIVE      Patient does not respond verbally to questions using translation device  However, she appears well and in no acute distress  She nods in response to question about pain, however she does not respond to where she is feeling pain  I have reviewed the relevant past medical, surgical, social and family history  I have also reviewed allergies and medications for this patient  Review of Systems  Review of Systems   Constitutional:        Unable to assess as she does not respond to questions using translation device  OBJECTIVE     Physical Exam    Vitals:    07/16/21 0731   BP: 127/89   Pulse: 90   Resp: 18   Temp: 97 8 °F (36 6 °C)   SpO2: 99%         Physical Exam  Vitals reviewed  Constitutional:       General: She is not in acute distress  Appearance: She is normal weight  She is ill-appearing  She is not toxic-appearing or diaphoretic        Comments: Tired appearing 72-year-old female lying comfortably in bed  HENT:      Head: Normocephalic and atraumatic  Eyes:      General: No scleral icterus  Extraocular Movements: Extraocular movements intact  Pupils: Pupils are equal, round, and reactive to light  Comments: Conjunctival pallor present  Cardiovascular:      Rate and Rhythm: Normal rate and regular rhythm  Pulses: Normal pulses  Heart sounds: Normal heart sounds  No murmur heard  No gallop  Pulmonary:      Effort: Pulmonary effort is normal  No respiratory distress  Breath sounds: Normal breath sounds  No wheezing or rales  Abdominal:      General: Abdomen is flat  Bowel sounds are normal  There is no distension  Palpations: Abdomen is soft  There is no mass  Tenderness: There is abdominal tenderness  There is no guarding or rebound  Comments: Tenderness upon deep palpation of left and right upper quadrant of the abdomen  Musculoskeletal:      Cervical back: Normal range of motion and neck supple  No rigidity  Right lower leg: No edema  Left lower leg: No edema  Comments: Muscle atrophy of the RLE  Lymphadenopathy:      Cervical: No cervical adenopathy  Skin:     General: Skin is warm and dry  Coloration: Skin is not jaundiced or pale  Findings: No bruising, erythema or rash  Neurological:      General: No focal deficit present  Mental Status: She is alert  Mental status is at baseline  Psychiatric:      Comments: Unable to assess due to language            Imaging  Relevant imaging reviewed in chart    Labs  Relevant labs reviewed in chart    CBC  Diff   Lab Results   Component Value Date/Time    WBC 6 94 07/16/2021 06:06 AM    HGB 11 7 07/16/2021 06:06 AM    HCT 36 7 07/16/2021 06:06 AM    RBC 4 01 07/16/2021 06:06 AM    MCV 92 07/16/2021 06:06 AM    MCHC 31 9 07/16/2021 06:06 AM    MCH 29 2 07/16/2021 06:06 AM    RDW 15 1 07/16/2021 06:06 AM    MPV 10 6 07/16/2021 06:06 AM    Lab Results   Component Value Date/Time    LYMPHSABS 0 95 07/14/2021 05:11 AM    EOSABS 0 00 07/14/2021 05:11 AM    MONOSABS 0 06 (L) 07/14/2021 05:11 AM    BASOSABS 0 01 07/14/2021 05:11 AM        Basic Metabolic Profile    Lab Results   Component Value Date/Time    SODIUM 141 07/16/2021 06:06 AM    CO2 31 07/16/2021 06:06 AM    Lab Results   Component Value Date/Time    BUN 5 07/16/2021 06:06 AM    CREATININE 0 39 (L) 07/16/2021 06:06 AM             Byron Johnson PA-C  Hematology & Medical Oncology  [facility] Physician [facility] 12

## 2021-07-17 LAB
ALBUMIN SERPL BCP-MCNC: 2.9 G/DL (ref 3.5–5)
ALP SERPL-CCNC: 59 U/L (ref 46–116)
ALT SERPL W P-5'-P-CCNC: 14 U/L (ref 12–78)
ANION GAP SERPL CALCULATED.3IONS-SCNC: 5 MMOL/L (ref 4–13)
AST SERPL W P-5'-P-CCNC: 12 U/L (ref 5–45)
BASOPHILS # BLD AUTO: 0.02 THOUSANDS/ΜL (ref 0–0.1)
BASOPHILS NFR BLD AUTO: 0 % (ref 0–1)
BILIRUB SERPL-MCNC: 0.21 MG/DL (ref 0.2–1)
BUN SERPL-MCNC: 5 MG/DL (ref 5–25)
CALCIUM ALBUM COR SERPL-MCNC: 10.6 MG/DL (ref 8.3–10.1)
CALCIUM SERPL-MCNC: 9.7 MG/DL (ref 8.3–10.1)
CHLORIDE SERPL-SCNC: 106 MMOL/L (ref 100–108)
CO2 SERPL-SCNC: 30 MMOL/L (ref 21–32)
CREAT SERPL-MCNC: 0.45 MG/DL (ref 0.6–1.3)
EOSINOPHIL # BLD AUTO: 0.15 THOUSAND/ΜL (ref 0–0.61)
EOSINOPHIL NFR BLD AUTO: 3 % (ref 0–6)
ERYTHROCYTE [DISTWIDTH] IN BLOOD BY AUTOMATED COUNT: 14.9 % (ref 11.6–15.1)
GFR SERPL CREATININE-BSD FRML MDRD: 114 ML/MIN/1.73SQ M
GLUCOSE SERPL-MCNC: 100 MG/DL (ref 65–140)
GLUCOSE SERPL-MCNC: 103 MG/DL (ref 65–140)
GLUCOSE SERPL-MCNC: 106 MG/DL (ref 65–140)
GLUCOSE SERPL-MCNC: 111 MG/DL (ref 65–140)
GLUCOSE SERPL-MCNC: 115 MG/DL (ref 65–140)
HCT VFR BLD AUTO: 35.6 % (ref 34.8–46.1)
HGB BLD-MCNC: 11.2 G/DL (ref 11.5–15.4)
IMM GRANULOCYTES # BLD AUTO: 0.01 THOUSAND/UL (ref 0–0.2)
IMM GRANULOCYTES NFR BLD AUTO: 0 % (ref 0–2)
LYMPHOCYTES # BLD AUTO: 2.21 THOUSANDS/ΜL (ref 0.6–4.47)
LYMPHOCYTES NFR BLD AUTO: 36 % (ref 14–44)
MAGNESIUM SERPL-MCNC: 1.7 MG/DL (ref 1.6–2.6)
MCH RBC QN AUTO: 28.8 PG (ref 26.8–34.3)
MCHC RBC AUTO-ENTMCNC: 31.5 G/DL (ref 31.4–37.4)
MCV RBC AUTO: 92 FL (ref 82–98)
MONOCYTES # BLD AUTO: 0.18 THOUSAND/ΜL (ref 0.17–1.22)
MONOCYTES NFR BLD AUTO: 3 % (ref 4–12)
MTX SERPL-SCNC: <0.1 UMOL/L
NEUTROPHILS # BLD AUTO: 3.55 THOUSANDS/ΜL (ref 1.85–7.62)
NEUTS SEG NFR BLD AUTO: 58 % (ref 43–75)
NRBC BLD AUTO-RTO: 0 /100 WBCS
PLATELET # BLD AUTO: 227 THOUSANDS/UL (ref 149–390)
PMV BLD AUTO: 10.2 FL (ref 8.9–12.7)
POTASSIUM SERPL-SCNC: 3.7 MMOL/L (ref 3.5–5.3)
PROT SERPL-MCNC: 5.7 G/DL (ref 6.4–8.2)
RBC # BLD AUTO: 3.89 MILLION/UL (ref 3.81–5.12)
SODIUM SERPL-SCNC: 141 MMOL/L (ref 136–145)
WBC # BLD AUTO: 6.12 THOUSAND/UL (ref 4.31–10.16)

## 2021-07-17 PROCEDURE — 80053 COMPREHEN METABOLIC PANEL: CPT | Performed by: INTERNAL MEDICINE

## 2021-07-17 PROCEDURE — 83735 ASSAY OF MAGNESIUM: CPT | Performed by: INTERNAL MEDICINE

## 2021-07-17 PROCEDURE — 82948 REAGENT STRIP/BLOOD GLUCOSE: CPT

## 2021-07-17 PROCEDURE — 99231 SBSQ HOSP IP/OBS SF/LOW 25: CPT | Performed by: INTERNAL MEDICINE

## 2021-07-17 PROCEDURE — 85025 COMPLETE CBC W/AUTO DIFF WBC: CPT | Performed by: INTERNAL MEDICINE

## 2021-07-17 PROCEDURE — 99232 SBSQ HOSP IP/OBS MODERATE 35: CPT | Performed by: INTERNAL MEDICINE

## 2021-07-17 PROCEDURE — 80204 DRUG ASSAY METHOTREXATE: CPT | Performed by: INTERNAL MEDICINE

## 2021-07-17 RX ORDER — HYDROMORPHONE HCL IN WATER/PF 6 MG/30 ML
0.2 PATIENT CONTROLLED ANALGESIA SYRINGE INTRAVENOUS EVERY 8 HOURS PRN
Status: DISCONTINUED | OUTPATIENT
Start: 2021-07-17 | End: 2021-07-18

## 2021-07-17 RX ORDER — MAGNESIUM SULFATE HEPTAHYDRATE 40 MG/ML
4 INJECTION, SOLUTION INTRAVENOUS ONCE
Status: COMPLETED | OUTPATIENT
Start: 2021-07-17 | End: 2021-07-17

## 2021-07-17 RX ADMIN — TAMSULOSIN HYDROCHLORIDE 0.4 MG: 0.4 CAPSULE ORAL at 16:43

## 2021-07-17 RX ADMIN — DICYCLOMINE HYDROCHLORIDE 10 MG: 10 CAPSULE ORAL at 16:43

## 2021-07-17 RX ADMIN — ACYCLOVIR 400 MG: 200 CAPSULE ORAL at 21:24

## 2021-07-17 RX ADMIN — LEUCOVORIN CALCIUM 25 MG: 25 TABLET ORAL at 07:19

## 2021-07-17 RX ADMIN — LEUCOVORIN CALCIUM 25 MG: 25 TABLET ORAL at 01:19

## 2021-07-17 RX ADMIN — ACYCLOVIR 400 MG: 200 CAPSULE ORAL at 10:28

## 2021-07-17 RX ADMIN — DOCUSATE SODIUM 100 MG: 100 CAPSULE ORAL at 10:29

## 2021-07-17 RX ADMIN — DOCUSATE SODIUM AND SENNOSIDES 1 TABLET: 8.6; 5 TABLET ORAL at 21:24

## 2021-07-17 RX ADMIN — DICYCLOMINE HYDROCHLORIDE 10 MG: 10 CAPSULE ORAL at 06:24

## 2021-07-17 RX ADMIN — ALLOPURINOL 100 MG: 100 TABLET ORAL at 10:29

## 2021-07-17 RX ADMIN — MENTHOL, UNSPECIFIED FORM AND METHYL SALICYLATE 1 APPLICATION: 10; 150 CREAM TOPICAL at 10:31

## 2021-07-17 RX ADMIN — DOCUSATE SODIUM AND SENNOSIDES 1 TABLET: 8.6; 5 TABLET ORAL at 10:29

## 2021-07-17 RX ADMIN — HYDROMORPHONE HYDROCHLORIDE 0.2 MG: 0.2 INJECTION, SOLUTION INTRAMUSCULAR; INTRAVENOUS; SUBCUTANEOUS at 20:00

## 2021-07-17 RX ADMIN — FOLIC ACID 1 MG: 1 TABLET ORAL at 10:30

## 2021-07-17 RX ADMIN — DICLOFENAC 2 G: 10 GEL TOPICAL at 16:40

## 2021-07-17 RX ADMIN — DICYCLOMINE HYDROCHLORIDE 10 MG: 10 CAPSULE ORAL at 12:37

## 2021-07-17 RX ADMIN — DICYCLOMINE HYDROCHLORIDE 10 MG: 10 CAPSULE ORAL at 21:24

## 2021-07-17 RX ADMIN — MAGNESIUM SULFATE HEPTAHYDRATE 4 G: 40 INJECTION, SOLUTION INTRAVENOUS at 10:27

## 2021-07-17 RX ADMIN — OXYCODONE HYDROCHLORIDE 5 MG: 5 SOLUTION ORAL at 12:35

## 2021-07-17 RX ADMIN — ATORVASTATIN CALCIUM 40 MG: 40 TABLET, FILM COATED ORAL at 16:43

## 2021-07-17 RX ADMIN — FAMOTIDINE 20 MG: 20 TABLET ORAL at 10:29

## 2021-07-17 RX ADMIN — ENOXAPARIN SODIUM 40 MG: 40 INJECTION SUBCUTANEOUS at 10:28

## 2021-07-17 RX ADMIN — GABAPENTIN 200 MG: 100 CAPSULE ORAL at 21:24

## 2021-07-17 RX ADMIN — LEUCOVORIN CALCIUM 25 MG: 25 TABLET ORAL at 13:17

## 2021-07-17 NOTE — CASE MANAGEMENT
Per [facility] for [facility] due to "insufficant funding" per [facility] admissions, Pt has exhausted her benefits from previous admission and is not eligible for MA due to citizenship status  CM to discuss same with pt  CM placed TC to pts insurance [id number] ([phone]) in order to obtain further guidance on same  CM was unable to get in contact with insurance rep at this time due to weekend closure  Per chart review [address] can assist with pt's d/c planning needs  CM informed Dr Kemi Samson of same  CM department will follow

## 2021-07-17 NOTE — PROGRESS NOTES
[address]  Department of Hematology & Medical Oncology  Inpatient Consultation/Progress Note    Date: 07/17/21      ASSESSMENT & PLAN      29-year-old female with primary CNS lymphoma admitted for C3 HD MTX/Rituxan with leucovorin rescue  Assessment and plan:  1  Primary CNS Lymphoma  · S/p 2 cycles of high-dose Methotrexate/Rituxan; scan after these two doses showed improvement of disease; pt was sent to rehab to work on her strength/conditioning; she was overdue for C3 of HD MTX/rituxan and was admitted from facility for chemo  · Primary oncologist is Dr Yocasta Pederson, follow-up appt scheduled 8/2  · Here for C3 of HD MTX/Rituxan (C3D1= 7/13 at 1900); Leucovorin rescue after (first dose = 7/16)  · Plan to discontinue leukovorin until MTX level < 0 10   · MTX level 0 13 on 7/15 at 1900  MTX level on 7/16 drawn but still in process  · Continue PT/OT during this admission  · Daily CBC/CMP, and daily weight    Please contact me if any questions or concerns about this patient's case  Thank you  SUBJECTIVE      Patient does not respond verbally to questions using translation device  However, she appears well and in no acute distress  She nods in response to question about pain, however she does not respond to where she is feeling pain  I have reviewed the relevant past medical, surgical, social and family history  I have also reviewed allergies and medications for this patient  Review of Systems  Review of Systems   Constitutional:        Unable to assess as she does not respond to questions using translation device  OBJECTIVE     Physical Exam    Vitals:    07/17/21 0800   BP:    Pulse: 99   Resp:    Temp:    SpO2: 100%       Physical Exam  Vitals reviewed  Constitutional:       General: She is not in acute distress  Appearance: She is normal weight  She is ill-appearing  She is not toxic-appearing or diaphoretic        Comments: Tired appearing 29-year-old female lying comfortably in bed  HENT:      Head: Normocephalic and atraumatic  Eyes:      General: No scleral icterus  Extraocular Movements: Extraocular movements intact  Pupils: Pupils are equal, round, and reactive to light  Comments: Conjunctival pallor present  Cardiovascular:      Rate and Rhythm: Normal rate and regular rhythm  Pulses: Normal pulses  Heart sounds: Normal heart sounds  No murmur heard  No gallop  Pulmonary:      Effort: Pulmonary effort is normal  No respiratory distress  Breath sounds: Normal breath sounds  No wheezing or rales  Abdominal:      General: Abdomen is flat  Bowel sounds are normal  There is no distension  Palpations: Abdomen is soft  There is no mass  Tenderness: There is abdominal tenderness  There is no guarding or rebound  Comments: Tenderness upon deep palpation of left and right upper quadrant of the abdomen  Musculoskeletal:      Cervical back: Normal range of motion and neck supple  No rigidity  Right lower leg: No edema  Left lower leg: No edema  Comments: Muscle atrophy of the RLE  Lymphadenopathy:      Cervical: No cervical adenopathy  Skin:     General: Skin is warm and dry  Coloration: Skin is not jaundiced or pale  Findings: No bruising, erythema or rash  Neurological:      General: No focal deficit present  Mental Status: She is alert  Mental status is at baseline  Psychiatric:      Comments: Unable to assess due to language            Imaging  Relevant imaging reviewed in chart    Labs  Relevant labs reviewed in chart    CBC  Diff   Lab Results   Component Value Date/Time    WBC 6 12 07/17/2021 04:39 AM    HGB 11 2 (L) 07/17/2021 04:39 AM    HCT 35 6 07/17/2021 04:39 AM    RBC 3 89 07/17/2021 04:39 AM    MCV 92 07/17/2021 04:39 AM    MCHC 31 5 07/17/2021 04:39 AM    MCH 28 8 07/17/2021 04:39 AM    RDW 14 9 07/17/2021 04:39 AM    MPV 10 2 07/17/2021 04:39 AM    Lab Results Component Value Date/Time    LYMPHSABS 2 21 07/17/2021 04:39 AM    EOSABS 0 15 07/17/2021 04:39 AM    MONOSABS 0 18 07/17/2021 04:39 AM    BASOSABS 0 02 07/17/2021 04:39 AM        Basic Metabolic Profile    Lab Results   Component Value Date/Time    SODIUM 141 07/17/2021 04:39 AM    CO2 30 07/17/2021 04:39 AM    Lab Results   Component Value Date/Time    BUN 5 07/17/2021 04:39 AM    CREATININE 0 45 (L) 07/17/2021 04:39 AM             Jamil Becerril PA-C  Hematology & Medical Oncology  [facility] Physician [facility] 12

## 2021-07-17 NOTE — PROGRESS NOTES
Notified Dr Sanjay Oleary re: leucovorin dose completion at 1320 today  He ordered to be notified when the next methotrexate level results so he can than decide as to weather or not to continue Leucovorin PO

## 2021-07-17 NOTE — PROGRESS NOTES
INTERNAL MEDICINE RESIDENCY PROGRESS NOTE     Name: Kylah Martinez   Age & Sex: 49 y o  female   MRN: [id number]  [facility]   Encounter: [id number]  Team: SOD Team A    PATIENT INFORMATION     Name: Kylah Martinez   Age & Sex: 49 y o  female   MRN: [id number]  Hospital Stay Days: 4    ASSESSMENT/PLAN     Principal Problem:    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[facility] )  Active Problems:    Altered mental status    Dysphagia    Severe protein-calorie malnutrition ([facility] )    Cancer related pain    Ambulatory dysfunction    Hypokalemia      Hypokalemia  Assessment & Plan  Resolved, potassium 3 7  Likely in setting of poor p o  Intake/poor absorption in setting of chemotherapy and malnutrition  Monitor with a m  BMP and replete as needed     Ambulatory dysfunction  Assessment & Plan  A: per [address] does stand up with assistance and able to take few steps however her left leg does give out  Known residual deficits left upper extremity and lower extremity weakness secondary to CNS lymphoma  PT/OT recommended rehab, case management consulted  Appreciate further guidance    Cancer related pain  Assessment & Plan  Tylenol 650mg Prn  Oxyocodone 2 5mg Prn    Severe protein-calorie malnutrition (HCC)  Assessment & Plan  Malnutrition Findings:   Adult Malnutrition type: Chronic illness  Adult Degree of Malnutrition: Other severe protein calorie malnutrition    BMI Findings: Body mass index is 22 17 kg/m²  Nutrition following, appreciate recommendations  Continue protein supplementation  Patient does have PEG tube in place, currently not receiving tube feeds as able to tolerate p o  Intake  Dysphagia  Assessment & Plan  A: per irasema louis pt started to tolerated mechanical / thin liquid diet and stopped PEG tubes  Peg tube in place, clear, no discharge noted  Continue with q 4 hours flushes    Evaluated by speech, Continue with level 2 diet    Altered mental status  Assessment & Plan  A: A x O 2 to person and place which is same as previous admission  Speaks Gujarati only     P: Monitor for acute changes     * 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[facility] )  Assessment & Plan  A: Diffuse Large B-cell Primary 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)       Direct Admit from Dr Day Sears for in-patient chemo 3rd cycle       S/P chemotherapy on , leucovin on  MRI- Appears to be interval improvement in the overall      enhancement of multifocal intracranial lesions  P:    DVT prophylaxis with Lovenox   Continue acyclovir, Diflucan and Levaquin prophylactic  Status post 2 doses of methotrexate/Rituxan and completion of Leucovorin rescue treatment  Oncology Consult, appreciate recommendations  Will not need further treatment as long as methotrexate levels less than 0 1 on next check, lab pending      Disposition:  Continue management as highlighted above  Patient medically stable pending methotrexate levels  Rehab placement  SUBJECTIVE     Patient seen and examined  No acute events overnight  States she is doing well overall  Denies any nausea, vomiting, diarrhea, constipation, fevers or chills  All other review of systems negative at this time  OBJECTIVE     Vitals:    21 1518 21 2228 21 0712 21 0800   BP: 93/59 98/66 105/69    Pulse: (!) 115 88 93 99   Resp: 18 16 18    Temp: 98 8 °F (37 1 °C) 98 4 °F (36 9 °C) 98 4 °F (36 9 °C)    SpO2: 98% 100% 100% 100%   Weight:       Height:          Temperature:   Temp (24hrs), Av 5 °F (36 9 °C), Min:98 4 °F (36 9 °C), Max:98 8 °F (37 1 °C)    Temperature: 98 4 °F (36 9 °C)  Intake & Output:  I/O       07/15 07 -  0700  07 -  0700  07 -  0700    P  O  0 0     I V  (mL/kg) 6770 3 (131 5) 1795 (34 9)     NG/ 220     IV Piggyback 710      Total Intake(mL/kg) 7920 3 (153 8) 2015 (39 1)     Urine (mL/kg/hr) 2750 (2 2) 3450 (2 8) 300 (1 9)    Stool  0     Total Output 2750 3450 300    Net +5170 8 -2318 -455           Unmeasured Urine Occurrence  1 x     Unmeasured Stool Occurrence  2 x         Weights:   IBW (Ideal Body Weight): 45 5 kg    Body mass index is 22 17 kg/m²  Weight (last 2 days)     None        Physical Exam  Constitutional:       Comments: Appears to be more alert today  Thin appearance   Eyes:      Comments: Ptosis of left eyelid, abducted at rest   Cardiovascular:      Rate and Rhythm: Normal rate and regular rhythm  Pulmonary:      Effort: Pulmonary effort is normal  No respiratory distress  Breath sounds: No wheezing  Abdominal:      General: Abdomen is flat  Palpations: Abdomen is soft  Musculoskeletal:         General: No swelling  Right lower leg: No edema  Left lower leg: No edema  Comments: Left upper extremity 3/5, left lower extremity 2/5   Neurological:      Motor: Weakness present  Deep Tendon Reflexes: Reflexes abnormal       Comments: Oriented to person and place  Psychiatric:         Mood and Affect: Mood normal          Behavior: Behavior normal        LABORATORY DATA     Labs: I have personally reviewed pertinent reports    Results from last 7 days   Lab Units 07/17/21  0439 07/16/21  0606 07/15/21  0546 07/14/21  0511 07/13/21  1009   WBC Thousand/uL 6 12 6 94 6 62 5 11 7 92   HEMOGLOBIN g/dL 11 2* 11 7 9 9* 11 5 11 6   HEMATOCRIT % 35 6 36 7 31 6* 35 6 36 7   PLATELETS Thousands/uL 227 243 208 275 228   NEUTROS PCT % 58  --   --  80* 61   MONOS PCT % 3*  --   --  1* 7   MONO PCT %  --  0*  --   --   --       Results from last 7 days   Lab Units 07/17/21  0439 07/16/21  0606 07/15/21  1635 07/14/21  0511 07/13/21  1008   POTASSIUM mmol/L 3 7 3 4* 4 0 3 5 3 4*   CHLORIDE mmol/L 106 104 106 105 103   CO2 mmol/L 30 31 28 28 27   BUN mg/dL 5 5 7 11 14   CREATININE mg/dL 0 45* 0 39* 0 33* 0 44* 0 39*   CALCIUM mg/dL 9 7 9 3 9 4 9 7 9 7   ALK PHOS U/L 59  --   --  57 61   ALT U/L 14  --   --  15 12   AST U/L 12  --   --  13 10     Results from last 7 days   Lab Units 07/17/21  0439 07/16/21  0606 07/15/21  0546   MAGNESIUM mg/dL 1 7 2 2 1 6                        IMAGING & DIAGNOSTIC TESTING     Radiology Results: I have personally reviewed pertinent reports  No results found  Other Diagnostic Testing: I have personally reviewed pertinent reports      ACTIVE MEDICATIONS     Current Facility-Administered Medications   Medication Dose Route Frequency    acetaminophen (TYLENOL) tablet 650 mg  650 mg Oral Q6H PRN    acyclovir (ZOVIRAX) capsule 400 mg  400 mg Oral Q12H [address]    albuterol (PROVENTIL HFA,VENTOLIN HFA) inhaler 2 puff  2 puff Inhalation Q4H PRN    allopurinol (ZYLOPRIM) tablet 100 mg  100 mg Oral Daily    aluminum-magnesium hydroxide-simethicone (MYLANTA) oral suspension 30 mL  30 mL Per PEG Tube Q4H PRN    amLODIPine (NORVASC) tablet 5 mg  5 mg Oral Daily    atorvastatin (LIPITOR) tablet 40 mg  40 mg Oral After Dinner    bisacodyl (DULCOLAX) rectal suppository 10 mg  10 mg Rectal Daily PRN    Diclofenac Sodium (VOLTAREN) 1 % topical gel 2 g  2 g Topical TID PRN    dicyclomine (BENTYL) capsule 10 mg  10 mg Oral 4x Daily (AC & HS)    docusate sodium (COLACE) capsule 100 mg  100 mg Oral Daily    enoxaparin (LOVENOX) subcutaneous injection 40 mg  40 mg Subcutaneous Q24H MEÑO    famotidine (PEPCID) tablet 20 mg  20 mg Oral Daily    folic acid (FOLVITE) tablet 1 mg  1 mg Oral Daily    gabapentin (NEURONTIN) capsule 200 mg  200 mg Oral HS    hydrOXYzine HCL (ATARAX) tablet 25 mg  25 mg Oral Q6H PRN    insulin lispro (HumaLOG) 100 units/mL subcutaneous injection 1-5 Units  1-5 Units Subcutaneous TID AC    insulin lispro (HumaLOG) 100 units/mL subcutaneous injection 1-5 Units  1-5 Units Subcutaneous HS    leucovorin (WELLCOVORIN) tablet 25 mg  25 mg Oral Q6H    magnesium sulfate 4 g/100 mL IVPB (premix) 4 g  4 g Intravenous Once    menthol-methyl salicylate (BENGAY) 81-81 % cream   Apply externally BID    ondansetron (ZOFRAN-ODT) dispersible tablet 4 mg  4 mg Oral Q6H PRN    oxyCODONE (ROXICODONE) oral solution 5 mg  5 mg Oral Q6H PRN    polyethylene glycol (MIRALAX) packet 17 g  17 g Oral Daily PRN    senna-docusate sodium (SENOKOT S) 8 6-50 mg per tablet 1 tablet  1 tablet Oral Q12H [address]    sodium chloride 0 9 % infusion  20 mL/hr Intravenous Once PRN    tamsulosin (FLOMAX) capsule 0 4 mg  0 4 mg Oral After Dinner       VTE Pharmacologic Prophylaxis: Enoxaparin (Lovenox)  VTE Mechanical Prophylaxis: sequential compression device    Portions of the record may have been created with voice recognition software  Occasional wrong word or "sound a like" substitutions may have occurred due to the inherent limitations of voice recognition software    Read the chart carefully and recognize, using context, where substitutions have occurred   ==  Cristi Hood, [address]  Internal Medicine Residency PGY-3

## 2021-07-18 LAB
ANION GAP SERPL CALCULATED.3IONS-SCNC: 7 MMOL/L (ref 4–13)
BUN SERPL-MCNC: 8 MG/DL (ref 5–25)
CALCIUM SERPL-MCNC: 9.8 MG/DL (ref 8.3–10.1)
CHLORIDE SERPL-SCNC: 106 MMOL/L (ref 100–108)
CO2 SERPL-SCNC: 28 MMOL/L (ref 21–32)
CREAT SERPL-MCNC: 0.36 MG/DL (ref 0.6–1.3)
GFR SERPL CREATININE-BSD FRML MDRD: 123 ML/MIN/1.73SQ M
GLUCOSE SERPL-MCNC: 104 MG/DL (ref 65–140)
GLUCOSE SERPL-MCNC: 122 MG/DL (ref 65–140)
GLUCOSE SERPL-MCNC: 131 MG/DL (ref 65–140)
GLUCOSE SERPL-MCNC: 89 MG/DL (ref 65–140)
GLUCOSE SERPL-MCNC: 94 MG/DL (ref 65–140)
POTASSIUM SERPL-SCNC: 3.7 MMOL/L (ref 3.5–5.3)
SODIUM SERPL-SCNC: 141 MMOL/L (ref 136–145)

## 2021-07-18 PROCEDURE — 80204 DRUG ASSAY METHOTREXATE: CPT | Performed by: INTERNAL MEDICINE

## 2021-07-18 PROCEDURE — 80048 BASIC METABOLIC PNL TOTAL CA: CPT | Performed by: STUDENT IN AN ORGANIZED HEALTH CARE EDUCATION/TRAINING PROGRAM

## 2021-07-18 PROCEDURE — 82948 REAGENT STRIP/BLOOD GLUCOSE: CPT

## 2021-07-18 PROCEDURE — 99232 SBSQ HOSP IP/OBS MODERATE 35: CPT | Performed by: INTERNAL MEDICINE

## 2021-07-18 RX ORDER — HYDROMORPHONE HCL/PF 1 MG/ML
0.5 SYRINGE (ML) INJECTION ONCE
Status: COMPLETED | OUTPATIENT
Start: 2021-07-18 | End: 2021-07-18

## 2021-07-18 RX ORDER — HYDROMORPHONE HCL IN WATER/PF 6 MG/30 ML
0.2 PATIENT CONTROLLED ANALGESIA SYRINGE INTRAVENOUS EVERY 6 HOURS PRN
Status: DISCONTINUED | OUTPATIENT
Start: 2021-07-18 | End: 2021-07-21 | Stop reason: HOSPADM

## 2021-07-18 RX ADMIN — AMLODIPINE BESYLATE 5 MG: 5 TABLET ORAL at 09:59

## 2021-07-18 RX ADMIN — DICYCLOMINE HYDROCHLORIDE 10 MG: 10 CAPSULE ORAL at 11:57

## 2021-07-18 RX ADMIN — OXYCODONE HYDROCHLORIDE 5 MG: 5 SOLUTION ORAL at 16:33

## 2021-07-18 RX ADMIN — ONDANSETRON 4 MG: 4 TABLET, ORALLY DISINTEGRATING ORAL at 19:44

## 2021-07-18 RX ADMIN — ACYCLOVIR 400 MG: 200 CAPSULE ORAL at 22:43

## 2021-07-18 RX ADMIN — DICYCLOMINE HYDROCHLORIDE 10 MG: 10 CAPSULE ORAL at 16:33

## 2021-07-18 RX ADMIN — OXYCODONE HYDROCHLORIDE 5 MG: 5 SOLUTION ORAL at 22:42

## 2021-07-18 RX ADMIN — FAMOTIDINE 20 MG: 20 TABLET ORAL at 09:59

## 2021-07-18 RX ADMIN — ALLOPURINOL 100 MG: 100 TABLET ORAL at 09:59

## 2021-07-18 RX ADMIN — DOCUSATE SODIUM AND SENNOSIDES 1 TABLET: 8.6; 5 TABLET ORAL at 22:43

## 2021-07-18 RX ADMIN — GABAPENTIN 200 MG: 100 CAPSULE ORAL at 22:43

## 2021-07-18 RX ADMIN — ACETAMINOPHEN 650 MG: 325 TABLET, FILM COATED ORAL at 16:32

## 2021-07-18 RX ADMIN — MENTHOL, UNSPECIFIED FORM AND METHYL SALICYLATE: 10; 150 CREAM TOPICAL at 16:36

## 2021-07-18 RX ADMIN — HYDROMORPHONE HYDROCHLORIDE 0.2 MG: 0.2 INJECTION, SOLUTION INTRAMUSCULAR; INTRAVENOUS; SUBCUTANEOUS at 15:52

## 2021-07-18 RX ADMIN — TAMSULOSIN HYDROCHLORIDE 0.4 MG: 0.4 CAPSULE ORAL at 16:32

## 2021-07-18 RX ADMIN — ATORVASTATIN CALCIUM 40 MG: 40 TABLET, FILM COATED ORAL at 16:32

## 2021-07-18 RX ADMIN — OXYCODONE HYDROCHLORIDE 5 MG: 5 SOLUTION ORAL at 10:14

## 2021-07-18 RX ADMIN — DICLOFENAC 2 G: 10 GEL TOPICAL at 10:02

## 2021-07-18 RX ADMIN — FOLIC ACID 1 MG: 1 TABLET ORAL at 09:59

## 2021-07-18 RX ADMIN — MENTHOL, UNSPECIFIED FORM AND METHYL SALICYLATE: 10; 150 CREAM TOPICAL at 10:00

## 2021-07-18 RX ADMIN — ACYCLOVIR 400 MG: 200 CAPSULE ORAL at 09:59

## 2021-07-18 RX ADMIN — HYDROMORPHONE HYDROCHLORIDE 0.5 MG: 1 INJECTION, SOLUTION INTRAMUSCULAR; INTRAVENOUS; SUBCUTANEOUS at 17:54

## 2021-07-18 RX ADMIN — DOCUSATE SODIUM 100 MG: 100 CAPSULE ORAL at 09:59

## 2021-07-18 RX ADMIN — DOCUSATE SODIUM AND SENNOSIDES 1 TABLET: 8.6; 5 TABLET ORAL at 09:59

## 2021-07-18 RX ADMIN — DICYCLOMINE HYDROCHLORIDE 10 MG: 10 CAPSULE ORAL at 22:43

## 2021-07-18 RX ADMIN — ENOXAPARIN SODIUM 40 MG: 40 INJECTION SUBCUTANEOUS at 09:59

## 2021-07-18 NOTE — PROGRESS NOTES
INTERNAL MEDICINE RESIDENCY PROGRESS NOTE     Name: Sapna Kaplan   Age & Sex: 49 y o  female   MRN: [id number]  [facility]   Encounter: [id number]  Team: SOD Team A    PATIENT INFORMATION     Name: Sapna Kaplan   Age & Sex: 49 y o  female   MRN: [id number]  Hospital Stay Days: 5    ASSESSMENT/PLAN     Principal Problem:    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[place] [address] )  Active Problems:    Altered mental status    Dysphagia    Severe protein-calorie malnutrition ([place] [address] )    Cancer related pain    Ambulatory dysfunction    Hypokalemia      * 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[place] [address] )  Assessment & Plan  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)       Direct Admit from Dr Bisi Mccormick for in-patient chemo 3rd cycle       S/P chemotherapy on 05/13, leucovin on 5/14 6/9 MRI- Appears to be interval improvement in the overall      enhancement of multifocal intracranial lesions  P:    DVT prophylaxis with Lovenox   Continue acyclovir, Diflucan and Levaquin prophylactic  Status post 2 doses of methotrexate/Rituxan and completion of Leucovorin rescue treatment  Oncology Consult, appreciate recommendations  MTX < 1 no further need for Leucovorin rescue    Hypokalemia  Assessment & Plan  Resolved, potassium 3 7  Likely in setting of poor p o  Intake/poor absorption in setting of chemotherapy and malnutrition  Monitor with a m  BMP and replete as needed     Ambulatory dysfunction  Assessment & Plan  A: per [address] does stand up with assistance and able to take few steps however her left leg does give out  Known residual deficits left upper extremity and lower extremity weakness secondary to CNS lymphoma  PT/OT recommended rehab, case management consulted    Appreciate further guidance    Cancer related pain  Assessment & Plan  Tylenol 650mg Prn  Oxyocodone 2 5mg Prn    Severe protein-calorie malnutrition ([facility] )  Assessment & Plan  Malnutrition Findings:   Adult Malnutrition type: Chronic illness  Adult Degree of Malnutrition: Other severe protein calorie malnutrition    BMI Findings: Body mass index is 22 17 kg/m²  Nutrition following, appreciate recommendations  Continue protein supplementation  Patient does have PEG tube in place, currently not receiving tube feeds as able to tolerate p o  Intake  Dysphagia  Assessment & Plan  A: per [facility] pt started to tolerated mechanical / thin liquid diet and stopped PEG tubes  Peg tube in place, clear, no discharge noted  Continue with q 4 hours flushes  Evaluated by speech, Continue with level 2 diet    Altered mental status  Assessment & Plan  A: A x O 2 to person and place which is same as previous admission  Speaks Kimmy only     P: Monitor for acute changes       Disposition: Pending Insurance concerns  SUBJECTIVE     Patient seen and examined  No acute events overnight  Resting comfortably in the bed  Language barrier  However, no apparent stress or complaints  OBJECTIVE     Vitals:    21 1448 21 1500 21 2222 21 0725   BP: 107/71 107/71 106/71 123/86   Pulse: 96 95 97 96   Resp:  16   Temp: (!) 97 1 °F (36 2 °C)  97 8 °F (36 6 °C) 98 4 °F (36 9 °C)   SpO2: 100% 100% 100% 98%   Weight:       Height:          Temperature:   Temp (24hrs), Av 8 °F (36 6 °C), Min:97 1 °F (36 2 °C), Max:98 4 °F (36 9 °C)    Temperature: 98 4 °F (36 9 °C)  Intake & Output:  I/O        07 -  0700  07 -  0700  07 -  0700    P  O  0 330 120    I V  (mL/kg) 1795 (34 9)      NG/ 120     IV Piggyback  100     Total Intake(mL/kg)  (39 1) 550 (10 7) 120 (2 3)    Urine (mL/kg/hr) 3450 (2 8) 2050 (1 7) 200 (0 9)    Stool 0      Total Output 3452050 200    Net -1435 -1500 -80           Unmeasured Urine Occurrence 1 x 1 x     Unmeasured Stool Occurrence 2 x          Weights:   IBW (Ideal Body Weight): 45 5 kg    Body mass index is 22 17 kg/m²    Weight (last 2 days)     None        Physical Exam  Constitutional:       General: She is not in acute distress  Appearance: She is ill-appearing  She is not toxic-appearing or diaphoretic  HENT:      Head: Normocephalic and atraumatic  Cardiovascular:      Rate and Rhythm: Normal rate and regular rhythm  Pulses: Normal pulses  Heart sounds: Normal heart sounds  No murmur heard  No friction rub  No gallop  Pulmonary:      Effort: Pulmonary effort is normal  No respiratory distress  Breath sounds: Normal breath sounds  No stridor  No wheezing, rhonchi or rales  Chest:      Chest wall: No tenderness  Abdominal:      General: Bowel sounds are normal  There is no distension  Palpations: There is no mass  Tenderness: There is no abdominal tenderness  There is no right CVA tenderness, left CVA tenderness, guarding or rebound  Hernia: No hernia is present  Musculoskeletal:      Right lower leg: No edema  Left lower leg: No edema  Neurological:      Mental Status: She is alert  Mental status is at baseline  LABORATORY DATA     Labs: I have personally reviewed pertinent reports    Results from last 7 days   Lab Units 07/17/21  0439 07/16/21  0606 07/15/21  0546 07/14/21  0511 07/13/21  1009   WBC Thousand/uL 6 12 6 94 6 62 5 11 7 92   HEMOGLOBIN g/dL 11 2* 11 7 9 9* 11 5 11 6   HEMATOCRIT % 35 6 36 7 31 6* 35 6 36 7   PLATELETS Thousands/uL 227 243 208 275 228   NEUTROS PCT % 58  --   --  80* 61   MONOS PCT % 3*  --   --  1* 7   MONO PCT %  --  0*  --   --   --       Results from last 7 days   Lab Units 07/18/21  0519 07/17/21  0439 07/16/21  0606 07/14/21  0511 07/13/21  1008   POTASSIUM mmol/L 3 7 3 7 3 4* 3 5 3 4*   CHLORIDE mmol/L 106 106 104 105 103   CO2 mmol/L 28 30 31 28 27   BUN mg/dL 8 5 5 11 14   CREATININE mg/dL 0 36* 0 45* 0 39* 0 44* 0 39*   CALCIUM mg/dL 9 8 9 7 9 3 9 7 9 7   ALK PHOS U/L  --  59  --  57 61   ALT U/L  --  14  --  15 12   AST U/L  --  12  --  13 10     Results from last 7 days Lab Units 07/17/21  0439 07/16/21  0606 07/15/21  0546   MAGNESIUM mg/dL 1 7 2 2 1 6                        IMAGING & DIAGNOSTIC TESTING     Radiology Results: I have personally reviewed pertinent reports  No results found  Other Diagnostic Testing: I have personally reviewed pertinent reports      ACTIVE MEDICATIONS     Current Facility-Administered Medications   Medication Dose Route Frequency    acetaminophen (TYLENOL) tablet 650 mg  650 mg Oral Q6H PRN    acyclovir (ZOVIRAX) capsule 400 mg  400 mg Oral Q12H [address]    albuterol (PROVENTIL HFA,VENTOLIN HFA) inhaler 2 puff  2 puff Inhalation Q4H PRN    allopurinol (ZYLOPRIM) tablet 100 mg  100 mg Oral Daily    aluminum-magnesium hydroxide-simethicone (MYLANTA) oral suspension 30 mL  30 mL Per PEG Tube Q4H PRN    amLODIPine (NORVASC) tablet 5 mg  5 mg Oral Daily    atorvastatin (LIPITOR) tablet 40 mg  40 mg Oral After Dinner    bisacodyl (DULCOLAX) rectal suppository 10 mg  10 mg Rectal Daily PRN    Diclofenac Sodium (VOLTAREN) 1 % topical gel 2 g  2 g Topical TID PRN    dicyclomine (BENTYL) capsule 10 mg  10 mg Oral 4x Daily (AC & HS)    docusate sodium (COLACE) capsule 100 mg  100 mg Oral Daily    enoxaparin (LOVENOX) subcutaneous injection 40 mg  40 mg Subcutaneous Q24H MEÑO    famotidine (PEPCID) tablet 20 mg  20 mg Oral Daily    folic acid (FOLVITE) tablet 1 mg  1 mg Oral Daily    gabapentin (NEURONTIN) capsule 200 mg  200 mg Oral HS    HYDROmorphone HCl (DILAUDID) injection 0 2 mg  0 2 mg Intravenous Q8H PRN    hydrOXYzine HCL (ATARAX) tablet 25 mg  25 mg Oral Q6H PRN    insulin lispro (HumaLOG) 100 units/mL subcutaneous injection 1-5 Units  1-5 Units Subcutaneous TID AC    insulin lispro (HumaLOG) 100 units/mL subcutaneous injection 1-5 Units  1-5 Units Subcutaneous HS    menthol-methyl salicylate (BENGAY) 86-79 % cream   Apply externally BID    ondansetron (ZOFRAN-ODT) dispersible tablet 4 mg  4 mg Oral Q6H PRN    oxyCODONE (ROXICODONE) oral solution 5 mg  5 mg Oral Q6H PRN    polyethylene glycol (MIRALAX) packet 17 g  17 g Oral Daily PRN    senna-docusate sodium (SENOKOT S) 8 6-50 mg per tablet 1 tablet  1 tablet Oral Q12H [address]    sodium chloride 0 9 % infusion  20 mL/hr Intravenous Once PRN    tamsulosin (FLOMAX) capsule 0 4 mg  0 4 mg Oral After Dinner       VTE Pharmacologic Prophylaxis: Enoxaparin (Lovenox)  VTE Mechanical Prophylaxis: sequential compression device    Portions of the record may have been created with voice recognition software  Occasional wrong word or "sound a like" substitutions may have occurred due to the inherent limitations of voice recognition software    Read the chart carefully and recognize, using context, where substitutions have occurred   ==  Kristi Shepard MD  [facility]  Internal Medicine Residency PGY-2

## 2021-07-18 NOTE — PLAN OF CARE
Problem: Prexisting or High Potential for Compromised Skin Integrity  Goal: Skin integrity is maintained or improved  Description: INTERVENTIONS:  - Identify patients at risk for skin breakdown  - Assess and monitor skin integrity  - Assess and monitor nutrition and hydration status  - Monitor labs   - Assess for incontinence   - Turn and reposition patient  - Assist with mobility/ambulation  - Relieve pressure over bony prominences  - Avoid friction and shearing  - Provide appropriate hygiene as needed including keeping skin clean and dry  - Evaluate need for skin moisturizer/barrier cream  - Collaborate with interdisciplinary team   - Patient/family teaching  - Consider wound care consult   Outcome: Progressing     Problem: MOBILITY - ADULT  Goal: Maintain or return to baseline ADL function  Description: INTERVENTIONS:  -  Assess patient's ability to carry out ADLs; assess patient's baseline for ADL function and identify physical deficits which impact ability to perform ADLs (bathing, care of mouth/teeth, toileting, grooming, dressing, etc )  - Assess/evaluate cause of self-care deficits   - Assess range of motion  - Assess patient's mobility; develop plan if impaired  - Assess patient's need for assistive devices and provide as appropriate  - Encourage maximum independence but intervene and supervise when necessary  - Involve family in performance of ADLs  - Assess for home care needs following discharge   - Consider OT consult to assist with ADL evaluation and planning for discharge  - Provide patient education as appropriate  Outcome: Progressing  Goal: Maintains/Returns to pre admission functional level  Description: INTERVENTIONS:  - Perform BMAT or MOVE assessment daily    - Set and communicate daily mobility goal to care team and patient/family/caregiver  - Collaborate with rehabilitation services on mobility goals if consulted  - Perform Range of Motion  - Reposition patient every 2 hours    - Record patient progress and toleration of activity level   Outcome: Progressing     Problem: PAIN - ADULT  Goal: Verbalizes/displays adequate comfort level or baseline comfort level  Description: Interventions:  - Encourage patient to monitor pain and request assistance  - Assess pain using appropriate pain scale  - Administer analgesics based on type and severity of pain and evaluate response  - Implement non-pharmacological measures as appropriate and evaluate response  - Consider cultural and social influences on pain and pain management  - Notify physician/advanced practitioner if interventions unsuccessful or patient reports new pain  Outcome: Progressing     Problem: INFECTION - ADULT  Goal: Absence or prevention of progression during hospitalization  Description: INTERVENTIONS:  - Assess and monitor for signs and symptoms of infection  - Monitor lab/diagnostic results  - Monitor all insertion sites, i e  indwelling lines, tubes, and drains  - Monitor endotracheal if appropriate and nasal secretions for changes in amount and color  - [place] appropriate cooling/warming therapies per order  - Administer medications as ordered  - Instruct and encourage patient and family to use good hand hygiene technique  - Identify and instruct in appropriate isolation precautions for identified infection/condition  Outcome: Progressing  Goal: Absence of fever/infection during neutropenic period  Description: INTERVENTIONS:  - Monitor WBC    Outcome: Progressing     Problem: SAFETY ADULT  Goal: Patient will remain free of falls  Description: INTERVENTIONS:  - Educate patient/family on patient safety including physical limitations  - Instruct patient to call for assistance with activity   - Consult OT/PT to assist with strengthening/mobility   - Keep Call bell within reach  - Keep bed low and locked with side rails adjusted as appropriate  - Keep care items and personal belongings within reach  - Initiate and maintain comfort rounds  - Make Fall Risk Sign visible to staff  - Offer Toileting every 2 Hours, in advance of need  - Initiate/Maintain alarm  - Obtain necessary fall risk management equipment  - Apply yellow socks and bracelet for high fall risk patients  - Consider moving patient to room near nurses station  Outcome: Progressing     Problem: DISCHARGE PLANNING  Goal: Discharge to home or other facility with appropriate resources  Description: INTERVENTIONS:  - Identify barriers to discharge w/patient and caregiver  - Arrange for needed discharge resources and transportation as appropriate  - Identify discharge learning needs (meds, wound care, etc )  - Arrange for interpretive services to assist at discharge as needed  - Refer to Case Management Department for coordinating discharge planning if the patient needs post-hospital services based on physician/advanced practitioner order or complex needs related to functional status, cognitive ability, or social support system  Outcome: Progressing     Problem: Knowledge Deficit  Goal: Patient/family/caregiver demonstrates understanding of disease process, treatment plan, medications, and discharge instructions  Description: Complete learning assessment and assess knowledge base    Interventions:  - Provide teaching at level of understanding  - Provide teaching via preferred learning methods  Outcome: Progressing     Problem: Potential for Falls  Goal: Patient will remain free of falls  Description: INTERVENTIONS:  - Educate patient/family on patient safety including physical limitations  - Instruct patient to call for assistance with activity   - Consult OT/PT to assist with strengthening/mobility   - Keep Call bell within reach  - Keep bed low and locked with side rails adjusted as appropriate  - Keep care items and personal belongings within reach  - Initiate and maintain comfort rounds  - Make Fall Risk Sign visible to staff  - Offer Toileting every 2 Hours, in advance of need  - Initiate/Maintain alarm  - Obtain necessary fall risk management equipment  - Apply yellow socks and bracelet for high fall risk patients  - Consider moving patient to room near nurses station  Outcome: Progressing     Problem: Nutrition/Hydration-ADULT  Goal: Nutrient/Hydration intake appropriate for improving, restoring or maintaining nutritional needs  Description: Monitor and assess patient's nutrition/hydration status for malnutrition  Collaborate with interdisciplinary team and initiate plan and interventions as ordered  Monitor patient's weight and dietary intake as ordered or per policy  Utilize nutrition screening tool and intervene as necessary  Determine patient's food preferences and provide high-protein, high-caloric foods as appropriate       INTERVENTIONS:  - Monitor oral intake, urinary output, labs, and treatment plans  - Assess nutrition and hydration status and recommend course of action  - Evaluate amount of meals eaten  - Assist patient with eating if necessary   - Allow adequate time for meals  - Recommend/ encourage appropriate diets, oral nutritional supplements, and vitamin/mineral supplements  - Order, calculate, and assess calorie counts as needed  - Recommend, monitor, and adjust tube feedings and TPN/PPN based on assessed needs  - Assess need for intravenous fluids  - Provide specific nutrition/hydration education as appropriate  - Include patient/family/caregiver in decisions related to nutrition  Outcome: Progressing

## 2021-07-18 NOTE — QUICK NOTE
MTX levels are now at the target of <0 10  Per protocol, doses of LV are not indicated at this point  Lab drawn from yesterday in process

## 2021-07-19 LAB
GLUCOSE SERPL-MCNC: 102 MG/DL (ref 65–140)
GLUCOSE SERPL-MCNC: 90 MG/DL (ref 65–140)
GLUCOSE SERPL-MCNC: 95 MG/DL (ref 65–140)
GLUCOSE SERPL-MCNC: 97 MG/DL (ref 65–140)
MTX SERPL-SCNC: <0.1 UMOL/L

## 2021-07-19 PROCEDURE — NC001 PR NO CHARGE: Performed by: INTERNAL MEDICINE

## 2021-07-19 PROCEDURE — 97530 THERAPEUTIC ACTIVITIES: CPT

## 2021-07-19 PROCEDURE — 82948 REAGENT STRIP/BLOOD GLUCOSE: CPT

## 2021-07-19 PROCEDURE — 97110 THERAPEUTIC EXERCISES: CPT

## 2021-07-19 PROCEDURE — 99232 SBSQ HOSP IP/OBS MODERATE 35: CPT | Performed by: INTERNAL MEDICINE

## 2021-07-19 PROCEDURE — 97112 NEUROMUSCULAR REEDUCATION: CPT

## 2021-07-19 RX ADMIN — DICYCLOMINE HYDROCHLORIDE 10 MG: 10 CAPSULE ORAL at 06:04

## 2021-07-19 RX ADMIN — ACYCLOVIR 400 MG: 200 CAPSULE ORAL at 20:33

## 2021-07-19 RX ADMIN — ALLOPURINOL 100 MG: 100 TABLET ORAL at 09:15

## 2021-07-19 RX ADMIN — MENTHOL, UNSPECIFIED FORM AND METHYL SALICYLATE: 10; 150 CREAM TOPICAL at 09:18

## 2021-07-19 RX ADMIN — DICLOFENAC 2 G: 10 GEL TOPICAL at 14:33

## 2021-07-19 RX ADMIN — DOCUSATE SODIUM 100 MG: 100 CAPSULE ORAL at 09:14

## 2021-07-19 RX ADMIN — OXYCODONE HYDROCHLORIDE 5 MG: 5 SOLUTION ORAL at 09:39

## 2021-07-19 RX ADMIN — FOLIC ACID 1 MG: 1 TABLET ORAL at 09:15

## 2021-07-19 RX ADMIN — MENTHOL, UNSPECIFIED FORM AND METHYL SALICYLATE: 10; 150 CREAM TOPICAL at 17:07

## 2021-07-19 RX ADMIN — GABAPENTIN 200 MG: 100 CAPSULE ORAL at 21:00

## 2021-07-19 RX ADMIN — TAMSULOSIN HYDROCHLORIDE 0.4 MG: 0.4 CAPSULE ORAL at 17:07

## 2021-07-19 RX ADMIN — FAMOTIDINE 20 MG: 20 TABLET ORAL at 09:14

## 2021-07-19 RX ADMIN — DICYCLOMINE HYDROCHLORIDE 10 MG: 10 CAPSULE ORAL at 21:00

## 2021-07-19 RX ADMIN — OXYCODONE HYDROCHLORIDE 5 MG: 5 SOLUTION ORAL at 20:33

## 2021-07-19 RX ADMIN — DICYCLOMINE HYDROCHLORIDE 10 MG: 10 CAPSULE ORAL at 11:28

## 2021-07-19 RX ADMIN — ATORVASTATIN CALCIUM 40 MG: 40 TABLET, FILM COATED ORAL at 17:07

## 2021-07-19 RX ADMIN — DOCUSATE SODIUM AND SENNOSIDES 1 TABLET: 8.6; 5 TABLET ORAL at 09:15

## 2021-07-19 RX ADMIN — DOCUSATE SODIUM AND SENNOSIDES 1 TABLET: 8.6; 5 TABLET ORAL at 20:33

## 2021-07-19 RX ADMIN — ACYCLOVIR 400 MG: 200 CAPSULE ORAL at 09:14

## 2021-07-19 RX ADMIN — ENOXAPARIN SODIUM 40 MG: 40 INJECTION SUBCUTANEOUS at 09:15

## 2021-07-19 RX ADMIN — DICYCLOMINE HYDROCHLORIDE 10 MG: 10 CAPSULE ORAL at 17:07

## 2021-07-19 NOTE — PLAN OF CARE
Problem: Prexisting or High Potential for Compromised Skin Integrity  Goal: Skin integrity is maintained or improved  Description: INTERVENTIONS:  - Identify patients at risk for skin breakdown  - Assess and monitor skin integrity  - Assess and monitor nutrition and hydration status  - Monitor labs   - Assess for incontinence   - Turn and reposition patient  - Assist with mobility/ambulation  - Relieve pressure over bony prominences  - Avoid friction and shearing  - Provide appropriate hygiene as needed including keeping skin clean and dry  - Evaluate need for skin moisturizer/barrier cream  - Collaborate with interdisciplinary team   - Patient/family teaching  - Consider wound care consult   Outcome: Progressing     Problem: MOBILITY - ADULT  Goal: Maintain or return to baseline ADL function  Description: INTERVENTIONS:  -  Assess patient's ability to carry out ADLs; assess patient's baseline for ADL function and identify physical deficits which impact ability to perform ADLs (bathing, care of mouth/teeth, toileting, grooming, dressing, etc )  - Assess/evaluate cause of self-care deficits   - Assess range of motion  - Assess patient's mobility; develop plan if impaired  - Assess patient's need for assistive devices and provide as appropriate  - Encourage maximum independence but intervene and supervise when necessary  - Involve family in performance of ADLs  - Assess for home care needs following discharge   - Consider OT consult to assist with ADL evaluation and planning for discharge  - Provide patient education as appropriate  Outcome: Progressing  Goal: Maintains/Returns to pre admission functional level  Description: INTERVENTIONS:  - Perform BMAT or MOVE assessment daily    - Set and communicate daily mobility goal to care team and patient/family/caregiver  - Collaborate with rehabilitation services on mobility goals if consulted  - Perform Range of Motion  - Reposition patient every 2 hours    - Record patient progress and toleration of activity level   Outcome: Progressing     Problem: PAIN - ADULT  Goal: Verbalizes/displays adequate comfort level or baseline comfort level  Description: Interventions:  - Encourage patient to monitor pain and request assistance  - Assess pain using appropriate pain scale  - Administer analgesics based on type and severity of pain and evaluate response  - Implement non-pharmacological measures as appropriate and evaluate response  - Consider cultural and social influences on pain and pain management  - Notify physician/advanced practitioner if interventions unsuccessful or patient reports new pain  Outcome: Progressing     Problem: INFECTION - ADULT  Goal: Absence or prevention of progression during hospitalization  Description: INTERVENTIONS:  - Assess and monitor for signs and symptoms of infection  - Monitor lab/diagnostic results  - Monitor all insertion sites, i e  indwelling lines, tubes, and drains  - Monitor endotracheal if appropriate and nasal secretions for changes in amount and color  - [place] appropriate cooling/warming therapies per order  - Administer medications as ordered  - Instruct and encourage patient and family to use good hand hygiene technique  - Identify and instruct in appropriate isolation precautions for identified infection/condition  Outcome: Progressing  Goal: Absence of fever/infection during neutropenic period  Description: INTERVENTIONS:  - Monitor WBC    Outcome: Progressing     Problem: SAFETY ADULT  Goal: Patient will remain free of falls  Description: INTERVENTIONS:  - Educate patient/family on patient safety including physical limitations  - Instruct patient to call for assistance with activity   - Consult OT/PT to assist with strengthening/mobility   - Keep Call bell within reach  - Keep bed low and locked with side rails adjusted as appropriate  - Keep care items and personal belongings within reach  - Initiate and maintain comfort rounds  - Make Fall Risk Sign visible to staff  - Offer Toileting every 2 Hours, in advance of need  - Initiate/Maintain alarm  - Obtain necessary fall risk management equipment  - Apply yellow socks and bracelet for high fall risk patients  - Consider moving patient to room near nurses station  Outcome: Progressing     Problem: DISCHARGE PLANNING  Goal: Discharge to home or other facility with appropriate resources  Description: INTERVENTIONS:  - Identify barriers to discharge w/patient and caregiver  - Arrange for needed discharge resources and transportation as appropriate  - Identify discharge learning needs (meds, wound care, etc )  - Arrange for interpretive services to assist at discharge as needed  - Refer to Case Management Department for coordinating discharge planning if the patient needs post-hospital services based on physician/advanced practitioner order or complex needs related to functional status, cognitive ability, or social support system  Outcome: Progressing     Problem: Knowledge Deficit  Goal: Patient/family/caregiver demonstrates understanding of disease process, treatment plan, medications, and discharge instructions  Description: Complete learning assessment and assess knowledge base    Interventions:  - Provide teaching at level of understanding  - Provide teaching via preferred learning methods  Outcome: Progressing     Problem: Potential for Falls  Goal: Patient will remain free of falls  Description: INTERVENTIONS:  - Educate patient/family on patient safety including physical limitations  - Instruct patient to call for assistance with activity   - Consult OT/PT to assist with strengthening/mobility   - Keep Call bell within reach  - Keep bed low and locked with side rails adjusted as appropriate  - Keep care items and personal belongings within reach  - Initiate and maintain comfort rounds  - Make Fall Risk Sign visible to staff  - Offer Toileting every 2 Hours, in advance of need  - Initiate/Maintain alarm  - Obtain necessary fall risk management equipment  - Apply yellow socks and bracelet for high fall risk patients  - Consider moving patient to room near nurses station  Outcome: Progressing     Problem: Nutrition/Hydration-ADULT  Goal: Nutrient/Hydration intake appropriate for improving, restoring or maintaining nutritional needs  Description: Monitor and assess patient's nutrition/hydration status for malnutrition  Collaborate with interdisciplinary team and initiate plan and interventions as ordered  Monitor patient's weight and dietary intake as ordered or per policy  Utilize nutrition screening tool and intervene as necessary  Determine patient's food preferences and provide high-protein, high-caloric foods as appropriate       INTERVENTIONS:  - Monitor oral intake, urinary output, labs, and treatment plans  - Assess nutrition and hydration status and recommend course of action  - Evaluate amount of meals eaten  - Assist patient with eating if necessary   - Allow adequate time for meals  - Recommend/ encourage appropriate diets, oral nutritional supplements, and vitamin/mineral supplements  - Order, calculate, and assess calorie counts as needed  - Recommend, monitor, and adjust tube feedings and TPN/PPN based on assessed needs  - Assess need for intravenous fluids  - Provide specific nutrition/hydration education as appropriate  - Include patient/family/caregiver in decisions related to nutrition  Outcome: Progressing

## 2021-07-19 NOTE — CASE MANAGEMENT
CM spoke with Nelson [employer] Case Manager) [phone] opt [phone] ext [phone] and informed on [facility] not willing to accept pt due to exhausting insurance benefits  Nelson Sr stated that pt has 120 days she can use for SNF, and she has only used 30 days so far, and pt has not exhausted her insurance benefits  Nelson Sr stated that the reason they probably wont accept her is because there was an out of network contract signed  Nelson Sr provided CM with in network facilities that will probably accept pt: [facility], [facility], [facility], and [facility]  CM emailed Nelson Sr to get more options for pt           CM sent referrals via [facility]

## 2021-07-19 NOTE — PLAN OF CARE
Problem: Prexisting or High Potential for Compromised Skin Integrity  Goal: Skin integrity is maintained or improved  Description: INTERVENTIONS:  - Identify patients at risk for skin breakdown  - Assess and monitor skin integrity  - Assess and monitor nutrition and hydration status  - Monitor labs   - Assess for incontinence   - Turn and reposition patient  - Assist with mobility/ambulation  - Relieve pressure over bony prominences  - Avoid friction and shearing  - Provide appropriate hygiene as needed including keeping skin clean and dry  - Evaluate need for skin moisturizer/barrier cream  - Collaborate with interdisciplinary team   - Patient/family teaching  - Consider wound care consult   Outcome: Progressing     Problem: MOBILITY - ADULT  Goal: Maintain or return to baseline ADL function  Description: INTERVENTIONS:  -  Assess patient's ability to carry out ADLs; assess patient's baseline for ADL function and identify physical deficits which impact ability to perform ADLs (bathing, care of mouth/teeth, toileting, grooming, dressing, etc )  - Assess/evaluate cause of self-care deficits   - Assess range of motion  - Assess patient's mobility; develop plan if impaired  - Assess patient's need for assistive devices and provide as appropriate  - Encourage maximum independence but intervene and supervise when necessary  - Involve family in performance of ADLs  - Assess for home care needs following discharge   - Consider OT consult to assist with ADL evaluation and planning for discharge  - Provide patient education as appropriate  Outcome: Progressing  Goal: Maintains/Returns to pre admission functional level  Description: INTERVENTIONS:  - Perform BMAT or MOVE assessment daily    - Set and communicate daily mobility goal to care team and patient/family/caregiver  - Collaborate with rehabilitation services on mobility goals if consulted  - Perform Range of Motion  - Reposition patient every 2 hours    - Record patient progress and toleration of activity level   Outcome: Progressing     Problem: PAIN - ADULT  Goal: Verbalizes/displays adequate comfort level or baseline comfort level  Description: Interventions:  - Encourage patient to monitor pain and request assistance  - Assess pain using appropriate pain scale  - Administer analgesics based on type and severity of pain and evaluate response  - Implement non-pharmacological measures as appropriate and evaluate response  - Consider cultural and social influences on pain and pain management  - Notify physician/advanced practitioner if interventions unsuccessful or patient reports new pain  Outcome: Progressing     Problem: INFECTION - ADULT  Goal: Absence or prevention of progression during hospitalization  Description: INTERVENTIONS:  - Assess and monitor for signs and symptoms of infection  - Monitor lab/diagnostic results  - Monitor all insertion sites, i e  indwelling lines, tubes, and drains  - Monitor endotracheal if appropriate and nasal secretions for changes in amount and color  - [place] appropriate cooling/warming therapies per order  - Administer medications as ordered  - Instruct and encourage patient and family to use good hand hygiene technique  - Identify and instruct in appropriate isolation precautions for identified infection/condition  Outcome: Progressing  Goal: Absence of fever/infection during neutropenic period  Description: INTERVENTIONS:  - Monitor WBC    Outcome: Progressing     Problem: SAFETY ADULT  Goal: Patient will remain free of falls  Description: INTERVENTIONS:  - Educate patient/family on patient safety including physical limitations  - Instruct patient to call for assistance with activity   - Consult OT/PT to assist with strengthening/mobility   - Keep Call bell within reach  - Keep bed low and locked with side rails adjusted as appropriate  - Keep care items and personal belongings within reach  - Initiate and maintain comfort rounds  - Make Fall Risk Sign visible to staff  - Offer Toileting every 2 Hours, in advance of need  - Initiate/Maintain alarm  - Obtain necessary fall risk management equipment  - Apply yellow socks and bracelet for high fall risk patients  - Consider moving patient to room near nurses station  Outcome: Progressing     Problem: DISCHARGE PLANNING  Goal: Discharge to home or other facility with appropriate resources  Description: INTERVENTIONS:  - Identify barriers to discharge w/patient and caregiver  - Arrange for needed discharge resources and transportation as appropriate  - Identify discharge learning needs (meds, wound care, etc )  - Arrange for interpretive services to assist at discharge as needed  - Refer to Case Management Department for coordinating discharge planning if the patient needs post-hospital services based on physician/advanced practitioner order or complex needs related to functional status, cognitive ability, or social support system  Outcome: Progressing     Problem: Knowledge Deficit  Goal: Patient/family/caregiver demonstrates understanding of disease process, treatment plan, medications, and discharge instructions  Description: Complete learning assessment and assess knowledge base    Interventions:  - Provide teaching at level of understanding  - Provide teaching via preferred learning methods  Outcome: Progressing     Problem: Potential for Falls  Goal: Patient will remain free of falls  Description: INTERVENTIONS:  - Educate patient/family on patient safety including physical limitations  - Instruct patient to call for assistance with activity   - Consult OT/PT to assist with strengthening/mobility   - Keep Call bell within reach  - Keep bed low and locked with side rails adjusted as appropriate  - Keep care items and personal belongings within reach  - Initiate and maintain comfort rounds  - Make Fall Risk Sign visible to staff  - Offer Toileting every 2 Hours, in advance of need  - Initiate/Maintain alarm  - Obtain necessary fall risk management equipment  - Apply yellow socks and bracelet for high fall risk patients  - Consider moving patient to room near nurses station  Outcome: Progressing     Problem: Nutrition/Hydration-ADULT  Goal: Nutrient/Hydration intake appropriate for improving, restoring or maintaining nutritional needs  Description: Monitor and assess patient's nutrition/hydration status for malnutrition  Collaborate with interdisciplinary team and initiate plan and interventions as ordered  Monitor patient's weight and dietary intake as ordered or per policy  Utilize nutrition screening tool and intervene as necessary  Determine patient's food preferences and provide high-protein, high-caloric foods as appropriate       INTERVENTIONS:  - Monitor oral intake, urinary output, labs, and treatment plans  - Assess nutrition and hydration status and recommend course of action  - Evaluate amount of meals eaten  - Assist patient with eating if necessary   - Allow adequate time for meals  - Recommend/ encourage appropriate diets, oral nutritional supplements, and vitamin/mineral supplements  - Order, calculate, and assess calorie counts as needed  - Recommend, monitor, and adjust tube feedings and TPN/PPN based on assessed needs  - Assess need for intravenous fluids  - Provide specific nutrition/hydration education as appropriate  - Include patient/family/caregiver in decisions related to nutrition  Outcome: Progressing

## 2021-07-19 NOTE — PROGRESS NOTES
Contacted , Jaiden Major, for an update  Discussed current plan  He verbalized understanding  He is aware pt is medically stable and is pending placement to rehab  He is still unable to visit, states he may visit within next 2-4 days       Pan Donato, DO  PGY3

## 2021-07-19 NOTE — OCCUPATIONAL THERAPY NOTE
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[facility])  Active Problems:    Altered mental status    Dysphagia    Severe protein-calorie malnutrition ([address] )    Cancer related pain    Ambulatory dysfunction    Hypokalemia            07/19/21 1100   OT Last Visit   OT Visit Date 07/19/21   Note Type   Note Type Treatment   Restrictions/Precautions   Weight Bearing Precautions Per Order No   Other Precautions Cognitive; Chair Alarm; Bed Alarm; Fall Risk;Pain   General   Response to Previous Treatment Patient with no complaints from previous session   Lifestyle   Autonomy At baseline, pt was I with ADLs/IADLs  PTA pt requires A for ADLs/IADLs   Reciprocal Relationships     Service to Others Unable to obtain   Intrinsic Gratification Will continue to assess   Pain Assessment   Pain Assessment Tool FLACC   Pain Rating: FLACC (Rest) - Face 1   Pain Rating: FLACC (Rest) - Legs 0   Pain Rating: FLACC (Rest) - Activity 0   Pain Rating: FLACC (Rest) - Cry 1   Pain Rating: FLACC (Rest) - Consolability 1   Score: FLACC (Rest) 3   Pain Rating: FLACC (Activity) - Face 1   Pain Rating: FLACC (Activity) - Legs 1   Pain Rating: FLACC (Activity) - Activity 0   Pain Rating: FLACC (Activity) - Cry 1   Pain Rating: FLACC (Activity) - Consolability 1   Score: FLACC (Activity) 4   ADL   Where Assessed Edge of bed   LB Dressing Assistance 2  Maximal Assistance   LB Dressing Deficit Don/doff R sock; Don/doff L sock   Bed Mobility   Supine to Sit 3  Moderate assistance   Additional items Assist x 2; Increased time required   Sit to Supine Unable to assess   Additional Comments Pt seated OOB in chair upon arrival    Transfers   Sit pivot 1  Dependent   Additional items Assist x 2; Increased time required   Therapeutic Exercise - ROM   UE-ROM Yes   ROM - Left Upper Extremities    L Elbow PROM;Elbow flexion;Elbow extension   L Wrist PROM; Prolonged stretch; Wrist flexion;Wrist extension   L Hand PROM; Prolonged stretch; Thumb; Index finger; Long finger;Ring finger;Little finger   L Position Seated   L Weight/Reps/Sets x10   LUE ROM Comment Pt with increased pain in LUE  Attempted sh abd but unable to complete ROM 2* pt pain  Noted edema/swelling in MCPs, PIPs, and DIPs  Pt limited use 2* pain  Thumb rests in adduction    Cognition   Overall Cognitive Status Impaired   Arousal/Participation Responsive   Attention Attends with cues to redirect   Following Commands Follows one step commands with increased time or repetition   Comments Pt pleasant during session  Utilized [employer] translate    Activity Tolerance   Activity Tolerance Patient limited by fatigue;Patient limited by pain   Medical Staff MARTINE Paige clearance for session    Assessment   Assessment Patient participated in Skilled OT session this date with interventions consisting of ADL re training with the use of correct body mechnaics, therapeutic exercise to: increase functional use of BUEs, increase BUE muscle strength ,  therapeutic activities to: increase activity tolerance and increase OOB/ sitting tolerance   Upon arrival patient was found supine in bed  Pt demonstrated the following tasks: MOD x 2 sup to sit, DEP x 2 sit pivot bed to chair  Pt continues to have limited use of LUE 2* decreased strength ROM, as well as extreme pain with touch/movement  PROM performed on distal UE  Unable to perform proximal ROM, specifically abd 2* pt pain  Pt LUE thumb in slightly contracted in ADD position  Double towel roll placed in hand to position thumb in ADB position  Patient continues to be functioning below baseline level, occupational performance remains limited secondary to factors listed above and increased risk for falls and injury  From OT standpoint, recommendation at time of d/c would be STR    Patient to benefit from continued Occupational Therapy treatment while in the hospital to address deficits as defined above and maximize level of functional independence with ADLs and functional mobility  Pt was left in chair with alarm on after session with all current needs met  The patient's raw score on the AM-PAC Daily Activity inpatient short form is 14, standardized score is 33 39, less than 39 4  Patients at this level are likely to benefit from discharge to post-acute rehabilitation services  Please refer to the recommendation of the Occupational Therapist for safe discharge planning  Plan   Treatment Interventions ADL retraining;Functional transfer training;UE strengthening/ROM; Endurance training;Cognitive reorientation;Patient/family training;Equipment evaluation/education; Neuromuscular reeducation; Fine motor coordination activities; Compensatory technique education;UE splinting;Continued evaluation; Energy conservation; Activityengagement   Goal Expiration Date 07/28/21   OT Treatment Day 1   OT Frequency 3-5x/wk   Recommendation   OT Discharge Recommendation Post acute rehabilitation services   OT - OK to Discharge Yes  (to rehab when medically stable)   AM-PAC Daily Activity Inpatient   Lower Body Dressing 2   Bathing 2   Toileting 2   Upper Body Dressing 2   Grooming 3   Eating 3   Daily Activity Raw Score 14   Daily Activity Standardized Score (Calc for Raw Score >=11) 33 39   AM-PAC Applied Cognition Inpatient   Following a Speech/Presentation 3   Understanding Ordinary Conversation 3   Taking Medications 3   Remembering Where Things Are Placed or Put Away 3   Remembering List of 4-5 Errands 3   Taking Care of Complicated Tasks 2   Applied Cognition Raw Score 17   Applied Cognition Standardized Score 36 52      Layne Peters MS, OTR/L

## 2021-07-19 NOTE — PLAN OF CARE
Problem: OCCUPATIONAL THERAPY ADULT  Goal: Performs self-care activities at highest level of function for planned discharge setting  See evaluation for individualized goals  Description: Treatment Interventions: ADL retraining, Functional transfer training, UE strengthening/ROM, Endurance training, Cognitive reorientation, Patient/family training, Equipment evaluation/education, Neuromuscular reeducation, Fine motor coordination activities, Compensatory technique education, Continued evaluation, Energy conservation, Activityengagement          See flowsheet documentation for full assessment, interventions and recommendations  Outcome: Progressing  Note: Limitation: Decreased ADL status, Decreased UE ROM, Decreased UE strength, Decreased Safe judgement during ADL, Decreased cognition, Decreased endurance, Decreased self-care trans, Decreased high-level ADLs, Decreased fine motor control  Prognosis: Fair  Assessment: Patient participated in Skilled OT session this date with interventions consisting of ADL re training with the use of correct body mechnaics, therapeutic exercise to: increase functional use of BUEs, increase BUE muscle strength ,  therapeutic activities to: increase activity tolerance and increase OOB/ sitting tolerance   Upon arrival patient was found supine in bed  Pt demonstrated the following tasks: MOD x 2 sup to sit, DEP x 2 sit pivot bed to chair  Pt continues to have limited use of LUE 2* decreased strength ROM, as well as extreme pain with touch/movement  PROM performed on distal UE  Unable to perform proximal ROM, specifically abd 2* pt pain  Pt LUE thumb in slightly contracted in ADD position  Double towel roll placed in hand to position thumb in ADB position  Patient continues to be functioning below baseline level, occupational performance remains limited secondary to factors listed above and increased risk for falls and injury     From OT standpoint, recommendation at time of d/c would be STR  Patient to benefit from continued Occupational Therapy treatment while in the hospital to address deficits as defined above and maximize level of functional independence with ADLs and functional mobility  Pt was left in chair with alarm on after session with all current needs met  The patient's raw score on the AM-PAC Daily Activity inpatient short form is 14, standardized score is 33 39, less than 39 4  Patients at this level are likely to benefit from discharge to post-acute rehabilitation services  Please refer to the recommendation of the Occupational Therapist for safe discharge planning       OT Discharge Recommendation: Post acute rehabilitation services  OT - OK to Discharge: Yes (to rehab when medically stable)

## 2021-07-19 NOTE — UTILIZATION REVIEW
Continued Stay Review    Date: 7/17/21 and 7/19/21                    Current Patient Class: IP  Current Level of Care: MS    HPI:56 y o  female initially admitted on 7/13/21 with primary CNS Lymphoma for chemo of cycle 3 HD MTX/Rituxan  Assessment/Plan:   Pt was taken off Bicarb infusion on 7/16  Methotrexate level on 7/16 was <0 10  Pt does not need to continue with Leukovoran at this point  Continue acyclovir, Diflucan and Levaquin prophylactic  She remains ill-appearing, she is frail  Doing well overall  Hypokalemia resolved  Does not admit to pain but grimaces with abd pressure, no guarding, rebound  Using PRN analgesia sparingly  Had parenteral analgesia on 7/17 and 7/18, now only on oral analgesia  Will need to find a new [facility] level rehab for patient  7/19 - is c/o L hand pain today  She is answering questions appropriately in her native language  Working on [employer] rehab approval for patient  Using oral analgesia only  Vital Signs:   07/19/21 15:10:58  98 7 °F (37 1 °C)  85  16  119/74  89  99 %  --   07/19/21 0939  --  --  --  --  --  --  None (Room air)   07/19/21 09:08:50  --  --  --  100/69  79  --  --   07/19/21 07:03:58  98 5 °F (36 9 °C)  87  16  109/72  84  98 %  --   07/18/21 22:24:12  98 6 °F (37 °C)  107Abnormal   18  99/70  80  98 %  --   07/18/21 2015  --  --  --  --  --  --  None (Room air)   07/18/21 15:05:37  98 6 °F (37 °C)  96  18  100/71  81  98 %  --   07/18/21 07:25:02  98 4 °F (36 9 °C)  96  16  123/86  98  98 %  --     07/17/21 22:22:16  97 8 °F (36 6 °C)  97  16  106/71  83  100 %  --   07/17/21 1500  --  95  --  107/71  83  100 %  None (Room air)   07/17/21 1448  97 1 °F (36 2 °C)Abnormal   96  16  107/71  83  100 %  --   07/17/21 1000  --  --  --  --  --  --  None (Room air)   07/17/21 0800  --  99  --  --  --  100 %  None (Room air)   07/17/21 07:12:59  98 4 °F (36 9 °C)  93  18  105/69  81  100 %  None (Room air)     Pertinent Labs/Diagnostic Results:      Ref  Range 7/16/2021 7/18/2021 19:13   Methotrexate Lvl Latest Units: umol/L <0 10 <0 10         Results from last 7 days   Lab Units 07/17/21  0439 07/16/21  0606 07/15/21  0546 07/14/21  0511 07/13/21  1009   WBC Thousand/uL 6 12 6 94 6 62 5 11 7 92   HEMOGLOBIN g/dL 11 2* 11 7 9 9* 11 5 11 6   HEMATOCRIT % 35 6 36 7 31 6* 35 6 36 7   PLATELETS Thousands/uL 227 243 208 275 228   NEUTROS ABS Thousands/µL 3 55  --   --  4 07 4 88   BANDS PCT %  --  2  --   --   --          Results from last 7 days   Lab Units 07/18/21  0519 07/17/21  0439 07/16/21  0606 07/15/21  1635 07/15/21  0546 07/14/21  0511   SODIUM mmol/L 141 141 141 138 138 141   POTASSIUM mmol/L 3 7 3 7 3 4* 4 0 2 7* 3 5   CHLORIDE mmol/L 106 106 104 106 99* 105   CO2 mmol/L 28 30 31 28 32 28   ANION GAP mmol/L 7 5 6 4 7 8   BUN mg/dL 8 5 5 7 6 11   CREATININE mg/dL 0 36* 0 45* 0 39* 0 33* 0 41* 0 44*   EGFR ml/min/1 73sq m 123 114 119 126 118 115   CALCIUM mg/dL 9 8 9 7 9 3 9 4 8 5 9 7   MAGNESIUM mg/dL  --  1 7 2 2  --  1 6 1 7     Results from last 7 days   Lab Units 07/17/21  0439 07/14/21  0511 07/13/21  1008   AST U/L 12 13 10   ALT U/L 14 15 12   ALK PHOS U/L 59 57 61   TOTAL PROTEIN g/dL 5 7* 6 1* 6 4   ALBUMIN g/dL 2 9* 3 1* 3 0*   TOTAL BILIRUBIN mg/dL 0 21 0 26 0 17*     Results from last 7 days   Lab Units 07/19/21  1110 07/19/21  0607 07/18/21 2038 07/18/21  1644 07/18/21  1115 07/18/21  0727 07/17/21 2032 07/17/21  1642 07/17/21  1203 07/17/21  0807 07/16/21  2044 07/16/21  1641   POC GLUCOSE mg/dl 95 102 131 89 122 104 106 100 103 115 128 143*     Results from last 7 days   Lab Units 07/18/21  0519 07/17/21  0439 07/16/21  0606 07/15/21  1635 07/15/21  0546 07/14/21  0511 07/13/21  1008   GLUCOSE RANDOM mg/dL 94 111 130 114 412* 233* 114     Medications:   Scheduled Medications:  acyclovir, 400 mg, Oral, Q12H MEÑO  allopurinol, 100 mg, Oral, Daily  amLODIPine, 5 mg, Oral, Daily  atorvastatin, 40 mg, Oral, After Dinner  dicyclomine, 10 mg, Oral, 4x Daily (AC & HS)  docusate sodium, 100 mg, Oral, Daily  enoxaparin, 40 mg, Subcutaneous, Q24H MEÑO  famotidine, 20 mg, Oral, Daily  folic acid, 1 mg, Oral, Daily  gabapentin, 200 mg, Oral, HS  insulin lispro, 1-5 Units, Subcutaneous, TID AC  insulin lispro, 1-5 Units, Subcutaneous, HS  menthol-methyl salicylate, , Apply externally, BID  senna-docusate sodium, 1 tablet, Oral, Q12H MEÑO  tamsulosin, 0 4 mg, Oral, After Dinner      Continuous IV Infusions:        PRN Meds:  acetaminophen, 650 mg, Oral, Q6H PRN - x 1 7/18  albuterol, 2 puff, Inhalation, Q4H PRN  aluminum-magnesium hydroxide-simethicone, 30 mL, Per PEG Tube, Q4H PRN  bisacodyl, 10 mg, Rectal, Daily PRN  Diclofenac Sodium, 2 g, Topical, TID PRN - x 1 7/17, 7/18, 7/19  HYDROmorphone, 0 2 mg, Intravenous, Q8H PRN - x 1 7/17, 7/18 and d/c   hydrOXYzine HCL, 25 mg, Oral, Q6H PRN  ondansetron, 4 mg, Oral, Q6H PRN - x 1 7/18  oxyCODONE, 5 mg, Oral, Q6H PRN - x1 7/16, 7/17, x 3 7/18, x 1 7/19  polyethylene glycol, 17 g, Oral, Daily PRN  sodium chloride, 20 mL/hr, Intravenous, Once PRN    Discharge Plan: will need SNF rehab, previous facility will not accept the pt back as they are OON  CM was provided with in-network facilities so bed search has begun  Network Utilization Review Department  ATTENTION: Please call with any questions or concerns to [phone] and carefully listen to the prompts so that you are directed to the right person  All voicemails are confidential   Niko Burk all requests for admission clinical reviews, approved or denied determinations and any other requests to dedicated fax number below belonging to the campus where the patient is receiving treatment   List of dedicated fax numbers for the Facilities:  [address] DENIALS (Administrative/Medical Necessity) [phone]   [address] (Maternity/NICU/Pediatrics) [address] [place] [address],[address] [phone]   [facility] [address] Aleisha Oh [address] [place] [address] [address] [facility] [address] [place] [address] [phone]

## 2021-07-19 NOTE — PROGRESS NOTES
INTERNAL MEDICINE RESIDENCY PROGRESS NOTE     Name: Tomy Adkins   Age & Sex: 49 y o  female   MRN: [id number]  Unit/Bed#: [facility] 905-01   Encounter: [id number]  Team: SOD Team A    PATIENT INFORMATION     Name: Tomy Adkins   Age & Sex: 49 y o  female   MRN: [id number]  Hospital Stay Days: 6    ASSESSMENT/PLAN     Principal Problem:    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[facility] 75 )  Active Problems:    Altered mental status    Dysphagia    Severe protein-calorie malnutrition ([facility] )    Cancer related pain    Ambulatory dysfunction    Hypokalemia      Hypokalemia  Assessment & Plan  Resolved, potassium 3 7  Likely in setting of poor p o  Intake/poor absorption in setting of chemotherapy and malnutrition  Monitor with a m  BMP and replete as needed     Ambulatory dysfunction  Assessment & Plan  A: per [address] does stand up with assistance and able to take few steps however her left leg does give out  Known residual deficits left upper extremity and lower extremity weakness secondary to CNS lymphoma  PT/OT recommended rehab, case management consulted  Appreciate further guidance    Cancer related pain  Assessment & Plan  Tylenol 650mg Prn  Oxyocodone 2 5mg Prn    Severe protein-calorie malnutrition (HCC)  Assessment & Plan  Malnutrition Findings:   Adult Malnutrition type: Chronic illness  Adult Degree of Malnutrition: Other severe protein calorie malnutrition    BMI Findings: Body mass index is 22 17 kg/m²  Nutrition following, appreciate recommendations  Continue protein supplementation  Patient does have PEG tube in place, currently not receiving tube feeds as able to tolerate p o  Intake  Dysphagia  Assessment & Plan  A: per irasema louis pt started to tolerated mechanical / thin liquid diet and stopped PEG tubes  Peg tube in place, clear, no discharge noted  Continue with q 4 hours flushes    Evaluated by speech, Continue with level 2 diet    Altered mental status  Assessment & Plan  A: A x O 2 to person and place which is same as previous admission  Speaks Gupavel only     P: Monitor for acute changes     * 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[facility] )  Assessment & Plan  A: Diffuse Large B-cell Primary 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)       Direct Admit from Dr Eden Treviño for in-patient chemo 3rd cycle       S/P chemotherapy on , leucovin on  MRI- Appears to be interval improvement in the overall      enhancement of multifocal intracranial lesions  P:    DVT prophylaxis with Lovenox   Continue acyclovir, Diflucan and Levaquin prophylactic  Status post 2 doses of methotrexate/Rituxan and completion of Leucovorin rescue treatment  Oncology Consult, appreciate recommendations  MTX < 1 no further need for Leucovorin rescue      Disposition:  Remains medically stable,  pending placement to rehab  SUBJECTIVE     Patient seen and examined  No acute events overnight  Endorses some left hand pain and swelling, otherwise ROS negative  Patient is tearful when speaking about her spouse as she would like for him to visit  Denies any nausea, vomiting, diarrhea constipation, fevers or chills  All other review of systems negative at this time  OBJECTIVE     Vitals:    21 2224 21 0703 21 0908 21 1510   BP: 99/70 109/72 100/69 119/74   Pulse: (!) 107 87  85   Resp: 18 16  16   Temp: 98 6 °F (37 °C) 98 5 °F (36 9 °C)  98 7 °F (37 1 °C)   SpO2: 98% 98%  99%   Weight:       Height:          Temperature:   Temp (24hrs), Av 6 °F (37 °C), Min:98 5 °F (36 9 °C), Max:98 7 °F (37 1 °C)    Temperature: 98 7 °F (37 1 °C)  Intake & Output:  I/O        07 -  0700  07 -  0700  07 -  0700    P  O  330 360 0    I V  (mL/kg)       NG/      IV Piggyback 100      Total Intake(mL/kg) 550 (10 7) 360 (7) 0 (0)    Urine (mL/kg/hr) 2050 (1 7) 575 (0 5) 500 (1)    Stool       Total Output  575 500    Net -1500 -215 -500           Unmeasured Urine Occurrence 1 x          Weights: IBW (Ideal Body Weight): 45 5 kg    Body mass index is 22 17 kg/m²  Weight (last 2 days)     None        Physical Exam  Constitutional:       Comments: Thin, cachectic appearance   HENT:      Head: Normocephalic and atraumatic  Eyes:      Comments: Left eye ptosis present, improving   Cardiovascular:      Rate and Rhythm: Normal rate and regular rhythm  Pulmonary:      Effort: Pulmonary effort is normal       Breath sounds: No wheezing  Abdominal:      General: Abdomen is flat  Tenderness: There is no abdominal tenderness  Comments: PEG tube in place   Musculoskeletal:         General: No swelling  Right lower leg: No edema  Left lower leg: No edema  Comments: Left upper extremity 1/5, tenderness to palpation and movement  Left hand swelling, 1+ edema  Left lower extremity 2/5  Skin:     General: Skin is dry  Neurological:      Comments: Oriented to person place   Psychiatric:      Comments: Somewhat flat affect, tearful when talking about family  LABORATORY DATA     Labs: I have personally reviewed pertinent reports    Results from last 7 days   Lab Units 07/17/21  0439 07/16/21  0606 07/15/21  0546 07/14/21  0511 07/13/21  1009   WBC Thousand/uL 6 12 6 94 6 62 5 11 7 92   HEMOGLOBIN g/dL 11 2* 11 7 9 9* 11 5 11 6   HEMATOCRIT % 35 6 36 7 31 6* 35 6 36 7   PLATELETS Thousands/uL 227 243 208 275 228   NEUTROS PCT % 58  --   --  80* 61   MONOS PCT % 3*  --   --  1* 7   MONO PCT %  --  0*  --   --   --       Results from last 7 days   Lab Units 07/18/21  0519 07/17/21  0439 07/16/21  0606 07/14/21  0511 07/13/21  1008   POTASSIUM mmol/L 3 7 3 7 3 4* 3 5 3 4*   CHLORIDE mmol/L 106 106 104 105 103   CO2 mmol/L 28 30 31 28 27   BUN mg/dL 8 5 5 11 14   CREATININE mg/dL 0 36* 0 45* 0 39* 0 44* 0 39*   CALCIUM mg/dL 9 8 9 7 9 3 9 7 9 7   ALK PHOS U/L  --  59  --  57 61   ALT U/L  --  14  --  15 12   AST U/L  --  12  --  13 10     Results from last 7 days   Lab Units 07/17/21  0439 07/16/21  0606 07/15/21  0546   MAGNESIUM mg/dL 1 7 2 2 1 6                        IMAGING & DIAGNOSTIC TESTING     Radiology Results: I have personally reviewed pertinent reports  No results found  Other Diagnostic Testing: I have personally reviewed pertinent reports      ACTIVE MEDICATIONS     Current Facility-Administered Medications   Medication Dose Route Frequency    acetaminophen (TYLENOL) tablet 650 mg  650 mg Oral Q6H PRN    acyclovir (ZOVIRAX) capsule 400 mg  400 mg Oral Q12H [address]    albuterol (PROVENTIL HFA,VENTOLIN HFA) inhaler 2 puff  2 puff Inhalation Q4H PRN    allopurinol (ZYLOPRIM) tablet 100 mg  100 mg Oral Daily    aluminum-magnesium hydroxide-simethicone (MYLANTA) oral suspension 30 mL  30 mL Per PEG Tube Q4H PRN    amLODIPine (NORVASC) tablet 5 mg  5 mg Oral Daily    atorvastatin (LIPITOR) tablet 40 mg  40 mg Oral After Dinner    bisacodyl (DULCOLAX) rectal suppository 10 mg  10 mg Rectal Daily PRN    Diclofenac Sodium (VOLTAREN) 1 % topical gel 2 g  2 g Topical TID PRN    dicyclomine (BENTYL) capsule 10 mg  10 mg Oral 4x Daily (AC & HS)    docusate sodium (COLACE) capsule 100 mg  100 mg Oral Daily    enoxaparin (LOVENOX) subcutaneous injection 40 mg  40 mg Subcutaneous Q24H MEÑO    famotidine (PEPCID) tablet 20 mg  20 mg Oral Daily    folic acid (FOLVITE) tablet 1 mg  1 mg Oral Daily    gabapentin (NEURONTIN) capsule 200 mg  200 mg Oral HS    HYDROmorphone HCl (DILAUDID) injection 0 2 mg  0 2 mg Intravenous Q6H PRN    hydrOXYzine HCL (ATARAX) tablet 25 mg  25 mg Oral Q6H PRN    insulin lispro (HumaLOG) 100 units/mL subcutaneous injection 1-5 Units  1-5 Units Subcutaneous TID AC    insulin lispro (HumaLOG) 100 units/mL subcutaneous injection 1-5 Units  1-5 Units Subcutaneous HS    menthol-methyl salicylate (BENGAY) 60-68 % cream   Apply externally BID    ondansetron (ZOFRAN-ODT) dispersible tablet 4 mg  4 mg Oral Q6H PRN    oxyCODONE (ROXICODONE) oral solution 5 mg  5 mg Oral Q6H PRN    polyethylene glycol (MIRALAX) packet 17 g  17 g Oral Daily PRN    senna-docusate sodium (SENOKOT S) 8 6-50 mg per tablet 1 tablet  1 tablet Oral Q12H [address]    sodium chloride 0 9 % infusion  20 mL/hr Intravenous Once PRN    tamsulosin (FLOMAX) capsule 0 4 mg  0 4 mg Oral After Dinner       VTE Pharmacologic Prophylaxis: Enoxaparin (Lovenox)  VTE Mechanical Prophylaxis: sequential compression device    Portions of the record may have been created with voice recognition software  Occasional wrong word or "sound a like" substitutions may have occurred due to the inherent limitations of voice recognition software    Read the chart carefully and recognize, using context, where substitutions have occurred   ==  [place], [address]   Internal Medicine Residency PGY-3

## 2021-07-19 NOTE — PLAN OF CARE
Problem: PHYSICAL THERAPY ADULT  Goal: Performs mobility at highest level of function for planned discharge setting  See evaluation for individualized goals  Description: Treatment/Interventions: Functional transfer training, LE strengthening/ROM, Endurance training, Patient/family training, Bed mobility, Gait training, Spoke to nursing, Spoke to case management, OT          See flowsheet documentation for full assessment, interventions and recommendations  Outcome: Not Progressing  Note: Prognosis: Fair  Problem List: Decreased strength, Decreased endurance, Impaired balance, Decreased mobility, Decreased safety awareness, Decreased coordination, Decreased cognition, Pain  Assessment: The pt  was able to clear her buttocks with stance but required her kneees blocked and support for her LUE  She was only able to maintain standing for a few seconds with each trial due to pain and fatigue  She was able to perform therapeutic exercise with instruction and demonstration  She was very hesitant to perform any mobility with her LUE, but noted improved pain when it was supported on a pillow  The pt  was in the chair with alarm active and all needs within reach post session  Barriers to Discharge: Inaccessible home environment, Decreased caregiver support        PT Discharge Recommendation: Post acute rehabilitation services     PT - OK to Discharge: Yes (once medically cleared )    See flowsheet documentation for full assessment

## 2021-07-19 NOTE — PROGRESS NOTES
Medical Oncology/Hematology Progress Note  Emir Delvalle, female, 49 y o , 1965,  [facility], [id number]     Assessment and Plan  81-year-old female with primary CNS lymphoma admitted for C3 HD MTX/Rituxan  1  Primary CNS lymphoma  · s/p 2 cycles of high-dose Methotrexate/Rituxan  · Patient was overdue for her C3 HD MTX/Rituxan and was admitted from rehab facility for chemo   · Here for C3 HD MTX/Rituxan (C3D1 = 7/13 at 1900); Leucovorin 7/15-7/16  Off Leucovorin as MTX level is now <0 10     · MTX 7/16 <0 10  · Primary oncologist Dr Vinod Alexis, follow-up appointment scheduled on 08/02  · Continue PT/OT  · Daily CBC/CMP and daily weight    Subjective:  Patient assessed at bedside  Complains of left hand pain started this morning  Does not appear to be in distress  No other acute complaints  Language barrier      Review of Systems   Unable to perform ROS: Other (Language barrier)     Objective:     Medication Administration - last 24 hours from 07/18/2021 1056 to 07/19/2021 1056       Date/Time Order Dose Route Action Action by     07/19/2021 0914 acyclovir (ZOVIRAX) capsule 400 mg 400 mg Oral Given Parul Hartman RN     07/18/2021 2243 acyclovir (ZOVIRAX) capsule 400 mg 400 mg Oral Given Ana Barahona     07/19/2021 0915 allopurinol (ZYLOPRIM) tablet 100 mg 100 mg Oral Given Parul Hartman RN     07/19/2021 0913 amLODIPine (NORVASC) tablet 5 mg 0 mg Oral Hold Parul Hartman RN     07/18/2021 1800 atorvastatin (LIPITOR) tablet 40 mg   Oral Canceled Entry Shelli Christensen RN     07/18/2021 1632 atorvastatin (LIPITOR) tablet 40 mg 40 mg Oral Given Shelli Christensen RN     07/19/2021 0604 dicyclomine (BENTYL) capsule 10 mg 10 mg Oral Given Ana Barahona     07/18/2021 2243 dicyclomine (BENTYL) capsule 10 mg 10 mg Oral Given Ana Barahona     07/18/2021 1633 dicyclomine (BENTYL) capsule 10 mg 10 mg Oral Given Shelli Christensen, MARTINE     07/18/2021 3091 dicyclomine (BENTYL) capsule 10 mg 10 mg Oral Given Evelin Ortiz RN     07/19/2021 0915 enoxaparin (LOVENOX) subcutaneous injection 40 mg 40 mg Subcutaneous Given Parul Hartman RN     07/19/2021 0914 famotidine (PEPCID) tablet 20 mg 20 mg Oral Given Parul Hartman RN     07/19/2021 0915 senna-docusate sodium (SENOKOT S) 8 6-50 mg per tablet 1 tablet 1 tablet Oral Given Parul Hartman RN     07/18/2021 2243 senna-docusate sodium (SENOKOT S) 8 6-50 mg per tablet 1 tablet 1 tablet Oral Given Ana Barahona     07/18/2021 1800 tamsulosin (FLOMAX) capsule 0 4 mg   Oral Canceled Entry Evelin Ortiz RN     07/18/2021 1632 tamsulosin (FLOMAX) capsule 0 4 mg 0 4 mg Oral Given Evelin Ortiz RN     07/18/2021 1944 ondansetron (ZOFRAN-ODT) dispersible tablet 4 mg 4 mg Oral Given Ana Barahona     07/18/2021 1632 acetaminophen (TYLENOL) tablet 650 mg 650 mg Oral Given Evelin Ortiz RN     07/19/2021 0918 menthol-methyl salicylate (BENGAY) 08-85 % cream   Apply externally Given Parul Hartman RN     07/18/2021 1636 menthol-methyl salicylate (BENGAY) 20-63 % cream   Apply externally Given Evelin Ortiz RN     07/19/2021 0727 insulin lispro (HumaLOG) 100 units/mL subcutaneous injection 1-5 Units 0 Units Subcutaneous Hold Parul Hartman RN     07/18/2021 1753 insulin lispro (HumaLOG) 100 units/mL subcutaneous injection 1-5 Units 1 Units Subcutaneous Not Given Evelin Ortiz RN     07/18/2021 1157 insulin lispro (HumaLOG) 100 units/mL subcutaneous injection 1-5 Units 1 Units Subcutaneous Not Given Evelin Ortiz RN     07/18/2021 2238 insulin lispro (HumaLOG) 100 units/mL subcutaneous injection 1-5 Units 1 Units Subcutaneous Not Given Funmi Moreau     07/19/2021 0939 oxyCODONE (ROXICODONE) oral solution 5 mg 5 mg Oral Given Parul Hartman RN     07/18/2021 2242 oxyCODONE (ROXICODONE) oral solution 5 mg 5 mg Oral Given Ana Barahona     07/18/2021 1633 oxyCODONE (ROXICODONE) oral solution 5 mg 5 mg Oral Given Kiel Castillo RN     07/19/2021 0914 docusate sodium (COLACE) capsule 100 mg 100 mg Oral Given Parul Hartman RN     07/18/2021 2243 gabapentin (NEURONTIN) capsule 200 mg 200 mg Oral Given Ana Barahona     33/30/3889 4461 folic acid (FOLVITE) tablet 1 mg 1 mg Oral Given Parul Hartman RN     07/18/2021 1552 HYDROmorphone HCl (DILAUDID) injection 0 2 mg 0 2 mg Intravenous Given Kiel Castillo RN     07/18/2021 1754 HYDROmorphone (DILAUDID) injection 0 5 mg 0 5 mg Intravenous Given Kiel Castillo RN          /69   Pulse 87   Temp 98 5 °F (36 9 °C)   Resp 16   Ht 5' (1 524 m)   Wt 51 5 kg (113 lb 8 6 oz)   SpO2 98%   BMI 22 17 kg/m²       Physical Exam  Vitals reviewed  Constitutional:       Appearance: Normal appearance  HENT:      Head: Normocephalic and atraumatic  Eyes:      General: No scleral icterus  Extraocular Movements: Extraocular movements intact  Conjunctiva/sclera: Conjunctivae normal    Cardiovascular:      Rate and Rhythm: Normal rate and regular rhythm  Heart sounds: Normal heart sounds  Pulmonary:      Effort: Pulmonary effort is normal  No respiratory distress  Breath sounds: Normal breath sounds  No wheezing or rales  Abdominal:      General: Bowel sounds are normal       Palpations: Abdomen is soft  Tenderness: There is no abdominal tenderness  Musculoskeletal:      Cervical back: Normal range of motion  Skin:     General: Skin is warm and dry  Neurological:      Mental Status: She is alert  Comments: Answering questions appropriately in [facility]      Psychiatric:         Attention and Perception: Attention normal          Behavior: Behavior normal          Recent Results (from the past 48 hour(s))   Fingerstick Glucose (POCT)    Collection Time: 07/17/21 12:03 PM   Result Value Ref Range    POC Glucose 103 65 - 140 mg/dl   Fingerstick Glucose (POCT)    Collection Time: 07/17/21 4:42 PM   Result Value Ref Range    POC Glucose 100 65 - 140 mg/dl   Fingerstick Glucose (POCT)    Collection Time: 07/17/21  8:32 PM   Result Value Ref Range    POC Glucose 106 65 - 140 mg/dl   Basic metabolic panel    Collection Time: 07/18/21  5:19 AM   Result Value Ref Range    Sodium 141 136 - 145 mmol/L    Potassium 3 7 3 5 - 5 3 mmol/L    Chloride 106 100 - 108 mmol/L    CO2 28 21 - 32 mmol/L    ANION GAP 7 4 - 13 mmol/L    BUN 8 5 - 25 mg/dL    Creatinine 0 36 (L) 0 60 - 1 30 mg/dL    Glucose 94 65 - 140 mg/dL    Calcium 9 8 8 3 - 10 1 mg/dL    eGFR 123 ml/min/1 73sq m   Fingerstick Glucose (POCT)    Collection Time: 07/18/21  7:27 AM   Result Value Ref Range    POC Glucose 104 65 - 140 mg/dl   Fingerstick Glucose (POCT)    Collection Time: 07/18/21 11:15 AM   Result Value Ref Range    POC Glucose 122 65 - 140 mg/dl   Fingerstick Glucose (POCT)    Collection Time: 07/18/21  4:44 PM   Result Value Ref Range    POC Glucose 89 65 - 140 mg/dl   Methotrexate level    Collection Time: 07/18/21  7:46 PM   Result Value Ref Range    Methotrexate Lvl <0 10 umol/L   Fingerstick Glucose (POCT)    Collection Time: 07/18/21  8:38 PM   Result Value Ref Range    POC Glucose 131 65 - 140 mg/dl   Fingerstick Glucose (POCT)    Collection Time: 07/19/21  6:07 AM   Result Value Ref Range    POC Glucose 102 65 - 140 mg/dl       No results found  I have personally reviewed labs, imaging studies, and pertinent reports  This note has been generated by voice recognition software system  Therefore, there may be spelling, grammar, and or syntax errors  Please contact if questions arise      DO Darrin Sandoval 73 Internal Medicine PGY-2

## 2021-07-19 NOTE — PHYSICAL THERAPY NOTE
Physical Therapy Progress Note     07/19/21 1150   PT Last Visit   PT Visit Date 07/19/21   Note Type   Note Type Treatment   Pain Assessment   Pain Assessment Tool FLACC   Pain Rating: FLACC (Rest) - Face 1   Pain Rating: FLACC (Rest) - Legs 0   Pain Rating: FLACC (Rest) - Activity 0   Pain Rating: FLACC (Rest) - Cry 1   Pain Rating: FLACC (Rest) - Consolability 1   Score: FLACC (Rest) 3   Pain Rating: FLACC (Activity) - Face 2   Pain Rating: FLACC (Activity) - Legs 1   Pain Rating: FLACC (Activity) - Activity 1   Pain Rating: FLACC (Activity) - Cry 1   Pain Rating: FLACC (Activity) - Consolability 1   Score: FLACC (Activity) 6   Restrictions/Precautions   Other Precautions Cognitive; Chair Alarm; Bed Alarm;Pain  (Alarm active post session )   Subjective   Subjective The pt  notes severe pain in her left arm  Transfers   Sit to Stand 1  Dependent   Additional items Assist x 2; Increased time required;Verbal cues  (Bilateral knees blocked and use of modified hammock tech )   Stand to Sit 1  Dependent   Additional items Assist x 2  (Bilateral knees blocked and use of modified hammock tech )   Balance   Static Sitting Fair -   Dynamic Sitting Poor +   Static Standing Poor -   Dynamic Standing Poor -   Activity Tolerance   Activity Tolerance Patient limited by pain   Nurse [address] [place] [address], RN  Exercises   TKR Sitting;Bilateral;AROM;AAROM;10 reps   Assessment   Prognosis Fair   Problem List Decreased strength;Decreased endurance; Impaired balance;Decreased mobility; Decreased safety awareness;Decreased coordination;Decreased cognition;Pain   Assessment The pt  was able to clear her buttocks with stance but required her kneees blocked and support for her LUE  She was only able to maintain standing for a few seconds with each trial due to pain and fatigue  She was able to perform therapeutic exercise with instruction and demonstration   She was very hesitant to perform any mobility with her LUE, but noted improved pain when it was supported on a pillow  The pt  was in the chair with alarm active and all needs within reach post session  Barriers to Discharge Inaccessible home environment;Decreased caregiver support   Goals   Patient Goals To have less pain  STG Expiration Date 07/28/21   PT Treatment Day 1   Plan   Treatment/Interventions Functional transfer training;LE strengthening/ROM; Therapeutic exercise; Endurance training;Patient/family training;Cognitive reorientation; Bed mobility   Progress Slow progress, decreased activity tolerance   PT Frequency 2-3x/wk   Recommendation   PT Discharge Recommendation Post acute rehabilitation services   AM-PAC Basic Mobility Inpatient   Turning in Bed Without Bedrails 3   Lying on Back to Sitting on Edge of Flat Bed 2   Moving Bed to Chair 1   Standing Up From Chair 1   Walk in Room 1   Climb 3-5 Stairs 1   Basic Mobility Inpatient Raw Score 9   Turning Head Towards Sound 4   Follow Simple Instructions 3   Low Function Basic Mobility Raw Score 16   Low Function Basic Mobility Standardized Score 25 72     An AM-PAC Basic Mobility standardized score less than 42 9 suggests the patient may benefit from discharge to post-acute rehab services      Erika Zuleta, PTA

## 2021-07-20 LAB
ANION GAP SERPL CALCULATED.3IONS-SCNC: 6 MMOL/L (ref 4–13)
BUN SERPL-MCNC: 16 MG/DL (ref 5–25)
CALCIUM SERPL-MCNC: 10 MG/DL (ref 8.3–10.1)
CHLORIDE SERPL-SCNC: 105 MMOL/L (ref 100–108)
CO2 SERPL-SCNC: 27 MMOL/L (ref 21–32)
CREAT SERPL-MCNC: 0.34 MG/DL (ref 0.6–1.3)
ERYTHROCYTE [DISTWIDTH] IN BLOOD BY AUTOMATED COUNT: 14.8 % (ref 11.6–15.1)
GFR SERPL CREATININE-BSD FRML MDRD: 125 ML/MIN/1.73SQ M
GLUCOSE SERPL-MCNC: 118 MG/DL (ref 65–140)
GLUCOSE SERPL-MCNC: 149 MG/DL (ref 65–140)
GLUCOSE SERPL-MCNC: 82 MG/DL (ref 65–140)
GLUCOSE SERPL-MCNC: 95 MG/DL (ref 65–140)
GLUCOSE SERPL-MCNC: 99 MG/DL (ref 65–140)
HCT VFR BLD AUTO: 35 % (ref 34.8–46.1)
HGB BLD-MCNC: 11.3 G/DL (ref 11.5–15.4)
MAGNESIUM SERPL-MCNC: 1.7 MG/DL (ref 1.6–2.6)
MCH RBC QN AUTO: 29 PG (ref 26.8–34.3)
MCHC RBC AUTO-ENTMCNC: 32.3 G/DL (ref 31.4–37.4)
MCV RBC AUTO: 90 FL (ref 82–98)
PLATELET # BLD AUTO: 209 THOUSANDS/UL (ref 149–390)
PMV BLD AUTO: 11.1 FL (ref 8.9–12.7)
POTASSIUM SERPL-SCNC: 3.4 MMOL/L (ref 3.5–5.3)
RBC # BLD AUTO: 3.89 MILLION/UL (ref 3.81–5.12)
SARS-COV-2 RNA RESP QL NAA+PROBE: NEGATIVE
SODIUM SERPL-SCNC: 138 MMOL/L (ref 136–145)
WBC # BLD AUTO: 5.5 THOUSAND/UL (ref 4.31–10.16)

## 2021-07-20 PROCEDURE — 83735 ASSAY OF MAGNESIUM: CPT | Performed by: STUDENT IN AN ORGANIZED HEALTH CARE EDUCATION/TRAINING PROGRAM

## 2021-07-20 PROCEDURE — U0003 INFECTIOUS AGENT DETECTION BY NUCLEIC ACID (DNA OR RNA); SEVERE ACUTE RESPIRATORY SYNDROME CORONAVIRUS 2 (SARS-COV-2) (CORONAVIRUS DISEASE [COVID-19]), AMPLIFIED PROBE TECHNIQUE, MAKING USE OF HIGH THROUGHPUT TECHNOLOGIES AS DESCRIBED BY CMS-2020-01-R: HCPCS | Performed by: STUDENT IN AN ORGANIZED HEALTH CARE EDUCATION/TRAINING PROGRAM

## 2021-07-20 PROCEDURE — 85027 COMPLETE CBC AUTOMATED: CPT | Performed by: STUDENT IN AN ORGANIZED HEALTH CARE EDUCATION/TRAINING PROGRAM

## 2021-07-20 PROCEDURE — U0005 INFEC AGEN DETEC AMPLI PROBE: HCPCS | Performed by: STUDENT IN AN ORGANIZED HEALTH CARE EDUCATION/TRAINING PROGRAM

## 2021-07-20 PROCEDURE — 82948 REAGENT STRIP/BLOOD GLUCOSE: CPT

## 2021-07-20 PROCEDURE — 99232 SBSQ HOSP IP/OBS MODERATE 35: CPT | Performed by: INTERNAL MEDICINE

## 2021-07-20 PROCEDURE — 80048 BASIC METABOLIC PNL TOTAL CA: CPT | Performed by: STUDENT IN AN ORGANIZED HEALTH CARE EDUCATION/TRAINING PROGRAM

## 2021-07-20 RX ORDER — POTASSIUM CHLORIDE 20MEQ/15ML
20 LIQUID (ML) ORAL ONCE
Status: COMPLETED | OUTPATIENT
Start: 2021-07-20 | End: 2021-07-20

## 2021-07-20 RX ORDER — MAGNESIUM SULFATE HEPTAHYDRATE 40 MG/ML
4 INJECTION, SOLUTION INTRAVENOUS ONCE
Status: COMPLETED | OUTPATIENT
Start: 2021-07-20 | End: 2021-07-20

## 2021-07-20 RX ADMIN — OXYCODONE HYDROCHLORIDE 5 MG: 5 SOLUTION ORAL at 20:46

## 2021-07-20 RX ADMIN — ALLOPURINOL 100 MG: 100 TABLET ORAL at 08:48

## 2021-07-20 RX ADMIN — DICYCLOMINE HYDROCHLORIDE 10 MG: 10 CAPSULE ORAL at 17:28

## 2021-07-20 RX ADMIN — MAGNESIUM SULFATE HEPTAHYDRATE 4 G: 40 INJECTION, SOLUTION INTRAVENOUS at 10:50

## 2021-07-20 RX ADMIN — GABAPENTIN 200 MG: 100 CAPSULE ORAL at 21:01

## 2021-07-20 RX ADMIN — ATORVASTATIN CALCIUM 40 MG: 40 TABLET, FILM COATED ORAL at 17:28

## 2021-07-20 RX ADMIN — FOLIC ACID 1 MG: 1 TABLET ORAL at 08:48

## 2021-07-20 RX ADMIN — OXYCODONE HYDROCHLORIDE 5 MG: 5 SOLUTION ORAL at 05:14

## 2021-07-20 RX ADMIN — TAMSULOSIN HYDROCHLORIDE 0.4 MG: 0.4 CAPSULE ORAL at 17:28

## 2021-07-20 RX ADMIN — FAMOTIDINE 20 MG: 20 TABLET ORAL at 08:48

## 2021-07-20 RX ADMIN — MENTHOL, UNSPECIFIED FORM AND METHYL SALICYLATE: 10; 150 CREAM TOPICAL at 08:48

## 2021-07-20 RX ADMIN — DICYCLOMINE HYDROCHLORIDE 10 MG: 10 CAPSULE ORAL at 05:09

## 2021-07-20 RX ADMIN — ENOXAPARIN SODIUM 40 MG: 40 INJECTION SUBCUTANEOUS at 08:48

## 2021-07-20 RX ADMIN — DOCUSATE SODIUM AND SENNOSIDES 1 TABLET: 8.6; 5 TABLET ORAL at 20:46

## 2021-07-20 RX ADMIN — POTASSIUM CHLORIDE 20 MEQ: 20 SOLUTION ORAL at 10:08

## 2021-07-20 RX ADMIN — ACYCLOVIR 400 MG: 200 CAPSULE ORAL at 20:46

## 2021-07-20 RX ADMIN — DICLOFENAC 2 G: 10 GEL TOPICAL at 08:56

## 2021-07-20 RX ADMIN — DOCUSATE SODIUM 100 MG: 100 CAPSULE ORAL at 08:48

## 2021-07-20 RX ADMIN — DICYCLOMINE HYDROCHLORIDE 10 MG: 10 CAPSULE ORAL at 21:01

## 2021-07-20 RX ADMIN — MENTHOL, UNSPECIFIED FORM AND METHYL SALICYLATE: 10; 150 CREAM TOPICAL at 17:28

## 2021-07-20 RX ADMIN — DICLOFENAC 2 G: 10 GEL TOPICAL at 20:49

## 2021-07-20 RX ADMIN — OXYCODONE HYDROCHLORIDE 5 MG: 5 SOLUTION ORAL at 14:13

## 2021-07-20 RX ADMIN — ACYCLOVIR 400 MG: 200 CAPSULE ORAL at 08:48

## 2021-07-20 RX ADMIN — HYDROMORPHONE HYDROCHLORIDE 0.2 MG: 0.2 INJECTION, SOLUTION INTRAMUSCULAR; INTRAVENOUS; SUBCUTANEOUS at 00:13

## 2021-07-20 RX ADMIN — DOCUSATE SODIUM AND SENNOSIDES 1 TABLET: 8.6; 5 TABLET ORAL at 08:48

## 2021-07-20 RX ADMIN — DICYCLOMINE HYDROCHLORIDE 10 MG: 10 CAPSULE ORAL at 10:54

## 2021-07-20 NOTE — CASE MANAGEMENT
Chakraborty Cuca from [facility] called CM and stated that pt has a bed at their facilitiy and can come tomorrow  Report: [phone] ext [phone], fax: [phone]  CM was asked for a covid test to be done, and if pt is on narcotics or scheduled 2 then she will need a hard scripts  Pt will be going to the [facility] first floor room 127           CM informed resident ryder about the scripts, and covid test order via tiger text    [facility] scheduled BLS transport with [address] [facility] for 7/21 at 230pm      CM informed assigned nurse, resident Snow, and Gato Herbert (Friend)  [phone] (H) on transport time

## 2021-07-20 NOTE — PLAN OF CARE
Problem: Prexisting or High Potential for Compromised Skin Integrity  Goal: Skin integrity is maintained or improved  Description: INTERVENTIONS:  - Identify patients at risk for skin breakdown  - Assess and monitor skin integrity  - Assess and monitor nutrition and hydration status  - Monitor labs   - Assess for incontinence   - Turn and reposition patient  - Assist with mobility/ambulation  - Relieve pressure over bony prominences  - Avoid friction and shearing  - Provide appropriate hygiene as needed including keeping skin clean and dry  - Evaluate need for skin moisturizer/barrier cream  - Collaborate with interdisciplinary team   - Patient/family teaching  - Consider wound care consult   Outcome: Progressing     Problem: MOBILITY - ADULT  Goal: Maintain or return to baseline ADL function  Description: INTERVENTIONS:  -  Assess patient's ability to carry out ADLs; assess patient's baseline for ADL function and identify physical deficits which impact ability to perform ADLs (bathing, care of mouth/teeth, toileting, grooming, dressing, etc )  - Assess/evaluate cause of self-care deficits   - Assess range of motion  - Assess patient's mobility; develop plan if impaired  - Assess patient's need for assistive devices and provide as appropriate  - Encourage maximum independence but intervene and supervise when necessary  - Involve family in performance of ADLs  - Assess for home care needs following discharge   - Consider OT consult to assist with ADL evaluation and planning for discharge  - Provide patient education as appropriate  Outcome: Progressing  Goal: Maintains/Returns to pre admission functional level  Description: INTERVENTIONS:  - Perform BMAT or MOVE assessment daily    - Set and communicate daily mobility goal to care team and patient/family/caregiver  - Collaborate with rehabilitation services on mobility goals if consulted  - Perform Range of Motion  - Reposition patient every 2 hours    - Record patient progress and toleration of activity level   Outcome: Progressing     Problem: PAIN - ADULT  Goal: Verbalizes/displays adequate comfort level or baseline comfort level  Description: Interventions:  - Encourage patient to monitor pain and request assistance  - Assess pain using appropriate pain scale  - Administer analgesics based on type and severity of pain and evaluate response  - Implement non-pharmacological measures as appropriate and evaluate response  - Consider cultural and social influences on pain and pain management  - Notify physician/advanced practitioner if interventions unsuccessful or patient reports new pain  Outcome: Progressing     Problem: INFECTION - ADULT  Goal: Absence or prevention of progression during hospitalization  Description: INTERVENTIONS:  - Assess and monitor for signs and symptoms of infection  - Monitor lab/diagnostic results  - Monitor all insertion sites, i e  indwelling lines, tubes, and drains  - Monitor endotracheal if appropriate and nasal secretions for changes in amount and color  - Robert appropriate cooling/warming therapies per order  - Administer medications as ordered  - Instruct and encourage patient and family to use good hand hygiene technique  - Identify and instruct in appropriate isolation precautions for identified infection/condition  Outcome: Progressing  Goal: Absence of fever/infection during neutropenic period  Description: INTERVENTIONS:  - Monitor WBC    Outcome: Progressing     Problem: SAFETY ADULT  Goal: Patient will remain free of falls  Description: INTERVENTIONS:  - Educate patient/family on patient safety including physical limitations  - Instruct patient to call for assistance with activity   - Consult OT/PT to assist with strengthening/mobility   - Keep Call bell within reach  - Keep bed low and locked with side rails adjusted as appropriate  - Keep care items and personal belongings within reach  - Initiate and maintain comfort rounds  - Make Fall Risk Sign visible to staff  - Offer Toileting every 2 Hours, in advance of need  - Initiate/Maintain alarm  - Obtain necessary fall risk management equipment  - Apply yellow socks and bracelet for high fall risk patients  - Consider moving patient to room near nurses station  Outcome: Progressing     Problem: DISCHARGE PLANNING  Goal: Discharge to home or other facility with appropriate resources  Description: INTERVENTIONS:  - Identify barriers to discharge w/patient and caregiver  - Arrange for needed discharge resources and transportation as appropriate  - Identify discharge learning needs (meds, wound care, etc )  - Arrange for interpretive services to assist at discharge as needed  - Refer to Case Management Department for coordinating discharge planning if the patient needs post-hospital services based on physician/advanced practitioner order or complex needs related to functional status, cognitive ability, or social support system  Outcome: Progressing     Problem: Knowledge Deficit  Goal: Patient/family/caregiver demonstrates understanding of disease process, treatment plan, medications, and discharge instructions  Description: Complete learning assessment and assess knowledge base    Interventions:  - Provide teaching at level of understanding  - Provide teaching via preferred learning methods  Outcome: Progressing     Problem: Potential for Falls  Goal: Patient will remain free of falls  Description: INTERVENTIONS:  - Educate patient/family on patient safety including physical limitations  - Instruct patient to call for assistance with activity   - Consult OT/PT to assist with strengthening/mobility   - Keep Call bell within reach  - Keep bed low and locked with side rails adjusted as appropriate  - Keep care items and personal belongings within reach  - Initiate and maintain comfort rounds  - Make Fall Risk Sign visible to staff  - Offer Toileting every 2 Hours, in advance of need  - Initiate/Maintain alarm  - Obtain necessary fall risk management equipment  - Apply yellow socks and bracelet for high fall risk patients  - Consider moving patient to room near nurses station  Outcome: Progressing     Problem: Nutrition/Hydration-ADULT  Goal: Nutrient/Hydration intake appropriate for improving, restoring or maintaining nutritional needs  Description: Monitor and assess patient's nutrition/hydration status for malnutrition  Collaborate with interdisciplinary team and initiate plan and interventions as ordered  Monitor patient's weight and dietary intake as ordered or per policy  Utilize nutrition screening tool and intervene as necessary  Determine patient's food preferences and provide high-protein, high-caloric foods as appropriate       INTERVENTIONS:  - Monitor oral intake, urinary output, labs, and treatment plans  - Assess nutrition and hydration status and recommend course of action  - Evaluate amount of meals eaten  - Assist patient with eating if necessary   - Allow adequate time for meals  - Recommend/ encourage appropriate diets, oral nutritional supplements, and vitamin/mineral supplements  - Order, calculate, and assess calorie counts as needed  - Recommend, monitor, and adjust tube feedings and TPN/PPN based on assessed needs  - Assess need for intravenous fluids  - Provide specific nutrition/hydration education as appropriate  - Include patient/family/caregiver in decisions related to nutrition  Outcome: Progressing

## 2021-07-20 NOTE — PROGRESS NOTES
INTERNAL MEDICINE RESIDENCY PROGRESS NOTE     Name: Atiya Dyer   Age & Sex: 49 y o  female   MRN: [id number]  Unit/Bed#: [facility] 905-01   Encounter: [id number]  Team: SOD Team A    PATIENT INFORMATION     Name: Atiya Dyer   Age & Sex: 49 y o  female   MRN: [id number]  Hospital Stay Days: 7    ASSESSMENT/PLAN     Principal Problem:    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[facility] )  Active Problems:    Altered mental status    Dysphagia    Severe protein-calorie malnutrition ([facility] )    Cancer related pain    Ambulatory dysfunction    Hypokalemia      Hypokalemia  Assessment & Plan  Improving, potassium 3 4  Likely in setting of poor p o  Intake/poor absorption in setting of chemotherapy and malnutrition  Monitor with a m  BMP and replete as needed     Ambulatory dysfunction  Assessment & Plan  A: per [address] does stand up with assistance and able to take few steps however her left leg does give out  Known residual deficits left upper extremity and lower extremity weakness secondary to CNS lymphoma  PT/OT recommended rehab, case management consulted  Appreciate further guidance    Cancer related pain  Assessment & Plan  Tylenol 650mg Prn  Oxyocodone 2 5mg Prn    Severe protein-calorie malnutrition (HCC)  Assessment & Plan  Malnutrition Findings:   Adult Malnutrition type: Chronic illness  Adult Degree of Malnutrition: Other severe protein calorie malnutrition    BMI Findings: Body mass index is 22 17 kg/m²  Nutrition following, appreciate recommendations  Continue protein supplementation  Patient does have PEG tube in place, currently not receiving tube feeds as able to tolerate p o  Intake  Encouraged to increase Magic cup and Ensure intake  Patient verbalized understanding    Dysphagia  Assessment & Plan  A: per irasema louis pt started to tolerated mechanical / thin liquid diet and stopped PEG tubes  Peg tube in place, clear, no discharge noted  Continue with q 4 hours flushes    Evaluated by speech, Continue with level 2 diet    Altered mental status  Assessment & Plan  A: A x O 2 to person and place which is same as previous admission  Speaks Gujarati only     P: Monitor for acute changes     * 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[facility] )  Assessment & Plan  A: Diffuse Large B-cell Primary 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)       Direct Admit from Dr Petey Granger for in-patient chemo 3rd cycle       S/P chemotherapy on , leucovin on  MRI- Appears to be interval improvement in the overall      enhancement of multifocal intracranial lesions  P:    DVT prophylaxis with Lovenox   Continue acyclovir, Diflucan and Levaquin prophylactic  Status post 2 doses of methotrexate/Rituxan and completion of Leucovorin rescue treatment  Oncology Consult, appreciate recommendations  MTX < 1 no further need for Leucovorin rescue      Disposition:  Patient remains medically stable, pending placement    SUBJECTIVE     Patient seen and examined  No acute events overnight  Endorses some skin irritation around area of PEG where the tape is attached  States her left upper extremity pain has improved  Endorses stable bilateral knee pain resolved/improved with Voltaren gel  Otherwise denies any acute symptoms  Denies any nausea, vomiting, diarrhea, outpatient, fevers or chills  All other review of systems negative at this time  OBJECTIVE     Vitals:    21 1510 21 2221 21 0717 21 0838   BP: 119/74 101/70 93/67 99/68   Pulse: 85 85 85    Resp: 16 18 18    Temp: 98 7 °F (37 1 °C) 98 3 °F (36 8 °C) (!) 96 4 °F (35 8 °C)    SpO2: 99% 99% 97%    Weight:       Height:          Temperature:   Temp (24hrs), Av 8 °F (36 6 °C), Min:96 4 °F (35 8 °C), Max:98 7 °F (37 1 °C)    Temperature: (!) 96 4 °F (35 8 °C)  Intake & Output:  I/O       701 -  0700  07 -  0700 701 -  0700    P  O  360 0     NG/GT  220     IV Piggyback       Total Intake(mL/kg) 360 (7) 220 (4 3)     Urine (mL/kg/hr) 575 (0 5) 600 (0 5) 400 (1 3)    Total Output 575 600 400    Net -852 -091 -470               Weights:   IBW (Ideal Body Weight): 45 5 kg    Body mass index is 22 17 kg/m²  Weight (last 2 days)     None        Physical Exam  Nursing note reviewed  Constitutional:       Appearance: She is not diaphoretic  Comments: Thin appearance   HENT:      Head: Normocephalic and atraumatic  Eyes:      Comments: Stable left eye ptosis   Cardiovascular:      Rate and Rhythm: Normal rate and regular rhythm  Pulmonary:      Breath sounds: Normal breath sounds  Abdominal:      Comments: Soft, generalized tenderness no guarding  Peg tube in place   Musculoskeletal:      Comments: Left upper extremity 1/5 left lower extremity 2/5  Left hand swelling notably improving   Skin:     General: Skin is dry  Neurological:      Comments: Oriented to person and place   Psychiatric:      Comments: Appears to be more alert, responding appropriately to questions  LABORATORY DATA     Labs: I have personally reviewed pertinent reports    Results from last 7 days   Lab Units 07/20/21  0508 07/17/21 0439 07/16/21  0606 07/14/21  0511   WBC Thousand/uL 5 50 6 12 6 94 5 11   HEMOGLOBIN g/dL 11 3* 11 2* 11 7 11 5   HEMATOCRIT % 35 0 35 6 36 7 35 6   PLATELETS Thousands/uL 209 227 243 275   NEUTROS PCT %  --  58  --  80*   MONOS PCT %  --  3*  --  1*   MONO PCT %  --   --  0*  --       Results from last 7 days   Lab Units 07/20/21  0508 07/18/21  0519 07/17/21  0439 07/14/21  0511   POTASSIUM mmol/L 3 4* 3 7 3 7 3 5   CHLORIDE mmol/L 105 106 106 105   CO2 mmol/L 27 28 30 28   BUN mg/dL 16 8 5 11   CREATININE mg/dL 0 34* 0 36* 0 45* 0 44*   CALCIUM mg/dL 10 0 9 8 9 7 9 7   ALK PHOS U/L  --   --  59 57   ALT U/L  --   --  14 15   AST U/L  --   --  12 13     Results from last 7 days   Lab Units 07/20/21  0508 07/17/21  0439 07/16/21  0606   MAGNESIUM mg/dL 1 7 1 7 2 2                        IMAGING & DIAGNOSTIC TESTING     Radiology Results: I have personally reviewed pertinent reports  No results found  Other Diagnostic Testing: I have personally reviewed pertinent reports      ACTIVE MEDICATIONS     Current Facility-Administered Medications   Medication Dose Route Frequency    acetaminophen (TYLENOL) tablet 650 mg  650 mg Oral Q6H PRN    acyclovir (ZOVIRAX) capsule 400 mg  400 mg Oral Q12H [facility]    albuterol (PROVENTIL HFA,VENTOLIN HFA) inhaler 2 puff  2 puff Inhalation Q4H PRN    allopurinol (ZYLOPRIM) tablet 100 mg  100 mg Oral Daily    aluminum-magnesium hydroxide-simethicone (MYLANTA) oral suspension 30 mL  30 mL Per PEG Tube Q4H PRN    atorvastatin (LIPITOR) tablet 40 mg  40 mg Oral After Dinner    bisacodyl (DULCOLAX) rectal suppository 10 mg  10 mg Rectal Daily PRN    Diclofenac Sodium (VOLTAREN) 1 % topical gel 2 g  2 g Topical TID PRN    dicyclomine (BENTYL) capsule 10 mg  10 mg Oral 4x Daily (AC & HS)    docusate sodium (COLACE) capsule 100 mg  100 mg Oral Daily    enoxaparin (LOVENOX) subcutaneous injection 40 mg  40 mg Subcutaneous Q24H MEÑO    famotidine (PEPCID) tablet 20 mg  20 mg Oral Daily    folic acid (FOLVITE) tablet 1 mg  1 mg Oral Daily    gabapentin (NEURONTIN) capsule 200 mg  200 mg Oral HS    HYDROmorphone HCl (DILAUDID) injection 0 2 mg  0 2 mg Intravenous Q6H PRN    hydrOXYzine HCL (ATARAX) tablet 25 mg  25 mg Oral Q6H PRN    insulin lispro (HumaLOG) 100 units/mL subcutaneous injection 1-5 Units  1-5 Units Subcutaneous TID AC    insulin lispro (HumaLOG) 100 units/mL subcutaneous injection 1-5 Units  1-5 Units Subcutaneous HS    magnesium sulfate 4 g/100 mL IVPB (premix) 4 g  4 g Intravenous Once    menthol-methyl salicylate (BENGAY) 00-05 % cream   Apply externally BID    ondansetron (ZOFRAN-ODT) dispersible tablet 4 mg  4 mg Oral Q6H PRN    oxyCODONE (ROXICODONE) oral solution 5 mg  5 mg Oral Q6H PRN    polyethylene glycol (MIRALAX) packet 17 g  17 g Oral Daily PRN    senna-docusate sodium (SENOKOT S) 8 6-50 mg per tablet 1 tablet  1 tablet Oral Q12H [address]    sodium chloride 0 9 % infusion  20 mL/hr Intravenous Once PRN    tamsulosin (FLOMAX) capsule 0 4 mg  0 4 mg Oral After Dinner       VTE Pharmacologic Prophylaxis: Enoxaparin (Lovenox)  VTE Mechanical Prophylaxis: sequential compression device    Portions of the record may have been created with voice recognition software  Occasional wrong word or "sound a like" substitutions may have occurred due to the inherent limitations of voice recognition software    Read the chart carefully and recognize, using context, where substitutions have occurred   ==  Den Mon[place], [facility]  Internal Medicine Residency PGY-3

## 2021-07-21 VITALS
DIASTOLIC BLOOD PRESSURE: 63 MMHG | RESPIRATION RATE: 20 BRPM | OXYGEN SATURATION: 98 % | HEIGHT: 60 IN | WEIGHT: 113.54 LBS | TEMPERATURE: 99.1 F | BODY MASS INDEX: 22.29 KG/M2 | SYSTOLIC BLOOD PRESSURE: 101 MMHG | HEART RATE: 107 BPM

## 2021-07-21 LAB
GLUCOSE SERPL-MCNC: 148 MG/DL (ref 65–140)
GLUCOSE SERPL-MCNC: 191 MG/DL (ref 65–140)

## 2021-07-21 PROCEDURE — 82948 REAGENT STRIP/BLOOD GLUCOSE: CPT

## 2021-07-21 PROCEDURE — 99239 HOSP IP/OBS DSCHRG MGMT >30: CPT | Performed by: INTERNAL MEDICINE

## 2021-07-21 RX ORDER — OXYCODONE HCL 5 MG/5 ML
5 SOLUTION, ORAL ORAL EVERY 6 HOURS PRN
Qty: 60 ML | Refills: 0 | Status: SHIPPED | OUTPATIENT
Start: 2021-07-21 | End: 2021-08-05 | Stop reason: HOSPADM

## 2021-07-21 RX ORDER — GABAPENTIN 250 MG/5ML
200 SOLUTION ORAL
Refills: 0 | Status: CANCELLED
Start: 2021-07-21

## 2021-07-21 RX ORDER — GABAPENTIN 250 MG/5ML
200 SOLUTION ORAL
Refills: 0
Start: 2021-07-21 | End: 2021-09-08 | Stop reason: HOSPADM

## 2021-07-21 RX ORDER — FOLIC ACID 1 MG/1
1 TABLET ORAL DAILY
Refills: 0 | Status: ON HOLD
Start: 2021-07-22 | End: 2021-09-07 | Stop reason: SDUPTHER

## 2021-07-21 RX ORDER — FOLIC ACID 1 MG/1
1 TABLET ORAL DAILY
Refills: 0 | Status: CANCELLED
Start: 2021-07-22

## 2021-07-21 RX ADMIN — ACYCLOVIR 400 MG: 200 CAPSULE ORAL at 09:07

## 2021-07-21 RX ADMIN — DICYCLOMINE HYDROCHLORIDE 10 MG: 10 CAPSULE ORAL at 05:46

## 2021-07-21 RX ADMIN — FOLIC ACID 1 MG: 1 TABLET ORAL at 09:07

## 2021-07-21 RX ADMIN — HYDROMORPHONE HYDROCHLORIDE 0.2 MG: 0.2 INJECTION, SOLUTION INTRAMUSCULAR; INTRAVENOUS; SUBCUTANEOUS at 11:02

## 2021-07-21 RX ADMIN — FAMOTIDINE 20 MG: 20 TABLET ORAL at 09:20

## 2021-07-21 RX ADMIN — ENOXAPARIN SODIUM 40 MG: 40 INJECTION SUBCUTANEOUS at 09:09

## 2021-07-21 RX ADMIN — INSULIN LISPRO 1 UNITS: 100 INJECTION, SOLUTION INTRAVENOUS; SUBCUTANEOUS at 09:08

## 2021-07-21 RX ADMIN — OXYCODONE HYDROCHLORIDE 5 MG: 5 SOLUTION ORAL at 03:32

## 2021-07-21 RX ADMIN — DOCUSATE SODIUM AND SENNOSIDES 1 TABLET: 8.6; 5 TABLET ORAL at 09:07

## 2021-07-21 RX ADMIN — MENTHOL, UNSPECIFIED FORM AND METHYL SALICYLATE: 10; 150 CREAM TOPICAL at 09:09

## 2021-07-21 RX ADMIN — OXYCODONE HYDROCHLORIDE 5 MG: 5 SOLUTION ORAL at 13:48

## 2021-07-21 RX ADMIN — ALLOPURINOL 100 MG: 100 TABLET ORAL at 09:07

## 2021-07-21 RX ADMIN — DOCUSATE SODIUM 100 MG: 100 CAPSULE ORAL at 09:07

## 2021-07-21 RX ADMIN — DICYCLOMINE HYDROCHLORIDE 10 MG: 10 CAPSULE ORAL at 11:03

## 2021-07-21 RX ADMIN — HYDROMORPHONE HYDROCHLORIDE 0.2 MG: 0.2 INJECTION, SOLUTION INTRAMUSCULAR; INTRAVENOUS; SUBCUTANEOUS at 02:34

## 2021-07-21 NOTE — NURSING NOTE
Pt report given to Mitra Baird [id number] at [facility] and rehab center  Pt scheduled for  at [address] via [address] transport

## 2021-07-21 NOTE — CASE MANAGEMENT
Resident Hillary informed CM that physicans are not allowed to print out hard cover scripts- and she is willing to send scripts to the pharmacy the facility uses    CM informed Sandoval Mccoy from [place] [address] and rehab center via Gray on conversation with resident Fide Wright provided pharmacy information: [facility]   p: [phone], F: [phone]        YSABEL provided pharmacy information to resident Elizalde via tiger text

## 2021-07-21 NOTE — DISCHARGE INSTRUCTIONS
Please call Info link to establish care with PCP     Non-Hodgkin Lymphoma   WHAT YOU NEED TO KNOW:   Non-Hodgkin lymphoma, or non-Hodgkin disease, is a cancer of the lymphatic system  The lymphatic system contains lymph vessels, lymph nodes, and glands, such as the spleen and thymus  Lymph vessels carry lymph fluid throughout the body  Lymph fluid contains lymphocytes (white blood cells) that help fight infection and disease  Non-Hodgkin lymphoma causes lymphocytes to grow and divide without control and to form tumors  Non-Hodgkin lymphoma can develop in any lymph tissue in the body  Common places are lymph nodes in the neck, underarm, and chest  Cancer cells can travel from lymph node to lymph node and spread through the body  DISCHARGE INSTRUCTIONS:   Return to the emergency department if:   · You have chest pain, your heart pounds or races, or you have trouble breathing  · You are too dizzy to stand, or you have trouble keeping your balance  · You have a seizure  · Your legs swell  · You cannot think clearly, or you feel confused  · You feel weak or numb on one side of your body  Contact your healthcare provider if:   · You have a fever  · You have back pain and weakness in your legs  · You are vomiting and cannot keep any food or liquids down  · You have questions or concerns about your condition or care  Follow up with your oncologist as directed: You will need to see your oncologist for ongoing treatment  Write down your questions so you remember to ask them during your visits  Self-care:   · Rest as needed  Return to activities slowly, and do more as you feel stronger  · Eat healthy foods  Eat a variety of healthy foods to get the protein, carbohydrates, and other nutrients your body needs  You may need to change the foods you eat depending on your treatments and side effects   You may also need to eat more calories than usual  Work with a dietitian to plan the best meals and snacks for you  Ask if you should take vitamins  · Do not smoke  Talk to your healthcare provider if you need help quitting  E-cigarettes or smokeless tobacco still contain nicotine  Ask your healthcare provider for information before you use these products  · Avoid people who have a cold or the flu  Also try to stay away from large groups of people to decrease your risk  © Copyright [employer] 2021 Information is for End User's use only and may not be sold, redistributed or otherwise used for commercial purposes  All illustrations and images included in CareNotes® are the copyrighted property of [employer]  or [address] [place], [place] [address]chela Gaytan   The above information is an  only  It is not intended as medical advice for individual conditions or treatments  Talk to your doctor, nurse or pharmacist before following any medical regimen to see if it is safe and effective for you

## 2021-07-21 NOTE — PLAN OF CARE
Problem: Prexisting or High Potential for Compromised Skin Integrity  Goal: Skin integrity is maintained or improved  Description: INTERVENTIONS:  - Identify patients at risk for skin breakdown  - Assess and monitor skin integrity  - Assess and monitor nutrition and hydration status  - Monitor labs   - Assess for incontinence   - Turn and reposition patient  - Assist with mobility/ambulation  - Relieve pressure over bony prominences  - Avoid friction and shearing  - Provide appropriate hygiene as needed including keeping skin clean and dry  - Evaluate need for skin moisturizer/barrier cream  - Collaborate with interdisciplinary team   - Patient/family teaching  - Consider wound care consult   Outcome: Progressing     Problem: MOBILITY - ADULT  Goal: Maintain or return to baseline ADL function  Description: INTERVENTIONS:  -  Assess patient's ability to carry out ADLs; assess patient's baseline for ADL function and identify physical deficits which impact ability to perform ADLs (bathing, care of mouth/teeth, toileting, grooming, dressing, etc )  - Assess/evaluate cause of self-care deficits   - Assess range of motion  - Assess patient's mobility; develop plan if impaired  - Assess patient's need for assistive devices and provide as appropriate  - Encourage maximum independence but intervene and supervise when necessary  - Involve family in performance of ADLs  - Assess for home care needs following discharge   - Consider OT consult to assist with ADL evaluation and planning for discharge  - Provide patient education as appropriate  Outcome: Progressing  Goal: Maintains/Returns to pre admission functional level  Description: INTERVENTIONS:  - Perform BMAT or MOVE assessment daily    - Set and communicate daily mobility goal to care team and patient/family/caregiver  - Collaborate with rehabilitation services on mobility goals if consulted  - Perform Range of Motion  - Reposition patient every 2 hours    - Record patient progress and toleration of activity level   Outcome: Progressing     Problem: PAIN - ADULT  Goal: Verbalizes/displays adequate comfort level or baseline comfort level  Description: Interventions:  - Encourage patient to monitor pain and request assistance  - Assess pain using appropriate pain scale  - Administer analgesics based on type and severity of pain and evaluate response  - Implement non-pharmacological measures as appropriate and evaluate response  - Consider cultural and social influences on pain and pain management  - Notify physician/advanced practitioner if interventions unsuccessful or patient reports new pain  Outcome: Progressing     Problem: INFECTION - ADULT  Goal: Absence or prevention of progression during hospitalization  Description: INTERVENTIONS:  - Assess and monitor for signs and symptoms of infection  - Monitor lab/diagnostic results  - Monitor all insertion sites, i e  indwelling lines, tubes, and drains  - Monitor endotracheal if appropriate and nasal secretions for changes in amount and color  - [place] appropriate cooling/warming therapies per order  - Administer medications as ordered  - Instruct and encourage patient and family to use good hand hygiene technique  - Identify and instruct in appropriate isolation precautions for identified infection/condition  Outcome: Progressing  Goal: Absence of fever/infection during neutropenic period  Description: INTERVENTIONS:  - Monitor WBC    Outcome: Progressing     Problem: SAFETY ADULT  Goal: Patient will remain free of falls  Description: INTERVENTIONS:  - Educate patient/family on patient safety including physical limitations  - Instruct patient to call for assistance with activity   - Consult OT/PT to assist with strengthening/mobility   - Keep Call bell within reach  - Keep bed low and locked with side rails adjusted as appropriate  - Keep care items and personal belongings within reach  - Initiate and maintain comfort rounds  - Make Fall Risk Sign visible to staff  - Offer Toileting every 2 Hours, in advance of need  - Initiate/Maintain alarm  - Obtain necessary fall risk management equipment  - Apply yellow socks and bracelet for high fall risk patients  - Consider moving patient to room near nurses station  Outcome: Progressing     Problem: DISCHARGE PLANNING  Goal: Discharge to home or other facility with appropriate resources  Description: INTERVENTIONS:  - Identify barriers to discharge w/patient and caregiver  - Arrange for needed discharge resources and transportation as appropriate  - Identify discharge learning needs (meds, wound care, etc )  - Arrange for interpretive services to assist at discharge as needed  - Refer to Case Management Department for coordinating discharge planning if the patient needs post-hospital services based on physician/advanced practitioner order or complex needs related to functional status, cognitive ability, or social support system  Outcome: Progressing     Problem: Knowledge Deficit  Goal: Patient/family/caregiver demonstrates understanding of disease process, treatment plan, medications, and discharge instructions  Description: Complete learning assessment and assess knowledge base    Interventions:  - Provide teaching at level of understanding  - Provide teaching via preferred learning methods  Outcome: Progressing     Problem: Potential for Falls  Goal: Patient will remain free of falls  Description: INTERVENTIONS:  - Educate patient/family on patient safety including physical limitations  - Instruct patient to call for assistance with activity   - Consult OT/PT to assist with strengthening/mobility   - Keep Call bell within reach  - Keep bed low and locked with side rails adjusted as appropriate  - Keep care items and personal belongings within reach  - Initiate and maintain comfort rounds  - Make Fall Risk Sign visible to staff  - Offer Toileting every 2 Hours, in advance of need  - Initiate/Maintain alarm  - Obtain necessary fall risk management equipment  - Apply yellow socks and bracelet for high fall risk patients  - Consider moving patient to room near nurses station  Outcome: Progressing     Problem: Nutrition/Hydration-ADULT  Goal: Nutrient/Hydration intake appropriate for improving, restoring or maintaining nutritional needs  Description: Monitor and assess patient's nutrition/hydration status for malnutrition  Collaborate with interdisciplinary team and initiate plan and interventions as ordered  Monitor patient's weight and dietary intake as ordered or per policy  Utilize nutrition screening tool and intervene as necessary  Determine patient's food preferences and provide high-protein, high-caloric foods as appropriate       INTERVENTIONS:  - Monitor oral intake, urinary output, labs, and treatment plans  - Assess nutrition and hydration status and recommend course of action  - Evaluate amount of meals eaten  - Assist patient with eating if necessary   - Allow adequate time for meals  - Recommend/ encourage appropriate diets, oral nutritional supplements, and vitamin/mineral supplements  - Order, calculate, and assess calorie counts as needed  - Recommend, monitor, and adjust tube feedings and TPN/PPN based on assessed needs  - Assess need for intravenous fluids  - Provide specific nutrition/hydration education as appropriate  - Include patient/family/caregiver in decisions related to nutrition  Outcome: Progressing

## 2021-07-21 NOTE — TRANSPORTATION MEDICAL NECESSITY
Section I - General Information    Name of Patient: Kylah Martinez                 : 1965    Medicare #: [id number]  Transport Date: 21 (PCS is valid for round trips on this date and for all repetitive trips in the 60-day range as noted below )  Origin: [facility]                                                         Destination: [address] [facility]   Is the pt's stay covered under Medicare Part A (PPS/DRG)   []     Closest appropriate facility? If no, why is transport to more distant facility required? Yes  If hospice pt, is this transport related to pt's terminal illness? NA       Section II - Medical Necessity Questionnaire  Ambulance transportation is medically necessary only if other means of transport are contraindicated or would be potentially harmful to the patient  To meet this requirement, the patient must either be "bed confined" or suffer from a condition such that transport by means other than ambulance is contraindicated by the patient's condition  The following questions must be answered by the medical professional signing below for this form to be valid:    1)  Describe the MEDICAL CONDITION (physical and/or mental) of this patient AT [address] [place] [address] that requires the patient to be transported in an ambulance and why transport by other means is contraindicated by the patient's condition:    Altered mental statues   Cancer related pain  Ambulatory dysfunction   Decreased strength;Decreased endurance; Impaired balance;Decreased mobility; Decreased safety awareness;Decreased coordination;Decreased cognition;Pain  Assist x 2; Increased time required      2) Is the patient "bed confined" as defined below?      Yes  To be "be confined" the patient must satisfy all three of the following conditions: (1) unable to get up from bed without Assistance; AND (2) unable to ambulate; AND (3) unable to sit in a chair or wheelchair  3) Can this patient safely be transported by car or wheelchair van (i e , seated during transport without a medical attendant or monitoring)? No    4) In addition to completing questions 1-3 above, please check any of the following conditions that apply*:   *Note: supporting documentation for any boxes checked must be maintained in the patient's medical records  If hosp-hosp transfer, describe services needed at 2nd facility not available at 1st facility? Unable to tolerate seated position for time needed to transport    Confused       Section III - Signature of Physician or Healthcare Professional  I certify that the above information is true and correct based on my evaluation of this patient, and represent that the patient requires transport by ambulance and that other forms of transport are contraindicated  I understand that this information will be used by the Centers for Medicare and Medicaid Services (CMS) to support the determination of medical necessity for ambulance services, and I represent that I have personal knowledge of the patient's condition at time of transport  []  If this box is checked, I also certify that the patient is physically or mentally incapable of signing the ambulance service's claim and that the institution with which I am affiliated has furnished care, services, or assistance to the patient  My signature below is made on behalf of the patient pursuant to 42 CFR §424 36(b)(4)   In accordance with 42 CFR §424 37, the specific reason(s) that the patient is physically or mentally incapable of signing the claim form is as follows:     Signature of Physician* or Healthcare Professional_[place]___  Signature Date 07/21/21 (For scheduled repetitive transports, this form is not valid for transports performed more than 60 days after this date)    Printed Name & Credentials of Physician or Healthcare Professional (MD, DO, RN, etc )____[place] [address] must be signed by patient's attending physician for scheduled, repetitive transports   For non-repetitive, unscheduled ambulance transports, if unable to obtain the signature of the attending physician, any of the following may sign (choose appropriate option below)  [] Physician Assistant []  Clinical Nurse Specialist []  Registered Nurse  []  Nurse Practitioner  [x] Discharge Planner

## 2021-07-21 NOTE — DISCHARGE SUMMARY
INTERNAL MEDICINE RESIDENCY DISCHARGE SUMMARY     Sandhya Russell   49 y o  female  MRN: [id number]  Room/Bed: [facility] 90/[facility] [address] [address]   Encounter: [id number]    Principal Problem:    CNS lymphoma [facility])  Active Problems:    Altered mental status    Dysphagia    Severe protein-calorie malnutrition (HCC)    Cancer related pain    Ambulatory dysfunction    Hypokalemia      Hypokalemia  Assessment & Plan  Improving, potassium 3 4  Likely in setting of poor p o  Intake/poor absorption in setting of chemotherapy and malnutrition  Monitor with a m  BMP and replete as needed     Ambulatory dysfunction  Assessment & Plan  A: per [address] does stand up with assistance and able to take few steps however her left leg does give out  Known residual deficits left upper extremity and lower extremity weakness secondary to CNS lymphoma  PT/OT recommended rehab, case management consulted  Appreciate further guidance    Cancer related pain  Assessment & Plan  Tylenol 650mg Prn  Oxyocodone 2 5mg Prn    Severe protein-calorie malnutrition (HCC)  Assessment & Plan  Malnutrition Findings:   Adult Malnutrition type: Chronic illness  Adult Degree of Malnutrition: Other severe protein calorie malnutrition    BMI Findings: Body mass index is 22 17 kg/m²  Nutrition following, appreciate recommendations  Continue protein supplementation  Patient does have PEG tube in place, currently not receiving tube feeds as able to tolerate p o  Intake  Encouraged to increase Magic cup and Ensure intake  Patient verbalized understanding    Dysphagia  Assessment & Plan  A: per irasema louis pt started to tolerated mechanical / thin liquid diet and stopped PEG tubes  Peg tube in place, clear, no discharge noted  Continue with q 4 hours flushes    Evaluated by speech, Continue with level 2 diet    Altered mental status  Assessment & Plan  A: A x O 2 to person and place which is same as previous admission  Speaks Miguel only     P: Monitor for acute changes     * 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[facility] )  Assessment & Plan  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)       Direct Admit from Dr Sharon Montoya for in-patient chemo 3rd cycle       S/P chemotherapy on 05/13, leucovin on 5/14 6/9 MRI- Appears to be interval improvement in the overall      enhancement of multifocal intracranial lesions  P:    DVT prophylaxis with Lovenox   Continue acyclovir, Diflucan and Levaquin prophylactic  Status post 2 doses of methotrexate/Rituxan and completion of Leucovorin rescue treatment  Oncology Consult, appreciate recommendations  MTX < 1 no further need for Leucovorin rescue        [address]       H&P per Dr Edward Bosch "   Patient is a 37-year-old female with past medical history of gout,  diffuse large primary CNS lymphoma s/p 2 rounds of chemotherapy and PEG tube placement 5/10, who presented as a direct admit from Dr Naila Jesus for in patient chemotherapy  [facility] after completing inpatient chemo for physical rehab  Patient was evaluated along with Dr Fatima Runner for interpretation  Pt is feeling okay  Endorses burning chest pain discomfort earlier today but now resolved  She also complains of arthritic pain in the knees  She is alert and orient x 2 to person and place  [facility], patient has been doing much better now  She is able to feed herself and gained some weight as well  She is able to walk to the bathroom with assistance however sometimes her left leg will give out  They have not been using the PEG tube any more  Compliant w/ all the meds  As for goals of care discussion between Dr Naila Jesus and Mr Kohli Nasim who is a family friend, patient would ideally like to go back to [facility] where there is more family support which she needs  She received COVID vaccine x 1 7/8   She had an MRI on June 9th which showed improvement in the overall enhancement of the multifocal intracranial lesions which does correlate her improvements clinically  Patient remained inpatient for 3rd cycle of high-dose methotrexate/Rituxan  Patient received rescue Leucovorin 7/15 to 7/16  Methotrexate levels were monitored until they were less than 0 1 on 07/16  Was followed by medical oncology while inpatient  Patient did note to have episodes of hypokalemia which resolved with p o  Repletion  Tolerated chemotherapy well  No acute complaints or complications noted  PT/OT recommended rehab  Patient was seen on day of discharge 07/21/2021  No acute complaints noted  Patient to go to [facility] for further rehab  Continued home regimen  Antihypertensives were discontinued as patient's blood pressure noted to be on the softer side and remained hemodynamically stable off of blood pressure medications  Was discharged on p r n  Oxycodone oral solution 5 mg q 6 hours for moderate/severe pain of bilateral lower extremity and left upper extremity  PICC line removed  Patient is aware to call info link to find PCP on her area  Continue outpatient follow-up with Medical Oncology, next appointment 08/02/2021 with Dr Norma Rivero  Visit Vitals  /63   Pulse (!) 107   Temp 99 1 °F (37 3 °C)   Resp 20   Ht 5' (1 524 m)   Wt 51 5 kg (113 lb 8 6 oz)   SpO2 98%   BMI 22 17 kg/m²   Smoking Status Never Smoker   BSA 1 47 m²     Physical Exam  Vitals reviewed  Constitutional:       Comments: Ill appearance   HENT:      Head: Normocephalic and atraumatic  Mouth/Throat:      Mouth: Mucous membranes are dry  Eyes:      Comments: Ptosis on left   Cardiovascular:      Rate and Rhythm: Normal rate and regular rhythm  Pulmonary:      Breath sounds: Normal breath sounds  Abdominal:      Palpations: Abdomen is soft  Tenderness: There is no abdominal tenderness        Comments: PEG tube in place   Musculoskeletal:      Comments: Left upper extremity 1/5, left lower extremity 2/5   Skin:     General: Skin is dry  Findings: No bruising  Neurological:      Comments: Oriented to person place and place    Psychiatric:         Behavior: Behavior normal          Thought Content: Thought content normal        DISCHARGE INFORMATION     PCP at Discharge:  Patient provided with info link to establish care with PCP in area    Admitting Provider: Brenda Ramires DO  Admission Date: 7/13/2021    Discharge Provider: Brenda Ramires DO  Discharge Date:  07/21/2021    Discharge Disposition: Non [facility] SNF/TCU/SNU  Discharge Condition: fair  Discharge with Lines: no    Discharge Diet: regular diet  Activity Restrictions: none  Test Results Pending at Discharge: n/a    Discharge Diagnoses:  Principal Problem:    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[facility] )  Active Problems:    Altered mental status    Dysphagia    Severe protein-calorie malnutrition ([facility] )    Cancer related pain    Ambulatory dysfunction    Hypokalemia  Resolved Problems:    * No resolved hospital problems  *      Consulting Providers:      Diagnostic & Therapeutic Procedures Performed:  No results found      Code Status: Level 1 - Full Code  Advance Directive & Living Will: <no information>  Power of :    POLST:      Medications:  Current Discharge Medication List      STOP taking these medications       albuterol (2 5 mg/3 mL) 0 083 % nebulizer solution Comments:   Reason for Stopping:         amLODIPine (NORVASC) 5 mg tablet Comments:   Reason for Stopping:         fluconazole (DIFLUCAN) 200 mg tablet Comments:   Reason for Stopping:         levofloxacin (LEVAQUIN) 500 mg tablet Comments:   Reason for Stopping:         omeprazole 2 mg/mL in sodium bicarbonate Comments:   Reason for Stopping:         oxyCODONE (OXY-IR) 5 MG capsule Comments:   Reason for Stopping:         Sennosides (Senna) 8 8 mg/5 mL LIQD Comments:   Reason for Stopping:         acyclovir (ZOVIRAX) 200 mg capsule Comments:   Reason for Stopping: enoxaparin (LOVENOX) 40 mg/0 4 mL Comments:   Reason for Stopping:         Lidocaine 4 % PTCH Comments:   Reason for Stopping:             Current Discharge Medication List      START taking these medications    Details   folic acid (FOLVITE) 1 mg tablet Take 1 tablet (1 mg total) by mouth daily  Refills: 0    Associated Diagnoses: 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HCC)      oxyCODONE (ROXICODONE) 5 mg/5 mL solution Take 5 mL (5 mg total) by mouth every 6 (six) hours as needed for moderate pain or severe pain for up to 6 daysMax Daily Amount: 20 mg  Qty: 60 mL, Refills: 0    Associated Diagnoses: 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[facility] ); Cancer related pain; Closed fracture of ramus of left pubis with routine healing, subsequent encounter           Current Discharge Medication List      CONTINUE these medications which have NOT CHANGED    Details   acetaminophen (Tylenol) 325 mg tablet Take 500 mg by mouth every 6 (six) hours as needed for mild pain      allopurinol (ZYLOPRIM) 100 mg tablet Take 1 tablet (100 mg total) by mouth daily  Refills: 0    Associated Diagnoses: 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[facility] );  Acute gout      aluminum-magnesium hydroxide-simethicone (MYLANTA) 200-200-20 mg/5 mL suspension 30 mL by Per PEG Tube route every 4 (four) hours as needed for indigestion or heartburn  Qty: 355 mL, Refills: 0    Associated Diagnoses: Severe protein-calorie malnutrition (HCC)      atorvastatin (LIPITOR) 40 mg tablet Take 40 mg by mouth daily      bisacodyl (DULCOLAX) 10 mg suppository Insert 1 suppository (10 mg total) into the rectum daily as needed (Third line for constipation)  Qty: 12 suppository, Refills: 0    Associated Diagnoses: Constipation      Cholecalciferol (Vitamin D3) 1 25 MG (33016 UT) TABS Take by mouth once a week Every monday      Diclofenac Sodium (VOLTAREN) 1 % Apply 2 g topically 3 (three) times a day as needed (knee nacho)  Refills: 0    Associated Diagnoses: Cancer related pain      dicyclomine (BENTYL) 10 mg capsule Take 1 capsule (10 mg total) by mouth 4 (four) times a day (before meals and at bedtime)  Refills: 0    Associated Diagnoses: Severe protein-calorie malnutrition (HCC)      docusate sodium (COLACE) 100 mg capsule Take 100 mg by mouth 2 (two) times a day      famotidine (PEPCID) 20 mg tablet Take 20 mg by mouth daily      hydrOXYzine HCL (ATARAX) 25 mg tablet Take 1 tablet (25 mg total) by mouth every 6 (six) hours as needed for itching or anxiety  Qty: 30 tablet, Refills: 0    Associated Diagnoses: 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HCC)      insulin lispro (HumaLOG) 100 units/mL injection Inject 1-5 Units under the skin 4 (four) times a day (before meals and at bedtime)  Refills: 0    Associated Diagnoses: Steroid-induced hyperglycemia      menthol-methyl salicylate (BENGAY) 26-86 % cream Apply topically 2 (two) times a day  Refills: 0    Associated Diagnoses: Cancer related pain      ondansetron (ZOFRAN-ODT) 4 mg disintegrating tablet Take 1 tablet (4 mg total) by mouth every 6 (six) hours as needed for nausea or vomiting  Qty: 20 tablet, Refills: 0    Associated Diagnoses: 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HCC)      polyethylene glycol (MIRALAX) 17 g packet Take 17 g by mouth daily as needed (Second line for constipation)  Refills: 0    Associated Diagnoses: Constipation      tamsulosin (FLOMAX) 0 4 mg Take 1 capsule (0 4 mg total) by mouth daily with dinner  Refills: 0    Associated Diagnoses: Urinary retention      albuterol (PROVENTIL HFA,VENTOLIN HFA) 90 mcg/act inhaler Inhale 2 puffs    Comments: Substitution to a formulary equivalent within the same pharmaceutical class is authorized  senna-docusate sodium (Senokot S) 8 6-50 mg per tablet Take 1 tablet by mouth 2 (two) times a day             Allergies:  No Known Allergies    FOLLOW-UP     PCP Outpatient Follow-up:  Info link provided to establish care with PCP in her area    Follow-up with hematology/oncology Dr Selina Dobson within next month  Next appointment scheduled for 08/02/2021      Active Issues Requiring Follow-up: yes     Issue:  Ongoing chemotherapy for CNS lymphoma  Responsible Individual:  Hematology/oncology  What is Needed: Follow-up and treatment plan  Follow-up Appointments Arranged: Yes       Discharge Statement:   I spent 45 minutes minutes discharging the patient  This time was spent on the day of discharge  I had direct contact with the patient on the day of discharge  Additional documentation is required if more than 30 minutes were spent on discharge  Portions of the record may have been created with voice recognition software  Occasional wrong word or "sound a like" substitutions may have occurred due to the inherent limitations of voice recognition software    Read the chart carefully and recognize, using context, where substitutions have occurred     ==  Santy Leyva, [facility]  Internal Medicine Resident PGY-3

## 2021-07-22 DIAGNOSIS — C85.89 CNS LYMPHOMA (HCC): Primary | ICD-10-CM

## 2021-07-22 NOTE — UTILIZATION REVIEW
Notification of Discharge   This is a Notification of Discharge from our facility [address]  Please be advised that this patient has been discharge from our facility  Below you will find the admission and discharge date and time including the patients disposition  UTILIZATION REVIEW CONTACT:  Johnnie Hardin  Utilization   Network Utilization Review Department  Phone: [phone] x carefully listen to the prompts  All voicemails are confidential   Email: [email or web address]     PHYSICIAN ADVISORY SERVICES:  FOR [id number] REVIEW - MEDICAL NECESSITY DENIAL  Phone: [phone]  Fax: [phone]  Email: [email or web address]     PRESENTATION DATE: 7/13/2021  8:05 AM  OBERVATION ADMISSION DATE:   INPATIENT ADMISSION DATE: 7/13/21  8:05 AM   DISCHARGE DATE: 7/21/2021  3:02 PM  DISPOSITION: Non [facility] SNF/TCU/SNU Non [facility] SNF/TCU/SNU      IMPORTANT INFORMATION:  Send all requests for admission clinical reviews, approved or denied determinations and any other requests to dedicated fax number below belonging to the campus where the patient is receiving treatment   List of dedicated fax numbers:  [address] [place] [address] DENIALS (Administrative/Medical Necessity) [phone]   [address]  (Maternity/NICU/Pediatrics) [phone]   Theresa Glasgow [phone]   [facility] [phone]   Renu Clement [phone]   [facility] [address] [facility] [phone]   [facility]  [phone]   [address] [facility] [address] [facility]  [address] [facility] [address] [facility] [phone]

## 2021-07-26 ENCOUNTER — TELEPHONE (OUTPATIENT)
Dept: CARDIAC SURGERY | Facility: CLINIC | Age: 56
End: 2021-07-26

## 2021-07-28 ENCOUNTER — HOSPITAL ENCOUNTER (INPATIENT)
Facility: HOSPITAL | Age: 56
LOS: 8 days | Discharge: NURSING, REHAB OR LONG-TERM CARE | DRG: 847 | End: 2021-08-05
Attending: INTERNAL MEDICINE | Admitting: INTERNAL MEDICINE
Payer: COMMERCIAL

## 2021-07-28 ENCOUNTER — TELEPHONE (OUTPATIENT)
Dept: HEMATOLOGY ONCOLOGY | Facility: CLINIC | Age: 56
End: 2021-07-28

## 2021-07-28 DIAGNOSIS — O26.899 PREGNANCY MANAGEMENT AFFECTED BY INSULIN RESISTANCE: ICD-10-CM

## 2021-07-28 DIAGNOSIS — G89.3 CANCER RELATED PAIN: ICD-10-CM

## 2021-07-28 DIAGNOSIS — L03.818 CELLULITIS OF OTHER SPECIFIED SITE: ICD-10-CM

## 2021-07-28 DIAGNOSIS — C85.89 CNS LYMPHOMA (HCC): Primary | ICD-10-CM

## 2021-07-28 DIAGNOSIS — S32.592D CLOSED FRACTURE OF RAMUS OF LEFT PUBIS WITH ROUTINE HEALING, SUBSEQUENT ENCOUNTER: ICD-10-CM

## 2021-07-28 DIAGNOSIS — R13.10 DYSPHAGIA, UNSPECIFIED TYPE: ICD-10-CM

## 2021-07-28 DIAGNOSIS — E09.9 DRUG OR CHEMICAL INDUCED DIABETES MELLITUS WITHOUT COMPLICATION, UNSPECIFIED WHETHER LONG TERM INSULIN USE (HCC): ICD-10-CM

## 2021-07-28 LAB
ALBUMIN SERPL BCP-MCNC: 3.1 G/DL (ref 3.5–5)
ALP SERPL-CCNC: 66 U/L (ref 46–116)
ALT SERPL W P-5'-P-CCNC: 12 U/L (ref 12–78)
ANION GAP SERPL CALCULATED.3IONS-SCNC: 3 MMOL/L (ref 4–13)
AST SERPL W P-5'-P-CCNC: 11 U/L (ref 5–45)
BASOPHILS # BLD AUTO: 0.04 THOUSANDS/ΜL (ref 0–0.1)
BASOPHILS NFR BLD AUTO: 1 % (ref 0–1)
BILIRUB SERPL-MCNC: 0.22 MG/DL (ref 0.2–1)
BUN SERPL-MCNC: 9 MG/DL (ref 5–25)
CALCIUM ALBUM COR SERPL-MCNC: 10.6 MG/DL (ref 8.3–10.1)
CALCIUM SERPL-MCNC: 9.9 MG/DL (ref 8.3–10.1)
CHLORIDE SERPL-SCNC: 109 MMOL/L (ref 100–108)
CO2 SERPL-SCNC: 29 MMOL/L (ref 21–32)
CREAT SERPL-MCNC: 0.43 MG/DL (ref 0.6–1.3)
EOSINOPHIL # BLD AUTO: 0.16 THOUSAND/ΜL (ref 0–0.61)
EOSINOPHIL NFR BLD AUTO: 2 % (ref 0–6)
ERYTHROCYTE [DISTWIDTH] IN BLOOD BY AUTOMATED COUNT: 15.1 % (ref 11.6–15.1)
GFR SERPL CREATININE-BSD FRML MDRD: 116 ML/MIN/1.73SQ M
GLUCOSE SERPL-MCNC: 176 MG/DL (ref 65–140)
GLUCOSE SERPL-MCNC: 182 MG/DL (ref 65–140)
HCT VFR BLD AUTO: 37.8 % (ref 34.8–46.1)
HGB BLD-MCNC: 12.3 G/DL (ref 11.5–15.4)
IMM GRANULOCYTES # BLD AUTO: 0.03 THOUSAND/UL (ref 0–0.2)
IMM GRANULOCYTES NFR BLD AUTO: 0 % (ref 0–2)
LYMPHOCYTES # BLD AUTO: 2.58 THOUSANDS/ΜL (ref 0.6–4.47)
LYMPHOCYTES NFR BLD AUTO: 38 % (ref 14–44)
MCH RBC QN AUTO: 29.3 PG (ref 26.8–34.3)
MCHC RBC AUTO-ENTMCNC: 32.5 G/DL (ref 31.4–37.4)
MCV RBC AUTO: 90 FL (ref 82–98)
MONOCYTES # BLD AUTO: 0.46 THOUSAND/ΜL (ref 0.17–1.22)
MONOCYTES NFR BLD AUTO: 7 % (ref 4–12)
NEUTROPHILS # BLD AUTO: 3.5 THOUSANDS/ΜL (ref 1.85–7.62)
NEUTS SEG NFR BLD AUTO: 52 % (ref 43–75)
NRBC BLD AUTO-RTO: 0 /100 WBCS
PLATELET # BLD AUTO: 264 THOUSANDS/UL (ref 149–390)
PMV BLD AUTO: 11 FL (ref 8.9–12.7)
POTASSIUM SERPL-SCNC: 3.1 MMOL/L (ref 3.5–5.3)
PROT SERPL-MCNC: 6.1 G/DL (ref 6.4–8.2)
RBC # BLD AUTO: 4.2 MILLION/UL (ref 3.81–5.12)
SODIUM SERPL-SCNC: 141 MMOL/L (ref 136–145)
WBC # BLD AUTO: 6.77 THOUSAND/UL (ref 4.31–10.16)

## 2021-07-28 PROCEDURE — 80053 COMPREHEN METABOLIC PANEL: CPT | Performed by: INTERNAL MEDICINE

## 2021-07-28 PROCEDURE — 02HV33Z INSERTION OF INFUSION DEVICE INTO SUPERIOR VENA CAVA, PERCUTANEOUS APPROACH: ICD-10-PCS | Performed by: INTERNAL MEDICINE

## 2021-07-28 PROCEDURE — 85025 COMPLETE CBC W/AUTO DIFF WBC: CPT | Performed by: INTERNAL MEDICINE

## 2021-07-28 PROCEDURE — 36569 INSJ PICC 5 YR+ W/O IMAGING: CPT

## 2021-07-28 PROCEDURE — 99223 1ST HOSP IP/OBS HIGH 75: CPT | Performed by: INTERNAL MEDICINE

## 2021-07-28 PROCEDURE — 82948 REAGENT STRIP/BLOOD GLUCOSE: CPT

## 2021-07-28 PROCEDURE — C1751 CATH, INF, PER/CENT/MIDLINE: HCPCS

## 2021-07-28 RX ORDER — MUSCLE RUB CREAM 100; 150 MG/G; MG/G
CREAM TOPICAL 4 TIMES DAILY PRN
Status: DISCONTINUED | OUTPATIENT
Start: 2021-07-28 | End: 2021-08-05 | Stop reason: HOSPADM

## 2021-07-28 RX ORDER — OXYCODONE HYDROCHLORIDE 5 MG/1
5 TABLET ORAL EVERY 4 HOURS PRN
Status: DISCONTINUED | OUTPATIENT
Start: 2021-07-28 | End: 2021-07-28

## 2021-07-28 RX ORDER — LEUCOVORIN CALCIUM 25 MG/1
25 TABLET ORAL EVERY 6 HOURS
Status: CANCELLED | OUTPATIENT
Start: 2021-07-30

## 2021-07-28 RX ORDER — GABAPENTIN 250 MG/5ML
200 SOLUTION ORAL
Status: DISCONTINUED | OUTPATIENT
Start: 2021-07-28 | End: 2021-07-29

## 2021-07-28 RX ORDER — DOCUSATE SODIUM 100 MG/1
100 CAPSULE, LIQUID FILLED ORAL 2 TIMES DAILY
Status: DISCONTINUED | OUTPATIENT
Start: 2021-07-28 | End: 2021-08-05 | Stop reason: HOSPADM

## 2021-07-28 RX ORDER — POTASSIUM CHLORIDE 20 MEQ/1
40 TABLET, EXTENDED RELEASE ORAL ONCE
Status: COMPLETED | OUTPATIENT
Start: 2021-07-28 | End: 2021-07-28

## 2021-07-28 RX ORDER — ONDANSETRON 4 MG/1
4 TABLET, ORALLY DISINTEGRATING ORAL EVERY 6 HOURS PRN
Status: DISCONTINUED | OUTPATIENT
Start: 2021-07-28 | End: 2021-08-05 | Stop reason: HOSPADM

## 2021-07-28 RX ORDER — OMEGA-3S/DHA/EPA/FISH OIL/D3 300MG-1000
400 CAPSULE ORAL DAILY
Status: DISCONTINUED | OUTPATIENT
Start: 2021-07-28 | End: 2021-08-05 | Stop reason: HOSPADM

## 2021-07-28 RX ORDER — LORAZEPAM 2 MG/ML
0.5 INJECTION INTRAMUSCULAR EVERY 6 HOURS PRN
Status: DISCONTINUED | OUTPATIENT
Start: 2021-07-28 | End: 2021-08-05 | Stop reason: HOSPADM

## 2021-07-28 RX ORDER — SODIUM CHLORIDE 9 MG/ML
20 INJECTION, SOLUTION INTRAVENOUS ONCE AS NEEDED
Status: DISCONTINUED | OUTPATIENT
Start: 2021-07-28 | End: 2021-08-05 | Stop reason: HOSPADM

## 2021-07-28 RX ORDER — SODIUM CHLORIDE 9 MG/ML
20 INJECTION, SOLUTION INTRAVENOUS ONCE AS NEEDED
Status: CANCELLED | OUTPATIENT
Start: 2021-07-28

## 2021-07-28 RX ORDER — OXYCODONE HYDROCHLORIDE 5 MG/1
2.5 TABLET ORAL EVERY 4 HOURS PRN
Status: DISCONTINUED | OUTPATIENT
Start: 2021-07-28 | End: 2021-08-03

## 2021-07-28 RX ORDER — DICYCLOMINE HYDROCHLORIDE 10 MG/1
10 CAPSULE ORAL
Status: DISCONTINUED | OUTPATIENT
Start: 2021-07-28 | End: 2021-08-05 | Stop reason: HOSPADM

## 2021-07-28 RX ORDER — ATORVASTATIN CALCIUM 10 MG/1
10 TABLET, FILM COATED ORAL
Status: DISCONTINUED | OUTPATIENT
Start: 2021-07-28 | End: 2021-08-05 | Stop reason: HOSPADM

## 2021-07-28 RX ORDER — TAMSULOSIN HYDROCHLORIDE 0.4 MG/1
0.4 CAPSULE ORAL
Status: DISCONTINUED | OUTPATIENT
Start: 2021-07-28 | End: 2021-08-05 | Stop reason: HOSPADM

## 2021-07-28 RX ORDER — AMOXICILLIN 250 MG
1 CAPSULE ORAL 2 TIMES DAILY
Status: DISCONTINUED | OUTPATIENT
Start: 2021-07-28 | End: 2021-08-05 | Stop reason: HOSPADM

## 2021-07-28 RX ORDER — ALBUTEROL SULFATE 90 UG/1
2 AEROSOL, METERED RESPIRATORY (INHALATION) EVERY 4 HOURS PRN
Status: DISCONTINUED | OUTPATIENT
Start: 2021-07-28 | End: 2021-08-05 | Stop reason: HOSPADM

## 2021-07-28 RX ORDER — ACETAMINOPHEN 325 MG/1
650 TABLET ORAL EVERY 6 HOURS PRN
Status: DISCONTINUED | OUTPATIENT
Start: 2021-07-28 | End: 2021-08-05 | Stop reason: HOSPADM

## 2021-07-28 RX ORDER — POLYETHYLENE GLYCOL 3350 17 G/17G
17 POWDER, FOR SOLUTION ORAL DAILY PRN
Status: DISCONTINUED | OUTPATIENT
Start: 2021-07-28 | End: 2021-08-05 | Stop reason: HOSPADM

## 2021-07-28 RX ORDER — HYDROXYZINE HYDROCHLORIDE 25 MG/1
25 TABLET, FILM COATED ORAL EVERY 6 HOURS PRN
Status: DISCONTINUED | OUTPATIENT
Start: 2021-07-28 | End: 2021-08-05 | Stop reason: HOSPADM

## 2021-07-28 RX ORDER — LORAZEPAM 2 MG/ML
0.5 INJECTION INTRAMUSCULAR ONCE
Status: COMPLETED | OUTPATIENT
Start: 2021-07-28 | End: 2021-07-28

## 2021-07-28 RX ORDER — FOLIC ACID 1 MG/1
1 TABLET ORAL DAILY
Status: DISCONTINUED | OUTPATIENT
Start: 2021-07-28 | End: 2021-08-05 | Stop reason: HOSPADM

## 2021-07-28 RX ADMIN — DICYCLOMINE HYDROCHLORIDE 10 MG: 10 CAPSULE ORAL at 17:18

## 2021-07-28 RX ADMIN — DOCUSATE SODIUM 100 MG: 100 CAPSULE ORAL at 17:18

## 2021-07-28 RX ADMIN — ATORVASTATIN CALCIUM 10 MG: 10 TABLET, FILM COATED ORAL at 17:18

## 2021-07-28 RX ADMIN — METHOTREXATE 4935 MG: 25 INJECTION INTRA-ARTERIAL; INTRAMUSCULAR; INTRATHECAL; INTRAVENOUS at 21:22

## 2021-07-28 RX ADMIN — ONDANSETRON: 2 SOLUTION INTRAMUSCULAR; INTRAVENOUS at 20:20

## 2021-07-28 RX ADMIN — LORAZEPAM 0.5 MG: 2 INJECTION INTRAMUSCULAR; INTRAVENOUS at 14:07

## 2021-07-28 RX ADMIN — SODIUM BICARBONATE 150 ML/HR: 84 INJECTION, SOLUTION INTRAVENOUS at 22:17

## 2021-07-28 RX ADMIN — POTASSIUM CHLORIDE 40 MEQ: 1500 TABLET, EXTENDED RELEASE ORAL at 22:13

## 2021-07-28 RX ADMIN — DICYCLOMINE HYDROCHLORIDE 10 MG: 10 CAPSULE ORAL at 22:13

## 2021-07-28 RX ADMIN — GABAPENTIN 200 MG: 250 SOLUTION ORAL at 22:14

## 2021-07-28 RX ADMIN — TAMSULOSIN HYDROCHLORIDE 0.4 MG: 0.4 CAPSULE ORAL at 17:18

## 2021-07-28 RX ADMIN — SODIUM BICARBONATE 150 ML/HR: 84 INJECTION, SOLUTION INTRAVENOUS at 15:18

## 2021-07-28 RX ADMIN — DOCUSATE SODIUM AND SENNOSIDES 1 TABLET: 8.6; 5 TABLET ORAL at 17:18

## 2021-07-28 RX ADMIN — LORAZEPAM 0.5 MG: 2 INJECTION INTRAMUSCULAR; INTRAVENOUS at 22:14

## 2021-07-28 RX ADMIN — INSULIN LISPRO 1 UNITS: 100 INJECTION, SOLUTION INTRAVENOUS; SUBCUTANEOUS at 18:16

## 2021-07-28 NOTE — TELEPHONE ENCOUNTER
Santo Moyer 58 pt is an 1100   Returned call to [facility] to advise them of such

## 2021-07-28 NOTE — PROCEDURES
Insert PICC line    Date/Time: 7/28/2021 2:49 PM  Performed by: Jenniffer Colon RN  Authorized by: CHEPE Salvador     Patient location:  Bedside  Other Assisting Provider: Yes (comment) (MAIKEL Alejandro)    Consent:     Consent obtained:  Written    Consent given by:  Spouse    Procedural risks discussed: COnsent per md     Alternatives discussed: Consent per md   Universal protocol:     Procedure explained and questions answered to patient or proxy's satisfaction: yes      Relevant documents present and verified: yes      Test results available and properly labeled: yes      Radiology Images displayed and confirmed  If images not available, report reviewed: yes      Required blood products, implants, devices, and special equipment available: yes      Site/side marked: yes      Immediately prior to procedure, a time out was called: yes      Patient identity confirmed:  Verbally with patient and arm band  Pre-procedure details:     Hand hygiene: Hand hygiene performed prior to insertion      Sterile barrier technique: All elements of maximal sterile technique followed      Skin preparation:  ChloraPrep    Skin preparation agent: Skin preparation agent completely dried prior to procedure    Indications:     PICC line indications: chemotherapy    Anesthesia (see MAR for exact dosages):      Anesthesia method:  Local infiltration    Local anesthetic:  Lidocaine 1% w/o epi  Procedure details:     Location:  Basilic    Vessel type: vein      Laterality:  Right    Approach: percutaneous technique used      Patient position:  Flat    Procedural supplies:  Double lumen    Catheter size:  5 Fr    Landmarks identified: yes      Ultrasound guidance: yes      Sterile ultrasound techniques: Sterile gel and sterile probe covers were used      Number of attempts:  1    Successful placement: yes      Vessel of catheter tip end:  Sherlock 3CG confirmed    Total catheter length (cm):  39    Catheter out on skin (cm):  3    Max flow rate: 999ml/hr    Arm circumference:  26  Post-procedure details:     Post-procedure:  Dressing applied and securement device placed    Assessment:  Blood return through all ports and free fluid flow    Post-procedure complications: none      Patient tolerance of procedure: Tolerated well, no immediate complications  Comments:      Left arm flaccid, pt screams when extended to side  Right PICC placed  LOT [id number]  Exp 8/31/22  Gauze dressing placed under tegaderm for small amount bloody drainage, RN aware to change dressing tomorrow to [facility]

## 2021-07-28 NOTE — H&P
Oncology Admission History and Physical   Billy Churchill 49 y o  female MRN: [id number]  Unit/Bed#: [facility] 918-01 Encounter: [id number]        Assessment and Plan:  1  Primary CNS Lymphoma  - s/p 3 cycles of high-dose Methotrexate/Rituxan; scan after these two doses showed improvement of disease; pt was sent to rehab to work on her strength/conditioning; she is here for C4 of HD MTX/rituxan and was admitted from facility for chemo; however, insurance denied rituxan, so she will only be getting HD methotrexate; this is suboptimal, but insurance is limiting this  - proceed with C4 of HD MTX with leukovorin rescue after  - continue PT/OT during this admission  She historically has been very uncomfortable with attempted PICC line placement- ativan has helped in past    - daily CBC/CMP, and daily weight  - Dr Brian Regalado is primary oncologist for this patient  - PICC line placement today, plan for mediport placement during this hospitalization    2  Ambulatory dysfunction  - PT/OT eval and treat during hospitalization    3  Cancer related pain  - tylenol 650 and oxycodone 2 5mg PRN    4  Dysphagia  - Pt has PEG, but did not use last admission or recently in SNF  - Level 3 for now, thin liquids OK  - Nutrition consult  - speech and swallow eval  - would like to get PEG tube removed by GI if nutrition/SLP agree    5  Altered mental status, improved  - AOX2,at her baseline   Speeaks Gujarti only   - ativan 0 5mg IV Q6 hours PRN agitation     Communication with patient/family:  I did update the patient with help of a Dr Gali Duncan as  as pt speaks [facility] with team:  Did talk with patient's nurse, primary outpatient oncology team and pharmacist regarding her plan         I did see this patient with Dr sEtiven Hayward and he is in agreement with this plan        Jerry Randhawa, ZAK-BC  Hematology/Oncology Nurse Practitioner       Presenting Complaint: chemo for CNS lymphoma    History of Presenting Illness: Agnieszka Jacobson is a 48 yo female with primary CNS lymphoma, s/p 3 cycles of high dose methotrexate and rituxan  Most recent imaging had showed improvement of intracranial lesions; pt was sent to rehab to work on her strength/conditioning; she is due for C4 of HD MTX (insurance not covering rituxan) and was admitted from facility for chemo  Pt is doing well overall; she is emotional right now, wants water  she is much stronger  She is now able to answer questions and communicate better  She denies any areas of pain  She is agreeable to getting chemotherapy today; no other concerns noted today during our conversation  No past medical history on file  Social History     Socioeconomic History    Marital status: Unknown     Spouse name: Not on file    Number of children: Not on file    Years of education: Not on file    Highest education level: Not on file   Occupational History    Not on file   Tobacco Use    Smoking status: Never Smoker    Smokeless tobacco: Never Used   Substance and Sexual Activity    Alcohol use: Not Currently    Drug use: Not on file    Sexual activity: Not on file   Other Topics Concern    Not on file   Social History Narrative    Not on file     Social Determinants of Health     Financial Resource Strain:     Difficulty of Paying Living Expenses:    Food Insecurity:     Worried About Running Out of Food in the Last Year:     [address] in the Last Year:    Transportation Needs:     Lack of Transportation (Medical):      Lack of Transportation (Non-Medical):    Physical Activity:     Days of Exercise per Week:     Minutes of Exercise per Session:    Stress:     Feeling of Stress :    Social Connections:     Frequency of Communication with Friends and Family:     Frequency of Social Gatherings with Friends and Family:     Attends Sabianist Services:     Active Member of Clubs or Organizations:     Attends Club or Organization Meetings:     Marital Status:    Intimate Partner Violence:     Fear of Current or Ex-Partner:     Emotionally Abused:     Physically Abused:     Sexually Abused:        Family History   Problem Relation Age of Onset    No Known Problems Mother        No Known Allergies      Current Facility-Administered Medications:     acetaminophen (TYLENOL) tablet 650 mg, 650 mg, Oral, Q6H PRN, Anne Eye, DO    albuterol (PROVENTIL HFA,VENTOLIN HFA) inhaler 2 puff, 2 puff, Inhalation, Q4H PRN, Anne Eye, DO    atorvastatin (LIPITOR) tablet 10 mg, 10 mg, Oral, Daily With Dinner, Anne Eye, DO    cholecalciferol (VITAMIN D3) tablet 400 Units, 400 Units, Oral, Daily, Anne Eye, DO    Diclofenac Sodium (VOLTAREN) 1 % topical gel 2 g, 2 g, Topical, TID PRN, Anne Eye, DO    dicyclomine (BENTYL) capsule 10 mg, 10 mg, Oral, 4x Daily (AC & HS), Anne Eye, DO    docusate sodium (COLACE) capsule 100 mg, 100 mg, Oral, BID, Anne Eye, DO    [START ON 7/29/2021] enoxaparin (LOVENOX) subcutaneous injection 40 mg, 40 mg, Subcutaneous, Daily, Anne Eye, DO    folic acid (FOLVITE) tablet 1 mg, 1 mg, Oral, Daily, Anne Eye, DO    gabapentin (NEURONTIN) oral solution 200 mg, 200 mg, Oral, HS, Anne Eye, DO    hydrOXYzine HCL (ATARAX) tablet 25 mg, 25 mg, Oral, Q6H PRN, Anne Eye, DO    insulin lispro (HumaLOG) 100 units/mL subcutaneous injection 1 Units, 1 Units, Subcutaneous, TID With Meals, Anne Eye, DO    menthol-methyl salicylate (BENGAY) 68-61 % cream, , Apply externally, 4x Daily PRN, Anne Eye, DO    methotrexate (PF) 4,935 mg in sodium chloride 0 9 % 1,000 mL IVPB, 3,500 mg/m2 (Treatment Plan Recorded), Intravenous, Once, Anne Eye, DO    ondansetron (ZOFRAN) 16 mg, dexamethasone (DECADRON) 10 mg in sodium chloride 0 9 % 50 mL IVPB, , Intravenous, Once, Anne Eye, DO    ondansetron (ZOFRAN-ODT) dispersible tablet 4 mg, 4 mg, Oral, Q6H PRN, Anne Eye, DO    oxyCODONE (ROXICODONE) IR tablet 5 mg, 5 mg, Oral, Q4H PRN, Raimundo Rodriguez DO    polyethylene glycol (MIRALAX) packet 17 g, 17 g, Oral, Daily PRN, Raimundo Rodriguez DO    senna-docusate sodium (SENOKOT S) 8 6-50 mg per tablet 1 tablet, 1 tablet, Oral, BID, Raimundo Rodriguez DO    sodium bicarbonate 100 mEq in dextrose 5 % 1,000 mL infusion, 150 mL/hr, Intravenous, Continuous, Raimundo Rodriguez DO    sodium chloride 0 9 % infusion, 20 mL/hr, Intravenous, Once PRN, Raimundo Rodriguez DO    tamsulosin (FLOMAX) capsule 0 4 mg, 0 4 mg, Oral, Daily With Dinner, Raimundo Rodriguez DO    /85   Pulse 88   Temp 98 8 °F (37 1 °C)   Resp 18   SpO2 100%     Review of Systems:  As per HPI  Limited ROS given language barrier  Physical Exam  Constitutional:       Appearance: Normal appearance  HENT:      Head: Normocephalic and atraumatic  Nose: Nose normal    Eyes:      Extraocular Movements: Extraocular movements intact  Conjunctiva/sclera: Conjunctivae normal       Pupils: Pupils are equal, round, and reactive to light  Cardiovascular:      Rate and Rhythm: Normal rate and regular rhythm  Heart sounds: Normal heart sounds  Pulmonary:      Effort: Pulmonary effort is normal  No respiratory distress  Breath sounds: Normal breath sounds  No wheezing, rhonchi or rales  Abdominal:      General: Bowel sounds are normal  PEG tube in place  Palpations: Abdomen is soft  Tenderness: There is no abdominal tenderness  Musculoskeletal:         General: Normal range of motion  Cervical back: Normal range of motion and neck supple  Skin:     General: Skin is warm and dry  Coloration: Skin is not jaundiced  Findings: No bruising or rash  Neurological:      Mental Status: She is alert  Motor: Weakness (0/5 left upper extremity; left lower weak) present  Comments: AOx2 with    Psychiatric:         Mood and Affect: Affect is tearful  Cognition and Memory: Cognition is impaired        Comments: Pt occasional tearful, occasional yelling       Labs: labs from today pending    Lab Results   Component Value Date    HGB 11 3 (L) 07/20/2021    HCT 35 0 07/20/2021    MCV 90 07/20/2021     07/20/2021    WBC 5 50 07/20/2021    NRBC 0 07/17/2021    BANDSPCT 2 07/16/2021    ATYLMPCT 1 (H) 05/23/2021       Lab Results   Component Value Date    K 3 4 (L) 07/20/2021     07/20/2021    CO2 27 07/20/2021    BUN 16 07/20/2021    CREATININE 0 34 (L) 07/20/2021    CALCIUM 10 0 07/20/2021    CORRECTEDCA 10 6 (H) 07/17/2021    AST 12 07/17/2021    ALT 14 07/17/2021    ALKPHOS 59 07/17/2021    EGFR 125 07/20/2021         This note has been generated by voice recognition software system  Therefore, there may be spelling, grammar, and or syntax errors  Please contact if questions arise

## 2021-07-28 NOTE — TELEPHONE ENCOUNTER
Gumaro Joy from [facility]  [phone]  Patient has not yet arrived for inpatient admit  Will send to Dr Jennifer Yan

## 2021-07-29 LAB
ALBUMIN SERPL BCP-MCNC: 3 G/DL (ref 3.5–5)
ALP SERPL-CCNC: 62 U/L (ref 46–116)
ALT SERPL W P-5'-P-CCNC: 15 U/L (ref 12–78)
ANION GAP SERPL CALCULATED.3IONS-SCNC: 4 MMOL/L (ref 4–13)
AST SERPL W P-5'-P-CCNC: 12 U/L (ref 5–45)
BACTERIA UR QL AUTO: ABNORMAL /HPF
BASOPHILS # BLD AUTO: 0.02 THOUSANDS/ΜL (ref 0–0.1)
BASOPHILS NFR BLD AUTO: 0 % (ref 0–1)
BILIRUB SERPL-MCNC: 0.45 MG/DL (ref 0.2–1)
BILIRUB UR QL STRIP: NEGATIVE
BUN SERPL-MCNC: 9 MG/DL (ref 5–25)
CALCIUM ALBUM COR SERPL-MCNC: 10.4 MG/DL (ref 8.3–10.1)
CALCIUM SERPL-MCNC: 9.6 MG/DL (ref 8.3–10.1)
CHLORIDE SERPL-SCNC: 106 MMOL/L (ref 100–108)
CLARITY UR: CLEAR
CO2 SERPL-SCNC: 31 MMOL/L (ref 21–32)
COLOR UR: YELLOW
CREAT SERPL-MCNC: 0.5 MG/DL (ref 0.6–1.3)
EOSINOPHIL # BLD AUTO: 0 THOUSAND/ΜL (ref 0–0.61)
EOSINOPHIL NFR BLD AUTO: 0 % (ref 0–6)
ERYTHROCYTE [DISTWIDTH] IN BLOOD BY AUTOMATED COUNT: 14.6 % (ref 11.6–15.1)
EST. AVERAGE GLUCOSE BLD GHB EST-MCNC: 108 MG/DL
GFR SERPL CREATININE-BSD FRML MDRD: 110 ML/MIN/1.73SQ M
GLUCOSE SERPL-MCNC: 145 MG/DL (ref 65–140)
GLUCOSE SERPL-MCNC: 150 MG/DL (ref 65–140)
GLUCOSE SERPL-MCNC: 153 MG/DL (ref 65–140)
GLUCOSE SERPL-MCNC: 230 MG/DL (ref 65–140)
GLUCOSE SERPL-MCNC: 232 MG/DL (ref 65–140)
GLUCOSE UR STRIP-MCNC: ABNORMAL MG/DL
HBA1C MFR BLD: 5.4 %
HBV CORE AB SER QL: NORMAL
HBV CORE IGM SER QL: NORMAL
HBV SURFACE AG SER QL: NORMAL
HCT VFR BLD AUTO: 34.3 % (ref 34.8–46.1)
HCV AB SER QL: NORMAL
HGB BLD-MCNC: 11.2 G/DL (ref 11.5–15.4)
HGB UR QL STRIP.AUTO: NEGATIVE
HYALINE CASTS #/AREA URNS LPF: ABNORMAL /LPF
IMM GRANULOCYTES # BLD AUTO: 0.08 THOUSAND/UL (ref 0–0.2)
IMM GRANULOCYTES NFR BLD AUTO: 1 % (ref 0–2)
KETONES UR STRIP-MCNC: NEGATIVE MG/DL
LEUKOCYTE ESTERASE UR QL STRIP: NEGATIVE
LYMPHOCYTES # BLD AUTO: 0.88 THOUSANDS/ΜL (ref 0.6–4.47)
LYMPHOCYTES NFR BLD AUTO: 13 % (ref 14–44)
MAGNESIUM SERPL-MCNC: 1.7 MG/DL (ref 1.6–2.6)
MCH RBC QN AUTO: 29.5 PG (ref 26.8–34.3)
MCHC RBC AUTO-ENTMCNC: 32.7 G/DL (ref 31.4–37.4)
MCV RBC AUTO: 90 FL (ref 82–98)
MONOCYTES # BLD AUTO: 0.28 THOUSAND/ΜL (ref 0.17–1.22)
MONOCYTES NFR BLD AUTO: 4 % (ref 4–12)
NEUTROPHILS # BLD AUTO: 5.8 THOUSANDS/ΜL (ref 1.85–7.62)
NEUTS SEG NFR BLD AUTO: 82 % (ref 43–75)
NITRITE UR QL STRIP: NEGATIVE
NON-SQ EPI CELLS URNS QL MICRO: ABNORMAL /HPF
NRBC BLD AUTO-RTO: 0 /100 WBCS
PH UR STRIP.AUTO: 8.5 [PH]
PLATELET # BLD AUTO: 237 THOUSANDS/UL (ref 149–390)
PMV BLD AUTO: 11.6 FL (ref 8.9–12.7)
POTASSIUM SERPL-SCNC: 3.4 MMOL/L (ref 3.5–5.3)
PROT SERPL-MCNC: 5.7 G/DL (ref 6.4–8.2)
PROT UR STRIP-MCNC: NEGATIVE MG/DL
RBC # BLD AUTO: 3.8 MILLION/UL (ref 3.81–5.12)
RBC #/AREA URNS AUTO: ABNORMAL /HPF
SODIUM SERPL-SCNC: 141 MMOL/L (ref 136–145)
SP GR UR STRIP.AUTO: 1.01 (ref 1–1.03)
UROBILINOGEN UR QL STRIP.AUTO: 0.2 E.U./DL
WBC # BLD AUTO: 7.06 THOUSAND/UL (ref 4.31–10.16)
WBC #/AREA URNS AUTO: ABNORMAL /HPF

## 2021-07-29 PROCEDURE — 83735 ASSAY OF MAGNESIUM: CPT | Performed by: STUDENT IN AN ORGANIZED HEALTH CARE EDUCATION/TRAINING PROGRAM

## 2021-07-29 PROCEDURE — 87340 HEPATITIS B SURFACE AG IA: CPT | Performed by: INTERNAL MEDICINE

## 2021-07-29 PROCEDURE — 85025 COMPLETE CBC W/AUTO DIFF WBC: CPT | Performed by: NURSE PRACTITIONER

## 2021-07-29 PROCEDURE — 97163 PT EVAL HIGH COMPLEX 45 MIN: CPT

## 2021-07-29 PROCEDURE — 81001 URINALYSIS AUTO W/SCOPE: CPT | Performed by: NURSE PRACTITIONER

## 2021-07-29 PROCEDURE — 92610 EVALUATE SWALLOWING FUNCTION: CPT

## 2021-07-29 PROCEDURE — 80204 DRUG ASSAY METHOTREXATE: CPT | Performed by: INTERNAL MEDICINE

## 2021-07-29 PROCEDURE — 80053 COMPREHEN METABOLIC PANEL: CPT | Performed by: NURSE PRACTITIONER

## 2021-07-29 PROCEDURE — 86704 HEP B CORE ANTIBODY TOTAL: CPT | Performed by: INTERNAL MEDICINE

## 2021-07-29 PROCEDURE — 86803 HEPATITIS C AB TEST: CPT | Performed by: INTERNAL MEDICINE

## 2021-07-29 PROCEDURE — 99222 1ST HOSP IP/OBS MODERATE 55: CPT | Performed by: HOSPITALIST

## 2021-07-29 PROCEDURE — 86705 HEP B CORE ANTIBODY IGM: CPT | Performed by: INTERNAL MEDICINE

## 2021-07-29 PROCEDURE — 97167 OT EVAL HIGH COMPLEX 60 MIN: CPT

## 2021-07-29 PROCEDURE — 99233 SBSQ HOSP IP/OBS HIGH 50: CPT | Performed by: INTERNAL MEDICINE

## 2021-07-29 PROCEDURE — 82948 REAGENT STRIP/BLOOD GLUCOSE: CPT

## 2021-07-29 PROCEDURE — 83036 HEMOGLOBIN GLYCOSYLATED A1C: CPT | Performed by: STUDENT IN AN ORGANIZED HEALTH CARE EDUCATION/TRAINING PROGRAM

## 2021-07-29 RX ORDER — CYCLOBENZAPRINE HCL 10 MG
10 TABLET ORAL 2 TIMES DAILY PRN
Status: DISCONTINUED | OUTPATIENT
Start: 2021-07-29 | End: 2021-07-30

## 2021-07-29 RX ORDER — GABAPENTIN 250 MG/5ML
200 SOLUTION ORAL 2 TIMES DAILY
Status: DISCONTINUED | OUTPATIENT
Start: 2021-07-29 | End: 2021-08-05 | Stop reason: HOSPADM

## 2021-07-29 RX ORDER — GABAPENTIN 250 MG/5ML
100 SOLUTION ORAL 3 TIMES DAILY
Status: DISCONTINUED | OUTPATIENT
Start: 2021-07-29 | End: 2021-07-29

## 2021-07-29 RX ORDER — OXYCODONE HYDROCHLORIDE 5 MG/1
5 TABLET ORAL ONCE
Status: COMPLETED | OUTPATIENT
Start: 2021-07-29 | End: 2021-07-29

## 2021-07-29 RX ADMIN — DICYCLOMINE HYDROCHLORIDE 10 MG: 10 CAPSULE ORAL at 21:08

## 2021-07-29 RX ADMIN — DICYCLOMINE HYDROCHLORIDE 10 MG: 10 CAPSULE ORAL at 15:57

## 2021-07-29 RX ADMIN — OXYCODONE HYDROCHLORIDE 5 MG: 5 TABLET ORAL at 16:43

## 2021-07-29 RX ADMIN — SODIUM BICARBONATE 150 ML/HR: 84 INJECTION, SOLUTION INTRAVENOUS at 06:46

## 2021-07-29 RX ADMIN — DICYCLOMINE HYDROCHLORIDE 10 MG: 10 CAPSULE ORAL at 08:21

## 2021-07-29 RX ADMIN — ACETAMINOPHEN 650 MG: 325 TABLET, FILM COATED ORAL at 19:33

## 2021-07-29 RX ADMIN — LEUCOVORIN CALCIUM 25 MG: 100 INJECTION, POWDER, LYOPHILIZED, FOR SUSPENSION INTRAMUSCULAR; INTRAVENOUS at 21:03

## 2021-07-29 RX ADMIN — DOCUSATE SODIUM AND SENNOSIDES 1 TABLET: 8.6; 5 TABLET ORAL at 08:23

## 2021-07-29 RX ADMIN — SODIUM BICARBONATE 150 ML/HR: 84 INJECTION, SOLUTION INTRAVENOUS at 15:58

## 2021-07-29 RX ADMIN — GABAPENTIN 200 MG: 250 SOLUTION ORAL at 15:58

## 2021-07-29 RX ADMIN — OXYCODONE HYDROCHLORIDE 2.5 MG: 5 TABLET ORAL at 10:37

## 2021-07-29 RX ADMIN — GABAPENTIN 200 MG: 250 SOLUTION ORAL at 21:05

## 2021-07-29 RX ADMIN — DOCUSATE SODIUM 100 MG: 100 CAPSULE ORAL at 19:33

## 2021-07-29 RX ADMIN — INSULIN LISPRO 1 UNITS: 100 INJECTION, SOLUTION INTRAVENOUS; SUBCUTANEOUS at 08:43

## 2021-07-29 RX ADMIN — DICYCLOMINE HYDROCHLORIDE 10 MG: 10 CAPSULE ORAL at 12:18

## 2021-07-29 RX ADMIN — ENOXAPARIN SODIUM 40 MG: 40 INJECTION SUBCUTANEOUS at 08:20

## 2021-07-29 RX ADMIN — CHOLECALCIFEROL TAB 10 MCG (400 UNIT) 400 UNITS: 10 TAB at 08:24

## 2021-07-29 RX ADMIN — POTASSIUM & SODIUM PHOSPHATES POWDER PACK 280-160-250 MG 2 PACKET: 280-160-250 PACK at 10:39

## 2021-07-29 RX ADMIN — ATORVASTATIN CALCIUM 10 MG: 10 TABLET, FILM COATED ORAL at 15:57

## 2021-07-29 RX ADMIN — TAMSULOSIN HYDROCHLORIDE 0.4 MG: 0.4 CAPSULE ORAL at 15:57

## 2021-07-29 RX ADMIN — DOCUSATE SODIUM 100 MG: 100 CAPSULE ORAL at 08:23

## 2021-07-29 RX ADMIN — DOCUSATE SODIUM AND SENNOSIDES 1 TABLET: 8.6; 5 TABLET ORAL at 19:33

## 2021-07-29 RX ADMIN — LORAZEPAM 0.5 MG: 2 INJECTION INTRAMUSCULAR; INTRAVENOUS at 12:19

## 2021-07-29 RX ADMIN — POTASSIUM & SODIUM PHOSPHATES POWDER PACK 280-160-250 MG 2 PACKET: 280-160-250 PACK at 15:57

## 2021-07-29 RX ADMIN — INSULIN LISPRO 1 UNITS: 100 INJECTION, SOLUTION INTRAVENOUS; SUBCUTANEOUS at 16:39

## 2021-07-29 RX ADMIN — FOLIC ACID 1 MG: 1 TABLET ORAL at 08:21

## 2021-07-29 RX ADMIN — LORAZEPAM 0.5 MG: 2 INJECTION INTRAMUSCULAR; INTRAVENOUS at 19:33

## 2021-07-29 RX ADMIN — CYCLOBENZAPRINE HYDROCHLORIDE 10 MG: 10 TABLET, FILM COATED ORAL at 19:33

## 2021-07-29 NOTE — PLAN OF CARE
Problem: MOBILITY - ADULT  Goal: Maintain or return to baseline ADL function  Description: INTERVENTIONS:  -  Assess patient's ability to carry out ADLs; assess patient's baseline for ADL function and identify physical deficits which impact ability to perform ADLs (bathing, care of mouth/teeth, toileting, grooming, dressing, etc )  - Assess/evaluate cause of self-care deficits   - Assess range of motion  - Assess patient's mobility; develop plan if impaired  - Assess patient's need for assistive devices and provide as appropriate  - Encourage maximum independence but intervene and supervise when necessary  - Involve family in performance of ADLs  - Assess for home care needs following discharge   - Consider OT consult to assist with ADL evaluation and planning for discharge  - Provide patient education as appropriate  Outcome: Progressing  Goal: Maintains/Returns to pre admission functional level  Description: INTERVENTIONS:  - Perform BMAT or MOVE assessment daily    - Set and communicate daily mobility goal to care team and patient/family/caregiver  - Collaborate with rehabilitation services on mobility goals if consulted  - Perform Range of Motion  times a day  - Reposition patient every  hours    - Dangle patient  times a day  - Stand patient  times a day  - Ambulate patient times a day  - Out of bed to chair  times a day   - Out of bed for meals  times a day  - Out of bed for toileting  - Record patient progress and toleration of activity level   Outcome: Progressing     Problem: Potential for Falls  Goal: Patient will remain free of falls  Description: INTERVENTIONS:  - Educate patient/family on patient safety including physical limitations  - Instruct patient to call for assistance with activity   - Consult OT/PT to assist with strengthening/mobility   - Keep Call bell within reach  - Keep bed low and locked with side rails adjusted as appropriate  - Keep care items and personal belongings within reach  - Initiate and maintain comfort rounds  - Make Fall Risk Sign visible to staff  - Offer Toileting every Hours, in advance of need  - Initiate/Maintain alarm  - Obtain necessary fall risk management equipment:   - Apply yellow socks and bracelet for high fall risk patients  - Consider moving patient to room near nurses station  Outcome: Progressing     Problem: Prexisting or High Potential for Compromised Skin Integrity  Goal: Skin integrity is maintained or improved  Description: INTERVENTIONS:  - Identify patients at risk for skin breakdown  - Assess and monitor skin integrity  - Assess and monitor nutrition and hydration status  - Monitor labs   - Assess for incontinence   - Turn and reposition patient  - Assist with mobility/ambulation  - Relieve pressure over bony prominences  - Avoid friction and shearing  - Provide appropriate hygiene as needed including keeping skin clean and dry  - Evaluate need for skin moisturizer/barrier cream  - Collaborate with interdisciplinary team   - Patient/family teaching  - Consider wound care consult   Outcome: Progressing     Problem: Knowledge Deficit  Goal: Patient/family/caregiver demonstrates understanding of disease process, treatment plan, medications, and discharge instructions  Description: Complete learning assessment and assess knowledge base    Interventions:  - Provide teaching at level of understanding  - Provide teaching via preferred learning methods  Outcome: Progressing

## 2021-07-29 NOTE — PLAN OF CARE
Problem: PHYSICAL THERAPY ADULT  Goal: Performs mobility at highest level of function for planned discharge setting  See evaluation for individualized goals  Description: Treatment/Interventions: Functional transfer training, LE strengthening/ROM, Patient/family training, Cognitive reorientation, Bed mobility, Spoke to nursing, Spoke to case management, OT          See flowsheet documentation for full assessment, interventions and recommendations  7/29/2021 1523 by Teena Fortune, PT  Note: Prognosis: Fair  Problem List: Decreased strength, Decreased endurance, Impaired balance, Decreased mobility, Decreased safety awareness, Decreased coordination, Decreased cognition, Pain  Assessment: Pt seen for high complexity PT evaluation  Pt with active PT eval/treat orders  Pt is a 49 y o  female who was admitted to [facility] on 7/28/2021 with CNS lymphoma  Pt's active problems include: dysphagia, cancer related pain, ambulatory dysfunction, and hypokalemia  Pt resides with  at the [facility] and was independent at baseline  Prior to hospital admission, pt at [facility] where she requires assist for ADLs and IADLs  Pt has limitations in strength, balance, endurance, and overall functional mobility  Currently, pt requires assist of 1-2 for all mobility performed this date  Pt performed sup to sit bed mobility tasks with min Ax1  Pt sat EOB with CGA; progressed to min Ax1 when performing dynamic balance tasks  Pt performed 2x STS from EOB using b/l HHA and max Ax2; L knee block utilized  Pt unable to take steps 2* increased LUE/LLE pain and LLE knee buckling  Pt was left supine in bed at the end of PT session with all needs in reach  Pt would benefit from continued PT services while in hospital to address remaining limitations  PT to continue to follow pt and recommends post-acute rehab services after d/c   The patient's AM-PAC Basic Mobility Inpatient Short Form Low Function Raw Score 16 , Standardized Score is 25 72  A standardized score less 42 9 suggests the patient may benefit from discharge to post-acute rehab services  Please also refer to the recommendation of the Physical Therapist for safe discharge planning  Barriers to Discharge: Inaccessible home environment, Decreased caregiver support        PT Discharge Recommendation: Post acute rehabilitation services     PT - OK to Discharge: Yes (to rehab once medically cleared )    See flowsheet documentation for full assessment

## 2021-07-29 NOTE — CONSULTS
Consult re: "hx of malnutrition, has PEG tube, I would like to remove during this hospitalization if not needed ; would like your eval and recs"     Summary:  patient appears to tolerate adequate PO intake; records reveal beneficial wt  gain: 4/26/21: 104# to current wt  of 114 6#; patient seen bedside along with ST; patient recommended by ST for increase to regular consistency (vegetarian features to continue); patient will consume dairy products such as cheese and 2% milk; RD will schedule ensure enlive BID for additional source of kcal/ protein  Recommend:  if patient able to maintain continued good PO intake this admit,  would endorse removal of PEG tube

## 2021-07-29 NOTE — PLAN OF CARE
Problem: OCCUPATIONAL THERAPY ADULT  Goal: Performs self-care activities at highest level of function for planned discharge setting  See evaluation for individualized goals  Description: Treatment Interventions: ADL retraining, Functional transfer training, UE strengthening/ROM, Endurance training, Cognitive reorientation, Patient/family training, Equipment evaluation/education, Neuromuscular reeducation, Fine motor coordination activities, Compensatory technique education, Continued evaluation, Energy conservation, Activityengagement          See flowsheet documentation for full assessment, interventions and recommendations  Note: Limitation: Decreased ADL status, Decreased UE ROM, Decreased UE strength, Decreased Safe judgement during ADL, Decreased cognition, Decreased endurance, Decreased self-care trans, Decreased high-level ADLs, Decreased fine motor control, Decreased sensation  Prognosis: Fair  Assessment: Pt is a 49 y o  female [facility] 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[facility] [address] ), pt admitted for " C4 of HD methotrexate " Pt with active OT orders and up and OOB as tolerated orders  Pt seen as a co-evaluation with PT due to the patient's co-morbidities, clinically unstable presentation/clinical complexity, and present impairments  At baseline, pt resided at [facility] where her  works and was I with ADLs/IADLs  Prior to current admission, pt was at [facility] and required A for ADL tasks  Upon evaluation, pt currently requires Min A sup to sit, MOD A sit to sup, MAX A x 2 STS  Pt with L knee buckling in stand  Exhibits decreased strength to B/L UE's limiting performance in ADLs/transfers  LUE with decreased ROM and increased sensitivity, as well as thumb in adducted position (towel roll placed post session to encourage abd)  Pt currently requires S eating, min a grooming, MOD A UB ADLs, MOD A LB ADLs, and MAX A toileting   Pt is limited at this time 2*: pain, endurance, activity tolerance, functional mobility, balance, trunk control, functional standing tolerance, decreased I w/ ADLS/IADLS, strength, ROM, cognitive impairments, impaired fine motor skills and coordination deficits  The following Occupational Performance Areas to address include: eating, grooming, bathing/shower, toilet hygiene, dressing, health maintenance, functional mobility, community mobility and clothing management  Pt to benefit from immediate acute skilled OT to address above deficits, improve overall functional independence, maximize fnxl mobility and reduce caregiver burden  From OT standpoint, recommendation at time of d/c would be to return to [email or web address]  Pt was left in bed after session with all current needs met  The patient's raw score on the AM-PAC Daily Activity inpatient short form is 14, standardized score is 33 39, less than 39 4  Patients at this level are likely to benefit from discharge to post-acute rehabilitation services  Please refer to the recommendation of the Occupational Therapist for safe discharge planning       OT Discharge Recommendation: Post acute rehabilitation services  OT - OK to Discharge: Yes (to rehab when medically stable )

## 2021-07-29 NOTE — ASSESSMENT & PLAN NOTE
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

## 2021-07-29 NOTE — CASE MANAGEMENT
CM spoke with pt's friend Mira Fajardo and gathered the following information:    HOME: Pt resides in a room at the [facility] where she and her  were employed  Prior to admission pt was at [facility]  : Yesi ADORNO [facility] POA: None reported    PCP: Dr Ilan Penn: [facility]    INDEPENDENCE: Mod Assist    HHC: None reported    I/P REHAB: [facility] (per Mira Fajardo family would like pt to return at d/c)    MENTAL HEALTH: None reported    D&A: None reported    TRANSPORTATION AT D/C: Transport needed    Patient/caregiver received discharge checklist   Content reviewed  Patient/caregiver encouraged to participate in discharge plan of care prior to discharge home  CM reviewed d/c planning process including the following: identifying help at home, patient preference for d/c planning needs, Discharge Lounge, [facility] Meds to Bed program, availability of treatment team to discuss questions or concerns patient and/or family may have regarding understanding medications and recognizing signs and symptoms once discharged  CM also encouraged patient to follow up with all recommended appointments after discharge  Patient advised of importance for patient and family to participate in managing patients medical well being

## 2021-07-29 NOTE — UTILIZATION REVIEW
Initial Clinical Review    Admission: Date/Time/Statement:   Admission Orders (From admission, onward)     Ordered        07/28/21 1337  Inpatient Admission  Once                   Orders Placed This Encounter   Procedures    Inpatient Admission     Standing Status:   Standing     Number of Occurrences:   1     Order Specific Question:   Level of Care     Answer:   Level 2 Stepdown / HOT [14]     Order Specific Question:   Estimated length of stay     Answer:   More than 2 Midnights     Order Specific Question:   Certification     Answer:   I certify that inpatient services are medically necessary for this patient for a duration of greater than two midnights  See H&P and MD Progress Notes for additional information about the patient's course of treatment  Initial Presentation:   Mrs Vivi Warren is a 48 yo female who presents as an elective INPATIENT admission for 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 - actually will be held d/t lack of insurance coverage  She will receive Leucovorin rescue, PICC line placement and PORT placement  Ambulatory dysfunction - PT/OT evals, Bicarb IV Infusion  Cancer related pain - PRN tylenol and oxycodone  Dysphagia - PEG tube in place, can have thin liquids, swallow eval, consider PEG tube removal this admission  She has improved mental status and is more communicative since last admission  Improved mental status - speaks Gujarti, A&O x 2, PRN Ativan for agitation  Date: 7/29   Day 2:   Check Methotrexate level today, continue Leukovorin  Pt is tolerating treatment this morning except for neck pain which worsens at night      ED Triage Vitals   Temperature Pulse Respirations Blood Pressure SpO2   07/28/21 1402 07/28/21 1402 07/28/21 1402 07/28/21 1402 07/28/21 1402   98 8 °F (37 1 °C) 88 18 127/85 100 %      Temp Source Heart Rate Source Patient Position - Orthostatic VS BP Location FiO2 (%)   07/28/21 2153 07/28/21 2100 -- -- --   Axillary Radial         Pain Score       07/28/21 1300       No Pain          Wt Readings from Last 1 Encounters:   07/28/21 52 kg (114 lb 9 6 oz)     Additional Vital Signs:   07/29/21 11:39:49  98 2 °F (36 8 °C)  80  --  136/80  99  98 %  --   07/29/21 08:12:56  98 5 °F (36 9 °C)  87  16  134/91  105  98 %  --   07/29/21 02:31:40  97 4 °F (36 3 °C)Abnormal   99  20  133/87  102  97 %  --   07/29/21 02:31:10  97 4 °F (36 3 °C)Abnormal   92  20  133/87  102  97 %  --   07/28/21 21:53:11  98 6 °F (37 °C)  108Abnormal   20  127/86  100  98 %  --   07/28/21 2100  --  96  --  --  --  --  None (Room air)   07/28/21 16:10:36  98 9 °F (37 2 °C)  122Abnormal   18  122/77  92  100 %  --   07/28/21 14:02:14  98 8 °F (37 1 °C)  88  18  127/85  99  100 %  --     Pertinent Labs/Diagnostic Test Results:   Results from last 7 days   Lab Units 07/29/21  0616 07/28/21  1712   WBC Thousand/uL 7 06 6 77   HEMOGLOBIN g/dL 11 2* 12 3   HEMATOCRIT % 34 3* 37 8   PLATELETS Thousands/uL 237 264   NEUTROS ABS Thousands/µL 5 80 3 50         Results from last 7 days   Lab Units 07/29/21  0616 07/28/21  1712   SODIUM mmol/L 141 141   POTASSIUM mmol/L 3 4* 3 1*   CHLORIDE mmol/L 106 109*   CO2 mmol/L 31 29   ANION GAP mmol/L 4 3*   BUN mg/dL 9 9   CREATININE mg/dL 0 50* 0 43*   EGFR ml/min/1 73sq m 110 116   CALCIUM mg/dL 9 6 9 9     Results from last 7 days   Lab Units 07/29/21  0616 07/28/21  1712   AST U/L 12 11   ALT U/L 15 12   ALK PHOS U/L 62 66   TOTAL PROTEIN g/dL 5 7* 6 1*   ALBUMIN g/dL 3 0* 3 1*   TOTAL BILIRUBIN mg/dL 0 45 0 22     Results from last 7 days   Lab Units 07/29/21  1146 07/29/21  0835 07/28/21  1720   POC GLUCOSE mg/dl 145* 153* 182*     Results from last 7 days   Lab Units 07/29/21  0616 07/28/21  1712   GLUCOSE RANDOM mg/dL 232* 176*     Results from last 7 days   Lab Units 07/29/21  0621   HEP B S AG  Non-reactive   HEP C AB  Non-reactive   HEP B C IGM  Non-reactive   HEP B C TOTAL AB  Non-reactive Results from last 7 days   Lab Units 07/29/21  0838   CLARITY UA  Clear   COLOR UA  Yellow   SPEC GRAV UA  1 007   PH UA  8 5*   GLUCOSE UA mg/dl 100 (1/10%)*   KETONES UA mg/dl Negative   BLOOD UA  Negative   PROTEIN UA mg/dl Negative   NITRITE UA  Negative   BILIRUBIN UA  Negative   UROBILINOGEN UA E U /dl 0 2   LEUKOCYTES UA  Negative   WBC UA /hpf None Seen   RBC UA /hpf None Seen   BACTERIA UA /hpf None Seen   EPITHELIAL CELLS WET PREP /hpf None Seen     Present on Admission:   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[facility] [address] )   Ambulatory dysfunction   Hypokalemia   Dysphagia   Cancer related pain    Admitting Diagnosis: 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HCC) [C85 89]     Age/Sex: 49 y o  female     Admission Orders:    Scheduled Medications:  atorvastatin, 10 mg, Oral, Daily With Dinner  cholecalciferol, 400 Units, Oral, Daily  dicyclomine, 10 mg, Oral, 4x Daily (AC & HS)  docusate sodium, 100 mg, Oral, BID  enoxaparin, 40 mg, Subcutaneous, Daily  folic acid, 1 mg, Oral, Daily  gabapentin, 200 mg, Oral, HS  insulin lispro, 1-5 Units, Subcutaneous, TID AC  leucovorin calcium IVPB, 25 mg, Intravenous, Q6H  potassium-sodium phosphates, 2 packet, Oral, BID With Meals  senna-docusate sodium, 1 tablet, Oral, BID  tamsulosin, 0 4 mg, Oral, Daily With Dinner      Continuous IV Infusions:  sodium bicarbonate infusion, 150 mL/hr, Intravenous, Continuous      PRN Meds:  acetaminophen, 650 mg, Oral, Q6H PRN  albuterol, 2 puff, Inhalation, Q4H PRN  Diclofenac Sodium, 2 g, Topical, TID PRN  hydrOXYzine HCL, 25 mg, Oral, Q6H PRN  LORazepam, 0 5 mg, Intravenous, Q6H PRN - x 1 7/28, 7/29  menthol-methyl salicylate, , Apply externally, 4x Daily PRN  ondansetron, 4 mg, Oral, Q6H PRN  oxyCODONE, 2 5 mg, Oral, Q4H PRN - x1 7/29  polyethylene glycol, 17 g, Oral, Daily PRN    SCDs  OOB   Consult wound care  Flush PEG q 8 hr   POC GLUCOSE AC/HS WITH SSI COVERAGE   Methotrexate level daily   Dysphagia diet   Swallow eval   IP CONSULT TO INTERNAL MEDICINE  IP CONSULT TO PICC TEAM  IP CONSULT TO NUTRITION SERVICES    Network Utilization Review Department  ATTENTION: Please call with any questions or concerns to [phone] and carefully listen to the prompts so that you are directed to the right person  All voicemails are confidential   Melba List all requests for admission clinical reviews, approved or denied determinations and any other requests to dedicated fax number below belonging to the campus where the patient is receiving treatment   List of dedicated fax numbers for the Facilities:  [address] DENIALS (Administrative/Medical Necessity) [phone]   [address] (Maternity/NICU/Pediatrics) [phone]   [address] [place] [address] [address] [place] [address] [id number] [place] [address] [address] [facility] [place] [address] [phone]

## 2021-07-29 NOTE — PROGRESS NOTES
Medical Oncology/Hematology Progress Note  Namrata Montoya, female, 49 y o , 1965,  [facility] [id number]     Assessment and Plan  Patient is a 72-year-old female past medical history of primary CNS lymphoma admitted for C4 of HD methotrexate  1  Primary CNS lymphoma  Patient is status post 3 cycles of high-dose methotrexate/Rituxan presenting forearm rehab for C4  Repeat MRI brain on 06/2021 showed interval improvement after initiation of chemotherapy  As insurance has denied Rituxan, she will only get high-dose methotrexate this admission  Treatment is suboptimal but limited secondary to insurance denial  Primary oncologist Dr Virginie Guerrier  · Here for C4 HD MTX with leucovorin rescue after  (C4D1 = 7/28 at 2100)   · PT/OT  · Daily CBC/CMP and weights  · PICC line in place; plan for MediPort placement during this hospitalization  · Will check methotrexate level today  · Continue leucovorin     2  Ambulatory dysfunction  · PT/OT    3  Cancer related pain  Pain mainly located in LLE, sharp paresthesias per patient  · Continue Tylenol and oxycodone p r n   · Increased gabapentin to 200 mg BID  · Added prn flexeril     4  Dysphagia  Patient has a PEG tube but did not use last admission are recently in SNF  Level 3 dysphagia for now with thin liquids okay  · Nutrition consulted  · Speech consulted  · Possibly removal of PEG tube by GI of Nutrition/SLP agree    5  Altered mental status, improved  Patient is A&O x2 at baseline  Speaks Gujarati only  · Ativan 0 5 mg IV q 6 p r n  Agitation    Subjective:  Patient assessed at bedside this morning  No acute complaints  Denies any fevers, chills, chest pain, abdominal pain, shortness of breath, generalized weakness, nausea, vomiting  Reports that she is feeling good this morning  Tolerating diet well  Reports neck pain is worse at night but is improved with use of pillow      Review of Systems   All other systems reviewed and are negative        Objective:     Medication Administration - last 24 hours from 07/28/2021 1002 to 07/29/2021 1002       Date/Time Order Dose Route Action Action by     07/29/2021 0820 enoxaparin (LOVENOX) subcutaneous injection 40 mg 40 mg Subcutaneous Given Farhad Palumbo RN     07/28/2021 1718 atorvastatin (LIPITOR) tablet 10 mg 10 mg Oral Given Kaylee Martinez RN     07/29/2021 5088 cholecalciferol (VITAMIN D3) tablet 400 Units 400 Units Oral Given Farhad Palumbo, MARTINE     07/28/2021 1409 cholecalciferol (VITAMIN D3) tablet 400 Units 0 Units Oral Hold Hailey Edin, RN     07/29/2021 2127 dicyclomine (BENTYL) capsule 10 mg 10 mg Oral Given Farhad Palumbo, MARTINE     07/28/2021 2213 dicyclomine (BENTYL) capsule 10 mg 10 mg Oral Given Alhaji Cunningham, MARTINE     07/28/2021 1718 dicyclomine (BENTYL) capsule 10 mg 10 mg Oral Given Kaylee Martinez RN     07/29/2021 3384 docusate sodium (COLACE) capsule 100 mg 100 mg Oral Given Farhad Palumbo, MARTINE     07/28/2021 1718 docusate sodium (COLACE) capsule 100 mg 100 mg Oral Given Kaylee Martinez RN     26/07/3199 3829 folic acid (FOLVITE) tablet 1 mg 1 mg Oral Given Farhad Palumbo RN     13/79/2353 4313 folic acid (FOLVITE) tablet 1 mg 1 mg Oral Not Given [facility] Edin, RN     07/28/2021 2214 gabapentin (NEURONTIN) oral solution 200 mg 200 mg Oral Given [facility], MARTINE     07/29/2021 0843 insulin lispro (HumaLOG) 100 units/mL subcutaneous injection 1 Units 1 Units Subcutaneous Given Farhad Palumbo, MARTINE     07/28/2021 1816 insulin lispro (HumaLOG) 100 units/mL subcutaneous injection 1 Units 1 Units Subcutaneous Given Hailey Shallow, RN     07/29/2021 3482 senna-docusate sodium (SENOKOT S) 8 6-50 mg per tablet 1 tablet 1 tablet Oral Given Farhad Palumbo RN     07/28/2021 1718 senna-docusate sodium (SENOKOT S) 8 6-50 mg per tablet 1 tablet 1 tablet Oral Given Kaylee Martinez RN     07/28/2021 1718 tamsulosin (FLOMAX) capsule 0 4 mg 0 4 mg Oral Given Kaylee Martinez RN 07/29/2021 0646 sodium bicarbonate 100 mEq in dextrose 5 % 1,000 mL infusion 150 mL/hr Intravenous [address] Gela Booker RN     07/29/2021 0645 sodium bicarbonate 100 mEq in dextrose 5 % 1,000 mL infusion 0 mL/hr Intravenous Stopped Gela Booker RN     07/28/2021 2217 sodium bicarbonate 100 mEq in dextrose 5 % 1,000 mL infusion 150 mL/hr Intravenous [address] Keri Starr RN     07/28/2021 1518 sodium bicarbonate 100 mEq in dextrose 5 % 1,000 mL infusion 150 mL/hr Intravenous [address] Yonny Harp RN     07/28/2021 1407 LORazepam (ATIVAN) injection 0 5 mg 0 5 mg Intravenous Given Yonny Harp RN     07/28/2021 2040 ondansetron (ZOFRAN) 16 mg, dexamethasone (DECADRON) 10 mg in sodium chloride 0 9 % 50 mL IVPB   Intravenous Stopped Keri Starr RN     07/28/2021 2020 ondansetron (ZOFRAN) 16 mg, dexamethasone (DECADRON) 10 mg in sodium chloride 0 9 % 50 mL IVPB   Intravenous [address] Keri Starr RN     07/29/2021 0122 methotrexate (PF) 4,935 mg in sodium chloride 0 9 % 1,000 mL IVPB 0 mg Intravenous Stopped Keri Starr RN     07/28/2021 2122 methotrexate (PF) 4,935 mg in sodium chloride 0 9 % 1,000 mL IVPB 4,935 mg Intravenous New Bag Keri Starr RN     07/28/2021 2214 LORazepam (ATIVAN) injection 0 5 mg 0 5 mg Intravenous Given Keri Starr RN     07/28/2021 2213 potassium chloride (K-DUR,KLOR-CON) CR tablet 40 mEq 40 mEq Oral Given Keri Starr RN          /91   Pulse 87   Temp 98 5 °F (36 9 °C)   Resp 16   Ht 5' (1 524 m)   Wt 52 kg (114 lb 9 6 oz)   SpO2 98%   BMI 22 38 kg/m²       Physical Exam  Vitals reviewed  Constitutional:       General: She is not in acute distress  Appearance: Normal appearance  She is not ill-appearing  HENT:      Head: Normocephalic and atraumatic  Mouth/Throat:      Mouth: Mucous membranes are moist       Pharynx: Oropharynx is clear  Eyes:      General: No scleral icterus       Extraocular Movements: Extraocular movements intact  Conjunctiva/sclera: Conjunctivae normal    Cardiovascular:      Rate and Rhythm: Normal rate and regular rhythm  Pulses: Normal pulses  Heart sounds: No murmur heard  Pulmonary:      Effort: Pulmonary effort is normal  No respiratory distress  Breath sounds: Normal breath sounds  No wheezing or rales  Abdominal:      General: Bowel sounds are normal       Palpations: Abdomen is soft  Tenderness: There is no abdominal tenderness  There is no guarding  Musculoskeletal:      Cervical back: Normal range of motion  Right lower leg: No edema  Left lower leg: No edema  Comments: LUE limited ROM   Skin:     General: Skin is warm and dry  Capillary Refill: Capillary refill takes less than 2 seconds  Neurological:      Mental Status: She is alert  Comments: AOx2   Psychiatric:         Behavior: Behavior normal          Thought Content:  Thought content normal          Recent Results (from the past 48 hour(s))   CBC and differential    Collection Time: 07/28/21  5:12 PM   Result Value Ref Range    WBC 6 77 4 31 - 10 16 Thousand/uL    RBC 4 20 3 81 - 5 12 Million/uL    Hemoglobin 12 3 11 5 - 15 4 g/dL    Hematocrit 37 8 34 8 - 46 1 %    MCV 90 82 - 98 fL    MCH 29 3 26 8 - 34 3 pg    MCHC 32 5 31 4 - 37 4 g/dL    RDW 15 1 11 6 - 15 1 %    MPV 11 0 8 9 - 12 7 fL    Platelets 265 689 - 051 Thousands/uL    nRBC 0 /100 WBCs    Neutrophils Relative 52 43 - 75 %    Immat GRANS % 0 0 - 2 %    Lymphocytes Relative 38 14 - 44 %    Monocytes Relative 7 4 - 12 %    Eosinophils Relative 2 0 - 6 %    Basophils Relative 1 0 - 1 %    Neutrophils Absolute 3 50 1 85 - 7 62 Thousands/µL    Immature Grans Absolute 0 03 0 00 - 0 20 Thousand/uL    Lymphocytes Absolute 2 58 0 60 - 4 47 Thousands/µL    Monocytes Absolute 0 46 0 17 - 1 22 Thousand/µL    Eosinophils Absolute 0 16 0 00 - 0 61 Thousand/µL    Basophils Absolute 0 04 0 00 - 0 10 Thousands/µL   Comprehensive metabolic panel    Collection Time: 07/28/21  5:12 PM   Result Value Ref Range    Sodium 141 136 - 145 mmol/L    Potassium 3 1 (L) 3 5 - 5 3 mmol/L    Chloride 109 (H) 100 - 108 mmol/L    CO2 29 21 - 32 mmol/L    ANION GAP 3 (L) 4 - 13 mmol/L    BUN 9 5 - 25 mg/dL    Creatinine 0 43 (L) 0 60 - 1 30 mg/dL    Glucose 176 (H) 65 - 140 mg/dL    Calcium 9 9 8 3 - 10 1 mg/dL    Corrected Calcium 10 6 (H) 8 3 - 10 1 mg/dL    AST 11 5 - 45 U/L    ALT 12 12 - 78 U/L    Alkaline Phosphatase 66 46 - 116 U/L    Total Protein 6 1 (L) 6 4 - 8 2 g/dL    Albumin 3 1 (L) 3 5 - 5 0 g/dL    Total Bilirubin 0 22 0 20 - 1 00 mg/dL    eGFR 116 ml/min/1 73sq m   Fingerstick Glucose (POCT)    Collection Time: 07/28/21  5:20 PM   Result Value Ref Range    POC Glucose 182 (H) 65 - 140 mg/dl   CBC and differential    Collection Time: 07/29/21  6:16 AM   Result Value Ref Range    WBC 7 06 4 31 - 10 16 Thousand/uL    RBC 3 80 (L) 3 81 - 5 12 Million/uL    Hemoglobin 11 2 (L) 11 5 - 15 4 g/dL    Hematocrit 34 3 (L) 34 8 - 46 1 %    MCV 90 82 - 98 fL    MCH 29 5 26 8 - 34 3 pg    MCHC 32 7 31 4 - 37 4 g/dL    RDW 14 6 11 6 - 15 1 %    MPV 11 6 8 9 - 12 7 fL    Platelets 999 120 - 995 Thousands/uL    nRBC 0 /100 WBCs    Neutrophils Relative 82 (H) 43 - 75 %    Immat GRANS % 1 0 - 2 %    Lymphocytes Relative 13 (L) 14 - 44 %    Monocytes Relative 4 4 - 12 %    Eosinophils Relative 0 0 - 6 %    Basophils Relative 0 0 - 1 %    Neutrophils Absolute 5 80 1 85 - 7 62 Thousands/µL    Immature Grans Absolute 0 08 0 00 - 0 20 Thousand/uL    Lymphocytes Absolute 0 88 0 60 - 4 47 Thousands/µL    Monocytes Absolute 0 28 0 17 - 1 22 Thousand/µL    Eosinophils Absolute 0 00 0 00 - 0 61 Thousand/µL    Basophils Absolute 0 02 0 00 - 0 10 Thousands/µL   Comprehensive metabolic panel    Collection Time: 07/29/21  6:16 AM   Result Value Ref Range    Sodium 141 136 - 145 mmol/L    Potassium 3 4 (L) 3 5 - 5 3 mmol/L    Chloride 106 100 - 108 mmol/L    CO2 31 21 - 32 mmol/L    ANION GAP 4 4 - 13 mmol/L    BUN 9 5 - 25 mg/dL    Creatinine 0 50 (L) 0 60 - 1 30 mg/dL    Glucose 232 (H) 65 - 140 mg/dL    Calcium 9 6 8 3 - 10 1 mg/dL    Corrected Calcium 10 4 (H) 8 3 - 10 1 mg/dL    AST 12 5 - 45 U/L    ALT 15 12 - 78 U/L    Alkaline Phosphatase 62 46 - 116 U/L    Total Protein 5 7 (L) 6 4 - 8 2 g/dL    Albumin 3 0 (L) 3 5 - 5 0 g/dL    Total Bilirubin 0 45 0 20 - 1 00 mg/dL    eGFR 110 ml/min/1 73sq m   Chronic Hepatitis Panel    Collection Time: 07/29/21  6:21 AM   Result Value Ref Range    Hepatitis B Surface Ag Non-reactive Non-reactive, NonReactive - Confirmed    Hepatitis C Ab Non-reactive Non-reactive    Hep B C IgM Non-reactive Non-reactive    Hep B Core Total Ab Non-reactive Non-reactive   Fingerstick Glucose (POCT)    Collection Time: 07/29/21  8:35 AM   Result Value Ref Range    POC Glucose 153 (H) 65 - 140 mg/dl   Urinalysis with microscopic    Collection Time: 07/29/21  8:38 AM   Result Value Ref Range    Clarity, UA Clear     Color, UA Yellow     Specific Gravity, UA 1 007 1 003 - 1 030    pH, UA 8 5 (A) 4 5, 5 0, 5 5, 6 0, 6 5, 7 0, 7 5, 8 0    Glucose,  (1/10%) (A) Negative mg/dl    Ketones, UA Negative Negative mg/dl    Blood, UA Negative Negative    Protein, UA Negative Negative mg/dl    Nitrite, UA Negative Negative    Bilirubin, UA Negative Negative    Urobilinogen, UA 0 2 0 2, 1 0 E U /dl E U /dl    Leukocytes, UA Negative Negative    WBC, UA None Seen None Seen, 2-4, 5-60 /hpf    RBC, UA None Seen None Seen, 2-4 /hpf    Hyaline Casts, UA None Seen None Seen /lpf    Bacteria, UA None Seen None Seen, Occasional /hpf    Epithelial Cells None Seen None Seen, Occasional /hpf       No results found  I have personally reviewed labs, imaging studies, and pertinent reports  This note has been generated by voice recognition software system  Therefore, there may be spelling, grammar, and or syntax errors  Please contact if questions arise      Kathie Eduardo Martínez Goodwin 15 Internal Medicine PGY-2

## 2021-07-29 NOTE — WOUND OSTOMY CARE
Consult Note - Wound   Annabelle Vieira 49 y o  female MRN: [id number]  Unit/Bed#: [facility] 918-01 Encounter: [id number]      History and Present Illness:  Patient is a 49year old female with history of CNS lymphoma now admitted for continued chemo treatment  She is awake, alert and oriented in bed somewhat somnolent, patient agreeable with wound care nurse to assess skin  Assessment Findings:   1- Sacrum, buttocks and heels are intact  Patient has a small hypopigmented area to R buttock, not a wound  Skin care plans:  1-Hydraguard to bilateral sacrum, buttock and heels BID and PRN  2-Elevate heels to offload pressure  3-Ehob cushion in chair when out of bed  4-Moisturize skin daily with skin nourishing cream   5-Turn/reposition q2h or when medically stable for pressure re-distribution on skin  Vitals: Blood pressure 124/84, pulse (!) 113, temperature 98 8 °F (37 1 °C), resp  rate 18, height 5' (1 524 m), weight 52 kg (114 lb 9 6 oz), SpO2 97 %  ,Body mass index is 22 38 kg/m²  We have placed prophylactic skin care orders, wound care is signing off, please re-consult with questions or concerns        Claudeen Balk, RN, BSN, [facility] [employer] [facility]

## 2021-07-29 NOTE — UTILIZATION REVIEW
Inpatient Admission Authorization Request   NOTIFICATION OF INPATIENT ADMISSION/INPATIENT AUTHORIZATION REQUEST   SERVICING FACILITY:   [facility]  Address: [address] [place] [address], [address] [place] [address]  Tax ID: [id number]  NPI: [id number]  [facility]  [address]  Place of Service Code: 24     ATTENDING PROVIDER:  Attending Name and NPI#: Wilian Cuba [[id number]]  Address: [address] [place] [address], [address] [place] [address]  Phone: [phone]     UTILIZATION REVIEW CONTACT:  Annelise Bradshaw Utilization   Network Utilization Review Department  Phone: [phone]  Fax: [phone]  Email: [email or web address]     PHYSICIAN ADVISORY SERVICES:  FOR IMPN-ES-NOCR REVIEW - MEDICAL NECESSITY DENIAL  Phone: [phone]  Fax: [phone]  Email: [email or web address]     TYPE OF REQUEST:  Inpatient Status     ADMISSION INFORMATION:  ADMISSION DATE/TIME: 7/28/21 12:56 PM  PATIENT DIAGNOSIS CODE/DESCRIPTION:  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[facility] [address] ) [C85 89]  DISCHARGE DATE/TIME: No discharge date for patient encounter  DISCHARGE DISPOSITION (IF DISCHARGED): Non [facility] SNF/TCU/SNU     IMPORTANT INFORMATION:  Please contact the Annelise Bradshaw directly with any questions or concerns regarding this request  Department voicemails are confidential     Send requests for admission clinical reviews, concurrent reviews, approvals, and administrative denials due to lack of clinical to fax [phone]

## 2021-07-29 NOTE — PHYSICAL THERAPY NOTE
Physical Therapy Evaluation     Patient's Name: Lizet Settler    Admitting Diagnosis  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[facility] ) [C85 [id number]]    Problem List  Patient Active Problem List   Diagnosis    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[facility] )    Altered mental status    Dysphagia    Aphasia    Fract[facility] of ramus of left pubis ([facility] )    Severe protein-calorie malnutrition ([facility] )    Cancer related pain    Ambulatory dysfunction    Hypokalemia       Past Medical History  No past medical history on file  Past Surgical History  Past Surgical History:   Procedure Laterality Date    IR PICC REPOSITION  4/27/2021    IR TUNNELED CENTRAL LINE REMOVAL  4/27/2021 07/29/21 0958   PT Last Visit   PT Visit Date 07/29/21   Note Type   Note type Evaluation   Pain Assessment   Pain Assessment Tool Pain Assessment not indicated - pt denies pain   Pain Score Worst Possible Pain   Pain Location/Orientation Location: Generalized   Patient's Stated Pain Goal No pain   Hospital Pain Intervention(s) Repositioned; Ambulation/increased activity   Home Living   Additional Comments Pt resides with  in [place], where  works, at baseline  PTA, pt at [address] [facility] From Family;Friend(s)   Comments PTA, pt requiring A for ADLs/ IADLs  At baseline, pt I for ADLs and IADLs  Restrictions/Precautions   Weight Bearing Precautions Per Order No   Other Precautions Cognitive; Chair Alarm; Bed Alarm;Multiple lines; Fall Risk;Pain   General   Family/Caregiver Present No   Cognition   Overall Cognitive Status Impaired   Arousal/Participation Alert   Attention Attends with cues to redirect   Orientation Level Oriented to person;Oriented to place; Disoriented to time   Memory Unable to assess   Following Commands Follows one step commands with increased time or repetition   Comments Pt primarily speaks [address]; blue  phone utilized throughout evaluation   Pt pleasant and cooperative throughout therapy session- limited 2* increased pain    RLE Assessment   RLE Assessment   (grossly 3/5 )   LLE Assessment   LLE Assessment   (grossly 2/5 )   Bed Mobility   Supine to Sit 4  Minimal assistance   Additional items Assist x 2;HOB elevated; Increased time required;Verbal cues   Sit to Supine 3  Moderate assistance   Additional items Assist x 1; Increased time required;Verbal cues;LE management   Additional Comments Pt supine in bed upon arrival  Pt sitting EOB with CGA, min A required for dynamic sitting tasks  Pt returned to supine in bed with bed alarm on all needs within reach at the end of therapy session    Transfers   Sit to Stand 2  Maximal assistance   Additional items Assist x 2; Increased time required;Verbal cues  (+ L knee block )   Stand to Sit 2  Maximal assistance   Additional items Assist x 2; Increased time required;Verbal cues  (+ L knee block )   Additional Comments Performed 2x STS from EOB with b/l HHA and L knee block    Ambulation/Elevation   Gait pattern Not appropriate  (2* increased L UE/ LLE pain, L knee buckling )   Balance   Static Sitting Fair -   Dynamic Sitting Poor +   Static Standing Poor -   Dynamic Standing Poor -   Activity Tolerance   Activity Tolerance Patient limited by pain   Medical Staff Made Aware OT Paralee Bile; co-eval performed this date 2* increased medical complexity and multiple co-morbidities    Nurse Made Aware RN cleared pt to participate in therapy session    Assessment   Prognosis Fair   Problem List Decreased strength;Decreased endurance; Impaired balance;Decreased mobility; Decreased safety awareness;Decreased coordination;Decreased cognition;Pain   Assessment Pt seen for high complexity PT evaluation  Pt with active PT eval/treat orders  Pt is a 49 y o  female who was admitted to [facility] on 7/28/2021 with CNS lymphoma  Pt's active problems include: dysphagia, cancer related pain, ambulatory dysfunction, and hypokalemia   Pt resides with  at the [facility] and was independent at baseline  Prior to hospital admission, pt at [facility] where she requires assist for ADLs and IADLs  Pt has limitations in strength, balance, endurance, and overall functional mobility  Currently, pt requires assist of 1-2 for all mobility performed this date  Pt performed sup to sit bed mobility tasks with min Ax1  Pt sat EOB with CGA; progressed to min Ax1 when performing dynamic balance tasks  Pt performed 2x STS from EOB using b/l HHA and max Ax2; L knee block utilized  Pt unable to take steps 2* increased LUE/LLE pain and LLE knee buckling  Pt was left supine in bed at the end of PT session with all needs in reach  Pt would benefit from continued PT services while in hospital to address remaining limitations  PT to continue to follow pt and recommends post-acute rehab services after d/c  The patient's AM-PAC Basic Mobility Inpatient Short Form Low Function Raw Score 16 , Standardized Score is 25 72  A standardized score less 42 9 suggests the patient may benefit from discharge to post-acute rehab services  Please also refer to the recommendation of the Physical Therapist for safe discharge planning  Barriers to Discharge Inaccessible home environment;Decreased caregiver support   Goals   Patient Goals to lay down    STG Expiration Date 08/12/21   Short Term Goal #1 STG 1  Pt will be able to perform bed mobility with mod I in order to improve overall functional mobility and assist in safe d/c  STG 2  Pt will be able to perform functional transfer with min A in order to improve overall functional mobility and assist in safe d/c  STG 3  Pt will improve sitting/standing static/dynamic balance 1 grade in order to improve functional mobility and assist in safe d/c  STG 4  Pt will improve LE strength by one grade in order to improve functional mobility and assist in safe d/c   *PT to continue to follow/ assess ambulation as appropriate at able*    PT Treatment Day 0   Plan Treatment/Interventions Functional transfer training;LE strengthening/ROM; Patient/family training;Cognitive reorientation; Bed mobility;Spoke to nursing;Spoke to case management;OT   PT Frequency 2-3x/wk   Recommendation   PT Discharge Recommendation Post acute rehabilitation services   PT - OK to Discharge Yes  (to rehab once medically cleared )   AM-PAC Basic Mobility Inpatient   Turning in Bed Without Bedrails 3   Lying on Back to Sitting on Edge of Flat Bed 2   Moving Bed to Chair 1   Standing Up From Chair 1   Walk in Room 1   Climb 3-5 Stairs 1   Basic Mobility Inpatient Raw Score 9   Turning Head Towards Sound 4   Follow Simple Instructions 3   Low Function Basic Mobility Raw Score 16   Low Function Basic Mobility Standardized Score 25 72           Nathaniel Rapp, PT, DPT

## 2021-07-29 NOTE — CONSULTS
INTERNAL MEDICINE RESIDENCY ADMISSION H&P     Name: Chai Jang   Age & Sex: 49 y o  female   MRN: [id number]  Unit/Bed#: [facility] 918-01   Encounter: [id number]  Primary Care Provider: No primary care provider on file  Code Status: Level 1 - Full Code  Admission Status: INPATIENT   Disposition: Patient requires Med/Surg    Admit to team: SOD Team A    ASSESSMENT/PLAN     Principal Problem:    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[facility] )  Active Problems:    Dysphagia    Cancer related pain    Ambulatory dysfunction    Hypokalemia      * 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[facility] )  Assessment & Plan  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)       Direct Admit from Dr Toma Logan for in-patient chemo 3rd cycle       S/P chemotherapy on 05/13, Priscella Flurry 5/14, 7/18 6/9 MRI- Appears to be interval improvement in the overall enhancement of multifocal      intracranial lesions      P:Per Primary      DVT prophylaxis with Lovenox     Currently on Leucovorin        Hypokalemia  Assessment & Plan  A: potassium 3 4  Likely in setting of poor p o  Intake/poor absorption in setting of chemotherapy and malnutrition    P: Will replenish     Ambulatory dysfunction  Assessment & Plan  Known residual deficits left upper extremity and lower extremity weakness secondary to CNS lymphoma    PT/OT recommended rehab    Cancer related pain  Assessment & Plan  Gabapentin 200mg hs    Oxy 2 5 PRN    Dysphagia  Assessment & Plan  Tolerating dysphagia diet       VTE Pharmacologic Prophylaxis: Enoxaparin (Lovenox)  VTE Mechanical Prophylaxis: sequential compression device    CHIEF COMPLAINT   No chief complaint on file  HISTORY OF PRESENT ILLNESS     Patient is a 28-year-old female with past medical history of gout,  diffuse large primary CNS lymphoma s/p 3 rounds of chemotherapy and PEG tube placement 5/10 who presented for 4th round of chemo and medical consult from heme/onc  Dr Ayleen Mcneil was able to interpret   She endorses left sided pain which feels like electric shocks diffusely left upper and Lower extremity  Denies any chest pain, SOB, palpitations, abdominal pain, dysuria, constipation, nausea, vomiting, fever or chills  She is tolerating dysphagia diet well  Hb stable at 11 2 WBC 7 MCV 90  Na 141 K 3 4 Cr  50     She will be getting Methotrexate w/ leucovorin rescue  REVIEW OF SYSTEMS     Review of Systems   Constitutional: Positive for fatigue  Negative for diaphoresis and unexpected weight change  Eyes: Negative for photophobia and visual disturbance  Respiratory: Negative for cough, shortness of breath and wheezing  Gastrointestinal: Negative for abdominal distention, abdominal pain, constipation, nausea and vomiting  Endocrine: Negative for polydipsia and polyuria  Genitourinary: Negative for difficulty urinating  Musculoskeletal: Positive for back pain  Neurological: Positive for weakness (left sided)  Psychiatric/Behavioral: Positive for behavioral problems and confusion  OBJECTIVE     Vitals:    21 2153 21 0231 21 0231 21 0812   BP: 127/86 133/87 133/87 134/91   Pulse: (!) 108 92 99 87   Resp: 20 20 20 16   Temp: 98 6 °F (37 °C) (!) 97 4 °F (36 3 °C) (!) 97 4 °F (36 3 °C) 98 5 °F (36 9 °C)   TempSrc: Axillary      SpO2: 98% 97% 97% 98%   Weight:       Height:          Temperature:   Temp (24hrs), Av 3 °F (36 8 °C), Min:97 4 °F (36 3 °C), Max:98 9 °F (37 2 °C)    Temperature: 98 5 °F (36 9 °C)  Intake & Output:  I/O       701 -  0700  07 -  07 -  0700    P  O   240     I V  (mL/kg)  1605 (30 9) 795 (15 3)    NG/GT  50     IV Piggyback  1284 4     Total Intake(mL/kg)  3179 4 (61 1) 795 (15 3)    Urine (mL/kg/hr)  1610 550 (4 9)    Stool  0     Total Output  1610 550    Net  +1569 4 +245           Unmeasured Urine Occurrence  1 x     Unmeasured Stool Occurrence  1 x         Weights:   IBW (Ideal Body Weight): 45 5 kg    Body mass index is 22 38 kg/m²    Weight (last 2 days)     Date/Time   Weight    07/28/21 2021   52 (114 6)            Physical Exam  Constitutional:       General: She is not in acute distress  Appearance: She is normal weight  She is ill-appearing  She is not toxic-appearing or diaphoretic  HENT:      Head: Normocephalic and atraumatic  Nose: No congestion or rhinorrhea  Cardiovascular:      Rate and Rhythm: Normal rate and regular rhythm  Pulses: Normal pulses  Heart sounds: Normal heart sounds  No murmur heard  No friction rub  No gallop  Pulmonary:      Effort: Pulmonary effort is normal  No respiratory distress  Breath sounds: Normal breath sounds  No stridor  No wheezing, rhonchi or rales  Chest:      Chest wall: No tenderness  Abdominal:      General: Abdomen is flat  Bowel sounds are normal  There is no distension  Palpations: Abdomen is soft  Tenderness: There is no abdominal tenderness  There is no right CVA tenderness or guarding  Musculoskeletal:      Right lower leg: No edema  Left lower leg: No edema  Neurological:      Mental Status: She is alert  Motor: Weakness (Left Upper Flaccid  LL 3/5, ptosis on left) present  Comments: A x O x 2       PAST MEDICAL HISTORY   No past medical history on file  PAST SURGICAL HISTORY     Past Surgical History:   Procedure Laterality Date    IR PICC REPOSITION  4/27/2021    IR TUNNELED CENTRAL LINE REMOVAL  4/27/2021     SOCIAL & FAMILY HISTORY     Social History     Substance and Sexual Activity   Alcohol Use Not Currently     Substance and Sexual Activity   Alcohol Use Not Currently        Substance and Sexual Activity   Drug Use Not on file     Social History     Tobacco Use   Smoking Status Never Smoker   Smokeless Tobacco Never Used     Family History   Problem Relation Age of Onset    No Known Problems Mother      LABORATORY DATA     Labs: I have personally reviewed pertinent reports      Results from last 7 days   Lab Units 07/29/21  0616 07/28/21  1712   WBC Thousand/uL 7 06 6 77   HEMOGLOBIN g/dL 11 2* 12 3   HEMATOCRIT % 34 3* 37 8   PLATELETS Thousands/uL 237 264   NEUTROS PCT % 82* 52   MONOS PCT % 4 7      Results from last 7 days   Lab Units 07/29/21  0616 07/28/21  1712   POTASSIUM mmol/L 3 4* 3 1*   CHLORIDE mmol/L 106 109*   CO2 mmol/L 31 29   BUN mg/dL 9 9   CREATININE mg/dL 0 50* 0 43*   CALCIUM mg/dL 9 6 9 9   ALK PHOS U/L 62 66   ALT U/L 15 12   AST U/L 12 11        Micro:  Lab Results   Component Value Date    BLOODCX No Growth After 5 Days  05/03/2021    BLOODCX No Growth After 5 Days  05/03/2021     IMAGING & DIAGNOSTIC TESTS     Imaging: I have personally reviewed pertinent reports  No results found  EKG, Pathology, and Other Studies: I have personally reviewed pertinent reports  ALLERGIES   No Known Allergies  MEDICATIONS PRIOR TO ARRIVAL     Prior to Admission medications    Medication Sig Start Date End Date Taking?  Authorizing Provider   acetaminophen (Tylenol) 325 mg tablet Take 500 mg by mouth every 6 (six) hours as needed for mild pain    Historical Provider, MD   albuterol (PROVENTIL HFA,VENTOLIN HFA) 90 mcg/act inhaler Inhale 2 puffs  Patient not taking: Reported on 7/8/2021 4/22/21 4/22/22  Historical Provider, MD   allopurinol (ZYLOPRIM) 100 mg tablet Take 1 tablet (100 mg total) by mouth daily 6/15/21   Helene Malik MD   aluminum-magnesium hydroxide-simethicone (MYLANTA) 200-200-20 mg/5 mL suspension 30 mL by Per PEG Tube route every 4 (four) hours as needed for indigestion or heartburn 6/14/21   Helene Malik MD   atorvastatin (LIPITOR) 40 mg tablet Take 40 mg by mouth daily    Historical Provider, MD   bisacodyl (DULCOLAX) 10 mg suppository Insert 1 suppository (10 mg total) into the rectum daily as needed (Third line for constipation) 6/14/21   Helene Malik MD   Cholecalciferol (Vitamin D3) 1 25 MG (59958 UT) TABS Take by mouth once a week Every monday    Historical Provider, MD   Diclofenac Sodium (VOLTAREN) 1 % Apply 2 g topically 3 (three) times a day as needed (knee nacho)  Patient taking differently: Apply 2 g topically 3 (three) times a day as needed (knee pain)  6/14/21   Binh Elizabeth MD   dicyclomine (BENTYL) 10 mg capsule Take 1 capsule (10 mg total) by mouth 4 (four) times a day (before meals and at bedtime) 6/14/21   Binh Elizabeth MD   docusate sodium (COLACE) 100 mg capsule Take 100 mg by mouth 2 (two) times a day    Historical Provider, MD   famotidine (PEPCID) 20 mg tablet Take 20 mg by mouth daily 4/22/21 4/22/22  Historical Provider, MD   folic acid (FOLVITE) 1 mg tablet Take 1 tablet (1 mg total) by mouth daily 7/22/21   Oliver Bishop DO   gabapentin (NEURONTIN) 250 mg/5 mL solution Take 4 mL (200 mg total) by mouth daily at bedtime 7/21/21   Oliver Bishop DO   hydrOXYzine HCL (ATARAX) 25 mg tablet Take 1 tablet (25 mg total) by mouth every 6 (six) hours as needed for itching or anxiety 6/14/21   Binh Elizabeth MD   insulin lispro (HumaLOG) 100 units/mL injection Inject 1-5 Units under the skin 4 (four) times a day (before meals and at bedtime) 6/14/21   Binh Elizabeth MD   menthol-methyl salicylate (BENGAY) 15-52 % cream Apply topically 2 (two) times a day 6/14/21   Binh Elizabeth MD   ondansetron (ZOFRAN-ODT) 4 mg disintegrating tablet Take 1 tablet (4 mg total) by mouth every 6 (six) hours as needed for nausea or vomiting 6/15/21   Binh Elizabeth MD   polyethylene glycol (MIRALAX) 17 g packet Take 17 g by mouth daily as needed (Second line for constipation) 6/14/21   Binh Elizabeth MD   senna-docusate sodium (Senokot S) 8 6-50 mg per tablet Take 1 tablet by mouth 2 (two) times a day  Patient not taking: Reported on 7/13/2021 4/22/21 4/22/22  Historical Provider, MD   tamsulosin (FLOMAX) 0 4 mg Take 1 capsule (0 4 mg total) by mouth daily with dinner 6/14/21   Binh Elizabeth MD     MEDICATIONS ADMINISTERED IN LAST 24 HOURS     Medication Administration - last 24 hours from 07/28/2021 0931 to 07/29/2021 0931 Date/Time Order Dose Route Action Action by     07/29/2021 0820 enoxaparin (LOVENOX) subcutaneous injection 40 mg 40 mg Subcutaneous Given Rodríguez Xiong RN     07/28/2021 1718 atorvastatin (LIPITOR) tablet 10 mg 10 mg Oral Given Maria L Dhillon, MARTINE     07/29/2021 8708 cholecalciferol (VITAMIN D3) tablet 400 Units 400 Units Oral Given Rodríguez Xiong RN     07/28/2021 1409 cholecalciferol (VITAMIN D3) tablet 400 Units 0 Units Oral Hold Renato Santos, MARTINE     07/29/2021 4246 dicyclomine (BENTYL) capsule 10 mg 10 mg Oral Given Rodríguez Xiong, MARTINE     07/28/2021 2213 dicyclomine (BENTYL) capsule 10 mg 10 mg Oral Given Megan Soliman RN     07/28/2021 1718 dicyclomine (BENTYL) capsule 10 mg 10 mg Oral Given Maria L Dhillon RN     07/29/2021 0823 docusate sodium (COLACE) capsule 100 mg 100 mg Oral Given Rodríguez Xiong RN     07/28/2021 1718 docusate sodium (COLACE) capsule 100 mg 100 mg Oral Given Maria L Dhillon RN     74/84/1225 7416 folic acid (FOLVITE) tablet 1 mg 1 mg Oral Given Rodríguez Xiong RN     60/93/4319 6549 folic acid (FOLVITE) tablet 1 mg 1 mg Oral Not Given Renato Santos RN     07/28/2021 2214 gabapentin (NEURONTIN) oral solution 200 mg 200 mg Oral Given Megan Soliman RN     07/29/2021 0843 insulin lispro (HumaLOG) 100 units/mL subcutaneous injection 1 Units 1 Units Subcutaneous Given Rodríguez Xiong RN     07/28/2021 1816 insulin lispro (HumaLOG) 100 units/mL subcutaneous injection 1 Units 1 Units Subcutaneous Given Renato Santos RN     07/29/2021 7834 senna-docusate sodium (SENOKOT S) 8 6-50 mg per tablet 1 tablet 1 tablet Oral Given Rodríguez Xiong RN     07/28/2021 1718 senna-docusate sodium (SENOKOT S) 8 6-50 mg per tablet 1 tablet 1 tablet Oral Given Maria L Dhillon RN     07/28/2021 1718 tamsulosin (FLOMAX) capsule 0 4 mg 0 4 mg Oral Given Maria L Dhillon RN     07/29/2021 0646 sodium bicarbonate 100 mEq in dextrose 5 % 1,000 mL infusion 150 mL/hr Intravenous Brandon Pulido RN 07/29/2021 0645 sodium bicarbonate 100 mEq in dextrose 5 % 1,000 mL infusion 0 mL/hr Intravenous Stopped Sandi Wyatt RN     07/28/2021 2217 sodium bicarbonate 100 mEq in dextrose 5 % 1,000 mL infusion 150 mL/hr Intravenous [address] Dariel Jimenez RN     07/28/2021 1518 sodium bicarbonate 100 mEq in dextrose 5 % 1,000 mL infusion 150 mL/hr Intravenous [address] Aron Hook RN     07/28/2021 1407 LORazepam (ATIVAN) injection 0 5 mg 0 5 mg Intravenous Given Aron Hook RN     07/28/2021 2040 ondansetron (ZOFRAN) 16 mg, dexamethasone (DECADRON) 10 mg in sodium chloride 0 9 % 50 mL IVPB   Intravenous Stopped Dariel Jimenez RN     07/28/2021 2020 ondansetron (ZOFRAN) 16 mg, dexamethasone (DECADRON) 10 mg in sodium chloride 0 9 % 50 mL IVPB   Intravenous [address] Dariel Jimenez RN     07/29/2021 0122 methotrexate (PF) 4,935 mg in sodium chloride 0 9 % 1,000 mL IVPB 0 mg Intravenous Stopped Dariel Jimenez RN     07/28/2021 2122 methotrexate (PF) 4,935 mg in sodium chloride 0 9 % 1,000 mL IVPB 4,935 mg Intravenous [address] Dariel Jimenez RN     07/28/2021 2214 LORazepam (ATIVAN) injection 0 5 mg 0 5 mg Intravenous Given Dariel Jimenez RN     07/28/2021 2213 potassium chloride (K-DUR,KLOR-CON) CR tablet 40 mEq 40 mEq Oral Given Dariel Jimenez RN        CURRENT MEDICATIONS     Current Facility-Administered Medications   Medication Dose Route Frequency Provider Last Rate    acetaminophen  650 mg Oral Q6H PRN Paralee Rang, DO      albuterol  2 puff Inhalation Q4H PRN Paralee Rang, DO      atorvastatin  10 mg Oral Daily With Sophie Kim, DO      cholecalciferol  400 Units Oral Daily Paralee Rang, DO      Diclofenac Sodium  2 g Topical TID PRN Paralee Rang, DO      dicyclomine  10 mg Oral 4x Daily (AC & HS) Paralee Rang, DO      docusate sodium  100 mg Oral BID Paralee Rang, DO      enoxaparin  40 mg Subcutaneous Daily Paralee Rang, DO      folic acid  1 mg Oral Daily Paralee Rang, DO      gabapentin  200 mg Oral HS Christine Gilding, DO      hydrOXYzine HCL  25 mg Oral Q6H PRN Christine Gilding, DO      insulin lispro  1 Units Subcutaneous TID With Meals Christine Gilding, DO      leucovorin calcium IVPB  25 mg Intravenous Q6H Christine Gilding, DO      LORazepam  0 5 mg Intravenous Q6H PRN Len Puls, CRNP      menthol-methyl salicylate   Apply externally 4x Daily PRN Christine Gilding, DO      ondansetron  4 mg Oral Q6H PRN Christine Gilding, DO      oxyCODONE  2 5 mg Oral Q4H PRN Len Puls, CRNP      polyethylene glycol  17 g Oral Daily PRN Christine Gilding, DO      senna-docusate sodium  1 tablet Oral BID Christine Gilding, DO      sodium bicarbonate infusion  150 mL/hr Intravenous Continuous Christine Gilding,  mL/hr (07/29/21 7650)    sodium chloride  20 mL/hr Intravenous Once PRN Christine Gilding, DO      tamsulosin  0 4 mg Oral Daily With [employer] Julio, DO       sodium bicarbonate infusion, 150 mL/hr, Last Rate: 150 mL/hr (07/29/21 0646)      acetaminophen, 650 mg, Q6H PRN  albuterol, 2 puff, Q4H PRN  Diclofenac Sodium, 2 g, TID PRN  hydrOXYzine HCL, 25 mg, Q6H PRN  LORazepam, 0 5 mg, Q6H PRN  menthol-methyl salicylate, , 4x Daily PRN  ondansetron, 4 mg, Q6H PRN  oxyCODONE, 2 5 mg, Q4H PRN  polyethylene glycol, 17 g, Daily PRN  sodium chloride, 20 mL/hr, Once PRN        Admission Time  I spent 30 minutes admitting the patient  This involved direct patient contact where I performed a full history and physical, reviewing previous records, and reviewing laboratory and other diagnostic studies  Portions of the record may have been created with voice recognition software  Occasional wrong word or "sound a like" substitutions may have occurred due to the inherent limitations of voice recognition software    Read the chart carefully and recognize, using context, where substitutions have occurred     ==  Eric Walker MD  [facility]  Internal Medicine Residency PGY-2

## 2021-07-29 NOTE — SPEECH THERAPY NOTE
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[facility])  Active Problems:    Dysphagia    Cancer related pain    Ambulatory dysfunction    Hypokalemia      Past Medical History  No past medical history on file  Past Surgical History  Past Surgical History:   Procedure Laterality Date    IR PICC REPOSITION  4/27/2021    IR TUNNELED CENTRAL LINE REMOVAL  4/27/2021       Summary   Pt presented with  s/s suggestive of minimal oral and suspected WFL pharyngeal swallow  Symptoms or concerns included decreased bolus propulsion, decreased mastication and oral residue with some solids, mult swallows at times but no overt  Coughing or other s/s aspiration  Able to use a liquid wash & mult swallows to clear  The pt is currently on level 3 & is a vegetarian  May be able to tolerate the regular as she will not receive any hard or solid meats  Would like to re assess w/ regular- possibly hard fruit & whole pieces of the regular veggies  Noted improvement over the last few months since initial VBS in May  Risk/s for Aspiration: min    Recommended Diet: regular diet and thin liquids - Vegetarian  Recommended Form of Meds: as desired   Aspiration precautions and swallowing strategies: upright posture, only feed when fully alert, slow rate of feeding and small bites/sips  Other Recommendations: Continue frequent oral care  Will follow w/ a regular tray      Current Medical Status  Pt is a 49 y o  female who presented to [facility] with     Current Precautions:  Fall  Aspiration  Contact  AIrborn  C diff   Seizure  Delirium    Allergies:  No known food allergies    Past medical history:  Please see H&P for details    Special Studies:  VBS 5/14/2021 following extended stay in which pt was given a PEG 5/10/2021 & was weak & not taking any po    Summary:  Images are on PACS for review  Pt presents w/ severe/profound oral dysphagia, at least mod pharyngeal dysphagia w/ poor retrieval & min to no oral seal & poor containment  Poor bolus manipulation & transfers w/ labored transfers & high risk for spill w/ all material kenji thinner liquids other than ht  Reduced anterior hyolaryngeal movement w/ reduced constriction  + silent aspiration of thin prior to the swallow, pt cued to cough but cough is weak  The pt would be more appropriate for pleasure feeds at this time given her medical status & severe oral dysphagia  She is high risk for ongoing poor intake by mouth but also risky for aspiration  Would focus on treating the primary medical issue & offer only pleasure feeds for comfort  Please rely on the PEG as the main nutrition source        Recommendations:  Pleasure feeds of puree/ht by tsp as able with the PEG for 1* nutrition    Social/Education/Vocational Hx:  Pt [facility]   Current Risks for Dysphagia & Aspiration: known history of dysphagia  Current Symptoms/Concerns: ?upgrade  Current Diet: soft/level 3 diet and thin liquids   Baseline Diet: soft/level 3 diet and thin liquids-Has PEG  Pt has not been using PEG for some time-unknown    Baseline Assessment   Behavior/Cognition: alert and crying out upon my arrival   Easily soothed w/ tray & SLP presence  Pt has increased yelling if left alone & not assisted  Was pleasant while SLP was in the room  Speech/Language Status: Pt Kimmy speaking, does respond to modeled cues from SLP  Patient Positioning: upright in bed  Pain Status/Interventions/Response to Interventions:   No report of or nonverbal indications of pain         Swallow Mechanism Exam  Facial: mild weakness on L, old  Labial: decreased ROM left side and fair w/ movement  Lingual: unable to test 2/2 limited command following  Velum: unable to visualize  Mandible: adequate ROM  Dentition: natural  Vocal quality:clear/adequate   Volitional Cough: unable to initiate volitional cough   Respiratory Status: on RA     Noted weakness in L UE w/ difficulty holding utensils    Consistencies Assessed and Performance   Consistencies Administered: thin liquids, puree, mechanical soft solids, soft solids and hard solids  Materials administered included lunch tray with chopped veggies, mashed, hard cookies, thin water, juice by cup/straw     Oral Stage: minimal  Mildly impulsive intake w/ reduced transfers at times w/ the larger pieces of veggies  Pt cued to put down fork w/ hand over hand modeled cues  Mild/mod lingual residue w/ the solids  With time is able to clear the oral cavity  Adequate manipulation w/ cup sips of liquids, straw sips as well  Pharyngeal Stage: WFL  Swallow Mechanics:  Swallowing initiation appeared prompt  Laryngeal rise was palpated and judged to be within functional limits  Mult swallows noted throughout  Esophageal Concerns: none reported    Strategies and Efficacy: offered sips to clear oral material, removing fork to reduce rate    Summary and Recommendations (see above)    Results Reviewed with: patient, RN, PA and dietician     Treatment Recommended: yes     Frequency of treatment: f/u with regular tray w/ whole pieces fresh fruit or salad    Patient Stated Goal:-    Dysphagia LTG  -Patient will demonstrate safe and effective oral intake (without overt s/s significant oral/pharyngeal dysphagia including s/s penetration or aspiration) for the highest appropriate diet level       Speech Therapy Prognosis   Prognosis: fair    Prognosis Considerations: prior medical history and cognitive status

## 2021-07-29 NOTE — ASSESSMENT & PLAN NOTE
Gabapentin 200mg hs  Acetaminophen 650mg q4h PRN for mild  Oxy 5 mg q4h PRN for severe  Hydromorphone 0 2mg q4h PRN for breakthrough

## 2021-07-29 NOTE — OCCUPATIONAL THERAPY NOTE
Occupational Therapy Evaluation     Patient Name: Mario Oleary  LGCBZ'L Date: 7/29/2021  Problem List  Principal Problem:    CNS lymphoma [facility])  Active Problems:    Dysphagia    Cancer related pain    Ambulatory dysfunction    Hypokalemia    Past Medical History  No past medical history on file  Past Surgical History  Past Surgical History:   Procedure Laterality Date    IR PICC REPOSITION  4/27/2021    IR TUNNELED CENTRAL LINE REMOVAL  4/27/2021 07/29/21 0957   OT Last Visit   OT Visit Date 07/29/21   Note Type   Note type Evaluation   Restrictions/Precautions   Weight Bearing Precautions Per Order No   Other Precautions Cognitive; Chair Alarm; Bed Alarm; Fall Risk;Pain   Pain Assessment   Pain Assessment Tool 0-10   Pain Score Worst Possible Pain   Pain Location/Orientation Location: [facility]   Hospital Pain Intervention(s) Repositioned; Ambulation/increased activity   Home Living   Additional Comments At baseline, pt resides at [facility] where her  works  PTA, pt was at [address] Help From Family;Friend(s)   Comments At baseline, pt was I with ADLs/IADLs  PTA, pt required A for ADLs/IADLs   Lifestyle   Autonomy At baseline, pt was I with ADLs/IADLs  PTA, pt required A for ADLs/IADLs   Reciprocal Relationships , friend   Intrinsic Gratification Per today's session, pt enjoys milk    Subjective   Subjective "I have pain everywhere" (translated)   ADL   Where Assessed Edge of bed   Eating Assistance 5  Supervision/Setup   Grooming Assistance 4  Minimal Assistance   UB Bathing Assistance 3  Moderate Assistance   LB Bathing Assistance 3  Moderate Assistance   [address] 3  Moderate [address] 3  Moderate [address]  2  Maximal Assistance   Bed Mobility   Supine to Sit 4  Minimal assistance   Additional items Assist x 1; Increased time required;HOB elevated   Sit to Supine 3  Moderate assistance   Additional items Assist x 1; Increased time required;LE management   Additional Comments Pt maintains EOB sitting position with CGA, MIN A for dynamic sitting balance    Transfers   Sit to Stand 2  Maximal assistance   Additional items Assist x 2; Increased time required   Stand to Sit 2  Maximal assistance   Additional items Assist x 2; Increased time required   Additional Comments performed x2 STS transfers  Unable to progress further 2* pain and L knee buckling    Balance   Static Sitting Fair -   Dynamic Sitting Poor +   Static Standing Poor -   Dynamic Standing Poor -   Ambulatory Zero   Activity Tolerance   Activity Tolerance Patient limited by pain   Medical Staff Made Aware PT Helene   Nurse Made Aware RN clearance for session   RUE Assessment   RUE Assessment   (3+/5)   LUE Assessment   LUE Assessment   (2-/5  increased ROM available with PROM, though painful)   Hand Function   Gross Motor Coordination Impaired   Fine Motor Coordination Impaired   Sensation   Additional Comments Pt with increased sensitivty to touch on LUE   Cognition   Overall Cognitive Status Impaired   Arousal/Participation Responsive; Cooperative   Attention Attends with cues to redirect   Orientation Level Oriented to place;Oriented to person;Disoriented to time;Disoriented to situation   Following Commands Follows one step commands with increased time or repetition   Comments Pt pleasant and cooperative t/o session  Utilized blue  phone  Assessment   Limitation Decreased ADL status; Decreased UE ROM; Decreased UE strength;Decreased Safe judgement during ADL;Decreased cognition;Decreased endurance;Decreased self-care trans;Decreased high-level ADLs; Decreased fine motor control;Decreased sensation   Prognosis Fair   Assessment Pt is a 49 y o  female admitted to [facility] on 7/28/2021 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[facility] [address] ), pt admitted for " C4 of HD methotrexate " Pt with active OT orders and up and OOB as tolerated orders  Pt seen as a co-evaluation with PT due to the patient's co-morbidities, clinically unstable presentation/clinical complexity, and present impairments  At baseline, pt resided at [facility] where her  works and was I with ADLs/IADLs  Prior to current admission, pt was at [facility] and required A for ADL tasks  Upon evaluation, pt currently requires Min A sup to sit, MOD A sit to sup, MAX A x 2 STS  Pt with L knee buckling in stand  Exhibits decreased strength to B/L UE's limiting performance in ADLs/transfers  LUE with decreased ROM and increased sensitivity, as well as thumb in adducted position (towel roll placed post session to encourage abd)  Pt currently requires S eating, min a grooming, MOD A UB ADLs, MOD A LB ADLs, and MAX A toileting  Pt is limited at this time 2*: pain, endurance, activity tolerance, functional mobility, balance, trunk control, functional standing tolerance, decreased I w/ ADLS/IADLS, strength, ROM, cognitive impairments, impaired fine motor skills and coordination deficits  The following Occupational Performance Areas to address include: eating, grooming, bathing/shower, toilet hygiene, dressing, health maintenance, functional mobility, community mobility and clothing management  Pt to benefit from immediate acute skilled OT to address above deficits, improve overall functional independence, maximize fnxl mobility and reduce caregiver burden  From OT standpoint, recommendation at time of d/c would be to return to STR  Pt was left in bed after session with all current needs met  The patient's raw score on the AM-PAC Daily Activity inpatient short form is 14, standardized score is 33 39, less than 39 4  Patients at this level are likely to benefit from discharge to post-acute rehabilitation services  Please refer to the recommendation of the Occupational Therapist for safe discharge planning     Goals   Patient Goals to lay back down    LTG Time Frame 10-14   Plan   Treatment Interventions ADL retraining;Functional transfer training;UE strengthening/ROM; Endurance training;Cognitive reorientation;Patient/family training;Equipment evaluation/education; Neuromuscular reeducation; Fine motor coordination activities; Compensatory technique education;Continued evaluation; Energy conservation; Activityengagement   Goal Expiration Date 08/12/21   OT Frequency 3-5x/wk   Recommendation   OT Discharge Recommendation Post acute rehabilitation services   OT - OK to Discharge Yes  (to rehab when medically stable )   AM-PAC Daily Activity Inpatient   Lower Body Dressing 2   Bathing 2   Toileting 2   Upper Body Dressing 2   Grooming 3   Eating 3   Daily Activity Raw Score 14   Daily Activity Standardized Score (Calc for Raw Score >=11) 33 39   AM-PAC Applied Cognition Inpatient   Following a Speech/Presentation 3   Understanding Ordinary Conversation 3   Taking Medications 3   Remembering Where Things Are Placed or Put Away 3   Remembering List of 4-5 Errands 2   Taking Care of Complicated Tasks 2   Applied Cognition Raw Score 16   Applied Cognition Standardized Score 35 03       GOALS    1) Pt will increase activity tolerance to G for 30 min txment sessions    2) Pt will complete UB dressing/self care/bathing w/ min A using adaptive device and DME as needed    3) Pt will complete LB dressing/self care/bathing w/ min A using adaptive device and DME as needed    4) Pt will complete toileting w/ min A w/ G hygiene/thoroughness using DME as needed    5) Pt will improve functional transfers to Mod A on/off all surfaces using DME as needed w/ G balance/safety     6) Pt will engage in ongoing cognitive assessment w/ G participation to assist w/ safe d/c planning/recommendations    7) Pt will increase static and dynamic standing/sitting balance to F using AD/DME as needed to increase safety and I during fnxl transfers and ADLs    8) Pt will maintain upright sitting position for at least 20 min during dynamic fnxl activity with G balance and endurance to improve ADL performance and independence, as well as reduce risk of falls         *OTR to continue to assess fnxl mobility and create goals as appropriate

## 2021-07-30 LAB
ALBUMIN SERPL BCP-MCNC: 2.5 G/DL (ref 3.5–5)
ALP SERPL-CCNC: 81 U/L (ref 46–116)
ALT SERPL W P-5'-P-CCNC: 32 U/L (ref 12–78)
ANION GAP SERPL CALCULATED.3IONS-SCNC: 1 MMOL/L (ref 4–13)
ANION GAP SERPL CALCULATED.3IONS-SCNC: 3 MMOL/L (ref 4–13)
AST SERPL W P-5'-P-CCNC: 18 U/L (ref 5–45)
BACTERIA UR QL AUTO: ABNORMAL /HPF
BASOPHILS # BLD AUTO: 0.03 THOUSANDS/ΜL (ref 0–0.1)
BASOPHILS NFR BLD AUTO: 1 % (ref 0–1)
BILIRUB SERPL-MCNC: 0.14 MG/DL (ref 0.2–1)
BILIRUB UR QL STRIP: NEGATIVE
BUN SERPL-MCNC: 5 MG/DL (ref 5–25)
BUN SERPL-MCNC: 7 MG/DL (ref 5–25)
CALCIUM ALBUM COR SERPL-MCNC: 9.7 MG/DL (ref 8.3–10.1)
CALCIUM SERPL-MCNC: 8.5 MG/DL (ref 8.3–10.1)
CALCIUM SERPL-MCNC: 8.9 MG/DL (ref 8.3–10.1)
CHLORIDE SERPL-SCNC: 107 MMOL/L (ref 100–108)
CHLORIDE SERPL-SCNC: 109 MMOL/L (ref 100–108)
CLARITY UR: CLEAR
CO2 SERPL-SCNC: 32 MMOL/L (ref 21–32)
CO2 SERPL-SCNC: 32 MMOL/L (ref 21–32)
COLOR UR: YELLOW
CREAT SERPL-MCNC: 0.35 MG/DL (ref 0.6–1.3)
CREAT SERPL-MCNC: 0.37 MG/DL (ref 0.6–1.3)
EOSINOPHIL # BLD AUTO: 0.04 THOUSAND/ΜL (ref 0–0.61)
EOSINOPHIL NFR BLD AUTO: 1 % (ref 0–6)
ERYTHROCYTE [DISTWIDTH] IN BLOOD BY AUTOMATED COUNT: 15 % (ref 11.6–15.1)
GFR SERPL CREATININE-BSD FRML MDRD: 122 ML/MIN/1.73SQ M
GFR SERPL CREATININE-BSD FRML MDRD: 124 ML/MIN/1.73SQ M
GLUCOSE SERPL-MCNC: 117 MG/DL (ref 65–140)
GLUCOSE SERPL-MCNC: 120 MG/DL (ref 65–140)
GLUCOSE SERPL-MCNC: 173 MG/DL (ref 65–140)
GLUCOSE SERPL-MCNC: 178 MG/DL (ref 65–140)
GLUCOSE SERPL-MCNC: 178 MG/DL (ref 65–140)
GLUCOSE SERPL-MCNC: 219 MG/DL (ref 65–140)
GLUCOSE UR STRIP-MCNC: NEGATIVE MG/DL
HCT VFR BLD AUTO: 30.4 % (ref 34.8–46.1)
HGB BLD-MCNC: 9.6 G/DL (ref 11.5–15.4)
HGB UR QL STRIP.AUTO: NEGATIVE
HYALINE CASTS #/AREA URNS LPF: ABNORMAL /LPF
IMM GRANULOCYTES # BLD AUTO: 0.04 THOUSAND/UL (ref 0–0.2)
IMM GRANULOCYTES NFR BLD AUTO: 1 % (ref 0–2)
KETONES UR STRIP-MCNC: NEGATIVE MG/DL
LEUKOCYTE ESTERASE UR QL STRIP: NEGATIVE
LYMPHOCYTES # BLD AUTO: 1.72 THOUSANDS/ΜL (ref 0.6–4.47)
LYMPHOCYTES NFR BLD AUTO: 33 % (ref 14–44)
MCH RBC QN AUTO: 29.4 PG (ref 26.8–34.3)
MCHC RBC AUTO-ENTMCNC: 31.6 G/DL (ref 31.4–37.4)
MCV RBC AUTO: 93 FL (ref 82–98)
MONOCYTES # BLD AUTO: 0.29 THOUSAND/ΜL (ref 0.17–1.22)
MONOCYTES NFR BLD AUTO: 6 % (ref 4–12)
MTX SERPL-SCNC: 0.74 UMOL/L
MTX SERPL-SCNC: 3.8 UMOL/L
NEUTROPHILS # BLD AUTO: 3.07 THOUSANDS/ΜL (ref 1.85–7.62)
NEUTS SEG NFR BLD AUTO: 58 % (ref 43–75)
NITRITE UR QL STRIP: NEGATIVE
NON-SQ EPI CELLS URNS QL MICRO: ABNORMAL /HPF
NRBC BLD AUTO-RTO: 0 /100 WBCS
PH UR STRIP.AUTO: 8.5 [PH]
PLATELET # BLD AUTO: 176 THOUSANDS/UL (ref 149–390)
PMV BLD AUTO: 11.6 FL (ref 8.9–12.7)
POTASSIUM SERPL-SCNC: 2.9 MMOL/L (ref 3.5–5.3)
POTASSIUM SERPL-SCNC: 4.2 MMOL/L (ref 3.5–5.3)
PROT SERPL-MCNC: 5 G/DL (ref 6.4–8.2)
PROT UR STRIP-MCNC: NEGATIVE MG/DL
RBC # BLD AUTO: 3.26 MILLION/UL (ref 3.81–5.12)
RBC #/AREA URNS AUTO: ABNORMAL /HPF
SODIUM SERPL-SCNC: 142 MMOL/L (ref 136–145)
SODIUM SERPL-SCNC: 142 MMOL/L (ref 136–145)
SP GR UR STRIP.AUTO: 1.01 (ref 1–1.03)
UROBILINOGEN UR QL STRIP.AUTO: 0.2 E.U./DL
WBC # BLD AUTO: 5.19 THOUSAND/UL (ref 4.31–10.16)
WBC #/AREA URNS AUTO: ABNORMAL /HPF

## 2021-07-30 PROCEDURE — 80053 COMPREHEN METABOLIC PANEL: CPT | Performed by: STUDENT IN AN ORGANIZED HEALTH CARE EDUCATION/TRAINING PROGRAM

## 2021-07-30 PROCEDURE — 99232 SBSQ HOSP IP/OBS MODERATE 35: CPT | Performed by: HOSPITALIST

## 2021-07-30 PROCEDURE — 85025 COMPLETE CBC W/AUTO DIFF WBC: CPT | Performed by: STUDENT IN AN ORGANIZED HEALTH CARE EDUCATION/TRAINING PROGRAM

## 2021-07-30 PROCEDURE — 80204 DRUG ASSAY METHOTREXATE: CPT | Performed by: INTERNAL MEDICINE

## 2021-07-30 PROCEDURE — 81001 URINALYSIS AUTO W/SCOPE: CPT | Performed by: STUDENT IN AN ORGANIZED HEALTH CARE EDUCATION/TRAINING PROGRAM

## 2021-07-30 PROCEDURE — 99233 SBSQ HOSP IP/OBS HIGH 50: CPT | Performed by: INTERNAL MEDICINE

## 2021-07-30 PROCEDURE — 92526 ORAL FUNCTION THERAPY: CPT

## 2021-07-30 PROCEDURE — 80048 BASIC METABOLIC PNL TOTAL CA: CPT | Performed by: STUDENT IN AN ORGANIZED HEALTH CARE EDUCATION/TRAINING PROGRAM

## 2021-07-30 PROCEDURE — 82948 REAGENT STRIP/BLOOD GLUCOSE: CPT

## 2021-07-30 RX ORDER — MAGNESIUM SULFATE HEPTAHYDRATE 40 MG/ML
2 INJECTION, SOLUTION INTRAVENOUS ONCE
Status: COMPLETED | OUTPATIENT
Start: 2021-07-30 | End: 2021-07-30

## 2021-07-30 RX ORDER — POTASSIUM CHLORIDE 20MEQ/15ML
40 LIQUID (ML) ORAL ONCE
Status: COMPLETED | OUTPATIENT
Start: 2021-07-30 | End: 2021-07-30

## 2021-07-30 RX ORDER — POTASSIUM CHLORIDE 14.9 MG/ML
20 INJECTION INTRAVENOUS
Status: COMPLETED | OUTPATIENT
Start: 2021-07-30 | End: 2021-07-30

## 2021-07-30 RX ADMIN — LEUCOVORIN CALCIUM 25 MG: 100 INJECTION, POWDER, LYOPHILIZED, FOR SUSPENSION INTRAMUSCULAR; INTRAVENOUS at 23:52

## 2021-07-30 RX ADMIN — OXYCODONE HYDROCHLORIDE 2.5 MG: 5 TABLET ORAL at 01:30

## 2021-07-30 RX ADMIN — LEUCOVORIN CALCIUM 25 MG: 100 INJECTION, POWDER, LYOPHILIZED, FOR SUSPENSION INTRAMUSCULAR; INTRAVENOUS at 12:36

## 2021-07-30 RX ADMIN — GABAPENTIN 200 MG: 250 SOLUTION ORAL at 10:37

## 2021-07-30 RX ADMIN — LORAZEPAM 0.5 MG: 2 INJECTION INTRAMUSCULAR; INTRAVENOUS at 20:39

## 2021-07-30 RX ADMIN — CHOLECALCIFEROL TAB 10 MCG (400 UNIT) 400 UNITS: 10 TAB at 10:34

## 2021-07-30 RX ADMIN — DOCUSATE SODIUM 100 MG: 100 CAPSULE ORAL at 10:31

## 2021-07-30 RX ADMIN — MENTHOL, UNSPECIFIED FORM AND METHYL SALICYLATE: 10; 150 CREAM TOPICAL at 00:16

## 2021-07-30 RX ADMIN — POTASSIUM CHLORIDE 20 MEQ: 14.9 INJECTION, SOLUTION INTRAVENOUS at 10:38

## 2021-07-30 RX ADMIN — POTASSIUM CHLORIDE 40 MEQ: 20 SOLUTION ORAL at 10:31

## 2021-07-30 RX ADMIN — DICYCLOMINE HYDROCHLORIDE 10 MG: 10 CAPSULE ORAL at 10:35

## 2021-07-30 RX ADMIN — DOCUSATE SODIUM AND SENNOSIDES 1 TABLET: 8.6; 5 TABLET ORAL at 10:31

## 2021-07-30 RX ADMIN — ATORVASTATIN CALCIUM 10 MG: 10 TABLET, FILM COATED ORAL at 16:10

## 2021-07-30 RX ADMIN — HYDROXYZINE HYDROCHLORIDE 25 MG: 25 TABLET ORAL at 23:40

## 2021-07-30 RX ADMIN — GABAPENTIN 200 MG: 250 SOLUTION ORAL at 18:26

## 2021-07-30 RX ADMIN — DOCUSATE SODIUM AND SENNOSIDES 1 TABLET: 8.6; 5 TABLET ORAL at 18:26

## 2021-07-30 RX ADMIN — MAGNESIUM SULFATE HEPTAHYDRATE 2 G: 40 INJECTION, SOLUTION INTRAVENOUS at 10:45

## 2021-07-30 RX ADMIN — INSULIN LISPRO 1 UNITS: 100 INJECTION, SOLUTION INTRAVENOUS; SUBCUTANEOUS at 16:12

## 2021-07-30 RX ADMIN — POTASSIUM CHLORIDE 20 MEQ: 14.9 INJECTION, SOLUTION INTRAVENOUS at 12:00

## 2021-07-30 RX ADMIN — DICYCLOMINE HYDROCHLORIDE 10 MG: 10 CAPSULE ORAL at 22:13

## 2021-07-30 RX ADMIN — HYDROXYZINE HYDROCHLORIDE 25 MG: 25 TABLET ORAL at 01:20

## 2021-07-30 RX ADMIN — SODIUM BICARBONATE 150 ML/HR: 84 INJECTION, SOLUTION INTRAVENOUS at 23:40

## 2021-07-30 RX ADMIN — FOLIC ACID 1 MG: 1 TABLET ORAL at 10:31

## 2021-07-30 RX ADMIN — OXYCODONE HYDROCHLORIDE 2.5 MG: 5 TABLET ORAL at 22:13

## 2021-07-30 RX ADMIN — DICYCLOMINE HYDROCHLORIDE 10 MG: 10 CAPSULE ORAL at 16:10

## 2021-07-30 RX ADMIN — LEUCOVORIN CALCIUM 25 MG: 100 INJECTION, POWDER, LYOPHILIZED, FOR SUSPENSION INTRAMUSCULAR; INTRAVENOUS at 18:18

## 2021-07-30 RX ADMIN — LEUCOVORIN CALCIUM 25 MG: 100 INJECTION, POWDER, LYOPHILIZED, FOR SUSPENSION INTRAMUSCULAR; INTRAVENOUS at 05:43

## 2021-07-30 RX ADMIN — ENOXAPARIN SODIUM 40 MG: 40 INJECTION SUBCUTANEOUS at 10:31

## 2021-07-30 RX ADMIN — SODIUM BICARBONATE 150 ML/HR: 84 INJECTION, SOLUTION INTRAVENOUS at 00:14

## 2021-07-30 RX ADMIN — TAMSULOSIN HYDROCHLORIDE 0.4 MG: 0.4 CAPSULE ORAL at 16:10

## 2021-07-30 RX ADMIN — LORAZEPAM 0.5 MG: 2 INJECTION INTRAMUSCULAR; INTRAVENOUS at 03:04

## 2021-07-30 RX ADMIN — DOCUSATE SODIUM 100 MG: 100 CAPSULE ORAL at 18:26

## 2021-07-30 NOTE — SPEECH THERAPY NOTE
Speech Language/Pathology    Speech/Language Pathology Progress Note    Patient Name: Dominic Hinson  MZYLY'K Date: 7/30/2021     Problem List  Principal Problem:    CNS lymphoma [facility])  Active Problems:    Dysphagia    Cancer related pain    Ambulatory dysfunction    Hypokalemia       Past Medical History  No past medical history on file  Past Surgical History  Past Surgical History:   Procedure Laterality Date    IR PICC REPOSITION  4/27/2021    IR TUNNELED CENTRAL LINE REMOVAL  4/27/2021         Subjective:  Pt was awake and alert, sitting upright in bed  Eating regular lunch tray  "Milk" "Open"    Current diet: regular with thins  Objective:  Pt was seen for ongoing dx dysphagia tx  Assessed pt with regular diet lunch tray (grilled cheese and fruit) and thin liquids  Pt fed self and alternated between bites of grilled cheese and cup sips of milk  Pt is impulsive and took large amounts at a time - used google translate to remind pt to take small bites/sips at a time  Mastication/transfer of solids was mildly prolonged and bolus formation was mildly unorganized  Pt presented with minimal oral residue/L buccal pocketing - able to clear with liquid washes and lingual sweeps  Pt presented with mild cough x2 after taking bite of grilled cheese followed by cup sip of thin liquid  Ate bites of cut up fruit - continued to present with unorganized bolus formation and minimal oral residue, which was cleared with liquid wash  Assessment:  Pt presents with impulsivity when eating/drinking leading to poor control and mild coughing with po  Pt presents with mildly prolonged transfer and unorganized mastication of materials   Presents w/ minimal oral residue - benefits from liquid wash to clear material      Plan/Recommendations:  Continue current diet   Frequent and thorough oral care  Cut up food into smaller pieces  Assist pt with meals - remind pt to take small bites/sips   Will f/u x1 to assess tolerance of current diet

## 2021-07-30 NOTE — PROGRESS NOTES
INTERNAL MEDICINE RESIDENCY PROGRESS NOTE     Name: Hi Nagel   Age & Sex: 49 y o  female   MRN: [id number]  Unit/Bed#: [facility] 918-01   Encounter: [id number]  Team: SOD Team A    PATIENT INFORMATION     Name: Hi Nagel   Age & Sex: 49 y o  female   MRN: [id number]  Hospital Stay Days: 2    ASSESSMENT/PLAN     Principal Problem:    CNS lymphoma [facility])  Active Problems:    Dysphagia    Cancer related pain    Ambulatory dysfunction    Hypokalemia      Hypokalemia  Assessment & Plan  A: potassium 2 9  Likely in setting of poor p o  Intake/poor absorption in setting of chemotherapy and malnutrition  Mg 1 9  Status post repletion  Repeat BMP at 2:00 p m  Monitor K and replete PRN      Ambulatory dysfunction  Assessment & Plan  Known residual deficits left upper extremity and lower extremity weakness secondary to CNS lymphoma    PT/OT recommended rehab    Cancer related pain  Assessment & Plan  Gabapentin 200mg hs    Oxy 2 5 PRN    Dysphagia  Assessment & Plan  Tolerating dysphagia diet     * 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HCC)  Assessment & Plan  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)       Direct Admit from Dr Sonu Forrester for in-patient chemo 3rd cycle       S/P chemotherapy on 05/13, Moi Adas 5/14, 7/18 6/9 MRI- Appears to be interval improvement in the overall enhancement of multifocal      intracranial lesions      P:Per Primary      DVT prophylaxis with Lovenox     Currently on Leucovorin          Disposition:  Continue with inpatient management for chemotherapy per Hematology/Oncology  Will continue to follow as consult    SUBJECTIVE     Patient seen and examined  No acute events overnight  Patient resting comfortably in bed  Oriented to person and place  Endorses some chronic left upper extremity pain  Endorses adequate appetite  Denies any nausea, vomiting, diarrhea, constipation, fevers or chills  All other review of systems negative at this time      OBJECTIVE     Vitals: 21 2228 21 0324 21 0600 21 0707   BP: 101/67 96/59  107/69   Pulse: (!) 107 98  91   Resp: 16 20  20   Temp: 98 9 °F (37 2 °C) 98 1 °F (36 7 °C)  98 1 °F (36 7 °C)   TempSrc:       SpO2: 98% 95%  98%   Weight:   52 1 kg (114 lb 13 8 oz)    Height:          Temperature:   Temp (24hrs), Av 4 °F (36 9 °C), Min:98 1 °F (36 7 °C), Max:98 9 °F (37 2 °C)    Temperature: 98 1 °F (36 7 °C)  Intake & Output:  I/O       701 -  07 -  -  07    P  O  240 360     I V  (mL/kg) 1605 (30 9) 3332 5 (64)     NG/GT 50      IV Piggyback 1284 4      Total Intake(mL/kg) 3179 4 (61 1) 3692 5 (70 9)     Urine (mL/kg/hr) 1610 2500 (2)     Stool 0      Total Output 1610 2500     Net +1569 4 +1192 5            Unmeasured Urine Occurrence 1 x      Unmeasured Stool Occurrence 1 x          Weights:   IBW (Ideal Body Weight): 45 5 kg    Body mass index is 22 43 kg/m²  Weight (last 2 days)     Date/Time   Weight    21 0600   52 1 (114 86)    21   52 (114 6)            Physical Exam  Constitutional:       General: She is not in acute distress  Appearance: She is ill-appearing  She is not toxic-appearing or diaphoretic  HENT:      Head: Normocephalic and atraumatic  Mouth/Throat:      Comments: Mild bilateral facial edema noted  Eyes:      Comments: Mild left eye ptosis noted   Cardiovascular:      Rate and Rhythm: Normal rate and regular rhythm  Heart sounds: No murmur heard  No gallop  Pulmonary:      Effort: Pulmonary effort is normal       Breath sounds: No wheezing  Abdominal:      Palpations: Abdomen is soft  Tenderness: There is no abdominal tenderness  Musculoskeletal:         General: No swelling  Right lower leg: No edema  Left lower leg: No edema  Neurological:      Comments: Oriented to person place  Left upper extremity 2/5, left lower extremity 3/5 muscle strength generalized weakness  Psychiatric:      Comments: Flat affect, responding appropriately to simple questions       LABORATORY DATA     Labs: I have personally reviewed pertinent reports  Results from last 7 days   Lab Units 07/30/21  0547 07/29/21  0616 07/28/21  1712   WBC Thousand/uL 5 19 7 06 6 77   HEMOGLOBIN g/dL 9 6* 11 2* 12 3   HEMATOCRIT % 30 4* 34 3* 37 8   PLATELETS Thousands/uL 176 237 264   NEUTROS PCT % 58 82* 52   MONOS PCT % 6 4 7      Results from last 7 days   Lab Units 07/30/21  0547 07/29/21  0616 07/28/21  1712   POTASSIUM mmol/L 2 9* 3 4* 3 1*   CHLORIDE mmol/L 107 106 109*   CO2 mmol/L 32 31 29   BUN mg/dL 7 9 9   CREATININE mg/dL 0 35* 0 50* 0 43*   CALCIUM mg/dL 8 5 9 6 9 9   ALK PHOS U/L 81 62 66   ALT U/L 32 15 12   AST U/L 18 12 11     Results from last 7 days   Lab Units 07/29/21  0616   MAGNESIUM mg/dL 1 7                        IMAGING & DIAGNOSTIC TESTING     Radiology Results: I have personally reviewed pertinent reports  No results found  Other Diagnostic Testing: I have personally reviewed pertinent reports      ACTIVE MEDICATIONS     Current Facility-Administered Medications   Medication Dose Route Frequency    acetaminophen (TYLENOL) tablet 650 mg  650 mg Oral Q6H PRN    albuterol (PROVENTIL HFA,VENTOLIN HFA) inhaler 2 puff  2 puff Inhalation Q4H PRN    atorvastatin (LIPITOR) tablet 10 mg  10 mg Oral Daily With Dinner    cholecalciferol (VITAMIN D3) tablet 400 Units  400 Units Oral Daily    Diclofenac Sodium (VOLTAREN) 1 % topical gel 2 g  2 g Topical TID PRN    dicyclomine (BENTYL) capsule 10 mg  10 mg Oral 4x Daily (AC & HS)    docusate sodium (COLACE) capsule 100 mg  100 mg Oral BID    enoxaparin (LOVENOX) subcutaneous injection 40 mg  40 mg Subcutaneous Daily    folic acid (FOLVITE) tablet 1 mg  1 mg Oral Daily    gabapentin (NEURONTIN) oral solution 200 mg  200 mg Oral BID    hydrOXYzine HCL (ATARAX) tablet 25 mg  25 mg Oral Q6H PRN    insulin lispro (HumaLOG) 100 units/mL subcutaneous injection 1-5 Units  1-5 Units Subcutaneous TID AC    leucovorin 25 mg in sodium chloride 0 9 % 50 mL IVPB  25 mg Intravenous Q6H    LORazepam (ATIVAN) injection 0 5 mg  0 5 mg Intravenous Q6H PRN    magnesium sulfate 2 g/50 mL IVPB (premix) 2 g  2 g Intravenous Once    menthol-methyl salicylate (BENGAY) 10-79 % cream   Apply externally 4x Daily PRN    ondansetron (ZOFRAN-ODT) dispersible tablet 4 mg  4 mg Oral Q6H PRN    oxyCODONE (ROXICODONE) IR tablet 2 5 mg  2 5 mg Oral Q4H PRN    polyethylene glycol (MIRALAX) packet 17 g  17 g Oral Daily PRN    potassium chloride 20 mEq IVPB (premix)  20 mEq Intravenous Q2H    senna-docusate sodium (SENOKOT S) 8 6-50 mg per tablet 1 tablet  1 tablet Oral BID    sodium bicarbonate 100 mEq in dextrose 5 % 1,000 mL infusion  150 mL/hr Intravenous Continuous    sodium chloride 0 9 % infusion  20 mL/hr Intravenous Once PRN    tamsulosin (FLOMAX) capsule 0 4 mg  0 4 mg Oral Daily With Dinner       VTE Pharmacologic Prophylaxis: Enoxaparin (Lovenox)  VTE Mechanical Prophylaxis: sequential compression device    Portions of the record may have been created with voice recognition software  Occasional wrong word or "sound a like" substitutions may have occurred due to the inherent limitations of voice recognition software    Read the chart carefully and recognize, using context, where substitutions have occurred   ==  Cathy Apley, [address] [facility]  Internal Medicine Residency PGY-3

## 2021-07-30 NOTE — PROGRESS NOTES
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      1  Primary CNS lymphoma  Patient is status post 3 cycles of high-dose methotrexate/Rituxan presenting forearm rehab for C4  Repeat MRI brain on 06/2021 showed interval improvement after initiation of chemotherapy  As insurance has denied Rituxan, she will only get high-dose methotrexate this admission  Treatment is suboptimal but limited secondary to insurance denial  Primary oncologist Dr Blake Mata  · Here for C4 HD MTX with leucovorin rescue after  (C4D1 = 7/28 at 2100)   · PT/OT  · Daily CBC/CMP and weights  · PICC line in place; plan for MediPort placement during this hospitalization  · Methotrexate level pending  · Continue leucovorin      2  Ambulatory dysfunction  · PT/OT     3  Cancer related pain  Pain mainly located in LLE, sharp paresthesias per patient  Denies any pain this morning  · Continue Tylenol and oxycodone p r n  · Continue gabapentin to 200 mg BID  · Discontinue Flexeril     4  Dysphagia  Patient has a PEG tube but did not use last admission are recently in SNF  Advanced diet to vegetarian regular diet and thin liquids per speech recommendations  · Will consider removal of PEG tube if patient continues to maintain good p o  intake this admission  · Nutrition and speech consulted, appreciate recommendations     5  Altered mental status, improved  Patient is A&O x2 at baseline  Speaks Gujarati only  · Ativan 0 5 mg IV q 6 p r n  agitation    6  Hypokalemia  Potassium 2 9 this morning  Magnesium 1 7 on admission  Currently asymptomatic  · Potassium and magnesium supplemented by SOD  · Continue to monitor    Subjective:  Patient assessed at bedside  Patient appears slightly somnolent this morning however easily arousable  Denies any pain currently  Reports she tolerated her diet well last night  Denies any shortness of breath  Review of Systems   All other systems reviewed and are negative      Objective:     Medication Administration - last 24 hours from 07/29/2021 1027 to 07/30/2021 1027       Date/Time Order Dose Route Action Action by     07/29/2021 1933 acetaminophen (TYLENOL) tablet 650 mg 650 mg Oral Given Bailey Cocking, RN     07/29/2021 1557 atorvastatin (LIPITOR) tablet 10 mg 10 mg Oral Given Christel Pillow, RN     07/29/2021 2108 dicyclomine (BENTYL) capsule 10 mg 10 mg Oral Given Bailey Cocking, RN     07/29/2021 1557 dicyclomine (BENTYL) capsule 10 mg 10 mg Oral Given Christel Pillow, RN     07/29/2021 1218 dicyclomine (BENTYL) capsule 10 mg 10 mg Oral Given Christel Pillow, RN     07/29/2021 1933 docusate sodium (COLACE) capsule 100 mg 100 mg Oral Given Bailey Cocking, RN     07/30/2021 0120 hydrOXYzine HCL (ATARAX) tablet 25 mg 25 mg Oral Given Bailey Cocking, RN     07/30/2021 0016 menthol-methyl salicylate (BENGAY) 09-93 % cream   Apply externally Given Bailey Cocking, RN     07/29/2021 1933 senna-docusate sodium (SENOKOT S) 8 6-50 mg per tablet 1 tablet 1 tablet Oral Given Bailey Cocking, RN     07/29/2021 1557 tamsulosin (FLOMAX) capsule 0 4 mg 0 4 mg Oral Given Christel Pillow, RN     07/30/2021 0014 sodium bicarbonate 100 mEq in dextrose 5 % 1,000 mL infusion 150 mL/hr Intravenous [address] Bailey Cocking, RN     07/29/2021 1558 sodium bicarbonate 100 mEq in dextrose 5 % 1,000 mL infusion 150 mL/hr Intravenous [facility], RN     07/30/2021 0130 oxyCODONE (ROXICODONE) IR tablet 2 5 mg 2 5 mg Oral Given Bailey Cocking, RN     07/29/2021 1037 oxyCODONE (ROXICODONE) IR tablet 2 5 mg 2 5 mg Oral Given Christel Pillow, RN     07/30/2021 0304 LORazepam (ATIVAN) injection 0 5 mg 0 5 mg Intravenous Given Audrey Casillas RN     07/29/2021 1933 LORazepam (ATIVAN) injection 0 5 mg 0 5 mg Intravenous Given Olive Bowman RN     07/29/2021 1219 LORazepam (ATIVAN) injection 0 5 mg 0 5 mg Intravenous Given Bjorn Dancer, RN     07/30/2021 9686 leucovorin 25 mg in sodium chloride 0 9 % 50 mL IVPB   Intravenous Canceled Entry Olive Bowman RN     07/30/2021 0543 leucovorin 25 mg in sodium chloride 0 9 % 50 mL IVPB 25 mg Intravenous [address] Olvie Bowman RN     07/29/2021 2103 leucovorin 25 mg in sodium chloride 0 9 % 50 mL IVPB 25 mg Intravenous [address] Olive Bowman RN     07/29/2021 1639 insulin lispro (HumaLOG) 100 units/mL subcutaneous injection 1-5 Units 1 Units Subcutaneous Given Bjorn Dancer, RN     07/29/2021 1221 insulin lispro (HumaLOG) 100 units/mL subcutaneous injection 1-5 Units 1 Units Subcutaneous Not Given Bjorn Dancer, RN     07/29/2021 1557 potassium-sodium phosphates (PHOS-NAK) packet 2 packet 2 packet Oral Given Bjorn Dancer, RN     07/29/2021 1039 potassium-sodium phosphates (PHOS-NAK) packet 2 packet 2 packet Oral Given Bjorn Dancer, RN     07/29/2021 1933 cyclobenzaprine (FLEXERIL) tablet 10 mg 10 mg Oral Given Olive Bowman RN     07/29/2021 2105 gabapentin (NEURONTIN) oral solution 200 mg 200 mg Oral Given Olive Bowman RN     07/29/2021 1934 gabapentin (NEURONTIN) oral solution 200 mg 0 mg Oral Hold Olive Bowman RN     07/29/2021 1558 gabapentin (NEURONTIN) oral solution 200 mg 200 mg Oral Given Bjorn Dancer, RN     07/29/2021 1643 oxyCODONE (ROXICODONE) IR tablet 5 mg 5 mg Oral Given Bjorn Dancer, RN          /69   Pulse 91   Temp 98 1 °F (36 7 °C)   Resp 20   Ht 5' (1 524 m)   Wt 52 1 kg (114 lb 13 8 oz)   SpO2 98%   BMI 22 43 kg/m²       Physical Exam  Vitals reviewed  Constitutional:       General: She is not in acute distress  Appearance: Normal appearance  She is normal weight  She is not ill-appearing, toxic-appearing or diaphoretic     HENT:      Mouth/Throat:      Mouth: Mucous membranes are dry  Eyes:      General: No scleral icterus  Extraocular Movements: Extraocular movements intact  Conjunctiva/sclera: Conjunctivae normal    Cardiovascular:      Rate and Rhythm: Normal rate and regular rhythm  Pulses: Normal pulses  Heart sounds: No murmur heard  Pulmonary:      Effort: Pulmonary effort is normal  No respiratory distress  Breath sounds: Normal breath sounds  No wheezing or rales  Abdominal:      General: Bowel sounds are normal  There is no distension  Palpations: Abdomen is soft  Tenderness: There is no abdominal tenderness  Musculoskeletal:      Cervical back: Normal range of motion  Right lower leg: No edema  Left lower leg: No edema  Skin:     General: Skin is warm and dry  Neurological:      Mental Status: She is alert  Mental status is at baseline  Comments: A&O x2  Somnolent but easily awakens to touch     Psychiatric:         Mood and Affect: Mood normal          Behavior: Behavior normal          Recent Results (from the past 48 hour(s))   CBC and differential    Collection Time: 07/28/21  5:12 PM   Result Value Ref Range    WBC 6 77 4 31 - 10 16 Thousand/uL    RBC 4 20 3 81 - 5 12 Million/uL    Hemoglobin 12 3 11 5 - 15 4 g/dL    Hematocrit 37 8 34 8 - 46 1 %    MCV 90 82 - 98 fL    MCH 29 3 26 8 - 34 3 pg    MCHC 32 5 31 4 - 37 4 g/dL    RDW 15 1 11 6 - 15 1 %    MPV 11 0 8 9 - 12 7 fL    Platelets 411 098 - 229 Thousands/uL    nRBC 0 /100 WBCs    Neutrophils Relative 52 43 - 75 %    Immat GRANS % 0 0 - 2 %    Lymphocytes Relative 38 14 - 44 %    Monocytes Relative 7 4 - 12 %    Eosinophils Relative 2 0 - 6 %    Basophils Relative 1 0 - 1 %    Neutrophils Absolute 3 50 1 85 - 7 62 Thousands/µL    Immature Grans Absolute 0 03 0 00 - 0 20 Thousand/uL    Lymphocytes Absolute 2 58 0 60 - 4 47 Thousands/µL    Monocytes Absolute 0 46 0 17 - 1 22 Thousand/µL    Eosinophils Absolute 0 16 0 00 - 0 61 Thousand/µL Basophils Absolute 0 04 0 00 - 0 10 Thousands/µL   Comprehensive metabolic panel    Collection Time: 07/28/21  5:12 PM   Result Value Ref Range    Sodium 141 136 - 145 mmol/L    Potassium 3 1 (L) 3 5 - 5 3 mmol/L    Chloride 109 (H) 100 - 108 mmol/L    CO2 29 21 - 32 mmol/L    ANION GAP 3 (L) 4 - 13 mmol/L    BUN 9 5 - 25 mg/dL    Creatinine 0 43 (L) 0 60 - 1 30 mg/dL    Glucose 176 (H) 65 - 140 mg/dL    Calcium 9 9 8 3 - 10 1 mg/dL    Corrected Calcium 10 6 (H) 8 3 - 10 1 mg/dL    AST 11 5 - 45 U/L    ALT 12 12 - 78 U/L    Alkaline Phosphatase 66 46 - 116 U/L    Total Protein 6 1 (L) 6 4 - 8 2 g/dL    Albumin 3 1 (L) 3 5 - 5 0 g/dL    Total Bilirubin 0 22 0 20 - 1 00 mg/dL    eGFR 116 ml/min/1 73sq m   Fingerstick Glucose (POCT)    Collection Time: 07/28/21  5:20 PM   Result Value Ref Range    POC Glucose 182 (H) 65 - 140 mg/dl   CBC and differential    Collection Time: 07/29/21  6:16 AM   Result Value Ref Range    WBC 7 06 4 31 - 10 16 Thousand/uL    RBC 3 80 (L) 3 81 - 5 12 Million/uL    Hemoglobin 11 2 (L) 11 5 - 15 4 g/dL    Hematocrit 34 3 (L) 34 8 - 46 1 %    MCV 90 82 - 98 fL    MCH 29 5 26 8 - 34 3 pg    MCHC 32 7 31 4 - 37 4 g/dL    RDW 14 6 11 6 - 15 1 %    MPV 11 6 8 9 - 12 7 fL    Platelets 365 786 - 146 Thousands/uL    nRBC 0 /100 WBCs    Neutrophils Relative 82 (H) 43 - 75 %    Immat GRANS % 1 0 - 2 %    Lymphocytes Relative 13 (L) 14 - 44 %    Monocytes Relative 4 4 - 12 %    Eosinophils Relative 0 0 - 6 %    Basophils Relative 0 0 - 1 %    Neutrophils Absolute 5 80 1 85 - 7 62 Thousands/µL    Immature Grans Absolute 0 08 0 00 - 0 20 Thousand/uL    Lymphocytes Absolute 0 88 0 60 - 4 47 Thousands/µL    Monocytes Absolute 0 28 0 17 - 1 22 Thousand/µL    Eosinophils Absolute 0 00 0 00 - 0 61 Thousand/µL    Basophils Absolute 0 02 0 00 - 0 10 Thousands/µL   Comprehensive metabolic panel    Collection Time: 07/29/21  6:16 AM   Result Value Ref Range    Sodium 141 136 - 145 mmol/L    Potassium 3 4 (L) 3 5 - 5 3 mmol/L    Chloride 106 100 - 108 mmol/L    CO2 31 21 - 32 mmol/L    ANION GAP 4 4 - 13 mmol/L    BUN 9 5 - 25 mg/dL    Creatinine 0 50 (L) 0 60 - 1 30 mg/dL    Glucose 232 (H) 65 - 140 mg/dL    Calcium 9 6 8 3 - 10 1 mg/dL    Corrected Calcium 10 4 (H) 8 3 - 10 1 mg/dL    AST 12 5 - 45 U/L    ALT 15 12 - 78 U/L    Alkaline Phosphatase 62 46 - 116 U/L    Total Protein 5 7 (L) 6 4 - 8 2 g/dL    Albumin 3 0 (L) 3 5 - 5 0 g/dL    Total Bilirubin 0 45 0 20 - 1 00 mg/dL    eGFR 110 ml/min/1 73sq m   Magnesium    Collection Time: 07/29/21  6:16 AM   Result Value Ref Range    Magnesium 1 7 1 6 - 2 6 mg/dL   Hemoglobin A1C w/ EAG Estimation    Collection Time: 07/29/21  6:16 AM   Result Value Ref Range    Hemoglobin A1C 5 4 Normal 3 8-5 6%; PreDiabetic 5 7-6 4%;  Diabetic >=6 5%; Glycemic control for adults with diabetes <7 0% %     mg/dl   Chronic Hepatitis Panel    Collection Time: 07/29/21  6:21 AM   Result Value Ref Range    Hepatitis B Surface Ag Non-reactive Non-reactive, NonReactive - Confirmed    Hepatitis C Ab Non-reactive Non-reactive    Hep B C IgM Non-reactive Non-reactive    Hep B Core Total Ab Non-reactive Non-reactive   Fingerstick Glucose (POCT)    Collection Time: 07/29/21  8:35 AM   Result Value Ref Range    POC Glucose 153 (H) 65 - 140 mg/dl   Urinalysis with microscopic    Collection Time: 07/29/21  8:38 AM   Result Value Ref Range    Clarity, UA Clear     Color, UA Yellow     Specific Gravity, UA 1 007 1 003 - 1 030    pH, UA 8 5 (A) 4 5, 5 0, 5 5, 6 0, 6 5, 7 0, 7 5, 8 0    Glucose,  (1/10%) (A) Negative mg/dl    Ketones, UA Negative Negative mg/dl    Blood, UA Negative Negative    Protein, UA Negative Negative mg/dl    Nitrite, UA Negative Negative    Bilirubin, UA Negative Negative    Urobilinogen, UA 0 2 0 2, 1 0 E U /dl E U /dl    Leukocytes, UA Negative Negative    WBC, UA None Seen None Seen, 2-4, 5-60 /hpf    RBC, UA None Seen None Seen, 2-4 /hpf    Hyaline Casts, UA None Seen None Seen /lpf    Bacteria, UA None Seen None Seen, Occasional /hpf    Epithelial Cells None Seen None Seen, Occasional /hpf   Fingerstick Glucose (POCT)    Collection Time: 07/29/21 11:46 AM   Result Value Ref Range    POC Glucose 145 (H) 65 - 140 mg/dl   Fingerstick Glucose (POCT)    Collection Time: 07/29/21  4:26 PM   Result Value Ref Range    POC Glucose 150 (H) 65 - 140 mg/dl   Fingerstick Glucose (POCT)    Collection Time: 07/29/21  8:48 PM   Result Value Ref Range    POC Glucose 230 (H) 65 - 140 mg/dl   Urinalysis with microscopic    Collection Time: 07/30/21 12:55 AM   Result Value Ref Range    Clarity, UA Clear     Color, UA Yellow     Specific [place], UA 1 010 1 003 - 1 030    pH, UA 8 5 (A) 4 5, 5 0, 5 5, 6 0, 6 5, 7 0, 7 5, 8 0    Glucose, UA Negative Negative mg/dl    Ketones, UA Negative Negative mg/dl    Blood, UA Negative Negative    Protein, UA Negative Negative mg/dl    Nitrite, UA Negative Negative    Bilirubin, UA Negative Negative    Urobilinogen, UA 0 2 0 2, 1 0 E U /dl E U /dl    Leukocytes, UA Negative Negative    WBC, UA 2-4 None Seen, 2-4, 5-60 /hpf    RBC, UA None Seen None Seen, 2-4 /hpf    Hyaline Casts, UA None Seen None Seen /lpf    Bacteria, UA None Seen None Seen, Occasional /hpf    Epithelial Cells None Seen None Seen, Occasional /hpf   CBC and differential    Collection Time: 07/30/21  5:47 AM   Result Value Ref Range    WBC 5 19 4 31 - 10 16 Thousand/uL    RBC 3 26 (L) 3 81 - 5 12 Million/uL    Hemoglobin 9 6 (L) 11 5 - 15 4 g/dL    Hematocrit 30 4 (L) 34 8 - 46 1 %    MCV 93 82 - 98 fL    MCH 29 4 26 8 - 34 3 pg    MCHC 31 6 31 4 - 37 4 g/dL    RDW 15 0 11 6 - 15 1 %    MPV 11 6 8 9 - 12 7 fL    Platelets 599 140 - 645 Thousands/uL    nRBC 0 /100 WBCs    Neutrophils Relative 58 43 - 75 %    Immat GRANS % 1 0 - 2 %    Lymphocytes Relative 33 14 - 44 %    Monocytes Relative 6 4 - 12 %    Eosinophils Relative 1 0 - 6 %    Basophils Relative 1 0 - 1 %    Neutrophils Absolute 3 07 1 85 - 7 62 Thousands/µL    Immature Grans Absolute 0 04 0 00 - 0 20 Thousand/uL    Lymphocytes Absolute 1 72 0 60 - 4 47 Thousands/µL    Monocytes Absolute 0 29 0 17 - 1 22 Thousand/µL    Eosinophils Absolute 0 04 0 00 - 0 61 Thousand/µL    Basophils Absolute 0 03 0 00 - 0 10 Thousands/µL   Comprehensive metabolic panel    Collection Time: 07/30/21  5:47 AM   Result Value Ref Range    Sodium 142 136 - 145 mmol/L    Potassium 2 9 (L) 3 5 - 5 3 mmol/L    Chloride 107 100 - 108 mmol/L    CO2 32 21 - 32 mmol/L    ANION GAP 3 (L) 4 - 13 mmol/L    BUN 7 5 - 25 mg/dL    Creatinine 0 35 (L) 0 60 - 1 30 mg/dL    Glucose 178 (H) 65 - 140 mg/dL    Calcium 8 5 8 3 - 10 1 mg/dL    Corrected Calcium 9 7 8 3 - 10 1 mg/dL    AST 18 5 - 45 U/L    ALT 32 12 - 78 U/L    Alkaline Phosphatase 81 46 - 116 U/L    Total Protein 5 0 (L) 6 4 - 8 2 g/dL    Albumin 2 5 (L) 3 5 - 5 0 g/dL    Total Bilirubin 0 14 (L) 0 20 - 1 00 mg/dL    eGFR 124 ml/min/1 73sq m   Fingerstick Glucose (POCT)    Collection Time: 07/30/21  7:23 AM   Result Value Ref Range    POC Glucose 178 (H) 65 - 140 mg/dl       No results found  I have personally reviewed labs, imaging studies, and pertinent reports  This note has been generated by voice recognition software system  Therefore, there may be spelling, grammar, and or syntax errors  Please contact if questions arise      DO Darrin Miranda 73 Internal Medicine PGY-2

## 2021-07-31 LAB
ANION GAP SERPL CALCULATED.3IONS-SCNC: 2 MMOL/L (ref 4–13)
BUN SERPL-MCNC: 10 MG/DL (ref 5–25)
CALCIUM SERPL-MCNC: 9.3 MG/DL (ref 8.3–10.1)
CHLORIDE SERPL-SCNC: 106 MMOL/L (ref 100–108)
CO2 SERPL-SCNC: 34 MMOL/L (ref 21–32)
CREAT SERPL-MCNC: 0.31 MG/DL (ref 0.6–1.3)
GFR SERPL CREATININE-BSD FRML MDRD: 129 ML/MIN/1.73SQ M
GLUCOSE SERPL-MCNC: 120 MG/DL (ref 65–140)
GLUCOSE SERPL-MCNC: 121 MG/DL (ref 65–140)
GLUCOSE SERPL-MCNC: 135 MG/DL (ref 65–140)
GLUCOSE SERPL-MCNC: 75 MG/DL (ref 65–140)
GLUCOSE SERPL-MCNC: 92 MG/DL (ref 65–140)
MAGNESIUM SERPL-MCNC: 2 MG/DL (ref 1.6–2.6)
MTX SERPL-SCNC: 0.74 UMOL/L
MTX SERPL-SCNC: <0.1 UMOL/L
POTASSIUM SERPL-SCNC: 3.6 MMOL/L (ref 3.5–5.3)
SODIUM SERPL-SCNC: 142 MMOL/L (ref 136–145)

## 2021-07-31 PROCEDURE — 80204 DRUG ASSAY METHOTREXATE: CPT | Performed by: INTERNAL MEDICINE

## 2021-07-31 PROCEDURE — 83735 ASSAY OF MAGNESIUM: CPT | Performed by: STUDENT IN AN ORGANIZED HEALTH CARE EDUCATION/TRAINING PROGRAM

## 2021-07-31 PROCEDURE — 99232 SBSQ HOSP IP/OBS MODERATE 35: CPT | Performed by: INTERNAL MEDICINE

## 2021-07-31 PROCEDURE — 82948 REAGENT STRIP/BLOOD GLUCOSE: CPT

## 2021-07-31 PROCEDURE — 99232 SBSQ HOSP IP/OBS MODERATE 35: CPT | Performed by: HOSPITALIST

## 2021-07-31 PROCEDURE — 80048 BASIC METABOLIC PNL TOTAL CA: CPT | Performed by: STUDENT IN AN ORGANIZED HEALTH CARE EDUCATION/TRAINING PROGRAM

## 2021-07-31 RX ORDER — POTASSIUM CHLORIDE 20 MEQ/1
20 TABLET, EXTENDED RELEASE ORAL ONCE
Status: COMPLETED | OUTPATIENT
Start: 2021-07-31 | End: 2021-07-31

## 2021-07-31 RX ORDER — LEUCOVORIN CALCIUM 25 MG/1
25 TABLET ORAL EVERY 6 HOURS
Status: DISPENSED | OUTPATIENT
Start: 2021-07-31 | End: 2021-08-02

## 2021-07-31 RX ADMIN — POTASSIUM CHLORIDE 20 MEQ: 1500 TABLET, EXTENDED RELEASE ORAL at 14:16

## 2021-07-31 RX ADMIN — ATORVASTATIN CALCIUM 10 MG: 10 TABLET, FILM COATED ORAL at 16:10

## 2021-07-31 RX ADMIN — OXYCODONE HYDROCHLORIDE 2.5 MG: 5 TABLET ORAL at 08:27

## 2021-07-31 RX ADMIN — FOLIC ACID 1 MG: 1 TABLET ORAL at 08:27

## 2021-07-31 RX ADMIN — ENOXAPARIN SODIUM 40 MG: 40 INJECTION SUBCUTANEOUS at 08:28

## 2021-07-31 RX ADMIN — DICYCLOMINE HYDROCHLORIDE 10 MG: 10 CAPSULE ORAL at 21:36

## 2021-07-31 RX ADMIN — OXYCODONE HYDROCHLORIDE 2.5 MG: 5 TABLET ORAL at 14:17

## 2021-07-31 RX ADMIN — DICYCLOMINE HYDROCHLORIDE 10 MG: 10 CAPSULE ORAL at 11:55

## 2021-07-31 RX ADMIN — OXYCODONE HYDROCHLORIDE 2.5 MG: 5 TABLET ORAL at 19:00

## 2021-07-31 RX ADMIN — GABAPENTIN 200 MG: 250 SOLUTION ORAL at 16:20

## 2021-07-31 RX ADMIN — DICYCLOMINE HYDROCHLORIDE 10 MG: 10 CAPSULE ORAL at 08:26

## 2021-07-31 RX ADMIN — LEUCOVORIN CALCIUM 25 MG: 25 TABLET ORAL at 21:35

## 2021-07-31 RX ADMIN — LEUCOVORIN CALCIUM 25 MG: 25 TABLET ORAL at 14:16

## 2021-07-31 RX ADMIN — HYDROXYZINE HYDROCHLORIDE 25 MG: 25 TABLET ORAL at 21:36

## 2021-07-31 RX ADMIN — SODIUM BICARBONATE 150 ML/HR: 84 INJECTION, SOLUTION INTRAVENOUS at 09:45

## 2021-07-31 RX ADMIN — CHOLECALCIFEROL TAB 10 MCG (400 UNIT) 400 UNITS: 10 TAB at 09:38

## 2021-07-31 RX ADMIN — ACETAMINOPHEN 650 MG: 325 TABLET, FILM COATED ORAL at 16:10

## 2021-07-31 RX ADMIN — GABAPENTIN 200 MG: 250 SOLUTION ORAL at 09:39

## 2021-07-31 RX ADMIN — DICYCLOMINE HYDROCHLORIDE 10 MG: 10 CAPSULE ORAL at 16:10

## 2021-07-31 RX ADMIN — DOCUSATE SODIUM AND SENNOSIDES 1 TABLET: 8.6; 5 TABLET ORAL at 16:10

## 2021-07-31 RX ADMIN — LORAZEPAM 0.5 MG: 2 INJECTION INTRAMUSCULAR; INTRAVENOUS at 16:09

## 2021-07-31 RX ADMIN — DOCUSATE SODIUM 100 MG: 100 CAPSULE ORAL at 08:26

## 2021-07-31 RX ADMIN — LEUCOVORIN CALCIUM 25 MG: 100 INJECTION, POWDER, LYOPHILIZED, FOR SUSPENSION INTRAMUSCULAR; INTRAVENOUS at 06:28

## 2021-07-31 RX ADMIN — TAMSULOSIN HYDROCHLORIDE 0.4 MG: 0.4 CAPSULE ORAL at 16:10

## 2021-07-31 RX ADMIN — DOCUSATE SODIUM AND SENNOSIDES 1 TABLET: 8.6; 5 TABLET ORAL at 08:27

## 2021-07-31 RX ADMIN — DOCUSATE SODIUM 100 MG: 100 CAPSULE ORAL at 16:10

## 2021-07-31 NOTE — PROGRESS NOTES
Progress Note - Lisa Welch 49 y o  female MRN: [id number]    Unit/Bed#: [facility] 918-01 Encounter: [id number]      Assessment:  Ms Elodia Garcia is s/p here 4th treatment with high dose MTX awaiting clearance and Left LE pain which waxes and wanes  Plan:  Monitor daily MTX levels  [id number] Batsheva Kelly for discharge once MTX < 0 1  Appreciate SLIM help in her care  Adjusting pain meds for pain control and drowsiness  Subjective:   Sleeping but arousable this morning  Did not have complaints this morning  Objective:     Vitals: Blood pressure 122/77, pulse 99, temperature 98 9 °F (37 2 °C), resp  rate 20, height 5' (1 524 m), weight 54 kg (119 lb 0 8 oz), SpO2 98 %  ,Body mass index is 23 25 kg/m²        Intake/Output Summary (Last 24 hours) at 7/31/2021 1231  Last data filed at 7/31/2021 1101  Gross per 24 hour   Intake 3277 ml   Output 850 ml   Net 2427 ml       Physical Exam: /77   Pulse 99   Temp 98 9 °F (37 2 °C)   Resp 20   Ht 5' (1 524 m)   Wt 54 kg (119 lb 0 8 oz)   SpO2 98%   BMI 23 25 kg/m²     General Appearance:    Alert, cooperative, no distress, appears stated age   Head:    Normocephalic, without obvious abnormality, atraumatic   Eyes:    PERRL, conjunctiva/corneas clear    Throat:   Lips, mucosa, and tongue normal; teeth and gums normal   Lungs:     Clear to auscultation bilaterally, respirations unlabored   Chest Wall:    No tenderness or deformity    Heart:    Regular rate and rhythm, S1 and S2 normal, no murmur, rub   or gallop   Abdomen:     Soft, non-tender, bowel sounds active all four quadrants,     no masses, no organomegaly   Extremities:   Extremities normal, atraumatic, no cyanosis or edema   Pulses:   2+ and symmetric all extremities   Skin:   Skin color, texture, turgor normal, no rashes or lesions   Lymph nodes:   Cervical, supraclavicular, and axillary nodes normal   Neurologic: Residual left sided deficits from disease        Invasive Devices     Peripherally Inserted Central Catheter Line            PICC Line 98/29/98 Right Basilic 2 days          Peripheral Intravenous Line            Peripheral IV 07/28/21 Right Hand 2 days          Drain            Gastrostomy/Enterostomy Gastrostomy-jejunostomy 20 Fr  LUQ 82 days                Lab, Imaging and other studies: I have personally reviewed pertinent reports      VTE Pharmacologic Prophylaxis: Enoxaparin (Lovenox)  VTE Mechanical Prophylaxis: sequential compression device

## 2021-07-31 NOTE — PROGRESS NOTES
INTERNAL MEDICINE RESIDENCY PROGRESS NOTE     Name: Brittney Leung   Age & Sex: 49 y o  female   MRN: [id number]  Unit/Bed#: [facility] 918-01   Encounter: [id number]  Team: SOD Team A    PATIENT INFORMATION     Name: Brittney Leung   Age & Sex: 49 y o  female   MRN: [id number]  Hospital Stay Days: 3    ASSESSMENT/PLAN     Principal Problem:    CNS lymphoma [facility])  Active Problems:    Dysphagia    Cancer related pain    Ambulatory dysfunction    Hypokalemia      Hypokalemia  Assessment & Plan  A: potassium 2 9  Likely in setting of poor p o  Intake/poor absorption in setting of chemotherapy and malnutrition  Mg 1 9  Status post repletion  Repeat BMP at 2:00 p m  Monitor K and replete PRN      Ambulatory dysfunction  Assessment & Plan  Known residual deficits left upper extremity and lower extremity weakness secondary to CNS lymphoma    PT/OT recommended rehab    Cancer related pain  Assessment & Plan  Gabapentin 200mg hs    Oxy 2 5 PRN    Dysphagia  Assessment & Plan  Tolerating dysphagia diet     * 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HCC)  Assessment & Plan  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)       Direct Admit from Dr Ariella Sanchez for in-patient chemo 3rd cycle       S/P chemotherapy on 05/13, [facility] 5/14, 7/18 6/9 MRI- Appears to be interval improvement in the overall enhancement of multifocal      intracranial lesions       P:Per Primary- received 4th session of IV high-dose methotrexate, now being monitored for clearance of methotrexate levels     DVT prophylaxis with Lovenox     Currently on Leucovorin          Disposition:  Continue with inpatient chemotherapy per Hematology/Oncology  Will continue to follow as consult    SUBJECTIVE     Patient seen and examined  No acute events overnight  Endorses some left upper extremity pain  Denies any nausea, vomiting, diarrhea, constipation, fevers or chills  All other review of systems negative at this time      OBJECTIVE     Vitals:    07/30/21 2211 21 0313 21 0600 21 0807   BP: 94/60 104/59  122/77   BP Location:       Pulse: (!) 120 102  99   Resp:  20     Temp:  98 9 °F (37 2 °C)     TempSrc:       SpO2: 98% 98%  98%   Weight:   54 kg (119 lb 0 8 oz)    Height:          Temperature:   Temp (24hrs), Av 9 °F (37 2 °C), Min:98 9 °F (37 2 °C), Max:98 9 °F (37 2 °C)    Temperature: 98 9 °F (37 2 °C)  Intake & Output:  I/O       701 -  07 -  07 -  07    P  O  360 0     I V  (mL/kg) 3332 5 (64) 3424 (63 4) 1300 (24 1)    NG/GT  50     IV Piggyback  360     Total Intake(mL/kg) 3692 5 (70 9) 3834 (71) 1300 (24 1)    Urine (mL/kg/hr) 2500 (2) 1200 (0 9) 400 (1 4)    Stool       Total Output 2500 1200 400    Net +1192 5 +2634 +900           Unmeasured Urine Occurrence  2 x         Weights:   IBW (Ideal Body Weight): 45 5 kg    Body mass index is 23 25 kg/m²  Weight (last 2 days)     Date/Time   Weight    21 0600   54 (119 05)    21 0600   52 1 (114 86)            Physical Exam  Vitals reviewed  Constitutional:       Comments: Appears fatigued, not in acute distress   HENT:      Head: Normocephalic and atraumatic  Eyes:      General: No scleral icterus  Comments: Left eye ptosis, mild, chronic   Cardiovascular:      Rate and Rhythm: Regular rhythm  Tachycardia present  Pulmonary:      Effort: Pulmonary effort is normal  No respiratory distress  Breath sounds: No wheezing  Abdominal:      General: Abdomen is flat  There is no distension  Tenderness: There is no abdominal tenderness  Musculoskeletal:         General: No swelling  Comments: Left upper extremity 2/5, left lower extremity 3/5  Generalized weakness in other extremities, no other focal neurological deficits  No edema  Skin:     General: Skin is dry  Coloration: Skin is pale  Neurological:      Comments: Oriented to person place in [facility]      Psychiatric:         Behavior: Behavior normal       Comments: Flat affect       LABORATORY DATA     Labs: I have personally reviewed pertinent reports  Results from last 7 days   Lab Units 07/30/21  0547 07/29/21  0616 07/28/21  1712   WBC Thousand/uL 5 19 7 06 6 77   HEMOGLOBIN g/dL 9 6* 11 2* 12 3   HEMATOCRIT % 30 4* 34 3* 37 8   PLATELETS Thousands/uL 176 237 264   NEUTROS PCT % 58 82* 52   MONOS PCT % 6 4 7      Results from last 7 days   Lab Units 07/31/21  0632 07/30/21  1309 07/30/21  0547 07/29/21  0616 07/28/21  1712   POTASSIUM mmol/L 3 6 4 2 2 9* 3 4* 3 1*   CHLORIDE mmol/L 106 109* 107 106 109*   CO2 mmol/L 34* 32 32 31 29   BUN mg/dL 10 5 7 9 9   CREATININE mg/dL 0 31* 0 37* 0 35* 0 50* 0 43*   CALCIUM mg/dL 9 3 8 9 8 5 9 6 9 9   ALK PHOS U/L  --   --  81 62 66   ALT U/L  --   --  32 15 12   AST U/L  --   --  18 12 11     Results from last 7 days   Lab Units 07/31/21  0632 07/29/21  0616   MAGNESIUM mg/dL 2 0 1 7                        IMAGING & DIAGNOSTIC TESTING     Radiology Results: I have personally reviewed pertinent reports  No results found  Other Diagnostic Testing: I have personally reviewed pertinent reports      ACTIVE MEDICATIONS     Current Facility-Administered Medications   Medication Dose Route Frequency    acetaminophen (TYLENOL) tablet 650 mg  650 mg Oral Q6H PRN    albuterol (PROVENTIL HFA,VENTOLIN HFA) inhaler 2 puff  2 puff Inhalation Q4H PRN    atorvastatin (LIPITOR) tablet 10 mg  10 mg Oral Daily With Dinner    cholecalciferol (VITAMIN D3) tablet 400 Units  400 Units Oral Daily    Diclofenac Sodium (VOLTAREN) 1 % topical gel 2 g  2 g Topical TID PRN    dicyclomine (BENTYL) capsule 10 mg  10 mg Oral 4x Daily (AC & HS)    docusate sodium (COLACE) capsule 100 mg  100 mg Oral BID    enoxaparin (LOVENOX) subcutaneous injection 40 mg  40 mg Subcutaneous Daily    folic acid (FOLVITE) tablet 1 mg  1 mg Oral Daily    gabapentin (NEURONTIN) oral solution 200 mg  200 mg Oral BID    hydrOXYzine HCL (ATARAX) tablet 25 mg  25 mg Oral Q6H PRN    insulin lispro (HumaLOG) 100 units/mL subcutaneous injection 1-5 Units  1-5 Units Subcutaneous TID AC    LORazepam (ATIVAN) injection 0 5 mg  0 5 mg Intravenous Q6H PRN    menthol-methyl salicylate (BENGAY) 38-26 % cream   Apply externally 4x Daily PRN    ondansetron (ZOFRAN-ODT) dispersible tablet 4 mg  4 mg Oral Q6H PRN    oxyCODONE (ROXICODONE) IR tablet 2 5 mg  2 5 mg Oral Q4H PRN    polyethylene glycol (MIRALAX) packet 17 g  17 g Oral Daily PRN    potassium chloride (K-DUR,KLOR-CON) CR tablet 20 mEq  20 mEq Oral Once    senna-docusate sodium (SENOKOT S) 8 6-50 mg per tablet 1 tablet  1 tablet Oral BID    sodium bicarbonate 100 mEq in dextrose 5 % 1,000 mL infusion  150 mL/hr Intravenous Continuous    sodium chloride 0 9 % infusion  20 mL/hr Intravenous Once PRN    tamsulosin (FLOMAX) capsule 0 4 mg  0 4 mg Oral Daily With Dinner       VTE Pharmacologic Prophylaxis: Enoxaparin (Lovenox)  VTE Mechanical Prophylaxis: sequential compression device    Portions of the record may have been created with voice recognition software  Occasional wrong word or "sound a like" substitutions may have occurred due to the inherent limitations of voice recognition software    Read the chart carefully and recognize, using context, where substitutions have occurred   ==  Danielle Kenney, [address] [facility]  Internal Medicine Residency PGY-3

## 2021-07-31 NOTE — PLAN OF CARE
Problem: MOBILITY - ADULT  Goal: Maintain or return to baseline ADL function  Description: INTERVENTIONS:  -  Assess patient's ability to carry out ADLs; assess patient's baseline for ADL function and identify physical deficits which impact ability to perform ADLs (bathing, care of mouth/teeth, toileting, grooming, dressing, etc )  - Assess/evaluate cause of self-care deficits   - Assess range of motion  - Assess patient's mobility; develop plan if impaired  - Assess patient's need for assistive devices and provide as appropriate  - Encourage maximum independence but intervene and supervise when necessary  - Involve family in performance of ADLs  - Assess for home care needs following discharge   - Consider OT consult to assist with ADL evaluation and planning for discharge  - Provide patient education as appropriate  Outcome: Progressing  Goal: Maintains/Returns to pre admission functional level  Description: INTERVENTIONS:  - Perform BMAT or MOVE assessment daily    - Set and communicate daily mobility goal to care team and patient/family/caregiver     - Collaborate with rehabilitation services on mobility goals if consulted  - Perform Range of Motion   - Reposition patient every   - Dangle patient   - Stand patient   - Ambulate patient   - Out of bed to chair   - Out of bed for meals   - Out of bed for toileting  - Record patient progress and toleration of activity level   Outcome: Progressing     Problem: Potential for Falls  Goal: Patient will remain free of falls  Description: INTERVENTIONS:  - Educate patient/family on patient safety including physical limitations  - Instruct patient to call for assistance with activity   - Consult OT/PT to assist with strengthening/mobility   - Keep Call bell within reach  - Keep bed low and locked with side rails adjusted as appropriate  - Keep care items and personal belongings within reach  - Initiate and maintain comfort rounds  - Make Fall Risk Sign visible to staff  - Offer Toileting every  in advance of need  - Initiate/Maintain  - Obtain necessary fall risk management equipment:   - Apply yellow socks and bracelet for high fall risk patients  - Consider moving patient to room near nurses station  Outcome: Progressing     Problem: Prexisting or High Potential for Compromised Skin Integrity  Goal: Skin integrity is maintained or improved  Description: INTERVENTIONS:  - Identify patients at risk for skin breakdown  - Assess and monitor skin integrity  - Assess and monitor nutrition and hydration status  - Monitor labs   - Assess for incontinence   - Turn and reposition patient  - Assist with mobility/ambulation  - Relieve pressure over bony prominences  - Avoid friction and shearing  - Provide appropriate hygiene as needed including keeping skin clean and dry  - Evaluate need for skin moisturizer/barrier cream  - Collaborate with interdisciplinary team   - Patient/family teaching  - Consider wound care consult   Outcome: Progressing     Problem: Knowledge Deficit  Goal: Patient/family/caregiver demonstrates understanding of disease process, treatment plan, medications, and discharge instructions  Description: Complete learning assessment and assess knowledge base  Interventions:  - Provide teaching at level of understanding  - Provide teaching via preferred learning methods  Outcome: Progressing     Problem: Nutrition/Hydration-ADULT  Goal: Nutrient/Hydration intake appropriate for improving, restoring or maintaining nutritional needs  Description: Monitor and assess patient's nutrition/hydration status for malnutrition  Collaborate with interdisciplinary team and initiate plan and interventions as ordered  Monitor patient's weight and dietary intake as ordered or per policy  Utilize nutrition screening tool and intervene as necessary  Determine patient's food preferences and provide high-protein, high-caloric foods as appropriate       INTERVENTIONS:  - Monitor oral intake, urinary output, labs, and treatment plans  - Assess nutrition and hydration status and recommend course of action  - Evaluate amount of meals eaten  - Assist patient with eating if necessary   - Allow adequate time for meals  - Recommend/ encourage appropriate diets, oral nutritional supplements, and vitamin/mineral supplements  - Order, calculate, and assess calorie counts as needed  - Recommend, monitor, and adjust tube feedings and TPN/PPN based on assessed needs  - Assess need for intravenous fluids  - Provide specific nutrition/hydration education as appropriate  - Include patient/family/caregiver in decisions related to nutrition  Outcome: Progressing

## 2021-08-01 PROBLEM — E87.6 HYPOKALEMIA: Status: RESOLVED | Noted: 2021-07-15 | Resolved: 2021-08-01

## 2021-08-01 LAB
ANION GAP SERPL CALCULATED.3IONS-SCNC: 7 MMOL/L (ref 4–13)
BUN SERPL-MCNC: 12 MG/DL (ref 5–25)
CALCIUM SERPL-MCNC: 9.5 MG/DL (ref 8.3–10.1)
CHLORIDE SERPL-SCNC: 105 MMOL/L (ref 100–108)
CO2 SERPL-SCNC: 28 MMOL/L (ref 21–32)
CREAT SERPL-MCNC: 0.32 MG/DL (ref 0.6–1.3)
ERYTHROCYTE [DISTWIDTH] IN BLOOD BY AUTOMATED COUNT: 15.2 % (ref 11.6–15.1)
GFR SERPL CREATININE-BSD FRML MDRD: 127 ML/MIN/1.73SQ M
GLUCOSE SERPL-MCNC: 107 MG/DL (ref 65–140)
GLUCOSE SERPL-MCNC: 128 MG/DL (ref 65–140)
GLUCOSE SERPL-MCNC: 137 MG/DL (ref 65–140)
GLUCOSE SERPL-MCNC: 140 MG/DL (ref 65–140)
GLUCOSE SERPL-MCNC: 84 MG/DL (ref 65–140)
GLUCOSE SERPL-MCNC: 97 MG/DL (ref 65–140)
HCT VFR BLD AUTO: 32.3 % (ref 34.8–46.1)
HGB BLD-MCNC: 10.2 G/DL (ref 11.5–15.4)
MCH RBC QN AUTO: 29.5 PG (ref 26.8–34.3)
MCHC RBC AUTO-ENTMCNC: 31.6 G/DL (ref 31.4–37.4)
MCV RBC AUTO: 93 FL (ref 82–98)
PLATELET # BLD AUTO: 196 THOUSANDS/UL (ref 149–390)
PMV BLD AUTO: 11.4 FL (ref 8.9–12.7)
POTASSIUM SERPL-SCNC: 4.2 MMOL/L (ref 3.5–5.3)
RBC # BLD AUTO: 3.46 MILLION/UL (ref 3.81–5.12)
SODIUM SERPL-SCNC: 140 MMOL/L (ref 136–145)
WBC # BLD AUTO: 4.38 THOUSAND/UL (ref 4.31–10.16)

## 2021-08-01 PROCEDURE — 82948 REAGENT STRIP/BLOOD GLUCOSE: CPT

## 2021-08-01 PROCEDURE — 99232 SBSQ HOSP IP/OBS MODERATE 35: CPT | Performed by: HOSPITALIST

## 2021-08-01 PROCEDURE — 80048 BASIC METABOLIC PNL TOTAL CA: CPT | Performed by: STUDENT IN AN ORGANIZED HEALTH CARE EDUCATION/TRAINING PROGRAM

## 2021-08-01 PROCEDURE — 99232 SBSQ HOSP IP/OBS MODERATE 35: CPT | Performed by: INTERNAL MEDICINE

## 2021-08-01 PROCEDURE — 80204 DRUG ASSAY METHOTREXATE: CPT | Performed by: INTERNAL MEDICINE

## 2021-08-01 PROCEDURE — 85027 COMPLETE CBC AUTOMATED: CPT | Performed by: STUDENT IN AN ORGANIZED HEALTH CARE EDUCATION/TRAINING PROGRAM

## 2021-08-01 RX ORDER — OXYCODONE HYDROCHLORIDE 5 MG/1
5 TABLET ORAL ONCE
Status: COMPLETED | OUTPATIENT
Start: 2021-08-01 | End: 2021-08-01

## 2021-08-01 RX ADMIN — LEUCOVORIN CALCIUM 25 MG: 25 TABLET ORAL at 02:53

## 2021-08-01 RX ADMIN — OXYCODONE HYDROCHLORIDE 2.5 MG: 5 TABLET ORAL at 00:39

## 2021-08-01 RX ADMIN — TAMSULOSIN HYDROCHLORIDE 0.4 MG: 0.4 CAPSULE ORAL at 15:49

## 2021-08-01 RX ADMIN — DOCUSATE SODIUM AND SENNOSIDES 1 TABLET: 8.6; 5 TABLET ORAL at 17:15

## 2021-08-01 RX ADMIN — LORAZEPAM 0.5 MG: 2 INJECTION INTRAMUSCULAR; INTRAVENOUS at 10:07

## 2021-08-01 RX ADMIN — DOCUSATE SODIUM 100 MG: 100 CAPSULE ORAL at 08:10

## 2021-08-01 RX ADMIN — DICYCLOMINE HYDROCHLORIDE 10 MG: 10 CAPSULE ORAL at 15:49

## 2021-08-01 RX ADMIN — DICLOFENAC SODIUM 2 G: 10 GEL TOPICAL at 10:38

## 2021-08-01 RX ADMIN — LORAZEPAM 0.5 MG: 2 INJECTION INTRAMUSCULAR; INTRAVENOUS at 17:15

## 2021-08-01 RX ADMIN — OXYCODONE HYDROCHLORIDE 2.5 MG: 5 TABLET ORAL at 04:42

## 2021-08-01 RX ADMIN — DICYCLOMINE HYDROCHLORIDE 10 MG: 10 CAPSULE ORAL at 11:19

## 2021-08-01 RX ADMIN — LORAZEPAM 0.5 MG: 2 INJECTION INTRAMUSCULAR; INTRAVENOUS at 01:08

## 2021-08-01 RX ADMIN — HYDROXYZINE HYDROCHLORIDE 25 MG: 25 TABLET ORAL at 11:42

## 2021-08-01 RX ADMIN — DOCUSATE SODIUM 100 MG: 100 CAPSULE ORAL at 17:15

## 2021-08-01 RX ADMIN — CHOLECALCIFEROL TAB 10 MCG (400 UNIT) 400 UNITS: 10 TAB at 08:11

## 2021-08-01 RX ADMIN — FOLIC ACID 1 MG: 1 TABLET ORAL at 08:10

## 2021-08-01 RX ADMIN — GABAPENTIN 200 MG: 250 SOLUTION ORAL at 08:12

## 2021-08-01 RX ADMIN — OXYCODONE HYDROCHLORIDE 2.5 MG: 5 TABLET ORAL at 22:33

## 2021-08-01 RX ADMIN — ATORVASTATIN CALCIUM 10 MG: 10 TABLET, FILM COATED ORAL at 15:49

## 2021-08-01 RX ADMIN — DICYCLOMINE HYDROCHLORIDE 10 MG: 10 CAPSULE ORAL at 22:33

## 2021-08-01 RX ADMIN — DICYCLOMINE HYDROCHLORIDE 10 MG: 10 CAPSULE ORAL at 05:09

## 2021-08-01 RX ADMIN — GABAPENTIN 200 MG: 250 SOLUTION ORAL at 17:18

## 2021-08-01 RX ADMIN — ENOXAPARIN SODIUM 40 MG: 40 INJECTION SUBCUTANEOUS at 08:10

## 2021-08-01 RX ADMIN — OXYCODONE HYDROCHLORIDE 2.5 MG: 5 TABLET ORAL at 09:10

## 2021-08-01 RX ADMIN — OXYCODONE HYDROCHLORIDE 5 MG: 5 TABLET ORAL at 11:19

## 2021-08-01 RX ADMIN — OXYCODONE HYDROCHLORIDE 2.5 MG: 5 TABLET ORAL at 15:09

## 2021-08-01 RX ADMIN — DOCUSATE SODIUM AND SENNOSIDES 1 TABLET: 8.6; 5 TABLET ORAL at 08:10

## 2021-08-01 NOTE — PROGRESS NOTES
Progress Note - Shantell Dural 49 y o  female MRN: [id number]    Unit/Bed#: [facility] 918-01 Encounter: [id number]      Assessment:  Ms Adrianne Forrest is s/p here 4th treatment with high dose MTX awaiting clearance and Left LE pain which waxes and wanes  Plan:  Monitor daily MTX levels  MTX < 0 1 today  Will draw tonight as well  Can start with discharge planning back to her facility  Appreciate SLIM help in her care  Adjusting pain meds for pain control and drowsiness  Subjective: In pain this morning, moaning and crying out  No other complaints  Eating as allowed  Objective:     Vitals: Blood pressure 101/68, pulse 104, temperature 98 8 °F (37 1 °C), resp  rate 18, height 5' (1 524 m), weight 55 kg (121 lb 4 1 oz), SpO2 99 %  ,Body mass index is 23 68 kg/m²        Intake/Output Summary (Last 24 hours) at 8/1/2021 0930  Last data filed at 8/1/2021 0645  Gross per 24 hour   Intake 1660 ml   Output 3850 ml   Net -2190 ml       Physical Exam: /68   Pulse 104   Temp 98 8 °F (37 1 °C)   Resp 18   Ht 5' (1 524 m)   Wt 55 kg (121 lb 4 1 oz)   SpO2 99%   BMI 23 68 kg/m²     General Appearance:    Alert, cooperative, no distress, appears stated age   Head:    Normocephalic, without obvious abnormality, atraumatic   Eyes:    PERRL, conjunctiva/corneas clear    Throat:   Lips, mucosa, and tongue normal; teeth and gums normal   Lungs:     Clear to auscultation bilaterally, respirations unlabored   Chest Wall:    No tenderness or deformity    Heart:    Tachycardic - most likely due to pain, S1 and S2 normal, no murmur, rub   or gallop   Abdomen:     Soft, non-tender, bowel sounds active all four quadrants,     no masses, no organomegaly   Extremities:   Extremities normal, atraumatic, no cyanosis or edema   Pulses:   2+ and symmetric all extremities   Skin:   Skin color, texture, turgor normal, no rashes or lesions   Lymph nodes:   Cervical, supraclavicular, and axillary nodes normal   Neurologic: Residual left sided deficits from disease        Invasive Devices     Peripherally Inserted Central Catheter Line            PICC Line 20/37/10 Right Basilic 3 days          Peripheral Intravenous Line            Peripheral IV 07/28/21 Right Hand 3 days          Drain            Gastrostomy/Enterostomy Gastrostomy-jejunostomy 20 Fr  LUQ 82 days                Lab, Imaging and other studies: I have personally reviewed pertinent reports      VTE Pharmacologic Prophylaxis: Enoxaparin (Lovenox)  VTE Mechanical Prophylaxis: sequential compression device

## 2021-08-02 LAB
ANION GAP SERPL CALCULATED.3IONS-SCNC: 8 MMOL/L (ref 4–13)
BASOPHILS # BLD AUTO: 0.03 THOUSANDS/ΜL (ref 0–0.1)
BASOPHILS NFR BLD AUTO: 1 % (ref 0–1)
BUN SERPL-MCNC: 14 MG/DL (ref 5–25)
CALCIUM SERPL-MCNC: 9.5 MG/DL (ref 8.3–10.1)
CHLORIDE SERPL-SCNC: 102 MMOL/L (ref 100–108)
CO2 SERPL-SCNC: 27 MMOL/L (ref 21–32)
CREAT SERPL-MCNC: 0.38 MG/DL (ref 0.6–1.3)
EOSINOPHIL # BLD AUTO: 0.26 THOUSAND/ΜL (ref 0–0.61)
EOSINOPHIL NFR BLD AUTO: 5 % (ref 0–6)
ERYTHROCYTE [DISTWIDTH] IN BLOOD BY AUTOMATED COUNT: 15.3 % (ref 11.6–15.1)
GFR SERPL CREATININE-BSD FRML MDRD: 120 ML/MIN/1.73SQ M
GLUCOSE SERPL-MCNC: 117 MG/DL (ref 65–140)
GLUCOSE SERPL-MCNC: 118 MG/DL (ref 65–140)
GLUCOSE SERPL-MCNC: 144 MG/DL (ref 65–140)
GLUCOSE SERPL-MCNC: 167 MG/DL (ref 65–140)
HCT VFR BLD AUTO: 33.2 % (ref 34.8–46.1)
HGB BLD-MCNC: 10.5 G/DL (ref 11.5–15.4)
IMM GRANULOCYTES # BLD AUTO: 0.04 THOUSAND/UL (ref 0–0.2)
IMM GRANULOCYTES NFR BLD AUTO: 1 % (ref 0–2)
LYMPHOCYTES # BLD AUTO: 2.51 THOUSANDS/ΜL (ref 0.6–4.47)
LYMPHOCYTES NFR BLD AUTO: 44 % (ref 14–44)
MCH RBC QN AUTO: 28.9 PG (ref 26.8–34.3)
MCHC RBC AUTO-ENTMCNC: 31.6 G/DL (ref 31.4–37.4)
MCV RBC AUTO: 92 FL (ref 82–98)
MONOCYTES # BLD AUTO: 0.21 THOUSAND/ΜL (ref 0.17–1.22)
MONOCYTES NFR BLD AUTO: 4 % (ref 4–12)
MTX SERPL-SCNC: <0.1 UMOL/L
NEUTROPHILS # BLD AUTO: 2.68 THOUSANDS/ΜL (ref 1.85–7.62)
NEUTS SEG NFR BLD AUTO: 45 % (ref 43–75)
NRBC BLD AUTO-RTO: 0 /100 WBCS
PLATELET # BLD AUTO: 241 THOUSANDS/UL (ref 149–390)
PMV BLD AUTO: 11.5 FL (ref 8.9–12.7)
POTASSIUM SERPL-SCNC: 3.8 MMOL/L (ref 3.5–5.3)
RBC # BLD AUTO: 3.63 MILLION/UL (ref 3.81–5.12)
SODIUM SERPL-SCNC: 137 MMOL/L (ref 136–145)
WBC # BLD AUTO: 5.73 THOUSAND/UL (ref 4.31–10.16)

## 2021-08-02 PROCEDURE — 80048 BASIC METABOLIC PNL TOTAL CA: CPT | Performed by: STUDENT IN AN ORGANIZED HEALTH CARE EDUCATION/TRAINING PROGRAM

## 2021-08-02 PROCEDURE — 82948 REAGENT STRIP/BLOOD GLUCOSE: CPT

## 2021-08-02 PROCEDURE — 99232 SBSQ HOSP IP/OBS MODERATE 35: CPT | Performed by: INTERNAL MEDICINE

## 2021-08-02 PROCEDURE — 97530 THERAPEUTIC ACTIVITIES: CPT

## 2021-08-02 PROCEDURE — 97112 NEUROMUSCULAR REEDUCATION: CPT

## 2021-08-02 PROCEDURE — 97535 SELF CARE MNGMENT TRAINING: CPT

## 2021-08-02 PROCEDURE — 80204 DRUG ASSAY METHOTREXATE: CPT | Performed by: INTERNAL MEDICINE

## 2021-08-02 PROCEDURE — 85025 COMPLETE CBC W/AUTO DIFF WBC: CPT | Performed by: STUDENT IN AN ORGANIZED HEALTH CARE EDUCATION/TRAINING PROGRAM

## 2021-08-02 RX ORDER — HYDROMORPHONE HCL/PF 1 MG/ML
0.5 SYRINGE (ML) INJECTION ONCE
Status: COMPLETED | OUTPATIENT
Start: 2021-08-02 | End: 2021-08-02

## 2021-08-02 RX ADMIN — OXYCODONE HYDROCHLORIDE 2.5 MG: 5 TABLET ORAL at 16:17

## 2021-08-02 RX ADMIN — DICYCLOMINE HYDROCHLORIDE 10 MG: 10 CAPSULE ORAL at 16:18

## 2021-08-02 RX ADMIN — INSULIN LISPRO 1 UNITS: 100 INJECTION, SOLUTION INTRAVENOUS; SUBCUTANEOUS at 10:38

## 2021-08-02 RX ADMIN — DOCUSATE SODIUM 100 MG: 100 CAPSULE ORAL at 17:56

## 2021-08-02 RX ADMIN — TAMSULOSIN HYDROCHLORIDE 0.4 MG: 0.4 CAPSULE ORAL at 16:17

## 2021-08-02 RX ADMIN — ONDANSETRON 4 MG: 4 TABLET, ORALLY DISINTEGRATING ORAL at 13:16

## 2021-08-02 RX ADMIN — MENTHOL, UNSPECIFIED FORM AND METHYL SALICYLATE: 10; 150 CREAM TOPICAL at 12:47

## 2021-08-02 RX ADMIN — GABAPENTIN 200 MG: 250 SOLUTION ORAL at 17:57

## 2021-08-02 RX ADMIN — DICYCLOMINE HYDROCHLORIDE 10 MG: 10 CAPSULE ORAL at 06:20

## 2021-08-02 RX ADMIN — DICYCLOMINE HYDROCHLORIDE 10 MG: 10 CAPSULE ORAL at 21:59

## 2021-08-02 RX ADMIN — OXYCODONE HYDROCHLORIDE 2.5 MG: 5 TABLET ORAL at 02:07

## 2021-08-02 RX ADMIN — DICYCLOMINE HYDROCHLORIDE 10 MG: 10 CAPSULE ORAL at 10:35

## 2021-08-02 RX ADMIN — ATORVASTATIN CALCIUM 10 MG: 10 TABLET, FILM COATED ORAL at 16:16

## 2021-08-02 RX ADMIN — GABAPENTIN 200 MG: 250 SOLUTION ORAL at 10:38

## 2021-08-02 RX ADMIN — DOCUSATE SODIUM AND SENNOSIDES 1 TABLET: 8.6; 5 TABLET ORAL at 10:35

## 2021-08-02 RX ADMIN — CHOLECALCIFEROL TAB 10 MCG (400 UNIT) 400 UNITS: 10 TAB at 10:37

## 2021-08-02 RX ADMIN — ACETAMINOPHEN 650 MG: 325 TABLET, FILM COATED ORAL at 07:14

## 2021-08-02 RX ADMIN — DOCUSATE SODIUM AND SENNOSIDES 1 TABLET: 8.6; 5 TABLET ORAL at 17:56

## 2021-08-02 RX ADMIN — FOLIC ACID 1 MG: 1 TABLET ORAL at 10:36

## 2021-08-02 RX ADMIN — OXYCODONE HYDROCHLORIDE 2.5 MG: 5 TABLET ORAL at 10:02

## 2021-08-02 RX ADMIN — DOCUSATE SODIUM 100 MG: 100 CAPSULE ORAL at 10:38

## 2021-08-02 RX ADMIN — ENOXAPARIN SODIUM 40 MG: 40 INJECTION SUBCUTANEOUS at 10:35

## 2021-08-02 RX ADMIN — HYDROMORPHONE HYDROCHLORIDE 0.5 MG: 1 INJECTION, SOLUTION INTRAMUSCULAR; INTRAVENOUS; SUBCUTANEOUS at 13:13

## 2021-08-02 RX ADMIN — OXYCODONE HYDROCHLORIDE 2.5 MG: 5 TABLET ORAL at 20:48

## 2021-08-02 RX ADMIN — LORAZEPAM 0.5 MG: 2 INJECTION INTRAMUSCULAR; INTRAVENOUS at 20:48

## 2021-08-02 NOTE — OCCUPATIONAL THERAPY NOTE
OccupationalTherapy Progress Note     Patient Name: Tomy Adkins  YSSERAFIN'SAKSHI Date: 8/2/2021  Problem List  Principal Problem:    CNS lymphoma [facility])  Active Problems:    Dysphagia    Cancer related pain    Ambulatory dysfunction          08/02/21 1120   OT Last Visit   OT Visit Date 08/02/21   Note Type   Note Type Treatment   Restrictions/Precautions   Weight Bearing Precautions Per Order No   Other Precautions Cognitive; Chair Alarm; Bed Alarm; Fall Risk;Pain   General   Response to Previous Treatment Patient with no complaints from previous session   Lifestyle   Autonomy At baseline, pt was I with ADLs/IADLs  PTA, pt required A for ADLs/IADLs   Reciprocal Relationships , friend   Service to Others Unable to obtain   Pain Assessment   Pain Assessment Tool FLACC   Pain Rating: FLACC (Rest) - Face 0   Pain Rating: FLACC (Rest) - Legs 0   Pain Rating: FLACC (Rest) - Activity 0   Pain Rating: FLACC (Rest) - Cry 0   Pain Rating: FLACC (Rest) - Consolability 0   Score: FLACC (Rest) 0   Pain Rating: FLACC (Activity) - Face 1   Pain Rating: FLACC (Activity) - Legs 1   Pain Rating: FLACC (Activity) - Activity 0   Pain Rating: FLACC (Activity) - Cry 1   Pain Rating: FLACC (Activity) - Consolability 1   Score: FLACC (Activity) 4   ADL   Where Assessed Edge of bed   Eating Assistance 5  Supervision/Setup   Eating Deficit Setup   Toileting Assistance  3  Moderate Assistance   Toileting Deficit Perineal hygiene   Bed Mobility   Supine to Sit 2  Maximal assistance   Additional items Assist x 1; Increased time required   Sit to Supine Unable to assess   Transfers   Sit to Stand 2  Maximal assistance   Additional items Assist x 2; Increased time required;Verbal cues   Stand to Sit 2  Maximal assistance   Additional items Assist x 2; Increased time required   Stand pivot 2  Maximal assistance   Additional items Assist x 2; Increased time required;Verbal cues   Toilet transfer 2  Maximal assistance   Additional items Assist x 2;Increased time required;Verbal cues   Additional Comments Transfers with B/L HHA    Cognition   Overall Cognitive Status Impaired   Arousal/Participation Responsive; Cooperative   Attention Attends with cues to redirect   Following Commands Follows one step commands with increased time or repetition   Comments Pt pleasant and cooperative t/o session    Activity Tolerance   Activity Tolerance Patient limited by fatigue;Patient limited by pain   Medical Staff Made Aware PT MARTINE Martinez   Assessment   Assessment Patient participated in Skilled OT session this date with interventions consisting of ADL re training with the use of correct body mechnaics, safety awareness and fall prevention techniques,  therapeutic activities to: increase activity tolerance and increase OOB/ sitting tolerance   Upon arrival patient was found supine in bed  Pt demonstrated the following tasks: MAX A x 1 sup to sit, MAX A x 2 STS, MAX A x 2 SPT, MAX A x 2 toilet transfer  Pt maintains EOB sitting position with CS/CGA  Pt completes toileting with MOD A for hygiene - pt attempts buttocks hygiene (hand IV fell off - RN aware)  Patient continues to be functioning below baseline level, occupational performance remains limited secondary to factors listed above and increased risk for falls and injury  From OT standpoint, recommendation at time of d/c would be STR  Patient to benefit from continued Occupational Therapy treatment while in the hospital to address deficits as defined above and maximize level of functional independence with ADLs and functional mobility  Pt was left in chair with alarm on after session with all current needs met  The patient's raw score on the AM-PAC Daily Activity inpatient short form is 14, standardized score is 33 39, less than 39 4  Patients at this level are likely to benefit from discharge to post-acute rehabilitation services   Please refer to the recommendation of the Occupational Therapist for safe discharge planning  Plan   Treatment Interventions ADL retraining;Functional transfer training;UE strengthening/ROM; Endurance training;Cognitive reorientation;Patient/family training;Equipment evaluation/education; Compensatory technique education; Neuromuscular reeducation; Fine motor coordination activities;Continued evaluation; Energy conservation; Activityengagement   Goal Expiration Date 08/12/21   OT Treatment Day 2   OT Frequency 3-5x/wk   Recommendation   OT Discharge Recommendation Post acute rehabilitation services   OT - OK to Discharge Yes  (to rehab when medically stable )   AM-PAC Daily Activity Inpatient   Lower Body Dressing 2   Bathing 2   Toileting 2   Upper Body Dressing 2   Grooming 3   Eating 3   Daily Activity Raw Score 14   Daily Activity Standardized Score (Calc for Raw Score >=11) 33 39   AM-PAC Applied Cognition Inpatient   Following a Speech/Presentation 3   Understanding Ordinary Conversation 3   Taking Medications 3   Remembering Where Things Are Placed or Put Away 3   Remembering List of 4-5 Errands 2   Taking Care of Complicated Tasks 2   Applied Cognition Raw Score 16   Applied Cognition Standardized Score 35 03          Xiomara Recio MS, OTR/L

## 2021-08-02 NOTE — PLAN OF CARE
Problem: MOBILITY - ADULT  Goal: Maintain or return to baseline ADL function  Description: INTERVENTIONS:  -  Assess patient's ability to carry out ADLs; assess patient's baseline for ADL function and identify physical deficits which impact ability to perform ADLs (bathing, care of mouth/teeth, toileting, grooming, dressing, etc )  - Assess/evaluate cause of self-care deficits   - Assess range of motion  - Assess patient's mobility; develop plan if impaired  - Assess patient's need for assistive devices and provide as appropriate  - Encourage maximum independence but intervene and supervise when necessary  - Involve family in performance of ADLs  - Assess for home care needs following discharge   - Consider OT consult to assist with ADL evaluation and planning for discharge  - Provide patient education as appropriate  Outcome: Progressing  Goal: Maintains/Returns to pre admission functional level  Description: INTERVENTIONS:  - Perform BMAT or MOVE assessment daily    - Set and communicate daily mobility goal to care team and patient/family/caregiver  - Collaborate with rehabilitation services on mobility goals if consulted  - Perform Range of Motion 3 times a day  - Reposition patient every 2 hours    - Dangle patient 3 times a day  - Stand patient 3 times a day  - Ambulate patient 3 times a day  - Out of bed to chair 3 times a day   - Out of bed for meals 3 times a day  - Out of bed for toileting  - Record patient progress and toleration of activity level   Outcome: Progressing     Problem: Potential for Falls  Goal: Patient will remain free of falls  Description: INTERVENTIONS:  - Educate patient/family on patient safety including physical limitations  - Instruct patient to call for assistance with activity   - Consult OT/PT to assist with strengthening/mobility   - Keep Call bell within reach  - Keep bed low and locked with side rails adjusted as appropriate  - Keep care items and personal belongings within reach  - Initiate and maintain comfort rounds  - Make Fall Risk Sign visible to staff  - Offer Toileting every 2 Hours, in advance of need  - Initiate/Maintain alarm  - Obtain necessary fall risk management equipment:   - Apply yellow socks and bracelet for high fall risk patients  - Consider moving patient to room near nurses station  Outcome: Progressing     Problem: Prexisting or High Potential for Compromised Skin Integrity  Goal: Skin integrity is maintained or improved  Description: INTERVENTIONS:  - Identify patients at risk for skin breakdown  - Assess and monitor skin integrity  - Assess and monitor nutrition and hydration status  - Monitor labs   - Assess for incontinence   - Turn and reposition patient  - Assist with mobility/ambulation  - Relieve pressure over bony prominences  - Avoid friction and shearing  - Provide appropriate hygiene as needed including keeping skin clean and dry  - Evaluate need for skin moisturizer/barrier cream  - Collaborate with interdisciplinary team   - Patient/family teaching  - Consider wound care consult   Outcome: Progressing     Problem: Knowledge Deficit  Goal: Patient/family/caregiver demonstrates understanding of disease process, treatment plan, medications, and discharge instructions  Description: Complete learning assessment and assess knowledge base  Interventions:  - Provide teaching at level of understanding  - Provide teaching via preferred learning methods  Outcome: Progressing     Problem: Nutrition/Hydration-ADULT  Goal: Nutrient/Hydration intake appropriate for improving, restoring or maintaining nutritional needs  Description: Monitor and assess patient's nutrition/hydration status for malnutrition  Collaborate with interdisciplinary team and initiate plan and interventions as ordered  Monitor patient's weight and dietary intake as ordered or per policy  Utilize nutrition screening tool and intervene as necessary   Determine patient's food preferences and provide high-protein, high-caloric foods as appropriate       INTERVENTIONS:  - Monitor oral intake, urinary output, labs, and treatment plans  - Assess nutrition and hydration status and recommend course of action  - Evaluate amount of meals eaten  - Assist patient with eating if necessary   - Allow adequate time for meals  - Recommend/ encourage appropriate diets, oral nutritional supplements, and vitamin/mineral supplements  - Order, calculate, and assess calorie counts as needed  - Recommend, monitor, and adjust tube feedings and TPN/PPN based on assessed needs  - Assess need for intravenous fluids  - Provide specific nutrition/hydration education as appropriate  - Include patient/family/caregiver in decisions related to nutrition  Outcome: Progressing

## 2021-08-02 NOTE — UTILIZATION REVIEW
Continued Stay Review    Date:8/1                          Current Patient Class: IP  Current Level of Care: MS    HPI:56 y o  female initially admitted on 7/28/21for  4th treatment with high dose MTX awaiting clearance and Left LE pain which waxes and wanes        Assessment/Plan:   7/31 Adjusting pain meds for pain control and drowsiness  Leucovorin IV switched to Po today and IV bicarb drip d/c'ed  8/1//21-Monitor daily MTX levels  MTX < 0 1 today  Will draw tonight as well  Pt in pain this am, crying out and moaning  Pt receiving multiple doses po narcotic analgesic for pain today  FLACC score=5  Pt reports pain all over her body   PT/OT recommended rehab  Potassium and mag WNL  Pt with poor appetite today      Vital Signs:   Date/Time  Temp  Pulse  Resp  BP  MAP (mmHg)  SpO2  O2 Device   08/01/21 21:13:55  99 1 °F (37 3 °C)  114Abnormal   18  108/72  84  97 %  --   08/01/21 1933  --  --  --  --  --  --  None (Room air)   08/01/21 16:02:49  99 5 °F (37 5 °C)  125Abnormal   20  107/71  83  96 %  --         Pertinent Labs/Diagnostic Results:       Results from last 7 days   Lab Units 08/01/21  0446 07/30/21  0547 07/29/21  0616 07/28/21  1712   WBC Thousand/uL 4 38 5 19 7 06 6 77   HEMOGLOBIN g/dL 10 2* 9 6* 11 2* 12 3   HEMATOCRIT % 32 3* 30 4* 34 3* 37 8   PLATELETS Thousands/uL 196 176 237 264   NEUTROS ABS Thousands/µL  --  3 07 5 80 3 50         Results from last 7 days   Lab Units 08/01/21  0446 07/31/21  0632 07/30/21  1309 07/30/21  0547 07/29/21  0616   SODIUM mmol/L 140 142 142 142 141   POTASSIUM mmol/L 4 2 3 6 4 2 2 9* 3 4*   CHLORIDE mmol/L 105 106 109* 107 106   CO2 mmol/L 28 34* 32 32 31   ANION GAP mmol/L 7 2* 1* 3* 4   BUN mg/dL 12 10 5 7 9   CREATININE mg/dL 0 32* 0 31* 0 37* 0 35* 0 50*   EGFR ml/min/1 73sq m 127 129 122 124 110   CALCIUM mg/dL 9 5 9 3 8 9 8 5 9 6   MAGNESIUM mg/dL  --  2 0  --   --  1 7     Results from last 7 days   Lab Units 07/30/21  0547 07/29/21  0616 07/28/21  9347 AST U/L 18 12 11   ALT U/L 32 15 12   ALK PHOS U/L 81 62 66   TOTAL PROTEIN g/dL 5 0* 5 7* 6 1*   ALBUMIN g/dL 2 5* 3 0* 3 1*   TOTAL BILIRUBIN mg/dL 0 14* 0 45 0 22     Results from last 7 days   Lab Units 08/01/21  2117 08/01/21  1608 08/01/21  1547 08/01/21  1144 08/01/21  0812 07/31/21  2132 07/31/21  1657 07/31/21  1147 07/31/21  0821 07/30/21  2104 07/30/21  1612   POC GLUCOSE mg/dl 137 140 128 107 84 135 121 120 75 219* 173*     Results from last 7 days   Lab Units 08/01/21  0446 07/31/21  0632 07/30/21  1309 07/30/21  0547 07/29/21  0616 07/28/21  1712   GLUCOSE RANDOM mg/dL 97 92 120 178* 232* 176*         Results from last 7 days   Lab Units 07/29/21  0616   HEMOGLOBIN A1C % 5 4   EAG mg/dl 108             Results from last 7 days   Lab Units 07/29/21  0621   HEP B S AG  Non-reactive   HEP C AB  Non-reactive   HEP B C IGM  Non-reactive   HEP B C TOTAL AB  Non-reactive                 Results from last 7 days   Lab Units 07/30/21  0055 07/29/21  0838   CLARITY UA  Clear Clear   COLOR UA  Yellow Yellow   SPEC GRAV UA  1 010 1 007   PH UA  8 5* 8 5*   GLUCOSE UA mg/dl Negative 100 (1/10%)*   KETONES UA mg/dl Negative Negative   BLOOD UA  Negative Negative   PROTEIN UA mg/dl Negative Negative   NITRITE UA  Negative Negative   BILIRUBIN UA  Negative Negative   UROBILINOGEN UA E U /dl 0 2 0 2   LEUKOCYTES UA  Negative Negative   WBC UA /hpf 2-4 None Seen   RBC UA /hpf None Seen None Seen   BACTERIA UA /hpf None Seen None Seen   EPITHELIAL CELLS WET PREP /hpf None Seen None Seen     Results for Sesar Macdonald (MRN [id number]) as of 8/2/2021 13:36   Ref   Range 7/29/2021 21:07 7/30/2021 05:47 7/31/2021 20:22 8/1/2021 22:46   Methotrexate Lvl Latest Units: umol/L 3 80 0 74 <0 10 <0 10               Medications:   Scheduled Medications:  atorvastatin, 10 mg, Oral, Daily With Dinner  cholecalciferol, 400 Units, Oral, Daily  dicyclomine, 10 mg, Oral, 4x Daily (AC & HS)  docusate sodium, 100 mg, Oral, BID  enoxaparin, 40 mg, Subcutaneous, Daily  folic acid, 1 mg, Oral, Daily  gabapentin, 200 mg, Oral, BID  insulin lispro, 1-5 Units, Subcutaneous, TID AC  senna-docusate sodium, 1 tablet, Oral, BID  tamsulosin, 0 4 mg, Oral, Daily With Dinner  oxyCODONE (ROXICODONE) IR tablet 5 mg   Dose: 5 mg  Freq: Once Route: PO x1 8/1  leucovorin 25 mg in sodium chloride 0 9 % 50 mL IVPB   Dose: 25 mg  Freq: Every 6 hours Route: IV  Last Dose: Stopped (07/31/21 0707)  leucovorin (WELLCOVORIN) tablet 25 mg   Dose: 25 mg  Freq: Every 6 hours Route: PO  Start: 07/31/21 1300 End: 08/02/21 0159  Continuous IV Infusions:sodium bicarbonate 100 mEq in dextrose 5 % 1,000 mL infusion   Rate: 150 mL/hr Dose: 150 mL/hr  Freq: Continuous Route: IV  Last Dose: Stopped (07/31/21 1659)     PRN Meds:  acetaminophen, 650 mg, Oral, Q6H PRN  albuterol, 2 puff, Inhalation, Q4H PRN  Diclofenac Sodium, 2 g, Topical, TID PRN x1 8/1  hydrOXYzine HCL, 25 mg, Oral, Q6H PRN x1 8/1  LORazepam, 0 5 mg, Intravenous, Q6H PRN x3 8/1  menthol-methyl salicylate, , Apply externally, 4x Daily PRN  ondansetron, 4 mg, Oral, Q6H PRN  oxyCODONE, 2 5 mg, Oral, Q4H PRN x3 7/31 x5 8/1  polyethylene glycol, 17 g, Oral, Daily PRN  sodium chloride, 20 mL/hr, Intravenous, Once PRN        Discharge Plan: [facility]    Network Utilization Review Department  ATTENTION: Please call with any questions or concerns to [phone] and carefully listen to the prompts so that you are directed to the right person  All voicemails are confidential   [facility] all requests for admission clinical reviews, approved or denied determinations and any other requests to dedicated fax number below belonging to the campus where the patient is receiving treatment   List of dedicated fax numbers for the Facilities:  [address] [place] [address] DENIALS (Administrative/Medical Necessity) [phone]   [address]  (Maternity/NICU/Pediatrics) [phone]   [address] [facility] [address] [facility] [address] [facility] [phone] [id number] [place] [address] [place] [facility] [phone] [place] [id number] [phone]

## 2021-08-02 NOTE — PLAN OF CARE
Problem: PHYSICAL THERAPY ADULT  Goal: Performs mobility at highest level of function for planned discharge setting  See evaluation for individualized goals  Description: Treatment/Interventions: Functional transfer training, LE strengthening/ROM, Patient/family training, Cognitive reorientation, Bed mobility, Spoke to nursing, Spoke to case management, OT          See flowsheet documentation for full assessment, interventions and recommendations  Outcome: Progressing  Note: Prognosis: Fair  Problem List: Decreased strength, Decreased endurance, Impaired balance, Decreased mobility, Decreased safety awareness, Decreased coordination, Decreased cognition, Pain  Assessment: Pt seen for PT treatment session this date  Therapy session focused on bed mobility, sitting static/ dynamic balance, functional transfers, and endurance training in order to improve overall mobility and independence  Pt requires assist of 1-2 for all mobility performed this date  Pt performed bed mobility tasks with max Ax1  Pt sitting unsupported ~15 min at close S- CGA on EOB and standard toilet  Pt performed multiple STS and SPT from EOB/ bedside chair/ standard toilet this date with max Ax2 using b/l HHA  While on toilet pt accidentally pulled IV on dorsum of hand out while trying to assist in hygiene tasks- RN notified  Pt making progress toward goals  Pt was left sitting upright in bedside chair with chair alarm on at the end of PT session with all needs in reach  Pt would benefit from continued PT services while in hospital to address remaining limitations  PT to continue to follow pt and recommends post-acute rehab services after d/c  The patient's AM-PAC Basic Mobility Inpatient Short Form Low Function Raw Score 16 , Standardized Score is 25 72  A standardized score less 42 9 suggests the patient may benefit from discharge to post-acute rehab services   Please also refer to the recommendation of the Physical Therapist for safe discharge planning  Barriers to Discharge: Inaccessible home environment, Decreased caregiver support        PT Discharge Recommendation: Post acute rehabilitation services     PT - OK to Discharge: Yes (to rehab once medically cleared )    See flowsheet documentation for full assessment

## 2021-08-02 NOTE — PROGRESS NOTES
Progress Note - Belen Nuñez 49 y o  female MRN: [id number]    Unit/Bed#: [facility] 918-01 Encounter: [id number]      Assessment:  Ms Vazquez Done is s/p here 4th treatment with high dose MTX awaiting clearance and Left LE pain which waxes and wanes  Plan:  Monitor daily MTX levels  MTX < 0 1 yesterday  From our perspective, she is ready for discharge back to her facility  Appreciate SLIM help in her care  Adjusting pain meds for pain control and drowsiness  Plan to keep PEG tube at this time in case she declines, can consider removing at future admission  PICC line placement was easier this round- will plan to continue with PICC on admission rather than mediport  Subjective: In pain this morning, moaning and crying out  She wants her crackers opened  No other complaints  Objective:     Vitals: Blood pressure 115/74, pulse 60, temperature 99 °F (37 2 °C), resp  rate 18, height 5' (1 524 m), weight 55 kg (121 lb 4 1 oz), SpO2 100 %  ,Body mass index is 23 68 kg/m²        Intake/Output Summary (Last 24 hours) at 8/2/2021 1403  Last data filed at 8/2/2021 0932  Gross per 24 hour   Intake 780 ml   Output 1450 ml   Net -670 ml       Physical Exam: /74   Pulse 60   Temp 99 °F (37 2 °C)   Resp 18   Ht 5' (1 524 m)   Wt 55 kg (121 lb 4 1 oz)   SpO2 100%   BMI 23 68 kg/m²     General Appearance:    Alert, no distress, appears stated age   Head:    Normocephalic, without obvious abnormality, atraumatic   Eyes:    PERRL, conjunctiva/corneas clear    Throat:   Lips, mucosa, and tongue normal; teeth and gums normal   Lungs:     Clear to auscultation bilaterally, respirations unlabored   Chest Wall:    No tenderness or deformity    Heart:  RRR   Abdomen:     Soft, non-tender, bowel sounds active all four quadrants,     no masses, no organomegaly   Extremities:   Extremities normal, atraumatic, no cyanosis or edema   Pulses:   2+ and symmetric all extremities   Skin:   Skin color, texture, turgor normal, no rashes or lesions       Neurologic: Residual left sided deficits from disease        Invasive Devices     Peripherally Inserted Central Catheter Line            PICC Line 45/46/00 Right Basilic 4 days          Drain            Gastrostomy/Enterostomy Gastrostomy-jejunostomy 20 Fr  LUQ 84 days                Lab, Imaging and other studies: I have personally reviewed pertinent reports      VTE Pharmacologic Prophylaxis: Enoxaparin (Lovenox)  VTE Mechanical Prophylaxis: sequential compression device

## 2021-08-02 NOTE — PROGRESS NOTES
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)       Direct Admit from Dr David Ordonez for in-patient chemo 3rd cycle       S/P chemotherapy on 05/13, Sanam Misael 5/14, 7/18 6/9 MRI- Appears to be interval improvement in the overall enhancement of multifocal      intracranial lesions       P:Per Primary- received 4th session of IV high-dose methotrexate, now being monitored for clearance of methotrexate levels     DVT prophylaxis with Lovenox     Received 6 doses Leucovorin        Ambulatory dysfunction  Assessment & Plan  Known residual deficits left upper extremity and lower extremity weakness secondary to CNS lymphoma    PT/OT recommended rehab    Cancer related pain  Assessment & Plan  Gabapentin 200mg hs    Oxy 2 5 PRN      Dysphagia  Assessment & Plan  Tolerating dysphagia diet     Hypokalemia-resolved as of 8/1/2021  Assessment & Plan  A: potassium 2 9  Likely in setting of poor p o  Intake/poor absorption in setting of chemotherapy and malnutrition  Potassium 4 2 Status post repletion  Mg 1 9  Status post repletion  Now resolved        Disposition: As directed by primary team    SUBJECTIVE     Patient seen and examined  No acute events overnight  Difficult to communicate with patient given language barrier, so unable to obtain ROS       OBJECTIVE     Vitals:    08/01/21 1602 08/01/21 2113 08/02/21 0730 08/02/21 0735   BP: 107/71 108/72 118/74 115/74   Pulse: (!) 125 (!) 114 97 60   Resp: 20 18  18   Temp: 99 5 °F (37 5 °C) 99 1 °F (37 3 °C)  99 °F (37 2 °C)   TempSrc:       SpO2: 96% 97% 100% 100%   Weight:       Height:          Temperature:   Temp (24hrs), Av 2 °F (37 3 °C), Min:99 °F (37 2 °C), Max:99 5 °F (37 5 °C)    Temperature: 99 °F (37 2 °C)  Intake & Output:  I/O       701 -  07 07 -  07 07 -  0700    P  O  360 600     I V  (mL/kg) 1300 (23 6)      NG/GT       IV Piggyback       Total Intake(mL/kg) 1660 (30 2) 600 (10 9)     Urine (mL/kg/hr) 3850 (2 9) 1450 (1 1)     Stool 0      Total Output 3850 1450     Net -2190 -850            Unmeasured Stool Occurrence 1 x          Weights:   IBW (Ideal Body Weight): 45 5 kg    Body mass index is 23 68 kg/m²  Weight (last 2 days)     Date/Time   Weight    21 0600   55 (121 25)    21 0600   54 (119 05)            Physical Exam  Vitals and nursing note reviewed  Constitutional:       General: She is not in acute distress  Appearance: She is well-developed  She is not ill-appearing  HENT:      Head: Normocephalic and atraumatic  Mouth/Throat:      Mouth: Mucous membranes are moist    Eyes:      Conjunctiva/sclera: Conjunctivae normal    Cardiovascular:      Rate and Rhythm: Normal rate and regular rhythm  Heart sounds: No murmur heard  Pulmonary:      Effort: Pulmonary effort is normal  No respiratory distress  Breath sounds: Normal breath sounds  No wheezing, rhonchi or rales  Abdominal:      General: Abdomen is flat  Bowel sounds are normal       Palpations: Abdomen is soft  Tenderness: There is no abdominal tenderness  Musculoskeletal:      Cervical back: Neck supple  Right lower leg: No edema  Left lower leg: No edema  Skin:     General: Skin is warm and dry  Neurological:      Mental Status: She is alert  Comments: Limited ROM and strength LUE and LLE       LABORATORY DATA     Labs: I have personally reviewed pertinent reports    Results from last 7 days Lab Units 08/02/21  0620 08/01/21  0446 07/30/21  0547 07/29/21  0616   WBC Thousand/uL 5 73 4 38 5 19 7 06   HEMOGLOBIN g/dL 10 5* 10 2* 9 6* 11 2*   HEMATOCRIT % 33 2* 32 3* 30 4* 34 3*   PLATELETS Thousands/uL 241 196 176 237   NEUTROS PCT % 45  --  58 82*   MONOS PCT % 4  --  6 4      Results from last 7 days   Lab Units 08/02/21  0620 08/01/21  0446 07/31/21  0632 07/30/21  0547 07/29/21  0616 07/28/21  1712   POTASSIUM mmol/L 3 8 4 2 3 6 2 9* 3 4* 3 1*   CHLORIDE mmol/L 102 105 106 107 106 109*   CO2 mmol/L 27 28 34* 32 31 29   BUN mg/dL 14 12 10 7 9 9   CREATININE mg/dL 0 38* 0 32* 0 31* 0 35* 0 50* 0 43*   CALCIUM mg/dL 9 5 9 5 9 3 8 5 9 6 9 9   ALK PHOS U/L  --   --   --  81 62 66   ALT U/L  --   --   --  32 15 12   AST U/L  --   --   --  18 12 11     Results from last 7 days   Lab Units 07/31/21  0632 07/29/21  0616   MAGNESIUM mg/dL 2 0 1 7                        IMAGING & DIAGNOSTIC TESTING     Radiology Results: I have personally reviewed pertinent reports  No results found  Other Diagnostic Testing: I have personally reviewed pertinent reports      ACTIVE MEDICATIONS     Current Facility-Administered Medications   Medication Dose Route Frequency    acetaminophen (TYLENOL) tablet 650 mg  650 mg Oral Q6H PRN    albuterol (PROVENTIL HFA,VENTOLIN HFA) inhaler 2 puff  2 puff Inhalation Q4H PRN    atorvastatin (LIPITOR) tablet 10 mg  10 mg Oral Daily With Dinner    cholecalciferol (VITAMIN D3) tablet 400 Units  400 Units Oral Daily    Diclofenac Sodium (VOLTAREN) 1 % topical gel 2 g  2 g Topical TID PRN    dicyclomine (BENTYL) capsule 10 mg  10 mg Oral 4x Daily (AC & HS)    docusate sodium (COLACE) capsule 100 mg  100 mg Oral BID    enoxaparin (LOVENOX) subcutaneous injection 40 mg  40 mg Subcutaneous Daily    folic acid (FOLVITE) tablet 1 mg  1 mg Oral Daily    gabapentin (NEURONTIN) oral solution 200 mg  200 mg Oral BID    HYDROmorphone (DILAUDID) injection 0 5 mg  0 5 mg Intravenous Once    hydrOXYzine HCL (ATARAX) tablet 25 mg  25 mg Oral Q6H PRN    insulin lispro (HumaLOG) 100 units/mL subcutaneous injection 1-5 Units  1-5 Units Subcutaneous TID AC    LORazepam (ATIVAN) injection 0 5 mg  0 5 mg Intravenous Q6H PRN    menthol-methyl salicylate (BENGAY) 08-81 % cream   Apply externally 4x Daily PRN    ondansetron (ZOFRAN-ODT) dispersible tablet 4 mg  4 mg Oral Q6H PRN    oxyCODONE (ROXICODONE) IR tablet 2 5 mg  2 5 mg Oral Q4H PRN    polyethylene glycol (MIRALAX) packet 17 g  17 g Oral Daily PRN    senna-docusate sodium (SENOKOT S) 8 6-50 mg per tablet 1 tablet  1 tablet Oral BID    sodium chloride 0 9 % infusion  20 mL/hr Intravenous Once PRN    tamsulosin (FLOMAX) capsule 0 4 mg  0 4 mg Oral Daily With Dinner       VTE Pharmacologic Prophylaxis: Enoxaparin (Lovenox)  VTE Mechanical Prophylaxis: sequential compression device    Portions of the record may have been created with voice recognition software  Occasional wrong word or "sound a like" substitutions may have occurred due to the inherent limitations of voice recognition software    Read the chart carefully and recognize, using context, where substitutions have occurred   ==  Reyna East DO  [address] [facility]  Internal Medicine Residency PGY-2

## 2021-08-02 NOTE — UTILIZATION REVIEW
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 for chemotherapy with  Plan for  treatment with high-dose methotrexate  She completed cycle 4 and now is undergoing monitoring for clearance of her methotrexate level  Plan-    Leucovorin rescue  PT/OT evals  Bicarb IV Infusion  PEG tube in place, can have thin liquids, swallow eval, consider PEG tube removal this admission  Assessment/Plan:   Pt with low potassium of 2 9 , repleted   Mag 1 9 s/p repletion  Plan for repeat BMP 1400 today and replete  as needed    Cleared by speech and Swallow for advancing her diet  Pt tolerating dysphagia diet  Pt remains on IV leucovorin q 6h and remains on IV bicarb drip  Denies any nausea, vomiting, diarrhea, constipation, fevers or chills  Pt reports LLE pain -gabapentin increased and flexeril d/c'ed  Pt using prn po narcotic analgesic, topical analgesic for pain  Pt receiving IV Ativan for agitation  Left upper extremity 2/5, left lower extremity 3/5 muscle strength generalized weakness  Will continue to monitor  methotrexate level daily, levels downtrending    She is a little more somnolent today and can consider decreasing her pain medication  Evaluating potential to remove PEG tube this admission since she is eating        Vital Signs:   07/30/21   16:11:13  07/30/21   2211  07/30/21   1611   7//30/21 0707    Vital Signs   Temp  --  98 9 °F (37 2  °C)   98 1 °F (36 7  °C)    Temp src  --  Oral   --    Pulse  120Abnormal   113Abnormal    91    Heart Rate Source  --  Monitor   --    Resp  --  18   20    BP  94/60  101/70   107/69    MAP (mmHg)  71  80   82          Pertinent Labs/Diagnostic Results:       Results from last 7 days   Lab Units 07/30/21  0547 07/29/21  0616 07/28/21  1712   WBC Thousand/uL 5 19 7 06 6 77   HEMOGLOBIN g/dL 9 6* 11 2* 12 3   HEMATOCRIT % 30 4* 34 3* 37 8 PLATELETS Thousands/uL 176 237 264   NEUTROS ABS Thousands/µL 3 07 5 80 3 50         Results from last 7 days   Lab Units 07/30/21  1309 07/30/21  0547 07/29/21  0616   SODIUM mmol/L 142 142 141   POTASSIUM mmol/L 4 2 2 9* 3 4*   CHLORIDE mmol/L 109* 107 106   CO2 mmol/L 32 32 31   ANION GAP mmol/L 1* 3* 4   BUN mg/dL 5 7 9   CREATININE mg/dL 0 37* 0 35* 0 50*   EGFR ml/min/1 73sq m 122 124 110   CALCIUM mg/dL 8 9 8 5 9 6   MAGNESIUM mg/dL  --   --  1 7     Results from last 7 days   Lab Units 07/30/21  0547 07/29/21  0616 07/28/21  1712   AST U/L 18 12 11   ALT U/L 32 15 12   ALK PHOS U/L 81 62 66   TOTAL PROTEIN g/dL 5 0* 5 7* 6 1*   ALBUMIN g/dL 2 5* 3 0* 3 1*   TOTAL BILIRUBIN mg/dL 0 14* 0 45 0 22     Results from last 7 days   Lab Units 07/30/21  2104 07/30/21  1612   POC GLUCOSE mg/dl 219* 173*     Results from last 7 days   Lab Units 07/30/21  1309 07/30/21  0547 07/29/21  0616 07/28/21  1712   GLUCOSE RANDOM mg/dL 120 178* 232* 176*         Results from last 7 days   Lab Units 07/29/21  0616   HEMOGLOBIN A1C % 5 4   EAG mg/dl 108             Results from last 7 days   Lab Units 07/29/21  0621   HEP B S AG  Non-reactive   HEP C AB  Non-reactive   HEP B C IGM  Non-reactive   HEP B C TOTAL AB  Non-reactive           Results from last 7 days   Lab Units 07/30/21  0055 07/29/21  0838   CLARITY UA  Clear Clear   COLOR UA  Yellow Yellow   SPEC GRAV UA  1 010 1 007   PH UA  8 5* 8 5*   GLUCOSE UA mg/dl Negative 100 (1/10%)*   KETONES UA mg/dl Negative Negative   BLOOD UA  Negative Negative   PROTEIN UA mg/dl Negative Negative   NITRITE UA  Negative Negative   BILIRUBIN UA  Negative Negative   UROBILINOGEN UA E U /dl 0 2 0 2   LEUKOCYTES UA  Negative Negative   WBC UA /hpf 2-4 None Seen   RBC UA /hpf None Seen None Seen   BACTERIA UA /hpf None Seen None Seen   EPITHELIAL CELLS WET PREP /hpf None Seen None Seen   Results for Myles Damon (MRN [id number]) as of 8/2/2021 13:36   Ref   Range 7/29/2021 21:07 7/30/2021 05:47 7/31/2021 20:22   Methotrexate Lvl Latest Units: umol/L 3 80 0 74 <0 10         Medications:   Scheduled Medications:  atorvastatin, 10 mg, Oral, Daily With Dinner  cholecalciferol, 400 Units, Oral, Daily  dicyclomine, 10 mg, Oral, 4x Daily (AC & HS)  docusate sodium, 100 mg, Oral, BID  enoxaparin, 40 mg, Subcutaneous, Daily  folic acid, 1 mg, Oral, Daily  gabapentin, 200 mg, Oral, BID  insulin lispro, 1-5 Units, Subcutaneous, TID AC  senna-docusate sodium, 1 tablet, Oral, BID  tamsulosin, 0 4 mg, Oral, Daily With Dinner  potassium chloride oral solution 40 mEq   Dose: 40 mEq  Freq: Once Route: PO x1 7/30  potassium chloride 20 mEq IVPB (premix)   Dose: 20 mEq  Freq: Every 2 hours scheduled Route: IV  Last Dose: Stopped (07/30/21 2208  received x2 7/30     magnesium sulfate 2 g/50 mL IVPB (premix) 2 g   Dose: 2 g  Freq: Once Route: IV x1 7/30  leucovorin 25 mg in sodium chloride 0 9 % 50 mL IVPB   Dose: 25 mg  Freq: Every 6 hours Route: IV    Continuous IV Infusions:sodium bicarbonate 100 mEq in dextrose 5 % 1,000 mL infusion   Rate: 150 mL/hr Dose: 150 mL/hr  Freq: Continuous Route: IV     PRN Meds:  acetaminophen, 650 mg, Oral, Q6H PRN  albuterol, 2 puff, Inhalation, Q4H PRN  Diclofenac Sodium, 2 g, Topical, TID PRN  hydrOXYzine HCL, 25 mg, Oral, Q6H PRN x2 7/30  LORazepam, 0 5 mg, Intravenous, Q6H PRN x2 7/30  menthol-methyl salicylate, , Apply externally, 4x Daily PRN x1 7/30  ondansetron, 4 mg, Oral, Q6H PRN  oxyCODONE, 2 5 mg, Oral, Q4H PRN x2 7/30  polyethylene glycol, 17 g, Oral, Daily PRN  sodium chloride, 20 mL/hr, Intravenous, Once PRN        Discharge Plan:D    Network Utilization Review Department  ATTENTION: Please call with any questions or concerns to [phone] and carefully listen to the prompts so that you are directed to the right person   All voicemails are confidential   Claudell Patient all requests for admission clinical reviews, approved or denied determinations and any other requests to dedicated fax number below belonging to the campus where the patient is receiving treatment   List of dedicated fax numbers for the Facilities:  [address] [place] [address] DENIALS (Administrative/Medical Necessity) [phone]   1000  [facility] (Maternity/NICU/Pediatrics) [phone]   [address] [place] [address] [facility] [phone] [address] [facility] [place] [address] [phone]

## 2021-08-02 NOTE — PHYSICAL THERAPY NOTE
PHYSICAL THERAPY NOTE          Patient Name: Jaun Burciaga  [id number] Date: 8/2/2021 08/02/21 1219   PT Last Visit   PT Visit Date 08/02/21   Note Type   Note Type Treatment   Pain Assessment   Pain Assessment Tool FLACC   Pain Location/Orientation Orientation: Left; Location: Arm;Location: Leg   Patient's Stated Pain Goal No pain   Hospital Pain Intervention(s) Repositioned; Ambulation/increased activity   Pain Rating: FLACC (Rest) - Face 0   Pain Rating: FLACC (Rest) - Legs 0   Pain Rating: FLACC (Rest) - Activity 0   Pain Rating: FLACC (Rest) - Cry 0   Pain Rating: FLACC (Rest) - Consolability 0   Score: FLACC (Rest) 0   Pain Rating: FLACC (Activity) - Face 1   Pain Rating: FLACC (Activity) - Legs 1   Pain Rating: FLACC (Activity) - Activity 0   Pain Rating: FLACC (Activity) - Cry 1   Pain Rating: FLACC (Activity) - Consolability 0   Score: FLACC (Activity) 3   Restrictions/Precautions   Weight Bearing Precautions Per Order No   Other Precautions Cognitive; Chair Alarm; Bed Alarm; Fall Risk;Pain   General   Chart Reviewed Yes   Response to Previous Treatment Patient unable to report, no changes reported from family or staff   Family/Caregiver Present No   Cognition   Overall Cognitive Status Impaired   Arousal/Participation Responsive; Cooperative   Attention Attends with cues to redirect   Memory Unable to assess   Following Commands Follows one step commands with increased time or repetition   Bed Mobility   Supine to Sit 2  Maximal assistance   Additional items Assist x 1;HOB elevated; Increased time required;Verbal cues;LE management   Sit to Supine Unable to assess   Additional Comments Pt supine in bed upon arrival  pt sitting upright in bedside chair with chair alarm on with all needs within reach at the end of therapy session  Transfers   Sit to Stand 2  Maximal assistance   Additional items Assist x 2; Increased time required;Verbal cues  (+ L knee block )   Stand to Sit 2  Maximal assistance   Additional items Assist x 2; Increased time required;Verbal cues   Stand pivot 2  Maximal assistance   Additional items Assist x 2; Increased time required;Verbal cues  (+ L knee blcok )   Toilet transfer 2  Maximal assistance   Additional items Assist x 2; Increased time required;Standard toilet   Additional Comments Transfers with b/l HHA + L knee block  Pt performed 6x STS + 3x SPT throughout therapy session    Ambulation/Elevation   Gait pattern Not appropriate  (2* poor stadning tolerance )   Balance   Static Sitting Fair -   Dynamic Sitting Poor +   Static Standing Poor -   Dynamic Standing Poor -   Endurance Deficit   Endurance Deficit Yes   Endurance Deficit Description fatigue, pain, deconditioning    Activity Tolerance   Activity Tolerance Patient limited by fatigue;Patient limited by pain   Medical Staff Made Aware OT Rodrigo Benavides; co-treat performed this date 2* increased medical complexity and multiple co-morbidities  PT focused on bed mobility, sitting static/ dynamic balance, functional transfers    Nurse Made Aware RN cleared pt to particiate in therapy session    Exercises   Neuro re-ed unsupported sitting on EOB and standard toilet with close S- CGA ~15 min    Assessment   Prognosis Fair   Problem List Decreased strength;Decreased endurance; Impaired balance;Decreased mobility; Decreased safety awareness;Decreased coordination;Decreased cognition;Pain   Assessment Pt seen for PT treatment session this date  Therapy session focused on bed mobility, sitting static/ dynamic balance, functional transfers, and endurance training in order to improve overall mobility and independence  Pt requires assist of 1-2 for all mobility performed this date  Pt performed bed mobility tasks with max Ax1  Pt sitting unsupported ~15 min at close S- CGA on EOB and standard toilet   Pt performed multiple STS and SPT from EOB/ bedside chair/ standard toilet this date with max Ax2 using b/l HHA  While on toilet pt accidentally pulled IV on dorsum of hand out while trying to assist in hygiene tasks- RN notified  Pt making progress toward goals  Pt was left sitting upright in bedside chair with chair alarm on at the end of PT session with all needs in reach  Pt would benefit from continued PT services while in hospital to address remaining limitations  PT to continue to follow pt and recommends post-acute rehab services after d/c  The patient's AM-PAC Basic Mobility Inpatient Short Form Low Function Raw Score 16 , Standardized Score is 25 72  A standardized score less 42 9 suggests the patient may benefit from discharge to post-acute rehab services  Please also refer to the recommendation of the Physical Therapist for safe discharge planning  Barriers to Discharge Inaccessible home environment;Decreased caregiver support   Goals   Patient Goals to go to the bathroom    STG Expiration Date 08/12/21   PT Treatment Day 1   Plan   Treatment/Interventions Functional transfer training;LE strengthening/ROM; Endurance training;Patient/family training;Bed mobility;Spoke to nursing;OT   Progress Progressing toward goals   PT Frequency 2-3x/wk   Recommendation   PT Discharge Recommendation Post acute rehabilitation services   PT - OK to Discharge Yes  (to rehab once medically cleared )   AM-PAC Basic Mobility Inpatient   Turning in Bed Without Bedrails 3   Lying on Back to Sitting on Edge of Flat Bed 2   Moving Bed to Chair 1   Standing Up From Chair 1   Walk in Room 1   Climb 3-5 Stairs 1   Basic Mobility Inpatient Raw Score 9   Turning Head Towards Sound 4   Follow Simple Instructions 3   Low Function Basic Mobility Raw Score 16   Low Function Basic Mobility Standardized Score 25 72     Zhang Chung, PT, DPT

## 2021-08-02 NOTE — CASE MANAGEMENT
CM was notified that pt is now medically ready for d/c  CM will need to go for auth for pt to return to SNF  CM will follow

## 2021-08-02 NOTE — PLAN OF CARE
Problem: OCCUPATIONAL THERAPY ADULT  Goal: Performs self-care activities at highest level of function for planned discharge setting  See evaluation for individualized goals  Description: Treatment Interventions: ADL retraining, Functional transfer training, UE strengthening/ROM, Endurance training, Cognitive reorientation, Patient/family training, Equipment evaluation/education, Neuromuscular reeducation, Fine motor coordination activities, Compensatory technique education, Continued evaluation, Energy conservation, Activityengagement          See flowsheet documentation for full assessment, interventions and recommendations  Outcome: Progressing  Note: Limitation: Decreased ADL status, Decreased UE ROM, Decreased UE strength, Decreased Safe judgement during ADL, Decreased cognition, Decreased endurance, Decreased self-care trans, Decreased high-level ADLs, Decreased fine motor control, Decreased sensation  Prognosis: Fair  Assessment: Patient participated in Skilled OT session this date with interventions consisting of ADL re training with the use of correct body mechnaics, safety awareness and fall prevention techniques,  therapeutic activities to: increase activity tolerance and increase OOB/ sitting tolerance   Upon arrival patient was found supine in bed  Pt demonstrated the following tasks: MAX A x 1 sup to sit, MAX A x 2 STS, MAX A x 2 SPT, MAX A x 2 toilet transfer  Pt maintains EOB sitting position with CS/CGA  Pt completes toileting with MOD A for hygiene - pt attempts buttocks hygiene (hand IV fell off - RN aware)  Patient continues to be functioning below baseline level, occupational performance remains limited secondary to factors listed above and increased risk for falls and injury  From OT standpoint, recommendation at time of d/c would be STR    Patient to benefit from continued Occupational Therapy treatment while in the hospital to address deficits as defined above and maximize level of functional independence with ADLs and functional mobility  Pt was left in chair with alarm on after session with all current needs met  The patient's raw score on the AM-PAC Daily Activity inpatient short form is 14, standardized score is 33 39, less than 39 4  Patients at this level are likely to benefit from discharge to post-acute rehabilitation services  Please refer to the recommendation of the Occupational Therapist for safe discharge planning       OT Discharge Recommendation: Post acute rehabilitation services  OT - OK to Discharge: Yes (to rehab when medically stable )

## 2021-08-02 NOTE — NURSING NOTE
Patient resting comfortably  Bed alarm armed and line from bed plugged into wall  Bed in low position  J-tube intact  PICC line intact

## 2021-08-03 PROBLEM — L03.90 CELLULITIS: Status: ACTIVE | Noted: 2021-08-03

## 2021-08-03 LAB
ANION GAP SERPL CALCULATED.3IONS-SCNC: 5 MMOL/L (ref 4–13)
BASOPHILS # BLD AUTO: 0.03 THOUSANDS/ΜL (ref 0–0.1)
BASOPHILS NFR BLD AUTO: 1 % (ref 0–1)
BUN SERPL-MCNC: 17 MG/DL (ref 5–25)
CALCIUM SERPL-MCNC: 9.2 MG/DL (ref 8.3–10.1)
CHLORIDE SERPL-SCNC: 106 MMOL/L (ref 100–108)
CO2 SERPL-SCNC: 28 MMOL/L (ref 21–32)
CREAT SERPL-MCNC: 0.47 MG/DL (ref 0.6–1.3)
EOSINOPHIL # BLD AUTO: 0.15 THOUSAND/ΜL (ref 0–0.61)
EOSINOPHIL NFR BLD AUTO: 3 % (ref 0–6)
ERYTHROCYTE [DISTWIDTH] IN BLOOD BY AUTOMATED COUNT: 15.2 % (ref 11.6–15.1)
GFR SERPL CREATININE-BSD FRML MDRD: 112 ML/MIN/1.73SQ M
GLUCOSE SERPL-MCNC: 110 MG/DL (ref 65–140)
GLUCOSE SERPL-MCNC: 116 MG/DL (ref 65–140)
GLUCOSE SERPL-MCNC: 159 MG/DL (ref 65–140)
GLUCOSE SERPL-MCNC: 193 MG/DL (ref 65–140)
GLUCOSE SERPL-MCNC: 262 MG/DL (ref 65–140)
HCT VFR BLD AUTO: 33.1 % (ref 34.8–46.1)
HGB BLD-MCNC: 10.4 G/DL (ref 11.5–15.4)
IMM GRANULOCYTES # BLD AUTO: 0.05 THOUSAND/UL (ref 0–0.2)
IMM GRANULOCYTES NFR BLD AUTO: 1 % (ref 0–2)
LYMPHOCYTES # BLD AUTO: 2.17 THOUSANDS/ΜL (ref 0.6–4.47)
LYMPHOCYTES NFR BLD AUTO: 42 % (ref 14–44)
MCH RBC QN AUTO: 29.1 PG (ref 26.8–34.3)
MCHC RBC AUTO-ENTMCNC: 31.4 G/DL (ref 31.4–37.4)
MCV RBC AUTO: 93 FL (ref 82–98)
MONOCYTES # BLD AUTO: 0.31 THOUSAND/ΜL (ref 0.17–1.22)
MONOCYTES NFR BLD AUTO: 6 % (ref 4–12)
MTX SERPL-SCNC: <0.1 UMOL/L
NEUTROPHILS # BLD AUTO: 2.45 THOUSANDS/ΜL (ref 1.85–7.62)
NEUTS SEG NFR BLD AUTO: 47 % (ref 43–75)
NRBC BLD AUTO-RTO: 0 /100 WBCS
PLATELET # BLD AUTO: 193 THOUSANDS/UL (ref 149–390)
PMV BLD AUTO: 11 FL (ref 8.9–12.7)
POTASSIUM SERPL-SCNC: 3.7 MMOL/L (ref 3.5–5.3)
RBC # BLD AUTO: 3.58 MILLION/UL (ref 3.81–5.12)
SODIUM SERPL-SCNC: 139 MMOL/L (ref 136–145)
WBC # BLD AUTO: 5.16 THOUSAND/UL (ref 4.31–10.16)

## 2021-08-03 PROCEDURE — 99254 IP/OBS CNSLTJ NEW/EST MOD 60: CPT | Performed by: INTERNAL MEDICINE

## 2021-08-03 PROCEDURE — 80048 BASIC METABOLIC PNL TOTAL CA: CPT | Performed by: STUDENT IN AN ORGANIZED HEALTH CARE EDUCATION/TRAINING PROGRAM

## 2021-08-03 PROCEDURE — 99232 SBSQ HOSP IP/OBS MODERATE 35: CPT | Performed by: INTERNAL MEDICINE

## 2021-08-03 PROCEDURE — 80204 DRUG ASSAY METHOTREXATE: CPT | Performed by: INTERNAL MEDICINE

## 2021-08-03 PROCEDURE — 85025 COMPLETE CBC W/AUTO DIFF WBC: CPT | Performed by: STUDENT IN AN ORGANIZED HEALTH CARE EDUCATION/TRAINING PROGRAM

## 2021-08-03 PROCEDURE — 82948 REAGENT STRIP/BLOOD GLUCOSE: CPT

## 2021-08-03 RX ORDER — CEPHALEXIN 500 MG/1
500 CAPSULE ORAL EVERY 6 HOURS SCHEDULED
Status: DISCONTINUED | OUTPATIENT
Start: 2021-08-03 | End: 2021-08-05 | Stop reason: HOSPADM

## 2021-08-03 RX ORDER — HYDROMORPHONE HCL/PF 1 MG/ML
0.5 SYRINGE (ML) INJECTION ONCE
Status: COMPLETED | OUTPATIENT
Start: 2021-08-03 | End: 2021-08-03

## 2021-08-03 RX ORDER — OXYCODONE HYDROCHLORIDE 5 MG/1
5 TABLET ORAL EVERY 4 HOURS PRN
Status: DISCONTINUED | OUTPATIENT
Start: 2021-08-03 | End: 2021-08-05 | Stop reason: HOSPADM

## 2021-08-03 RX ORDER — HYDROMORPHONE HCL IN WATER/PF 6 MG/30 ML
0.2 PATIENT CONTROLLED ANALGESIA SYRINGE INTRAVENOUS EVERY 6 HOURS PRN
Status: DISCONTINUED | OUTPATIENT
Start: 2021-08-03 | End: 2021-08-05 | Stop reason: HOSPADM

## 2021-08-03 RX ADMIN — CHOLECALCIFEROL TAB 10 MCG (400 UNIT) 400 UNITS: 10 TAB at 09:20

## 2021-08-03 RX ADMIN — CEPHALEXIN 500 MG: 500 CAPSULE ORAL at 23:48

## 2021-08-03 RX ADMIN — DICYCLOMINE HYDROCHLORIDE 10 MG: 10 CAPSULE ORAL at 21:34

## 2021-08-03 RX ADMIN — LORAZEPAM 0.5 MG: 2 INJECTION INTRAMUSCULAR; INTRAVENOUS at 20:14

## 2021-08-03 RX ADMIN — ATORVASTATIN CALCIUM 10 MG: 10 TABLET, FILM COATED ORAL at 17:59

## 2021-08-03 RX ADMIN — DOCUSATE SODIUM AND SENNOSIDES 1 TABLET: 8.6; 5 TABLET ORAL at 09:20

## 2021-08-03 RX ADMIN — OXYCODONE HYDROCHLORIDE 5 MG: 5 TABLET ORAL at 13:00

## 2021-08-03 RX ADMIN — LORAZEPAM 0.5 MG: 2 INJECTION INTRAMUSCULAR; INTRAVENOUS at 14:15

## 2021-08-03 RX ADMIN — GABAPENTIN 200 MG: 250 SOLUTION ORAL at 17:59

## 2021-08-03 RX ADMIN — ENOXAPARIN SODIUM 40 MG: 40 INJECTION SUBCUTANEOUS at 09:20

## 2021-08-03 RX ADMIN — DICYCLOMINE HYDROCHLORIDE 10 MG: 10 CAPSULE ORAL at 17:59

## 2021-08-03 RX ADMIN — HYDROMORPHONE HYDROCHLORIDE 0.2 MG: 0.2 INJECTION, SOLUTION INTRAMUSCULAR; INTRAVENOUS; SUBCUTANEOUS at 19:47

## 2021-08-03 RX ADMIN — DOCUSATE SODIUM 100 MG: 100 CAPSULE ORAL at 09:20

## 2021-08-03 RX ADMIN — DOCUSATE SODIUM 100 MG: 100 CAPSULE ORAL at 17:59

## 2021-08-03 RX ADMIN — DICYCLOMINE HYDROCHLORIDE 10 MG: 10 CAPSULE ORAL at 06:11

## 2021-08-03 RX ADMIN — FOLIC ACID 1 MG: 1 TABLET ORAL at 09:21

## 2021-08-03 RX ADMIN — TAMSULOSIN HYDROCHLORIDE 0.4 MG: 0.4 CAPSULE ORAL at 17:59

## 2021-08-03 RX ADMIN — GABAPENTIN 200 MG: 250 SOLUTION ORAL at 09:22

## 2021-08-03 RX ADMIN — DOCUSATE SODIUM AND SENNOSIDES 1 TABLET: 8.6; 5 TABLET ORAL at 17:59

## 2021-08-03 RX ADMIN — LORAZEPAM 0.5 MG: 2 INJECTION INTRAMUSCULAR; INTRAVENOUS at 07:18

## 2021-08-03 RX ADMIN — CEPHALEXIN 500 MG: 500 CAPSULE ORAL at 17:59

## 2021-08-03 RX ADMIN — CEPHALEXIN 500 MG: 500 CAPSULE ORAL at 13:00

## 2021-08-03 RX ADMIN — DICLOFENAC SODIUM 2 G: 10 GEL TOPICAL at 20:14

## 2021-08-03 RX ADMIN — INSULIN LISPRO 1 UNITS: 100 INJECTION, SOLUTION INTRAVENOUS; SUBCUTANEOUS at 12:05

## 2021-08-03 RX ADMIN — HYDROMORPHONE HYDROCHLORIDE 0.5 MG: 1 INJECTION, SOLUTION INTRAMUSCULAR; INTRAVENOUS; SUBCUTANEOUS at 06:11

## 2021-08-03 RX ADMIN — DICYCLOMINE HYDROCHLORIDE 10 MG: 10 CAPSULE ORAL at 12:05

## 2021-08-03 RX ADMIN — OXYCODONE HYDROCHLORIDE 5 MG: 5 TABLET ORAL at 23:48

## 2021-08-03 NOTE — ASSESSMENT & PLAN NOTE
Skin infection around PEG site concerning for nonpurulent cellulitis  Streptococcus species is most common bacteria for this type infection  --agree with cephalexin 500 mg q 6 hours for 7 days  --will need this treatment for PEG tube to be removed by Gastroenterology

## 2021-08-03 NOTE — UTILIZATION REVIEW
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 admitted on 7/28/21 for 4th chemotherapy treatment with high dose MTX and Left LE pain which waxes and wanes  Pt received Leucovorin x6 doses IV then po leucovorin which was d/c'ed 8/22    Daily methotrexate levels drawn  Assessment/Plan:8/2- Methotrexate level 8/1 was less than 0 1 so she is ready for discharge once we can get her back to her facility  Pt having  increased pain - will continue to manage pain medication, with need to adjust for sedation  Will keep PEG tube for now  Will continue with PICC line placement each admission  8/3-CBC and CMP all stable, mild anemia, low albumin  She has PEG tube pain/irritation, she no longer uses the PEG  GI consult placed and pt started pt on po abx - Keflex q6h pox 7 days for cellulitis due to pain and erythema at site and will reeval PEG tube removal next admission   Oxycodone increased to q4h prn She has chronic left LE pain which waxes and wanes  FLACC score=5 per nursing this am  Pt receiving po prn  And x1  IV  analgesic for pain  GI- defer PEG tube removal for now given possible cellulitis in that area  Recommend short course of oral antibiotics to resolve possible cellulitis   Mild erythema around the PEG tube site with associated tenderness  No visible drainage or purulence or induration  IPCM working on return to facility, await auth       Vital Signs:   08/03/21 07:28:24  97 4 °F (36 3 °C)Abnormal   112Abnormal   --  105/78  87  97 %  --   08/02/21 22:20:12  99 1 °F (37 3 °C)  129Abnormal   16  103/78  86  94 %  --   08/02/21 2031  --  --  --  --  --  --  None (Room air)   08/02/21 14:51:23  99 4 °F (37 4 °C)  121Abnormal   18  102/77  85  98 %  --         Pertinent Labs/Diagnostic Results:       Results from last 7 days   Lab Units 08/03/21  0932 08/02/21  4842 08/01/21  8000 07/30/21  0547 07/29/21  0616   WBC Thousand/uL 5 16 5 73 4 38 5 19 7 06   HEMOGLOBIN g/dL 10 4* 10 5* 10 2* 9 6* 11 2*   HEMATOCRIT % 33 1* 33 2* 32 3* 30 4* 34 3*   PLATELETS Thousands/uL 193 241 196 176 237   NEUTROS ABS Thousands/µL 2 45 2 68  --  3 07 5 80         Results from last 7 days   Lab Units 08/03/21  0932 08/02/21  0620 08/01/21  0446 07/31/21  0632 07/30/21  1309 07/29/21  0616   SODIUM mmol/L 139 137 140 142 142 141   POTASSIUM mmol/L 3 7 3 8 4 2 3 6 4 2 3 4*   CHLORIDE mmol/L 106 102 105 106 109* 106   CO2 mmol/L 28 27 28 34* 32 31   ANION GAP mmol/L 5 8 7 2* 1* 4   BUN mg/dL 17 14 12 10 5 9   CREATININE mg/dL 0 47* 0 38* 0 32* 0 31* 0 37* 0 50*   EGFR ml/min/1 73sq m 112 120 127 129 122 110   CALCIUM mg/dL 9 2 9 5 9 5 9 3 8 9 9 6   MAGNESIUM mg/dL  --   --   --  2 0  --  1 7     Results from last 7 days   Lab Units 07/30/21  0547 07/29/21  0616 07/28/21  1712   AST U/L 18 12 11   ALT U/L 32 15 12   ALK PHOS U/L 81 62 66   TOTAL PROTEIN g/dL 5 0* 5 7* 6 1*   ALBUMIN g/dL 2 5* 3 0* 3 1*   TOTAL BILIRUBIN mg/dL 0 14* 0 45 0 22     Results from last 7 days   Lab Units 08/03/21  1059 08/03/21  0756 08/02/21  2049 08/02/21  1616 08/02/21  1035 08/01/21  2117 08/01/21  1608 08/01/21  1547 08/01/21  1144 08/01/21  0812 07/31/21  2132 07/31/21  1657   POC GLUCOSE mg/dl 193* 116 117 144* 167* 137 140 128 107 84 135 121     Results from last 7 days   Lab Units 08/03/21  0932 08/02/21  0620 08/01/21  0446 07/31/21  0632 07/30/21  1309 07/30/21  0547 07/29/21  0616 07/28/21  1712   GLUCOSE RANDOM mg/dL 159* 118 97 92 120 178* 232* 176*         Results from last 7 days   Lab Units 07/29/21  0616   HEMOGLOBIN A1C % 5 4   EAG mg/dl 108             Results from last 7 days   Lab Units 07/29/21  0621   HEP B S AG  Non-reactive   HEP C AB  Non-reactive   HEP B C IGM  Non-reactive   HEP B C TOTAL AB  Non-reactive                 Results from last 7 days   Lab Units 07/30/21  0055 07/29/21  0838   CLARITY UA  Clear Clear   COLOR UA  Yellow Yellow   SPEC GRAV UA  1 010 1 007   PH UA  8 5* 8 5*   GLUCOSE UA mg/dl Negative 100 (1/10%)*   KETONES UA mg/dl Negative Negative   BLOOD UA  Negative Negative   PROTEIN UA mg/dl Negative Negative   NITRITE UA  Negative Negative   BILIRUBIN UA  Negative Negative   UROBILINOGEN UA E U /dl 0 2 0 2   LEUKOCYTES UA  Negative Negative   WBC UA /hpf 2-4 None Seen   RBC UA /hpf None Seen None Seen   BACTERIA UA /hpf None Seen None Seen   EPITHELIAL CELLS WET PREP /hpf None Seen None Seen        Results for Rob Shah (MRN [id number]) as of 8/3/2021 13:45   Ref  Range 8/1/2021 22:46 8/3/2021 12:33   Methotrexate Lvl Latest Units: umol/L <0 10 <0 10             Medications:   Scheduled Medications:  atorvastatin, 10 mg, Oral, Daily With Dinner  cephalexin, 500 mg, Oral, Q6H [address]  cholecalciferol, 400 Units, Oral, Daily  dicyclomine, 10 mg, Oral, 4x Daily (AC & HS)  docusate sodium, 100 mg, Oral, BID  enoxaparin, 40 mg, Subcutaneous, Daily  folic acid, 1 mg, Oral, Daily  gabapentin, 200 mg, Oral, BID  insulin lispro, 1-5 Units, Subcutaneous, TID AC  senna-docusate sodium, 1 tablet, Oral, BID  tamsulosin, 0 4 mg, Oral, Daily With Dinner  leucovorin (WELLCOVORIN) tablet 25 mg   Dose: 25 mg  Freq: Every 6 hours Route: PO  Start: 07/31/21 1300 End: 08/02/21 0159    HYDROmorphone (DILAUDID) injection 0 5 mg   Dose: 0 5 mg  Freq:  Once Route: IV x1 8/2, x1 8/3    Continuous IV Infusions:     PRN Meds:  acetaminophen, 650 mg, Oral, Q6H PRN x1 8/2  albuterol, 2 puff, Inhalation, Q4H PRN  Diclofenac Sodium, 2 g, Topical, TID PRN  HYDROmorphone, 0 2 mg, Intravenous, Q6H PRN  hydrOXYzine HCL, 25 mg, Oral, Q6H PRN  LORazepam, 0 5 mg, Intravenous, Q6H PRN x1 8/2, x1 8/3  menthol-methyl salicylate, , Apply externally, 4x Daily PRN  ondansetron, 4 mg, Oral, Q6H PRN x1 8/2  oxyCODONE, 5 mg, Oral, Q4H PRN x1 8/3  polyethylene glycol, 17 g, Oral, Daily PRN  sodium chloride, 20 mL/hr, Intravenous, Once PRN  oxyCODONE (ROXICODONE) IR tablet 2 5 mg   Dose: 2 5 mg  Freq: Every 4 hours PRN Route: PO  PRN Reason: moderate pain  Start: 07/28/21 1633 End: 08/03/21 1223 x4 8/2      Discharge Plan:[facility]    Network Utilization Review Department  ATTENTION: Please call with any questions or concerns to [phone] and carefully listen to the prompts so that you are directed to the right person  All voicemails are confidential   Raimundo Calender all requests for admission clinical reviews, approved or denied determinations and any other requests to dedicated fax number below belonging to the campus where the patient is receiving treatment   List of dedicated fax numbers for the Facilities:  [address] DENIALS (Administrative/Medical Necessity) [phone]   [address] (Maternity/NICU/Pediatrics) [phone] [address] [place] [address] [address] [place] [address] [place] [address] [place] [address] [address] [place] [facility] [facility] [phone]

## 2021-08-03 NOTE — CASE MANAGEMENT
YSABEL spoke with Justin Sheehan from [facility] who confirmed receipt of fax and stated that they went for auth and are waiting for approval  CM will follow

## 2021-08-03 NOTE — PROGRESS NOTES
Progress Note - Lizet Settler 49 y o  female MRN: [id number]    Unit/Bed#: [facility] 918-01 Encounter: [id number]      Assessment/Plan:    1  CNS lymphoma:    s/p here 4th treatment with high dose MTX, she has cleared the MTX and is ready for discharge  MTX < 0 1 on 8/2  CBC and CMP all stable, mild anemia, low albumin  2  Dysphagia, improved  3  Cellulitis of skin surrounding PEG tube    She has PEG tube pain/irritation, she no longer uses the PEG  She has chronic left LE pain which waxes and wanes  Asked GI to evaluate patient and PEG tube at this time due to pain/erythema; would like PEG removed  They agreed given pain and erythema that we should do course of abx, and then re-evaluate PEG tube removal next admission (likely 8/11)  Keflex 500mg Q6 hours x 7 days for cellulitis of skin  4  Pain   - Chronic back/leg pain, oxycodone and gabapentin help   - new pain at PEG site- increase oxycodone to 5mg Q4 hours PRN     5  Access   - PICC line placement was easier this round- will plan to continue with PICC on admission rather than mediport  From our perspective, she is ready for discharge back to her facility  Appreciate SLIM help in her care  Adjusting pain meds for pain control and drowsiness  Communication with patient/family:  I did update the patient, with help of Gill Son (clinic RN) who speaks Yudith Dessert  Communication with team:  Did talk with Dr Ayala Jimenez with GI as per above  I did review this patient with Dr Randell Bonilla and she is in agreement with this plan  ZAK Awan-BC  Hematology/Oncology Nurse Practitioner         Subjective: In pain this morning, moaning and crying out  She has new pain and tenderness at PEG site  She wants her crackers again  back pain is still bothersome, no leg pain today  She tells me pain is 10/10; when she gets pain meds it helps but not completely  No other complaints       Objective:     Vitals: Blood pressure 105/78,

## 2021-08-03 NOTE — CASE MANAGEMENT
CM spoke to Catalina Grace from [facility] who stated that she would go for auth for pt to return to their facility  CM faxed updated clinicals  CM will follow

## 2021-08-03 NOTE — CONSULTS
Consultation - [facility]  Sapna Kaplan 49 y o  female MRN: [id number]  Unit/Bed#: [facility] 918-01 Encounter: [id number]    Reason for Consult / Principal Problem:     PEG tube removal    ASSESSMENT AND PLAN:      1  PEG tube removal - evaluated PEG tube for removal at bedside  However, concern for some underlying cellulitis possibly at the site of gastrostomy tube  Exquisitely tender in the area  No visible drainage or purulence  No visible bleeding  No induration  No other systemic signs of infection  Patient tolerating oral diet without need for any enteral feeds using gastrostomy tube  · Will defer PEG tube removal for now given possible cellulitis in that area  · Recommend short course of oral antibiotics to resolve possible cellulitis   · Patient anticipated for return to the hospital next week for next cycle of chemotherapy   · As such, GI to re-evaluate the patient at that time for removal of PEG tube at bedside   · If necessary, can consider endoscopic removal as well although would like to avoid this to prevent unnecessary need for anesthesia  · Okay for discharge from GI standpoint; discussed with primary team    2  Underlying cellulitis - mild erythema around the PEG tube site with associated tenderness  No visible drainage or purulence or induration  · Recommend short course of antibiotics  · Return to hospital anticipated for removal of PEG to next week      GI will sign off  Please contact the GI fellow on call with any questions or concerns  ______________________________________________________________________    HISTORY OF PRESENT ILLNESS:  53-year-old female with history of CNS lymphoma currently undergoing chemotherapy presented to the hospital for her 4th cycle of chemotherapy  During her hospital stay, patient noted to no longer require her PEG tube which was placed back in 05/2021  As such, GI consultation was requested for removal of the PEG tube  She was also reportedly having pain around the area  She appears to been doing well with her feeds orally and has not required any use of her PEG tube over the last month or so  Otherwise offers no acute GI complaints  Patient is primarily [place] [address]  Language interpretation by [employer] services  REVIEW OF SYSTEMS:    CONSTITUTIONAL: Denies any fever, chills, rigors, and weight loss  HEENT: No earache or tinnitus, denies hearing loss or visual disturbances  CARDIOVASCULAR: No chest pain or palpitations   RESPIRATORY: Denies any cough, hemoptysis, shortness of breath or dyspnea on exertion  GASTROINTESTINAL: As noted in the History of Present Illness   GENITOURINARY: No problems with urination, denies any hematuria or dysuria  NEUROLOGIC: No dizziness or vertigo, denies headaches   MUSCULOSKELETAL: Denies any muscle or joint pain   SKIN: Denies skin rashes or itching   ENDOCRINE: Denies excessive thirst, denies intolerance to heat or cold  PSYCHOSOCIAL: Denies depression or anxiety, denies any recent memory loss     Historical Information   No past medical history on file    Past Surgical History:   Procedure Laterality Date    IR PICC REPOSITION  4/27/2021    IR TUNNELED CENTRAL LINE REMOVAL  4/27/2021     Social History   Social History     Substance and Sexual Activity   Alcohol Use Not Currently     Social History     Substance and Sexual Activity   Drug Use Not on file     Social History     Tobacco Use   Smoking Status Never Smoker   Smokeless Tobacco Never Used     Family History   Problem Relation Age of Onset    No Known Problems Mother        Meds/Allergies   Medications Prior to Admission   Medication    acetaminophen (Tylenol) 325 mg tablet    albuterol (PROVENTIL HFA,VENTOLIN HFA) 90 mcg/act inhaler    allopurinol (ZYLOPRIM) 100 mg tablet    aluminum-magnesium hydroxide-simethicone (MYLANTA) 200-200-20 mg/5 mL suspension    atorvastatin (LIPITOR) 40 mg tablet    bisacodyl (DULCOLAX) 10 mg suppository    Cholecalciferol (Vitamin D3) 1 25 MG (59653 UT) TABS    Diclofenac Sodium (VOLTAREN) 1 %    dicyclomine (BENTYL) 10 mg capsule    docusate sodium (COLACE) 100 mg capsule    famotidine (PEPCID) 20 mg tablet    folic acid (FOLVITE) 1 mg tablet    gabapentin (NEURONTIN) 250 mg/5 mL solution    hydrOXYzine HCL (ATARAX) 25 mg tablet    insulin lispro (HumaLOG) 100 units/mL injection    menthol-methyl salicylate (BENGAY) 74-69 % cream    ondansetron (ZOFRAN-ODT) 4 mg disintegrating tablet    [] oxyCODONE (ROXICODONE) 5 mg/5 mL solution    polyethylene glycol (MIRALAX) 17 g packet    senna-docusate sodium (Senokot S) 8 6-50 mg per tablet    tamsulosin (FLOMAX) 0 4 mg     Current Facility-Administered Medications   Medication Dose Route Frequency    acetaminophen (TYLENOL) tablet 650 mg  650 mg Oral Q6H PRN    albuterol (PROVENTIL HFA,VENTOLIN HFA) inhaler 2 puff  2 puff Inhalation Q4H PRN    atorvastatin (LIPITOR) tablet 10 mg  10 mg Oral Daily With Dinner    cholecalciferol (VITAMIN D3) tablet 400 Units  400 Units Oral Daily    Diclofenac Sodium (VOLTAREN) 1 % topical gel 2 g  2 g Topical TID PRN    dicyclomine (BENTYL) capsule 10 mg  10 mg Oral 4x Daily (AC & HS)    docusate sodium (COLACE) capsule 100 mg  100 mg Oral BID    enoxaparin (LOVENOX) subcutaneous injection 40 mg  40 mg Subcutaneous Daily    folic acid (FOLVITE) tablet 1 mg  1 mg Oral Daily    gabapentin (NEURONTIN) oral solution 200 mg  200 mg Oral BID    HYDROmorphone HCl (DILAUDID) injection 0 2 mg  0 2 mg Intravenous Q6H PRN    hydrOXYzine HCL (ATARAX) tablet 25 mg  25 mg Oral Q6H PRN    insulin lispro (HumaLOG) 100 units/mL subcutaneous injection 1-5 Units  1-5 Units Subcutaneous TID AC    LORazepam (ATIVAN) injection 0 5 mg  0 5 mg Intravenous Q6H PRN    menthol-methyl salicylate (BENGAY) 76-82 % cream   Apply externally 4x Daily PRN    ondansetron (ZOFRAN-ODT) dispersible tablet 4 mg 4 mg Oral Q6H PRN    oxyCODONE (ROXICODONE) IR tablet 2 5 mg  2 5 mg Oral Q4H PRN    oxyCODONE (ROXICODONE) IR tablet 5 mg  5 mg Oral Q4H PRN    polyethylene glycol (MIRALAX) packet 17 g  17 g Oral Daily PRN    senna-docusate sodium (SENOKOT S) 8 6-50 mg per tablet 1 tablet  1 tablet Oral BID    sodium chloride 0 9 % infusion  20 mL/hr Intravenous Once PRN    tamsulosin (FLOMAX) capsule 0 4 mg  0 4 mg Oral Daily With Dinner     No Known Allergies      PHYSICAL EXAM:      Objective   Blood pressure 105/78, pulse (!) 112, temperature (!) 97 4 °F (36 3 °C), resp  rate 16, height 5' (1 524 m), weight 55 kg (121 lb 3 2 oz), SpO2 97 %  Body mass index is 23 67 kg/m²  Intake/Output Summary (Last 24 hours) at 8/3/2021 1205  Last data filed at 8/3/2021 0240  Gross per 24 hour   Intake --   Output 800 ml   Net -800 ml       General Appearance:   Alert, cooperative, no distress, elderly female   HEENT:   Normocephalic, atraumatic, anicteric     Neck:   Supple, symmetrical, trachea midline   Lungs:   Respirations unlabored    Heart:   Regular rate and rhythm   Abdomen:   Soft, tender to palpation in area of PEG tube, non-distended; normal bowel sounds; no masses, no organomegaly; localized erythema and tenderness at gastrostomy site without any visible purulent drainage although with some foul-smelling aroma     Genitalia:   Deferred    Rectal:   Deferred    Extremities:   No cyanosis, clubbing or edema    Pulses:   2+ and symmetric all extremities    Skin:   No jaundice, rashes, or lesions    Lymph Nodes:   No palpable cervical lymphadenopathy        LAB RESULTS:     No results displayed because visit has over 200 results  RADIOLOGY RESULTS: I have personally reviewed pertinent imaging studies  WENDY Rodriguez  Gastroenterology Fellow  [facility]  Available on [email or web address]

## 2021-08-03 NOTE — PROGRESS NOTES
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)       Direct Admit from Dr Missy Bowens for in-patient chemo 3rd cycle       S/P chemotherapy on 05/13, [facility] Ponto 5/14, 7/18 6/9 MRI- Appears to be interval improvement in the overall enhancement of multifocal      intracranial lesions       P:Per Primary- received 4th session of IV high-dose methotrexate, cleared of methotrexate levels-->pending placement     DVT prophylaxis with Lovenox     Received 6 doses Leucovorin        Cellulitis  Assessment & Plan  Skin infection around PEG site concerning for nonpurulent cellulitis  Streptococcus species is most common bacteria for this type infection  --agree with cephalexin 500 mg q 6 hours for 7 days  --will need this treatment for PEG tube to be removed by Gastroenterology    Cancer related pain  Assessment & Plan  Gabapentin 200mg hs  Acetaminophen 650mg q4h PRN for mild  Oxy 5 mg q4h PRN for severe  Hydromorphone 0 2mg q4h PRN for breakthrough    Ambulatory dysfunction  Assessment & Plan  Known residual deficits left upper extremity and lower extremity weakness secondary to CNS lymphoma    PT/OT recommended rehab    Dysphagia  Assessment & Plan  Tolerating dysphagia diet     Hypokalemia-resolved as of 8/1/2021  Assessment & Plan  A: potassium 2 9  Likely in setting of poor p o  Intake/poor absorption in setting of chemotherapy and malnutrition  Potassium 4 2 Status post repletion  Mg 1 9    Status post repletion  Now resolved        Disposition:  As directed by primary team    SUBJECTIVE     Patient seen and examined  No acute events overnight  Difficult to communicate with patient given language barrier, so unable to obtain ROS  OBJECTIVE     Vitals:    21 2220 21 0600 21 0728 21 0920   BP: 103/78  105/78    Pulse: (!) 129  (!) 112    Resp: 16      Temp: 99 1 °F (37 3 °C)  (!) 97 4 °F (36 3 °C)    TempSrc:       SpO2: 94%  97% 97%   Weight:  55 kg (121 lb 3 2 oz)     Height:          Temperature:   Temp (24hrs), Av 6 °F (37 °C), Min:97 4 °F (36 3 °C), Max:99 4 °F (37 4 °C)    Temperature: (!) 97 4 °F (36 3 °C)  Intake & Output:  I/O       701 -  07 -  07 07 -  0700    P  O  600 180     I V  (mL/kg)       Total Intake(mL/kg) 600 (10 9) 180 (3 3)     Urine (mL/kg/hr) 1450 (1 1) 1000 (0 8)     Stool  0     Total Output 1450 1000     Net -850 -820            Unmeasured Stool Occurrence  1 x         Weights:   IBW (Ideal Body Weight): 45 5 kg    Body mass index is 23 67 kg/m²  Weight (last 2 days)     Date/Time   Weight    21 06   55 (121 2)    21 0600   55 (121 25)            Physical Exam  Vitals and nursing note reviewed  Constitutional:       General: She is not in acute distress  Appearance: She is well-developed  HENT:      Head: Normocephalic and atraumatic  Eyes:      Conjunctiva/sclera: Conjunctivae normal    Cardiovascular:      Rate and Rhythm: Normal rate and regular rhythm  Heart sounds: No murmur heard  Pulmonary:      Effort: Pulmonary effort is normal  No respiratory distress  Breath sounds: Normal breath sounds  Abdominal:      Palpations: Abdomen is soft  Tenderness: There is abdominal tenderness (mildly tender diffusely this morning)  Musculoskeletal:      Cervical back: Neck supple  Right lower leg: No edema  Left lower leg: No edema     Skin:     General: Skin is 40 mg  40 mg Subcutaneous Daily    folic acid (FOLVITE) tablet 1 mg  1 mg Oral Daily    gabapentin (NEURONTIN) oral solution 200 mg  200 mg Oral BID    HYDROmorphone HCl (DILAUDID) injection 0 2 mg  0 2 mg Intravenous Q6H PRN    hydrOXYzine HCL (ATARAX) tablet 25 mg  25 mg Oral Q6H PRN    insulin lispro (HumaLOG) 100 units/mL subcutaneous injection 1-5 Units  1-5 Units Subcutaneous TID AC    LORazepam (ATIVAN) injection 0 5 mg  0 5 mg Intravenous Q6H PRN    menthol-methyl salicylate (BENGAY) 14-42 % cream   Apply externally 4x Daily PRN    ondansetron (ZOFRAN-ODT) dispersible tablet 4 mg  4 mg Oral Q6H PRN    oxyCODONE (ROXICODONE) IR tablet 5 mg  5 mg Oral Q4H PRN    polyethylene glycol (MIRALAX) packet 17 g  17 g Oral Daily PRN    senna-docusate sodium (SENOKOT S) 8 6-50 mg per tablet 1 tablet  1 tablet Oral BID    sodium chloride 0 9 % infusion  20 mL/hr Intravenous Once PRN    tamsulosin (FLOMAX) capsule 0 4 mg  0 4 mg Oral Daily With Dinner       VTE Pharmacologic Prophylaxis: Enoxaparin (Lovenox)  VTE Mechanical Prophylaxis: sequential compression device    Portions of the record may have been created with voice recognition software  Occasional wrong word or "sound a like" substitutions may have occurred due to the inherent limitations of voice recognition software    Read the chart carefully and recognize, using context, where substitutions have occurred   ==  Cindy Rush DO  [address]  Internal Medicine Residency PGY-2

## 2021-08-04 LAB
ANION GAP SERPL CALCULATED.3IONS-SCNC: 7 MMOL/L (ref 4–13)
BASOPHILS # BLD AUTO: 0.06 THOUSANDS/ΜL (ref 0–0.1)
BASOPHILS NFR BLD AUTO: 1 % (ref 0–1)
BUN SERPL-MCNC: 19 MG/DL (ref 5–25)
CALCIUM SERPL-MCNC: 8.5 MG/DL (ref 8.3–10.1)
CHLORIDE SERPL-SCNC: 108 MMOL/L (ref 100–108)
CO2 SERPL-SCNC: 25 MMOL/L (ref 21–32)
CREAT SERPL-MCNC: 0.29 MG/DL (ref 0.6–1.3)
EOSINOPHIL # BLD AUTO: 0.25 THOUSAND/ΜL (ref 0–0.61)
EOSINOPHIL NFR BLD AUTO: 6 % (ref 0–6)
ERYTHROCYTE [DISTWIDTH] IN BLOOD BY AUTOMATED COUNT: 15.5 % (ref 11.6–15.1)
GFR SERPL CREATININE-BSD FRML MDRD: 132 ML/MIN/1.73SQ M
GLUCOSE SERPL-MCNC: 121 MG/DL (ref 65–140)
GLUCOSE SERPL-MCNC: 131 MG/DL (ref 65–140)
GLUCOSE SERPL-MCNC: 146 MG/DL (ref 65–140)
GLUCOSE SERPL-MCNC: 160 MG/DL (ref 65–140)
HCT VFR BLD AUTO: 30.4 % (ref 34.8–46.1)
HGB BLD-MCNC: 9.2 G/DL (ref 11.5–15.4)
IMM GRANULOCYTES # BLD AUTO: 0.09 THOUSAND/UL (ref 0–0.2)
IMM GRANULOCYTES NFR BLD AUTO: 2 % (ref 0–2)
LYMPHOCYTES # BLD AUTO: 2.28 THOUSANDS/ΜL (ref 0.6–4.47)
LYMPHOCYTES NFR BLD AUTO: 51 % (ref 14–44)
MCH RBC QN AUTO: 28.3 PG (ref 26.8–34.3)
MCHC RBC AUTO-ENTMCNC: 30.3 G/DL (ref 31.4–37.4)
MCV RBC AUTO: 94 FL (ref 82–98)
MONOCYTES # BLD AUTO: 0.44 THOUSAND/ΜL (ref 0.17–1.22)
MONOCYTES NFR BLD AUTO: 10 % (ref 4–12)
NEUTROPHILS # BLD AUTO: 1.34 THOUSANDS/ΜL (ref 1.85–7.62)
NEUTS SEG NFR BLD AUTO: 30 % (ref 43–75)
NRBC BLD AUTO-RTO: 0 /100 WBCS
PLATELET # BLD AUTO: 169 THOUSANDS/UL (ref 149–390)
PMV BLD AUTO: 11.2 FL (ref 8.9–12.7)
POTASSIUM SERPL-SCNC: 3.4 MMOL/L (ref 3.5–5.3)
RBC # BLD AUTO: 3.25 MILLION/UL (ref 3.81–5.12)
SODIUM SERPL-SCNC: 140 MMOL/L (ref 136–145)
WBC # BLD AUTO: 4.46 THOUSAND/UL (ref 4.31–10.16)

## 2021-08-04 PROCEDURE — 99232 SBSQ HOSP IP/OBS MODERATE 35: CPT | Performed by: INTERNAL MEDICINE

## 2021-08-04 PROCEDURE — 80048 BASIC METABOLIC PNL TOTAL CA: CPT | Performed by: STUDENT IN AN ORGANIZED HEALTH CARE EDUCATION/TRAINING PROGRAM

## 2021-08-04 PROCEDURE — 80204 DRUG ASSAY METHOTREXATE: CPT | Performed by: INTERNAL MEDICINE

## 2021-08-04 PROCEDURE — 85025 COMPLETE CBC W/AUTO DIFF WBC: CPT | Performed by: STUDENT IN AN ORGANIZED HEALTH CARE EDUCATION/TRAINING PROGRAM

## 2021-08-04 PROCEDURE — 82948 REAGENT STRIP/BLOOD GLUCOSE: CPT

## 2021-08-04 RX ORDER — POTASSIUM CHLORIDE 14.9 MG/ML
20 INJECTION INTRAVENOUS ONCE
Status: COMPLETED | OUTPATIENT
Start: 2021-08-04 | End: 2021-08-04

## 2021-08-04 RX ADMIN — LORAZEPAM 0.5 MG: 2 INJECTION INTRAMUSCULAR; INTRAVENOUS at 11:34

## 2021-08-04 RX ADMIN — CEPHALEXIN 500 MG: 500 CAPSULE ORAL at 11:34

## 2021-08-04 RX ADMIN — FOLIC ACID 1 MG: 1 TABLET ORAL at 11:34

## 2021-08-04 RX ADMIN — DOCUSATE SODIUM 100 MG: 100 CAPSULE ORAL at 18:45

## 2021-08-04 RX ADMIN — ENOXAPARIN SODIUM 40 MG: 40 INJECTION SUBCUTANEOUS at 11:33

## 2021-08-04 RX ADMIN — DICYCLOMINE HYDROCHLORIDE 10 MG: 10 CAPSULE ORAL at 11:34

## 2021-08-04 RX ADMIN — TAMSULOSIN HYDROCHLORIDE 0.4 MG: 0.4 CAPSULE ORAL at 18:44

## 2021-08-04 RX ADMIN — GABAPENTIN 200 MG: 250 SOLUTION ORAL at 11:36

## 2021-08-04 RX ADMIN — DOCUSATE SODIUM AND SENNOSIDES 1 TABLET: 8.6; 5 TABLET ORAL at 18:44

## 2021-08-04 RX ADMIN — CEPHALEXIN 500 MG: 500 CAPSULE ORAL at 05:16

## 2021-08-04 RX ADMIN — ATORVASTATIN CALCIUM 10 MG: 10 TABLET, FILM COATED ORAL at 18:45

## 2021-08-04 RX ADMIN — HYDROMORPHONE HYDROCHLORIDE 0.2 MG: 0.2 INJECTION, SOLUTION INTRAMUSCULAR; INTRAVENOUS; SUBCUTANEOUS at 02:16

## 2021-08-04 RX ADMIN — CHOLECALCIFEROL TAB 10 MCG (400 UNIT) 400 UNITS: 10 TAB at 11:35

## 2021-08-04 RX ADMIN — LORAZEPAM 0.5 MG: 2 INJECTION INTRAMUSCULAR; INTRAVENOUS at 02:52

## 2021-08-04 RX ADMIN — INSULIN LISPRO 1 UNITS: 100 INJECTION, SOLUTION INTRAVENOUS; SUBCUTANEOUS at 11:33

## 2021-08-04 RX ADMIN — DOCUSATE SODIUM AND SENNOSIDES 1 TABLET: 8.6; 5 TABLET ORAL at 11:34

## 2021-08-04 RX ADMIN — DOCUSATE SODIUM 100 MG: 100 CAPSULE ORAL at 11:36

## 2021-08-04 RX ADMIN — OXYCODONE HYDROCHLORIDE 5 MG: 5 TABLET ORAL at 06:41

## 2021-08-04 RX ADMIN — DICYCLOMINE HYDROCHLORIDE 10 MG: 10 CAPSULE ORAL at 21:43

## 2021-08-04 RX ADMIN — DICYCLOMINE HYDROCHLORIDE 10 MG: 10 CAPSULE ORAL at 05:16

## 2021-08-04 RX ADMIN — OXYCODONE HYDROCHLORIDE 5 MG: 5 TABLET ORAL at 18:45

## 2021-08-04 RX ADMIN — HYDROMORPHONE HYDROCHLORIDE 0.2 MG: 0.2 INJECTION, SOLUTION INTRAMUSCULAR; INTRAVENOUS; SUBCUTANEOUS at 11:33

## 2021-08-04 RX ADMIN — CEPHALEXIN 500 MG: 500 CAPSULE ORAL at 18:45

## 2021-08-04 RX ADMIN — POTASSIUM CHLORIDE 20 MEQ: 14.9 INJECTION, SOLUTION INTRAVENOUS at 14:09

## 2021-08-04 RX ADMIN — DICYCLOMINE HYDROCHLORIDE 10 MG: 10 CAPSULE ORAL at 18:45

## 2021-08-04 RX ADMIN — POTASSIUM CHLORIDE 20 MEQ: 14.9 INJECTION, SOLUTION INTRAVENOUS at 11:33

## 2021-08-04 RX ADMIN — GABAPENTIN 200 MG: 250 SOLUTION ORAL at 18:44

## 2021-08-04 NOTE — NURSING NOTE
Pt is experiencing fecal impaction- her anus was very dilated this afternoon and showed signs of slight tearing at the top  I helped to remove a medium BM, pt seemed to be in severe pain throughout

## 2021-08-04 NOTE — PROGRESS NOTES
Progress Note - Sandhya Russell 49 y o  female MRN: [id number]    Unit/Bed#: [facility] 918-01 Encounter: [id number]      Assessment/Plan:    1  CNS lymphoma:    s/p here 4th treatment with high dose MTX, she has cleared the MTX and is ready for discharge  MTX < 0 1 on 8/2  CBC and CMP all stable, mild anemia, low albumin  2  Dysphagia, improved  3  Cellulitis of skin surrounding PEG tube    She has PEG tube pain/irritation, she no longer uses the PEG  She has chronic left LE pain which waxes and wanes  Asked GI to evaluate patient and PEG tube at this time due to pain/erythema; would like PEG removed  They agreed given pain and erythema that we should do course of abx, and then re-evaluate PEG tube removal next admission (likely 8/11)  Continue Keflex 500mg Q6 hours x 7 days for cellulitis of skin- improving    4  Pain   - Chronic back/leg pain, oxycodone and gabapentin help   - new pain at PEG site- increase oxycodone to 5mg Q4 hours PRN     5  Access   - PICC line placement was easier this round- will plan to continue with PICC on admission rather than mediport  From our perspective, she is ready for discharge back to her facility  Appreciate SLIM help in her care  Adjusting pain meds for pain control and drowsiness  Communication with patient/family:  I did update the patient, with help of Kinza Aggarwal (clinic RN) who speaks So Fuller  Communication with team:  Did talk with Dr Zay Goldman with GI as per above  I did review this patient with Dr Barrera Cornell and she is in agreement with this plan  ZAK Conrad-BC  Hematology/Oncology Nurse Practitioner         Subjective:   Pt more calm today  Less tenderness at PEG site  No other complaints today  Objective:     Vitals: Blood pressure 115/77, pulse (!) 109, temperature 98 7 °F (37 1 °C), resp  rate 17, height 5' (1 524 m), weight 55 kg (121 lb 4 1 oz), SpO2 96 %  ,Body mass index is 23 68 kg/m²        Intake/Output Summary (Last 24 hours) at 8/4/2021 1516  Last data filed at 8/4/2021 1338  Gross per 24 hour   Intake 960 ml   Output 1000 ml   Net -40 ml       Physical Exam: /77   Pulse (!) 109   Temp 98 7 °F (37 1 °C)   Resp 17   Ht 5' (1 524 m)   Wt 55 kg (121 lb 4 1 oz)   SpO2 96%   BMI 23 68 kg/m²     General Appearance:    Alert, no distress, appears stated age   Head:    Normocephalic, without obvious abnormality, atraumatic   Eyes:    PERRL, conjunctiva/corneas clear    Throat:   Lips, mucosa, and tongue normal; teeth and gums normal   Lungs:     Clear to auscultation bilaterally, respirations unlabored   Chest Wall:    No tenderness or deformity    Heart:  RRR   Abdomen:     Soft, non-tender, bowel sounds active all four quadrants,     no masses, no organomegaly; left abdominal PEG with tenderness, erythema improving  Extremities:   Extremities normal, atraumatic, no cyanosis or edema   Pulses:   2+ and symmetric all extremities   Skin:   Skin color, texture, turgor normal, no rashes or lesions       Neurologic: Residual left sided deficits from disease        Invasive Devices     Peripherally Inserted Central Catheter Line            PICC Line 77/83/42 Right Basilic 7 days          Drain            Gastrostomy/Enterostomy Gastrostomy-jejunostomy 20 Fr  LUQ 86 days                Lab, Imaging and other studies: I have personally reviewed pertinent reports      VTE Pharmacologic Prophylaxis: Enoxaparin (Lovenox)  VTE Mechanical Prophylaxis: sequential compression device

## 2021-08-04 NOTE — CASE MANAGEMENT
CM was notified that pt's Evia Lites was approved (Auth=[id number])  CM scheduled pt transport for 8/5/2021 via [place]  Pt has a 1315   CM called pt's emergency contact and left a message  CM will follow

## 2021-08-04 NOTE — PROGRESS NOTES
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)       Direct Admit from Dr Ariella Sanchez for in-patient chemo 3rd cycle       S/P chemotherapy on 05/13, Ping Bang 5/14, 7/18 6/9 MRI- Appears to be interval improvement in the overall enhancement of multifocal      intracranial lesions       P:Per Primary- received 4th session of IV high-dose methotrexate, cleared of methotrexate levels-->pending placement     DVT prophylaxis with Lovenox     Received 6 doses Leucovorin        Cellulitis  Assessment & Plan  Skin infection around PEG site concerning for nonpurulent cellulitis  Streptococcus species is most common bacteria for this type infection  --agree with cephalexin 500 mg q 6 hours for 7 days  --will need this treatment for PEG tube to be removed by Gastroenterology    Cancer related pain  Assessment & Plan  Gabapentin 200mg hs  Acetaminophen 650mg q4h PRN for mild  Oxy 5 mg q4h PRN for severe  Hydromorphone 0 2mg q4h PRN for breakthrough    Hypokalemia  Assessment & Plan  A: Potassium 3 4  Likely in setting of poor p o   Intake/poor absorption in setting of chemotherapy and malnutrition  Will check Mag in AM if potassium not WNL  KCl IVPB 20 meq + 20 meq  Consider Kdur tablets 20 meq QD upon discharge      Ambulatory dysfunction  Assessment & Plan  Known residual deficits left upper extremity and lower extremity weakness secondary to CNS lymphoma    PT/OT recommended rehab    Dysphagia  Assessment & Plan  Tolerating dysphagia diet       Disposition:  As directed by primary team    SUBJECTIVE     Patient seen and examined  No acute events overnight  Difficult to communicate with patient given language barrier, so unable to obtain ROS  OBJECTIVE     Vitals:    21 2129 21 0600 21 0711 21 1116   BP: 101/71  118/78 116/78   Pulse: (!) 114  95 105   Resp:    17   Temp:   98 8 °F (37 1 °C) 98 9 °F (37 2 °C)   TempSrc:       SpO2: 95%  99% 97%   Weight:  55 kg (121 lb 4 1 oz)     Height:          Temperature:   Temp (24hrs), Av 2 °F (37 3 °C), Min:98 8 °F (37 1 °C), Max:99 9 °F (37 7 °C)    Temperature: 98 9 °F (37 2 °C)  Intake & Output:  I/O        07 -  07 -  07 07 -  0700    P  O  180 480 480    Total Intake(mL/kg) 180 (3 3) 480 (8 7) 480 (8 7)    Urine (mL/kg/hr) 1000 (0 8) 300 (0 2) 700 (1 7)    Stool 0  0    Total Output 1000 300 700    Net -820 +180 -220           Unmeasured Stool Occurrence 1 x  1 x        Weights:   IBW (Ideal Body Weight): 45 5 kg    Body mass index is 23 68 kg/m²  Weight (last 2 days)     Date/Time   Weight    21 0600   55 (121 25)    21 0600   55 (121 2)            Physical Exam  Vitals and nursing note reviewed  Constitutional:       General: She is not in acute distress  Appearance: She is well-developed  HENT:      Head: Normocephalic and atraumatic  Eyes:      Conjunctiva/sclera: Conjunctivae normal    Cardiovascular:      Rate and Rhythm: Regular rhythm  Tachycardia present  Heart sounds: No murmur heard  Pulmonary:      Effort: Pulmonary effort is normal  No respiratory distress  Breath sounds: Normal breath sounds  Abdominal:      Palpations: Abdomen is soft  Tenderness: There is no abdominal tenderness  Comments: Mild erythema in circumferential area around the PEG tube   Musculoskeletal:      Cervical back: Neck supple  Skin:     General: Skin is warm and dry  Neurological:      Mental Status: She is alert  LABORATORY DATA     Labs: I have personally reviewed pertinent reports  Results from last 7 days   Lab Units 08/04/21  0656 08/03/21  0932 08/02/21  0620   WBC Thousand/uL 4 46 5 16 5 73   HEMOGLOBIN g/dL 9 2* 10 4* 10 5*   HEMATOCRIT % 30 4* 33 1* 33 2*   PLATELETS Thousands/uL 169 193 241   NEUTROS PCT % 30* 47 45   MONOS PCT % 10 6 4      Results from last 7 days   Lab Units 08/04/21  0627 08/03/21  0932 08/02/21  0620 07/30/21  0547 07/29/21  0616 07/28/21  1712   POTASSIUM mmol/L 3 4* 3 7 3 8 2 9* 3 4* 3 1*   CHLORIDE mmol/L 108 106 102 107 106 109*   CO2 mmol/L 25 28 27 32 31 29   BUN mg/dL 19 17 14 7 9 9   CREATININE mg/dL 0 29* 0 47* 0 38* 0 35* 0 50* 0 43*   CALCIUM mg/dL 8 5 9 2 9 5 8 5 9 6 9 9   ALK PHOS U/L  --   --   --  81 62 66   ALT U/L  --   --   --  32 15 12   AST U/L  --   --   --  18 12 11     Results from last 7 days   Lab Units 07/31/21  0632 07/29/21  0616   MAGNESIUM mg/dL 2 0 1 7                        IMAGING & DIAGNOSTIC TESTING     Radiology Results: I have personally reviewed pertinent reports  No results found  Other Diagnostic Testing: I have personally reviewed pertinent reports      ACTIVE MEDICATIONS     Current Facility-Administered Medications   Medication Dose Route Frequency    acetaminophen (TYLENOL) tablet 650 mg  650 mg Oral Q6H PRN    albuterol (PROVENTIL HFA,VENTOLIN HFA) inhaler 2 puff  2 puff Inhalation Q4H PRN    atorvastatin (LIPITOR) tablet 10 mg  10 mg Oral Daily With Dinner    cephalexin (KEFLEX) capsule 500 mg  500 mg Oral Q6H [facility]    cholecalciferol (VITAMIN D3) tablet 400 Units  400 Units Oral Daily    Diclofenac Sodium (VOLTAREN) 1 % topical gel 2 g  2 g Topical TID PRN    dicyclomine (BENTYL) capsule 10 mg  10 mg Oral 4x Daily (AC & HS)    docusate sodium (COLACE) capsule 100 mg  100 mg Oral BID    enoxaparin (LOVENOX) subcutaneous injection 40 mg  40 mg Subcutaneous Daily    folic acid (FOLVITE) tablet 1 mg  1 mg Oral Daily    gabapentin (NEURONTIN) oral solution 200 mg  200 mg Oral BID    HYDROmorphone HCl (DILAUDID) injection 0 2 mg  0 2 mg Intravenous Q6H PRN    hydrOXYzine HCL (ATARAX) tablet 25 mg  25 mg Oral Q6H PRN    insulin lispro (HumaLOG) 100 units/mL subcutaneous injection 1-5 Units  1-5 Units Subcutaneous TID AC    LORazepam (ATIVAN) injection 0 5 mg  0 5 mg Intravenous Q6H PRN    menthol-methyl salicylate (BENGAY) 53-46 % cream   Apply externally 4x Daily PRN    ondansetron (ZOFRAN-ODT) dispersible tablet 4 mg  4 mg Oral Q6H PRN    oxyCODONE (ROXICODONE) IR tablet 5 mg  5 mg Oral Q4H PRN    polyethylene glycol (MIRALAX) packet 17 g  17 g Oral Daily PRN    potassium chloride 20 mEq IVPB (premix)  20 mEq Intravenous Once    senna-docusate sodium (SENOKOT S) 8 6-50 mg per tablet 1 tablet  1 tablet Oral BID    sodium chloride 0 9 % infusion  20 mL/hr Intravenous Once PRN    tamsulosin (FLOMAX) capsule 0 4 mg  0 4 mg Oral Daily With Dinner       VTE Pharmacologic Prophylaxis: Enoxaparin (Lovenox)  VTE Mechanical Prophylaxis: sequential compression device    Portions of the record may have been created with voice recognition software  Occasional wrong word or "sound a like" substitutions may have occurred due to the inherent limitations of voice recognition software    Read the chart carefully and recognize, using context, where substitutions have occurred   ==  Jamila Knott DO  [address]  Internal Medicine Residency PGY-2

## 2021-08-05 VITALS
DIASTOLIC BLOOD PRESSURE: 68 MMHG | WEIGHT: 121.25 LBS | BODY MASS INDEX: 23.81 KG/M2 | OXYGEN SATURATION: 96 % | HEIGHT: 60 IN | RESPIRATION RATE: 16 BRPM | HEART RATE: 104 BPM | SYSTOLIC BLOOD PRESSURE: 108 MMHG | TEMPERATURE: 99.1 F

## 2021-08-05 DIAGNOSIS — C85.89 CNS LYMPHOMA (HCC): Primary | ICD-10-CM

## 2021-08-05 LAB
ANION GAP SERPL CALCULATED.3IONS-SCNC: 6 MMOL/L (ref 4–13)
BUN SERPL-MCNC: 12 MG/DL (ref 5–25)
CALCIUM SERPL-MCNC: 8.8 MG/DL (ref 8.3–10.1)
CHLORIDE SERPL-SCNC: 109 MMOL/L (ref 100–108)
CO2 SERPL-SCNC: 25 MMOL/L (ref 21–32)
CREAT SERPL-MCNC: 0.3 MG/DL (ref 0.6–1.3)
GFR SERPL CREATININE-BSD FRML MDRD: 130 ML/MIN/1.73SQ M
GLUCOSE SERPL-MCNC: 112 MG/DL (ref 65–140)
GLUCOSE SERPL-MCNC: 115 MG/DL (ref 65–140)
GLUCOSE SERPL-MCNC: 92 MG/DL (ref 65–140)
MAGNESIUM SERPL-MCNC: 1.7 MG/DL (ref 1.6–2.6)
POTASSIUM SERPL-SCNC: 3.4 MMOL/L (ref 3.5–5.3)
SARS-COV-2 RNA RESP QL NAA+PROBE: NEGATIVE
SODIUM SERPL-SCNC: 140 MMOL/L (ref 136–145)

## 2021-08-05 PROCEDURE — 80048 BASIC METABOLIC PNL TOTAL CA: CPT | Performed by: STUDENT IN AN ORGANIZED HEALTH CARE EDUCATION/TRAINING PROGRAM

## 2021-08-05 PROCEDURE — 83735 ASSAY OF MAGNESIUM: CPT | Performed by: STUDENT IN AN ORGANIZED HEALTH CARE EDUCATION/TRAINING PROGRAM

## 2021-08-05 PROCEDURE — 99232 SBSQ HOSP IP/OBS MODERATE 35: CPT | Performed by: INTERNAL MEDICINE

## 2021-08-05 PROCEDURE — 99239 HOSP IP/OBS DSCHRG MGMT >30: CPT | Performed by: INTERNAL MEDICINE

## 2021-08-05 PROCEDURE — 82948 REAGENT STRIP/BLOOD GLUCOSE: CPT

## 2021-08-05 PROCEDURE — U0005 INFEC AGEN DETEC AMPLI PROBE: HCPCS | Performed by: NURSE PRACTITIONER

## 2021-08-05 PROCEDURE — U0003 INFECTIOUS AGENT DETECTION BY NUCLEIC ACID (DNA OR RNA); SEVERE ACUTE RESPIRATORY SYNDROME CORONAVIRUS 2 (SARS-COV-2) (CORONAVIRUS DISEASE [COVID-19]), AMPLIFIED PROBE TECHNIQUE, MAKING USE OF HIGH THROUGHPUT TECHNOLOGIES AS DESCRIBED BY CMS-2020-01-R: HCPCS | Performed by: NURSE PRACTITIONER

## 2021-08-05 RX ORDER — POTASSIUM CHLORIDE 20 MEQ/1
20 TABLET, EXTENDED RELEASE ORAL ONCE
Status: COMPLETED | OUTPATIENT
Start: 2021-08-05 | End: 2021-08-05

## 2021-08-05 RX ORDER — POTASSIUM CHLORIDE 20 MEQ/1
20 TABLET, EXTENDED RELEASE ORAL DAILY
Status: DISCONTINUED | OUTPATIENT
Start: 2021-08-05 | End: 2021-08-05 | Stop reason: HOSPADM

## 2021-08-05 RX ORDER — CEPHALEXIN 500 MG/1
500 CAPSULE ORAL EVERY 6 HOURS SCHEDULED
Qty: 19 CAPSULE | Refills: 0 | Status: SHIPPED
Start: 2021-08-05 | End: 2021-08-10

## 2021-08-05 RX ORDER — OXYCODONE HYDROCHLORIDE 5 MG/1
5 TABLET ORAL EVERY 4 HOURS PRN
Qty: 30 TABLET | Refills: 0 | Status: ON HOLD | OUTPATIENT
Start: 2021-08-05 | End: 2021-08-20

## 2021-08-05 RX ORDER — POTASSIUM CHLORIDE 14.9 MG/ML
20 INJECTION INTRAVENOUS ONCE
Status: DISCONTINUED | OUTPATIENT
Start: 2021-08-05 | End: 2021-08-05

## 2021-08-05 RX ORDER — ATORVASTATIN CALCIUM 10 MG/1
10 TABLET, FILM COATED ORAL
Qty: 30 TABLET | Refills: 0 | Status: ON HOLD
Start: 2021-08-05 | End: 2021-09-07 | Stop reason: SDUPTHER

## 2021-08-05 RX ADMIN — POTASSIUM CHLORIDE 20 MEQ: 1500 TABLET, EXTENDED RELEASE ORAL at 09:43

## 2021-08-05 RX ADMIN — FOLIC ACID 1 MG: 1 TABLET ORAL at 09:43

## 2021-08-05 RX ADMIN — OXYCODONE HYDROCHLORIDE 5 MG: 5 TABLET ORAL at 14:03

## 2021-08-05 RX ADMIN — OXYCODONE HYDROCHLORIDE 5 MG: 5 TABLET ORAL at 09:43

## 2021-08-05 RX ADMIN — GABAPENTIN 200 MG: 250 SOLUTION ORAL at 09:42

## 2021-08-05 RX ADMIN — LORAZEPAM 0.5 MG: 2 INJECTION INTRAMUSCULAR; INTRAVENOUS at 15:04

## 2021-08-05 RX ADMIN — DOCUSATE SODIUM AND SENNOSIDES 1 TABLET: 8.6; 5 TABLET ORAL at 09:43

## 2021-08-05 RX ADMIN — DOCUSATE SODIUM 100 MG: 100 CAPSULE ORAL at 09:42

## 2021-08-05 RX ADMIN — POLYETHYLENE GLYCOL 3350 17 G: 17 POWDER, FOR SOLUTION ORAL at 06:14

## 2021-08-05 RX ADMIN — HYDROMORPHONE HYDROCHLORIDE 0.2 MG: 0.2 INJECTION, SOLUTION INTRAMUSCULAR; INTRAVENOUS; SUBCUTANEOUS at 12:18

## 2021-08-05 RX ADMIN — CEPHALEXIN 500 MG: 500 CAPSULE ORAL at 01:32

## 2021-08-05 RX ADMIN — DICYCLOMINE HYDROCHLORIDE 10 MG: 10 CAPSULE ORAL at 12:05

## 2021-08-05 RX ADMIN — ENOXAPARIN SODIUM 40 MG: 40 INJECTION SUBCUTANEOUS at 09:42

## 2021-08-05 RX ADMIN — CEPHALEXIN 500 MG: 500 CAPSULE ORAL at 12:05

## 2021-08-05 RX ADMIN — MAGNESIUM GLUCONATE 500 MG ORAL TABLET 400 MG: 500 TABLET ORAL at 09:43

## 2021-08-05 RX ADMIN — POTASSIUM CHLORIDE 20 MEQ: 1500 TABLET, EXTENDED RELEASE ORAL at 12:05

## 2021-08-05 RX ADMIN — CHOLECALCIFEROL TAB 10 MCG (400 UNIT) 400 UNITS: 10 TAB at 09:43

## 2021-08-05 RX ADMIN — DICYCLOMINE HYDROCHLORIDE 10 MG: 10 CAPSULE ORAL at 05:38

## 2021-08-05 RX ADMIN — CEPHALEXIN 500 MG: 500 CAPSULE ORAL at 05:39

## 2021-08-05 NOTE — TRANSPORTATION MEDICAL NECESSITY
Section I - General Information    Name of Patient: Jaun Burciaga                 : 1965    Medicare #: [id number]  Transport Date: 21 (PCS is valid for round trips on this date and for all repetitive trips in the 60-day range as noted below )  Origin: [facility]                                                         Destination: [facility] and [address] [place] [address] [facility]  Is the pt's stay covered under Medicare Part A (PPS/DRG)   []     Closest appropriate facility? If no, why is transport to more distant facility required? Yes  If hospice pt, is this transport related to pt's terminal illness? NA       Section II - Medical Necessity Questionnaire  Ambulance transportation is medically necessary only if other means of transport are contraindicated or would be potentially harmful to the patient  To meet this requirement, the patient must either be "bed confined" or suffer from a condition such that transport by means other than ambulance is contraindicated by the patient's condition  The following questions must be answered by the medical professional signing below for this form to be valid:    1)  Describe the MEDICAL CONDITION (physical and/or mental) of this patient AT [address] [place] [address] that requires the patient to be transported in an ambulance and why transport by other means is contraindicated by the patient's condition: CNS lymphoma, Cancer related pain, Ambulatory dysfunction, Fracture of ramus of left pubis, Decreased strength;Decreased endurance; Impaired balance;Decreased mobility; Decreased safety awareness;Decreased coordination;Decreased cognition;Pain  Pt is a fall and safety risk  2) Is the patient "bed confined" as defined below?      No  To be "be confined" the patient must satisfy all three of the following conditions: (1) unable to get up from bed without Assistance; AND (2) unable to ambulate; AND (3) unable to sit in a chair or wheelchair  3) Can this patient safely be transported by car or wheelchair van (i e , seated during transport without a medical attendant or monitoring)? No    4) In addition to completing questions 1-3 above, please check any of the following conditions that apply*:   *Note: supporting documentation for any boxes checked must be maintained in the patient's medical records  If hosp-hosp transfer, describe services needed at 2nd facility not available at 1st facility? Moderate/severe pain on movement   Medical attendant required   Unable to tolerate seated position for time needed to transport       Section III - Signature of Physician or Healthcare Professional  I certify that the above information is true and correct based on my evaluation of this patient, and represent that the patient requires transport by ambulance and that other forms of transport are contraindicated  I understand that this information will be used by the Centers for Medicare and Medicaid Services (CMS) to support the determination of medical necessity for ambulance services, and I represent that I have personal knowledge of the patient's condition at time of transport  []  If this box is checked, I also certify that the patient is physically or mentally incapable of signing the ambulance service's claim and that the institution with which I am affiliated has furnished care, services, or assistance to the patient  My signature below is made on behalf of the patient pursuant to 42 CFR §424 36(b)(4)  In accordance with 42 CFR §424 37, the specific reason(s) that the patient is physically or mentally incapable of signing the claim form is as follows:  Sapna Castillo of Physician* or Healthcare Professional______________________________________________________________  Signature Date 08/05/21 (For scheduled repetitive transports, this form is not valid for transports performed more than 60 days after this date)    Printed Name & Credentials of Physician or Healthcare Professional (MD, DO, RN, etc )________________________________  *Form must be signed by patient's attending physician for scheduled, repetitive transports   For non-repetitive, unscheduled ambulance transports, if unable to obtain the signature of the attending physician, any of the following may sign (choose appropriate option below)  [] Physician Assistant []  Clinical Nurse Specialist []  Registered Nurse  []  Nurse Practitioner  [x] Discharge Planner

## 2021-08-05 NOTE — DISCHARGE SUMMARY
Discharge Summary - Jennifer Forrester 49 y o  female MRN: [id number]    Unit/Bed#: [facility] 345-85 Encounter: [id number]    Admission Date: 7/28/2021     Admitting Diagnosis: 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[address] ) [[address] 89]    HPI: Karan Sweeney a 48 yo female with primary CNS lymphoma, s/p 4 cycles of high dose methotrexate and rituxan  Most recent imaging had showed improvement of intracranial lesions; pt was sent to rehab to work on her strength/conditioning; she was admitted for C4 of HD MTX on 7/28  (insurance not covering rituxan)      Procedures Performed: Chemotherapy    Hospital Course: She was admitted for C4 of HD MTX on 7/28  (insurance not covering rituxan)  Pt is doing well overall; She does have some irritation at the PEG site, thought to be beginning of cellulitis; she was started on Keflex with improvement of her pain/erthema in the area  She is now able to answer questions and communicate better  She has occasional pain in the lower back and legs, this is managed with gabapentin/bentyl  She is ready for discharge to rehab facility to continue working on getting stronger  I did use Eleanor, clinic RN to help translate as [facility] is her primary language and is not on blue phone  Significant Findings, Care, Treatment and Services Provided:  Cellulitis of PEG tube site- improving on Keflex 500mg Q6hrs, plan for 7 day course total      Complications: none    Discharge Diagnosis: CNS lymphoma, cellulitis (improving)     Condition at Discharge: fair     Discharge instructions/Information to patient and family:   See after visit summary for information provided to patient and family  Provisions for Follow-Up Care:  See after visit summary for information related to follow-up care and any pertinent home health orders        Disposition: Skilled nursing facility at [facility] and rehab center    Planned Readmission: Yes  8/11    General Appearance:    Alert, no distress, appears chronically ill Head:    Normocephalic, without obvious abnormality, atraumatic   Eyes:    PERRL, conjunctiva/corneas clear    Throat:   Lips, mucosa, and tongue normal; teeth and gums normal   Lungs:     Clear to auscultation bilaterally, respirations unlabored   Chest Wall:    No tenderness or deformity    Heart:  RRR   Abdomen:     Soft, non-tender, bowel sounds active all four quadrants,     no masses, no organomegaly; left abdominal PEG without as much tenderness, erythema improving, some dried exudate  Extremities:   Extremities normal, atraumatic, no cyanosis or edema   Pulses:   2+ and symmetric all extremities   Skin:   Skin color, texture, turgor normal, no rashes or lesions         Neurologic: Residual left sided deficits from disease          I did see this patient with Dr Curtis Romo and he is in agreement with this plan  RENALDO Camilo-BC  Hematology/Oncology Nurse Practitioner     Discharge Statement   I spent 45 minutes discharging the patient  This time was spent on the day of discharge  I had direct contact with the patient on the day of discharge  Discharge Medications:  See after visit summary for reconciled discharge medications provided to patient and family

## 2021-08-05 NOTE — SPEECH THERAPY NOTE
Speech Language/Pathology  Pt d/c to facility tolerating a regular diet w/ thin   No f/u needed at this time

## 2021-08-05 NOTE — CASE MANAGEMENT
Per CHEPE Britton, pt is medically cleared for d/c today  Pt will transition to [facility] via [address] [facility]  Pt has a 1545   CMN was completed  CM notified pt's CRNP and pt's nurse of d/c plan  COVID test was requested

## 2021-08-05 NOTE — PROGRESS NOTES
INTERNAL MEDICINE RESIDENCY PROGRESS NOTE     Name: Jl Duckworth   Age & Sex: 49 y o  female   MRN: [id number]  Unit/Bed#: [facility] 918-01   Encounter: [id number]  Team: SOD Team A    PATIENT INFORMATION     Name: Jl Duckworth   Age & Sex: 49 y o  female   MRN: [id number]  Hospital Stay Days: 8    ASSESSMENT/PLAN     Principal Problem:    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[facility] )  Active Problems:    Hypokalemia    Cancer related pain    Cellulitis    Dysphagia    Ambulatory dysfunction      * 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)       Direct Admit from Dr Eden Treviño for in-patient chemo 3rd cycle       S/P chemotherapy on 05/13, Milinda Husbands 5/14, 7/18 6/9 MRI- Appears to be interval improvement in the overall enhancement of multifocal      intracranial lesions       P:Per Primary- received 4th session of IV high-dose methotrexate, cleared of methotrexate levels-->pending placement     DVT prophylaxis with Lovenox     Received 6 doses Leucovorin        Hypokalemia  Assessment & Plan  A: Potassium 3 4 two days in row despite adequate IV repletion  Likely in setting of poor p o   Intake/poor absorption in setting of chemotherapy and malnutrition  Mag 1 7, lower limit of normal   Continue Kdur tablets 20 meq QD and Mag Ox 400mg QD upon discharge      Cancer related pain  Assessment & Plan  Gabapentin 200mg hs  Acetaminophen 650mg q4h PRN for mild  Oxy 5 mg q4h PRN for severe  Hydromorphone 0 2mg q4h PRN for breakthrough    Cellulitis  Assessment & Plan  Skin infection around PEG site concerning for nonpurulent cellulitis  Streptococcus species is most common bacteria for this type infection  --agree with cephalexin 500 mg q 6 hours for 7 days  --will need this treatment for PEG tube to be removed by Gastroenterology    Ambulatory dysfunction  Assessment & Plan  Known residual deficits left upper extremity and lower extremity weakness secondary to CNS lymphoma    PT/OT recommended rehab    Dysphagia  Assessment & Plan  Tolerating dysphagia diet       Disposition: As directed by primary team     SUBJECTIVE     Patient seen and examined  No acute events overnight  Difficult to communicate with patient given language barrier, so unable to obtain ROS  OBJECTIVE     Vitals:    21 1116 21 1446 21 2154 21 0724   BP: 116/78 115/77 117/81 108/68   Pulse: 105 (!) 109 101 104   Resp: 17 17 16    Temp: 98 9 °F (37 2 °C) 98 7 °F (37 1 °C) 98 4 °F (36 9 °C) 99 1 °F (37 3 °C)   TempSrc:       SpO2: 97% 96% 94% 96%   Weight:       Height:          Temperature:   Temp (24hrs), Av 7 °F (37 1 °C), Min:98 4 °F (36 9 °C), Max:99 1 °F (37 3 °C)    Temperature: 99 1 °F (37 3 °C)  Intake & Output:  I/O       701 -  07 -  07 07 -  0700    P  O  480 960     IV Piggyback  143 3     Total Intake(mL/kg) 480 (8 7) 1103 3 (20 1)     Urine (mL/kg/hr) 300 (0 2) 1500 (1 1)     Stool  0     Total Output 300 1500     Net +180 -396 7            Unmeasured Stool Occurrence  1 x         Weights:   IBW (Ideal Body Weight): 45 5 kg    Body mass index is 23 68 kg/m²  Weight (last 2 days)     Date/Time   Weight    21 0600   55 (121 25)    21 0600   55 (121 2)            Physical Exam  Vitals and nursing note reviewed  Constitutional:       General: She is not in acute distress  Appearance: She is well-developed  HENT:      Head: Normocephalic and atraumatic  Eyes:      Conjunctiva/sclera: Conjunctivae normal    Cardiovascular:      Rate and Rhythm: Normal rate and regular rhythm  Heart sounds: No murmur heard  Pulmonary:      Effort: Pulmonary effort is normal  No respiratory distress  Breath sounds: Normal breath sounds  Abdominal:      Palpations: Abdomen is soft  Tenderness: There is no abdominal tenderness  Musculoskeletal:      Cervical back: Neck supple  Skin:     General: Skin is warm and dry  Neurological:      Mental Status: She is alert  LABORATORY DATA     Labs: I have personally reviewed pertinent reports  Results from last 7 days   Lab Units 08/04/21  0656 08/03/21  0932 08/02/21  0620   WBC Thousand/uL 4 46 5 16 5 73   HEMOGLOBIN g/dL 9 2* 10 4* 10 5*   HEMATOCRIT % 30 4* 33 1* 33 2*   PLATELETS Thousands/uL 169 193 241   NEUTROS PCT % 30* 47 45   MONOS PCT % 10 6 4      Results from last 7 days   Lab Units 08/05/21  0637 08/04/21  0627 08/03/21  0932 07/30/21  0547   POTASSIUM mmol/L 3 4* 3 4* 3 7 2 9*   CHLORIDE mmol/L 109* 108 106 107   CO2 mmol/L 25 25 28 32   BUN mg/dL 12 19 17 7   CREATININE mg/dL 0 30* 0 29* 0 47* 0 35*   CALCIUM mg/dL 8 8 8 5 9 2 8 5   ALK PHOS U/L  --   --   --  81   ALT U/L  --   --   --  32   AST U/L  --   --   --  18     Results from last 7 days   Lab Units 08/05/21  0637 07/31/21  0632   MAGNESIUM mg/dL 1 7 2 0                        IMAGING & DIAGNOSTIC TESTING     Radiology Results: I have personally reviewed pertinent reports  No results found  Other Diagnostic Testing: I have personally reviewed pertinent reports      ACTIVE MEDICATIONS     Current Facility-Administered Medications   Medication Dose Route Frequency    acetaminophen (TYLENOL) tablet 650 mg  650 mg Oral Q6H PRN    albuterol (PROVENTIL HFA,VENTOLIN HFA) inhaler 2 puff  2 puff Inhalation Q4H PRN    atorvastatin (LIPITOR) tablet 10 mg  10 mg Oral Daily With Dinner    cephalexin (KEFLEX) capsule 500 mg  500 mg Oral Q6H [address]    cholecalciferol (VITAMIN D3) tablet 400 Units  400 Units Oral Daily    Diclofenac Sodium (VOLTAREN) 1 % topical gel 2 g  2 g Topical TID PRN    dicyclomine (BENTYL) capsule 10 mg  10 mg Oral 4x Daily (AC & HS)    docusate sodium (COLACE) capsule 100 mg  100 mg Oral BID    enoxaparin (LOVENOX) subcutaneous injection 40 mg  40 mg Subcutaneous Daily    folic acid (FOLVITE) tablet 1 mg  1 mg Oral Daily    gabapentin (NEURONTIN) oral solution 200 mg  200 mg Oral BID  HYDROmorphone HCl (DILAUDID) injection 0 2 mg  0 2 mg Intravenous Q6H PRN    hydrOXYzine HCL (ATARAX) tablet 25 mg  25 mg Oral Q6H PRN    insulin lispro (HumaLOG) 100 units/mL subcutaneous injection 1-5 Units  1-5 Units Subcutaneous TID AC    LORazepam (ATIVAN) injection 0 5 mg  0 5 mg Intravenous Q6H PRN    magnesium oxide (MAG-OX) tablet 400 mg  400 mg Oral Daily    menthol-methyl salicylate (BENGAY) 13-99 % cream   Apply externally 4x Daily PRN    ondansetron (ZOFRAN-ODT) dispersible tablet 4 mg  4 mg Oral Q6H PRN    oxyCODONE (ROXICODONE) IR tablet 5 mg  5 mg Oral Q4H PRN    polyethylene glycol (MIRALAX) packet 17 g  17 g Oral Daily PRN    potassium chloride (K-DUR,KLOR-CON) CR tablet 20 mEq  20 mEq Oral Daily    senna-docusate sodium (SENOKOT S) 8 6-50 mg per tablet 1 tablet  1 tablet Oral BID    sodium chloride 0 9 % infusion  20 mL/hr Intravenous Once PRN    tamsulosin (FLOMAX) capsule 0 4 mg  0 4 mg Oral Daily With Dinner       VTE Pharmacologic Prophylaxis: Enoxaparin (Lovenox)  VTE Mechanical Prophylaxis: sequential compression device    Portions of the record may have been created with voice recognition software  Occasional wrong word or "sound a like" substitutions may have occurred due to the inherent limitations of voice recognition software    Read the chart carefully and recognize, using context, where substitutions have occurred   ==  Gulshan Guerra DO  [address]  Internal Medicine Residency PGY-1

## 2021-08-06 ENCOUNTER — TELEPHONE (OUTPATIENT)
Dept: HEMATOLOGY ONCOLOGY | Facility: CLINIC | Age: 56
End: 2021-08-06

## 2021-08-06 NOTE — TELEPHONE ENCOUNTER
Lilia from patient's rehab called back stating she is unable to secure transportation for patient on Wednesday 8/11  Transportation is available on Friday 8/13  Informed her to schedule for 8/13  ADT order updated  Location of drop off confirmed to [facility]  Informed [employer] as well as [facility] manager, cc and rounding physician Dr Pepito Lamar and Jarett Vivas, [address] [place] [address]

## 2021-08-13 ENCOUNTER — HOSPITAL ENCOUNTER (INPATIENT)
Facility: HOSPITAL | Age: 56
LOS: 7 days | Discharge: NURSING, REHAB OR LONG-TERM CARE | DRG: 847 | End: 2021-08-20
Attending: INTERNAL MEDICINE | Admitting: INTERNAL MEDICINE
Payer: COMMERCIAL

## 2021-08-13 DIAGNOSIS — R26.2 AMBULATORY DYSFUNCTION: ICD-10-CM

## 2021-08-13 DIAGNOSIS — R13.10 DYSPHAGIA: ICD-10-CM

## 2021-08-13 DIAGNOSIS — R10.32 LEFT LOWER QUADRANT ABDOMINAL PAIN: ICD-10-CM

## 2021-08-13 DIAGNOSIS — R41.82 ALTERED MENTAL STATUS: ICD-10-CM

## 2021-08-13 DIAGNOSIS — L03.90 CELLULITIS: ICD-10-CM

## 2021-08-13 DIAGNOSIS — C85.89 CNS LYMPHOMA (HCC): Primary | ICD-10-CM

## 2021-08-13 DIAGNOSIS — E87.6 HYPOKALEMIA: ICD-10-CM

## 2021-08-13 LAB
ALBUMIN SERPL BCP-MCNC: 3 G/DL (ref 3.5–5)
ALP SERPL-CCNC: 79 U/L (ref 46–116)
ALT SERPL W P-5'-P-CCNC: 20 U/L (ref 12–78)
ANION GAP SERPL CALCULATED.3IONS-SCNC: 5 MMOL/L (ref 4–13)
AST SERPL W P-5'-P-CCNC: 13 U/L (ref 5–45)
BASOPHILS # BLD AUTO: 0.06 THOUSANDS/ΜL (ref 0–0.1)
BASOPHILS NFR BLD AUTO: 1 % (ref 0–1)
BILIRUB SERPL-MCNC: 0.13 MG/DL (ref 0.2–1)
BUN SERPL-MCNC: 12 MG/DL (ref 5–25)
CALCIUM ALBUM COR SERPL-MCNC: 10.4 MG/DL (ref 8.3–10.1)
CALCIUM SERPL-MCNC: 9.6 MG/DL (ref 8.3–10.1)
CHLORIDE SERPL-SCNC: 108 MMOL/L (ref 100–108)
CO2 SERPL-SCNC: 24 MMOL/L (ref 21–32)
CREAT SERPL-MCNC: 0.38 MG/DL (ref 0.6–1.3)
EOSINOPHIL # BLD AUTO: 0.46 THOUSAND/ΜL (ref 0–0.61)
EOSINOPHIL NFR BLD AUTO: 7 % (ref 0–6)
ERYTHROCYTE [DISTWIDTH] IN BLOOD BY AUTOMATED COUNT: 15.7 % (ref 11.6–15.1)
GFR SERPL CREATININE-BSD FRML MDRD: 120 ML/MIN/1.73SQ M
GLUCOSE SERPL-MCNC: 91 MG/DL (ref 65–140)
GLUCOSE SERPL-MCNC: 93 MG/DL (ref 65–140)
HCT VFR BLD AUTO: 37.3 % (ref 34.8–46.1)
HGB BLD-MCNC: 12 G/DL (ref 11.5–15.4)
IMM GRANULOCYTES # BLD AUTO: 0.05 THOUSAND/UL (ref 0–0.2)
IMM GRANULOCYTES NFR BLD AUTO: 1 % (ref 0–2)
LYMPHOCYTES # BLD AUTO: 2.11 THOUSANDS/ΜL (ref 0.6–4.47)
LYMPHOCYTES NFR BLD AUTO: 30 % (ref 14–44)
MCH RBC QN AUTO: 29.1 PG (ref 26.8–34.3)
MCHC RBC AUTO-ENTMCNC: 32.2 G/DL (ref 31.4–37.4)
MCV RBC AUTO: 90 FL (ref 82–98)
MONOCYTES # BLD AUTO: 0.64 THOUSAND/ΜL (ref 0.17–1.22)
MONOCYTES NFR BLD AUTO: 9 % (ref 4–12)
NEUTROPHILS # BLD AUTO: 3.7 THOUSANDS/ΜL (ref 1.85–7.62)
NEUTS SEG NFR BLD AUTO: 52 % (ref 43–75)
NRBC BLD AUTO-RTO: 0 /100 WBCS
PLATELET # BLD AUTO: 422 THOUSANDS/UL (ref 149–390)
PMV BLD AUTO: 10.4 FL (ref 8.9–12.7)
POTASSIUM SERPL-SCNC: 3.7 MMOL/L (ref 3.5–5.3)
PROT SERPL-MCNC: 6.3 G/DL (ref 6.4–8.2)
RBC # BLD AUTO: 4.13 MILLION/UL (ref 3.81–5.12)
SODIUM SERPL-SCNC: 137 MMOL/L (ref 136–145)
WBC # BLD AUTO: 7.02 THOUSAND/UL (ref 4.31–10.16)

## 2021-08-13 PROCEDURE — 85025 COMPLETE CBC W/AUTO DIFF WBC: CPT | Performed by: PHYSICIAN ASSISTANT

## 2021-08-13 PROCEDURE — C1751 CATH, INF, PER/CENT/MIDLINE: HCPCS

## 2021-08-13 PROCEDURE — 02HV33Z INSERTION OF INFUSION DEVICE INTO SUPERIOR VENA CAVA, PERCUTANEOUS APPROACH: ICD-10-PCS | Performed by: INTERNAL MEDICINE

## 2021-08-13 PROCEDURE — 82948 REAGENT STRIP/BLOOD GLUCOSE: CPT

## 2021-08-13 PROCEDURE — 80053 COMPREHEN METABOLIC PANEL: CPT | Performed by: PHYSICIAN ASSISTANT

## 2021-08-13 PROCEDURE — 36569 INSJ PICC 5 YR+ W/O IMAGING: CPT

## 2021-08-13 PROCEDURE — 99231 SBSQ HOSP IP/OBS SF/LOW 25: CPT | Performed by: PHYSICIAN ASSISTANT

## 2021-08-13 PROCEDURE — 3E04305 INTRODUCTION OF OTHER ANTINEOPLASTIC INTO CENTRAL VEIN, PERCUTANEOUS APPROACH: ICD-10-PCS | Performed by: INTERNAL MEDICINE

## 2021-08-13 RX ORDER — LEUCOVORIN CALCIUM 25 MG/1
25 TABLET ORAL EVERY 6 HOURS
Status: CANCELLED | OUTPATIENT
Start: 2021-08-16

## 2021-08-13 RX ORDER — OXYCODONE HCL 5 MG/5 ML
2.5 SOLUTION, ORAL ORAL EVERY 4 HOURS PRN
Status: DISCONTINUED | OUTPATIENT
Start: 2021-08-13 | End: 2021-08-16

## 2021-08-13 RX ORDER — LORAZEPAM 2 MG/ML
2 INJECTION INTRAMUSCULAR ONCE
Status: COMPLETED | OUTPATIENT
Start: 2021-08-13 | End: 2021-08-13

## 2021-08-13 RX ORDER — ACETAMINOPHEN 325 MG/1
650 TABLET ORAL EVERY 6 HOURS PRN
Status: DISCONTINUED | OUTPATIENT
Start: 2021-08-13 | End: 2021-08-20 | Stop reason: HOSPADM

## 2021-08-13 RX ADMIN — SODIUM BICARBONATE 150 ML/HR: 84 INJECTION, SOLUTION INTRAVENOUS at 23:36

## 2021-08-13 RX ADMIN — SODIUM BICARBONATE 150 ML/HR: 84 INJECTION, SOLUTION INTRAVENOUS at 16:02

## 2021-08-13 RX ADMIN — LORAZEPAM 2 MG: 2 INJECTION INTRAMUSCULAR; INTRAVENOUS at 15:59

## 2021-08-13 NOTE — CONSULTS
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  Patient has received 4 cycles of chemotherapy, currently here to receive 5th cycle  Methotrexate with leucovorin rescue plus Rituxan  Daily methotrexate levels, CBC and CMP as per Hematology  Labs reveal normal electrolytes, kidney function at baseline  DVT prophylaxis with Lovenox  PICC to be inserted, patient was very anxious  Will order 1 time dose of Ativan  Hematology oncology is the primary team    Ambulatory dysfunction  Assessment & Plan  Patient has 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  PT OT consult      Cancer related pain  Assessment & Plan  Continue tylenol and oxycodone p r n  Dysphagia  Assessment & Plan  Patient reports she no longer uses Peg, reports has good oral intake  Level 3 dysphagia-dental soft with thin liquids  Speech and swallow evaluation    Altered mental status  Assessment & Plan  On my encounter, patient is alert oriented x3  Is aware of current events, can follow simple commands, can do simple calculations  Patient is a Gujrati, complete interpretation not possible    Also given patient's limited knowledge of system limited cognitive evaluation    VTE Pharmacologic Prophylaxis: Enoxaparin (Lovenox)  VTE Mechanical Prophylaxis: sequential compression device    HISTORY OF PRESENT ILLNESS   Reason for Admission / Principal Problem:  Chemotherapy for lymphoma  Reason for Consult:  Hypokalemia, ambulatory dysfunction, AMS    Malcom Barahona 49 y o  female with past medical history of gout, diffuse large primary CNS lymphoma who is status for rounds of chemotherapy who is here as a direct admit from Dr Keo Hernandez for in-patient chemotherapy  Patient reports no active issue, patient is very anxious, her mood is very labile  She reports she is tolerating oral diet well, has not used her PEG tube  Reports her  will be coming to the hospital to visit her  Patient does not report of any shortness of breath, no chest pain, no diarrhea  Patient is Gujrati language speaker, although patient is able to give me all her information  SUBJECTIVE       REVIEW OF SYSTEMS   Review of Systems   Constitutional: Negative for activity change, appetite change, chills, diaphoresis and fatigue  HENT: Negative for congestion and hearing loss  Eyes: Negative for discharge and itching  Respiratory: Negative for apnea, cough, choking and shortness of breath  Cardiovascular: Negative for chest pain and leg swelling  Endocrine: Negative for cold intolerance and heat intolerance  Genitourinary: Negative for difficulty urinating and dysuria  Musculoskeletal: Positive for arthralgias  Neurological: Negative for light-headedness and numbness  Psychiatric/Behavioral: Positive for confusion  Negative for agitation and hallucinations  The patient is not nervous/anxious  OBJECTIVE     Vitals:    21 1024 21 1100   BP: 99/64    BP Location: Right arm    Pulse: 80    Resp: 18    Temp: 98 4 °F (36 9 °C)    TempSrc: Oral    SpO2: 98%    Weight:  50 2 kg (110 lb 10 7 oz)   Height:  5' (1 524 m)      Temperature:   Temp (24hrs), Av 4 °F (36 9 °C), Min:98 4 °F (36 9 °C), Max:98 4 °F (36 9 °C)    Temperature: 98 4 °F (36 9 °C)  Intake & Output:  I/O     None        Weights:   IBW (Ideal Body Weight): 45 5 kg    Body mass index is 21 61 kg/m²  Weight (last 2 days)     Date/Time   Weight    21 1100   50 2 (110 67)            Physical Exam  Constitutional:       Appearance: Normal appearance  HENT:      Head: Normocephalic and atraumatic        Nose: Nose normal    Eyes:      Pupils: Pupils are equal, round, and reactive to light  Cardiovascular:      Rate and Rhythm: Normal rate and regular rhythm  Pulses: Normal pulses  Heart sounds: Normal heart sounds  Pulmonary:      Effort: Pulmonary effort is normal       Breath sounds: Normal breath sounds  Abdominal:      General: Abdomen is flat  Palpations: Abdomen is soft  Comments: PEG tube in place   Musculoskeletal:         General: Normal range of motion  Cervical back: Normal range of motion  Skin:     General: Skin is warm  Neurological:      Mental Status: She is alert  Mental status is at baseline  Comments: Oriented to place, person, time  Weakness of left upper and lower extremity  Contracture of left lower extremity       PAST MEDICAL HISTORY   No past medical history on file  PAST SURGICAL HISTORY     Past Surgical History:   Procedure Laterality Date    IR PICC REPOSITION  4/27/2021    IR TUNNELED CENTRAL LINE REMOVAL  4/27/2021     SOCIAL & FAMILY HISTORY     Social History     Substance and Sexual Activity   Alcohol Use Not Currently     Substance and Sexual Activity   Alcohol Use Not Currently        Substance and Sexual Activity   Drug Use Not on file     Social History     Tobacco Use   Smoking Status Never Smoker   Smokeless Tobacco Never Used     Family History   Problem Relation Age of Onset    No Known Problems Mother      LABORATORY DATA     Labs: I have personally reviewed pertinent reports      Results from last 7 days   Lab Units 08/13/21  1025   WBC Thousand/uL 7 02   HEMOGLOBIN g/dL 12 0   HEMATOCRIT % 37 3   PLATELETS Thousands/uL 422*   NEUTROS PCT % 52   MONOS PCT % 9      Results from last 7 days   Lab Units 08/13/21  1025   POTASSIUM mmol/L 3 7   CHLORIDE mmol/L 108   CO2 mmol/L 24   BUN mg/dL 12   CREATININE mg/dL 0 38*   CALCIUM mg/dL 9 6   ALK PHOS U/L 79   ALT U/L 20   AST U/L 13                          Micro:  Lab Results   Component Value Date    BLOODCX No Growth After 5 Days  05/03/2021    BLOODCX No Growth After 5 Days  05/03/2021     IMAGING & DIAGNOSTIC TESTS     Imaging: I have personally reviewed pertinent reports  No results found  EKG, Pathology, and Other Studies: I have personally reviewed pertinent reports  ALLERGIES   No Known Allergies  MEDICATIONS PRIOR TO ARRIVAL     Prior to Admission medications    Medication Sig Start Date End Date Taking?  Authorizing Provider   acetaminophen (Tylenol) 325 mg tablet Take 500 mg by mouth every 6 (six) hours as needed for mild pain    Historical Provider, MD   aluminum-magnesium hydroxide-simethicone (MYLANTA) 200-200-20 mg/5 mL suspension 30 mL by Per PEG Tube route every 4 (four) hours as needed for indigestion or heartburn 6/14/21   Moises Bashir MD   atorvastatin (LIPITOR) 10 mg tablet Take 1 tablet (10 mg total) by mouth daily with dinner 8/5/21   CHEPE De La Torre   Cholecalciferol (Vitamin D3) 1 25 MG (25646 UT) TABS Take by mouth once a week Every monday    Historical Provider, MD   Diclofenac Sodium (VOLTAREN) 1 % Apply 2 g topically 3 (three) times a day as needed (knee nahco)  Patient taking differently: Apply 2 g topically 3 (three) times a day as needed (knee pain)  6/14/21   Moises Bashir MD   dicyclomine (BENTYL) 10 mg capsule Take 1 capsule (10 mg total) by mouth 4 (four) times a day (before meals and at bedtime) 6/14/21   Moises Bashir MD   docusate sodium (COLACE) 100 mg capsule Take 100 mg by mouth 2 (two) times a day    Historical Provider, MD   famotidine (PEPCID) 20 mg tablet Take 20 mg by mouth daily 4/22/21 4/22/22  Historical Provider, MD   folic acid (FOLVITE) 1 mg tablet Take 1 tablet (1 mg total) by mouth daily 7/22/21   Reji Hook DO   gabapentin (NEURONTIN) 250 mg/5 mL solution Take 4 mL (200 mg total) by mouth daily at bedtime 7/21/21   Hillary Fuchs DO   insulin lispro (HumaLOG) 100 units/mL injection Inject 1-5 Units under the skin 4 (four) times a day (before meals and at bedtime) 6/14/21   Isela Beltran MD   ondansetron (ZOFRAN-ODT) 4 mg disintegrating tablet Take 1 tablet (4 mg total) by mouth every 6 (six) hours as needed for nausea or vomiting 6/15/21   Isela Beltran MD   oxyCODONE (ROXICODONE) 5 mg immediate release tablet Take 1 tablet (5 mg total) by mouth every 4 (four) hours as needed for severe pain for up to 10 daysMax Daily Amount: 30 mg 8/5/21 8/15/21  CHEPE Tobin   polyethylene glycol (MIRALAX) 17 g packet Take 17 g by mouth daily as needed (Second line for constipation) 6/14/21   Isela Beltran MD     MEDICATIONS ADMINISTERED IN LAST 24 HOURS     CURRENT MEDICATIONS     Current Facility-Administered Medications   Medication Dose Route Frequency Provider Last Rate    acetaminophen  650 mg Oral Q6H PRN Margot Lam PA-C      enoxaparin  40 mg Subcutaneous Q24H Bryanna Velez MD      LORazepam  2 mg Intravenous Once Devonte Vernon MD      oxyCODONE  2 5 mg Oral Q4H PRN Margot Lam PA-C      sodium bicarbonate infusion  150 mL/hr Intravenous Continuous Myra Pimentel MD       sodium bicarbonate infusion, 150 mL/hr      acetaminophen, 650 mg, Q6H PRN  oxyCODONE, 2 5 mg, Q4H PRN        Portions of the record may have been created with voice recognition software  Occasional wrong word or "sound a like" substitutions may have occurred due to the inherent limitations of voice recognition software    Read the chart carefully and recognize, using context, where substitutions have occurred     ==  Devonte Vernon MD  [facility]  Internal Medicine Residency

## 2021-08-13 NOTE — ASSESSMENT & PLAN NOTE
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

## 2021-08-13 NOTE — UTILIZATION REVIEW
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 admitted on 7/28/21 for 4th chemotherapy treatment with high dose MTX and Left LE pain which waxes and wanes  Pt received Leucovorin x6 doses IV then po leucovorin which was d/c'ed 8/22    Daily methotrexate levels drawn  Assessment/Plan:8/2- Methotrexate level 8/1 was less than 0 1 so she is ready for discharge once we can get her back to her facility  Pt having  increased pain - will continue to manage pain medication, with need to adjust for sedation  Will keep PEG tube for now  Will continue with PICC line placement each admission  8/3-CBC and CMP all stable, mild anemia, low albumin  She has PEG tube pain/irritation, she no longer uses the PEG  GI consult placed and pt started pt on po abx - Keflex q6h pox 7 days for cellulitis due to pain and erythema at site and will reeval PEG tube removal next admission   Oxycodone increased to q4h prn She has chronic left LE pain which waxes and wanes  FLACC score=5 per nursing this am  Pt receiving po prn  And x1  IV  analgesic for pain  GI- defer PEG tube removal for now given possible cellulitis in that area  Recommend short course of oral antibiotics to resolve possible cellulitis   Mild erythema around the PEG tube site with associated tenderness  No visible drainage or purulence or induration  IPCM working on return to facility, await auth       Vital Signs:   08/03/21 07:28:24  97 4 °F (36 3 °C)Abnormal   112Abnormal   --  105/78  87  97 %  --   08/02/21 22:20:12  99 1 °F (37 3 °C)  129Abnormal   16  103/78  86  94 %  --   08/02/21 2031  --  --  --  --  --  --  None (Room air)   08/02/21 14:51:23  99 4 °F (37 4 °C)  121Abnormal   18  102/77  85  98 %  --         Pertinent Labs/Diagnostic Results:       Results from last 7 days   Lab Units 08/13/21  1025   WBC Thousand/uL 7 02   HEMOGLOBIN g/dL 12 0   HEMATOCRIT % 37 3   PLATELETS Thousands/uL 422*   NEUTROS ABS Thousands/µL 3 70         Results from last 7 days   Lab Units 08/13/21  1025   SODIUM mmol/L 137   POTASSIUM mmol/L 3 7   CHLORIDE mmol/L 108   CO2 mmol/L 24   ANION GAP mmol/L 5   BUN mg/dL 12   CREATININE mg/dL 0 38*   EGFR ml/min/1 73sq m 120   CALCIUM mg/dL 9 6     Results from last 7 days   Lab Units 08/13/21  1025   AST U/L 13   ALT U/L 20   ALK PHOS U/L 79   TOTAL PROTEIN g/dL 6 3*   ALBUMIN g/dL 3 0*   TOTAL BILIRUBIN mg/dL 0 13*         Results from last 7 days   Lab Units 08/13/21  1025   GLUCOSE RANDOM mg/dL 93                                            Results for Myles Damon (MRN [id number]) as of 8/3/2021 13:45   Ref  Range 8/1/2021 22:46 8/3/2021 12:33   Methotrexate Lvl Latest Units: umol/L <0 10 <0 10             Medications:   Scheduled Medications:  atorvastatin, 10 mg, Oral, Daily With Dinner  cephalexin, 500 mg, Oral, Q6H [facility]  cholecalciferol, 400 Units, Oral, Daily  dicyclomine, 10 mg, Oral, 4x Daily (AC & HS)  docusate sodium, 100 mg, Oral, BID  enoxaparin, 40 mg, Subcutaneous, Daily  folic acid, 1 mg, Oral, Daily  gabapentin, 200 mg, Oral, BID  insulin lispro, 1-5 Units, Subcutaneous, TID AC  senna-docusate sodium, 1 tablet, Oral, BID  tamsulosin, 0 4 mg, Oral, Daily With Dinner  leucovorin (WELLCOVORIN) tablet 25 mg   Dose: 25 mg  Freq: Every 6 hours Route: PO  Start: 07/31/21 1300 End: 08/02/21 0159    HYDROmorphone (DILAUDID) injection 0 5 mg   Dose: 0 5 mg  Freq: Once Route: IV x1 8/2, x1 8/3    Continuous IV Infusions:  No current facility-administered medications for this encounter      PRN Meds:  acetaminophen, 650 mg, Oral, Q6H PRN x1 8/2  albuterol, 2 puff, Inhalation, Q4H PRN  Diclofenac Sodium, 2 g, Topical, TID PRN  HYDROmorphone, 0 2 mg, Intravenous, Q6H PRN  hydrOXYzine HCL, 25 mg, Oral, Q6H PRN  LORazepam, 0 5 mg, Intravenous, Q6H PRN x1 8/2, x1 8/3  menthol-methyl salicylate, , Apply externally, 4x Daily PRN  ondansetron, 4 mg, Oral, Q6H PRN x1 8/2  oxyCODONE, 5 mg, Oral, Q4H PRN x1 8/3  polyethylene glycol, 17 g, Oral, Daily PRN  sodium chloride, 20 mL/hr, Intravenous, Once PRN  oxyCODONE (ROXICODONE) IR tablet 2 5 mg   Dose: 2 5 mg  Freq: Every 4 hours PRN Route: PO  PRN Reason: moderate pain  Start: 07/28/21 1633 End: 08/03/21 1223 x4 8/2      Discharge Plan:D    Network Utilization Review Department  ATTENTION: Please call with any questions or concerns to [phone] and carefully listen to the prompts so that you are directed to the right person  All voicemails are confidential   [facility] all requests for admission clinical reviews, approved or denied determinations and any other requests to dedicated fax number below belonging to the campus where the patient is receiving treatment  List of dedicated fax numbers for the Facilities:  [address] DENIALS (Administrative/Medical Necessity) [phone]   [address] (Maternity/NICU/Pediatrics) [phone]   [address] [place] [address] [id number] [address]   Nurse Practitioner   Oncology-Medical   Discharge Summary       Attested   Date of Service:  8/5/2021  1:25 PM               Attested        Attestation signed by Duncan Bhatti MD at 8/5/2021 4:23 PM       Split/Shared Statement (No Critical Care Time)     I saw/examined the patient   I agree with the Advanced Practitioner's note with the following additions/exceptions:     Ms Jasmyn Dodd is a 49 yr female with primary CNS lymphoma admitted for high-dose methotrexate  She tolerated treatment well  Methotrexate level less than 0 1  Infection noted around PEG to on Keflex and improving  Will think about pulling tube at next visit  To be discharged today back to the facility  She will be admitted next week for her next cycle of chemotherapy      Partha Champion MD                     Show:Clear all  [x]Manual[x]Template[x]Copied    Added by:  [x]Kaelyn Mathews    []Alessandra for details     Discharge Summary - Tomy Adkins 49 y o  female MRN: [id number]     Unit/Bed#: [facility] 918-01 Encounter: [id number]     Admission Date: 7/28/2021      Admitting Diagnosis: 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[facility] [address] ) [[address] 89]     HPI: Cecelia Valente a 48 yo female with primary CNS lymphoma, s/p 4 cycles of high dose methotrexate and rituxan  Most recent imaging had showed improvement of intracranial lesions; pt was sent to rehab to work on her strength/conditioning; she was admitted for C4 of HD MTX on 7/28  (insurance not covering rituxan)        Procedures Performed: Chemotherapy     Hospital Course: She was admitted for C4 of HD MTX on 7/28  (insurance not covering rituxan)  Pt is doing well overall; She does have some irritation at the PEG site, thought to be beginning of cellulitis; she was started on Keflex with improvement of her pain/erthema in the area  She is now able to answer questions and communicate better  She has occasional pain in the lower back and legs, this is managed with gabapentin/bentyl  She is ready for discharge to rehab facility to continue working on getting stronger    I did use Eleanor, clinic RN to help translate as Naomi Bashir is her primary language and is not on blue phone           Significant Findings, Care, Treatment and Services Provided:  Cellulitis of PEG tube site- improving on Keflex 500mg Q6hrs, plan for 7 day course total       Complications: none     Discharge Diagnosis: CNS lymphoma, cellulitis (improving)      Condition at Discharge: fair      Discharge instructions/Information to patient and family:   See after visit summary for information provided to patient and family        Provisions for Follow-Up Care:  See after visit summary for information related to follow-up care and any pertinent home health orders        Disposition: Skilled nursing facility at [facility] and rehab center     Planned Readmission: Yes  8/11     General Appearance:    Alert, no distress, appears chronically ill   Head:    Normocephalic, without obvious abnormality, atraumatic   Eyes:    PERRL, conjunctiva/corneas clear    Throat:   Lips, mucosa, and tongue normal; teeth and gums normal   Lungs:     Clear to auscultation bilaterally, respirations unlabored   Chest Wall:    No tenderness or deformity    Heart:  RRR   Abdomen:     Soft, non-tender, bowel sounds active all four quadrants,     no masses, no organomegaly; left abdominal PEG without as much tenderness, erythema improving, some dried exudate        Extremities:   Extremities normal, atraumatic, no cyanosis or edema   Pulses:   2+ and symmetric all extremities   Skin:   Skin color, texture, turgor normal, no rashes or lesions         Neurologic: Residual left sided deficits from disease            I did see this patient with Dr Alena Hardy and he is in agreement with this plan           RENALDO Pacheco-BC  Hematology/Oncology Nurse Practitioner      Discharge Statement   I spent 45 minutes discharging the patient  This time was spent on the day of discharge   I had direct contact with the patient on the day of discharge          Discharge Medications:  See after visit summary for reconciled discharge medications provided to patient and family                              Cosigned by: Deneen Cardenas MD at 8/5/2021  4:23 PM   Revision History

## 2021-08-13 NOTE — ASSESSMENT & PLAN NOTE
On my encounter, patient is alert oriented x3  Is aware of current events, can follow simple commands, can do simple calculations  Patient is a Gujrati, complete interpretation not possible    Also given patient's limited knowledge of system limited cognitive evaluation

## 2021-08-13 NOTE — PROCEDURES
Insert PICC line    Date/Time: 8/13/2021 4:28 PM  Performed by: Joshua Huizar RN  Authorized by: Myra Pimentel MD     Patient location:  Bedside  Other Assisting Provider: Yes (comment) (Inserted by Johan Albright, assisted by Breezy Krause)    Consent:     Consent obtained:  Written    Consent given by:  Spouse    Procedural risks discussed: with physician  Alternatives discussed: with physician  Universal protocol:     Procedure explained and questions answered to patient or proxy's satisfaction: yes      Relevant documents present and verified: yes      Test results available and properly labeled: yes      Radiology Images displayed and confirmed  If images not available, report reviewed: yes      Required blood products, implants, devices, and special equipment available: yes      Site/side marked: yes      Immediately prior to procedure, a time out was called: yes      Patient identity confirmed:  Verbally with patient and arm band  Pre-procedure details:     Hand hygiene: Hand hygiene performed prior to insertion      Sterile barrier technique: All elements of maximal sterile technique followed      Skin preparation:  ChloraPrep    Skin preparation agent: Skin preparation agent completely dried prior to procedure    Indications:     PICC line indications: chemotherapy    Anesthesia (see MAR for exact dosages):      Anesthesia method:  Local infiltration    Local anesthetic:  Lidocaine 1% w/o epi (4 mls)  Procedure details:     Location:  Basilic    Vessel type: vein      Laterality:  Right    Approach: percutaneous technique used      Patient position:  Flat    Procedural supplies:  Double lumen    Catheter size:  5 Fr    Landmarks identified: yes      Ultrasound guidance: yes      Ultrasound image availability:  Not saved    Sterile ultrasound techniques: Sterile gel and sterile probe covers were used      Number of attempts:  3    Successful placement: yes      Vessel of catheter tip end:  Sherlock 3CG confirmed (Esperanza Ingram to use, no xray required)    Total catheter length (cm):  40    Catheter out on skin (cm):  1    Max flow rate:  999    Arm circumference:  25  Post-procedure details:     Post-procedure:  Dressing applied and securement device placed    Assessment:  Blood return through all ports    Post-procedure complications: none      Patient tolerance of procedure: Tolerated with difficulty  Comments:      Patient received 2 mg ativan  Patient screaming during procedure and required multiple people holding her done while inserted

## 2021-08-13 NOTE — PLAN OF CARE
Problem: Prexisting or High Potential for Compromised Skin Integrity  Goal: Skin integrity is maintained or improved  Description: INTERVENTIONS:  - Identify patients at risk for skin breakdown  - Assess and monitor skin integrity  - Assess and monitor nutrition and hydration status  - Monitor labs   - Assess for incontinence   - Turn and reposition patient  - Assist with mobility/ambulation  - Relieve pressure over bony prominences  - Avoid friction and shearing  - Provide appropriate hygiene as needed including keeping skin clean and dry  - Evaluate need for skin moisturizer/barrier cream  - Collaborate with interdisciplinary team   - Patient/family teaching  - Consider wound care consult   8/13/2021 1247 by August Cat RN  Outcome: Progressing  8/13/2021 1246 by August Cat RN  Outcome: Progressing     Problem: MOBILITY - ADULT  Goal: Maintain or return to baseline ADL function  Description: INTERVENTIONS:  -  Assess patient's ability to carry out ADLs; assess patient's baseline for ADL function and identify physical deficits which impact ability to perform ADLs (bathing, care of mouth/teeth, toileting, grooming, dressing, etc )  - Assess/evaluate cause of self-care deficits   - Assess range of motion  - Assess patient's mobility; develop plan if impaired  - Assess patient's need for assistive devices and provide as appropriate  - Encourage maximum independence but intervene and supervise when necessary  - Involve family in performance of ADLs  - Assess for home care needs following discharge   - Consider OT consult to assist with ADL evaluation and planning for discharge  - Provide patient education as appropriate  8/13/2021 1247 by August Cat RN  Outcome: Progressing  8/13/2021 1246 by August Cat RN  Outcome: Progressing  Goal: Maintains/Returns to pre admission functional level  Description: INTERVENTIONS:  - Perform BMAT or MOVE assessment daily    - Set and communicate daily mobility goal to care team and patient/family/caregiver     - Collaborate with rehabilitation services on mobility goals if consulted  - Record patient progress and toleration of activity level   8/13/2021 1247 by Maria Victoria Trinidad RN  Outcome: Progressing  8/13/2021 1246 by Maria Victoria Trinidad RN  Outcome: Progressing     Problem: Potential for Falls  Goal: Patient will remain free of falls  Description: INTERVENTIONS:  - Educate patient/family on patient safety including physical limitations  - Instruct patient to call for assistance with activity   - Consult OT/PT to assist with strengthening/mobility   - Keep Call bell within reach  - Keep bed low and locked with side rails adjusted as appropriate  - Keep care items and personal belongings within reach  - Initiate and maintain comfort rounds  - Make Fall Risk Sign visible to staff  - Apply yellow socks and bracelet for high fall risk patients  - Consider moving patient to room near nurses station  8/13/2021 1247 by Maria Victoria Trinidad RN  Outcome: Progressing  8/13/2021 1246 by Maria Victoria Trinidad RN  Outcome: Progressing     Problem: PAIN - ADULT  Goal: Verbalizes/displays adequate comfort level or baseline comfort level  Description: Interventions:  - Encourage patient to monitor pain and request assistance  - Assess pain using appropriate pain scale  - Administer analgesics based on type and severity of pain and evaluate response  - Implement non-pharmacological measures as appropriate and evaluate response  - Consider cultural and social influences on pain and pain management  - Notify physician/advanced practitioner if interventions unsuccessful or patient reports new pain  8/13/2021 1247 by Maria Victoria Trinidad RN  Outcome: Progressing  8/13/2021 1246 by Maria Victoria Trinidad RN  Outcome: Progressing     Problem: INFECTION - ADULT  Goal: Absence or prevention of progression during hospitalization  Description: INTERVENTIONS:  - Assess and monitor for signs and symptoms of infection  - Monitor lab/diagnostic results  - Monitor all insertion sites, i e  indwelling lines, tubes, and drains  - Monitor endotracheal if appropriate and nasal secretions for changes in amount and color  - [place] appropriate cooling/warming therapies per order  - Administer medications as ordered  - Instruct and encourage patient and family to use good hand hygiene technique  - Identify and instruct in appropriate isolation precautions for identified infection/condition  8/13/2021 1247 by Taz Cuenca RN  Outcome: Progressing  8/13/2021 1246 by Taz Cuenca RN  Outcome: Progressing  Goal: Absence of fever/infection during neutropenic period  Description: INTERVENTIONS:  - Monitor WBC    8/13/2021 1247 by Taz Cuenca RN  Outcome: Progressing  8/13/2021 1246 by Taz Cuenca RN  Outcome: Progressing     Problem: DISCHARGE PLANNING  Goal: Discharge to home or other facility with appropriate resources  Description: INTERVENTIONS:  - Identify barriers to discharge w/patient and caregiver  - Arrange for needed discharge resources and transportation as appropriate  - Identify discharge learning needs (meds, wound care, etc )  - Arrange for interpretive services to assist at discharge as needed  - Refer to Case Management Department for coordinating discharge planning if the patient needs post-hospital services based on physician/advanced practitioner order or complex needs related to functional status, cognitive ability, or social support system  8/13/2021 1247 by Taz Cuenca RN  Outcome: Progressing  8/13/2021 1246 by Taz Cuenca RN  Outcome: Progressing     Problem: Knowledge Deficit  Goal: Patient/family/caregiver demonstrates understanding of disease process, treatment plan, medications, and discharge instructions  Description: Complete learning assessment and assess knowledge base    Interventions:  - Provide teaching at level of understanding  - Provide teaching via preferred learning methods  8/13/2021 1247 by Taz Cuenca RN  Outcome: Progressing  8/13/2021 1246 by Carson Riley RN  Outcome: Progressing

## 2021-08-13 NOTE — PROGRESS NOTES
PICC team at bedside for PICC line for chemo  Pt right arm was cleaned and taped in preparation when pt yelled NO, started crying, pulled arm from tape, laid on side and refused to extend arm  At this point, it would be preferable if pt could have ativan prior to PICC line attempt again    Spoke to charge RN

## 2021-08-13 NOTE — H&P
H&P - Patience Rockbridge 49 y o  female MRN: [id number]    Unit/Bed#: [facility] 916-01 Encounter: [id number]    Presenting Complaint: chemotherapy admission for CNS lymphoma    Assessment and Plan:  1  Diffuse large B-cell lymphoma, primary CNS lymphoma   · Currently admitted for cycle 5 of methotrexate with leucovorin rescue + rituxan  · Plan to proceed with high-dose methotrexate, 3500g per m2 as well as rituxan  Last cycle insurance denied rituxan therapy, pharmacy is running chemotherapy report currently for authorization for use  · Daily methotrexate levels as well as CBC/CMP built into treatment plan   · PICC team consulted    2  Cancer related pain  · Tylenol 650 and oxycodone 2 5mg PRN     3  Ambulatory dysfunction  · PT/OT evaluation while inpatient     4  Dysphagia  · History of PEG use, not required last admission or at SNF  · Level 3 dysphagia - dental soft with thin liquid, vegetarian ordered to start  · Nutritional consult  · Speech and swallow evaluation requested    5  AMS - improved  · Patient is at her baseline, AAOx2  · Speaks only [employer] has previously been reviewed by Dr Juanpablo Norman and discussed with patient's family members  Family understands and agrees with this plan  Consent form signed and in chart  PICC consent signed and in chart  Pharmacy notified of patient's arrival      ECOG-3/4    Subjective:   Clinically stable  No fevers, chills, N/V/D, no urinary complaints, no hematuria, melena, hematochezia  ROS limited due to inability to locate interpretor  Patient smiling and reaching to hold hand per previous baseline from treating patient before  Overall performance status has improved in terms of strength, appetite, weight, mentation  Was scheduled for treatment on 8/11 but this was delayed due to transportation issues  Objective:     Vitals: Blood pressure 99/64, pulse 80, temperature 98 4 °F (36 9 °C), temperature source Oral, resp   rate 18, height 5' (1 524 m), weight 50 2 kg (110 lb 10 7 oz), SpO2 98 %  ,Body mass index is 21 61 kg/m²  No intake or output data in the 24 hours ending 08/13/21 1500    Physical Exam:  General Appearance:    Alert, oriented        Eyes:    PERRL   Ears:    Normal external ear canals, both ears   Nose:   Nares normal, septum midline   Throat:   Mucosa moist  Pharynx without injection  Neck:   Supple       Lungs:     Clear to auscultation bilaterally   Chest Wall:    No tenderness or deformity    Heart:    Regular rate and rhythm       Abdomen:     Soft, non-tender, bowel sounds +, no organomegaly           Extremities:   Extremities no cyanosis or edema       Skin:   no rash or icterus  Lymph nodes:   Cervical, supraclavicular, and axillary nodes normal   Neurologic:   CNII-XII intact, normal strength, sensation and reflexes     throughout          Invasive Devices     Peripheral Intravenous Line            Peripheral IV 08/13/21 Right Wrist <1 day          Drain            Gastrostomy/Enterostomy Gastrostomy-jejunostomy 20 Fr  LUQ 95 days                Lab, Imaging and other studies: I have personally reviewed pertinent reports

## 2021-08-13 NOTE — ASSESSMENT & PLAN NOTE
Patient reports she no longer uses Peg, reports has good oral intake    Level 3 dysphagia-dental soft with thin liquids  Speech and swallow evaluation

## 2021-08-13 NOTE — ASSESSMENT & PLAN NOTE
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

## 2021-08-14 LAB
GLUCOSE SERPL-MCNC: 153 MG/DL (ref 65–140)
GLUCOSE SERPL-MCNC: 219 MG/DL (ref 65–140)
GLUCOSE SERPL-MCNC: 90 MG/DL (ref 65–140)

## 2021-08-14 PROCEDURE — 99232 SBSQ HOSP IP/OBS MODERATE 35: CPT | Performed by: INTERNAL MEDICINE

## 2021-08-14 PROCEDURE — 82948 REAGENT STRIP/BLOOD GLUCOSE: CPT

## 2021-08-14 RX ORDER — GABAPENTIN 250 MG/5ML
200 SOLUTION ORAL
Status: DISCONTINUED | OUTPATIENT
Start: 2021-08-14 | End: 2021-08-20 | Stop reason: HOSPADM

## 2021-08-14 RX ORDER — ONDANSETRON 4 MG/1
4 TABLET, ORALLY DISINTEGRATING ORAL EVERY 6 HOURS PRN
Status: DISCONTINUED | OUTPATIENT
Start: 2021-08-14 | End: 2021-08-20 | Stop reason: HOSPADM

## 2021-08-14 RX ORDER — HYDROXYZINE HYDROCHLORIDE 10 MG/1
10 TABLET, FILM COATED ORAL ONCE
Status: DISCONTINUED | OUTPATIENT
Start: 2021-08-14 | End: 2021-08-20 | Stop reason: HOSPADM

## 2021-08-14 RX ORDER — MAGNESIUM HYDROXIDE/ALUMINUM HYDROXICE/SIMETHICONE 120; 1200; 1200 MG/30ML; MG/30ML; MG/30ML
30 SUSPENSION ORAL EVERY 4 HOURS PRN
Status: DISCONTINUED | OUTPATIENT
Start: 2021-08-14 | End: 2021-08-20 | Stop reason: HOSPADM

## 2021-08-14 RX ORDER — DOCUSATE SODIUM 100 MG/1
100 CAPSULE, LIQUID FILLED ORAL 2 TIMES DAILY
Status: DISCONTINUED | OUTPATIENT
Start: 2021-08-14 | End: 2021-08-20 | Stop reason: HOSPADM

## 2021-08-14 RX ORDER — DICYCLOMINE HYDROCHLORIDE 10 MG/1
10 CAPSULE ORAL
Status: DISCONTINUED | OUTPATIENT
Start: 2021-08-14 | End: 2021-08-20 | Stop reason: HOSPADM

## 2021-08-14 RX ORDER — POLYETHYLENE GLYCOL 3350 17 G/17G
17 POWDER, FOR SOLUTION ORAL DAILY PRN
Status: DISCONTINUED | OUTPATIENT
Start: 2021-08-14 | End: 2021-08-20 | Stop reason: HOSPADM

## 2021-08-14 RX ORDER — FOLIC ACID 1 MG/1
1 TABLET ORAL DAILY
Status: DISCONTINUED | OUTPATIENT
Start: 2021-08-14 | End: 2021-08-20 | Stop reason: HOSPADM

## 2021-08-14 RX ORDER — FAMOTIDINE 20 MG/1
20 TABLET, FILM COATED ORAL DAILY
Status: DISCONTINUED | OUTPATIENT
Start: 2021-08-14 | End: 2021-08-20 | Stop reason: HOSPADM

## 2021-08-14 RX ORDER — ATORVASTATIN CALCIUM 10 MG/1
10 TABLET, FILM COATED ORAL
Status: DISCONTINUED | OUTPATIENT
Start: 2021-08-14 | End: 2021-08-20 | Stop reason: HOSPADM

## 2021-08-14 RX ORDER — SODIUM CHLORIDE 9 MG/ML
20 INJECTION, SOLUTION INTRAVENOUS ONCE AS NEEDED
Status: DISCONTINUED | OUTPATIENT
Start: 2021-08-14 | End: 2021-08-20 | Stop reason: HOSPADM

## 2021-08-14 RX ADMIN — ENOXAPARIN SODIUM 40 MG: 40 INJECTION SUBCUTANEOUS at 08:44

## 2021-08-14 RX ADMIN — DICYCLOMINE HYDROCHLORIDE 10 MG: 10 CAPSULE ORAL at 08:44

## 2021-08-14 RX ADMIN — DICYCLOMINE HYDROCHLORIDE 10 MG: 10 CAPSULE ORAL at 12:50

## 2021-08-14 RX ADMIN — GABAPENTIN 200 MG: 250 SOLUTION ORAL at 21:26

## 2021-08-14 RX ADMIN — INSULIN LISPRO 2 UNITS: 100 INJECTION, SOLUTION INTRAVENOUS; SUBCUTANEOUS at 18:49

## 2021-08-14 RX ADMIN — FOLIC ACID 1 MG: 1 TABLET ORAL at 08:44

## 2021-08-14 RX ADMIN — DOCUSATE SODIUM 100 MG: 100 CAPSULE ORAL at 18:48

## 2021-08-14 RX ADMIN — SODIUM BICARBONATE 150 ML/HR: 84 INJECTION, SOLUTION INTRAVENOUS at 18:00

## 2021-08-14 RX ADMIN — ONDANSETRON: 2 INJECTION INTRAMUSCULAR; INTRAVENOUS at 12:50

## 2021-08-14 RX ADMIN — SODIUM BICARBONATE 150 ML/HR: 84 INJECTION, SOLUTION INTRAVENOUS at 08:35

## 2021-08-14 RX ADMIN — DICYCLOMINE HYDROCHLORIDE 10 MG: 10 CAPSULE ORAL at 21:26

## 2021-08-14 RX ADMIN — ATORVASTATIN CALCIUM 10 MG: 10 TABLET, FILM COATED ORAL at 18:48

## 2021-08-14 RX ADMIN — DOCUSATE SODIUM 100 MG: 100 CAPSULE ORAL at 08:44

## 2021-08-14 RX ADMIN — OXYCODONE HYDROCHLORIDE 2.5 MG: 5 SOLUTION ORAL at 03:38

## 2021-08-14 RX ADMIN — METHOTREXATE 4935 MG: 25 INJECTION INTRA-ARTERIAL; INTRAMUSCULAR; INTRATHECAL; INTRAVENOUS at 13:51

## 2021-08-14 RX ADMIN — FAMOTIDINE 20 MG: 20 TABLET ORAL at 08:44

## 2021-08-14 RX ADMIN — DICYCLOMINE HYDROCHLORIDE 10 MG: 10 CAPSULE ORAL at 18:48

## 2021-08-14 NOTE — UTILIZATION REVIEW
Initial Clinical Review    Admission: Date/Time/Statement:   Admission Orders (From admission, onward)     Ordered        08/13/21 1927  Inpatient Admission  Once                   Orders Placed This Encounter   Procedures    Inpatient Admission     Standing Status:   Standing     Number of Occurrences:   1     Order Specific Question:   Level of Care     Answer:   Level 2 Stepdown / HOT [14]     Order Specific Question:   Estimated length of stay     Answer:   More than 2 Midnights     Order Specific Question:   Certification     Answer:   I certify that inpatient services are medically necessary for this patient for a duration of greater than two midnights  See H&P and MD Progress Notes for additional information about the patient's course of treatment  Initial Presentation: 48 yo female with primary CNS lymphoma, s/p 3 cycles of high dose methotrexate and rituxan  Most recent imaging had showed improvement of intracranial lesions; pt was sent to rehab to work on her strength/conditioning; she is due for C4 of HD MTX (insurance not covering rituxan) and was admitted inpatient to M/S unit from facility for chemo    Plan: - s/p 3 cycles of high-dose Methotrexate/Rituxan; scan after these two doses showed improvement of disease; pt was sent to rehab to work on her strength/conditioning; she is here for C4 of HD MTX/rituxan and was admitted from facility for chemo; however, insurance denied rituxan, so she will only be getting HD methotrexate; this is suboptimal, but insurance is limiting this     - proceed with C4 of HD MTX with leukovorin rescue after  - PT/OT evals and treat  She historically has been very uncomfortable with attempted PICC line placement- ativan has helped in past    - daily CBC/CMP, and daily weight  - PICC line placement today, plan for mediport placement during this hospitalization  - tylenol 650 and oxycodone 2 5mg PRN  Level 3 for now, thin liquids OK  - Nutrition consult  - speech and swallow eval  - pt has PEG, would like to get PEG tube removed by GI if nutrition/SLP agree    Date: 8/14   Day 2:  Pt did robbi well with chemotherapy without significant side effect  The pain is fairly well controlled with current pain medication  No N/V  Continue IV hydration,  Sodium bicarbonate to keep urine pH  more than 7  Monitoring side effect  Check methotrexate level daily  CBC and CMP daily         Wt Readings from Last 1 Encounters:   08/13/21 50 2 kg (110 lb 10 7 oz)     Additional Vital Signs:   Date/Time  Temp  Pulse  Resp  BP  MAP (mmHg)  SpO2  O2 Device  Patient Position - Orthostatic VS   08/14/21 15:13:13  98 5 °F (36 9 °C)  96  20  132/83  99  97 %  --  --   08/14/21 14:30:30  --  94  --  127/78  94  98 %  --  --   08/14/21 13:31:42  --  102  --  125/77  93  99 %  --  --   08/14/21 11:33:08  97 9 °F (36 6 °C)  93  18  128/78  95  98 %  --  --   08/14/21 08:54:14  97 5 °F (36 4 °C)  104  --  132/79  97  99 %  --  --   08/13/21 16:46:04  98 1 °F (36 7 °C)  99  18  137/80  99  99 %  --  --   08/13/21 10:24:55  98 4 °F (36 9 °C)  80  18  99/64  76  98 %  None (Room air)  Lying       Pertinent Labs/Diagnostic Test Results:     Results from last 7 days   Lab Units 08/13/21  1025   WBC Thousand/uL 7 02   HEMOGLOBIN g/dL 12 0   HEMATOCRIT % 37 3   PLATELETS Thousands/uL 422*   NEUTROS ABS Thousands/µL 3 70     Results from last 7 days   Lab Units 08/13/21  1025   SODIUM mmol/L 137   POTASSIUM mmol/L 3 7   CHLORIDE mmol/L 108   CO2 mmol/L 24   ANION GAP mmol/L 5   BUN mg/dL 12   CREATININE mg/dL 0 38*   EGFR ml/min/1 73sq m 120   CALCIUM mg/dL 9 6     Results from last 7 days   Lab Units 08/13/21  1025   AST U/L 13   ALT U/L 20   ALK PHOS U/L 79   TOTAL PROTEIN g/dL 6 3*   ALBUMIN g/dL 3 0*   TOTAL BILIRUBIN mg/dL 0 13*     Results from last 7 days   Lab Units 08/14/21  1310 08/14/21  0839 08/13/21  1650   POC GLUCOSE mg/dl 153* 90 91     Results from last 7 days   Lab Units 08/13/21  1025   GLUCOSE RANDOM mg/dL 93                 No past medical history on file  Present on Admission:   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[address] )   Dysphagia   Ambulatory dysfunction   Altered mental status   Cancer related pain      Admitting Diagnosis: 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HCC) [C85 89]  Age/Sex: 49 y o  female  Admission Orders:  Scheduled Medications:  atorvastatin, 10 mg, Oral, Daily With Dinner  dicyclomine, 10 mg, Oral, 4x Daily (AC & HS)  docusate sodium, 100 mg, Oral, BID  enoxaparin, 40 mg, Subcutaneous, Q24H MEÑO  famotidine, 20 mg, Oral, Daily  folic acid, 1 mg, Oral, Daily  gabapentin, 200 mg, Oral, HS  hydrOXYzine HCL, 10 mg, Oral, Once  insulin lispro, 1-6 Units, Subcutaneous, TID AC  methotrexate (PF) IVPB, 3,500 mg/m2 (Treatment Plan Recorded), Intravenous, Once    Continuous IV Infusions:  sodium bicarbonate infusion, 150 mL/hr, Intravenous, Continuous    PRN Meds:  acetaminophen, 650 mg, Oral, Q6H PRN  aluminum-magnesium hydroxide-simethicone, 30 mL, Per PEG Tube, Q4H PRN  Diclofenac Sodium, 2 g, Topical, TID PRN  ondansetron, 4 mg, Oral, Q6H PRN  oxyCODONE, 2 5 mg, Oral, Q4H PRN 8/14 x1  polyethylene glycol, 17 g, Oral, Daily PRN  sodium chloride, 20 mL/hr, Intravenous, Once PRN        IP CONSULT TO PICC TEAM  IP CONSULT TO INTERNAL MEDICINE  IP CONSULT TO NUTRITION SERVICES    Network Utilization Review Department  ATTENTION: Please call with any questions or concerns to [phone] and carefully listen to the prompts so that you are directed to the right person  All voicemails are confidential   Kirsten Rodriguez all requests for admission clinical reviews, approved or denied determinations and any other requests to dedicated fax number below belonging to the campus where the patient is receiving treatment   List of dedicated fax numbers for the Facilities:  [address] DENIALS (Administrative/Medical Necessity) [phone]   [address] (Maternity/NICU/Pediatrics) [phone] 5000 [facility] [place] [phone]   [address] [place] [facility] [address] [address] [place] [address] [address] [phone] 4601 [facility] [phone]

## 2021-08-15 LAB
ANION GAP SERPL CALCULATED.3IONS-SCNC: 3 MMOL/L (ref 4–13)
BACTERIA UR QL AUTO: ABNORMAL /HPF
BASOPHILS # BLD AUTO: 0.01 THOUSANDS/ΜL (ref 0–0.1)
BASOPHILS NFR BLD AUTO: 0 % (ref 0–1)
BILIRUB UR QL STRIP: NEGATIVE
BUN SERPL-MCNC: 10 MG/DL (ref 5–25)
CALCIUM SERPL-MCNC: 9.2 MG/DL (ref 8.3–10.1)
CHLORIDE SERPL-SCNC: 104 MMOL/L (ref 100–108)
CLARITY UR: CLEAR
CO2 SERPL-SCNC: 36 MMOL/L (ref 21–32)
COLOR UR: YELLOW
CREAT SERPL-MCNC: 0.45 MG/DL (ref 0.6–1.3)
EOSINOPHIL # BLD AUTO: 0 THOUSAND/ΜL (ref 0–0.61)
EOSINOPHIL NFR BLD AUTO: 0 % (ref 0–6)
ERYTHROCYTE [DISTWIDTH] IN BLOOD BY AUTOMATED COUNT: 14.7 % (ref 11.6–15.1)
GFR SERPL CREATININE-BSD FRML MDRD: 114 ML/MIN/1.73SQ M
GLUCOSE SERPL-MCNC: 127 MG/DL (ref 65–140)
GLUCOSE SERPL-MCNC: 144 MG/DL (ref 65–140)
GLUCOSE SERPL-MCNC: 205 MG/DL (ref 65–140)
GLUCOSE SERPL-MCNC: 86 MG/DL (ref 65–140)
GLUCOSE UR STRIP-MCNC: NEGATIVE MG/DL
HCT VFR BLD AUTO: 31.6 % (ref 34.8–46.1)
HGB BLD-MCNC: 10.3 G/DL (ref 11.5–15.4)
HGB UR QL STRIP.AUTO: NEGATIVE
HYALINE CASTS #/AREA URNS LPF: ABNORMAL /LPF
IMM GRANULOCYTES # BLD AUTO: 0.06 THOUSAND/UL (ref 0–0.2)
IMM GRANULOCYTES NFR BLD AUTO: 1 % (ref 0–2)
KETONES UR STRIP-MCNC: NEGATIVE MG/DL
LEUKOCYTE ESTERASE UR QL STRIP: NEGATIVE
LYMPHOCYTES # BLD AUTO: 1.24 THOUSANDS/ΜL (ref 0.6–4.47)
LYMPHOCYTES NFR BLD AUTO: 23 % (ref 14–44)
MCH RBC QN AUTO: 28.7 PG (ref 26.8–34.3)
MCHC RBC AUTO-ENTMCNC: 32.6 G/DL (ref 31.4–37.4)
MCV RBC AUTO: 88 FL (ref 82–98)
MONOCYTES # BLD AUTO: 0.44 THOUSAND/ΜL (ref 0.17–1.22)
MONOCYTES NFR BLD AUTO: 8 % (ref 4–12)
NEUTROPHILS # BLD AUTO: 3.63 THOUSANDS/ΜL (ref 1.85–7.62)
NEUTS SEG NFR BLD AUTO: 68 % (ref 43–75)
NITRITE UR QL STRIP: NEGATIVE
NON-SQ EPI CELLS URNS QL MICRO: ABNORMAL /HPF
NRBC BLD AUTO-RTO: 0 /100 WBCS
PH UR STRIP.AUTO: 8.5 [PH]
PLATELET # BLD AUTO: 343 THOUSANDS/UL (ref 149–390)
PMV BLD AUTO: 10.4 FL (ref 8.9–12.7)
POTASSIUM SERPL-SCNC: 3.1 MMOL/L (ref 3.5–5.3)
PROT UR STRIP-MCNC: NEGATIVE MG/DL
RBC # BLD AUTO: 3.59 MILLION/UL (ref 3.81–5.12)
RBC #/AREA URNS AUTO: ABNORMAL /HPF
SODIUM SERPL-SCNC: 143 MMOL/L (ref 136–145)
SP GR UR STRIP.AUTO: 1.01 (ref 1–1.03)
UROBILINOGEN UR QL STRIP.AUTO: 0.2 E.U./DL
WBC # BLD AUTO: 5.38 THOUSAND/UL (ref 4.31–10.16)
WBC #/AREA URNS AUTO: ABNORMAL /HPF

## 2021-08-15 PROCEDURE — 80204 DRUG ASSAY METHOTREXATE: CPT | Performed by: INTERNAL MEDICINE

## 2021-08-15 PROCEDURE — 81001 URINALYSIS AUTO W/SCOPE: CPT | Performed by: INTERNAL MEDICINE

## 2021-08-15 PROCEDURE — 80048 BASIC METABOLIC PNL TOTAL CA: CPT | Performed by: STUDENT IN AN ORGANIZED HEALTH CARE EDUCATION/TRAINING PROGRAM

## 2021-08-15 PROCEDURE — 82948 REAGENT STRIP/BLOOD GLUCOSE: CPT

## 2021-08-15 PROCEDURE — 85025 COMPLETE CBC W/AUTO DIFF WBC: CPT | Performed by: STUDENT IN AN ORGANIZED HEALTH CARE EDUCATION/TRAINING PROGRAM

## 2021-08-15 RX ORDER — POTASSIUM CHLORIDE 20 MEQ/1
40 TABLET, EXTENDED RELEASE ORAL ONCE
Status: COMPLETED | OUTPATIENT
Start: 2021-08-15 | End: 2021-08-15

## 2021-08-15 RX ADMIN — ATORVASTATIN CALCIUM 10 MG: 10 TABLET, FILM COATED ORAL at 17:16

## 2021-08-15 RX ADMIN — OXYCODONE HYDROCHLORIDE 2.5 MG: 5 SOLUTION ORAL at 10:49

## 2021-08-15 RX ADMIN — ACETAMINOPHEN 650 MG: 325 TABLET, FILM COATED ORAL at 08:53

## 2021-08-15 RX ADMIN — INSULIN LISPRO 2 UNITS: 100 INJECTION, SOLUTION INTRAVENOUS; SUBCUTANEOUS at 10:50

## 2021-08-15 RX ADMIN — DICYCLOMINE HYDROCHLORIDE 10 MG: 10 CAPSULE ORAL at 10:49

## 2021-08-15 RX ADMIN — FOLIC ACID 1 MG: 1 TABLET ORAL at 08:52

## 2021-08-15 RX ADMIN — POTASSIUM CHLORIDE 40 MEQ: 1500 TABLET, EXTENDED RELEASE ORAL at 10:49

## 2021-08-15 RX ADMIN — RITUXIMAB 500 MG: 10 INJECTION, SOLUTION INTRAVENOUS at 12:03

## 2021-08-15 RX ADMIN — SODIUM BICARBONATE 150 ML/HR: 84 INJECTION, SOLUTION INTRAVENOUS at 08:46

## 2021-08-15 RX ADMIN — SODIUM CHLORIDE 25 MG: 9 INJECTION, SOLUTION INTRAVENOUS at 11:36

## 2021-08-15 RX ADMIN — DICYCLOMINE HYDROCHLORIDE 10 MG: 10 CAPSULE ORAL at 06:23

## 2021-08-15 RX ADMIN — FAMOTIDINE 20 MG: 20 TABLET ORAL at 08:52

## 2021-08-15 RX ADMIN — ACETAMINOPHEN 650 MG: 325 TABLET, FILM COATED ORAL at 13:42

## 2021-08-15 RX ADMIN — GABAPENTIN 200 MG: 250 SOLUTION ORAL at 22:36

## 2021-08-15 RX ADMIN — DOCUSATE SODIUM 100 MG: 100 CAPSULE ORAL at 08:52

## 2021-08-15 RX ADMIN — DICYCLOMINE HYDROCHLORIDE 10 MG: 10 CAPSULE ORAL at 22:36

## 2021-08-15 RX ADMIN — SODIUM BICARBONATE 150 ML/HR: 84 INJECTION, SOLUTION INTRAVENOUS at 01:19

## 2021-08-15 RX ADMIN — OXYCODONE HYDROCHLORIDE 2.5 MG: 5 SOLUTION ORAL at 17:16

## 2021-08-15 RX ADMIN — DICYCLOMINE HYDROCHLORIDE 10 MG: 10 CAPSULE ORAL at 17:16

## 2021-08-15 RX ADMIN — SODIUM CHLORIDE 25 MG: 9 INJECTION, SOLUTION INTRAVENOUS at 17:12

## 2021-08-15 RX ADMIN — SODIUM CHLORIDE 25 MG: 9 INJECTION, SOLUTION INTRAVENOUS at 22:36

## 2021-08-15 RX ADMIN — SODIUM BICARBONATE 150 ML/HR: 84 INJECTION, SOLUTION INTRAVENOUS at 17:07

## 2021-08-15 RX ADMIN — DOCUSATE SODIUM 100 MG: 100 CAPSULE ORAL at 17:16

## 2021-08-15 RX ADMIN — ENOXAPARIN SODIUM 40 MG: 40 INJECTION SUBCUTANEOUS at 08:51

## 2021-08-15 NOTE — PROGRESS NOTES
[facility] [facility]  Progress Note - Maddi Betancourt 1965, 49 y o  female MRN: [id number]  Unit/Bed#: [facility] 916-01 Encounter: [id number]  Primary Care Provider: No primary care provider on file  Date and time admitted to hospital: 8/13/2021  9:51 AM    * CNS lymphoma [facility])  Assessment & Plan  Patient diagnosed with primary B-cell CNS lymphoma in April 2021  Patient has received 4 cycles of chemotherapy, currently here to receive 5th cycle  Methotrexate with leucovorin rescue plus Rituxan  Daily methotrexate levels, CBC and CMP as per Hematology  Labs reveal hypokalemia likely secondary to alkalinization of urine  Will replete  DVT prophylaxis with Lovenox  UA has been ordered, want urine pH to be less than 7 to prevent crystallization  Hematology oncology is the primary team    Ambulatory dysfunction  Assessment & Plan  Patient has 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  PT OT consult      Cancer related pain  Assessment & Plan  Continue tylenol and oxycodone p r n  Altered mental status  Assessment & Plan  On my encounter, patient is alert oriented x3  Is aware of current events, can follow simple commands, can do simple calculations  Patient is a [place], complete interpretation not possible  Also given patient's limited knowledge of system limited cognitive evaluation      VTE Pharmacologic Prophylaxis:   VTE Score: 7 Moderate Risk (Score 3-4) - Pharmacological DVT Prophylaxis Ordered: Enoxaparin (Lovenox)  Mechanical VTE Prophylaxis in Place: Yes    Patient Centered Rounds: I have performed bedside rounds with nursing staff today  Discussions with Specialists or Other Care Team Provider: y    Education and Discussions with Family / Patient: Attempted to update  () via phone  Unable to contact      Current Length of Stay: 2 day(s)    Current Patient Status: Inpatient     Discharge Plan / Estimated Discharge Date: Anticipate discharge in > 72 hrs to rehab facility  Code Status: Prior      Subjective:   Patient seen bedside today morning  Appears in no acute apparent distress  No complaint no overnight events reported    Objective:     Vitals:   Temp (24hrs), Av °F (36 7 °C), Min:97 7 °F (36 5 °C), Max:98 5 °F (36 9 °C)    Temp:  [97 7 °F (36 5 °C)-98 5 °F (36 9 °C)] 98 2 °F (36 8 °C)  HR:  [] 71  Resp:  [18-20] 20  BP: (125-151)/(77-96) 140/80  SpO2:  [97 %-99 %] 97 %  Body mass index is 21 61 kg/m²  Input and Output Summary (last 24 hours): Intake/Output Summary (Last 24 hours) at 8/15/2021 1119  Last data filed at 8/15/2021 0901  Gross per 24 hour   Intake 3614 4 ml   Output 4700 ml   Net -1085 6 ml       Physical Exam:     Physical Exam  Constitutional:       Appearance: Normal appearance  HENT:      Head: Normocephalic and atraumatic  Nose: Nose normal    Eyes:      Pupils: Pupils are equal, round, and reactive to light  Cardiovascular:      Rate and Rhythm: Normal rate and regular rhythm  Pulses: Normal pulses  Heart sounds: Normal heart sounds  Pulmonary:      Effort: Pulmonary effort is normal       Breath sounds: Normal breath sounds  Abdominal:      General: Abdomen is flat  Palpations: Abdomen is soft  Comments: PEG tube in place   Musculoskeletal:         General: Normal range of motion  Cervical back: Normal range of motion  Skin:     General: Skin is warm  Neurological:      Mental Status: She is alert  Mental status is at baseline  Comments: Oriented to place, person, time  Weakness of left upper and lower extremity    Contracture of left lower extremity         Additional Data:     Labs:  Results from last 7 days   Lab Units 08/15/21  0443   WBC Thousand/uL 5 38   HEMOGLOBIN g/dL 10 3*   HEMATOCRIT % 31 6*   PLATELETS Thousands/uL 343   NEUTROS PCT % 68   LYMPHS PCT % 23   MONOS PCT % 8   EOS PCT % 0     Results from last 7 days   Lab Units 08/15/21  0443 08/13/21  1025   SODIUM mmol/L 143 137   POTASSIUM mmol/L 3 1* 3 7   CHLORIDE mmol/L 104 108   CO2 mmol/L 36* 24   BUN mg/dL 10 12   CREATININE mg/dL 0 45* 0 38*   ANION GAP mmol/L 3* 5   CALCIUM mg/dL 9 2 9 6   ALBUMIN g/dL  --  3 0*   TOTAL BILIRUBIN mg/dL  --  0 13*   ALK PHOS U/L  --  79   ALT U/L  --  20   AST U/L  --  13   GLUCOSE RANDOM mg/dL 144* 93         Results from last 7 days   Lab Units 08/15/21  1046 08/15/21  0850 08/14/21  1848 08/14/21  1310 08/14/21  0839 08/13/21  1650   POC GLUCOSE mg/dl 205* 127 219* 153* 90 91               Imaging: No pertinent imaging reviewed      Recent Cultures (last 7 days):           Lines/Drains:  Invasive Devices     Peripherally Inserted Central Catheter Line            PICC Line 13/63/24 Right Basilic 1 day          Peripheral Intravenous Line            Peripheral IV 08/13/21 Right Wrist 2 days          Drain            Gastrostomy/Enterostomy Gastrostomy-jejunostomy 20 Fr  LUQ 97 days                Telemetry:        Last 24 Hours Medication List:   Current Facility-Administered Medications   Medication Dose Route Frequency Provider Last Rate    acetaminophen  650 mg Oral Q6H PRN Zofia Raymundo PA-C      aluminum-magnesium hydroxide-simethicone  30 mL Per PEG Tube Q4H PRN José Luis Barahona, DO      atorvastatin  10 mg Oral Daily With [employer], DO      Diclofenac Sodium  2 g Topical TID PRN Kirti Carmona,       dicyclomine  10 mg Oral 4x Daily (AC & HS) José Luis Barahona, DO      docusate sodium  100 mg Oral BID Harartur Barahona, DO      enoxaparin  40 mg Subcutaneous Q24H [address] Maggy Horton PA-C      famotidine  20 mg Oral Daily José Luis Barahona, DO      folic acid  1 mg Oral Daily José Luis Barahona, DO      gabapentin  200 mg Oral HS Harartur Barahona, DO      hydrOXYzine HCL  10 mg Oral Once Kingston Bennett MD      insulin lispro  1-6 Units Subcutaneous TID AC José Luis Aleksandr Murphy, DO      leucovorin calcium IVPB  25 mg Intravenous Q6H Parminder Chung MD      ondansetron  4 mg Oral Q6H PRN Zhang Herndon DO      oxyCODONE  2 5 mg Oral Q4H PRN Manuel Yadav PA-C      polyethylene glycol  17 g Oral Daily PRN Zhang Herndon DO      riTUXimab (RITUXAN) first titrated chemo infusion  500 mg Intravenous Once Parminder Chung MD      sodium bicarbonate infusion  150 mL/hr Intravenous Continuous Manuel Yadav PA-C 150 mL/hr (08/15/21 0846)    sodium chloride  20 mL/hr Intravenous Once PRN Parminder Chung MD          Today, Patient Was Seen By: Chloé Munoz MD    ** Please Note: This note has been constructed using a voice recognition system   **

## 2021-08-15 NOTE — NURSING NOTE
Patient is one day behind on chemo regimen  Unable to release Rituxan orders for today  Spoke with Dr Urrutia Files  Orders will be changed in order to release to administer chemo today

## 2021-08-15 NOTE — PROGRESS NOTES
Hematology/Oncology IP Progress Note    Date of Service: 8/14/2021    [address] [facility]AT[facility]    Sapna Kaplan is a 49 y o  female at Hospital [facility] ( LOS: 1 day ) admitted on 8/13/2021  9:51 AM     Principal Problem: 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[address] )     Assessment/Plan:   I personally reviewed the lab results, image studies results and other specialties/physicians consult notes and recommendations  I shared the findings with patient and family and discussed diagnosis and management plan as below  Principal Problem:    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[address] )  Active Problems:    Altered mental status    Dysphagia    Cancer related pain    Ambulatory dysfunction    1  Primary CNS diffuse large B-cell lymphoma , admitted for cycle 5 high-dose methotrexate + with rituximab  Patient did very well with chemotherapy without significant side effects  Patient will continue IV hydration,  Sodium bicarbonate to keep urine pH  more than 7  Monitoring side effect  Check methotrexate level daily  CBC and CMP daily  2    AMS-- improved significantly  There is a language barrier  Patient only speaks Babs Drea  3    Pain that is very well controlled with current pain medication  4     We will continue to follow patient with you during the hospital stay  Disclaimer: This document was prepared using [employer] technology  If a word or phrase is confusing, or does not make sense, this is likely due to recognition error which was not discovered during the providers review  If you believe an error has occurred, please Contact me through [employer] service for christiano? cation  Subjective:     Patient feels OK  She did very well with chemotherapy without significant side effect  The pain is fairly well controlled with current pain medication  No nausea or vomiting      Objective:   VITALS:   /83   Pulse 87   Temp 97 8 °F (36 6 °C)   Resp 20   Ht 5' (1 524 m)   Wt 50 2 kg (110 lb 10 7 oz)   SpO2 97%   BMI 21 61 kg/m²   Temp (24hrs), Av 9 °F (36 6 °C), Min:97 5 °F (36 4 °C), Max:98 5 °F (36 9 °C)    I&O:    Intake/Output Summary (Last 24 hours) at 2021  Last data filed at 2021  Gross per 24 hour   Intake 5124 4 ml   Output 4100 ml   Net 1024 4 ml      Weight change:      Physical EXAM:  General:  Alert, cooperative, no distress, appears stated age  Head:  Normocephalic, without obvious abnormality, atraumatic  Eyes:  Conjunctivae/corneas clear  PERRL, EOMs intact  No evidence of conjunctivitis     Throat: Lips, mucosa, and tongue normal  No lesions in the oropharynx   Neck: Supple, symmetrical, trachea midline, no adenopathy    Lungs:   Clear to auscultation bilaterally  Respiratory effort easy, nonlabored    Heart:  Regular rate and rhythm, S1, S2 normal, no murmur, click, rub or gallop  Abdomen:   Soft, non-tender,nondistended  Bowel sounds normal  No masses,  No organomegaly  Extremities: Extremities normal, atraumatic, no cyanosis or edema  No axillary or inguinal adenopathy   Skin: Skin color, texture, turgor normal  No rashes or lesions    Neurologic: A&O   No focal deficits         ALLERGIES:  No Known Allergies    CURRENT MEDICATIONS:  Inpatient Scheduled Medications:atorvastatin, 10 mg, Daily With Dinner  dicyclomine, 10 mg, 4x Daily (AC & HS)  docusate sodium, 100 mg, BID  enoxaparin, 40 mg, Q24H MEÑO  famotidine, 20 mg, Daily  folic acid, 1 mg, Daily  gabapentin, 200 mg, HS  hydrOXYzine HCL, 10 mg, Once  insulin lispro, 1-6 Units, [facility]ID [facility]      Inpatient PRN Medications:acetaminophen, 650 mg, Q6H PRN  aluminum-magnesium hydroxide-simethicone, 30 mL, Q4H PRN  Diclofenac Sodium, 2 g, TID PRN  ondansetron, 4 mg, Q6H PRN  oxyCODONE, 2 5 mg, Q4H PRN  polyethylene glycol, 17 g, Daily PRN  sodium chloride, 20 mL/hr, Once PRN      Inpatient IV Infusions:sodium bicarbonate infusion, Last Rate: 150 mL/hr (21 1800)        LABS:  CBC:  Recent Labs     08/13/21  1025   WBC 7 02   MCV 90   MCH 29 1   MCHC 32 2   RDW 15 7*   MPV 10 4     CMP:   Recent Labs     08/13/21  1025   SODIUM 137   BUN 12   CALCIUM 9 6   CREATININE 0 38*     MISC  LABS:  No results for input(s): LDH, URICACID, PHOSPHORUS, LACTICACID in the last 72 hours  Invalid input(s): MAGNESIUM  LFT:   Recent Labs     08/13/21  1025   AST 13     Coags:  Invalid input(s): COAGPROFILE  No results for input(s): INR, PTT in the last 72 hours      Invalid input(s): PTPNeri MD  8/14/2021, 10:11 PM

## 2021-08-15 NOTE — ASSESSMENT & PLAN NOTE
Patient diagnosed with primary B-cell CNS lymphoma in April 2021  Patient has received 4 cycles of chemotherapy, currently here to receive 5th cycle  Methotrexate with leucovorin rescue plus Rituxan  Daily methotrexate levels, CBC and CMP as per Hematology  Labs reveal hypokalemia likely secondary to alkalinization of urine    Will replete  DVT prophylaxis with Lovenox  UA has been ordered, want urine pH to be less than 7 to prevent crystallization  Hematology oncology is the primary team

## 2021-08-15 NOTE — PLAN OF CARE
Problem: Prexisting or High Potential for Compromised Skin Integrity  Goal: Skin integrity is maintained or improved  Description: INTERVENTIONS:  - Identify patients at risk for skin breakdown  - Assess and monitor skin integrity  - Assess and monitor nutrition and hydration status  - Monitor labs   - Assess for incontinence   - Turn and reposition patient  - Assist with mobility/ambulation  - Relieve pressure over bony prominences  - Avoid friction and shearing  - Provide appropriate hygiene as needed including keeping skin clean and dry  - Evaluate need for skin moisturizer/barrier cream  - Collaborate with interdisciplinary team   - Patient/family teaching  - Consider wound care consult   Outcome: Progressing     Problem: MOBILITY - ADULT  Goal: Maintain or return to baseline ADL function  Description: INTERVENTIONS:  -  Assess patient's ability to carry out ADLs; assess patient's baseline for ADL function and identify physical deficits which impact ability to perform ADLs (bathing, care of mouth/teeth, toileting, grooming, dressing, etc )  - Assess/evaluate cause of self-care deficits   - Assess range of motion  - Assess patient's mobility; develop plan if impaired  - Assess patient's need for assistive devices and provide as appropriate  - Encourage maximum independence but intervene and supervise when necessary  - Involve family in performance of ADLs  - Assess for home care needs following discharge   - Consider OT consult to assist with ADL evaluation and planning for discharge  - Provide patient education as appropriate  Outcome: Progressing  Goal: Maintains/Returns to pre admission functional level  Description: INTERVENTIONS:  - Perform BMAT or MOVE assessment daily    - Set and communicate daily mobility goal to care team and patient/family/caregiver     - Collaborate with rehabilitation services on mobility goals if consulted  - Record patient progress and toleration of activity level   Outcome: Progressing     Problem: Potential for Falls  Goal: Patient will remain free of falls  Description: INTERVENTIONS:  - Educate patient/family on patient safety including physical limitations  - Instruct patient to call for assistance with activity   - Consult OT/PT to assist with strengthening/mobility   - Keep Call bell within reach  - Keep bed low and locked with side rails adjusted as appropriate  - Keep care items and personal belongings within reach  - Initiate and maintain comfort rounds  - Make Fall Risk Sign visible to staff  - Apply yellow socks and bracelet for high fall risk patients  - Consider moving patient to room near nurses station  Outcome: Progressing     Problem: PAIN - ADULT  Goal: Verbalizes/displays adequate comfort level or baseline comfort level  Description: Interventions:  - Encourage patient to monitor pain and request assistance  - Assess pain using appropriate pain scale  - Administer analgesics based on type and severity of pain and evaluate response  - Implement non-pharmacological measures as appropriate and evaluate response  - Consider cultural and social influences on pain and pain management  - Notify physician/advanced practitioner if interventions unsuccessful or patient reports new pain  Outcome: Progressing     Problem: INFECTION - ADULT  Goal: Absence or prevention of progression during hospitalization  Description: INTERVENTIONS:  - Assess and monitor for signs and symptoms of infection  - Monitor lab/diagnostic results  - Monitor all insertion sites, i e  indwelling lines, tubes, and drains  - Monitor endotracheal if appropriate and nasal secretions for changes in amount and color  - [place] appropriate cooling/warming therapies per order  - Administer medications as ordered  - Instruct and encourage patient and family to use good hand hygiene technique  - Identify and instruct in appropriate isolation precautions for identified infection/condition  Outcome: Progressing  Goal: Absence of fever/infection during neutropenic period  Description: INTERVENTIONS:  - Monitor WBC    Outcome: Progressing     Problem: DISCHARGE PLANNING  Goal: Discharge to home or other facility with appropriate resources  Description: INTERVENTIONS:  - Identify barriers to discharge w/patient and caregiver  - Arrange for needed discharge resources and transportation as appropriate  - Identify discharge learning needs (meds, wound care, etc )  - Arrange for interpretive services to assist at discharge as needed  - Refer to Case Management Department for coordinating discharge planning if the patient needs post-hospital services based on physician/advanced practitioner order or complex needs related to functional status, cognitive ability, or social support system  Outcome: Progressing     Problem: Knowledge Deficit  Goal: Patient/family/caregiver demonstrates understanding of disease process, treatment plan, medications, and discharge instructions  Description: Complete learning assessment and assess knowledge base    Interventions:  - Provide teaching at level of understanding  - Provide teaching via preferred learning methods  Outcome: Progressing

## 2021-08-16 ENCOUNTER — TELEPHONE (OUTPATIENT)
Dept: RADIOLOGY | Facility: HOSPITAL | Age: 56
End: 2021-08-16

## 2021-08-16 PROBLEM — F32.A DEPRESSION: Status: ACTIVE | Noted: 2021-08-16

## 2021-08-16 LAB
ALBUMIN SERPL BCP-MCNC: 2.2 G/DL (ref 3.5–5)
ALP SERPL-CCNC: 51 U/L (ref 46–116)
ALT SERPL W P-5'-P-CCNC: 22 U/L (ref 12–78)
ANION GAP SERPL CALCULATED.3IONS-SCNC: 2 MMOL/L (ref 4–13)
AST SERPL W P-5'-P-CCNC: 15 U/L (ref 5–45)
BASOPHILS # BLD AUTO: 0.03 THOUSANDS/ΜL (ref 0–0.1)
BASOPHILS NFR BLD AUTO: 1 % (ref 0–1)
BILIRUB SERPL-MCNC: 0.15 MG/DL (ref 0.2–1)
BILIRUB UR QL STRIP: NEGATIVE
BUN SERPL-MCNC: 4 MG/DL (ref 5–25)
CALCIUM ALBUM COR SERPL-MCNC: 9.3 MG/DL (ref 8.3–10.1)
CALCIUM SERPL-MCNC: 7.9 MG/DL (ref 8.3–10.1)
CHLORIDE SERPL-SCNC: 95 MMOL/L (ref 100–108)
CLARITY UR: CLEAR
CO2 SERPL-SCNC: 40 MMOL/L (ref 21–32)
COLOR UR: YELLOW
CREAT SERPL-MCNC: 0.46 MG/DL (ref 0.6–1.3)
EOSINOPHIL # BLD AUTO: 0.1 THOUSAND/ΜL (ref 0–0.61)
EOSINOPHIL NFR BLD AUTO: 3 % (ref 0–6)
ERYTHROCYTE [DISTWIDTH] IN BLOOD BY AUTOMATED COUNT: 15 % (ref 11.6–15.1)
GFR SERPL CREATININE-BSD FRML MDRD: 113 ML/MIN/1.73SQ M
GLUCOSE SERPL-MCNC: 108 MG/DL (ref 65–140)
GLUCOSE SERPL-MCNC: 121 MG/DL (ref 65–140)
GLUCOSE SERPL-MCNC: 155 MG/DL (ref 65–140)
GLUCOSE SERPL-MCNC: 157 MG/DL (ref 65–140)
GLUCOSE SERPL-MCNC: 448 MG/DL (ref 65–140)
GLUCOSE UR STRIP-MCNC: NEGATIVE MG/DL
HCT VFR BLD AUTO: 28.9 % (ref 34.8–46.1)
HGB BLD-MCNC: 9 G/DL (ref 11.5–15.4)
HGB UR QL STRIP.AUTO: NEGATIVE
IMM GRANULOCYTES # BLD AUTO: 0.05 THOUSAND/UL (ref 0–0.2)
IMM GRANULOCYTES NFR BLD AUTO: 1 % (ref 0–2)
KETONES UR STRIP-MCNC: NEGATIVE MG/DL
LEUKOCYTE ESTERASE UR QL STRIP: NEGATIVE
LYMPHOCYTES # BLD AUTO: 1.77 THOUSANDS/ΜL (ref 0.6–4.47)
LYMPHOCYTES NFR BLD AUTO: 50 % (ref 14–44)
MCH RBC QN AUTO: 27.8 PG (ref 26.8–34.3)
MCHC RBC AUTO-ENTMCNC: 31.1 G/DL (ref 31.4–37.4)
MCV RBC AUTO: 89 FL (ref 82–98)
MONOCYTES # BLD AUTO: 0.09 THOUSAND/ΜL (ref 0.17–1.22)
MONOCYTES NFR BLD AUTO: 3 % (ref 4–12)
NEUTROPHILS # BLD AUTO: 1.48 THOUSANDS/ΜL (ref 1.85–7.62)
NEUTS SEG NFR BLD AUTO: 42 % (ref 43–75)
NITRITE UR QL STRIP: NEGATIVE
NRBC BLD AUTO-RTO: 0 /100 WBCS
PH UR STRIP.AUTO: 8.5 [PH]
PLATELET # BLD AUTO: 284 THOUSANDS/UL (ref 149–390)
PMV BLD AUTO: 10.7 FL (ref 8.9–12.7)
POTASSIUM SERPL-SCNC: 3 MMOL/L (ref 3.5–5.3)
POTASSIUM SERPL-SCNC: 3.7 MMOL/L (ref 3.5–5.3)
PROT SERPL-MCNC: 4.8 G/DL (ref 6.4–8.2)
PROT UR STRIP-MCNC: NEGATIVE MG/DL
RBC # BLD AUTO: 3.24 MILLION/UL (ref 3.81–5.12)
SODIUM SERPL-SCNC: 137 MMOL/L (ref 136–145)
SP GR UR STRIP.AUTO: 1.01 (ref 1–1.03)
UROBILINOGEN UR QL STRIP.AUTO: 0.2 E.U./DL
WBC # BLD AUTO: 3.52 THOUSAND/UL (ref 4.31–10.16)

## 2021-08-16 PROCEDURE — 99233 SBSQ HOSP IP/OBS HIGH 50: CPT | Performed by: INTERNAL MEDICINE

## 2021-08-16 PROCEDURE — 85025 COMPLETE CBC W/AUTO DIFF WBC: CPT | Performed by: INTERNAL MEDICINE

## 2021-08-16 PROCEDURE — 82948 REAGENT STRIP/BLOOD GLUCOSE: CPT

## 2021-08-16 PROCEDURE — 81003 URINALYSIS AUTO W/O SCOPE: CPT | Performed by: NURSE PRACTITIONER

## 2021-08-16 PROCEDURE — 80053 COMPREHEN METABOLIC PANEL: CPT | Performed by: INTERNAL MEDICINE

## 2021-08-16 PROCEDURE — 80204 DRUG ASSAY METHOTREXATE: CPT | Performed by: INTERNAL MEDICINE

## 2021-08-16 PROCEDURE — 84132 ASSAY OF SERUM POTASSIUM: CPT | Performed by: STUDENT IN AN ORGANIZED HEALTH CARE EDUCATION/TRAINING PROGRAM

## 2021-08-16 RX ORDER — POTASSIUM CHLORIDE 20 MEQ/1
40 TABLET, EXTENDED RELEASE ORAL ONCE
Status: COMPLETED | OUTPATIENT
Start: 2021-08-16 | End: 2021-08-16

## 2021-08-16 RX ORDER — HYDROMORPHONE HCL/PF 1 MG/ML
0.5 SYRINGE (ML) INJECTION
Status: DISCONTINUED | OUTPATIENT
Start: 2021-08-16 | End: 2021-08-20 | Stop reason: HOSPADM

## 2021-08-16 RX ORDER — LORAZEPAM 2 MG/ML
0.5 INJECTION INTRAMUSCULAR ONCE
Status: DISCONTINUED | OUTPATIENT
Start: 2021-08-16 | End: 2021-08-20 | Stop reason: HOSPADM

## 2021-08-16 RX ORDER — SERTRALINE HYDROCHLORIDE 25 MG/1
25 TABLET, FILM COATED ORAL DAILY
Status: DISCONTINUED | OUTPATIENT
Start: 2021-08-16 | End: 2021-08-20 | Stop reason: HOSPADM

## 2021-08-16 RX ORDER — OXYCODONE HCL 5 MG/5 ML
5 SOLUTION, ORAL ORAL EVERY 4 HOURS PRN
Status: DISCONTINUED | OUTPATIENT
Start: 2021-08-16 | End: 2021-08-20 | Stop reason: HOSPADM

## 2021-08-16 RX ORDER — LEUCOVORIN CALCIUM 25 MG/1
25 TABLET ORAL EVERY 6 HOURS SCHEDULED
Status: COMPLETED | OUTPATIENT
Start: 2021-08-16 | End: 2021-08-18

## 2021-08-16 RX ORDER — POTASSIUM CHLORIDE 20 MEQ/1
20 TABLET, EXTENDED RELEASE ORAL ONCE
Status: DISCONTINUED | OUTPATIENT
Start: 2021-08-16 | End: 2021-08-16

## 2021-08-16 RX ADMIN — DICYCLOMINE HYDROCHLORIDE 10 MG: 10 CAPSULE ORAL at 06:19

## 2021-08-16 RX ADMIN — SODIUM CHLORIDE 25 MG: 9 INJECTION, SOLUTION INTRAVENOUS at 10:58

## 2021-08-16 RX ADMIN — SERTRALINE 25 MG: 25 TABLET, FILM COATED ORAL at 14:12

## 2021-08-16 RX ADMIN — SODIUM BICARBONATE 150 ML/HR: 84 INJECTION, SOLUTION INTRAVENOUS at 01:49

## 2021-08-16 RX ADMIN — POTASSIUM CHLORIDE 40 MEQ: 1500 TABLET, EXTENDED RELEASE ORAL at 10:04

## 2021-08-16 RX ADMIN — HYDROMORPHONE HYDROCHLORIDE 0.5 MG: 1 INJECTION, SOLUTION INTRAMUSCULAR; INTRAVENOUS; SUBCUTANEOUS at 14:28

## 2021-08-16 RX ADMIN — DOCUSATE SODIUM 100 MG: 100 CAPSULE ORAL at 10:04

## 2021-08-16 RX ADMIN — INSULIN LISPRO 1 UNITS: 100 INJECTION, SOLUTION INTRAVENOUS; SUBCUTANEOUS at 11:13

## 2021-08-16 RX ADMIN — GABAPENTIN 200 MG: 250 SOLUTION ORAL at 22:48

## 2021-08-16 RX ADMIN — SODIUM CHLORIDE 25 MG: 9 INJECTION, SOLUTION INTRAVENOUS at 17:49

## 2021-08-16 RX ADMIN — ATORVASTATIN CALCIUM 10 MG: 10 TABLET, FILM COATED ORAL at 17:47

## 2021-08-16 RX ADMIN — HYDROMORPHONE HYDROCHLORIDE 0.5 MG: 1 INJECTION, SOLUTION INTRAMUSCULAR; INTRAVENOUS; SUBCUTANEOUS at 22:48

## 2021-08-16 RX ADMIN — DOCUSATE SODIUM 100 MG: 100 CAPSULE ORAL at 17:49

## 2021-08-16 RX ADMIN — OXYCODONE HYDROCHLORIDE 2.5 MG: 5 SOLUTION ORAL at 10:03

## 2021-08-16 RX ADMIN — DICYCLOMINE HYDROCHLORIDE 10 MG: 10 CAPSULE ORAL at 17:48

## 2021-08-16 RX ADMIN — DICYCLOMINE HYDROCHLORIDE 10 MG: 10 CAPSULE ORAL at 11:09

## 2021-08-16 RX ADMIN — SODIUM BICARBONATE 150 ML/HR: 84 INJECTION, SOLUTION INTRAVENOUS at 09:50

## 2021-08-16 RX ADMIN — LEUCOVORIN CALCIUM 25 MG: 25 TABLET ORAL at 22:48

## 2021-08-16 RX ADMIN — ENOXAPARIN SODIUM 40 MG: 40 INJECTION SUBCUTANEOUS at 09:52

## 2021-08-16 RX ADMIN — SODIUM CHLORIDE 25 MG: 9 INJECTION, SOLUTION INTRAVENOUS at 04:54

## 2021-08-16 RX ADMIN — DICYCLOMINE HYDROCHLORIDE 10 MG: 10 CAPSULE ORAL at 22:48

## 2021-08-16 RX ADMIN — FOLIC ACID 1 MG: 1 TABLET ORAL at 10:04

## 2021-08-16 RX ADMIN — FAMOTIDINE 20 MG: 20 TABLET ORAL at 10:04

## 2021-08-16 NOTE — CASE MANAGEMENT
Not a bundle  Pt is a scheduled readmission for chemo  Per prior notes, pt resides in a room at the [facility] where she & her  were employed  Prior to admission pt was at [facility]   Moustapha Porter (friend) [phone]  No POA  Uses [facility]  No h/o vna  H/o rhb at [facility], [facility]  No MH,D&A tx  Left VM for Glenroy Barahona to discuss dc plan   from [facility] plan available for any DC needs is [place] Floor [phone] ext [phone]  CM reviewed d/c planning process including the following: identifying help at home, patient preference for d/c planning needs, Discharge Lounge, [facility] Meds to Bed program, availability of treatment team to discuss questions or concerns patient and/or family may have regarding understanding medications and recognizing signs and symptoms once discharged  CM also encouraged patient to follow up with all recommended appointments after discharge  Patient advised of importance for patient and family to participate in managing patients medical well being

## 2021-08-16 NOTE — PROGRESS NOTES
visited Mrs Barahona at request of nurse  She was screaming on and off most of the day   tried [place] translation on speaker phone to ask if I am sit with her and what was bothering her  She wanted her   Staff shared he is at work and they live more than 2 hours away and she is alone   had  ask if it was ok to sit with her awhile and Mrs Miguel Leo said yes   stayed and Mrs Miguel Leo closed her eyes and rested for the length of the visit

## 2021-08-16 NOTE — UTILIZATION REVIEW
Inpatient Admission Authorization Request   NOTIFICATION OF INPATIENT ADMISSION/INPATIENT AUTHORIZATION REQUEST   SERVICING FACILITY:   [facility]  Address: [address] [place] [address], [address] [place], [place] [address]  Tax ID: [id number]  NPI: [id number]  [facility]patient [facility]  [address]  Place of Service Code: 24     ATTENDING PROVIDER:  Attending Name and NPI#: Lakisha Andujarnancy [[id number]]  Address: [address] [place] [address], [address] [place] [address]  Phone: [phone]     UTILIZATION REVIEW CONTACT:  nAdra Pena Utilization   Network Utilization Review Department  Phone: [phone]  Fax: [phone]  Email: [email or web address]     PHYSICIAN ADVISORY SERVICES:  FOR [id number] REVIEW - MEDICAL NECESSITY DENIAL  Phone: [phone]  Fax: [phone]  Email: [email or web address]     TYPE OF REQUEST:  Inpatient Status     ADMISSION INFORMATION:  ADMISSION DATE/TIME: 8/13/21  9:51 AM  PATIENT DIAGNOSIS CODE/DESCRIPTION:  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[facility] ) [C85 89]  DISCHARGE DATE/TIME: No discharge date for patient encounter  DISCHARGE DISPOSITION (IF DISCHARGED): [facility]     IMPORTANT INFORMATION:  Please contact the Andra Pena directly with any questions or concerns regarding this request  Department voicemails are confidential     Send requests for admission clinical reviews, concurrent reviews, approvals, and administrative denials due to lack of clinical to fax [phone]         Initial Clinical Review    Admission: Date/Time/Statement:   Admission Orders (From admission, onward)     Ordered        08/13/21 1927  Inpatient Admission  Once                   Orders Placed This Encounter   Procedures    Inpatient Admission     Standing Status:   Standing     Number of Occurrences:   1     Order Specific Question:   Level of Care     Answer:   Level 2 Stepdown / HOT [14]     Order Specific Question:   Estimated length of stay     Answer:   More than 2 Midnights     Order Specific Question:   Certification     Answer:   I certify that inpatient services are medically necessary for this patient for a duration of greater than two midnights  See H&P and MD Progress Notes for additional information about the patient's course of treatment  Initial Presentation: 48 yo female with primary CNS lymphoma, s/p 3 cycles of high dose methotrexate and rituxan  Most recent imaging had showed improvement of intracranial lesions; pt was sent to rehab to work on her strength/conditioning; she is due for C4 of HD MTX (insurance not covering rituxan) and was admitted inpatient to M/S unit from facility for chemo  Plan: - s/p 3 cycles of high-dose Methotrexate/Rituxan; scan after these two doses showed improvement of disease; pt was sent to rehab to work on her strength/conditioning; she is here for C4 of HD MTX/rituxan and was admitted from facility for chemo; however, insurance denied rituxan, so she will only be getting HD methotrexate; this is suboptimal, but insurance is limiting this     - proceed with C4 of HD MTX with leukovorin rescue after  - PT/OT evals and treat  She historically has been very uncomfortable with attempted PICC line placement- ativan has helped in past    - daily CBC/CMP, and daily weight  - PICC line placement today, plan for mediport placement during this hospitalization  - tylenol 650 and oxycodone 2 5mg PRN  Level 3 for now, thin liquids OK  - Nutrition consult  - speech and swallow eval  - pt has PEG, would like to get PEG tube removed by GI if nutrition/SLP agree    Date: 8/14   Day 2:  Pt did orbbi well with chemotherapy without significant side effect  The pain is fairly well controlled with current pain medication  No N/V  Continue IV hydration,  Sodium bicarbonate to keep urine pH  more than 7  Monitoring side effect  Check methotrexate level daily  CBC and CMP daily         Wt Readings from Last 1 Encounters:   08/15/21 50 2 kg (110 lb 10 7 oz) Additional Vital Signs:   Date/Time  Temp  Pulse  Resp  BP  MAP (mmHg)  SpO2  O2 Device  Patient Position - Orthostatic VS   08/14/21 15:13:13  98 5 °F (36 9 °C)  96  20  132/83  99  97 %  --  --   08/14/21 14:30:30  --  94  --  127/78  94  98 %  --  --   08/14/21 13:31:42  --  102  --  125/77  93  99 %  --  --   08/14/21 11:33:08  97 9 °F (36 6 °C)  93  18  128/78  95  98 %  --  --   08/14/21 08:54:14  97 5 °F (36 4 °C)  104  --  132/79  97  99 %  --  --   08/13/21 16:46:04  98 1 °F (36 7 °C)  99  18  137/80  99  99 %  --  --   08/13/21 10:24:55  98 4 °F (36 9 °C)  80  18  99/64  76  98 %  None (Room air)  Lying       Pertinent Labs/Diagnostic Test Results:     Results from last 7 days   Lab Units 08/16/21  0626 08/15/21  0443 08/13/21  1025   WBC Thousand/uL 3 52* 5 38 7 02   HEMOGLOBIN g/dL 9 0* 10 3* 12 0   HEMATOCRIT % 28 9* 31 6* 37 3   PLATELETS Thousands/uL 284 343 422*   NEUTROS ABS Thousands/µL 1 48* 3 63 3 70     Results from last 7 days   Lab Units 08/16/21  0626 08/15/21  0443 08/13/21  1025   SODIUM mmol/L 137 143 137   POTASSIUM mmol/L 3 0* 3 1* 3 7   CHLORIDE mmol/L 95* 104 108   CO2 mmol/L 40* 36* 24   ANION GAP mmol/L 2* 3* 5   BUN mg/dL 4* 10 12   CREATININE mg/dL 0 46* 0 45* 0 38*   EGFR ml/min/1 73sq m 113 114 120   CALCIUM mg/dL 7 9* 9 2 9 6     Results from last 7 days   Lab Units 08/16/21  0626 08/13/21  1025   AST U/L 15 13   ALT U/L 22 20   ALK PHOS U/L 51 79   TOTAL PROTEIN g/dL 4 8* 6 3*   ALBUMIN g/dL 2 2* 3 0*   TOTAL BILIRUBIN mg/dL 0 15* 0 13*     Results from last 7 days   Lab Units 08/16/21  0842 08/15/21  1657 08/15/21  1046 08/15/21  0850 08/14/21  1848 08/14/21  1310 08/14/21  0839 08/13/21  1650   POC GLUCOSE mg/dl 108 86 205* 127 219* 153* 90 91     Results from last 7 days   Lab Units 08/16/21  0626 08/15/21  0443 08/13/21  1025   GLUCOSE RANDOM mg/dL 448* 144* 93                 No past medical history on file    Present on Admission:   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[facility] [address] )   Dysphagia   Ambulatory dysfunction   Altered mental status   Cancer related pain      Admitting Diagnosis: 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HCC) [C85 89]  Age/Sex: 49 y o  female  Admission Orders:  Scheduled Medications:  atorvastatin, 10 mg, Oral, Daily With Dinner  dicyclomine, 10 mg, Oral, 4x Daily (AC & HS)  docusate sodium, 100 mg, Oral, BID  enoxaparin, 40 mg, Subcutaneous, Q24H MEÑO  famotidine, 20 mg, Oral, Daily  folic acid, 1 mg, Oral, Daily  gabapentin, 200 mg, Oral, HS  hydrOXYzine HCL, 10 mg, Oral, Once  insulin lispro, 1-6 Units, Subcutaneous, TID AC  leucovorin, 25 mg, Oral, Q6H MEÑO  leucovorin calcium IVPB, 25 mg, Intravenous, Q6H  potassium chloride, 20 mEq, Oral, Once    Continuous IV Infusions:  sodium bicarbonate infusion, 150 mL/hr, Intravenous, Continuous    PRN Meds:  acetaminophen, 650 mg, Oral, Q6H PRN  aluminum-magnesium hydroxide-simethicone, 30 mL, Per PEG Tube, Q4H PRN  Diclofenac Sodium, 2 g, Topical, TID PRN  ondansetron, 4 mg, Oral, Q6H PRN  oxyCODONE, 2 5 mg, Oral, Q4H PRN 8/14 x1  polyethylene glycol, 17 g, Oral, Daily PRN  sodium chloride, 20 mL/hr, Intravenous, Once PRN        IP CONSULT TO PICC TEAM  IP CONSULT TO INTERNAL MEDICINE  IP CONSULT TO NUTRITION SERVICES    Network Utilization Review Department  ATTENTION: Please call with any questions or concerns to [phone] and carefully listen to the prompts so that you are directed to the right person  All voicemails are confidential   Vinod Milan all requests for admission clinical reviews, approved or denied determinations and any other requests to dedicated fax number below belonging to the campus where the patient is receiving treatment   List of dedicated fax numbers for the Facilities:  [address] [place] [address] DENIALS (Administrative/Medical Necessity) [phone]   [address]  [facility] (Maternity/NICU/Pediatrics) [phone] [address] [phone]   [address] [phone]     [address] [place] [address] [id number] [place] [address] [address] [address] [place] [address] [phone]

## 2021-08-16 NOTE — ASSESSMENT & PLAN NOTE
Patient's mental status waxes and wanes although she is mostly alert and oriented x3  She has baseline left-sided upper and lower extremity weakness 3-4/5 muscle strength from her CNS lymphoma

## 2021-08-16 NOTE — UTILIZATION REVIEW
Continued Stay Review    Date: 8/15                         Current Patient Class: IP  Current Level of Care: MS    HPI:56 y o  female initially admitted on 8/13 for chemotherapy - round 5 of HD MTX for Diffuse large B-cell lymphoma, primary CNS lymphoma     Assessment/Plan:   Chemo with High dose methotrexate  She is on a bicarb drip, has hypokalemia 3 1 today  She is crying and depressed today but her baseline is A&O  Starting on Sertraline  Will work with PT/OT and continue analgesia for cancer pain and Voltarn gel for knee pain       Vital Signs:   08/15/21 22:16:12  98 °F (36 7 °C)  77  18  144/70  95  98 %  --   08/15/21 2005  --  --  --  --  --  --  None (Room air)   08/15/21 16:52:41  --  78  16  141/91  108  96 %  --   08/15/21 11:40:37  97 9 °F (36 6 °C)  67  17  140/88  105  99 %  --   08/15/21 0236  --  --  --  140/80  --  --  --   08/15/21 02:31:08  --  --  20  151/96  114  --  --   08/15/21 02:29:53  98 2 °F (36 8 °C)  --  20  147/96  113  --  --   08/14/21 22:38:31  97 7 °F (36 5 °C)  71  20  140/83  102  97 %  --   08/14/21 18:59:24  97 8 °F (36 6 °C)  87  20  130/83  99  97 %  --   08/14/21 15:13:13  98 5 °F (36 9 °C)  96  20  132/83  99  97 %  --   08/14/21 14:30:30  --  94  --  127/78  94  98 %  --   08/14/21 13:31:42  --  102  --  125/77  93  99 %  --   08/14/21 11:33:08  97 9 °F (36 6 °C)  93  18  128/78  95  98 %  --   08/14/21 08:54:14  97 5 °F (36 4 °C)  104  --  132/79  97  99 %  --          Pertinent Labs/Diagnostic Results:       Results from last 7 days   Lab Units 08/15/21  0443 08/13/21  1025   WBC Thousand/uL 5 38 7 02   HEMOGLOBIN g/dL 10 3* 12 0   HEMATOCRIT % 31 6* 37 3   PLATELETS Thousands/uL 343 422*   NEUTROS ABS Thousands/µL 3 63 3 70         Results from last 7 days   Lab Units 08/15/21  0443 08/13/21  1025   SODIUM mmol/L 143 137   POTASSIUM mmol/L 3 1* 3 7   CHLORIDE mmol/L 104 108   CO2 mmol/L 36* 24   ANION GAP mmol/L 3* 5   BUN mg/dL 10 12   CREATININE mg/dL 0 45* 0 38*   EGFR ml/min/1 73sq m 114 120   CALCIUM mg/dL 9 2 9 6       Results from last 7 days   Lab Units 08/15/21  1657 08/15/21  1046 08/15/21  0850 08/14/21  1848 08/14/21  1310 08/14/21  0839 08/13/21  1650   POC GLUCOSE mg/dl 86 205* 127 219* 153* 90 91     Results from last 7 days   Lab Units 08/15/21  0443 08/13/21  1025   GLUCOSE RANDOM mg/dL 144* 93     Results from last 7 days   Lab Units 08/15/21  1140   CLARITY UA  Clear   COLOR UA  Yellow   SPEC GRAV UA  1 007   PH UA  8 5*   GLUCOSE UA mg/dl Negative   KETONES UA mg/dl Negative   BLOOD UA  Negative   PROTEIN UA mg/dl Negative   NITRITE UA  Negative   BILIRUBIN UA  Negative   UROBILINOGEN UA E U /dl 0 2   LEUKOCYTES UA  Negative   WBC UA /hpf None Seen   RBC UA /hpf None Seen   BACTERIA UA /hpf None Seen   EPITHELIAL CELLS WET PREP /hpf None Seen     Medications:   Scheduled Medications:  atorvastatin, 10 mg, Oral, Daily With Dinner  dicyclomine, 10 mg, Oral, 4x Daily (AC & HS)  docusate sodium, 100 mg, Oral, BID  enoxaparin, 40 mg, Subcutaneous, Q24H MEÑO  famotidine, 20 mg, Oral, Daily  folic acid, 1 mg, Oral, Daily  gabapentin, 200 mg, Oral, HS  hydrOXYzine HCL, 10 mg, Oral, Once  insulin lispro, 1-6 Units, Subcutaneous, TID AC  leucovorin, 25 mg, Oral, Q6H MEÑO  leucovorin calcium IVPB, 25 mg, Intravenous, Q6H  sertraline, 25 mg, Oral, Daily      Continuous IV Infusions:  sodium bicarbonate infusion, 150 mL/hr, Intravenous, Continuous      PRN Meds:  acetaminophen, 650 mg, Oral, Q6H PRN - x 2 8/15  aluminum-magnesium hydroxide-simethicone, 30 mL, Per PEG Tube, Q4H PRN  Diclofenac Sodium, 2 g, Topical, TID PRN  HYDROmorphone, 0 5 mg, Intravenous, Q3H PRN  -x 1 8/16  ondansetron, 4 mg, Oral, Q6H PRN  oxyCODONE, 5 mg, Oral, Q4H PRN - x 1 8/14, x2 8/15  polyethylene glycol, 17 g, Oral, Daily PRN  sodium chloride, 20 mL/hr, Intravenous, Once PRN    Discharge Plan: SNF    Network Utilization Review Department  ATTENTION: Please call with any questions or concerns to [phone] and carefully listen to the prompts so that you are directed to the right person  All voicemails are confidential   Wendy Query all requests for admission clinical reviews, approved or denied determinations and any other requests to dedicated fax number below belonging to the campus where the patient is receiving treatment   List of dedicated fax numbers for the Facilities:  [address] DENIALS (Administrative/Medical Necessity) [phone]   [address] (Maternity/NICU/Pediatrics) [phone]   [address] [place] [address] [id number] [place] [address] [address] T[address] [place] [address] [phone]

## 2021-08-16 NOTE — ASSESSMENT & PLAN NOTE
· Patient on p r n  Tylenol for mild pain with p r n  Oxycodone for moderate pain and Dilaudid for severe pain  · Voltaren gel for knee pain  · Pain currently well controlled

## 2021-08-16 NOTE — PROGRESS NOTES
Hematology/Oncology IP Progress Note    Date of Service: 8/16/2021    [facility] MOB  [facility] HEMATOLOGY ONCOLOGY SPECIALISTS    Sapna Kaplan is a 49 y o  female at Hospital Day ( LOS: 3 days ) admitted on 8/13/2021  9:51 AM     Principal Problem: 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[facility] [address] )     Assessment/Plan:   Principal Problem:    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[facility] [address] )  Active Problems:    Altered mental status    Dysphagia    Cancer related pain    Ambulatory dysfunction    1  Primary CNS diffuse large B-cell lymphoma , admitted for cycle 5 high-dose methotrexate + with rituximab  Patient did very well with chemotherapy without significant side effects  Patient will continue IV hydration,  Sodium bicarbonate to keep urine pH  more than 7  Monitoring side effect  Check methotrexate level daily  CBC and CMP daily  2   Abdominal pain:   · Pt is calling out in pain today, has LLQ abdominal pain on exam, and PEG tube has mild exudate from this  She completed 7 day course of Keflex for this  She is stating she needs to go to the bathroom, tho she was just taken to bathroom  · Check UA with reflex  · CT A/P to r/o abscess and evaluate for other etiology; will plan to consult GI after this imaging is completed  · Manage pain: increase oxycodone to 5mg Q4PRN; add dilaudid 0 5mg IV Q3 hours PRN severe pain  3   AMS: Hx of AMS; limited communication  AOX self today  Her malignancy dose contribute to this  Consider imaging if worsens  Continue to monitor  4  We will continue to follow patient with you during the hospital stay  Communication with patient/family:  I did update the patient via [address] [employer]  phone  Communication with team:  Did talk with [phone] Cristian Cortes  RN       I did see this patient with Dr Devonte Vernon and he is in agreement with this plan  Griselda Wilson [facility]  Hematology/Oncology Nurse Practitioner       Subjective:     Patient is calling out today    She tells me that she has abdominal pain, mostly left-sided abdominal pain when examining her  She finds that is increased tenderness  She denies any nausea or vomiting, bowels are okay  She tells me that pain is not impacted by eating or her bowels, and that only palpation makes it worse  She tells me she wants to go home  She wants to talk to her  see him  She is very tearful and does not want to be here  She did very well with chemotherapy without significant side effect  Objective:   VITALS:   /78   Pulse 78   Temp 98 5 °F (36 9 °C)   Resp 16   Ht 5' (1 524 m)   Wt 50 2 kg (110 lb 10 7 oz)   SpO2 97%   BMI 21 61 kg/m²   Temp (24hrs), Av 3 °F (36 8 °C), Min:98 °F (36 7 °C), Max:98 5 °F (36 9 °C)    I&O:    Intake/Output Summary (Last 24 hours) at 2021 1222  Last data filed at 2021 1119  Gross per 24 hour   Intake 3090 ml   Output 4650 ml   Net -1560 ml      Weight change:      Physical EXAM:  General:  Alert, cooperative, no distress, appears stated age  Appears chronically ill  Appears in pain  Head:  Normocephalic, without obvious abnormality, atraumatic  Eyes:  Conjunctivae/corneas clear  PERRL, EOMs intact  No evidence of conjunctivitis     Throat: Lips, mucosa, and tongue normal  No lesions in the oropharynx   Neck: Supple, symmetrical, trachea midline, no adenopathy    Lungs:   Clear to auscultation bilaterally  Respiratory effort easy, nonlabored    Heart:  Regular rate and rhythm, S1, S2 normal, no murmur, click, rub or gallop  Abdomen:   Soft, nondistended  Bowel sounds normal  No masses,  No organomegaly  Pain in LLQ with palpation, some exudate when evaluating PEG tube  Extremities: Extremities normal, atraumatic, no cyanosis or edema  No axillary or inguinal adenopathy   Skin: Skin color, texture, turgor normal  No rashes or lesions    Neurologic/Psych:   Awake and alert; tearful            ALLERGIES:  No Known Allergies    CURRENT MEDICATIONS:  Inpatient Scheduled Medications:atorvastatin, 10 mg, Daily With Dinner  dicyclomine, 10 mg, 4x Daily (AC & HS)  docusate sodium, 100 mg, BID  enoxaparin, 40 mg, Q24H MEÑO  famotidine, 20 mg, Daily  folic acid, 1 mg, Daily  gabapentin, 200 mg, HS  hydrOXYzine HCL, 10 mg, Once  insulin lispro, 1-6 Units, TID AC  leucovorin, 25 mg, Q6H MEÑO  leucovorin calcium IVPB, 25 mg, Q6H  potassium chloride, 20 mEq, Once  sertraline, 25 mg, Daily      Inpatient PRN Medications:acetaminophen, 650 mg, Q6H PRN  aluminum-magnesium hydroxide-simethicone, 30 mL, Q4H PRN  Diclofenac Sodium, 2 g, TID PRN  ondansetron, 4 mg, Q6H PRN  oxyCODONE, 2 5 mg, Q4H PRN  polyethylene glycol, 17 g, Daily PRN  sodium chloride, 20 mL/hr, Once PRN      Inpatient IV Infusions:sodium bicarbonate infusion, Last Rate: 150 mL/hr (08/16/21 0950)        LABS:  CBC:  Recent Labs     08/15/21  0443 08/16/21  0626   WBC 5 38 3 52*   MCV 88 89   MCH 28 7 27 8   MCHC 32 6 31 1*   RDW 14 7 15 0   MPV 10 4 10 7     CMP:   Recent Labs     08/15/21  0443 08/16/21  0626   SODIUM 143 137   BUN 10 4*   CALCIUM 9 2 7 9*   CREATININE 0 45* 0 46*     MISC  LABS:  No results for input(s): LDH, URICACID, PHOSPHORUS, LACTICACID in the last 72 hours  Invalid input(s): MAGNESIUM  LFT:   Recent Labs     08/16/21  0626   AST 15     Coags:  Invalid input(s): COAGPROFILE  No results for input(s): INR, PTT in the last 72 hours      Invalid input(s): PTP, CHEPE Camara  8/16/2021, 12:22 PM

## 2021-08-16 NOTE — ASSESSMENT & PLAN NOTE
Patient diagnosed with primary B-cell CNS lymphoma in April 2021  Patient has received 4 cycles of chemotherapy, currently here to receive 5th cycle  Methotrexate with leucovorin rescue plus Rituxan  Daily methotrexate levels, CBC and CMP as per Hematology  Labs reveal hypokalemia likely secondary to alkalinization of urine  Will replete as needed daily    DVT prophylaxis with Lovenox  UA has been ordered, want urine pH to be less than 7 to prevent crystallization  Hematology oncology is the primary team

## 2021-08-16 NOTE — PROGRESS NOTES
INTERNAL MEDICINE RESIDENCY PROGRESS NOTE     Name: Tomy Adkins   Age & Sex: 49 y o  female   MRN: [id number]  Unit/Bed#: [facility] 916-01   Encounter: [id number]  Team: SOD Team B     PATIENT INFORMATION     Name: Tomy Adkins   Age & Sex: 49 y o  female   MRN: [id number]  Hospital Stay Days: 3    ASSESSMENT/PLAN     Principal Problem:    CNS lymphoma [facility])  Active Problems:    Altered mental status    Depression    Cancer related pain    Ambulatory dysfunction    Hypokalemia      * CNS lymphoma [facility])  Assessment & Plan  Patient diagnosed with primary B-cell CNS lymphoma in April 2021  Patient has received 4 cycles of chemotherapy, currently here to receive 5th cycle  Methotrexate with leucovorin rescue plus Rituxan  Daily methotrexate levels, CBC and CMP as per Hematology  Labs reveal hypokalemia likely secondary to alkalinization of urine  Will replete as needed daily  DVT prophylaxis with Lovenox  UA has been ordered, want urine pH to be less than 7 to prevent crystallization  Hematology oncology is the primary team    Depression  Assessment & Plan  Patient is having episodes of crying and feeling of depression  No suicidal ideations  Plan: Will start patient on sertraline 25 mg daily  Altered mental status  Assessment & Plan  Patient's mental status waxes and wanes although she is mostly alert and oriented x3  She has baseline left-sided upper and lower extremity weakness 3-4/5 muscle strength from her CNS lymphoma  Hypokalemia  Assessment & Plan  Recent Labs     08/15/21  0443 08/16/21  0626   K 3 1* 3 0*     Will replete potassium daily  Ambulatory dysfunction  Assessment & Plan  Patient has 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  PT OT consult    Cancer related pain  Assessment & Plan  Patient on p r n  Tylenol for mild pain with p r n  Oxycodone for moderate pain and Dilaudid for severe pain  Voltaren gel for knee pain    Pain currently well controlled  SUBJECTIVE     Patient seen and examined  No acute events overnight  Alert and oriented x3  OBJECTIVE     Vitals:    08/15/21 2216 21 0225 21 0915 21 1101   BP: 144/70 122/69 136/75 135/78   BP Location:       Pulse: 77 71 82 78   Resp: 18 18 16    Temp: 98 °F (36 7 °C) 98 1 °F (36 7 °C) 98 5 °F (36 9 °C) 98 5 °F (36 9 °C)   TempSrc:       SpO2: 98% 95% 98% 97%   Weight:       Height:          Temperature:   Temp (24hrs), Av 3 °F (36 8 °C), Min:98 °F (36 7 °C), Max:98 5 °F (36 9 °C)    Temperature: 98 5 °F (36 9 °C)  Intake & Output:  I/O       701 - 08/15 0700 08/15 0701 -  0700 701 -  0700    P  O  240 480     I V  (mL/kg) 2625 (52 3) 2360 (47)     NG/      IV Piggyback 1284 4 302 5     Total Intake(mL/kg) 4269 4 (85) 3142 5 (62 6)     Urine (mL/kg/hr) 4400 (3 7) 4350 (3 6) 1900 (6 3)    Total Output 4400 4350 1900    Net -130 6 -1207 5 -1900               Weights:   IBW (Ideal Body Weight): 45 5 kg    Body mass index is 21 61 kg/m²  Weight (last 2 days)     Date/Time   Weight    08/15/21 1000   50 2 (110 67)            Physical Exam  Vitals reviewed  Constitutional:       General: She is not in acute distress  Appearance: She is well-developed  She is not diaphoretic  HENT:      Head: Normocephalic and atraumatic  Nose: Nose normal       Mouth/Throat:      Pharynx: No oropharyngeal exudate  Eyes:      General: No scleral icterus  Conjunctiva/sclera: Conjunctivae normal    Neck:      Thyroid: No thyromegaly  Vascular: No JVD  Trachea: No tracheal deviation  Cardiovascular:      Rate and Rhythm: Normal rate and regular rhythm  Heart sounds: Normal heart sounds  No murmur heard  No friction rub  No gallop  Pulmonary:      Effort: Pulmonary effort is normal  No respiratory distress  Breath sounds: Normal breath sounds  No stridor  No wheezing or rales     Chest:      Chest wall: No tenderness  Abdominal:      General: Bowel sounds are normal  There is no distension  Palpations: Abdomen is soft  There is no mass  Tenderness: There is no abdominal tenderness  There is no guarding or rebound  Musculoskeletal:         General: No tenderness  Normal range of motion  Cervical back: Neck supple  Skin:     General: Skin is warm  Findings: No erythema or rash  Neurological:      Mental Status: She is alert and oriented to person, place, and time  Sensory: No sensory deficit  Comments: LEFT UPPER AND LOWER EXTREMITY 3/5 weakness  Psychiatric:         Behavior: Behavior normal          Thought Content: Thought content normal          Judgment: Judgment normal        LABORATORY DATA     Labs: I have personally reviewed pertinent reports  Results from last 7 days   Lab Units 08/16/21  0626 08/15/21  0443 08/13/21  1025   WBC Thousand/uL 3 52* 5 38 7 02   HEMOGLOBIN g/dL 9 0* 10 3* 12 0   HEMATOCRIT % 28 9* 31 6* 37 3   PLATELETS Thousands/uL 284 343 422*   NEUTROS PCT % 42* 68 52   MONOS PCT % 3* 8 9      Results from last 7 days   Lab Units 08/16/21  0626 08/15/21  0443 08/13/21  1025   POTASSIUM mmol/L 3 0* 3 1* 3 7   CHLORIDE mmol/L 95* 104 108   CO2 mmol/L 40* 36* 24   BUN mg/dL 4* 10 12   CREATININE mg/dL 0 46* 0 45* 0 38*   CALCIUM mg/dL 7 9* 9 2 9 6   ALK PHOS U/L 51  --  79   ALT U/L 22  --  20   AST U/L 15  --  13                            IMAGING & DIAGNOSTIC TESTING     Radiology Results: I have personally reviewed pertinent reports  No results found  Other Diagnostic Testing: I have personally reviewed pertinent reports      ACTIVE MEDICATIONS     Current Facility-Administered Medications   Medication Dose Route Frequency    acetaminophen (TYLENOL) tablet 650 mg  650 mg Oral Q6H PRN    aluminum-magnesium hydroxide-simethicone (MYLANTA) oral suspension 30 mL  30 mL Per PEG Tube Q4H PRN    atorvastatin (LIPITOR) tablet 10 mg  10 mg Oral Daily With Dinner    Diclofenac Sodium (VOLTAREN) 1 % topical gel 2 g  2 g Topical TID PRN    dicyclomine (BENTYL) capsule 10 mg  10 mg Oral 4x Daily (AC & HS)    docusate sodium (COLACE) capsule 100 mg  100 mg Oral BID    enoxaparin (LOVENOX) subcutaneous injection 40 mg  40 mg Subcutaneous Q24H MEÑO    famotidine (PEPCID) tablet 20 mg  20 mg Oral Daily    folic acid (FOLVITE) tablet 1 mg  1 mg Oral Daily    gabapentin (NEURONTIN) oral solution 200 mg  200 mg Oral HS    HYDROmorphone (DILAUDID) injection 0 5 mg  0 5 mg Intravenous Q3H PRN    hydrOXYzine HCL (ATARAX) tablet 10 mg  10 mg Oral Once    insulin lispro (HumaLOG) 100 units/mL subcutaneous injection 1-6 Units  1-6 Units Subcutaneous TID AC    leucovorin (WELLCOVORIN) tablet 25 mg  25 mg Oral Q6H MEÑO    leucovorin 25 mg in sodium chloride 0 9 % 50 mL IVPB  25 mg Intravenous Q6H    ondansetron (ZOFRAN-ODT) dispersible tablet 4 mg  4 mg Oral Q6H PRN    oxyCODONE (ROXICODONE) oral solution 5 mg  5 mg Oral Q4H PRN    polyethylene glycol (MIRALAX) packet 17 g  17 g Oral Daily PRN    sertraline (ZOLOFT) tablet 25 mg  25 mg Oral Daily    sodium bicarbonate 100 mEq in dextrose 5 % 1,000 mL infusion  150 mL/hr Intravenous Continuous    sodium chloride 0 9 % infusion  20 mL/hr Intravenous Once PRN       VTE Pharmacologic Prophylaxis: Enoxaparin (Lovenox)  VTE Mechanical Prophylaxis: sequential compression device    Portions of the record may have been created with voice recognition software  Occasional wrong word or "sound a like" substitutions may have occurred due to the inherent limitations of voice recognition software    Read the chart carefully and recognize, using context, where substitutions have occurred   ==  Rufino Crum, [address] [facility]  Internal Medicine Residency PGY-3

## 2021-08-17 ENCOUNTER — APPOINTMENT (INPATIENT)
Dept: RADIOLOGY | Facility: HOSPITAL | Age: 56
DRG: 847 | End: 2021-08-17
Payer: COMMERCIAL

## 2021-08-17 LAB
ALBUMIN SERPL BCP-MCNC: 2.8 G/DL (ref 3.5–5)
ALP SERPL-CCNC: 66 U/L (ref 46–116)
ALT SERPL W P-5'-P-CCNC: 23 U/L (ref 12–78)
ANION GAP SERPL CALCULATED.3IONS-SCNC: 2 MMOL/L (ref 4–13)
AST SERPL W P-5'-P-CCNC: 18 U/L (ref 5–45)
BILIRUB SERPL-MCNC: 0.27 MG/DL (ref 0.2–1)
BUN SERPL-MCNC: 10 MG/DL (ref 5–25)
CALCIUM ALBUM COR SERPL-MCNC: 10.4 MG/DL (ref 8.3–10.1)
CALCIUM SERPL-MCNC: 9.4 MG/DL (ref 8.3–10.1)
CHLORIDE SERPL-SCNC: 105 MMOL/L (ref 100–108)
CO2 SERPL-SCNC: 32 MMOL/L (ref 21–32)
CREAT SERPL-MCNC: 0.42 MG/DL (ref 0.6–1.3)
ERYTHROCYTE [DISTWIDTH] IN BLOOD BY AUTOMATED COUNT: 15.1 % (ref 11.6–15.1)
GFR SERPL CREATININE-BSD FRML MDRD: 117 ML/MIN/1.73SQ M
GLUCOSE SERPL-MCNC: 104 MG/DL (ref 65–140)
GLUCOSE SERPL-MCNC: 107 MG/DL (ref 65–140)
GLUCOSE SERPL-MCNC: 124 MG/DL (ref 65–140)
GLUCOSE SERPL-MCNC: 130 MG/DL (ref 65–140)
HCT VFR BLD AUTO: 33.9 % (ref 34.8–46.1)
HGB BLD-MCNC: 10.6 G/DL (ref 11.5–15.4)
MCH RBC QN AUTO: 28 PG (ref 26.8–34.3)
MCHC RBC AUTO-ENTMCNC: 31.3 G/DL (ref 31.4–37.4)
MCV RBC AUTO: 90 FL (ref 82–98)
NRBC BLD AUTO-RTO: 0 /100 WBCS
PLATELET # BLD AUTO: 322 THOUSANDS/UL (ref 149–390)
PMV BLD AUTO: 9.9 FL (ref 8.9–12.7)
POTASSIUM SERPL-SCNC: 3.9 MMOL/L (ref 3.5–5.3)
PROT SERPL-MCNC: 6 G/DL (ref 6.4–8.2)
RBC # BLD AUTO: 3.78 MILLION/UL (ref 3.81–5.12)
SODIUM SERPL-SCNC: 139 MMOL/L (ref 136–145)
WBC # BLD AUTO: 3.44 THOUSAND/UL (ref 4.31–10.16)

## 2021-08-17 PROCEDURE — 82948 REAGENT STRIP/BLOOD GLUCOSE: CPT

## 2021-08-17 PROCEDURE — 99231 SBSQ HOSP IP/OBS SF/LOW 25: CPT | Performed by: NURSE PRACTITIONER

## 2021-08-17 PROCEDURE — 80204 DRUG ASSAY METHOTREXATE: CPT | Performed by: NURSE PRACTITIONER

## 2021-08-17 PROCEDURE — 80053 COMPREHEN METABOLIC PANEL: CPT | Performed by: NURSE PRACTITIONER

## 2021-08-17 PROCEDURE — 97167 OT EVAL HIGH COMPLEX 60 MIN: CPT

## 2021-08-17 PROCEDURE — 99254 IP/OBS CNSLTJ NEW/EST MOD 60: CPT | Performed by: INTERNAL MEDICINE

## 2021-08-17 PROCEDURE — 97163 PT EVAL HIGH COMPLEX 45 MIN: CPT

## 2021-08-17 PROCEDURE — 76705 ECHO EXAM OF ABDOMEN: CPT

## 2021-08-17 PROCEDURE — 85025 COMPLETE CBC W/AUTO DIFF WBC: CPT | Performed by: NURSE PRACTITIONER

## 2021-08-17 RX ADMIN — GABAPENTIN 200 MG: 250 SOLUTION ORAL at 22:53

## 2021-08-17 RX ADMIN — HYDROMORPHONE HYDROCHLORIDE 0.5 MG: 1 INJECTION, SOLUTION INTRAMUSCULAR; INTRAVENOUS; SUBCUTANEOUS at 13:20

## 2021-08-17 RX ADMIN — LEUCOVORIN CALCIUM 25 MG: 25 TABLET ORAL at 17:24

## 2021-08-17 RX ADMIN — LEUCOVORIN CALCIUM 25 MG: 25 TABLET ORAL at 04:39

## 2021-08-17 RX ADMIN — DOCUSATE SODIUM 100 MG: 100 CAPSULE ORAL at 09:48

## 2021-08-17 RX ADMIN — ENOXAPARIN SODIUM 40 MG: 40 INJECTION SUBCUTANEOUS at 09:48

## 2021-08-17 RX ADMIN — LEUCOVORIN CALCIUM 25 MG: 25 TABLET ORAL at 11:52

## 2021-08-17 RX ADMIN — DICYCLOMINE HYDROCHLORIDE 10 MG: 10 CAPSULE ORAL at 17:24

## 2021-08-17 RX ADMIN — LEUCOVORIN CALCIUM 25 MG: 25 TABLET ORAL at 22:54

## 2021-08-17 RX ADMIN — FAMOTIDINE 20 MG: 20 TABLET ORAL at 09:48

## 2021-08-17 RX ADMIN — DICYCLOMINE HYDROCHLORIDE 10 MG: 10 CAPSULE ORAL at 06:01

## 2021-08-17 RX ADMIN — HYDROMORPHONE HYDROCHLORIDE 0.5 MG: 1 INJECTION, SOLUTION INTRAMUSCULAR; INTRAVENOUS; SUBCUTANEOUS at 04:36

## 2021-08-17 RX ADMIN — DICYCLOMINE HYDROCHLORIDE 10 MG: 10 CAPSULE ORAL at 22:53

## 2021-08-17 RX ADMIN — FOLIC ACID 1 MG: 1 TABLET ORAL at 09:48

## 2021-08-17 RX ADMIN — SERTRALINE 25 MG: 25 TABLET, FILM COATED ORAL at 09:48

## 2021-08-17 RX ADMIN — DICYCLOMINE HYDROCHLORIDE 10 MG: 10 CAPSULE ORAL at 11:52

## 2021-08-17 RX ADMIN — DOCUSATE SODIUM 100 MG: 100 CAPSULE ORAL at 17:24

## 2021-08-17 RX ADMIN — ATORVASTATIN CALCIUM 10 MG: 10 TABLET, FILM COATED ORAL at 17:25

## 2021-08-17 NOTE — PLAN OF CARE
Problem: PHYSICAL THERAPY ADULT  Goal: Performs mobility at highest level of function for planned discharge setting  See evaluation for individualized goals  Description: Treatment/Interventions: Functional transfer training, LE strengthening/ROM, Endurance training, Patient/family training, Bed mobility, Gait training, Spoke to nursing, Spoke to case management, OT          See flowsheet documentation for full assessment, interventions and recommendations  Note: Prognosis: Fair  Problem List: Decreased strength, Decreased endurance, Impaired balance, Decreased mobility, Decreased safety awareness, Decreased coordination, Decreased cognition, Pain  Assessment: Pt seen for high complexity PT evaluation  Pt with active PT eval/treat orders  Pt is a 49 y o  female who was admitted to [facility] on 8/13/2021 with CNS lymphoma  Pt's active problems include: depression, altered mental status, hypokalemia, ambulatory dysfunction, cancer related pain,dysphagia, aphasia, severe-protein calorie malnutrition, cellulits  At baseline, pt resides with  at [facility] where  works and was independent for ADLs prior to hospital admission  PTA, pt receiving rehab services at [facility] where she required A for ADLs/ IADLs  Currently, pt has limitations in strength, balance, endurance, and overall functional mobility  Pt requires assist of 1-2 for all mobility performed this date  Pt performed bed mobility tasks with min Ax1  Pt sitting EOB at close S level/ CGA required for dynamic balance tasks  Pt performed STS from EOB with b/l HHA with max Ax2, pt able to take lateral side step with RLE with max Ax2  SPT from EOB to bedside chair performed with dep x2  Pt was left sitting upright in bedside chair with chair alarm on at the end of PT session with all needs in reach  Pt would benefit from continued PT services while in hospital to address remaining limitations   PT to continue to follow pt and recommends post-acute rehab services after d/c  The patient's AM-PAC Basic Mobility Inpatient Short Form Low Function Raw Score 17 , Standardized Score is 27 46  A standardized score less 42 9 suggests the patient may benefit from discharge to post-acute rehab services  Please also refer to the recommendation of the Physical Therapist for safe discharge planning  Barriers to Discharge: Inaccessible home environment, Decreased caregiver support        PT Discharge Recommendation: Post acute rehabilitation services     PT - OK to Discharge: Yes (to rehab once medically cleared )    See flowsheet documentation for full assessment

## 2021-08-17 NOTE — OCCUPATIONAL THERAPY NOTE
Occupational Therapy Evaluation     Patient Name: Lizet JUSTIN Date: 8/17/2021  Problem List  Principal Problem:    CNS lymphoma [facility])  Active Problems:    Altered mental status    Cancer related pain    Ambulatory dysfunction    Hypokalemia    Depression    Past Medical History  No past medical history on file  Past Surgical History  Past Surgical History:   Procedure Laterality Date    IR PICC REPOSITION  4/27/2021    IR TUNNELED CENTRAL LINE REMOVAL  4/27/2021 08/17/21 1102   OT Last Visit   OT Visit Date 08/17/21   Note Type   Note type Evaluation   Restrictions/Precautions   Weight Bearing Precautions Per Order No   Other Precautions Cognitive; Chair Alarm; Bed Alarm; Fall Risk;Pain  (speaks gujarati, L sided weakness)   Pain Assessment   Pain Assessment Tool [facility] Pain Intervention(s) Repositioned; Ambulation/increased activity   Pain Rating: FLACC (Rest) - Face 0   Pain Rating: FLACC (Rest) - Legs 0   Pain Rating: FLACC (Rest) - Activity 0   Pain Rating: FLACC (Rest) - Cry 1   Pain Rating: FLACC (Rest) - Consolability 0   Score: FLACC (Rest) 1   Pain Rating: FLACC (Activity) - Face 1   Pain Rating: FLACC (Activity) - Legs 1   Pain Rating: FLACC (Activity) - Activity 1   Pain Rating: FLACC (Activity) - Cry 1   Pain Rating: FLACC (Activity) - Consolability 1   Score: FLACC (Activity) 5   Home Living   Additional Comments Pt resides in [place] where  works   However, has recently been at [facility]   Prior Function   Comments At baseline, pt was I with ADLs/IADLs;however, PTA, pt requires A for ADLs/IADLs   Lifestyle   Autonomy At baseline, pt was I with ADLs/IADLs;however, PTA, pt requires A for ADLs/IADLs   Reciprocal Relationships Family, friends   Intrinsic Gratification Will continue to assess   Subjective   Subjective "bathroom"   ADL   Where Assessed Edge of bed   Eating Assistance 5  Supervision/Setup   Grooming Assistance 4  Minimal Assistance UB Bathing Assistance 3  Moderate Assistance   LB Bathing Assistance 3  Moderate Assistance   UB Dressing Assistance 3  Moderate Assistance   LB Dressing Assistance 3  Moderate Assistance   Toileting Assistance  3  Moderate Assistance   Bed Mobility   Supine to Sit 4  Minimal assistance   Additional items Assist x 1; Increased time required   Sit to Supine Unable to assess   Transfers   Sit to Stand 2  Maximal assistance   Additional items Assist x 2; Increased time required   Stand to Sit 2  Maximal assistance   Additional items Assist x 2; Increased time required   Stand pivot 1  Dependent   Additional items Assist x 2; Increased time required   Toilet transfer 2  Maximal assistance   Additional items Assist x 2; Increased time required   Additional Comments Transfers with B/L HHA    Balance   Static Sitting Fair   Dynamic Sitting Fair -   Static Standing Poor   Dynamic Standing Poor   Ambulatory Poor -   Activity Tolerance   Activity Tolerance Patient limited by fatigue;Patient limited by pain   Medical Staff Made Aware PT Helene    Nurse Made Aware RN clearance for session    RUE Assessment   RUE Assessment   (3+/5)   LUE Assessment   LUE Assessment   (~ 50* sh flex, pain with PROM, noted decreased contractures)   Hand Function   Gross Motor Coordination Impaired   Fine Motor Coordination Impaired   Vision - Complex Assessment   Ocular Range of Motion WFL   Head Position WDL   Tracking Able to track stimulus in all quads without difficulty   Cognition   Overall Cognitive Status Impaired   Arousal/Participation Alert; Responsive; Cooperative   Attention Attends with cues to redirect   Orientation Level Unable to assess   Memory Unable to assess   Following Commands Follows one step commands with increased time or repetition   Comments Pt pleasant and cooperative t/o session  Attempted to use  phone, unable to obtain    Pt able to communicate with some words and gestures    Assessment   Limitation Decreased ADL status; Decreased UE ROM; Decreased UE strength;Decreased Safe judgement during ADL;Decreased cognition;Decreased endurance;Decreased fine motor control;Decreased self-care trans;Decreased high-level ADLs   Prognosis Fair   Assessment Pt is a 49 y o  female admitted to [facility] on 8/13/2021 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[facility] [address] )  Pt with active OT orders  Pt seen as a co-evaluation with PT due to the patient's co-morbidities, clinically unstable presentation/clinical complexity, and present impairments  Pt resides at [facility] with   However, pt was admitted from [facility] at baseline; PTA, though, required A for ADLs/IADLs  Upon evaluation, pt currently requires MAX A x 2 for transfers and DEP x 2 SPT  Exhibits decreased strength to B/L UE's limiting performance in ADLs/transfers, though noted to have decreased in contracture in L hand  Pt currently requires S eating, MIN A grooming, MOD A UB ADLs, MOD A LB ADLs, and MOD A toileting  Pt is limited at this time 2*: pain, endurance, activity tolerance, functional mobility, balance, functional standing tolerance, unsupportive home environment, decreased I w/ ADLS/IADLS, strength, cognitive impairments and decreased safety awareness  The following Occupational Performance Areas to address include: eating, grooming, bathing/shower, toilet hygiene, dressing, health maintenance, functional mobility, community mobility and clothing management  Pt to benefit from immediate acute skilled OT to address above deficits, improve overall functional independence, maximize fnxl mobility and reduce caregiver burden  From OT standpoint, recommendation at time of d/c would be STR  Pt was left in chair with alarm on after session with all current needs met  The patient's raw score on the AM-PAC Daily Activity inpatient short form is 14, standardized score is 33 39, less than 39 4   Patients at this level are likely to benefit from discharge to post-acute rehabilitation services  Please refer to the recommendation of the Occupational Therapist for safe discharge planning  Goals   Patient Goals to go to the bathroom    LTG Time Frame 10-14   Plan   Treatment Interventions ADL retraining;Functional transfer training;UE strengthening/ROM; Endurance training;Equipment evaluation/education;Patient/family training;Cognitive reorientation; Neuromuscular reeducation; Fine motor coordination activities; Compensatory technique education;Continued evaluation; Energy conservation; Activityengagement   Goal Expiration Date 08/31/21   OT Frequency 2-3x/wk   Recommendation   OT Discharge Recommendation Post acute rehabilitation services   OT - OK to Discharge Yes  (to rehab when medically stable )   AM-PAC Daily Activity Inpatient   Lower Body Dressing 2   Bathing 2   Toileting 2   Upper Body Dressing 2   Grooming 3   Eating 3   Daily Activity Raw Score 14   Daily Activity Standardized Score (Calc for Raw Score >=11) 33 39   AM-PAC Applied Cognition Inpatient   Following a Speech/Presentation 3   Understanding Ordinary Conversation 3   Taking Medications 3   Remembering Where Things Are Placed or Put Away 3   Remembering List of 4-5 Errands 3   Taking Care of Complicated Tasks 2   Applied Cognition Raw Score 17   Applied Cognition Standardized Score 36 52     GOALS    1) Pt will increase activity tolerance to G for 30 min txment sessions    2) Pt will complete UB dressing/self care/bathing w/ min A using adaptive device and DME as needed    3) Pt will complete LB dressing/self care/bathing w/ min A using adaptive device and DME as needed    4) Pt will complete toileting w/ min A w/ G hygiene/thoroughness using DME as needed    5) Pt will improve functional transfers to Mod A on/off all surfaces using DME as needed w/ G balance/safety     6) Pt will improve functional mobility during ADL/IADL/leisure tasks to Mod A using DME as needed w/ G balance/safety     7) Pt will demonstrate G carryover of pt/caregiver education and training as appropriate      8) Pt will increase static and dynamic standing/sitting balance to F- using AD/DME as needed to increase safety and I during fnxl transfers and ADLs    9) Pt will maintain upright sitting position for at least 20 min during dynamic fnxl activity with G balance and endurance to improve ADL performance and independence, as well as reduce risk of falls      Mango Renee MS, OTR/L

## 2021-08-17 NOTE — ASSESSMENT & PLAN NOTE
Continue tylenol and oxycodone and Dilaudid p r n  Continue gabapentin 200 mg daily at bed  Can consider increasing the dose if needed  Pain currently well controlled  Topical Voltaren gel for knee pain

## 2021-08-17 NOTE — CONSULTS
Consult Service: Gastroenterology      PATIENT INFORMATION      Belen Nuñez 49 y o  female MRN: [id number]  Unit/Bed#: [facility] 916-01 Encounter: [id number]  PCP: No primary care provider on file  Date of Admission:  8/13/2021  Date of Consultation: 08/17/21  Requesting Physician: Anthony Coronel, DO       ASSESSMENTS & PLAN   Belen Nuñez is a 49 y o  old female with PMH of Primary CNS diffuse large B-cell lymphoma admitted for 5th cycle of chemotherapy, depression, cancer-related pain status post PEG tube placement 3 months ago for dysphagia    Gastroenterology has been consulted for assistance with  PEG tube removal    PEG tube removal  ·  PEG tube was originally placed in 05/2021  · Patient was evaluated earlier this month for PEG tube removal, however at that time she had cellulitis of the PEG tube site and PEG tube was not removed  · Currently cellulitis appears to have healed well, there is still some erythema but no signs of active infection,  Minimal drainage seen  ·  Given that patient is able to tolerate a diet including both solids and liquids without any nausea or vomiting or dysphagia, I offered to remove the PEG tube today but patient adamantly refused to remove it today because she said she is tired but said she would be okay if we removed it tomorrow  · We will plan for PEG tube removal tomorrow     Primary CNS diffuse large B-cell lymphoma  · Currently admitted for chemotherapy cycle 5  · Management per Medical Oncology        HISTORY OF PRESENT ILLNESS      Belen Nuñez is a 49 y o  female who is originally admitted for  Cycle 5 of chemotherapy for CNS lymphoma and is being consulted for  PEG tube removal      PEG tube was originally placed in 05/2021 when patient was having dysphagia  Currently patient reports that she has not been using her PEG tube for tube feeds at all    She says she uses it predominantly for medications, however she reports that she would be comfortable swallowing medications and has been doing so  She denies any difficulty with swallowing solid food or liquid foods  She reports no pain while swallowing  She reports any nausea or vomiting after eating food  Currently patient is not eating hospital food but is eating a lot of cookies  She reports that she does not like hospital food  REVIEW OF SYSTEMS     A thorough 12-point review of systems has been conducted  Pertinent positives and negatives are mentioned in the history of present illness  PAST MEDICAL & SURGICAL HISTORY      No past medical history on file  Past Surgical History:   Procedure Laterality Date    IR PICC REPOSITION  4/27/2021    IR TUNNELED CENTRAL LINE REMOVAL  4/27/2021         MEDICATIONS & ALLERGIES       Medications:   Prior to Admission medications    Medication Sig Start Date End Date Taking?  Authorizing Provider   acetaminophen (Tylenol) 325 mg tablet Take 500 mg by mouth every 6 (six) hours as needed for mild pain   Yes Historical Provider, MD   aluminum-magnesium hydroxide-simethicone (MYLANTA) 200-200-20 mg/5 mL suspension 30 mL by Per PEG Tube route every 4 (four) hours as needed for indigestion or heartburn 6/14/21  Yes Dhiraj Tinsley MD   atorvastatin (LIPITOR) 10 mg tablet Take 1 tablet (10 mg total) by mouth daily with dinner 8/5/21  Yes CHEPE Tobin   Cholecalciferol (Vitamin D3) 1 25 MG (91355 UT) TABS Take by mouth once a week Every monday   Yes Historical Provider, MD   Diclofenac Sodium (VOLTAREN) 1 % Apply 2 g topically 3 (three) times a day as needed (knee nacho)  Patient taking differently: Apply 2 g topically 3 (three) times a day as needed (knee pain)  6/14/21  Yes Dhiraj Tinsley MD   dicyclomine (BENTYL) 10 mg capsule Take 1 capsule (10 mg total) by mouth 4 (four) times a day (before meals and at bedtime) 6/14/21  Yes Dhiraj Tinsley MD   docusate sodium (COLACE) 100 mg capsule Take 100 mg by mouth 2 (two) times a day   Yes Historical Provider, MD famotidine (PEPCID) 20 mg tablet Take 20 mg by mouth daily 4/22/21 4/22/22 Yes Historical Provider, MD   folic acid (FOLVITE) 1 mg tablet Take 1 tablet (1 mg total) by mouth daily 7/22/21  Yes Hillary Fuchs DO   gabapentin (NEURONTIN) 250 mg/5 mL solution Take 4 mL (200 mg total) by mouth daily at bedtime 7/21/21  Yes Hillary Fuchs DO   insulin lispro (HumaLOG) 100 units/mL injection Inject 1-5 Units under the skin 4 (four) times a day (before meals and at bedtime) 6/14/21  Yes Quang Correia MD   ondansetron (ZOFRAN-ODT) 4 mg disintegrating tablet Take 1 tablet (4 mg total) by mouth every 6 (six) hours as needed for nausea or vomiting 6/15/21  Yes Quang Correia MD   polyethylene glycol (MIRALAX) 17 g packet Take 17 g by mouth daily as needed (Second line for constipation) 6/14/21  Yes Quang Correia MD       Allergies: No Known Allergies      SOCIAL HISTORY      Marital Status: Unknown    Substance Use History:   Social History     Substance and Sexual Activity   Alcohol Use Not Currently     Social History     Tobacco Use   Smoking Status Never Smoker   Smokeless Tobacco Never Used     Social History     Substance and Sexual Activity   Drug Use Not on file         FAMILY HISTORY      Non-Contributory      PHYSICAL EXAM     Vitals:   Blood Pressure: 116/76 (08/17/21 0709)  Pulse: 78 (08/17/21 0709)  Temperature: 97 8 °F (36 6 °C) (08/17/21 0709)  Temp Source: Oral (08/15/21 0229)  Respirations: 20 (08/16/21 2241)  Height: 5' (152 4 cm) (08/15/21 1511)  Weight - Scale: 50 2 kg (110 lb 10 7 oz) (08/15/21 1000)  SpO2: 96 % (08/17/21 0709)    Physical Exam:   GENERAL: NAD  HEENT:  NC/AT, MMM  CARDIAC:  RRR, +S1/S2, no S3/S4 heard  PULMONARY:  CTA B/L, no wheezing/rales/rhonci, non-labored breathing  ABDOMEN:    Peg tube site with minimal erythema, minimal exudate, nontender on palpation  DIGITAL RECTAL EXAM: not indicated   NEUROLOGIC:  Alert/oriented x3     SKIN:  No rashes or erythema       ADDITIONAL DATA     Lab Results:     Results from last 7 days   Lab Units 08/17/21  1007 08/16/21  0626   WBC Thousand/uL 3 44* 3 52*   HEMOGLOBIN g/dL 10 6* 9 0*   HEMATOCRIT % 33 9* 28 9*   PLATELETS Thousands/uL 322 284   NEUTROS PCT %  --  42*   LYMPHS PCT %  --  50*   MONOS PCT %  --  3*   EOS PCT %  --  3     Results from last 7 days   Lab Units 08/17/21  1007   POTASSIUM mmol/L 3 9   CHLORIDE mmol/L 105   CO2 mmol/L 32   BUN mg/dL 10   CREATININE mg/dL 0 42*   CALCIUM mg/dL 9 4   ALK PHOS U/L 66   ALT U/L 23   AST U/L 18           Imaging:    No results found  EKG, Pathology, and Other Studies Reviewed on Admission:   · EKG: Reviewed      Counseling / Coordination of Care Time: 30 total mins spent n consult  Greater than 50% of total time spent on patient counseling and coordination of care  Evelio Donovan MD   PGY-4,   Gastroenterology         ** Please Note: This note is constructed using a voice recognition dictation system   **

## 2021-08-17 NOTE — UTILIZATION REVIEW
Continued Stay Review    Date: 8/17/21                      Current Patient Class: IP Current Level of Care: MS    HPI:56 y o  female initially admitted on 8/13 for chemotherapy - round 5 of HD MTX for Diffuse large B-cell lymphoma, primary CNS lymphoma     Assessment/Plan:  Patient is alert oriented x3  Is aware of current events, can follow simple commands, can do simple calculations  She is tearful  Vitals are stable, no fever  K is WNL  Inconsistent glucose control  Pt continues to use both parenteral and oral analgesia for good pain control  Points to LLQ abd with pain  No N/V    PEG tube with mild exudate and has completed 7 days Keflex  She has tolerated chemo well  Bicarb drip d/c and not on IV fluids        Vital Signs:   08/17/21 07:09:35  97 8 °F (36 6 °C)  78  --  116/76  89  96 %  --   08/16/21 22:41:01  97 7 °F (36 5 °C)  83  20  137/84  102  97 %  --   08/16/21 15:46:37  98 7 °F (37 1 °C)  88  18  138/75  96  95 %  --   08/16/21 11:01:04  98 5 °F (36 9 °C)  78  --  135/78  97  97 %  --   08/16/21 09:15:19  98 5 °F (36 9 °C)  82  16  136/75  95  98 %  --   08/16/21 02:25:10  98 1 °F (36 7 °C)  71  18  122/69  87  95 %  --   08/15/21 22:16:12  98 °F (36 7 °C)  77  18  144/70  95  98 %  --   08/15/21 2005  --  --  --  --  --  --  None (Room air)   08/15/21 16:52:41  --  78  16  141/91  108  96 %  --   08/15/21 11:40:37  97 9 °F (36 6 °C)  67  17  140/88  105  99 %  --   08/15/21 0236  --  --  --  140/80  --  --  --   08/15/21 02:31:08  --  --  20  151/96  114  --  --   08/15/21 02:29:53  98 2 °F (36 8 °C)  --  20  147/96  113  --  --     Pertinent Labs/Diagnostic Results:       Results from last 7 days   Lab Units 08/17/21  1007 08/16/21  0626 08/15/21  0443 08/13/21  1025   WBC Thousand/uL 3 44* 3 52* 5 38 7 02   HEMOGLOBIN g/dL 10 6* 9 0* 10 3* 12 0   HEMATOCRIT % 33 9* 28 9* 31 6* 37 3   PLATELETS Thousands/uL 322 284 343 422*   NEUTROS ABS Thousands/µL  --  1 48* 3 63 3 70         Results from last 7 days   Lab Units 08/17/21  1007 08/16/21  1407 08/16/21  0626 08/15/21  0443 08/13/21  1025   SODIUM mmol/L 139  --  137 143 137   POTASSIUM mmol/L 3 9 3 7 3 0* 3 1* 3 7   CHLORIDE mmol/L 105  --  95* 104 108   CO2 mmol/L 32  --  40* 36* 24   ANION GAP mmol/L 2*  --  2* 3* 5   BUN mg/dL 10  --  4* 10 12   CREATININE mg/dL 0 42*  --  0 46* 0 45* 0 38*   EGFR ml/min/1 73sq m 117  --  113 114 120   CALCIUM mg/dL 9 4  --  7 9* 9 2 9 6     Results from last 7 days   Lab Units 08/17/21  1007 08/16/21  0626 08/13/21  1025   AST U/L 18 15 13   ALT U/L 23 22 20   ALK PHOS U/L 66 51 79   TOTAL PROTEIN g/dL 6 0* 4 8* 6 3*   ALBUMIN g/dL 2 8* 2 2* 3 0*   TOTAL BILIRUBIN mg/dL 0 27 0 15* 0 13*     Results from last 7 days   Lab Units 08/17/21  1157 08/17/21  0823 08/16/21  2054 08/16/21  1734 08/16/21  1112 08/16/21  0842 08/15/21  1657 08/15/21  1046 08/15/21  0850 08/14/21  1848 08/14/21  1310 08/14/21  0839   POC GLUCOSE mg/dl 124 130 155* 121 157* 108 86 205* 127 219* 153* 90     Results from last 7 days   Lab Units 08/17/21  1007 08/16/21  0626 08/15/21  0443 08/13/21  1025   GLUCOSE RANDOM mg/dL 107 448* 144* 93     Results from last 7 days   Lab Units 08/16/21  1308 08/15/21  1140   CLARITY UA  Clear Clear   COLOR UA  Yellow Yellow   SPEC GRAV UA  1 007 1 007   PH UA  8 5* 8 5*   GLUCOSE UA mg/dl Negative Negative   KETONES UA mg/dl Negative Negative   BLOOD UA  Negative Negative   PROTEIN UA mg/dl Negative Negative   NITRITE UA  Negative Negative   BILIRUBIN UA  Negative Negative   UROBILINOGEN UA E U /dl 0 2 0 2   LEUKOCYTES UA  Negative Negative   WBC UA /hpf  --  None Seen   RBC UA /hpf  --  None Seen   BACTERIA UA /hpf  --  None Seen   EPITHELIAL CELLS WET PREP /hpf  --  None Seen     Medications:   Scheduled Medications:  atorvastatin, 10 mg, Oral, Daily With Dinner  dicyclomine, 10 mg, Oral, 4x Daily (AC & HS)  docusate sodium, 100 mg, Oral, BID  enoxaparin, 40 mg, Subcutaneous, Q24H MEÑO  famotidine, 20 mg, Oral, Daily  folic acid, 1 mg, Oral, Daily  gabapentin, 200 mg, Oral, HS  hydrOXYzine HCL, 10 mg, Oral, Once  insulin lispro, 1-6 Units, Subcutaneous, TID AC  iohexol, 50 mL, Oral, 90 min pre-procedure  leucovorin, 25 mg, Oral, Q6H MEÑO  LORazepam, 0 5 mg, Intravenous, Once  sertraline, 25 mg, Oral, Daily      Continuous IV Infusions:     PRN Meds:  acetaminophen, 650 mg, Oral, Q6H PRN  aluminum-magnesium hydroxide-simethicone, 30 mL, Per PEG Tube, Q4H PRN  Diclofenac Sodium, 2 g, Topical, TID PRN  HYDROmorphone, 0 5 mg, Intravenous, Q3H PRN - x 2 8/16, x 1 8/17  Ondansetron, 4 mg, Oral, Q6H PRN  oxyCODONE, 5 mg, Oral, Q4H PRN  oxyCODONE, 2 5 mg, Oral, Q4H PRN - x 1 8/16 and d/c   polyethylene glycol, 17 g, Oral, Daily PRN  sodium chloride, 20 mL/hr, Intravenous, Once PRN      Discharge Plan: return to [facility]    Network Utilization Review Department  ATTENTION: Please call with any questions or concerns to [phone] and carefully listen to the prompts so that you are directed to the right person  All voicemails are confidential   Rukhsana Herbert all requests for admission clinical reviews, approved or denied determinations and any other requests to dedicated fax number below belonging to the campus where the patient is receiving treatment   List of dedicated fax numbers for the Facilities:  [address] DENIALS (Administrative/Medical Necessity) [phone]   [address] (Maternity/NICU/Pediatrics) [phone]   [address] [place] [address] Dr Aleisha Oh [address] [id number] [address]   Micki Mack [address]  [place] [id number] [id number] [place] [address] [phone]

## 2021-08-17 NOTE — ASSESSMENT & PLAN NOTE
Patient diagnosed with primary B-cell CNS lymphoma in April 2021  Patient has received 4 cycles of chemotherapy, currently here to receive 5th cycle  Methotrexate with leucovorin rescue plus Rituxan  Daily methotrexate levels, CBC and CMP as per Hematology  Hypokalemia resolved  DVT prophylaxis with Lovenox  UA has been ordered, want urine pH to be more than 7 to prevent crystallization  Hematology oncology is the primary team  Patient currently has no active medical needs from our consult service  Will sign off  Please call/text with any questions

## 2021-08-17 NOTE — PLAN OF CARE
Problem: OCCUPATIONAL THERAPY ADULT  Goal: Performs self-care activities at highest level of function for planned discharge setting  See evaluation for individualized goals  Description: Treatment Interventions: ADL retraining, Functional transfer training, UE strengthening/ROM, Endurance training, Equipment evaluation/education, Patient/family training, Cognitive reorientation, Neuromuscular reeducation, Fine motor coordination activities, Compensatory technique education, Continued evaluation, Energy conservation, Activityengagement          See flowsheet documentation for full assessment, interventions and recommendations  Note: Limitation: Decreased ADL status, Decreased UE ROM, Decreased UE strength, Decreased Safe judgement during ADL, Decreased cognition, Decreased endurance, Decreased fine motor control, Decreased self-care trans, Decreased high-level ADLs  Prognosis: Fair  Assessment: Pt is a 49 y o  female admitted to hospitals on 8/13/2021 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[facility] [address] )  Pt with active OT orders  Pt seen as a co-evaluation with PT due to the patient's co-morbidities, clinically unstable presentation/clinical complexity, and present impairments  Pt resides at [facility] with   However, pt was admitted from [facility] at baseline; PTA, though, required A for ADLs/IADLs  Upon evaluation, pt currently requires MAX A x 2 for transfers and DEP x 2 SPT  Exhibits decreased strength to B/L UE's limiting performance in ADLs/transfers, though noted to have decreased in contracture in L hand  Pt currently requires S eating, MIN A grooming, MOD A UB ADLs, MOD A LB ADLs, and MOD A toileting  Pt is limited at this time 2*: pain, endurance, activity tolerance, functional mobility, balance, functional standing tolerance, unsupportive home environment, decreased I w/ ADLS/IADLS, strength, cognitive impairments and decreased safety awareness  The following Occupational Performance Areas to address include: eating, grooming, bathing/shower, toilet hygiene, dressing, health maintenance, functional mobility, community mobility and clothing management  Pt to benefit from immediate acute skilled OT to address above deficits, improve overall functional independence, maximize fnxl mobility and reduce caregiver burden  From OT standpoint, recommendation at time of d/c would be STR  Pt was left in chair with alarm on after session with all current needs met  The patient's raw score on the AM-PAC Daily Activity inpatient short form is 14, standardized score is 33 39, less than 39 4  Patients at this level are likely to benefit from discharge to post-acute rehabilitation services  Please refer to the recommendation of the Occupational Therapist for safe discharge planning       OT Discharge Recommendation: Post acute rehabilitation services  OT - OK to Discharge: Yes (to rehab when medically stable )

## 2021-08-17 NOTE — ASSESSMENT & PLAN NOTE
· Patient has signs and symptoms of depression in setting of malignancy  · Patient started on sertraline 25 mg daily

## 2021-08-17 NOTE — CASE MANAGEMENT
YSABEL spoke with Ana Soto from [facility], she stated that she can do the insurance Auth but needs to know when pt will be ready for DC    No DC date as of right now

## 2021-08-17 NOTE — PROGRESS NOTES
Hematology/Oncology IP Progress Note    Date of Service: 8/17/2021    Merged with [facility]    Dominic Hinson is a 49 y o  female at Hospital Day ( LOS: 4 days ) admitted on 8/13/2021  9:51 AM     Principal Problem: 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[facility] [address] )     Assessment/Plan:   Principal Problem:    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[facility] [address] )  Active Problems:    Altered mental status    Cancer related pain    Ambulatory dysfunction    Hypokalemia    Depression    1  Primary CNS diffuse large B-cell lymphoma , admitted for cycle 5 high-dose methotrexate + with rituximab  Patient did very well with chemotherapy without significant side effects  Patient will continue IV hydration,  Sodium bicarbonate to keep urine pH  more than 7  Monitoring side effect  Check methotrexate level daily  CBC and CMP daily  No MTX level back yet  2   Abdominal pain:   · Pt tells me LLQ abdominal pain is better, has pain at tube site and wants it out, and PEG tube has mild exudate from this  She completed 7 day course of Keflex for this  She is stating she needs to go to the bathroom, tho she was just taken to bathroom  · UA unremarkable  · Pt could not tolerate CT A/P; we will obtain US of abdomen to r/o abscess and evaluate for other etiology  · Consult GI; If PEG can be pulled per GI, this would be preferred  · Manage pain: increase oxycodone to 5mg Q4PRN; add dilaudid 0 5mg IV Q3 hours PRN severe pain  3   AMS: Hx of AMS; limited communication  AOX2 today self and place  Her malignancy dose contribute to this  Consider imaging if worsens  Continue to monitor  4  We will continue to follow patient with you during the hospital stay  Communication with patient/family:  I did update the patient via [id number]  phone  Communication with team:  Did talk with Rach Barth RN  I did review this patient with Dr Harjinder Frye and he is in agreement with this plan        Stephanie Steele RENALDO Waller-BC  Hematology/Oncology Nurse Practitioner       Subjective:     Patient is doing better today, less pain, she wants PEG tube  She finds that is increased tenderness  She denies any nausea or vomiting, bowels are okay  Had BM today  She talked to her  yesterday  She did very well with chemotherapy without significant side effect  Objective:   VITALS:   /76   Pulse 78   Temp 97 8 °F (36 6 °C)   Resp 20   Ht 5' (1 524 m)   Wt 50 2 kg (110 lb 10 7 oz)   SpO2 96%   BMI 21 61 kg/m²   Temp (24hrs), Av 2 °F (36 8 °C), Min:97 7 °F (36 5 °C), Max:98 7 °F (37 1 °C)    I&O:    Intake/Output Summary (Last 24 hours) at 2021 1001  Last data filed at 2021 0401  Gross per 24 hour   Intake --   Output 2250 ml   Net -2250 ml      Weight change:      Physical EXAM:  General:  Alert, cooperative, no distress, appears stated age  Appears chronically ill  Head:  Normocephalic, without obvious abnormality, atraumatic  Eyes:  Conjunctivae/corneas clear  PERRL, EOMs intact  No evidence of conjunctivitis     Throat: Lips, mucosa, and tongue normal  No lesions in the oropharynx   Neck: Supple, symmetrical, trachea midline, no adenopathy    Lungs:   Clear to auscultation bilaterally  Respiratory effort easy, nonlabored    Heart:  Regular rate and rhythm, S1, S2 normal, no murmur, click, rub or gallop  Abdomen:   Soft, nondistended  Bowel sounds normal  No masses,  No organomegaly  Pain in LLQ with palpation at at [facility] site  Extremities: Extremities normal, atraumatic, no cyanosis or edema  No axillary or inguinal adenopathy   Skin: Skin color, texture, turgor normal  No rashes or lesions    Neurologic/Psych:   Awake and alert; tearful            ALLERGIES:  No Known Allergies    CURRENT MEDICATIONS:  Inpatient Scheduled Medications:atorvastatin, 10 mg, Daily With Dinner  dicyclomine, 10 mg, 4x Daily (AC & HS)  docusate sodium, 100 mg, BID  enoxaparin, 40 mg, Q24H MEÑO  famotidine, 20 mg, Daily  folic acid, 1 mg, Daily  gabapentin, 200 mg, HS  hydrOXYzine HCL, 10 mg, Once  insulin lispro, 1-6 Units, TID AC  iohexol, 50 mL, 90 min pre-procedure  leucovorin, 25 mg, Q6H MEÑO  LORazepam, 0 5 mg, Once  sertraline, 25 mg, Daily      Inpatient PRN Medications:acetaminophen, 650 mg, Q6H PRN  aluminum-magnesium hydroxide-simethicone, 30 mL, Q4H PRN  Diclofenac Sodium, 2 g, TID PRN  HYDROmorphone, 0 5 mg, Q3H PRN  ondansetron, 4 mg, Q6H PRN  oxyCODONE, 5 mg, Q4H PRN  polyethylene glycol, 17 g, Daily PRN  sodium chloride, 20 mL/hr, Once PRN      Inpatient IV Infusions:     LABS:  CBC:  Recent Labs     08/15/21  0443 08/16/21  0626   WBC 5 38 3 52*   MCV 88 89   MCH 28 7 27 8   MCHC 32 6 31 1*   RDW 14 7 15 0   MPV 10 4 10 7     CMP:   Recent Labs     08/15/21  0443 08/16/21  0626   SODIUM 143 137   BUN 10 4*   CALCIUM 9 2 7 9*   CREATININE 0 45* 0 46*     MISC  LABS:  No results for input(s): LDH, URICACID, PHOSPHORUS, LACTICACID in the last 72 hours  Invalid input(s): MAGNESIUM  LFT:   Recent Labs     08/16/21  0626   AST 15     Coags:  Invalid input(s): COAGPROFILE  No results for input(s): INR, PTT in the last 72 hours      Invalid input(s): Jarrod HUTCHISON CRNP  8/17/2021, 10:01 AM

## 2021-08-17 NOTE — ASSESSMENT & PLAN NOTE
On my encounter, patient is alert oriented x3  Is aware of current events, can follow simple commands, can do simple calculations  Patient is a [place], complete interpretation not possible  Also given patient's limited knowledge of system limited cognitive evaluation  AMS resolved

## 2021-08-17 NOTE — PROGRESS NOTES
INTERNAL MEDICINE RESIDENCY PROGRESS NOTE     Name: Sunita Wise   Age & Sex: 49 y o  female   MRN: [id number]  [facility]   Encounter: [id number]  Team: SOD Team B     PATIENT INFORMATION     Name: Sunita Wise   Age & Sex: 49 y o  female   MRN: [id number]  Hospital Stay Days: 4    ASSESSMENT/PLAN     Principal Problem:    CNS lymphoma [facility])  Active Problems:    Altered mental status    Depression    Cancer related pain    Ambulatory dysfunction    Hypokalemia      * CNS lymphoma [facility])  Assessment & Plan  Patient diagnosed with primary B-cell CNS lymphoma in April 2021  Patient has received 4 cycles of chemotherapy, currently here to receive 5th cycle  Methotrexate with leucovorin rescue plus Rituxan  Daily methotrexate levels, CBC and CMP as per Hematology  Hypokalemia resolved  DVT prophylaxis with Lovenox  UA has been ordered, want urine pH to be more than 7 to prevent crystallization  Hematology oncology is the primary team  Patient currently has no active medical needs from our consult service  Will sign off  Please call/text with any questions  Depression  Assessment & Plan  Patient has signs and symptoms of depression in setting of malignancy  Patient started on sertraline 25 mg daily  Altered mental status  Assessment & Plan  On my encounter, patient is alert oriented x3  Is aware of current events, can follow simple commands, can do simple calculations  Patient is a Gujr[place], complete interpretation not possible  Also given patient's limited knowledge of system limited cognitive evaluation  AMS resolved  Hypokalemia  Assessment & Plan  Daily repletion as needed  Ambulatory dysfunction  Assessment & Plan  Patient has 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  PT OT consult      Cancer related pain  Assessment & Plan  Continue tylenol and oxycodone and Dilaudid p r n  Continue gabapentin 200 mg daily at bed    Can consider increasing the dose if needed  Pain currently well controlled  Topical Voltaren gel for knee pain  Disposition:  Will sign off  SUBJECTIVE     Patient seen and examined  No acute events overnight  Alert and oriented x3  Patient has baseline left-sided weakness  Patient was crying during the conversation although I was able to reorient  OBJECTIVE     Vitals:    21 1101 21 1546 21 2241 21 0709   BP: 135/78 138/75 137/84 116/76   Pulse: 78 88 83 78   Resp:  18 20    Temp: 98 5 °F (36 9 °C) 98 7 °F (37 1 °C) 97 7 °F (36 5 °C) 97 8 °F (36 6 °C)   TempSrc:       SpO2: 97% 95% 97% 96%   Weight:       Height:          Temperature:   Temp (24hrs), Av 1 °F (36 7 °C), Min:97 7 °F (36 5 °C), Max:98 7 °F (37 1 °C)    Temperature: 97 8 °F (36 6 °C)  Intake & Output:  I/O       08/15 07 -  0700  07 -  0700  07 -  0700    P  O  480      I V  (mL/kg) 2360 (47)      NG/GT       IV Piggyback 302 5      Total Intake(mL/kg) 3142 5 (62 6)      Urine (mL/kg/hr) 4350 (3 6) 3350 (2 8)     Total Output 4350 3350     Net -1207 5 -3350                Weights:   IBW (Ideal Body Weight): 45 5 kg    Body mass index is 21 61 kg/m²  Weight (last 2 days)     Date/Time   Weight    08/15/21 1000   50 2 (110 67)            Physical Exam  Vitals reviewed  Constitutional:       General: She is not in acute distress  Appearance: She is well-developed  She is not diaphoretic  HENT:      Head: Normocephalic and atraumatic  Nose: Nose normal       Mouth/Throat:      Pharynx: No oropharyngeal exudate  Eyes:      General: No scleral icterus  Conjunctiva/sclera: Conjunctivae normal    Neck:      Thyroid: No thyromegaly  Vascular: No JVD  Trachea: No tracheal deviation  Cardiovascular:      Rate and Rhythm: Normal rate and regular rhythm  Heart sounds: Normal heart sounds  No murmur heard  No friction rub  No gallop      Pulmonary: Effort: Pulmonary effort is normal  No respiratory distress  Breath sounds: Normal breath sounds  No stridor  No wheezing or rales  Chest:      Chest wall: No tenderness  Abdominal:      General: Bowel sounds are normal  There is no distension  Palpations: Abdomen is soft  There is no mass  Tenderness: There is no abdominal tenderness  There is no guarding or rebound  Musculoskeletal:         General: No tenderness  Normal range of motion  Cervical back: Neck supple  Skin:     General: Skin is warm  Findings: No erythema or rash  Neurological:      Mental Status: She is alert and oriented to person, place, and time  Sensory: No sensory deficit  Comments: Left-sided weakness 3/5 in left upper and lower extremity  Psychiatric:      Comments: Patient was crying during my conversation  LABORATORY DATA     Labs: I have personally reviewed pertinent reports  Results from last 7 days   Lab Units 08/17/21 1007 08/16/21  0626 08/15/21  0443 08/13/21  1025   WBC Thousand/uL 3 44* 3 52* 5 38 7 02   HEMOGLOBIN g/dL 10 6* 9 0* 10 3* 12 0   HEMATOCRIT % 33 9* 28 9* 31 6* 37 3   PLATELETS Thousands/uL 322 284 343 422*   NEUTROS PCT %  --  42* 68 52   MONOS PCT %  --  3* 8 9      Results from last 7 days   Lab Units 08/17/21 1007 08/16/21  1407 08/16/21  0626 08/15/21  0443 08/13/21  1025   POTASSIUM mmol/L 3 9 3 7 3 0* 3 1* 3 7   CHLORIDE mmol/L 105  --  95* 104 108   CO2 mmol/L 32  --  40* 36* 24   BUN mg/dL 10  --  4* 10 12   CREATININE mg/dL 0 42*  --  0 46* 0 45* 0 38*   CALCIUM mg/dL 9 4  --  7 9* 9 2 9 6   ALK PHOS U/L 66  --  51  --  79   ALT U/L 23  --  22  --  20   AST U/L 18  --  15  --  13                            IMAGING & DIAGNOSTIC TESTING     Radiology Results: I have personally reviewed pertinent reports  No results found  Other Diagnostic Testing: I have personally reviewed pertinent reports      ACTIVE MEDICATIONS     Current Facility-Administered Medications   Medication Dose Route Frequency    acetaminophen (TYLENOL) tablet 650 mg  650 mg Oral Q6H PRN    aluminum-magnesium hydroxide-simethicone (MYLANTA) oral suspension 30 mL  30 mL Per PEG Tube Q4H PRN    atorvastatin (LIPITOR) tablet 10 mg  10 mg Oral Daily With Dinner    Diclofenac Sodium (VOLTAREN) 1 % topical gel 2 g  2 g Topical TID PRN    dicyclomine (BENTYL) capsule 10 mg  10 mg Oral 4x Daily (AC & HS)    docusate sodium (COLACE) capsule 100 mg  100 mg Oral BID    enoxaparin (LOVENOX) subcutaneous injection 40 mg  40 mg Subcutaneous Q24H MEÑO    famotidine (PEPCID) tablet 20 mg  20 mg Oral Daily    folic acid (FOLVITE) tablet 1 mg  1 mg Oral Daily    gabapentin (NEURONTIN) oral solution 200 mg  200 mg Oral HS    HYDROmorphone (DILAUDID) injection 0 5 mg  0 5 mg Intravenous Q3H PRN    hydrOXYzine HCL (ATARAX) tablet 10 mg  10 mg Oral Once    insulin lispro (HumaLOG) 100 units/mL subcutaneous injection 1-6 Units  1-6 Units Subcutaneous TID AC    iohexol (OMNIPAQUE) 240 MG/ML solution 50 mL  50 mL Oral 90 min pre-procedure    leucovorin (WELLCOVORIN) tablet 25 mg  25 mg Oral Q6H MEÑO    LORazepam (ATIVAN) injection 0 5 mg  0 5 mg Intravenous Once    ondansetron (ZOFRAN-ODT) dispersible tablet 4 mg  4 mg Oral Q6H PRN    oxyCODONE (ROXICODONE) oral solution 5 mg  5 mg Oral Q4H PRN    polyethylene glycol (MIRALAX) packet 17 g  17 g Oral Daily PRN    sertraline (ZOLOFT) tablet 25 mg  25 mg Oral Daily    sodium chloride 0 9 % infusion  20 mL/hr Intravenous Once PRN       VTE Pharmacologic Prophylaxis: Enoxaparin (Lovenox)  VTE Mechanical Prophylaxis: sequential compression device    Portions of the record may have been created with voice recognition software  Occasional wrong word or "sound a like" substitutions may have occurred due to the inherent limitations of voice recognition software    Read the chart carefully and recognize, using context, where substitutions have occurred   ==  Yoel Lynn, [address]  Internal Medicine Residency PGY-3

## 2021-08-17 NOTE — PHYSICAL THERAPY NOTE
Physical Therapy Evaluation     Patient's Name: Brittney Leung    Admitting Diagnosis  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[facility] ) [C85 89]    Problem List  Patient Active Problem List   Diagnosis    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[facility] )    Altered mental status    Dysphagia    Aphasia    Fracture of ramus of left pubis (HCC)    Severe protein-calorie malnutrition ([facility] )    Cancer related pain    Ambulatory dysfunction    Hypokalemia    Cellulitis    Depression       Past Medical History  No past medical history on file  Past Surgical History  Past Surgical History:   Procedure Laterality Date    IR PICC REPOSITION  4/27/2021    IR TUNNELED CENTRAL LINE REMOVAL  4/27/2021 08/17/21 1101   PT Last Visit   PT Visit Date 08/17/21   Note Type   Note type Evaluation   Pain Assessment   Pain Assessment Tool FLACC   Pain Location/Orientation Orientation: Left; Location: Arm;Location: Leg   Hospital Pain Intervention(s) Repositioned; Ambulation/increased activity; Emotional support   Pain Rating: FLACC (Rest) - Face 0   Pain Rating: FLACC (Rest) - Legs 0   Pain Rating: FLACC (Rest) - Activity 0   Pain Rating: FLACC (Rest) - Cry 0   Pain Rating: FLACC (Rest) - Consolability 0   Score: FLACC (Rest) 0   Pain Rating: FLACC (Activity) - Face 1   Pain Rating: FLACC (Activity) - Legs 1   Pain Rating: FLACC (Activity) - Activity 1   Pain Rating: FLACC (Activity) - Cry 1   Pain Rating: FLACC (Activity) - Consolability 1   Score: FLACC (Activity) 5   Home Living   Additional Comments Per chart review, pt resides with  in [place], where  works  PTA pt recieving rehab at [facility]  Prior Function   Comments Per chart review, PTA, pt requiring A for ADLs/ IADLs  At baseline, pt I for ADLs/ IADLs    Restrictions/Precautions   Weight Bearing Precautions Per Order No   Other Precautions Cognitive; Chair Alarm; Bed Alarm; Fall Risk;Pain  (+ language barrier, pt primarily speaks [place] )   General   Family/Caregiver Present No Cognition   Arousal/Participation Alert   Attention Attends with cues to redirect   Orientation Level Unable to assess   Memory Unable to assess   Following Commands Follows one step commands with increased time or repetition   Comments Pt pleasant and cooperative to participate in therapy session  Attempted to use blue phone during evaluation session  RLE Assessment   RLE Assessment   (grossly 3+/5 )   LLE Assessment   LLE Assessment   (grossly 3-/5 )   Bed Mobility   Supine to Sit 4  Minimal assistance   Additional items Assist x 1; Increased time required;Verbal cues   Sit to Supine Unable to assess   Additional Comments Pt supine in bed upon arrival  Pt sitting EOB with S/ CGA  Pt sitting upright in bedside chair with chair alarm on with all needs within reach at the end of therapy session  Transfers   Sit to Stand 2  Maximal assistance   Additional items Assist x 2; Increased time required;Verbal cues   Stand to Sit 2  Maximal assistance   Additional items Assist x 2; Increased time required;Verbal cues   Stand pivot 1  Dependent   Additional items Assist x 2; Increased time required;Verbal cues   Toilet transfer 2  Maximal assistance   Additional items Assist x 2; Increased time required;Standard toilet   Additional Comments Pt performed 2x STS transfers with b/l HHA  Ambulation/Elevation   Gait pattern Excessively slow; Short stride;Decreased foot clearance;Decreased L stance; Improper Weight shift   Gait Assistance 2  Maximal assist   Additional items Assist x 2;Verbal cues; Tactile cues   Assistive Device   (b/l HHA)   Distance 1 lateral side step with R foot    Balance   Static Sitting Fair   Dynamic Sitting Fair -   Static Standing Poor   Dynamic Standing Poor   Ambulatory Poor -   Activity Tolerance   Activity Tolerance Patient limited by fatigue;Patient limited by pain   Medical Staff [address]; co-shadia performed this date 2* increased medical complexity and multiple co-morbidities    Nurse Made Aware RN cleared pt to participate in therapy session    Assessment   Prognosis Fair   Problem List Decreased strength;Decreased endurance; Impaired balance;Decreased mobility; Decreased safety awareness;Decreased coordination;Decreased cognition;Pain   Assessment Pt seen for high complexity PT evaluation  Pt with active PT eval/treat orders  Pt is a 49 y o  female who was admitted to [facility] on 8/13/2021 with CNS lymphoma  Pt's active problems include: depression, altered mental status, hypokalemia, ambulatory dysfunction, cancer related pain,dysphagia, aphasia, severe-protein calorie malnutrition, cellulits  At baseline, pt resides with  at [facility] where  works and was independent for ADLs  PTA, pt receiving rehab services at [facility] ([place], [place]) where she required A for ADLs/ IADLs  Currently, pt has limitations in strength, balance, endurance, and overall functional mobility  Pt requires assist of 1-2 for all mobility performed this date  Pt performed bed mobility tasks with min Ax1  Pt sitting EOB at close S level/ CGA required for dynamic balance tasks  Pt performed STS from EOB with b/l HHA with max Ax2, pt able to take lateral side step with RLE with max Ax2  SPT from EOB to bedside chair performed with dep x2  Pt was left sitting upright in bedside chair with chair alarm on at the end of PT session with all needs in reach  Pt would benefit from continued PT services while in hospital to address remaining limitations  PT to continue to follow pt and recommends post-acute rehab services after d/c  The patient's AM-PAC Basic Mobility Inpatient Short Form Low Function Raw Score 17 , Standardized Score is 27 46  A standardized score less 42 9 suggests the patient may benefit from discharge to post-acute rehab services  Please also refer to the recommendation of the Physical Therapist for safe discharge planning     Barriers to Discharge Inaccessible home environment;Decreased caregiver support   Goals   Patient Goals to go to the bathroom    STG Expiration Date 08/31/21   Short Term Goal #1 STG 1  Pt will be able to perform bed mobility with mod I in order to improve overall functional mobility and assist in safe d/c  STG 2  Pt will be able to perform functional transfer with min A in order to improve overall functional mobility and assist in safe d/c  STG 3  Pt will be able to ambulate at least 15 feet with least restrictive device with mod A in order to improve overall functional mobility and assist in safe d/c  STG 4  Pt will improve sitting/standing static/dynamic balance 1 grade in order to improve functional mobility and assist in safe d/c  STG 5  Pt will improve LE strength by one grade in order to improve functional mobility and assist in safe d/c     PT Treatment Day 0   Plan   Treatment/Interventions Functional transfer training;LE strengthening/ROM; Endurance training;Patient/family training;Bed mobility;Gait training;Spoke to nursing;Spoke to case management;OT   PT Frequency 2-3x/wk   Recommendation   PT Discharge Recommendation Post acute rehabilitation services   PT - OK to Discharge Yes  (to rehab once medically cleared )   AM-PAC Basic Mobility Inpatient   Turning in Bed Without Bedrails 3   Lying on Back to Sitting on Edge of Flat Bed 3   Moving Bed to Chair 1   Standing Up From Chair 1   Walk in Room 1   Climb 3-5 Stairs 1   Basic Mobility Inpatient Raw Score 10   Turning Head Towards Sound 4   Follow Simple Instructions 3   Low Function Basic Mobility Raw Score 17   Low Function Basic Mobility Standardized Score 27 46         Kelsey Quinonez, PT, DPT

## 2021-08-17 NOTE — TELEPHONE ENCOUNTER
Spoke with Ana RN,,  Pt refusing to drink oral contrast at this time,  SOD will speak with patient in the morning and will try to get test done then

## 2021-08-18 LAB
ALBUMIN SERPL BCP-MCNC: 3.3 G/DL (ref 3.5–5)
ALP SERPL-CCNC: 70 U/L (ref 46–116)
ALT SERPL W P-5'-P-CCNC: 24 U/L (ref 12–78)
ANION GAP SERPL CALCULATED.3IONS-SCNC: 7 MMOL/L (ref 4–13)
ANISOCYTOSIS BLD QL SMEAR: PRESENT
ARTIFACT: PRESENT
AST SERPL W P-5'-P-CCNC: 17 U/L (ref 5–45)
BASOPHILS # BLD MANUAL: 0 THOUSAND/UL (ref 0–0.1)
BASOPHILS NFR MAR MANUAL: 0 % (ref 0–1)
BILIRUB SERPL-MCNC: 0.39 MG/DL (ref 0.2–1)
BUN SERPL-MCNC: 13 MG/DL (ref 5–25)
CALCIUM ALBUM COR SERPL-MCNC: 10.6 MG/DL (ref 8.3–10.1)
CALCIUM SERPL-MCNC: 10 MG/DL (ref 8.3–10.1)
CHLORIDE SERPL-SCNC: 105 MMOL/L (ref 100–108)
CO2 SERPL-SCNC: 26 MMOL/L (ref 21–32)
CREAT SERPL-MCNC: 0.59 MG/DL (ref 0.6–1.3)
EOSINOPHIL # BLD MANUAL: 0.22 THOUSAND/UL (ref 0–0.4)
EOSINOPHIL NFR BLD MANUAL: 5 % (ref 0–6)
ERYTHROCYTE [DISTWIDTH] IN BLOOD BY AUTOMATED COUNT: 15.2 % (ref 11.6–15.1)
GFR SERPL CREATININE-BSD FRML MDRD: 104 ML/MIN/1.73SQ M
GLUCOSE SERPL-MCNC: 100 MG/DL (ref 65–140)
GLUCOSE SERPL-MCNC: 107 MG/DL (ref 65–140)
GLUCOSE SERPL-MCNC: 116 MG/DL (ref 65–140)
GLUCOSE SERPL-MCNC: 162 MG/DL (ref 65–140)
GLUCOSE SERPL-MCNC: 162 MG/DL (ref 65–140)
HCT VFR BLD AUTO: 37.4 % (ref 34.8–46.1)
HGB BLD-MCNC: 11.7 G/DL (ref 11.5–15.4)
LYMPHOCYTES # BLD AUTO: 1.58 THOUSAND/UL (ref 0.6–4.47)
LYMPHOCYTES # BLD AUTO: 36 % (ref 14–44)
MCH RBC QN AUTO: 28.2 PG (ref 26.8–34.3)
MCHC RBC AUTO-ENTMCNC: 31.3 G/DL (ref 31.4–37.4)
MCV RBC AUTO: 90 FL (ref 82–98)
MONOCYTES # BLD AUTO: 0 THOUSAND/UL (ref 0–1.22)
MONOCYTES NFR BLD: 0 % (ref 4–12)
MTX SERPL-SCNC: 0.44 UMOL/L
MTX SERPL-SCNC: 0.61 UMOL/L
MTX SERPL-SCNC: 3.1 UMOL/L
MTX SERPL-SCNC: <0.1 UMOL/L
MTX SERPL-SCNC: <0.1 UMOL/L
NEUTROPHILS # BLD MANUAL: 2.6 THOUSAND/UL (ref 1.85–7.62)
NEUTS SEG NFR BLD AUTO: 59 % (ref 43–75)
PLATELET # BLD AUTO: 390 THOUSANDS/UL (ref 149–390)
PLATELET BLD QL SMEAR: ADEQUATE
PMV BLD AUTO: 11.4 FL (ref 8.9–12.7)
POTASSIUM SERPL-SCNC: 3.6 MMOL/L (ref 3.5–5.3)
PROT SERPL-MCNC: 6.7 G/DL (ref 6.4–8.2)
RBC # BLD AUTO: 4.15 MILLION/UL (ref 3.81–5.12)
RBC MORPH BLD: PRESENT
SODIUM SERPL-SCNC: 138 MMOL/L (ref 136–145)
WBC # BLD AUTO: 4.4 THOUSAND/UL (ref 4.31–10.16)

## 2021-08-18 PROCEDURE — 99232 SBSQ HOSP IP/OBS MODERATE 35: CPT | Performed by: NURSE PRACTITIONER

## 2021-08-18 PROCEDURE — 80204 DRUG ASSAY METHOTREXATE: CPT | Performed by: NURSE PRACTITIONER

## 2021-08-18 PROCEDURE — 80053 COMPREHEN METABOLIC PANEL: CPT | Performed by: NURSE PRACTITIONER

## 2021-08-18 PROCEDURE — 85007 BL SMEAR W/DIFF WBC COUNT: CPT | Performed by: NURSE PRACTITIONER

## 2021-08-18 PROCEDURE — 85027 COMPLETE CBC AUTOMATED: CPT | Performed by: NURSE PRACTITIONER

## 2021-08-18 PROCEDURE — 82948 REAGENT STRIP/BLOOD GLUCOSE: CPT

## 2021-08-18 RX ORDER — LEUCOVORIN CALCIUM 25 MG/1
25 TABLET ORAL EVERY 6 HOURS SCHEDULED
Status: DISCONTINUED | OUTPATIENT
Start: 2021-08-18 | End: 2021-08-19

## 2021-08-18 RX ORDER — HYDROMORPHONE HCL/PF 1 MG/ML
1 SYRINGE (ML) INJECTION ONCE
Status: DISCONTINUED | OUTPATIENT
Start: 2021-08-18 | End: 2021-08-18

## 2021-08-18 RX ADMIN — LEUCOVORIN CALCIUM 25 MG: 25 TABLET ORAL at 22:20

## 2021-08-18 RX ADMIN — LEUCOVORIN CALCIUM 25 MG: 25 TABLET ORAL at 06:02

## 2021-08-18 RX ADMIN — FOLIC ACID 1 MG: 1 TABLET ORAL at 08:03

## 2021-08-18 RX ADMIN — GABAPENTIN 200 MG: 250 SOLUTION ORAL at 22:20

## 2021-08-18 RX ADMIN — INSULIN LISPRO 1 UNITS: 100 INJECTION, SOLUTION INTRAVENOUS; SUBCUTANEOUS at 12:10

## 2021-08-18 RX ADMIN — SODIUM BICARBONATE 100 ML/HR: 84 INJECTION, SOLUTION INTRAVENOUS at 22:51

## 2021-08-18 RX ADMIN — FAMOTIDINE 20 MG: 20 TABLET ORAL at 08:03

## 2021-08-18 RX ADMIN — DICYCLOMINE HYDROCHLORIDE 10 MG: 10 CAPSULE ORAL at 12:10

## 2021-08-18 RX ADMIN — LEUCOVORIN CALCIUM 25 MG: 25 TABLET ORAL at 12:09

## 2021-08-18 RX ADMIN — SERTRALINE 25 MG: 25 TABLET, FILM COATED ORAL at 08:03

## 2021-08-18 RX ADMIN — ATORVASTATIN CALCIUM 10 MG: 10 TABLET, FILM COATED ORAL at 18:15

## 2021-08-18 RX ADMIN — ENOXAPARIN SODIUM 40 MG: 40 INJECTION SUBCUTANEOUS at 08:03

## 2021-08-18 RX ADMIN — DICYCLOMINE HYDROCHLORIDE 10 MG: 10 CAPSULE ORAL at 06:02

## 2021-08-18 RX ADMIN — LEUCOVORIN CALCIUM 25 MG: 25 TABLET ORAL at 18:16

## 2021-08-18 RX ADMIN — DICYCLOMINE HYDROCHLORIDE 10 MG: 10 CAPSULE ORAL at 22:20

## 2021-08-18 RX ADMIN — DOCUSATE SODIUM 100 MG: 100 CAPSULE ORAL at 18:15

## 2021-08-18 RX ADMIN — DOCUSATE SODIUM 100 MG: 100 CAPSULE ORAL at 08:03

## 2021-08-18 RX ADMIN — HYDROMORPHONE HYDROCHLORIDE 0.5 MG: 1 INJECTION, SOLUTION INTRAMUSCULAR; INTRAVENOUS; SUBCUTANEOUS at 12:56

## 2021-08-18 RX ADMIN — SODIUM BICARBONATE 100 ML/HR: 84 INJECTION, SOLUTION INTRAVENOUS at 10:32

## 2021-08-18 RX ADMIN — DICYCLOMINE HYDROCHLORIDE 10 MG: 10 CAPSULE ORAL at 18:15

## 2021-08-18 NOTE — PROGRESS NOTES
Hematology/Oncology IP Progress Note    Date of Service: 8/18/2021    [facility]  [facility] HEMATOLOGY ONCOLOGY SPECIALISTS    Annabelle Vieira is a 49 y o  female at [facility] ( LOS: 5 days ) admitted on 8/13/2021  9:51 AM     Principal Problem: 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[address] )     Assessment/Plan:   Principal Problem:    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[facility] )  Active Problems:    Altered mental status    Cancer related pain    Ambulatory dysfunction    Hypokalemia    Depression    1  Primary CNS diffuse large B-cell lymphoma , admitted for cycle 5 high-dose methotrexate + with rituximab  Patient did very well with chemotherapy without significant side effects  ·  MTX level still not less than 0 10  ·  Restart Sodium Bicarb IV hydration x 24 hours  Continue Leukovorin 25mg Q6 hours x 6 doses  Check methotrexate level daily  CBC and CMP daily  No MTX level back yet  2   Abdominal pain, improved:   · Pt tells me LLQ abdominal pain is better, has pain at tube site and wants it out, and PEG tube has mild exudate from this  She completed 7 day course of Keflex for this  She is stating she needs to go to the bathroom, tho she was just taken to bathroom  · UA unremarkable  · US of abdomen negative for abscess  · GI removed PEG today, 8/18  · Manage pain: increase oxycodone to 5mg Q4PRN; add dilaudid 0 5mg IV Q3 hours PRN severe pain  3   AMS: Hx of AMS; limited communication  AOX2 today self and place  Her malignancy dose contribute to this  Consider imaging if worsens  Continue to monitor  4  We will continue to follow patient with you during the hospital stay  Communication with patient/family:  I did update the patient via [address]  phone  Communication with team:  I spoke with GI team       I did review this patient with Dr Clari Kraus and he is in agreement with this plan        Len Hernandez, RENALDOP-BC  Hematology/Oncology Nurse Practitioner       Subjective:     Patient is doing better today, less pain, she still wants PEG tube  She denies any nausea or vomiting, bowels are okay  She talked to her  yesterday  She did very well with chemotherapy without significant side effect  Objective:   VITALS:   /87   Pulse 70   Temp 98 3 °F (36 8 °C)   Resp 18   Ht 5' (1 524 m)   Wt 50 2 kg (110 lb 10 7 oz)   SpO2 97%   BMI 21 61 kg/m²   Temp (24hrs), Av 3 °F (36 8 °C), Min:98 °F (36 7 °C), Max:98 4 °F (36 9 °C)    I&O:    Intake/Output Summary (Last 24 hours) at 2021 7739  Last data filed at 2021 0301  Gross per 24 hour   Intake 200 ml   Output 2300 ml   Net -2100 ml      Weight change:      Physical EXAM:  General:  Alert, cooperative, no distress, appears stated age  Appears chronically ill  Head:  Normocephalic, without obvious abnormality, atraumatic  Eyes:  Conjunctivae/corneas clear  PERRL, EOMs intact  No evidence of conjunctivitis     Throat: Lips, mucosa, and tongue normal  No lesions in the oropharynx   Neck: Supple    Lungs:   Clear to auscultation bilaterally  Respiratory effort easy, nonlabored    Heart:  RRR    Abdomen:   Soft, nondistended  Bowel sounds normal  No masses,  No organomegaly  Pain in LLQ still present with palpation; PEG tube removed, dressed unable to assess  Extremities: Extremities normal, atraumatic, no cyanosis or edema   No axillary or inguinal adenopathy   Skin: Skin color, texture, turgor normal  No rashes or lesions    Neurologic/Psych:   Awake and alert, calm today          ALLERGIES:  No Known Allergies    CURRENT MEDICATIONS:  Inpatient Scheduled Medications:atorvastatin, 10 mg, Daily With Dinner  dicyclomine, 10 mg, 4x Daily (AC & HS)  docusate sodium, 100 mg, BID  enoxaparin, 40 mg, Q24H MEÑO  famotidine, 20 mg, Daily  folic acid, 1 mg, Daily  gabapentin, 200 mg, HS  hydrOXYzine HCL, 10 mg, Once  insulin lispro, 1-6 Units, TID AC  iohexol, 50 mL, 90 min pre-procedure  leucovorin, 25 mg, Q6H [facility]  LORazepam, 0 5 mg, Once  sertraline, 25 mg, Daily      Inpatient PRN Medications:acetaminophen, 650 mg, Q6H PRN  aluminum-magnesium hydroxide-simethicone, 30 mL, Q4H PRN  Diclofenac Sodium, 2 g, TID PRN  HYDROmorphone, 0 5 mg, Q3H PRN  ondansetron, 4 mg, Q6H PRN  oxyCODONE, 5 mg, Q4H PRN  polyethylene glycol, 17 g, Daily PRN  sodium chloride, 20 mL/hr, Once PRN      Inpatient IV Infusions:sodium bicarbonate infusion        LABS:  CBC:  Recent Labs     08/16/21  0626 08/17/21  1007   WBC 3 52* 3 44*   MCV 89 90   MCH 27 8 28 0   MCHC 31 1* 31 3*   RDW 15 0 15 1   MPV 10 7 9 9     CMP:   Recent Labs     08/16/21 0626 08/17/21  1007   SODIUM 137 139   BUN 4* 10   CALCIUM 7 9* 9 4   CREATININE 0 46* 0 42*     MISC  LABS:  No results for input(s): LDH, URICACID, PHOSPHORUS, LACTICACID in the last 72 hours  Invalid input(s): MAGNESIUM  LFT:   Recent Labs     08/16/21  0626 08/17/21  1007   AST 15 18     Coags:  Invalid input(s): COAGPROFILE  No results for input(s): INR, PTT in the last 72 hours      Invalid input(s): Rodríguez HUTCHISON CRNP  8/18/2021, 9:06 AM

## 2021-08-18 NOTE — CASE MANAGEMENT
CM was notified by Yolande Irving, [facility], that pt is not medically cleared for d/c today  CM will follow

## 2021-08-19 LAB
GLUCOSE SERPL-MCNC: 109 MG/DL (ref 65–140)
GLUCOSE SERPL-MCNC: 114 MG/DL (ref 65–140)
GLUCOSE SERPL-MCNC: 116 MG/DL (ref 65–140)
GLUCOSE SERPL-MCNC: 162 MG/DL (ref 65–140)
SARS-COV-2 RNA RESP QL NAA+PROBE: NEGATIVE

## 2021-08-19 PROCEDURE — 99231 SBSQ HOSP IP/OBS SF/LOW 25: CPT | Performed by: NURSE PRACTITIONER

## 2021-08-19 PROCEDURE — U0005 INFEC AGEN DETEC AMPLI PROBE: HCPCS | Performed by: NURSE PRACTITIONER

## 2021-08-19 PROCEDURE — 82948 REAGENT STRIP/BLOOD GLUCOSE: CPT

## 2021-08-19 PROCEDURE — NC001 PR NO CHARGE: Performed by: INTERNAL MEDICINE

## 2021-08-19 PROCEDURE — U0003 INFECTIOUS AGENT DETECTION BY NUCLEIC ACID (DNA OR RNA); SEVERE ACUTE RESPIRATORY SYNDROME CORONAVIRUS 2 (SARS-COV-2) (CORONAVIRUS DISEASE [COVID-19]), AMPLIFIED PROBE TECHNIQUE, MAKING USE OF HIGH THROUGHPUT TECHNOLOGIES AS DESCRIBED BY CMS-2020-01-R: HCPCS | Performed by: NURSE PRACTITIONER

## 2021-08-19 RX ADMIN — DICYCLOMINE HYDROCHLORIDE 10 MG: 10 CAPSULE ORAL at 06:02

## 2021-08-19 RX ADMIN — DICYCLOMINE HYDROCHLORIDE 10 MG: 10 CAPSULE ORAL at 17:56

## 2021-08-19 RX ADMIN — DOCUSATE SODIUM 100 MG: 100 CAPSULE ORAL at 17:56

## 2021-08-19 RX ADMIN — SERTRALINE 25 MG: 25 TABLET, FILM COATED ORAL at 08:15

## 2021-08-19 RX ADMIN — LEUCOVORIN CALCIUM 25 MG: 25 TABLET ORAL at 06:02

## 2021-08-19 RX ADMIN — ENOXAPARIN SODIUM 40 MG: 40 INJECTION SUBCUTANEOUS at 08:15

## 2021-08-19 RX ADMIN — ATORVASTATIN CALCIUM 10 MG: 10 TABLET, FILM COATED ORAL at 17:56

## 2021-08-19 RX ADMIN — FAMOTIDINE 20 MG: 20 TABLET ORAL at 08:15

## 2021-08-19 RX ADMIN — DOCUSATE SODIUM 100 MG: 100 CAPSULE ORAL at 08:15

## 2021-08-19 RX ADMIN — INSULIN LISPRO 1 UNITS: 100 INJECTION, SOLUTION INTRAVENOUS; SUBCUTANEOUS at 11:04

## 2021-08-19 RX ADMIN — DICLOFENAC SODIUM 2 G: 10 GEL TOPICAL at 17:56

## 2021-08-19 RX ADMIN — DICYCLOMINE HYDROCHLORIDE 10 MG: 10 CAPSULE ORAL at 11:03

## 2021-08-19 RX ADMIN — FOLIC ACID 1 MG: 1 TABLET ORAL at 08:15

## 2021-08-19 NOTE — DISCHARGE SUMMARY
Discharge Summary - Palmer Washington 49 y o  female MRN: [id number]    [facility] Encounter: [id number]    Admission Date: 8/13/2021   Discharge Date: 8/20/2021    Admitting Diagnosis: 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[address] ) [C85 89]    HPI: Niels Vega a 50 yo female with primary CNS lymphoma, admitted for C5 of  high dose methotrexate and rituxan  Most recent imaging had showed improvement of intracranial lesions; pt was sent to rehab to work on her strength/conditioning; she was admitted for C5 on 8/13/21 and tolerated treatment well during this admission without complications  Procedures Performed: Chemotherapy    Hospital Course: She was admitted for C5 of HD MTX and rituxan; she tolerated this well without any complications  MTX was less than 0 1 on 8/18  Pt is doing well overall; She had PEG tube removed without issues  She should have wound changes to this area at facility  Pt will keep PICC at the facility as she requires chemotherapy Q2 weeks and they stated they can manage it  Jorje Alaniz is her primary language and [employer] ipad should be used to communicate with her  Significant Findings, Care, Treatment and Services Provided:   - PEG tube removed 8/18 by GI without complications    Complications: None    Discharge Diagnosis: CNS Lymphoma    Condition at Discharge: fair     Discharge instructions/Information to patient and family:   See after visit summary for information provided to patient and family  Provisions for Follow-Up Care:  See after visit summary for information related to follow-up care and any pertinent home health orders        Disposition: Skilled nursing facility at [facility] and rehab center    Planned Readmission: Yes, 8/27 or the week after    General Appearance:    Alert, no distress, appears chronically ill   Head:    Normocephalic, without obvious abnormality, atraumatic   Eyes:    PERRL, conjunctiva/corneas clear    Throat:   Lips, mucosa, and tongue normal; teeth and gums normal   Lungs:     Clear to auscultation bilaterally, respirations unlabored   Chest Wall:    No tenderness or deformity    Heart:  RRR   Abdomen:     Soft, non-tender, bowel sounds active all four quadrants,     no masses, no organomegaly; PEG tube removed, dressed  Extremities:   Extremities normal, atraumatic, no cyanosis or edema   Pulses:   2+ and symmetric all extremities   Skin:   Skin color, texture, turgor normal, no rashes or lesions         Neurologic: Residual left sided deficits from disease          Discharge Statement   I spent 30 minutes discharging the patient  This time was spent on the day of discharge  I had direct contact with the patient on the day of discharge  Discharge Medications:  See after visit summary for reconciled discharge medications provided to patient and family

## 2021-08-19 NOTE — TRANSPORTATION MEDICAL NECESSITY
Section I - General Information    Name of Patient: Brittney Leung                 : 1965    Medicare #: [id number]  Transport Date: 2021 (PCS is valid for round trips on this date and for all repetitive trips in the 60-day range as noted below )  Origin: [facility]                                                         Destination: [facility]  Is the pt's stay covered under Medicare Part A (PPS/DRG)   []     Closest appropriate facility? If no, why is transport to more distant facility required? Yes  If hospice pt, is this transport related to pt's terminal illness? NA       Section II - Medical Necessity Questionnaire  Ambulance transportation is medically necessary only if other means of transport are contraindicated or would be potentially harmful to the patient  To meet this requirement, the patient must either be "bed confined" or suffer from a condition such that transport by means other than ambulance is contraindicated by the patient's condition  The following questions must be answered by the medical professional signing below for this form to be valid:    1)  Describe the MEDICAL CONDITION (physical and/or mental) of this patient AT [address] [place] [address] that requires the patient to be transported in an ambulance and why transport by other means is contraindicated by the patient's condition: lymphoma, dysphagia, altered mental status, cancer pain, bedbound    2) Is the patient "bed confined" as defined below? Yes  To be "be confined" the patient must satisfy all three of the following conditions: (1) unable to get up from bed without Assistance; AND (2) unable to ambulate; AND (3) unable to sit in a chair or wheelchair  3) Can this patient safely be transported by car or wheelchair van (i e , seated during transport without a medical attendant or monitoring)?    No    4) In addition to completing questions 1-3 above, please check any of the following conditions that apply*:   *Note: supporting documentation for any boxes checked must be maintained in the patient's medical records  If hosp-hosp transfer, describe services needed at 2nd facility not available at 1st facility? Moderate/severe pain on movement   Medical attendant required   Unable to sit in a chair or wheelchair due to decubitus ulcers or other wounds       Section III - Signature of Physician or Healthcare Professional  I certify that the above information is true and correct based on my evaluation of this patient, and represent that the patient requires transport by ambulance and that other forms of transport are contraindicated  I understand that this information will be used by the Centers for Medicare and Medicaid Services (CMS) to support the determination of medical necessity for ambulance services, and I represent that I have personal knowledge of the patient's condition at time of transport  []  If this box is checked, I also certify that the patient is physically or mentally incapable of signing the ambulance service's claim and that the institution with which I am affiliated has furnished care, services, or assistance to the patient  My signature below is made on behalf of the patient pursuant to 42 CFR §424 36(b)(4)  In accordance with 42 CFR §424 37, the specific reason(s) that the patient is physically or mentally incapable of signing the claim form is as follows: Kaley Gilliam of Physician* or Healthcare Professional______________________________________________________________  Signature Date 08/19/21 (For scheduled repetitive transports, this form is not valid for transports performed more than 60 days after this date)    Printed Name & Credentials of Physician or Healthcare Professional (MD, DO, RN, etc ) Priscilla Alfredo*Form must be signed by patient's attending physician for scheduled, repetitive transports   For non-repetitive, unscheduled ambulance transports, if unable to obtain the signature of the attending physician, any of the following may sign (choose appropriate option below)  [] Physician Assistant []  Clinical Nurse Specialist [x]  Registered Nurse  []  Nurse Practitioner  [x] Discharge Planner

## 2021-08-19 NOTE — PROGRESS NOTES
Hematology/Oncology IP Progress Note    Date of Service: 8/19/2021    [facility] CAMPUS MOB  [facility] HEMATOLOGY ONCOLOGY SPECIALISTS    Laila Gunderson is a 49 y o  female at [address] [facility] [address] ( LOS: 6 days ) admitted on 8/13/2021  9:51 AM     Principal Problem: 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[facility] [address] )     Assessment/Plan:   Principal Problem:    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[facility] [address] )  Active Problems:    Altered mental status    Cancer related pain    Ambulatory dysfunction    Hypokalemia    Depression    1  Primary CNS diffuse large B-cell lymphoma , admitted for cycle 5 high-dose methotrexate + with rituximab  Patient did very well with chemotherapy without significant side effects  ·  MTX level less than 0 10  ·  Pt is ready for discharge, transport arranged 8/20 per CM  2  Abdominal pain, resolved:   · Pt had tenderness in LLQ; US negative  UA unremarkable  Pt has improved since PEG tube removal  She needs wound care per protocol of this area  · Manage pain: continue oxycodone to 5mg Q4PRN and discharge her on this   3  Central line: Pt has PICC, should keep this on discharge as rehab can manage this, and she needs it Q2 weeks for treatment  4  We will continue to follow patient with you during the hospital stay  Communication with patient/family:  I did update the patient via [phone]  phone  Communication with team:  I spoke with GI team       I did review this patient with Dr Victor Hugo Nunez and he is in agreement with this plan  ZAK Nicole-BC  Hematology/Oncology Nurse Practitioner       Subjective:     Patient is doing better today, less pain, glad to have PICC out  She wants to have her hair done  She feels pain is better and well controlled  She denies any nausea or vomiting, bowels are okay  She did very well with chemotherapy without significant side effect       Objective:   VITALS:   /72   Pulse 90   Temp 98 5 °F (36 9 °C)   Resp 20   Ht 5' (1 524 m)   Wt 50 2 kg (110 lb 10 7 oz)   SpO2 97%   BMI 21 61 kg/m²   Temp (24hrs), Av 3 °F (36 8 °C), Min:97 9 °F (36 6 °C), Max:98 5 °F (36 9 °C)    I&O:    Intake/Output Summary (Last 24 hours) at 2021 1714  Last data filed at 2021 1300  Gross per 24 hour   Intake 240 ml   Output 1200 ml   Net -960 ml      Weight change:      Physical EXAM:  General:  Alert, cooperative, no distress, appears stated age  Appears chronically ill  Head:  Normocephalic, without obvious abnormality, atraumatic  Eyes:  Conjunctivae/corneas clear  PERRL, EOMs intact  No evidence of conjunctivitis     Throat: Lips, mucosa, and tongue normal  No lesions in the oropharynx   Neck: Supple    Lungs:   Clear to auscultation bilaterally  Respiratory effort easy, nonlabored    Heart:  RRR    Abdomen:   Soft, nondistended  Bowel sounds normal  No masses,  No organomegaly  Pain in LLQ better; PEG tube removed, dressed  Extremities: Extremities normal, atraumatic, no cyanosis or edema   No axillary or inguinal adenopathy   Skin: Skin color, texture, turgor normal  No rashes or lesions    Neurologic/Psych:   Awake and alert, calm today          ALLERGIES:  No Known Allergies    CURRENT MEDICATIONS:  Inpatient Scheduled Medications:atorvastatin, 10 mg, Daily With Dinner  dicyclomine, 10 mg, 4x Daily (AC & HS)  docusate sodium, 100 mg, BID  enoxaparin, 40 mg, Q24H MEÑO  famotidine, 20 mg, Daily  folic acid, 1 mg, Daily  gabapentin, 200 mg, HS  hydrOXYzine HCL, 10 mg, Once  insulin lispro, 1-6 Units, TID AC  iohexol, 50 mL, 90 min pre-procedure  LORazepam, 0 5 mg, Once  sertraline, 25 mg, Daily      Inpatient PRN Medications:acetaminophen, 650 mg, Q6H PRN  aluminum-magnesium hydroxide-simethicone, 30 mL, Q4H PRN  Diclofenac Sodium, 2 g, TID PRN  HYDROmorphone, 0 5 mg, Q3H PRN  ondansetron, 4 mg, Q6H PRN  oxyCODONE, 5 mg, Q4H PRN  polyethylene glycol, 17 g, Daily PRN  sodium chloride, 20 mL/hr, Once PRN      Inpatient IV Infusions:     LABS:  CBC:  Recent Labs 08/17/21  1007 08/18/21  0926   WBC 3 44* 4 40   MCV 90 90   MCH 28 0 28 2   MCHC 31 3* 31 3*   RDW 15 1 15 2*   MPV 9 9 11 4     CMP:   Recent Labs     08/17/21  1007 08/18/21  0926   SODIUM 139 138   BUN 10 13   CALCIUM 9 4 10 0   CREATININE 0 42* 0 59*     MISC  LABS:  No results for input(s): LDH, URICACID, PHOSPHORUS, LACTICACID in the last 72 hours  Invalid input(s): MAGNESIUM  LFT:   Recent Labs     08/17/21  1007 08/18/21  0926   AST 18 17     Coags:  Invalid input(s): COAGPROFILE  No results for input(s): INR, PTT in the last 72 hours      Invalid input(s): PTP, CHEPE Tao  8/19/2021, 5:14 PM

## 2021-08-19 NOTE — CASE MANAGEMENT
PT stable for DC  Notified [address] [facility] who will obtain auth via Myriam Clarke, [address], they obtained [facility]  PT will need COVID    TCT all BLS ambulance on list, no one with availability  Manager aware    [facility], they can transport 9:30A tomorrow    [facility],  left unable to find transport today, transport arranged for 9:30 tomorrow  COVID test pending  TCB if concern with transport time,     Message Gavin Ask from [facility], she received transport time  Advised COVID negative    TCT  [employer], advised of [facility]    He will notify patient

## 2021-08-20 VITALS
SYSTOLIC BLOOD PRESSURE: 124 MMHG | HEART RATE: 79 BPM | DIASTOLIC BLOOD PRESSURE: 87 MMHG | RESPIRATION RATE: 18 BRPM | HEIGHT: 60 IN | WEIGHT: 110.67 LBS | TEMPERATURE: 98.6 F | BODY MASS INDEX: 21.73 KG/M2 | OXYGEN SATURATION: 99 %

## 2021-08-20 LAB
GLUCOSE SERPL-MCNC: 107 MG/DL (ref 65–140)
MTX SERPL-SCNC: <0.1 UMOL/L

## 2021-08-20 PROCEDURE — 82948 REAGENT STRIP/BLOOD GLUCOSE: CPT

## 2021-08-20 RX ORDER — OXYCODONE HYDROCHLORIDE 5 MG/1
5 TABLET ORAL EVERY 4 HOURS PRN
Qty: 30 TABLET | Refills: 0 | Status: ON HOLD | OUTPATIENT
Start: 2021-08-20 | End: 2021-09-03

## 2021-08-20 RX ORDER — SERTRALINE HYDROCHLORIDE 25 MG/1
25 TABLET, FILM COATED ORAL DAILY
Refills: 0 | Status: ON HOLD
Start: 2021-08-20 | End: 2021-09-07 | Stop reason: SDUPTHER

## 2021-08-20 RX ADMIN — FAMOTIDINE 20 MG: 20 TABLET ORAL at 08:23

## 2021-08-20 RX ADMIN — DICYCLOMINE HYDROCHLORIDE 10 MG: 10 CAPSULE ORAL at 08:23

## 2021-08-20 RX ADMIN — SERTRALINE 25 MG: 25 TABLET, FILM COATED ORAL at 08:23

## 2021-08-20 RX ADMIN — FOLIC ACID 1 MG: 1 TABLET ORAL at 08:23

## 2021-08-20 RX ADMIN — ENOXAPARIN SODIUM 40 MG: 40 INJECTION SUBCUTANEOUS at 08:24

## 2021-08-20 RX ADMIN — DOCUSATE SODIUM 100 MG: 100 CAPSULE ORAL at 08:23

## 2021-08-21 NOTE — UTILIZATION REVIEW
Notification of Discharge   This is a Notification of Discharge from our facility [address]  Please be advised that this patient has been discharge from our facility  Below you will find the admission and discharge date and time including the patients disposition  UTILIZATION REVIEW CONTACT:  Sydnee Blancas  Utilization   Network Utilization Review Department  Phone: [phone] x carefully listen to the prompts  All voicemails are confidential   Email: [email or web address]     PHYSICIAN ADVISORY SERVICES:  FOR [id number] REVIEW - MEDICAL NECESSITY DENIAL  Phone: [phone]  Fax: [phone]  Email: [email or web address]     PRESENTATION DATE: 8/13/2021  9:51 AM  OBERVATION ADMISSION DATE:   INPATIENT ADMISSION DATE: 8/13/21  9:51 AM   DISCHARGE DATE: 8/20/2021 10:00 AM  DISPOSITION: PRA - SNF PRA - SNF      IMPORTANT INFORMATION:  Send all requests for admission clinical reviews, approved or denied determinations and any other requests to dedicated fax number below belonging to the campus where the patient is receiving treatment   List of dedicated fax numbers:  [address] DENIALS (Administrative/Medical Necessity) [phone]   [address] [place] [address] (Maternity/NICU/Pediatrics) [phone]   Noelle Duron [phone]   [facility] [phone]   Holland Wang [phone]   Cristian Ventura [facility] [address] [place] [address] [phone]   [facility]  [phone]   [address] [facility] [address] [facility]  [facility] [address] [facility] [phone]

## 2021-08-23 DIAGNOSIS — C85.89 CNS LYMPHOMA (HCC): Primary | ICD-10-CM

## 2021-08-25 NOTE — UTILIZATION REVIEW
Continued Stay Review    Date: 8/19/20                          Current Patient Class: IP  Current Level of Care: MS    HPI:56 y o  female initially admitted on 8/13 for chemotherapy - round 5 of HD MTX for Diffuse large B-cell lymphoma, primary CNS lymphoma      Assessment/Plan:   Pt doing better today, PICC removed  Has good pain control, no N/V, good bowel function  Completed chemo and tolerated well  Plan for d/c to rehab on 8/20  Covid negative       Vital Signs:   08/19/21 23:07:37  98 1 °F (36 7 °C)  82  18  133/89  104  97 %  None (Room air)  Lying   08/19/21 21:59:21  98 4 °F (36 9 °C)  79  18  123/80  94  96 %  --  --   08/19/21 15:15:33  98 5 °F (36 9 °C)  90  20  114/72  86  97 %  --  --   08/19/21 07:03:36  98 5 °F (36 9 °C)  79  --  113/74  87  96 %  --  --   08/18/21 22:08:15  97 9 °F (36 6 °C)  85  18  117/82  94  97 %  --  --   08/18/21 2130  --  --  --  --  --  --  None (Room air)  --   08/18/21 14:14:41  98 6 °F (37 °C)  81  17  104/66  79  96 %  --  --   08/18/21 08:02:22  98 3 °F (36 8 °C)  70  --  135/87  103  97 %  --  --     Pertinent Labs/Diagnostic Results:   Results from last 7 days   Lab Units 08/19/21  1105   SARS-COV-2  Negative     Results from last 7 days   Lab Units 08/18/21  0926   WBC Thousand/uL 4 40   HEMOGLOBIN g/dL 11 7   HEMATOCRIT % 37 4   PLATELETS Thousands/uL 390         Results from last 7 days   Lab Units 08/18/21  0926   SODIUM mmol/L 138   POTASSIUM mmol/L 3 6   CHLORIDE mmol/L 105   CO2 mmol/L 26   ANION GAP mmol/L 7   BUN mg/dL 13   CREATININE mg/dL 0 59*   EGFR ml/min/1 73sq m 104   CALCIUM mg/dL 10 0     Results from last 7 days   Lab Units 08/18/21  0926   AST U/L 17   ALT U/L 24   ALK PHOS U/L 70   TOTAL PROTEIN g/dL 6 7   ALBUMIN g/dL 3 3*   TOTAL BILIRUBIN mg/dL 0 39     Results from last 7 days   Lab Units 08/20/21  0726 08/19/21  2049 08/19/21  1634 08/19/21  1044 08/19/21  0713 08/18/21  2046 08/18/21  1630 08/18/21  1121 08/18/21  0817   POC GLUCOSE mg/dl 107 114 116 162* 109 107 116 162* 100     Results from last 7 days   Lab Units 08/18/21  0926   GLUCOSE RANDOM mg/dL 162*     Medications:     Scheduled Medications:  Lipitor daily   Bentyl 4 x daily   Colace BID   Lovenox 40 mg sq daily   Pepcid daily   Folic acid daily   Gabapentin daily at HS   Leucovorin q 6 hr last dose 0602 8/19  Humalog insulin x 1 8/19    Continuous IV Infusions:  Bicarb drip at 150 ml/hr was stopped on 8/19    PRN Meds:  Voltaren gel x 1 8/19    Discharge Plan: SNF rehab    Network Utilization Review Department  ATTENTION: Please call with any questions or concerns to [phone] and carefully listen to the prompts so that you are directed to the right person  All voicemails are confidential   Mario Swain all requests for admission clinical reviews, approved or denied determinations and any other requests to dedicated fax number below belonging to the campus where the patient is receiving treatment   List of dedicated fax numbers for the Facilities:  [address] DENIALS (Administrative/Medical Necessity) [phone]   [address] (Maternity/NICU/Pediatrics) [phone]   [address] [place] [address] [address] [place] [address] [address] [place] [address] [address] [facility] [place] [address] [phone]

## 2021-08-27 ENCOUNTER — HOSPITAL ENCOUNTER (INPATIENT)
Facility: HOSPITAL | Age: 56
LOS: 12 days | Discharge: HOME | DRG: 847 | End: 2021-09-08
Attending: INTERNAL MEDICINE | Admitting: INTERNAL MEDICINE
Payer: COMMERCIAL

## 2021-08-27 DIAGNOSIS — Z51.11 ENCOUNTER FOR ANTINEOPLASTIC CHEMOTHERAPY: ICD-10-CM

## 2021-08-27 DIAGNOSIS — G89.3 CANCER RELATED PAIN: ICD-10-CM

## 2021-08-27 DIAGNOSIS — E11.9 TYPE 2 DIABETES MELLITUS WITHOUT COMPLICATION, WITHOUT LONG-TERM CURRENT USE OF INSULIN (HCC): ICD-10-CM

## 2021-08-27 DIAGNOSIS — K59.00 CONSTIPATION: ICD-10-CM

## 2021-08-27 DIAGNOSIS — E43 SEVERE PROTEIN-CALORIE MALNUTRITION (HCC): ICD-10-CM

## 2021-08-27 DIAGNOSIS — C85.89 CNS LYMPHOMA (HCC): Primary | ICD-10-CM

## 2021-08-27 DIAGNOSIS — R13.10 DYSPHAGIA, UNSPECIFIED TYPE: ICD-10-CM

## 2021-08-27 DIAGNOSIS — F41.9 ANXIETY: ICD-10-CM

## 2021-08-27 LAB
ALBUMIN SERPL BCP-MCNC: 3.1 G/DL (ref 3.5–5)
ALP SERPL-CCNC: 69 U/L (ref 46–116)
ALT SERPL W P-5'-P-CCNC: 24 U/L (ref 12–78)
ANION GAP SERPL CALCULATED.3IONS-SCNC: 5 MMOL/L (ref 4–13)
AST SERPL W P-5'-P-CCNC: 13 U/L (ref 5–45)
BACTERIA UR QL AUTO: NORMAL /HPF
BASOPHILS # BLD AUTO: 0.05 THOUSANDS/ΜL (ref 0–0.1)
BASOPHILS NFR BLD AUTO: 1 % (ref 0–1)
BILIRUB SERPL-MCNC: 0.16 MG/DL (ref 0.2–1)
BILIRUB UR QL STRIP: NEGATIVE
BUN SERPL-MCNC: 14 MG/DL (ref 5–25)
CALCIUM ALBUM COR SERPL-MCNC: 10 MG/DL (ref 8.3–10.1)
CALCIUM SERPL-MCNC: 9.3 MG/DL (ref 8.3–10.1)
CHLORIDE SERPL-SCNC: 104 MMOL/L (ref 100–108)
CLARITY UR: CLEAR
CO2 SERPL-SCNC: 30 MMOL/L (ref 21–32)
COLOR UR: YELLOW
CREAT SERPL-MCNC: 0.41 MG/DL (ref 0.6–1.3)
EOSINOPHIL # BLD AUTO: 0.21 THOUSAND/ΜL (ref 0–0.61)
EOSINOPHIL NFR BLD AUTO: 4 % (ref 0–6)
ERYTHROCYTE [DISTWIDTH] IN BLOOD BY AUTOMATED COUNT: 16.1 % (ref 11.6–15.1)
GFR SERPL CREATININE-BSD FRML MDRD: 118 ML/MIN/1.73SQ M
GLUCOSE SERPL-MCNC: 111 MG/DL (ref 65–140)
GLUCOSE SERPL-MCNC: 84 MG/DL (ref 65–140)
GLUCOSE SERPL-MCNC: 89 MG/DL (ref 65–140)
GLUCOSE UR STRIP-MCNC: NEGATIVE MG/DL
HCT VFR BLD AUTO: 33.9 % (ref 34.8–46.1)
HGB BLD-MCNC: 10.7 G/DL (ref 11.5–15.4)
HGB UR QL STRIP.AUTO: NEGATIVE
HYALINE CASTS #/AREA URNS LPF: NORMAL /LPF
IMM GRANULOCYTES # BLD AUTO: 0.03 THOUSAND/UL (ref 0–0.2)
IMM GRANULOCYTES NFR BLD AUTO: 1 % (ref 0–2)
KETONES UR STRIP-MCNC: NEGATIVE MG/DL
LEUKOCYTE ESTERASE UR QL STRIP: ABNORMAL
LYMPHOCYTES # BLD AUTO: 2.52 THOUSANDS/ΜL (ref 0.6–4.47)
LYMPHOCYTES NFR BLD AUTO: 53 % (ref 14–44)
MAGNESIUM SERPL-MCNC: 2 MG/DL (ref 1.6–2.6)
MCH RBC QN AUTO: 28.1 PG (ref 26.8–34.3)
MCHC RBC AUTO-ENTMCNC: 31.6 G/DL (ref 31.4–37.4)
MCV RBC AUTO: 89 FL (ref 82–98)
MONOCYTES # BLD AUTO: 0.57 THOUSAND/ΜL (ref 0.17–1.22)
MONOCYTES NFR BLD AUTO: 12 % (ref 4–12)
NEUTROPHILS # BLD AUTO: 1.38 THOUSANDS/ΜL (ref 1.85–7.62)
NEUTS SEG NFR BLD AUTO: 29 % (ref 43–75)
NITRITE UR QL STRIP: NEGATIVE
NON-SQ EPI CELLS URNS QL MICRO: NORMAL /HPF
NRBC BLD AUTO-RTO: 0 /100 WBCS
PH UR STRIP.AUTO: 8 [PH]
PLATELET # BLD AUTO: 244 THOUSANDS/UL (ref 149–390)
PMV BLD AUTO: 11.5 FL (ref 8.9–12.7)
POTASSIUM SERPL-SCNC: 3.9 MMOL/L (ref 3.5–5.3)
PROT SERPL-MCNC: 6 G/DL (ref 6.4–8.2)
PROT UR STRIP-MCNC: NEGATIVE MG/DL
RBC # BLD AUTO: 3.81 MILLION/UL (ref 3.81–5.12)
RBC #/AREA URNS AUTO: NORMAL /HPF
SODIUM SERPL-SCNC: 139 MMOL/L (ref 136–145)
SP GR UR STRIP.AUTO: 1.01 (ref 1–1.03)
UROBILINOGEN UR QL STRIP.AUTO: 0.2 E.U./DL
WBC # BLD AUTO: 4.76 THOUSAND/UL (ref 4.31–10.16)
WBC #/AREA URNS AUTO: NORMAL /HPF

## 2021-08-27 PROCEDURE — 83735 ASSAY OF MAGNESIUM: CPT | Performed by: INTERNAL MEDICINE

## 2021-08-27 PROCEDURE — 85025 COMPLETE CBC W/AUTO DIFF WBC: CPT | Performed by: INTERNAL MEDICINE

## 2021-08-27 PROCEDURE — 99232 SBSQ HOSP IP/OBS MODERATE 35: CPT | Performed by: STUDENT IN AN ORGANIZED HEALTH CARE EDUCATION/TRAINING PROGRAM

## 2021-08-27 PROCEDURE — C1751 CATH, INF, PER/CENT/MIDLINE: HCPCS

## 2021-08-27 PROCEDURE — 82948 REAGENT STRIP/BLOOD GLUCOSE: CPT

## 2021-08-27 PROCEDURE — 02HV33Z INSERTION OF INFUSION DEVICE INTO SUPERIOR VENA CAVA, PERCUTANEOUS APPROACH: ICD-10-PCS | Performed by: STUDENT IN AN ORGANIZED HEALTH CARE EDUCATION/TRAINING PROGRAM

## 2021-08-27 PROCEDURE — 81001 URINALYSIS AUTO W/SCOPE: CPT | Performed by: STUDENT IN AN ORGANIZED HEALTH CARE EDUCATION/TRAINING PROGRAM

## 2021-08-27 PROCEDURE — 36569 INSJ PICC 5 YR+ W/O IMAGING: CPT

## 2021-08-27 PROCEDURE — 80053 COMPREHEN METABOLIC PANEL: CPT | Performed by: INTERNAL MEDICINE

## 2021-08-27 RX ORDER — SERTRALINE HYDROCHLORIDE 25 MG/1
25 TABLET, FILM COATED ORAL DAILY
Status: DISCONTINUED | OUTPATIENT
Start: 2021-08-28 | End: 2021-09-08 | Stop reason: HOSPADM

## 2021-08-27 RX ORDER — OLANZAPINE 2.5 MG/1
2.5 TABLET ORAL ONCE
Status: COMPLETED | OUTPATIENT
Start: 2021-08-27 | End: 2021-08-27

## 2021-08-27 RX ORDER — FOLIC ACID 1 MG/1
1 TABLET ORAL DAILY
Status: DISCONTINUED | OUTPATIENT
Start: 2021-08-28 | End: 2021-09-08 | Stop reason: HOSPADM

## 2021-08-27 RX ORDER — FAMOTIDINE 20 MG/1
20 TABLET, FILM COATED ORAL DAILY
Status: DISCONTINUED | OUTPATIENT
Start: 2021-08-28 | End: 2021-09-08 | Stop reason: HOSPADM

## 2021-08-27 RX ORDER — SODIUM CHLORIDE 9 MG/ML
20 INJECTION, SOLUTION INTRAVENOUS ONCE AS NEEDED
Status: DISCONTINUED | OUTPATIENT
Start: 2021-08-27 | End: 2021-08-29

## 2021-08-27 RX ORDER — ATORVASTATIN CALCIUM 10 MG/1
10 TABLET, FILM COATED ORAL
Status: DISCONTINUED | OUTPATIENT
Start: 2021-08-27 | End: 2021-09-08 | Stop reason: HOSPADM

## 2021-08-27 RX ORDER — DICYCLOMINE HYDROCHLORIDE 10 MG/1
10 CAPSULE ORAL
Status: DISCONTINUED | OUTPATIENT
Start: 2021-08-27 | End: 2021-09-08 | Stop reason: HOSPADM

## 2021-08-27 RX ORDER — GABAPENTIN 100 MG/1
200 CAPSULE ORAL
Status: DISCONTINUED | OUTPATIENT
Start: 2021-08-27 | End: 2021-09-08 | Stop reason: HOSPADM

## 2021-08-27 RX ORDER — ACETAMINOPHEN 325 MG/1
500 TABLET ORAL EVERY 6 HOURS PRN
Status: DISCONTINUED | OUTPATIENT
Start: 2021-08-27 | End: 2021-09-08 | Stop reason: HOSPADM

## 2021-08-27 RX ORDER — DOCUSATE SODIUM 100 MG/1
100 CAPSULE, LIQUID FILLED ORAL 2 TIMES DAILY
Status: DISCONTINUED | OUTPATIENT
Start: 2021-08-27 | End: 2021-09-08 | Stop reason: HOSPADM

## 2021-08-27 RX ORDER — OXYCODONE HYDROCHLORIDE 5 MG/1
5 TABLET ORAL EVERY 4 HOURS PRN
Status: DISCONTINUED | OUTPATIENT
Start: 2021-08-27 | End: 2021-09-08 | Stop reason: HOSPADM

## 2021-08-27 RX ORDER — POLYETHYLENE GLYCOL 3350 17 G/17G
17 POWDER, FOR SOLUTION ORAL DAILY PRN
Status: DISCONTINUED | OUTPATIENT
Start: 2021-08-27 | End: 2021-09-08 | Stop reason: HOSPADM

## 2021-08-27 RX ORDER — ERGOCALCIFEROL 1.25 MG/1
50000 CAPSULE ORAL WEEKLY
Status: DISCONTINUED | OUTPATIENT
Start: 2021-08-30 | End: 2021-09-08 | Stop reason: HOSPADM

## 2021-08-27 RX ADMIN — TBO-FILGRASTIM 300 MCG: 300 INJECTION, SOLUTION SUBCUTANEOUS at 22:45

## 2021-08-27 RX ADMIN — DICYCLOMINE HYDROCHLORIDE 10 MG: 10 CAPSULE ORAL at 22:45

## 2021-08-27 RX ADMIN — DOCUSATE SODIUM 100 MG: 100 CAPSULE, LIQUID FILLED ORAL at 18:01

## 2021-08-27 RX ADMIN — OLANZAPINE 2.5 MG: 2.5 TABLET, FILM COATED ORAL at 12:45

## 2021-08-27 RX ADMIN — ATORVASTATIN CALCIUM 10 MG: 10 TABLET, FILM COATED ORAL at 18:01

## 2021-08-27 RX ADMIN — SODIUM BICARBONATE 150 ML/HR: 84 INJECTION, SOLUTION INTRAVENOUS at 14:44

## 2021-08-27 RX ADMIN — DICYCLOMINE HYDROCHLORIDE 10 MG: 10 CAPSULE ORAL at 18:01

## 2021-08-27 RX ADMIN — GABAPENTIN 200 MG: 100 CAPSULE ORAL at 22:45

## 2021-08-27 NOTE — UTILIZATION REVIEW
Second Request -- for 08/192021 -requesting review for last day approval       Continued Stay Review    Date: 8/19/20                          Current Patient Class: IP  Current Level of Care: MS    HPI:56 y o  female initially admitted on 8/13 for chemotherapy - round 5 of HD MTX for Diffuse large B-cell lymphoma, primary CNS lymphoma      Assessment/Plan:   Pt doing better today, PICC removed  Has good pain control, no N/V, good bowel function  Completed chemo and tolerated well  Plan for d/c to rehab on 8/20  Covid negative  Vital Signs:   08/19/21 23:07:37  98 1 °F (36 7 °C)  82  18  133/89  104  97 %  None (Room air)  Lying   08/19/21 21:59:21  98 4 °F (36 9 °C)  79  18  123/80  94  96 %  --  --   08/19/21 15:15:33  98 5 °F (36 9 °C)  90  20  114/72  86  97 %  --  --   08/19/21 07:03:36  98 5 °F (36 9 °C)  79  --  113/74  87  96 %  --  --   08/18/21 22:08:15  97 9 °F (36 6 °C)  85  18  117/82  94  97 %  --  --   08/18/21 2130  --  --  --  --  --  --  None (Room air)  --   08/18/21 14:14:41  98 6 °F (37 °C)  81  17  104/66  79  96 %  --  --   08/18/21 08:02:22  98 3 °F (36 8 °C)  70  --  135/87  103  97 %  --  --     Pertinent Labs/Diagnostic Results:                               Medications:     Scheduled Medications:  Lipitor daily   Bentyl 4 x daily   Colace BID   Lovenox 40 mg sq daily   Pepcid daily   Folic acid daily   Gabapentin daily at HS   Leucovorin q 6 hr last dose 0602 8/19  Humalog insulin x 1 8/19    Continuous IV Infusions:  Bicarb drip at 150 ml/hr was stopped on 8/19    PRN Meds:  Voltaren gel x 1 8/19    Discharge Plan: SNF rehab    Network Utilization Review Department  ATTENTION: Please call with any questions or concerns to [phone] and carefully listen to the prompts so that you are directed to the right person   All voicemails are confidential   Elnor Raider all requests for admission clinical reviews, approved or denied determinations and any other requests to dedicated fax number below belonging to the campus where the patient is receiving treatment   List of dedicated fax numbers for the Facilities:  [address] [place] [address] DENIALS (Administrative/Medical Necessity) [phone]   [address] (Maternity/NICU/Pediatrics) [phone]   [address] Aleisha Chaudharyra [address] [address] [address] Tae Jules [address] [facility] [phone]

## 2021-08-27 NOTE — QUICK NOTE
Given ANC 1 38k today, plan to hold IP chemotherapy today  I called RN Shadia Walton and confirmed that she has not initiated methotrexate yet  Sodium bicarb infusion was initiated, but I recommended that she hold it at this time  Placed order for 300 mcg tbo-filgrastim (Granix) SQ to be administered at 1800 tonight  CBC w/ diff scheduled to be draw tomorrow at 0700  If ANC is > 1 5k tomorrow, will initiate chemotherapy then  Double lumen PICC line was successfully placed today at 1400  I discussed the above information with Dr Carmela Landeros and he is in agreement

## 2021-08-27 NOTE — PROCEDURES
Insert PICC line    Date/Time: 8/27/2021 2:02 PM  Performed by: Bren Lara RN  Authorized by: Pamela Argueta PA-C     Patient location:  Bedside  Other Assisting Provider: Yes (comment) (Maria Del Carmen Blood Infusion Tech)    Consent:     Consent obtained:  Written (Physician obtained)    Consent given by:  Spouse    Procedural risks discussed: with MD Kramer protocol:     Procedure explained and questions answered to patient or proxy's satisfaction: yes      Relevant documents present and verified: yes      Test results available and properly labeled: yes      Radiology Images displayed and confirmed  If images not available, report reviewed: yes      Required blood products, implants, devices, and special equipment available: yes      Site/side marked: yes      Immediately prior to procedure, a time out was called: yes      Patient identity confirmed:  Arm band and hospital-assigned identification number  Pre-procedure details:     Hand hygiene: Hand hygiene performed prior to insertion      Sterile barrier technique: All elements of maximal sterile technique followed      Skin preparation:  ChloraPrep    Skin preparation agent: Skin preparation agent completely dried prior to procedure    Indications:     PICC line indications: chemotherapy    Sedation:     Sedation type: Other (comment)  Anesthesia (see MAR for exact dosages):      Anesthesia method:  Local infiltration    Local anesthetic:  Lidocaine 2% w/o epi (2 ml)  Procedure details:     Location:  Basilic    Vessel type: vein      Laterality:  Right    Approach: percutaneous technique used      Patient position:  Flat    Procedural supplies:  Double lumen    Catheter size:  5 Fr    Landmarks identified: yes      Ultrasound guidance: yes      Ultrasound image availability:  Not saved    Sterile ultrasound techniques: Sterile gel and sterile probe covers were used      Number of attempts:  1    Successful placement: yes      Vessel of catheter tip end: Tiburcio 3CG confirmed    Total catheter length (cm):  36    Catheter out on skin (cm):  1    Max flow rate:  999    Arm circumference:  26  Post-procedure details:     Post-procedure:  Dressing applied and securement device placed    Assessment:  Blood return through all ports and free fluid flow    Post-procedure complications: none      Patient tolerance of procedure:   Tolerated well, no immediate complications    Observer: Yes      Observer name:  Leyda JACKSON

## 2021-08-27 NOTE — PLAN OF CARE

## 2021-08-27 NOTE — H&P
[address]  Department of Hematology & Medical Oncology  Inpatient H&P Note    Date: 08/27/21          ASSESSMENT & PLAN      72-year-old female with primary CNS diffuse large B-cell lymphoma admitted for C6 of high-dose methotrexate with Leucovorin rescue and rituxan  Her primary oncologist is Dr Christiano Landeros  Assessment:  · 72-year-old female with PCNSL and ECOG performance status of 4 here for cycle 6 of HD methotrexate with Leucovorin rescue and rituxan therapy q2 weeks  Her primary oncologist is Dr Christiano Landeros  Plan:  · Treatment:  · Per primary oncologist, administer 3,500 mg/m2 Methotrexate with Leucovorin rescue and 375 mg/m2 Rituximab  Order was signed by Dr Christiano Landeros  · Consent was obtained and signed prior to administration of cycle 1   · PICC team consulted  · Placed order for 25mg Zyprexa tablet anticipating PICC line (triple lumen) insertion  · QTC reviewed and WNL  · Chemotherapy-induced nausea and vomiting protocol in place  ·  Labs/imaging:  ? Monitor methotrexate levels daily  ? Continue monitoring until MTX levels less than 0 1  ? Daily CMP, CBC, magnesium  ? Given her episode of urinary incontinence and malodorous urine, placed order for UA w reflex to culture  and placed order for machado catheter insertion     Please contact me if any questions or concerns about this patient's case  Thank you       SUBJECTIVE      Breezy Carranza is a Gujarati-speaking 72-year-old female who was diagnosed with primary CNS lymphoma via biopsy in March 2021 at [facility] and transferred to [facility] for chemotherapy       She has a PICC line in place  She had a PEG tube placed on 5/10 for dysphagia and was recomved on 8/18 by GI without complications during her last admission       She was diagnosed with primary CNS lymphoma after presenting with left-sided facial droop and difficulty swallowing at [facility] on March 19, 2021   Treatment according to the MATRIX program was recommended when she was initially diagnosed at [facility]  However given that the patient will likely not tolerate the toxicity of this regimen given her ECOG performance status of 4, Dr Shama Valencia  recommended high-dose methotrexate with Rituxan and and consideration of Manuela-C and/or thiotepa if she tolerates her initial cycles well  The plan was discussed with the family by Dr Shama Valencia and a consent form was signed      She received her 5th cycle of methotrexate with Leucovorin rescue on 8/13 and 8/14 which she tolerated well without significant adverse effects or complications  She did have altered mental status during her last admission and required short-acting benzodiazepines with PICC line insertion  Oncology pharmacist Elyssa Schaefer was made aware and will coordinate chemotherapy regimen accordingly      Today, patient is seen at bedside  She nods when asked about pain and is asking for "medicine " RN Jaret Angel reported one episode of urinary incontinence and malodorous urine, which is not normal for patient  Given that patient only speaks Jorgensen Octavia, Dr Shweta Sherman performed her review of symptoms using [employer]  I have reviewed the relevant past medical, surgical, social and family history  I have also reviewed allergies and medications for this patient  Review of Systems  ROS      OBJECTIVE     Physical Exam    There were no vitals filed for this visit  Physical Exam  Constitutional:       General: She is not in acute distress  Appearance: Normal appearance  She is normal weight  She is ill-appearing  She is not toxic-appearing or diaphoretic  HENT:      Head: Normocephalic and atraumatic  Eyes:      General: No scleral icterus  Extraocular Movements: Extraocular movements intact  Pupils: Pupils are equal, round, and reactive to light  Comments: Conjunctival pallor present  Cardiovascular:      Rate and Rhythm: Normal rate and regular rhythm  Pulses: Normal pulses        Heart sounds: Normal heart sounds  No murmur heard  No gallop  Pulmonary:      Effort: Pulmonary effort is normal  No respiratory distress  Breath sounds: No wheezing or rales  Abdominal:      General: Bowel sounds are normal  There is no distension  Palpations: Abdomen is soft  There is no mass  Tenderness: There is abdominal tenderness  There is no guarding or rebound  Comments: Tenderness upon deep palpation of all four quadrants  Musculoskeletal:         General: Normal range of motion  Cervical back: Normal range of motion and neck supple  No rigidity or tenderness  Right lower leg: No edema  Left lower leg: No edema  Lymphadenopathy:      Cervical: No cervical adenopathy  Skin:     General: Skin is warm and dry  Coloration: Skin is not jaundiced or pale  Findings: No bruising, erythema or rash  Neurological:      Mental Status: She is alert  Mental status is at baseline            Imaging  Relevant imaging reviewed in chart    Labs  Relevant labs reviewed in chart    CBC  Diff   Lab Results   Component Value Date/Time    WBC 4 40 08/18/2021 09:26 AM    HGB 11 7 08/18/2021 09:26 AM    HCT 37 4 08/18/2021 09:26 AM    RBC 4 15 08/18/2021 09:26 AM    MCV 90 08/18/2021 09:26 AM    MCHC 31 3 (L) 08/18/2021 09:26 AM    MCH 28 2 08/18/2021 09:26 AM    RDW 15 2 (H) 08/18/2021 09:26 AM    MPV 11 4 08/18/2021 09:26 AM    Lab Results   Component Value Date/Time    LYMPHSABS 1 77 08/16/2021 06:26 AM    EOSABS 0 22 08/18/2021 09:26 AM    EOSABS 0 10 08/16/2021 06:26 AM    MONOSABS 0 09 (L) 08/16/2021 06:26 AM    BASOSABS 0 00 08/18/2021 09:26 AM    BASOSABS 0 03 08/16/2021 06:26 AM        Basic Metabolic Profile    Lab Results   Component Value Date/Time    SODIUM 138 08/18/2021 09:26 AM    CO2 26 08/18/2021 09:26 AM    Lab Results   Component Value Date/Time    BUN 13 08/18/2021 09:26 AM    CREATININE 0 59 (L) 08/18/2021 09:26 AM           Pamela Argueta PA-C  Hematology & Medical Oncology  [address] [place] [address]

## 2021-08-28 LAB
ALBUMIN SERPL BCP-MCNC: 3 G/DL (ref 3.5–5)
ALP SERPL-CCNC: 68 U/L (ref 46–116)
ALT SERPL W P-5'-P-CCNC: 24 U/L (ref 12–78)
ANION GAP SERPL CALCULATED.3IONS-SCNC: 4 MMOL/L (ref 4–13)
AST SERPL W P-5'-P-CCNC: 18 U/L (ref 5–45)
BASOPHILS # BLD AUTO: 0.05 THOUSANDS/ΜL (ref 0–0.1)
BASOPHILS NFR BLD AUTO: 1 % (ref 0–1)
BILIRUB SERPL-MCNC: 0.14 MG/DL (ref 0.2–1)
BUN SERPL-MCNC: 16 MG/DL (ref 5–25)
CALCIUM ALBUM COR SERPL-MCNC: 10.5 MG/DL (ref 8.3–10.1)
CALCIUM SERPL-MCNC: 9.7 MG/DL (ref 8.3–10.1)
CHLORIDE SERPL-SCNC: 105 MMOL/L (ref 100–108)
CO2 SERPL-SCNC: 32 MMOL/L (ref 21–32)
CREAT SERPL-MCNC: 0.43 MG/DL (ref 0.6–1.3)
EOSINOPHIL # BLD AUTO: 0.21 THOUSAND/ΜL (ref 0–0.61)
EOSINOPHIL NFR BLD AUTO: 2 % (ref 0–6)
ERYTHROCYTE [DISTWIDTH] IN BLOOD BY AUTOMATED COUNT: 16.3 % (ref 11.6–15.1)
GFR SERPL CREATININE-BSD FRML MDRD: 116 ML/MIN/1.73SQ M
GLUCOSE SERPL-MCNC: 119 MG/DL (ref 65–140)
GLUCOSE SERPL-MCNC: 120 MG/DL (ref 65–140)
GLUCOSE SERPL-MCNC: 129 MG/DL (ref 65–140)
GLUCOSE SERPL-MCNC: 153 MG/DL (ref 65–140)
GLUCOSE SERPL-MCNC: 180 MG/DL (ref 65–140)
GLUCOSE SERPL-MCNC: 185 MG/DL (ref 65–140)
HCT VFR BLD AUTO: 35.3 % (ref 34.8–46.1)
HGB BLD-MCNC: 11 G/DL (ref 11.5–15.4)
IMM GRANULOCYTES # BLD AUTO: 0.18 THOUSAND/UL (ref 0–0.2)
IMM GRANULOCYTES NFR BLD AUTO: 2 % (ref 0–2)
LYMPHOCYTES # BLD AUTO: 2.06 THOUSANDS/ΜL (ref 0.6–4.47)
LYMPHOCYTES NFR BLD AUTO: 21 % (ref 14–44)
MAGNESIUM SERPL-MCNC: 1.9 MG/DL (ref 1.6–2.6)
MCH RBC QN AUTO: 27.8 PG (ref 26.8–34.3)
MCHC RBC AUTO-ENTMCNC: 31.2 G/DL (ref 31.4–37.4)
MCV RBC AUTO: 89 FL (ref 82–98)
MONOCYTES # BLD AUTO: 0.91 THOUSAND/ΜL (ref 0.17–1.22)
MONOCYTES NFR BLD AUTO: 9 % (ref 4–12)
NEUTROPHILS # BLD AUTO: 6.6 THOUSANDS/ΜL (ref 1.85–7.62)
NEUTS SEG NFR BLD AUTO: 65 % (ref 43–75)
NRBC BLD AUTO-RTO: 0 /100 WBCS
PLATELET # BLD AUTO: 220 THOUSANDS/UL (ref 149–390)
PMV BLD AUTO: 11 FL (ref 8.9–12.7)
POTASSIUM SERPL-SCNC: 4 MMOL/L (ref 3.5–5.3)
PROT SERPL-MCNC: 6 G/DL (ref 6.4–8.2)
RBC # BLD AUTO: 3.95 MILLION/UL (ref 3.81–5.12)
SODIUM SERPL-SCNC: 141 MMOL/L (ref 136–145)
WBC # BLD AUTO: 10.01 THOUSAND/UL (ref 4.31–10.16)

## 2021-08-28 PROCEDURE — 85025 COMPLETE CBC W/AUTO DIFF WBC: CPT | Performed by: INTERNAL MEDICINE

## 2021-08-28 PROCEDURE — 99232 SBSQ HOSP IP/OBS MODERATE 35: CPT | Performed by: INTERNAL MEDICINE

## 2021-08-28 PROCEDURE — 80053 COMPREHEN METABOLIC PANEL: CPT | Performed by: INTERNAL MEDICINE

## 2021-08-28 PROCEDURE — 83735 ASSAY OF MAGNESIUM: CPT | Performed by: INTERNAL MEDICINE

## 2021-08-28 PROCEDURE — 82948 REAGENT STRIP/BLOOD GLUCOSE: CPT

## 2021-08-28 RX ADMIN — SERTRALINE 25 MG: 25 TABLET, FILM COATED ORAL at 08:56

## 2021-08-28 RX ADMIN — FOLIC ACID 1 MG: 1 TABLET ORAL at 08:56

## 2021-08-28 RX ADMIN — SODIUM BICARBONATE 150 ML/HR: 84 INJECTION, SOLUTION INTRAVENOUS at 21:31

## 2021-08-28 RX ADMIN — OXYCODONE HYDROCHLORIDE 5 MG: 5 TABLET ORAL at 06:55

## 2021-08-28 RX ADMIN — DOCUSATE SODIUM 100 MG: 100 CAPSULE, LIQUID FILLED ORAL at 17:52

## 2021-08-28 RX ADMIN — DICYCLOMINE HYDROCHLORIDE 10 MG: 10 CAPSULE ORAL at 06:54

## 2021-08-28 RX ADMIN — DOCUSATE SODIUM 100 MG: 100 CAPSULE, LIQUID FILLED ORAL at 08:56

## 2021-08-28 RX ADMIN — ATORVASTATIN CALCIUM 10 MG: 10 TABLET, FILM COATED ORAL at 17:53

## 2021-08-28 RX ADMIN — ENOXAPARIN SODIUM 40 MG: 40 INJECTION SUBCUTANEOUS at 08:56

## 2021-08-28 RX ADMIN — INSULIN LISPRO 1 UNITS: 100 INJECTION, SOLUTION INTRAVENOUS; SUBCUTANEOUS at 21:04

## 2021-08-28 RX ADMIN — DICYCLOMINE HYDROCHLORIDE 10 MG: 10 CAPSULE ORAL at 17:52

## 2021-08-28 RX ADMIN — DICYCLOMINE HYDROCHLORIDE 10 MG: 10 CAPSULE ORAL at 21:04

## 2021-08-28 RX ADMIN — GABAPENTIN 200 MG: 100 CAPSULE ORAL at 21:04

## 2021-08-28 RX ADMIN — OXYCODONE HYDROCHLORIDE 5 MG: 5 TABLET ORAL at 03:05

## 2021-08-28 RX ADMIN — DICYCLOMINE HYDROCHLORIDE 10 MG: 10 CAPSULE ORAL at 11:41

## 2021-08-28 RX ADMIN — FAMOTIDINE 20 MG: 20 TABLET ORAL at 08:56

## 2021-08-28 NOTE — PROGRESS NOTES
Notified Jose Miguel BECKFORD with Oncology regarding ANC 1 38  Responded to hold chemotherapy and will order Granix to be given  Will re-evaluate am labs

## 2021-08-28 NOTE — PROGRESS NOTES
[facility]  Department of Hematology & Medical Oncology  Inpatient H&P Note    Date: 08/28/21          ASSESSMENT & PLAN      81-year-old female with primary CNS diffuse large B-cell lymphoma admitted for C6 of high-dose methotrexate with Leucovorin rescue and rituxan  Her primary oncologist is Dr John Ovalle  Assessment:  · 81-year-old female with PCNSL and ECOG performance status of 4 here for cycle 6 of HD methotrexate with Leucovorin rescue and rituxan therapy q2 weeks  Her primary oncologist is Dr John Ovalle  Plan:  · Treatment:  · Per primary oncologist, administer 3,500 mg/m2 Methotrexate with Leucovorin rescue and 375 mg/m2 Rituximab  Order was signed by Dr John Ovalle  · Consent was obtained and signed prior to administration of cycle 1   · PICC team consulted and PICC placed  · Chemotherapy-induced nausea and vomiting protocol in place  ANC yesterday <1 5 and granix administered at [id number]  ANC improved today to 6 60, plan to recheck [address] in morning and begin treatment tomorrow provided ANC >1 5  Orders have been updated and signed by Dr Rodríguez Escobar in [facility]  ·  Labs/imaging:  ? Monitor methotrexate levels daily  ? Continue monitoring until MTX levels less than 0 1  ? Daily CMP, CBC, magnesium  ? Given her episode of urinary incontinence and malodorous urine, placed order for UA w reflex to culture and placed order for machado catheter insertion     Please contact me if any questions or concerns about this patient's case  Thank you       SUBJECTIVE      Christophe Madrid is a Gujarati-speaking 81-year-old female who was diagnosed with primary CNS lymphoma via biopsy in March 2021 at [facility] and transferred to [facility] for chemotherapy       She has a PICC line in place   She had a PEG tube placed on 5/10 for dysphagia and was recomved on 8/18 by GI without complications during her last admission       She was diagnosed with primary CNS lymphoma after presenting with left-sided facial droop and difficulty swallowing at [facility] on March 19, 2021  Treatment according to the MATRIX program was recommended when she was initially diagnosed at [facility]  However given that the patient will likely not tolerate the toxicity of this regimen given her ECOG performance status of 4, Dr Virginie Guerrier  recommended high-dose methotrexate with Rituxan and and consideration of Manuela-C and/or thiotepa if she tolerates her initial cycles well  The plan was discussed with the family by Dr Virginie Guerrier and a consent form was signed      She received her 5th cycle of methotrexate with Leucovorin rescue on 8/13 and 8/14 which she tolerated well without significant adverse effects or complications  She did have altered mental status during her last admission and required short-acting benzodiazepines with PICC line insertion  Oncology pharmacist Liseth Camacho was made aware and will coordinate chemotherapy regimen accordingly      Abdominal pain has resolved  Yesterday MARTINE Garcia reported one episode of urinary incontinence and malodorous urine, which is not normal for patient  UA sent  Given that patient only speaks Jorgensen Octavia, Dr Norah Robertson spoke with patient regarding updates in therapy and performed her review of symptoms using [employer]  I have reviewed the relevant past medical, surgical, social and family history  I have also reviewed allergies and medications for this patient  Review of Systems performed by Dr Norah Robertson, discussed and negative with no acute complaints  ROS      OBJECTIVE     Physical Exam    Vitals:    08/28/21 0900   BP:    Pulse:    Resp:    Temp: 98 °F (36 7 °C)   SpO2:          Physical Exam  Vitals and nursing note reviewed  Constitutional:       General: She is not in acute distress  Appearance: Normal appearance  She is ill-appearing  She is not toxic-appearing or diaphoretic  HENT:      Head: Normocephalic and atraumatic  Eyes:      General: No scleral icterus       Extraocular Movements: Extraocular movements intact  Pupils: Pupils are equal, round, and reactive to light  Comments: Conjunctival pallor present bilaterally   Cardiovascular:      Rate and Rhythm: Normal rate and regular rhythm  Pulses: Normal pulses  Heart sounds: Normal heart sounds  No murmur heard  No gallop  Pulmonary:      Effort: Pulmonary effort is normal  No respiratory distress  Breath sounds: No wheezing or rales  Abdominal:      General: Bowel sounds are normal  There is no distension  Palpations: Abdomen is soft  There is no mass  Tenderness: There is no abdominal tenderness  There is no guarding or rebound  Musculoskeletal:         General: Normal range of motion  Cervical back: Normal range of motion and neck supple  No rigidity or tenderness  Right lower leg: No edema  Left lower leg: No edema  Lymphadenopathy:      Cervical: No cervical adenopathy  Skin:     General: Skin is warm and dry  Coloration: Skin is not jaundiced or pale  Findings: No bruising, erythema or rash  Neurological:      Mental Status: She is alert  Mental status is at baseline     Psychiatric:         Mood and Affect: Mood normal           Imaging  Relevant imaging reviewed in chart    Labs  Relevant labs reviewed in chart    CBC  Diff   Lab Results   Component Value Date/Time    WBC 10 01 08/28/2021 06:48 AM    HGB 11 0 (L) 08/28/2021 06:48 AM    HCT 35 3 08/28/2021 06:48 AM    RBC 3 95 08/28/2021 06:48 AM    MCV 89 08/28/2021 06:48 AM    MCHC 31 2 (L) 08/28/2021 06:48 AM    MCH 27 8 08/28/2021 06:48 AM    RDW 16 3 (H) 08/28/2021 06:48 AM    MPV 11 0 08/28/2021 06:48 AM    Lab Results   Component Value Date/Time    LYMPHSABS 2 06 08/28/2021 06:48 AM    EOSABS 0 21 08/28/2021 06:48 AM    MONOSABS 0 91 08/28/2021 06:48 AM    BASOSABS 0 05 08/28/2021 06:48 AM        Basic Metabolic Profile    Lab Results   Component Value Date/Time    SODIUM 141 08/28/2021 06:48 AM    CO2 32 08/28/2021 06:48 AM    Lab Results   Component Value Date/Time    BUN 16 08/28/2021 06:48 AM    CREATININE 0 43 (L) 08/28/2021 06:48 AM

## 2021-08-29 LAB
ALBUMIN SERPL BCP-MCNC: 3 G/DL (ref 3.5–5)
ALP SERPL-CCNC: 75 U/L (ref 46–116)
ALT SERPL W P-5'-P-CCNC: 17 U/L (ref 12–78)
ANION GAP SERPL CALCULATED.3IONS-SCNC: 3 MMOL/L (ref 4–13)
ARTIFACT: PRESENT
AST SERPL W P-5'-P-CCNC: 10 U/L (ref 5–45)
BASOPHILS # BLD MANUAL: 0.11 THOUSAND/UL (ref 0–0.1)
BASOPHILS NFR MAR MANUAL: 1 % (ref 0–1)
BILIRUB SERPL-MCNC: 0.16 MG/DL (ref 0.2–1)
BUN SERPL-MCNC: 15 MG/DL (ref 5–25)
CALCIUM ALBUM COR SERPL-MCNC: 10.1 MG/DL (ref 8.3–10.1)
CALCIUM SERPL-MCNC: 9.3 MG/DL (ref 8.3–10.1)
CHLORIDE SERPL-SCNC: 105 MMOL/L (ref 100–108)
CO2 SERPL-SCNC: 32 MMOL/L (ref 21–32)
CREAT SERPL-MCNC: 0.48 MG/DL (ref 0.6–1.3)
EOSINOPHIL # BLD MANUAL: 0.21 THOUSAND/UL (ref 0–0.4)
EOSINOPHIL NFR BLD MANUAL: 2 % (ref 0–6)
ERYTHROCYTE [DISTWIDTH] IN BLOOD BY AUTOMATED COUNT: 16.4 % (ref 11.6–15.1)
GFR SERPL CREATININE-BSD FRML MDRD: 112 ML/MIN/1.73SQ M
GLUCOSE SERPL-MCNC: 101 MG/DL (ref 65–140)
GLUCOSE SERPL-MCNC: 143 MG/DL (ref 65–140)
GLUCOSE SERPL-MCNC: 161 MG/DL (ref 65–140)
GLUCOSE SERPL-MCNC: 242 MG/DL (ref 65–140)
GLUCOSE SERPL-MCNC: 259 MG/DL (ref 65–140)
HCT VFR BLD AUTO: 31.2 % (ref 34.8–46.1)
HGB BLD-MCNC: 9.7 G/DL (ref 11.5–15.4)
LYMPHOCYTES # BLD AUTO: 2.14 THOUSAND/UL (ref 0.6–4.47)
LYMPHOCYTES # BLD AUTO: 20 % (ref 14–44)
MAGNESIUM SERPL-MCNC: 1.7 MG/DL (ref 1.6–2.6)
MCH RBC QN AUTO: 27.8 PG (ref 26.8–34.3)
MCHC RBC AUTO-ENTMCNC: 31.1 G/DL (ref 31.4–37.4)
MCV RBC AUTO: 89 FL (ref 82–98)
MONOCYTES # BLD AUTO: 0.32 THOUSAND/UL (ref 0–1.22)
MONOCYTES NFR BLD: 3 % (ref 4–12)
NEUTROPHILS # BLD MANUAL: 7.93 THOUSAND/UL (ref 1.85–7.62)
NEUTS BAND NFR BLD MANUAL: 4 % (ref 0–8)
NEUTS SEG NFR BLD AUTO: 70 % (ref 43–75)
PLATELET # BLD AUTO: 212 THOUSANDS/UL (ref 149–390)
PLATELET BLD QL SMEAR: ADEQUATE
PMV BLD AUTO: 11.6 FL (ref 8.9–12.7)
POLYCHROMASIA BLD QL SMEAR: PRESENT
POTASSIUM SERPL-SCNC: 3.5 MMOL/L (ref 3.5–5.3)
PROT SERPL-MCNC: 5.6 G/DL (ref 6.4–8.2)
RBC # BLD AUTO: 3.49 MILLION/UL (ref 3.81–5.12)
RBC MORPH BLD: PRESENT
SODIUM SERPL-SCNC: 140 MMOL/L (ref 136–145)
WBC # BLD AUTO: 10.72 THOUSAND/UL (ref 4.31–10.16)

## 2021-08-29 PROCEDURE — 82948 REAGENT STRIP/BLOOD GLUCOSE: CPT

## 2021-08-29 PROCEDURE — 83735 ASSAY OF MAGNESIUM: CPT | Performed by: INTERNAL MEDICINE

## 2021-08-29 PROCEDURE — 85007 BL SMEAR W/DIFF WBC COUNT: CPT | Performed by: INTERNAL MEDICINE

## 2021-08-29 PROCEDURE — 99232 SBSQ HOSP IP/OBS MODERATE 35: CPT | Performed by: INTERNAL MEDICINE

## 2021-08-29 PROCEDURE — 80053 COMPREHEN METABOLIC PANEL: CPT | Performed by: INTERNAL MEDICINE

## 2021-08-29 PROCEDURE — 85027 COMPLETE CBC AUTOMATED: CPT | Performed by: INTERNAL MEDICINE

## 2021-08-29 RX ORDER — SODIUM CHLORIDE 9 MG/ML
20 INJECTION, SOLUTION INTRAVENOUS ONCE AS NEEDED
Status: DISCONTINUED | OUTPATIENT
Start: 2021-08-29 | End: 2021-09-08 | Stop reason: HOSPADM

## 2021-08-29 RX ADMIN — ENOXAPARIN SODIUM 40 MG: 40 INJECTION SUBCUTANEOUS at 08:19

## 2021-08-29 RX ADMIN — FOLIC ACID 1 MG: 1 TABLET ORAL at 08:19

## 2021-08-29 RX ADMIN — SODIUM BICARBONATE 150 ML/HR: 84 INJECTION, SOLUTION INTRAVENOUS at 08:18

## 2021-08-29 RX ADMIN — SODIUM BICARBONATE 150 ML/HR: 84 INJECTION, SOLUTION INTRAVENOUS at 15:51

## 2021-08-29 RX ADMIN — METHOTREXATE 5000 MG: 25 INJECTION INTRA-ARTERIAL; INTRAMUSCULAR; INTRATHECAL; INTRAVENOUS at 12:12

## 2021-08-29 RX ADMIN — OXYCODONE HYDROCHLORIDE 5 MG: 5 TABLET ORAL at 08:20

## 2021-08-29 RX ADMIN — DICYCLOMINE HYDROCHLORIDE 10 MG: 10 CAPSULE ORAL at 08:19

## 2021-08-29 RX ADMIN — DOCUSATE SODIUM 100 MG: 100 CAPSULE, LIQUID FILLED ORAL at 08:19

## 2021-08-29 RX ADMIN — DICYCLOMINE HYDROCHLORIDE 10 MG: 10 CAPSULE ORAL at 22:01

## 2021-08-29 RX ADMIN — FAMOTIDINE 20 MG: 20 TABLET ORAL at 08:19

## 2021-08-29 RX ADMIN — DEXAMETHASONE SODIUM PHOSPHATE: 10 INJECTION, SOLUTION INTRAMUSCULAR; INTRAVENOUS at 11:46

## 2021-08-29 RX ADMIN — INSULIN LISPRO 2 UNITS: 100 INJECTION, SOLUTION INTRAVENOUS; SUBCUTANEOUS at 22:02

## 2021-08-29 RX ADMIN — DOCUSATE SODIUM 100 MG: 100 CAPSULE, LIQUID FILLED ORAL at 16:00

## 2021-08-29 RX ADMIN — OXYCODONE HYDROCHLORIDE 5 MG: 5 TABLET ORAL at 22:01

## 2021-08-29 RX ADMIN — ATORVASTATIN CALCIUM 10 MG: 10 TABLET, FILM COATED ORAL at 15:51

## 2021-08-29 RX ADMIN — DICYCLOMINE HYDROCHLORIDE 10 MG: 10 CAPSULE ORAL at 11:20

## 2021-08-29 RX ADMIN — SERTRALINE 25 MG: 25 TABLET, FILM COATED ORAL at 08:19

## 2021-08-29 RX ADMIN — INSULIN LISPRO 2 UNITS: 100 INJECTION, SOLUTION INTRAVENOUS; SUBCUTANEOUS at 17:21

## 2021-08-29 RX ADMIN — INSULIN LISPRO 1 UNITS: 100 INJECTION, SOLUTION INTRAVENOUS; SUBCUTANEOUS at 08:26

## 2021-08-29 RX ADMIN — DICYCLOMINE HYDROCHLORIDE 10 MG: 10 CAPSULE ORAL at 15:51

## 2021-08-29 RX ADMIN — SODIUM BICARBONATE 150 ML/HR: 84 INJECTION, SOLUTION INTRAVENOUS at 23:24

## 2021-08-29 RX ADMIN — GABAPENTIN 200 MG: 100 CAPSULE ORAL at 22:01

## 2021-08-29 RX ADMIN — OXYCODONE HYDROCHLORIDE 5 MG: 5 TABLET ORAL at 15:51

## 2021-08-29 NOTE — PROGRESS NOTES
[address]  Department of Hematology & Medical Oncology  Inpatient H&P Note    Date: 08/29/21          ASSESSMENT & PLAN      51-year-old female with primary CNS diffuse large B-cell lymphoma admitted for C6 of high-dose methotrexate with Leucovorin rescue and rituxan  Her primary oncologist is Dr Selina Dobson  Assessment:  · 51-year-old female with PCNSL and ECOG performance status of 2 here for cycle 6 of HD methotrexate with Leucovorin rescue and rituxan therapy q2 weeks  Her primary oncologist is Dr Selina Dobson  Plan:  · Treatment:  · Per primary oncologist, administer 3,500 mg/m2 Methotrexate with Leucovorin rescue and 375 mg/m2 Rituximab  Order was signed by Dr Selina Dobson  · As mentioned before, plan was reviewed by treating oncologist and hence signed  · Since she has met CBC parameters for treatment, her D1 starts today  · Orders reviewed with PharmD before  ·  Labs/imaging:  ? Monitor methotrexate levels daily  ? Continue monitoring until MTX levels less than 0 1  ? Daily CMP, CBC, magnesium     Please contact me if any questions or concerns about this patient's case  Thank you       SUBJECTIVE      No acute events overnight  No acute symptoms this morning upon evaluation  Denies any fevers chills nausea vomiting headaches abdominal pain chest pain shortness of breath cough genitourinary complaints  I have reviewed the relevant past medical, surgical, social and family history  I have also reviewed allergies and medications for this patient  Review of Systems   Review of Systems   All other systems reviewed and are negative  OBJECTIVE     Physical Exam    Vitals:    08/29/21 1128   BP: 100/60   Pulse: 97   Resp: 18   Temp: 98 8 °F (37 1 °C)   SpO2: 98%     Physical Exam  Vitals reviewed  Constitutional:       Appearance: Normal appearance  HENT:      Head: Normocephalic     Eyes:      Conjunctiva/sclera: Conjunctivae normal    Cardiovascular:      Rate and Rhythm: Normal rate  Pulmonary:      Effort: Pulmonary effort is normal  No respiratory distress  Abdominal:      General: There is no distension  Musculoskeletal:         General: No swelling  Normal range of motion  Cervical back: Normal range of motion  Skin:     General: Skin is dry  Neurological:      General: No focal deficit present  Mental Status: She is alert and oriented to person, place, and time  Mental status is at baseline                 Imaging  Relevant imaging reviewed in chart    Labs  Relevant labs reviewed in chart    CBC  Diff   Lab Results   Component Value Date/Time    WBC 10 72 (H) 08/29/2021 05:09 AM    HGB 9 7 (L) 08/29/2021 05:09 AM    HCT 31 2 (L) 08/29/2021 05:09 AM    RBC 3 49 (L) 08/29/2021 05:09 AM    MCV 89 08/29/2021 05:09 AM    MCHC 31 1 (L) 08/29/2021 05:09 AM    MCH 27 8 08/29/2021 05:09 AM    RDW 16 4 (H) 08/29/2021 05:09 AM    MPV 11 6 08/29/2021 05:09 AM    Lab Results   Component Value Date/Time    LYMPHSABS 2 06 08/28/2021 06:48 AM    EOSABS 0 21 08/29/2021 05:09 AM    EOSABS 0 21 08/28/2021 06:48 AM    MONOSABS 0 91 08/28/2021 06:48 AM    BASOSABS 0 11 (H) 08/29/2021 05:09 AM    BASOSABS 0 05 08/28/2021 06:48 AM        Basic Metabolic Profile    Lab Results   Component Value Date/Time    SODIUM 140 08/29/2021 05:09 AM    CO2 32 08/29/2021 05:09 AM    Lab Results   Component Value Date/Time    BUN 15 08/29/2021 05:09 AM    CREATININE 0 48 (L) 08/29/2021 05:09 AM

## 2021-08-29 NOTE — UTILIZATION REVIEW
Inpatient Admission Authorization Request   NOTIFICATION OF INPATIENT ADMISSION/INPATIENT AUTHORIZATION REQUEST   SERVICING FACILITY:   [facility]  Address: [address] [place] [address], [address] [place] [address]  Tax ID: [id number]  NPI: [id number]  Place of Service: Inpatient [address] [facility]  [address]  Place of Service Code: 24     ATTENDING PROVIDER:  Attending Name and NPI#: Gilma Mccarthy [[id number]]  Address: [address] [place] [address], [address] [place] [address]  Phone: [phone]     UTILIZATION REVIEW CONTACT:  Jamel Rajan Utilization   Network Utilization Review Department  Phone: [phone]  Fax: [phone]  Email: [email or web address]     PHYSICIAN ADVISORY SERVICES:  FOR YFKN-FO-SHZH REVIEW - MEDICAL NECESSITY DENIAL  Phone: [phone]  Fax: [phone]  Email: [email or web address]     TYPE OF REQUEST:  Inpatient Status     ADMISSION INFORMATION:  ADMISSION DATE/TIME: 8/27/21 10:17 AM  PATIENT DIAGNOSIS CODE/DESCRIPTION:  Lymphoma ([facility] [address] ) [C85 90]  DISCHARGE DATE/TIME: No discharge date for patient encounter  DISCHARGE DISPOSITION (IF DISCHARGED): [facility]     IMPORTANT INFORMATION:  Please contact the Jamel Rajan directly with any questions or concerns regarding this request  Department voicemails are confidential     Send requests for admission clinical reviews, concurrent reviews, approvals, and administrative denials due to lack of clinical to fax [phone]

## 2021-08-29 NOTE — PLAN OF CARE
Problem: SAFETY ADULT  Goal: Patient will remain free of falls  Description: INTERVENTIONS:  - Educate patient/family on patient safety including physical limitations  - Instruct patient to call for assistance with activity   - Consult OT/PT to assist with strengthening/mobility   - Keep Call bell within reach  - Keep bed low and locked with side rails adjusted as appropriate  - Keep care items and personal belongings within reach  - Initiate and maintain comfort rounds  - Make Fall Risk Sign visible to staff  - Offer Toileting every 3 Hours, in advance of need  - Initiate/Maintain 2alarm  - Obtain necessary fall risk management equipment: 3  - Apply yellow socks and bracelet for high fall risk patients  - Consider moving patient to room near nurses station  Outcome: Progressing     Problem: Prexisting or High Potential for Compromised Skin Integrity  Goal: Skin integrity is maintained or improved  Description: INTERVENTIONS:  - Identify patients at risk for skin breakdown  - Assess and monitor skin integrity  - Assess and monitor nutrition and hydration status  - Monitor labs   - Assess for incontinence   - Turn and reposition patient  - Assist with mobility/ambulation  - Relieve pressure over bony prominences  - Avoid friction and shearing  - Provide appropriate hygiene as needed including keeping skin clean and dry  - Evaluate need for skin moisturizer/barrier cream  - Collaborate with interdisciplinary team   - Patient/family teaching  - Consider wound care consult   Outcome: Progressing     Problem: MOBILITY - ADULT  Goal: Maintain or return to baseline ADL function  Description: INTERVENTIONS:  -  Assess patient's ability to carry out ADLs; assess patient's baseline for ADL function and identify physical deficits which impact ability to perform ADLs (bathing, care of mouth/teeth, toileting, grooming, dressing, etc )  - Assess/evaluate cause of self-care deficits   - Assess range of motion  - Assess patient's mobility; develop plan if impaired  - Assess patient's need for assistive devices and provide as appropriate  - Encourage maximum independence but intervene and supervise when necessary  - Involve family in performance of ADLs  - Assess for home care needs following discharge   - Consider OT consult to assist with ADL evaluation and planning for discharge  - Provide patient education as appropriate  Outcome: Progressing  Goal: Maintains/Returns to pre admission functional level  Description: INTERVENTIONS:  - Perform BMAT or MOVE assessment daily    - Set and communicate daily mobility goal to care team and patient/family/caregiver  - Collaborate with rehabilitation services on mobility goals if consulted  - Perform Range of Motion 3 times a day  - Reposition patient every 2 hours    - Dangle patient 3 times a day  - Stand patient 3 times a day  - Ambulate patient 3 times a day  - Out of bed to chair 3 times a day   - Out of bed for meals 3 times a day  - Out of bed for toileting  - Record patient progress and toleration of activity level   Outcome: Progressing     Problem: Potential for Falls  Goal: Patient will remain free of falls  Description: INTERVENTIONS:  - Educate patient/family on patient safety including physical limitations  - Instruct patient to call for assistance with activity   - Consult OT/PT to assist with strengthening/mobility   - Keep Call bell within reach  - Keep bed low and locked with side rails adjusted as appropriate  - Keep care items and personal belongings within reach  - Initiate and maintain comfort rounds  - Make Fall Risk Sign visible to staff  - Offer Toileting every 3 Hours, in advance of need  - Initiate/Maintain 2alarm  - Obtain necessary fall risk management equipment: 3  - Apply yellow socks and bracelet for high fall risk patients  - Consider moving patient to room near nurses station  Outcome: Progressing

## 2021-08-30 ENCOUNTER — APPOINTMENT (INPATIENT)
Dept: RADIOLOGY | Facility: HOSPITAL | Age: 56
DRG: 847 | End: 2021-08-30
Payer: COMMERCIAL

## 2021-08-30 LAB
ALBUMIN SERPL BCP-MCNC: 2.9 G/DL (ref 3.5–5)
ALP SERPL-CCNC: 66 U/L (ref 46–116)
ALT SERPL W P-5'-P-CCNC: 28 U/L (ref 12–78)
ANION GAP SERPL CALCULATED.3IONS-SCNC: 3 MMOL/L (ref 4–13)
AST SERPL W P-5'-P-CCNC: 19 U/L (ref 5–45)
BILIRUB SERPL-MCNC: 0.23 MG/DL (ref 0.2–1)
BUN SERPL-MCNC: 14 MG/DL (ref 5–25)
CALCIUM ALBUM COR SERPL-MCNC: 9.7 MG/DL (ref 8.3–10.1)
CALCIUM SERPL-MCNC: 8.8 MG/DL (ref 8.3–10.1)
CHLORIDE SERPL-SCNC: 103 MMOL/L (ref 100–108)
CO2 SERPL-SCNC: 35 MMOL/L (ref 21–32)
CREAT SERPL-MCNC: 0.44 MG/DL (ref 0.6–1.3)
ERYTHROCYTE [DISTWIDTH] IN BLOOD BY AUTOMATED COUNT: 16.1 % (ref 11.6–15.1)
GFR SERPL CREATININE-BSD FRML MDRD: 115 ML/MIN/1.73SQ M
GLUCOSE SERPL-MCNC: 103 MG/DL (ref 65–140)
GLUCOSE SERPL-MCNC: 105 MG/DL (ref 65–140)
GLUCOSE SERPL-MCNC: 130 MG/DL (ref 65–140)
GLUCOSE SERPL-MCNC: 134 MG/DL (ref 65–140)
GLUCOSE SERPL-MCNC: 96 MG/DL (ref 65–140)
HCT VFR BLD AUTO: 30.6 % (ref 34.8–46.1)
HGB BLD-MCNC: 9.6 G/DL (ref 11.5–15.4)
MAGNESIUM SERPL-MCNC: 1.9 MG/DL (ref 1.6–2.6)
MCH RBC QN AUTO: 28 PG (ref 26.8–34.3)
MCHC RBC AUTO-ENTMCNC: 31.4 G/DL (ref 31.4–37.4)
MCV RBC AUTO: 89 FL (ref 82–98)
MTX SERPL-SCNC: 2.8 UMOL/L
PLATELET # BLD AUTO: 244 THOUSANDS/UL (ref 149–390)
PMV BLD AUTO: 11.5 FL (ref 8.9–12.7)
POTASSIUM SERPL-SCNC: 3.3 MMOL/L (ref 3.5–5.3)
PROT SERPL-MCNC: 5.9 G/DL (ref 6.4–8.2)
RBC # BLD AUTO: 3.43 MILLION/UL (ref 3.81–5.12)
SODIUM SERPL-SCNC: 141 MMOL/L (ref 136–145)
WBC # BLD AUTO: 7.32 THOUSAND/UL (ref 4.31–10.16)

## 2021-08-30 PROCEDURE — G1004 CDSM NDSC: HCPCS

## 2021-08-30 PROCEDURE — 70553 MRI BRAIN STEM W/O & W/DYE: CPT

## 2021-08-30 PROCEDURE — 82948 REAGENT STRIP/BLOOD GLUCOSE: CPT

## 2021-08-30 PROCEDURE — 80053 COMPREHEN METABOLIC PANEL: CPT | Performed by: INTERNAL MEDICINE

## 2021-08-30 PROCEDURE — 99232 SBSQ HOSP IP/OBS MODERATE 35: CPT | Performed by: INTERNAL MEDICINE

## 2021-08-30 PROCEDURE — 83735 ASSAY OF MAGNESIUM: CPT | Performed by: INTERNAL MEDICINE

## 2021-08-30 PROCEDURE — 97167 OT EVAL HIGH COMPLEX 60 MIN: CPT

## 2021-08-30 PROCEDURE — 3E04305 INTRODUCTION OF OTHER ANTINEOPLASTIC INTO CENTRAL VEIN, PERCUTANEOUS APPROACH: ICD-10-PCS | Performed by: INTERNAL MEDICINE

## 2021-08-30 PROCEDURE — 97163 PT EVAL HIGH COMPLEX 45 MIN: CPT

## 2021-08-30 PROCEDURE — 80204 DRUG ASSAY METHOTREXATE: CPT | Performed by: NURSE PRACTITIONER

## 2021-08-30 PROCEDURE — 85027 COMPLETE CBC AUTOMATED: CPT | Performed by: INTERNAL MEDICINE

## 2021-08-30 PROCEDURE — A9585 GADOBUTROL INJECTION: HCPCS | Performed by: INTERNAL MEDICINE

## 2021-08-30 RX ORDER — POTASSIUM CHLORIDE 20 MEQ/1
20 TABLET, EXTENDED RELEASE ORAL ONCE
Status: COMPLETED | OUTPATIENT
Start: 2021-08-30 | End: 2021-08-30

## 2021-08-30 RX ORDER — LORAZEPAM 2 MG/ML
1 INJECTION INTRAMUSCULAR ONCE
Status: COMPLETED | OUTPATIENT
Start: 2021-08-30 | End: 2021-08-30

## 2021-08-30 RX ORDER — ACETAMINOPHEN 325 MG/1
975 TABLET ORAL ONCE
Status: COMPLETED | OUTPATIENT
Start: 2021-08-30 | End: 2021-08-30

## 2021-08-30 RX ADMIN — FAMOTIDINE 20 MG: 20 TABLET ORAL at 08:15

## 2021-08-30 RX ADMIN — SODIUM CHLORIDE 25 MG: 9 INJECTION, SOLUTION INTRAVENOUS at 17:40

## 2021-08-30 RX ADMIN — GABAPENTIN 200 MG: 100 CAPSULE ORAL at 22:33

## 2021-08-30 RX ADMIN — DOCUSATE SODIUM 100 MG: 100 CAPSULE, LIQUID FILLED ORAL at 16:29

## 2021-08-30 RX ADMIN — GADOBUTROL 5 ML: 604.72 INJECTION INTRAVENOUS at 15:21

## 2021-08-30 RX ADMIN — ATORVASTATIN CALCIUM 10 MG: 10 TABLET, FILM COATED ORAL at 16:29

## 2021-08-30 RX ADMIN — DIPHENHYDRAMINE HYDROCHLORIDE 25 MG: 50 INJECTION, SOLUTION INTRAMUSCULAR; INTRAVENOUS at 16:28

## 2021-08-30 RX ADMIN — FOLIC ACID 1 MG: 1 TABLET ORAL at 08:15

## 2021-08-30 RX ADMIN — DICYCLOMINE HYDROCHLORIDE 10 MG: 10 CAPSULE ORAL at 16:29

## 2021-08-30 RX ADMIN — DICYCLOMINE HYDROCHLORIDE 10 MG: 10 CAPSULE ORAL at 12:08

## 2021-08-30 RX ADMIN — DICYCLOMINE HYDROCHLORIDE 10 MG: 10 CAPSULE ORAL at 22:33

## 2021-08-30 RX ADMIN — POTASSIUM CHLORIDE 20 MEQ: 1500 TABLET, EXTENDED RELEASE ORAL at 16:29

## 2021-08-30 RX ADMIN — SERTRALINE 25 MG: 25 TABLET, FILM COATED ORAL at 08:15

## 2021-08-30 RX ADMIN — RITUXIMAB 500 MG: 10 INJECTION, SOLUTION INTRAVENOUS at 17:43

## 2021-08-30 RX ADMIN — ENOXAPARIN SODIUM 40 MG: 40 INJECTION SUBCUTANEOUS at 08:15

## 2021-08-30 RX ADMIN — DOCUSATE SODIUM 100 MG: 100 CAPSULE, LIQUID FILLED ORAL at 22:33

## 2021-08-30 RX ADMIN — SODIUM BICARBONATE 150 ML/HR: 84 INJECTION, SOLUTION INTRAVENOUS at 07:30

## 2021-08-30 RX ADMIN — DICYCLOMINE HYDROCHLORIDE 10 MG: 10 CAPSULE ORAL at 08:15

## 2021-08-30 RX ADMIN — ERGOCALCIFEROL 50000 UNITS: 1.25 CAPSULE ORAL at 08:15

## 2021-08-30 RX ADMIN — LORAZEPAM 1 MG: 2 INJECTION INTRAMUSCULAR; INTRAVENOUS at 14:27

## 2021-08-30 RX ADMIN — SODIUM CHLORIDE 25 MG: 9 INJECTION, SOLUTION INTRAVENOUS at 12:07

## 2021-08-30 RX ADMIN — ACETAMINOPHEN 975 MG: 325 TABLET, FILM COATED ORAL at 16:29

## 2021-08-30 RX ADMIN — DOCUSATE SODIUM 100 MG: 100 CAPSULE, LIQUID FILLED ORAL at 08:15

## 2021-08-30 NOTE — PROGRESS NOTES
[address]  Department of Hematology & Medical Oncology  Inpatient Progress Note    Date: 08/30/21          ASSESSMENT & PLAN      47-year-old female with primary CNS diffuse large B-cell lymphoma admitted for C6 of high-dose methotrexate with Leucovorin rescue and rituxan  Her primary oncologist is Dr Vinod Alexis  Assessment:  · 47-year-old female with PCNSL and ECOG performance status of 2 here for cycle 6 of HD methotrexate with Leucovorin rescue and rituxan therapy q2 weeks  Her primary oncologist is Dr Vinod Alexis  Plan:  · Treatment:  · Per primary oncologist, administer 3,500 mg/m2 Methotrexate with Leucovorin rescue and 375 mg/m2 Rituximab per Dr Vinod Alexis  · Continue sodium bicarb 100meq D5 @150ml/hr and leukovorin 25mg Q6 hours until MTX level less than 0 10   ·  Labs/imaging:  ? Monitor methotrexate levels daily  ? Continue monitoring until MTX levels less than 0 10  ? Daily CMP, CBC, magnesium  ? Obtain restaging brain MRI; add ativan 1mg IV X1 prior  · PT/OT evaluation and placement recommendations  · Patient has indwelling machado catheter; Will work to remove this 48 hours after chemotherapy was administered    Dispo planning: Pending PT/OT evaluation; Ideally would like to get pt home with clinic follow up     Please contact me if any questions or concerns about this patient's case  Thank you  I did review this patient with Dr Craole Pacheco and he is in agreement with this plan        RE[facility]LDO [facility]  Hematology/Oncology Nurse Practitioner     SUBJECTIVE      No acute events overnight  She tolerated chemotherapy well  She feels well today  She wants to have her hair cut  Denies any fevers, chills, nausea, vomiting, headaches, abdominal pain, chest pain, shortness of breath, cough, or genitourinary complaints  She likes having the machado in  She has no abdominal pain  PEG tube site is not bothering her        I have reviewed the relevant past medical, surgical, social and family history  I have also reviewed allergies and medications for this patient  Review of Systems   Review of Systems   All other systems reviewed and are negative  OBJECTIVE     Physical Exam    Vitals:    08/30/21 0531   BP: 137/81   Pulse: 68   Resp:    Temp: 97 5 °F (36 4 °C)   SpO2: 98%     Physical Exam  Vitals reviewed  Constitutional:       Appearance: Normal appearance  Comments: Disheveled    HENT:      Head: Normocephalic  Eyes:      Conjunctiva/sclera: Conjunctivae normal    Cardiovascular:      Rate and Rhythm: Normal rate  Pulmonary:      Effort: Pulmonary effort is normal  No respiratory distress  Abdominal:      General: There is no distension  Comments: Well healed PEG tube site   Musculoskeletal:         General: No swelling  Normal range of motion  Cervical back: Normal range of motion  Skin:     General: Skin is dry  Neurological:      General: No focal deficit present  Mental Status: She is alert and oriented to person, place, and time  Mental status is at baseline                 Imaging  Relevant imaging reviewed in chart    Labs  Relevant labs reviewed in chart    CBC  Diff   Lab Results   Component Value Date/Time    WBC 7 32 08/30/2021 05:38 AM    HGB 9 6 (L) 08/30/2021 05:38 AM    HCT 30 6 (L) 08/30/2021 05:38 AM    RBC 3 43 (L) 08/30/2021 05:38 AM    MCV 89 08/30/2021 05:38 AM    MCHC 31 4 08/30/2021 05:38 AM    MCH 28 0 08/30/2021 05:38 AM    RDW 16 1 (H) 08/30/2021 05:38 AM    MPV 11 5 08/30/2021 05:38 AM    Lab Results   Component Value Date/Time    LYMPHSABS 2 06 08/28/2021 06:48 AM    EOSABS 0 21 08/29/2021 05:09 AM    EOSABS 0 21 08/28/2021 06:48 AM    MONOSABS 0 91 08/28/2021 06:48 AM    BASOSABS 0 11 (H) 08/29/2021 05:09 AM    BASOSABS 0 05 08/28/2021 06:48 AM        Basic Metabolic Profile    Lab Results   Component Value Date/Time    SODIUM 141 08/30/2021 05:38 AM    CO2 35 (H) 08/30/2021 05:38 AM    Lab Results   Component Value Date/Time    BUN 14 08/30/2021 05:38 AM    CREATININE 0 44 (L) 08/30/2021 05:38 AM

## 2021-08-30 NOTE — CASE MANAGEMENT
Not a bundle  Pt is a scheduled readmission for chemo  Per prior notation, pt resides in a home at the [facility] where she & her  were employed  Prior to admission pt was at [facility]   Jennifer Prado (friend) [phone]  No POA  Uses [facility]  No h/o vna  H/o rhb at [facility]  No MH,D&A tx   from [facility] plan available for any DC needs is Ino Raymundo [phone] ext [phone]  Referral to [facility] to inquire if pt can return  PT/OT pending  CM reviewed d/c planning process including the following: identifying help at home, patient preference for d/c planning needs, Discharge Lounge, [facility] Meds to Bed program, availability of treatment team to discuss questions or concerns patient and/or family may have regarding understanding medications and recognizing signs and symptoms once discharged  CM also encouraged patient to follow up with all recommended appointments after discharge  Patient advised of importance for patient and family to participate in managing patients medical well being

## 2021-08-30 NOTE — PLAN OF CARE
Problem: PHYSICAL THERAPY ADULT  Goal: Performs mobility at highest level of function for planned discharge setting  See evaluation for individualized goals  Description: Treatment/Interventions: Functional transfer training, LE strengthening/ROM, Therapeutic exercise, Endurance training, Cognitive reorientation, Patient/family training, Equipment eval/education, Bed mobility          See flowsheet documentation for full assessment, interventions and recommendations  Note: Prognosis: Fair  Problem List: Decreased strength, Impaired balance, Decreased endurance, Decreased mobility, Decreased cognition, Decreased safety awareness  Assessment: Pt is a 49 y o  female seen for PT evaluation [facility]  Pt was admitted with a primary dx of: CNS lymphoma, admitted for C6 of chemo  PT now consulted for assessment of mobility and d/c needs  Pt with Ambulate orders  Pts current comorbidities effecting treatment include: dysphagia s/p PEG removal 8/18, Severe-protein calorie malnutrition, cancer related pain, depression  Pts current clinical presentation is Unstable/ Unpredictable (high complexity) due to Ongoing medical management for primary dx, Decreased activity tolerance compared to baseline, Fall risk, Increased assistance needed from caregiver at current time, Trending lab values  Prior to admission, pt was at [facility], required assistance with transfers  Upon evaluation, pt currently is requiring supervision for bed mobility; maxA x2 for transfers  Pt presents at PT eval functioning below baseline and currently w/ overall mobility deficits 2* to: BLE weakness, impaired balance, decreased endurance, decreased activity tolerance compared to baseline, decreased functional mobility tolerance compared to baseline, decreased safety awareness, fall risk  Pt currently at a fall risk 2* to impairments listed above    Pt will continue to benefit from skilled acute PT interventions to address stated impairments; to maximize functional mobility; for ongoing pt/ family training; and DME needs  At conclusion of PT session pt returned back in chair and chair alarm engaged with phone and call bell within reach  Pt denies any further questions at this time  Recommend return to IP rehab upon hospital D/C  PT Discharge Recommendation: Post acute rehabilitation services     PT - OK to Discharge: Yes (to IP rehab)    See flowsheet documentation for full assessment

## 2021-08-30 NOTE — OCCUPATIONAL THERAPY NOTE
Occupational Therapy Evaluation     Patient Name: Mario Oleary  FHIRG'R Date: 8/30/2021  Problem List  Principal Problem:    CNS lymphoma [facility])  Active Problems:    Encounter for antineoplastic chemotherapy    Past Medical History  History reviewed  No pertinent past medical history  Past Surgical History  Past Surgical History:   Procedure Laterality Date    IR PICC REPOSITION  4/27/2021    IR TUNNELED CENTRAL LINE REMOVAL  4/27/2021 08/30/21 0923   OT Last Visit   OT Visit Date 08/30/21   Note Type   Note type Evaluation   Restrictions/Precautions   Weight Bearing Precautions Per Order No   Other Precautions Cognitive; Chair Alarm; Bed Alarm; Fall Risk   Pain Assessment   Pain Assessment Tool Pain Assessment not indicated - pt denies pain   Pain Score No Pain   Home Living   Additional Comments PTA, pt was residing at [facility]  At baseline, pt was living with spouse   Prior Function   Comments PTA, pt required A for ADL/IADL tasks and was A x 2 for transfers  At baseline, pt was I with ADLs/IADLs   Lifestyle   Autonomy Pt requires A for ADLs/IADLs, fnxl mobility   Reciprocal Relationships Facility staff, family, friends   Psychosocial   Psychosocial (WDL) WDL   ADL   Where Assessed Chair   Eating Assistance 5  Supervision/Setup   Grooming Assistance 4  Minimal Assistance   UB Bathing Assistance 3  Moderate [address] 3  Moderate [address] 3  Moderate [address] 3  Moderate Assistance   Bed Mobility   Supine to Sit 5  Supervision   Additional items Assist x 1; Increased time required   Transfers   Sit to Stand 2  Maximal assistance   Additional items Assist x 2; Increased time required   Stand to Sit 2  Maximal assistance   Additional items Assist x 2; Increased time required   Stand pivot 2  Maximal assistance   Additional items Assist x 2; Increased time required   Additional Comments Transfers with B/L HHA   Initially trialed RW; however, pt with L knee buckling    Balance   Static Sitting Fair   Dynamic Sitting Fair -   Static Standing Poor   Dynamic Standing Poor   Activity Tolerance   Activity Tolerance Patient limited by fatigue   Medical Staff Made Aware PT Windy Mccann    Nurse Made Aware RN clearance for session    RUE Assessment   RUE Assessment   (3+/5)   LUE Assessment   LUE Assessment   (~ 2+/5, pt able to use RUE to assist in flexion )   Hand Function   Gross Motor Coordination Impaired   Fine Motor Coordination Impaired   Vision - Complex Assessment   Ocular Range of Motion WFL   Head Position WDL   Tracking Able to track stimulus in all quads without difficulty   Cognition   Arousal/Participation Alert; Responsive; Cooperative   Attention Attends with cues to redirect   Following Commands Follows one step commands without difficulty   Comments Pt pleasant and cooperative t/o session     Assessment   Limitation Decreased ADL status; Decreased UE ROM; Decreased UE strength;Decreased Safe judgement during ADL;Decreased cognition;Decreased endurance;Decreased fine motor control;Decreased self-care trans;Decreased high-level ADLs   Prognosis Fair   Assessment Pt is a 49 y o  female admitted to hospitals on 8/27/2021 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[facility] [address] ) - pt admitted for C6 of chemo  Pt with active OT orders and up and OOB as tolerated orders  Pt seen as a co-evaluation with PT due to the patient's co-morbidities, clinically unstable presentation/clinical complexity, and present impairments  [facility]  [facility], pt has recently been staying at [facility] rehab for rehab services  Pt requires A for ADLs/IADLs, and Ax2 for transfers  Upon evaluation, pt currently requires MAX A x 2 for transfers, S for sup to sit with close S to maintain EOB sitting position   Exhibits decreased strength to B/L UE's limiting performance in ADLs/transfers, LUE weaker than R, though LUE strength and ROM has increased since previous admission  Pt currently requires S eating, MIN Agrooming, MOD A UB ADLs, MOD A LB ADLs, and MOD A toileting  Pt is limited at this time 2*: pain, endurance, activity tolerance, functional mobility, balance, functional standing tolerance, decreased I w/ ADLS/IADLS, strength, ROM, cognitive impairments and decreased safety awareness  The following Occupational Performance Areas to address include: eating, grooming, bathing/shower, toilet hygiene, dressing, health maintenance, functional mobility, community mobility and clothing management  Pt to benefit from immediate acute skilled OT to address above deficits, improve overall functional independence, maximize fnxl mobility and reduce caregiver burden  From OT standpoint, recommendation at time of d/c would be to return to rehab  Pt was left in chair with alarm on after session with all current needs met  The patient's raw score on the AM-PAC Daily Activity inpatient short form is 14, standardized score is 33 39, less than 39 4  Patients at this level are likely to benefit from discharge to post-acute rehabilitation services  Please refer to the recommendation of the Occupational Therapist for safe discharge planning  Goals   LTG Time Frame 10-14   Plan   Treatment Interventions ADL retraining;UE strengthening/ROM; Functional transfer training; Endurance training;Cognitive reorientation;Patient/family training;Equipment evaluation/education; Compensatory technique education; Fine motor coordination activities; Neuromuscular reeducation;Continued evaluation; Energy conservation; Activityengagement   Goal Expiration Date 09/13/21   OT Frequency 2-3x/wk   Recommendation   OT Discharge Recommendation Post acute rehabilitation services   OT - OK to Discharge Yes  (to rehab when medically stable )   AM-[facility] Daily Activity Inpatient   Lower Body Dressing 2   Bathing 2   Toileting 2   Upper Body Dressing 2   Grooming 3   Eating 3   Daily Activity Raw Score 14   Daily Activity Standardized Score (Calc for Raw Score >=11) 33 39   AM-PAC Applied Cognition Inpatient   Following a Speech/Presentation 3   Understanding Ordinary Conversation 3   Taking Medications 3   Remembering Where Things Are Placed or Put Away 3   Remembering List of 4-5 Errands 3   Taking Care of Complicated Tasks 2   Applied Cognition Raw Score 17   Applied Cognition Standardized Score 36 52         GOALS    1) Pt will increase activity tolerance to G for 30 min txment sessions    2) Pt will complete UB dressing/self care/bathing w/ S using adaptive device and DME as needed    3) Pt will complete LB dressing/self care/bathing w/ MIN A using adaptive device and DME as needed    4) Pt will complete toileting w/ MIN A w/ G hygiene/thoroughness using DME as needed    5) Pt will improve functional transfers to MOD A on/off all surfaces using DME as needed w/ G balance/safety     6) Pt will demonstrate G carryover of pt/caregiver education and training as appropriate      7) Pt will increase static and dynamic standing/sitting balance to F+ using AD/DME as needed to increase safety and I during fnxl transfers and ADLs    8) Pt will maintain upright sitting position for at least 20 min during dynamic fnxl activity with G balance and endurance to improve ADL performance and independence, as well as reduce risk of falls    9) Pt will increase UE strength by 1 full grade to improve ADL performance and independence       * OTR to continue to assess fnxl mobility and establish goals as appropriate       Leidy Lopes MS, OTR/L

## 2021-08-30 NOTE — PHYSICAL THERAPY NOTE
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[facility])    Altered mental status    Dysphagia    Aphasia    Fracture of ramus of left pubis (HCC)    Severe protein-calorie malnutrition ([facility])    Cancer related pain    Ambulatory dysfunction    Hypokalemia    Cellulitis    Depression    Encounter for antineoplastic chemotherapy       Past Medical History  History reviewed  No pertinent past medical history  Past Surgical History  Past Surgical History:   Procedure Laterality Date    IR PICC REPOSITION  4/27/2021    IR TUNNELED CENTRAL LINE REMOVAL  4/27/2021 08/30/21 0924   PT Last Visit   PT Visit Date 08/30/21   Note Type   Note type Evaluation   Pain Assessment   Pain Assessment Tool Pain Assessment not indicated - pt denies pain   Pain Score No Pain   Home Living   Additional Comments Pt admit from [facility], has required Ax2 with transfers per medical record, was independent prior to initial admission/onset of medical issues   Restrictions/Precautions   Weight Bearing Precautions Per Order No   Other Precautions Cognitive; Chair Alarm; Bed Alarm; Fall Risk;Multiple lines  (language barrier, pt Gujarati speaking)   General   Family/Caregiver Present No   Cognition   Overall Cognitive Status Impaired   Comments Pt pleasant and cooperative throughout session, motivated to participate, gesturing OOB, follows simple commands/gestures appropriately   RLE Assessment   RLE Assessment X  (Grossly 3+/5)   LLE Assessment   LLE Assessment X  (Grossly 2/5)   Bed Mobility   Rolling R 4  Minimal assistance   Additional items Assist x 1; Increased time required;Verbal cues   Supine to Sit 5  Supervision   Additional items HOB elevated; Increased time required;Verbal cues   Additional Comments Good sitting balance at EOB   Transfers   Sit to Stand 2  Maximal assistance   Additional items Assist x 2; Increased time required;Verbal cues   Stand to Sit 2  Maximal assistance   Additional items Assist x 2; Increased time required;Verbal cues   Stand pivot 2  Maximal assistance   Additional items Assist x 2; Increased time required;Verbal cues   Additional Comments Performed STS transfer with RW, maxA x2, poor L LE control into standing  Returned to sitting  Pivot transfer with B/L HHA maxA x2, L knee block   Balance   Static Sitting Fair   Dynamic Sitting Poor +   Static Standing Poor   Dynamic Standing Poor   Ambulatory Zero   Activity Tolerance   Activity Tolerance Patient limited by fatigue   Medical Staff Made Aware [address] RN updated  Chair alarm engaged   Assessment   Prognosis Fair   Problem List Decreased strength; Impaired balance;Decreased endurance;Decreased mobility; Decreased cognition;Decreased safety awareness   Assessment Pt is a 49 y o  female seen for PT evaluation s/p admit to [facility] on 8/27/2021  Pt was admitted with a primary dx of: CNS lymphoma, admitted for C6 of chemo  PT now consulted for assessment of mobility and d/c needs  Pt with Ambulate orders  Pts current comorbidities effecting treatment include: dysphagia s/p PEG removal 8/18, Severe-protein calorie malnutrition, cancer related pain, depression  Pts current clinical presentation is Unstable/ Unpredictable (high complexity) due to Ongoing medical management for primary dx, Decreased activity tolerance compared to baseline, Fall risk, Increased assistance needed from caregiver at current time, Trending lab values  Prior to admission, pt was at [employer], required assistance with transfers  Upon evaluation, pt currently is requiring supervision for bed mobility; maxA x2 for transfers    Pt presents at PT eval functioning below baseline and currently w/ overall mobility deficits 2* to: BLE weakness, impaired balance, decreased endurance, decreased activity tolerance compared to baseline, decreased functional mobility tolerance compared to baseline, decreased safety awareness, fall risk  Pt currently at a fall risk 2* to impairments listed above  Pt will continue to benefit from skilled acute PT interventions to address stated impairments; to maximize functional mobility; for ongoing pt/ family training; and DME needs  At conclusion of PT session pt returned back in chair and chair alarm engaged with phone and call bell within reach  Pt denies any further questions at this time  Recommend return to IP rehab upon hospital D/C  Goals   Patient Goals to chair   STG Expiration Date 09/13/21   Short Term Goal #1 In 14 days pt will be able to: 1  Demonstrate ability to perform all aspects of bed mobility independently to increase functional independence  2  Perform functional transfers with modA x1 to facilitate safe return to previous living environment  3  Pt will be able to tolerate unsupported sitting > 30 minutes with supervision and static standing > 2 minutes with Letitia  4  Improve LE strength grades by 1 to increase ease of functional mobility with transfers and gait  5  Pt will demonstrate improved balance by one grade in order to decrease risk of falls  PT Treatment Day 0   Plan   Treatment/Interventions Functional transfer training;LE strengthening/ROM; Therapeutic exercise; Endurance training;Cognitive reorientation;Patient/family training;Equipment eval/education; Bed mobility   PT Frequency 2-3x/wk   Recommendation   PT Discharge Recommendation Post acute rehabilitation services   PT - OK to Discharge Yes  (to IP rehab)   [address] in Bed Without Bedrails 3   Lying on Back to Sitting on Edge of Flat Bed 3   Moving Bed to Chair 1   Standing Up From Chair 1   Walk in Room 1   Climb 3-5 Stairs 1   Basic Mobility Inpatient Raw Score 10   Turning Head Towards Sound 4   Follow Simple Instructions 3   Low Function Basic Mobility Raw Score 17 Low Function Basic Mobility Standardized Score 27 46       Meredith Porter, PT, DPT

## 2021-08-30 NOTE — PLAN OF CARE
Problem: OCCUPATIONAL THERAPY ADULT  Goal: Performs self-care activities at highest level of function for planned discharge setting  See evaluation for individualized goals  Description: Treatment Interventions: ADL retraining, UE strengthening/ROM, Functional transfer training, Endurance training, Cognitive reorientation, Patient/family training, Equipment evaluation/education, Compensatory technique education, Fine motor coordination activities, Neuromuscular reeducation, Continued evaluation, Energy conservation, Activityengagement          See flowsheet documentation for full assessment, interventions and recommendations  Note: Limitation: Decreased ADL status, Decreased UE ROM, Decreased UE strength, Decreased Safe judgement during ADL, Decreased cognition, Decreased endurance, Decreased fine motor control, Decreased self-care trans, Decreased high-level ADLs  Prognosis: Fair  Assessment: Pt is a 49 y o  female admitted to [facility] on 8/27/2021 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[facility] [address] ) - pt admitted for C6 of chemo  Pt with active OT orders and up and OOB as tolerated orders  Pt seen as a co-evaluation with PT due to the patient's co-morbidities, clinically unstable presentation/clinical complexity, and present impairments  At baseline, pt resides with  at [facility]  However, pt has recently been staying at [facility] rehab for rehab services  Pt requires A for ADLs/IADLs, and Ax2 for transfers  Upon evaluation, pt currently requires MAX A x 2 for transfers, S for sup to sit with close S to maintain EOB sitting position  Exhibits decreased strength to B/L UE's limiting performance in ADLs/transfers, LUE weaker than R, though LUE strength and ROM has increased since previous admission  Pt currently requires S eating, MIN Agrooming, MOD A UB ADLs, MOD A LB ADLs, and MOD A toileting   Pt is limited at this time 2*: pain, endurance, activity tolerance, functional mobility, balance, functional standing tolerance, decreased I w/ ADLS/IADLS, strength, ROM, cognitive impairments and decreased safety awareness  The following Occupational Performance Areas to address include: eating, grooming, bathing/shower, toilet hygiene, dressing, health maintenance, functional mobility, community mobility and clothing management  Pt to benefit from immediate acute skilled OT to address above deficits, improve overall functional independence, maximize fnxl mobility and reduce caregiver burden  From OT standpoint, recommendation at time of d/c would be to return to rehab  Pt was left in chair with alarm on after session with all current needs met  The patient's raw score on the AM-PAC Daily Activity inpatient short form is 14, standardized score is 33 39, less than 39 4  Patients at this level are likely to benefit from discharge to post-acute rehabilitation services  Please refer to the recommendation of the Occupational Therapist for safe discharge planning       OT Discharge Recommendation: Post acute rehabilitation services  OT - OK to Discharge: Yes (to rehab when medically stable )

## 2021-08-31 LAB
ALBUMIN SERPL BCP-MCNC: 2.6 G/DL (ref 3.5–5)
ALP SERPL-CCNC: 63 U/L (ref 46–116)
ALT SERPL W P-5'-P-CCNC: 27 U/L (ref 12–78)
ANION GAP SERPL CALCULATED.3IONS-SCNC: 7 MMOL/L (ref 4–13)
AST SERPL W P-5'-P-CCNC: 17 U/L (ref 5–45)
BILIRUB SERPL-MCNC: 0.14 MG/DL (ref 0.2–1)
BUN SERPL-MCNC: 14 MG/DL (ref 5–25)
CALCIUM ALBUM COR SERPL-MCNC: 9.5 MG/DL (ref 8.3–10.1)
CALCIUM SERPL-MCNC: 8.4 MG/DL (ref 8.3–10.1)
CHLORIDE SERPL-SCNC: 106 MMOL/L (ref 100–108)
CO2 SERPL-SCNC: 30 MMOL/L (ref 21–32)
CREAT SERPL-MCNC: 0.46 MG/DL (ref 0.6–1.3)
ERYTHROCYTE [DISTWIDTH] IN BLOOD BY AUTOMATED COUNT: 16.7 % (ref 11.6–15.1)
GFR SERPL CREATININE-BSD FRML MDRD: 113 ML/MIN/1.73SQ M
GLUCOSE SERPL-MCNC: 108 MG/DL (ref 65–140)
GLUCOSE SERPL-MCNC: 146 MG/DL (ref 65–140)
GLUCOSE SERPL-MCNC: 167 MG/DL (ref 65–140)
GLUCOSE SERPL-MCNC: 98 MG/DL (ref 65–140)
HCT VFR BLD AUTO: 31.4 % (ref 34.8–46.1)
HGB BLD-MCNC: 9.8 G/DL (ref 11.5–15.4)
MAGNESIUM SERPL-MCNC: 1.9 MG/DL (ref 1.6–2.6)
MCH RBC QN AUTO: 27.9 PG (ref 26.8–34.3)
MCHC RBC AUTO-ENTMCNC: 31.2 G/DL (ref 31.4–37.4)
MCV RBC AUTO: 90 FL (ref 82–98)
NRBC BLD AUTO-RTO: 0 /100 WBCS
PLATELET # BLD AUTO: 233 THOUSANDS/UL (ref 149–390)
PMV BLD AUTO: 11.4 FL (ref 8.9–12.7)
POTASSIUM SERPL-SCNC: 3.4 MMOL/L (ref 3.5–5.3)
PROT SERPL-MCNC: 5.7 G/DL (ref 6.4–8.2)
RBC # BLD AUTO: 3.51 MILLION/UL (ref 3.81–5.12)
SODIUM SERPL-SCNC: 143 MMOL/L (ref 136–145)
WBC # BLD AUTO: 4.08 THOUSAND/UL (ref 4.31–10.16)

## 2021-08-31 PROCEDURE — 80053 COMPREHEN METABOLIC PANEL: CPT | Performed by: INTERNAL MEDICINE

## 2021-08-31 PROCEDURE — 80204 DRUG ASSAY METHOTREXATE: CPT | Performed by: NURSE PRACTITIONER

## 2021-08-31 PROCEDURE — 82948 REAGENT STRIP/BLOOD GLUCOSE: CPT

## 2021-08-31 PROCEDURE — 83735 ASSAY OF MAGNESIUM: CPT | Performed by: INTERNAL MEDICINE

## 2021-08-31 PROCEDURE — 97530 THERAPEUTIC ACTIVITIES: CPT

## 2021-08-31 PROCEDURE — 97110 THERAPEUTIC EXERCISES: CPT

## 2021-08-31 PROCEDURE — 85027 COMPLETE CBC AUTOMATED: CPT | Performed by: INTERNAL MEDICINE

## 2021-08-31 PROCEDURE — 99231 SBSQ HOSP IP/OBS SF/LOW 25: CPT | Performed by: INTERNAL MEDICINE

## 2021-08-31 RX ORDER — SENNOSIDES 8.6 MG
1 TABLET ORAL
Status: DISCONTINUED | OUTPATIENT
Start: 2021-08-31 | End: 2021-09-08 | Stop reason: HOSPADM

## 2021-08-31 RX ORDER — POTASSIUM CHLORIDE 20 MEQ/1
20 TABLET, EXTENDED RELEASE ORAL ONCE
Status: COMPLETED | OUTPATIENT
Start: 2021-08-31 | End: 2021-08-31

## 2021-08-31 RX ADMIN — DOCUSATE SODIUM 100 MG: 100 CAPSULE, LIQUID FILLED ORAL at 10:06

## 2021-08-31 RX ADMIN — DICYCLOMINE HYDROCHLORIDE 10 MG: 10 CAPSULE ORAL at 06:03

## 2021-08-31 RX ADMIN — SERTRALINE 25 MG: 25 TABLET, FILM COATED ORAL at 10:06

## 2021-08-31 RX ADMIN — SODIUM CHLORIDE 25 MG: 9 INJECTION, SOLUTION INTRAVENOUS at 18:46

## 2021-08-31 RX ADMIN — SODIUM BICARBONATE 150 ML/HR: 84 INJECTION, SOLUTION INTRAVENOUS at 09:59

## 2021-08-31 RX ADMIN — DICYCLOMINE HYDROCHLORIDE 10 MG: 10 CAPSULE ORAL at 16:10

## 2021-08-31 RX ADMIN — DOCUSATE SODIUM 100 MG: 100 CAPSULE, LIQUID FILLED ORAL at 16:10

## 2021-08-31 RX ADMIN — ENOXAPARIN SODIUM 40 MG: 40 INJECTION SUBCUTANEOUS at 10:06

## 2021-08-31 RX ADMIN — SODIUM BICARBONATE 150 ML/HR: 84 INJECTION, SOLUTION INTRAVENOUS at 00:20

## 2021-08-31 RX ADMIN — DICYCLOMINE HYDROCHLORIDE 10 MG: 10 CAPSULE ORAL at 21:52

## 2021-08-31 RX ADMIN — OXYCODONE HYDROCHLORIDE 5 MG: 5 TABLET ORAL at 16:11

## 2021-08-31 RX ADMIN — INSULIN LISPRO 1 UNITS: 100 INJECTION, SOLUTION INTRAVENOUS; SUBCUTANEOUS at 21:52

## 2021-08-31 RX ADMIN — SODIUM CHLORIDE 25 MG: 9 INJECTION, SOLUTION INTRAVENOUS at 12:27

## 2021-08-31 RX ADMIN — OXYCODONE HYDROCHLORIDE 5 MG: 5 TABLET ORAL at 03:49

## 2021-08-31 RX ADMIN — SENNOSIDES 8.6 MG: 8.6 TABLET ORAL at 21:52

## 2021-08-31 RX ADMIN — SODIUM BICARBONATE 150 ML/HR: 84 INJECTION, SOLUTION INTRAVENOUS at 18:46

## 2021-08-31 RX ADMIN — FOLIC ACID 1 MG: 1 TABLET ORAL at 10:06

## 2021-08-31 RX ADMIN — GABAPENTIN 200 MG: 100 CAPSULE ORAL at 21:52

## 2021-08-31 RX ADMIN — SODIUM CHLORIDE 25 MG: 9 INJECTION, SOLUTION INTRAVENOUS at 00:18

## 2021-08-31 RX ADMIN — POTASSIUM CHLORIDE 20 MEQ: 1500 TABLET, EXTENDED RELEASE ORAL at 16:10

## 2021-08-31 RX ADMIN — POLYETHYLENE GLYCOL 3350 17 G: 17 POWDER, FOR SOLUTION ORAL at 10:06

## 2021-08-31 RX ADMIN — FAMOTIDINE 20 MG: 20 TABLET ORAL at 10:06

## 2021-08-31 RX ADMIN — ATORVASTATIN CALCIUM 10 MG: 10 TABLET, FILM COATED ORAL at 16:10

## 2021-08-31 RX ADMIN — DICYCLOMINE HYDROCHLORIDE 10 MG: 10 CAPSULE ORAL at 12:30

## 2021-08-31 RX ADMIN — OXYCODONE HYDROCHLORIDE 5 MG: 5 TABLET ORAL at 10:07

## 2021-08-31 RX ADMIN — SODIUM CHLORIDE 25 MG: 9 INJECTION, SOLUTION INTRAVENOUS at 06:03

## 2021-08-31 NOTE — UTILIZATION REVIEW
Continued Stay Review    Date: 8/31/21                       Current Patient Class: IP Current Level of Care: MS    HPI:56 y o  female initially admitted on 8/27 for Chemo cycle 6 of HD methotrexate with Leucovorin rescue and rituxan therapy q2 weeks    Assessment/Plan:   Continues on Bicarb drip  Doing well on treatment w/o signs of toxicity  Pt is noted to have marked clinical improvement compared to previous hospital stay  PEG tube site is not bothering the patient  She states she is feeling well  Methotrexate level 8/30 was 2 80  Will continue on bicarb drip and Leucovroin until Methotrexate level is < 0 10  Continues to use PRN analgesia  PT is recommending SNF level rehab  Plan is to remove machado today  Vital Signs:   08/31/21 0911  99 °F (37 2 °C)  75  16  103/55  83 %Abnormal   --  Lying   08/30/21 22:26:19  98 1 °F (36 7 °C)  77  20  107/58  98 %  --  --   08/30/21 1834  98 4 °F (36 9 °C)  82  16  114/68  98 %  --  --   08/30/21 1300  98 7 °F (37 1 °C)  88  15  111/66  100 %  --  Sitting   08/30/21 1034  97 6 °F (36 4 °C)  83  16  110/66  98 %  --  Sitting   08/30/21 05:31:08  97 5 °F (36 4 °C)  68  --  137/81  98 %  --  --   08/30/21 0029  97 9 °F (36 6 °C)  92  18  112/72  94 %  --  --   08/30/21 00:06:02  97 9 °F (36 6 °C)  95  18  --  92 %  --  --   08/29/21 2137  --  --  --  --  --  None (Room air)  --   08/29/21 18:55:45  99 °F (37 2 °C)  98  18  112/70  96 %  --  --   08/29/21 15:16:53  99 2 °F (37 3 °C)  105  20  118/74  96 %  --  --   08/29/21 11:28:41  98 8 °F (37 1 °C)  97  18  100/60  98 %  --  --   08/29/21 0830  --  --  18  --  --  --  --   08/29/21 08:29:47  98 1 °F (36 7 °C)  104  --  --  98 %  --  --     Pertinent Labs/Diagnostic Results:     8/30 MRI brain - 1  Stable treatment related changes with multifocal nonenhancing supra and infratentorial signal abnormality  No pathologic enhancement to confirm active or recurrent tumor at this time    Continued clinical and imaging surveillance advised  2   Right frontal ventricular catheter tract with enhancement around a bowen hole craniotomy and right frontal bone, stable likely reactive /postsurgical enhancement in this region  Continued clinical and imaging surveillance advised with special attention to the right frontal bone/bowen hole site on follow-up  3   No acute infarction, intracranial hemorrhage or mass effect  Ref   Range 8/30/2021 12:07   Methotrexate Lvl Latest Units: umol/L 2 80     Results from last 7 days   Lab Units 08/31/21  0519 08/30/21  0538 08/29/21  0509 08/28/21  0648 08/27/21  1438   WBC Thousand/uL 4 08* 7 32 10 72* 10 01 4 76   HEMOGLOBIN g/dL 9 8* 9 6* 9 7* 11 0* 10 7*   HEMATOCRIT % 31 4* 30 6* 31 2* 35 3 33 9*   PLATELETS Thousands/uL 233 244 212 220 244   NEUTROS ABS Thousands/µL  --   --   --  6 60 1 38*   BANDS PCT %  --   --  4  --   --          Results from last 7 days   Lab Units 08/31/21  0519 08/30/21  0538 08/29/21  0509 08/28/21  0648 08/27/21  1438   SODIUM mmol/L 143 141 140 141 139   POTASSIUM mmol/L 3 4* 3 3* 3 5 4 0 3 9   CHLORIDE mmol/L 106 103 105 105 104   CO2 mmol/L 30 35* 32 32 30   ANION GAP mmol/L 7 3* 3* 4 5   BUN mg/dL 14 14 15 16 14   CREATININE mg/dL 0 46* 0 44* 0 48* 0 43* 0 41*   EGFR ml/min/1 73sq m 113 115 112 116 118   CALCIUM mg/dL 8 4 8 8 9 3 9 7 9 3   MAGNESIUM mg/dL 1 9 1 9 1 7 1 9 2 0     Results from last 7 days   Lab Units 08/31/21  0519 08/30/21  0538 08/29/21  0509 08/28/21  0648 08/27/21  1438   AST U/L 17 19 10 18 13   ALT U/L 27 28 17 24 24   ALK PHOS U/L 63 66 75 68 69   TOTAL PROTEIN g/dL 5 7* 5 9* 5 6* 6 0* 6 0*   ALBUMIN g/dL 2 6* 2 9* 3 0* 3 0* 3 1*   TOTAL BILIRUBIN mg/dL 0 14* 0 23 0 16* 0 14* 0 16*     Results from last 7 days   Lab Units 08/31/21  1031 08/30/21  2042 08/30/21  1729 08/30/21  1150 08/30/21  0814 08/29/21  2155 08/29/21  1655 08/29/21  1119 08/29/21  0825 08/28/21  2028 08/28/21  1641 08/28/21  1142   POC GLUCOSE mg/dl 146* 134 103 130 96 242* 259* 143* 161* 153* 120 129     Results from last 7 days   Lab Units 08/31/21  0519 08/30/21  0538 08/29/21  0509 08/28/21  0648 08/27/21  1438   GLUCOSE RANDOM mg/dL 108 105 101 119 84     Results from last 7 days   Lab Units 08/27/21  1502   CLARITY UA  Clear   COLOR UA  Yellow   SPEC GRAV UA  1 006   PH UA  8 0   GLUCOSE UA mg/dl Negative   KETONES UA mg/dl Negative   BLOOD UA  Negative   PROTEIN UA mg/dl Negative   NITRITE UA  Negative   BILIRUBIN UA  Negative   UROBILINOGEN UA E U /dl 0 2   LEUKOCYTES UA  Trace*   WBC UA /hpf 2-4   RBC UA /hpf None Seen   BACTERIA UA /hpf Occasional   EPITHELIAL CELLS WET PREP /hpf None Seen     Medications:   Scheduled Medications:  atorvastatin, 10 mg, Oral, Daily With Dinner  dicyclomine, 10 mg, Oral, 4x Daily (AC & HS)  docusate sodium, 100 mg, Oral, BID  enoxaparin, 40 mg, Subcutaneous, Daily  ergocalciferol, 50,000 Units, Oral, Weekly  famotidine, 20 mg, Oral, Daily  folic acid, 1 mg, Oral, Daily  gabapentin, 200 mg, Oral, HS  insulin lispro, 1-5 Units, Subcutaneous, TID AC  insulin lispro, 1-5 Units, Subcutaneous, HS  leucovorin calcium IVPB, 25 mg, Intravenous, Q6H  ondansetron with dexamethasone IVPB, , Intravenous, Once  sertraline, 25 mg, Oral, Daily      Continuous IV Infusions:  sodium bicarbonate infusion, 150 mL/hr, Intravenous, Continuous      PRN Meds:  acetaminophen, 488 mg, Oral, Q6H PRN  oxyCODONE, 5 mg, Oral, Q4H PRN - x2 8/31  polyethylene glycol, 17 g, Oral, Daily PRN - x 1 8/31  sodium chloride, 20 mL/hr, Intravenous, Once PRN    Discharge Plan:  [facility] rehab    Network Utilization Review Department  ATTENTION: Please call with any questions or concerns to [phone] and carefully listen to the prompts so that you are directed to the right person   All voicemails are confidential   Niko Burk all requests for admission clinical reviews, approved or denied determinations and any other requests to dedicated fax number below belonging to the campus where the patient is receiving treatment   List of dedicated fax numbers for the Facilities:  [address] [place] [address] DENIALS (Administrative/Medical Necessity) [phone]   [address] (Maternity/NICU/Pediatrics) [phone] [address] Aleisha Oh 0[id number] [place] [address] [address] T[address] [facility] [phone]

## 2021-08-31 NOTE — CASE MANAGEMENT
Pt needs rehab  [facility]  states they cannot take pt back as insurance denied last review & pt owes money  States family not getting paperwork done  TC to [facility] health insurance complex case manager assigned to case [phone] ext [phone]  Per Ania Trujillo, pt was not participating in rehab & Radha Zapien denied last review at [facility]  Pt needs to participate in therapy to approve or needs LT care  However, pt is not a [address] apply for ZENIA Trujillo asked CM to send referrals back to [facility] ch, [facility], [address] [facility] [address] (considered skilled at [address] [facility] [address], [place]  Referrals sent  Ania Trujillo asked Cm to attempt rehab referrals again & see if insurance will approve  S/w Curtis Lynn with oncology & updated her  Pt might be at last chemo pending imaging tbd  Might need 2 more rounds  Plan tbd  She will ask attending to s/w pt about participating in rehab  Gina Millan with plan  She does not do the insurance reviews, but can help communicate with Radha Zapien insurance to assist us  Ecin updates sent to [facility]  asking to re-review  1430 update: S/w oncology  No more chemo  Ecin message sent to [facility]  with update

## 2021-08-31 NOTE — PHYSICAL THERAPY NOTE
Physical Therapy Treatment Note     08/31/21 1006   PT Last Visit   PT Visit Date 08/31/21   Note Type   Note Type Treatment   Pain Assessment   Pain Assessment Tool 0-10   Pain Score 8   Pain Location/Orientation Location: Abdomen   Restrictions/Precautions   Weight Bearing Precautions Per Order No   Other Precautions Fall Risk;Pain;Telemetry;Cognitive; Chair Alarm   General   Chart Reviewed Yes   Family/Caregiver Present No   Cognition   Overall Cognitive Status WFL   Subjective   Subjective pt  laying in bed upon entry  Reported pain in the abdominal area reporting because of not being able to have BM  However nursing confirmed she ahs a BM two days ago but per pt  it was 1 week ago  Pt atgreeable to PT   Bed Mobility   Supine to Sit 5  Supervision   Additional items Assist x 1; Increased time required; Bedrails;LE management;Verbal cues  (to the R)   Transfers   Sit to Stand 2  Maximal assistance   Additional items Assist x 1; Increased time required;Verbal cues   Stand to Sit 2  Maximal assistance   Additional items Assist x 1; Increased time required;Verbal cues   Stand pivot 2  Maximal assistance   Additional items Assist x 2;Verbal cues   Balance   Static Sitting Fair +   Ambulatory Zero   Endurance Deficit   Endurance Deficit Yes   Endurance Deficit Description fatigue   Activity Tolerance   Activity Tolerance Patient tolerated treatment well;Patient limited by fatigue   Nurse Made Aware yes   Exercises   Quad Sets Supine;Bilateral;AROM;20 reps   Heelslides Supine;Bilateral;AROM;AAROM;20 reps   Glute Sets Supine;AROM; Bilateral;20 reps   Hip Flexion Supine;Bilateral;AROM;AAROM;20 reps   Hip Abduction Supine;Bilateral;AROM;AAROM;20 reps   Hip Adduction Supine;Bilateral;AROM;20 reps   Knee AROM Long Arc Quad Sitting;Bilateral;AROM;20 reps   Ankle Pumps Bilateral;AROM;20 reps; Supine   Assessment   Prognosis Fair   Problem List Decreased strength;Decreased range of motion; Impaired balance;Decreased mobility; Decreased coordination;Decreased cognition; Impaired judgement;Pain;Decreased safety awareness   Assessment pt  able to perform LE Sena - AROm of RLE and occ  assistance needed for LLE  Noted with L lean in sitting while seated on recliner but noted she sat unsupported in upright position while EOB  Able to communicate better this session  Pt  able to achive full standing at EOB with RW and Max-Mod Ax  1  Demonstration given for transfer and cues for extending hips while standing  Pt  positioned on recliner with chair alarm engaged and all needs within reach  Pt  able to perform BM with Min A   Will continue to follow during the stay  Barriers to Discharge Decreased caregiver support; Inaccessible home environment   Goals   Patient Goals None reported   STG Expiration Date 09/13/21   PT Treatment Day 1   Plan   Treatment/Interventions Functional transfer training;LE strengthening/ROM; Therapeutic exercise;Cognitive reorientation;Patient/family training;Equipment eval/education; Bed mobility;Spoke to nursing   Progress Progressing toward goals   PT Frequency 2-3x/wk   Recommendation   PT Discharge Recommendation Post acute rehabilitation services   Equipment Recommended   (TBD)   PT - OK to Discharge Yes         Quincy Manzanares, PTA

## 2021-08-31 NOTE — PROGRESS NOTES
[address]  Department of Hematology & Medical Oncology  Inpatient Progress Note    Date: 08/31/21          ASSESSMENT & PLAN      72-year-old female with primary CNS diffuse large B-cell lymphoma admitted for C6 of high-dose methotrexate with Leucovorin rescue and rituxan  Her primary oncologist is Dr Mary Plummer  · 72-year-old female with PCNSL and ECOG performance status of 2 here for cycle 6 of HD methotrexate with Leucovorin rescue and rituxan therapy q2 weeks  Her primary oncologist is Dr Mary Plummer  · Treatment:  · Per primary oncologist, administer 3,500 mg/m2 Methotrexate with Leucovorin rescue and 375 mg/m2 Rituximab per Dr Mary Plummer  · Continue sodium bicarb 100meq D5 @150ml/hr and leukovorin 25mg Q6 hours until MTX level less than 0 10   · MTX level 8/30/21 was 2 80  · Brain MRI 8/30 with CR1; "No pathologic enhancement to confirm active or recurrent tumor at this time"  No further HD MTX admissions planned as she has completed 6 cycles  ·  Labs/imaging:  ? Monitor methotrexate levels daily  ? Continue monitoring until MTX levels less than 0 10  ? Daily CMP, CBC, magnesium  · PT/OT evaluation and treatment appreciated  Had long discussion with patient regarding her need to continue to participate in therapy and rehab to work toward getting home  · Patient has indwelling machado catheter;  Plan to remove this today      Dispo planning: Hopefully patient can go to acute rehab and work on getting stronger, with plan to eventually get back home  Outpatient follow up: Pt will need follow up in 2-4 weeks with Dr Mary Plummer in clinic for continued treatment and surveillance  Please contact me if any questions or concerns about this patient's case  Thank you  I did review this patient with Dr Laray Schwab and he is in agreement with this plan        Jarett Vivas [facility]-BC  Hematology/Oncology Nurse Practitioner     SUBJECTIVE      No acute events overnight   She tolerated chemotherapy well  Had rituxan yesterday without any issues  She feels well today, just some abdominal pain secondary to constipation, she tells me a while since she had a BM  Denies any fevers, chills, nausea, vomiting, headaches, chest pain, shortness of breath, cough, or genitourinary complaints  PEG tube site is not bothering her  I have reviewed the relevant past medical, surgical, social and family history  I have also reviewed allergies and medications for this patient  Review of Systems   Review of Systems   All other systems reviewed and are negative  OBJECTIVE     Physical Exam    Vitals:    08/31/21 0911   BP: 103/55   Pulse: 75   Resp: 16   Temp: 99 °F (37 2 °C)   SpO2: (!) 83%     Physical Exam  Vitals reviewed  Constitutional:       Appearance: Normal appearance  Comments: Disheveled    HENT:      Head: Normocephalic  Eyes:      Conjunctiva/sclera: Conjunctivae normal    Cardiovascular:      Rate and Rhythm: Normal rate  Pulmonary:      Effort: Pulmonary effort is normal  No respiratory distress  Abdominal:      General: There is no distension  Comments: Well healed PEG tube site   Musculoskeletal:         General: No swelling  Normal range of motion  Cervical back: Normal range of motion  Skin:     General: Skin is dry  Neurological:      General: No focal deficit present  Mental Status: She is alert and oriented to person, place, and time  Mental status is at baseline                 Imaging  Relevant imaging reviewed in chart    Labs  Relevant labs reviewed in chart    CBC  Diff   Lab Results   Component Value Date/Time    WBC 4 08 (L) 08/31/2021 05:19 AM    HGB 9 8 (L) 08/31/2021 05:19 AM    HCT 31 4 (L) 08/31/2021 05:19 AM    RBC 3 51 (L) 08/31/2021 05:19 AM    MCV 90 08/31/2021 05:19 AM    MCHC 31 2 (L) 08/31/2021 05:19 AM    MCH 27 9 08/31/2021 05:19 AM    RDW 16 7 (H) 08/31/2021 05:19 AM    MPV 11 4 08/31/2021 05:19 AM    Lab Results   Component Value Date/Time    LYMPHSABS 2 06 08/28/2021 06:48 AM    EOSABS 0 21 08/29/2021 05:09 AM    EOSABS 0 21 08/28/2021 06:48 AM    MONOSABS 0 91 08/28/2021 06:48 AM    BASOSABS 0 11 (H) 08/29/2021 05:09 AM    BASOSABS 0 05 08/28/2021 06:48 AM        Basic Metabolic Profile    Lab Results   Component Value Date/Time    SODIUM 143 08/31/2021 05:19 AM    CO2 30 08/31/2021 05:19 AM    Lab Results   Component Value Date/Time    BUN 14 08/31/2021 05:19 AM    CREATININE 0 46 (L) 08/31/2021 05:19 AM

## 2021-08-31 NOTE — PLAN OF CARE
Problem: PHYSICAL THERAPY ADULT  Goal: Performs mobility at highest level of function for planned discharge setting  See evaluation for individualized goals  Description: Treatment/Interventions: Functional transfer training, LE strengthening/ROM, Therapeutic exercise, Endurance training, Cognitive reorientation, Patient/family training, Equipment eval/education, Bed mobility          See flowsheet documentation for full assessment, interventions and recommendations  Outcome: Progressing  Note: Prognosis: Fair  Problem List: Decreased strength, Decreased range of motion, Impaired balance, Decreased mobility, Decreased coordination, Decreased cognition, Impaired judgement, Pain, Decreased safety awareness  Assessment: pt  able to perform LE Sena - AROm of RLE and occ  assistance needed for LLE  Noted with L lean in sitting while seated on recliner but noted she sat unsupported in upright position while EOB  Able to communicate better this session  Pt  able to achive full standing at EOB with RW and Max-Mod Ax  1  Demonstration given for transfer and cues for extending hips while standing  Pt  positioned on recliner with chair alarm engaged and all needs within reach  Pt  able to perform BM with Min A   Will continue to follow during the stay  Barriers to Discharge: Decreased caregiver support, Inaccessible home environment        PT Discharge Recommendation: Post acute rehabilitation services     PT - OK to Discharge: Yes    See flowsheet documentation for full assessment

## 2021-09-01 LAB
ALBUMIN SERPL BCP-MCNC: 2.7 G/DL (ref 3.5–5)
ALP SERPL-CCNC: 58 U/L (ref 46–116)
ALT SERPL W P-5'-P-CCNC: 25 U/L (ref 12–78)
ANION GAP SERPL CALCULATED.3IONS-SCNC: 3 MMOL/L (ref 4–13)
ANISOCYTOSIS BLD QL SMEAR: PRESENT
ARTIFACT: PRESENT
AST SERPL W P-5'-P-CCNC: 19 U/L (ref 5–45)
BASOPHILS # BLD MANUAL: 0.06 THOUSAND/UL (ref 0–0.1)
BASOPHILS NFR MAR MANUAL: 2 % (ref 0–1)
BILIRUB SERPL-MCNC: 0.2 MG/DL (ref 0.2–1)
BUN SERPL-MCNC: 12 MG/DL (ref 5–25)
CALCIUM ALBUM COR SERPL-MCNC: 9.6 MG/DL (ref 8.3–10.1)
CALCIUM SERPL-MCNC: 8.6 MG/DL (ref 8.3–10.1)
CHLORIDE SERPL-SCNC: 105 MMOL/L (ref 100–108)
CO2 SERPL-SCNC: 33 MMOL/L (ref 21–32)
CREAT SERPL-MCNC: 0.45 MG/DL (ref 0.6–1.3)
EOSINOPHIL # BLD MANUAL: 0.08 THOUSAND/UL (ref 0–0.4)
EOSINOPHIL NFR BLD MANUAL: 3 % (ref 0–6)
ERYTHROCYTE [DISTWIDTH] IN BLOOD BY AUTOMATED COUNT: 16.3 % (ref 11.6–15.1)
GFR SERPL CREATININE-BSD FRML MDRD: 114 ML/MIN/1.73SQ M
GLUCOSE SERPL-MCNC: 101 MG/DL (ref 65–140)
GLUCOSE SERPL-MCNC: 162 MG/DL (ref 65–140)
GLUCOSE SERPL-MCNC: 191 MG/DL (ref 65–140)
GLUCOSE SERPL-MCNC: 88 MG/DL (ref 65–140)
GLUCOSE SERPL-MCNC: 89 MG/DL (ref 65–140)
HCT VFR BLD AUTO: 28.6 % (ref 34.8–46.1)
HGB BLD-MCNC: 8.9 G/DL (ref 11.5–15.4)
LYMPHOCYTES # BLD AUTO: 1.35 THOUSAND/UL (ref 0.6–4.47)
LYMPHOCYTES # BLD AUTO: 48 % (ref 14–44)
MAGNESIUM SERPL-MCNC: 1.9 MG/DL (ref 1.6–2.6)
MCH RBC QN AUTO: 27.6 PG (ref 26.8–34.3)
MCHC RBC AUTO-ENTMCNC: 31.1 G/DL (ref 31.4–37.4)
MCV RBC AUTO: 89 FL (ref 82–98)
MONOCYTES # BLD AUTO: 0.03 THOUSAND/UL (ref 0–1.22)
MONOCYTES NFR BLD: 1 % (ref 4–12)
NEUTROPHILS # BLD MANUAL: 1.29 THOUSAND/UL (ref 1.85–7.62)
NEUTS SEG NFR BLD AUTO: 46 % (ref 43–75)
PLATELET # BLD AUTO: 236 THOUSANDS/UL (ref 149–390)
PLATELET BLD QL SMEAR: ADEQUATE
PMV BLD AUTO: 11.2 FL (ref 8.9–12.7)
POIKILOCYTOSIS BLD QL SMEAR: PRESENT
POTASSIUM SERPL-SCNC: 3.8 MMOL/L (ref 3.5–5.3)
PROT SERPL-MCNC: 5.6 G/DL (ref 6.4–8.2)
RBC # BLD AUTO: 3.22 MILLION/UL (ref 3.81–5.12)
RBC MORPH BLD: PRESENT
SCHISTOCYTES BLD QL SMEAR: PRESENT
SODIUM SERPL-SCNC: 141 MMOL/L (ref 136–145)
WBC # BLD AUTO: 2.81 THOUSAND/UL (ref 4.31–10.16)

## 2021-09-01 PROCEDURE — 97110 THERAPEUTIC EXERCISES: CPT

## 2021-09-01 PROCEDURE — 97535 SELF CARE MNGMENT TRAINING: CPT

## 2021-09-01 PROCEDURE — 85027 COMPLETE CBC AUTOMATED: CPT | Performed by: INTERNAL MEDICINE

## 2021-09-01 PROCEDURE — 85007 BL SMEAR W/DIFF WBC COUNT: CPT | Performed by: INTERNAL MEDICINE

## 2021-09-01 PROCEDURE — 97530 THERAPEUTIC ACTIVITIES: CPT

## 2021-09-01 PROCEDURE — 80204 DRUG ASSAY METHOTREXATE: CPT | Performed by: NURSE PRACTITIONER

## 2021-09-01 PROCEDURE — 99232 SBSQ HOSP IP/OBS MODERATE 35: CPT | Performed by: INTERNAL MEDICINE

## 2021-09-01 PROCEDURE — 83735 ASSAY OF MAGNESIUM: CPT | Performed by: INTERNAL MEDICINE

## 2021-09-01 PROCEDURE — 97112 NEUROMUSCULAR REEDUCATION: CPT

## 2021-09-01 PROCEDURE — 82948 REAGENT STRIP/BLOOD GLUCOSE: CPT

## 2021-09-01 PROCEDURE — 80053 COMPREHEN METABOLIC PANEL: CPT | Performed by: INTERNAL MEDICINE

## 2021-09-01 RX ORDER — LEUCOVORIN CALCIUM 25 MG/1
25 TABLET ORAL EVERY 6 HOURS
Status: DISCONTINUED | OUTPATIENT
Start: 2021-09-01 | End: 2021-09-02 | Stop reason: ALTCHOICE

## 2021-09-01 RX ADMIN — OXYCODONE HYDROCHLORIDE 5 MG: 5 TABLET ORAL at 08:44

## 2021-09-01 RX ADMIN — OXYCODONE HYDROCHLORIDE 5 MG: 5 TABLET ORAL at 19:10

## 2021-09-01 RX ADMIN — INSULIN LISPRO 1 UNITS: 100 INJECTION, SOLUTION INTRAVENOUS; SUBCUTANEOUS at 08:45

## 2021-09-01 RX ADMIN — INSULIN LISPRO 1 UNITS: 100 INJECTION, SOLUTION INTRAVENOUS; SUBCUTANEOUS at 23:15

## 2021-09-01 RX ADMIN — POLYETHYLENE GLYCOL 3350 17 G: 17 POWDER, FOR SOLUTION ORAL at 08:44

## 2021-09-01 RX ADMIN — ENOXAPARIN SODIUM 40 MG: 40 INJECTION SUBCUTANEOUS at 08:46

## 2021-09-01 RX ADMIN — LEUCOVORIN CALCIUM 25 MG: 25 TABLET ORAL at 18:26

## 2021-09-01 RX ADMIN — LEUCOVORIN CALCIUM 25 MG: 25 TABLET ORAL at 12:20

## 2021-09-01 RX ADMIN — SERTRALINE 25 MG: 25 TABLET, FILM COATED ORAL at 08:43

## 2021-09-01 RX ADMIN — SODIUM BICARBONATE 150 ML/HR: 84 INJECTION, SOLUTION INTRAVENOUS at 01:38

## 2021-09-01 RX ADMIN — OXYCODONE HYDROCHLORIDE 5 MG: 5 TABLET ORAL at 02:54

## 2021-09-01 RX ADMIN — DICYCLOMINE HYDROCHLORIDE 10 MG: 10 CAPSULE ORAL at 04:50

## 2021-09-01 RX ADMIN — ACETAMINOPHEN 488 MG: 325 TABLET, FILM COATED ORAL at 04:50

## 2021-09-01 RX ADMIN — DICYCLOMINE HYDROCHLORIDE 10 MG: 10 CAPSULE ORAL at 23:13

## 2021-09-01 RX ADMIN — DOCUSATE SODIUM 100 MG: 100 CAPSULE, LIQUID FILLED ORAL at 16:33

## 2021-09-01 RX ADMIN — OXYCODONE HYDROCHLORIDE 5 MG: 5 TABLET ORAL at 23:13

## 2021-09-01 RX ADMIN — OXYCODONE HYDROCHLORIDE 5 MG: 5 TABLET ORAL at 16:33

## 2021-09-01 RX ADMIN — DICYCLOMINE HYDROCHLORIDE 10 MG: 10 CAPSULE ORAL at 12:20

## 2021-09-01 RX ADMIN — ACETAMINOPHEN 488 MG: 325 TABLET, FILM COATED ORAL at 14:53

## 2021-09-01 RX ADMIN — ATORVASTATIN CALCIUM 10 MG: 10 TABLET, FILM COATED ORAL at 16:33

## 2021-09-01 RX ADMIN — DOCUSATE SODIUM 100 MG: 100 CAPSULE, LIQUID FILLED ORAL at 08:43

## 2021-09-01 RX ADMIN — SENNOSIDES 8.6 MG: 8.6 TABLET ORAL at 23:13

## 2021-09-01 RX ADMIN — DICYCLOMINE HYDROCHLORIDE 10 MG: 10 CAPSULE ORAL at 16:33

## 2021-09-01 RX ADMIN — FOLIC ACID 1 MG: 1 TABLET ORAL at 08:43

## 2021-09-01 RX ADMIN — GABAPENTIN 200 MG: 100 CAPSULE ORAL at 23:12

## 2021-09-01 RX ADMIN — FAMOTIDINE 20 MG: 20 TABLET ORAL at 08:43

## 2021-09-01 NOTE — RESTORATIVE TECHNICIAN NOTE
Restorative Technician Note      Patient Name: Davida Huff     Note Type: Mobility  Patient Position Upon Consult: Bedside chair  Activity Performed: Transferred;Dangled;Stood  Assistive Device: Other (Comment) (Assist x1 stand-pivot back to bed)  Education Provided: Yes (Educated/encouraged pt to get In/OOB with assistance )  Patient Position at End of Consult: Supine; All needs within reach;Bed/Chair alarm activated    Chio DE LA TORRE, Restorative Technician, [employer]

## 2021-09-01 NOTE — PLAN OF CARE
Problem: PHYSICAL THERAPY ADULT  Goal: Performs mobility at highest level of function for planned discharge setting  See evaluation for individualized goals  Description: Treatment/Interventions: Functional transfer training, LE strengthening/ROM, Therapeutic exercise, Endurance training, Cognitive reorientation, Patient/family training, Equipment eval/education, Bed mobility          See flowsheet documentation for full assessment, interventions and recommendations  Outcome: Progressing  Note: Prognosis: Fair  Problem List: Decreased strength, Decreased range of motion, Impaired balance, Decreased mobility, Decreased coordination, Decreased cognition, Impaired judgement, Pain, Decreased safety awareness  Assessment: Pt seen for PT treatment session this date  Therapy session focused on bed mobility, sitting static/ dynamic sitting balance/ tolerance, functional transfers, gait training, and endurance training in order to improve overall mobility and independence  Pt requires assist of 1-2 for all mobility performed this date  Pt performed bed mobility tasks at S level  Pt performed static / dynamic sitting balance tasks at S level; periods of CGA required for dynamic reaching activities  Pt performed multiple STS from EOB using b/l HHA with max Ax1; max Ax2 required to perform SPT  Pt able to tolerate/ perform lateral side steps with max Ax2 and therpist advancing LLE  Pt requesting to use bathroom- SPT to/from commode performed with max Ax2; increased time required for hygiene tasks  Pt able to tolerate static standing ~30 seconds this date to assist in hygiene tasks  Pt making progress toward goals  Pt was left sitting upright in bedside chair with chair alarm on at the end of PT session with all needs in reach  Pt would benefit from continued PT services while in hospital to address remaining limitations  PT to continue to follow pt and recommends post-acute rehab services after d/c   The patient's AM-PAC Basic Mobility Inpatient Short Form Low Function Raw Score 17 , Standardized Score is 27 46  A standardized score less 42 9 suggests the patient may benefit from discharge to post-acute rehab services  Please also refer to the recommendation of the Physical Therapist for safe discharge planning  Barriers to Discharge: Decreased caregiver support, Inaccessible home environment        PT Discharge Recommendation: Post acute rehabilitation services     PT - OK to Discharge: Yes (to rehab once medically cleared )    See flowsheet documentation for full assessment

## 2021-09-01 NOTE — PROGRESS NOTES
[address]  Department of Hematology & Medical Oncology  Inpatient Progress Note    Date: 09/01/21          ASSESSMENT & PLAN      29-year-old female with primary CNS diffuse large B-cell lymphoma admitted for C6 of high-dose methotrexate with Leucovorin rescue and rituxan  Her primary oncologist is Dr Tate Diss  · 29-year-old female with PCNSL and ECOG performance status of 2 here for cycle 6 of HD methotrexate with Leucovorin rescue and rituxan therapy q2 weeks  Her primary oncologist is Dr Lea Mclean  · Treatment:  · Per primary oncologist, administer 3,500 mg/m2 Methotrexate with Leucovorin rescue and 375 mg/m2 Rituximab per Dr Lea Mclean  · Continue sodium bicarb 100meq D5 @150ml/hr and leukovorin 25mg Q6 hours until MTX level less than 0 10   · MTX level 8/30/21 was 2 80  · Level from yesterday is in process  · Brain MRI 8/30 with CR1; "No pathologic enhancement to confirm active or recurrent tumor at this time"  No further HD MTX admissions planned as she has completed 6 cycles  ·  Labs/imaging:  ? Monitor methotrexate levels daily  ? Continue monitoring until MTX levels less than 0 10  ? Daily CMP, CBC, magnesium  · PT/OT evaluation and treatment appreciated  Dispo planning: Hopefully patient can go to acute rehab and work on getting stronger, with plan to eventually get back home  Outpatient follow up: Pt will need follow up in 2-4 weeks with Dr Lea Mclean in clinic for continued treatment and surveillance  Please contact me if any questions or concerns about this patient's case  Thank you  SUBJECTIVE      No acute events overnight  Denies f/c/n/v/cp/ap  She complained of some right leg pain but no swelling  She rated her pain 1/10  I have reviewed the relevant past medical, surgical, social and family history  I have also reviewed allergies and medications for this patient      Review of Systems   Review of Systems   All other systems reviewed and are negative  OBJECTIVE     Physical Exam    Vitals:    09/01/21 0829   BP:    Pulse:    Resp: 20   Temp: 97 7 °F (36 5 °C)   SpO2:      Physical Exam  Vitals reviewed  Constitutional:       General: She is not in acute distress  Appearance: Normal appearance  She is not toxic-appearing  HENT:      Head: Normocephalic  Eyes:      General: No scleral icterus  Conjunctiva/sclera: Conjunctivae normal    Cardiovascular:      Rate and Rhythm: Normal rate  Pulmonary:      Effort: Pulmonary effort is normal  No respiratory distress  Abdominal:      General: There is no distension  Musculoskeletal:         General: Normal range of motion  Cervical back: Normal range of motion  Neurological:      General: No focal deficit present  Mental Status: She is alert and oriented to person, place, and time  Mental status is at baseline                 Imaging  Relevant imaging reviewed in chart    Labs  Relevant labs reviewed in chart    CBC  Diff   Lab Results   Component Value Date/Time    WBC 2 81 (L) 09/01/2021 07:04 AM    HGB 8 9 (L) 09/01/2021 07:04 AM    HCT 28 6 (L) 09/01/2021 07:04 AM    RBC 3 22 (L) 09/01/2021 07:04 AM    MCV 89 09/01/2021 07:04 AM    MCHC 31 1 (L) 09/01/2021 07:04 AM    MCH 27 6 09/01/2021 07:04 AM    RDW 16 3 (H) 09/01/2021 07:04 AM    MPV 11 2 09/01/2021 07:04 AM    Lab Results   Component Value Date/Time    LYMPHSABS 2 06 08/28/2021 06:48 AM    EOSABS 0 08 09/01/2021 07:04 AM    EOSABS 0 21 08/28/2021 06:48 AM    MONOSABS 0 91 08/28/2021 06:48 AM    BASOSABS 0 06 09/01/2021 07:04 AM    BASOSABS 0 05 08/28/2021 06:48 AM        Basic Metabolic Profile    Lab Results   Component Value Date/Time    SODIUM 141 09/01/2021 07:04 AM    CO2 33 (H) 09/01/2021 07:04 AM    Lab Results   Component Value Date/Time    BUN 12 09/01/2021 07:04 AM    CREATININE 0 45 (L) 09/01/2021 07:04 AM

## 2021-09-01 NOTE — PHYSICAL THERAPY NOTE
PHYSICAL THERAPY NOTE          Patient Name: Sapna Kaplan  [id number] Date: 9/1/2021 09/01/21 1137   PT Last Visit   PT Visit Date 09/01/21   Note Type   Note Type Treatment   Pain Assessment   Pain Assessment Tool FLACC   Pain Location/Orientation Orientation: Left; Location: Arm;Location: Leg   Pain Rating: FLACC (Rest) - Face 0   Pain Rating: FLACC (Rest) - Legs 0   Pain Rating: FLACC (Rest) - Activity 0   Pain Rating: FLACC (Rest) - Cry 0   Pain Rating: FLACC (Rest) - Consolability 0   Score: FLACC (Rest) 0   Pain Rating: FLACC (Activity) - Face 1   Pain Rating: FLACC (Activity) - Legs 1   Pain Rating: FLACC (Activity) - Activity 1   Pain Rating: FLACC (Activity) - Cry 1   Pain Rating: FLACC (Activity) - Consolability 1   Score: FLACC (Activity) 5   Restrictions/Precautions   Other Precautions Chair Alarm; Bed Alarm; Fall Risk   General   Chart Reviewed Yes   Response to Previous Treatment Patient with no complaints from previous session  Family/Caregiver Present No   Cognition   Arousal/Participation Alert; Responsive; Cooperative   Attention Attends with cues to redirect   Orientation Level Oriented to person;Oriented to place  (year )   Memory Unable to assess   Following Commands Follows one step commands without difficulty   Comments Pt pleasant and engaged throughout therapy session  Bed Mobility   Supine to Sit 5  Supervision   Additional items Assist x 1; Increased time required;Verbal cues   Sit to Supine Unable to assess   Additional Comments Pt supine in bed upon arrival  Pt sitting upright in bedside chair with chair alarm on with all needs within reach at the end of therapy session  Transfers   Sit to Stand 2  Maximal assistance   Additional items Assist x 1; Increased time required;Verbal cues   Stand to Sit 2  Maximal assistance   Additional items Assist x 1; Increased time required;Verbal cues   Stand pivot 2 Maximal assistance   Additional items Assist x 2; Increased time required;Verbal cues   Toilet transfer 3  Moderate assistance   Additional items Assist x 2; Increased time required;Commode   Additional Comments Pt varied from max Ax1- mod Ax2 with functional transfers  Transfered with HHA  Pt performed 5x throughout therapy session  Ambulation/Elevation   Gait pattern Excessively slow; Short stride; Foward flexed;Decreased foot clearance; Improper Weight shift;L Knee Konrad; Step to   Gait Assistance 2  Maximal assist   Additional items Assist x 2;Verbal cues; Tactile cues  (therapist advance LLE )   Assistive Device   (b/l HHA )   Distance 3 lateral side steps with therapist advancing LLE    Balance   Static Sitting Fair +   Dynamic Sitting Fair   Static Standing Poor   Dynamic Standing Poor   Ambulatory Poor -   Endurance Deficit   Endurance Deficit Yes   Endurance Deficit Description fatigue, pain    Activity Tolerance   Activity Tolerance Patient tolerated treatment well   Medical Staff Made Aware OT; co- treatment performed this date 2* increased medical complexity and multiple co-morbidites    Nurse Made Aware RN cleared pt to participate in therapy session    Exercises   Neuro re-ed unsupported sitting EOB ~15 min; close S for static tasks periods of CGA for dynamic reaching tasks    Assessment   Prognosis Fair   Problem List Decreased strength;Decreased range of motion; Impaired balance;Decreased mobility; Decreased coordination;Decreased cognition; Impaired judgement;Pain;Decreased safety awareness   Assessment Pt seen for PT treatment session this date  Therapy session focused on bed mobility, sitting static/ dynamic sitting balance/ tolerance, functional transfers, gait training, and endurance training in order to improve overall mobility and independence  Pt requires assist of 1-2 for all mobility performed this date  Pt performed bed mobility tasks at S level   Pt performed static / dynamic sitting balance tasks at S level; periods of CGA required for dynamic reaching activities  Pt performed multiple STS from EOB using b/l HHA with max Ax1; max Ax2 required to perform SPT  Pt able to tolerate/ perform lateral side steps with max Ax2 and therpist advancing LLE  Pt requesting to use bathroom- SPT to/from commode performed with max Ax2; increased time required for hygiene tasks  Pt able to tolerate static standing ~30 seconds this date to assist in hygiene tasks  Pt making progress toward goals  Pt was left sitting upright in bedside chair with chair alarm on at the end of PT session with all needs in reach  Pt would benefit from continued PT services while in hospital to address remaining limitations  PT to continue to follow pt and recommends post-acute rehab services after d/c  The patient's AM-PAC Basic Mobility Inpatient Short Form Low Function Raw Score 17 , Standardized Score is 27 46  A standardized score less 42 9 suggests the patient may benefit from discharge to post-acute rehab services  Please also refer to the recommendation of the Physical Therapist for safe discharge planning  Barriers to Discharge Decreased caregiver support; Inaccessible home environment   Goals   Patient Goals to go to the bathroom    STG Expiration Date 09/13/21   PT Treatment Day 2   Plan   Treatment/Interventions Functional transfer training;LE strengthening/ROM; Endurance training;Patient/family training;Bed mobility;Gait training;Spoke to nursing;OT   Progress Progressing toward goals   PT Frequency 2-3x/wk   Recommendation   PT Discharge Recommendation Post acute rehabilitation services   PT - OK to Discharge Yes  (to rehab once medically cleared )   AM-[facility] Basic Mobility Inpatient   Turning in Bed Without Bedrails 3   Lying on Back to Sitting on Edge of Flat Bed 3   Moving Bed to Chair 1   Standing Up From Chair 1   Walk in Room 1   Climb 3-5 Stairs 1   Basic Mobility Inpatient Raw Score 10   Turning Head Towards Sound 4 Follow Simple Instructions 3   Low Function Basic Mobility Raw Score 17   Low Function Basic Mobility Standardized Score 27 46     Kaitlin Arriola, PT, DPT

## 2021-09-01 NOTE — OCCUPATIONAL THERAPY NOTE
Occupational Therapy Progress Note     Patient Name: Tomy Adkins  YNTII'V Date: 9/1/2021  Problem List  Principal Problem:    CNS lymphoma [facility])  Active Problems:    Encounter for antineoplastic chemotherapy          09/01/21 1121   OT Last Visit   OT Visit Date 09/01/21   Note Type   Note Type Treatment   Restrictions/Precautions   Weight Bearing Precautions Per Order No   Other Precautions Chair Alarm; Bed Alarm; Fall Risk   General   Response to Previous Treatment Patient with no complaints from previous session   Lifestyle   Autonomy Pt requires A for ADLs/IADLs, fnxl mobility   Reciprocal Relationships Facility staff, family, friends   Service to Others Unable to obtain   Intrinsic Gratification Pt enjoys sweet cereal   Pain Assessment   Pain Assessment Tool FLACC   Pain Rating: FLACC (Rest) - Face 0   Pain Rating: FLACC (Rest) - Legs 0   Pain Rating: FLACC (Rest) - Activity 0   Pain Rating: FLACC (Rest) - Cry 0   Pain Rating: FLACC (Rest) - Consolability 0   Score: FLACC (Rest) 0   Pain Rating: FLACC (Activity) - Face 1   Pain Rating: FLACC (Activity) - Legs 0   Pain Rating: FLACC (Activity) - Activity 0   Pain Rating: FLACC (Activity) - Cry 1   Pain Rating: FLACC (Activity) - Consolability 1   Score: FLACC (Activity) 3   ADL   Where Assessed Edge of bed   Grooming Assistance 4  Minimal Assistance   Grooming Deficit Brushing hair;Wash/dry face   Grooming Comments + washing hair with shampoo cap  S for washing face   UB Bathing Assistance 4  Minimal Assistance   UB Bathing Deficit Right arm  (+ underarms )   LB Bathing Assistance 4  Minimal Assistance   LB Bathing Deficit Buttocks   UB Dressing Assistance 3  Moderate Assistance   UB Dressing Deficit Thread RUE; Thread LUE   LB Dressing Assistance 4  Minimal Assistance   LB Dressing Deficit Don/doff R sock; Don/doff L sock   Bed Mobility   Supine to Sit 5  Supervision   Additional items Assist x 1; Increased time required   Sit to Supine Unable to assess Transfers   Sit to Stand 2  Maximal assistance   Additional items Assist x 1; Increased time required   Stand to Sit 2  Maximal assistance   Additional items Assist x 2; Increased time required   Stand pivot 2  Maximal assistance   Additional items Assist x 2; Increased time required   Toilet transfer 3  Moderate assistance   Additional items Assist x 2; Increased time required   Additional Comments MAX of 1-2 for STS transfer  Transfers with B/L HHA    Functional Mobility   Functional Mobility 2  Maximal assistance   Additional Comments Ax2 minimal steps bed to chair    ROM - Left Upper Extremities    L Shoulder PROM; Flexion;ABduction; Extension; External rotation; Internal rotation   L Elbow PROM;Elbow flexion;Elbow extension   L Wrist Prolonged stretch; Wrist flexion;Wrist extension;Radial deviation;Ulnar deviation   L Hand PROM; Prolonged stretch; Thumb; Index finger; Long finger;Ring finger;Little finger   L Position Supine   L Weight/Reps/Sets x10   Cognition   Arousal/Participation Alert; Responsive; Cooperative   Attention Attends with cues to redirect   Orientation Level Oriented to person  (oriented to year + states we are in the hospital)   Memory Unable to assess   Following Commands Follows one step commands with increased time or repetition   Comments Pt pleasant and cooperative t/o session    Activity Tolerance   Activity Tolerance Patient limited by fatigue;Patient limited by pain   Medical Staff Made Aware PT Ny Rush, RN    Assessment   Assessment Patient participated in Skilled OT session this date with interventions consisting of ADL re training with the use of correct body mechnaics, safety awareness and fall prevention techniques, therapeutic exercise to: increase functional use of BUEs, increase BUE muscle strength ,  therapeutic activities to: increase activity tolerance, increase dynamic sit/ stand balance during functional activity  and increase OOB/ sitting tolerance   Upon arrival patient was found supine in bed  Pt demonstrated the following tasks: S sup to sit, MAX/MOD A x 1-2 STS, MAX A x 2 SPT + minimal steps  Pt engaged in LUE PROM, as well as bathing ADL  Requires MIN A for grooming and UB ADLs, MIN A for don/doff socks, MOD A for LB bathing  Patient continues to be functioning below baseline level, occupational performance remains limited secondary to factors listed above and increased risk for falls and injury  From OT standpoint, recommendation at time of d/c would be STR  Patient to benefit from continued Occupational Therapy treatment while in the hospital to address deficits as defined above and maximize level of functional independence with ADLs and functional mobility  Pt was left in chair with alarm on after session with all current needs met  The patient's raw score on the AM-PAC Daily Activity inpatient short form is 15, standardized score is 34 69, less than 39 4  Patients at this level are likely to benefit from discharge to post-acute rehabilitation services  Please refer to the recommendation of the Occupational Therapist for safe discharge planning  See below for new goal added to pt's POC  Plan   Treatment Interventions ADL retraining;Functional transfer training;UE strengthening/ROM; Endurance training;Cognitive reorientation;Patient/family training;Equipment evaluation/education; Compensatory technique education;Continued evaluation; Energy conservation; Activityengagement   Goal Expiration Date 09/13/21   OT Treatment Day 3   OT Frequency 2-3x/wk   Recommendation   OT Discharge Recommendation Post acute rehabilitation services   OT - OK to Discharge Yes  (to rehab when medically stable )   AM-PAC Daily Activity Inpatient   Lower Body Dressing 2   Bathing 2   Toileting 2   Upper Body Dressing 3   Grooming 3   Eating 3   Daily Activity Raw Score 15   Daily Activity Standardized Score (Calc for Raw Score >=11) 34 69   AM-PAC Applied Cognition Inpatient   Following a Speech/Presentation 3   Understanding Ordinary Conversation 4   Taking Medications 3   Remembering Where Things Are Placed or Put Away 3   Remembering List of 4-5 Errands 3   Taking Care of Complicated Tasks 2   Applied Cognition Raw Score 18   Applied Cognition Standardized Score 38 07     Add the following goal to pt's POC:    GOALS    - Pt will improve functional mobility during ADL/IADL/leisure tasks to MOD A x 1 using DME as needed w/ G balance/safety         Con Kohler MS, OTR/L

## 2021-09-01 NOTE — PLAN OF CARE
Problem: OCCUPATIONAL THERAPY ADULT  Goal: Performs self-care activities at highest level of function for planned discharge setting  See evaluation for individualized goals  Description: Treatment Interventions: ADL retraining, UE strengthening/ROM, Functional transfer training, Endurance training, Cognitive reorientation, Patient/family training, Equipment evaluation/education, Compensatory technique education, Fine motor coordination activities, Neuromuscular reeducation, Continued evaluation, Energy conservation, Activityengagement          See flowsheet documentation for full assessment, interventions and recommendations  Outcome: Progressing  Note: Limitation: Decreased ADL status, Decreased UE ROM, Decreased UE strength, Decreased Safe judgement during ADL, Decreased cognition, Decreased endurance, Decreased fine motor control, Decreased self-care trans, Decreased high-level ADLs  Prognosis: Fair  Assessment: Patient participated in Skilled OT session this date with interventions consisting of ADL re training with the use of correct body mechnaics, safety awareness and fall prevention techniques, therapeutic exercise to: increase functional use of BUEs, increase BUE muscle strength ,  therapeutic activities to: increase activity tolerance, increase dynamic sit/ stand balance during functional activity  and increase OOB/ sitting tolerance   Upon arrival patient was found supine in bed  Pt demonstrated the following tasks: S sup to sit, MAX/MOD A x 1-2 STS, MAX A x 2 SPT + minimal steps  Pt engaged in LUE PROM, as well as bathing ADL  Requires MIN A for grooming and UB ADLs, MIN A for don/doff socks, MOD A for LB bathing  Patient continues to be functioning below baseline level, occupational performance remains limited secondary to factors listed above and increased risk for falls and injury  From OT standpoint, recommendation at time of d/c would be STR    Patient to benefit from continued Occupational Therapy treatment while in the hospital to address deficits as defined above and maximize level of functional independence with ADLs and functional mobility  Pt was left in chair with alarm on after session with all current needs met  The patient's raw score on the AM-PAC Daily Activity inpatient short form is 15, standardized score is 34 69, less than 39 4  Patients at this level are likely to benefit from discharge to post-acute rehabilitation services  Please refer to the recommendation of the Occupational Therapist for safe discharge planning       OT Discharge Recommendation: Post acute rehabilitation services  OT - OK to Discharge: Yes (to rehab when medically stable )

## 2021-09-02 LAB
ALBUMIN SERPL BCP-MCNC: 2.9 G/DL (ref 3.5–5)
ALP SERPL-CCNC: 59 U/L (ref 46–116)
ALT SERPL W P-5'-P-CCNC: 29 U/L (ref 12–78)
ANION GAP SERPL CALCULATED.3IONS-SCNC: 7 MMOL/L (ref 4–13)
AST SERPL W P-5'-P-CCNC: 21 U/L (ref 5–45)
BILIRUB SERPL-MCNC: 0.43 MG/DL (ref 0.2–1)
BUN SERPL-MCNC: 13 MG/DL (ref 5–25)
CALCIUM ALBUM COR SERPL-MCNC: 10.8 MG/DL (ref 8.3–10.1)
CALCIUM SERPL-MCNC: 9.9 MG/DL (ref 8.3–10.1)
CHLORIDE SERPL-SCNC: 104 MMOL/L (ref 100–108)
CO2 SERPL-SCNC: 29 MMOL/L (ref 21–32)
CREAT SERPL-MCNC: 0.39 MG/DL (ref 0.6–1.3)
ERYTHROCYTE [DISTWIDTH] IN BLOOD BY AUTOMATED COUNT: 16.5 % (ref 11.6–15.1)
GFR SERPL CREATININE-BSD FRML MDRD: 119 ML/MIN/1.73SQ M
GLUCOSE SERPL-MCNC: 134 MG/DL (ref 65–140)
GLUCOSE SERPL-MCNC: 201 MG/DL (ref 65–140)
GLUCOSE SERPL-MCNC: 239 MG/DL (ref 65–140)
GLUCOSE SERPL-MCNC: 299 MG/DL (ref 65–140)
GLUCOSE SERPL-MCNC: 91 MG/DL (ref 65–140)
HCT VFR BLD AUTO: 30.5 % (ref 34.8–46.1)
HGB BLD-MCNC: 9.5 G/DL (ref 11.5–15.4)
MAGNESIUM SERPL-MCNC: 1.9 MG/DL (ref 1.6–2.6)
MCH RBC QN AUTO: 27.9 PG (ref 26.8–34.3)
MCHC RBC AUTO-ENTMCNC: 31.1 G/DL (ref 31.4–37.4)
MCV RBC AUTO: 89 FL (ref 82–98)
MTX SERPL-SCNC: 0.19 UMOL/L
MTX SERPL-SCNC: <0.1 UMOL/L
NRBC BLD AUTO-RTO: 0 /100 WBCS
PLATELET # BLD AUTO: 268 THOUSANDS/UL (ref 149–390)
PMV BLD AUTO: 11.8 FL (ref 8.9–12.7)
POTASSIUM SERPL-SCNC: 3.9 MMOL/L (ref 3.5–5.3)
PROT SERPL-MCNC: 6.2 G/DL (ref 6.4–8.2)
RBC # BLD AUTO: 3.41 MILLION/UL (ref 3.81–5.12)
SODIUM SERPL-SCNC: 140 MMOL/L (ref 136–145)
WBC # BLD AUTO: 2.44 THOUSAND/UL (ref 4.31–10.16)

## 2021-09-02 PROCEDURE — 99232 SBSQ HOSP IP/OBS MODERATE 35: CPT | Performed by: INTERNAL MEDICINE

## 2021-09-02 PROCEDURE — 82948 REAGENT STRIP/BLOOD GLUCOSE: CPT

## 2021-09-02 PROCEDURE — 85027 COMPLETE CBC AUTOMATED: CPT | Performed by: INTERNAL MEDICINE

## 2021-09-02 PROCEDURE — 83735 ASSAY OF MAGNESIUM: CPT | Performed by: INTERNAL MEDICINE

## 2021-09-02 PROCEDURE — 80053 COMPREHEN METABOLIC PANEL: CPT | Performed by: INTERNAL MEDICINE

## 2021-09-02 RX ADMIN — SERTRALINE 25 MG: 25 TABLET, FILM COATED ORAL at 11:45

## 2021-09-02 RX ADMIN — ACETAMINOPHEN 488 MG: 325 TABLET, FILM COATED ORAL at 16:45

## 2021-09-02 RX ADMIN — ENOXAPARIN SODIUM 40 MG: 40 INJECTION SUBCUTANEOUS at 11:46

## 2021-09-02 RX ADMIN — INSULIN LISPRO 1 UNITS: 100 INJECTION, SOLUTION INTRAVENOUS; SUBCUTANEOUS at 22:28

## 2021-09-02 RX ADMIN — DOCUSATE SODIUM 100 MG: 100 CAPSULE, LIQUID FILLED ORAL at 16:46

## 2021-09-02 RX ADMIN — FAMOTIDINE 20 MG: 20 TABLET ORAL at 11:45

## 2021-09-02 RX ADMIN — INSULIN LISPRO 1 UNITS: 100 INJECTION, SOLUTION INTRAVENOUS; SUBCUTANEOUS at 18:47

## 2021-09-02 RX ADMIN — OXYCODONE HYDROCHLORIDE 5 MG: 5 TABLET ORAL at 14:37

## 2021-09-02 RX ADMIN — SENNOSIDES 8.6 MG: 8.6 TABLET ORAL at 22:27

## 2021-09-02 RX ADMIN — GABAPENTIN 200 MG: 100 CAPSULE ORAL at 22:27

## 2021-09-02 RX ADMIN — LEUCOVORIN CALCIUM 25 MG: 25 TABLET ORAL at 00:29

## 2021-09-02 RX ADMIN — DICYCLOMINE HYDROCHLORIDE 10 MG: 10 CAPSULE ORAL at 11:45

## 2021-09-02 RX ADMIN — INSULIN LISPRO 2 UNITS: 100 INJECTION, SOLUTION INTRAVENOUS; SUBCUTANEOUS at 12:59

## 2021-09-02 RX ADMIN — DOCUSATE SODIUM 100 MG: 100 CAPSULE, LIQUID FILLED ORAL at 11:45

## 2021-09-02 RX ADMIN — DICYCLOMINE HYDROCHLORIDE 10 MG: 10 CAPSULE ORAL at 06:20

## 2021-09-02 RX ADMIN — ATORVASTATIN CALCIUM 10 MG: 10 TABLET, FILM COATED ORAL at 16:45

## 2021-09-02 RX ADMIN — OXYCODONE HYDROCHLORIDE 5 MG: 5 TABLET ORAL at 22:28

## 2021-09-02 RX ADMIN — FOLIC ACID 1 MG: 1 TABLET ORAL at 11:45

## 2021-09-02 RX ADMIN — LEUCOVORIN CALCIUM 25 MG: 25 TABLET ORAL at 12:57

## 2021-09-02 RX ADMIN — DICYCLOMINE HYDROCHLORIDE 10 MG: 10 CAPSULE ORAL at 16:46

## 2021-09-02 RX ADMIN — DICYCLOMINE HYDROCHLORIDE 10 MG: 10 CAPSULE ORAL at 22:28

## 2021-09-02 RX ADMIN — LEUCOVORIN CALCIUM 25 MG: 25 TABLET ORAL at 06:20

## 2021-09-02 NOTE — CASE MANAGEMENT
PT stable for DC  Message to [facility], they will check with insurance to see if they can Bre Ovalles  Family owes money, they need to find out if family will pay    [facility] did not accept pt  Insurance did not approve    TCT Jalyn Trevino , [phone]  States pt does not participate in therapy at SNF due to pain  Recommendation is for palliative consult to address pain to see if she will participate  Reviewed PT notes from 9/1 which showed pt did participate  She would need more information to provide for a review to see if she would qualify  Questioned what next step would be from insurance standpoint if STR not approved  States pt does not have LTC coverage    PT is not a  citizen and is not eligible for MA  Discussed with Manager, MW   Will discuss with family DCP    TCT Dailycorbin Yao, family friend  Advised that [facility] was not approved by insurance  PT is medically stable for discharge and would like to discuss DCP  States  is living in a hotel  They would like to take patient back to [facility] where there is family to assist     Ricardo Malik spoke with Abigail Rueda8  Advised pt is medically stable to be DC'd    I spoke with Edward    He will discuss with , they will look at flight availability and call me back

## 2021-09-02 NOTE — PROGRESS NOTES
[address]  Department of Hematology & Medical Oncology  Inpatient Progress Note    Date: 09/02/21          ASSESSMENT & PLAN      82-year-old female with primary CNS diffuse large B-cell lymphoma admitted for C6 of high-dose methotrexate with Leucovorin rescue and rituxan  Her primary oncologist is Dr Addison Mcclellan  · 82-year-old female with PCNSL and ECOG performance status of 2 here for cycle 6 of HD methotrexate with Leucovorin rescue and rituxan therapy q2 weeks  Her primary oncologist is Dr Addison Mcclellan  · Treatment:  · Per primary oncologist, administer 3,500 mg/m2 Methotrexate with Leucovorin rescue and 375 mg/m2 Rituximab per Dr Addison Mcclellan  · Continue sodium bicarb 100meq D5 @150ml/hr and leukovorin 25mg Q6 hours until MTX level less than 0 10   · MTX level 8/30/21 was 2 80  · Level from 9/1 with MTX level less than 0 10  · Brain MRI 8/30 with CR1; "No pathologic enhancement to confirm active or recurrent tumor at this time"  · No further HD MTX admissions planned as she has completed 6 cycles  Management will be per primary outpatient oncologist, Dr Addison Mcclellan  I did make his team nurse, Charlie soares, aware of this  · PT/OT evaluation and treatment appreciated  Dispo planning: Hopefully patient can go to acute rehab and work on getting stronger, with plan to eventually get back home  Outpatient follow up: Pt will need follow up in 2-4 weeks with Dr Addison Mcclellan in clinic for continued treatment and surveillance  Please contact me if any questions or concerns about this patient's case  Thank you  Edward Radford, FNP-BC  Hematology/Oncology Nurse Practitioner       SUBJECTIVE      No acute events overnight  She feels well  Denies f/c/n/v/cp/ap  No pain today  She is very happy about her brain scan results  She wants to leave  I have reviewed the relevant past medical, surgical, social and family history   I have also reviewed allergies and medications for this patient  Review of Systems   Review of Systems   All other systems reviewed and are negative  OBJECTIVE     Physical Exam    Vitals:    09/02/21 0816   BP:    Pulse: 86   Resp: 16   Temp: 98 2 °F (36 8 °C)   SpO2: 99%     Physical Exam  Vitals reviewed  Constitutional:       General: She is not in acute distress  Appearance: Normal appearance  She is not toxic-appearing  Comments: Disheveled, calm  HENT:      Head: Normocephalic  Eyes:      General: No scleral icterus  Conjunctiva/sclera: Conjunctivae normal    Cardiovascular:      Rate and Rhythm: Normal rate  Pulmonary:      Effort: Pulmonary effort is normal  No respiratory distress  Abdominal:      General: There is no distension  Comments: PEG site well healed, no pain  Musculoskeletal:         General: Normal range of motion  Cervical back: Normal range of motion  Neurological:      General: No focal deficit present  Mental Status: She is alert and oriented to person, place, and time  Mental status is at baseline                 Imaging  Relevant imaging reviewed in chart    Labs  Relevant labs reviewed in chart    CBC  Diff   Lab Results   Component Value Date/Time    WBC 2 44 (L) 09/02/2021 05:14 AM    HGB 9 5 (L) 09/02/2021 05:14 AM    HCT 30 5 (L) 09/02/2021 05:14 AM    RBC 3 41 (L) 09/02/2021 05:14 AM    MCV 89 09/02/2021 05:14 AM    MCHC 31 1 (L) 09/02/2021 05:14 AM    MCH 27 9 09/02/2021 05:14 AM    RDW 16 5 (H) 09/02/2021 05:14 AM    MPV 11 8 09/02/2021 05:14 AM    Lab Results   Component Value Date/Time    LYMPHSABS 2 06 08/28/2021 06:48 AM    EOSABS 0 08 09/01/2021 07:04 AM    EOSABS 0 21 08/28/2021 06:48 AM    MONOSABS 0 91 08/28/2021 06:48 AM    BASOSABS 0 06 09/01/2021 07:04 AM    BASOSABS 0 05 08/28/2021 06:48 AM        Basic Metabolic Profile    Lab Results   Component Value Date/Time    SODIUM 140 09/02/2021 05:14 AM    CO2 29 09/02/2021 05:14 AM    Lab Results   Component Value Date/Time BUN 13 09/02/2021 05:14 AM    CREATININE 0 39 (L) 09/02/2021 05:14 AM

## 2021-09-02 NOTE — CASE MANAGEMENT
Spoke with [employer], states  works  Their plan was to arrange transport and take pt and fly to [facility]  Advised I can talk to insurance company about [address] services    He will talk to the  and advise in AM

## 2021-09-02 NOTE — QUICK NOTE
Spoke with designated point of contact, Frankie Muhammad  They would like to arrange for patient to leave hospital and go to [facility]  I wrote a note stating pt's current health condition  See note in patient's chart  I did inform Edward that according to the last PT note, she is a +1 assist for transfers, and that if they take her home she needs to have assistance and someone available to help care for her  I told them she was medically ready to discharge today       Nish Diego, FNP-BC  Hematology/Oncology Nurse Practitioner

## 2021-09-02 NOTE — UTILIZATION REVIEW
Continued Stay Review    Date: 9/2/2021                         Current Patient Class:  Inpatient   Current Level of Care:  Med surg     HPI:56 y o  female initially admitted on 8/27/2021 with a diagnosis of CNNS diffuse large  B - cell lymphoma admitted  for cycle 6 of chemotherapy - HD methotrexate with Leucovorin rescue and rituxan therapy    Assessment/Plan:  9/2 No acute events overnight  She feels well  She has completed her  6 cycles of chemotherapy  Management  Will be by her primary outpatient oncologist  PT/OT evaluation  With hope patient  Will be able to go to acute rehab and work on getting stronger, with eventual plan to return home   WBC dropped to 2 44    Vital Signs:   Date/Time  Temp  Pulse  Resp  BP  MAP (mmHg)  SpO2  O2 Device  Patient Position - Orthostatic VS   09/02/21 08:16:34  98 2 °F (36 8 °C)  86  16  --  --  99 %  --  --   09/02/21 0150  --  79  18  --  --  94 %  --  --   09/01/21 2305  --  --  --  --  --  --  None (Room air)  --   09/01/21 1500  97 8 °F (36 6 °C)  93  18  110/64  --  98 %  --  --   09/01/21 0845  --  --  --  --  --  --  None (Room air)  --   09/01/21 08:29:13  97 7 °F (36 5 °C)  --  20  --  --  --  --  --   08/31/21 23:24:33  99 1 °F (37 3 °C)  92  16  110/58  77  97 %  --  --   08/31/21 16:22:12  99 2 °F (37 3 °C)  88  20  118/86  --  97 %  --  --           Pertinent Labs/Diagnostic Results:       Results from last 7 days   Lab Units 09/02/21  0514 09/01/21  0704 08/31/21  0519 08/30/21  0538 08/29/21  0509 08/28/21  0648 08/27/21  1438   WBC Thousand/uL 2 44* 2 81* 4 08* 7 32 10 72* 10 01 4 76   HEMOGLOBIN g/dL 9 5* 8 9* 9 8* 9 6* 9 7* 11 0* 10 7*   HEMATOCRIT % 30 5* 28 6* 31 4* 30 6* 31 2* 35 3 33 9*   PLATELETS Thousands/uL 268 236 233 244 212 220 244   NEUTROS ABS Thousands/µL  --   --   --   --   --  6 60 1 38*   BANDS PCT %  --   --   --   --  4  --   --          Results from last 7 days   Lab Units 09/02/21  0514 09/01/21  2319 08/31/21  0519 08/30/21  1558 08/29/21  0509   SODIUM mmol/L 140 141 143 141 140   POTASSIUM mmol/L 3 9 3 8 3 4* 3 3* 3 5   CHLORIDE mmol/L 104 105 106 103 105   CO2 mmol/L 29 33* 30 35* 32   ANION GAP mmol/L 7 3* 7 3* 3*   BUN mg/dL 13 12 14 14 15   CREATININE mg/dL 0 39* 0 45* 0 46* 0 44* 0 48*   EGFR ml/min/1 73sq m 119 114 113 115 112   CALCIUM mg/dL 9 9 8 6 8 4 8 8 9 3   MAGNESIUM mg/dL 1 9 1 9 1 9 1 9 1 7     Results from last 7 days   Lab Units 09/02/21  0514 09/01/21  0704 08/31/21  0519 08/30/21  0538 08/29/21  0509   AST U/L 21 19 17 19 10   ALT U/L 29 25 27 28 17   ALK PHOS U/L 59 58 63 66 75   TOTAL PROTEIN g/dL 6 2* 5 6* 5 7* 5 9* 5 6*   ALBUMIN g/dL 2 9* 2 7* 2 6* 2 9* 3 0*   TOTAL BILIRUBIN mg/dL 0 43 0 20 0 14* 0 23 0 16*     Results from last 7 days   Lab Units 09/02/21  0805 09/01/21  2133 09/01/21  1639 09/01/21  1137 09/01/21  0742 08/31/21  2110 08/31/21  1732 08/31/21  1031 08/30/21  2042 08/30/21  1729 08/30/21  1150 08/30/21  0814   POC GLUCOSE mg/dl 134 162* 88 89 191* 167* 98 146* 134 103 130 96     Results from last 7 days   Lab Units 09/02/21  0514 09/01/21  0704 08/31/21  0519 08/30/21  0538 08/29/21  0509 08/28/21  0648 08/27/21  1438   GLUCOSE RANDOM mg/dL 91 101 108 105 101 119 84       Results from last 7 days   Lab Units 08/27/21  1502   CLARITY UA  Clear   COLOR UA  Yellow   SPEC GRAV UA  1 006   PH UA  8 0   GLUCOSE UA mg/dl Negative   KETONES UA mg/dl Negative   BLOOD UA  Negative   PROTEIN UA mg/dl Negative   NITRITE UA  Negative   BILIRUBIN UA  Negative   UROBILINOGEN UA E U /dl 0 2   LEUKOCYTES UA  Trace*   WBC UA /hpf 2-4   RBC UA /hpf None Seen   BACTERIA UA /hpf Occasional   EPITHELIAL CELLS WET PREP /hpf None Seen         Medications:   Scheduled Medications:  atorvastatin, 10 mg, Oral, Daily With Dinner  dicyclomine, 10 mg, Oral, 4x Daily (AC & HS)  docusate sodium, 100 mg, Oral, BID  enoxaparin, 40 mg, Subcutaneous, Daily  ergocalciferol, 50,000 Units, Oral, Weekly  famotidine, 20 mg, Oral, Daily  folic acid, 1 mg, Oral, Daily  gabapentin, 200 mg, Oral, HS  insulin lispro, 1-5 Units, Subcutaneous, TID AC  insulin lispro, 1-5 Units, Subcutaneous, HS  leucovorin, 25 mg, Oral, Q6H last dose at 18:15  today  ondansetron with dexamethasone IVPB, , Intravenous, Once  senna, 1 tablet, Oral, HS  sertraline, 25 mg, Oral, Daily      Continuous IV Infusions:  Sodium Bicarbonate  150 ml/hr - d/c'd on 9/1 @ 8:17 am      PRN Meds:  acetaminophen, 488 mg, Oral, Q6H PRN - 9/1 x 2   oxyCODONE, 5 mg, Oral, Q4H PRN - 9/1 x 5   polyethylene glycol, 17 g, Oral, Daily PRN - 9/1 x 1   sodium chloride, 20 mL/hr, Intravenous, Once PRN        Discharge Plan: tbd     Network Utilization Review Department  ATTENTION: Please call with any questions or concerns to [phone] and carefully listen to the prompts so that you are directed to the right person  All voicemails are confidential   Cloyde Havers all requests for admission clinical reviews, approved or denied determinations and any other requests to dedicated fax number below belonging to the campus where the patient is receiving treatment   List of dedicated fax numbers for the Facilities:  [address] DENIALS (Administrative/Medical Necessity) [phone]   [address] (Maternity/NICU/Pediatrics) [phone]   [address] Aleisha Staffordel [place] 5036 [id number] [place] [address] [address] [phone]

## 2021-09-03 LAB
ALBUMIN SERPL BCP-MCNC: 3 G/DL (ref 3.5–5)
ALP SERPL-CCNC: 62 U/L (ref 46–116)
ALT SERPL W P-5'-P-CCNC: 34 U/L (ref 12–78)
ANION GAP SERPL CALCULATED.3IONS-SCNC: 4 MMOL/L (ref 4–13)
AST SERPL W P-5'-P-CCNC: 22 U/L (ref 5–45)
BILIRUB SERPL-MCNC: 0.25 MG/DL (ref 0.2–1)
BUN SERPL-MCNC: 14 MG/DL (ref 5–25)
CALCIUM ALBUM COR SERPL-MCNC: 10.7 MG/DL (ref 8.3–10.1)
CALCIUM SERPL-MCNC: 9.9 MG/DL (ref 8.3–10.1)
CHLORIDE SERPL-SCNC: 104 MMOL/L (ref 100–108)
CO2 SERPL-SCNC: 29 MMOL/L (ref 21–32)
CREAT SERPL-MCNC: 0.39 MG/DL (ref 0.6–1.3)
ERYTHROCYTE [DISTWIDTH] IN BLOOD BY AUTOMATED COUNT: 16.5 % (ref 11.6–15.1)
GFR SERPL CREATININE-BSD FRML MDRD: 119 ML/MIN/1.73SQ M
GLUCOSE SERPL-MCNC: 111 MG/DL (ref 65–140)
GLUCOSE SERPL-MCNC: 114 MG/DL (ref 65–140)
GLUCOSE SERPL-MCNC: 172 MG/DL (ref 65–140)
GLUCOSE SERPL-MCNC: 96 MG/DL (ref 65–140)
GLUCOSE SERPL-MCNC: 97 MG/DL (ref 65–140)
HCT VFR BLD AUTO: 30.5 % (ref 34.8–46.1)
HGB BLD-MCNC: 9.5 G/DL (ref 11.5–15.4)
MAGNESIUM SERPL-MCNC: 1.8 MG/DL (ref 1.6–2.6)
MCH RBC QN AUTO: 27.5 PG (ref 26.8–34.3)
MCHC RBC AUTO-ENTMCNC: 31.1 G/DL (ref 31.4–37.4)
MCV RBC AUTO: 88 FL (ref 82–98)
PLATELET # BLD AUTO: 279 THOUSANDS/UL (ref 149–390)
PMV BLD AUTO: 11 FL (ref 8.9–12.7)
POTASSIUM SERPL-SCNC: 4 MMOL/L (ref 3.5–5.3)
PROT SERPL-MCNC: 6.4 G/DL (ref 6.4–8.2)
RBC # BLD AUTO: 3.46 MILLION/UL (ref 3.81–5.12)
SODIUM SERPL-SCNC: 137 MMOL/L (ref 136–145)
WBC # BLD AUTO: 2.47 THOUSAND/UL (ref 4.31–10.16)

## 2021-09-03 PROCEDURE — 97110 THERAPEUTIC EXERCISES: CPT

## 2021-09-03 PROCEDURE — 85027 COMPLETE CBC AUTOMATED: CPT | Performed by: INTERNAL MEDICINE

## 2021-09-03 PROCEDURE — 80053 COMPREHEN METABOLIC PANEL: CPT | Performed by: INTERNAL MEDICINE

## 2021-09-03 PROCEDURE — 99232 SBSQ HOSP IP/OBS MODERATE 35: CPT | Performed by: INTERNAL MEDICINE

## 2021-09-03 PROCEDURE — 83735 ASSAY OF MAGNESIUM: CPT | Performed by: INTERNAL MEDICINE

## 2021-09-03 PROCEDURE — 82948 REAGENT STRIP/BLOOD GLUCOSE: CPT

## 2021-09-03 PROCEDURE — 97530 THERAPEUTIC ACTIVITIES: CPT

## 2021-09-03 PROCEDURE — 97535 SELF CARE MNGMENT TRAINING: CPT

## 2021-09-03 RX ORDER — GABAPENTIN 100 MG/1
200 CAPSULE ORAL
Qty: 30 CAPSULE | Refills: 1
Start: 2021-09-03 | End: 2021-09-07

## 2021-09-03 RX ORDER — LEUCOVORIN CALCIUM 25 MG/1
25 TABLET ORAL ONCE
Status: COMPLETED | OUTPATIENT
Start: 2021-09-03 | End: 2021-09-03

## 2021-09-03 RX ORDER — OXYCODONE HYDROCHLORIDE 5 MG/1
5 TABLET ORAL EVERY 4 HOURS PRN
Qty: 30 TABLET | Refills: 0 | Status: SHIPPED | OUTPATIENT
Start: 2021-09-03 | End: 2021-09-07

## 2021-09-03 RX ADMIN — FOLIC ACID 1 MG: 1 TABLET ORAL at 08:15

## 2021-09-03 RX ADMIN — LEUCOVORIN CALCIUM 25 MG: 25 TABLET ORAL at 12:05

## 2021-09-03 RX ADMIN — OXYCODONE HYDROCHLORIDE 5 MG: 5 TABLET ORAL at 13:00

## 2021-09-03 RX ADMIN — DICYCLOMINE HYDROCHLORIDE 10 MG: 10 CAPSULE ORAL at 16:56

## 2021-09-03 RX ADMIN — FAMOTIDINE 20 MG: 20 TABLET ORAL at 08:15

## 2021-09-03 RX ADMIN — SERTRALINE 25 MG: 25 TABLET, FILM COATED ORAL at 08:15

## 2021-09-03 RX ADMIN — DICYCLOMINE HYDROCHLORIDE 10 MG: 10 CAPSULE ORAL at 22:18

## 2021-09-03 RX ADMIN — DOCUSATE SODIUM 100 MG: 100 CAPSULE, LIQUID FILLED ORAL at 08:15

## 2021-09-03 RX ADMIN — DICYCLOMINE HYDROCHLORIDE 10 MG: 10 CAPSULE ORAL at 06:28

## 2021-09-03 RX ADMIN — INSULIN LISPRO 1 UNITS: 100 INJECTION, SOLUTION INTRAVENOUS; SUBCUTANEOUS at 22:20

## 2021-09-03 RX ADMIN — OXYCODONE HYDROCHLORIDE 5 MG: 5 TABLET ORAL at 02:36

## 2021-09-03 RX ADMIN — OXYCODONE HYDROCHLORIDE 5 MG: 5 TABLET ORAL at 17:00

## 2021-09-03 RX ADMIN — OXYCODONE HYDROCHLORIDE 5 MG: 5 TABLET ORAL at 22:18

## 2021-09-03 RX ADMIN — ENOXAPARIN SODIUM 40 MG: 40 INJECTION SUBCUTANEOUS at 08:15

## 2021-09-03 RX ADMIN — ATORVASTATIN CALCIUM 10 MG: 10 TABLET, FILM COATED ORAL at 16:56

## 2021-09-03 RX ADMIN — DICYCLOMINE HYDROCHLORIDE 10 MG: 10 CAPSULE ORAL at 12:05

## 2021-09-03 RX ADMIN — GABAPENTIN 200 MG: 100 CAPSULE ORAL at 22:18

## 2021-09-03 RX ADMIN — SENNOSIDES 8.6 MG: 8.6 TABLET ORAL at 22:18

## 2021-09-03 RX ADMIN — DOCUSATE SODIUM 100 MG: 100 CAPSULE, LIQUID FILLED ORAL at 17:00

## 2021-09-03 NOTE — PLAN OF CARE
Problem: PHYSICAL THERAPY ADULT  Goal: Performs mobility at highest level of function for planned discharge setting  See evaluation for individualized goals  Description: Treatment/Interventions: Functional transfer training, LE strengthening/ROM, Therapeutic exercise, Endurance training, Cognitive reorientation, Patient/family training, Equipment eval/education, Bed mobility          See flowsheet documentation for full assessment, interventions and recommendations  Outcome: Progressing  Note: Prognosis: Fair  Problem List: Decreased strength, Decreased range of motion, Impaired balance, Decreased mobility, Decreased coordination, Decreased cognition, Impaired judgement, Pain, Decreased safety awareness  Assessment: Pt seen for PT treatment session this date  Therapy session focused on bed mobility, sitting static/ dynamic balance, therapeutic exercise,  endurance training in order to improve overall mobility and independence  Pt requires assist of 1 for all mobility performed this date  Pt performed bed mobility tasks at S level; impulsive at times- requires cues for safety  Pt sitting EOB at close S level  Pt performed 2x SPT from EOB <> bedside chair with max Ax1; SBA of 2nd person required for safety  Pt performed/ tolerated seated b/l LE therex to hips, knees, and ankles with no c/o increased pain/ discomfort  Pt's nurse present throughout therapy session to provide interpretation  Pt making progress toward goals  Pt was left supine in bed with bed alarm on at the end of PT session with all needs in reach  Pt would benefit from continued PT services while in hospital to address remaining limitations  PT to continue to follow pt and recommends post-acute rehab services after d/c  The patient's AM-PAC Basic Mobility Inpatient Short Form Low Function Raw Score 17 , Standardized Score is 27 46  A standardized score less 42 9 suggests the patient may benefit from discharge to post-acute rehab services   Please also refer to the recommendation of the Physical Therapist for safe discharge planning  Barriers to Discharge: Decreased caregiver support, Inaccessible home environment        PT Discharge Recommendation: Post acute rehabilitation services     PT - OK to Discharge: Yes (once medically cleared )    See flowsheet documentation for full assessment

## 2021-09-03 NOTE — OCCUPATIONAL THERAPY NOTE
Occupational Therapy Treatment Note      Dominic Hinson    9/3/2021    Principal Problem:    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[address] )  Active Problems:    Encounter for antineoplastic chemotherapy      History reviewed  No pertinent past medical history  Past Surgical History:   Procedure Laterality Date    IR PICC REPOSITION  4/27/2021    IR TUNNELED CENTRAL LINE REMOVAL  4/27/2021 09/03/21 1020   OT Last Visit   OT Visit Date 09/03/21   Note Type   Note Type Treatment   Restrictions/Precautions   Weight Bearing Precautions Per Order No   Other Precautions Chair Alarm; Bed Alarm;Cognitive; Impulsive; Fall Risk;Pain   General   Response to Previous Treatment Patient with no complaints from previous session   Lifestyle   Autonomy Pt requires A for ADLs/IADLs, fnxl mobility   Reciprocal Relationships Facility staff, family, friends   Service to Others Unable to obtain   Intrinsic Gratification Pt enjoys sweet cereal   Pain Assessment   Pain Assessment Tool FLACC   Pain Rating: FLACC (Rest) - Face 0   Pain Rating: FLACC (Rest) - Legs 0   Pain Rating: FLACC (Rest) - Activity 0   Pain Rating: FLACC (Rest) - Cry 1   Pain Rating: FLACC (Rest) - Consolability 0   Score: FLACC (Rest) 1   Pain Rating: FLACC (Activity) - Face 1   Pain Rating: FLACC (Activity) - Legs 0   Pain Rating: FLACC (Activity) - Activity 0   Pain Rating: FLACC (Activity) - Cry 1   Pain Rating: FLACC (Activity) - Consolability 0   Score: FLACC (Activity) 2   ADL   Where Assessed Edge of bed   Eating Assistance 5  Supervision/Setup   Eating Deficit Setup   Grooming Assistance 5  Supervision/Setup   Grooming Deficit Verbal cueing;Setup; Increased time to complete;Wash/dry hands; Wash/dry face   Functional Standing Tolerance   Time 30 seconds   Activity SPT to bed from chair   Bed Mobility   Rolling R 5  Supervision   Additional items Bedrails   Rolling L 5  Supervision   Additional items Bedrails   Supine to Sit 5  Supervision   Sit to Supine 5  Supervision Transfers   Sit to Stand 2  Maximal assistance   Additional items Assist x 1   Stand to Sit 2  Maximal assistance   Additional items Assist x 1   Stand pivot 2  Maximal assistance   Additional items Assist x 1   Cognition   Arousal/Participation Alert; Responsive; Cooperative   Attention Attends with cues to redirect   Activity Tolerance   Activity Tolerance Patient limited by fatigue   Medical Staff Made Aware PT Dusty hobsno  RN    Assessment   Assessment Pt seen for skilled OT treatment w focus on simple self care and mobility/transfers  Pt  On bedpan upon arrival, able to roll over to get off bedpan herself  Pt up to EOB w close S  Good balance sitting at EOB  RN in room, able to translate as she speaks same language as patient  Pt requires max assist for sit to stand and for SPT to chair and back to bed  OT will continue to follow while in acute care  The patient's raw score on the AM-PAC Daily Activity inpatient short form is 18, standardized score is 38 66, less than 39 4  Patients at this level are likely to benefit from discharge to post-acute rehabilitation services  From OT standpoint, recommend STR to maximize functional level      Plan   Treatment Interventions ADL retraining;Functional transfer training   Goal Expiration Date 09/03/21   OT Treatment Day 4   OT Frequency 2-3x/wk   Recommendation   OT Discharge Recommendation Post acute rehabilitation services   OT - OK to Discharge Yes   AM-PAC Daily Activity Inpatient   Lower Body Dressing 2   Bathing 3   Toileting 3   Upper Body Dressing 3   Grooming 3   Eating 4   Daily Activity Raw Score 18   Daily Activity Standardized Score (Calc for Raw Score >=11) 38 66 never

## 2021-09-03 NOTE — PHYSICAL THERAPY NOTE
PHYSICAL THERAPY NOTE          Patient Name: Maddi Betancourt  [id number] Date: 9/3/2021     09/03/21 1023   PT Last Visit   PT Visit Date 09/03/21   Note Type   Note Type Treatment   Pain Assessment   Pain Assessment Tool FLACC   Pain Location/Orientation Orientation: Left; Location: Arm;Location: Leg   Pain Rating: FLACC (Rest) - Face 0   Pain Rating: FLACC (Rest) - Legs 0   Pain Rating: FLACC (Rest) - Activity 0   Pain Rating: FLACC (Rest) - Cry 1   Pain Rating: FLACC (Rest) - Consolability 0   Score: FLACC (Rest) 1   Pain Rating: FLACC (Activity) - Face 1   Pain Rating: FLACC (Activity) - Legs 0   Pain Rating: FLACC (Activity) - Activity 0   Pain Rating: FLACC (Activity) - Cry 1   Pain Rating: FLACC (Activity) - Consolability 0   Score: FLACC (Activity) 2   Restrictions/Precautions   Weight Bearing Precautions Per Order No   Other Precautions Cognitive; Chair Alarm; Bed Alarm; Fall Risk;Pain   General   Chart Reviewed Yes   Response to Previous Treatment Patient with no complaints from previous session  Family/Caregiver Present No   Cognition   Arousal/Participation Alert; Responsive; Cooperative   Attention Attends with cues to redirect   Orientation Level Oriented to person;Oriented to place   Memory Unable to assess   Following Commands Follows one step commands without difficulty   Comments Pt pleasant and cooperative to participate in therapy session  Bed Mobility   Rolling R 5  Supervision   Additional items Assist x 1;Bedrails; Increased time required   Rolling L 5  Supervision   Additional items Assist x 1;Bedrails; Increased time required   Supine to Sit 5  Supervision   Additional items Assist x 1; Increased time required;Verbal cues   Sit to Supine 5  Supervision   Additional items Assist x 1; Increased time required;Verbal cues   Additional Comments Pt supine in bed upon arrival  Pt returned to supine in bed with bed alarm on with all needs within reach at the end of therapy session    Transfers   Sit to Stand 2  Maximal assistance   Additional items Assist x 1; Increased time required;Verbal cues   Stand to Sit 2  Maximal assistance   Additional items Assist x 1; Increased time required;Verbal cues   Stand pivot 2  Maximal assistance   Additional items Assist x 1; Increased time required;Verbal cues   Additional Comments Pt performed 2x SPT from EOB to bedside chair with max Ax1  Ambulation/Elevation   Gait pattern Not tested   Balance   Static Sitting Fair +   Dynamic Sitting Fair   Static Standing Poor   Endurance Deficit   Endurance Deficit Yes   Endurance Deficit Description fatigue, pain, deconditioning    Activity Tolerance   Activity Tolerance Patient tolerated treatment well   Medical Staff Made Aware OT present for part of therapy session- PT focused on bed mobility, funcitonal transfers, therapeutic exercises, sitting static/ dynamic sitting balance  Nurse Made Aware RN cleared pt to participate in therapy session    Exercises   Knee AROM Long Arc Quad Sitting;Bilateral;15 reps;AROM   Ankle Pumps Sitting;Bilateral;15 reps;AROM   Marching Sitting;Bilateral;15 reps;AAROM;AROM  (AROM- RLE; AAROM- LLE )   Neuro re-ed Pt sitting EOB at close S level  Assessment   Prognosis Fair   Problem List Decreased strength;Decreased range of motion; Impaired balance;Decreased mobility; Decreased coordination;Decreased cognition; Impaired judgement;Pain;Decreased safety awareness   Assessment Pt seen for PT treatment session this date  Therapy session focused on bed mobility, sitting static/ dynamic balance, therapeutic exercise,  endurance training in order to improve overall mobility and independence  Pt requires assist of 1 for all mobility performed this date  Pt performed bed mobility tasks at S level; impulsive at times- requires cues for safety  Pt sitting EOB at close S level   Pt performed 2x SPT from EOB <> bedside chair with max Ax1; SBA of 2nd person required for safety  Pt performed/ tolerated seated b/l LE therex to hips, knees, and ankles with no c/o increased pain/ discomfort  Pt's nurse present throughout therapy session to provide interpretation  Pt making progress toward goals  Pt was left supine in bed with bed alarm on at the end of PT session with all needs in reach  Pt would benefit from continued PT services while in hospital to address remaining limitations  PT to continue to follow pt and recommends post-acute rehab services after d/c  The patient's AM-PAC Basic Mobility Inpatient Short Form Low Function Raw Score 17 , Standardized Score is 27 46  A standardized score less 42 9 suggests the patient may benefit from discharge to post-acute rehab services  Please also refer to the recommendation of the Physical Therapist for safe discharge planning  Barriers to Discharge Decreased caregiver support; Inaccessible home environment   Goals   Patient Goals none stated    STG Expiration Date 09/13/21   PT Treatment Day 3   Plan   Treatment/Interventions Functional transfer training;LE strengthening/ROM; Endurance training;Patient/family training; Therapeutic exercise; Bed mobility;Gait training;Spoke to nursing;Spoke to case management;OT   Progress Progressing toward goals   PT Frequency 2-3x/wk   Recommendation   PT Discharge Recommendation Post acute rehabilitation services   PT - OK to Discharge Yes  (once medically cleared )   AM-PAC Basic Mobility Inpatient   Turning in Bed Without Bedrails 3   Lying on Back to Sitting on Edge of Flat Bed 3   Moving Bed to Chair 1   Standing Up From Chair 1   Walk in Room 1   Climb 3-5 Stairs 1   Basic Mobility Inpatient Raw Score 10   Turning Head Towards Sound 4   Follow Simple Instructions 3   Low Function Basic Mobility Raw Score 17   Low Function Basic Mobility Standardized Score 27 46     Mao Burrows, PT, DPT

## 2021-09-03 NOTE — PROGRESS NOTES
Progress Note - Jaun Burciaga 49 y o  female MRN: [id number]    [facility] Encounter: [id number]      Assessment:  Ms Brenda Lugo is a 61-year-old female with primary CNS diffuse large B-cell lymphoma status post cycle 6 of high-dose methotrexate and Leucovorin rescue  Ready for discharge and dispo planning is for her to return home to [facility], leaving either Sam night or Monday morning  Plan:  Dispo planning for return to [facility] either Sam night or Monday morning  All scripts sent to [address] [place] [address] today so they can be picked up prior to her flying home  Arrangement for wheelchair undergoing with care coordinators  Received 6th dose of Leucovorin today  Has follow-up scheduled as an outpatient if she chooses to continue to follow with [facility]  Subjective:   No complaints and sleeping but easily arousable  Objective:     Vitals: Blood pressure 121/76, pulse 87, temperature 98 2 °F (36 8 °C), temperature source Oral, resp  rate 18, height 5' (1 524 m), weight 53 1 kg (117 lb 1 oz), SpO2 97 %  ,Body mass index is 22 86 kg/m²        Intake/Output Summary (Last 24 hours) at 9/3/2021 1643  Last data filed at 9/3/2021 1417  Gross per 24 hour   Intake --   Output 1250 ml   Net -1250 ml       Physical Exam: /76 (BP Location: Left arm)   Pulse 87   Temp 98 2 °F (36 8 °C) (Oral)   Resp 18   Ht 5' (1 524 m)   Wt 53 1 kg (117 lb 1 oz)   SpO2 97%   BMI 22 86 kg/m²     General Appearance:    Alert, cooperative, no distress, appears stated age   Head:    Normocephalic, without obvious abnormality, atraumatic   Eyes:    PERRL, conjunctiva/corneas clear, EOM's intact, fundi     benign, both eyes   Ears:    Normal TM's and external ear canals, both ears   Nose:   Nares normal, septum midline, mucosa normal, no drainage    or sinus tenderness   Throat:   Lips, mucosa, and tongue normal; teeth and gums normal   Neck:   Supple, symmetrical, trachea midline, no adenopathy;      Back: Symmetric, no curvature, ROM normal, no CVA tenderness   Lungs:     Clear to auscultation bilaterally, respirations unlabored   Chest Wall:    No tenderness or deformity    Heart:    Regular rate and rhythm, S1 and S2 normal, no murmur, rub   or gallop       Abdomen:     Soft, non-tender, bowel sounds active all four quadrants,     no masses, no organomegaly           Extremities:   Extremities normal, atraumatic, no cyanosis or edema   Pulses:   2+ and symmetric all extremities                                     Skin:   Skin color, texture, turgor normal, no rashes or lesions                Invasive Devices     Peripherally Inserted Central Catheter Line            PICC Line 97/30/40 Right Basilic 7 days                Lab, Imaging and other studies: I have personally reviewed pertinent reports      VTE Pharmacologic Prophylaxis: Enoxaparin (Lovenox)      Gregorio Robledo MD, PhD

## 2021-09-03 NOTE — CASE MANAGEMENT
buffy Shelton, RAYMUNDO GRADY  Requested call back with info on [facility] and DME benefits    TCF Rock Shelton, can use [facility] [address] [facility]  and [employer] for DME    TCT friend, Mr Kishor Callahan, states  is purchasing airline tickets today, looking at Sunday  He will ask  if he wants us to obtain w/c for her  PT will need COVID test    Discussed with manager MW, concern over safe DC prior to flight at hot    May not be able to obtain [facility] to see pt

## 2021-09-03 NOTE — CASE MANAGEMENT
Ordered w/c for pt  Would be a rental, pt is going back to [facility],  Rep will check if can be supplied    Spoke with friend Mr Arango Shazia  He will be available to assist  with pts discharge and transfers    Family waiting for confirmation from airline for flight to [facility]  Flight will either be Sunday evening or Monday AM   Family will leave voicemail on my phone with day/time      Family requires a letter for airline regarding medical status  Letter is in patients chart  (Copy of letter at my desk)  Family requires copy of COVID results (to be done Saturday)     This will need to be printed and provided to family    Dr Barrera Cornell notified of above  Primary nurse notified and asked to relay to oncoming staff  Weekend staff notified of above

## 2021-09-03 NOTE — PLAN OF CARE
Problem: OCCUPATIONAL THERAPY ADULT  Goal: Performs self-care activities at highest level of function for planned discharge setting  See evaluation for individualized goals  Description: Treatment Interventions: ADL retraining, UE strengthening/ROM, Functional transfer training, Endurance training, Cognitive reorientation, Patient/family training, Equipment evaluation/education, Compensatory technique education, Fine motor coordination activities, Neuromuscular reeducation, Continued evaluation, Energy conservation, Activityengagement          See flowsheet documentation for full assessment, interventions and recommendations  Note: Limitation: Decreased ADL status, Decreased UE ROM, Decreased UE strength, Decreased Safe judgement during ADL, Decreased cognition, Decreased endurance, Decreased fine motor control, Decreased self-care trans, Decreased high-level ADLs  Prognosis: Fair  Assessment: Pt seen for skilled OT treatment w focus on simple self care and mobility/transfers  Pt  On bedpan upon arrival, able to roll over to get off bedpan herself  Pt up to EOB w close S  Good balance sitting at EOB  RN in room, able to translate as she speaks same language as patient  Pt requires max assist for sit to stand and for SPT to chair and back to bed  OT will continue to follow while in acute care  The patient's raw score on the -PAC Daily Activity inpatient short form is 18, standardized score is 38 66, less than 39 4  Patients at this level are likely to benefit from discharge to post-acute rehabilitation services  From OT standpoint, recommend STR to maximize functional level  OT Discharge Recommendation: Post acute rehabilitation services  OT - OK to Discharge:  Yes

## 2021-09-04 LAB
ALBUMIN SERPL BCP-MCNC: 3.1 G/DL (ref 3.5–5)
ALP SERPL-CCNC: 65 U/L (ref 46–116)
ALT SERPL W P-5'-P-CCNC: 32 U/L (ref 12–78)
ANION GAP SERPL CALCULATED.3IONS-SCNC: 3 MMOL/L (ref 4–13)
AST SERPL W P-5'-P-CCNC: 16 U/L (ref 5–45)
BASOPHILS # BLD AUTO: 0.03 THOUSANDS/ΜL (ref 0–0.1)
BASOPHILS NFR BLD AUTO: 1 % (ref 0–1)
BILIRUB SERPL-MCNC: 0.25 MG/DL (ref 0.2–1)
BUN SERPL-MCNC: 17 MG/DL (ref 5–25)
CALCIUM ALBUM COR SERPL-MCNC: 10.4 MG/DL (ref 8.3–10.1)
CALCIUM SERPL-MCNC: 9.7 MG/DL (ref 8.3–10.1)
CHLORIDE SERPL-SCNC: 104 MMOL/L (ref 100–108)
CO2 SERPL-SCNC: 29 MMOL/L (ref 21–32)
CREAT SERPL-MCNC: 0.4 MG/DL (ref 0.6–1.3)
EOSINOPHIL # BLD AUTO: 0.11 THOUSAND/ΜL (ref 0–0.61)
EOSINOPHIL NFR BLD AUTO: 3 % (ref 0–6)
ERYTHROCYTE [DISTWIDTH] IN BLOOD BY AUTOMATED COUNT: 16.7 % (ref 11.6–15.1)
GFR SERPL CREATININE-BSD FRML MDRD: 118 ML/MIN/1.73SQ M
GLUCOSE SERPL-MCNC: 101 MG/DL (ref 65–140)
GLUCOSE SERPL-MCNC: 142 MG/DL (ref 65–140)
GLUCOSE SERPL-MCNC: 152 MG/DL (ref 65–140)
GLUCOSE SERPL-MCNC: 83 MG/DL (ref 65–140)
HCT VFR BLD AUTO: 31.8 % (ref 34.8–46.1)
HGB BLD-MCNC: 10.1 G/DL (ref 11.5–15.4)
IMM GRANULOCYTES # BLD AUTO: 0.07 THOUSAND/UL (ref 0–0.2)
IMM GRANULOCYTES NFR BLD AUTO: 2 % (ref 0–2)
LYMPHOCYTES # BLD AUTO: 2.46 THOUSANDS/ΜL (ref 0.6–4.47)
LYMPHOCYTES NFR BLD AUTO: 72 % (ref 14–44)
MAGNESIUM SERPL-MCNC: 1.8 MG/DL (ref 1.6–2.6)
MCH RBC QN AUTO: 27.7 PG (ref 26.8–34.3)
MCHC RBC AUTO-ENTMCNC: 31.8 G/DL (ref 31.4–37.4)
MCV RBC AUTO: 87 FL (ref 82–98)
MONOCYTES # BLD AUTO: 0.42 THOUSAND/ΜL (ref 0.17–1.22)
MONOCYTES NFR BLD AUTO: 12 % (ref 4–12)
NEUTROPHILS # BLD AUTO: 0.35 THOUSANDS/ΜL (ref 1.85–7.62)
NEUTS SEG NFR BLD AUTO: 10 % (ref 43–75)
NRBC BLD AUTO-RTO: 0 /100 WBCS
PLATELET # BLD AUTO: 275 THOUSANDS/UL (ref 149–390)
PMV BLD AUTO: 11 FL (ref 8.9–12.7)
POTASSIUM SERPL-SCNC: 3.9 MMOL/L (ref 3.5–5.3)
PROT SERPL-MCNC: 6.5 G/DL (ref 6.4–8.2)
RBC # BLD AUTO: 3.64 MILLION/UL (ref 3.81–5.12)
SARS-COV-2 RNA RESP QL NAA+PROBE: NEGATIVE
SODIUM SERPL-SCNC: 136 MMOL/L (ref 136–145)
WBC # BLD AUTO: 3.44 THOUSAND/UL (ref 4.31–10.16)

## 2021-09-04 PROCEDURE — 99232 SBSQ HOSP IP/OBS MODERATE 35: CPT | Performed by: INTERNAL MEDICINE

## 2021-09-04 PROCEDURE — 83735 ASSAY OF MAGNESIUM: CPT | Performed by: INTERNAL MEDICINE

## 2021-09-04 PROCEDURE — 80053 COMPREHEN METABOLIC PANEL: CPT | Performed by: INTERNAL MEDICINE

## 2021-09-04 PROCEDURE — U0003 INFECTIOUS AGENT DETECTION BY NUCLEIC ACID (DNA OR RNA); SEVERE ACUTE RESPIRATORY SYNDROME CORONAVIRUS 2 (SARS-COV-2) (CORONAVIRUS DISEASE [COVID-19]), AMPLIFIED PROBE TECHNIQUE, MAKING USE OF HIGH THROUGHPUT TECHNOLOGIES AS DESCRIBED BY CMS-2020-01-R: HCPCS | Performed by: PHYSICIAN ASSISTANT

## 2021-09-04 PROCEDURE — 82948 REAGENT STRIP/BLOOD GLUCOSE: CPT

## 2021-09-04 PROCEDURE — 85025 COMPLETE CBC W/AUTO DIFF WBC: CPT | Performed by: INTERNAL MEDICINE

## 2021-09-04 PROCEDURE — U0005 INFEC AGEN DETEC AMPLI PROBE: HCPCS | Performed by: PHYSICIAN ASSISTANT

## 2021-09-04 RX ADMIN — SERTRALINE 25 MG: 25 TABLET, FILM COATED ORAL at 08:27

## 2021-09-04 RX ADMIN — DOCUSATE SODIUM 100 MG: 100 CAPSULE, LIQUID FILLED ORAL at 15:42

## 2021-09-04 RX ADMIN — SENNOSIDES 8.6 MG: 8.6 TABLET ORAL at 22:39

## 2021-09-04 RX ADMIN — FAMOTIDINE 20 MG: 20 TABLET ORAL at 08:27

## 2021-09-04 RX ADMIN — DICYCLOMINE HYDROCHLORIDE 10 MG: 10 CAPSULE ORAL at 22:39

## 2021-09-04 RX ADMIN — DOCUSATE SODIUM 100 MG: 100 CAPSULE, LIQUID FILLED ORAL at 08:27

## 2021-09-04 RX ADMIN — INSULIN LISPRO 1 UNITS: 100 INJECTION, SOLUTION INTRAVENOUS; SUBCUTANEOUS at 22:39

## 2021-09-04 RX ADMIN — DICYCLOMINE HYDROCHLORIDE 10 MG: 10 CAPSULE ORAL at 15:41

## 2021-09-04 RX ADMIN — ENOXAPARIN SODIUM 40 MG: 40 INJECTION SUBCUTANEOUS at 08:28

## 2021-09-04 RX ADMIN — DICYCLOMINE HYDROCHLORIDE 10 MG: 10 CAPSULE ORAL at 11:27

## 2021-09-04 RX ADMIN — OXYCODONE HYDROCHLORIDE 5 MG: 5 TABLET ORAL at 06:20

## 2021-09-04 RX ADMIN — DICYCLOMINE HYDROCHLORIDE 10 MG: 10 CAPSULE ORAL at 06:20

## 2021-09-04 RX ADMIN — OXYCODONE HYDROCHLORIDE 5 MG: 5 TABLET ORAL at 14:14

## 2021-09-04 RX ADMIN — GABAPENTIN 200 MG: 100 CAPSULE ORAL at 22:39

## 2021-09-04 RX ADMIN — FOLIC ACID 1 MG: 1 TABLET ORAL at 08:27

## 2021-09-04 RX ADMIN — ATORVASTATIN CALCIUM 10 MG: 10 TABLET, FILM COATED ORAL at 15:42

## 2021-09-04 RX ADMIN — OXYCODONE HYDROCHLORIDE 5 MG: 5 TABLET ORAL at 02:14

## 2021-09-04 NOTE — PROGRESS NOTES
Progress Note - Robert Nielsen 49 y o  female MRN: [id number]    [facility] Encounter: [id number]      Assessment:  Ms Eduardo Cross is a 49 yr female with primary CNS diffuse large B-cell lymphoma who completed 6 cycles of high-dose methotrexate and Leucovorin rescue  Medically she is ready for discharge and is planning to go back to [facility] either tomorrow night or Monday morning  Plan:  Completed disposition planning for her return to [facility] and send scripts to our pharmacy yesterday  Arranging all other aspects of discharge in anticipation for tomorrow night or Monday  She has outpatient follow-up if she decides to stay and continue to follow with [facility]  Subjective:   Appears well this morning and is smiling and happy when I saw her  Nods yes that she is could not feeling well  No complaints  No pain  Objective:     Vitals: Blood pressure 127/75, pulse 79, temperature 98 3 °F (36 8 °C), resp  rate 18, height 5' (1 524 m), weight 53 1 kg (117 lb 1 oz), SpO2 99 %  ,Body mass index is 22 86 kg/m²  Intake/Output Summary (Last 24 hours) at 9/4/2021 1011  Last data filed at 9/4/2021 0801  Gross per 24 hour   Intake 480 ml   Output 521 ml   Net -41 ml       Physical Exam: /75   Pulse 79   Temp 98 3 °F (36 8 °C)   Resp 18   Ht 5' (1 524 m)   Wt 53 1 kg (117 lb 1 oz)   SpO2 99%   BMI 22 86 kg/m²     Physical Exam  Constitutional:       General: She is not in acute distress  Appearance: Normal appearance  She is not toxic-appearing  HENT:      Mouth/Throat:      Mouth: Mucous membranes are moist       Pharynx: Oropharynx is clear  Eyes:      General: No scleral icterus  Cardiovascular:      Rate and Rhythm: Normal rate and regular rhythm  Pulses: Normal pulses  Heart sounds: No murmur heard  No friction rub  No gallop  Pulmonary:      Effort: Pulmonary effort is normal  No respiratory distress  Breath sounds: Normal breath sounds   No wheezing or rales    Abdominal:      General: There is no distension  Palpations: There is no mass  Tenderness: There is no abdominal tenderness  There is no rebound  Musculoskeletal:         General: No swelling or tenderness  Right lower leg: No edema  Left lower leg: No edema  Skin:     Findings: No rash  Neurological:      General: No focal deficit present  Mental Status: She is alert and oriented to person, place, and time  Psychiatric:         Mood and Affect: Mood normal          Behavior: Behavior normal          Thought Content: Thought content normal          Judgment: Judgment normal          Invasive Devices     Peripherally Inserted Central Catheter Line            PICC Line 72/67/78 Right Basilic 7 days                Lab, Imaging and other studies: I have personally reviewed pertinent reports      VTE Mechanical Prophylaxis: sequential compression device     Nicholas Dejesus MD, PhD

## 2021-09-05 LAB
ALBUMIN SERPL BCP-MCNC: 3.2 G/DL (ref 3.5–5)
ALP SERPL-CCNC: 69 U/L (ref 46–116)
ALT SERPL W P-5'-P-CCNC: 32 U/L (ref 12–78)
ANION GAP SERPL CALCULATED.3IONS-SCNC: 5 MMOL/L (ref 4–13)
AST SERPL W P-5'-P-CCNC: 21 U/L (ref 5–45)
BASOPHILS # BLD AUTO: 0.05 THOUSANDS/ΜL (ref 0–0.1)
BASOPHILS NFR BLD AUTO: 2 % (ref 0–1)
BILIRUB SERPL-MCNC: 0.18 MG/DL (ref 0.2–1)
BUN SERPL-MCNC: 18 MG/DL (ref 5–25)
CALCIUM ALBUM COR SERPL-MCNC: 10.2 MG/DL (ref 8.3–10.1)
CALCIUM SERPL-MCNC: 9.6 MG/DL (ref 8.3–10.1)
CHLORIDE SERPL-SCNC: 105 MMOL/L (ref 100–108)
CO2 SERPL-SCNC: 28 MMOL/L (ref 21–32)
CREAT SERPL-MCNC: 0.44 MG/DL (ref 0.6–1.3)
EOSINOPHIL # BLD AUTO: 0.11 THOUSAND/ΜL (ref 0–0.61)
EOSINOPHIL NFR BLD AUTO: 4 % (ref 0–6)
ERYTHROCYTE [DISTWIDTH] IN BLOOD BY AUTOMATED COUNT: 17.2 % (ref 11.6–15.1)
GFR SERPL CREATININE-BSD FRML MDRD: 115 ML/MIN/1.73SQ M
GLUCOSE SERPL-MCNC: 112 MG/DL (ref 65–140)
GLUCOSE SERPL-MCNC: 128 MG/DL (ref 65–140)
GLUCOSE SERPL-MCNC: 89 MG/DL (ref 65–140)
GLUCOSE SERPL-MCNC: 91 MG/DL (ref 65–140)
GLUCOSE SERPL-MCNC: 97 MG/DL (ref 65–140)
HCT VFR BLD AUTO: 32.9 % (ref 34.8–46.1)
HGB BLD-MCNC: 10.4 G/DL (ref 11.5–15.4)
IMM GRANULOCYTES # BLD AUTO: 0.05 THOUSAND/UL (ref 0–0.2)
IMM GRANULOCYTES NFR BLD AUTO: 2 % (ref 0–2)
LYMPHOCYTES # BLD AUTO: 1.97 THOUSANDS/ΜL (ref 0.6–4.47)
LYMPHOCYTES NFR BLD AUTO: 62 % (ref 14–44)
MAGNESIUM SERPL-MCNC: 2 MG/DL (ref 1.6–2.6)
MCH RBC QN AUTO: 27.5 PG (ref 26.8–34.3)
MCHC RBC AUTO-ENTMCNC: 31.6 G/DL (ref 31.4–37.4)
MCV RBC AUTO: 87 FL (ref 82–98)
MONOCYTES # BLD AUTO: 0.39 THOUSAND/ΜL (ref 0.17–1.22)
MONOCYTES NFR BLD AUTO: 13 % (ref 4–12)
NEUTROPHILS # BLD AUTO: 0.51 THOUSANDS/ΜL (ref 1.85–7.62)
NEUTS SEG NFR BLD AUTO: 17 % (ref 43–75)
NRBC BLD AUTO-RTO: 0 /100 WBCS
PLATELET # BLD AUTO: 245 THOUSANDS/UL (ref 149–390)
PMV BLD AUTO: 10.8 FL (ref 8.9–12.7)
POTASSIUM SERPL-SCNC: 3.6 MMOL/L (ref 3.5–5.3)
PROT SERPL-MCNC: 6.7 G/DL (ref 6.4–8.2)
RBC # BLD AUTO: 3.78 MILLION/UL (ref 3.81–5.12)
SODIUM SERPL-SCNC: 138 MMOL/L (ref 136–145)
WBC # BLD AUTO: 3.08 THOUSAND/UL (ref 4.31–10.16)

## 2021-09-05 PROCEDURE — 85025 COMPLETE CBC W/AUTO DIFF WBC: CPT | Performed by: INTERNAL MEDICINE

## 2021-09-05 PROCEDURE — 80053 COMPREHEN METABOLIC PANEL: CPT | Performed by: INTERNAL MEDICINE

## 2021-09-05 PROCEDURE — 83735 ASSAY OF MAGNESIUM: CPT | Performed by: INTERNAL MEDICINE

## 2021-09-05 PROCEDURE — 99232 SBSQ HOSP IP/OBS MODERATE 35: CPT | Performed by: INTERNAL MEDICINE

## 2021-09-05 PROCEDURE — 82948 REAGENT STRIP/BLOOD GLUCOSE: CPT

## 2021-09-05 RX ADMIN — DICYCLOMINE HYDROCHLORIDE 10 MG: 10 CAPSULE ORAL at 13:27

## 2021-09-05 RX ADMIN — DOCUSATE SODIUM 100 MG: 100 CAPSULE, LIQUID FILLED ORAL at 10:14

## 2021-09-05 RX ADMIN — OXYCODONE HYDROCHLORIDE 5 MG: 5 TABLET ORAL at 10:17

## 2021-09-05 RX ADMIN — DICYCLOMINE HYDROCHLORIDE 10 MG: 10 CAPSULE ORAL at 05:39

## 2021-09-05 RX ADMIN — SERTRALINE 25 MG: 25 TABLET, FILM COATED ORAL at 10:14

## 2021-09-05 RX ADMIN — ATORVASTATIN CALCIUM 10 MG: 10 TABLET, FILM COATED ORAL at 15:58

## 2021-09-05 RX ADMIN — OXYCODONE HYDROCHLORIDE 5 MG: 5 TABLET ORAL at 23:31

## 2021-09-05 RX ADMIN — ENOXAPARIN SODIUM 40 MG: 40 INJECTION SUBCUTANEOUS at 10:14

## 2021-09-05 RX ADMIN — DOCUSATE SODIUM 100 MG: 100 CAPSULE, LIQUID FILLED ORAL at 18:19

## 2021-09-05 RX ADMIN — FAMOTIDINE 20 MG: 20 TABLET ORAL at 10:14

## 2021-09-05 RX ADMIN — FOLIC ACID 1 MG: 1 TABLET ORAL at 10:14

## 2021-09-05 RX ADMIN — SENNOSIDES 8.6 MG: 8.6 TABLET ORAL at 21:45

## 2021-09-05 RX ADMIN — DICYCLOMINE HYDROCHLORIDE 10 MG: 10 CAPSULE ORAL at 21:46

## 2021-09-05 RX ADMIN — DICYCLOMINE HYDROCHLORIDE 10 MG: 10 CAPSULE ORAL at 15:58

## 2021-09-05 RX ADMIN — OXYCODONE HYDROCHLORIDE 5 MG: 5 TABLET ORAL at 15:57

## 2021-09-05 RX ADMIN — GABAPENTIN 200 MG: 100 CAPSULE ORAL at 21:45

## 2021-09-05 NOTE — PROGRESS NOTES
Progress Note - Chai Jang 49 y o  female MRN: [id number]    Unit/Bed#: [facility] 909-01 Encounter: [id number]      Assessment:  Ms Camilla Jean is a 28-year-old woman with CNS diffuse large B-cell lymphoma status post 6 cycles of high-dose methotrexate and rituximab (only received two doses rituximab) with response to treatment  Plan:  Ms Camilla Jean is doing well and has recovered from her last treatment  She will be discharged today and will be returning to [facility]  If she chooses to stay in follow-up with [facility], she has a follow-up appointment with medical oncology  Subjective:   Doing well this morning, no issues or complaints  Sleeping but arousable  Objective:     Vitals: Blood pressure 116/73, pulse 83, temperature 97 9 °F (36 6 °C), resp  rate 18, height 5' (1 524 m), weight 52 7 kg (116 lb 2 9 oz), SpO2 100 %  ,Body mass index is 22 69 kg/m²  Intake/Output Summary (Last 24 hours) at 9/5/2021 1044  Last data filed at 9/5/2021 0501  Gross per 24 hour   Intake 480 ml   Output 1300 ml   Net -820 ml       Physical Exam: /73   Pulse 83   Temp 97 9 °F (36 6 °C)   Resp 18   Ht 5' (1 524 m)   Wt 52 7 kg (116 lb 2 9 oz)   SpO2 100%   BMI 22 69 kg/m²   General appearance: sleeping but arousable  Lungs: clear to auscultation bilaterally  Heart: regular rate and rhythm, S1, S2 normal, no murmur, click, rub or gallop  Abdomen: soft, non-tender; bowel sounds normal; no masses,  no organomegaly  Extremities: extremities normal, warm and well-perfused; no cyanosis, clubbing, or edema  Pulses: 2+ and symmetric  Skin: Skin color, texture, turgor normal  No rashes or lesions     Invasive Devices     Peripherally Inserted Central Catheter Line            PICC Line 38/54/85 Right Basilic 8 days                Lab, Imaging and other studies: I have personally reviewed pertinent reports        VTE Mechanical Prophylaxis: sequential compression device     Nicholas Dejesus MD, PhD

## 2021-09-05 NOTE — CASE MANAGEMENT
No VM from Mr Mary Levy regarding day/time of flight for patient to return to [facility]  COVID results from 9/4/2021 available, letter in chart as requested  CM will continue to follow at this time

## 2021-09-06 LAB
ALBUMIN SERPL BCP-MCNC: 3.3 G/DL (ref 3.5–5)
ALP SERPL-CCNC: 67 U/L (ref 46–116)
ALT SERPL W P-5'-P-CCNC: 29 U/L (ref 12–78)
ANION GAP SERPL CALCULATED.3IONS-SCNC: 5 MMOL/L (ref 4–13)
AST SERPL W P-5'-P-CCNC: 18 U/L (ref 5–45)
BASOPHILS # BLD AUTO: 0.04 THOUSANDS/ΜL (ref 0–0.1)
BASOPHILS NFR BLD AUTO: 1 % (ref 0–1)
BILIRUB SERPL-MCNC: 0.26 MG/DL (ref 0.2–1)
BUN SERPL-MCNC: 18 MG/DL (ref 5–25)
CALCIUM ALBUM COR SERPL-MCNC: 10.7 MG/DL (ref 8.3–10.1)
CALCIUM SERPL-MCNC: 10.1 MG/DL (ref 8.3–10.1)
CHLORIDE SERPL-SCNC: 105 MMOL/L (ref 100–108)
CO2 SERPL-SCNC: 28 MMOL/L (ref 21–32)
CREAT SERPL-MCNC: 0.42 MG/DL (ref 0.6–1.3)
EOSINOPHIL # BLD AUTO: 0.16 THOUSAND/ΜL (ref 0–0.61)
EOSINOPHIL NFR BLD AUTO: 4 % (ref 0–6)
ERYTHROCYTE [DISTWIDTH] IN BLOOD BY AUTOMATED COUNT: 17.6 % (ref 11.6–15.1)
GFR SERPL CREATININE-BSD FRML MDRD: 117 ML/MIN/1.73SQ M
GLUCOSE SERPL-MCNC: 130 MG/DL (ref 65–140)
GLUCOSE SERPL-MCNC: 130 MG/DL (ref 65–140)
GLUCOSE SERPL-MCNC: 91 MG/DL (ref 65–140)
GLUCOSE SERPL-MCNC: 93 MG/DL (ref 65–140)
GLUCOSE SERPL-MCNC: 96 MG/DL (ref 65–140)
HCT VFR BLD AUTO: 34 % (ref 34.8–46.1)
HGB BLD-MCNC: 10.7 G/DL (ref 11.5–15.4)
IMM GRANULOCYTES # BLD AUTO: 0.06 THOUSAND/UL (ref 0–0.2)
IMM GRANULOCYTES NFR BLD AUTO: 2 % (ref 0–2)
LYMPHOCYTES # BLD AUTO: 2.21 THOUSANDS/ΜL (ref 0.6–4.47)
LYMPHOCYTES NFR BLD AUTO: 59 % (ref 14–44)
MAGNESIUM SERPL-MCNC: 2 MG/DL (ref 1.6–2.6)
MCH RBC QN AUTO: 27.4 PG (ref 26.8–34.3)
MCHC RBC AUTO-ENTMCNC: 31.5 G/DL (ref 31.4–37.4)
MCV RBC AUTO: 87 FL (ref 82–98)
MONOCYTES # BLD AUTO: 0.43 THOUSAND/ΜL (ref 0.17–1.22)
MONOCYTES NFR BLD AUTO: 12 % (ref 4–12)
NEUTROPHILS # BLD AUTO: 0.81 THOUSANDS/ΜL (ref 1.85–7.62)
NEUTS SEG NFR BLD AUTO: 22 % (ref 43–75)
NRBC BLD AUTO-RTO: 0 /100 WBCS
PLATELET # BLD AUTO: 258 THOUSANDS/UL (ref 149–390)
PMV BLD AUTO: 11 FL (ref 8.9–12.7)
POTASSIUM SERPL-SCNC: 3.8 MMOL/L (ref 3.5–5.3)
PROT SERPL-MCNC: 6.7 G/DL (ref 6.4–8.2)
RBC # BLD AUTO: 3.9 MILLION/UL (ref 3.81–5.12)
SODIUM SERPL-SCNC: 138 MMOL/L (ref 136–145)
WBC # BLD AUTO: 3.71 THOUSAND/UL (ref 4.31–10.16)

## 2021-09-06 PROCEDURE — 99232 SBSQ HOSP IP/OBS MODERATE 35: CPT | Performed by: INTERNAL MEDICINE

## 2021-09-06 PROCEDURE — 85025 COMPLETE CBC W/AUTO DIFF WBC: CPT | Performed by: INTERNAL MEDICINE

## 2021-09-06 PROCEDURE — 83735 ASSAY OF MAGNESIUM: CPT | Performed by: INTERNAL MEDICINE

## 2021-09-06 PROCEDURE — 80053 COMPREHEN METABOLIC PANEL: CPT | Performed by: INTERNAL MEDICINE

## 2021-09-06 PROCEDURE — 82948 REAGENT STRIP/BLOOD GLUCOSE: CPT

## 2021-09-06 RX ADMIN — OXYCODONE HYDROCHLORIDE 5 MG: 5 TABLET ORAL at 09:51

## 2021-09-06 RX ADMIN — ERGOCALCIFEROL 50000 UNITS: 1.25 CAPSULE ORAL at 09:52

## 2021-09-06 RX ADMIN — FAMOTIDINE 20 MG: 20 TABLET ORAL at 09:51

## 2021-09-06 RX ADMIN — FOLIC ACID 1 MG: 1 TABLET ORAL at 09:51

## 2021-09-06 RX ADMIN — ACETAMINOPHEN 488 MG: 325 TABLET, FILM COATED ORAL at 17:42

## 2021-09-06 RX ADMIN — SERTRALINE 25 MG: 25 TABLET, FILM COATED ORAL at 09:51

## 2021-09-06 RX ADMIN — POLYETHYLENE GLYCOL 3350 17 G: 17 POWDER, FOR SOLUTION ORAL at 15:47

## 2021-09-06 RX ADMIN — ATORVASTATIN CALCIUM 10 MG: 10 TABLET, FILM COATED ORAL at 17:42

## 2021-09-06 RX ADMIN — DICYCLOMINE HYDROCHLORIDE 10 MG: 10 CAPSULE ORAL at 15:47

## 2021-09-06 RX ADMIN — OXYCODONE HYDROCHLORIDE 5 MG: 5 TABLET ORAL at 15:47

## 2021-09-06 RX ADMIN — DICYCLOMINE HYDROCHLORIDE 10 MG: 10 CAPSULE ORAL at 21:33

## 2021-09-06 RX ADMIN — SENNOSIDES 8.6 MG: 8.6 TABLET ORAL at 21:33

## 2021-09-06 RX ADMIN — OXYCODONE HYDROCHLORIDE 5 MG: 5 TABLET ORAL at 04:50

## 2021-09-06 RX ADMIN — DOCUSATE SODIUM 100 MG: 100 CAPSULE, LIQUID FILLED ORAL at 17:42

## 2021-09-06 RX ADMIN — DOCUSATE SODIUM 100 MG: 100 CAPSULE, LIQUID FILLED ORAL at 09:51

## 2021-09-06 RX ADMIN — ENOXAPARIN SODIUM 40 MG: 40 INJECTION SUBCUTANEOUS at 09:50

## 2021-09-06 RX ADMIN — OXYCODONE HYDROCHLORIDE 5 MG: 5 TABLET ORAL at 21:33

## 2021-09-06 RX ADMIN — GABAPENTIN 200 MG: 100 CAPSULE ORAL at 21:33

## 2021-09-06 RX ADMIN — DICYCLOMINE HYDROCHLORIDE 10 MG: 10 CAPSULE ORAL at 11:59

## 2021-09-06 RX ADMIN — DICYCLOMINE HYDROCHLORIDE 10 MG: 10 CAPSULE ORAL at 05:43

## 2021-09-06 NOTE — UTILIZATION REVIEW
Continued Stay Review    Date: 9/4                  Current Patient Class:  IP   Current Level of Care:  MS     HPI:56 y o  female initially admitted on 8/27 for Chemo cycle 6 of HD methotrexate with Leucovorin rescue and rituxan therapy q2 weeks     Assessment/Plan: CNS diffuse large B-cell lymphoma who completed 6 cycles of high-dose methotrexate and Leucovorin rescue  Medically she is ready for discharge and is planning to go back to [facility] either tomorrow night or Monday morning  Vital Signs: 127/75, pulse 79, temperature 98 3 °F (36 8 °C), resp  rate 18, height 5' (1 524 m), weight 53 1 kg (117 lb 1 oz), SpO2 99 %  ,Body mass index is 22 86 kg/m²    Pertinent Labs/Diagnostic Results:   Results from last 7 days   Lab Units 09/04/21  1130   SARS-COV-2  Negative     Results from last 7 days   Lab Units 09/04/21 0423 09/03/21 0442 09/02/21  0514   WBC Thousand/uL 3 44* 2 47* 2 44*   HEMOGLOBIN g/dL 10 1* 9 5* 9 5*   HEMATOCRIT % 31 8* 30 5* 30 5*   PLATELETS Thousands/uL 275 279 268   NEUTROS ABS Thousands/µL 0 35*  --   --      Results from last 7 days   Lab Units 09/04/21  0423 09/03/21  0442 09/02/21  0514   SODIUM mmol/L 136 137 140   POTASSIUM mmol/L 3 9 4 0 3 9   CHLORIDE mmol/L 104 104 104   CO2 mmol/L 29 29 29   ANION GAP mmol/L 3* 4 7   BUN mg/dL 17 14 13   CREATININE mg/dL 0 40* 0 39* 0 39*   EGFR ml/min/1 73sq m 118 119 119   CALCIUM mg/dL 9 7 9 9 9 9   MAGNESIUM mg/dL 1 8 1 8 1 9     Results from last 7 days   Lab Units 09/04/21  0423 09/03/21  0442 09/02/21  0514   AST U/L 16 22 21   ALT U/L 32 34 29   ALK PHOS U/L 65 62 59   TOTAL PROTEIN g/dL 6 5 6 4 6 2*   ALBUMIN g/dL 3 1* 3 0* 2 9*   TOTAL BILIRUBIN mg/dL 0 25 0 25 0 43     Lab Units 09/04/21  2042 09/04/21  1644 09/04/21  1127 09/03/21 2036 09/03/21  1632 09/03/21  1104 09/03/21  0814   POC GLUCOSE mg/dl 152* 101 142* 172* 111 114 96     Results from last 7 days   Lab Units 09/04/21  0423 09/03/21  0442 09/02/21  0514 09/01/21  7392 08/31/21  0519   GLUCOSE RANDOM mg/dL 83 97 91 101 108     Medications:   Scheduled Medications:  atorvastatin, 10 mg, Oral, Daily With Dinner  dicyclomine, 10 mg, Oral, 4x Daily (AC & HS)  docusate sodium, 100 mg, Oral, BID  enoxaparin, 40 mg, Subcutaneous, Daily  ergocalciferol, 50,000 Units, Oral, Weekly  famotidine, 20 mg, Oral, Daily  folic acid, 1 mg, Oral, Daily  gabapentin, 200 mg, Oral, HS  insulin lispro, 1-5 Units, Subcutaneous, TID AC  insulin lispro, 1-5 Units, Subcutaneous, HS  ondansetron with dexamethasone IVPB, , Intravenous, Once  senna, 1 tablet, Oral, HS  sertraline, 25 mg, Oral, Daily      Continuous IV Infusions:     PRN Meds:  acetaminophen, 488 mg, Oral, Q6H PRN  oxyCODONE, 5 mg, Oral, Q4H PRN  polyethylene glycol, 17 g, Oral, Daily PRN  sodium chloride, 20 mL/hr, Intravenous, Once PRN        Discharge Plan: D     Network Utilization Review Department  ATTENTION: Please call with any questions or concerns to [phone] and carefully listen to the prompts so that you are directed to the right person  All voicemails are confidential   Justo Vick all requests for admission clinical reviews, approved or denied determinations and any other requests to dedicated fax number below belonging to the campus where the patient is receiving treatment   List of dedicated fax numbers for the Facilities:  [address] [place] [address] DENIALS (Administrative/Medical Necessity) [phone]   [address] (Maternity/NICU/Pediatrics) [phone]   [address] 40 [address] [facility] [address] [id number] [facility] [address] [place] [address] [address],Suite [facility] [address] [place] [address] [phone]

## 2021-09-06 NOTE — PROGRESS NOTES
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  She is status post treatment with high-dose methotrexate and rituximab though only for a few cycles with rituximab due to insurance issues, ready for discharge  Plan:  Plan has been for discharge and return home to [facility] with discussion with her  stating flights were either for last night or this morning  No voicemail was left as was promised by her  for further information  Social work has not been able to reach her  for finalized plans  All scripts have been sent to the pharmacy in anticipation of her discharge  Will continue to work on patient's discharge  Subjective:   She is in pain this morning  Just received pain medication prior to my seeing her  Eating okay  No other issues  Objective:     Vitals: Blood pressure 130/80, pulse 75, temperature 98 1 °F (36 7 °C), resp  rate 16, height 5' (1 524 m), weight 53 kg (116 lb 13 5 oz), SpO2 99 %  ,Body mass index is 22 82 kg/m²        Intake/Output Summary (Last 24 hours) at 9/6/2021 1144  Last data filed at 9/6/2021 0900  Gross per 24 hour   Intake 480 ml   Output 1325 ml   Net -845 ml       Physical Exam: /80   Pulse 75   Temp 98 1 °F (36 7 °C)   Resp 16   Ht 5' (1 524 m)   Wt 53 kg (116 lb 13 5 oz)   SpO2 99%   BMI 22 82 kg/m²   General appearance: alert and oriented, in no acute distress  Lungs: clear to auscultation bilaterally  Heart: regular rate and rhythm, S1, S2 normal, no murmur, click, rub or gallop  Abdomen: soft, non-tender; bowel sounds normal; no masses,  no organomegaly  Extremities: extremities normal, warm and well-perfused; no cyanosis, clubbing, or edema  Pulses: 2+ and symmetric  Skin: Skin color, texture, turgor normal  No rashes or lesions  Neurologic: Grossly normal, alert Invasive Devices     Peripherally Inserted Central Catheter Line            PICC Line 57/51/63 Right Basilic 9 days                Lab, Imaging and other studies: I have personally reviewed pertinent reports      VTE Pharmacologic Prophylaxis: Enoxaparin (Lovenox)  VTE Mechanical Prophylaxis: sequential compression device     Wei Alford MD, PhD

## 2021-09-07 LAB
ALBUMIN SERPL BCP-MCNC: 3.2 G/DL (ref 3.5–5)
ALP SERPL-CCNC: 72 U/L (ref 46–116)
ALT SERPL W P-5'-P-CCNC: 31 U/L (ref 12–78)
ANION GAP SERPL CALCULATED.3IONS-SCNC: 4 MMOL/L (ref 4–13)
AST SERPL W P-5'-P-CCNC: 23 U/L (ref 5–45)
BASOPHILS # BLD AUTO: 0.05 THOUSANDS/ΜL (ref 0–0.1)
BASOPHILS NFR BLD AUTO: 1 % (ref 0–1)
BILIRUB SERPL-MCNC: 0.36 MG/DL (ref 0.2–1)
BUN SERPL-MCNC: 18 MG/DL (ref 5–25)
CALCIUM ALBUM COR SERPL-MCNC: 10.1 MG/DL (ref 8.3–10.1)
CALCIUM SERPL-MCNC: 9.5 MG/DL (ref 8.3–10.1)
CHLORIDE SERPL-SCNC: 107 MMOL/L (ref 100–108)
CO2 SERPL-SCNC: 28 MMOL/L (ref 21–32)
CREAT SERPL-MCNC: 0.49 MG/DL (ref 0.6–1.3)
EOSINOPHIL # BLD AUTO: 0.15 THOUSAND/ΜL (ref 0–0.61)
EOSINOPHIL NFR BLD AUTO: 4 % (ref 0–6)
ERYTHROCYTE [DISTWIDTH] IN BLOOD BY AUTOMATED COUNT: 17.2 % (ref 11.6–15.1)
GFR SERPL CREATININE-BSD FRML MDRD: 111 ML/MIN/1.73SQ M
GLUCOSE SERPL-MCNC: 126 MG/DL (ref 65–140)
GLUCOSE SERPL-MCNC: 132 MG/DL (ref 65–140)
GLUCOSE SERPL-MCNC: 139 MG/DL (ref 65–140)
GLUCOSE SERPL-MCNC: 154 MG/DL (ref 65–140)
GLUCOSE SERPL-MCNC: 96 MG/DL (ref 65–140)
HCT VFR BLD AUTO: 31.7 % (ref 34.8–46.1)
HGB BLD-MCNC: 10 G/DL (ref 11.5–15.4)
IMM GRANULOCYTES # BLD AUTO: 0.03 THOUSAND/UL (ref 0–0.2)
IMM GRANULOCYTES NFR BLD AUTO: 1 % (ref 0–2)
LYMPHOCYTES # BLD AUTO: 2.02 THOUSANDS/ΜL (ref 0.6–4.47)
LYMPHOCYTES NFR BLD AUTO: 49 % (ref 14–44)
MAGNESIUM SERPL-MCNC: 2 MG/DL (ref 1.6–2.6)
MCH RBC QN AUTO: 27.8 PG (ref 26.8–34.3)
MCHC RBC AUTO-ENTMCNC: 31.5 G/DL (ref 31.4–37.4)
MCV RBC AUTO: 88 FL (ref 82–98)
MONOCYTES # BLD AUTO: 0.51 THOUSAND/ΜL (ref 0.17–1.22)
MONOCYTES NFR BLD AUTO: 12 % (ref 4–12)
NEUTROPHILS # BLD AUTO: 1.34 THOUSANDS/ΜL (ref 1.85–7.62)
NEUTS SEG NFR BLD AUTO: 33 % (ref 43–75)
NRBC BLD AUTO-RTO: 0 /100 WBCS
PLATELET # BLD AUTO: 276 THOUSANDS/UL (ref 149–390)
PMV BLD AUTO: 11.5 FL (ref 8.9–12.7)
POTASSIUM SERPL-SCNC: 4.1 MMOL/L (ref 3.5–5.3)
PROT SERPL-MCNC: 6.5 G/DL (ref 6.4–8.2)
RBC # BLD AUTO: 3.6 MILLION/UL (ref 3.81–5.12)
SARS-COV-2 RNA RESP QL NAA+PROBE: NEGATIVE
SODIUM SERPL-SCNC: 139 MMOL/L (ref 136–145)
WBC # BLD AUTO: 4.1 THOUSAND/UL (ref 4.31–10.16)

## 2021-09-07 PROCEDURE — 99232 SBSQ HOSP IP/OBS MODERATE 35: CPT | Performed by: NURSE PRACTITIONER

## 2021-09-07 PROCEDURE — 85025 COMPLETE CBC W/AUTO DIFF WBC: CPT | Performed by: INTERNAL MEDICINE

## 2021-09-07 PROCEDURE — 97535 SELF CARE MNGMENT TRAINING: CPT

## 2021-09-07 PROCEDURE — 80053 COMPREHEN METABOLIC PANEL: CPT | Performed by: INTERNAL MEDICINE

## 2021-09-07 PROCEDURE — U0003 INFECTIOUS AGENT DETECTION BY NUCLEIC ACID (DNA OR RNA); SEVERE ACUTE RESPIRATORY SYNDROME CORONAVIRUS 2 (SARS-COV-2) (CORONAVIRUS DISEASE [COVID-19]), AMPLIFIED PROBE TECHNIQUE, MAKING USE OF HIGH THROUGHPUT TECHNOLOGIES AS DESCRIBED BY CMS-2020-01-R: HCPCS | Performed by: NURSE PRACTITIONER

## 2021-09-07 PROCEDURE — 82948 REAGENT STRIP/BLOOD GLUCOSE: CPT

## 2021-09-07 PROCEDURE — U0005 INFEC AGEN DETEC AMPLI PROBE: HCPCS | Performed by: NURSE PRACTITIONER

## 2021-09-07 PROCEDURE — 97530 THERAPEUTIC ACTIVITIES: CPT

## 2021-09-07 PROCEDURE — 83735 ASSAY OF MAGNESIUM: CPT | Performed by: INTERNAL MEDICINE

## 2021-09-07 PROCEDURE — 97110 THERAPEUTIC EXERCISES: CPT

## 2021-09-07 RX ORDER — SENNOSIDES 8.6 MG
650 CAPSULE ORAL EVERY 8 HOURS PRN
Qty: 30 TABLET | Refills: 0
Start: 2021-09-07

## 2021-09-07 RX ORDER — POLYETHYLENE GLYCOL 3350 17 G/17G
17 POWDER, FOR SOLUTION ORAL ONCE
Status: COMPLETED | OUTPATIENT
Start: 2021-09-07 | End: 2021-09-07

## 2021-09-07 RX ORDER — GABAPENTIN 100 MG/1
200 CAPSULE ORAL
Qty: 60 CAPSULE | Refills: 0
Start: 2021-09-07 | End: 2021-09-07

## 2021-09-07 RX ORDER — DICYCLOMINE HYDROCHLORIDE 10 MG/1
10 CAPSULE ORAL
Qty: 90 CAPSULE | Refills: 0
Start: 2021-09-07 | End: 2021-09-07 | Stop reason: SDUPTHER

## 2021-09-07 RX ORDER — SERTRALINE HYDROCHLORIDE 25 MG/1
25 TABLET, FILM COATED ORAL DAILY
Qty: 30 TABLET | Refills: 0
Start: 2021-09-07 | End: 2021-09-07 | Stop reason: SDUPTHER

## 2021-09-07 RX ORDER — GABAPENTIN 100 MG/1
200 CAPSULE ORAL
Qty: 60 CAPSULE | Refills: 0
Start: 2021-09-07

## 2021-09-07 RX ORDER — FAMOTIDINE 20 MG/1
20 TABLET, FILM COATED ORAL DAILY
Qty: 30 TABLET | Refills: 0
Start: 2021-09-07 | End: 2021-09-07 | Stop reason: SDUPTHER

## 2021-09-07 RX ORDER — ATORVASTATIN CALCIUM 10 MG/1
10 TABLET, FILM COATED ORAL
Qty: 30 TABLET | Refills: 0
Start: 2021-09-07 | End: 2021-09-07 | Stop reason: SDUPTHER

## 2021-09-07 RX ORDER — POLYETHYLENE GLYCOL 3350 17 G/17G
17 POWDER, FOR SOLUTION ORAL DAILY PRN
Qty: 10 EACH | Refills: 0
Start: 2021-09-07 | End: 2021-09-07 | Stop reason: SDUPTHER

## 2021-09-07 RX ORDER — SENNOSIDES 8.6 MG
650 CAPSULE ORAL EVERY 8 HOURS PRN
Qty: 30 TABLET | Refills: 0
Start: 2021-09-07 | End: 2021-09-07 | Stop reason: SDUPTHER

## 2021-09-07 RX ORDER — FOLIC ACID 1 MG/1
1 TABLET ORAL DAILY
Qty: 30 TABLET | Refills: 0
Start: 2021-09-07 | End: 2021-09-07 | Stop reason: SDUPTHER

## 2021-09-07 RX ORDER — FAMOTIDINE 20 MG/1
20 TABLET, FILM COATED ORAL DAILY
Qty: 30 TABLET | Refills: 0
Start: 2021-09-07

## 2021-09-07 RX ORDER — OXYCODONE HYDROCHLORIDE 5 MG/1
5 TABLET ORAL EVERY 4 HOURS PRN
Qty: 30 TABLET | Refills: 0 | Status: SHIPPED | OUTPATIENT
Start: 2021-09-07 | End: 2021-09-17

## 2021-09-07 RX ORDER — ATORVASTATIN CALCIUM 10 MG/1
10 TABLET, FILM COATED ORAL
Qty: 30 TABLET | Refills: 0
Start: 2021-09-07

## 2021-09-07 RX ORDER — LORAZEPAM 0.5 MG/1
.5-1 TABLET ORAL EVERY 8 HOURS PRN
Qty: 10 TABLET | Refills: 0 | Status: SHIPPED | OUTPATIENT
Start: 2021-09-07 | End: 2021-09-17

## 2021-09-07 RX ORDER — SERTRALINE HYDROCHLORIDE 25 MG/1
25 TABLET, FILM COATED ORAL DAILY
Qty: 30 TABLET | Refills: 0
Start: 2021-09-07

## 2021-09-07 RX ORDER — POLYETHYLENE GLYCOL 3350 17 G/17G
17 POWDER, FOR SOLUTION ORAL DAILY PRN
Qty: 10 EACH | Refills: 0
Start: 2021-09-07

## 2021-09-07 RX ORDER — FOLIC ACID 1 MG/1
1 TABLET ORAL DAILY
Qty: 30 TABLET | Refills: 0
Start: 2021-09-07

## 2021-09-07 RX ORDER — DICYCLOMINE HYDROCHLORIDE 10 MG/1
10 CAPSULE ORAL
Qty: 90 CAPSULE | Refills: 0
Start: 2021-09-07

## 2021-09-07 RX ADMIN — ENOXAPARIN SODIUM 40 MG: 40 INJECTION SUBCUTANEOUS at 08:11

## 2021-09-07 RX ADMIN — DICYCLOMINE HYDROCHLORIDE 10 MG: 10 CAPSULE ORAL at 21:02

## 2021-09-07 RX ADMIN — DICYCLOMINE HYDROCHLORIDE 10 MG: 10 CAPSULE ORAL at 18:13

## 2021-09-07 RX ADMIN — DOCUSATE SODIUM 100 MG: 100 CAPSULE, LIQUID FILLED ORAL at 18:13

## 2021-09-07 RX ADMIN — OXYCODONE HYDROCHLORIDE 5 MG: 5 TABLET ORAL at 06:17

## 2021-09-07 RX ADMIN — DOCUSATE SODIUM 100 MG: 100 CAPSULE, LIQUID FILLED ORAL at 08:11

## 2021-09-07 RX ADMIN — DICYCLOMINE HYDROCHLORIDE 10 MG: 10 CAPSULE ORAL at 06:17

## 2021-09-07 RX ADMIN — POLYETHYLENE GLYCOL 3350 17 G: 17 POWDER, FOR SOLUTION ORAL at 13:30

## 2021-09-07 RX ADMIN — ATORVASTATIN CALCIUM 10 MG: 10 TABLET, FILM COATED ORAL at 18:13

## 2021-09-07 RX ADMIN — OXYCODONE HYDROCHLORIDE 5 MG: 5 TABLET ORAL at 20:34

## 2021-09-07 RX ADMIN — FOLIC ACID 1 MG: 1 TABLET ORAL at 08:10

## 2021-09-07 RX ADMIN — FAMOTIDINE 20 MG: 20 TABLET ORAL at 08:10

## 2021-09-07 RX ADMIN — INSULIN LISPRO 1 UNITS: 100 INJECTION, SOLUTION INTRAVENOUS; SUBCUTANEOUS at 18:13

## 2021-09-07 RX ADMIN — DICYCLOMINE HYDROCHLORIDE 10 MG: 10 CAPSULE ORAL at 13:30

## 2021-09-07 RX ADMIN — SERTRALINE 25 MG: 25 TABLET, FILM COATED ORAL at 08:10

## 2021-09-07 RX ADMIN — GABAPENTIN 200 MG: 100 CAPSULE ORAL at 21:02

## 2021-09-07 RX ADMIN — SENNOSIDES 8.6 MG: 8.6 TABLET ORAL at 21:02

## 2021-09-07 NOTE — OCCUPATIONAL THERAPY NOTE
Occupational Therapy Progress Note     Patient Name: Hal Sawyer  GGTPA'C Date: 9/7/2021  Problem List  Principal Problem:    CNS lymphoma [facility])  Active Problems:    Encounter for antineoplastic chemotherapy            09/07/21 1516   OT Last Visit   OT Visit Date 09/07/21   Note Type   Note Type Treatment   Restrictions/Precautions   Weight Bearing Precautions Per Order No   Other Precautions Cognitive; Chair Alarm; Bed Alarm; Fall Risk;Pain   General   Response to Previous Treatment Patient with no complaints from previous session   Lifestyle   Autonomy Pt requires A for ADLs/IADLs, fnxl mobility   Reciprocal Relationships Facility staff, family, friends   Service to Others Unable to obtain   Intrinsic Gratification Pt enjoys sweet cereal   Pain Assessment   Pain Assessment Tool FLACC   Pain Rating: FLACC (Rest) - Face 0   Pain Rating: FLACC (Rest) - Legs 0   Pain Rating: FLACC (Rest) - Activity 0   Pain Rating: FLACC (Rest) - Cry 0   Pain Rating: FLACC (Rest) - Consolability 0   Score: FLACC (Rest) 0   Pain Rating: FLACC (Activity) - Face 0   Pain Rating: FLACC (Activity) - Legs 0   Pain Rating: FLACC (Activity) - Activity 0   Pain Rating: FLACC (Activity) - Cry 1   Pain Rating: FLACC (Activity) - Consolability 1   Score: FLACC (Activity) 2   ADL   Where Assessed Chair   Grooming Assistance 4  Minimal Assistance   Grooming Deficit Setup;Supervision/safety; Increased time to complete;Brushing hair   Grooming Comments Pt unable to utilize R UE to brush hair; Requires OT to perform thoroughly    UB Bathing Assistance 4  Minimal Assistance   UB Bathing Deficit Setup;Supervision/safety;Right arm; Chest  (Under arms )   LB Bathing Assistance 4  Minimal Assistance   LB Bathing Deficit Setup;Supervision/safety;Right lower leg including foot; Left lower leg including foot; Buttocks   UB Dressing Assistance 4  Minimal Assistance   UB Dressing Deficit Thread LUE; Thread RUE   LB Dressing Assistance 3  Moderate Assistance LB Dressing Deficit Don/doff L sock; Don/doff R sock   LB Dressing Comments OT donned/doff b/socks, however pt adjusted socks I later in session    Bed Mobility   Additional Comments Pt OOB in chair upon OT arrival    Transfers   Sit to Stand 2  Maximal assistance   Additional items Assist x 1; Armrests; Increased time required;Verbal cues   Stand to Sit 2  Maximal assistance   Additional items Assist x 1; Armrests; Increased time required;Verbal cues   Cognition   Overall Cognitive Status [facility]   Arousal/Participation Alert; Responsive; Cooperative   Attention Attends with cues to redirect   Orientation Level Oriented to person;Oriented to place   Memory Unable to assess   Following Commands Follows one step commands without difficulty   Comments pt pleaseant, cooperative, and willing to engage in OT treatment session this date    Assessment   Assessment Upon arrival of OT, pt was in chair with chair alarm on  Pt participated in skilled OT session this date with interventions consisting of ADL re training with the use of correct body mechnaics   In comparison to previous session, pt demonstrates improvements in activity tolerance  Pt continues to be functioning below baseline level  Pt demonstrated the following tasks: max sit to stand, Letitia to complete grooming including brushing her hair, Letitia to complete UB bathing and dressing, Letitia for LB bathing and modA for LB dressing  Occupational performance remains limited secondary to factors listed above and increased risk for falls and injury  From OT standpoint, recommendation at time of d/c would be post acute rehabilitation services  Patient to benefit from continued Occupational Therapy treatment while in the hospital to address deficits as defined above and maximize level of functional independence with ADLs and functional mobility  Upon completion of OT session pt was left in chair with chair alarm on and all current needs met with call bell within reach  The patient's raw score on the AM-PAC Daily Activity inpatient short form is 18, standardized score is 38 66, less than 39 4  Patients at this level are likely to benefit from discharge to post-acute rehabilitation services  Please refer to the recommendation of the Occupational Therapist for safe discharge planning     Recommendation   OT Discharge Recommendation Post acute rehabilitation services   OT - OK to Discharge Yes  (When medically appropriate )   AM-PAC Daily Activity Inpatient   Lower Body Dressing 2   Bathing 3   Toileting 3   Upper Body Dressing 3   Grooming 3   Eating 4   Daily Activity Raw Score 18   Daily Activity Standardized Score (Calc for Raw Score >=11) 38 66   AM-PAC Applied Cognition Inpatient   Following a Speech/Presentation 3   Understanding Ordinary Conversation 4   Taking Medications 3   Remembering Where Things Are Placed or Put Away 3   Remembering List of 4-5 Errands 3   Taking Care of Complicated Tasks 2   Applied Cognition Raw Score 18   Applied Cognition Standardized Score 38 07     Nathaly Andrade OTR/L

## 2021-09-07 NOTE — PLAN OF CARE
Problem: PHYSICAL THERAPY ADULT  Goal: Performs mobility at highest level of function for planned discharge setting  See evaluation for individualized goals  Description: Treatment/Interventions: Functional transfer training, LE strengthening/ROM, Therapeutic exercise, Endurance training, Cognitive reorientation, Patient/family training, Equipment eval/education, Bed mobility          See flowsheet documentation for full assessment, interventions and recommendations  Outcome: Progressing  Note: Prognosis: Fair  Problem List: Decreased strength, Decreased range of motion, Impaired balance, Decreased mobility, Decreased coordination, Decreased cognition, Impaired judgement, Pain, Decreased safety awareness  Assessment: Pt seen for PT treatment session this date  Therapy session focused on bed mobility, functional transfers, gait training, therapeutic exercise and endurance training in order to improve overall mobility and independence  Pt requires assist of 1 for all mobility performed this date; SBA of another for chair follow for increased safety required  Pt performed bed mobility tasks at S level  Pt sitting EOB at close S level  Pt performed multiple STS from bedside chair/ EOB with max Ax1; pt performed SPT from EOB to bedside chair with max Ax1  Pt able to take 3 forward steps this date with mod Ax2 using RW; cues for sequencing and RW management required  Pt performed/ tolerated seated b/l LE therex to hips, knees and ankles performed with no c/o increased pain/ discomfort  Pt making progress toward goals  Pt was left sitting upright in bedside chair with chair alarm on at the end of PT session with all needs in reach  Pt would benefit from continued PT services while in hospital to address remaining limitations  PT to continue to follow pt and recommends post-acute rehab services after d/c  The patient's AM-PAC Basic Mobility Inpatient Short Form Low Function Raw Score 17 , Standardized Score is 27 46   A standardized score less 42 9 suggests the patient may benefit from discharge to post-acute rehab services  Please also refer to the recommendation of the Physical Therapist for safe discharge planning  Barriers to Discharge: Decreased caregiver support, Inaccessible home environment        PT Discharge Recommendation: Post acute rehabilitation services     PT - OK to Discharge: Yes (once medically cleared )    See flowsheet documentation for full assessment

## 2021-09-07 NOTE — CASE MANAGEMENT
PT was not discharged over the weekend    TCT friend, Mr Monie Colby, states they will pick pt up today, flight is tomorrow  Discussed that pt requires assistance when ambulating, transfers,  to the bathroom, etc   States airline requires a document to be completed by MD, they will review and advise if they will allow pt to fly  Asked him to fax to me so I can have MD complete  Advised if airline refuses to allow pt to fly, I can set up HHC/DME for home    [facility] for determination    TCF H  C  [facility], advised family is looking at [address] pt back to [facility]  She is available if any assistance needed with DCP    Fax received and provided to MD to complete    TCT Mr Monie Colby, advised [place] medical form completed and faxed to him    To advise of DCP once airline confirms pt can travel

## 2021-09-07 NOTE — PLAN OF CARE
Problem: OCCUPATIONAL THERAPY ADULT  Goal: Performs self-care activities at highest level of function for planned discharge setting  See evaluation for individualized goals  Description: Treatment Interventions: ADL retraining, UE strengthening/ROM, Functional transfer training, Endurance training, Cognitive reorientation, Patient/family training, Equipment evaluation/education, Compensatory technique education, Fine motor coordination activities, Neuromuscular reeducation, Continued evaluation, Energy conservation, Activityengagement          See flowsheet documentation for full assessment, interventions and recommendations  Outcome: Progressing  Note: Limitation: Decreased ADL status, Decreased UE ROM, Decreased UE strength, Decreased Safe judgement during ADL, Decreased cognition, Decreased endurance, Decreased fine motor control, Decreased self-care trans, Decreased high-level ADLs  Prognosis: Fair  Assessment: Upon arrival of OT, pt was in chair with chair alarm on  Pt participated in skilled OT session this date with interventions consisting of ADL re training with the use of correct body mechnaics   In comparison to previous session, pt demonstrates improvements in activity tolerance  Pt continues to be functioning below baseline level  Pt demonstrated the following tasks: max sit to stand, Letitia to complete grooming including brushing her hair, Letitia to complete UB bathing and dressing, Letitia for LB bathing and modA for LB dressing  Occupational performance remains limited secondary to factors listed above and increased risk for falls and injury  From OT standpoint, recommendation at time of d/c would be post acute rehabilitation services  Patient to benefit from continued Occupational Therapy treatment while in the hospital to address deficits as defined above and maximize level of functional independence with ADLs and functional mobility   Upon completion of OT session pt was left in chair with chair alarm on and all current needs met with call bell within reach  The patient's raw score on the AM-PAC Daily Activity inpatient short form is 18, standardized score is 38 66, less than 39 4  Patients at this level are likely to benefit from discharge to post-acute rehabilitation services  Please refer to the recommendation of the Occupational Therapist for safe discharge planning       OT Discharge Recommendation: Post acute rehabilitation services  OT - OK to Discharge: Yes (When medically appropriate )

## 2021-09-07 NOTE — CASE MANAGEMENT
johnny Green CM requesting a wheelchair be provided to patient and that therapy provide exercises for patient to do at home  States family is requesting letter from MD for flight    ROMINA Castro, inquiring if insurance will be paying for a purchase of a wheelchair since this is not a rental   PT is returning to [facility]    email from [employer] with airline information  They are waiting for airline confirmation for patient to board flight  He will advise when they receive    TCF Yesica Castro, insurance would not pay for wheelchair, pt with no [place] coverage  Notified PT, Jyoti Pagan, she will provide exercise information    Reviewed with Manager MW, can provide wheelchair to patient for discharge  Mizpah Dinorah notified    TCT Stan Abbasi, friend  States once they have confirmation from airline that pt can board plane, they plan to pick pt up tonight  She will go [address] home in [place] and then be taken to [place] airport tomorrow for her flight  [employer] does not cover wheelchair but we will provide to her on [address] scripts sent to [facility]  [employer]  I will obtain cost and advise him  COVID test being repeated  He requested COVID results and MD letter be emailed (done)  Also needs vaccine card     [address], pt received vaccine 7/8 and 8/9, Marty Calderon    She will see if they have vaccine card and will fax over

## 2021-09-07 NOTE — PHYSICAL THERAPY NOTE
PHYSICAL THERAPY NOTE          Patient Name: Annabelle Vieira  UWMYH'L Date: 9/7/2021 09/07/21 1501   PT Last Visit   PT Visit Date 09/07/21   Note Type   Note Type Treatment   Pain Assessment   Pain Assessment Tool FLACC   Pain Location/Orientation Orientation: Left; Location: Knee; Location: Leg   Patient's Stated Pain Goal No pain   Hospital Pain Intervention(s) Repositioned; Ambulation/increased activity   Pain Rating: FLACC (Rest) - Face 0   Pain Rating: FLACC (Rest) - Legs 0   Pain Rating: FLACC (Rest) - Activity 0   Pain Rating: FLACC (Rest) - Cry 0   Pain Rating: FLACC (Rest) - Consolability 0   Score: FLACC (Rest) 0   Pain Rating: FLACC (Activity) - Face 0   Pain Rating: FLACC (Activity) - Legs 0   Pain Rating: FLACC (Activity) - Activity 1   Pain Rating: FLACC (Activity) - Cry 1   Pain Rating: FLACC (Activity) - Consolability 1   Score: FLACC (Activity) 3   Restrictions/Precautions   Weight Bearing Precautions Per Order No   Other Precautions Cognitive; Chair Alarm; Bed Alarm; Fall Risk;Pain   General   Chart Reviewed Yes   Response to Previous Treatment Patient with no complaints from previous session  Family/Caregiver Present No   Cognition   Overall Cognitive Status WFL   Arousal/Participation Alert; Responsive; Cooperative   Attention Attends with cues to redirect   Orientation Level Oriented to person;Oriented to place   Memory Unable to assess   Following Commands Follows one step commands without difficulty   Comments Pt very pleasant and cooperative throughout therapy session  Google translate used throughout therapy session to assist in translation  Bed Mobility   Supine to Sit 5  Supervision   Additional items Assist x 1;HOB elevated; Increased time required;Verbal cues; Bedrails   Sit to Supine Unable to assess   Additional Comments Pt supine in bed upon arrival  Pt sitting upright in bedside chair with chair alarm on with all needs within reach at the end of therapy session  Transfers   Sit to Stand 2  Maximal assistance   Additional items Assist x 1; Increased time required;Verbal cues   Stand to Sit 2  Maximal assistance   Additional items Assist x 1; Increased time required;Verbal cues   Stand pivot 2  Maximal assistance   Additional items Assist x 1; Increased time required;Verbal cues   Additional Comments Pt performed 4x STS throughout therapy session and 1x SPT from EOB to bedside chair  Ambulation/Elevation   Gait pattern Excessively slow; Short stride; Foward flexed;Decreased foot clearance; Improper Weight shift; Step to;Decreased L stance; Wide DORIE   Gait Assistance 3  Moderate assist   Additional items Assist x 2;Verbal cues; Tactile cues   Assistive Device Rolling walker   Distance 3 steps foward with RW; chair follow for increased safety    Balance   Static Sitting Fair +   Dynamic Sitting Fair   Static Standing Poor   Dynamic Standing Poor   Ambulatory Poor -   Activity Tolerance   Activity Tolerance Patient tolerated treatment well   Medical Staff Made Aware Rehab aide Miles Webb; spoke with OT    Nurse Made Aware RN cleared pt to participate in therapy session    Exercises   Heelslides Supine;Bilateral;AROM;AAROM;15 reps  (AROM RLE; AAROM LLE )   Hip Flexion Sitting;Bilateral;15 reps;AROM   Hip Abduction Sitting;Bilateral;15 reps   Hip Adduction Sitting;Bilateral;15 reps;AROM   Knee AROM Long Arc Quad Sitting;Bilateral;20 reps;AAROM;AROM  (AROM RLE; AAROM LLE )   Ankle Pumps Sitting;Bilateral;20 reps;AROM   Marching Sitting;Bilateral;15 reps;AAROM;AROM  (AROM RLE; AAROM LLE )   Assessment   Prognosis Fair   Problem List Decreased strength;Decreased range of motion; Impaired balance;Decreased mobility; Decreased coordination;Decreased cognition; Impaired judgement;Pain;Decreased safety awareness   Assessment Pt seen for PT treatment session this date   Therapy session focused on bed mobility, functional transfers, gait training, therapeutic exercise and endurance training in order to improve overall mobility and independence  Pt requires assist of 1 for all mobility performed this date; SBA of another for chair follow for increased safety required  Pt performed bed mobility tasks at S level  Pt sitting EOB at close S level  Pt performed multiple STS from bedside chair/ EOB with max Ax1; pt performed SPT from EOB to bedside chair with max Ax1  Pt able to take 3 forward steps this date with mod Ax2 using RW; cues for sequencing and RW management required  Pt performed/ tolerated seated b/l LE therex to hips, knees and ankles performed with no c/o increased pain/ discomfort  Pt making progress toward goals  Pt was left sitting upright in bedside chair with chair alarm on at the end of PT session with all needs in reach  Pt would benefit from continued PT services while in hospital to address remaining limitations  PT to continue to follow pt and recommends post-acute rehab services after d/c  The patient's AM-PAC Basic Mobility Inpatient Short Form Low Function Raw Score 17 , Standardized Score is 27 46  A standardized score less 42 9 suggests the patient may benefit from discharge to post-acute rehab services  Please also refer to the recommendation of the Physical Therapist for safe discharge planning  Barriers to Discharge Decreased caregiver support; Inaccessible home environment   Goals   Patient Goals to get OOB    STG Expiration Date 09/13/21   PT Treatment Day 4   Plan   Treatment/Interventions Functional transfer training;LE strengthening/ROM; Therapeutic exercise; Endurance training;Patient/family training;Equipment eval/education; Bed mobility;Gait training;Spoke to nursing;Spoke to case management;OT   Progress Progressing toward goals   PT Frequency 2-3x/wk   Recommendation   PT Discharge Recommendation Post acute rehabilitation services   PT - OK to Discharge Yes  (once medically cleared )   AM-PAC Basic Mobility Inpatient   Turning in Bed Without Bedrails 3   Lying on Back to Sitting on Edge of Flat Bed 3   Moving Bed to Chair 1   Standing Up From Chair 1   Walk in Room 1   Climb 3-5 Stairs 1   Basic Mobility Inpatient Raw Score 10   Turning Head Towards Sound 4   Follow Simple Instructions 3   Low Function Basic Mobility Raw Score 17   Low Function Basic Mobility Standardized Score 27 46     Bora Autumn, PT, DPT

## 2021-09-07 NOTE — CASE MANAGEMENT
[facility]- no cost for meds  Will obtain in AM    Wheelchair provided at [facility] desk    PT exercises provided to pt and copy at [address] [place] [address]  He has not heard from airline but he does not expect there to be any problem with her going    He will pick pt up tomorrow at [facility]   He plans to take her to his home and then to the airport    He received letter from MD    PT is aware of DC and is happy to be going home    [facility], they have no beds

## 2021-09-07 NOTE — PROGRESS NOTES
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  She is status post treatment with high-dose methotrexate and rituximab though only for a few cycles with rituximab due to insurance issues, ready for discharge  1  CNS Lymphoma  - She has completed 6 cyles of HD MTX, sometimes with Rituxan (not given due to insurance constraints for 1 cycle)  - Per recent imaging, she is in CR1  - No further HD MTX treatment indicated at this time; Pt should follow up with outpatient oncologist upon discharge    2  Mobility issues  - Given her disease, prolonged hospitalizations and debility, she is a t plus-1 assist with activities; will require wheelchair to mobilize  3   GERD/Constipation/Occasional abdominal pain  - Pepcid daily  - Miralax daily PRN  - PEG tube previously removed, no issues with this; well healed  Tylenol 650mg PO Q8 hours PRN; Oxycodone 5mg Q4 hours PRN    4  History of insulin requirement  - She previously received insulin with hyperglycemia with steroids for treatment  Last A1C was 5 4, never has hx of DM2 per records, no need for medications at this     Dispo:   Plan has been for discharge from hospital;  plans to take her home to [facility]  Social work has been working with  to finalized plans  All scripts have been sent to the pharmacy in anticipation of her discharge  Will continue to work on patient's discharge  Subjective:   She is doing well overall this AM, has some heartburn  Eating cereal makes it better  Mild constipation, last BM 4 days ago per patient  No other issues  Objective:     Vitals: Blood pressure 114/83, pulse 84, temperature 97 6 °F (36 4 °C), temperature source Oral, resp  rate 16, height 5' (1 524 m), weight 53 kg (116 lb 13 5 oz), SpO2 97 %  ,Body mass index is 22 82 kg/m²  Intake/Output Summary (Last 24 hours) at 9/7/2021 1037  Last data filed at 9/7/2021 0906  Gross per 24 hour   Intake 240 ml   Output 400 ml   Net -160 ml       Physical Exam: /83 (BP Location: Right arm)   Pulse 84   Temp 97 6 °F (36 4 °C) (Oral)   Resp 16   Ht 5' (1 524 m)   Wt 53 kg (116 lb 13 5 oz)   SpO2 97%   BMI 22 82 kg/m²   General appearance: alert and oriented, in no acute distress  Lungs: clear to auscultation bilaterally  Heart: regular rate and rhythm, S1, S2 normal, no murmur, click, rub or gallop  Abdomen: soft, non-tender; bowel sounds normal; no masses,  no organomegaly No rash, PEG site well healed  Extremities: extremities normal, warm and well-perfused; no cyanosis, clubbing, or edema  Pulses: 2+ and symmetric  Skin: Skin color, texture, turgor normal  No rashes or lesions  Neurologic: Grossly normal, alert, pleasant    Invasive Devices     Peripherally Inserted Central Catheter Line            PICC Line 76/27/52 Right Basilic 10 days                Lab, Imaging and other studies: I have personally reviewed pertinent reports  VTE Pharmacologic Prophylaxis: Enoxaparin (Lovenox)  VTE Mechanical Prophylaxis: sequential compression device       I did review this patient with Dr Funmilayo Johnson  and she is in agreement with this plan        Delmis Gaspar, [facility]-BC  Hematology/Oncology Nurse Practitioner

## 2021-09-08 VITALS
BODY MASS INDEX: 22.94 KG/M2 | SYSTOLIC BLOOD PRESSURE: 110 MMHG | RESPIRATION RATE: 18 BRPM | DIASTOLIC BLOOD PRESSURE: 58 MMHG | OXYGEN SATURATION: 95 % | HEIGHT: 60 IN | HEART RATE: 78 BPM | WEIGHT: 116.84 LBS | TEMPERATURE: 97.2 F

## 2021-09-08 LAB
ALBUMIN SERPL BCP-MCNC: 3 G/DL (ref 3.5–5)
ALP SERPL-CCNC: 80 U/L (ref 46–116)
ALT SERPL W P-5'-P-CCNC: 26 U/L (ref 12–78)
ANION GAP SERPL CALCULATED.3IONS-SCNC: 5 MMOL/L (ref 4–13)
AST SERPL W P-5'-P-CCNC: 16 U/L (ref 5–45)
BASOPHILS # BLD AUTO: 0.05 THOUSANDS/ΜL (ref 0–0.1)
BASOPHILS NFR BLD AUTO: 1 % (ref 0–1)
BILIRUB SERPL-MCNC: 0.17 MG/DL (ref 0.2–1)
BUN SERPL-MCNC: 20 MG/DL (ref 5–25)
CALCIUM ALBUM COR SERPL-MCNC: 10.2 MG/DL (ref 8.3–10.1)
CALCIUM SERPL-MCNC: 9.4 MG/DL (ref 8.3–10.1)
CHLORIDE SERPL-SCNC: 108 MMOL/L (ref 100–108)
CO2 SERPL-SCNC: 28 MMOL/L (ref 21–32)
CREAT SERPL-MCNC: 0.4 MG/DL (ref 0.6–1.3)
EOSINOPHIL # BLD AUTO: 0.08 THOUSAND/ΜL (ref 0–0.61)
EOSINOPHIL NFR BLD AUTO: 2 % (ref 0–6)
ERYTHROCYTE [DISTWIDTH] IN BLOOD BY AUTOMATED COUNT: 17.2 % (ref 11.6–15.1)
GFR SERPL CREATININE-BSD FRML MDRD: 118 ML/MIN/1.73SQ M
GLUCOSE SERPL-MCNC: 102 MG/DL (ref 65–140)
GLUCOSE SERPL-MCNC: 111 MG/DL (ref 65–140)
HCT VFR BLD AUTO: 30.7 % (ref 34.8–46.1)
HGB BLD-MCNC: 9.5 G/DL (ref 11.5–15.4)
IMM GRANULOCYTES # BLD AUTO: 0.05 THOUSAND/UL (ref 0–0.2)
IMM GRANULOCYTES NFR BLD AUTO: 1 % (ref 0–2)
LYMPHOCYTES # BLD AUTO: 2.01 THOUSANDS/ΜL (ref 0.6–4.47)
LYMPHOCYTES NFR BLD AUTO: 52 % (ref 14–44)
MAGNESIUM SERPL-MCNC: 2 MG/DL (ref 1.6–2.6)
MCH RBC QN AUTO: 27.6 PG (ref 26.8–34.3)
MCHC RBC AUTO-ENTMCNC: 30.9 G/DL (ref 31.4–37.4)
MCV RBC AUTO: 89 FL (ref 82–98)
MONOCYTES # BLD AUTO: 0.43 THOUSAND/ΜL (ref 0.17–1.22)
MONOCYTES NFR BLD AUTO: 11 % (ref 4–12)
NEUTROPHILS # BLD AUTO: 1.31 THOUSANDS/ΜL (ref 1.85–7.62)
NEUTS SEG NFR BLD AUTO: 33 % (ref 43–75)
NRBC BLD AUTO-RTO: 0 /100 WBCS
PLATELET # BLD AUTO: 254 THOUSANDS/UL (ref 149–390)
PMV BLD AUTO: 11.4 FL (ref 8.9–12.7)
POTASSIUM SERPL-SCNC: 3.8 MMOL/L (ref 3.5–5.3)
PROT SERPL-MCNC: 6.2 G/DL (ref 6.4–8.2)
RBC # BLD AUTO: 3.44 MILLION/UL (ref 3.81–5.12)
SODIUM SERPL-SCNC: 141 MMOL/L (ref 136–145)
WBC # BLD AUTO: 3.93 THOUSAND/UL (ref 4.31–10.16)

## 2021-09-08 PROCEDURE — 99239 HOSP IP/OBS DSCHRG MGMT >30: CPT | Performed by: NURSE PRACTITIONER

## 2021-09-08 PROCEDURE — 82948 REAGENT STRIP/BLOOD GLUCOSE: CPT

## 2021-09-08 PROCEDURE — 80053 COMPREHEN METABOLIC PANEL: CPT | Performed by: INTERNAL MEDICINE

## 2021-09-08 PROCEDURE — 83735 ASSAY OF MAGNESIUM: CPT | Performed by: INTERNAL MEDICINE

## 2021-09-08 PROCEDURE — 85025 COMPLETE CBC W/AUTO DIFF WBC: CPT | Performed by: INTERNAL MEDICINE

## 2021-09-08 RX ADMIN — FOLIC ACID 1 MG: 1 TABLET ORAL at 09:23

## 2021-09-08 RX ADMIN — SERTRALINE 25 MG: 25 TABLET, FILM COATED ORAL at 09:23

## 2021-09-08 RX ADMIN — DICYCLOMINE HYDROCHLORIDE 10 MG: 10 CAPSULE ORAL at 06:13

## 2021-09-08 RX ADMIN — OXYCODONE HYDROCHLORIDE 5 MG: 5 TABLET ORAL at 06:14

## 2021-09-08 RX ADMIN — ACETAMINOPHEN 488 MG: 325 TABLET, FILM COATED ORAL at 09:21

## 2021-09-08 RX ADMIN — DOCUSATE SODIUM 100 MG: 100 CAPSULE, LIQUID FILLED ORAL at 09:23

## 2021-09-08 RX ADMIN — ENOXAPARIN SODIUM 40 MG: 40 INJECTION SUBCUTANEOUS at 09:23

## 2021-09-08 RX ADMIN — FAMOTIDINE 20 MG: 20 TABLET ORAL at 09:23

## 2021-09-08 NOTE — UTILIZATION REVIEW
Continued Stay Review    Date: 9/4/21 and 9/6/21  PT IS D/C ON 9/8/21                 Current Patient Class: IP  Current Level of Care: MS    HPI:56 y o  female initially admitted on 8/27 for Chemo cycle 6 of HD methotrexate with Leucovorin rescue and rituxan therapy q2 weeks     Assessment/Plan: CNS diffuse large B-cell lymphoma who completed 6 cycles of high-dose methotrexate and Leucovorin rescue  9/6 - having pain this morning  Medicated with oxycodone  She is tolerating her diet  She is A&O  She is for d/c in the next few days with a plan to fly to [facility]  9/8 - dc to family and then to airport        Vital Signs:  09/08/21 07:59:22  97 2 °F (36 2 °C)Abnormal   --  --  --  --  --  --  --   09/07/21 2332  --  --  --  --  --  --  None (Room air)  --   09/07/21 2330  98 2 °F (36 8 °C)  78  18  110/58  --  96 %  --  --   09/07/21 1501  97 8 °F (36 6 °C)  82  16  108/76  --  97 %  --  Sitting   09/07/21 0831  97 6 °F (36 4 °C)  84  16  114/83  --  97 %  None (Room air)  Lying   09/06/21 21:50:21  97 4 °F (36 3 °C)Abnormal   81  --  124/64  86  99 %  --  --   09/06/21 15:38:54  98 2 °F (36 8 °C)  82  18  112/86  --  97 %  --  --   09/06/21 1500  --  --  --  --  --  --  None (Room air)  --   09/06/21 07:54:12  98 1 °F (36 7 °C)  75  16  130/80  --  99 %  --  --     Pertinent Labs/Diagnostic Results:   Results from last 7 days   Lab Units 09/07/21  1613 09/04/21  1130   SARS-COV-2  Negative Negative     Results from last 7 days   Lab Units 09/08/21  0615 09/07/21  0619 09/06/21  0539 09/05/21  0539 09/04/21  0423   WBC Thousand/uL 3 93* 4 10* 3 71* 3 08* 3 44*   HEMOGLOBIN g/dL 9 5* 10 0* 10 7* 10 4* 10 1*   HEMATOCRIT % 30 7* 31 7* 34 0* 32 9* 31 8*   PLATELETS Thousands/uL 254 276 258 245 275   NEUTROS ABS Thousands/µL 1 31* 1 34* 0 81* 0 51* 0 35*         Results from last 7 days   Lab Units 09/08/21  0614 09/07/21  6247 09/06/21  0539 09/05/21  0539 09/04/21  0423   SODIUM mmol/L 141 139 138 138 136   POTASSIUM mmol/L 3 8 4 1 3 8 3 6 3 9   CHLORIDE mmol/L 108 107 105 105 104   CO2 mmol/L 28 28 28 28 29   ANION GAP mmol/L 5 4 5 5 3*   BUN mg/dL 20 18 18 18 17   CREATININE mg/dL 0 40* 0 49* 0 42* 0 44* 0 40*   EGFR ml/min/1 73sq m 118 111 117 115 118   CALCIUM mg/dL 9 4 9 5 10 1 9 6 9 7   MAGNESIUM mg/dL 2 0 2 0 2 0 2 0 1 8     Results from last 7 days   Lab Units 09/08/21  0614 09/07/21  0619 09/06/21  0539 09/05/21  0539 09/04/21  0423   AST U/L 16 23 18 21 16   ALT U/L 26 31 29 32 32   ALK PHOS U/L 80 72 67 69 65   TOTAL PROTEIN g/dL 6 2* 6 5 6 7 6 7 6 5   ALBUMIN g/dL 3 0* 3 2* 3 3* 3 2* 3 1*   TOTAL BILIRUBIN mg/dL 0 17* 0 36 0 26 0 18* 0 25     Results from last 7 days   Lab Units 09/08/21  0731 09/07/21  2024 09/07/21  1630 09/07/21  1220 09/07/21  0832 09/06/21  2102 09/06/21  1705 09/06/21  1104 09/06/21  0753 09/05/21  2129 09/05/21  1637 09/05/21  1146   POC GLUCOSE mg/dl 102 132 154* 139 126 130 130 96 91 112 91 128     Results from last 7 days   Lab Units 09/08/21  0614 09/07/21  0619 09/06/21  0539 09/05/21  0539 09/04/21  0423 09/03/21  0442 09/02/21  0514   GLUCOSE RANDOM mg/dL 111 96 93 89 83 97 91     Medications:   Scheduled Medications:  atorvastatin, 10 mg, Oral, Daily With Dinner  dicyclomine, 10 mg, Oral, 4x Daily (AC & HS)  docusate sodium, 100 mg, Oral, BID  enoxaparin, 40 mg, Subcutaneous, Daily  ergocalciferol, 50,000 Units, Oral, Weekly  famotidine, 20 mg, Oral, Daily  folic acid, 1 mg, Oral, Daily  gabapentin, 200 mg, Oral, HS  insulin lispro, 1-5 Units, Subcutaneous, TID AC  insulin lispro, 1-5 Units, Subcutaneous, HS  [MAR Hold] ondansetron with dexamethasone IVPB, , Intravenous, Once  senna, 1 tablet, Oral, HS  sertraline, 25 mg, Oral, Daily      Continuous IV Infusions:     PRN Meds:  acetaminophen, 488 mg, Oral, Q6H PRN - x 1 9/6  oxyCODONE, 5 mg, Oral, Q4H PRN - x 3 9/4, 9/5, x 4 9/6, x 2 9/7, x 1 9/8  polyethylene glycol, 17 g, Oral, Daily PRN - x 1 9/6  sodium chloride, 20 mL/hr, Intravenous, Once PRN    Discharge Plan: d/c to home     Network Utilization Review Department  ATTENTION: Please call with any questions or concerns to [phone] and carefully listen to the prompts so that you are directed to the right person  All voicemails are confidential   Billy Wise all requests for admission clinical reviews, approved or denied determinations and any other requests to dedicated fax number below belonging to the campus where the patient is receiving treatment  List of dedicated fax numbers for the Facilities:  [address] DENIALS (Administrative/Medical Necessity) [phone]   [address] (Maternity/NICU/Pediatrics) [phone] [address] Aleisha [facility] [address] [address] [address] [facility] [place] [address] [phone]       Notification of Discharge   This is a Notification of Discharge from our facility [address]  Please be advised that this patient has been discharge from our facility  Below you will find the admission and discharge date and time including the patients disposition  UTILIZATION REVIEW CONTACT:  Marie Fuel  Utilization   Network Utilization Review Department  Phone: [phone] x carefully listen to the prompts  All voicemails are confidential   Email: [email or web address]     PHYSICIAN ADVISORY SERVICES:  FOR [id number] REVIEW - MEDICAL NECESSITY DENIAL  Phone: [phone]  Fax: [phone]  Email: [email or web address]     PRESENTATION DATE: 8/27/2021 10:17 AM  OBERVATION ADMISSION DATE:   INPATIENT ADMISSION DATE: 8/27/21 10:17 AM   DISCHARGE DATE: 9/8/2021 10:00 AM  DISPOSITION: Home/Self Care Home/Self Care      IMPORTANT INFORMATION:  Send all requests for admission clinical reviews, approved or denied determinations and any other requests to dedicated fax number below belonging to the campus where the patient is receiving treatment   List of dedicated fax numbers:  [address] DENIALS (Administrative/Medical Necessity) [phone]   1000  [facility] (Maternity/NICU/Pediatrics) [phone]   Dennis Licona [phone]   Russell Beatty [phone]   Em Lira [phone]   [address] [phone]   [facility]  [phone]   [address] [place] [address], [place] [id number] [facility] 1000 [facility] [phone]

## 2021-09-08 NOTE — UTILIZATION REVIEW
Continued Stay Review    Date: 9/4/21 and 9/6/21  PT IS D/C ON 9/8/21                 Current Patient Class: IP  Current Level of Care: MS    HPI:56 y o  female initially admitted on 8/27 for Chemo cycle 6 of HD methotrexate with Leucovorin rescue and rituxan therapy q2 weeks     Assessment/Plan: CNS diffuse large B-cell lymphoma who completed 6 cycles of high-dose methotrexate and Leucovorin rescue  9/6 - having pain this morning  Medicated with oxycodone  She is tolerating her diet  She is A&O  She is for d/c in the next few days with a plan to fly to [facility]  9/8 - dc to family and then to airport        Vital Signs:  09/08/21 07:59:22  97 2 °F (36 2 °C)Abnormal   --  --  --  --  --  --  --   09/07/21 2332  --  --  --  --  --  --  None (Room air)  --   09/07/21 2330  98 2 °F (36 8 °C)  78  18  110/58  --  96 %  --  --   09/07/21 1501  97 8 °F (36 6 °C)  82  16  108/76  --  97 %  --  Sitting   09/07/21 0831  97 6 °F (36 4 °C)  84  16  114/83  --  97 %  None (Room air)  Lying   09/06/21 21:50:21  97 4 °F (36 3 °C)Abnormal   81  --  124/64  86  99 %  --  --   09/06/21 15:38:54  98 2 °F (36 8 °C)  82  18  112/86  --  97 %  --  --   09/06/21 1500  --  --  --  --  --  --  None (Room air)  --   09/06/21 07:54:12  98 1 °F (36 7 °C)  75  16  130/80  --  99 %  --  --     Pertinent Labs/Diagnostic Results:   Results from last 7 days   Lab Units 09/07/21  1613 09/04/21  1130   SARS-COV-2  Negative Negative     Results from last 7 days   Lab Units 09/08/21  0615 09/07/21  0619 09/06/21  0539 09/05/21  0539 09/04/21  0423   WBC Thousand/uL 3 93* 4 10* 3 71* 3 08* 3 44*   HEMOGLOBIN g/dL 9 5* 10 0* 10 7* 10 4* 10 1*   HEMATOCRIT % 30 7* 31 7* 34 0* 32 9* 31 8*   PLATELETS Thousands/uL 254 276 258 245 275   NEUTROS ABS Thousands/µL 1 31* 1 34* 0 81* 0 51* 0 35*         Results from last 7 days   Lab Units 09/08/21  0614 09/07/21  0619 09/06/21  0539 09/05/21  0539 09/04/21  0423   SODIUM mmol/L 141 139 138 138 136 POTASSIUM mmol/L 3 8 4 1 3 8 3 6 3 9   CHLORIDE mmol/L 108 107 105 105 104   CO2 mmol/L 28 28 28 28 29   ANION GAP mmol/L 5 4 5 5 3*   BUN mg/dL 20 18 18 18 17   CREATININE mg/dL 0 40* 0 49* 0 42* 0 44* 0 40*   EGFR ml/min/1 73sq m 118 111 117 115 118   CALCIUM mg/dL 9 4 9 5 10 1 9 6 9 7   MAGNESIUM mg/dL 2 0 2 0 2 0 2 0 1 8     Results from last 7 days   Lab Units 09/08/21  0614 09/07/21  0619 09/06/21  0539 09/05/21  0539 09/04/21  0423   AST U/L 16 23 18 21 16   ALT U/L 26 31 29 32 32   ALK PHOS U/L 80 72 67 69 65   TOTAL PROTEIN g/dL 6 2* 6 5 6 7 6 7 6 5   ALBUMIN g/dL 3 0* 3 2* 3 3* 3 2* 3 1*   TOTAL BILIRUBIN mg/dL 0 17* 0 36 0 26 0 18* 0 25     Results from last 7 days   Lab Units 09/08/21  0731 09/07/21  2024 09/07/21  1630 09/07/21  1220 09/07/21  0832 09/06/21  2102 09/06/21  1705 09/06/21  1104 09/06/21  0753 09/05/21  2129 09/05/21  1637 09/05/21  1146   POC GLUCOSE mg/dl 102 132 154* 139 126 130 130 96 91 112 91 128     Results from last 7 days   Lab Units 09/08/21  0614 09/07/21  0619 09/06/21  0539 09/05/21  0539 09/04/21  0423 09/03/21  0442 09/02/21  0514   GLUCOSE RANDOM mg/dL 111 96 93 89 83 97 91     Medications:   Scheduled Medications:  atorvastatin, 10 mg, Oral, Daily With Dinner  dicyclomine, 10 mg, Oral, 4x Daily (AC & HS)  docusate sodium, 100 mg, Oral, BID  enoxaparin, 40 mg, Subcutaneous, Daily  ergocalciferol, 50,000 Units, Oral, Weekly  famotidine, 20 mg, Oral, Daily  folic acid, 1 mg, Oral, Daily  gabapentin, 200 mg, Oral, HS  insulin lispro, 1-5 Units, Subcutaneous, TID AC  insulin lispro, 1-5 Units, Subcutaneous, HS  ondansetron with dexamethasone IVPB, , Intravenous, Once  senna, 1 tablet, Oral, HS  sertraline, 25 mg, Oral, Daily      Continuous IV Infusions:     PRN Meds:  acetaminophen, 488 mg, Oral, Q6H PRN - x 1 9/6  oxyCODONE, 5 mg, Oral, Q4H PRN - x 3 9/4, 9/5, x 4 9/6, x 2 9/7, x 1 9/8  polyethylene glycol, 17 g, Oral, Daily PRN - x 1 9/6  sodium chloride, 20 mL/hr, Intravenous, Once PRN    Discharge Plan: d/c to home     Network Utilization Review Department  ATTENTION: Please call with any questions or concerns to [phone] and carefully listen to the prompts so that you are directed to the right person  All voicemails are confidential   Rukhsana Herbert all requests for admission clinical reviews, approved or denied determinations and any other requests to dedicated fax number below belonging to the campus where the patient is receiving treatment   List of dedicated fax numbers for the Facilities:  [address] DENIALS (Administrative/Medical Necessity) [phone]   [address] (Maternity/NICU/Pediatrics) [phone]   [address] Aleisha Staffordel Althea [id number] [address] Tae Jules 1481 [address] [facility] [place] [address] [phone]

## 2021-09-08 NOTE — DISCHARGE SUMMARY
Discharge Summary - Dominic Hinson 49 y o  female MRN: [id number]    Unit/Bed#: [facility] 909-01 Encounter: [id number]    Admission Date: 8/27/2021     Admitting Diagnosis: Lymphoma ([address] ) [C85 90]    HPI: Phil Mcdowell a 48 yo female with primary CNS lymphoma, s/p 6 cycles of high dose methotrexate and rituxan (no rituxan given with C4 due to insurance issues)  Most recent imaging had showed improvement of intracranial lesions; pt was sent to rehab to work on her strength/conditioning; she was admitted for C4 of HD MTX on 7/28  (insurance not covering rituxan)      Procedures Performed: Chemotherapy    Hospital Course:   She was admitted for [facility] HD MTX and Rituxan on 8/27  She did have mild neutropenia upon admission, was given granix with improvement  She also required IV hydration  She then received C6 of HD MTX and rituxan on 8/29,  Which she tolerated well without any issues  She cleared MTX by 9/1 and fluids and leukovorin were discontinued  1  CNS Lymphoma  - She has completed 6 cyles of HD MTX, sometimes with Rituxan (not given due to insurance constraints for 1 cycle)  - Per recent imaging, she is in CR1  - No further HD MTX treatment indicated at this time; Pt should follow up with outpatient oncologist upon discharge     2  Mobility issues  - Given her disease, prolonged hospitalizations and debility, she is a t plus-1 assist with activities; will require wheelchair to mobilize       3  GERD/Constipation/Occasional abdominal pain  - Pepcid daily  - Miralax daily PRN  - PEG tube previously removed, no issues with this; well healed  Tylenol 650mg PO Q8 hours PRN; Oxycodone 5mg Q4 hours PRN     4  Intermittent back/leg pains  - she is on bentyl TID, this helps  She is on oxycodone 5mg Q4hrs PRN for this, #30 tabs prescribed for this  5  Anxiety  - occasionally cries out, is anxious about being alone; ativan 0 5mg tab Q6 hours PRN, #10 tabs prescribed       6  History of insulin requirement  - She previously received insulin with hyperglycemia with steroids for treatment  Last A1C was 5 4, never has hx of DM2 per records, no need for medications at this         Significant Findings, Care, Treatment and Services Provided: 8/30/21 brain MRI w/wo contrast:   IMPRESSION:  1   Stable treatment related changes with multifocal nonenhancing supra and infratentorial signal abnormality  No pathologic enhancement to confirm active or recurrent tumor at this time  Continued clinical and imaging surveillance advised  2   Right frontal ventricular catheter tract with enhancement around a bowen hole craniotomy and right frontal bone, stable likely reactive /postsurgical enhancement in this region  Continued clinical and imaging surveillance advised with special   attention to the right frontal bone/bowen hole site on follow-up     3   No acute infarction, intracranial hemorrhage or mass effect  Complications: mild neutropenia, improved with granix    Discharge Diagnosis: CNS lymphoma,  In CR1    Condition at Discharge: fair     Discharge instructions/Information to patient and family:   See after visit summary for information provided to patient and family  Instructed patient and  to have follow up with oncologist in 3-4 weeks  They verbalized understanding  Provisions for Follow-Up Care:  See after visit summary for information related to follow-up care and any pertinent home health orders  Disposition: Home with  and friend    reports he will take her home to [facility] today, flight out this evening from [place]  COVID test negative  Provided note regarding current health status for this  Did not tell patient or caregivers that she is safe to fly, but did say she was medically stable and safe for discharge with plan for assistance due to mobility issues as per above  Subjective:   Pt feels great- she is ready to go home   Denies any fevers, chills, nausea, vomiting, headaches, abdominal pain, chest pain, shortness of breath, cough, or genitourinary complaints  Physical exam:   General appearance: alert and oriented, in no acute distress  Lungs: CTAB  Heart: RRR  Abdomen: soft, non-tender;  PEG site well healed  Extremities: extremities normal, warm and well-perfused; no cyanosis, clubbing, or edema  Pulses: 2+ and symmetric  Skin: Skin color, texture, turgor normal  No rashes or lesions  Neurologic: Grossly normal, alert, pleasant, smiling, very happy    Planned Readmission: No    Discharge Statement   I spent 48 minutes discharging the patient  This time was spent on the day of discharge  I had direct contact with the patient on the day of discharge  I used a [employer]  via [employer] for the entirety of my interaction to inform patient and  of discharge needs  I reviewed need for plus-1 assistance with transfers, assistance with toileting, concerns regarding her risk for falls, and reviewed her medications with them  Family friend wrote in [place] what the medications were and their indication on the bottle  Pt was discharged via wheelchair  Discharge Medications:  See after visit summary for reconciled discharge medications provided to patient and family  I did review this patient with Dr Amalia Palencia and she is in agreement with this plan        RENALDO Blandon-BC  Hematology/Oncology Nurse Practitioner

## 2021-09-09 LAB
DME PARACHUTE DELIVERY DATE ACTUAL: NORMAL
DME PARACHUTE DELIVERY DATE REQUESTED: NORMAL
DME PARACHUTE DELIVERY NOTE: NORMAL
DME PARACHUTE ITEM DESCRIPTION: NORMAL
DME PARACHUTE ORDER STATUS: NORMAL
DME PARACHUTE SUPPLIER NAME: NORMAL
DME PARACHUTE SUPPLIER PHONE: NORMAL

## 2021-09-09 NOTE — UTILIZATION REVIEW
Notification of Discharge   This is a Notification of Discharge from our facility [address]  Please be advised that this patient has been discharge from our facility  Below you will find the admission and discharge date and time including the patients disposition  UTILIZATION REVIEW CONTACT:  Ana Hui  Utilization   Network Utilization Review Department  Phone: [phone] x carefully listen to the prompts  All voicemails are confidential   Email: [email or web address]     PHYSICIAN ADVISORY SERVICES:  FOR IURH-[id number] REVIEW - MEDICAL NECESSITY DENIAL  Phone: [phone]  Fax: [phone]  Email: [email or web address]     PRESENTATION DATE: 8/27/2021 10:17 AM  OBERVATION ADMISSION DATE:   INPATIENT ADMISSION DATE: 8/27/21 10:17 AM   DISCHARGE DATE: 9/8/2021 10:00 AM  DISPOSITION: Home/Self Care Home/Self Care      IMPORTANT INFORMATION:  Send all requests for admission clinical reviews, approved or denied determinations and any other requests to dedicated fax number below belonging to the campus where the patient is receiving treatment   List of dedicated fax numbers:  [address] [place] [address] DENIALS (Administrative/Medical Necessity) [phone]   [address] [place] [address] (Maternity/NICU/Pediatrics) [phone]   Mainor Aj [phone]   Ted Burciaga [phone]   Selin Joshua [phone]   [place] [address] [phone]   [facility]  [phone]   [address] [place] [address], [facility]  2401 [facility] 1000 [facility] [phone]

## 2021-10-15 NOTE — UTILIZATION REVIEW
Initial Clinical Review    Admission: Date/Time/Statement:   Admission Orders (From admission, onward)     Ordered        08/27/21 1057  Inpatient Admission  Once                   Orders Placed This Encounter   Procedures    Inpatient Admission     Standing Status:   Standing     Number of Occurrences:   1     Order Specific Question:   Level of Care     Answer:   Med Surg [16]     Order Specific Question:   Estimated length of stay     Answer:   More than 2 Midnights     Order Specific Question:   Certification     Answer:   I certify that inpatient services are medically necessary for this patient for a duration of greater than two midnights  See H&P and MD Progress Notes for additional information about the patient's course of treatment  Initial Presentation: 48 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 Per primary oncologist, administer 3,500 mg/m2 Methotrexate with Leucovorin rescue and 375 mg/m2 Rituximab  Order was signed by Dr Keo Hernandez  § Consent was obtained and signed prior to administration of cycle 1   § PICC team consulted  Smith Arguello order for 25mg Zyprexa tablet anticipating PICC line (triple lumen) insertion  § QTC reviewed and WNL  § Chemotherapy-induced nausea and vomiting protocol in place  ? Monitor methotrexate levels daily  § Continue monitoring until MTX levels less than 0 1  ? Daily CMP, CBC, magnesium  ? Given her episode of urinary incontinence and malodorous urine, placed order for UA w reflex to culture  and placed order for machado catheter insertion    Date: 8/28 Day 2: Pt with no complaints, states she feels better than before  PICC line placed yesterday  ANC yesterday <1 5 and granix administered at [id number]   ANC improved today to 6 60, plan to recheck [address] in morning and begin treatment tomorrow provided ANC >1 5       Vital Signs: Date/Time  Temp  Pulse  Resp  BP  SpO2   08/28/21 15:31:21  99 3 °F (37 4 °C)  97  18  107/59  98 %   08/28/21 0900  98 °F (36 7 °C)  --  --  --  --   08/28/21 0811  94 5 °F (34 7 °C)Abnormal   85  17  112/83  93 %   08/27/21 18:33:09  98 1 °F (36 7 °C)  87  18  109/60  98 %   08/27/21 15:35:29  98 3 °F (36 8 °C)  79  18  128/61  98 %       Pertinent Labs/Diagnostic Test Results:     Results from last 7 days   Lab Units 08/28/21  0648 08/27/21  1438   WBC Thousand/uL 10 01 4 76   HEMOGLOBIN g/dL 11 0* 10 7*   HEMATOCRIT % 35 3 33 9*   PLATELETS Thousands/uL 220 244   NEUTROS ABS Thousands/µL 6 60 1 38*     Results from last 7 days   Lab Units 08/28/21  0648 08/27/21  1438   SODIUM mmol/L 141 139   POTASSIUM mmol/L 4 0 3 9   CHLORIDE mmol/L 105 104   CO2 mmol/L 32 30   ANION GAP mmol/L 4 5   BUN mg/dL 16 14   CREATININE mg/dL 0 43* 0 41*   EGFR ml/min/1 73sq m 116 118   CALCIUM mg/dL 9 7 9 3   MAGNESIUM mg/dL 1 9 2 0     Results from last 7 days   Lab Units 08/28/21  0648 08/27/21  1438   AST U/L 18 13   ALT U/L 24 24   ALK PHOS U/L 68 69   TOTAL PROTEIN g/dL 6 0* 6 0*   ALBUMIN g/dL 3 0* 3 1*   TOTAL BILIRUBIN mg/dL 0 14* 0 16*     Results from last 7 days   Lab Units 08/28/21  1641 08/28/21  1142 08/28/21  1126 08/28/21  0738 08/27/21  2048 08/27/21  1705   POC GLUCOSE mg/dl 120 129 180* 185* 111 89     Results from last 7 days   Lab Units 08/28/21  0648 08/27/21  1438   GLUCOSE RANDOM mg/dL 119 84       Results from last 7 days   Lab Units 08/27/21  1502   CLARITY UA  Clear   COLOR UA  Yellow   SPEC GRAV UA  1 006   PH UA  8 0   GLUCOSE UA mg/dl Negative   KETONES UA mg/dl Negative   BLOOD UA  Negative   PROTEIN UA mg/dl Negative   NITRITE UA  Negative   BILIRUBIN UA  Negative   UROBILINOGEN UA E U /dl 0 2   LEUKOCYTES UA  Trace*   WBC UA /hpf 2-4   RBC UA /hpf None Seen   BACTERIA UA /hpf Occasional   EPITHELIAL CELLS WET PREP /hpf None Seen         History reviewed  No pertinent past medical history    Present on Admission:   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[facility])      Admitting Diagnosis: Lymphoma ([facility] [address] ) [C85 90]  Age/Sex: 49 y o  female  Admission Orders:  Scheduled Medications:  atorvastatin, 10 mg, Oral, Daily With Dinner  dicyclomine, 10 mg, Oral, 4x Daily (AC & HS)  docusate sodium, 100 mg, Oral, BID  enoxaparin, 40 mg, Subcutaneous, Daily  [START ON 8/30/2021] ergocalciferol, 50,000 Units, Oral, Weekly  famotidine, 20 mg, Oral, Daily  folic acid, 1 mg, Oral, Daily  gabapentin, 200 mg, Oral, HS  insulin lispro, 1-5 Units, Subcutaneous, TID AC  insulin lispro, 1-5 Units, Subcutaneous, HS  methotrexate (PF) IVPB, 5,000 mg, Intravenous, Once  ondansetron with dexamethasone IVPB, , Intravenous, Once  sertraline, 25 mg, Oral, Daily      Continuous IV Infusions:  sodium bicarbonate infusion, 150 mL/hr, Intravenous, Continuous      PRN Meds:  acetaminophen, 488 mg, Oral, Q6H PRN  oxyCODONE, 5 mg, Oral, Q4H PRN 8/28 x2  polyethylene glycol, 17 g, Oral, Daily PRN  sodium chloride, 20 mL/hr, Intravenous, Once PRN        IP CONSULT TO PICC TEAM  IP CONSULT TO CASE MANAGEMENT    Network Utilization Review Department  ATTENTION: Please call with any questions or concerns to [phone] and carefully listen to the prompts so that you are directed to the right person  All voicemails are confidential   Katie Heard all requests for admission clinical reviews, approved or denied determinations and any other requests to dedicated fax number below belonging to the campus where the patient is receiving treatment   List of dedicated fax numbers for the Facilities:  [address] DENIALS (Administrative/Medical Necessity) [phone]   [address] (Maternity/NICU/Pediatrics) 261 [facility],[address] [address] [place] [address] [place] [address] [place] [id number] [place] [address] Shelby Lucero [address]81 [address] [facility] [phone] Never

## 2021-10-29 ENCOUNTER — TELEPHONE (OUTPATIENT)
Dept: CASE MANAGEMENT | Facility: HOSPITAL | Age: 56
End: 2021-10-29

## 2022-06-11 NOTE — SPEECH THERAPY NOTE
Good nutrition is important when healing from an illness, injury, or surgery. Follow any nutrition recommendations given to you during your hospital stay. If you were given an oral nutrition supplement while in the hospital, continue to take this supplement at home. You can take it with meals, in-between meals, and/or before bedtime. These supplements can be purchased at most local grocery stores, pharmacies, and chain [employer]-stores. If you have any questions about your diet or nutrition, call the hospital and ask for the dietitian. Resume general diet. 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[address] )  Active Problems:    Altered mental status    Dysphagia    Urinary retention    Aphasia    Weakness    Presence of permanent central venous catheter    Fracture of ramus of left pubis (HCC)    Severe protein-calorie malnutrition ([facility] [address] )       Past Medical History  History reviewed  No pertinent past medical history  Past Surgical History  Past Surgical History:   Procedure Laterality Date    IR PICC REPOSITION  4/27/2021    IR TUNNELED CENTRAL LINE REMOVAL  4/27/2021         Subjective:  Patient is awake and alert   and son are visiting  Son assists with translation  Objective: The patient is seen for f/u dysphagia therapy at lunch meal  PCA reports patient coughing with thin liquids and diet was downgraded to nectar thick   is feeding patient  She is assessed with mac and cheese with chopped carrots  Mastication time is prolonged, but efficient  No oral residue observed  However, patient with cough after mac and cheese and is observed pointing to chest  Patient is somewhat gurgly and O2 drops to 85%  Staff is notified and comes to bedside  At this time, patient's O2 are back to 96%  She complains of feeling food sticking when swallowing  She tolerates small tsp puree and small sips of nectar thick and thin liquids without difficulty  With time and sips, the patient reports that bolus has cleared  Patient is stable when session is over  Assessment:  Patient observed with choking episode on mechanical soft solids  Plan/Recommendations:  Recommend VBS to assess for aspiration  However, after discussion with MD, will wait family meeting decision and goals of care  For this time, will place patient on most conservative diet of puree and honey thick liquids  ST will continue to further assess

## 2023-02-10 NOTE — QUICK NOTE
Patient feels fine  She tolerated the chemotherapy very well  Methotrexate level is pending  Will continue IV hydration, bicarbo drip and monitoring the urine PH  Lab daily      Jasen Ryan MD
The patient was seen and examined by me today  Clinically she is stable  She is going to be discharged back to nursing home  I agree with nurse practitioner's discharge summary 
Ambulatory

## 2024-02-12 NOTE — TELEPHONE ENCOUNTER
Caller: Marcio Ortez    Relationship to patient: Self    Best call back number: [phone]     Chief complaint: NEEDS TO RESCHEDULE LAB FOR PHYSICAL     Type of visit: LAB    Requested date: WEEK OF TRAV 3RD FOR JUNE 10TH PHYSICAL    If rescheduling, when is the original appointment: ORIGINAL WAS 4-23-24     Spoke to SAKSHI Reza () at [facility] to setup transport for patient on 8/11 at [address] [place] [address] for inpatient chemo treatment at [facility] HUMPHREY Rodrigues took down the information and stated she will set it up

## 2024-03-11 NOTE — PROGRESS NOTES
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  She has had 2 treatments with high-dose methotrexate/Rituxan in April and mid May 2021  Brain MRI on June 9th showed responding disease  The patient is currently at [facility]  By family report she is making progress  She is able to walk with assistance  She is able to feed herself  She had not been able to do these things prior to treatment initiation  She has moderate anemia, most recent hemoglobin 9 9, gradually improving  Normal white count and platelets  BMP is normal   I reviewed the above situation with the patient and her family  They are hoping to get her back to [facility] where she has more family support  Fortunately, she is making progress both radiographically and, more importantly, clinically  Based on all of this we will proceed with further chemotherapy and rehab  Hopefully she will be good enough to travel back to [facility] sometime in the late summer/early fall  The patient and her family voiced understanding above discussion  Translation was provided by family friend, Mr Odalys Mixon  I discussed the above with the patient    The patient  voiced understanding and agreement   ______________________________________________________________________    Chief Complaint   Patient presents with    Follow-up       HPI:  Oncology History   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[address] )   4/23/2021 Initial Diagnosis    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[address] )     4/27/2021 -  Chemotherapy    leucovorin (WELLCOVORIN) tablet 25 mg, 25 mg, Oral, Every 6 hours, 2 of 8 cycles  Administration: 25 mg (5/1/2021), 25 mg (5/1/2021), 25 mg (5/1/2021), 25 mg (5/1/2021), 25 mg (5/2/2021), 25 mg (5/2/2021), 25 mg (5/16/2021), 25 mg (5/16/2021), 25 mg (5/16/2021), 25 mg (5/17/2021), 25 mg (5/17/2021), 25 mg (5/17/2021)  methotrexate (PF) 5,000 mg in sodium chloride 0 9 % 1,000 mL IVPB, 4,935 mg, Intravenous, Once, 2 of 8 cycles  Administration: 5,000 mg (4/28/2021)  riTUXimab (RITUXAN) 500 mg in sodium chloride 0 9 % 200 mL first titrated chemo infusion, 528 8 mg, Intravenous, Once, 2 of 3 cycles  Administration: 500 mg (4/27/2021), 500 mg (5/13/2021)  leucovorin calcium 25 mg in sodium chloride 0 9 % 50 mL, 25 mg (100 % of original dose 25 mg), Intravenous, Every 6 hours, 2 of 8 cycles  Dose modification: 25 mg (original dose 25 mg, Cycle 1, Reason: Other (Must fill in a comment))  Administration: 25 mg (4/29/2021), 25 mg (4/29/2021), 25 mg (4/30/2021), 25 mg (4/30/2021), 25 mg (4/30/2021), 25 mg (4/30/2021)         Interval History:  Gradually improving  3-symptomatic, greater than 50% sedentary  Review of Systems   Constitutional: Positive for fatigue  Negative for appetite change, diaphoresis and fever  HENT: Negative for sinus pain  Eyes: Negative for discharge  Respiratory: Negative for cough and shortness of breath  Cardiovascular: Negative for chest pain  Gastrointestinal: Negative for abdominal pain, constipation and diarrhea  Endocrine: Negative for cold intolerance  Genitourinary: Negative for difficulty urinating and hematuria  Musculoskeletal: Positive for arthralgias (Left knee pain  )  Negative for joint swelling  Skin: Negative for rash  Allergic/Immunologic: Negative for environmental allergies  Neurological: Negative for dizziness and headaches  Hematological: Negative for adenopathy  Psychiatric/Behavioral: Negative for agitation  No past medical history on file    Patient Active Problem List   Diagnosis    CNS lymphoma ([address] )    Altered mental status    Dysphagia    Aphasia    Weakness    Fracture of ramus of left pubis (HCC)    Severe protein-calorie malnutrition ([address] )    Cancer related pain       Current Outpatient Medications:     acyclovir (ZOVIRAX) 200 mg capsule, Take 2 capsules (400 mg total) by mouth every 12 (twelve) hours, Disp: , Rfl: 0    albuterol (2 5 mg/3 mL) 0 083 % nebulizer solution, Inhale 2 5 mg, Disp: , Rfl:     albuterol (PROVENTIL HFA,VENTOLIN HFA) 90 mcg/act inhaler, Inhale 2 puffs, Disp: , Rfl:     allopurinol (ZYLOPRIM) 100 mg tablet, Take 1 tablet (100 mg total) by mouth daily, Disp: , Rfl: 0    aluminum-magnesium hydroxide-simethicone (MYLANTA) 200-200-20 mg/5 mL suspension, 30 mL by Per PEG Tube route every 4 (four) hours as needed for indigestion or heartburn, Disp: 355 mL, Rfl: 0    amLODIPine (NORVASC) 5 mg tablet, Take 1 tablet (5 mg total) by mouth daily, Disp: , Rfl: 0    bisacodyl (DULCOLAX) 10 mg suppository, Insert 1 suppository (10 mg total) into the rectum daily as needed (Third line for constipation), Disp: 12 suppository, Rfl: 0    Diclofenac Sodium (VOLTAREN) 1 %, Apply 2 g topically 3 (three) times a day as needed (knee nacho), Disp: , Rfl: 0    dicyclomine (BENTYL) 10 mg capsule, Take 1 capsule (10 mg total) by mouth 4 (four) times a day (before meals and at bedtime), Disp: , Rfl: 0    enoxaparin (LOVENOX) 40 mg/0 4 mL, Inject 0 4 mL (40 mg total) under the skin every 24 hours, Disp: , Rfl: 0    ERROR: CANNOT USE RATIO BASED PRESCRIPTION MIXTURE NAMING FOR A NON-MIXTURE, Take 10 mL (20 mg total) by mouth daily, Disp: , Rfl: 0    famotidine (PEPCID) 20 mg tablet, Take 20 mg by mouth daily, Disp: , Rfl:     fluconazole (DIFLUCAN) 200 mg tablet, Take 2 tablets (400 mg total) by mouth daily, Disp: , Rfl: 0    gabapentin (NEURONTIN) 250 mg/5 mL solution, Take 2 mL (100 mg total) by mouth daily at bedtime, Disp: , Rfl: 0    hydrOXYzine HCL (ATARAX) 25 mg tablet, Take 1 tablet (25 mg total) by mouth every 6 (six) hours as needed for itching or anxiety, Disp: 30 tablet, Rfl: 0    insulin lispro (HumaLOG) 100 units/mL injection, Inject 1-5 Units under the skin 4 (four) times a day (before meals and at bedtime), Disp: , Rfl: 0    levofloxacin (LEVAQUIN) 500 mg tablet, Take 1 tablet (500 mg total) by mouth every 24 hours, Disp: , Rfl: 0    Lidocaine 4 % PTCH, Place 1 patch on the skin daily, Disp: , Rfl:     menthol-methyl salicylate (BENGAY) 72-64 % cream, Apply topically 2 (two) times a day, Disp: , Rfl: 0    ondansetron (ZOFRAN-ODT) 4 mg disintegrating tablet, Take 1 tablet (4 mg total) by mouth every 6 (six) hours as needed for nausea or vomiting, Disp: 20 tablet, Rfl: 0    polyethylene glycol (MIRALAX) 17 g packet, Take 17 g by mouth daily as needed (Second line for constipation), Disp: , Rfl: 0    senna-docusate sodium (Senokot S) 8 6-50 mg per tablet, Take 1 tablet by mouth 2 (two) times a day, Disp: , Rfl:     tamsulosin (FLOMAX) 0 4 mg, Take 1 capsule (0 4 mg total) by mouth daily with dinner, Disp: , Rfl: 0  No Known Allergies  Past Surgical History:   Procedure Laterality Date    IR PICC REPOSITION  4/27/2021    IR TUNNELED CENTRAL LINE REMOVAL  4/27/2021     Social History     Objective:  Vitals:    07/08/21 1027   BP: 116/72   BP Location: Right arm   Patient Position: Sitting   Pulse: 97   Resp: 17   Temp: 98 °F (36 7 °C)   TempSrc: Tympanic   SpO2: 98%     Physical Exam  Constitutional:       Appearance: She is well-developed  HENT:      Head: Normocephalic and atraumatic  Eyes:      Pupils: Pupils are equal, round, and reactive to light  Cardiovascular:      Rate and Rhythm: Normal rate  Heart sounds: No murmur heard  Pulmonary:      Effort: No respiratory distress  Breath sounds: No wheezing or rales  Abdominal:      General: There is no distension  Palpations: Abdomen is soft  Tenderness: There is no abdominal tenderness  There is no rebound  Musculoskeletal:         General: Swelling (About the left knee ) present  No tenderness  Cervical back: Neck supple  Lymphadenopathy:      Cervical: No cervical adenopathy     Skin: General: Skin is warm  Findings: No rash  Neurological:      Mental Status: She is alert and oriented to person, place, and time  Deep Tendon Reflexes: Reflexes normal    Psychiatric:         Thought Content: Thought content normal            Labs: I personally reviewed the labs and imaging pertinent to this patient care  4 (moderate pain)

## 2024-08-11 NOTE — ASSESSMENT & PLAN NOTE
Patient is having episodes of crying and feeling of depression  No suicidal ideations  Plan:  · Will start patient on sertraline 25 mg daily  lower leg pain/injury

## 2024-09-22 NOTE — UTILIZATION REVIEW
Continued Stay Review    Date: 5/28/21                         Current Patient Class: IP  Current Level of Care: MS    HPI:56 y o  female initially admitted on 4/23 with primary CNS lymphoma has received chemo and is on steroid taper  Has PEG tube  Assessment/Plan:   Pt continues to deny pain  She remains on Prednisone 2 5mg daily  Yesterday a nurse who speaks the same language as the patient had a conversation with her  She was A&O x 3  She said she was having 10/10 pain that remained after nurse medicated her with 2 types of analgesia  Pt states she cannot live like this and wishes to die  Was a discussion with oncology and plan is for rehab post discharge and pt will not need RT and no chemo for at least 3 weeks   was contacted by phone and he wants everything done for the patient  CM continues to work on finding placement that will accept pt's insurance  A possible facility has been identified and if they would accept the patient a bed will not be available until Tuesday, 6/1        Vital Signs:   05/28/21 08:03:05  97 5 °F (36 4 °C)  85  18  100/65  77  98 %  --   05/27/21 2259  --  --  --  --  --  --  None (Room air)   05/27/21 22:17:56  98 8 °F (37 1 °C)  84  18  107/69  82  100 %  --   05/27/21 2000  --  --  --  --  --  99 %  --   05/27/21 14:29:26  98 7 °F (37 1 °C)  99  18  107/69  82  98 %  --   05/27/21 0920  --  --  --  --  --  --  None (Room air)   05/27/21 0740  --  --  --  --  --  --  None (Room air)   05/27/21 07:21:07  --  94  19  107/67  80  100 %  --   05/26/21 23:02:22  96 7 °F (35 9 °C)Abnormal   82  16  105/69  81  100 %  --   05/26/21 1945  --  --  --  --  --  --  None (Room air)   05/26/21 14:33:57  98 9 °F (37 2 °C)  87  12  96/60  72  100 %  --   05/26/21 0840  --  98  --  --  --  --  --   05/26/21 07:09:47  99 °F (37 2 °C)  --  16  99/61  74  --  --     Pertinent Labs/Diagnostic Results:       Results from last 7 days   Lab Units 05/25/21  0505 05/23/21  0519   WBC Thousand/uL 6 76 5 82   HEMOGLOBIN g/dL 8 8* 8 0*   HEMATOCRIT % 28 4* 25 3*   PLATELETS Thousands/uL 117* 101*   BANDS PCT %  --  8         Results from last 7 days   Lab Units 05/27/21  0558 05/25/21  0505 05/23/21  0519   SODIUM mmol/L 135* 132* 130*   POTASSIUM mmol/L 4 4 4 4 4 2   CHLORIDE mmol/L 101 97* 95*   CO2 mmol/L 27 29 27   ANION GAP mmol/L 7 6 8   BUN mg/dL 21 19 21   CREATININE mg/dL 0 30* 0 25* 0 27*   EGFR ml/min/1 73sq m 130 138 135   CALCIUM mg/dL 9 4 9 7 9 1         Results from last 7 days   Lab Units 05/28/21  1044 05/28/21  0809 05/27/21  2035 05/27/21  1636 05/27/21  1144 05/27/21  0724 05/26/21  2032 05/26/21  1633 05/26/21  1111 05/26/21  0820 05/25/21  2111 05/25/21  1711   POC GLUCOSE mg/dl 140 140 165* 112 160* 153* 152* 137 159* 126 152* 161*     Results from last 7 days   Lab Units 05/27/21  0558 05/25/21  0505 05/23/21  0519   GLUCOSE RANDOM mg/dL 108 102 119     Medications:   Scheduled Medications:  acetaminophen, 650 mg, Oral, Q8H MEÑO  acyclovir, 400 mg, Oral, Q12H MEÑO  allopurinol, 100 mg, Oral, Daily  amLODIPine, 5 mg, Oral, Daily  dicyclomine, 10 mg, Oral, 4x Daily (AC & HS)  enoxaparin, 40 mg, Subcutaneous, Q24H MEÑO  fluconazole, 400 mg, Oral, Daily  gabapentin, 100 mg, Oral, HS  insulin glargine, 7 Units, Subcutaneous, HS  insulin lispro, 1-5 Units, Subcutaneous, 4x Daily (AC & HS)  levofloxacin, 500 mg, Oral, Q24H  lidocaine, 1 patch, Topical, Daily  menthol-methyl salicylate, , Apply externally, BID  omeprazole (PRILOSEC) suspension 2 mg/mL, 20 mg, Oral, Daily  predniSONE, 2 5 mg, Oral, Daily  tamsulosin, 0 4 mg, Oral, Daily With Dinner      Continuous IV Infusions:     PRN Meds:  albuterol, 2 puff, Inhalation, Q6H PRN  aluminum-magnesium hydroxide-simethicone, 30 mL, Per PEG Tube, Q4H PRN  bisacodyl, 10 mg, Rectal, Daily PRN  HYDROmorphone, 0 5 mg, Intravenous, Q4H PRN - x 1 5/27, x 3 5/27  morphine injection, 2 mg, Intravenous, Q4H PRN - x 4 5/27, x 3 5/28  ondansetron, 4 mg, Intravenous, Q6H PRN  polyethylene glycol, 17 g, Oral, Daily PRN  senna-docusate sodium, 1 tablet, Oral, BID PRN  sodium chloride, 20 mL/hr, Intravenous, Once PRN  sodium chloride, 20 mL/hr, Intravenous, Once PRN  sodium chloride, 20 mL/hr, Intravenous, Once PRN    Discharge Plan: rehab     Network Utilization Review Department  ATTENTION: Please call with any questions or concerns to [phone] and carefully listen to the prompts so that you are directed to the right person  All voicemails are confidential   Katheryn Lawton all requests for admission clinical reviews, approved or denied determinations and any other requests to dedicated fax number below belonging to the campus where the patient is receiving treatment   List of dedicated fax numbers for the Facilities:  [address] DENIALS (Administrative/Medical Necessity) [phone]   [address] (Maternity/NICU/Pediatrics) [phone]   [address] [place] [address] [facility] [place] [address] [id number] [place] [address] [address] [facility] [facility] [phone] Pt does not have capacity for complex medical decision making   Surrogate: wife  Code status: DNR/DNI   GOC: CMO see note from 9/16 for further details